# Patient Record
Sex: MALE
[De-identification: names, ages, dates, MRNs, and addresses within clinical notes are randomized per-mention and may not be internally consistent; named-entity substitution may affect disease eponyms.]

---

## 2022-12-02 ENCOUNTER — NURSE TRIAGE (OUTPATIENT)
Dept: OTHER | Facility: CLINIC | Age: 62
End: 2022-12-02

## 2022-12-02 NOTE — TELEPHONE ENCOUNTER
Received call from Kavon at [facility] with [employer]. Subjective: Caller states \"Would like to establish care\"     Current Symptoms: SOB - not new and feeling better than a couple weeks ago  Was told many years ago that his \"lungs are no good\"  Has not seen a provider in about 10 years    Onset:  Been a long time, gradual onset for >1 year    Pain Severity: 0/10    Temperature: Patient is reported to not have a fever. Also denies chills and sweats     What has been tried: Nothing    History related to the current reason for call: See above  Smoker    Recommended disposition: See in Office Within 2 Weeks    Care advice provided, patient verbalizes understanding; denies any other questions or concerns; instructed to call back for any new or worsening symptoms. Patient/Caller agrees with recommended disposition; writer provided warm transfer to [facility] at [facility] for appointment scheduling     Attention Provider: Thank you for allowing me to participate in the care of your patient. The patient was connected to triage in response to information provided to the ECC/PSC. Please do not respond through this encounter as the response is not directed to a shared pool.       Reason for Disposition   Breathing difficulty   [1] MILD longstanding difficulty breathing AND [2]  SAME as normal    Protocols used: Respiratory Multiple Symptoms - Guideline Selection-ADULT-, Breathing Difficulty-ADULT-AH

## 2022-12-20 ENCOUNTER — TELEPHONE (OUTPATIENT)
Dept: INTERNAL MEDICINE CLINIC | Age: 62
End: 2022-12-20

## 2022-12-20 NOTE — TELEPHONE ENCOUNTER
----- Message from Susankarina Vikram sent at 12/19/2022  9:56 AM EST -----  Subject: Appointment Request    Reason for Call: New Patient/New to Provider Appointment needed: New   Patient Request Appointment    QUESTIONS    Reason for appointment request? Requested Provider unavailable - iRck Hillman     Additional Information for Provider? PT had a return from [place] appt set with   PCP on 12/12/22 that was canceled due to transportation. Still has   shortness of breathe and would like to get that appt rescheduled to est   care with PCP. PT also is feeling a lot better from when that appt was   set. All due and missed due to transportation.  Please contact PT to   reschedule appt.  ---------------------------------------------------------------------------  --------------  Charles CHING  [phone]; OK to leave message on voicemail  ---------------------------------------------------------------------------  --------------  SCRIPT ANSWERS  NATACHAID Screen: Felicitas Mcadams

## 2022-12-20 NOTE — TELEPHONE ENCOUNTER
Multiple attempts made to reach patient, phone number disconnected no alternative numbers listed and no patient contacts available.

## 2023-01-01 ENCOUNTER — HOSPITAL ENCOUNTER (INPATIENT)
Age: 63
LOS: 17 days | Discharge: NURSING, REHAB OR LONG-TERM CARE | DRG: 139 | End: 2023-01-19
Attending: EMERGENCY MEDICINE | Admitting: INTERNAL MEDICINE
Payer: MEDICAID

## 2023-01-01 ENCOUNTER — APPOINTMENT (OUTPATIENT)
Dept: GENERAL RADIOLOGY | Age: 63
DRG: 139 | End: 2023-01-01
Payer: MEDICAID

## 2023-01-01 ENCOUNTER — APPOINTMENT (OUTPATIENT)
Dept: CT IMAGING | Age: 63
DRG: 139 | End: 2023-01-01
Payer: MEDICAID

## 2023-01-01 DIAGNOSIS — J96.02 ACUTE RESPIRATORY FAILURE WITH HYPOXIA AND HYPERCAPNIA (HCC): Primary | ICD-10-CM

## 2023-01-01 DIAGNOSIS — R41.82 ALTERED MENTAL STATUS, UNSPECIFIED ALTERED MENTAL STATUS TYPE: ICD-10-CM

## 2023-01-01 DIAGNOSIS — J18.9 COMMUNITY ACQUIRED PNEUMONIA OF RIGHT LOWER LOBE OF LUNG: ICD-10-CM

## 2023-01-01 DIAGNOSIS — J96.01 ACUTE RESPIRATORY FAILURE WITH HYPOXIA AND HYPERCAPNIA (HCC): Primary | ICD-10-CM

## 2023-01-01 LAB
ABSOLUTE EOS #: 0 K/UL (ref 0–0.4)
ABSOLUTE LYMPH #: 0.8 K/UL (ref 1–4.8)
ABSOLUTE MONO #: 0.5 K/UL (ref 0.1–1.3)
ALBUMIN SERPL-MCNC: 3.9 G/DL (ref 3.5–5.2)
ALP BLD-CCNC: 68 U/L (ref 40–129)
ALT SERPL-CCNC: 525 U/L (ref 5–41)
ANION GAP SERPL CALCULATED.3IONS-SCNC: 12 MMOL/L (ref 9–17)
AST SERPL-CCNC: 718 U/L
BACTERIA: ABNORMAL
BASOPHILS # BLD: 1 % (ref 0–2)
BASOPHILS ABSOLUTE: 0.1 K/UL (ref 0–0.2)
BILIRUB SERPL-MCNC: 1.1 MG/DL (ref 0.3–1.2)
BILIRUBIN DIRECT: 0.9 MG/DL
BILIRUBIN URINE: ABNORMAL
BILIRUBIN, INDIRECT: 0.2 MG/DL (ref 0–1)
BUN BLDV-MCNC: 46 MG/DL (ref 8–23)
CALCIUM SERPL-MCNC: 7.9 MG/DL (ref 8.6–10.4)
CASTS UA: ABNORMAL /LPF
CHLORIDE BLD-SCNC: 97 MMOL/L (ref 98–107)
CO2: 29 MMOL/L (ref 20–31)
COLOR: ABNORMAL
CREAT SERPL-MCNC: 1.77 MG/DL (ref 0.7–1.2)
EOSINOPHILS RELATIVE PERCENT: 0 % (ref 0–4)
EPITHELIAL CELLS UA: ABNORMAL /HPF
GFR SERPL CREATININE-BSD FRML MDRD: 43 ML/MIN/1.73M2
GLUCOSE BLD-MCNC: 96 MG/DL (ref 70–99)
GLUCOSE URINE: NEGATIVE
HCT VFR BLD CALC: 41.4 % (ref 41–53)
HEMOGLOBIN: 12.1 G/DL (ref 13.5–17.5)
INFLUENZA A: NOT DETECTED
INFLUENZA B: NOT DETECTED
INR BLD: 2.3
KETONES, URINE: ABNORMAL
LEUKOCYTE ESTERASE, URINE: ABNORMAL
LIPASE: 17 U/L (ref 13–60)
LYMPHOCYTES # BLD: 12 % (ref 24–44)
MAGNESIUM: 2.1 MG/DL (ref 1.6–2.6)
MCH RBC QN AUTO: 20.2 PG (ref 26–34)
MCHC RBC AUTO-ENTMCNC: 29.2 G/DL (ref 31–37)
MCV RBC AUTO: 69.1 FL (ref 80–100)
MONOCYTES # BLD: 8 % (ref 1–7)
NITRITE, URINE: NEGATIVE
PARTIAL THROMBOPLASTIN TIME: 26.5 SEC (ref 24–36)
PDW BLD-RTO: 19.8 % (ref 11.5–14.9)
PH UA: 5 (ref 5–8)
PHOSPHORUS: 4.3 MG/DL (ref 2.5–4.5)
PLATELET # BLD: 55 K/UL (ref 150–450)
PMV BLD AUTO: 8.6 FL (ref 6–12)
POTASSIUM SERPL-SCNC: 4.8 MMOL/L (ref 3.7–5.3)
PRO-BNP: 7797 PG/ML
PROTEIN UA: ABNORMAL
PROTHROMBIN TIME: 24.9 SEC (ref 11.8–14.6)
RBC # BLD: 5.99 M/UL (ref 4.5–5.9)
RBC UA: ABNORMAL /HPF
SARS-COV-2 RNA, RT PCR: NOT DETECTED
SEG NEUTROPHILS: 79 % (ref 36–66)
SEGMENTED NEUTROPHILS ABSOLUTE COUNT: 5.4 K/UL (ref 1.3–9.1)
SODIUM BLD-SCNC: 138 MMOL/L (ref 135–144)
SOURCE: NORMAL
SPECIFIC GRAVITY UA: 1.02 (ref 1–1.03)
SPECIMEN DESCRIPTION: NORMAL
TOTAL PROTEIN: 6.6 G/DL (ref 6.4–8.3)
TROPONIN, HIGH SENSITIVITY: 177 NG/L (ref 0–22)
TURBIDITY: ABNORMAL
URINE HGB: ABNORMAL
UROBILINOGEN, URINE: ABNORMAL
WBC # BLD: 6.8 K/UL (ref 3.5–11)
WBC UA: ABNORMAL /HPF

## 2023-01-01 PROCEDURE — 87636 SARSCOV2 & INF A&B AMP PRB: CPT

## 2023-01-01 PROCEDURE — 84100 ASSAY OF PHOSPHORUS: CPT

## 2023-01-01 PROCEDURE — 71045 X-RAY EXAM CHEST 1 VIEW: CPT

## 2023-01-01 PROCEDURE — 93005 ELECTROCARDIOGRAM TRACING: CPT | Performed by: EMERGENCY MEDICINE

## 2023-01-01 PROCEDURE — 36415 COLL VENOUS BLD VENIPUNCTURE: CPT

## 2023-01-01 PROCEDURE — 85730 THROMBOPLASTIN TIME PARTIAL: CPT

## 2023-01-01 PROCEDURE — 70450 CT HEAD/BRAIN W/O DYE: CPT

## 2023-01-01 PROCEDURE — 80048 BASIC METABOLIC PNL TOTAL CA: CPT

## 2023-01-01 PROCEDURE — 83735 ASSAY OF MAGNESIUM: CPT

## 2023-01-01 PROCEDURE — 2580000003 HC RX 258: Performed by: EMERGENCY MEDICINE

## 2023-01-01 PROCEDURE — 6360000002 HC RX W HCPCS: Performed by: EMERGENCY MEDICINE

## 2023-01-01 PROCEDURE — 80076 HEPATIC FUNCTION PANEL: CPT

## 2023-01-01 PROCEDURE — 81001 URINALYSIS AUTO W/SCOPE: CPT

## 2023-01-01 PROCEDURE — 85025 COMPLETE CBC W/AUTO DIFF WBC: CPT

## 2023-01-01 PROCEDURE — 96375 TX/PRO/DX INJ NEW DRUG ADDON: CPT

## 2023-01-01 PROCEDURE — 36600 WITHDRAWAL OF ARTERIAL BLOOD: CPT

## 2023-01-01 PROCEDURE — 0202U NFCT DS 22 TRGT SARS-COV-2: CPT

## 2023-01-01 PROCEDURE — 83690 ASSAY OF LIPASE: CPT

## 2023-01-01 PROCEDURE — 84484 ASSAY OF TROPONIN QUANT: CPT

## 2023-01-01 PROCEDURE — 99285 EMERGENCY DEPT VISIT HI MDM: CPT

## 2023-01-01 PROCEDURE — 96365 THER/PROPH/DIAG IV INF INIT: CPT

## 2023-01-01 PROCEDURE — 80307 DRUG TEST PRSMV CHEM ANLYZR: CPT

## 2023-01-01 PROCEDURE — 83880 ASSAY OF NATRIURETIC PEPTIDE: CPT

## 2023-01-01 PROCEDURE — 85610 PROTHROMBIN TIME: CPT

## 2023-01-01 RX ORDER — FUROSEMIDE 10 MG/ML
20 INJECTION INTRAMUSCULAR; INTRAVENOUS ONCE
Status: COMPLETED | OUTPATIENT
Start: 2023-01-01 | End: 2023-01-01

## 2023-01-01 RX ADMIN — CEFTRIAXONE SODIUM 1000 MG: 1 INJECTION, POWDER, FOR SOLUTION INTRAMUSCULAR; INTRAVENOUS at 23:04

## 2023-01-01 RX ADMIN — FUROSEMIDE 20 MG: 10 INJECTION, SOLUTION INTRAMUSCULAR; INTRAVENOUS at 22:29

## 2023-01-01 RX ADMIN — AZITHROMYCIN DIHYDRATE 500 MG: 500 INJECTION, POWDER, LYOPHILIZED, FOR SOLUTION INTRAVENOUS at 23:49

## 2023-01-02 ENCOUNTER — APPOINTMENT (OUTPATIENT)
Dept: CT IMAGING | Age: 63
DRG: 139 | End: 2023-01-02
Payer: MEDICAID

## 2023-01-02 ENCOUNTER — APPOINTMENT (OUTPATIENT)
Dept: GENERAL RADIOLOGY | Age: 63
DRG: 139 | End: 2023-01-02
Payer: MEDICAID

## 2023-01-02 ENCOUNTER — APPOINTMENT (OUTPATIENT)
Dept: ULTRASOUND IMAGING | Age: 63
DRG: 139 | End: 2023-01-02
Payer: MEDICAID

## 2023-01-02 PROBLEM — J96.01 ACUTE RESPIRATORY FAILURE WITH HYPOXIA AND HYPERCAPNIA (HCC): Status: ACTIVE | Noted: 2023-01-02

## 2023-01-02 PROBLEM — D69.6 THROMBOCYTOPENIA (HCC): Status: ACTIVE | Noted: 2023-01-02

## 2023-01-02 PROBLEM — R45.1 AGITATION: Status: ACTIVE | Noted: 2023-01-02

## 2023-01-02 PROBLEM — D64.9 ANEMIA: Status: ACTIVE | Noted: 2023-01-02

## 2023-01-02 PROBLEM — J18.9 COMMUNITY ACQUIRED PNEUMONIA OF RIGHT LOWER LOBE OF LUNG: Status: ACTIVE | Noted: 2023-01-02

## 2023-01-02 PROBLEM — J96.00 ACUTE RESPIRATORY FAILURE (HCC): Status: ACTIVE | Noted: 2023-01-02

## 2023-01-02 PROBLEM — J96.02 ACUTE RESPIRATORY FAILURE WITH HYPOXIA AND HYPERCAPNIA (HCC): Status: ACTIVE | Noted: 2023-01-02

## 2023-01-02 PROBLEM — R41.82 ALTERED MENTAL STATUS: Status: ACTIVE | Noted: 2023-01-02

## 2023-01-02 PROBLEM — R74.01 TRANSAMINITIS: Status: ACTIVE | Noted: 2023-01-02

## 2023-01-02 LAB
ABSOLUTE EOS #: 0 K/UL (ref 0–0.4)
ABSOLUTE LYMPH #: 0.67 K/UL (ref 1–4.8)
ABSOLUTE MONO #: 0.45 K/UL (ref 0.1–1.3)
ABSOLUTE RETIC #: 0.16 M/UL (ref 0.02–0.1)
ALBUMIN SERPL-MCNC: 3.3 G/DL (ref 3.5–5.2)
ALLEN TEST: ABNORMAL
ALLEN TEST: ABNORMAL
ALP BLD-CCNC: 59 U/L (ref 40–129)
ALT SERPL-CCNC: 449 U/L (ref 5–41)
AMMONIA: 29 UMOL/L (ref 16–60)
AMPHETAMINE SCREEN URINE: NEGATIVE
ANION GAP SERPL CALCULATED.3IONS-SCNC: 10 MMOL/L (ref 9–17)
AST SERPL-CCNC: 559 U/L
BARBITURATE SCREEN URINE: NEGATIVE
BASOPHILS # BLD: 1 % (ref 0–2)
BASOPHILS ABSOLUTE: 0.06 K/UL (ref 0–0.2)
BENZODIAZEPINE SCREEN, URINE: NEGATIVE
BILIRUB SERPL-MCNC: 0.8 MG/DL (ref 0.3–1.2)
BILIRUBIN URINE: NEGATIVE
BUN BLDV-MCNC: 46 MG/DL (ref 8–23)
C-REACTIVE PROTEIN: 16.4 MG/L (ref 0–5)
CALCIUM SERPL-MCNC: 7.5 MG/DL (ref 8.6–10.4)
CANNABINOID SCREEN URINE: NEGATIVE
CARBOXYHEMOGLOBIN: 2.1 % (ref 0–5)
CARBOXYHEMOGLOBIN: 3.1 % (ref 0–5)
CARBOXYHEMOGLOBIN: 4.3 % (ref 0–5)
CASTS UA: NORMAL /LPF
CASTS UA: NORMAL /LPF
CHLORIDE BLD-SCNC: 99 MMOL/L (ref 98–107)
CO2: 27 MMOL/L (ref 20–31)
COCAINE METABOLITE, URINE: NEGATIVE
COLOR: ABNORMAL
CREAT SERPL-MCNC: 1.5 MG/DL (ref 0.7–1.2)
D-DIMER QUANTITATIVE: 6.7 MG/L FEU (ref 0–0.59)
EOSINOPHILS RELATIVE PERCENT: 0 % (ref 0–4)
EPITHELIAL CELLS UA: NORMAL /HPF
ETHANOL PERCENT: <0.01 %
ETHANOL: <10 MG/DL
FDP: >5 UG/ML
FENTANYL URINE: NEGATIVE
FERRITIN: 14 NG/ML (ref 30–400)
FIBRINOGEN: 141 MG/DL (ref 210–530)
FIO2: 40
FOLATE: 10 NG/ML
GFR SERPL CREATININE-BSD FRML MDRD: 52 ML/MIN/1.73M2
GLUCOSE BLD-MCNC: 101 MG/DL (ref 70–99)
GLUCOSE URINE: NEGATIVE
HAPTOGLOBIN: 60 MG/DL (ref 30–200)
HAV IGM SER IA-ACNC: NONREACTIVE
HCO3 ARTERIAL: 29.3 MMOL/L (ref 22–26)
HCO3 ARTERIAL: 31.4 MMOL/L (ref 22–26)
HCO3 VENOUS: 28.1 MMOL/L (ref 24–30)
HCT VFR BLD CALC: 37.2 % (ref 41–53)
HCT VFR BLD CALC: 38.7 % (ref 41–53)
HCT VFR BLD CALC: 38.8 % (ref 41–53)
HEMOGLOBIN: 10.8 G/DL (ref 13.5–17.5)
HEMOGLOBIN: 11.1 G/DL (ref 13.5–17.5)
HEMOGLOBIN: 11.2 G/DL (ref 13.5–17.5)
HEPATITIS B CORE IGM ANTIBODY: NONREACTIVE
HEPATITIS B SURFACE ANTIGEN: NONREACTIVE
HEPATITIS C ANTIBODY: REACTIVE
HIV AG/AB: NONREACTIVE
INR BLD: 2.2
IRON SATURATION: 3 % (ref 20–55)
IRON: 14 UG/DL (ref 59–158)
KETONES, URINE: ABNORMAL
LACTATE DEHYDROGENASE: 444 U/L (ref 135–225)
LEGIONELLA PNEUMOPHILIA AG, URINE: NEGATIVE
LEUKOCYTE ESTERASE, URINE: ABNORMAL
LYMPHOCYTES # BLD: 12 % (ref 24–44)
MCH RBC QN AUTO: 20.1 PG (ref 26–34)
MCHC RBC AUTO-ENTMCNC: 28.8 G/DL (ref 31–37)
MCV RBC AUTO: 69.7 FL (ref 80–100)
METHADONE SCREEN, URINE: NEGATIVE
METHEMOGLOBIN: 0.5 % (ref 0–1.9)
METHEMOGLOBIN: 0.8 % (ref 0–1.9)
METHEMOGLOBIN: 1 % (ref 0–1.9)
MODE: ABNORMAL
MODE: ABNORMAL
MONOCYTES # BLD: 8 % (ref 1–7)
MORPHOLOGY: ABNORMAL
NITRITE, URINE: NEGATIVE
NUCLEATED RED BLOOD CELLS: 2 PER 100 WBC
O2 DEVICE/FLOW/%: ABNORMAL
O2 DEVICE/FLOW/%: ABNORMAL
O2 SAT, ARTERIAL: 85.8 % (ref 95–98)
O2 SAT, ARTERIAL: 86.3 % (ref 95–98)
O2 SAT, VEN: 72.1 % (ref 60–85)
OPIATES, URINE: NEGATIVE
OXYCODONE SCREEN URINE: NEGATIVE
PARTIAL THROMBOPLASTIN TIME: 38.7 SEC (ref 24–36)
PATIENT TEMP: 37
PCO2 ARTERIAL: 60.3 MMHG (ref 35–45)
PCO2 ARTERIAL: 61.9 MMHG (ref 35–45)
PCO2, VEN: 63 MM HG (ref 39–55)
PDW BLD-RTO: 19.7 % (ref 11.5–14.9)
PEEP/CPAP: 8
PH ARTERIAL: 7.29 (ref 7.35–7.45)
PH ARTERIAL: 7.31 (ref 7.35–7.45)
PH UA: 5 (ref 5–8)
PH VENOUS: 7.26 (ref 7.32–7.42)
PHENCYCLIDINE, URINE: NEGATIVE
PLATELET # BLD: 67 K/UL (ref 150–450)
PMV BLD AUTO: 9.1 FL (ref 6–12)
PO2 ARTERIAL: 58.1 MMHG (ref 80–100)
PO2 ARTERIAL: 63 MMHG (ref 80–100)
PO2, VEN: 48.1 MM HG (ref 30–50)
POSITIVE BASE EXCESS, ART: 2.8 MMOL/L (ref 0–2)
POSITIVE BASE EXCESS, ART: 5.3 MMOL/L (ref 0–2)
POSITIVE BASE EXCESS, VEN: 1 MMOL/L (ref 0–2)
POTASSIUM SERPL-SCNC: 4.8 MMOL/L (ref 3.7–5.3)
PROCALCITONIN: 0.09 NG/ML
PROTEIN UA: ABNORMAL
PROTHROMBIN TIME: 24.4 SEC (ref 11.8–14.6)
PT. POSITION: ABNORMAL
PT. POSITION: ABNORMAL
RBC # BLD: 5.57 M/UL (ref 4.5–5.9)
RBC UA: NORMAL /HPF
RESPIRATORY RATE: 16
RESPIRATORY RATE: 22
RETIC %: 2.8 % (ref 0.5–2)
SAMPLE SITE: ABNORMAL
SAMPLE SITE: ABNORMAL
SEDIMENTATION RATE, ERYTHROCYTE: 1 MM/HR (ref 0–20)
SEG NEUTROPHILS: 79 % (ref 36–66)
SEGMENTED NEUTROPHILS ABSOLUTE COUNT: 4.41 K/UL (ref 1.3–9.1)
SET RATE: 22
SODIUM BLD-SCNC: 136 MMOL/L (ref 135–144)
SPECIFIC GRAVITY UA: 1.01 (ref 1–1.03)
TEST INFORMATION: NORMAL
TEXT FOR RESPIRATORY: ABNORMAL
TOTAL IRON BINDING CAPACITY: 426 UG/DL (ref 250–450)
TOTAL PROTEIN: 5.9 G/DL (ref 6.4–8.3)
TOTAL RATE: 26
TROPONIN, HIGH SENSITIVITY: 184 NG/L (ref 0–22)
TROPONIN, HIGH SENSITIVITY: 195 NG/L (ref 0–22)
TURBIDITY: CLEAR
UNSATURATED IRON BINDING CAPACITY: 412 UG/DL (ref 112–347)
URINE HGB: ABNORMAL
UROBILINOGEN, URINE: NORMAL
VITAMIN B-12: 1926 PG/ML (ref 232–1245)
VT: 500
WBC # BLD: 5.6 K/UL (ref 3.5–11)
WBC UA: NORMAL /HPF

## 2023-01-02 PROCEDURE — 82805 BLOOD GASES W/O2 SATURATION: CPT

## 2023-01-02 PROCEDURE — 2580000003 HC RX 258: Performed by: INTERNAL MEDICINE

## 2023-01-02 PROCEDURE — 76705 ECHO EXAM OF ABDOMEN: CPT

## 2023-01-02 PROCEDURE — 80074 ACUTE HEPATITIS PANEL: CPT

## 2023-01-02 PROCEDURE — 99255 IP/OBS CONSLTJ NEW/EST HI 80: CPT | Performed by: INTERNAL MEDICINE

## 2023-01-02 PROCEDURE — 6370000000 HC RX 637 (ALT 250 FOR IP): Performed by: NURSE PRACTITIONER

## 2023-01-02 PROCEDURE — 6360000002 HC RX W HCPCS: Performed by: INTERNAL MEDICINE

## 2023-01-02 PROCEDURE — 2700000000 HC OXYGEN THERAPY PER DAY

## 2023-01-02 PROCEDURE — 86140 C-REACTIVE PROTEIN: CPT

## 2023-01-02 PROCEDURE — 02HV33Z INSERTION OF INFUSION DEVICE INTO SUPERIOR VENA CAVA, PERCUTANEOUS APPROACH: ICD-10-PCS | Performed by: INTERNAL MEDICINE

## 2023-01-02 PROCEDURE — 2580000003 HC RX 258: Performed by: NURSE PRACTITIONER

## 2023-01-02 PROCEDURE — 85384 FIBRINOGEN ACTIVITY: CPT

## 2023-01-02 PROCEDURE — 87449 NOS EACH ORGANISM AG IA: CPT

## 2023-01-02 PROCEDURE — 81001 URINALYSIS AUTO W/SCOPE: CPT

## 2023-01-02 PROCEDURE — 87389 HIV-1 AG W/HIV-1&-2 AB AG IA: CPT

## 2023-01-02 PROCEDURE — 85018 HEMOGLOBIN: CPT

## 2023-01-02 PROCEDURE — 36592 COLLECT BLOOD FROM PICC: CPT

## 2023-01-02 PROCEDURE — 82140 ASSAY OF AMMONIA: CPT

## 2023-01-02 PROCEDURE — 2000000000 HC ICU R&B

## 2023-01-02 PROCEDURE — 70450 CT HEAD/BRAIN W/O DYE: CPT

## 2023-01-02 PROCEDURE — 87040 BLOOD CULTURE FOR BACTERIA: CPT

## 2023-01-02 PROCEDURE — 6360000002 HC RX W HCPCS: Performed by: NURSE PRACTITIONER

## 2023-01-02 PROCEDURE — 85025 COMPLETE CBC W/AUTO DIFF WBC: CPT

## 2023-01-02 PROCEDURE — 83883 ASSAY NEPHELOMETRY NOT SPEC: CPT

## 2023-01-02 PROCEDURE — 6370000000 HC RX 637 (ALT 250 FOR IP): Performed by: INTERNAL MEDICINE

## 2023-01-02 PROCEDURE — 85730 THROMBOPLASTIN TIME PARTIAL: CPT

## 2023-01-02 PROCEDURE — C9113 INJ PANTOPRAZOLE SODIUM, VIA: HCPCS

## 2023-01-02 PROCEDURE — 71045 X-RAY EXAM CHEST 1 VIEW: CPT

## 2023-01-02 PROCEDURE — 51702 INSERT TEMP BLADDER CATH: CPT

## 2023-01-02 PROCEDURE — 82746 ASSAY OF FOLIC ACID SERUM: CPT

## 2023-01-02 PROCEDURE — 85014 HEMATOCRIT: CPT

## 2023-01-02 PROCEDURE — 94660 CPAP INITIATION&MGMT: CPT

## 2023-01-02 PROCEDURE — 85362 FIBRIN DEGRADATION PRODUCTS: CPT

## 2023-01-02 PROCEDURE — 83010 ASSAY OF HAPTOGLOBIN QUANT: CPT

## 2023-01-02 PROCEDURE — 87522 HEPATITIS C REVRS TRNSCRPJ: CPT

## 2023-01-02 PROCEDURE — 85610 PROTHROMBIN TIME: CPT

## 2023-01-02 PROCEDURE — 84156 ASSAY OF PROTEIN URINE: CPT

## 2023-01-02 PROCEDURE — 84165 PROTEIN E-PHORESIS SERUM: CPT

## 2023-01-02 PROCEDURE — 84145 PROCALCITONIN (PCT): CPT

## 2023-01-02 PROCEDURE — 86335 IMMUNFIX E-PHORSIS/URINE/CSF: CPT

## 2023-01-02 PROCEDURE — 86738 MYCOPLASMA ANTIBODY: CPT

## 2023-01-02 PROCEDURE — 99291 CRITICAL CARE FIRST HOUR: CPT | Performed by: INTERNAL MEDICINE

## 2023-01-02 PROCEDURE — G0480 DRUG TEST DEF 1-7 CLASSES: HCPCS

## 2023-01-02 PROCEDURE — 87086 URINE CULTURE/COLONY COUNT: CPT

## 2023-01-02 PROCEDURE — 2500000003 HC RX 250 WO HCPCS: Performed by: INTERNAL MEDICINE

## 2023-01-02 PROCEDURE — 85652 RBC SED RATE AUTOMATED: CPT

## 2023-01-02 PROCEDURE — 94640 AIRWAY INHALATION TREATMENT: CPT

## 2023-01-02 PROCEDURE — 82607 VITAMIN B-12: CPT

## 2023-01-02 PROCEDURE — 2580000003 HC RX 258

## 2023-01-02 PROCEDURE — 36415 COLL VENOUS BLD VENIPUNCTURE: CPT

## 2023-01-02 PROCEDURE — 31500 INSERT EMERGENCY AIRWAY: CPT

## 2023-01-02 PROCEDURE — 94002 VENT MGMT INPAT INIT DAY: CPT

## 2023-01-02 PROCEDURE — 0BH18EZ INSERTION OF ENDOTRACHEAL AIRWAY INTO TRACHEA, VIA NATURAL OR ARTIFICIAL OPENING ENDOSCOPIC: ICD-10-PCS | Performed by: INTERNAL MEDICINE

## 2023-01-02 PROCEDURE — 85045 AUTOMATED RETICULOCYTE COUNT: CPT

## 2023-01-02 PROCEDURE — 82800 BLOOD PH: CPT

## 2023-01-02 PROCEDURE — 87641 MR-STAPH DNA AMP PROBE: CPT

## 2023-01-02 PROCEDURE — 86334 IMMUNOFIX E-PHORESIS SERUM: CPT

## 2023-01-02 PROCEDURE — 83615 LACTATE (LD) (LDH) ENZYME: CPT

## 2023-01-02 PROCEDURE — 83550 IRON BINDING TEST: CPT

## 2023-01-02 PROCEDURE — 87899 AGENT NOS ASSAY W/OPTIC: CPT

## 2023-01-02 PROCEDURE — 36600 WITHDRAWAL OF ARTERIAL BLOOD: CPT

## 2023-01-02 PROCEDURE — 83540 ASSAY OF IRON: CPT

## 2023-01-02 PROCEDURE — 6360000002 HC RX W HCPCS

## 2023-01-02 PROCEDURE — 84484 ASSAY OF TROPONIN QUANT: CPT

## 2023-01-02 PROCEDURE — 84155 ASSAY OF PROTEIN SERUM: CPT

## 2023-01-02 PROCEDURE — 94761 N-INVAS EAR/PLS OXIMETRY MLT: CPT

## 2023-01-02 PROCEDURE — 6360000002 HC RX W HCPCS: Performed by: STUDENT IN AN ORGANIZED HEALTH CARE EDUCATION/TRAINING PROGRAM

## 2023-01-02 PROCEDURE — 85379 FIBRIN DEGRADATION QUANT: CPT

## 2023-01-02 PROCEDURE — A4216 STERILE WATER/SALINE, 10 ML: HCPCS

## 2023-01-02 PROCEDURE — 5A1945Z RESPIRATORY VENTILATION, 24-96 CONSECUTIVE HOURS: ICD-10-PCS | Performed by: INTERNAL MEDICINE

## 2023-01-02 PROCEDURE — 82728 ASSAY OF FERRITIN: CPT

## 2023-01-02 PROCEDURE — 80053 COMPREHEN METABOLIC PANEL: CPT

## 2023-01-02 PROCEDURE — 2500000003 HC RX 250 WO HCPCS

## 2023-01-02 RX ORDER — GUAIFENESIN 600 MG/1
600 TABLET, EXTENDED RELEASE ORAL 2 TIMES DAILY PRN
Status: DISCONTINUED | OUTPATIENT
Start: 2023-01-02 | End: 2023-01-19 | Stop reason: HOSPADM

## 2023-01-02 RX ORDER — SODIUM CHLORIDE 9 MG/ML
INJECTION, SOLUTION INTRAVENOUS CONTINUOUS
Status: DISCONTINUED | OUTPATIENT
Start: 2023-01-02 | End: 2023-01-04

## 2023-01-02 RX ORDER — LINEZOLID 2 MG/ML
600 INJECTION, SOLUTION INTRAVENOUS EVERY 12 HOURS
Status: DISCONTINUED | OUTPATIENT
Start: 2023-01-02 | End: 2023-01-02 | Stop reason: ALTCHOICE

## 2023-01-02 RX ORDER — SODIUM CHLORIDE 9 MG/ML
INJECTION, SOLUTION INTRAVENOUS PRN
Status: DISCONTINUED | OUTPATIENT
Start: 2023-01-02 | End: 2023-01-19 | Stop reason: HOSPADM

## 2023-01-02 RX ORDER — SODIUM CHLORIDE 0.9 % (FLUSH) 0.9 %
5-40 SYRINGE (ML) INJECTION PRN
Status: DISCONTINUED | OUTPATIENT
Start: 2023-01-02 | End: 2023-01-19 | Stop reason: HOSPADM

## 2023-01-02 RX ORDER — PROPOFOL 10 MG/ML
5-50 INJECTION, EMULSION INTRAVENOUS CONTINUOUS
Status: DISCONTINUED | OUTPATIENT
Start: 2023-01-02 | End: 2023-01-05

## 2023-01-02 RX ORDER — SODIUM CHLORIDE 0.9 % (FLUSH) 0.9 %
5-40 SYRINGE (ML) INJECTION EVERY 12 HOURS SCHEDULED
Status: DISCONTINUED | OUTPATIENT
Start: 2023-01-02 | End: 2023-01-19 | Stop reason: HOSPADM

## 2023-01-02 RX ORDER — FUROSEMIDE 10 MG/ML
20 INJECTION INTRAMUSCULAR; INTRAVENOUS ONCE
Status: COMPLETED | OUTPATIENT
Start: 2023-01-02 | End: 2023-01-02

## 2023-01-02 RX ORDER — ACETAMINOPHEN 325 MG/1
650 TABLET ORAL EVERY 6 HOURS PRN
Status: DISCONTINUED | OUTPATIENT
Start: 2023-01-02 | End: 2023-01-19 | Stop reason: HOSPADM

## 2023-01-02 RX ORDER — ONDANSETRON 2 MG/ML
4 INJECTION INTRAMUSCULAR; INTRAVENOUS EVERY 6 HOURS PRN
Status: DISCONTINUED | OUTPATIENT
Start: 2023-01-02 | End: 2023-01-19 | Stop reason: HOSPADM

## 2023-01-02 RX ORDER — ONDANSETRON 4 MG/1
4 TABLET, ORALLY DISINTEGRATING ORAL EVERY 8 HOURS PRN
Status: DISCONTINUED | OUTPATIENT
Start: 2023-01-02 | End: 2023-01-19 | Stop reason: HOSPADM

## 2023-01-02 RX ORDER — HEPARIN SODIUM 10000 [USP'U]/100ML
5-30 INJECTION, SOLUTION INTRAVENOUS CONTINUOUS
Status: DISCONTINUED | OUTPATIENT
Start: 2023-01-02 | End: 2023-01-02

## 2023-01-02 RX ORDER — METRONIDAZOLE 500 MG/100ML
500 INJECTION, SOLUTION INTRAVENOUS EVERY 8 HOURS
Status: DISCONTINUED | OUTPATIENT
Start: 2023-01-02 | End: 2023-01-05

## 2023-01-02 RX ORDER — NICOTINE 21 MG/24HR
1 PATCH, TRANSDERMAL 24 HOURS TRANSDERMAL DAILY
Status: DISCONTINUED | OUTPATIENT
Start: 2023-01-02 | End: 2023-01-19 | Stop reason: HOSPADM

## 2023-01-02 RX ORDER — ALBUTEROL SULFATE 2.5 MG/3ML
2.5 SOLUTION RESPIRATORY (INHALATION)
Status: DISCONTINUED | OUTPATIENT
Start: 2023-01-02 | End: 2023-01-19 | Stop reason: HOSPADM

## 2023-01-02 RX ORDER — ENOXAPARIN SODIUM 100 MG/ML
40 INJECTION SUBCUTANEOUS DAILY
Status: DISCONTINUED | OUTPATIENT
Start: 2023-01-02 | End: 2023-01-02

## 2023-01-02 RX ORDER — IPRATROPIUM BROMIDE AND ALBUTEROL SULFATE 2.5; .5 MG/3ML; MG/3ML
1 SOLUTION RESPIRATORY (INHALATION) EVERY 4 HOURS
Status: DISCONTINUED | OUTPATIENT
Start: 2023-01-02 | End: 2023-01-07

## 2023-01-02 RX ORDER — ACETAMINOPHEN 650 MG/1
650 SUPPOSITORY RECTAL EVERY 6 HOURS PRN
Status: DISCONTINUED | OUTPATIENT
Start: 2023-01-02 | End: 2023-01-19 | Stop reason: HOSPADM

## 2023-01-02 RX ORDER — DEXMEDETOMIDINE HYDROCHLORIDE 4 UG/ML
.1-1.5 INJECTION, SOLUTION INTRAVENOUS CONTINUOUS
Status: DISCONTINUED | OUTPATIENT
Start: 2023-01-02 | End: 2023-01-02

## 2023-01-02 RX ORDER — PROPOFOL 10 MG/ML
INJECTION, EMULSION INTRAVENOUS
Status: COMPLETED
Start: 2023-01-02 | End: 2023-01-02

## 2023-01-02 RX ORDER — 0.9 % SODIUM CHLORIDE 0.9 %
1000 INTRAVENOUS SOLUTION INTRAVENOUS ONCE
Status: DISCONTINUED | OUTPATIENT
Start: 2023-01-02 | End: 2023-01-07

## 2023-01-02 RX ORDER — IPRATROPIUM BROMIDE AND ALBUTEROL SULFATE 2.5; .5 MG/3ML; MG/3ML
1 SOLUTION RESPIRATORY (INHALATION)
Status: DISCONTINUED | OUTPATIENT
Start: 2023-01-02 | End: 2023-01-02

## 2023-01-02 RX ORDER — POLYETHYLENE GLYCOL 3350 17 G/17G
17 POWDER, FOR SOLUTION ORAL DAILY PRN
Status: DISCONTINUED | OUTPATIENT
Start: 2023-01-02 | End: 2023-01-19 | Stop reason: HOSPADM

## 2023-01-02 RX ADMIN — METRONIDAZOLE 500 MG: 500 INJECTION, SOLUTION INTRAVENOUS at 15:36

## 2023-01-02 RX ADMIN — SODIUM CHLORIDE, PRESERVATIVE FREE 40 MG: 5 INJECTION INTRAVENOUS at 15:34

## 2023-01-02 RX ADMIN — CEFEPIME 2000 MG: 2 INJECTION, POWDER, FOR SOLUTION INTRAVENOUS at 16:01

## 2023-01-02 RX ADMIN — IPRATROPIUM BROMIDE AND ALBUTEROL SULFATE 1 AMPULE: 2.5; .5 SOLUTION RESPIRATORY (INHALATION) at 23:16

## 2023-01-02 RX ADMIN — IPRATROPIUM BROMIDE AND ALBUTEROL SULFATE 1 AMPULE: 2.5; .5 SOLUTION RESPIRATORY (INHALATION) at 16:24

## 2023-01-02 RX ADMIN — IPRATROPIUM BROMIDE AND ALBUTEROL SULFATE 1 AMPULE: 2.5; .5 SOLUTION RESPIRATORY (INHALATION) at 19:43

## 2023-01-02 RX ADMIN — METRONIDAZOLE 500 MG: 500 INJECTION, SOLUTION INTRAVENOUS at 21:36

## 2023-01-02 RX ADMIN — NOREPINEPHRINE BITARTRATE 1 MCG/MIN: 1 INJECTION, SOLUTION, CONCENTRATE INTRAVENOUS at 16:48

## 2023-01-02 RX ADMIN — IPRATROPIUM BROMIDE AND ALBUTEROL SULFATE 1 AMPULE: 2.5; .5 SOLUTION RESPIRATORY (INHALATION) at 07:26

## 2023-01-02 RX ADMIN — SODIUM CHLORIDE, PRESERVATIVE FREE 10 ML: 5 INJECTION INTRAVENOUS at 21:39

## 2023-01-02 RX ADMIN — CEFEPIME 2000 MG: 2 INJECTION, POWDER, FOR SOLUTION INTRAVENOUS at 21:38

## 2023-01-02 RX ADMIN — DEXMEDETOMIDINE HYDROCHLORIDE 0.4 MCG/KG/HR: 400 INJECTION INTRAVENOUS at 10:40

## 2023-01-02 RX ADMIN — WATER 20 MG: 1 INJECTION INTRAMUSCULAR; INTRAVENOUS; SUBCUTANEOUS at 10:55

## 2023-01-02 RX ADMIN — FUROSEMIDE 20 MG: 10 INJECTION, SOLUTION INTRAMUSCULAR; INTRAVENOUS at 11:00

## 2023-01-02 RX ADMIN — THIAMINE HYDROCHLORIDE: 100 INJECTION, SOLUTION INTRAMUSCULAR; INTRAVENOUS at 14:35

## 2023-01-02 RX ADMIN — PROPOFOL 20 MCG/KG/MIN: 10 INJECTION, EMULSION INTRAVENOUS at 15:59

## 2023-01-02 RX ADMIN — SODIUM CHLORIDE 100 ML/HR: 9 INJECTION, SOLUTION INTRAVENOUS at 18:25

## 2023-01-02 RX ADMIN — VANCOMYCIN HYDROCHLORIDE 2000 MG: 1 INJECTION, POWDER, LYOPHILIZED, FOR SOLUTION INTRAVENOUS at 16:43

## 2023-01-02 RX ADMIN — ALBUTEROL SULFATE 2.5 MG: 2.5 SOLUTION RESPIRATORY (INHALATION) at 11:15

## 2023-01-02 ASSESSMENT — ENCOUNTER SYMPTOMS
SHORTNESS OF BREATH: 0
CHOKING: 0
VOMITING: 0
ANAL BLEEDING: 1
COUGH: 0
STRIDOR: 0
DIARRHEA: 0
SORE THROAT: 0
TROUBLE SWALLOWING: 0
CHEST TIGHTNESS: 0
COLOR CHANGE: 0
BACK PAIN: 0
NAUSEA: 0
APNEA: 1
EYE ITCHING: 0
FACIAL SWELLING: 0
CONSTIPATION: 0
BLOOD IN STOOL: 0
VOICE CHANGE: 0
WHEEZING: 0
ABDOMINAL PAIN: 0
EYE DISCHARGE: 0
EYE PAIN: 0

## 2023-01-02 ASSESSMENT — PULMONARY FUNCTION TESTS
PIF_VALUE: 21
PIF_VALUE: 20
PIF_VALUE: 23
PIF_VALUE: 22
PIF_VALUE: 27
PIF_VALUE: 20
PIF_VALUE: 21

## 2023-01-02 NOTE — CONSULTS
[facility]   CONSULT NOTE:      DATE OF CONSULT 1/2/2023    REASON FOR CONSULTATION:  Acute hypoxemic and hypercapnic respiratory failure      PCP No primary care provider on file. CHIEF COMPLAINT: (Altered mental status shortness of breath)    HISTORY OF PRESENT ILLNESS:     70-year-old male. Patient has not seen a physician in several years and is on no medications. Per significant other's report, the patient has been confused for the past week. This has been worse and worse yesterday prompting evaluation by EMS. O2 saturations were 75%. The patient  Was placed on BiPAP after VBG revealed 7.258 PCO2 62 PO2 48. The patient was placed on BiPAP chest x-ray revealed right lower lobe infiltrate. There may be a left upper lobe bullous disease. Unclear at this time. Patient placed on Rocephin and Zithromax DuoNeb treatments. Patient also on Zyvox. On NicoDerm patch. Patient Lovenox was held because of a INR  of 2.3. Count is 55-67.   not obtainable to 5. A lot of the history is taken from patient's significant other, Pa Solis     ALLERGIES:  No Known Allergies    HOME MEDICATIONS:  No medications prior to admission. PAST MEDICAL HISTORY:  History reviewed. No pertinent past medical history. PAST SURGICAL HISTORY:  History reviewed. No pertinent surgical history.        SOCIAL HISTORY:  Social History     Socioeconomic History    Marital status: Single     Spouse name: Not on file    Number of children: Not on file    Years of education: Not on file    Highest education level: Not on file   Occupational History    Not on file   Tobacco Use    Smoking status: Every Day     Packs/day: 1.00     Years: 40.00     Pack years: 40.00     Types: Cigarettes    Smokeless tobacco: Never   Substance and Sexual Activity    Alcohol use: Yes     Comment: beer and scotch    Drug use: Not Currently     Comment: over 20 years (stated by daughter) Sexual activity: Not on file   Other Topics Concern    Not on file   Social History Narrative    Not on file     Social Determinants of Health     Financial Resource Strain: Not on file   Food Insecurity: Not on file   Transportation Needs: Not on file   Physical Activity: Not on file   Stress: Not on file   Social Connections: Not on file   Intimate Partner Violence: Not on file   Housing Stability: Not on file       FAMILY HISTORY:  History reviewed. No pertinent family history. REVIEW OF SYSTEMS:  All other systems reviewed and are negative. PHYSICAL EXAM:  Vital Signs Blood pressure 126/73, pulse 71, temperature 98.2 °F (36.8 °C), temperature source Axillary, resp. rate 13, weight 180 lb 12.4 oz (82 kg), SpO2 90 %. Oxygen Amount and Delivery: O2 Flow Rate (L/min): (S) 15 L/min    Admission Weight Weight: 180 lb 12.4 oz (82 kg)    General Appearance   Chronically ill-appearing  Head  Normocephalic, without obvious abnormality, atraumatic    Eyes  conjunctivae/corneas clear. PERRL,      Neck  no adenopathy,  Lungs coarse breath sounds no crackles rales or wheezes.     Heart: regular rate and rhythm, S1, S2 normal, no murmur, click, rub or gallop  Abdomen  soft, non-tender; bowel sounds normal; no masses,  no organomegaly  Extremities no signs of cyanosis    Skin  Skin color, texture, turgor normal. No rashes or lesions  Neurologic: A able to communicate but is confused    Lab Review  CBC     Lab Results   Component Value Date/Time    WBC 5.6 01/02/2023 05:43 AM    RBC 5.57 01/02/2023 05:43 AM    HGB 11.2 01/02/2023 05:43 AM    HCT 38.8 01/02/2023 05:43 AM    PLT 67 01/02/2023 05:43 AM    MCV 69.7 01/02/2023 05:43 AM    MCH 20.1 01/02/2023 05:43 AM    MCHC 28.8 01/02/2023 05:43 AM    RDW 19.7 01/02/2023 05:43 AM    NRBC 2 01/02/2023 05:43 AM    LYMPHOPCT 12 01/02/2023 05:43 AM    MONOPCT 8 01/02/2023 05:43 AM    BASOPCT 1 01/02/2023 05:43 AM    MONOSABS 0.45 01/02/2023 05:43 AM    LYMPHSABS 0.67 01/02/2023 05:43 AM    EOSABS 0.00 01/02/2023 05:43 AM    BASOSABS 0.06 01/02/2023 05:43 AM       BMP   Lab Results   Component Value Date/Time     01/02/2023 03:53 AM    K 4.8 01/02/2023 03:53 AM    CL 99 01/02/2023 03:53 AM    CO2 27 01/02/2023 03:53 AM    BUN 46 01/02/2023 03:53 AM    CREATININE 1.50 01/02/2023 03:53 AM    GLUCOSE 101 01/02/2023 03:53 AM    CALCIUM 7.5 01/02/2023 03:53 AM       LFTS  Lab Results   Component Value Date/Time    ALKPHOS 59 01/02/2023 03:53 AM     01/02/2023 03:53 AM     01/02/2023 03:53 AM    PROT 5.9 01/02/2023 03:53 AM    BILITOT 0.8 01/02/2023 03:53 AM    BILIDIR 0.9 01/01/2023 09:59 PM    IBILI 0.2 01/01/2023 09:59 PM    LABALBU 3.3 01/02/2023 03:53 AM       INR  Recent Labs     01/01/23 2159   PROTIME 24.9*   INR 2.3       APTT  Recent Labs     01/01/23 2159   APTT 26.5       Lactic Acid  No results found for: LACTA     PRO-BNP   Recent Labs     01/01/23  2159   PROBNP 7,797*           ABGs:   Lab Results   Component Value Date/Time    PHART 7.295 01/02/2023 12:20 AM    PO2ART 63.0 01/02/2023 12:20 AM    UKL9SWP 60.3 01/02/2023 12:20 AM       Lab Results   Component Value Date/Time    MODE BIPAP 01/02/2023 12:20 AM         Impression  1. Acute respiratory failure with hypoxemia hypercapnia  2. Suspect community acquired pneumonia  3. Suspect COPD active tobacco use greater than 1 pack/day  4. Confusion altered mental status  5. INR 2.3  6. Elevated transaminases  7. Thrombocytopenia  8. Suspect liver disease given numbers 5,6,7  9. Elevated BNP  10. Tobacco abuse  11. Acute kidney injury versus chronic renal insufficiency  12.   Full code        Plan:      Await blood cultures  On Rocephin and Zithromax  No suspicion of nosocomial pneumonia, will stop Zyvox  Liver ultrasound pending  Hold pharmacological anticoagulation DVT prophylaxis given INR 2.3  Check echo  Try patient off BiPAP  Follow-up for any signs of respiratory decline    Updated patient's significant other, Sil Alfa her home number is [phone].   She cannot recall where her cell phone is    Thank you for the consult    Cc time 45 minutes    Dwayne Bullock MD

## 2023-01-02 NOTE — PROGRESS NOTES
Patient transferred to ICU from ER with RN and RT at bedside while attached to portable monitor. RN assessed patient and attached bedside monitor. Call light within reach and bed in the lowest position. Family at bedside. Patient currently wearing BIPAP.

## 2023-01-02 NOTE — PROGRESS NOTES
Vancomycin Dosing by Pharmacy - Initial Note   TODAY'S DATE:  1/2/2023  Patient name, age:  Catalina Fong, 60 y.o. Indication: Respiratory infection, MRSA suspected  Additional antimicrobials:  cefepime, flagyl     Allergies:  Patient has no known allergies. Actual Weight:    Wt Readings from Last 1 Encounters:   01/02/23 180 lb 12.4 oz (82 kg)     Labs/Ancillary Data  Estimated Creatinine Clearance: 52 mL/min (A) (based on SCr of 1.5 mg/dL (H)). Recent Labs     01/01/23  2159 01/02/23  0353 01/02/23  0543   CREATININE 1.77* 1.50*  --    BUN 46* 46*  --    WBC 6.8  --  5.6     Procalcitonin   Date Value Ref Range Status   01/02/2023 0.09 (H) <0.09 ng/mL Final     Comment:           Suspected Sepsis:  <0.50 ng/mL     Low likelihood of sepsis. 0.50-2.00 ng/mL     Increased likelihood of sepsis. Antibiotics encouraged. >2.00 ng/mL     High risk of sepsis/shock. Antibiotics strongly encouraged. Suspected Lower Resp Tract Infections:  <0.24 ng/mL     Low likelihood of bacterial infection. >0.24 ng/mL     Increased likelihood of bacterial infection. Antibiotics encouraged. With successful antibiotic therapy, PCT levels should decrease rapidly. (Half-life of 24 to   36 hours.)        Procalcitonin values from samples collected within the first 6 hours of systemic infection   may still be low. Retesting may be indicated. Values from day 1 and day 4 can be entered into the Change in Procalcitonin Calculator   ([email or web address]) to determine the patient's Mortality Risk Prognosis        In healthy neonates, plasma Procalcitonin (PCT) concentrations increase gradually after   birth, reaching peak values at about 24 hours of age then decrease to normal values below   0.5 ng/mL by 48-72 hours of age.          Intake/Output Summary (Last 24 hours) at 1/2/2023 1431  Last data filed at 1/2/2023 4423  Gross per 24 hour   Intake --   Output 290 ml   Net -290 ml     Temp: 98.6    Recent vancomycin administrations        No vancomycin IV orders with administrations found. Orders not given:            vancomycin (VANCOCIN) intermittent dosing (placeholder)    vancomycin (VANCOCIN) 2,000 mg in dextrose 5 % 500 mL IVPB                  Culture Date / Source  /  Results  See micro     MRSA Nasal Swab: was ordered by provider, awaiting results. Lore Kramer PLAN   Initial loading dose of 25mg/kg (max of 2,500 mg) = 2000  mg  x 1, then  vancomycin 1250 mg IV every 24 hours. Ensured BUN/sCr ordered at baseline and every 48 hours x at least 3 levels, then at least weekly. Vancomycin level ordered for 01/04 @ 0600. Will use bayesian method for dosing. This level will not be a trough. Target AUC/KENDALL: 400-600. Vancomycin Target Concentration Parameters  Treatment  Population Target AUC/KENDALL Target Trough   Invasive MRSA Infection (bacteremia, pneumonia, meningitis, endocarditis, osteomyelitis)  Sepsis (undifferentiated) 400-600 N/A   Infection due to non-MRSA pathogen  Empiric treatment of non-invasive MRSA infection  (SSTI, UTI) <500 10-15 mg/L   CrCl < 29 mL/min  Rapidly fluctuating serum creatinine   KENTRELL N/A < 15 mg/L     Renal replacement therapy is dosed by levels, per hospital protocol. Abbreviations  * Pauc: probability that AUC is >400 (efficacy); Pconc: probability that Ctrough is above 20 ?g/mL (toxicity); Tox: Probability of nephrotoxicity, based on Ricky et al. Clin Infect Dis 2009. Loading dose: 2000 mg at 14:00 01/02/2023. Regimen: 1250 mg IV every 24 hours. Start time: 14:28 on 01/02/2023  Exposure target: AUC24 (range)400-600 mg/L.hr   AUC24,ss: 486 mg/L.hr  Probability of AUC24 > 400: 71 %  Ctrough,ss: 14.7 mg/L  Probability of Ctrough,ss > 20: 24 %  Probability of nephrotoxicity (Lodise ELIGIO 2009): 10 %    Thank you for the consult. Pharmacy will continue to follow.     Lakeisha Valiente PharmD, BCPS  1/2/2023 2:32 PM

## 2023-01-02 NOTE — ED NOTES
Mode of arrival (squad #, walk in, police, etc) : EMS        Chief complaint(s): Altered Mental Status        Arrival Note (brief scenario, treatment PTA, etc). : Ems was called to pts home for AMS. Per pts wife pt has been experiencing AMS for the last 2 weeks but today was \"worse\" and she noticed \"slurred speech\". EMS found patient on 75% RA, pt arrived with nonrebreather 10L. Pt alert to self only. C= \"Have you ever felt that you should Cut down on your drinking? \"  No  A= \"Have people Annoyed you by criticizing your drinking? \"  No  G= \"Have you ever felt bad or Guilty about your drinking? \"  No  E= \"Have you ever had a drink as an Eye-opener first thing in the morning to steady your nerves or to help a hangover? \"  No      Deferred []      Reason for deferring: N/A    *If yes to two or more: probable alcohol abuse. Marcela Schrader RN  01/01/23 9381
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
## 2023-01-02 NOTE — PROGRESS NOTES
Precedex stopped due to pt over-sedated. Precedex has been weaned down to off but patient remained obtunded. SPO2 decreased to 85-87%. Nasal cannula at 6L without improvement. Pt's wrist restraints removed and bipap applied. Domenic Shepherd RT to room to trouble shoot. Mask changed, FIO2 increased. Dr Yajaira Evans to room, updated. He spoke to pt's daughter and S/O at bedside. Decision made to intubate.

## 2023-01-02 NOTE — PROGRESS NOTES
Critical care follow-up    Called to see because of worsening respiratory difficulty and worsening mental status. Patient was only on 0.6 of Precedex. Given Geodon IM earlier 20 mg    Patient currently not responding at this time. On BiPAP now on AVAPS. Sats stable. Blood pressure marginal at this time. I told the patient's significant other and patient's daughter that intubation is necessary. They voiced understanding. Significant other also gave verbal consent for either PICC line or central line. Will proceed. Patient critically ill.     Isabel Padilla MD

## 2023-01-02 NOTE — PROGRESS NOTES
Writer and senior resident were notified of patient's agitation. At time we arrived in the ICU patient was already started on Percedex and was put in soft restraints. Patient then received an IM dose of Geodon. Ethanol blood level pending.

## 2023-01-02 NOTE — PLAN OF CARE
Problem: Discharge Planning  Goal: Discharge to home or other facility with appropriate resources  Outcome: Progressing  Flowsheets (Taken 1/2/2023 0145)  Discharge to home or other facility with appropriate resources:   Identify barriers to discharge with patient and caregiver   Arrange for needed discharge resources and transportation as appropriate   Identify discharge learning needs (meds, wound care, etc)   Refer to discharge planning if patient needs post-hospital services based on physician order or complex needs related to functional status, cognitive ability or social support system     Problem: Safety - Adult  Goal: Free from fall injury  Outcome: Progressing     Problem: ABCDS Injury Assessment  Goal: Absence of physical injury  Outcome: Progressing     Problem: Skin/Tissue Integrity  Goal: Absence of new skin breakdown  Description: 1. Monitor for areas of redness and/or skin breakdown  2. Assess vascular access sites hourly  3. Every 4-6 hours minimum:  Change oxygen saturation probe site  4. Every 4-6 hours:  If on nasal continuous positive airway pressure, respiratory therapy assess nares and determine need for appliance change or resting period.   Outcome: Progressing

## 2023-01-02 NOTE — ED PROVIDER NOTES
EMERGENCY DEPARTMENT ENCOUNTER    Pt Name: Jaison Marvin  MRN: [id number]  [place] 1960  Date of evaluation: 1/1/23  CHIEF COMPLAINT       Chief Complaint   Patient presents with    Altered Mental Status     HISTORY OF PRESENT ILLNESS   68-year-old male presenting to the ER with altered mental state. Per the wife, the patient has been getting more and more confused since 2 weeks ago. It has progressively been getting worse and today it was just to the point where he needed to be assessed and treated. Patient does not normally see a physician. The wife called EMS. The history is provided by the EMS personnel and the spouse. Altered Mental Status  Presenting symptoms: behavior changes, confusion and partial responsiveness    Severity:  Moderate  Episode history:  Single  Duration:  2 weeks  Timing:  Constant  Progression:  Worsening  Chronicity:  New        REVIEW OF SYSTEMS     Review of Systems   Unable to perform ROS: Mental status change   Psychiatric/Behavioral:  Positive for confusion. PASTMEDICAL HISTORY   History reviewed. No pertinent past medical history. Past Problem List  Patient Active Problem List   Diagnosis Code    Acute respiratory failure ([facility] J96.00       SURGICAL HISTORY     History reviewed. No pertinent surgical history. CURRENT MEDICATIONS       Previous Medications    No medications on file     ALLERGIES     has No Known Allergies. FAMILY HISTORY     has no family status information on file. SOCIAL HISTORY        PHYSICAL EXAM     INITIAL VITALS: /78   Pulse 72   Temp 98.4 °F (36.9 °C)   Resp 22   SpO2 90%    Physical Exam  Constitutional:       General: He is not in acute distress. Appearance: Normal appearance. He is ill-appearing. HENT:      Head: Normocephalic and atraumatic. Right Ear: External ear normal.      Left Ear: External ear normal.      Nose: Nose normal. No congestion or rhinorrhea.    Eyes:      Extraocular Movements: Extraocular movements intact. Pupils: Pupils are equal, round, and reactive to light. Cardiovascular:      Rate and Rhythm: Normal rate and regular rhythm. Pulses: Normal pulses. Heart sounds: Normal heart sounds. Pulmonary:      Effort: Pulmonary effort is normal. No respiratory distress. Breath sounds: Normal breath sounds. Abdominal:      General: Bowel sounds are normal.      Palpations: Abdomen is soft. Musculoskeletal:         General: No deformity. Normal range of motion. Cervical back: Normal range of motion. No rigidity. Right lower leg: Edema present. Left lower leg: Edema present. Skin:     General: Skin is warm and dry. Neurological:      General: No focal deficit present. Mental Status: He is alert. He is confused. GCS: GCS eye subscore is 3. GCS verbal subscore is 4. GCS motor subscore is 5. Psychiatric:         Speech: Speech is slurred. Behavior: Behavior is slowed. MEDICAL DECISION MAKING / ED COURSE:   Summary of Patient Presentation: Patient presenting in respiratory distress and altered mental state progressively worsening for the last 2 weeks.       1)  Number and Complexity of Problems Addressed at this Encounter  Problem List This Visit: Altered mental state and respiratory distress    Differential Diagnosis: Intracranial abnormality, CO2 retention, pneumonia, urinary tract infection      Pertinent Comorbid Conditions: Patient does not see a physician regularly    2)  Data Reviewed and Analyzed  (Lab and radiology tests/orders below in next section)        My EKG interpretation: Did not display signs of acute cardiac ischemia    Tests considered but not ordered and why: CT chest pulmonary embolus, the patient responded well to BiPAP and did display signs of pneumonia on chest x-ray imaging      Imaging that is independently reviewed and interpreted by me as: Chest x-ray and CT of the head did not display signs of acute abnormality. 3)  Treatment and Disposition         Patient repeat assessment: Patient doing much better after being on BiPAP. Patient presenting in altered mental state and respiratory distress. Cardiac work-up in the ER did not display positive signs of acute cardiac ischemia. Patient is showing signs of pneumonia as well as a small pleural effusion on x-ray imaging. Patient started on IV antibiotics in the ER. Patient initially hypercapnic and altered but after BiPAP treatment in the ER the patient's mental status has been improving. Patient was admitted to the ICU after speaking with the admitting team and after consulting pulmonology critical care physician was spoken with. Patient and family understand and agree with the plan. CRITICAL CARE:   40 minutes    PROCEDURES:    Procedures      DATA FOR LAB AND RADIOLOGY TESTS ORDERED BELOW ARE REVIEWED BY THE ED CLINICIAN:    RADIOLOGY: All x-rays, CT, MRI, and formal ultrasound images (except ED bedside ultrasound) are read by the radiologist, see reports below, unless otherwise noted in MDM or here. Reports below are reviewed by myself. CT HEAD WO CONTRAST   Final Result   No acute intracranial abnormality. Mild chronic small vessel ischemic disease. XR CHEST PORTABLE   Final Result   Right lower lobe pneumonia and small right pleural effusion      Large lucent area in the left apex suggesting a large bulla however   superimposed pneumothorax cannot be excluded. Follow-up CT would be useful   for further evaluation. The findings were sent to the Radiology Results [id number] at 10:31   pm on 1/1/2023 to be communicated to a licensed caregiver. LABS: Lab orders shown below, the results are reviewed by myself, and all abnormals are listed below.   Labs Reviewed   TROPONIN - Abnormal; Notable for the following components:       Result Value    Troponin, High Sensitivity 177 (*)     All other components within normal limits   TROPONIN - Abnormal; Notable for the following components:    Troponin, High Sensitivity 184 (*)     All other components within normal limits   PROTIME-INR - Abnormal; Notable for the following components:    Protime 24.9 (*)     All other components within normal limits   BASIC METABOLIC PANEL - Abnormal; Notable for the following components:    BUN 46 (*)     Creatinine 1.77 (*)     Est, Glom Filt Rate 43 (*)     Calcium 7.9 (*)     Chloride 97 (*)     All other components within normal limits   CBC WITH AUTO DIFFERENTIAL - Abnormal; Notable for the following components:    RBC 5.99 (*)     Hemoglobin 12.1 (*)     MCV 69.1 (*)     MCH 20.2 (*)     MCHC 29.2 (*)     RDW 19.8 (*)     Platelets 55 (*)     Seg Neutrophils 79 (*)     Lymphocytes 12 (*)     Monocytes 8 (*)     Absolute Lymph # 0.80 (*)     All other components within normal limits   BRAIN NATRIURETIC PEPTIDE - Abnormal; Notable for the following components:    Pro-BNP 7,797 (*)     All other components within normal limits   HEPATIC FUNCTION PANEL - Abnormal; Notable for the following components:     (*)      (*)     Bilirubin, Direct 0.9 (*)     All other components within normal limits   URINALYSIS WITH REFLEX TO CULTURE - Abnormal; Notable for the following components:    Color, UA Orange (*)     Turbidity UA Cloudy (*)     Bilirubin Urine NEGATIVE  Verified by ictotest. (*)     Ketones, Urine TRACE (*)     Urine Hgb LARGE (*)     Protein, UA 2+ (*)     Urobilinogen, Urine ELEVATED (*)     Leukocyte Esterase, Urine SMALL (*)     All other components within normal limits   MICROSCOPIC URINALYSIS - Abnormal; Notable for the following components:    Bacteria, UA FEW (*)     All other components within normal limits   BLOOD GAS, ARTERIAL - Abnormal; Notable for the following components:    pH, Arterial 7.295 (*)     pCO2, Arterial 60.3 (*)     pO2, Arterial 63.0 (*)     HCO3, Arterial 29.3 (*)     Positive Base Excess, Art 2.8 (*)     O2 Sat, Arterial 85.8 (*)     All other components within normal limits   COVID-19 & INFLUENZA COMBO   APTT   PHOSPHORUS   MAGNESIUM   LIPASE   BLOOD GAS, VENOUS       Vitals Reviewed:    Vitals:    01/01/23 2214 01/01/23 2229 01/01/23 2310 01/02/23 0015   BP:  131/82  107/78   Pulse:    72   Resp: 22  17 22   Temp:       SpO2:    90%     MEDICATIONS GIVEN TO PATIENT THIS ENCOUNTER:  Orders Placed This Encounter   Medications    furosemide (LASIX) injection 20 mg    cefTRIAXone (ROCEPHIN) 1,000 mg in sodium chloride 0.9 % 50 mL IVPB mini-bag     Order Specific Question:   Antimicrobial Indications     Answer:   Pneumonia (CAP)    azithromycin (ZITHROMAX) 500 mg in D5W 250ml addavial     Order Specific Question:   Antimicrobial Indications     Answer:   Pneumonia (CAP)    azithromycin (ZITHROMAX) 500 mg in D5W 250ml addavial     Order Specific Question:   Antimicrobial Indications     Answer:   Pneumonia (CAP)     Order Specific Question:   CAP duration of therapy     Answer:   5 days    cefTRIAXone (ROCEPHIN) 1,000 mg in sodium chloride 0.9 % 50 mL IVPB mini-bag     Order Specific Question:   Antimicrobial Indications     Answer:   Pneumonia (CAP)     Order Specific Question:   CAP duration of therapy     Answer:   5 days     DISCHARGE PRESCRIPTIONS:  New Prescriptions    No medications on file     PHYSICIAN CONSULTS ORDERED THIS ENCOUNTER:  IP CONSULT TO INTERNAL MEDICINE  IP CONSULT TO CRITICAL CARE  IP CONSULT TO SOCIAL WORK  FINAL IMPRESSION      1. Acute respiratory failure with hypoxia and hypercapnia (HCC)    2. Community acquired pneumonia of right lower lobe of lung    3. Altered mental status, unspecified altered mental status type          DISPOSITION/PLAN   DISPOSITION Admitted 01/02/2023 12:34:39 AM      OUTPATIENT FOLLOW UP THE PATIENT:  No follow-up provider specified.     DO Gilles Graves DO  01/02/23 0118

## 2023-01-02 NOTE — PROGRESS NOTES
Writer returned call from pt's son Carolee Reza, update given. States he & his sister will visit later.

## 2023-01-02 NOTE — H&P
[address] [facility] [address]      [address]     HISTORY AND PHYSICAL EXAMINATION            Date:   1/2/2023  Patient name:  April Napoles  Date of admission:  1/1/2023  9:55 PM  MRN:   [id number]  Account:  [de-identified]  YOB: 1960  PCP:    No primary care provider on file. Room:   2009/2009-01  Code Status:    Full Code    Chief Complaint:     Chief Complaint   Patient presents with    Altered Mental Status       History Obtained From:     patient, wife, electronic medical record    History of Present Illness: The patient is a 60 y.o.  male with unknown past medical history due to no healthcare visits or follow-up who presents with Altered Mental Status and he is admitted to the hospital for the management of  Acute respiratory failure most likely 2/2 pneumonia. Per patient's wife, she reports that the patient has not been acting himself for the past week. She reports being more slurred, confused and progressively getting worse prior to presentation. Patient prior to the presentation a week ago he had a fall and EMS presented patient was vitally stable and he was not transported to the hospital.  This time upon EMS evaluation of the patient he had an O2 of 75%, he was put on a breather and was given a dose of IV Lasix in route. Wife and patient, cannot recall any precipitating event could have led to his presentation. He also denies chest pain, shortness of breath, or cough. Per patient, he does not recall any complaints or events prior to the presentation. Wife denies recent alcohol use, however, patient does have a history of regular alcohol intake but not on daily basis. Wife also reports that patient has constant heartburn for which he takes regular antiacids.  Patient denies the presence of any abdominal pain or any change in bowel habits, abdominal pain, nausea or vomiting. Upon arrival in the ED, patient was put on BiPAP. Patient was afebrile, normotensive and in normal sinus rhythm. A chest x-ray was completed and the patient had right lower pneumonia with a small pleural effusion. Patient also had a large bullae in the left apex with a pneumothorax that cannot be excluded. Patient ABG was suggestive of pattern of respiratory acidosis with pH 7.295, PCO2 60.3, PO2 63.0, HCO3 29.3. CT head WO did not show any acute findings. Patient had elevated troponins of 184, trended down to 177. Patient also had an elevated BNP of 7797. An elevated creatinine of 1.77 was on presentation, with no previous lab test to be compared to. Also patient had a left elevated liver enzymes ALT was 525, , with prolonged prothrombin time of 24.9, and INR of 2.3. Patient was admitted to the ICU for the management of type II respiratory failure associated altered mental status    Past Medical History:     History reviewed. No pertinent past medical history. Past SurgicalHistory:     History reviewed. No pertinent surgical history. Medications Prior to Admission:        Prior to Admission medications    Not on File        Allergies:     Patient has no known allergies. Social History:     Tobacco:    reports that he has been smoking cigarettes. He has a 40.00 pack-year smoking history. He has never used smokeless tobacco.  Alcohol:      reports current alcohol use. Drug Use:  reports that he does not currently use drugs. Family History:     History reviewed. No pertinent family history. Review of Systems:     Positive and Negative as described in HPI. Review of Systems   Constitutional:  Positive for appetite change and fatigue. Negative for activity change, chills, fever and unexpected weight change. HENT:  Positive for hearing loss.  Negative for congestion, drooling, ear pain, facial swelling, sore throat, trouble swallowing and voice change. Eyes:  Negative for pain, discharge and itching. Respiratory:  Positive for apnea. Negative for cough, choking, chest tightness, shortness of breath, wheezing and stridor. Cardiovascular:  Positive for leg swelling. Negative for chest pain and palpitations. Gastrointestinal:  Positive for anal bleeding. Negative for abdominal pain, blood in stool, constipation, diarrhea, nausea and vomiting. Genitourinary:  Positive for flank pain. Negative for dysuria, enuresis, hematuria and urgency. Musculoskeletal:  Negative for arthralgias and back pain. Skin:  Negative for color change and pallor. Neurological:  Negative for dizziness, tremors, facial asymmetry, weakness and headaches. Psychiatric/Behavioral:  Positive for confusion. Negative for agitation and behavioral problems. Physical Exam:   /70   Pulse 67   Temp 98.6 °F (37 °C) (Axillary)   Resp 16   Wt 180 lb 12.4 oz (82 kg)   SpO2 95%   Temp (24hrs), Av.5 °F (36.9 °C), Min:98.4 °F (36.9 °C), Max:98.6 °F (37 °C)    No results for input(s): POCGLU in the last 72 hours. Intake/Output Summary (Last 24 hours) at 2023 0774  Last data filed at 2023 6986  Gross per 24 hour   Intake --   Output 290 ml   Net -290 ml       Physical Exam  Vitals and nursing note reviewed. Constitutional:       General: He is in acute distress. Appearance: He is normal weight. HENT:      Head: Normocephalic. Mouth/Throat:      Mouth: Mucous membranes are moist.   Eyes:      General: No scleral icterus. Right eye: No discharge. Left eye: No discharge. Pupils: Pupils are equal, round, and reactive to light. Cardiovascular:      Rate and Rhythm: Normal rate and regular rhythm. Pulses: Normal pulses. Heart sounds: Normal heart sounds. Pulmonary:      Effort: Respiratory distress present. Breath sounds: Wheezing present. Abdominal:      General: There is distension. Palpations: There is no mass. Tenderness: There is no abdominal tenderness. There is no guarding. Hernia: No hernia is present. Musculoskeletal:         General: Deformity (Onycholysis. ) present. Cervical back: Neck supple. No rigidity or tenderness. Right lower leg: Edema (+2/3) present. Left lower leg: Edema (+2/3) present. Neurological:      Mental Status: He is oriented to person, place, and time.        Investigations:     Laboratory Testing:  Recent Results (from the past 24 hour(s))   Blood Gas, Venous    Collection Time: 01/01/23 10:04 AM   Result Value Ref Range    pH, [place] 7.258 (L) 7.320 - 7.420    pCO2, [place] 63.0 (H) 39.0 - 55.0 mm Hg    pO2, [place] 48.1 30.0 - 50.0 mm Hg    HCO3, Venous 28.1 24.0 - 30.0 mmol/L    Positive Base Excess, [place] 1.0 0.0 - 2.0 mmol/L    O2 Sat, [place] 72.1 60.0 - 85.0 %    Carboxyhemoglobin 4.3 0 - 5 %    Methemoglobin 0.5 0.0 - 1.9 %    Pt Temp 37    Troponin    Collection Time: 01/01/23  9:59 PM   Result Value Ref Range    Troponin, High Sensitivity 177 (HH) 0 - 22 ng/L   APTT    Collection Time: 01/01/23  9:59 PM   Result Value Ref Range    PTT 26.5 24.0 - 36.0 sec   Protime-INR    Collection Time: 01/01/23  9:59 PM   Result Value Ref Range    Protime 24.9 (H) 11.8 - 14.6 sec    INR 2.3    Phosphorus    Collection Time: 01/01/23  9:59 PM   Result Value Ref Range    Phosphorus 4.3 2.5 - 4.5 mg/dL   Magnesium    Collection Time: 01/01/23  9:59 PM   Result Value Ref Range    Magnesium 2.1 1.6 - 2.6 mg/dL   Basic Metabolic Panel    Collection Time: 01/01/23  9:59 PM   Result Value Ref Range    Glucose 96 70 - 99 mg/dL    BUN 46 (H) 8 - 23 mg/dL    Creatinine 1.77 (H) 0.70 - 1.20 mg/dL    Est, Glom Filt Rate 43 (L) >60 mL/min/1.73m2    Calcium 7.9 (L) 8.6 - 10.4 mg/dL    Sodium 138 135 - 144 mmol/L    Potassium 4.8 3.7 - 5.3 mmol/L    Chloride 97 (L) 98 - 107 mmol/L    CO2 29 20 - 31 mmol/L    Anion Gap 12 9 - 17 mmol/L   CBC with Auto Differential Collection Time: 01/01/23  9:59 PM   Result Value Ref Range    WBC 6.8 3.5 - 11.0 k/uL    RBC 5.99 (H) 4.5 - 5.9 m/uL    Hemoglobin 12.1 (L) 13.5 - 17.5 g/dL    Hematocrit 41.4 41 - 53 %    MCV 69.1 (L) 80 - 100 fL    MCH 20.2 (L) 26 - 34 pg    MCHC 29.2 (L) 31 - 37 g/dL    RDW 19.8 (H) 11.5 - 14.9 %    Platelets 55 (L) 945 - 450 k/uL    MPV 8.6 6.0 - 12.0 fL    Seg Neutrophils 79 (H) 36 - 66 %    Lymphocytes 12 (L) 24 - 44 %    Monocytes 8 (H) 1 - 7 %    Eosinophils % 0 0 - 4 %    Basophils 1 0 - 2 %    Segs Absolute 5.40 1.3 - 9.1 k/uL    Absolute Lymph # 0.80 (L) 1.0 - 4.8 k/uL    Absolute Mono # 0.50 0.1 - 1.3 k/uL    Absolute Eos # 0.00 0.0 - 0.4 k/uL    Basophils Absolute 0.10 0.0 - 0.2 k/uL   Brain Natriuretic Peptide    Collection Time: 01/01/23  9:59 PM   Result Value Ref Range    Pro-BNP 7,797 (H) <300 pg/mL   Hepatic Function Panel    Collection Time: 01/01/23  9:59 PM   Result Value Ref Range    Albumin 3.9 3.5 - 5.2 g/dL    Alkaline Phosphatase 68 40 - 129 U/L     (H) 5 - 41 U/L     (H) <40 U/L    Total Bilirubin 1.1 0.3 - 1.2 mg/dL    Bilirubin, Direct 0.9 (H) <0.3 mg/dL    Bilirubin, Indirect 0.2 0.0 - 1.0 mg/dL    Total Protein 6.6 6.4 - 8.3 g/dL   Lipase    Collection Time: 01/01/23  9:59 PM   Result Value Ref Range    Lipase 17 13 - 60 U/L   COVID-19 & Influenza Combo    Collection Time: 01/01/23 10:25 PM    Specimen: Nasopharyngeal Swab   Result Value Ref Range    Specimen Description . NASOPHARYNGEAL SWAB     Source . NASOPHARYNGEAL SWAB     SARS-CoV-2 RNA, RT PCR Not Detected Not Detected    INFLUENZA A Not Detected Not Detected    INFLUENZA B Not Detected Not Detected   Urinalysis with Reflex to Culture    Collection Time: 01/01/23 10:25 PM    Specimen: Urine   Result Value Ref Range    Color, UA Orange (A) Yellow    Turbidity UA Cloudy (A) Clear    Glucose, Ur NEGATIVE NEGATIVE    Bilirubin Urine NEGATIVE  Verified by ictotest. (A) NEGATIVE    Ketones, Urine TRACE (A) NEGATIVE Specific Gravity, UA 1.018 1.000 - 1.030    Urine Hgb LARGE (A) NEGATIVE    pH, UA 5.0 5.0 - 8.0    Protein, UA 2+ (A) NEGATIVE    Urobilinogen, Urine ELEVATED (A) Normal    Nitrite, Urine NEGATIVE NEGATIVE    Leukocyte Esterase, Urine SMALL (A) NEGATIVE   Microscopic Urinalysis    Collection Time: 01/01/23 10:25 PM   Result Value Ref Range    WBC, UA 6 TO 9 /HPF    RBC, UA TOO NUMEROUS TO COUNT /HPF    Casts UA 3 to 5 /LPF    Epithelial Cells UA 0 TO 2 /HPF    Bacteria, UA FEW (A) None   EKG 12 Lead    Collection Time: 01/01/23 11:05 PM   Result Value Ref Range    Ventricular Rate 75 BPM    Atrial Rate 75 BPM    P-R Interval 142 ms    QRS Duration 66 ms    Q-T Interval 350 ms    QTc Calculation (Bazett) 390 ms    P Axis 18 degrees    R Axis -165 degrees    T Axis 41 degrees   Arterial Blood Gases    Collection Time: 01/02/23 12:20 AM   Result Value Ref Range    pH, Arterial 7.295 (L) 7.350 - 7.450    pCO2, Arterial 60.3 (HH) 35.0 - 45.0 mmHg    pO2, Arterial 63.0 (L) 80.0 - 100.0 mmHg    HCO3, Arterial 29.3 (H) 22.0 - 26.0 mmol/L    Positive Base Excess, Art 2.8 (H) 0.0 - 2.0 mmol/L    O2 Sat, Arterial 85.8 (LL) 95 - 98 %    Carboxyhemoglobin 3.1 0 - 5 %    Methemoglobin 1.0 0.0 - 1.9 %    Pt Temp 37     O2 Device/Flow/% BIPAP     Respiratory Rate 16     Tyron Test PASS     Sample Site Right Radial Artery     Pt.  Position SEMI-FOWLERS     Mode BIPAP     Text for Respiratory RESULTS TO PHYSICIAN    Comprehensive Metabolic Panel w/ Reflex to MG    Collection Time: 01/02/23  3:53 AM   Result Value Ref Range    Glucose 101 (H) 70 - 99 mg/dL    BUN 46 (H) 8 - 23 mg/dL    Creatinine 1.50 (H) 0.70 - 1.20 mg/dL    Est, Glom Filt Rate 52 (L) >60 mL/min/1.73m2    Calcium 7.5 (L) 8.6 - 10.4 mg/dL    Sodium 136 135 - 144 mmol/L    Potassium 4.8 3.7 - 5.3 mmol/L    Chloride 99 98 - 107 mmol/L    CO2 27 20 - 31 mmol/L    Anion Gap 10 9 - 17 mmol/L    Alkaline Phosphatase 59 40 - 129 U/L     (H) 5 - 41 U/L     (H) <40 U/L    Total Bilirubin 0.8 0.3 - 1.2 mg/dL    Total Protein 5.9 (L) 6.4 - 8.3 g/dL    Albumin 3.3 (L) 3.5 - 5.2 g/dL   CBC with Auto Differential    Collection Time: 01/02/23  5:43 AM   Result Value Ref Range    WBC 5.6 3.5 - 11.0 k/uL    RBC 5.57 4.5 - 5.9 m/uL    Hemoglobin 11.2 (L) 13.5 - 17.5 g/dL    Hematocrit 38.8 (L) 41 - 53 %    MCV 69.7 (L) 80 - 100 fL    MCH 20.1 (L) 26 - 34 pg    MCHC 28.8 (L) 31 - 37 g/dL    RDW 19.7 (H) 11.5 - 14.9 %    Platelets 67 (L) 012 - 450 k/uL    MPV 9.1 6.0 - 12.0 fL    Seg Neutrophils 79 (H) 36 - 66 %    Lymphocytes 12 (L) 24 - 44 %    Monocytes 8 (H) 1 - 7 %    Eosinophils % 0 0 - 4 %    Basophils 1 0 - 2 %    nRBC 2 per 100 WBC    Segs Absolute 4.41 1.3 - 9.1 k/uL    Absolute Lymph # 0.67 (L) 1.0 - 4.8 k/uL    Absolute Mono # 0.45 0.1 - 1.3 k/uL    Absolute Eos # 0.00 0.0 - 0.4 k/uL    Basophils Absolute 0.06 0.0 - 0.2 k/uL    Morphology ANISOCYTOSIS PRESENT     Morphology HYPOCHROMIA PRESENT     Morphology 1+ ELLIPTOCYTES        Imaging/Diagnostics:  CT HEAD WO CONTRAST    Result Date: 1/1/2023  EXAMINATION: CT OF THE HEAD WITHOUT CONTRAST  1/1/2023 10:48 pm TECHNIQUE: CT of the head was performed without the administration of intravenous contrast. Automated exposure control, iterative reconstruction, and/or weight based adjustment of the mA/kV was utilized to reduce the radiation dose to as low as reasonably achievable. COMPARISON: None. HISTORY: Reason for Exam: AMS Additional signs and symptoms: the patient has been getting more and more confused since 2 weeks ago. It has progressively been getting worse and today FINDINGS: BRAIN/VENTRICLES: There is no acute intracranial hemorrhage, mass effect or midline shift. No abnormal extra-axial fluid collection. The gray-white differentiation is maintained without evidence of an acute infarct. There is no evidence of hydrocephalus. Changes of mild chronic small vessel ischemic disease.  ORBITS: The visualized portion of the orbits demonstrate no acute abnormality. SINUSES: The visualized paranasal sinuses and mastoid air cells demonstrate no acute abnormality. SOFT TISSUES/SKULL:  No acute abnormality of the visualized skull or soft tissues. No acute intracranial abnormality. Mild chronic small vessel ischemic disease. XR CHEST PORTABLE    Result Date: 1/1/2023  EXAMINATION: ONE XRAY VIEW OF THE CHEST 1/1/2023 7:17 pm COMPARISON: None. HISTORY: ORDERING SYSTEM PROVIDED HISTORY: ams TECHNOLOGIST PROVIDED HISTORY: ams Reason for Exam: Altered mental status, difficulty breathing Additional signs and symptoms: Altered mental status, difficulty breathing Relevant Medical/Surgical History: Altered mental status, difficulty breathing FINDINGS: Large lucent area in the left apex suggesting a large bulla however superimposed pneumothorax cannot be excluded. The cardiac size is normal. Right lower lobe airspace infiltrate and mild right pleural effusion. . Pulmonary vascularity appears normal. There is mild ectasia of the thoracic aorta. Calcific tendinitis of the right shoulder. No acute bony abnormalities. The hilar structures are normal.     Right lower lobe pneumonia and small right pleural effusion Large lucent area in the left apex suggesting a large bulla however superimposed pneumothorax cannot be excluded. Follow-up CT would be useful for further evaluation. The findings were sent to the Radiology Results [address] at 10:31 pm on 1/1/2023 to be communicated to a licensed caregiver.        Assessment :      Primary Problem  Acute respiratory failure [facility])    Active Hospital Problems    Diagnosis Date Noted    Acute respiratory failure ([facility] [address] [J96.00] 01/02/2023     Priority: Medium       Plan:     Patient status Admit as inpatient in the  Medical ICU    Acute type 2 respiratory failure with AMS secondary to Pneumonia Vs Acute Heart Failure  -Chest Xray suggestive of RLL pneumonia and possible presence of Bullae in the left Lung [place]  -Patient on BiPAP  -Started on Azithromycin and Ceftriaxone daily  -Ipratropium-Albuterol every 4 hrs  -Echocardiogam pending  -LLE. Repeat IV lasix 20 mg    Elevated troponins most likely 2/2 demand ischemia  -Troponin trending down  -no acute changes on EKG  -ECHO pending    Microcytic anemia, with acute drop of hemoglobin  -Hb upon presentation is 12.1, dropped this AM to 11.2  -No previous labs to compare?  -Iron, TIBC and Ferritin pending  -H&H Q6H for now and change to Q8H  -Fibrin products ordered    Transaminitis with prolonged PTT and INR  - .   -INR and PTT 24.9, and 2.3 respectively  -Thrombocytopenic of 54  on admission vs 67 this AM respectively  -Liver US pending  -Hepatitis Panel Pending  -Hold Lovenox. Will place Pneumatic compressors  -Hem/Onc Consulted     Thrombocytopenia  -Unknown  baseline  -Transaminates and Prolonged PT and PTT  -Improving PLT function  77 today vs 55 on admission  -Monitor HH closely  -Hold Lovenox    Asymptomatic Bacteruria  -Patient already on Rocephin      No DVT prophylaxis: Pneumatic Compressors  GI prophylaxis: Pantoprazole      Consultations:   IP CONSULT TO INTERNAL MEDICINE  IP CONSULT TO CRITICAL CARE  IP CONSULT TO SOCIAL WORK     Patient is admitted as inpatient status because of co-morbiditieslisted above, severity of signs and symptoms as outlined, requirement for current medical therapies and most importantly because of direct risk to patient if care not provided in a hospital setting. Brea Barton MD  1/2/2023  7:19 AM    Copy sent to Dr. Katrinka Frankel primary care provider on file. Attestation and add on       I have discussed the care of Kingsley Lamb , including pertinent history and exam findings,      1/2/23    with the resident. I have seen and examined the patient and the key elements of all parts of the encounter have been performed by me .    I agree with the assessment, plan and orders as documented by the resident. 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        [] DKA            [x] delirium       [] debility-    [] Dementia     [] Malnutrition                [] Acute End Organ Failure                    [x] cardiac,    [x] respiratory     [] renal [] hepatic    [] ___            []  Chronic Multi-organ insufficiency / failure    involving                             [] cardiac,    [] respiratory     [] renal [x] hepatic    [] ___           --Abnormal Pt , platelet counts hemoglobin  -          --critical care time spent  36--        ;        Medications:      Allergies:  No Known Allergies    Current Meds:   Scheduled Meds:    sodium chloride flush  5-40 mL IntraVENous 2 times per day    nicotine  1 patch TransDERmal Daily    folic acid, thiamine, multi-vitamin with vitamin K infusion   IntraVENous Q24H    pantoprazole (PROTONIX) 40 mg injection  40 mg IntraVENous Daily    cefepime  2,000 mg IntraVENous Q12H    metroNIDAZOLE  500 mg IntraVENous Q8H    vancomycin (VANCOCIN) intermittent dosing (placeholder)   Other RX Placeholder    vancomycin  2,000 mg IntraVENous Once    [START ON 1/3/2023] vancomycin  1,250 mg IntraVENous Q24H    ipratropium-albuterol  1 ampule Inhalation Q4H    sodium chloride  1,000 mL IntraVENous Once     Continuous Infusions:    sodium chloride      norepinephrine 1 mcg/min (01/02/23 1648)    propofol 20 mcg/kg/min (01/02/23 1641)    sodium chloride       PRN Meds: sodium chloride flush, sodium chloride, ondansetron **OR** ondansetron, polyethylene glycol, acetaminophen **OR** acetaminophen, albuterol, guaiFENesin      MD DELFIN Moe [address], [address] [place] [address].    Phone [phone]   Fax: [phone]  Answering Service: [phone]

## 2023-01-02 NOTE — PROGRESS NOTES
[facility] Internal Medicine  Pancho Carmona MD; Praveen Kirkland MD; Nils Duvall MD; MD General Acacia Jenkins MD; Rachael Kauffman MD  [facility]   [facility]                 Date:   1/2/2023  Patientname:  Evon Tucker  Date of admission:  1/1/2023  9:55 PM  MRN:   [id number]  Account:  [de-identified]  YOB: 1960  PCP:    No primary care provider on file. Room:   2009/2009-01  Code Status:    Full Code      Chief Complaint:     Chief Complaint   Patient presents with    Altered Mental Status       History of Present Illness:     Evon Tucker is a 60 y.o. Non- / non  male who presents with Altered Mental Status and is admitted to the hospital for the management of Acute respiratory failure ([facility] [address] According to wife and daughter present at bedside, patient has not pursued any healthcare treatment since childhood. According to wife patient has not been feeling well for the past 2 weeks and has been getting more confused. About a week ago he did have a fall where EMS was called but all of his vital signs were stable so he was not transported to the hospital.  When EMS came to the house to evaluate him today he was found to have an O2 sat at 75% on room air and he was placed on a nonrebreather and given a dose of Lasix due to new edema to bilateral lower extremities. When he arrived to the ED he was placed on BiPAP. Chest x-ray confirms presence of pneumonia and small right pleural effusion. CT head negative with no acute abnormality. Cardiac work-up does not show any acute cardiac ischemia. Troponin is elevated at 184 and trended down to 177. Uncertain of baseline troponin due to patient not having active medical treatment. Likely related to fluid overload with BNP resulting at 7797. UA is positive for UTI. Labs also indicate presence of acute kidney injury.   Creatinine elevated at 1.77 with no baseline to compare and no reported history of kidney disease. Liver enzymes are also elevated , . Patient admits occasional alcohol use but denies daily use. Denies fever, chills, chest pain, cough, abdominal pain, nausea, vomiting, diarrhea, and urinary symptoms. There are no aggravating or alleviating factors. Symptoms are reported as acute, constant and severe. Past Medical History:     History reviewed. No pertinent past medical history. Past Surgical History:     History reviewed. No pertinent surgical history. Medications Prior to Admission:     Prior to Admission medications    Not on File        Allergies:     Patient has no known allergies. Social History:     Tobacco:    reports that he has been smoking cigarettes. He has a 40.00 pack-year smoking history. He has never used smokeless tobacco.  Alcohol:      reports current alcohol use. Drug Use:  reports that he does not currently use drugs. Family History:     History reviewed. No pertinent family history.     Investigations:      Laboratory Testing:  Recent Results (from the past 24 hour(s))   Troponin    Collection Time: 01/01/23  9:59 PM   Result Value Ref Range    Troponin, High Sensitivity 177 (HH) 0 - 22 ng/L   APTT    Collection Time: 01/01/23  9:59 PM   Result Value Ref Range    PTT 26.5 24.0 - 36.0 sec   Protime-INR    Collection Time: 01/01/23  9:59 PM   Result Value Ref Range    Protime 24.9 (H) 11.8 - 14.6 sec    INR 2.3    Phosphorus    Collection Time: 01/01/23  9:59 PM   Result Value Ref Range    Phosphorus 4.3 2.5 - 4.5 mg/dL   Magnesium    Collection Time: 01/01/23  9:59 PM   Result Value Ref Range    Magnesium 2.1 1.6 - 2.6 mg/dL   Basic Metabolic Panel    Collection Time: 01/01/23  9:59 PM   Result Value Ref Range    Glucose 96 70 - 99 mg/dL    BUN 46 (H) 8 - 23 mg/dL    Creatinine 1.77 (H) 0.70 - 1.20 mg/dL    Est, Glom Filt Rate 43 (L) >60 mL/min/1.73m2    Calcium 7.9 (L) 8.6 - 10.4 mg/dL    Sodium 138 135 - 144 mmol/L    Potassium 4.8 3.7 - 5.3 mmol/L    Chloride 97 (L) 98 - 107 mmol/L    CO2 29 20 - 31 mmol/L    Anion Gap 12 9 - 17 mmol/L   CBC with Auto Differential    Collection Time: 01/01/23  9:59 PM   Result Value Ref Range    WBC 6.8 3.5 - 11.0 k/uL    RBC 5.99 (H) 4.5 - 5.9 m/uL    Hemoglobin 12.1 (L) 13.5 - 17.5 g/dL    Hematocrit 41.4 41 - 53 %    MCV 69.1 (L) 80 - 100 fL    MCH 20.2 (L) 26 - 34 pg    MCHC 29.2 (L) 31 - 37 g/dL    RDW 19.8 (H) 11.5 - 14.9 %    Platelets 55 (L) 995 - 450 k/uL    MPV 8.6 6.0 - 12.0 fL    Seg Neutrophils 79 (H) 36 - 66 %    Lymphocytes 12 (L) 24 - 44 %    Monocytes 8 (H) 1 - 7 %    Eosinophils % 0 0 - 4 %    Basophils 1 0 - 2 %    Segs Absolute 5.40 1.3 - 9.1 k/uL    Absolute Lymph # 0.80 (L) 1.0 - 4.8 k/uL    Absolute Mono # 0.50 0.1 - 1.3 k/uL    Absolute Eos # 0.00 0.0 - 0.4 k/uL    Basophils Absolute 0.10 0.0 - 0.2 k/uL   Brain Natriuretic Peptide    Collection Time: 01/01/23  9:59 PM   Result Value Ref Range    Pro-BNP 7,797 (H) <300 pg/mL   Hepatic Function Panel    Collection Time: 01/01/23  9:59 PM   Result Value Ref Range    Albumin 3.9 3.5 - 5.2 g/dL    Alkaline Phosphatase 68 40 - 129 U/L     (H) 5 - 41 U/L     (H) <40 U/L    Total Bilirubin 1.1 0.3 - 1.2 mg/dL    Bilirubin, Direct 0.9 (H) <0.3 mg/dL    Bilirubin, Indirect 0.2 0.0 - 1.0 mg/dL    Total Protein 6.6 6.4 - 8.3 g/dL   Lipase    Collection Time: 01/01/23  9:59 PM   Result Value Ref Range    Lipase 17 13 - 60 U/L   COVID-19 & Influenza Combo    Collection Time: 01/01/23 10:25 PM    Specimen: Nasopharyngeal Swab   Result Value Ref Range    Specimen Description . NASOPHARYNGEAL SWAB     Source . NASOPHARYNGEAL SWAB     SARS-CoV-2 RNA, RT PCR Not Detected Not Detected    INFLUENZA A Not Detected Not Detected    INFLUENZA B Not Detected Not Detected   Urinalysis with Reflex to Culture    Collection Time: 01/01/23 10:25 PM    Specimen: Urine   Result Value Ref Range    Color, UA Orange (A) Yellow    Turbidity UA Cloudy (A) Clear    Glucose, Ur NEGATIVE NEGATIVE    Bilirubin Urine NEGATIVE  Verified by ictotest. (A) NEGATIVE    Ketones, Urine TRACE (A) NEGATIVE    Specific Gravity, UA 1.018 1.000 - 1.030    Urine Hgb LARGE (A) NEGATIVE    pH, UA 5.0 5.0 - 8.0    Protein, UA 2+ (A) NEGATIVE    Urobilinogen, Urine ELEVATED (A) Normal    Nitrite, Urine NEGATIVE NEGATIVE    Leukocyte Esterase, Urine SMALL (A) NEGATIVE   Microscopic Urinalysis    Collection Time: 01/01/23 10:25 PM   Result Value Ref Range    WBC, UA 6 TO 9 /HPF    RBC, UA TOO NUMEROUS TO COUNT /HPF    Casts UA 3 to 5 /LPF    Epithelial Cells UA 0 TO 2 /HPF    Bacteria, UA FEW (A) None   EKG 12 Lead    Collection Time: 01/01/23 11:05 PM   Result Value Ref Range    Ventricular Rate 75 BPM    Atrial Rate 75 BPM    P-R Interval 142 ms    QRS Duration 66 ms    Q-T Interval 350 ms    QTc Calculation (Bazett) 390 ms    P Axis 18 degrees    R Axis -165 degrees    T Axis 41 degrees   Arterial Blood Gases    Collection Time: 01/02/23 12:20 AM   Result Value Ref Range    pH, Arterial 7.295 (L) 7.350 - 7.450    pCO2, Arterial 60.3 (HH) 35.0 - 45.0 mmHg    pO2, Arterial 63.0 (L) 80.0 - 100.0 mmHg    HCO3, Arterial 29.3 (H) 22.0 - 26.0 mmol/L    Positive Base Excess, Art 2.8 (H) 0.0 - 2.0 mmol/L    O2 Sat, Arterial 85.8 (LL) 95 - 98 %    Carboxyhemoglobin 3.1 0 - 5 %    Methemoglobin 1.0 0.0 - 1.9 %    Pt Temp 37     O2 Device/Flow/% BIPAP     Respiratory Rate 16     Tyron Test PASS     Sample Site Right Radial Artery     Pt. Position SEMI-FOWLERS     Mode BIPAP     Text for Respiratory RESULTS TO PHYSICIAN        Imaging/Diagnostics:  CT HEAD WO CONTRAST    Result Date: 1/1/2023  No acute intracranial abnormality. Mild chronic small vessel ischemic disease.      XR CHEST PORTABLE    Result Date: 1/1/2023  Right lower lobe pneumonia and small right pleural effusion Large lucent area in the left apex suggesting a large bulla however superimposed pneumothorax cannot be excluded. Follow-up CT would be useful for further evaluation. The findings were sent to the Radiology Results [address] at 10:31 pm on 1/1/2023 to be communicated to a licensed caregiver. Plan:     Patient status inpatient in the Medical ICU    Acute respiratory failure  -secondary to pneumonia and acute fluid volume overload  -IV Zithromax and rocephin initiated in ED  -Mucinex BID  -Respiratory care eval and treat  -Bipap  -Duoneb aerosols   -Albuterol aerosols PRN  -critical care consult  -2D echo concern for heart failure  -active nicotine abuse, 1+ ppd x50 years    Urinary tract infection  -IV rocephin initiated in ED  -Urine culture pending      Full code    Lovenox for DVT prophylaxis      ALAYNA Roberts - NP  1/2/2023  4:11 AM      Please note that this chart was generated using voice recognition Dragon dictation software. Although every effort was made to ensure the accuracy of this automated transcription, some errors in transcription may have occurred. [facility]: BRIDGET Valentin [address] [place] [address].    Phone [phone]

## 2023-01-02 NOTE — CONSULTS
Today's Date: 1/2/2023  Patient Name: Eliceo Javed  Date of admission: 1/1/2023  9:55 PM  Patient's age: 60 y.o., 1960  Admission Dx: Acute respiratory failure ([facility] [address] [J96.00]  Acute respiratory failure with hypoxia and hypercapnia ([facility] [address] [J96.01, J96.02]  Altered mental status, unspecified altered mental status type [R41.82]  Community acquired pneumonia of right lower lobe of lung [J18.9]    Reason for Consult: management recommendations  Requesting Physician: Jerry Pereira MD    CHIEF COMPLAINT: Abnormal cell counts. Altered mental status. Pneumonia. History Obtained From:  spouse, family member. Electronic medical record. Patient unable to give any history due to altered mental status    HISTORY OF PRESENT ILLNESS:      The patient is a 60 y.o.  male who is admitted with chief complaint of altered mental status. Patient has not seen a doctor for multiple years. Due to worsening mental status EMS was summoned. Patient oxygen saturation was noted to be at 75%. Patient placed on BiPAP x-ray showed right lower lobe pneumonia. Patient started on antibiotics. Patient also noted to have INR of 2.3 platelet count of 60,292. Patient does have a history of alcohol intake. Patient's ammonia level noted to be 29. Creatinine 1.5. Total bilirubin is within range. Hemoglobin 11.2 with MCV of 69. Platelet count is 35,964. Ultrasound liver done however report is pending.         Past Medical History: Unable to obtain as patient is confused    Past Surgical History: Unable to obtain as patient is confused    Medications:    Prior to Admission medications    Not on File     Current Facility-Administered Medications   Medication Dose Route Frequency Provider Last Rate Last Admin    sodium chloride flush 0.9 % injection 5-40 mL  5-40 mL IntraVENous 2 times per day ALAYNA Perez NP        sodium chloride flush 0.9 % injection 5-40 mL  5-40 mL IntraVENous PRN ALAYNA Perez NP 0.9 % sodium chloride infusion   IntraVENous PRN ALAYNA Ingram - NP        [Held by provider] enoxaparin (LOVENOX) injection 40 mg  40 mg SubCUTAneous Daily ALAYNA Marrero NP        ondansetron (ZOFRAN-ODT) disintegrating tablet 4 mg  4 mg Oral Q8H PRN ALAYNA Ingram NP        Or    ondansetron (ZOFRAN) injection 4 mg  4 mg IntraVENous Q6H PRN ALAYNA Marrero NP        polyethylene glycol (GLYCOLAX) packet 17 g  17 g Oral Daily PRN Sabina Kruse APRN - NP        acetaminophen (TYLENOL) tablet 650 mg  650 mg Oral Q6H PRN ALAYNA Ingram - NP        Or    acetaminophen (TYLENOL) suppository 650 mg  650 mg Rectal Q6H PRN Sabina Kruse, ALAYNA - NP        nicotine (NICODERM CQ) 21 MG/24HR 1 patch  1 patch TransDERmal Daily ALAYNA Ingram - NP   1 patch at 01/02/23 3574    albuterol (PROVENTIL) nebulizer solution 2.5 mg  2.5 mg Nebulization Q2H PRN ALAYNA Ingram - NASRA        ipratropium-albuterol (DUONEB) nebulizer solution 1 ampule  1 ampule Inhalation Q4H WA ALAYNA Marrero NP   1 ampule at 01/02/23 0726    azithromycin (ZITHROMAX) 500 mg in D5W 250ml addavial  500 mg IntraVENous Q24H ALAYNA Marrero NP        cefTRIAXone (ROCEPHIN) 1,000 mg in sodium chloride 0.9 % 50 mL IVPB mini-bag  1,000 mg IntraVENous Q24H ALAYNA Marrero NP        guaiFENesin [facility]) extended release tablet 600 mg  600 mg Oral BID PRN ALAYNA Ingram - NP        furosemide (LASIX) injection 20 mg  20 mg IntraVENous Once Batsheva Cheung MD        dexmedetomidine (PRECEDEX) 400 mcg in sodium chloride 0.9 % 100 mL infusion  0.1-1.5 mcg/kg/hr IntraVENous Continuous Hurley Gilford, MD 12.3 mL/hr at 01/02/23 1053 0.6 mcg/kg/hr at 01/02/23 1053    sodium chloride 0.9 % 6,221 mL with folic acid 1 mg, adult multi-vitamin with vitamin k 10 mL, thiamine 100 mg   IntraVENous Q24H Hurley Gilford, MD           Allergies:  Patient has no known allergies.     Social History:   reports that he has been smoking cigarettes. He has a 40.00 pack-year smoking history. He has never used smokeless tobacco. He reports current alcohol use. He reports that he does not currently use drugs. Family History: Patient not able to give history due to altered mental status    REVIEW OF SYSTEMS:      Unable to obtain as patient is confused    PHYSICAL EXAM:        /73   Pulse 71   Temp 98.2 °F (36.8 °C) (Axillary)   Resp 13   Wt 180 lb 12.4 oz (82 kg)   SpO2 90%    Temp (24hrs), Av.4 °F (36.9 °C), Min:98.2 °F (36.8 °C), Max:98.6 °F (37 °C)      General appearance -lethargic. Mental status -lethargic does not follow commands  Eyes - pupils equal and reactive, extraocular eye movements intact   Ears - bilateral TM's and external ear canals normal   Mouth - mucous membranes moist, pharynx normal without lesions   Neck - supple, no significant adenopathy   Lymphatics - no palpable lymphadenopathy, no hepatosplenomegaly   Chest -bilateral rales  Heart - normal rate, regular rhythm, normal S1, S2, no murmurs  Abdomen - soft, nontender, nondistended, no masses or organomegaly   Neurological -lethargic does not follow commands  Musculoskeletal - no joint tenderness, deformity or swelling   Extremities - peripheral pulses normal, no pedal edema, no clubbing or cyanosis   Skin - normal coloration and turgor, no rashes, no suspicious skin lesions noted ,      DATA:      Labs:     Results for orders placed or performed during the hospital encounter of 23   COVID-19 & Influenza Combo    Specimen: Nasopharyngeal Swab   Result Value Ref Range    Specimen Description . NASOPHARYNGEAL SWAB     Source . NASOPHARYNGEAL SWAB     SARS-CoV-2 RNA, RT PCR Not Detected Not Detected    INFLUENZA A Not Detected Not Detected    INFLUENZA B Not Detected Not Detected   Troponin   Result Value Ref Range    Troponin, High Sensitivity 177 (HH) 0 - 22 ng/L   Troponin   Result Value Ref Range    Troponin, High Sensitivity 184 (HH) 0 - 22 ng/L   APTT   Result Value Ref Range    PTT 26.5 24.0 - 36.0 sec   Protime-INR   Result Value Ref Range    Protime 24.9 (H) 11.8 - 14.6 sec    INR 2.3    Phosphorus   Result Value Ref Range    Phosphorus 4.3 2.5 - 4.5 mg/dL   Magnesium   Result Value Ref Range    Magnesium 2.1 1.6 - 2.6 mg/dL   Basic Metabolic Panel   Result Value Ref Range    Glucose 96 70 - 99 mg/dL    BUN 46 (H) 8 - 23 mg/dL    Creatinine 1.77 (H) 0.70 - 1.20 mg/dL    Est, Glom Filt Rate 43 (L) >60 mL/min/1.73m2    Calcium 7.9 (L) 8.6 - 10.4 mg/dL    Sodium 138 135 - 144 mmol/L    Potassium 4.8 3.7 - 5.3 mmol/L    Chloride 97 (L) 98 - 107 mmol/L    CO2 29 20 - 31 mmol/L    Anion Gap 12 9 - 17 mmol/L   CBC with Auto Differential   Result Value Ref Range    WBC 6.8 3.5 - 11.0 k/uL    RBC 5.99 (H) 4.5 - 5.9 m/uL    Hemoglobin 12.1 (L) 13.5 - 17.5 g/dL    Hematocrit 41.4 41 - 53 %    MCV 69.1 (L) 80 - 100 fL    MCH 20.2 (L) 26 - 34 pg    MCHC 29.2 (L) 31 - 37 g/dL    RDW 19.8 (H) 11.5 - 14.9 %    Platelets 55 (L) 667 - 450 k/uL    MPV 8.6 6.0 - 12.0 fL    Seg Neutrophils 79 (H) 36 - 66 %    Lymphocytes 12 (L) 24 - 44 %    Monocytes 8 (H) 1 - 7 %    Eosinophils % 0 0 - 4 %    Basophils 1 0 - 2 %    Segs Absolute 5.40 1.3 - 9.1 k/uL    Absolute Lymph # 0.80 (L) 1.0 - 4.8 k/uL    Absolute Mono # 0.50 0.1 - 1.3 k/uL    Absolute Eos # 0.00 0.0 - 0.4 k/uL    Basophils Absolute 0.10 0.0 - 0.2 k/uL   Blood Gas, Venous   Result Value Ref Range    pH, Nabor 7.258 (L) 7.320 - 7.420    pCO2, Nabor 63.0 (H) 39.0 - 55.0 mm Hg    pO2, Nabor 48.1 30.0 - 50.0 mm Hg    HCO3, Venous 28.1 24.0 - 30.0 mmol/L    Positive Base Excess, Nabor 1.0 0.0 - 2.0 mmol/L    O2 Sat, Nabor 72.1 60.0 - 85.0 %    Carboxyhemoglobin 4.3 0 - 5 %    Methemoglobin 0.5 0.0 - 1.9 %    Pt Temp 37    Brain Natriuretic Peptide   Result Value Ref Range    Pro-BNP 7,797 (H) <300 pg/mL   Hepatic Function Panel   Result Value Ref Range    Albumin 3.9 3.5 - 5.2 g/dL    Alkaline Phosphatase 68 40 - 129 U/L     (H) 5 - 41 U/L     (H) <40 U/L    Total Bilirubin 1.1 0.3 - 1.2 mg/dL    Bilirubin, Direct 0.9 (H) <0.3 mg/dL    Bilirubin, Indirect 0.2 0.0 - 1.0 mg/dL    Total Protein 6.6 6.4 - 8.3 g/dL   Lipase   Result Value Ref Range    Lipase 17 13 - 60 U/L   Urinalysis with Reflex to Culture    Specimen: Urine   Result Value Ref Range    Color, UA Orange (A) Yellow    Turbidity UA Cloudy (A) Clear    Glucose, Ur NEGATIVE NEGATIVE    Bilirubin Urine NEGATIVE  Verified by ictotest. (A) NEGATIVE    Ketones, Urine TRACE (A) NEGATIVE    Specific Gravity, UA 1.018 1.000 - 1.030    Urine Hgb LARGE (A) NEGATIVE    pH, UA 5.0 5.0 - 8.0    Protein, UA 2+ (A) NEGATIVE    Urobilinogen, Urine ELEVATED (A) Normal    Nitrite, Urine NEGATIVE NEGATIVE    Leukocyte Esterase, Urine SMALL (A) NEGATIVE   Microscopic Urinalysis   Result Value Ref Range    WBC, UA 6 TO 9 /HPF    RBC, UA TOO NUMEROUS TO COUNT /HPF    Casts UA 3 to 5 /LPF    Epithelial Cells UA 0 TO 2 /HPF    Bacteria, UA FEW (A) None   Arterial Blood Gases   Result Value Ref Range    pH, Arterial 7.295 (L) 7.350 - 7.450    pCO2, Arterial 60.3 (HH) 35.0 - 45.0 mmHg    pO2, Arterial 63.0 (L) 80.0 - 100.0 mmHg    HCO3, Arterial 29.3 (H) 22.0 - 26.0 mmol/L    Positive Base Excess, Art 2.8 (H) 0.0 - 2.0 mmol/L    O2 Sat, Arterial 85.8 (LL) 95 - 98 %    Carboxyhemoglobin 3.1 0 - 5 %    Methemoglobin 1.0 0.0 - 1.9 %    Pt Temp 37     O2 Device/Flow/% BIPAP     Respiratory Rate 16     Tyron Test PASS     Sample Site Right Radial Artery     Pt.  Position SEMI-FOWLERS     Mode BIPAP     Text for Respiratory RESULTS TO PHYSICIAN    Comprehensive Metabolic Panel w/ Reflex to MG   Result Value Ref Range    Glucose 101 (H) 70 - 99 mg/dL    BUN 46 (H) 8 - 23 mg/dL    Creatinine 1.50 (H) 0.70 - 1.20 mg/dL    Est, Glom Filt Rate 52 (L) >60 mL/min/1.73m2    Calcium 7.5 (L) 8.6 - 10.4 mg/dL    Sodium 136 135 - 144 mmol/L    Potassium 4.8 3.7 - 5.3 mmol/L Chloride 99 98 - 107 mmol/L    CO2 27 20 - 31 mmol/L    Anion Gap 10 9 - 17 mmol/L    Alkaline Phosphatase 59 40 - 129 U/L     (H) 5 - 41 U/L     (H) <40 U/L    Total Bilirubin 0.8 0.3 - 1.2 mg/dL    Total Protein 5.9 (L) 6.4 - 8.3 g/dL    Albumin 3.3 (L) 3.5 - 5.2 g/dL   CBC with Auto Differential   Result Value Ref Range    WBC 5.6 3.5 - 11.0 k/uL    RBC 5.57 4.5 - 5.9 m/uL    Hemoglobin 11.2 (L) 13.5 - 17.5 g/dL    Hematocrit 38.8 (L) 41 - 53 %    MCV 69.7 (L) 80 - 100 fL    MCH 20.1 (L) 26 - 34 pg    MCHC 28.8 (L) 31 - 37 g/dL    RDW 19.7 (H) 11.5 - 14.9 %    Platelets 67 (L) 719 - 450 k/uL    MPV 9.1 6.0 - 12.0 fL    Seg Neutrophils 79 (H) 36 - 66 %    Lymphocytes 12 (L) 24 - 44 %    Monocytes 8 (H) 1 - 7 %    Eosinophils % 0 0 - 4 %    Basophils 1 0 - 2 %    nRBC 2 per 100 WBC    Segs Absolute 4.41 1.3 - 9.1 k/uL    Absolute Lymph # 0.67 (L) 1.0 - 4.8 k/uL    Absolute Mono # 0.45 0.1 - 1.3 k/uL    Absolute Eos # 0.00 0.0 - 0.4 k/uL    Basophils Absolute 0.06 0.0 - 0.2 k/uL    Morphology ANISOCYTOSIS PRESENT     Morphology HYPOCHROMIA PRESENT     Morphology 1+ ELLIPTOCYTES    Ammonia   Result Value Ref Range    Ammonia 29 16 - 60 umol/L   Procalcitonin   Result Value Ref Range    Procalcitonin 0.09 (H) <0.09 ng/mL   C-Reactive Protein   Result Value Ref Range    CRP 16.4 (H) 0.0 - 5.0 mg/L   Sedimentation Rate   Result Value Ref Range    Sed Rate 1 0 - 20 mm/Hr   Drug Scr, Abuse, Ur   Result Value Ref Range    Amphetamine Screen, Ur NEGATIVE NEGATIVE    Barbiturate Screen, Ur NEGATIVE NEGATIVE    Benzodiazepine Screen, Urine NEGATIVE NEGATIVE    Cocaine Metabolite, Urine NEGATIVE NEGATIVE    Methadone Screen, Urine NEGATIVE NEGATIVE    Opiates, Urine NEGATIVE NEGATIVE    Phencyclidine, Urine NEGATIVE NEGATIVE    Cannabinoid Scrn, Ur NEGATIVE NEGATIVE    Oxycodone Screen, Ur NEGATIVE NEGATIVE    Fentanyl, Ur NEGATIVE NEGATIVE    Test Information       Assay provides medical screening only.   The absence of expected drug(s) and/or metabolite(s) may indicate diluted or adulterated urine, limitations of testing or timing of collection. Troponin   Result Value Ref Range    Troponin, High Sensitivity 195 (HH) 0 - 22 ng/L   EKG 12 Lead   Result Value Ref Range    Ventricular Rate 75 BPM    Atrial Rate 75 BPM    P-R Interval 142 ms    QRS Duration 66 ms    Q-T Interval 350 ms    QTc Calculation (Bazett) 390 ms    P Axis 18 degrees    R Axis -165 degrees    T Axis 41 degrees         IMAGING DATA:    CT HEAD WO CONTRAST    Result Date: 1/1/2023  EXAMINATION: CT OF THE HEAD WITHOUT CONTRAST  1/1/2023 10:48 pm TECHNIQUE: CT of the head was performed without the administration of intravenous contrast. Automated exposure control, iterative reconstruction, and/or weight based adjustment of the mA/kV was utilized to reduce the radiation dose to as low as reasonably achievable. COMPARISON: None. HISTORY: Reason for Exam: AMS Additional signs and symptoms: the patient has been getting more and more confused since 2 weeks ago. It has progressively been getting worse and today FINDINGS: BRAIN/VENTRICLES: There is no acute intracranial hemorrhage, mass effect or midline shift. No abnormal extra-axial fluid collection. The gray-white differentiation is maintained without evidence of an acute infarct. There is no evidence of hydrocephalus. Changes of mild chronic small vessel ischemic disease. ORBITS: The visualized portion of the orbits demonstrate no acute abnormality. SINUSES: The visualized paranasal sinuses and mastoid air cells demonstrate no acute abnormality. SOFT TISSUES/SKULL:  No acute abnormality of the visualized skull or soft tissues. No acute intracranial abnormality. Mild chronic small vessel ischemic disease. XR CHEST PORTABLE    Result Date: 1/1/2023  EXAMINATION: ONE XRAY VIEW OF THE CHEST 1/1/2023 7:17 pm COMPARISON: None.  HISTORY: ORDERING SYSTEM PROVIDED HISTORY: ams TECHNOLOGIST PROVIDED HISTORY: ams Reason for Exam: Altered mental status, difficulty breathing Additional signs and symptoms: Altered mental status, difficulty breathing Relevant Medical/Surgical History: Altered mental status, difficulty breathing FINDINGS: Large lucent area in the left apex suggesting a large bulla however superimposed pneumothorax cannot be excluded. The cardiac size is normal. Right lower lobe airspace infiltrate and mild right pleural effusion. . Pulmonary vascularity appears normal. There is mild ectasia of the thoracic aorta. Calcific tendinitis of the right shoulder. No acute bony abnormalities. The hilar structures are normal.     Right lower lobe pneumonia and small right pleural effusion Large lucent area in the left apex suggesting a large bulla however superimposed pneumothorax cannot be excluded. Follow-up CT would be useful for further evaluation. The findings were sent to the Radiology Results [address] at 10:31 pm on 1/1/2023 to be communicated to a licensed caregiver. IMPRESSION:   Primary Problem  Acute respiratory failure [facility])    Active Hospital Problems    Diagnosis Date Noted    Acute type II respiratory failure ([address] [J96.00] 01/02/2023     Priority: Medium    Transaminitis [R74.01] 01/02/2023     Priority: Medium    Microcytic anemia [D64.9] 01/02/2023     Priority: Medium    Thrombocytopenia ([address] [D69.6] 01/02/2023     Priority: Medium       Medical apathy  Respiratory failure secondary pneumonia  Abnormal LFT  Microcytic anemia  Thrombocytopenia  History of alcohol dependence    RECOMMENDATIONS:  I reviewed the labs/imaging available to me,outside records and discussed with the patient. I explained to the patient the nature of this problem. I explained the significance of these abnormalities and possible etiology and management options  Follow-up on ultrasound liver. If abnormal will also obtain ultrasound of spleen.   Suspect patient may have liver disease from alcohol use causing cytopenias   We will order work-up for anemia and thrombocytopenia  Rule out DIC  Okay to give pharmacological DVT prophylaxis if no active bleeding and platelet count is above 50,000  Transfuse to keep platelet count above 10,000 and above 50,000 patient is bleeding  Discussed with internal medicine team.    Discussed with family and Nurse. Thank you for asking us to see this patient. Mervin Ortiz MD          This note is created with the assistance of a speech recognition program.  While intending to generate a document that actually reflects the content of the visit, the document can still have some errors including those of syntax and sound a like substitutions which may escape proof reading. It such instances, actual meaning can be extrapolated by contextual diversion.

## 2023-01-02 NOTE — PROGRESS NOTES
Physical Therapy        Physical Therapy Cancel Note      DATE: 2023    NAME: Lottie Sinha  MRN: [id number]   : 1960      Patient not seen this date for Physical Therapy due to:  23 PT/OT Eval deferred by Nurse Madeleine Conroy this AM: awaiting pulmonology to assess patient before Therapy proceeds. PT to follow and check back in PM as time allows.       Electronically signed by Aurora Eldridge PT on 2023 at 10:19 AM

## 2023-01-02 NOTE — PROGRESS NOTES
Writer notified Dr. Grace Solorzano of patient's ABG results. No new orders provided; will continue to monitor.

## 2023-01-02 NOTE — PROGRESS NOTES
Physical Therapy        Physical Therapy Cancel Note      DATE: 2023    NAME: Geovanna Madrigal  MRN: [id number]   : 1960      Patient not seen this date for Physical Therapy due to:  23 Hold PT this PM per nurse Olivia esteves/valentin pt's agitation and combativeness. PT to follow.       Electronically signed by Rachelle Ellison PT on 2023 at 5:01 PM

## 2023-01-02 NOTE — PROGRESS NOTES
Patient intubated assisted Dr. Devyn Smith, 7.5 ETT placed 23cm at the lip, bilateral BS and ETCo2 color change noted. Patient is now on ventilator no complcations.

## 2023-01-02 NOTE — ED NOTES
Report given to Hal Litten, RN from ICU. Report method in person   The following was reviewed with receiving RN:   Current vital signs:  /78   Pulse 72   Temp 98.4 °F (36.9 °C)   Resp 22   SpO2 90%                MEWS Score: 2     Any medication or safety alerts were reviewed. Any pending diagnostics and notifications were also reviewed, as well as any safety concerns or issues, abnormal labs, abnormal imaging, and abnormal assessment findings. Questions were answered.             [facility]  01/02/23 0768

## 2023-01-02 NOTE — PROCEDURES
Procedure note    Endotracheal intubation    Indication impending respiratory failure arrest  Severe altered mental status    I explained the circumstances with patient's significant other and patient's daughter. They understand the need for intubation for airway protection and ventilation. They requested that we proceed as the patient is a full code    We placed the patient in the intubation position    Using a 4.0 glide scope, a 7.5 endotracheal tube was placed to 21 cm at the lip. Equal breath sounds were heard. Chest x-ray was done revealing endotracheal tube in the trachea. I did request that the patient endotracheal to be advanced 2 cm after looking the chest x-ray. Patient O2 saturations currently 95% with an FiO2 of 40%    Estimated blood loss 0    To to notify family in waiting room, they were not present. Attempted contacting Marcella Vieira via telephone.   Attempt not successful     MD Karlee

## 2023-01-02 NOTE — PROGRESS NOTES
[address]   OCCUPATIONAL THERAPY MISSED TREATMENT NOTE   INPATIENT   Date: 23  Patient Name: Ben Hill       Room:   MRN: [id number]   Account #: [de-identified]    : 1960  (63 y.o.)  Gender: male      REASON FOR MISSED TREATMENT:  Hold treatment per nursing request   -   ANGEL Baker requests OT to check back after pulmonology assesses patient. OT will re-attempt PM as appropriate.     [place], OT

## 2023-01-03 ENCOUNTER — APPOINTMENT (OUTPATIENT)
Dept: MRI IMAGING | Age: 63
DRG: 139 | End: 2023-01-03
Payer: MEDICAID

## 2023-01-03 ENCOUNTER — APPOINTMENT (OUTPATIENT)
Dept: ULTRASOUND IMAGING | Age: 63
DRG: 139 | End: 2023-01-03
Payer: MEDICAID

## 2023-01-03 ENCOUNTER — APPOINTMENT (OUTPATIENT)
Dept: VASCULAR LAB | Age: 63
DRG: 139 | End: 2023-01-03
Payer: MEDICAID

## 2023-01-03 ENCOUNTER — APPOINTMENT (OUTPATIENT)
Dept: CT IMAGING | Age: 63
DRG: 139 | End: 2023-01-03
Payer: MEDICAID

## 2023-01-03 ENCOUNTER — APPOINTMENT (OUTPATIENT)
Dept: GENERAL RADIOLOGY | Age: 63
DRG: 139 | End: 2023-01-03
Payer: MEDICAID

## 2023-01-03 ENCOUNTER — APPOINTMENT (OUTPATIENT)
Dept: NON INVASIVE DIAGNOSTICS | Age: 63
DRG: 139 | End: 2023-01-03
Payer: MEDICAID

## 2023-01-03 PROBLEM — J43.9 EMPHYSEMA LUNG (HCC): Status: ACTIVE | Noted: 2023-01-03

## 2023-01-03 PROBLEM — R79.1 PROLONGED PT (PROTHROMBIN TIME): Status: ACTIVE | Noted: 2023-01-03

## 2023-01-03 PROBLEM — I63.9 CEREBRAL INFARCTION (HCC): Status: ACTIVE | Noted: 2023-01-03

## 2023-01-03 LAB
ABSOLUTE BANDS #: 0.06 K/UL (ref 0–1)
ABSOLUTE EOS #: 0 K/UL (ref 0–0.4)
ABSOLUTE LYMPH #: 0.52 K/UL (ref 1–4.8)
ABSOLUTE MONO #: 0.29 K/UL (ref 0.1–1.3)
ADENOVIRUS PCR: NOT DETECTED
ALBUMIN (CALCULATED): 3.6 G/DL (ref 3.2–5.2)
ALBUMIN PERCENT: 65 % (ref 45–65)
ALBUMIN SERPL-MCNC: 3.1 G/DL (ref 3.5–5.2)
ALLEN TEST: ABNORMAL
ALP BLD-CCNC: 50 U/L (ref 40–129)
ALPHA 1 PERCENT: 3 % (ref 3–6)
ALPHA 2 PERCENT: 7 % (ref 6–13)
ALPHA-1-GLOBULIN: 0.2 G/DL (ref 0.1–0.4)
ALPHA-2-GLOBULIN: 0.4 G/DL (ref 0.5–0.9)
ALT SERPL-CCNC: 330 U/L (ref 5–41)
ANION GAP SERPL CALCULATED.3IONS-SCNC: 8 MMOL/L (ref 9–17)
AST SERPL-CCNC: 291 U/L
BANDS: 1 % (ref 0–10)
BASOPHILS # BLD: 0 % (ref 0–2)
BASOPHILS ABSOLUTE: 0 K/UL (ref 0–0.2)
BETA GLOBULIN: 0.6 G/DL (ref 0.5–1.1)
BETA PERCENT: 11 % (ref 11–19)
BILIRUB SERPL-MCNC: 0.9 MG/DL (ref 0.3–1.2)
BORDETELLA PARAPERTUSSIS: NOT DETECTED
BORDETELLA PERTUSSIS PCR: NOT DETECTED
BUN BLDV-MCNC: 32 MG/DL (ref 8–23)
CALCIUM SERPL-MCNC: 7.4 MG/DL (ref 8.6–10.4)
CARBOXYHEMOGLOBIN: 1.7 % (ref 0–5)
CHLAMYDIA PNEUMONIAE BY PCR: NOT DETECTED
CHLORIDE BLD-SCNC: 105 MMOL/L (ref 98–107)
CHLORIDE, UR: 34 MMOL/L
CO2: 30 MMOL/L (ref 20–31)
CORONAVIRUS 229E PCR: NOT DETECTED
CORONAVIRUS HKU1 PCR: NOT DETECTED
CORONAVIRUS NL63 PCR: NOT DETECTED
CORONAVIRUS OC43 PCR: NOT DETECTED
CREAT SERPL-MCNC: 0.97 MG/DL (ref 0.7–1.2)
CREATININE URINE: 79.8 MG/DL (ref 39–259)
CULTURE: NO GROWTH
EKG ATRIAL RATE: 75 BPM
EKG P AXIS: 18 DEGREES
EKG P-R INTERVAL: 142 MS
EKG Q-T INTERVAL: 350 MS
EKG QRS DURATION: 66 MS
EKG QTC CALCULATION (BAZETT): 390 MS
EKG R AXIS: -165 DEGREES
EKG T AXIS: 41 DEGREES
EKG VENTRICULAR RATE: 75 BPM
EOSINOPHILS RELATIVE PERCENT: 0 % (ref 0–4)
FIO2: 35
FREE KAPPA/LAMBDA RATIO: 0.13 (ref 0.26–1.65)
GAMMA GLOBULIN %: 14 % (ref 9–20)
GAMMA GLOBULIN: 0.8 G/DL (ref 0.5–1.5)
GFR SERPL CREATININE-BSD FRML MDRD: >60 ML/MIN/1.73M2
GLUCOSE BLD-MCNC: 91 MG/DL (ref 70–99)
HCO3 ARTERIAL: 32.6 MMOL/L (ref 22–26)
HCT VFR BLD CALC: 37.6 % (ref 41–53)
HCT VFR BLD CALC: 38 % (ref 41–53)
HEMOGLOBIN: 10.9 G/DL (ref 13.5–17.5)
HEMOGLOBIN: 11 G/DL (ref 13.5–17.5)
HUMAN METAPNEUMOVIRUS PCR: NOT DETECTED
INFLUENZA A BY PCR: NOT DETECTED
INFLUENZA B BY PCR: NOT DETECTED
INR BLD: 1.9
KAPPA FREE LIGHT CHAINS QNT: 2.79 MG/DL (ref 0.37–1.94)
LAMBDA FREE LIGHT CHAINS QNT: 20.89 MG/DL (ref 0.57–2.63)
LV EF: 55 %
LVEF MODALITY: NORMAL
LYMPHOCYTES # BLD: 9 % (ref 24–44)
MCH RBC QN AUTO: 20.2 PG (ref 26–34)
MCHC RBC AUTO-ENTMCNC: 29.2 G/DL (ref 31–37)
MCV RBC AUTO: 69.4 FL (ref 80–100)
METHEMOGLOBIN: 0.9 % (ref 0–1.9)
MODE: ABNORMAL
MONOCYTES # BLD: 5 % (ref 1–7)
MORPHOLOGY: ABNORMAL
MRSA, DNA, NASAL: ABNORMAL
MYCOPLASMA PNEUMONIAE PCR: NOT DETECTED
NUCLEATED RED BLOOD CELLS: 1 PER 100 WBC
O2 DEVICE/FLOW/%: ABNORMAL
O2 SAT, ARTERIAL: 89.2 % (ref 95–98)
PARAINFLUENZA 1 PCR: NOT DETECTED
PARAINFLUENZA 2 PCR: NOT DETECTED
PARAINFLUENZA 3 PCR: NOT DETECTED
PARAINFLUENZA 4 PCR: NOT DETECTED
PARTIAL THROMBOPLASTIN TIME: 38.4 SEC (ref 24–36)
PATHOLOGIST REVIEW: NORMAL
PATHOLOGIST: ABNORMAL
PATHOLOGIST: ABNORMAL
PATIENT TEMP: 37
PCO2 ARTERIAL: 56 MMHG (ref 35–45)
PDW BLD-RTO: 20 % (ref 11.5–14.9)
PEEP/CPAP: 8
PH ARTERIAL: 7.37 (ref 7.35–7.45)
PLATELET # BLD: 66 K/UL (ref 150–450)
PMV BLD AUTO: 9.4 FL (ref 6–12)
PO2 ARTERIAL: 61.4 MMHG (ref 80–100)
POSITIVE BASE EXCESS, ART: 7.4 MMOL/L (ref 0–2)
POTASSIUM SERPL-SCNC: 3.7 MMOL/L (ref 3.7–5.3)
PROTEIN ELECTROPHORESIS, SERUM: ABNORMAL
PROTHROMBIN TIME: 21.9 SEC (ref 11.8–14.6)
PT. POSITION: ABNORMAL
RBC # BLD: 5.42 M/UL (ref 4.5–5.9)
RESP SYNCYTIAL VIRUS PCR: NOT DETECTED
RESPIRATORY RATE: 22
RHINO/ENTEROVIRUS PCR: NOT DETECTED
SAMPLE SITE: ABNORMAL
SARS-COV-2, PCR: NOT DETECTED
SEG NEUTROPHILS: 85 % (ref 36–66)
SEGMENTED NEUTROPHILS ABSOLUTE COUNT: 4.87 K/UL (ref 1.3–9.1)
SERUM IFX INTERP: ABNORMAL
SET RATE: 22
SODIUM BLD-SCNC: 143 MMOL/L (ref 135–144)
SODIUM,UR: <20 MMOL/L
SPECIMEN DESCRIPTION: ABNORMAL
SPECIMEN DESCRIPTION: NORMAL
SPECIMEN DESCRIPTION: NORMAL
SURGICAL PATHOLOGY REPORT: NORMAL
TEXT FOR RESPIRATORY: ABNORMAL
TOTAL PROT. SUM,%: 100 % (ref 98–102)
TOTAL PROT. SUM: 5.6 G/DL (ref 6.3–8.2)
TOTAL PROTEIN, URINE: 23 MG/DL
TOTAL PROTEIN: 5.2 G/DL (ref 6.4–8.3)
TOTAL PROTEIN: 5.5 G/DL (ref 6.4–8.3)
TOTAL RATE: 22
TRIGL SERPL-MCNC: 85 MG/DL
URINE TOTAL PROTEIN CREATININE RATIO: 0.29 (ref 0–0.2)
VT: 500
WBC # BLD: 5.7 K/UL (ref 3.5–11)

## 2023-01-03 PROCEDURE — 85610 PROTHROMBIN TIME: CPT

## 2023-01-03 PROCEDURE — 70496 CT ANGIOGRAPHY HEAD: CPT

## 2023-01-03 PROCEDURE — 6360000002 HC RX W HCPCS: Performed by: INTERNAL MEDICINE

## 2023-01-03 PROCEDURE — 6360000004 HC RX CONTRAST MEDICATION: Performed by: PSYCHIATRY & NEUROLOGY

## 2023-01-03 PROCEDURE — 82570 ASSAY OF URINE CREATININE: CPT

## 2023-01-03 PROCEDURE — 2500000003 HC RX 250 WO HCPCS: Performed by: INTERNAL MEDICINE

## 2023-01-03 PROCEDURE — 71045 X-RAY EXAM CHEST 1 VIEW: CPT

## 2023-01-03 PROCEDURE — 84478 ASSAY OF TRIGLYCERIDES: CPT

## 2023-01-03 PROCEDURE — 2580000003 HC RX 258: Performed by: INTERNAL MEDICINE

## 2023-01-03 PROCEDURE — 82805 BLOOD GASES W/O2 SATURATION: CPT

## 2023-01-03 PROCEDURE — 87205 SMEAR GRAM STAIN: CPT

## 2023-01-03 PROCEDURE — 6360000004 HC RX CONTRAST MEDICATION

## 2023-01-03 PROCEDURE — 94003 VENT MGMT INPAT SUBQ DAY: CPT

## 2023-01-03 PROCEDURE — 85014 HEMATOCRIT: CPT

## 2023-01-03 PROCEDURE — 93010 ELECTROCARDIOGRAM REPORT: CPT | Performed by: INTERNAL MEDICINE

## 2023-01-03 PROCEDURE — 85730 THROMBOPLASTIN TIME PARTIAL: CPT

## 2023-01-03 PROCEDURE — 85018 HEMOGLOBIN: CPT

## 2023-01-03 PROCEDURE — 71260 CT THORAX DX C+: CPT

## 2023-01-03 PROCEDURE — 99223 1ST HOSP IP/OBS HIGH 75: CPT | Performed by: PSYCHIATRY & NEUROLOGY

## 2023-01-03 PROCEDURE — 93306 TTE W/DOPPLER COMPLETE: CPT

## 2023-01-03 PROCEDURE — 2580000003 HC RX 258

## 2023-01-03 PROCEDURE — 76705 ECHO EXAM OF ABDOMEN: CPT

## 2023-01-03 PROCEDURE — 2000000000 HC ICU R&B

## 2023-01-03 PROCEDURE — 6370000000 HC RX 637 (ALT 250 FOR IP): Performed by: INTERNAL MEDICINE

## 2023-01-03 PROCEDURE — 2500000003 HC RX 250 WO HCPCS

## 2023-01-03 PROCEDURE — 94761 N-INVAS EAR/PLS OXIMETRY MLT: CPT

## 2023-01-03 PROCEDURE — 84300 ASSAY OF URINE SODIUM: CPT

## 2023-01-03 PROCEDURE — 87070 CULTURE OTHR SPECIMN AEROBIC: CPT

## 2023-01-03 PROCEDURE — 6370000000 HC RX 637 (ALT 250 FOR IP): Performed by: NURSE PRACTITIONER

## 2023-01-03 PROCEDURE — 36415 COLL VENOUS BLD VENIPUNCTURE: CPT

## 2023-01-03 PROCEDURE — 6360000002 HC RX W HCPCS

## 2023-01-03 PROCEDURE — 70551 MRI BRAIN STEM W/O DYE: CPT

## 2023-01-03 PROCEDURE — 82436 ASSAY OF URINE CHLORIDE: CPT

## 2023-01-03 PROCEDURE — 80053 COMPREHEN METABOLIC PANEL: CPT

## 2023-01-03 PROCEDURE — 2580000003 HC RX 258: Performed by: PSYCHIATRY & NEUROLOGY

## 2023-01-03 PROCEDURE — 2700000000 HC OXYGEN THERAPY PER DAY

## 2023-01-03 PROCEDURE — 99233 SBSQ HOSP IP/OBS HIGH 50: CPT | Performed by: INTERNAL MEDICINE

## 2023-01-03 PROCEDURE — 36600 WITHDRAWAL OF ARTERIAL BLOOD: CPT

## 2023-01-03 PROCEDURE — 89220 SPUTUM SPECIMEN COLLECTION: CPT

## 2023-01-03 PROCEDURE — 85025 COMPLETE CBC W/AUTO DIFF WBC: CPT

## 2023-01-03 PROCEDURE — 94640 AIRWAY INHALATION TREATMENT: CPT

## 2023-01-03 PROCEDURE — C9113 INJ PANTOPRAZOLE SODIUM, VIA: HCPCS

## 2023-01-03 PROCEDURE — 84156 ASSAY OF PROTEIN URINE: CPT

## 2023-01-03 PROCEDURE — 2580000003 HC RX 258: Performed by: NURSE PRACTITIONER

## 2023-01-03 PROCEDURE — 93970 EXTREMITY STUDY: CPT

## 2023-01-03 RX ORDER — 0.9 % SODIUM CHLORIDE 0.9 %
100 INTRAVENOUS SOLUTION INTRAVENOUS ONCE
Status: COMPLETED | OUTPATIENT
Start: 2023-01-03 | End: 2023-01-03

## 2023-01-03 RX ORDER — SODIUM CHLORIDE 0.9 % (FLUSH) 0.9 %
10 SYRINGE (ML) INJECTION PRN
Status: DISCONTINUED | OUTPATIENT
Start: 2023-01-03 | End: 2023-01-19 | Stop reason: HOSPADM

## 2023-01-03 RX ORDER — SODIUM CHLORIDE 0.9 % (FLUSH) 0.9 %
10 SYRINGE (ML) INJECTION PRN
Status: DISCONTINUED | OUTPATIENT
Start: 2023-01-03 | End: 2023-01-12 | Stop reason: SDUPTHER

## 2023-01-03 RX ORDER — LORAZEPAM 2 MG/ML
INJECTION INTRAMUSCULAR
Status: DISCONTINUED
Start: 2023-01-03 | End: 2023-01-03 | Stop reason: WASHOUT

## 2023-01-03 RX ORDER — ASPIRIN 81 MG/1
81 TABLET, CHEWABLE ORAL DAILY
Status: DISCONTINUED | OUTPATIENT
Start: 2023-01-03 | End: 2023-01-19 | Stop reason: HOSPADM

## 2023-01-03 RX ADMIN — THIAMINE HYDROCHLORIDE: 100 INJECTION, SOLUTION INTRAMUSCULAR; INTRAVENOUS at 16:20

## 2023-01-03 RX ADMIN — METRONIDAZOLE 500 MG: 500 INJECTION, SOLUTION INTRAVENOUS at 05:24

## 2023-01-03 RX ADMIN — PROPOFOL 30 MCG/KG/MIN: 10 INJECTION, EMULSION INTRAVENOUS at 08:26

## 2023-01-03 RX ADMIN — SODIUM CHLORIDE, PRESERVATIVE FREE 10 ML: 5 INJECTION INTRAVENOUS at 07:58

## 2023-01-03 RX ADMIN — NOREPINEPHRINE BITARTRATE 2 MCG/MIN: 1 INJECTION, SOLUTION, CONCENTRATE INTRAVENOUS at 17:23

## 2023-01-03 RX ADMIN — IPRATROPIUM BROMIDE AND ALBUTEROL SULFATE 1 AMPULE: 2.5; .5 SOLUTION RESPIRATORY (INHALATION) at 06:48

## 2023-01-03 RX ADMIN — SODIUM CHLORIDE, PRESERVATIVE FREE 10 ML: 5 INJECTION INTRAVENOUS at 14:59

## 2023-01-03 RX ADMIN — IOPAMIDOL 75 ML: 755 INJECTION, SOLUTION INTRAVENOUS at 14:59

## 2023-01-03 RX ADMIN — IPRATROPIUM BROMIDE AND ALBUTEROL SULFATE 1 AMPULE: 2.5; .5 SOLUTION RESPIRATORY (INHALATION) at 16:36

## 2023-01-03 RX ADMIN — METRONIDAZOLE 500 MG: 500 INJECTION, SOLUTION INTRAVENOUS at 21:07

## 2023-01-03 RX ADMIN — IPRATROPIUM BROMIDE AND ALBUTEROL SULFATE 1 AMPULE: 2.5; .5 SOLUTION RESPIRATORY (INHALATION) at 11:47

## 2023-01-03 RX ADMIN — IPRATROPIUM BROMIDE AND ALBUTEROL SULFATE 1 AMPULE: 2.5; .5 SOLUTION RESPIRATORY (INHALATION) at 20:35

## 2023-01-03 RX ADMIN — VANCOMYCIN HYDROCHLORIDE 1000 MG: 1 INJECTION, POWDER, LYOPHILIZED, FOR SOLUTION INTRAVENOUS at 11:49

## 2023-01-03 RX ADMIN — IRON SUCROSE 300 MG: 20 INJECTION, SOLUTION INTRAVENOUS at 18:48

## 2023-01-03 RX ADMIN — SODIUM CHLORIDE, PRESERVATIVE FREE 10 ML: 5 INJECTION INTRAVENOUS at 21:01

## 2023-01-03 RX ADMIN — SODIUM CHLORIDE, PRESERVATIVE FREE 10 ML: 5 INJECTION INTRAVENOUS at 18:36

## 2023-01-03 RX ADMIN — SODIUM CHLORIDE: 9 INJECTION, SOLUTION INTRAVENOUS at 00:02

## 2023-01-03 RX ADMIN — CEFEPIME 2000 MG: 2 INJECTION, POWDER, FOR SOLUTION INTRAVENOUS at 10:40

## 2023-01-03 RX ADMIN — SODIUM CHLORIDE 100 ML: 9 INJECTION, SOLUTION INTRAVENOUS at 14:58

## 2023-01-03 RX ADMIN — PROPOFOL 30 MCG/KG/MIN: 10 INJECTION, EMULSION INTRAVENOUS at 21:05

## 2023-01-03 RX ADMIN — IOPAMIDOL 75 ML: 755 INJECTION, SOLUTION INTRAVENOUS at 18:35

## 2023-01-03 RX ADMIN — CEFEPIME 2000 MG: 2 INJECTION, POWDER, FOR SOLUTION INTRAVENOUS at 23:00

## 2023-01-03 RX ADMIN — PROPOFOL 30 MCG/KG/MIN: 10 INJECTION, EMULSION INTRAVENOUS at 12:59

## 2023-01-03 RX ADMIN — METRONIDAZOLE 500 MG: 500 INJECTION, SOLUTION INTRAVENOUS at 14:09

## 2023-01-03 RX ADMIN — IPRATROPIUM BROMIDE AND ALBUTEROL SULFATE 1 AMPULE: 2.5; .5 SOLUTION RESPIRATORY (INHALATION) at 04:04

## 2023-01-03 RX ADMIN — SODIUM CHLORIDE 100 ML: 9 INJECTION, SOLUTION INTRAVENOUS at 18:36

## 2023-01-03 RX ADMIN — SODIUM CHLORIDE, PRESERVATIVE FREE 40 MG: 5 INJECTION INTRAVENOUS at 08:07

## 2023-01-03 RX ADMIN — PROPOFOL 30 MCG/KG/MIN: 10 INJECTION, EMULSION INTRAVENOUS at 16:25

## 2023-01-03 ASSESSMENT — PULMONARY FUNCTION TESTS
PIF_VALUE: 26
PIF_VALUE: 23
PIF_VALUE: 26
PIF_VALUE: 20
PIF_VALUE: 21
PIF_VALUE: 24
PIF_VALUE: 26
PIF_VALUE: 22
PIF_VALUE: 22
PIF_VALUE: 28
PIF_VALUE: 22
PIF_VALUE: 20
PIF_VALUE: 22
PIF_VALUE: 21
PIF_VALUE: 44
PIF_VALUE: 21
PIF_VALUE: 23
PIF_VALUE: 21
PIF_VALUE: 21
PIF_VALUE: 20
PIF_VALUE: 23
PIF_VALUE: 22
PIF_VALUE: 23
PIF_VALUE: 22
PIF_VALUE: 22
PIF_VALUE: 26
PIF_VALUE: 22
PIF_VALUE: 26
PIF_VALUE: 20
PIF_VALUE: 22
PIF_VALUE: 25
PIF_VALUE: 29
PIF_VALUE: 24
PIF_VALUE: 30
PIF_VALUE: 23
PIF_VALUE: 24
PIF_VALUE: 23
PIF_VALUE: 21

## 2023-01-03 NOTE — CARE COORDINATION
CASE MANAGEMENT NOTE:    Admission Date:  1/1/2023 Jaison Marvin is a 60 y.o.  male    Admitted for : Acute respiratory failure ([facility] [address] [J96.00]  Acute respiratory failure with hypoxia and hypercapnia ([facility] [address] [J96.01, J96.02]  Altered mental status, unspecified altered mental status type [R41.82]  Community acquired pneumonia of right lower lobe of lung [J18.9]    Met with:  Family Maximilian Etienne     PCP:  none                                 Insurance:  none , Ivan Figueredo from [facility] met with dtr       Is patient alert and oriented at time of discussion:  No- intubated on vent     Current Residence/ Living Arrangements:  independently at home with Mckenzie Velázquez            Current Services PTA:  No    Does patient go to outpatient dialysis: No  If yes, location and chair time: NA  Who is their nephrologist? NA    Is patient agreeable to VNS: unknown     [place]edom of choice provided:  NA    List of [address] provided: NA    VNS chosen:  NA    DME:  none    Home Oxygen: No    Nebulizer: No    CPAP/BIPAP: No    Supplier: N/A    Potential Assistance Needed: Yes    SNF needed: Yes- possible pt is on vent and will have to see what needs will be    [place] of choice and list provided: NA    Pharmacy:  none Pt only taked OTC meds       Is patient currently receiving oral anticoagulation therapy? No    Is the Patient an SOL[facility]INTER [facility] with Readmission Risk Score greater than 14%? No  If yes, pt needs a follow up appointment made within 7 days. Family Members/Caregivers that pt would like involved in their care:    Yes    If yes, list name here:  Maximilian Iniguez, Son Faizan Trejo and Mkcenzie Velázquez     Transportation Provider:  Family             Discharge Plan:  1/3/23 SELF PAY VENT FIO2 35%. From home with Mckenzie Velázquez DME: none VNS: none Pt was intubated in ED. Pt has been sick since thanksgiving and last two weeks he had altered mental status. IV cefepime, flagyl, Vanco, MRI brain , lung scan, spleen U/S, venous dopplers.  Consult to Neuro , Nephro .  Following for needs//JF                           Electronically signed by: Darnell Joseph RN on 1/3/2023 at 12:10 PM

## 2023-01-03 NOTE — PROGRESS NOTES
[address]    OCCUPATIONAL THERAPY MISSED TREATMENT NOTE   INPATIENT   Date: 1/3/23  Patient Name: Geovanna Madrigal       Room:   MRN: [id number]   Account #: [de-identified]    : 1960  (63 y.o.)  Gender: male                 REASON FOR MISSED TREATMENT:  Patient unable to participate   -    Pt is vented and sedated. OT will continue to follow and attempt once medically appropriate.   [address] [place] [address]

## 2023-01-03 NOTE — PLAN OF CARE
Problem: Discharge Planning  Goal: Discharge to home or other facility with appropriate resources  1/3/2023 1719 by Caroline Fleming RN  Outcome: Progressing  Flowsheets  Taken 1/3/2023 1625  Discharge to home or other facility with appropriate resources: Refer to discharge planning if patient needs post-hospital services based on physician order or complex needs related to functional status, cognitive ability or social support system  Taken 1/3/2023 1245  Discharge to home or other facility with appropriate resources: Refer to discharge planning if patient needs post-hospital services based on physician order or complex needs related to functional status, cognitive ability or social support system  Taken 1/3/2023 0830  Discharge to home or other facility with appropriate resources: Refer to discharge planning if patient needs post-hospital services based on physician order or complex needs related to functional status, cognitive ability or social support system     Problem: Safety - Medical Restraint  Goal: Remains free of injury from restraints (Restraint for Interference with Medical Device)  Description: INTERVENTIONS:  1. Determine that other, less restrictive measures have been tried or would not be effective before applying the restraint  2. Evaluate the patient's condition at the time of restraint application  3. Inform patient/family regarding the reason for restraint  4.  Q2H: Monitor safety, psychosocial status, comfort, nutrition and hydration  1/3/2023 1719 by Caroline Fleming RN  Outcome: Progressing  Flowsheets  Taken 1/3/2023 1400  Remains free of injury from restraints (restraint for interference with medical device):   Determine that other, less restrictive measures have been tried or would not be effective before applying the restraint   Evaluate the patient's condition at the time of restraint application  Taken 2/6/6820 1300  Remains free of injury from restraints (restraint for interference with medical device):   Determine that other, less restrictive measures have been tried or would not be effective before applying the restraint   Evaluate the patient's condition at the time of restraint application  Taken 6/4/5859 1200  Remains free of injury from restraints (restraint for interference with medical device):   Determine that other, less restrictive measures have been tried or would not be effective before applying the restraint   Evaluate the patient's condition at the time of restraint application  Taken 0/4/2856 1100  Remains free of injury from restraints (restraint for interference with medical device):   Determine that other, less restrictive measures have been tried or would not be effective before applying the restraint   Evaluate the patient's condition at the time of restraint application  Taken 0/4/5743 1000  Remains free of injury from restraints (restraint for interference with medical device):   Determine that other, less restrictive measures have been tried or would not be effective before applying the restraint   Evaluate the patient's condition at the time of restraint application  Taken 8/5/8950 0900  Remains free of injury from restraints (restraint for interference with medical device):   Determine that other, less restrictive measures have been tried or would not be effective before applying the restraint   Evaluate the patient's condition at the time of restraint application  Taken 7/7/5841 0800  Remains free of injury from restraints (restraint for interference with medical device):   Determine that other, less restrictive measures have been tried or would not be effective before applying the restraint   Evaluate the patient's condition at the time of restraint application     Problem: Pain  Goal: Verbalizes/displays adequate comfort level or baseline comfort level  1/3/2023 1719 by Patsy Minor RN  Outcome: Progressing  Flowsheets  Taken 1/3/2023 1625  Verbalizes/displays adequate comfort level or baseline comfort level:   Encourage patient to monitor pain and request assistance   Assess pain using appropriate pain scale  Taken 1/3/2023 1245  Verbalizes/displays adequate comfort level or baseline comfort level:   Encourage patient to monitor pain and request assistance   Assess pain using appropriate pain scale   Administer analgesics based on type and severity of pain and evaluate response  Taken 1/3/2023 0730  Verbalizes/displays adequate comfort level or baseline comfort level:   Encourage patient to monitor pain and request assistance   Assess pain using appropriate pain scale

## 2023-01-03 NOTE — PROGRESS NOTES
Pt transferred to CT for CT head per primary RN, house sup and RT. Pt on vent and tele. Tolerated well. No distress noted.

## 2023-01-03 NOTE — PLAN OF CARE
Problem: Discharge Planning  Goal: Discharge to home or other facility with appropriate resources  1/3/2023 0439 by Mansi Persaud RN  Outcome: Progressing  Flowsheets (Taken 1/2/2023 0145 by Raven Troncoso RN)  Discharge to home or other facility with appropriate resources:   Identify barriers to discharge with patient and caregiver   Arrange for needed discharge resources and transportation as appropriate   Identify discharge learning needs (meds, wound care, etc)   Refer to discharge planning if patient needs post-hospital services based on physician order or complex needs related to functional status, cognitive ability or social support system  1/2/2023 1959 by Danilo Castro RN  Outcome: Progressing     Problem: Safety - Adult  Goal: Free from fall injury  1/3/2023 0439 by Mansi Persaud RN  Outcome: Progressing  Flowsheets (Taken 1/2/2023 2044)  Free From Fall Injury:   Instruct family/caregiver on patient safety   Based on caregiver fall risk screen, instruct family/caregiver to ask for assistance with transferring infant if caregiver noted to have fall risk factors  Note: Pt free from falls or injuries this shift. Fall precautions maintained. Will cont to monitor  1/2/2023 1959 by Danilo Castro RN  Outcome: Progressing     Problem: ABCDS Injury Assessment  Goal: Absence of physical injury  1/3/2023 0439 by Mansi Persaud RN  Outcome: Progressing  Flowsheets (Taken 1/2/2023 2044)  Absence of Physical Injury: Implement safety measures based on patient assessment  1/2/2023 1959 by Danilo Castro RN  Outcome: Progressing     Problem: Skin/Tissue Integrity  Goal: Absence of new skin breakdown  Description: 1. Monitor for areas of redness and/or skin breakdown  2. Assess vascular access sites hourly  3. Every 4-6 hours minimum:  Change oxygen saturation probe site  4.   Every 4-6 hours:  If on nasal continuous positive airway pressure, respiratory therapy assess nares and determine need for appliance change or resting period. 1/3/2023 0439 by Blane Lizama RN  Outcome: Progressing  1/2/2023 1959 by Omaira Ballard RN  Outcome: Progressing     Problem: Safety - Medical Restraint  Goal: Remains free of injury from restraints (Restraint for Interference with Medical Device)  Description: INTERVENTIONS:  1. Determine that other, less restrictive measures have been tried or would not be effective before applying the restraint  2. Evaluate the patient's condition at the time of restraint application  3. Inform patient/family regarding the reason for restraint  4.  Q2H: Monitor safety, psychosocial status, comfort, nutrition and hydration  1/3/2023 0439 by Blane Lizama RN  Outcome: Progressing  Flowsheets (Taken 1/2/2023 2000)  Remains free of injury from restraints (restraint for interference with medical device):   Determine that other, less restrictive measures have been tried or would not be effective before applying the restraint   Evaluate the patient's condition at the time of restraint application   Inform patient/family regarding the reason for restraint   Every 2 hours: Monitor safety, psychosocial status, comfort, nutrition and hydration  1/2/2023 1959 by Omaira Ballard RN  Outcome: Progressing  Flowsheets (Taken 1/2/2023 1600)  Remains free of injury from restraints (restraint for interference with medical device):   Determine that other, less restrictive measures have been tried or would not be effective before applying the restraint   Evaluate the patient's condition at the time of restraint application   Inform patient/family regarding the reason for restraint   Every 2 hours: Monitor safety, psychosocial status, comfort, nutrition and hydration     Problem: Pain  Goal: Verbalizes/displays adequate comfort level or baseline comfort level  Outcome: Progressing

## 2023-01-03 NOTE — PROGRESS NOTES
Vancomycin Dosing by Pharmacy - Daily Note   Vancomycin Therapy Day:  2  Indication: pneumonia    Allergies:  Patient has no known allergies. Actual Weight:    Wt Readings from Last 1 Encounters:   01/02/23 180 lb 12.4 oz (82 kg)       Labs/Ancillary Data  Estimated Creatinine Clearance: 80 mL/min (based on SCr of 0.97 mg/dL). Recent Labs     01/01/23  2159 01/02/23  0353 01/02/23  0543 01/03/23  0427   CREATININE 1.77* 1.50*  --  0.97   BUN 46* 46*  --  32*   WBC 6.8  --  5.6 5.7     Procalcitonin   Date Value Ref Range Status   01/02/2023 0.09 (H) <0.09 ng/mL Final     Comment:           Suspected Sepsis:  <0.50 ng/mL     Low likelihood of sepsis. 0.50-2.00 ng/mL     Increased likelihood of sepsis. Antibiotics encouraged. >2.00 ng/mL     High risk of sepsis/shock. Antibiotics strongly encouraged. Suspected Lower Resp Tract Infections:  <0.24 ng/mL     Low likelihood of bacterial infection. >0.24 ng/mL     Increased likelihood of bacterial infection. Antibiotics encouraged. With successful antibiotic therapy, PCT levels should decrease rapidly. (Half-life of 24 to   36 hours.)        Procalcitonin values from samples collected within the first 6 hours of systemic infection   may still be low. Retesting may be indicated. Values from day 1 and day 4 can be entered into the Change in Procalcitonin Calculator   ([email or web address]-pct-calculator. [employer]) to determine the patient's Mortality Risk Prognosis        In healthy neonates, plasma Procalcitonin (PCT) concentrations increase gradually after   birth, reaching peak values at about 24 hours of age then decrease to normal values below   0.5 ng/mL by 48-72 hours of age.          Intake/Output Summary (Last 24 hours) at 1/3/2023 0732  Last data filed at 1/3/2023 0551  Gross per 24 hour   Intake 2283.88 ml   Output 1825 ml   Net 458.88 ml     Temp: 97.6F    Culture Date / Source  /  Results  1/2 - blood x 2 - pending  MRSA nasal swab - pending  Legionella - Negative  Strep antigen - sent  Recent vancomycin administrations                     vancomycin (VANCOCIN) 2,000 mg in dextrose 5 % 500 mL IVPB (mg) 2,000 mg New Bag 01/02/23 1643                    Vancomycin Concentrations:   TROUGH:  No results for input(s): VANCOTROUGH in the last 72 hours. RANDOM:  No results for input(s): VANCORANDOM in the last 72 hours. MRSA Nasal Swab: was ordered by provider, awaiting results. Kwasiaaron Julio PLAN     Srcr improved 0.97 mg/dl, will change vanco dose to 1000mg IV every 12 hrs. Ensured BUN/sCr ordered at baseline and every 48 hours x at least 3 levels, then at least weekly. Vanco level tomorrow. Vancomycin Target Concentration Parameters  Treatment  Population Target AUC/KENDALL Target Trough   Invasive MRSA Infection (bacteremia, pneumonia, meningitis, endocarditis, osteomyelitis)  Sepsis (undifferentiated) 400-600 N/A   Infection due to non-MRSA pathogen  Empiric treatment of non-invasive MRSA infection  (SSTI, UTI) <500 10-15 mg/L   CrCl < 29 mL/min  Rapidly fluctuating serum creatinine   KENTRELL N/A < 15 mg/L     Renal replacement therapy is dosed by levels, per hospital protocol. Abbreviations  * Pauc: probability that AUC is >400 (efficacy); Pconc: probability that Ctrough is above 20 ?g/mL (toxicity); Tox: Probability of nephrotoxicity, based on Lodsusanna et al. Clin Infect Dis 2009. Snip appropriate part of Bayesian software results and enter here    Thank you for the consult. Pharmacy will continue to follow. Oskar Patiño, Pharm. [address] [place] [address]

## 2023-01-03 NOTE — PROGRESS NOTES
Physical Therapy        Physical Therapy Cancel Note      DATE: 1/3/2023    NAME: Lottie Sinha  MRN: [id number]   : 1960      Patient not seen this date for Physical Therapy due to:  1/3/23 Patient unable to participate - Pt is vented and sedated. PT will  to follow and check back QD to assess when medically appropriate.  1135am    Electronically signed by Aurora Eldridge PT on 1/3/2023 at 11:37 AM

## 2023-01-03 NOTE — PROGRESS NOTES
[facility]    PROGRESS NOTE             1/3/2023    7:15 AM    Name:   Sammy Negro  MRN:     [id number]     Acct:      [de-identified]   Room:   2009/2009-01  IP Day:  1  Admit Date:  1/1/2023  9:55 PM    PCP:  No primary care provider on file. Code Status:  Full Code    Subjective:     C/C:   Chief Complaint   Patient presents with    Altered Mental Status     Interval History Status: worsened. Patient seen and examined at the bed side. Yesterday patient was intubated following a further decline in mentation and worsening respiratory difficulty. Patient underwent a CT scan WO at 10 PM last night, showing an acute to subacute infarct in the posterior left parietal lobe. Pending MRI  This AM Hb remains stable at 11, Plt 67. This morning the patient was saturating 97% on 35 FiO2. Patient is sedated with propofol and on Levophed    Notes from nursing staff and Consults had been reviewed, and the overnight progress had been checked with the nursing staff as well. Brief History:     The patient is a 60 y.o.  male with unknown past medical history due to no healthcare visits or follow-up who presents with Altered Mental Status and he is admitted to the hospital for the management of  Acute respiratory failure most likely 2/2 pneumonia. Per patient's wife, she reports that the patient has not been acting himself for the past week. She reports being more slurred, confused and progressively getting worse prior to presentation. Patient prior to the presentation a week ago he had a fall and EMS presented patient was vitally stable and he was not transported to the hospital.  This time upon EMS evaluation of the patient he had an O2 of 75%, he was put on a breather and was given a dose of IV Lasix in route. Wife and patient, cannot recall any precipitating event could have led to his presentation.  He also denies chest pain, shortness of breath, or cough. Per patient, he does not recall any complaints or events prior to the presentation. Wife denies recent alcohol use, however, patient does have a history of regular alcohol intake but not on daily basis. Wife also reports that patient has constant heartburn for which he takes regular antiacids. Patient denies the presence of any abdominal pain or any change in bowel habits, abdominal pain, nausea or vomiting. Upon arrival in the ED, patient was put on BiPAP. Patient was afebrile, normotensive and in normal sinus rhythm. A chest x-ray was completed and the patient had right lower pneumonia with a small pleural effusion. Patient also had a large bullae in the left apex with a pneumothorax that cannot be excluded. Patient ABG was suggestive of pattern of respiratory acidosis with pH 7.295, PCO2 60.3, PO2 63.0, HCO3 29.3. CT head WO did not show any acute findings. Patient had elevated troponins of 184, trended down to 177. Patient also had an elevated BNP of 7797. An elevated creatinine of 1.77 was on presentation, with no previous lab test to be compared to. Also patient had a left elevated liver enzymes ALT was 525, , with prolonged prothrombin time of 24.9, and INR of 2.3. Patient was admitted to the ICU for the management of type II respiratory failure associated altered mental status    Review of Systems:     NA: Patient is intubated. Medications:      Allergies:    No Known Allergies    Current Meds:   Scheduled Meds:    sodium chloride flush  5-40 mL IntraVENous 2 times per day    nicotine  1 patch TransDERmal Daily    folic acid, thiamine, multi-vitamin with vitamin K infusion   IntraVENous Q24H    pantoprazole (PROTONIX) 40 mg injection  40 mg IntraVENous Daily    cefepime  2,000 mg IntraVENous Q12H    metroNIDAZOLE  500 mg IntraVENous Q8H    vancomycin (VANCOCIN) intermittent dosing (placeholder)   Other RX Placeholder    vancomycin 1,250 mg IntraVENous Q24H    ipratropium-albuterol  1 ampule Inhalation Q4H    sodium chloride  1,000 mL IntraVENous Once     Continuous Infusions:    sodium chloride      norepinephrine 3 mcg/min (23)    propofol 30 mcg/kg/min (23)    sodium chloride Stopped (23)     PRN Meds: sodium chloride flush, sodium chloride, ondansetron **OR** ondansetron, polyethylene glycol, acetaminophen **OR** acetaminophen, albuterol, guaiFENesin    Data:     Past Medical History:   has no past medical history on file. Social History:   reports that he has been smoking cigarettes. He has a 40.00 pack-year smoking history. He has never used smokeless tobacco. He reports current alcohol use. He reports that he does not currently use drugs. Family History:   History reviewed. No pertinent family history. Vitals:  /73   Pulse 75   Temp 97.6 °F (36.4 °C) (Axillary)   Resp 17   Ht 5' 7\" (1.702 m)   Wt 180 lb 12.4 oz (82 kg)   SpO2 97%   BMI 28.31 kg/m²   Temp (24hrs), Av.8 °F (36.6 °C), Min:97.5 °F (36.4 °C), Max:98.2 °F (36.8 °C)      No results for input(s): POCGLU in the last 72 hours. I/O(24Hr):     Intake/Output Summary (Last 24 hours) at 1/3/2023 0715  Last data filed at 1/3/2023 0551  Gross per 24 hour   Intake 2283.88 ml   Output 1825 ml   Net 458.88 ml       Labs:    CBC:   Lab Results   Component Value Date/Time    WBC 5.7 2023 04:27 AM    RBC 5.42 2023 04:27 AM    HGB 11.0 2023 04:27 AM    HCT 37.6 2023 04:27 AM    MCV 69.4 2023 04:27 AM    MCH 20.2 2023 04:27 AM    MCHC 29.2 2023 04:27 AM    RDW 20.0 2023 04:27 AM    PLT 66 2023 04:27 AM    MPV 9.4 2023 04:27 AM     CMP:    Lab Results   Component Value Date/Time     2023 04:27 AM    K 3.7 2023 04:27 AM     2023 04:27 AM    CO2 30 2023 04:27 AM    BUN 32 2023 04:27 AM    CREATININE 0.97 2023 04:27 AM    LABGLOM >60 01/03/2023 04:27 AM    GLUCOSE 91 01/03/2023 04:27 AM    PROT 5.2 01/03/2023 04:27 AM    LABALBU 3.1 01/03/2023 04:27 AM    CALCIUM 7.4 01/03/2023 04:27 AM    BILITOT 0.9 01/03/2023 04:27 AM    ALKPHOS 50 01/03/2023 04:27 AM     01/03/2023 04:27 AM     01/03/2023 04:27 AM     PT/INR:    Lab Results   Component Value Date/Time    PROTIME 24.4 01/02/2023 04:44 PM    INR 2.2 01/02/2023 04:44 PM       No results found for: SPECIAL  Lab Results   Component Value Date/Time    CULTURE NO GROWTH 12 HOURS 01/02/2023 08:25 AM         Radiology:    CT HEAD WO CONTRAST    Result Date: 1/2/2023  EXAMINATION: CT OF THE HEAD WITHOUT CONTRAST  1/2/2023 10:11 pm TECHNIQUE: CT of the head was performed without the administration of intravenous contrast. Automated exposure control, iterative reconstruction, and/or weight based adjustment of the mA/kV was utilized to reduce the radiation dose to as low as reasonably achievable. COMPARISON: None. HISTORY: ORDERING SYSTEM PROVIDED HISTORY: Altered Mental status TECHNOLOGIST PROVIDED HISTORY: Altered Mental status Reason for Exam: Altered Mental status Additional signs and symptoms: Patient came in with transient alteration of awareness, follow up head CT for any changes. FINDINGS: BRAIN/VENTRICLES: There is no acute intracranial hemorrhage, mass effect or midline shift. No abnormal extra-axial fluid collection. There is a focal area of decreased attenuation with loss of gray/white matter differentiation involving posterior left parietal lobe with somewhat increased conspicuity as compared to prior exam.  There is stable small focus of encephalomalacia involving left cerebellar hemisphere compatible with prior remote infarct/insult. Chronic lacunar infarct to right basal ganglia redemonstrated. There is no evidence of hydrocephalus. ORBITS: The visualized portion of the orbits demonstrate no acute abnormality. SINUSES: Small air-fluid level right sphenoid sinus. Trace air-fluid level right frontal sinus. Mild mucoperiosteal thickening to bilateral ethmoid air cells. Findings are new from prior exam and likely relate to presence of nasogastric tube. SOFT TISSUES/SKULL:  No acute abnormality of the visualized skull or soft tissues. Focal area of decreased attenuation with loss of gray/white matter differentiation involving posterior left parietal lobe with somewhat increased conspicuity as compared to prior exam likely reflecting an area of acute to subacute infarct. Stable small chronic infarct/insult to left cerebellar hemisphere. Chronic lacunar infarct to right basal ganglia redemonstrated. Paranasal sinus disease likely relating to presence of nasogastric tube. CT HEAD WO CONTRAST    Result Date: 1/1/2023  EXAMINATION: CT OF THE HEAD WITHOUT CONTRAST  1/1/2023 10:48 pm TECHNIQUE: CT of the head was performed without the administration of intravenous contrast. Automated exposure control, iterative reconstruction, and/or weight based adjustment of the mA/kV was utilized to reduce the radiation dose to as low as reasonably achievable. COMPARISON: None. HISTORY: Reason for Exam: AMS Additional signs and symptoms: the patient has been getting more and more confused since 2 weeks ago. It has progressively been getting worse and today FINDINGS: BRAIN/VENTRICLES: There is no acute intracranial hemorrhage, mass effect or midline shift. No abnormal extra-axial fluid collection. The gray-white differentiation is maintained without evidence of an acute infarct. There is no evidence of hydrocephalus. Changes of mild chronic small vessel ischemic disease. ORBITS: The visualized portion of the orbits demonstrate no acute abnormality. SINUSES: The visualized paranasal sinuses and mastoid air cells demonstrate no acute abnormality. SOFT TISSUES/SKULL:  No acute abnormality of the visualized skull or soft tissues. No acute intracranial abnormality.  Mild chronic small vessel ischemic disease. US LIVER    Result Date: 1/2/2023  EXAMINATION: RIGHT UPPER QUADRANT ULTRASOUND 1/2/2023 10:20 am COMPARISON: None. HISTORY: ORDERING SYSTEM PROVIDED HISTORY: elevated AST and ALT, in setting of PT, and INR TECHNOLOGIST PROVIDED HISTORY: elevated AST and ALT, in setting of PT, and INR FINDINGS: Patient body habitus limits the study, as there is increased attenuation of the ultrasound beam. This decreases sensitivity and specificity. LIVER:  There is mild increased echogenicity seen throughout the liver. No intrahepatic ductal dilatation. No perihepatic fluid. BILIARY SYSTEM:  Bladder is contracted. Gallstones are seen. Gallbladder wall thickness measures 6 mm. Common bile duct is within normal limits measuring 5 mm. RIGHT KIDNEY: The right kidney is grossly unremarkable without evidence of hydronephrosis. PANCREAS:  Visualized portions of the pancreas are unremarkable. OTHER: No evidence of right upper quadrant ascites. Portal vein flow appears hepatopetal     Increased echogenicity is seen throughout the liver suggesting diffuse hepatocellular disease such as fatty infiltration Cholelithiasis. No cholecystitis     XR CHEST PORTABLE    Result Date: 1/2/2023  EXAMINATION: ONE XRAY VIEW OF THE CHEST 1/2/2023 3:15 pm COMPARISON: 01/01/2023 HISTORY: ORDERING SYSTEM PROVIDED HISTORY: intubation TECHNOLOGIST PROVIDED HISTORY: intubation Reason for Exam: intubation FINDINGS: Overlying items external to the patient somewhat limit evaluation. Placement of endotracheal tube with tip 8.2 cm from the robert. Placement of enteric tube seen to extend into the stomach, distal extent not included in field of view. Persistent likely layering right pleural effusion, similar to slightly worsened. Persistent bilateral airspace opacities to bilateral mid to lower lung zones, right worse than left, similar on the left but mildly worsened on the right. No pneumothoraces are seen.   Persistent likely bullous change to the left upper lung zone. Cardiac and mediastinal silhouettes are stable. Stable appearance to bony structures. Placement of endotracheal tube which appears high riding with tip 8.2 cm from the robert. Suggest advancement by 2-3 cm. Placement of endotracheal tube seen to extend into the stomach, distal extent not included in field of view. No pneumothoraces. Persistent likely bullous change to the left upper lung zone. Persistent right pleural effusion, similar to slightly worsened. Persistent bilateral airspace opacities, right worse than left, similar on the left but mildly worsened on the right. XR CHEST PORTABLE    Result Date: 1/1/2023  EXAMINATION: ONE XRAY VIEW OF THE CHEST 1/1/2023 7:17 pm COMPARISON: None. HISTORY: ORDERING SYSTEM PROVIDED HISTORY: ams TECHNOLOGIST PROVIDED HISTORY: ams Reason for Exam: Altered mental status, difficulty breathing Additional signs and symptoms: Altered mental status, difficulty breathing Relevant Medical/Surgical History: Altered mental status, difficulty breathing FINDINGS: Large lucent area in the left apex suggesting a large bulla however superimposed pneumothorax cannot be excluded. The cardiac size is normal. Right lower lobe airspace infiltrate and mild right pleural effusion. . Pulmonary vascularity appears normal. There is mild ectasia of the thoracic aorta. Calcific tendinitis of the right shoulder. No acute bony abnormalities. The hilar structures are normal.     Right lower lobe pneumonia and small right pleural effusion Large lucent area in the left apex suggesting a large bulla however superimposed pneumothorax cannot be excluded. Follow-up CT would be useful for further evaluation. The findings were sent to the Radiology Results [id number] at 10:31 pm on 1/1/2023 to be communicated to a licensed caregiver. EKG:    there are no previous tracings available for comparison.     Physical Examination:        PHYSICAL EXAM:  General Appearance  Intubated and sedated. Psych: NA  HEENT - Head is normocephalic, atraumatic. Neck: supple, no rigidity, normal ROM  Lungs - Course lung sound on the right side. No wheezes appreciated. Limited exam due to patient position and intubation. Inability to perform posterior lung field exam.  Cardiovascular - Heart sounds are normal.  Regular rhythm, normal rate without murmur, gallop or rub. Abdomen - Soft, nontender, nondistended, no masses or organomegaly  Neurologic - Patient looks to have a facial droop on the right side(possibly due to tube holding position). Limited exam: patient is sedated with propofol. Skin - No bruising or bleeding on exposed skin area  Extremities - No cyanosis, clubbing or edema      Assessment:        Primary Problem  Acute respiratory failure with hypoxia and hypercapnia [facility])    Active Hospital Problems    Diagnosis Date Noted    Transaminitis [R74.01] 01/02/2023     Priority: Medium    Microcytic anemia [D64.9] 01/02/2023     Priority: Medium    Thrombocytopenia ([address] [D69.6] 01/02/2023     Priority: Medium    Agitation [R45.1] 01/02/2023     Priority: Medium    Acute respiratory failure with hypoxia and hypercapnia (HCC) RLL pneumonia [J96.01, J96.02] 01/02/2023     Priority: Medium    Altered mental status [R41.82] 01/02/2023     Priority: Medium    Community acquired pneumonia of right lower lobe of lung [J18.9] 01/02/2023     Priority: Medium       Plan: Altered Mental Status in the setting of Acute type 2 respiratory failure secondary to Pneumonia Vs Acute Heart Failure  -Chest Xray suggestive of RLL pneumonia and possible presence of Bullae in the left Lung [place]-  Patient intubated on Fio2 35, saturating 96%.  On propofol  -CT Head WO, showing an acute to subacute posterior L Parietal lobe infarct  -Neurology consulted; appreciate recommendations  -MRI brain pending  -Antibiotics changed to Cefepime and Vancomycin  Metronidazole Added  -Ipratropium-Albuterol every 4 hrs  -Echocardiogam pending     Elevated troponins most likely 2/2 demand ischemia  -Troponin trending down  -no acute changes on EKG  -ECHO pending    Microcytic anemia, with acute drop of hemoglobin  -Hb upon presentation is 12.1, this AM to 11  -No previous labs to compare?  -Iron: 14, TIBC: 412 and Ferritin:14  -H&H  Q8H  -Fibrin products>5 but <20     Transaminitis with prolonged PTT and INR  - . -IMPROVING  -INR and PTT 24.9, and 2.3 respectively  -Thrombocytopenic of 54  on admission vs 66 this AM   -Liver showing:   Increased echogenicity is seen throughout the liver suggesting diffuse   hepatocellular disease such as fatty infiltration   -Hepatitis C reactive. Pending Hcv PCR  -Hold Lovenox. Will place Pneumatic compressors  -Hem/Onc on board; appreciate recommendations     Thrombocytopenia  -Unknown  baseline  -Transaminates and Prolonged PT and PTT  -Improving PLT function  77 today vs 55 on admission  -Monitor HH closely  -Hold Lovenox     Asymptomatic Bacteruria  -Patient on Cefepime and Vancomycin already    DVT prophylaxis: reason for no prophylaxis: Prolonged PT, aPTT  GI prophylaxis: Protonix 40 mg daily    Shobha Chávez MD  1/3/2023  7:15 AM     Attestation and add on       I have discussed the care of Mechelle Benítez , including pertinent history and exam findings,      1/3/23    with the resident. I have seen and examined the patient and the key elements of all parts of the encounter have been performed by me . I agree with the assessment, plan and orders as documented by the resident.      Principal Problem:    Acute respiratory failure with hypoxia and hypercapnia (HCC) RLL pneumonia  Active Problems:    Transaminitis    Microcytic anemia    Thrombocytopenia (HCC)    Agitation    Altered mental status    Community acquired pneumonia of right lower lobe of lung    Prolonged pt (prothrombin time)    Emphysema lung (HCC)    Cerebral infarction [facility])  Resolved Problems:    * No resolved hospital problems. *       MD DELFIN Tellez[address], [address] [place] [address].    Phone [phone]   Fax: [phone]  Answering Service: [phone]

## 2023-01-03 NOTE — PROGRESS NOTES
Pt returned to room from [address] with primary RN, house sup and RT. On vent and tele. Tolerated well. No distress noted. Repositioned for comfort.

## 2023-01-03 NOTE — PLAN OF CARE
Please complete the MRI screening form online. MRI will be schedule once screening has been completed. Any questions, please call [phone]. Thank you!

## 2023-01-03 NOTE — PROGRESS NOTES
Today's Date: 1/3/2023  Patient Name: Marily Doll  Date of admission: 1/1/2023  9:55 PM  Patient's age: 60 y.o., 1960  Admission Dx: Acute respiratory failure ([facility] [address] [J96.00]  Acute respiratory failure with hypoxia and hypercapnia ([facility] [address] [J96.01, J96.02]  Altered mental status, unspecified altered mental status type [R41.82]  Community acquired pneumonia of right lower lobe of lung [J18.9]    Reason for Consult: management recommendations  Requesting Physician: Rachel Banuelos MD    CHIEF COMPLAINT: Abnormal cell counts. Altered mental status. Pneumonia. History Obtained From:  spouse, family member. Electronic medical record. Patient unable to give any history due to altered mental status          Interval history:    Patient seen and examined  Labs and vitals reviewed  Kidney function improved  Patient has been intubated. He is on low-dose Levophed. Platelet count 92,124. Hemoglobin stable  Work-up suggest iron deficiency  Ultrasound liver shows increased echogenicity suggesting diffuse hepatocellular disease  HCV antibodies positive. Patient's free light chain ratio came back as 7.4. Serum immunofixation studies positive, IgG lambda, 0.04 g/dL  Peripheral smear shows rare RBC fragments. Ultrasound spleen is unremarkable MRI brain without contrast report is pending  CTA negative for PE      HISTORY OF PRESENT ILLNESS:      The patient is a 60 y.o.  male who is admitted with chief complaint of altered mental status. Patient has not seen a doctor for multiple years. Due to worsening mental status EMS was summoned. Patient oxygen saturation was noted to be at 75%. Patient placed on BiPAP x-ray showed right lower lobe pneumonia. Patient started on antibiotics. Patient also noted to have INR of 2.3 platelet count of 81,464. Patient does have a history of alcohol intake. Patient's ammonia level noted to be 29. Creatinine 1.5. Total bilirubin is within range.   Hemoglobin 11.2 with MCV of 69. Platelet count is 59,429. Ultrasound liver done however report is pending.         Past Medical History: Unable to obtain as patient is confused    Past Surgical History: Unable to obtain as patient is confused    Medications:    Prior to Admission medications    Not on File     Current Facility-Administered Medications   Medication Dose Route Frequency Provider Last Rate Last Admin    perflutren lipid microspheres (DEFINITY) injection 1.5 mL  1.5 mL IntraVENous ONCE PRN Carolina Colbert MD        vancomycin 1000 mg IVPB in 250 mL D5W addavial  1,000 mg IntraVENous Q12H Lashell Martinez MD   Stopped at 01/03/23 1402    aspirin chewable tablet 81 mg  81 mg Oral Daily Aliza Grewal MD        0.9 % sodium chloride bolus  100 mL IntraVENous Once Lashell Martinez MD 49.6 mL/hr at 01/03/23 1458 100 mL at 01/03/23 1458    sodium chloride flush 0.9 % injection 10 mL  10 mL IntraVENous PRN Lashell Martinez MD   10 mL at 01/03/23 1459    sodium chloride flush 0.9 % injection 5-40 mL  5-40 mL IntraVENous 2 times per day Refugia Inches, APRN - NP   10 mL at 01/03/23 0758    sodium chloride flush 0.9 % injection 5-40 mL  5-40 mL IntraVENous PRN Abiola Gomez APRN - NP        0.9 % sodium chloride infusion   IntraVENous PRN Refugia Inches, APRN - NP        ondansetron (ZOFRAN-ODT) disintegrating tablet 4 mg  4 mg Oral Q8H PRN Refugia Inches, APRN - NP        Or    ondansetron (ZOFRAN) injection 4 mg  4 mg IntraVENous Q6H PRN Abiola Gomez, APRN - NP        polyethylene glycol (GLYCOLAX) packet 17 g  17 g Oral Daily PRN Refugia Inches, APRN - NP        acetaminophen (TYLENOL) tablet 650 mg  650 mg Oral Q6H PRN Refugia Inches, APRN - NP        Or    acetaminophen (TYLENOL) suppository 650 mg  650 mg Rectal Q6H PRN Refugia Inches, APRN - NP        nicotine (NICODERM CQ) 21 MG/24HR 1 patch  1 patch TransDERmal Daily Refugia Inches, APRN - NP   1 patch at 01/03/23 0810    albuterol (PROVENTIL) nebulizer solution 2.5 mg 2.5 mg Nebulization Q2H PRN Yaneth Bee, APRN - NP   2.5 mg at 01/02/23 1115    guaiFENesin (MUCINEX) extended release tablet 600 mg  600 mg Oral BID PRN Yaneth Bee, APRN - NP        sodium chloride 0.9 % 0,566 mL with folic acid 1 mg, adult multi-vitamin with vitamin k 10 mL, thiamine 100 mg   IntraVENous Q24H Keon Jurado  mL/hr at 01/02/23 1641 Rate Verify at 01/02/23 1641    pantoprazole (PROTONIX) 40 mg in sodium chloride (PF) 0.9 % 10 mL injection  40 mg IntraVENous Daily General Deja MD   40 mg at 01/03/23 0807    cefepime (MAXIPIME) 2,000 mg in sodium chloride 0.9 % 50 mL IVPB mini-bag  2,000 mg IntraVENous Q12H General Deja MD 12.5 mL/hr at 01/03/23 1040 2,000 mg at 01/03/23 1040    metronidazole (FLAGYL) 500 mg in 0.9% NaCl 100 mL IVPB premix  500 mg IntraVENous Q8H General Deja  mL/hr at 01/03/23 1409 500 mg at 01/03/23 1409    vancomycin (VANCOCIN) intermittent dosing (placeholder)   Other RX Darcie Merritt MD        norepinephrine (LEVOPHED) 16 mg in dextrose 5 % 250 mL infusion  1-100 mcg/min IntraVENous Continuous Keon Jurado MD 1.9 mL/hr at 01/03/23 0851 2 mcg/min at 01/03/23 0851    ipratropium-albuterol (DUONEB) nebulizer solution 1 ampule  1 ampule Inhalation Q4H Keon Jurado MD   1 ampule at 01/03/23 1147    propofol injection  5-50 mcg/kg/min IntraVENous Continuous Keon Jurado MD 14.8 mL/hr at 01/03/23 1259 30 mcg/kg/min at 01/03/23 1259    0.9 % sodium chloride infusion   IntraVENous Continuous Keon Jurado MD   Stopped at 01/03/23 0524    0.9 % sodium chloride bolus  1,000 mL IntraVENous Once Keon Jurado MD           Allergies:  Patient has no known allergies. Social History:   reports that he has been smoking cigarettes. He has a 40.00 pack-year smoking history. He has never used smokeless tobacco. He reports current alcohol use. He reports that he does not currently use drugs.      Family History: Patient not able to give history due to altered mental status    REVIEW OF SYSTEMS:      Unable to obtain as patient is intubated    PHYSICAL EXAM:        /75   Pulse 76   Temp 98.7 °F (37.1 °C) (Axillary)   Resp 22   Ht 5' 7\" (1.702 m)   Wt 188 lb 15 oz (85.7 kg)   SpO2 100%   BMI 29.59 kg/m²    Temp (24hrs), Av.9 °F (36.6 °C), Min:97.5 °F (36.4 °C), Max:98.7 °F (37.1 °C)      General appearance -intubated. Mental status -intubated  Eyes - pupils equal and reactive, extraocular eye movements intact   Ears - bilateral TM's and external ear canals normal   Mouth -orally intubated  Neck - supple, no significant adenopathy   Lymphatics - no palpable lymphadenopathy, no hepatosplenomegaly   Chest -bilateral rales  Heart - normal rate, regular rhythm, normal S1, S2, no murmurs  Abdomen - soft, nontender, nondistended, no masses or organomegaly   Neurological -orally intubated  Musculoskeletal - no joint tenderness, deformity or swelling   Extremities - peripheral pulses normal, no pedal edema, no clubbing or cyanosis   Skin - normal coloration and turgor, no rashes, no suspicious skin lesions noted ,      DATA:      Labs:     Results for orders placed or performed during the hospital encounter of 23   COVID-19 & Influenza Combo    Specimen: Nasopharyngeal Swab   Result Value Ref Range    Specimen Description . NASOPHARYNGEAL SWAB     Source . NASOPHARYNGEAL SWAB     SARS-CoV-2 RNA, RT PCR Not Detected Not Detected    INFLUENZA A Not Detected Not Detected    INFLUENZA B Not Detected Not Detected   Respiratory Panel, Molecular, with COVID-19 (Restricted: peds pts or suitable admitted adults)    Specimen: Nasopharyngeal Swab   Result Value Ref Range    Specimen Description . NASOPHARYNGEAL SWAB     Adenovirus PCR Not Detected Not Detected    Coronavirus 229E PCR Not Detected Not Detected    Coronavirus HKU1 PCR Not Detected Not Detected    Coronavirus NL63 PCR Not Detected Not Detected    Coronavirus OC43 PCR Not Detected Not Detected    SARS-CoV-2, PCR Not Detected Not Detected    Human Metapneumovirus PCR Not Detected Not Detected    Rhino/Enterovirus PCR Not Detected Not Detected    Influenza A by PCR Not Detected Not Detected    Influenza B by PCR Not Detected Not Detected    Parainfluenza 1 PCR Not Detected Not Detected    Parainfluenza 2 PCR Not Detected Not Detected    Parainfluenza 3 PCR Not Detected Not Detected    Parainfluenza 4 PCR Not Detected Not Detected    Resp Syncytial Virus PCR Not Detected Not Detected    Bordetella Parapertussis Not Detected Not Detected    B Pertussis by PCR Not Detected Not Detected    Chlamydia pneumoniae By PCR Not Detected Not Detected    Mycoplasma pneumo by PCR Not Detected Not Detected   Legionella Ag, Ur    Specimen: Urine   Result Value Ref Range    Legionella Pneumophilia Ag, Urine NEGATIVE NEGATIVE   Culture, Blood 1    Specimen: Blood   Result Value Ref Range    Specimen Description . BLOOD LEFT ARM     Culture NO GROWTH 1 DAY    Culture, Blood 1    Specimen: Blood   Result Value Ref Range    Specimen Description . BLOOD RIGHT ARM     Culture NO GROWTH 1 DAY    Culture, Urine    Specimen: Urine, clean catch   Result Value Ref Range    Specimen Description . CLEAN CATCH URINE     Culture NO GROWTH    MRSA DNA Probe, Nasal    Specimen: Nasal   Result Value Ref Range    Specimen Description . NASAL SWAB     MRSA, DNA, Nasal (A) NEGATIVE     POSITIVE:  MRSA DNA detected by nucleic acid amplification. Culture, Respiratory    Specimen: Sputum Induced   Result Value Ref Range    Specimen Description . INDUCED SPUTUM     Direct Exam >10, <25 NEUTROPHILS/LPF     Direct Exam < 10 EPITHELIAL CELLS/LPF     Direct Exam NO SIGNIFICANT PATHOGENS SEEN     Culture PENDING    Troponin   Result Value Ref Range    Troponin, High Sensitivity 177 (HH) 0 - 22 ng/L   Troponin   Result Value Ref Range    Troponin, High Sensitivity 184 (HH) 0 - 22 ng/L   APTT   Result Value Ref Range    PTT 26.5 24.0 - 36.0 sec Protime-INR   Result Value Ref Range    Protime 24.9 (H) 11.8 - 14.6 sec    INR 2.3    Phosphorus   Result Value Ref Range    Phosphorus 4.3 2.5 - 4.5 mg/dL   Magnesium   Result Value Ref Range    Magnesium 2.1 1.6 - 2.6 mg/dL   Basic Metabolic Panel   Result Value Ref Range    Glucose 96 70 - 99 mg/dL    BUN 46 (H) 8 - 23 mg/dL    Creatinine 1.77 (H) 0.70 - 1.20 mg/dL    Est, Glom Filt Rate 43 (L) >60 mL/min/1.73m2    Calcium 7.9 (L) 8.6 - 10.4 mg/dL    Sodium 138 135 - 144 mmol/L    Potassium 4.8 3.7 - 5.3 mmol/L    Chloride 97 (L) 98 - 107 mmol/L    CO2 29 20 - 31 mmol/L    Anion Gap 12 9 - 17 mmol/L   CBC with Auto Differential   Result Value Ref Range    WBC 6.8 3.5 - 11.0 k/uL    RBC 5.99 (H) 4.5 - 5.9 m/uL    Hemoglobin 12.1 (L) 13.5 - 17.5 g/dL    Hematocrit 41.4 41 - 53 %    MCV 69.1 (L) 80 - 100 fL    MCH 20.2 (L) 26 - 34 pg    MCHC 29.2 (L) 31 - 37 g/dL    RDW 19.8 (H) 11.5 - 14.9 %    Platelets 55 (L) 147 - 450 k/uL    MPV 8.6 6.0 - 12.0 fL    Seg Neutrophils 79 (H) 36 - 66 %    Lymphocytes 12 (L) 24 - 44 %    Monocytes 8 (H) 1 - 7 %    Eosinophils % 0 0 - 4 %    Basophils 1 0 - 2 %    Segs Absolute 5.40 1.3 - 9.1 k/uL    Absolute Lymph # 0.80 (L) 1.0 - 4.8 k/uL    Absolute Mono # 0.50 0.1 - 1.3 k/uL    Absolute Eos # 0.00 0.0 - 0.4 k/uL    Basophils Absolute 0.10 0.0 - 0.2 k/uL   Blood Gas, Venous   Result Value Ref Range    pH, [place] 7.258 (L) 7.320 - 7.420    pCO2, [place] 63.0 (H) 39.0 - 55.0 mm Hg    pO2, [place] 48.1 30.0 - 50.0 mm Hg    HCO3, Venous 28.1 24.0 - 30.0 mmol/L    Positive Base Excess, [place] 1.0 0.0 - 2.0 mmol/L    O2 Sat, [place] 72.1 60.0 - 85.0 %    Carboxyhemoglobin 4.3 0 - 5 %    Methemoglobin 0.5 0.0 - 1.9 %    Pt Temp 37    Brain Natriuretic Peptide   Result Value Ref Range    Pro-BNP 7,797 (H) <300 pg/mL   Hepatic Function Panel   Result Value Ref Range    Albumin 3.9 3.5 - 5.2 g/dL    Alkaline Phosphatase 68 40 - 129 U/L     (H) 5 - 41 U/L     (H) <40 U/L    Total Bilirubin 1.1 0.3 - 1.2 mg/dL    Bilirubin, Direct 0.9 (H) <0.3 mg/dL    Bilirubin, Indirect 0.2 0.0 - 1.0 mg/dL    Total Protein 6.6 6.4 - 8.3 g/dL   Lipase   Result Value Ref Range    Lipase 17 13 - 60 U/L   Urinalysis with Reflex to Culture    Specimen: Urine   Result Value Ref Range    Color, UA Orange (A) Yellow    Turbidity UA Cloudy (A) Clear    Glucose, Ur NEGATIVE NEGATIVE    Bilirubin Urine NEGATIVE  Verified by ictotest. (A) NEGATIVE    Ketones, Urine TRACE (A) NEGATIVE    Specific Gravity, UA 1.018 1.000 - 1.030    Urine Hgb LARGE (A) NEGATIVE    pH, UA 5.0 5.0 - 8.0    Protein, UA 2+ (A) NEGATIVE    Urobilinogen, Urine ELEVATED (A) Normal    Nitrite, Urine NEGATIVE NEGATIVE    Leukocyte Esterase, Urine SMALL (A) NEGATIVE   Microscopic Urinalysis   Result Value Ref Range    WBC, UA 6 TO 9 /HPF    RBC, UA TOO NUMEROUS TO COUNT /HPF    Casts UA 3 to 5 /LPF    Epithelial Cells UA 0 TO 2 /HPF    Bacteria, UA FEW (A) None   Arterial Blood Gases   Result Value Ref Range    pH, Arterial 7.295 (L) 7.350 - 7.450    pCO2, Arterial 60.3 (HH) 35.0 - 45.0 mmHg    pO2, Arterial 63.0 (L) 80.0 - 100.0 mmHg    HCO3, Arterial 29.3 (H) 22.0 - 26.0 mmol/L    Positive Base Excess, Art 2.8 (H) 0.0 - 2.0 mmol/L    O2 Sat, Arterial 85.8 (LL) 95 - 98 %    Carboxyhemoglobin 3.1 0 - 5 %    Methemoglobin 1.0 0.0 - 1.9 %    Pt Temp 37     O2 Device/Flow/% BIPAP     Respiratory Rate 16     Tyron Test PASS     Sample Site Right Radial Artery     Pt.  Position SEMI-FOWLERS     Mode BIPAP     Text for Respiratory RESULTS TO PHYSICIAN    Comprehensive Metabolic Panel w/ Reflex to MG   Result Value Ref Range    Glucose 101 (H) 70 - 99 mg/dL    BUN 46 (H) 8 - 23 mg/dL    Creatinine 1.50 (H) 0.70 - 1.20 mg/dL    Est, Glom Filt Rate 52 (L) >60 mL/min/1.73m2    Calcium 7.5 (L) 8.6 - 10.4 mg/dL    Sodium 136 135 - 144 mmol/L    Potassium 4.8 3.7 - 5.3 mmol/L    Chloride 99 98 - 107 mmol/L    CO2 27 20 - 31 mmol/L    Anion Gap 10 9 - 17 mmol/L    Alkaline Phosphatase 59 40 - 129 U/L     (H) 5 - 41 U/L     (H) <40 U/L    Total Bilirubin 0.8 0.3 - 1.2 mg/dL    Total Protein 5.9 (L) 6.4 - 8.3 g/dL    Albumin 3.3 (L) 3.5 - 5.2 g/dL   CBC with Auto Differential   Result Value Ref Range    WBC 5.6 3.5 - 11.0 k/uL    RBC 5.57 4.5 - 5.9 m/uL    Hemoglobin 11.2 (L) 13.5 - 17.5 g/dL    Hematocrit 38.8 (L) 41 - 53 %    MCV 69.7 (L) 80 - 100 fL    MCH 20.1 (L) 26 - 34 pg    MCHC 28.8 (L) 31 - 37 g/dL    RDW 19.7 (H) 11.5 - 14.9 %    Platelets 67 (L) 112 - 450 k/uL    MPV 9.1 6.0 - 12.0 fL    Seg Neutrophils 79 (H) 36 - 66 %    Lymphocytes 12 (L) 24 - 44 %    Monocytes 8 (H) 1 - 7 %    Eosinophils % 0 0 - 4 %    Basophils 1 0 - 2 %    nRBC 2 per 100 WBC    Segs Absolute 4.41 1.3 - 9.1 k/uL    Absolute Lymph # 0.67 (L) 1.0 - 4.8 k/uL    Absolute Mono # 0.45 0.1 - 1.3 k/uL    Absolute Eos # 0.00 0.0 - 0.4 k/uL    Basophils Absolute 0.06 0.0 - 0.2 k/uL    Morphology ANISOCYTOSIS PRESENT     Morphology HYPOCHROMIA PRESENT     Morphology 1+ ELLIPTOCYTES    Hemoglobin and Hematocrit   Result Value Ref Range    Hemoglobin 11.1 (L) 13.5 - 17.5 g/dL    Hematocrit 38.7 (L) 41 - 53 %   Hemoglobin and Hematocrit   Result Value Ref Range    Hemoglobin 10.8 (L) 13.5 - 17.5 g/dL    Hematocrit 37.2 (L) 41 - 53 %   Ammonia   Result Value Ref Range    Ammonia 29 16 - 60 umol/L   Hepatitis Panel, Acute   Result Value Ref Range    Hepatitis B Surface Ag NONREACTIVE NONREACTIVE    Hepatitis C Ab REACTIVE (A) NONREACTIVE    Hep B Core Ab, IgM NONREACTIVE NONREACTIVE    Hep A IgM NONREACTIVE NONREACTIVE   Iron and TIBC   Result Value Ref Range    Iron 14 (L) 59 - 158 ug/dL    TIBC 426 250 - 450 ug/dL    Iron Saturation 3 (L) 20 - 55 %    UIBC 412 (H) 112 - 347 ug/dL   Ferritin   Result Value Ref Range    Ferritin 14 (L) 30 - 400 ng/mL   Procalcitonin   Result Value Ref Range    Procalcitonin 0.09 (H) <0.09 ng/mL   C-Reactive Protein   Result Value Ref Range    CRP 16.4 (H) 0.0 - 5.0 mg/L Fibrin Split Products   Result Value Ref Range    FDP >5 (H) <5 ug/mL   Sedimentation Rate   Result Value Ref Range    Sed Rate 1 0 - 20 mm/Hr   Drug Scr, Abuse, Ur   Result Value Ref Range    Amphetamine Screen, Ur NEGATIVE NEGATIVE    Barbiturate Screen, Ur NEGATIVE NEGATIVE    Benzodiazepine Screen, Urine NEGATIVE NEGATIVE    Cocaine Metabolite, Urine NEGATIVE NEGATIVE    Methadone Screen, Urine NEGATIVE NEGATIVE    Opiates, Urine NEGATIVE NEGATIVE    Phencyclidine, Urine NEGATIVE NEGATIVE    Cannabinoid Scrn, Ur NEGATIVE NEGATIVE    Oxycodone Screen, Ur NEGATIVE NEGATIVE    Fentanyl, Ur NEGATIVE NEGATIVE    Test Information       Assay provides medical screening only. The absence of expected drug(s) and/or metabolite(s) may indicate diluted or adulterated urine, limitations of testing or timing of collection. Troponin   Result Value Ref Range    Troponin, High Sensitivity 195 (HH) 0 - 22 ng/L   Vitamin B12 & Folate   Result Value Ref Range    Vitamin B-12 1926 (H) 232 - 1245 pg/mL    Folate 10.0 >4.8 ng/mL   HIV Screen   Result Value Ref Range    HIV Ag/Ab NONREACTIVE NONREACTIVE   MONOCLONAL PANEL   Result Value Ref Range    Kappa Free Light Chains QNT 2.79 (H) 0.37 - 1.94 mg/dL    Lambda Free Light Chains QNT 20.89 (H) 0.57 - 2.63 mg/dL    Free Kappa/Lambda Ratio 0.13 (L) 0.26 - 1.65    Serum IFX Interp       A ZONE OF RESTRICTION IS PRESENT IN THE GAMMAGLOBULIN REGION. CONFIRMED BY IMMUNOFIXATION TO BE MONOCLONAL    Pathologist       Reviewed by pathologist:  Maranda Jaimes. Leandro Alicea D.O. Total Protein 5.5 (L) 6.4 - 8.3 g/dL    Albumin (calculated) 3.6 3.2 - 5.2 g/dL    Albumin % 65 45 - 65 %    Alpha-1-Globulin 0.2 0.1 - 0.4 g/dL    Alpha 1 % 3 3 - 6 %    Alpha-2-Globulin 0.4 (L) 0.5 - 0.9 g/dL    Alpha 2 % 7 6 - 13 %    Beta Globulin 0.6 0.5 - 1.1 g/dL    Beta Percent 11 11 - 19 %    Gamma Globulin 0.8 0.5 - 1.5 g/dL    Gamma Globulin % 14 9 - 20 %    Total Prot.  Sum 5.6 (L) 6.3 - 8.2 g/dL    Total Prot. Sum,% 100 98 - 102 %    Protein Electrophoresis, Serum       A ZONE OF RESTRICTION IS PRESENT IN THE GAMMAGLOBULIN REGION. CONFIRMED BY IMMUNOFIXATION TO BE MONOCLONAL    Pathologist       Reviewed by pathologist:  Charmayne Alter. Remona Reasons, D.O. Path Review, Smear   Result Value Ref Range    Pathologist Review ELECTRONICALLY SIGNED.  Mi Sy MD    Reticulocytes   Result Value Ref Range    Retic % 2.8 (H) 0.5 - 2.0 %    Absolute Retic # 0.157 (H) 0.0245 - 0.098 M/uL   Fibrinogen   Result Value Ref Range    Fibrinogen 141 (L) 210 - 530 mg/dL   Ethanol   Result Value Ref Range    Ethanol <10 <10 mg/dL    Ethanol percent <0.010 %   Lactate Dehydrogenase   Result Value Ref Range     (H) 135 - 225 U/L   Hemoglobin and Hematocrit   Result Value Ref Range    Hemoglobin 10.9 (L) 13.5 - 17.5 g/dL    Hematocrit 38.0 (L) 41 - 53 %   Haptoglobin   Result Value Ref Range    Haptoglobin 60 30 - 200 mg/dL   D-Dimer, Quantitative   Result Value Ref Range    D-Dimer, Quant 6.70 (H) 0.00 - 0.59 mg/L FEU   Urinalysis with Reflex to Culture    Specimen: Urine   Result Value Ref Range    Color, UA Dark Yellow (A) Yellow    Turbidity UA Clear Clear    Glucose, Ur NEGATIVE NEGATIVE    Bilirubin Urine NEGATIVE NEGATIVE    Ketones, Urine TRACE (A) NEGATIVE    Specific [place], UA 1.015 1.000 - 1.030    Urine Hgb LARGE (A) NEGATIVE    pH, UA 5.0 5.0 - 8.0    Protein, UA 1+ (A) NEGATIVE    Urobilinogen, Urine Normal Normal    Nitrite, Urine NEGATIVE NEGATIVE    Leukocyte Esterase, Urine SMALL (A) NEGATIVE   APTT   Result Value Ref Range    PTT 38.7 (H) 24.0 - 36.0 sec   Protime-INR   Result Value Ref Range    Protime 24.4 (H) 11.8 - 14.6 sec    INR 2.2    Microscopic Urinalysis   Result Value Ref Range    WBC, UA 10 TO 20 /HPF    RBC, UA TOO NUMEROUS TO COUNT /HPF    Casts UA HYALINE /LPF    Casts UA 0 TO 2 /LPF    Epithelial Cells UA 3 to 5 /HPF   BLOOD GAS, ARTERIAL   Result Value Ref Range    pH, Arterial 7.314 (L) 7.350 - 7.450    pCO2, Arterial 61.9 (HH) 35.0 - 45.0 mmHg    pO2, Arterial 58.1 (LL) 80.0 - 100.0 mmHg    HCO3, Arterial 31.4 (H) 22.0 - 26.0 mmol/L    Positive Base Excess, Art 5.3 (H) 0.0 - 2.0 mmol/L    O2 Sat, Arterial 86.3 (LL) 95 - 98 %    Carboxyhemoglobin 2.1 0 - 5 %    Methemoglobin 0.8 0.0 - 1.9 %    Pt Temp 37.0     O2 Device/Flow/% VENTILATOR     Respiratory Rate 22     Tyron Test PASS     Sample Site Left Radial Artery     Pt. Position SEMI-FOWLERS     Mode PRVC     Set Rate 22     Total Rate 26          FIO2 40     Peep/Cpap 8     Text for Respiratory PENDING    SURGICAL PATHOLOGY REPORT   Result Value Ref Range    Surgical Pathology Report       [id number]  [address] [place] [address], [facility] [address]. [facility], [address]  [phone]  Fax: [phone]  SURGICAL PATHOLOGY CONSULTATION    Patient Name: Franci Morales  MR#: [id number]  Specimen #[id number]    Procedures/Addenda  PERIPHERAL BLOOD REPORT     Date Ordered:     1/2/2023     Status:  Signed Out       Date Complete:     1/3/2023     By: Josue Negron M.D. Date Reported:     1/3/2023       INTERPRETATION  Peripheral blood:  Microcytic, hypochromic anemia. The patient's iron studies are compatible with iron deficiency anemia. Lymphopenia. Differential considerations include acute illness/infection,  medication effect, smoking, and debilitating diseases. Thrombocytopenia  Differential considerations include bone marrow hypoproduction and  immune destruction (idiopathic thrombocytopenic purpura, drug effect). RESULTS-COMMENTS  PERIPHERAL BLOOD STUDY    CBC: Please see the electronic health record  for CBC parameters  (, 01/02/2023, 05:43). PLATELETS: Decreased platelets. Platelets show normal morphology. LEUKOCYTES: Decreased lymphocytes. White blood cells show normal  morphology. There are no blasts.     ERYTHROCYTES: Microcytic, hypochromic. Anisocytosis and  poikilocytosis. Polychromasia. Reticulocyte count 2.8%. Rare RBC  fragments. Note: The electronic health record is reviewed. Malissa Bass M.D.                    Source:  A: Peripheral Blood     Comprehensive Metabolic Panel w/ Reflex to MG   Result Value Ref Range    Glucose 91 70 - 99 mg/dL    BUN 32 (H) 8 - 23 mg/dL    Creatinine 0.97 0.70 - 1.20 mg/dL    Est, Glom Filt Rate >60 >60 mL/min/1.73m2    Calcium 7.4 (L) 8.6 - 10.4 mg/dL    Sodium 143 135 - 144 mmol/L    Potassium 3.7 3.7 - 5.3 mmol/L    Chloride 105 98 - 107 mmol/L    CO2 30 20 - 31 mmol/L    Anion Gap 8 (L) 9 - 17 mmol/L    Alkaline Phosphatase 50 40 - 129 U/L     (H) 5 - 41 U/L     (H) <40 U/L    Total Bilirubin 0.9 0.3 - 1.2 mg/dL    Total Protein 5.2 (L) 6.4 - 8.3 g/dL    Albumin 3.1 (L) 3.5 - 5.2 g/dL   CBC with Auto Differential   Result Value Ref Range    WBC 5.7 3.5 - 11.0 k/uL    RBC 5.42 4.5 - 5.9 m/uL    Hemoglobin 11.0 (L) 13.5 - 17.5 g/dL    Hematocrit 37.6 (L) 41 - 53 %    MCV 69.4 (L) 80 - 100 fL    MCH 20.2 (L) 26 - 34 pg    MCHC 29.2 (L) 31 - 37 g/dL    RDW 20.0 (H) 11.5 - 14.9 %    Platelets 66 (L) 161 - 450 k/uL    MPV 9.4 6.0 - 12.0 fL    Seg Neutrophils 85 (H) 36 - 66 %    Lymphocytes 9 (L) 24 - 44 %    Monocytes 5 1 - 7 %    Eosinophils % 0 0 - 4 %    Basophils 0 0 - 2 %    Bands 1 0 - 10 %    nRBC 1 (H) 0 per 100 WBC    Segs Absolute 4.87 1.3 - 9.1 k/uL    Absolute Lymph # 0.52 (L) 1.0 - 4.8 k/uL    Absolute Mono # 0.29 0.1 - 1.3 k/uL    Absolute Eos # 0.00 0.0 - 0.4 k/uL    Basophils Absolute 0.00 0.0 - 0.2 k/uL    Absolute Bands # 0.06 0.0 - 1.0 k/uL    Morphology ANISOCYTOSIS PRESENT     Morphology HYPOCHROMIA PRESENT     Morphology 1+ POLYCHROMASIA     Morphology 1+ ELLIPTOCYTES    BLOOD GAS, ARTERIAL   Result Value Ref Range    pH, Arterial 7.373 7.350 - 7.450    pCO2, Arterial 56.0 (HH) 35.0 - 45.0 mmHg    pO2, Arterial 61.4 (L) 80.0 - 100.0 mmHg HCO3, Arterial 32.6 (H) 22.0 - 26.0 mmol/L    Positive Base Excess, Art 7.4 (H) 0.0 - 2.0 mmol/L    O2 Sat, Arterial 89.2 (L) 95 - 98 %    Carboxyhemoglobin 1.7 0 - 5 %    Methemoglobin 0.9 0.0 - 1.9 %    Pt Temp 37     O2 Device/Flow/% VENTILATOR     Respiratory Rate 22     Tyron Test PASS     Sample Site Right Radial Artery     Pt. Position SEMI-FOWLERS     Mode PRVC     Set Rate 22     Total Rate 22          FIO2 35     Peep/Cpap 8     Text for Respiratory RESULTS GIVEN TO RN    Protime-INR   Result Value Ref Range    Protime 21.9 (H) 11.8 - 14.6 sec    INR 1.9    APTT   Result Value Ref Range    PTT 38.4 (H) 24.0 - 36.0 sec   Triglyceride   Result Value Ref Range    Triglycerides 85 <150 mg/dL   CHLORIDE, URINE, RANDOM   Result Value Ref Range    Chloride, Ur 34 mmol/L   Protein / Creatinine Ratio, Urine   Result Value Ref Range    Total Protein, Urine 23 mg/dL    Creatinine, Ur 79.8 39.0 - 259.0 mg/dL    Urine Total Protein Creatinine Ratio 0.29 (H) 0.00 - 0.20   SODIUM, URINE, RANDOM   Result Value Ref Range    Sodium,Ur <20 mmol/L   EKG 12 Lead   Result Value Ref Range    Ventricular Rate 75 BPM    Atrial Rate 75 BPM    P-R Interval 142 ms    QRS Duration 66 ms    Q-T Interval 350 ms    QTc Calculation (Bazett) 390 ms    P Axis 18 degrees    R Axis -165 degrees    T Axis 41 degrees         IMAGING DATA:    CT HEAD WO CONTRAST    Result Date: 1/1/2023  EXAMINATION: CT OF THE HEAD WITHOUT CONTRAST  1/1/2023 10:48 pm TECHNIQUE: CT of the head was performed without the administration of intravenous contrast. Automated exposure control, iterative reconstruction, and/or weight based adjustment of the mA/kV was utilized to reduce the radiation dose to as low as reasonably achievable. COMPARISON: None. HISTORY: Reason for Exam: AMS Additional signs and symptoms: the patient has been getting more and more confused since 2 weeks ago.   It has progressively been getting worse and today FINDINGS: BRAIN/VENTRICLES: There is no acute intracranial hemorrhage, mass effect or midline shift. No abnormal extra-axial fluid collection. The gray-white differentiation is maintained without evidence of an acute infarct. There is no evidence of hydrocephalus. Changes of mild chronic small vessel ischemic disease. ORBITS: The visualized portion of the orbits demonstrate no acute abnormality. SINUSES: The visualized paranasal sinuses and mastoid air cells demonstrate no acute abnormality. SOFT TISSUES/SKULL:  No acute abnormality of the visualized skull or soft tissues. No acute intracranial abnormality. Mild chronic small vessel ischemic disease. XR CHEST PORTABLE    Result Date: 1/1/2023  EXAMINATION: ONE XRAY VIEW OF THE CHEST 1/1/2023 7:17 pm COMPARISON: None. HISTORY: ORDERING SYSTEM PROVIDED HISTORY: ams TECHNOLOGIST PROVIDED HISTORY: ams Reason for Exam: Altered mental status, difficulty breathing Additional signs and symptoms: Altered mental status, difficulty breathing Relevant Medical/Surgical History: Altered mental status, difficulty breathing FINDINGS: Large lucent area in the left apex suggesting a large bulla however superimposed pneumothorax cannot be excluded. The cardiac size is normal. Right lower lobe airspace infiltrate and mild right pleural effusion. . Pulmonary vascularity appears normal. There is mild ectasia of the thoracic aorta. Calcific tendinitis of the right shoulder. No acute bony abnormalities. The hilar structures are normal.     Right lower lobe pneumonia and small right pleural effusion Large lucent area in the left apex suggesting a large bulla however superimposed pneumothorax cannot be excluded. Follow-up CT would be useful for further evaluation. The findings were sent to the Radiology Results [address] at 10:31 pm on 1/1/2023 to be communicated to a licensed caregiver.          IMPRESSION:   Primary Problem  Acute respiratory failure with hypoxia and hypercapnia [facility])    Active Hospital Problems    Diagnosis Date Noted    Prolonged pt (prothrombin time) [R79.1] 01/03/2023     Priority: Medium    Emphysema lung ([facility] [address] [J43.9] 01/03/2023     Priority: Medium    Cerebral infarction ([facility] [address] [I63.9] 01/03/2023     Priority: Medium    Transaminitis [R74.01] 01/02/2023     Priority: Medium    Microcytic anemia [D64.9] 01/02/2023     Priority: Medium    Thrombocytopenia ([facility] [address] [D69.6] 01/02/2023     Priority: Medium    Agitation [R45.1] 01/02/2023     Priority: Medium    Acute respiratory failure with hypoxia and hypercapnia (HCC) RLL pneumonia [J96.01, J96.02] 01/02/2023     Priority: Medium    Altered mental status [R41.82] 01/02/2023     Priority: Medium    Community acquired pneumonia of right lower lobe of lung [J18.9] 01/02/2023     Priority: Medium       Medical apathy  Respiratory failure secondary pneumonia  Abnormal LFT  Microcytic anemia  Thrombocytopenia  History of alcohol dependence  IgG lambda paraproteinemia  Positive HCV screen  Iron deficiency  Coagulopathy    RECOMMENDATIONS:  I reviewed the labs/imaging available to me,outside records and discussed with the patient. I explained to the patient the nature of this problem. I explained the significance of these abnormalities and possible etiology and management options  Patient has multiple medical abnormalities  Will replace iron  No plan for bone marrow biopsy given small amount of M protein at this point and other acute issues going on  Follow-up on HCV PCR  Coag panel is abnormal.  DIC versus coagulopathy due to liver. Follow-up on his peripheral smear to assess for schistocytes. Low iron and anemia raise concerns regarding GI bleed. Closely monitor. Liver disease leads to a form of \"rebalanced\" hemostasis, in which diminished hepatic function leads to both procoagulant and anticoagulant effects.  Patients with severe liver disease and abnormalities of coagulation testing should not be assumed to be \"auto-anticoagulated,\" because standard coagulation testing does not assess prothrombotic and fibrinolytic changes. I requested for Thromboelastography on the patient but it is not done at [facility] (only done at [facility]. Td's at this point)    Given potential for coagulopathy and concerns regarding GI bleed (anemia and low iron) recommend only using mechanical DVT prophylaxis and holding off on anticoagulation at this point    Discussed with Dr. Mario Espinal from ICU team        Discussed with family and Nurse. Thank you for asking us to see this patient. Criss Ortiz MD          This note is created with the assistance of a speech recognition program.  While intending to generate a document that actually reflects the content of the visit, the document can still have some errors including those of syntax and sound a like substitutions which may escape proof reading. It such instances, actual meaning can be extrapolated by contextual diversion.

## 2023-01-03 NOTE — PROGRESS NOTES
ICU Progress Note (Vent)   [facility]ists    Patient - Ketty Reardon,  Age - 60 y.o.    - 1960      Room Number -    MRN -  [id number]   Acct # - [de-identified]  Date of Admission -  2023  9:55 PM    Events of Past 24 Hours   Patient currently is intubated on vent support. On low-dose Levophed. On propofol for sedation. Vitals    height is 5' 7\" (1.702 m) and weight is 188 lb 15 oz (85.7 kg). His axillary temperature is 98 °F (36.7 °C). His blood pressure is 109/73 and his pulse is 72. His respiration is 22 and oxygen saturation is 97%. Temperature Range: Temp: 98 °F (36.7 °C) Temp  Av.8 °F (36.6 °C)  Min: 97.5 °F (36.4 °C)  Max: 98 °F (36.7 °C)  BP Range:  Systolic (85FAM), EA , Min:80 , XHC:591     Diastolic (85IXI), DVX:42, Min:52, Max:78    Pulse Range: Pulse  Av.1  Min: 60  Max: 84  Respiration Range: Resp  Av.7  Min: 13  Max: 23  Current Pulse Ox[de-identified]  SpO2: 97 %  24HR Pulse Ox Range:  SpO2  Av.1 %  Min: 89 %  Max: 100 %  Oxygen Amount and Delivery: O2 Flow Rate (L/min): 6 L/min      Wt Readings from Last 3 Encounters:   23 188 lb 15 oz (85.7 kg)   23 188 lb (85.3 kg)     I/O     Intake/Output Summary (Last 24 hours) at 1/3/2023 1112  Last data filed at 1/3/2023 0551  Gross per 24 hour   Intake 2283.88 ml   Output 1825 ml   Net 458.88 ml     I/O last 3 completed shifts: In: 2283.9 [I.V.:1367.1;  IV Piggyback:916.8]  Out: 5 [Urine:2115]     DRAIN/TUBE OUTPUT:     Invasive Lines   ETT Day -   2  PICC line day #2    Mechanical Ventilation Data   SETTINGS (Comprehensive)  Vent Information  Ventilator Initiate: Yes  Vent Mode: AC/PRVC  Additional Respiratory Assessments  Heart Rate: 72  Resp: 22  SpO2: 97 %  End Tidal CO2: 38 (%)  Humidification Source: HME  Circuit Condensation: Not drained       ABGs:   Lab Results   Component Value Date/Time    PHART 7.373 2023 04:46 AM    PO2ART 61.4 2023 04:46 AM HLN0OOD 56.0 01/03/2023 04:46 AM       Lab Results   Component Value Date/Time    MODE PRVC 01/03/2023 04:46 AM         Medications   IV   sodium chloride      norepinephrine 2 mcg/min (01/03/23 0851)    propofol 30 mcg/kg/min (01/03/23 0826)    sodium chloride Stopped (01/03/23 0524)      vancomycin  1,000 mg IntraVENous Q12H    sodium chloride flush  5-40 mL IntraVENous 2 times per day    nicotine  1 patch TransDERmal Daily    folic acid, thiamine, multi-vitamin with vitamin K infusion   IntraVENous Q24H    pantoprazole (PROTONIX) 40 mg injection  40 mg IntraVENous Daily    cefepime  2,000 mg IntraVENous Q12H    metroNIDAZOLE  500 mg IntraVENous Q8H    vancomycin (VANCOCIN) intermittent dosing (placeholder)   Other RX Placeholder    ipratropium-albuterol  1 ampule Inhalation Q4H    sodium chloride  1,000 mL IntraVENous Once       Diet/Nutrition   Diet NPO    Exam   VITALS    height is 5' 7\" (1.702 m) and weight is 188 lb 15 oz (85.7 kg). His axillary temperature is 98 °F (36.7 °C). His blood pressure is 109/73 and his pulse is 72. His respiration is 22 and oxygen saturation is 97%. Ventilator Settings (Basic)  Vent Mode: AC/PRVC Resp Rate (Set): 22 bmp/Vt (Set, mL): 500 mL/ /FiO2 : 35 %    Constitutional -critically ill-appearing intubated  General Appearance  well developed,   HEENT - Life support devices in place (ET)  Lungs - Chest expands equally, no wheezes, rales or rhonchi. Cardiovascular - Heart sounds are normal.  normal rate and rhythm regular, no murmur, gallop or rub.   Abdomen - soft, nontender,  Extremities - no cyanosis,     Lab Results   CBC     Lab Results   Component Value Date/Time    WBC 5.7 01/03/2023 04:27 AM    RBC 5.42 01/03/2023 04:27 AM    HGB 11.0 01/03/2023 04:27 AM    HCT 37.6 01/03/2023 04:27 AM    PLT 66 01/03/2023 04:27 AM    MCV 69.4 01/03/2023 04:27 AM    MCH 20.2 01/03/2023 04:27 AM    MCHC 29.2 01/03/2023 04:27 AM    RDW 20.0 01/03/2023 04:27 AM    NRBC 1 01/03/2023 04:27 AM LYMPHOPCT 9 01/03/2023 04:27 AM    MONOPCT 5 01/03/2023 04:27 AM    BASOPCT 0 01/03/2023 04:27 AM    MONOSABS 0.29 01/03/2023 04:27 AM    LYMPHSABS 0.52 01/03/2023 04:27 AM    EOSABS 0.00 01/03/2023 04:27 AM    BASOSABS 0.00 01/03/2023 04:27 AM       BMP   Lab Results   Component Value Date/Time     01/03/2023 04:27 AM    K 3.7 01/03/2023 04:27 AM     01/03/2023 04:27 AM    CO2 30 01/03/2023 04:27 AM    BUN 32 01/03/2023 04:27 AM    CREATININE 0.97 01/03/2023 04:27 AM    GLUCOSE 91 01/03/2023 04:27 AM    CALCIUM 7.4 01/03/2023 04:27 AM       LFTS  Lab Results   Component Value Date/Time    ALKPHOS 50 01/03/2023 04:27 AM     01/03/2023 04:27 AM     01/03/2023 04:27 AM    PROT 5.2 01/03/2023 04:27 AM    BILITOT 0.9 01/03/2023 04:27 AM    BILIDIR 0.9 01/01/2023 09:59 PM    IBILI 0.2 01/01/2023 09:59 PM    LABALBU 3.1 01/03/2023 04:27 AM       INR  Recent Labs     01/01/23 2159 01/02/23  1644   PROTIME 24.9* 24.4*   INR 2.3 2.2       APTT  Recent Labs     01/01/23 2159 01/02/23  1644   APTT 26.5 38.7*       Lactic Acid  No results found for: LACTA     BNP   No results for input(s): BNP in the last 72 hours. Cultures   Sputum culture ordered.,  Yesterday  Urine culture in process  Blood cultures x2, January 2 negative  MRSA probe pending x-ray reviewed      Radiology     Plain Films         Chest x-ray reveals endotracheal tube in fair position. The right lower lobe process has improved from yesterday. SYSTEM ASSESSMENT    Impression  1. Acute respiratory failure with hypoxemia hypercapnia--intubated January 2  2. Suspect community acquired pneumonia  3. Suspect COPD active tobacco use greater than 1 pack/day  4. Confusion altered mental status  5. INR 2.3  6. Elevated transaminases  7. Thrombocytopenia  8. Suspect liver disease given numbers 5,6,7  9. Elevated BNP  10. Tobacco abuse  11. Acute kidney injury --- resolving, creatinine 1.77 upon admission now at 0.97  11. Shock  12. Full code    Neuro   Currently on propofol, --currently on vent support check triglyceride level  CT scan reveals a questionable subacute infarct. MRI of the brain ordered. Appreciate Dr. Rosie Mares  input  Respiratory   Wean oxygen as tolerated. Keep O2 sat 90-92%    Hemodynamics   On low-dose Levophed--- currently at 2 mics    Gastrointestinal/Nutrition   Will consult nutritional specialist for enteric feeding    Renal   Acute kidney injury is resolving. Creatinine down to 0.97    Infectious Disease   Cultures pending or negative thus far  On Maxipime Flagyl vancomycin  Hematology/Oncology   Problems with thrombocytopenia and elevation of INR 2.2  Appreciate hematology input. Spoke to Dr. Werner Monterroso---    For today, will hold off on any Lovenox or subcu heparin for DVT prophylaxis and use EPC cuffs. Will await the Doppler ultrasounds of lower extremities before placing EPC cuffs    Endocrine       Social/Spiritual/DNR/Disposition/Other     Updated César Slade, patient's daughter at bedside.     Critical Care Time   35 min    Electronically signed by Janee Aden MD on 1/3/2023 at 11:12 AM

## 2023-01-03 NOTE — CONSULTS
Department of Internal Medicine  Nephrology Sonia Flores MD   Consult Note    Reason for consultation: Management of acute kidney injury. Consulting physician: Jeremi Levi MD.    History of present illness: This is a 60 y.o. male with no significant past medical history [no regular physician follow-up], who was brought to the emergency department via EMS for further evaluation of progressively worsening confusion and lethargy of 2 weeks duration. Patient's spouse said that he became progressively confused prompting her to call EMS on day of presentation 1/1/2023. When EMS arrived, patient was found to be obtunded with oxygen saturation 75% on room air and he was placed on a nonrebreather mask and given a dose of IV Lasix as he also had severe bilateral leg swelling. Patient was placed on BiPAP on arrival to the emergency department and was admitted to the intensive care unit. Initial systolic blood pressure was 106 mmHg and serum creatinine 1.77 mg/dL associated with elevated AST and ALT. On admission to the intensive care unit patient required endotracheal intubation for airway protection and treatment of acute respiratory failure. Nephrology consultation has been placed for management of acute kidney injury. He is currently on Levophed drip at 3 mcg/min. CT scan of the brain performed at presentation showed no acute intracranial abnormality but with mild chronic small vessel ischemic disease. Repeat CT scan performed 24 hours later [1/2/2023] showed focal area of decreased attenuation with loss of gray/white matter differentiation involving posterior left. Lobe with somewhat increased conspicuity as compared to prior exam likely reflecting an area of acute to subacute stroke. Patient has no known allergies. History reviewed. No pertinent past medical history.     Scheduled Meds:   vancomycin  1,000 mg IntraVENous Q12H    sodium chloride flush  5-40 mL IntraVENous 2 times per day nicotine  1 patch TransDERmal Daily    folic acid, thiamine, multi-vitamin with vitamin K infusion   IntraVENous Q24H    pantoprazole (PROTONIX) 40 mg injection  40 mg IntraVENous Daily    cefepime  2,000 mg IntraVENous Q12H    metroNIDAZOLE  500 mg IntraVENous Q8H    vancomycin (VANCOCIN) intermittent dosing (placeholder)   Other RX Placeholder    ipratropium-albuterol  1 ampule Inhalation Q4H    sodium chloride  1,000 mL IntraVENous Once     Continuous Infusions:   sodium chloride      norepinephrine 2 mcg/min (23)    propofol 30 mcg/kg/min (23)    sodium chloride Stopped (23)     PRN Meds:.perflutren lipid microspheres, sodium chloride flush, sodium chloride, ondansetron **OR** ondansetron, polyethylene glycol, acetaminophen **OR** acetaminophen, albuterol, guaiFENesin    History reviewed. No pertinent family history. Social History     Socioeconomic History    Marital status: Single     Spouse name: None    Number of children: None    Years of education: None    Highest education level: None   Tobacco Use    Smoking status: Every Day     Packs/day: 1.00     Years: 40.00     Pack years: 40.00     Types: Cigarettes    Smokeless tobacco: Never   Substance and Sexual Activity    Alcohol use: Yes     Comment: beer and scotch    Drug use: Not Currently     Comment: over 20 years (stated by daughter)     Review of systems: Not obtainable as patient is intubated and on ventilator support. Physical Exam:    VITALS:  /73   Pulse 72   Temp 98 °F (36.7 °C) (Axillary)   Resp 22   Ht 5' 7\" (1.702 m)   Wt 188 lb 15 oz (85.7 kg)   SpO2 97%   BMI 29.59 kg/m²   TEMPERATURE:  Current - Temp: 98 °F (36.7 °C);  Max - Temp  Av.8 °F (36.6 °C)  Min: 97.5 °F (36.4 °C)  Max: 98 °F (36.7 °C)  RESPIRATIONS RANGE: Resp  Av.9  Min: 13  Max: 23  PULSE RANGE: Pulse  Av.9  Min: 60  Max: 77  BLOOD PRESSURE RANGE:  Systolic (64MDK), LG , Min:80 , QYC:615  ; Diastolic (58NDH), Av, Min:52, Max:78   PULSE OXIMETRY RANGE: SpO2  Av.1 %  Min: 89 %  Max: 100 %  24HR INTAKE/OUTPUT:    Intake/Output Summary (Last 24 hours) at 1/3/2023 1153  Last data filed at 1/3/2023 0551  Gross per 24 hour   Intake 2283.88 ml   Output 1825 ml   Net 458.88 ml     VENT SETTINGS:   Vent Information  Ventilator Initiate: Yes  Vent Mode: AC/PRVC  Additional Respiratory Assessments  Heart Rate: 72  Resp: 22  SpO2: 97 %  End Tidal CO2: 38 (%)  Humidification Source: HME  Circuit Condensation: Not drained    Constitutional:  intubated and sedated. Skin: Skin color, texture, turgor normal. No rashes or lesions    Head: Normocephalic, without obvious abnormality, atraumatic     Cardiovascular/Edema: regular rate and rhythm, S1, S2 normal, no murmur, click, rub or gallop    Respiratory: Lungs:  Coarse breath sounds bilaterally    Abdomen: soft, non-tender; bowel sounds normal; no masses,  no organomegaly    Back: symmetric, no curvature. ROM normal. No CVA tenderness. Extremities: edema +    Neuro:   sedated. CBC:   Recent Labs     23  0543 23  1150 23  1932 23  0135 23  0427   WBC 6.8 5.6  --   --   --  5.7   HGB 12.1* 11.2*   < > 10.8* 10.9* 11.0*   PLT 55* 67*  --   --   --  66*    < > = values in this interval not displayed.      BMP:    Recent Labs     23  21523  0353 23  0427    136 143   K 4.8 4.8 3.7   CL 97* 99 105   CO2 29 27 30   BUN 46* 46* 32*   CREATININE 1.77* 1.50* 0.97   GLUCOSE 96 101* 91     Lab Results   Component Value Date/Time    NITRU NEGATIVE 2023 03:34 PM    COLORU Dark Yellow 2023 03:34 PM    PHUR 5.0 2023 03:34 PM    WBCUA 10 TO 20 2023 03:34 PM    RBCUA TOO NUMEROUS TO COUNT 2023 03:34 PM    BACTERIA FEW 2023 10:25 PM    SPECGRAV 1.015 2023 03:34 PM    LEUKOCYTESUR SMALL 2023 03:34 PM    UROBILINOGEN Normal 2023 03:34 PM    BILIRUBINUR NEGATIVE 01/02/2023 03:34 PM    GLUCOSEU NEGATIVE 01/02/2023 03:34 PM    KETUA TRACE 01/02/2023 03:34 PM     IMPRESSION/RECOMMENDATIONS:      1. Acute kidney injury most consistent with prerenal azotemia and/or ischemic acute tubular necrosis from hypotension. Renal function is improved and serum creatinine is down to 0.97 mg/dL. He remains at increased risk for contrast nephropathy. However, due to acute respiratory failure and elevated D-dimer, it is okay to proceed with CT angiogram of the chest PE protocol. Continue IV fluid 0.9 normal saline at 100 mL/h. Monitor urine output closely. Keep mean arterial pressure greater than 65 mmHg. Urinalysis urine microscopy. Random urine electrolytes. Check basic metabolic profile daily. Thank you very much for the courtesy of this consultation.     Juani Nava MD FACP  Attending Nephrologist  1/3/2023 11:43 AM

## 2023-01-03 NOTE — PLAN OF CARE
Problem: Discharge Planning  Goal: Discharge to home or other facility with appropriate resources  1/2/2023 1959 by Dave Brooke RN  Outcome: Progressing     Problem: Safety - Adult  Goal: Free from fall injury  1/2/2023 1959 by Dave Brooke RN  Outcome: Progressing     Problem: ABCDS Injury Assessment  Goal: Absence of physical injury  1/2/2023 1959 by Dave Brooke RN  Outcome: Progressing     Problem: Skin/Tissue Integrity  Goal: Absence of new skin breakdown  Description: 1. Monitor for areas of redness and/or skin breakdown  2. Assess vascular access sites hourly  3. Every 4-6 hours minimum:  Change oxygen saturation probe site  4. Every 4-6 hours:  If on nasal continuous positive airway pressure, respiratory therapy assess nares and determine need for appliance change or resting period. 1/2/2023 1959 by Dave Brooke RN  Outcome: Progressing     Problem: Safety - Medical Restraint  Goal: Remains free of injury from restraints (Restraint for Interference with Medical Device)  Description: INTERVENTIONS:  1. Determine that other, less restrictive measures have been tried or would not be effective before applying the restraint  2. Evaluate the patient's condition at the time of restraint application  3. Inform patient/family regarding the reason for restraint  4. Q2H: Monitor safety, psychosocial status, comfort, nutrition and hydration  Outcome: Progressing  Flowsheets (Taken 1/2/2023 1600)  Remains free of injury from restraints (restraint for interference with medical device):   Determine that other, less restrictive measures have been tried or would not be effective before applying the restraint   Evaluate the patient's condition at the time of restraint application   Inform patient/family regarding the reason for restraint   Every 2 hours: Monitor safety, psychosocial status, comfort, nutrition and hydration   Pt in wrist restraints to protect artificial airway.  Pt does attempt to pull ETT and lines when restraints released for ROM.

## 2023-01-03 NOTE — CONSULTS
59 yo wm with mental status changes . Patient is admitted with increasing confusion for one week not acting right not being himself with slurring of speech along with confusion . He is found to have hypoxia with CXR showing right lower lobe pneumonia with small pleural effusion with possible large bullae left lung apex . Patient was placed on nasal BIPAP now on ventilator on IV diprivan sedation . Nurse reports that he is responsive off sedation moving all limbs equally . 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 There right centrum ovale and left parietal occipital hypodensities concordant with stroke unclear age . He does have history of alcohol use . Platelet count 67 k felt to be by hematology from alcohol ,  ,  . INR 2.3 unclear if anticoagulated UA small leukocyte esterase placed on IV rocephin . 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 No pertinent past medical history. History reviewed. No pertinent surgical history. History reviewed. No pertinent family history.     Social History     Socioeconomic History    Marital status: Single     Spouse name: None    Number of children: None    Years of education: None    Highest education level: None   Tobacco Use    Smoking status: Every Day     Packs/day: 1.00     Years: 40.00     Pack years: 40.00     Types: Cigarettes    Smokeless tobacco: Never   Substance and Sexual Activity    Alcohol use: Yes     Comment: beer and scotch    Drug use: Not Currently     Comment: over 20 years (stated by daughter)       Current Facility-Administered Medications   Medication Dose Route Frequency Provider Last Rate Last Admin    perflutren lipid microspheres (DEFINITY) injection 1.5 mL  1.5 mL IntraVENous ONCE PRN Gabe Galloway MD        vancomycin 1000 mg IVPB in 250 mL D5W addavial  1,000 mg IntraVENous Q12H Gladys Magallanes MD        sodium chloride flush 0.9 % injection 5-40 mL  5-40 mL IntraVENous 2 times per day ALAYNA Perez - NP   10 mL at 01/03/23 0758    sodium chloride flush 0.9 % injection 5-40 mL  5-40 mL IntraVENous PRN Abiola Gomez APRN - NP        0.9 % sodium chloride infusion   IntraVENous PRN Wood Maurice, APRN - NP        ondansetron (ZOFRAN-ODT) disintegrating tablet 4 mg  4 mg Oral Q8H PRN ALAYNA Perez - NASRA        Or    ondansetron (ZOFRAN) injection 4 mg  4 mg IntraVENous Q6H PRN Abiola Gomez, APRN - NP        polyethylene glycol (GLYCOLAX) packet 17 g  17 g Oral Daily PRN Wood Maurice, APRN - NP        acetaminophen (TYLENOL) tablet 650 mg  650 mg Oral Q6H PRN Wood Maurice APRN - NP        Or    acetaminophen (TYLENOL) suppository 650 mg  650 mg Rectal Q6H PRN Wood Maurice, APRN - NP        nicotine (NICODERM CQ) 21 MG/24HR 1 patch  1 patch TransDERmal Daily Wood Maurice APRN - NP   1 patch at 01/03/23 0810    albuterol (PROVENTIL) nebulizer solution 2.5 mg  2.5 mg Nebulization Q2H PRN Wood Maurice APRN - NP   2.5 mg at 01/02/23 1115    guaiFENesin (MUCINEX) extended release tablet 600 mg  600 mg Oral BID PRN Wood Maurice, APRN - NP        sodium chloride 0.9 % 1,836 mL with folic acid 1 mg, adult multi-vitamin with vitamin k 10 mL, thiamine 100 mg   IntraVENous Q24H Mike Ernst  mL/hr at 01/02/23 1641 Rate Verify at 01/02/23 1641    pantoprazole (PROTONIX) 40 mg in sodium chloride (PF) 0.9 % 10 mL injection  40 mg IntraVENous Daily Argyle Meckel, MD   40 mg at 01/03/23 0807    cefepime (MAXIPIME) 2,000 mg in sodium chloride 0.9 % 50 mL IVPB mini-bag  2,000 mg IntraVENous Q12H Argyle Meckel, MD 12.5 mL/hr at 01/03/23 1040 2,000 mg at 01/03/23 1040    metronidazole (FLAGYL) 500 mg in 0.9% NaCl 100 mL IVPB premix  500 mg IntraVENous Margaret Duff MD   Stopped at 01/03/23 0263    vancomycin (VANCOCIN) intermittent dosing (placeholder)   Other RX Placeholder Argyle Meckel, MD        norepinephrine (LEVOPHED) 16 mg in dextrose 5 % 250 mL infusion  1-100 mcg/min IntraVENous Continuous Sheyla Reyna MD 1.9 mL/hr at 01/03/23 0851 2 mcg/min at 01/03/23 0851    ipratropium-albuterol (DUONEB) nebulizer solution 1 ampule  1 ampule Inhalation Q4H Sheyla Reyna MD   1 ampule at 01/03/23 0648    propofol injection  5-50 mcg/kg/min IntraVENous Continuous Sheyla Reyna MD 14.8 mL/hr at 01/03/23 0826 30 mcg/kg/min at 01/03/23 0826    0.9 % sodium chloride infusion   IntraVENous Continuous Sheyla Reyna MD   Stopped at 01/03/23 0524    0.9 % sodium chloride bolus  1,000 mL IntraVENous Once Sheyla Reyna MD           No Known Allergies    ROS:   On ventilator on sedation     Vitals:    01/03/23 1045   BP: 109/73   Pulse: 72   Resp: 22   Temp:    SpO2: 97%     Admission weight: 180 lb 12.4 oz (82 kg)    Neurological Examination on ventilator on diprivan   Constitutional .General exam well groomed   Head/ Ears /Nose/Throat/external ear . Normal exam  Neck and thyroid . Normal size. No bruits  Cardiovascular: Auscultation of heart with regular rate and rhythm   Musculoskeletal. Muscle bulk and tone normal                                                           Muscle strength withdrawaing all limbs equally                                                                             No tremor   Orientation Obtunded grimacing to noxious stimulation not following commands   Cranial nerve 2 no visual threat  Cranial nerve 3, 4 and 6 . Extraocular muscles are intact . Pupils are equal and reactive   Cranial nerve 5 . Intact corneal reflex. Cranial nerve 7 normal exam   Cranial nerve 8. Grossly intact hearing   Cranial nerve 9 and 10. Symmetric palate elevation   Cranial nerve 11 , 5 out of 5 strength   Cranial Nerve 12 midline tongue . No atrophy  Sensation . Equal withdrawal all limbs   Deep Tendon Reflexes symmetrical   Plantar response flexor equivocal     Assessment :    Left cerebral infraction . Respiratory failure     Plan:    MRI of Head . EEG . Cardiac 2 D echo.  ASA 81 mg po qd

## 2023-01-04 ENCOUNTER — APPOINTMENT (OUTPATIENT)
Dept: NEUROLOGY | Age: 63
DRG: 139 | End: 2023-01-04
Payer: MEDICAID

## 2023-01-04 ENCOUNTER — APPOINTMENT (OUTPATIENT)
Dept: GENERAL RADIOLOGY | Age: 63
DRG: 139 | End: 2023-01-04
Payer: MEDICAID

## 2023-01-04 LAB
ABSOLUTE EOS #: 0.1 K/UL (ref 0–0.4)
ABSOLUTE LYMPH #: 0.4 K/UL (ref 1–4.8)
ABSOLUTE MONO #: 0.5 K/UL (ref 0.1–1.3)
ALBUMIN SERPL-MCNC: 2.8 G/DL (ref 3.5–5.2)
ALLEN TEST: ABNORMAL
ALP BLD-CCNC: 49 U/L (ref 40–129)
ALT SERPL-CCNC: 253 U/L (ref 5–41)
ANION GAP SERPL CALCULATED.3IONS-SCNC: 7 MMOL/L (ref 9–17)
AST SERPL-CCNC: 180 U/L
BASOPHILS # BLD: 1 % (ref 0–2)
BASOPHILS ABSOLUTE: 0.1 K/UL (ref 0–0.2)
BILIRUB SERPL-MCNC: 0.8 MG/DL (ref 0.3–1.2)
BUN BLDV-MCNC: 16 MG/DL (ref 8–23)
CALCIUM SERPL-MCNC: 7.4 MG/DL (ref 8.6–10.4)
CARBOXYHEMOGLOBIN: 1.2 % (ref 0–5)
CHLORIDE BLD-SCNC: 107 MMOL/L (ref 98–107)
CO2: 29 MMOL/L (ref 20–31)
CREAT SERPL-MCNC: 0.64 MG/DL (ref 0.7–1.2)
EOSINOPHILS RELATIVE PERCENT: 1 % (ref 0–4)
GFR SERPL CREATININE-BSD FRML MDRD: >60 ML/MIN/1.73M2
GLUCOSE BLD-MCNC: 85 MG/DL (ref 70–99)
HCO3 ARTERIAL: 31.6 MMOL/L (ref 22–26)
HCT VFR BLD CALC: 38.2 % (ref 41–53)
HEMOGLOBIN: 10.8 G/DL (ref 13.5–17.5)
LYMPHOCYTES # BLD: 7 % (ref 24–44)
MAGNESIUM: 2 MG/DL (ref 1.6–2.6)
MCH RBC QN AUTO: 19.5 PG (ref 26–34)
MCHC RBC AUTO-ENTMCNC: 28.3 G/DL (ref 31–37)
MCV RBC AUTO: 68.8 FL (ref 80–100)
METHEMOGLOBIN: 1.3 % (ref 0–1.9)
MODE: ABNORMAL
MONOCYTES # BLD: 9 % (ref 1–7)
MYCOPLASMA PNEUMONIAE IGM: 0.25
O2 DEVICE/FLOW/%: ABNORMAL
O2 SAT, ARTERIAL: 91.7 % (ref 95–98)
PATIENT TEMP: 37
PCO2 ARTERIAL: 55.9 MMHG (ref 35–45)
PDW BLD-RTO: 20.1 % (ref 11.5–14.9)
PH ARTERIAL: 7.36 (ref 7.35–7.45)
PLATELET # BLD: 75 K/UL (ref 150–450)
PMV BLD AUTO: 9.3 FL (ref 6–12)
PO2 ARTERIAL: 71.4 MMHG (ref 80–100)
POSITIVE BASE EXCESS, ART: 6.1 MMOL/L (ref 0–2)
POTASSIUM SERPL-SCNC: 3.3 MMOL/L (ref 3.7–5.3)
PT. POSITION: ABNORMAL
RBC # BLD: 5.56 M/UL (ref 4.5–5.9)
SAMPLE SITE: ABNORMAL
SEG NEUTROPHILS: 82 % (ref 36–66)
SEGMENTED NEUTROPHILS ABSOLUTE COUNT: 4.9 K/UL (ref 1.3–9.1)
SODIUM BLD-SCNC: 143 MMOL/L (ref 135–144)
SOURCE: NORMAL
STREP PNEUMONIAE ANTIGEN: NEGATIVE
TEXT FOR RESPIRATORY: ABNORMAL
TOTAL PROTEIN: 5 G/DL (ref 6.4–8.3)
VANCOMYCIN RANDOM DATE LAST DOSE: NORMAL
VANCOMYCIN RANDOM DOSE AMOUNT: NORMAL
VANCOMYCIN RANDOM TIME LAST DOSE: 232
VANCOMYCIN RANDOM: 16.1 UG/ML
WBC # BLD: 6 K/UL (ref 3.5–11)

## 2023-01-04 PROCEDURE — 2580000003 HC RX 258: Performed by: INTERNAL MEDICINE

## 2023-01-04 PROCEDURE — 36600 WITHDRAWAL OF ARTERIAL BLOOD: CPT

## 2023-01-04 PROCEDURE — 2580000003 HC RX 258: Performed by: NURSE PRACTITIONER

## 2023-01-04 PROCEDURE — 6360000002 HC RX W HCPCS: Performed by: PSYCHIATRY & NEUROLOGY

## 2023-01-04 PROCEDURE — 2500000003 HC RX 250 WO HCPCS

## 2023-01-04 PROCEDURE — 71045 X-RAY EXAM CHEST 1 VIEW: CPT

## 2023-01-04 PROCEDURE — 94003 VENT MGMT INPAT SUBQ DAY: CPT

## 2023-01-04 PROCEDURE — 80053 COMPREHEN METABOLIC PANEL: CPT

## 2023-01-04 PROCEDURE — 80202 ASSAY OF VANCOMYCIN: CPT

## 2023-01-04 PROCEDURE — 2700000000 HC OXYGEN THERAPY PER DAY

## 2023-01-04 PROCEDURE — 6360000002 HC RX W HCPCS: Performed by: INTERNAL MEDICINE

## 2023-01-04 PROCEDURE — 6370000000 HC RX 637 (ALT 250 FOR IP): Performed by: INTERNAL MEDICINE

## 2023-01-04 PROCEDURE — 99233 SBSQ HOSP IP/OBS HIGH 50: CPT | Performed by: INTERNAL MEDICINE

## 2023-01-04 PROCEDURE — 2500000003 HC RX 250 WO HCPCS: Performed by: INTERNAL MEDICINE

## 2023-01-04 PROCEDURE — 6360000002 HC RX W HCPCS

## 2023-01-04 PROCEDURE — 83735 ASSAY OF MAGNESIUM: CPT

## 2023-01-04 PROCEDURE — 2000000000 HC ICU R&B

## 2023-01-04 PROCEDURE — 95822 EEG COMA OR SLEEP ONLY: CPT | Performed by: PSYCHIATRY & NEUROLOGY

## 2023-01-04 PROCEDURE — C9113 INJ PANTOPRAZOLE SODIUM, VIA: HCPCS

## 2023-01-04 PROCEDURE — 94640 AIRWAY INHALATION TREATMENT: CPT

## 2023-01-04 PROCEDURE — 94761 N-INVAS EAR/PLS OXIMETRY MLT: CPT

## 2023-01-04 PROCEDURE — 87522 HEPATITIS C REVRS TRNSCRPJ: CPT

## 2023-01-04 PROCEDURE — 2500000003 HC RX 250 WO HCPCS: Performed by: NURSE PRACTITIONER

## 2023-01-04 PROCEDURE — 2580000003 HC RX 258: Performed by: PSYCHIATRY & NEUROLOGY

## 2023-01-04 PROCEDURE — 2580000003 HC RX 258

## 2023-01-04 PROCEDURE — 82805 BLOOD GASES W/O2 SATURATION: CPT

## 2023-01-04 PROCEDURE — 36415 COLL VENOUS BLD VENIPUNCTURE: CPT

## 2023-01-04 PROCEDURE — 99233 SBSQ HOSP IP/OBS HIGH 50: CPT | Performed by: PSYCHIATRY & NEUROLOGY

## 2023-01-04 PROCEDURE — 85025 COMPLETE CBC W/AUTO DIFF WBC: CPT

## 2023-01-04 PROCEDURE — A4216 STERILE WATER/SALINE, 10 ML: HCPCS

## 2023-01-04 PROCEDURE — 6370000000 HC RX 637 (ALT 250 FOR IP): Performed by: PSYCHIATRY & NEUROLOGY

## 2023-01-04 PROCEDURE — 95822 EEG COMA OR SLEEP ONLY: CPT

## 2023-01-04 PROCEDURE — 6370000000 HC RX 637 (ALT 250 FOR IP): Performed by: NURSE PRACTITIONER

## 2023-01-04 RX ADMIN — IPRATROPIUM BROMIDE AND ALBUTEROL SULFATE 1 AMPULE: 2.5; .5 SOLUTION RESPIRATORY (INHALATION) at 00:17

## 2023-01-04 RX ADMIN — ASPIRIN 81 MG: 81 TABLET, CHEWABLE ORAL at 07:40

## 2023-01-04 RX ADMIN — IPRATROPIUM BROMIDE AND ALBUTEROL SULFATE 1 AMPULE: 2.5; .5 SOLUTION RESPIRATORY (INHALATION) at 20:12

## 2023-01-04 RX ADMIN — POTASSIUM CHLORIDE: 2 INJECTION, SOLUTION, CONCENTRATE INTRAVENOUS at 13:16

## 2023-01-04 RX ADMIN — IPRATROPIUM BROMIDE AND ALBUTEROL SULFATE 1 AMPULE: 2.5; .5 SOLUTION RESPIRATORY (INHALATION) at 07:23

## 2023-01-04 RX ADMIN — IRON SUCROSE 300 MG: 20 INJECTION, SOLUTION INTRAVENOUS at 17:02

## 2023-01-04 RX ADMIN — PROPOFOL 40 MCG/KG/MIN: 10 INJECTION, EMULSION INTRAVENOUS at 14:09

## 2023-01-04 RX ADMIN — SODIUM CHLORIDE, PRESERVATIVE FREE 40 MG: 5 INJECTION INTRAVENOUS at 07:40

## 2023-01-04 RX ADMIN — METRONIDAZOLE 500 MG: 500 INJECTION, SOLUTION INTRAVENOUS at 16:48

## 2023-01-04 RX ADMIN — VANCOMYCIN HYDROCHLORIDE 1000 MG: 1 INJECTION, POWDER, LYOPHILIZED, FOR SOLUTION INTRAVENOUS at 02:32

## 2023-01-04 RX ADMIN — IPRATROPIUM BROMIDE AND ALBUTEROL SULFATE 1 AMPULE: 2.5; .5 SOLUTION RESPIRATORY (INHALATION) at 04:04

## 2023-01-04 RX ADMIN — PROPOFOL 35 MCG/KG/MIN: 10 INJECTION, EMULSION INTRAVENOUS at 09:16

## 2023-01-04 RX ADMIN — METRONIDAZOLE 500 MG: 500 INJECTION, SOLUTION INTRAVENOUS at 21:17

## 2023-01-04 RX ADMIN — CEFTRIAXONE 2000 MG: 2 INJECTION, POWDER, FOR SOLUTION INTRAMUSCULAR; INTRAVENOUS at 22:49

## 2023-01-04 RX ADMIN — PROPOFOL 40 MCG/KG/MIN: 10 INJECTION, EMULSION INTRAVENOUS at 19:14

## 2023-01-04 RX ADMIN — VANCOMYCIN HYDROCHLORIDE 1250 MG: 1.25 INJECTION, POWDER, LYOPHILIZED, FOR SOLUTION INTRAVENOUS at 17:03

## 2023-01-04 RX ADMIN — CEFEPIME 2000 MG: 2 INJECTION, POWDER, FOR SOLUTION INTRAVENOUS at 10:27

## 2023-01-04 RX ADMIN — NOREPINEPHRINE BITARTRATE 2 MCG/MIN: 1 INJECTION, SOLUTION, CONCENTRATE INTRAVENOUS at 10:31

## 2023-01-04 RX ADMIN — SODIUM CHLORIDE, PRESERVATIVE FREE 10 ML: 5 INJECTION INTRAVENOUS at 20:36

## 2023-01-04 RX ADMIN — METRONIDAZOLE 500 MG: 500 INJECTION, SOLUTION INTRAVENOUS at 05:02

## 2023-01-04 RX ADMIN — SODIUM CHLORIDE: 9 INJECTION, SOLUTION INTRAVENOUS at 02:31

## 2023-01-04 RX ADMIN — IPRATROPIUM BROMIDE AND ALBUTEROL SULFATE 1 AMPULE: 2.5; .5 SOLUTION RESPIRATORY (INHALATION) at 15:50

## 2023-01-04 RX ADMIN — THIAMINE HYDROCHLORIDE: 100 INJECTION, SOLUTION INTRAMUSCULAR; INTRAVENOUS at 13:25

## 2023-01-04 RX ADMIN — IPRATROPIUM BROMIDE AND ALBUTEROL SULFATE 1 AMPULE: 2.5; .5 SOLUTION RESPIRATORY (INHALATION) at 12:14

## 2023-01-04 RX ADMIN — IPRATROPIUM BROMIDE AND ALBUTEROL SULFATE 1 AMPULE: 2.5; .5 SOLUTION RESPIRATORY (INHALATION) at 23:38

## 2023-01-04 RX ADMIN — ANTI-FUNGAL POWDER MICONAZOLE NITRATE TALC FREE: 1.42 POWDER TOPICAL at 20:36

## 2023-01-04 RX ADMIN — PROPOFOL 40 MCG/KG/MIN: 10 INJECTION, EMULSION INTRAVENOUS at 23:55

## 2023-01-04 RX ADMIN — PROPOFOL 35 MCG/KG/MIN: 10 INJECTION, EMULSION INTRAVENOUS at 03:57

## 2023-01-04 ASSESSMENT — PULMONARY FUNCTION TESTS
PIF_VALUE: 23
PIF_VALUE: 22
PIF_VALUE: 24
PIF_VALUE: 27
PIF_VALUE: 23
PIF_VALUE: 27
PIF_VALUE: 29
PIF_VALUE: 23
PIF_VALUE: 28
PIF_VALUE: 23
PIF_VALUE: 24
PIF_VALUE: 26
PIF_VALUE: 25
PIF_VALUE: 22
PIF_VALUE: 23
PIF_VALUE: 22
PIF_VALUE: 22
PIF_VALUE: 23
PIF_VALUE: 28
PIF_VALUE: 23
PIF_VALUE: 23
PIF_VALUE: 22
PIF_VALUE: 27
PIF_VALUE: 22
PIF_VALUE: 27
PIF_VALUE: 27
PIF_VALUE: 25
PIF_VALUE: 22
PIF_VALUE: 24
PIF_VALUE: 22
PIF_VALUE: 23
PIF_VALUE: 30
PIF_VALUE: 44
PIF_VALUE: 23
PIF_VALUE: 24
PIF_VALUE: 28
PIF_VALUE: 25
PIF_VALUE: 26
PIF_VALUE: 22
PIF_VALUE: 29
PIF_VALUE: 28
PIF_VALUE: 27
PIF_VALUE: 22
PIF_VALUE: 24
PIF_VALUE: 22
PIF_VALUE: 22
PIF_VALUE: 26
PIF_VALUE: 25
PIF_VALUE: 27
PIF_VALUE: 27
PIF_VALUE: 22
PIF_VALUE: 21
PIF_VALUE: 26
PIF_VALUE: 23
PIF_VALUE: 24
PIF_VALUE: 22
PIF_VALUE: 22
PIF_VALUE: 24
PIF_VALUE: 22
PIF_VALUE: 27
PIF_VALUE: 26
PIF_VALUE: 22
PIF_VALUE: 25
PIF_VALUE: 22
PIF_VALUE: 27
PIF_VALUE: 34

## 2023-01-04 NOTE — PROGRESS NOTES
Patient vented and sedated; SC visit with sig other Addis and daughter Catia Singh; medical update provided by family; sig other anxious ; welcomed prayer; listening presence and support;     01/03/23 1925   Encounter Summary   Encounter Overview/Reason  Spiritual/Emotional Needs   Service Provided For: Patient and family together   Referral/Consult From: [address] [place] [address] Significant other;Children   Last Encounter  01/03/23   Complexity of Encounter Moderate   Spiritual/Emotional needs   Type Spiritual Support   Grief, Loss, and Adjustments   Type Adjustment to illness   Assessment/Intervention/Outcome   Assessment Anxious; Coping; Hopeful;Powerlessness   Intervention Discussed illness injury and its impact; Discussed belief system/Faith practices/vijay; Explored/Affirmed feelings, thoughts, concerns;Prayer (assurance of)/[place];Sustaining Presence/Ministry of presence   Outcome Coping;Engaged in conversation;Expressed feelings, needs, and concerns;Expressed Gratitude;Receptive

## 2023-01-04 NOTE — PROGRESS NOTES
Vancomycin Dosing by Pharmacy - Daily Note   Vancomycin Therapy Day:  3  Indication: pneumonia    Allergies:  Patient has no known allergies. Actual Weight:    Wt Readings from Last 1 Encounters:   01/03/23 188 lb 15 oz (85.7 kg)       Labs/Ancillary Data  Estimated Creatinine Clearance: 123 mL/min (A) (based on SCr of 0.64 mg/dL (L)). Recent Labs     01/02/23  0353 01/02/23  0543 01/03/23  0427 01/04/23  0904   CREATININE 1.50*  --  0.97 0.64*   BUN 46*  --  32* 16   WBC  --  5.6 5.7 6.0     Procalcitonin   Date Value Ref Range Status   01/02/2023 0.09 (H) <0.09 ng/mL Final     Comment:           Suspected Sepsis:  <0.50 ng/mL     Low likelihood of sepsis. 0.50-2.00 ng/mL     Increased likelihood of sepsis. Antibiotics encouraged. >2.00 ng/mL     High risk of sepsis/shock. Antibiotics strongly encouraged. Suspected Lower Resp Tract Infections:  <0.24 ng/mL     Low likelihood of bacterial infection. >0.24 ng/mL     Increased likelihood of bacterial infection. Antibiotics encouraged. With successful antibiotic therapy, PCT levels should decrease rapidly. (Half-life of 24 to   36 hours.)        Procalcitonin values from samples collected within the first 6 hours of systemic infection   may still be low. Retesting may be indicated. Values from day 1 and day 4 can be entered into the Change in Procalcitonin Calculator   ([email or web address]-pct-calculator. com) to determine the patient's Mortality Risk Prognosis        In healthy neonates, plasma Procalcitonin (PCT) concentrations increase gradually after   birth, reaching peak values at about 24 hours of age then decrease to normal values below   0.5 ng/mL by 48-72 hours of age.          Intake/Output Summary (Last 24 hours) at 1/4/2023 1204  Last data filed at 1/4/2023 0800  Gross per 24 hour   Intake 2755.65 ml   Output 1525 ml   Net 1230.65 ml     Temp: 98.4    Culture Date / Source  /  Results  See micro  Recent vancomycin administrations vancomycin 1000 mg IVPB in 250 mL D5W addavial (mg) 1,000 mg New Bag 01/04/23 0232     1,000 mg New Bag 01/03/23 1149    vancomycin (VANCOCIN) 2,000 mg in dextrose 5 % 500 mL IVPB (mg) 2,000 mg New Bag 01/02/23 1643                    Vancomycin Concentrations:   TROUGH:  No results for input(s): VANCOTROUGH in the last 72 hours. RANDOM:    Recent Labs     01/04/23  0904   VANCORANDOM 16.1       MRSA Nasal Swab: showed MRSA positive result on 01/03 . PLAN     Increase dose to 1250 mg q12h IV  Ensured BUN/sCr ordered at baseline and every 48 hours x at least 3 levels, then at least weekly. Pharmacy will continue to monitor patient and adjust therapy as indicated      Vancomycin Target Concentration Parameters  Treatment  Population Target AUC/KENDALL Target Trough   Invasive MRSA Infection (bacteremia, pneumonia, meningitis, endocarditis, osteomyelitis)  Sepsis (undifferentiated) 400-600 N/A   Infection due to non-MRSA pathogen  Empiric treatment of non-invasive MRSA infection  (SSTI, UTI) <500 10-15 mg/L   CrCl < 29 mL/min  Rapidly fluctuating serum creatinine   KENTRELL N/A < 15 mg/L     Renal replacement therapy is dosed by levels, per hospital protocol. Abbreviations  * Pauc: probability that AUC is >400 (efficacy); Pconc: probability that Ctrough is above 20 ?g/mL (toxicity); Tox: Probability of nephrotoxicity, based on Ricky et al. Clin Infect Dis 2009. Loading dose: N/A  Regimen: 1250 mg IV every 12 hours. Start time: 14:32 on 01/04/2023  Exposure target: AUC24 (range)400-600 mg/L.hr   AUC24,ss: 461 mg/L.hr  Probability of AUC24 > 400: 74 %  Ctrough,ss: 13.7 mg/L  Probability of Ctrough,ss > 20: 14 %  Probability of nephrotoxicity (Ricky ELIGIO 2009): 9 %    Thank you for the consult. Pharmacy will continue to follow.     Honorio Santos, AidaD, [facility]  1/4/2023   12:05 PM

## 2023-01-04 NOTE — PROGRESS NOTES
Active problem Left cerebral infraction . Respiratory failure . The condition is  MRI of Head with small acute infarctions in left cerebellum and left parietal lobe . Moderate bilateral subarachnoid hemorrhage with both cerebral hemispheres of uncertain etiology . Small bilateral mastoid effusions and left paranasal sinus disease . CTA of Head with no large vessel occlusion . Diffuse leptomeningeal enhancement seen in subarachnoid hemorrhage , encephalitis or meningitis  EEG moderate diffuse slowing. He is on ventilator for respiratory failure with community acquired pneumonia . MRSA postive on cefepine 2 grams IVPB q 12 hours , vancomycin 1250 mg IVPB q 12 hours , flagyl 500 mg IVPB q 12 hours. INR1. 9 along with elevated liver enzymes felt to be from liver disease , platelets 75 k with alcohol history  . He is on ventilator on IV diprivan sedation obtunded grimacing to noxious stimulus with mild withdrawal of all limbs . 59 yo wm with mental status changes . Patient is admitted with increasing confusion for one week not acting right not being himself with slurring of speech along with confusion . He is found to have hypoxia with CXR showing right lower lobe pneumonia with small pleural effusion with possible large bullae left lung apex . Patient was placed on nasal BIPAP now on ventilator on IV diprivan sedation . Nurse reports that he is responsive off sedation moving all limbs equally . 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 There right centrum ovale and left parietal occipital hypodensities concordant with stroke unclear age . He does have history of alcohol use . Platelet count 67 k felt to be by hematology from alcohol ,  ,  . INR 2.3 on no anticoagulation . UA small leukocyte esterase . 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 EEG moderate diffuse slowing. MRI of Head with small acute infarctions in left cerebellum and left parietal lobe . Moderate bilateral subarachnoid hemorrhage with both cerebral hemispheres of uncertain etiology . Small bilateral mastoid effusions and left paranasal sinus disease . CTA of Head with no large vessel occlusion . Diffuse leptomeningeal enhancement seen in subarachnoid hemorrhage , encephalitis or meningitis . Cardiac 2 D echo normal LVF EF> 55 % . Mild TR      History reviewed. No pertinent past medical history. History reviewed. No pertinent surgical history. History reviewed. No pertinent family history.     Social History     Socioeconomic History    Marital status: Single     Spouse name: None    Number of children: None    Years of education: None    Highest education level: None   Tobacco Use    Smoking status: Every Day     Packs/day: 1.00     Years: 40.00     Pack years: 40.00     Types: Cigarettes    Smokeless tobacco: Never   Substance and Sexual Activity    Alcohol use: Yes     Comment: beer and scotch    Drug use: Not Currently     Comment: over 20 years (stated by daughter)       Current Facility-Administered Medications   Medication Dose Route Frequency Provider Last Rate Last Admin    miconazole (MICOTIN) 2 % powder   Topical BID ALAYNA Marrero - NP        potassium chloride 20 mEq in sodium chloride 0.45 % 1,000 mL infusion   IntraVENous Continuous Tita Tracy MD 50 mL/hr at 01/04/23 1316 New Bag at 01/04/23 1316    vancomycin (VANCOCIN) 1,250 mg in dextrose 5 % 250 mL IVPB (ADDAVIAL)  1,250 mg IntraVENous Q12H Reece Tate .7 mL/hr at 01/04/23 1703 1,250 mg at 01/04/23 1703    perflutren lipid microspheres (DEFINITY) injection 1.5 mL  1.5 mL IntraVENous ONCE PRN Godfrey Rodney MD        [facility] by provider] aspirin chewable tablet 81 mg  81 mg Oral Daily Bobby Corona MD   81 mg at 01/04/23 0740    sodium chloride flush 0.9 % injection 10 mL  10 mL IntraVENous PRN Reece Tate MD   10 mL at 01/03/23 1459    iron sucrose (VENOFER) 300 mg in sodium chloride 0.9 % 250 mL IVPB  300 mg IntraVENous Q24H Melodie Golden .7 mL/hr at 01/04/23 1702 300 mg at 01/04/23 1702    sodium chloride flush 0.9 % injection 10 mL  10 mL IntraVENous PRN Matteo Paul MD   10 mL at 01/03/23 1836    sodium chloride flush 0.9 % injection 5-40 mL  5-40 mL IntraVENous 2 times per day ALAYNA Oconnor - NP   10 mL at 01/03/23 2101    sodium chloride flush 0.9 % injection 5-40 mL  5-40 mL IntraVENous PRN ALAYNA Marrero - NP        0.9 % sodium chloride infusion   IntraVENous PRN Juliet Galvan APRN - NP        ondansetron (ZOFRAN-ODT) disintegrating tablet 4 mg  4 mg Oral Q8H PRN Juliet Galvan, APRN - NP        Or    ondansetron (ZOFRAN) injection 4 mg  4 mg IntraVENous Q6H PRN Juliet Galvan, APRN - NP        polyethylene glycol (GLYCOLAX) packet 17 g  17 g Oral Daily PRN Juliet Galvan, APRN - NP        acetaminophen (TYLENOL) tablet 650 mg  650 mg Oral Q6H PRN Juliet Callhay, APRN - NP        Or    acetaminophen (TYLENOL) suppository 650 mg  650 mg Rectal Q6H PRN Juleit Galvan, APRN - NP        nicotine (NICODERM CQ) 21 MG/24HR 1 patch  1 patch TransDERmal Daily Juliet Galvan, APRN - NP   1 patch at 01/04/23 0740    albuterol (PROVENTIL) nebulizer solution 2.5 mg  2.5 mg Nebulization Q2H PRN Juliet Galvan, APRN - NP   2.5 mg at 01/02/23 1115    guaiFENesin (MUCINEX) extended release tablet 600 mg  600 mg Oral BID PRN Juliet Callhay, APRN - NP        pantoprazole (PROTONIX) 40 mg in sodium chloride (PF) 0.9 % 10 mL injection  40 mg IntraVENous Daily Jeimy Abbott MD   40 mg at 01/04/23 0740    cefepime (MAXIPIME) 2,000 mg in sodium chloride 0.9 % 50 mL IVPB mini-bag  2,000 mg IntraVENous Q12H Jeimy Abbott MD   Stopped at 01/04/23 1427    metronidazole (FLAGYL) 500 mg in 0.9% NaCl 100 mL IVPB premix  500 mg IntraVENous Q8H Jeimy Abbott  mL/hr at 01/04/23 1648 500 mg at 01/04/23 1648    vancomycin (VANCOCIN) intermittent dosing (placeholder)   Other RX Placeholder [employer] Julia[facility] Michelle Hughes MD        norepinephrine (LEVOPHED) 16 mg in dextrose 5 % 250 mL infusion  1-30 mcg/min IntraVENous Continuous Martha Pelayo MD 1.9 mL/hr at 01/04/23 1703 2 mcg/min at 01/04/23 1703    ipratropium-albuterol (DUONEB) nebulizer solution 1 ampule  1 ampule Inhalation Q4H Saroj Christianson MD   1 ampule at 01/04/23 1550    propofol injection  5-50 mcg/kg/min IntraVENous Continuous Saroj Christianson MD 19.7 mL/hr at 01/04/23 1409 40 mcg/kg/min at 01/04/23 1409    0.9 % sodium chloride bolus  1,000 mL IntraVENous Once Saroj Christianson MD           No Known Allergies    ROS:   On ventilator on sedation     Vitals:    01/04/23 1600   BP:    Pulse:    Resp:    Temp: 97.9 °F (36.6 °C)   SpO2:      Admission weight: 180 lb 12.4 oz (82 kg)    Neurological Examination on ventilator on diprivan   Constitutional .General exam well groomed   Head/ Ears /Nose/Throat/external ear . Normal exam  Neck and thyroid . Normal size. No bruits  Cardiovascular: Auscultation of heart with regular rate and rhythm   Musculoskeletal. Muscle bulk and tone normal                                                           Muscle strength mild withdrawal of all limbs equally                                                                             No tremor   Orientation Obtunded grimacing to noxious stimulation not following commands   Cranial nerve 2 no visual threat  Cranial nerve 3, 4 and 6 . Extraocular muscles are intact . Pupils are equal and reactive   Cranial nerve 5 . Intact corneal reflex. Cranial nerve 7 normal exam   Cranial nerve 8. Grossly intact hearing   Cranial nerve 9 and 10. Symmetric palate elevation   Cranial nerve 11 , 5 out of 5 strength   Cranial Nerve 12 midline tongue . No atrophy  Sensation . Equal withdrawal all limbs   Deep Tendon Reflexes symmetrical   Plantar response flexor equivocal     Assessment :    Encephalopathy . Cerebral multi infarct state . Meningeal process subarachnoid hemorrhage versus infection . Respiratory failure     Plan: Will change cefepime to rocephin 2 grams IVPB for meningeal coverage . ID consultation .  Can not get LP with elevated INR

## 2023-01-04 NOTE — PROGRESS NOTES
Comprehensive Nutrition Assessment    Type and Reason for Visit:  Initial    Nutrition Recommendations/Plan:   Continue NPO as ordered. Malnutrition Assessment:  Malnutrition Status:  Insufficient data (23 1527)    Context:  Acute Illness     Findings of the 6 clinical characteristics of malnutrition:  Energy Intake:  Unable to assess  Weight Loss:  Unable to assess     Body Fat Loss:  Unable to assess     Muscle Mass Loss:  Unable to assess    Fluid Accumulation:  Mild Extremities   Strength:  Not Performed    Nutrition Assessment:    Pt to ED after having 2 weeks of altered mental status, slurred speech and confusion. Pt was intubated with Propofol at 19.7 ml providin kcals. Pulmonology notes hold off weaning another 24 hours, if unable to extubate tomorrow will start tube feedings. Nutrition Related Findings:    Edema: +2 pitting BLE's. Hx: Constant hearburn, smoker, alcohol use but not daily per SO. Labs & meds reviewed. MRI done showing multiple small infarcts which are of a acute nature. EEG done 1-4. Wound Type: None       Current Nutrition Intake & Therapies:    Average Meal Intake: NPO     Diet NPO    Anthropometric Measures:  Height: 5' 7\" (170.2 cm)  Ideal Body Weight (IBW): 148 lbs (67 kg)    Admission Body Weight: 188 lb (85.3 kg)  Current Body Weight: 188 lb (85.3 kg), 127 % IBW. Weight Source: Bed Scale  Current BMI (kg/m2): 29.4                          BMI Categories: Overweight (BMI 25.0-29. 9)    Estimated Daily Nutrient Needs:  Energy Requirements Based On: Kcal/kg  Weight Used for Energy Requirements: Admission  Energy (kcal/day): 2145 kcals based on 25 kcals/kg  Weight Used for Protein Requirements: Ideal  Protein (g/day): 101-114 gm protein based on 1.5-1.7 gm/kg         Nutrition Diagnosis:   Inadequate oral intake related to cognitive or neurological impairment, impaired respiratory function as evidenced by NPO or clear liquid status due to medical condition, intubation    Nutrition Interventions:   Food and/or Nutrient Delivery: Continue NPO  Nutrition Education/Counseling: No recommendation at this time  Coordination of Nutrition Care: Continue to monitor while inpatient       Goals:     Goals: Meet at least 75% of estimated needs       Nutrition Monitoring and Evaluation:   Behavioral-Environmental Outcomes: None Identified  Food/Nutrient Intake Outcomes: None Identified  Physical Signs/Symptoms Outcomes: Biochemical Data, Fluid Status or Edema, Hemodynamic Status, Nutrition Focused Physical Findings, Skin, Weight    Discharge Planning:     Too soon to determine     Sondra Campos, [address] [place] [address],   [phone]

## 2023-01-04 NOTE — PROGRESS NOTES
Today's Date: 1/4/2023  Patient Name: Tawana Wyman  Date of admission: 1/1/2023  9:55 PM  Patient's age: 60 y.o., 1960  Admission Dx: Acute respiratory failure ([facility] [address] [J96.00]  Acute respiratory failure with hypoxia and hypercapnia ([facility] [address] [J96.01, J96.02]  Altered mental status, unspecified altered mental status type [R41.82]  Community acquired pneumonia of right lower lobe of lung [J18.9]    Reason for Consult: management recommendations  Requesting Physician: Tracy Hughes MD    CHIEF COMPLAINT: Abnormal cell counts. Altered mental status. Pneumonia. History Obtained From:  spouse, family member. Electronic medical record. Patient unable to give any history due to altered mental status          Interval history:    Patient seen and examined  Labs and vitals reviewed  Patient remains in ICU. Remains intubated  Kidney function has improved. 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 Platelet count up to 75,000    HISTORY OF PRESENT ILLNESS:      The patient is a 60 y.o.  male who is admitted with chief complaint of altered mental status. Patient has not seen a doctor for multiple years. Due to worsening mental status EMS was summoned. Patient oxygen saturation was noted to be at 75%. Patient placed on BiPAP x-ray showed right lower lobe pneumonia. Patient started on antibiotics. Patient also noted to have INR of 2.3 platelet count of 23,196. Patient does have a history of alcohol intake. Patient's ammonia level noted to be 29. Creatinine 1.5. Total bilirubin is within range. Hemoglobin 11.2 with MCV of 69. Platelet count is 07,812. Ultrasound liver done however report is pending.         Past Medical History: Unable to obtain as patient is confused    Past Surgical History: Unable to obtain as patient is confused    Medications:    Prior to Admission medications    Not on File Current Facility-Administered Medications   Medication Dose Route Frequency Provider Last Rate Last Admin    miconazole (MICOTIN) 2 % powder   Topical BID Refugia Inches, APRN - NP        potassium chloride 20 mEq in sodium chloride 0.45 % 1,000 mL infusion   IntraVENous Continuous Kendy Kumari MD 50 mL/hr at 01/04/23 1316 New Bag at 01/04/23 1316    vancomycin (VANCOCIN) 1,250 mg in dextrose 5 % 250 mL IVPB (ADDAVIAL)  1,250 mg IntraVENous Q12H Lashell Martinez MD        perflutren lipid microspheres (DEFINITY) injection 1.5 mL  1.5 mL IntraVENous ONCE PRN Carolina Colbert MD        [facility] aspirin chewable tablet 81 mg  81 mg Oral Daily Aliza Grewal MD   81 mg at 01/04/23 0740    sodium chloride flush 0.9 % injection 10 mL  10 mL IntraVENous PRN Lashell Martinez MD   10 mL at 01/03/23 1459    iron sucrose (VENOFER) 300 mg in sodium chloride 0.9 % 250 mL IVPB  300 mg IntraVENous Q24H Suha Dinero MD   Stopped at 01/03/23 2018    sodium chloride flush 0.9 % injection 10 mL  10 mL IntraVENous PRN Aliza Grewal MD   10 mL at 01/03/23 1836    sodium chloride flush 0.9 % injection 5-40 mL  5-40 mL IntraVENous 2 times per day Refugia Inches, APRN - NP   10 mL at 01/03/23 2101    sodium chloride flush 0.9 % injection 5-40 mL  5-40 mL IntraVENous PRN ALAYNA Marrero - NP        0.9 % sodium chloride infusion   IntraVENous PRN Refugia Inches, APRN - NP        ondansetron (ZOFRAN-ODT) disintegrating tablet 4 mg  4 mg Oral Q8H PRN Refugia Inches, APRN - NP        Or    ondansetron (ZOFRAN) injection 4 mg  4 mg IntraVENous Q6H PRN Abiola Gomez APRN - NP        polyethylene glycol (GLYCOLAX) packet 17 g  17 g Oral Daily PRN Refugia Inches, APRN - NP        acetaminophen (TYLENOL) tablet 650 mg  650 mg Oral Q6H PRN Refugia Inches, APRN - NP        Or    acetaminophen (TYLENOL) suppository 650 mg  650 mg Rectal Q6H PRN RefugALAYNA Aguirre - NASRA        nicotine (NICODERM CQ) 21 MG/24HR 1 patch 1 patch TransDERmal Daily Rodolfo Mesa APRN - NP   1 patch at 01/04/23 0740    albuterol (PROVENTIL) nebulizer solution 2.5 mg  2.5 mg Nebulization Q2H PRN Rodolfo Mesa, APRN - NP   2.5 mg at 01/02/23 1115    guaiFENesin (MUCINEX) extended release tablet 600 mg  600 mg Oral BID PRN Jyotsnaspencer crispin, APRN - NP        pantoprazole (PROTONIX) 40 mg in sodium chloride (PF) 0.9 % 10 mL injection  40 mg IntraVENous Daily Laurel Lopez MD   40 mg at 01/04/23 0740    cefepime (MAXIPIME) 2,000 mg in sodium chloride 0.9 % 50 mL IVPB mini-bag  2,000 mg IntraVENous Q12H Laurel Lopez MD 12.5 mL/hr at 01/04/23 1027 2,000 mg at 01/04/23 1027    metronidazole (FLAGYL) 500 mg in 0.9% NaCl 100 mL IVPB premix  500 mg IntraVENous Lee Dickson MD   Stopped at 01/04/23 0602    vancomycin (VANCOCIN) intermittent dosing (placeholder)   Other RX Kandis Cotter MD        norepinephrine (LEVOPHED) 16 mg in dextrose 5 % 250 mL infusion  1-30 mcg/min IntraVENous Continuous Jules Carrington MD 3.8 mL/hr at 01/04/23 1038 4 mcg/min at 01/04/23 1038    ipratropium-albuterol (DUONEB) nebulizer solution 1 ampule  1 ampule Inhalation Q4H Shaina Craig MD   1 ampule at 01/04/23 1214    propofol injection  5-50 mcg/kg/min IntraVENous Continuous Shaina Craig MD 19.7 mL/hr at 01/04/23 1322 40 mcg/kg/min at 01/04/23 1322    0.9 % sodium chloride bolus  1,000 mL IntraVENous Once Shaina Craig MD           Allergies:  Patient has no known allergies. Social History:   reports that he has been smoking cigarettes. He has a 40.00 pack-year smoking history. He has never used smokeless tobacco. He reports current alcohol use. He reports that he does not currently use drugs.      Family History: Patient not able to give history due to altered mental status    REVIEW OF SYSTEMS:      Unable to obtain as patient is intubated    PHYSICAL EXAM:        /76   Pulse 82   Temp 98.6 °F (37 °C) (Oral)   Resp 22   Ht 5' 7\" (1.702 m)   Wt 188 lb 15 oz (85.7 kg)   SpO2 97%   BMI 29.59 kg/m²    Temp (24hrs), Av.3 °F (36.8 °C), Min:97.6 °F (36.4 °C), Max:98.7 °F (37.1 °C)      General appearance -intubated. Mental status -intubated  Eyes - pupils equal and reactive, extraocular eye movements intact   Ears - bilateral TM's and external ear canals normal   Mouth -orally intubated  Neck - supple, no significant adenopathy   Lymphatics - no palpable lymphadenopathy, no hepatosplenomegaly   Chest -bilateral rales  Heart - normal rate, regular rhythm, normal S1, S2, no murmurs  Abdomen - soft, nontender, nondistended, no masses or organomegaly   Neurological -orally intubated  Musculoskeletal - no joint tenderness, deformity or swelling   Extremities - peripheral pulses normal, no pedal edema, no clubbing or cyanosis   Skin - normal coloration and turgor, no rashes, no suspicious skin lesions noted ,      DATA:      Labs:     Results for orders placed or performed during the hospital encounter of 23   COVID-19 & Influenza Combo    Specimen: Nasopharyngeal Swab   Result Value Ref Range    Specimen Description . NASOPHARYNGEAL SWAB     Source . NASOPHARYNGEAL SWAB     SARS-CoV-2 RNA, RT PCR Not Detected Not Detected    INFLUENZA A Not Detected Not Detected    INFLUENZA B Not Detected Not Detected   Respiratory Panel, Molecular, with COVID-19 (Restricted: peds pts or suitable admitted adults)    Specimen: Nasopharyngeal Swab   Result Value Ref Range    Specimen Description . NASOPHARYNGEAL SWAB     Adenovirus PCR Not Detected Not Detected    Coronavirus 229E PCR Not Detected Not Detected    Coronavirus HKU1 PCR Not Detected Not Detected    Coronavirus NL63 PCR Not Detected Not Detected    Coronavirus OC43 PCR Not Detected Not Detected    SARS-CoV-2, PCR Not Detected Not Detected    Human Metapneumovirus PCR Not Detected Not Detected    Rhino/Enterovirus PCR Not Detected Not Detected    Influenza A by PCR Not Detected Not Detected    Influenza B by PCR Not Detected Not Detected    Parainfluenza 1 PCR Not Detected Not Detected    Parainfluenza 2 PCR Not Detected Not Detected    Parainfluenza 3 PCR Not Detected Not Detected    Parainfluenza 4 PCR Not Detected Not Detected    Resp Syncytial Virus PCR Not Detected Not Detected    Bordetella Parapertussis Not Detected Not Detected    B Pertussis by PCR Not Detected Not Detected    Chlamydia pneumoniae By PCR Not Detected Not Detected    Mycoplasma pneumo by PCR Not Detected Not Detected   Legionella Ag, Ur    Specimen: Urine   Result Value Ref Range    Legionella Pneumophilia Ag, Urine NEGATIVE NEGATIVE   Culture, Blood 1    Specimen: Blood   Result Value Ref Range    Specimen Description . BLOOD LEFT ARM     Culture NO GROWTH 2 DAYS    Culture, Blood 1    Specimen: Blood   Result Value Ref Range    Specimen Description . BLOOD RIGHT ARM     Culture NO GROWTH 2 DAYS    Culture, Urine    Specimen: Urine, clean catch   Result Value Ref Range    Specimen Description . CLEAN CATCH URINE     Culture NO GROWTH    MRSA DNA Probe, Nasal    Specimen: Nasal   Result Value Ref Range    Specimen Description . NASAL SWAB     MRSA, DNA, Nasal (A) NEGATIVE     POSITIVE:  MRSA DNA detected by nucleic acid amplification. Culture, Respiratory    Specimen: Sputum Induced   Result Value Ref Range    Specimen Description . INDUCED SPUTUM     Direct Exam >10, <25 NEUTROPHILS/LPF     Direct Exam < 10 EPITHELIAL CELLS/LPF     Direct Exam NO SIGNIFICANT PATHOGENS SEEN     Culture PENDING    Troponin   Result Value Ref Range    Troponin, High Sensitivity 177 (HH) 0 - 22 ng/L   Troponin   Result Value Ref Range    Troponin, High Sensitivity 184 (HH) 0 - 22 ng/L   APTT   Result Value Ref Range    PTT 26.5 24.0 - 36.0 sec   Protime-INR   Result Value Ref Range    Protime 24.9 (H) 11.8 - 14.6 sec    INR 2.3    Phosphorus   Result Value Ref Range    Phosphorus 4.3 2.5 - 4.5 mg/dL   Magnesium   Result Value Ref Range    Magnesium 2.1 1.6 - 2.6 mg/dL   Basic Metabolic Panel   Result Value Ref Range    Glucose 96 70 - 99 mg/dL    BUN 46 (H) 8 - 23 mg/dL    Creatinine 1.77 (H) 0.70 - 1.20 mg/dL    Est, Glom Filt Rate 43 (L) >60 mL/min/1.73m2    Calcium 7.9 (L) 8.6 - 10.4 mg/dL    Sodium 138 135 - 144 mmol/L    Potassium 4.8 3.7 - 5.3 mmol/L    Chloride 97 (L) 98 - 107 mmol/L    CO2 29 20 - 31 mmol/L    Anion Gap 12 9 - 17 mmol/L   CBC with Auto Differential   Result Value Ref Range    WBC 6.8 3.5 - 11.0 k/uL    RBC 5.99 (H) 4.5 - 5.9 m/uL    Hemoglobin 12.1 (L) 13.5 - 17.5 g/dL    Hematocrit 41.4 41 - 53 %    MCV 69.1 (L) 80 - 100 fL    MCH 20.2 (L) 26 - 34 pg    MCHC 29.2 (L) 31 - 37 g/dL    RDW 19.8 (H) 11.5 - 14.9 %    Platelets 55 (L) 107 - 450 k/uL    MPV 8.6 6.0 - 12.0 fL    Seg Neutrophils 79 (H) 36 - 66 %    Lymphocytes 12 (L) 24 - 44 %    Monocytes 8 (H) 1 - 7 %    Eosinophils % 0 0 - 4 %    Basophils 1 0 - 2 %    Segs Absolute 5.40 1.3 - 9.1 k/uL    Absolute Lymph # 0.80 (L) 1.0 - 4.8 k/uL    Absolute Mono # 0.50 0.1 - 1.3 k/uL    Absolute Eos # 0.00 0.0 - 0.4 k/uL    Basophils Absolute 0.10 0.0 - 0.2 k/uL   Blood Gas, Venous   Result Value Ref Range    pH, [place] 7.258 (L) 7.320 - 7.420    pCO2, [place] 63.0 (H) 39.0 - 55.0 mm Hg    pO2, [place] 48.1 30.0 - 50.0 mm Hg    HCO3, Venous 28.1 24.0 - 30.0 mmol/L    Positive Base Excess, [place] 1.0 0.0 - 2.0 mmol/L    O2 Sat, [place] 72.1 60.0 - 85.0 %    Carboxyhemoglobin 4.3 0 - 5 %    Methemoglobin 0.5 0.0 - 1.9 %    Pt Temp 37    Brain Natriuretic Peptide   Result Value Ref Range    Pro-BNP 7,797 (H) <300 pg/mL   Hepatic Function Panel   Result Value Ref Range    Albumin 3.9 3.5 - 5.2 g/dL    Alkaline Phosphatase 68 40 - 129 U/L     (H) 5 - 41 U/L     (H) <40 U/L    Total Bilirubin 1.1 0.3 - 1.2 mg/dL    Bilirubin, Direct 0.9 (H) <0.3 mg/dL    Bilirubin, Indirect 0.2 0.0 - 1.0 mg/dL    Total Protein 6.6 6.4 - 8.3 g/dL   Lipase   Result Value Ref Range    Lipase 17 13 - 60 U/L   Urinalysis with Reflex to Culture    Specimen: Urine   Result Value Ref Range    Color, UA Orange (A) Yellow    Turbidity UA Cloudy (A) Clear    Glucose, Ur NEGATIVE NEGATIVE    Bilirubin Urine NEGATIVE  Verified by ictotest. (A) NEGATIVE    Ketones, Urine TRACE (A) NEGATIVE    Specific Gravity, UA 1.018 1.000 - 1.030    Urine Hgb LARGE (A) NEGATIVE    pH, UA 5.0 5.0 - 8.0    Protein, UA 2+ (A) NEGATIVE    Urobilinogen, Urine ELEVATED (A) Normal    Nitrite, Urine NEGATIVE NEGATIVE    Leukocyte Esterase, Urine SMALL (A) NEGATIVE   Microscopic Urinalysis   Result Value Ref Range    WBC, UA 6 TO 9 /HPF    RBC, UA TOO NUMEROUS TO COUNT /HPF    Casts UA 3 to 5 /LPF    Epithelial Cells UA 0 TO 2 /HPF    Bacteria, UA FEW (A) None   Arterial Blood Gases   Result Value Ref Range    pH, Arterial 7.295 (L) 7.350 - 7.450    pCO2, Arterial 60.3 (HH) 35.0 - 45.0 mmHg    pO2, Arterial 63.0 (L) 80.0 - 100.0 mmHg    HCO3, Arterial 29.3 (H) 22.0 - 26.0 mmol/L    Positive Base Excess, Art 2.8 (H) 0.0 - 2.0 mmol/L    O2 Sat, Arterial 85.8 (LL) 95 - 98 %    Carboxyhemoglobin 3.1 0 - 5 %    Methemoglobin 1.0 0.0 - 1.9 %    Pt Temp 37     O2 Device/Flow/% BIPAP     Respiratory Rate 16     Tyron Test PASS     Sample Site Right Radial Artery     Pt.  Position SEMI-FOWLERS     Mode BIPAP     Text for Respiratory RESULTS TO PHYSICIAN    Comprehensive Metabolic Panel w/ Reflex to MG   Result Value Ref Range    Glucose 101 (H) 70 - 99 mg/dL    BUN 46 (H) 8 - 23 mg/dL    Creatinine 1.50 (H) 0.70 - 1.20 mg/dL    Est, Glom Filt Rate 52 (L) >60 mL/min/1.73m2    Calcium 7.5 (L) 8.6 - 10.4 mg/dL    Sodium 136 135 - 144 mmol/L    Potassium 4.8 3.7 - 5.3 mmol/L    Chloride 99 98 - 107 mmol/L    CO2 27 20 - 31 mmol/L    Anion Gap 10 9 - 17 mmol/L    Alkaline Phosphatase 59 40 - 129 U/L     (H) 5 - 41 U/L     (H) <40 U/L    Total Bilirubin 0.8 0.3 - 1.2 mg/dL    Total Protein 5.9 (L) 6.4 - 8.3 g/dL    Albumin 3.3 (L) 3.5 - 5.2 g/dL   CBC with Auto Differential Result Value Ref Range    WBC 5.6 3.5 - 11.0 k/uL    RBC 5.57 4.5 - 5.9 m/uL    Hemoglobin 11.2 (L) 13.5 - 17.5 g/dL    Hematocrit 38.8 (L) 41 - 53 %    MCV 69.7 (L) 80 - 100 fL    MCH 20.1 (L) 26 - 34 pg    MCHC 28.8 (L) 31 - 37 g/dL    RDW 19.7 (H) 11.5 - 14.9 %    Platelets 67 (L) 350 - 450 k/uL    MPV 9.1 6.0 - 12.0 fL    Seg Neutrophils 79 (H) 36 - 66 %    Lymphocytes 12 (L) 24 - 44 %    Monocytes 8 (H) 1 - 7 %    Eosinophils % 0 0 - 4 %    Basophils 1 0 - 2 %    nRBC 2 per 100 WBC    Segs Absolute 4.41 1.3 - 9.1 k/uL    Absolute Lymph # 0.67 (L) 1.0 - 4.8 k/uL    Absolute Mono # 0.45 0.1 - 1.3 k/uL    Absolute Eos # 0.00 0.0 - 0.4 k/uL    Basophils Absolute 0.06 0.0 - 0.2 k/uL    Morphology ANISOCYTOSIS PRESENT     Morphology HYPOCHROMIA PRESENT     Morphology 1+ ELLIPTOCYTES    Hemoglobin and Hematocrit   Result Value Ref Range    Hemoglobin 11.1 (L) 13.5 - 17.5 g/dL    Hematocrit 38.7 (L) 41 - 53 %   Hemoglobin and Hematocrit   Result Value Ref Range    Hemoglobin 10.8 (L) 13.5 - 17.5 g/dL    Hematocrit 37.2 (L) 41 - 53 %   Ammonia   Result Value Ref Range    Ammonia 29 16 - 60 umol/L   Hepatitis Panel, Acute   Result Value Ref Range    Hepatitis B Surface Ag NONREACTIVE NONREACTIVE    Hepatitis C Ab REACTIVE (A) NONREACTIVE    Hep B Core Ab, IgM NONREACTIVE NONREACTIVE    Hep A IgM NONREACTIVE NONREACTIVE   Iron and TIBC   Result Value Ref Range    Iron 14 (L) 59 - 158 ug/dL    TIBC 426 250 - 450 ug/dL    Iron Saturation 3 (L) 20 - 55 %    UIBC 412 (H) 112 - 347 ug/dL   Ferritin   Result Value Ref Range    Ferritin 14 (L) 30 - 400 ng/mL   Mycoplasma Ab,IgM   Result Value Ref Range    Mycoplasma pneumo IgM 0.25 <0.91   Procalcitonin   Result Value Ref Range    Procalcitonin 0.09 (H) <0.09 ng/mL   C-Reactive Protein   Result Value Ref Range    CRP 16.4 (H) 0.0 - 5.0 mg/L   Fibrin Split Products   Result Value Ref Range    FDP >5 (H) <5 ug/mL   Sedimentation Rate   Result Value Ref Range    Sed Rate 1 0 - 20 mm/Hr   Drug Scr, Abuse, Ur   Result Value Ref Range    Amphetamine Screen, Ur NEGATIVE NEGATIVE    Barbiturate Screen, Ur NEGATIVE NEGATIVE    Benzodiazepine Screen, Urine NEGATIVE NEGATIVE    Cocaine Metabolite, Urine NEGATIVE NEGATIVE    Methadone Screen, Urine NEGATIVE NEGATIVE    Opiates, Urine NEGATIVE NEGATIVE    Phencyclidine, Urine NEGATIVE NEGATIVE    Cannabinoid Scrn, Ur NEGATIVE NEGATIVE    Oxycodone Screen, Ur NEGATIVE NEGATIVE    Fentanyl, Ur NEGATIVE NEGATIVE    Test Information       Assay provides medical screening only. The absence of expected drug(s) and/or metabolite(s) may indicate diluted or adulterated urine, limitations of testing or timing of collection. Troponin   Result Value Ref Range    Troponin, High Sensitivity 195 (HH) 0 - 22 ng/L   Vitamin B12 & Folate   Result Value Ref Range    Vitamin B-12 1926 (H) 232 - 1245 pg/mL    Folate 10.0 >4.8 ng/mL   HIV Screen   Result Value Ref Range    HIV Ag/Ab NONREACTIVE NONREACTIVE   MONOCLONAL PANEL   Result Value Ref Range    Kappa Free Light Chains QNT 2.79 (H) 0.37 - 1.94 mg/dL    Lambda Free Light Chains QNT 20.89 (H) 0.57 - 2.63 mg/dL    Free Kappa/Lambda Ratio 0.13 (L) 0.26 - 1.65    Serum IFX Interp       A ZONE OF RESTRICTION IS PRESENT IN THE GAMMAGLOBULIN REGION. CONFIRMED BY IMMUNOFIXATION TO BE MONOCLONAL    Pathologist       Reviewed by pathologist:  Stewart Chavez. Ira Serrano D.O. Total Protein 5.5 (L) 6.4 - 8.3 g/dL    Albumin (calculated) 3.6 3.2 - 5.2 g/dL    Albumin % 65 45 - 65 %    Alpha-1-Globulin 0.2 0.1 - 0.4 g/dL    Alpha 1 % 3 3 - 6 %    Alpha-2-Globulin 0.4 (L) 0.5 - 0.9 g/dL    Alpha 2 % 7 6 - 13 %    Beta Globulin 0.6 0.5 - 1.1 g/dL    Beta Percent 11 11 - 19 %    Gamma Globulin 0.8 0.5 - 1.5 g/dL    Gamma Globulin % 14 9 - 20 %    Total Prot. Sum 5.6 (L) 6.3 - 8.2 g/dL    Total Prot. Sum,% 100 98 - 102 %    Protein Electrophoresis, Serum       A ZONE OF RESTRICTION IS PRESENT IN THE GAMMAGLOBULIN REGION.   CONFIRMED BY IMMUNOFIXATION TO BE MONOCLONAL    Pathologist       Reviewed by pathologist:  Sundeep Brooks. Shara Richard D.O. Path Review, Smear   Result Value Ref Range    Pathologist Review ELECTRONICALLY SIGNED.  Cher Jonas MD    Reticulocytes   Result Value Ref Range    Retic % 2.8 (H) 0.5 - 2.0 %    Absolute Retic # 0.157 (H) 0.0245 - 0.098 M/uL   Fibrinogen   Result Value Ref Range    Fibrinogen 141 (L) 210 - 530 mg/dL   Ethanol   Result Value Ref Range    Ethanol <10 <10 mg/dL    Ethanol percent <0.010 %   Lactate Dehydrogenase   Result Value Ref Range     (H) 135 - 225 U/L   Hemoglobin and Hematocrit   Result Value Ref Range    Hemoglobin 10.9 (L) 13.5 - 17.5 g/dL    Hematocrit 38.0 (L) 41 - 53 %   Haptoglobin   Result Value Ref Range    Haptoglobin 60 30 - 200 mg/dL   D-Dimer, Quantitative   Result Value Ref Range    D-Dimer, Quant 6.70 (H) 0.00 - 0.59 mg/L FEU   Urinalysis with Reflex to Culture    Specimen: Urine   Result Value Ref Range    Color, UA Dark Yellow (A) Yellow    Turbidity UA Clear Clear    Glucose, Ur NEGATIVE NEGATIVE    Bilirubin Urine NEGATIVE NEGATIVE    Ketones, Urine TRACE (A) NEGATIVE    Specific [place], UA 1.015 1.000 - 1.030    Urine Hgb LARGE (A) NEGATIVE    pH, UA 5.0 5.0 - 8.0    Protein, UA 1+ (A) NEGATIVE    Urobilinogen, Urine Normal Normal    Nitrite, Urine NEGATIVE NEGATIVE    Leukocyte Esterase, Urine SMALL (A) NEGATIVE   APTT   Result Value Ref Range    PTT 38.7 (H) 24.0 - 36.0 sec   Protime-INR   Result Value Ref Range    Protime 24.4 (H) 11.8 - 14.6 sec    INR 2.2    Microscopic Urinalysis   Result Value Ref Range    WBC, UA 10 TO 20 /HPF    RBC, UA TOO NUMEROUS TO COUNT /HPF    Casts UA HYALINE /LPF    Casts UA 0 TO 2 /LPF    Epithelial Cells UA 3 to 5 /HPF   BLOOD GAS, ARTERIAL   Result Value Ref Range    pH, Arterial 7.314 (L) 7.350 - 7.450    pCO2, Arterial 61.9 (HH) 35.0 - 45.0 mmHg    pO2, Arterial 58.1 (LL) 80.0 - 100.0 mmHg    HCO3, Arterial 31.4 (H) 22.0 - 26.0 mmol/L Positive Base Excess, Art 5.3 (H) 0.0 - 2.0 mmol/L    O2 Sat, Arterial 86.3 (LL) 95 - 98 %    Carboxyhemoglobin 2.1 0 - 5 %    Methemoglobin 0.8 0.0 - 1.9 %    Pt Temp 37.0     O2 Device/Flow/% VENTILATOR     Respiratory Rate 22     Tyron Test PASS     Sample Site Left Radial Artery     Pt. Position SEMI-FOWLERS     Mode PRVC     Set Rate 22     Total Rate 26          FIO2 40     Peep/Cpap 8    SURGICAL PATHOLOGY REPORT   Result Value Ref Range    Surgical Pathology Report       [id number]  [facility]  ANATOMIC PATHOLOGY  [address] [place] [address],  [address]. [facility], [address]  [phone]  Fax: [phone]  SURGICAL PATHOLOGY CONSULTATION    Patient Name: Mk Ng  MR#: [id number]  Specimen #[id number]    Procedures/Addenda  PERIPHERAL BLOOD REPORT     Date Ordered:     1/2/2023     Status:  Signed Out       Date Complete:     1/3/2023     By: Carlos Villegas M.D. Date Reported:     1/3/2023       INTERPRETATION  Peripheral blood:  Microcytic, hypochromic anemia. The patient's iron studies are compatible with iron deficiency anemia. Lymphopenia. Differential considerations include acute illness/infection,  medication effect, smoking, and debilitating diseases. Thrombocytopenia  Differential considerations include bone marrow hypoproduction and  immune destruction (idiopathic thrombocytopenic purpura, drug effect). RESULTS-COMMENTS  PERIPHERAL BLOOD STUDY    CBC: Please see the electronic health record  for CBC parameters  (, 01/02/2023, 05:43). PLATELETS: Decreased platelets. Platelets show normal morphology. LEUKOCYTES: Decreased lymphocytes. White blood cells show normal  morphology. There are no blasts. ERYTHROCYTES: Microcytic, hypochromic. Anisocytosis and  poikilocytosis. Polychromasia. Reticulocyte count 2.8%. Rare RBC  fragments. Note: The electronic health record is reviewed. Frank Aponte M.D.                    Source:  A: Peripheral Blood     Comprehensive Metabolic Panel w/ Reflex to MG   Result Value Ref Range    Glucose 91 70 - 99 mg/dL    BUN 32 (H) 8 - 23 mg/dL    Creatinine 0.97 0.70 - 1.20 mg/dL    Est, Glom Filt Rate >60 >60 mL/min/1.73m2    Calcium 7.4 (L) 8.6 - 10.4 mg/dL    Sodium 143 135 - 144 mmol/L    Potassium 3.7 3.7 - 5.3 mmol/L    Chloride 105 98 - 107 mmol/L    CO2 30 20 - 31 mmol/L    Anion Gap 8 (L) 9 - 17 mmol/L    Alkaline Phosphatase 50 40 - 129 U/L     (H) 5 - 41 U/L     (H) <40 U/L    Total Bilirubin 0.9 0.3 - 1.2 mg/dL    Total Protein 5.2 (L) 6.4 - 8.3 g/dL    Albumin 3.1 (L) 3.5 - 5.2 g/dL   CBC with Auto Differential   Result Value Ref Range    WBC 5.7 3.5 - 11.0 k/uL    RBC 5.42 4.5 - 5.9 m/uL    Hemoglobin 11.0 (L) 13.5 - 17.5 g/dL    Hematocrit 37.6 (L) 41 - 53 %    MCV 69.4 (L) 80 - 100 fL    MCH 20.2 (L) 26 - 34 pg    MCHC 29.2 (L) 31 - 37 g/dL    RDW 20.0 (H) 11.5 - 14.9 %    Platelets 66 (L) 636 - 450 k/uL    MPV 9.4 6.0 - 12.0 fL    Seg Neutrophils 85 (H) 36 - 66 %    Lymphocytes 9 (L) 24 - 44 %    Monocytes 5 1 - 7 %    Eosinophils % 0 0 - 4 %    Basophils 0 0 - 2 %    Bands 1 0 - 10 %    nRBC 1 (H) 0 per 100 WBC    Segs Absolute 4.87 1.3 - 9.1 k/uL    Absolute Lymph # 0.52 (L) 1.0 - 4.8 k/uL    Absolute Mono # 0.29 0.1 - 1.3 k/uL    Absolute Eos # 0.00 0.0 - 0.4 k/uL    Basophils Absolute 0.00 0.0 - 0.2 k/uL    Absolute Bands # 0.06 0.0 - 1.0 k/uL    Morphology ANISOCYTOSIS PRESENT     Morphology HYPOCHROMIA PRESENT     Morphology 1+ POLYCHROMASIA     Morphology 1+ ELLIPTOCYTES    BLOOD GAS, ARTERIAL   Result Value Ref Range    pH, Arterial 7.373 7.350 - 7.450    pCO2, Arterial 56.0 (HH) 35.0 - 45.0 mmHg    pO2, Arterial 61.4 (L) 80.0 - 100.0 mmHg    HCO3, Arterial 32.6 (H) 22.0 - 26.0 mmol/L    Positive Base Excess, Art 7.4 (H) 0.0 - 2.0 mmol/L    O2 Sat, Arterial 89.2 (L) 95 - 98 %    Carboxyhemoglobin 1.7 0 - 5 % Methemoglobin 0.9 0.0 - 1.9 %    Pt Temp 37     O2 Device/Flow/% VENTILATOR     Respiratory Rate 22     Tyron Test PASS     Sample Site Right Radial Artery     Pt. Position SEMI-FOWLERS     Mode PRVC     Set Rate 22     Total Rate 22          FIO2 35     Peep/Cpap 8     Text for Respiratory RESULTS GIVEN TO RN    IMMUNOFIXATION URINE RANDOM PROFILE   Result Value Ref Range    Urine IFX Specimen HURTADO SPECIMEN     Volume HURTADO SPECIMEN mL    Urine Total Protein 11 mg/dL    Urine IFX Interp PENDING    Protime-INR   Result Value Ref Range    Protime 21.9 (H) 11.8 - 14.6 sec    INR 1.9    APTT   Result Value Ref Range    PTT 38.4 (H) 24.0 - 36.0 sec   Triglyceride   Result Value Ref Range    Triglycerides 85 <150 mg/dL   CHLORIDE, URINE, RANDOM   Result Value Ref Range    Chloride, Ur 34 mmol/L   Protein / Creatinine Ratio, Urine   Result Value Ref Range    Total Protein, Urine 23 mg/dL    Creatinine, Ur 79.8 39.0 - 259.0 mg/dL    Urine Total Protein Creatinine Ratio 0.29 (H) 0.00 - 0.20   SODIUM, URINE, RANDOM   Result Value Ref Range    Sodium,Ur <20 mmol/L   BLOOD GAS, ARTERIAL   Result Value Ref Range    pH, Arterial 7.360 7.350 - 7.450    pCO2, Arterial 55.9 (HH) 35.0 - 45.0 mmHg    pO2, Arterial 71.4 (L) 80.0 - 100.0 mmHg    HCO3, Arterial 31.6 (H) 22.0 - 26.0 mmol/L    Positive Base Excess, Art 6.1 (H) 0.0 - 2.0 mmol/L    O2 Sat, Arterial 91.7 (L) 95 - 98 %    Carboxyhemoglobin 1.2 0 - 5 %    Methemoglobin 1.3 0.0 - 1.9 %    Pt Temp 37     O2 Device/Flow/% VENTILATOR     Tyron Test PASS     Sample Site Right Radial Artery     Pt.  Position SEMI-FOWLERS     Mode PRVC     Text for Respiratory RESULTS GIVEN TO RN    CBC with Auto Differential   Result Value Ref Range    WBC 6.0 3.5 - 11.0 k/uL    RBC 5.56 4.5 - 5.9 m/uL    Hemoglobin 10.8 (L) 13.5 - 17.5 g/dL    Hematocrit 38.2 (L) 41 - 53 %    MCV 68.8 (L) 80 - 100 fL    MCH 19.5 (L) 26 - 34 pg    MCHC 28.3 (L) 31 - 37 g/dL    RDW 20.1 (H) 11.5 - 14.9 % Platelets 75 (L) 738 - 450 k/uL    MPV 9.3 6.0 - 12.0 fL    Seg Neutrophils 82 (H) 36 - 66 %    Lymphocytes 7 (L) 24 - 44 %    Monocytes 9 (H) 1 - 7 %    Eosinophils % 1 0 - 4 %    Basophils 1 0 - 2 %    Segs Absolute 4.90 1.3 - 9.1 k/uL    Absolute Lymph # 0.40 (L) 1.0 - 4.8 k/uL    Absolute Mono # 0.50 0.1 - 1.3 k/uL    Absolute Eos # 0.10 0.0 - 0.4 k/uL    Basophils Absolute 0.10 0.0 - 0.2 k/uL   Comprehensive Metabolic Panel w/ Reflex to MG   Result Value Ref Range    Glucose 85 70 - 99 mg/dL    BUN 16 8 - 23 mg/dL    Creatinine 0.64 (L) 0.70 - 1.20 mg/dL    Est, Glom Filt Rate >60 >60 mL/min/1.73m2    Calcium 7.4 (L) 8.6 - 10.4 mg/dL    Sodium 143 135 - 144 mmol/L    Potassium 3.3 (L) 3.7 - 5.3 mmol/L    Chloride 107 98 - 107 mmol/L    CO2 29 20 - 31 mmol/L    Anion Gap 7 (L) 9 - 17 mmol/L    Alkaline Phosphatase 49 40 - 129 U/L     (H) 5 - 41 U/L     (H) <40 U/L    Total Bilirubin 0.8 0.3 - 1.2 mg/dL    Total Protein 5.0 (L) 6.4 - 8.3 g/dL    Albumin 2.8 (L) 3.5 - 5.2 g/dL   Vancomycin Level, Random   Result Value Ref Range    Vancomycin Rm 16.1 ug/mL    Vancomycin Random Dose amount 1g     Vancomycin Random Date last dose 1/4/22     Vancomycin Random Time last dose 0232    Magnesium   Result Value Ref Range    Magnesium 2.0 1.6 - 2.6 mg/dL   EKG 12 Lead   Result Value Ref Range    Ventricular Rate 75 BPM    Atrial Rate 75 BPM    P-R Interval 142 ms    QRS Duration 66 ms    Q-T Interval 350 ms    QTc Calculation (Bazett) 390 ms    P Axis 18 degrees    R Axis -165 degrees    T Axis 41 degrees         IMAGING DATA:    CT HEAD WO CONTRAST    Result Date: 1/1/2023  EXAMINATION: CT OF THE HEAD WITHOUT CONTRAST  1/1/2023 10:48 pm TECHNIQUE: CT of the head was performed without the administration of intravenous contrast. Automated exposure control, iterative reconstruction, and/or weight based adjustment of the mA/kV was utilized to reduce the radiation dose to as low as reasonably achievable. COMPARISON: None. HISTORY: Reason for Exam: AMS Additional signs and symptoms: the patient has been getting more and more confused since 2 weeks ago. It has progressively been getting worse and today FINDINGS: BRAIN/VENTRICLES: There is no acute intracranial hemorrhage, mass effect or midline shift. No abnormal extra-axial fluid collection. The gray-white differentiation is maintained without evidence of an acute infarct. There is no evidence of hydrocephalus. Changes of mild chronic small vessel ischemic disease. ORBITS: The visualized portion of the orbits demonstrate no acute abnormality. SINUSES: The visualized paranasal sinuses and mastoid air cells demonstrate no acute abnormality. SOFT TISSUES/SKULL:  No acute abnormality of the visualized skull or soft tissues. No acute intracranial abnormality. Mild chronic small vessel ischemic disease. XR CHEST PORTABLE    Result Date: 1/1/2023  EXAMINATION: ONE XRAY VIEW OF THE CHEST 1/1/2023 7:17 pm COMPARISON: None. HISTORY: ORDERING SYSTEM PROVIDED HISTORY: ams TECHNOLOGIST PROVIDED HISTORY: ams Reason for Exam: Altered mental status, difficulty breathing Additional signs and symptoms: Altered mental status, difficulty breathing Relevant Medical/Surgical History: Altered mental status, difficulty breathing FINDINGS: Large lucent area in the left apex suggesting a large bulla however superimposed pneumothorax cannot be excluded. The cardiac size is normal. Right lower lobe airspace infiltrate and mild right pleural effusion. . Pulmonary vascularity appears normal. There is mild ectasia of the thoracic aorta. Calcific tendinitis of the right shoulder. No acute bony abnormalities. The hilar structures are normal.     Right lower lobe pneumonia and small right pleural effusion Large lucent area in the left apex suggesting a large bulla however superimposed pneumothorax cannot be excluded. Follow-up CT would be useful for further evaluation.  The findings were sent to the Radiology Results [address] at 10:31 pm on 1/1/2023 to be communicated to a licensed caregiver. IMPRESSION:   Primary Problem  Acute respiratory failure with hypoxia and hypercapnia ([address]    Active Hospital Problems    Diagnosis Date Noted    Prolonged pt (prothrombin time) [R79.1] 01/03/2023     Priority: Medium    Emphysema lung ([address] [J43.9] 01/03/2023     Priority: Medium    Cerebral infarction ([address] [I63.9] 01/03/2023     Priority: Medium    Transaminitis [R74.01] 01/02/2023     Priority: Medium    Microcytic anemia [D64.9] 01/02/2023     Priority: Medium    Thrombocytopenia ([address] [D69.6] 01/02/2023     Priority: Medium    Agitation [R45.1] 01/02/2023     Priority: Medium    Acute respiratory failure with hypoxia and hypercapnia (HCC) RLL pneumonia [J96.01, J96.02] 01/02/2023     Priority: Medium    Altered mental status [R41.82] 01/02/2023     Priority: Medium    Community acquired pneumonia of right lower lobe of lung [J18.9] 01/02/2023     Priority: Medium       Medical apathy  Respiratory failure secondary pneumonia  Abnormal LFT  Microcytic anemia  Thrombocytopenia  History of alcohol dependence  IgG lambda paraproteinemia  Positive HCV screen  Iron deficiency  Coagulopathy    RECOMMENDATIONS:  I reviewed the labs/imaging available to me,outside records and discussed with the patient. I explained to the patient the nature of this problem. I explained the significance of these abnormalities and possible etiology and management options  Patient has multiple medical abnormalities  Discussed results patient's family member at bedside  MRI shows findings concerning for multifocal stroke and bilateral subarachnoid hemorrhages. Replace iron  Will monitor paraproteinemia at this point possibly reactive versus primary bone marrow disorder  Follow-up on HCV PCR  Coag panel is abnormal.  DIC versus coagulopathy due to liver. Follow-up on his peripheral smear to assess for schistocytes. We will recheck coag panel tomorrow  Low iron and anemia raise concerns regarding GI bleed. Closely monitor. Patient is seriously ill       Liver disease leads to a form of \"rebalanced\" hemostasis, in which diminished hepatic function leads to both procoagulant and anticoagulant effects. Patients with severe liver disease and abnormalities of coagulation testing should not be assumed to be \"auto-anticoagulated,\" because standard coagulation testing does not assess prothrombotic and fibrinolytic changes. I requested for Thromboelastography on the patient but it is not done at [facility] (only done at [facility] at this point)              Discussed with family and Nurse. Thank you for asking us to see this patient. Brenton Ortiz MD          This note is created with the assistance of a speech recognition program.  While intending to generate a document that actually reflects the content of the visit, the document can still have some errors including those of syntax and sound a like substitutions which may escape proof reading. It such instances, actual meaning can be extrapolated by contextual diversion.

## 2023-01-04 NOTE — PROGRESS NOTES
ICU Progress Note (Vent)   [facility]    Patient - Claudia Beverly,  Age - 60 y.o.    - 1960      Room Number -    MRN -  [id number]   [facility]  Date of Admission -  2023  9:55 PM    Events of Past 24 Hours   Remains sedated, on propofol on norepinephrine    Vitals    height is 5' 7\" (1.702 m) and weight is 188 lb 15 oz (85.7 kg). His oral temperature is 98.6 °F (37 °C). His blood pressure is 134/76 and his pulse is 82. His respiration is 22 and oxygen saturation is 97%. Temperature Range: Temp: 98.6 °F (37 °C) Temp  Av.3 °F (36.8 °C)  Min: 97.6 °F (36.4 °C)  Max: 98.7 °F (37.1 °C)  BP Range:  Systolic (98JYA), JUX:377 , Min:74 , FRD:009     Diastolic (94HNU), OEN:32, Min:43, Max:185    Pulse Range: Pulse  Av.7  Min: 60  Max: 85  Respiration Range: Resp  Av.8  Min: 16  Max: 23  Current Pulse Ox[de-identified]  SpO2: 97 %  24HR Pulse Ox Range:  SpO2  Av.5 %  Min: 76 %  Max: 100 %  Oxygen Amount and Delivery: O2 Flow Rate (L/min): 6 L/min      Wt Readings from Last 3 Encounters:   23 188 lb 15 oz (85.7 kg)   23 188 lb (85.3 kg)     I/O     Intake/Output Summary (Last 24 hours) at 2023 1338  Last data filed at 2023 0800  Gross per 24 hour   Intake 2755.65 ml   Output 1525 ml   Net 1230.65 ml     I/O last 3 completed shifts:   In: 4364.4 [I.V.:2723.5; IV Piggyback:1640.9]  Out: 6336 [Urine:2150; Emesis/NG output:275]     DRAIN/TUBE OUTPUT:     Invasive Lines   ETT Day -   2  Lines -PICC line day 2    ICP PRESSURE RANGE:  No data recorded  CVP PRESSURE RANGE:  No data recorded  Mechanical Ventilation Data   SETTINGS (Comprehensive)  Vent Information  Ventilator Initiate: Yes  Vent Mode: AC/PRVC  Additional Respiratory Assessments  Heart Rate: 82  Resp: 22  SpO2: 97 %  End Tidal CO2: 39 (%)  Humidification Source: HME  Circuit Condensation: Not drained       ABGs:   Lab Results   Component Value Date/Time PHART 7.360 01/04/2023 05:43 AM    PO2ART 71.4 01/04/2023 05:43 AM    ZJN5WMA 55.9 01/04/2023 05:43 AM       Lab Results   Component Value Date/Time    MODE [facility] 01/04/2023 05:43 AM         Medications   IV   IV infusion builder 50 mL/hr at 01/04/23 1316    sodium chloride      norepinephrine 4 mcg/min (01/04/23 1038)    propofol 40 mcg/kg/min (01/04/23 1322)      miconazole   Topical BID    vancomycin  1,250 mg IntraVENous Q12H    aspirin  81 mg Oral Daily    iron sucrose  300 mg IntraVENous Q24H    sodium chloride flush  5-40 mL IntraVENous 2 times per day    nicotine  1 patch TransDERmal Daily    pantoprazole (PROTONIX) 40 mg injection  40 mg IntraVENous Daily    cefepime  2,000 mg IntraVENous Q12H    metroNIDAZOLE  500 mg IntraVENous Q8H    vancomycin (VANCOCIN) intermittent dosing (placeholder)   Other RX Placeholder    ipratropium-albuterol  1 ampule Inhalation Q4H    sodium chloride  1,000 mL IntraVENous Once       Diet/Nutrition   Diet NPO    Exam   VITALS    height is 5' 7\" (1.702 m) and weight is 188 lb 15 oz (85.7 kg). His oral temperature is 98.6 °F (37 °C). His blood pressure is 134/76 and his pulse is 82. His respiration is 22 and oxygen saturation is 97%. Ventilator Settings (Basic)  Vent Mode: AC/PRVC Resp Rate (Set): 22 bmp/Vt (Set, mL): 500 mL/ /FiO2 : 40 %    Constitutional - Sedated  General Appearance  well developed, well nourished  HEENT - Life support devices in place (ET, OG),normocephalic, atraumatic. PERRLA  Lungs - Chest expands equally, no wheezes, rales or rhonchi. Cardiovascular - Heart sounds are normal.  normal rate and rhythm regular, no murmur, gallop or rub. Abdomen - soft, nontender, nondistended, no masses or organomegaly  Neurologic - CN II-XII are grossly intact.  There are no focal motor deficits  Skin - no bruising or bleeding  Extremities - no cyanosis, clubbing or edema    Lab Results   CBC     Lab Results   Component Value Date/Time    WBC 6.0 01/04/2023 09:04 AM RBC 5.56 01/04/2023 09:04 AM    HGB 10.8 01/04/2023 09:04 AM    HCT 38.2 01/04/2023 09:04 AM    PLT 75 01/04/2023 09:04 AM    MCV 68.8 01/04/2023 09:04 AM    MCH 19.5 01/04/2023 09:04 AM    MCHC 28.3 01/04/2023 09:04 AM    RDW 20.1 01/04/2023 09:04 AM    NRBC 1 01/03/2023 04:27 AM    LYMPHOPCT 7 01/04/2023 09:04 AM    MONOPCT 9 01/04/2023 09:04 AM    BASOPCT 1 01/04/2023 09:04 AM    MONOSABS 0.50 01/04/2023 09:04 AM    LYMPHSABS 0.40 01/04/2023 09:04 AM    EOSABS 0.10 01/04/2023 09:04 AM    BASOSABS 0.10 01/04/2023 09:04 AM       BMP   Lab Results   Component Value Date/Time     01/04/2023 09:04 AM    K 3.3 01/04/2023 09:04 AM     01/04/2023 09:04 AM    CO2 29 01/04/2023 09:04 AM    BUN 16 01/04/2023 09:04 AM    CREATININE 0.64 01/04/2023 09:04 AM    GLUCOSE 85 01/04/2023 09:04 AM    CALCIUM 7.4 01/04/2023 09:04 AM       LFTS  Lab Results   Component Value Date/Time    ALKPHOS 49 01/04/2023 09:04 AM     01/04/2023 09:04 AM     01/04/2023 09:04 AM    PROT 5.0 01/04/2023 09:04 AM    BILITOT 0.8 01/04/2023 09:04 AM    BILIDIR 0.9 01/01/2023 09:59 PM    IBILI 0.2 01/01/2023 09:59 PM    LABALBU 2.8 01/04/2023 09:04 AM       INR  Recent Labs     01/01/23  2159 01/02/23  1644 01/03/23  1226   PROTIME 24.9* 24.4* 21.9*   INR 2.3 2.2 1.9       APTT  Recent Labs     01/01/23  2159 01/02/23  1644 01/03/23  1226   APTT 26.5 38.7* 38.4*       Lactic Acid  No results found for: LACTA     BNP   No results for input(s): BNP in the last 72 hours.      Cultures       Radiology     Plain Films         Increased interstitial markings bilaterally at the bases      SYSTEM ASSESSMENT    Acute hypoxic respiratory failure-intubated 1/2  Community-acquired pneumonia, multifocal  Acute infarcts left cerebellar hemisphere, left parietal lobe with bilateral subarachnoid hemorrhages  MRSA positive  COPD-no PFTs  Altered mental status  Liver disease given elevated transaminases, INR, and thrombocytopenia  History of tobacco use 1/pack per day x40 years  Acute kidney injury-resolved  Full code      Neuro   MRI shows small acute infarcts left cerebellar hemisphere and left parietal lobe with moderate bilateral subarachnoid hemorrhages within both cerebral hemispheres  Minimize sedation if possible  Respiratory   Wean oxygen as tolerated.  Keep O2 sat > 88%  Hold off on weaning another 24 hours     Hemodynamics   Wean norepinephrine off keep MAP greater than 65    Gastrointestinal/Nutrition   GI prophylaxis with Protonix  Unable to extubate tomorrow, will start tube feeds    Renal   IV fluids per nephrology; Hanny Bazan" back started    Infectious Disease   Remains on cefepime, Flagyl and vancomycin  MRSA positive, HIV negative  Hematology/Oncology   Hold baby aspirin secondary to thrombocytopenia, if platelet count goes up, can consider anticoagulation for DVT prophylaxis    Endocrine   Monitor blood sugars which have been on the lower side    Social/Spiritual/DNR/Disposition/Other     Updated patient's daughter in detail, she is concerned about the patient \"twitching\" which I did not observe and I asked her to discuss this with neurology    Critical Care Time   35 min    Electronically signed by Ciro Lombardi MD on 1/4/2023 at 1:38 PM

## 2023-01-04 NOTE — PROGRESS NOTES
[id number]    PROGRESS NOTE             1/4/2023    7:19 AM    Name:   Eliceo Javed  MRN:     [id number]     Acct:      [de-identified]   Room:   2009/2009-01  IP Day:  2  Admit Date:  1/1/2023  9:55 PM    PCP:  No primary care provider on file. Code Status:  Full Code    Subjective:     C/C:   Chief Complaint   Patient presents with    Altered Mental Status     Interval History Status: Slightly improved. Patient seen and examined at the bed side. Overnight patient's blood has blood. Patient had an episode of Hypertensive urgency 181/148 and 208/185 while patient was being turned in bed. Also, episodes of hypotension blood pressure between 74-97/43-59. Patient is being weaned off Levophed with rate of 1 mcg. Propofol was increased to 35. Overall patient today moves his head in response to name being called. He moves his limbs spontaneously. Patient grimaces to pain. Per nurse patient is also starting to follow command    MRI showing multiple small infarct which are of acute nature CTA without LVO, showing leptomeningeal enhancement which can be seen in Subarachnoid hemorrhage vs encephalitis vs meningitis  Pending Labs this AM    Patient Venous doppler demonstrated a superficial venous thrombosis in the extremity identified. Saturating 97% on 40 FiO2. Notes from nursing staff and Consults had been reviewed, and the overnight progress had been checked with the nursing staff as well. Nurse Martha input was appreciated    Brief History:     The patient is a 60 y.o.  male with unknown past medical history due to no healthcare visits or follow-up who presents with Altered Mental Status and he is admitted to the hospital for the management of  Acute respiratory failure most likely 2/2 pneumonia. Per patient's wife, she reports that the patient has not been acting himself for the past week.   She reports being more slurred, confused and progressively getting worse prior to presentation. Patient prior to the presentation a week ago he had a fall and EMS presented patient was vitally stable and he was not transported to the hospital.  This time upon EMS evaluation of the patient he had an O2 of 75%, he was put on a breather and was given a dose of IV Lasix in route. Wife and patient, cannot recall any precipitating event could have led to his presentation. He also denies chest pain, shortness of breath, or cough. Per patient, he does not recall any complaints or events prior to the presentation. Wife denies recent alcohol use, however, patient does have a history of regular alcohol intake but not on daily basis. Wife also reports that patient has constant heartburn for which he takes regular antiacids. Patient denies the presence of any abdominal pain or any change in bowel habits, abdominal pain, nausea or vomiting. Upon arrival in the ED, patient was put on BiPAP. Patient was afebrile, normotensive and in normal sinus rhythm. A chest x-ray was completed and the patient had right lower pneumonia with a small pleural effusion. Patient also had a large bullae in the left apex with a pneumothorax that cannot be excluded. Patient ABG was suggestive of pattern of respiratory acidosis with pH 7.295, PCO2 60.3, PO2 63.0, HCO3 29.3. CT head WO did not show any acute findings. Patient had elevated troponins of 184, trended down to 177. Patient also had an elevated BNP of 7797. An elevated creatinine of 1.77 was on presentation, with no previous lab test to be compared to. Also patient had a left elevated liver enzymes ALT was 525, , with prolonged prothrombin time of 24.9, and INR of 2.3. Patient was admitted to the ICU for the management of type II respiratory failure associated altered mental status    Review of Systems:     NA: Patient is intubated. Medications:      Allergies:    No Known Allergies    Current Meds:   Scheduled Meds:    miconazole   Topical BID    vancomycin  1,000 mg IntraVENous Q12H    aspirin  81 mg Oral Daily    iron sucrose  300 mg IntraVENous Q24H    sodium chloride flush  5-40 mL IntraVENous 2 times per day    nicotine  1 patch TransDERmal Daily    folic acid, thiamine, multi-vitamin with vitamin K infusion   IntraVENous Q24H    pantoprazole (PROTONIX) 40 mg injection  40 mg IntraVENous Daily    cefepime  2,000 mg IntraVENous Q12H    metroNIDAZOLE  500 mg IntraVENous Q8H    vancomycin (VANCOCIN) intermittent dosing (placeholder)   Other RX Placeholder    ipratropium-albuterol  1 ampule Inhalation Q4H    sodium chloride  1,000 mL IntraVENous Once     Continuous Infusions:    sodium chloride      norepinephrine 2 mcg/min (23 0505)    propofol 35 mcg/kg/min (23 7437)    sodium chloride 100 mL/hr at 23 0231     PRN Meds: perflutren lipid microspheres, sodium chloride flush, sodium chloride flush, sodium chloride flush, sodium chloride, ondansetron **OR** ondansetron, polyethylene glycol, acetaminophen **OR** acetaminophen, albuterol, guaiFENesin    Data:     Past Medical History:   has no past medical history on file. Social History:   reports that he has been smoking cigarettes. He has a 40.00 pack-year smoking history. He has never used smokeless tobacco. He reports current alcohol use. He reports that he does not currently use drugs. Family History:   History reviewed. No pertinent family history. Vitals:  BP (!) 91/55   Pulse 77   Temp 98.4 °F (36.9 °C) (Oral)   Resp 22   Ht 5' 7\" (1.702 m)   Wt 188 lb 15 oz (85.7 kg)   SpO2 93%   BMI 29.59 kg/m²   Temp (24hrs), Av.3 °F (36.8 °C), Min:97.6 °F (36.4 °C), Max:98.7 °F (37.1 °C)      No results for input(s): POCGLU in the last 72 hours. I/O(24Hr):     Intake/Output Summary (Last 24 hours) at 2023 0719  Last data filed at 2023 0600  Gross per 24 hour   Intake 2725.65 ml   Output 1525 ml   Net 1200.65 ml       Labs:    CBC:   Lab Results   Component Value Date/Time    WBC 5.7 01/03/2023 04:27 AM    RBC 5.42 01/03/2023 04:27 AM    HGB 11.0 01/03/2023 04:27 AM    HCT 37.6 01/03/2023 04:27 AM    MCV 69.4 01/03/2023 04:27 AM    MCH 20.2 01/03/2023 04:27 AM    MCHC 29.2 01/03/2023 04:27 AM    RDW 20.0 01/03/2023 04:27 AM    PLT 66 01/03/2023 04:27 AM    MPV 9.4 01/03/2023 04:27 AM     CMP:    Lab Results   Component Value Date/Time     01/03/2023 04:27 AM    K 3.7 01/03/2023 04:27 AM     01/03/2023 04:27 AM    CO2 30 01/03/2023 04:27 AM    BUN 32 01/03/2023 04:27 AM    CREATININE 0.97 01/03/2023 04:27 AM    LABGLOM >60 01/03/2023 04:27 AM    GLUCOSE 91 01/03/2023 04:27 AM    PROT 5.2 01/03/2023 04:27 AM    LABALBU 3.1 01/03/2023 04:27 AM    CALCIUM 7.4 01/03/2023 04:27 AM    BILITOT 0.9 01/03/2023 04:27 AM    ALKPHOS 50 01/03/2023 04:27 AM     01/03/2023 04:27 AM     01/03/2023 04:27 AM     PT/INR:    Lab Results   Component Value Date/Time    PROTIME 21.9 01/03/2023 12:26 PM    INR 1.9 01/03/2023 12:26 PM       No results found for: SPECIAL  Lab Results   Component Value Date/Time    CULTURE PENDING 01/03/2023 12:12 PM         Radiology:    CT HEAD WO CONTRAST    Result Date: 1/2/2023  EXAMINATION: CT OF THE HEAD WITHOUT CONTRAST  1/2/2023 10:11 pm TECHNIQUE: CT of the head was performed without the administration of intravenous contrast. Automated exposure control, iterative reconstruction, and/or weight based adjustment of the mA/kV was utilized to reduce the radiation dose to as low as reasonably achievable. COMPARISON: None. HISTORY: ORDERING SYSTEM PROVIDED HISTORY: Altered Mental status TECHNOLOGIST PROVIDED HISTORY: Altered Mental status Reason for Exam: Altered Mental status Additional signs and symptoms: Patient came in with transient alteration of awareness, follow up head CT for any changes.  FINDINGS: BRAIN/VENTRICLES: There is no acute intracranial hemorrhage, mass effect or midline shift. No abnormal extra-axial fluid collection. There is a focal area of decreased attenuation with loss of gray/white matter differentiation involving posterior left parietal lobe with somewhat increased conspicuity as compared to prior exam.  There is stable small focus of encephalomalacia involving left cerebellar hemisphere compatible with prior remote infarct/insult. Chronic lacunar infarct to right basal ganglia redemonstrated. There is no evidence of hydrocephalus. ORBITS: The visualized portion of the orbits demonstrate no acute abnormality. SINUSES: Small air-fluid level right sphenoid sinus. Trace air-fluid level right frontal sinus. Mild mucoperiosteal thickening to bilateral ethmoid air cells. Findings are new from prior exam and likely relate to presence of nasogastric tube. SOFT TISSUES/SKULL:  No acute abnormality of the visualized skull or soft tissues. Focal area of decreased attenuation with loss of gray/white matter differentiation involving posterior left parietal lobe with somewhat increased conspicuity as compared to prior exam likely reflecting an area of acute to subacute infarct. Stable small chronic infarct/insult to left cerebellar hemisphere. Chronic lacunar infarct to right basal ganglia redemonstrated. Paranasal sinus disease likely relating to presence of nasogastric tube. CT HEAD WO CONTRAST    Result Date: 1/1/2023  EXAMINATION: CT OF THE HEAD WITHOUT CONTRAST  1/1/2023 10:48 pm TECHNIQUE: CT of the head was performed without the administration of intravenous contrast. Automated exposure control, iterative reconstruction, and/or weight based adjustment of the mA/kV was utilized to reduce the radiation dose to as low as reasonably achievable. COMPARISON: None. HISTORY: Reason for Exam: AMS Additional signs and symptoms: the patient has been getting more and more confused since 2 weeks ago.   It has progressively been getting worse and today FINDINGS: BRAIN/VENTRICLES: There is no acute intracranial hemorrhage, mass effect or midline shift. No abnormal extra-axial fluid collection. The gray-white differentiation is maintained without evidence of an acute infarct. There is no evidence of hydrocephalus. Changes of mild chronic small vessel ischemic disease. ORBITS: The visualized portion of the orbits demonstrate no acute abnormality. SINUSES: The visualized paranasal sinuses and mastoid air cells demonstrate no acute abnormality. SOFT TISSUES/SKULL:  No acute abnormality of the visualized skull or soft tissues. No acute intracranial abnormality. Mild chronic small vessel ischemic disease. US LIVER    Result Date: 1/2/2023  EXAMINATION: RIGHT UPPER QUADRANT ULTRASOUND 1/2/2023 10:20 am COMPARISON: None. HISTORY: ORDERING SYSTEM PROVIDED HISTORY: elevated AST and ALT, in setting of PT, and INR TECHNOLOGIST PROVIDED HISTORY: elevated AST and ALT, in setting of PT, and INR FINDINGS: Patient body habitus limits the study, as there is increased attenuation of the ultrasound beam. This decreases sensitivity and specificity. LIVER:  There is mild increased echogenicity seen throughout the liver. No intrahepatic ductal dilatation. No perihepatic fluid. BILIARY SYSTEM:  Bladder is contracted. Gallstones are seen. Gallbladder wall thickness measures 6 mm. Common bile duct is within normal limits measuring 5 mm. RIGHT KIDNEY: The right kidney is grossly unremarkable without evidence of hydronephrosis. PANCREAS:  Visualized portions of the pancreas are unremarkable. OTHER: No evidence of right upper quadrant ascites. Portal vein flow appears hepatopetal     Increased echogenicity is seen throughout the liver suggesting diffuse hepatocellular disease such as fatty infiltration Cholelithiasis.   No cholecystitis     XR CHEST PORTABLE    Result Date: 1/2/2023  EXAMINATION: ONE XRAY VIEW OF THE CHEST 1/2/2023 3:15 pm COMPARISON: 01/01/2023 HISTORY: ORDERING SYSTEM PROVIDED HISTORY: intubation TECHNOLOGIST PROVIDED HISTORY: intubation Reason for Exam: intubation FINDINGS: Overlying items external to the patient somewhat limit evaluation. Placement of endotracheal tube with tip 8.2 cm from the robert. Placement of enteric tube seen to extend into the stomach, distal extent not included in field of view. Persistent likely layering right pleural effusion, similar to slightly worsened. Persistent bilateral airspace opacities to bilateral mid to lower lung zones, right worse than left, similar on the left but mildly worsened on the right. No pneumothoraces are seen. Persistent likely bullous change to the left upper lung zone. Cardiac and mediastinal silhouettes are stable. Stable appearance to bony structures. Placement of endotracheal tube which appears high riding with tip 8.2 cm from the robert. Suggest advancement by 2-3 cm. Placement of endotracheal tube seen to extend into the stomach, distal extent not included in field of view. No pneumothoraces. Persistent likely bullous change to the left upper lung zone. Persistent right pleural effusion, similar to slightly worsened. Persistent bilateral airspace opacities, right worse than left, similar on the left but mildly worsened on the right. XR CHEST PORTABLE    Result Date: 1/1/2023  EXAMINATION: ONE XRAY VIEW OF THE CHEST 1/1/2023 7:17 pm COMPARISON: None. HISTORY: ORDERING SYSTEM PROVIDED HISTORY: ams TECHNOLOGIST PROVIDED HISTORY: ams Reason for Exam: Altered mental status, difficulty breathing Additional signs and symptoms: Altered mental status, difficulty breathing Relevant Medical/Surgical History: Altered mental status, difficulty breathing FINDINGS: Large lucent area in the left apex suggesting a large bulla however superimposed pneumothorax cannot be excluded. The cardiac size is normal. Right lower lobe airspace infiltrate and mild right pleural effusion. . Pulmonary vascularity appears normal. There is mild ectasia of the thoracic aorta. Calcific tendinitis of the right shoulder. No acute bony abnormalities. The hilar structures are normal.     Right lower lobe pneumonia and small right pleural effusion Large lucent area in the left apex suggesting a large bulla however superimposed pneumothorax cannot be excluded. Follow-up CT would be useful for further evaluation. The findings were sent to the Radiology Results [id number] at 10:31 pm on 1/1/2023 to be communicated to a licensed caregiver. EKG:    there are no previous tracings available for comparison. Physical Examination:        PHYSICAL EXAM:  General Appearance  Intubated and sedated. Psych: NA  HEENT - Head is normocephalic, atraumatic. Neck: supple, no rigidity, normal ROM  Lungs - Course lung sound on the right side. No wheezes appreciated. Limited exam due to patient position and intubation. Inability to perform posterior lung field exam.  Cardiovascular - Heart sounds are normal.  Regular rhythm, normal rate without murmur, gallop or rub.   Abdomen - Soft, nontender, nondistended, no masses or organomegaly  Skin - No bruising or bleeding on exposed skin area  Extremities - No cyanosis, clubbing or edema  Assessment:        Primary Problem  Acute respiratory failure with hypoxia and hypercapnia ([address]    Active Hospital Problems    Diagnosis Date Noted    Prolonged pt (prothrombin time) [R79.1] 01/03/2023     Priority: Medium    Emphysema lung ([address] [J43.9] 01/03/2023     Priority: Medium    Cerebral infarction ([address] [I63.9] 01/03/2023     Priority: Medium    Transaminitis [R74.01] 01/02/2023     Priority: Medium    Microcytic anemia [D64.9] 01/02/2023     Priority: Medium    Thrombocytopenia ([address] [D69.6] 01/02/2023     Priority: Medium    Agitation [R45.1] 01/02/2023     Priority: Medium    Acute respiratory failure with hypoxia and hypercapnia (HCC) RLL pneumonia [J96.01, J96.02] 01/02/2023     Priority: Medium Altered mental status [R41.82] 01/02/2023     Priority: Medium    Community acquired pneumonia of right lower lobe of lung [J18.9] 01/02/2023     Priority: Medium       Plan: Altered Mental Status in the setting of Acute type 2 respiratory failure secondary to Pneumonia Vs Acute Heart Failure  -Chest Xray suggestive of RLL pneumonia and possible presence of Bullae in the left Lung [place]- Patient intubated on Fio2 35, saturating 96%. On propofol  -CT Head WO, showing an acute to subacute posterior L Parietal lobe infarct  -Neurology consulted; appreciate recommendations  -MRI showing multiple small infarct which are of acute nature  -CTA without LVO, showing leptomeningeal enhancement which can be seen in Subarachnoid hemorrhage vs encephalitis vs meningitis  -On Cefepime and Vancomycin Metronidazole   -Ipratropium-Albuterol every 4 hrs  -Echocardiogam completed     Elevated troponins most likely 2/2 demand ischemia  -Troponin trending down  -no acute changes on EKG  -ECHO showing normal LVF. Mild tricuspid regurge. RV pressure of 25 mmHG    Microcytic anemia, with acute drop of hemoglobin  -Hb upon presentation is 12.1, this AM to 11  -No previous labs to compare?  -Iron: 14, TIBC: 412 and Ferritin:14  -Started on Iron IV replacement  -H&H  Q8H  -Fibrin products>5 but <20     Transaminitis with prolonged PTT and INR  - . -IMPROVING  -INR and PTT 21.9 and 1.9 respectively  -Thrombocytopenic of 54  on admission vs 66 now   -Liver showing:   Increased echogenicity is seen throughout the liver suggesting diffuse   hepatocellular disease such as fatty infiltration   -Hepatitis C reactive. Pending Hcv PCR  -Hem/Onc on board; A-No plan for bone marrow biopsy given small amount of M protein at this point and other acute issues going   B-possible coagulopathy to do liver vs DIC.   C-No schistocytosis      Thrombocytopenia  -Unknown  baseline  -Transaminates and Prolonged PT and PTT  -Improving PLT function 77 vs 55 on admission  -Monitor HH closely  -Hold Lovenox    6. Multiple infarct seen on MRI  -Patient started on Aspirin 81 mg  -MRI showing multiple small infarct which are of acute nature  -CTA without LVO, showing leptomeningeal enhancement which can be seen in Subarachnoid hemorrhage vs encephalitis vs meningitis     7. Asymptomatic Bacteruria  -Patient on Cefepime and Vancomycin already    DVT prophylaxis: reason for no prophylaxis: Prolonged PT, aPTT  GI prophylaxis: Protonix 40 mg daily    Judypearl Sanchez MD  1/4/2023  7:19 AM     Attestation and add on       I have discussed the care of Sherry Jang , including pertinent history and exam findings,      1/4/23    with the resident. I have seen and examined the patient and the key elements of all parts of the encounter have been performed by me . I agree with the assessment, plan and orders as documented by the resident. On vent . Levophed had to be restarted   Critical , high risk     MD DELFIN Quij[address], [address] [place] [address].    Phone [phone]   Fax: [phone]  Answering Service: [phone]

## 2023-01-04 NOTE — PROGRESS NOTES
Department of Internal Medicine  Nephrology Danilo Dorado MD  Progress Note    Reason for consultation: Management of acute kidney injury. Consulting physician: Christine Engle MD.    Interval history: Patient was seen and examined today in the intensive care unit. He remains intubated and on ventilator support. Renal function has improved and he is nonoliguric. He received CT angiogram of the chest yesterday which ruled out pulmonary embolism; dilated main pulmonary artery suggestive of pulmonary hypertension as well as increased RV to LV ratio suggestive of right heart failure. There was incidental finding of moderate right and small left pleural effusion with partial collapse of the right lower lobe. History of present illness: This is a 60 y.o. male with no significant past medical history [no regular physician follow-up], who was brought to the emergency department via EMS for further evaluation of progressively worsening confusion and lethargy of 2 weeks duration. Patient's spouse said that he became progressively confused prompting her to call EMS on day of presentation 1/1/2023. When EMS arrived, patient was found to be obtunded with oxygen saturation 75% on room air and he was placed on a nonrebreather mask and given a dose of IV Lasix as he also had severe bilateral leg swelling. Patient was placed on BiPAP on arrival to the emergency department and was admitted to the intensive care unit. Initial systolic blood pressure was 106 mmHg and serum creatinine 1.77 mg/dL associated with elevated AST and ALT. On admission to the intensive care unit patient required endotracheal intubation for airway protection and treatment of acute respiratory failure. Nephrology consultation has been placed for management of acute kidney injury. He is currently on Levophed drip at 3 mcg/min.   CT scan of the brain performed at presentation showed no acute intracranial abnormality but with mild chronic small vessel ischemic disease. Repeat CT scan performed 24 hours later [1/2/2023] showed focal area of decreased attenuation with loss of gray/white matter differentiation involving posterior left lobe with somewhat increased conspicuity as compared to prior exam likely reflecting an area of acute to subacute stroke. Patient has no known allergies. History reviewed. No pertinent past medical history. Scheduled Meds:   miconazole   Topical BID    vancomycin  1,000 mg IntraVENous Q12H    aspirin  81 mg Oral Daily    iron sucrose  300 mg IntraVENous Q24H    sodium chloride flush  5-40 mL IntraVENous 2 times per day    nicotine  1 patch TransDERmal Daily    folic acid, thiamine, multi-vitamin with vitamin K infusion   IntraVENous Q24H    pantoprazole (PROTONIX) 40 mg injection  40 mg IntraVENous Daily    cefepime  2,000 mg IntraVENous Q12H    metroNIDAZOLE  500 mg IntraVENous Q8H    vancomycin (VANCOCIN) intermittent dosing (placeholder)   Other RX Placeholder    ipratropium-albuterol  1 ampule Inhalation Q4H    sodium chloride  1,000 mL IntraVENous Once     Continuous Infusions:   sodium chloride      norepinephrine 4 mcg/min (01/04/23 1038)    propofol 35 mcg/kg/min (01/04/23 0916)    sodium chloride 100 mL/hr at 01/04/23 0231     PRN Meds:.perflutren lipid microspheres, sodium chloride flush, sodium chloride flush, sodium chloride flush, sodium chloride, ondansetron **OR** ondansetron, polyethylene glycol, acetaminophen **OR** acetaminophen, albuterol, guaiFENesin    History reviewed. No pertinent family history.      Social History     Socioeconomic History    Marital status: Single     Spouse name: None    Number of children: None    Years of education: None    Highest education level: None   Tobacco Use    Smoking status: Every Day     Packs/day: 1.00     Years: 40.00     Pack years: 40.00     Types: Cigarettes    Smokeless tobacco: Never   Substance and Sexual Activity    Alcohol use: Yes     Comment: beer and scotch    Drug use: Not Currently     Comment: over 20 years (stated by daughter)     Review of systems: Not obtainable as patient is intubated and on ventilator support. Physical Exam:    VITALS:  BP (!) 97/59   Pulse 69   Temp 98.6 °F (37 °C) (Oral)   Resp 22   Ht 5' 7\" (1.702 m)   Wt 188 lb 15 oz (85.7 kg)   SpO2 91%   BMI 29.59 kg/m²   TEMPERATURE:  Current - Temp: 98.6 °F (37 °C); Max - Temp  Av.4 °F (36.9 °C)  Min: 97.6 °F (36.4 °C)  Max: 98.7 °F (37.1 °C)  RESPIRATIONS RANGE: Resp  Av.7  Min: 16  Max: 23  PULSE RANGE: Pulse  Av.1  Min: 60  Max: 85  BLOOD PRESSURE RANGE:  Systolic (90LNP), SGF:053 , Min:74 , BJU:331 ; Diastolic (40BTA), LPT:23, Min:43, Max:185  PULSE OXIMETRY RANGE: SpO2  Av.8 %  Min: 76 %  Max: 100 %  24HR INTAKE/OUTPUT:    Intake/Output Summary (Last 24 hours) at 2023 1129  Last data filed at 2023 0800  Gross per 24 hour   Intake 2755.65 ml   Output 1525 ml   Net 1230.65 ml       VENT SETTINGS:   Vent Information  Ventilator Initiate: Yes  Vent Mode: AC/PRVC  Additional Respiratory Assessments  Heart Rate: 69  Resp: 22  SpO2: 91 %  End Tidal CO2: 37 (%)  Humidification Source: HME  Circuit Condensation: Not drained    Constitutional:  intubated and sedated. Skin: Skin color, texture, turgor normal. No rashes or lesions    Head: Normocephalic, without obvious abnormality, atraumatic     Cardiovascular/Edema: regular rate and rhythm, S1, S2 normal, no murmur, click, rub or gallop    Respiratory: Lungs:  Coarse breath sounds bilaterally    Abdomen: soft, non-tender; bowel sounds normal; no masses,  no organomegaly    Back: symmetric, no curvature. ROM normal. No CVA tenderness. Extremities: edema +    Neuro:   sedated.     CBC:   Recent Labs     23  0543 23  1150 23  0135 23  0427 23  0904   WBC 5.6  --   --  5.7 6.0   HGB 11.2*   < > 10.9* 11.0* 10.8*   PLT 67*  --   --  66* 75*    < > = values in this interval not displayed. BMP:    Recent Labs     01/02/23  0353 01/03/23  0427 01/04/23  0904    143 143   K 4.8 3.7 3.3*   CL 99 105 107   CO2 27 30 29   BUN 46* 32* 16   CREATININE 1.50* 0.97 0.64*   GLUCOSE 101* 91 85       Lab Results   Component Value Date/Time    NITRU NEGATIVE 01/02/2023 03:34 PM    COLORU Dark Yellow 01/02/2023 03:34 PM    PHUR 5.0 01/02/2023 03:34 PM    WBCUA 10 TO 20 01/02/2023 03:34 PM    RBCUA TOO NUMEROUS TO COUNT 01/02/2023 03:34 PM    BACTERIA FEW 01/01/2023 10:25 PM    SPECGRAV 1.015 01/02/2023 03:34 PM    LEUKOCYTESUR SMALL 01/02/2023 03:34 PM    UROBILINOGEN Normal 01/02/2023 03:34 PM    BILIRUBINUR NEGATIVE 01/02/2023 03:34 PM    GLUCOSEU NEGATIVE 01/02/2023 03:34 PM    KETUA TRACE 01/02/2023 03:34 PM     MRI of the brain performed without contrast on 1/3/2023 showed:  Small acute infarcts involving the left cerebellar hemisphere and left   parietal lobe. Small subacute infarct within the left cerebellar hemisphere. Moderate bilateral subarachnoid hemorrhage within both cerebral hemispheres   which is of uncertain etiology. The hemorrhage is more apparent on MRI than   on previous head CT. Old right caudate nucleus lacune. Small bilateral mastoid effusions and moderate left paranasal sinus disease. IMPRESSION/RECOMMENDATIONS:      1. Acute kidney injury - most consistent with prerenal azotemia and/or ischemic acute tubular necrosis from hypotension. Renal function is improved and serum creatinine is down to 0.64 mg/dL today. Plan: Change IV fluid to 0.45 normal saline with 20 mEq of potassium chloride per liter at 50 mill per hour. Monitor urine output closely. Keep mean arterial pressure greater than 65 mmHg. Check basic metabolic profile daily. 2.  Hypokalemia - secondary to poor intake. Would add potassium chloride to IV fluid.     3.  Hypotension - continue IV fluid and titrate pressors to keep mean arterial pressure greater than 65 mmHg.    Prognosis is guarded.     Britney Acosta MD FACP  Attending Nephrologist  1/4/2023 11:29 AM

## 2023-01-04 NOTE — PROGRESS NOTES
[address]    OCCUPATIONAL THERAPY MISSED TREATMENT NOTE   INPATIENT   Date: 23  Patient Name: Ketty Reardon       Room:   MRN: [id number]   Account #: [de-identified]    : 1960  (63 y.o.)  Gender: male                 REASON FOR MISSED TREATMENT:  Patient unable to participate   -    Per chart review, patient is sedated and intubated. Unable to functionally participate in therapy assessment. Will hold therapy assessments until patient can safely participate.         Electronically signed by ASHUTOSH Hutchinson on 2023 at 2:04 PM

## 2023-01-04 NOTE — CARE COORDINATION
ONGOING DISCHARGE PLAN:    Patient is intubated on vent FIO2 40%. Pt is from home with his GF. Per Dtr Anu Singh yesterday patient is normally independent. Will need to readdress needs once off vent. IV cefepime, Flagyl, Vanco, IV venofer, aerosols. Levophed gtt at 4mcgs, propofol gtt. EEG done today. Neuro consult.  K+3.3, BUN 16 Creat 0.64, ,     Will continue to follow for additional discharge needs.     Electronically signed by Angélica Sheridan RN on 1/4/2023 at 12:17 PM

## 2023-01-04 NOTE — PROGRESS NOTES
Physical Therapy        Physical Therapy Cancel Note      DATE: 2023    NAME: Lottie Sinha  MRN: [id number]   : 1960      Patient not seen this date for Physical Therapy due to:    Patient unable to participate   -    Per chart review, patient is sedated and intubated. Unable to functionally participate in therapy assessment. Will hold therapy assessments until patient can actively and safely participate.         Electronically signed by Everett Miguel PT on 2023 at 2:31 PM

## 2023-01-04 NOTE — PLAN OF CARE
Problem: Discharge Planning  Goal: Discharge to home or other facility with appropriate resources  1/4/2023 0616 by Giselle Menchaca RN  Outcome: Progressing  1/3/2023 1719 by Manasa Goldberg RN  Outcome: Progressing  Flowsheets  Taken 1/3/2023 1625  Discharge to home or other facility with appropriate resources: Refer to discharge planning if patient needs post-hospital services based on physician order or complex needs related to functional status, cognitive ability or social support system  Taken 1/3/2023 1245  Discharge to home or other facility with appropriate resources: Refer to discharge planning if patient needs post-hospital services based on physician order or complex needs related to functional status, cognitive ability or social support system  Taken 1/3/2023 0830  Discharge to home or other facility with appropriate resources: Refer to discharge planning if patient needs post-hospital services based on physician order or complex needs related to functional status, cognitive ability or social support system     Problem: Safety - Adult  Goal: Free from fall injury  1/4/2023 0616 by Giselle Menchaca RN  Outcome: Progressing  1/3/2023 1719 by Manasa Goldberg RN  Outcome: Progressing     Problem: ABCDS Injury Assessment  Goal: Absence of physical injury  1/4/2023 0616 by Giselle Menchaca RN  Outcome: Progressing  1/3/2023 1719 by Manasa Goldberg RN  Outcome: Progressing     Problem: Skin/Tissue Integrity  Goal: Absence of new skin breakdown  Description: 1. Monitor for areas of redness and/or skin breakdown  2. Assess vascular access sites hourly  3. Every 4-6 hours minimum:  Change oxygen saturation probe site  4. Every 4-6 hours:  If on nasal continuous positive airway pressure, respiratory therapy assess nares and determine need for appliance change or resting period.   1/4/2023 0616 by Giselle Menchaca RN  Outcome: Progressing  1/3/2023 1719 by Teresa Boyd Meka Virk RN  Outcome: Progressing     Problem: Safety - Medical Restraint  Goal: Remains free of injury from restraints (Restraint for Interference with Medical Device)  Description: INTERVENTIONS:  1. Determine that other, less restrictive measures have been tried or would not be effective before applying the restraint  2. Evaluate the patient's condition at the time of restraint application  3. Inform patient/family regarding the reason for restraint  4.  Q2H: Monitor safety, psychosocial status, comfort, nutrition and hydration  1/4/2023 0616 by Doretha Krueger RN  Outcome: Progressing  Flowsheets (Taken 1/3/2023 1800 by Magaly Rios RN)  Remains free of injury from restraints (restraint for interference with medical device):   Determine that other, less restrictive measures have been tried or would not be effective before applying the restraint   Evaluate the patient's condition at the time of restraint application  2/0/7392 2880 by Magaly Rios RN  Outcome: Progressing  Flowsheets  Taken 1/3/2023 1700  Remains free of injury from restraints (restraint for interference with medical device):   Determine that other, less restrictive measures have been tried or would not be effective before applying the restraint   Evaluate the patient's condition at the time of restraint application  Taken 1/2/5601 1600  Remains free of injury from restraints (restraint for interference with medical device):   Determine that other, less restrictive measures have been tried or would not be effective before applying the restraint   Evaluate the patient's condition at the time of restraint application  Taken 2/5/5761 1500  Remains free of injury from restraints (restraint for interference with medical device):   Determine that other, less restrictive measures have been tried or would not be effective before applying the restraint   Evaluate the patient's condition at the time of restraint application  Taken 1/3/2023 1400  Remains free of injury from restraints (restraint for interference with medical device):   Determine that other, less restrictive measures have been tried or would not be effective before applying the restraint   Evaluate the patient's condition at the time of restraint application  Taken 2/7/0239 1300  Remains free of injury from restraints (restraint for interference with medical device):   Determine that other, less restrictive measures have been tried or would not be effective before applying the restraint   Evaluate the patient's condition at the time of restraint application  Taken 5/6/9326 1200  Remains free of injury from restraints (restraint for interference with medical device):   Determine that other, less restrictive measures have been tried or would not be effective before applying the restraint   Evaluate the patient's condition at the time of restraint application  Taken 3/8/4982 1100  Remains free of injury from restraints (restraint for interference with medical device):   Determine that other, less restrictive measures have been tried or would not be effective before applying the restraint   Evaluate the patient's condition at the time of restraint application  Taken 8/4/3506 1000  Remains free of injury from restraints (restraint for interference with medical device):   Determine that other, less restrictive measures have been tried or would not be effective before applying the restraint   Evaluate the patient's condition at the time of restraint application  Taken 0/2/2257 0900  Remains free of injury from restraints (restraint for interference with medical device):   Determine that other, less restrictive measures have been tried or would not be effective before applying the restraint   Evaluate the patient's condition at the time of restraint application  Taken 3/0/8963 0800  Remains free of injury from restraints (restraint for interference with medical device):   Determine that other, less restrictive measures have been tried or would not be effective before applying the restraint   Evaluate the patient's condition at the time of restraint application     Problem: Pain  Goal: Verbalizes/displays adequate comfort level or baseline comfort level  1/4/2023 0616 by Farzana Mcqueen RN  Outcome: Progressing  1/3/2023 1719 by Viola Todd RN  Outcome: Progressing  Flowsheets  Taken 1/3/2023 1625  Verbalizes/displays adequate comfort level or baseline comfort level:   Encourage patient to monitor pain and request assistance   Assess pain using appropriate pain scale  Taken 1/3/2023 1245  Verbalizes/displays adequate comfort level or baseline comfort level:   Encourage patient to monitor pain and request assistance   Assess pain using appropriate pain scale   Administer analgesics based on type and severity of pain and evaluate response  Taken 1/3/2023 0730  Verbalizes/displays adequate comfort level or baseline comfort level:   Encourage patient to monitor pain and request assistance   Assess pain using appropriate pain scale

## 2023-01-05 ENCOUNTER — APPOINTMENT (OUTPATIENT)
Dept: GENERAL RADIOLOGY | Age: 63
DRG: 139 | End: 2023-01-05
Payer: MEDICAID

## 2023-01-05 LAB
ABSOLUTE EOS #: 0.11 K/UL (ref 0–0.4)
ABSOLUTE LYMPH #: 0.42 K/UL (ref 1–4.8)
ABSOLUTE MONO #: 0.53 K/UL (ref 0.1–1.3)
ALBUMIN SERPL-MCNC: 2.9 G/DL (ref 3.5–5.2)
ALLEN TEST: ABNORMAL
ALP BLD-CCNC: 53 U/L (ref 40–129)
ALT SERPL-CCNC: 218 U/L (ref 5–41)
ANION GAP SERPL CALCULATED.3IONS-SCNC: 6 MMOL/L (ref 9–17)
AST SERPL-CCNC: 126 U/L
BASOPHILS # BLD: 1 % (ref 0–2)
BASOPHILS ABSOLUTE: 0.05 K/UL (ref 0–0.2)
BILIRUB SERPL-MCNC: 0.8 MG/DL (ref 0.3–1.2)
BUN BLDV-MCNC: 11 MG/DL (ref 8–23)
CALCIUM SERPL-MCNC: 7.9 MG/DL (ref 8.6–10.4)
CARBOXYHEMOGLOBIN: 1.7 % (ref 0–5)
CHLORIDE BLD-SCNC: 110 MMOL/L (ref 98–107)
CO2: 30 MMOL/L (ref 20–31)
CREAT SERPL-MCNC: 0.58 MG/DL (ref 0.7–1.2)
CULTURE: NO GROWTH
DIRECT EXAM: NORMAL
EOSINOPHILS RELATIVE PERCENT: 2 % (ref 0–4)
FIBRINOGEN: 98 MG/DL (ref 210–530)
FIO2: 40
GFR SERPL CREATININE-BSD FRML MDRD: >60 ML/MIN/1.73M2
GLUCOSE BLD-MCNC: 84 MG/DL (ref 70–99)
HCO3 ARTERIAL: 29.9 MMOL/L (ref 22–26)
HCT VFR BLD CALC: 40 % (ref 41–53)
HEMOGLOBIN: 11.4 G/DL (ref 13.5–17.5)
HEPATITIS C RNA PCR QUANT: DETECTED
HEPATITIS C RNA QUANT LOG: 4.24 LOG IU/ML
HEPATITIS C RNA-PCR: ABNORMAL IU/ML
INR BLD: 1.7
LYMPHOCYTES # BLD: 8 % (ref 24–44)
MAGNESIUM: 2 MG/DL (ref 1.6–2.6)
MCH RBC QN AUTO: 19.7 PG (ref 26–34)
MCHC RBC AUTO-ENTMCNC: 28.5 G/DL (ref 31–37)
MCV RBC AUTO: 68.9 FL (ref 80–100)
METHEMOGLOBIN: 1.1 % (ref 0–1.9)
MODE: ABNORMAL
MONOCYTES # BLD: 10 % (ref 1–7)
MORPHOLOGY: ABNORMAL
O2 DEVICE/FLOW/%: ABNORMAL
O2 SAT, ARTERIAL: 89.7 % (ref 95–98)
PARTIAL THROMBOPLASTIN TIME: 41.6 SEC (ref 24–36)
PATIENT TEMP: 37
PCO2 ARTERIAL: 51.6 MMHG (ref 35–45)
PDW BLD-RTO: 20.5 % (ref 11.5–14.9)
PEEP/CPAP: 8
PH ARTERIAL: 7.37 (ref 7.35–7.45)
PLATELET # BLD: 66 K/UL (ref 150–450)
PMV BLD AUTO: 9.1 FL (ref 6–12)
PO2 ARTERIAL: 62.9 MMHG (ref 80–100)
POSITIVE BASE EXCESS, ART: 4.7 MMOL/L (ref 0–2)
POTASSIUM SERPL-SCNC: 3.4 MMOL/L (ref 3.7–5.3)
PROTHROMBIN TIME: 20.3 SEC (ref 11.8–14.6)
PT. POSITION: ABNORMAL
RBC # BLD: 5.8 M/UL (ref 4.5–5.9)
RESPIRATORY RATE: 22
SAMPLE SITE: ABNORMAL
SEG NEUTROPHILS: 79 % (ref 36–66)
SEGMENTED NEUTROPHILS ABSOLUTE COUNT: 4.19 K/UL (ref 1.3–9.1)
SET RATE: 22
SODIUM BLD-SCNC: 146 MMOL/L (ref 135–144)
SOURCE: ABNORMAL
SPECIMEN DESCRIPTION: NORMAL
TEXT FOR RESPIRATORY: ABNORMAL
TOTAL PROTEIN: 5 G/DL (ref 6.4–8.3)
TOTAL RATE: 22
VT: 500
WBC # BLD: 5.3 K/UL (ref 3.5–11)

## 2023-01-05 PROCEDURE — 94003 VENT MGMT INPAT SUBQ DAY: CPT

## 2023-01-05 PROCEDURE — 99233 SBSQ HOSP IP/OBS HIGH 50: CPT | Performed by: INTERNAL MEDICINE

## 2023-01-05 PROCEDURE — 82805 BLOOD GASES W/O2 SATURATION: CPT

## 2023-01-05 PROCEDURE — 2580000003 HC RX 258: Performed by: NURSE PRACTITIONER

## 2023-01-05 PROCEDURE — 71045 X-RAY EXAM CHEST 1 VIEW: CPT

## 2023-01-05 PROCEDURE — 94660 CPAP INITIATION&MGMT: CPT

## 2023-01-05 PROCEDURE — 36415 COLL VENOUS BLD VENIPUNCTURE: CPT

## 2023-01-05 PROCEDURE — 2500000003 HC RX 250 WO HCPCS

## 2023-01-05 PROCEDURE — 2000000000 HC ICU R&B

## 2023-01-05 PROCEDURE — 2580000003 HC RX 258: Performed by: INTERNAL MEDICINE

## 2023-01-05 PROCEDURE — 6360000002 HC RX W HCPCS

## 2023-01-05 PROCEDURE — 94761 N-INVAS EAR/PLS OXIMETRY MLT: CPT

## 2023-01-05 PROCEDURE — A4216 STERILE WATER/SALINE, 10 ML: HCPCS

## 2023-01-05 PROCEDURE — 2700000000 HC OXYGEN THERAPY PER DAY

## 2023-01-05 PROCEDURE — 6360000002 HC RX W HCPCS: Performed by: INTERNAL MEDICINE

## 2023-01-05 PROCEDURE — 85730 THROMBOPLASTIN TIME PARTIAL: CPT

## 2023-01-05 PROCEDURE — 6360000002 HC RX W HCPCS: Performed by: PSYCHIATRY & NEUROLOGY

## 2023-01-05 PROCEDURE — 99255 IP/OBS CONSLTJ NEW/EST HI 80: CPT | Performed by: INTERNAL MEDICINE

## 2023-01-05 PROCEDURE — 94640 AIRWAY INHALATION TREATMENT: CPT

## 2023-01-05 PROCEDURE — C9113 INJ PANTOPRAZOLE SODIUM, VIA: HCPCS

## 2023-01-05 PROCEDURE — 80053 COMPREHEN METABOLIC PANEL: CPT

## 2023-01-05 PROCEDURE — 85384 FIBRINOGEN ACTIVITY: CPT

## 2023-01-05 PROCEDURE — 83735 ASSAY OF MAGNESIUM: CPT

## 2023-01-05 PROCEDURE — 2580000003 HC RX 258: Performed by: PSYCHIATRY & NEUROLOGY

## 2023-01-05 PROCEDURE — 2500000003 HC RX 250 WO HCPCS: Performed by: INTERNAL MEDICINE

## 2023-01-05 PROCEDURE — 2580000003 HC RX 258

## 2023-01-05 PROCEDURE — 85025 COMPLETE CBC W/AUTO DIFF WBC: CPT

## 2023-01-05 PROCEDURE — 6370000000 HC RX 637 (ALT 250 FOR IP): Performed by: INTERNAL MEDICINE

## 2023-01-05 PROCEDURE — 6370000000 HC RX 637 (ALT 250 FOR IP): Performed by: NURSE PRACTITIONER

## 2023-01-05 PROCEDURE — 99233 SBSQ HOSP IP/OBS HIGH 50: CPT | Performed by: PSYCHIATRY & NEUROLOGY

## 2023-01-05 PROCEDURE — 36600 WITHDRAWAL OF ARTERIAL BLOOD: CPT

## 2023-01-05 PROCEDURE — 85610 PROTHROMBIN TIME: CPT

## 2023-01-05 PROCEDURE — 6370000000 HC RX 637 (ALT 250 FOR IP): Performed by: STUDENT IN AN ORGANIZED HEALTH CARE EDUCATION/TRAINING PROGRAM

## 2023-01-05 RX ORDER — DEXMEDETOMIDINE HYDROCHLORIDE 4 UG/ML
.1-1.5 INJECTION, SOLUTION INTRAVENOUS CONTINUOUS
Status: DISCONTINUED | OUTPATIENT
Start: 2023-01-05 | End: 2023-01-06

## 2023-01-05 RX ADMIN — IPRATROPIUM BROMIDE AND ALBUTEROL SULFATE 1 AMPULE: 2.5; .5 SOLUTION RESPIRATORY (INHALATION) at 07:56

## 2023-01-05 RX ADMIN — IPRATROPIUM BROMIDE AND ALBUTEROL SULFATE 1 AMPULE: 2.5; .5 SOLUTION RESPIRATORY (INHALATION) at 03:56

## 2023-01-05 RX ADMIN — SODIUM CHLORIDE 3000 MG: 900 INJECTION INTRAVENOUS at 14:48

## 2023-01-05 RX ADMIN — SODIUM CHLORIDE 3000 MG: 900 INJECTION INTRAVENOUS at 20:02

## 2023-01-05 RX ADMIN — SODIUM CHLORIDE, PRESERVATIVE FREE 10 ML: 5 INJECTION INTRAVENOUS at 20:15

## 2023-01-05 RX ADMIN — IRON SUCROSE 300 MG: 20 INJECTION, SOLUTION INTRAVENOUS at 17:37

## 2023-01-05 RX ADMIN — DEXMEDETOMIDINE HYDROCHLORIDE 0.2 MCG/KG/HR: 400 INJECTION INTRAVENOUS at 20:06

## 2023-01-05 RX ADMIN — ANTI-FUNGAL POWDER MICONAZOLE NITRATE TALC FREE: 1.42 POWDER TOPICAL at 08:21

## 2023-01-05 RX ADMIN — SODIUM CHLORIDE, PRESERVATIVE FREE 40 MG: 5 INJECTION INTRAVENOUS at 08:02

## 2023-01-05 RX ADMIN — METRONIDAZOLE 500 MG: 500 INJECTION, SOLUTION INTRAVENOUS at 05:15

## 2023-01-05 RX ADMIN — VANCOMYCIN HYDROCHLORIDE 1250 MG: 1.25 INJECTION, POWDER, LYOPHILIZED, FOR SOLUTION INTRAVENOUS at 04:48

## 2023-01-05 RX ADMIN — IPRATROPIUM BROMIDE AND ALBUTEROL SULFATE 1 AMPULE: 2.5; .5 SOLUTION RESPIRATORY (INHALATION) at 12:06

## 2023-01-05 RX ADMIN — CEFTRIAXONE 2000 MG: 2 INJECTION, POWDER, FOR SOLUTION INTRAMUSCULAR; INTRAVENOUS at 08:04

## 2023-01-05 RX ADMIN — IPRATROPIUM BROMIDE AND ALBUTEROL SULFATE 1 AMPULE: 2.5; .5 SOLUTION RESPIRATORY (INHALATION) at 19:39

## 2023-01-05 RX ADMIN — VANCOMYCIN HYDROCHLORIDE 1250 MG: 1.25 INJECTION, POWDER, LYOPHILIZED, FOR SOLUTION INTRAVENOUS at 17:35

## 2023-01-05 RX ADMIN — PROPOFOL 40 MCG/KG/MIN: 10 INJECTION, EMULSION INTRAVENOUS at 10:08

## 2023-01-05 RX ADMIN — POTASSIUM CHLORIDE: 2 INJECTION, SOLUTION, CONCENTRATE INTRAVENOUS at 08:22

## 2023-01-05 RX ADMIN — ANTI-FUNGAL POWDER MICONAZOLE NITRATE TALC FREE: 1.42 POWDER TOPICAL at 20:14

## 2023-01-05 RX ADMIN — POTASSIUM BICARBONATE 20 MEQ: 782 TABLET, EFFERVESCENT ORAL at 14:48

## 2023-01-05 RX ADMIN — PROPOFOL 40 MCG/KG/MIN: 10 INJECTION, EMULSION INTRAVENOUS at 05:11

## 2023-01-05 ASSESSMENT — PULMONARY FUNCTION TESTS
PIF_VALUE: 2
PIF_VALUE: 23
PIF_VALUE: 24
PIF_VALUE: 23
PIF_VALUE: 17
PIF_VALUE: 24
PIF_VALUE: 25
PIF_VALUE: 23
PIF_VALUE: 19
PIF_VALUE: 24
PIF_VALUE: 25
PIF_VALUE: 23
PIF_VALUE: 24
PIF_VALUE: 23
PIF_VALUE: 7
PIF_VALUE: 23
PIF_VALUE: 17
PIF_VALUE: 17
PIF_VALUE: 29

## 2023-01-05 NOTE — PROGRESS NOTES
ICU Progress Note (Vent)   [facility]    Patient - Leila Potter,  Age - 60 y.o.    - 1960      Room Number -    MRN -  [id number]   Acct # - [de-identified]  Date of Admission -  2023  9:55 PM    Events of Past 24 Hours     Sedated on Propofol, he is following commands  On levophed requirements improving    Vitals    height is 5' 7\" (1.702 m) and weight is 195 lb 5.2 oz (88.6 kg). His oral temperature is 97.8 °F (36.6 °C). His blood pressure is 110/69 and his pulse is 75. His respiration is 22 and oxygen saturation is 96%. Temperature Range: Temp: 97.8 °F (36.6 °C) Temp  Av °F (36.7 °C)  Min: 97.7 °F (36.5 °C)  Max: 98.7 °F (37.1 °C)  BP Range:  Systolic (02YCL), NDJ:614 , Min:81 , JWT:167     Diastolic (66HPU), GZB:86, Min:50, Max:87    Pulse Range: Pulse  Av  Min: 59  Max: 82  Respiration Range: Resp  Av.1  Min: 22  Max: 25  Current Pulse Ox[de-identified]  SpO2: 96 %  24HR Pulse Ox Range:  SpO2  Av.1 %  Min: 89 %  Max: 100 %  Oxygen Amount and Delivery: O2 Flow Rate (L/min): 6 L/min      Wt Readings from Last 3 Encounters:   23 195 lb 5.2 oz (88.6 kg)   23 188 lb (85.3 kg)     I/O     Intake/Output Summary (Last 24 hours) at 2023 0927  Last data filed at 2023 0500  Gross per 24 hour   Intake 4028.48 ml   Output 1400 ml   Net 2628.48 ml     I/O last 3 completed shifts:   In: 6246 [I.V.:4553.7; NG/GT:30; IV Piggyback:1662.3]  Out: 2125 [Urine:1850; Emesis/NG output:275]     DRAIN/TUBE OUTPUT:     Invasive Lines   ETT Day -   3  Lines -  PICC line day 3    ICP PRESSURE RANGE:  No data recorded  CVP PRESSURE RANGE:  No data recorded  Mechanical Ventilation Data   SETTINGS (Comprehensive)  Vent Information  Ventilator Initiate: Yes  Vent Mode: AC/PRVC  Additional Respiratory Assessments  Heart Rate: 75  Resp: 22  SpO2: 96 %  End Tidal CO2: 34 (%)  Humidification Source: HME  Circuit Condensation: Not drained      ABGs:   Lab Results   Component Value Date/Time    PHART 7.372 01/05/2023 04:46 AM    PO2ART 62.9 01/05/2023 04:46 AM    YPA7QRU 51.6 01/05/2023 04:46 AM       Lab Results   Component Value Date/Time    MODE PRVC 01/05/2023 04:46 AM       Medications   IV   IV infusion builder 50 mL/hr at 01/05/23 9776    sodium chloride      norepinephrine 1 mcg/min (01/05/23 0924)    propofol 40 mcg/kg/min (01/05/23 0511)      miconazole   Topical BID    vancomycin  1,250 mg IntraVENous Q12H    cefTRIAXone (ROCEPHIN) IV  2,000 mg IntraVENous Q12H    [Held by provider] aspirin  81 mg Oral Daily    iron sucrose  300 mg IntraVENous Q24H    sodium chloride flush  5-40 mL IntraVENous 2 times per day    nicotine  1 patch TransDERmal Daily    pantoprazole (PROTONIX) 40 mg injection  40 mg IntraVENous Daily    metroNIDAZOLE  500 mg IntraVENous Q8H    vancomycin (VANCOCIN) intermittent dosing (placeholder)   Other RX Placeholder    ipratropium-albuterol  1 ampule Inhalation Q4H    sodium chloride  1,000 mL IntraVENous Once       Diet/Nutrition   Diet NPO    Exam   VITALS    height is 5' 7\" (1.702 m) and weight is 195 lb 5.2 oz (88.6 kg). His oral temperature is 97.8 °F (36.6 °C). His blood pressure is 110/69 and his pulse is 75. His respiration is 22 and oxygen saturation is 96%. Ventilator Settings (Basic)  Vent Mode: AC/PRVC Resp Rate (Set): 22 bmp/Vt (Set, mL): 500 mL/ /FiO2 : 40 %    Constitutional - Sedated, following commands   General Appearance  well developed, well nourished  HEENT - Life support devices in place (ET, OG),normocephalic, atraumatic. PERRLA  Lungs - Chest expands equally, no wheezes, rales or rhonchi. Cardiovascular - Heart sounds are normal.  normal rate and rhythm regular, no murmur, gallop or rub. Abdomen - soft, nontender, nondistended, no masses or organomegaly  Neurologic - CN II-XII are grossly intact.  There are no focal motor deficits  Skin - no bruising or bleeding  Extremities - no cyanosis, clubbing or edema    Lab Results   CBC     Lab Results   Component Value Date/Time    WBC 5.3 01/05/2023 04:27 AM    RBC 5.80 01/05/2023 04:27 AM    HGB 11.4 01/05/2023 04:27 AM    HCT 40.0 01/05/2023 04:27 AM    PLT 66 01/05/2023 04:27 AM    MCV 68.9 01/05/2023 04:27 AM    MCH 19.7 01/05/2023 04:27 AM    MCHC 28.5 01/05/2023 04:27 AM    RDW 20.5 01/05/2023 04:27 AM    NRBC 1 01/03/2023 04:27 AM    LYMPHOPCT 8 01/05/2023 04:27 AM    MONOPCT 10 01/05/2023 04:27 AM    BASOPCT 1 01/05/2023 04:27 AM    MONOSABS 0.53 01/05/2023 04:27 AM    LYMPHSABS 0.42 01/05/2023 04:27 AM    EOSABS 0.11 01/05/2023 04:27 AM    BASOSABS 0.05 01/05/2023 04:27 AM       BMP   Lab Results   Component Value Date/Time     01/05/2023 04:27 AM    K 3.4 01/05/2023 04:27 AM     01/05/2023 04:27 AM    CO2 30 01/05/2023 04:27 AM    BUN 11 01/05/2023 04:27 AM    CREATININE 0.58 01/05/2023 04:27 AM    GLUCOSE 84 01/05/2023 04:27 AM    CALCIUM 7.9 01/05/2023 04:27 AM       LFTS  Lab Results   Component Value Date/Time    ALKPHOS 53 01/05/2023 04:27 AM     01/05/2023 04:27 AM     01/05/2023 04:27 AM    PROT 5.0 01/05/2023 04:27 AM    BILITOT 0.8 01/05/2023 04:27 AM    BILIDIR 0.9 01/01/2023 09:59 PM    IBILI 0.2 01/01/2023 09:59 PM    LABALBU 2.9 01/05/2023 04:27 AM       INR  Recent Labs     01/02/23  1644 01/03/23  1226 01/05/23  0427   PROTIME 24.4* 21.9* 20.3*   INR 2.2 1.9 1.7       APTT  Recent Labs     01/02/23  1644 01/03/23  1226 01/05/23 0427   APTT 38.7* 38.4* 41.6*       Lactic Acid  No results found for: LACTA     BNP   No results for input(s): BNP in the last 72 hours.      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 Keep O2 sat > 88%     Hemodynamics   Wean norepinephrine off keep MAP greater than 65 currently on 1 mcg    Gastrointestinal/Nutrition   GI Prophylaxis with Protonix  Start tube feeds today     Renal   IVF per nephrology    Infectious Disease   Currently on Rocephin, Flagyl and Vancomycin   MRSA positive    Hematology/Oncology   Hem/Onc is following  Platelet count at 66 today, workup pending per hem/onc with peripheral blood smear and coag panel  Receiving Venofer     Endocrine   Glucose in 80s continue to monitor    Social/Spiritual/DNR/Disposition/Other   Discussed with significant other at bedside and she has been updated       Critical Care Time   35 min    Electronically signed by Harris Crawfodr MD on 1/5/2023 at 9:27 AM      Patient seen and examined independently by me. Above discussed and I agree with resident note except where indicated in the EMR revision history. Also see my additional comments and changes indicated by discrete font, text color, italics, and/or initials. Labs, cultures, and radiographs where available were reviewed.      He appears to be improving significantly in terms of his mental status over the last 48 hours  Hemodynamically also improved, norepinephrine has been off for about an hour  Discussed with neurology, they are concerned about meningitis, they also recommend CASSI  Would suggest infectious disease consultation  LP would be higher risk given patient's thrombocytopenia  If unable to extubate, will start tube feeds, will also need free water flushes  Once again, I did not witness any \"twitching\"  Updated patient's family in detail at bedside    Electronically signed by Nella Miles MD on 1/5/2023 at 10:57 AM

## 2023-01-05 NOTE — PROGRESS NOTES
Today's Date: 1/5/2023  Patient Name: Naun Mistry  Date of admission: 1/1/2023  9:55 PM  Patient's age: 60 y.o., 1960  Admission Dx: Acute respiratory failure ([facility] [address] [J96.00]  Acute respiratory failure with hypoxia and hypercapnia ([facility] [address] [J96.01, J96.02]  Altered mental status, unspecified altered mental status type [R41.82]  Community acquired pneumonia of right lower lobe of lung [J18.9]    Reason for Consult: management recommendations  Requesting Physician: Sergey Robledo MD    CHIEF COMPLAINT: Abnormal cell counts. Altered mental status. Pneumonia. History Obtained From:  spouse, family member. Electronic medical record. Patient unable to give any history due to altered mental status          Interval history:    Patient seen and examined  Labs and vitals reviewed  Patient remains in ICU. Patient has been extubated  No obvious bleeding noted  Platelet count fluctuating  HCV PCR positive    HISTORY OF PRESENT ILLNESS:      The patient is a 60 y.o.  male who is admitted with chief complaint of altered mental status. Patient has not seen a doctor for multiple years. Due to worsening mental status EMS was summoned. Patient oxygen saturation was noted to be at 75%. Patient placed on BiPAP x-ray showed right lower lobe pneumonia. Patient started on antibiotics. Patient also noted to have INR of 2.3 platelet count of 23,804. Patient does have a history of alcohol intake. Patient's ammonia level noted to be 29. Creatinine 1.5. Total bilirubin is within range. Hemoglobin 11.2 with MCV of 69. Platelet count is 16,886. Ultrasound liver done however report is pending.         Past Medical History: Hypertension    Past Surgical History: No pertinent surgical history    Medications:    Prior to Admission medications    Not on File     Current Facility-Administered Medications   Medication Dose Route Frequency Provider Last Rate Last Admin    potassium bicarb-citric acid (EFFER-K) effervescent tablet 20 mEq  20 mEq Oral Daily Radha Damon MD   20 mEq at 01/05/23 1448    ampicillin-sulbactam (UNASYN) 3,000 mg in sodium chloride 0.9 % 100 mL IVPB (mini-bag)  3,000 mg IntraVENous Q6H Angelia Luther  mL/hr at 01/05/23 1448 3,000 mg at 01/05/23 1448    miconazole (MICOTIN) 2 % powder   Topical BID ALAYNA Ramirez NP   Given at 01/05/23 7735    potassium chloride 20 mEq in sodium chloride 0.45 % 1,000 mL infusion   IntraVENous Continuous Sonia Flores MD 50 mL/hr at 01/05/23 0822 New Bag at 01/05/23 0822    vancomycin (VANCOCIN) 1,250 mg in dextrose 5 % 250 mL IVPB (ADDAVIAL)  1,250 mg IntraVENous Q12H June Shaw MD   Stopped at 01/05/23 6798    perflutren lipid microspheres (DEFINITY) injection 1.5 mL  1.5 mL IntraVENous ONCE PRN Stephen Murphy MD        [facility] by provider] aspirin chewable tablet 81 mg  81 mg Oral Daily Machelle Gaytan MD   81 mg at 01/04/23 0740    sodium chloride flush 0.9 % injection 10 mL  10 mL IntraVENous PRN June Shaw MD   10 mL at 01/03/23 1459    iron sucrose (VENOFER) 300 mg in sodium chloride 0.9 % 250 mL IVPB  300 mg IntraVENous Q24H Alex Chavez MD   Stopped at 01/04/23 1832    sodium chloride flush 0.9 % injection 10 mL  10 mL IntraVENous PRN Machelle Gaytan MD   10 mL at 01/03/23 1836    sodium chloride flush 0.9 % injection 5-40 mL  5-40 mL IntraVENous 2 times per day ALAYNA Ramirez NP   10 mL at 01/04/23 2036    sodium chloride flush 0.9 % injection 5-40 mL  5-40 mL IntraVENous PRN ALAYNA Marrero NP        0.9 % sodium chloride infusion   IntraVENous PRN ALAYNA Ramirez NP        ondansetron (ZOFRAN-ODT) disintegrating tablet 4 mg  4 mg Oral Q8H PRN ALAYNA Ramirez NP        Or    ondansetron (ZOFRAN) injection 4 mg  4 mg IntraVENous Q6H PRN ALAYNA Marrero NP        polyethylene glycol (GLYCOLAX) packet 17 g  17 g Oral Daily PRN ALAYNA Ramirez NP        acetaminophen (TYLENOL) tablet 650 mg  650 mg Oral Q6H PRN Gato Danas, APRN - NP        Or    acetaminophen (TYLENOL) suppository 650 mg  650 mg Rectal Q6H PRN Gato Danas, APRN - NP        nicotine (NICODERM CQ) 21 MG/24HR 1 patch  1 patch TransDERmal Daily Gato Danas, APRN - NP   1 patch at 01/05/23 0802    albuterol (PROVENTIL) nebulizer solution 2.5 mg  2.5 mg Nebulization Q2H PRN Gato Danas, APRN - NP   2.5 mg at 01/02/23 1115    guaiFENesin (MUCINEX) extended release tablet 600 mg  600 mg Oral BID PRN Gato Danas, APRN - NP        pantoprazole (PROTONIX) 40 mg in sodium chloride (PF) 0.9 % 10 mL injection  40 mg IntraVENous Daily Gustavo Velasquez MD   40 mg at 01/05/23 0802    vancomycin (VANCOCIN) intermittent dosing (placeholder)   Other RX Francy Roche MD        norepinephrine (LEVOPHED) 16 mg in dextrose 5 % 250 mL infusion  1-30 mcg/min IntraVENous Continuous Mar Mixon MD   Stopped at 01/05/23 1000    ipratropium-albuterol (DUONEB) nebulizer solution 1 ampule  1 ampule Inhalation Q4H Jessica Brooks MD   1 ampule at 01/05/23 1206    0.9 % sodium chloride bolus  1,000 mL IntraVENous Once Jessica Brooks MD           Allergies:  Patient has no known allergies. Social History:   reports that he has been smoking cigarettes. He has a 40.00 pack-year smoking history. He has never used smokeless tobacco. He reports current alcohol use. He reports that he does not currently use drugs. Family History: Patient not able to give history due to altered mental status    REVIEW OF SYSTEMS:      General: no fever or night sweats, Weight is stable. ENT: No double or blurred vision, no hearing problem, no dysphagia or sore throat   Respiratory: Positive shortness of breath  Cardiovascular: Denies chest pain, PND or orthopnea. No L E swelling or palpitations. Gastrointestinal:    No nausea or vomiting, abdominal pain, diarrhea or constipation.   Genitourinary: Denies dysuria, hematuria, frequency, urgency or incontinence. Neurological: Complains of being very weak. Musculoskeletal:  No arthralgia no back pain or joint swelling. Skin: There are no rashes or bleeding. Psych: Denies hallucinations or intentions to harm self        PHYSICAL EXAM:        BP (!) 155/80   Pulse 97   Temp 97 °F (36.1 °C) (Axillary)   Resp 23   Ht 5' 7\" (1.702 m)   Wt 195 lb 5.2 oz (88.6 kg)   SpO2 (!) 88%   BMI 30.59 kg/m²    Temp (24hrs), Av.6 °F (36.4 °C), Min:97 °F (36.1 °C), Max:97.9 °F (36.6 °C)      General appearance - not in acute distress  Mental status -arousable but somewhat lethargic  Eyes - pupils equal and reactive, extraocular eye movements intact   Ears - bilateral TM's and external ear canals normal   Mouth -mucous membranes moist  Neck - supple, no significant adenopathy   Lymphatics - no palpable lymphadenopathy, no hepatosplenomegaly   Chest -bilateral rales  Heart - normal rate, regular rhythm, normal S1, S2, no murmurs  Abdomen - soft, nontender, nondistended, no masses or organomegaly   Neurological -orally intubated  Musculoskeletal - no joint tenderness, deformity or swelling   Extremities - peripheral pulses normal, no pedal edema, no clubbing or cyanosis   Skin - normal coloration and turgor, no rashes, no suspicious skin lesions noted ,      DATA:      Labs:     Results for orders placed or performed during the hospital encounter of 23   COVID-19 & Influenza Combo    Specimen: Nasopharyngeal Swab   Result Value Ref Range    Specimen Description . NASOPHARYNGEAL SWAB     Source . NASOPHARYNGEAL SWAB     SARS-CoV-2 RNA, RT PCR Not Detected Not Detected    INFLUENZA A Not Detected Not Detected    INFLUENZA B Not Detected Not Detected   Respiratory Panel, Molecular, with COVID-19 (Restricted: peds pts or suitable admitted adults)    Specimen: Nasopharyngeal Swab   Result Value Ref Range    Specimen Description . NASOPHARYNGEAL SWAB     Adenovirus PCR Not Detected Not Detected    Coronavirus 229E PCR Not Detected Not Detected    Coronavirus HKU1 PCR Not Detected Not Detected    Coronavirus NL63 PCR Not Detected Not Detected    Coronavirus OC43 PCR Not Detected Not Detected    SARS-CoV-2, PCR Not Detected Not Detected    Human Metapneumovirus PCR Not Detected Not Detected    Rhino/Enterovirus PCR Not Detected Not Detected    Influenza A by PCR Not Detected Not Detected    Influenza B by PCR Not Detected Not Detected    Parainfluenza 1 PCR Not Detected Not Detected    Parainfluenza 2 PCR Not Detected Not Detected    Parainfluenza 3 PCR Not Detected Not Detected    Parainfluenza 4 PCR Not Detected Not Detected    Resp Syncytial Virus PCR Not Detected Not Detected    Bordetella Parapertussis Not Detected Not Detected    B Pertussis by PCR Not Detected Not Detected    Chlamydia pneumoniae By PCR Not Detected Not Detected    Mycoplasma pneumo by PCR Not Detected Not Detected   Legionella Ag, Ur    Specimen: Urine   Result Value Ref Range    Legionella Pneumophilia Ag, Urine NEGATIVE NEGATIVE   STREP PNEUMONIAE ANTIGEN    Specimen: Urine, indwelling catheter   Result Value Ref Range    Source . CLEAN CATCH URINE     Strep pneumo Ag NEGATIVE    Culture, Blood 1    Specimen: Blood   Result Value Ref Range    Specimen Description . BLOOD LEFT ARM     Culture NO GROWTH 3 DAYS    Culture, Blood 1    Specimen: Blood   Result Value Ref Range    Specimen Description . BLOOD RIGHT ARM     Culture NO GROWTH 3 DAYS    Culture, Urine    Specimen: Urine, clean catch   Result Value Ref Range    Specimen Description . CLEAN CATCH URINE     Culture NO GROWTH    MRSA DNA Probe, Nasal    Specimen: Nasal   Result Value Ref Range    Specimen Description . NASAL SWAB     MRSA, DNA, Nasal (A) NEGATIVE     POSITIVE:  MRSA DNA detected by nucleic acid amplification. Culture, Respiratory    Specimen: Sputum Induced   Result Value Ref Range    Specimen Description . INDUCED SPUTUM     Direct Exam >10, <25 NEUTROPHILS/LPF     Direct Exam < 10 EPITHELIAL CELLS/LPF     Direct Exam NO SIGNIFICANT PATHOGENS SEEN     Culture NO GROWTH    Troponin   Result Value Ref Range    Troponin, High Sensitivity 177 (HH) 0 - 22 ng/L   Troponin   Result Value Ref Range    Troponin, High Sensitivity 184 (HH) 0 - 22 ng/L   APTT   Result Value Ref Range    PTT 26.5 24.0 - 36.0 sec   Protime-INR   Result Value Ref Range    Protime 24.9 (H) 11.8 - 14.6 sec    INR 2.3    Phosphorus   Result Value Ref Range    Phosphorus 4.3 2.5 - 4.5 mg/dL   Magnesium   Result Value Ref Range    Magnesium 2.1 1.6 - 2.6 mg/dL   Basic Metabolic Panel   Result Value Ref Range    Glucose 96 70 - 99 mg/dL    BUN 46 (H) 8 - 23 mg/dL    Creatinine 1.77 (H) 0.70 - 1.20 mg/dL    Est, Glom Filt Rate 43 (L) >60 mL/min/1.73m2    Calcium 7.9 (L) 8.6 - 10.4 mg/dL    Sodium 138 135 - 144 mmol/L    Potassium 4.8 3.7 - 5.3 mmol/L    Chloride 97 (L) 98 - 107 mmol/L    CO2 29 20 - 31 mmol/L    Anion Gap 12 9 - 17 mmol/L   CBC with Auto Differential   Result Value Ref Range    WBC 6.8 3.5 - 11.0 k/uL    RBC 5.99 (H) 4.5 - 5.9 m/uL    Hemoglobin 12.1 (L) 13.5 - 17.5 g/dL    Hematocrit 41.4 41 - 53 %    MCV 69.1 (L) 80 - 100 fL    MCH 20.2 (L) 26 - 34 pg    MCHC 29.2 (L) 31 - 37 g/dL    RDW 19.8 (H) 11.5 - 14.9 %    Platelets 55 (L) 338 - 450 k/uL    MPV 8.6 6.0 - 12.0 fL    Seg Neutrophils 79 (H) 36 - 66 %    Lymphocytes 12 (L) 24 - 44 %    Monocytes 8 (H) 1 - 7 %    Eosinophils % 0 0 - 4 %    Basophils 1 0 - 2 %    Segs Absolute 5.40 1.3 - 9.1 k/uL    Absolute Lymph # 0.80 (L) 1.0 - 4.8 k/uL    Absolute Mono # 0.50 0.1 - 1.3 k/uL    Absolute Eos # 0.00 0.0 - 0.4 k/uL    Basophils Absolute 0.10 0.0 - 0.2 k/uL   Blood Gas, Venous   Result Value Ref Range    pH, [place] 7.258 (L) 7.320 - 7.420    pCO2, [place] 63.0 (H) 39.0 - 55.0 mm Hg    pO2, [place] 48.1 30.0 - 50.0 mm Hg    HCO3, Venous 28.1 24.0 - 30.0 mmol/L    Positive Base Excess, [place] 1.0 0.0 - 2.0 mmol/L    O2 Sat, [place] 72.1 60.0 - 85.0 %    Carboxyhemoglobin 4.3 0 - 5 %    Methemoglobin 0.5 0.0 - 1.9 %    Pt Temp 37    Brain Natriuretic Peptide   Result Value Ref Range    Pro-BNP 7,797 (H) <300 pg/mL   Hepatic Function Panel   Result Value Ref Range    Albumin 3.9 3.5 - 5.2 g/dL    Alkaline Phosphatase 68 40 - 129 U/L     (H) 5 - 41 U/L     (H) <40 U/L    Total Bilirubin 1.1 0.3 - 1.2 mg/dL    Bilirubin, Direct 0.9 (H) <0.3 mg/dL    Bilirubin, Indirect 0.2 0.0 - 1.0 mg/dL    Total Protein 6.6 6.4 - 8.3 g/dL   Lipase   Result Value Ref Range    Lipase 17 13 - 60 U/L   Urinalysis with Reflex to Culture    Specimen: Urine   Result Value Ref Range    Color, UA Orange (A) Yellow    Turbidity UA Cloudy (A) Clear    Glucose, Ur NEGATIVE NEGATIVE    Bilirubin Urine NEGATIVE  Verified by ictotest. (A) NEGATIVE    Ketones, Urine TRACE (A) NEGATIVE    Specific Gravity, UA 1.018 1.000 - 1.030    Urine Hgb LARGE (A) NEGATIVE    pH, UA 5.0 5.0 - 8.0    Protein, UA 2+ (A) NEGATIVE    Urobilinogen, Urine ELEVATED (A) Normal    Nitrite, Urine NEGATIVE NEGATIVE    Leukocyte Esterase, Urine SMALL (A) NEGATIVE   Microscopic Urinalysis   Result Value Ref Range    WBC, UA 6 TO 9 /HPF    RBC, UA TOO NUMEROUS TO COUNT /HPF    Casts UA 3 to 5 /LPF    Epithelial Cells UA 0 TO 2 /HPF    Bacteria, UA FEW (A) None   Arterial Blood Gases   Result Value Ref Range    pH, Arterial 7.295 (L) 7.350 - 7.450    pCO2, Arterial 60.3 (HH) 35.0 - 45.0 mmHg    pO2, Arterial 63.0 (L) 80.0 - 100.0 mmHg    HCO3, Arterial 29.3 (H) 22.0 - 26.0 mmol/L    Positive Base Excess, Art 2.8 (H) 0.0 - 2.0 mmol/L    O2 Sat, Arterial 85.8 (LL) 95 - 98 %    Carboxyhemoglobin 3.1 0 - 5 %    Methemoglobin 1.0 0.0 - 1.9 %    Pt Temp 37     O2 Device/Flow/% BIPAP     Respiratory Rate 16     Tyron Test PASS     Sample Site Right Radial Artery     Pt.  Position SEMI-FOWLERS     Mode BIPAP     Text for Respiratory RESULTS TO PHYSICIAN    Comprehensive Metabolic Panel w/ Reflex to MG   Result Value Ref Range    Glucose 101 (H) 70 - 99 mg/dL    BUN 46 (H) 8 - 23 mg/dL    Creatinine 1.50 (H) 0.70 - 1.20 mg/dL    Est, Glom Filt Rate 52 (L) >60 mL/min/1.73m2    Calcium 7.5 (L) 8.6 - 10.4 mg/dL    Sodium 136 135 - 144 mmol/L    Potassium 4.8 3.7 - 5.3 mmol/L    Chloride 99 98 - 107 mmol/L    CO2 27 20 - 31 mmol/L    Anion Gap 10 9 - 17 mmol/L    Alkaline Phosphatase 59 40 - 129 U/L     (H) 5 - 41 U/L     (H) <40 U/L    Total Bilirubin 0.8 0.3 - 1.2 mg/dL    Total Protein 5.9 (L) 6.4 - 8.3 g/dL    Albumin 3.3 (L) 3.5 - 5.2 g/dL   CBC with Auto Differential   Result Value Ref Range    WBC 5.6 3.5 - 11.0 k/uL    RBC 5.57 4.5 - 5.9 m/uL    Hemoglobin 11.2 (L) 13.5 - 17.5 g/dL    Hematocrit 38.8 (L) 41 - 53 %    MCV 69.7 (L) 80 - 100 fL    MCH 20.1 (L) 26 - 34 pg    MCHC 28.8 (L) 31 - 37 g/dL    RDW 19.7 (H) 11.5 - 14.9 %    Platelets 67 (L) 047 - 450 k/uL    MPV 9.1 6.0 - 12.0 fL    Seg Neutrophils 79 (H) 36 - 66 %    Lymphocytes 12 (L) 24 - 44 %    Monocytes 8 (H) 1 - 7 %    Eosinophils % 0 0 - 4 %    Basophils 1 0 - 2 %    nRBC 2 per 100 WBC    Segs Absolute 4.41 1.3 - 9.1 k/uL    Absolute Lymph # 0.67 (L) 1.0 - 4.8 k/uL    Absolute Mono # 0.45 0.1 - 1.3 k/uL    Absolute Eos # 0.00 0.0 - 0.4 k/uL    Basophils Absolute 0.06 0.0 - 0.2 k/uL    Morphology ANISOCYTOSIS PRESENT     Morphology HYPOCHROMIA PRESENT     Morphology 1+ ELLIPTOCYTES    Hemoglobin and Hematocrit   Result Value Ref Range    Hemoglobin 11.1 (L) 13.5 - 17.5 g/dL    Hematocrit 38.7 (L) 41 - 53 %   Hemoglobin and Hematocrit   Result Value Ref Range    Hemoglobin 10.8 (L) 13.5 - 17.5 g/dL    Hematocrit 37.2 (L) 41 - 53 %   Ammonia   Result Value Ref Range    Ammonia 29 16 - 60 umol/L   Hepatitis Panel, Acute   Result Value Ref Range    Hepatitis B Surface Ag NONREACTIVE NONREACTIVE    Hepatitis C Ab REACTIVE (A) NONREACTIVE    Hep B Core Ab, IgM NONREACTIVE NONREACTIVE    Hep A IgM NONREACTIVE NONREACTIVE   Iron and TIBC   Result Value Ref Range    Iron 14 (L) 59 - 158 ug/dL    TIBC 426 250 - 450 ug/dL    Iron Saturation 3 (L) 20 - 55 %    UIBC 412 (H) 112 - 347 ug/dL   Ferritin   Result Value Ref Range    Ferritin 14 (L) 30 - 400 ng/mL   Mycoplasma Ab,IgM   Result Value Ref Range    Mycoplasma pneumo IgM 0.25 <0.91   Procalcitonin   Result Value Ref Range    Procalcitonin 0.09 (H) <0.09 ng/mL   C-Reactive Protein   Result Value Ref Range    CRP 16.4 (H) 0.0 - 5.0 mg/L   Fibrin Split Products   Result Value Ref Range    FDP >5 (H) <5 ug/mL   Sedimentation Rate   Result Value Ref Range    Sed Rate 1 0 - 20 mm/Hr   Drug Scr, Abuse, Ur   Result Value Ref Range    Amphetamine Screen, Ur NEGATIVE NEGATIVE    Barbiturate Screen, Ur NEGATIVE NEGATIVE    Benzodiazepine Screen, Urine NEGATIVE NEGATIVE    Cocaine Metabolite, Urine NEGATIVE NEGATIVE    Methadone Screen, Urine NEGATIVE NEGATIVE    Opiates, Urine NEGATIVE NEGATIVE    Phencyclidine, Urine NEGATIVE NEGATIVE    Cannabinoid Scrn, Ur NEGATIVE NEGATIVE    Oxycodone Screen, Ur NEGATIVE NEGATIVE    Fentanyl, Ur NEGATIVE NEGATIVE    Test Information       Assay provides medical screening only. The absence of expected drug(s) and/or metabolite(s) may indicate diluted or adulterated urine, limitations of testing or timing of collection. Troponin   Result Value Ref Range    Troponin, High Sensitivity 195 (HH) 0 - 22 ng/L   Vitamin B12 & Folate   Result Value Ref Range    Vitamin B-12 1926 (H) 232 - 1245 pg/mL    Folate 10.0 >4.8 ng/mL   HIV Screen   Result Value Ref Range    HIV Ag/Ab NONREACTIVE NONREACTIVE   MONOCLONAL PANEL   Result Value Ref Range    Kappa Free Light Chains QNT 2.79 (H) 0.37 - 1.94 mg/dL    Lambda Free Light Chains QNT 20.89 (H) 0.57 - 2.63 mg/dL    Free Kappa/Lambda Ratio 0.13 (L) 0.26 - 1.65    Serum IFX Interp       A ZONE OF RESTRICTION IS PRESENT IN THE GAMMAGLOBULIN REGION. CONFIRMED BY IMMUNOFIXATION TO BE MONOCLONAL    Pathologist       Reviewed by pathologist:  Ayan Flores. Ny Wilson D.O.     Total Protein 5.5 (L) 6.4 - 8.3 g/dL    Albumin (calculated) 3.6 3.2 - 5.2 g/dL    Albumin % 65 45 - 65 %    Alpha-1-Globulin 0.2 0.1 - 0.4 g/dL    Alpha 1 % 3 3 - 6 %    Alpha-2-Globulin 0.4 (L) 0.5 - 0.9 g/dL    Alpha 2 % 7 6 - 13 %    Beta Globulin 0.6 0.5 - 1.1 g/dL    Beta Percent 11 11 - 19 %    Gamma Globulin 0.8 0.5 - 1.5 g/dL    Gamma Globulin % 14 9 - 20 %    Total Prot. Sum 5.6 (L) 6.3 - 8.2 g/dL    Total Prot. Sum,% 100 98 - 102 %    Protein Electrophoresis, Serum       A ZONE OF RESTRICTION IS PRESENT IN THE GAMMAGLOBULIN REGION. CONFIRMED BY IMMUNOFIXATION TO BE MONOCLONAL    Pathologist       Reviewed by pathologist:  Robert Vega. Jeremi Jimenez D.O. Path Review, Smear   Result Value Ref Range    Pathologist Review ELECTRONICALLY SIGNED.  Kimberly Agustin MD    Reticulocytes   Result Value Ref Range    Retic % 2.8 (H) 0.5 - 2.0 %    Absolute Retic # 0.157 (H) 0.0245 - 0.098 M/uL   Fibrinogen   Result Value Ref Range    Fibrinogen 141 (L) 210 - 530 mg/dL   Ethanol   Result Value Ref Range    Ethanol <10 <10 mg/dL    Ethanol percent <0.010 %   Lactate Dehydrogenase   Result Value Ref Range     (H) 135 - 225 U/L   Hemoglobin and Hematocrit   Result Value Ref Range    Hemoglobin 10.9 (L) 13.5 - 17.5 g/dL    Hematocrit 38.0 (L) 41 - 53 %   Haptoglobin   Result Value Ref Range    Haptoglobin 60 30 - 200 mg/dL   D-Dimer, Quantitative   Result Value Ref Range    D-Dimer, Quant 6.70 (H) 0.00 - 0.59 mg/L FEU   Urinalysis with Reflex to Culture    Specimen: Urine   Result Value Ref Range    Color, UA Dark Yellow (A) Yellow    Turbidity UA Clear Clear    Glucose, Ur NEGATIVE NEGATIVE    Bilirubin Urine NEGATIVE NEGATIVE    Ketones, Urine TRACE (A) NEGATIVE    Specific [place], UA 1.015 1.000 - 1.030    Urine Hgb LARGE (A) NEGATIVE    pH, UA 5.0 5.0 - 8.0    Protein, UA 1+ (A) NEGATIVE    Urobilinogen, Urine Normal Normal    Nitrite, Urine NEGATIVE NEGATIVE    Leukocyte Esterase, Urine SMALL (A) NEGATIVE   APTT   Result Value Ref Range    PTT 38.7 (H) 24.0 - 36.0 sec   Protime-INR   Result Value Ref Range    Protime 24.4 (H) 11.8 - 14.6 sec    INR 2.2    Microscopic Urinalysis   Result Value Ref Range    WBC, UA 10 TO 20 /HPF    RBC, UA TOO NUMEROUS TO COUNT /HPF    Casts UA HYALINE /LPF    Casts UA 0 TO 2 /LPF    Epithelial Cells UA 3 to 5 /HPF   BLOOD GAS, ARTERIAL   Result Value Ref Range    pH, Arterial 7.314 (L) 7.350 - 7.450    pCO2, Arterial 61.9 (HH) 35.0 - 45.0 mmHg    pO2, Arterial 58.1 (LL) 80.0 - 100.0 mmHg    HCO3, Arterial 31.4 (H) 22.0 - 26.0 mmol/L    Positive Base Excess, Art 5.3 (H) 0.0 - 2.0 mmol/L    O2 Sat, Arterial 86.3 (LL) 95 - 98 %    Carboxyhemoglobin 2.1 0 - 5 %    Methemoglobin 0.8 0.0 - 1.9 %    Pt Temp 37.0     O2 Device/Flow/% VENTILATOR     Respiratory Rate 22     Tyron Test PASS     Sample Site Left Radial Artery     Pt. Position SEMI-FOWLERS     Mode PRVC     Set Rate 22     Total Rate 26          FIO2 40     Peep/Cpap 8    SURGICAL PATHOLOGY REPORT   Result Value Ref Range    Surgical Pathology Report       [id number]  [facility]  ANATOMIC PATHOLOGY  [address] [place] [address]. [facility], [address]  [phone]  Fax: [phone]  SURGICAL PATHOLOGY CONSULTATION    Patient Name: Eleno Javed  MR#: [id number]  Specimen #[id number]    Procedures/Addenda  PERIPHERAL BLOOD REPORT     Date Ordered:     1/2/2023     Status:  Signed Out       Date Complete:     1/3/2023     By: Abad Camacho M.D. Date Reported:     1/3/2023       INTERPRETATION  Peripheral blood:  Microcytic, hypochromic anemia. The patient's iron studies are compatible with iron deficiency anemia. Lymphopenia. Differential considerations include acute illness/infection,  medication effect, smoking, and debilitating diseases. Thrombocytopenia  Differential considerations include bone marrow hypoproduction and  immune destruction (idiopathic thrombocytopenic purpura, drug effect). RESULTS-COMMENTS  PERIPHERAL BLOOD STUDY    CBC: Please see the electronic health record  for CBC parameters  (, 01/02/2023, 05:43). PLATELETS: Decreased platelets. Platelets show normal morphology. LEUKOCYTES: Decreased lymphocytes. White blood cells show normal  morphology. There are no blasts. ERYTHROCYTES: Microcytic, hypochromic. Anisocytosis and  poikilocytosis. Polychromasia. Reticulocyte count 2.8%. Rare RBC  fragments. Note: The electronic health record is reviewed. Tyesha Price M.D.                    Source:  A: Peripheral Blood     Comprehensive Metabolic Panel w/ Reflex to MG   Result Value Ref Range    Glucose 91 70 - 99 mg/dL    BUN 32 (H) 8 - 23 mg/dL    Creatinine 0.97 0.70 - 1.20 mg/dL    Est, Glom Filt Rate >60 >60 mL/min/1.73m2    Calcium 7.4 (L) 8.6 - 10.4 mg/dL    Sodium 143 135 - 144 mmol/L    Potassium 3.7 3.7 - 5.3 mmol/L    Chloride 105 98 - 107 mmol/L    CO2 30 20 - 31 mmol/L    Anion Gap 8 (L) 9 - 17 mmol/L    Alkaline Phosphatase 50 40 - 129 U/L     (H) 5 - 41 U/L     (H) <40 U/L    Total Bilirubin 0.9 0.3 - 1.2 mg/dL    Total Protein 5.2 (L) 6.4 - 8.3 g/dL    Albumin 3.1 (L) 3.5 - 5.2 g/dL   CBC with Auto Differential   Result Value Ref Range    WBC 5.7 3.5 - 11.0 k/uL    RBC 5.42 4.5 - 5.9 m/uL    Hemoglobin 11.0 (L) 13.5 - 17.5 g/dL    Hematocrit 37.6 (L) 41 - 53 %    MCV 69.4 (L) 80 - 100 fL    MCH 20.2 (L) 26 - 34 pg    MCHC 29.2 (L) 31 - 37 g/dL    RDW 20.0 (H) 11.5 - 14.9 %    Platelets 66 (L) 161 - 450 k/uL    MPV 9.4 6.0 - 12.0 fL    Seg Neutrophils 85 (H) 36 - 66 %    Lymphocytes 9 (L) 24 - 44 %    Monocytes 5 1 - 7 %    Eosinophils % 0 0 - 4 %    Basophils 0 0 - 2 %    Bands 1 0 - 10 %    nRBC 1 (H) 0 per 100 WBC    Segs Absolute 4.87 1.3 - 9.1 k/uL    Absolute Lymph # 0.52 (L) 1.0 - 4.8 k/uL    Absolute Mono # 0.29 0.1 - 1.3 k/uL    Absolute Eos # 0.00 0.0 - 0.4 k/uL    Basophils Absolute 0. 00 0.0 - 0.2 k/uL    Absolute Bands # 0.06 0.0 - 1.0 k/uL    Morphology ANISOCYTOSIS PRESENT     Morphology HYPOCHROMIA PRESENT     Morphology 1+ POLYCHROMASIA     Morphology 1+ ELLIPTOCYTES    BLOOD GAS, ARTERIAL   Result Value Ref Range    pH, Arterial 7.373 7.350 - 7.450    pCO2, Arterial 56.0 (HH) 35.0 - 45.0 mmHg    pO2, Arterial 61.4 (L) 80.0 - 100.0 mmHg    HCO3, Arterial 32.6 (H) 22.0 - 26.0 mmol/L    Positive Base Excess, Art 7.4 (H) 0.0 - 2.0 mmol/L    O2 Sat, Arterial 89.2 (L) 95 - 98 %    Carboxyhemoglobin 1.7 0 - 5 %    Methemoglobin 0.9 0.0 - 1.9 %    Pt Temp 37     O2 Device/Flow/% VENTILATOR     Respiratory Rate 22     Tyron Test PASS     Sample Site Right Radial Artery     Pt.  Position SEMI-FOWLERS     Mode PRVC     Set Rate 22     Total Rate 22          FIO2 35     Peep/Cpap 8     Text for Respiratory RESULTS GIVEN TO RN    IMMUNOFIXATION URINE RANDOM PROFILE   Result Value Ref Range    Urine IFX Specimen HURTADO SPECIMEN     Volume HURTADO SPECIMEN mL    Urine Total Protein 11 mg/dL    Urine IFX Interp PENDING    Protime-INR   Result Value Ref Range    Protime 21.9 (H) 11.8 - 14.6 sec    INR 1.9    APTT   Result Value Ref Range    PTT 38.4 (H) 24.0 - 36.0 sec   Triglyceride   Result Value Ref Range    Triglycerides 85 <150 mg/dL   CHLORIDE, URINE, RANDOM   Result Value Ref Range    Chloride, Ur 34 mmol/L   Protein / Creatinine Ratio, Urine   Result Value Ref Range    Total Protein, Urine 23 mg/dL    Creatinine, Ur 79.8 39.0 - 259.0 mg/dL    Urine Total Protein Creatinine Ratio 0.29 (H) 0.00 - 0.20   SODIUM, URINE, RANDOM   Result Value Ref Range    Sodium,Ur <20 mmol/L   BLOOD GAS, ARTERIAL   Result Value Ref Range    pH, Arterial 7.360 7.350 - 7.450    pCO2, Arterial 55.9 (HH) 35.0 - 45.0 mmHg    pO2, Arterial 71.4 (L) 80.0 - 100.0 mmHg    HCO3, Arterial 31.6 (H) 22.0 - 26.0 mmol/L    Positive Base Excess, Art 6.1 (H) 0.0 - 2.0 mmol/L    O2 Sat, Arterial 91.7 (L) 95 - 98 % Carboxyhemoglobin 1.2 0 - 5 %    Methemoglobin 1.3 0.0 - 1.9 %    Pt Temp 37     O2 Device/Flow/% VENTILATOR     Tyron Test PASS     Sample Site Right Radial Artery     Pt. Position SEMI-FOWLERS     Mode PRVC     Text for Respiratory RESULTS GIVEN TO RN    CBC with Auto Differential   Result Value Ref Range    WBC 6.0 3.5 - 11.0 k/uL    RBC 5.56 4.5 - 5.9 m/uL    Hemoglobin 10.8 (L) 13.5 - 17.5 g/dL    Hematocrit 38.2 (L) 41 - 53 %    MCV 68.8 (L) 80 - 100 fL    MCH 19.5 (L) 26 - 34 pg    MCHC 28.3 (L) 31 - 37 g/dL    RDW 20.1 (H) 11.5 - 14.9 %    Platelets 75 (L) 369 - 450 k/uL    MPV 9.3 6.0 - 12.0 fL    Seg Neutrophils 82 (H) 36 - 66 %    Lymphocytes 7 (L) 24 - 44 %    Monocytes 9 (H) 1 - 7 %    Eosinophils % 1 0 - 4 %    Basophils 1 0 - 2 %    Segs Absolute 4.90 1.3 - 9.1 k/uL    Absolute Lymph # 0.40 (L) 1.0 - 4.8 k/uL    Absolute Mono # 0.50 0.1 - 1.3 k/uL    Absolute Eos # 0.10 0.0 - 0.4 k/uL    Basophils Absolute 0.10 0.0 - 0.2 k/uL   Comprehensive Metabolic Panel w/ Reflex to MG   Result Value Ref Range    Glucose 85 70 - 99 mg/dL    BUN 16 8 - 23 mg/dL    Creatinine 0.64 (L) 0.70 - 1.20 mg/dL    Est, Glom Filt Rate >60 >60 mL/min/1.73m2    Calcium 7.4 (L) 8.6 - 10.4 mg/dL    Sodium 143 135 - 144 mmol/L    Potassium 3.3 (L) 3.7 - 5.3 mmol/L    Chloride 107 98 - 107 mmol/L    CO2 29 20 - 31 mmol/L    Anion Gap 7 (L) 9 - 17 mmol/L    Alkaline Phosphatase 49 40 - 129 U/L     (H) 5 - 41 U/L     (H) <40 U/L    Total Bilirubin 0.8 0.3 - 1.2 mg/dL    Total Protein 5.0 (L) 6.4 - 8.3 g/dL    Albumin 2.8 (L) 3.5 - 5.2 g/dL   Vancomycin Level, Random   Result Value Ref Range    Vancomycin Rm 16.1 ug/mL    Vancomycin Random Dose amount 1g     Vancomycin Random Date last dose 1/4/22     Vancomycin Random Time last dose 0232    Magnesium   Result Value Ref Range    Magnesium 2.0 1.6 - 2.6 mg/dL   Hepatitis C RNA, quantitative, PCR   Result Value Ref Range    Source . PLASMA     Hepatitis C RNA-PCR 17,400 IU/mL    Hepatitis C RNA Quant Log 4.24 Log IU/mL    HCV RNA PCR, Quant DETECTED (A) Not Detected   CBC with Auto Differential   Result Value Ref Range    WBC 5.3 3.5 - 11.0 k/uL    RBC 5.80 4.5 - 5.9 m/uL    Hemoglobin 11.4 (L) 13.5 - 17.5 g/dL    Hematocrit 40.0 (L) 41 - 53 %    MCV 68.9 (L) 80 - 100 fL    MCH 19.7 (L) 26 - 34 pg    MCHC 28.5 (L) 31 - 37 g/dL    RDW 20.5 (H) 11.5 - 14.9 %    Platelets 66 (L) 557 - 450 k/uL    MPV 9.1 6.0 - 12.0 fL    Seg Neutrophils 79 (H) 36 - 66 %    Lymphocytes 8 (L) 24 - 44 %    Monocytes 10 (H) 1 - 7 %    Eosinophils % 2 0 - 4 %    Basophils 1 0 - 2 %    Segs Absolute 4.19 1.3 - 9.1 k/uL    Absolute Lymph # 0.42 (L) 1.0 - 4.8 k/uL    Absolute Mono # 0.53 0.1 - 1.3 k/uL    Absolute Eos # 0.11 0.0 - 0.4 k/uL    Basophils Absolute 0.05 0.0 - 0.2 k/uL    Morphology ANISOCYTOSIS PRESENT     Morphology MICROCYTOSIS PRESENT     Morphology HYPOCHROMIA PRESENT     Morphology FEW ELLIPTOCYTES     Morphology FEW TARGET CELLS    Comprehensive Metabolic Panel w/ Reflex to MG   Result Value Ref Range    Glucose 84 70 - 99 mg/dL    BUN 11 8 - 23 mg/dL    Creatinine 0.58 (L) 0.70 - 1.20 mg/dL    Est, Glom Filt Rate >60 >60 mL/min/1.73m2    Calcium 7.9 (L) 8.6 - 10.4 mg/dL    Sodium 146 (H) 135 - 144 mmol/L    Potassium 3.4 (L) 3.7 - 5.3 mmol/L    Chloride 110 (H) 98 - 107 mmol/L    CO2 30 20 - 31 mmol/L    Anion Gap 6 (L) 9 - 17 mmol/L    Alkaline Phosphatase 53 40 - 129 U/L     (H) 5 - 41 U/L     (H) <40 U/L    Total Bilirubin 0.8 0.3 - 1.2 mg/dL    Total Protein 5.0 (L) 6.4 - 8.3 g/dL    Albumin 2.9 (L) 3.5 - 5.2 g/dL   Fibrinogen   Result Value Ref Range    Fibrinogen 98 (L) 210 - 530 mg/dL   Protime-INR   Result Value Ref Range    Protime 20.3 (H) 11.8 - 14.6 sec    INR 1.7    APTT   Result Value Ref Range    PTT 41.6 (H) 24.0 - 36.0 sec   BLOOD GAS, ARTERIAL   Result Value Ref Range    pH, Arterial 7.372 7.350 - 7.450    pCO2, Arterial 51.6 (HH) 35.0 - 45.0 mmHg    pO2, Arterial 62.9 (L) 80.0 - 100.0 mmHg    HCO3, Arterial 29.9 (H) 22.0 - 26.0 mmol/L    Positive Base Excess, Art 4.7 (H) 0.0 - 2.0 mmol/L    O2 Sat, Arterial 89.7 (L) 95 - 98 %    Carboxyhemoglobin 1.7 0 - 5 %    Methemoglobin 1.1 0.0 - 1.9 %    Pt Temp 37     O2 Device/Flow/% VENTILATOR     Respiratory Rate 22     Tyron Test PASS     Sample Site Right Radial Artery     Pt. Position SEMI-FOWLERS     Mode PRVC     Set Rate 22     Total Rate 22          FIO2 40     Peep/Cpap 8     Text for Respiratory RESULTS GIVEN TO RN    Magnesium   Result Value Ref Range    Magnesium 2.0 1.6 - 2.6 mg/dL   EKG 12 Lead   Result Value Ref Range    Ventricular Rate 75 BPM    Atrial Rate 75 BPM    P-R Interval 142 ms    QRS Duration 66 ms    Q-T Interval 350 ms    QTc Calculation (Bazett) 390 ms    P Axis 18 degrees    R Axis -165 degrees    T Axis 41 degrees         IMAGING DATA:    CT HEAD WO CONTRAST    Result Date: 1/1/2023  EXAMINATION: CT OF THE HEAD WITHOUT CONTRAST  1/1/2023 10:48 pm TECHNIQUE: CT of the head was performed without the administration of intravenous contrast. Automated exposure control, iterative reconstruction, and/or weight based adjustment of the mA/kV was utilized to reduce the radiation dose to as low as reasonably achievable. COMPARISON: None. HISTORY: Reason for Exam: AMS Additional signs and symptoms: the patient has been getting more and more confused since 2 weeks ago. It has progressively been getting worse and today FINDINGS: BRAIN/VENTRICLES: There is no acute intracranial hemorrhage, mass effect or midline shift. No abnormal extra-axial fluid collection. The gray-white differentiation is maintained without evidence of an acute infarct. There is no evidence of hydrocephalus. Changes of mild chronic small vessel ischemic disease. ORBITS: The visualized portion of the orbits demonstrate no acute abnormality.  SINUSES: The visualized paranasal sinuses and mastoid air cells demonstrate no acute abnormality. SOFT TISSUES/SKULL:  No acute abnormality of the visualized skull or soft tissues. No acute intracranial abnormality. Mild chronic small vessel ischemic disease. XR CHEST PORTABLE    Result Date: 1/1/2023  EXAMINATION: ONE XRAY VIEW OF THE CHEST 1/1/2023 7:17 pm COMPARISON: None. HISTORY: ORDERING SYSTEM PROVIDED HISTORY: ams TECHNOLOGIST PROVIDED HISTORY: ams Reason for Exam: Altered mental status, difficulty breathing Additional signs and symptoms: Altered mental status, difficulty breathing Relevant Medical/Surgical History: Altered mental status, difficulty breathing FINDINGS: Large lucent area in the left apex suggesting a large bulla however superimposed pneumothorax cannot be excluded. The cardiac size is normal. Right lower lobe airspace infiltrate and mild right pleural effusion. . Pulmonary vascularity appears normal. There is mild ectasia of the thoracic aorta. Calcific tendinitis of the right shoulder. No acute bony abnormalities. The hilar structures are normal.     Right lower lobe pneumonia and small right pleural effusion Large lucent area in the left apex suggesting a large bulla however superimposed pneumothorax cannot be excluded. Follow-up CT would be useful for further evaluation. The findings were sent to the Radiology Results [address] at 10:31 pm on 1/1/2023 to be communicated to a licensed caregiver.          IMPRESSION:   Primary Problem  Acute respiratory failure with hypoxia and hypercapnia [facility])    Active Hospital Problems    Diagnosis Date Noted    Prolonged pt (prothrombin time) [R79.1] 01/03/2023     Priority: Medium    Emphysema lung ([address] [J43.9] 01/03/2023     Priority: Medium    Cerebral infarction ([address] [I63.9] 01/03/2023     Priority: Medium    Transaminitis [R74.01] 01/02/2023     Priority: Medium    Microcytic anemia [D64.9] 01/02/2023     Priority: Medium    Thrombocytopenia ([address] [D69.6] 01/02/2023     Priority: Medium    Agitation [R45.1] 01/02/2023     Priority: Medium    Acute respiratory failure with hypoxia and hypercapnia (HCC) RLL pneumonia [J96.01, J96.02] 01/02/2023     Priority: Medium    Altered mental status [R41.82] 01/02/2023     Priority: Medium    Community acquired pneumonia of right lower lobe of lung [J18.9] 01/02/2023     Priority: Medium       Medical apathy  Respiratory failure secondary pneumonia  Abnormal LFT  Microcytic anemia  Thrombocytopenia  History of alcohol dependence  IgG lambda paraproteinemia  Positive HCV screen  Iron deficiency  Coagulopathy    RECOMMENDATIONS:  I reviewed the labs/imaging available to me,outside records and discussed with the patient. I explained to the patient the nature of this problem. I explained the significance of these abnormalities and possible etiology and management options  Patient has multiple medical abnormalities  Discussed with patient wife at bedside  HCV PCR is positive  Fibrinogen level below 100, INR and PTT worsening  Given suspicion for subarachnoid hemorrhage I will give 1 unit of cryo  MRI shows findings concerning for multifocal stroke and bilateral subarachnoid hemorrhages. Replace iron  Continue monitor for paraproteinemia repeat profile in 4 to 6 weeks  Low iron and anemia raise concerns regarding GI bleed. Closely monitor. Patient is seriously ill                    Discussed with family and Nurse. Thank you for asking us to see this patient. Lulu Ortiz MD          This note is created with the assistance of a speech recognition program.  While intending to generate a document that actually reflects the content of the visit, the document can still have some errors including those of syntax and sound a like substitutions which may escape proof reading. It such instances, actual meaning can be extrapolated by contextual diversion.

## 2023-01-05 NOTE — CONSULTS
[facility] -   Infectious diseases evaluation  admission date 1/1/2023    reason for consultation:   Community-acquired pneumonia    Impression :   Current:  Acute hypoxic respiratory failure required intubation 1/2/22  Community-acquired pneumonia with possible aspiration. Acute CVA with subarachnoid hemorrhage  Positive MRSA nasal swab  Liver disease  Cholelithiasis  Acute renal failure  Thrombocytopenia  Reactive hepatitis C antibody /elevated liver enzymes    Recommendations   IV vancomycin, Flagyl and Rocephin day 4  Discontinue Flagyl and Rocephin  IV Unasyn through 1/ 8/23  Continue IV vancomycin  Liver ultrasound 1/2/2323 showed increased echogenicity and cholelithiasis, no cholecystitis. HIV screen was negative on 1/2/23  Hepatitis C RNA pending  The patient received IV ceftriaxone and Zithromax on 1/1/2022  Follow blood and respiratory cultures, no growth to date  No growth on urine culture from 1/3/23  Nasal swab for MRSA was +1/2/23  Respiratory panel with COVID-19 PCR was negative  Urine for Legionella antigen negative  Follow CBC and renal function  Continue supportive care  Discussed with wife at the bedside    Infection Control Recommendations   [place] Precautions  Contact Isolation       Antimicrobial Stewardship Recommendations   Simplification of therapy  Targeted therapy      History of Present Illness:   Initial history:  Kinga Wanye is a 60y.o.-year-old male was brought to the hospital on 1/1/2023 with altered mental status, confusion associated with decreased responsiveness worsening for 2 weeks. At the ER he was obtunded, O2 sat was 75% on room air, was placed on nonrebreather initially, subsequently was intubated. He is intubated, sedated unable to provide history that was obtained from chart review and nursing staff. According to his wife he is fully vaccinated for COVID-19 and flu.   He has been afebrile since admission  Initial procalcitonin level was 0.09, WBC normal  He was hypotensive was started on Levophed. MRI showed small acute infarcts in the left cerebellar hemisphere and left parietal love with moderate bilateral subarachnoid hemorrhages within both cerebral hemispheres. Chest x-ray showed right lower lobe infiltrates  Interval changes  1/5/2023   The patient is intubated, sedated, on Levophed. Left arm PICC line in place, NG tube in place. Patient Vitals for the past 8 hrs:   BP Temp Temp src Pulse Resp SpO2   01/05/23 1208 -- -- -- 86 23 96 %   01/05/23 0915 114/67 -- -- 72 -- 98 %   01/05/23 0900 112/69 -- -- 73 -- 97 %   01/05/23 0845 106/65 -- -- 74 -- 98 %   01/05/23 0830 115/67 -- -- 74 -- 96 %   01/05/23 0815 124/75 -- -- 72 -- 93 %   01/05/23 0800 117/74 -- -- 74 -- 99 %   01/05/23 0757 117/74 97.6 °F (36.4 °C) Axillary 75 22 96 %   01/05/23 0745 91/60 -- -- 69 -- 95 %   01/05/23 0730 116/73 -- -- 70 -- 96 %   01/05/23 0700 110/69 -- -- 72 22 94 %   01/05/23 0600 114/70 -- -- 72 22 96 %   01/05/23 0530 113/67 -- -- 73 -- 95 %             I have personally reviewed the past medical history, past surgical history, medications, social history, and family history, and I haveupdated the database accordingly. Allergies:   Patient has no known allergies. Review of Systems:     Review of Systems  Intubated, unable to provide  Physical Examination :       Physical Exam  Constitutional:       Appearance: He is ill-appearing. Comments: Intubated   HENT:      Head: Normocephalic and atraumatic. Right Ear: External ear normal.      Left Ear: External ear normal.   Eyes:      General: No scleral icterus. Conjunctiva/sclera: Conjunctivae normal.   Cardiovascular:      Rate and Rhythm: Normal rate and regular rhythm. Heart sounds: Normal heart sounds. Pulmonary:      Comments: Intubated, coarse breath sounds bilaterally, few crackles. Abdominal:      General: There is no distension.       Palpations: Abdomen is soft. Genitourinary:     Comments: Mars catheter in place with dark urine  Musculoskeletal:      Cervical back: Neck supple. No rigidity. Right lower leg: No edema. Left lower leg: No edema. Skin:     Coloration: Skin is not jaundiced. Findings: No erythema. Neurological:      Comments: Intubated, sedated       Past Medical History:   History reviewed. No pertinent past medical history. Past Surgical  History:   History reviewed. No pertinent surgical history.     Medications:      potassium bicarb-citric acid  20 mEq Oral Daily    miconazole   Topical BID    vancomycin  1,250 mg IntraVENous Q12H    cefTRIAXone (ROCEPHIN) IV  2,000 mg IntraVENous Q12H    [Held by provider] aspirin  81 mg Oral Daily    iron sucrose  300 mg IntraVENous Q24H    sodium chloride flush  5-40 mL IntraVENous 2 times per day    nicotine  1 patch TransDERmal Daily    pantoprazole (PROTONIX) 40 mg injection  40 mg IntraVENous Daily    metroNIDAZOLE  500 mg IntraVENous Q8H    vancomycin (VANCOCIN) intermittent dosing (placeholder)   Other RX Placeholder    ipratropium-albuterol  1 ampule Inhalation Q4H    sodium chloride  1,000 mL IntraVENous Once       Social History:     Social History     Socioeconomic History    Marital status: Single     Spouse name: Not on file    Number of children: Not on file    Years of education: Not on file    Highest education level: Not on file   Occupational History    Not on file   Tobacco Use    Smoking status: Every Day     Packs/day: 1.00     Years: 40.00     Pack years: 40.00     Types: Cigarettes    Smokeless tobacco: Never   Substance and Sexual Activity    Alcohol use: Yes     Comment: beer and scotch    Drug use: Not Currently     Comment: over 20 years (stated by daughter)    Sexual activity: Not on file   Other Topics Concern    Not on file   Social History Narrative    Not on file     Social Determinants of Health     Financial Resource Strain: Not on file   Food Insecurity: Not on file   Transportation Needs: Not on file   Physical Activity: Not on file   Stress: Not on file   Social Connections: Not on file   Intimate Partner Violence: Not on file   Housing Stability: Not on file       Family History:   History reviewed. No pertinent family history. Medical Decision Making:   I have independently reviewed/ordered the following labs:    CBC with Differential:   Recent Labs     01/04/23  0904 01/05/23  0427   WBC 6.0 5.3   HGB 10.8* 11.4*   HCT 38.2* 40.0*   PLT 75* 66*   LYMPHOPCT 7* 8*   MONOPCT 9* 10*     BMP:  Recent Labs     01/04/23  0904 01/05/23  0427    146*   K 3.3* 3.4*    110*   CO2 29 30   BUN 16 11   CREATININE 0.64* 0.58*   MG 2.0 2.0     Hepatic Function Panel:   Recent Labs     01/04/23  0904 01/05/23  0427   PROT 5.0* 5.0*   LABALBU 2.8* 2.9*   BILITOT 0.8 0.8   ALKPHOS 49 53   * 218*   * 126*     No results for input(s): RPR in the last 72 hours. No results for input(s): HIV in the last 72 hours. No results for input(s): BC in the last 72 hours. Lab Results   Component Value Date/Time    CREATININE 0.58 01/05/2023 04:27 AM    GLUCOSE 84 01/05/2023 04:27 AM       Detailed results: Thank you for allowing us to participate in the care of this patient. Please call with questions. This note is created with the assistance of a speech recognition program.  While intending to generate adocument that actually reflects the content of the visit, the document can still have some errors including those of syntax and sound a like substitutions which may escape proof reading. It such instances, actual meaningcan be extrapolated by contextual diversion.     Carlton Cordova MD  Office: [phone]  Perfect serve / office [phone]

## 2023-01-05 NOTE — CARE COORDINATION
Post Acute Facility/Agency List     Provided significant other with the following list, the list includes the overall star ratings obtained from CMS per the Medicare Web site (www.Medicare.gov):     [] [address]  [] Acute Inpatient Rehabilitation Facilities  [x] Skilled Nursing Facilities  [x] Home Care    Provided verbal instructions on how to utilize the QR Code to obtain additional detailed star ratings from www. Medicare. gov     offered to print and provide the detailed list:    [x]Accepted   []Declined    Electronically signed by Dilip Harris RN on 1/5/2023 at 11:02 AM

## 2023-01-05 NOTE — PROGRESS NOTES
[address] [facility] [employer]    PROGRESS NOTE             1/5/2023    7:42 AM    Name:   Nahun Lainez  MRN:     [id number]     Acct:      [de-identified]   Room:   2009/2009-01  IP Day:  3  Admit Date:  1/1/2023  9:55 PM    PCP:  No primary care provider on file. Code Status:  Full Code    Subjective:     C/C:   Chief Complaint   Patient presents with    Altered Mental Status     Interval History Status: Slightly improved. Patient seen and examined at the bed side. Overnight patient's blood has blood. Patient is being weaned off Levophed with rate of 2 mcg. Propofol was increased to 40. Overall patient today moves his head in response to name being called. He moves his limbs spontaneously. Follows commands, squeeze fingers and wiggle toes. Patient grimaces to pain. Per nurse patient is also starting to follow command    Patient today is Hypernatremic with Na 146    Saturating 97% on 40 FiO2. Notes from nursing staff and Consults had been reviewed, and the overnight progress had been checked with the nursing staff as well. Nurse Martha input was appreciated    Brief History:     The patient is a 60 y.o.  male with unknown past medical history due to no healthcare visits or follow-up who presents with Altered Mental Status and he is admitted to the hospital for the management of  Acute respiratory failure most likely 2/2 pneumonia. Per patient's wife, she reports that the patient has not been acting himself for the past week. She reports being more slurred, confused and progressively getting worse prior to presentation. Patient prior to the presentation a week ago he had a fall and EMS presented patient was vitally stable and he was not transported to the hospital.  This time upon EMS evaluation of the patient he had an O2 of 75%, he was put on a breather and was given a dose of IV Lasix in route.   Wife and patient, cannot recall any precipitating event could have led to his presentation. He also denies chest pain, shortness of breath, or cough. Per patient, he does not recall any complaints or events prior to the presentation. Wife denies recent alcohol use, however, patient does have a history of regular alcohol intake but not on daily basis. Wife also reports that patient has constant heartburn for which he takes regular antiacids. Patient denies the presence of any abdominal pain or any change in bowel habits, abdominal pain, nausea or vomiting. Upon arrival in the ED, patient was put on BiPAP. Patient was afebrile, normotensive and in normal sinus rhythm. A chest x-ray was completed and the patient had right lower pneumonia with a small pleural effusion. Patient also had a large bullae in the left apex with a pneumothorax that cannot be excluded. Patient ABG was suggestive of pattern of respiratory acidosis with pH 7.295, PCO2 60.3, PO2 63.0, HCO3 29.3. CT head WO did not show any acute findings. Patient had elevated troponins of 184, trended down to 177. Patient also had an elevated BNP of 7797. An elevated creatinine of 1.77 was on presentation, with no previous lab test to be compared to. Also patient had a left elevated liver enzymes ALT was 525, , with prolonged prothrombin time of 24.9, and INR of 2.3. Patient was admitted to the ICU for the management of type II respiratory failure associated altered mental status    Review of Systems:     NA: Patient is intubated. Medications:      Allergies:    No Known Allergies    Current Meds:   Scheduled Meds:    miconazole   Topical BID    vancomycin  1,250 mg IntraVENous Q12H    cefTRIAXone (ROCEPHIN) IV  2,000 mg IntraVENous Q12H    [Held by provider] aspirin  81 mg Oral Daily    iron sucrose  300 mg IntraVENous Q24H    sodium chloride flush  5-40 mL IntraVENous 2 times per day    nicotine  1 patch TransDERmal Daily    pantoprazole (PROTONIX) 40 mg injection  40 mg IntraVENous Daily    metroNIDAZOLE  500 mg IntraVENous Q8H    vancomycin (VANCOCIN) intermittent dosing (placeholder)   Other RX Placeholder    ipratropium-albuterol  1 ampule Inhalation Q4H    sodium chloride  1,000 mL IntraVENous Once     Continuous Infusions:    IV infusion builder 50 mL/hr at 23 1316    sodium chloride      norepinephrine 2 mcg/min (23 1901)    propofol 40 mcg/kg/min (23 05)     PRN Meds: perflutren lipid microspheres, sodium chloride flush, sodium chloride flush, sodium chloride flush, sodium chloride, ondansetron **OR** ondansetron, polyethylene glycol, acetaminophen **OR** acetaminophen, albuterol, guaiFENesin    Data:     Past Medical History:   has no past medical history on file. Social History:   reports that he has been smoking cigarettes. He has a 40.00 pack-year smoking history. He has never used smokeless tobacco. He reports current alcohol use. He reports that he does not currently use drugs. Family History:   History reviewed. No pertinent family history. Vitals:  /69   Pulse 72   Temp 97.8 °F (36.6 °C) (Oral)   Resp 22   Ht 5' 7\" (1.702 m)   Wt 195 lb 5.2 oz (88.6 kg)   SpO2 94%   BMI 30.59 kg/m²   Temp (24hrs), Av.1 °F (36.7 °C), Min:97.7 °F (36.5 °C), Max:98.7 °F (37.1 °C)      No results for input(s): POCGLU in the last 72 hours. I/O(24Hr):     Intake/Output Summary (Last 24 hours) at 2023 0742  Last data filed at 2023 0500  Gross per 24 hour   Intake 4058.48 ml   Output 1400 ml   Net 2658.48 ml       Labs:    CBC:   Lab Results   Component Value Date/Time    WBC 5.3 2023 04:27 AM    RBC 5.80 2023 04:27 AM    HGB 11.4 2023 04:27 AM    HCT 40.0 2023 04:27 AM    MCV 68.9 2023 04:27 AM    MCH 19.7 2023 04:27 AM    MCHC 28.5 2023 04:27 AM    RDW 20.5 2023 04:27 AM    PLT 66 2023 04:27 AM    MPV 9.1 2023 04:27 AM     CMP:    Lab Results   Component Value Date/Time     01/05/2023 04:27 AM    K 3.4 01/05/2023 04:27 AM     01/05/2023 04:27 AM    CO2 30 01/05/2023 04:27 AM    BUN 11 01/05/2023 04:27 AM    CREATININE 0.58 01/05/2023 04:27 AM    LABGLOM >60 01/05/2023 04:27 AM    GLUCOSE 84 01/05/2023 04:27 AM    PROT 5.0 01/05/2023 04:27 AM    LABALBU 2.9 01/05/2023 04:27 AM    CALCIUM 7.9 01/05/2023 04:27 AM    BILITOT 0.8 01/05/2023 04:27 AM    ALKPHOS 53 01/05/2023 04:27 AM     01/05/2023 04:27 AM     01/05/2023 04:27 AM     PT/INR:    Lab Results   Component Value Date/Time    PROTIME 20.3 01/05/2023 04:27 AM    INR 1.7 01/05/2023 04:27 AM       No results found for: SPECIAL  Lab Results   Component Value Date/Time    CULTURE CULTURE IN PROGRESS 01/03/2023 12:12 PM         Radiology:    CT HEAD WO CONTRAST    Result Date: 1/2/2023  EXAMINATION: CT OF THE HEAD WITHOUT CONTRAST  1/2/2023 10:11 pm TECHNIQUE: CT of the head was performed without the administration of intravenous contrast. Automated exposure control, iterative reconstruction, and/or weight based adjustment of the mA/kV was utilized to reduce the radiation dose to as low as reasonably achievable. COMPARISON: None. HISTORY: ORDERING SYSTEM PROVIDED HISTORY: Altered Mental status TECHNOLOGIST PROVIDED HISTORY: Altered Mental status Reason for Exam: Altered Mental status Additional signs and symptoms: Patient came in with transient alteration of awareness, follow up head CT for any changes. FINDINGS: BRAIN/VENTRICLES: There is no acute intracranial hemorrhage, mass effect or midline shift. No abnormal extra-axial fluid collection. There is a focal area of decreased attenuation with loss of gray/white matter differentiation involving posterior left parietal lobe with somewhat increased conspicuity as compared to prior exam.  There is stable small focus of encephalomalacia involving left cerebellar hemisphere compatible with prior remote infarct/insult.   Chronic lacunar infarct to right basal ganglia redemonstrated. There is no evidence of hydrocephalus. ORBITS: The visualized portion of the orbits demonstrate no acute abnormality. SINUSES: Small air-fluid level right sphenoid sinus. Trace air-fluid level right frontal sinus. Mild mucoperiosteal thickening to bilateral ethmoid air cells. Findings are new from prior exam and likely relate to presence of nasogastric tube. SOFT TISSUES/SKULL:  No acute abnormality of the visualized skull or soft tissues. Focal area of decreased attenuation with loss of gray/white matter differentiation involving posterior left parietal lobe with somewhat increased conspicuity as compared to prior exam likely reflecting an area of acute to subacute infarct. Stable small chronic infarct/insult to left cerebellar hemisphere. Chronic lacunar infarct to right basal ganglia redemonstrated. Paranasal sinus disease likely relating to presence of nasogastric tube. CT HEAD WO CONTRAST    Result Date: 1/1/2023  EXAMINATION: CT OF THE HEAD WITHOUT CONTRAST  1/1/2023 10:48 pm TECHNIQUE: CT of the head was performed without the administration of intravenous contrast. Automated exposure control, iterative reconstruction, and/or weight based adjustment of the mA/kV was utilized to reduce the radiation dose to as low as reasonably achievable. COMPARISON: None. HISTORY: Reason for Exam: AMS Additional signs and symptoms: the patient has been getting more and more confused since 2 weeks ago. It has progressively been getting worse and today FINDINGS: BRAIN/VENTRICLES: There is no acute intracranial hemorrhage, mass effect or midline shift. No abnormal extra-axial fluid collection. The gray-white differentiation is maintained without evidence of an acute infarct. There is no evidence of hydrocephalus. Changes of mild chronic small vessel ischemic disease. ORBITS: The visualized portion of the orbits demonstrate no acute abnormality.  SINUSES: The visualized paranasal sinuses and mastoid air cells demonstrate no acute abnormality. SOFT TISSUES/SKULL:  No acute abnormality of the visualized skull or soft tissues. No acute intracranial abnormality. Mild chronic small vessel ischemic disease. US LIVER    Result Date: 1/2/2023  EXAMINATION: RIGHT UPPER QUADRANT ULTRASOUND 1/2/2023 10:20 am COMPARISON: None. HISTORY: ORDERING SYSTEM PROVIDED HISTORY: elevated AST and ALT, in setting of PT, and INR TECHNOLOGIST PROVIDED HISTORY: elevated AST and ALT, in setting of PT, and INR FINDINGS: Patient body habitus limits the study, as there is increased attenuation of the ultrasound beam. This decreases sensitivity and specificity. LIVER:  There is mild increased echogenicity seen throughout the liver. No intrahepatic ductal dilatation. No perihepatic fluid. BILIARY SYSTEM:  Bladder is contracted. Gallstones are seen. Gallbladder wall thickness measures 6 mm. Common bile duct is within normal limits measuring 5 mm. RIGHT KIDNEY: The right kidney is grossly unremarkable without evidence of hydronephrosis. PANCREAS:  Visualized portions of the pancreas are unremarkable. OTHER: No evidence of right upper quadrant ascites. Portal vein flow appears hepatopetal     Increased echogenicity is seen throughout the liver suggesting diffuse hepatocellular disease such as fatty infiltration Cholelithiasis. No cholecystitis     XR CHEST PORTABLE    Result Date: 1/2/2023  EXAMINATION: ONE XRAY VIEW OF THE CHEST 1/2/2023 3:15 pm COMPARISON: 01/01/2023 HISTORY: ORDERING SYSTEM PROVIDED HISTORY: intubation TECHNOLOGIST PROVIDED HISTORY: intubation Reason for Exam: intubation FINDINGS: Overlying items external to the patient somewhat limit evaluation. Placement of endotracheal tube with tip 8.2 cm from the robert. Placement of enteric tube seen to extend into the stomach, distal extent not included in field of view.  Persistent likely layering right pleural effusion, similar to slightly worsened. Persistent bilateral airspace opacities to bilateral mid to lower lung zones, right worse than left, similar on the left but mildly worsened on the right. No pneumothoraces are seen. Persistent likely bullous change to the left upper lung zone. Cardiac and mediastinal silhouettes are stable. Stable appearance to bony structures. Placement of endotracheal tube which appears high riding with tip 8.2 cm from the robert. Suggest advancement by 2-3 cm. Placement of endotracheal tube seen to extend into the stomach, distal extent not included in field of view. No pneumothoraces. Persistent likely bullous change to the left upper lung zone. Persistent right pleural effusion, similar to slightly worsened. Persistent bilateral airspace opacities, right worse than left, similar on the left but mildly worsened on the right. XR CHEST PORTABLE    Result Date: 1/1/2023  EXAMINATION: ONE XRAY VIEW OF THE CHEST 1/1/2023 7:17 pm COMPARISON: None. HISTORY: ORDERING SYSTEM PROVIDED HISTORY: ams TECHNOLOGIST PROVIDED HISTORY: ams Reason for Exam: Altered mental status, difficulty breathing Additional signs and symptoms: Altered mental status, difficulty breathing Relevant Medical/Surgical History: Altered mental status, difficulty breathing FINDINGS: Large lucent area in the left apex suggesting a large bulla however superimposed pneumothorax cannot be excluded. The cardiac size is normal. Right lower lobe airspace infiltrate and mild right pleural effusion. . Pulmonary vascularity appears normal. There is mild ectasia of the thoracic aorta. Calcific tendinitis of the right shoulder. No acute bony abnormalities. The hilar structures are normal.     Right lower lobe pneumonia and small right pleural effusion Large lucent area in the left apex suggesting a large bulla however superimposed pneumothorax cannot be excluded. Follow-up CT would be useful for further evaluation.  The findings were sent to the Radiology Results Communication Center at 10:31 pm on 1/1/2023 to be communicated to a licensed caregiver. EKG:    there are no previous tracings available for comparison. Physical Examination:        PHYSICAL EXAM:  General Appearance  Intubated and sedated. Psych: NA  HEENT - Head is normocephalic, atraumatic. Neck: supple, no rigidity, normal ROM  Lungs - Course lung sound on the right side. No wheezes appreciated. Limited exam due to patient position and intubation. Inability to perform posterior lung field exam.  Cardiovascular - Heart sounds are normal.  Regular rhythm, normal rate without murmur, gallop or rub. Neurology:  patient today moves his head in response to name being called. He moves his limbs spontaneously. Follows commands, squeeze fingers and wiggle toes. Patient grimaces to pain. Abdomen - Soft, nontender, nondistended, no masses or organomegaly  Skin - No bruising or bleeding on exposed skin area  Extremities - No cyanosis, clubbing or edema  Assessment:        Primary Problem  Acute respiratory failure with hypoxia and hypercapnia ([address]    Active Hospital Problems    Diagnosis Date Noted    Prolonged pt (prothrombin time) [R79.1] 01/03/2023     Priority: Medium    Emphysema lung ([address] [J43.9] 01/03/2023     Priority: Medium    Cerebral infarction ([address] [I63.9] 01/03/2023     Priority: Medium    Transaminitis [R74.01] 01/02/2023     Priority: Medium    Microcytic anemia [D64.9] 01/02/2023     Priority: Medium    Thrombocytopenia ([address] [D69.6] 01/02/2023     Priority: Medium    Agitation [R45.1] 01/02/2023     Priority: Medium    Acute respiratory failure with hypoxia and hypercapnia (HCC) RLL pneumonia [J96.01, J96.02] 01/02/2023     Priority: Medium    Altered mental status [R41.82] 01/02/2023     Priority: Medium    Community acquired pneumonia of right lower lobe of lung [J18.9] 01/02/2023     Priority: Medium       Plan:         Altered Mental Status in the setting of Acute type 2 respiratory failure secondary to Pneumonia Vs Acute Heart Failure  -Chest Xray suggestive of RLL pneumonia and possible presence of Bullae in the left Lung [place]- Patient intubated on Fio2 35, saturating 96%. On propofol  -CT WO and MRI showing multiple infarcts of acute nature. -CTA without LVO, showing leptomeningeal enhancement which can be seen in Subarachnoid hemorrhage vs encephalitis vs meningitis  -EEG showing Diffuse slowing.  -No plan for LP at this moment as INR is elevated. -ID consulted  -Cefepime changed to Rocephin to provide meningeal coverage, cont vanco and metronidazole. -Ipratropium-Albuterol every 4 hrs  -Echocardiogam completed     Elevated troponins most likely 2/2 demand ischemia  -Troponin trending down  -no acute changes on EKG  -ECHO showing normal LVF. Mild tricuspid regurge. RV pressure of 25 mmHG    Microcytic anemia  -Hb upon presentation is 12.1, this AM to 11.4  -No previous labs to compare?  -Iron: 14, TIBC: 412 and Ferritin:14  -Started on Iron IV replacement  -Fibrin products>5 but <20  -Fibrinogen 98, was 141 on 01/2/2023     Transaminitis with prolonged PTT and INR  -  and  this AM  -INR and PTT 20.3 and 1.7 respectively  -Thrombocytopenic of 54  on admission vs 66 now   -Liver showing:   Increased echogenicity is seen throughout the liver suggesting diffuse hepatocellular    disease such as fatty infiltration   -Hepatitis C reactive. Pending Hcv PCR  -Hem/Onc on board;   A-Monitor paraproteinemia   B-possible coagulopathy to do liver vs DIC. C-No schistocytosis     5. Multiple acute brain infarcts   -Patient started on Aspirin 81 mg  -MRI showing multiple small infarct which are of acute nature  -CTA without LVO, showing leptomeningeal enhancement which can be seen in Subarachnoid hemorrhage vs encephalitis vs meningitis  -EEG showing diffuse slowing without epileptic waves.   -Aspirin HELD       Thrombocytopenia  -Unknown  baseline  -Transaminates and Prolonged PT and PTT  -PLT count  66   -Monitor HH closely  -Hold Lovenox      7. Asymptomatic Bacteruria  -Patient on Rocephin and Vancomycin already    DVT prophylaxis: reason for no prophylaxis: Prolonged PT, aPTT  GI prophylaxis: Protonix 40 mg daily    Lorena Saavedra MD  1/5/2023  7:42 AM     Attestation and add on       I have discussed the care of Josie Sauceda , including pertinent history and exam findings,      1/5/23    with the resident. I have seen and examined the patient and the key elements of all parts of the encounter have been performed by me . I agree with the assessment, plan and orders as documented by the resident. MD DELFIN Dove[address], [address] [place] [address].    Phone [phone]   Fax: [phone]  Answering Service: [phone]

## 2023-01-05 NOTE — PROGRESS NOTES
Patient attempting to hit, pinch and spit at [address] Dr Sameer Kamara aware and restraints can continue.

## 2023-01-05 NOTE — PROGRESS NOTES
[address]   OCCUPATIONAL THERAPY MISSED TREATMENT NOTE   INPATIENT   Date: 23  Patient Name: Edyta Thomas       Room:   MRN: [id number]   Account #: [de-identified]    : 1960  (63 y.o.)  Gender: male                 REASON FOR MISSED TREATMENT:  Patient unable to participate   -    Pt currently intubated and sedated. Occupational therapy will continue to follow.            Eliza Tejeda OT

## 2023-01-05 NOTE — PROCEDURES
[address]                 [address] [facility], [address]                          ELECTROENCEPHALOGRAM REPORT    PATIENT NAME: Jase Reyes                   :        1960  MED REC NO:   [id number]                              ROOM:         ACCOUNT NO:   [de-identified]                           ADMIT DATE: 2023  PROVIDER:     Ela Madison MD    DATE OF EE2023    ATTENDING OF RECORD:  Dr. Kala Dennis. REASON FOR STUDY:  This is a 77-year-old gentleman presenting with acute  mental status changes, respiratory failure. MEDICATIONS:  Include Zofran, Mucinex, Protonix, cefepime. EEG FINDINGS:  This is a 23-channel EEG and one EKG channel recording  performed on a patient described to be on the ventilator on sedation. The patient shows delayed rhythms. No waking rhythm activity is seen. Background activity consists of moderate degree of medium amplitude 4-7  Hz activity admixed with lesser degree 2-3 Hz activity seen throughout  all head areas. This is furthermore interspersed by intermittent  spindle activity in beta and alpha frequency, which are seen  interspersed throughout all head areas. No clear lateralized or  epileptiform disturbance is seen. Hyperventilation and photic  stimulation not performed. The patient does have reactivity to noxious  stimulus. IMPRESSION:  This EEG shows the presence of moderate diffuse slowing. This EEG is concordant with diffuse encephalopathy. No clear  lateralized or epileptiform disturbance is seen.         Jurgen Gross MD    D: 2023 18:05:43       T: 2023 18:08:03     [email or web address]  Job#: [id number]     Doc#: [id number]    CC:

## 2023-01-05 NOTE — PROGRESS NOTES
Patient angry and spitting at [address] [facility]. Wife at bedside and patient is being mean to her. He at one time told me to \"get the fuck out\".

## 2023-01-05 NOTE — PLAN OF CARE
Problem: Discharge Planning  Goal: Discharge to home or other facility with appropriate resources  1/4/2023 1952 by Anh Faith RN  Outcome: Progressing  1/4/2023 1941 by Jerad Encinas RN  Outcome: Progressing  1/4/2023 0616 by Allen Rose RN  Outcome: Progressing     Problem: Safety - Adult  Goal: Free from fall injury  1/4/2023 1952 by Anh Faith RN  Outcome: Progressing  1/4/2023 1941 by Jerad Encinas RN  Outcome: Progressing  1/4/2023 0616 by Allen Rose RN  Outcome: Progressing     Problem: ABCDS Injury Assessment  Goal: Absence of physical injury  1/4/2023 1952 by Anh Faith RN  Outcome: Progressing  1/4/2023 1941 by Jerad Encinas RN  Outcome: Progressing  1/4/2023 0616 by Allen Rose RN  Outcome: Progressing     Problem: Skin/Tissue Integrity  Goal: Absence of new skin breakdown  Description: 1. Monitor for areas of redness and/or skin breakdown  2. Assess vascular access sites hourly  3. Every 4-6 hours minimum:  Change oxygen saturation probe site  4. Every 4-6 hours:  If on nasal continuous positive airway pressure, respiratory therapy assess nares and determine need for appliance change or resting period. 1/4/2023 1952 by Anh Faith RN  Outcome: Progressing  1/4/2023 1941 by Jerad Encinas RN  Outcome: Progressing  1/4/2023 0616 by Allen Rose RN  Outcome: Progressing     Problem: Safety - Medical Restraint  Goal: Remains free of injury from restraints (Restraint for Interference with Medical Device)  Description: INTERVENTIONS:  1. Determine that other, less restrictive measures have been tried or would not be effective before applying the restraint  2. Evaluate the patient's condition at the time of restraint application  3. Inform patient/family regarding the reason for restraint  4.  Q2H: Monitor safety, psychosocial status, comfort, nutrition and hydration  1/4/2023 1952 by Anh Faith RN  Outcome: Progressing  1/4/2023 1941 by Shwetha Jolly RN  Outcome: Progressing  1/4/2023 0616 by Farzana Mcqueen RN  Outcome: Progressing  Flowsheets (Taken 1/3/2023 1800 by Viola Todd RN)  Remains free of injury from restraints (restraint for interference with medical device):   Determine that other, less restrictive measures have been tried or would not be effective before applying the restraint   Evaluate the patient's condition at the time of restraint application     Problem: Pain  Goal: Verbalizes/displays adequate comfort level or baseline comfort level  1/4/2023 1952 by Pelon Dumont RN  Outcome: Progressing  1/4/2023 1941 by Shwetha Jolly RN  Outcome: Progressing  1/4/2023 0616 by Farzana Mcqueen RN  Outcome: Progressing     Problem: Nutrition Deficit:  Goal: Optimize nutritional status  1/4/2023 1952 by Pelon Dumont RN  Outcome: Progressing  1/4/2023 1941 by Shwetha Jolly RN  Outcome: Progressing

## 2023-01-05 NOTE — PROGRESS NOTES
Department of Internal Medicine  Nephrology [facility], MD  Progress Note    Reason for consultation: Management of acute kidney injury. Consulting physician: Jerry Pereira MD.    Interval history:   Patient was seen and examined today in the intensive care unit. He remains intubated and on ventilator support. Renal function has improved and he is nonoliguric. He received CT angiogram of the chest which ruled out pulmonary embolism; dilated main pulmonary artery suggestive of pulmonary hypertension as well as increased RV to LV ratio suggestive of right heart failure. There was incidental finding of moderate right and small left pleural effusion with partial collapse of the right lower lobe. NG tube is draining fluid  Plan to start tube feeding  Serum sodium was 146, potassium 3.4 serum creatinine 0.5  proBNP 7000    History of present illness: This is a 60 y.o. male with no significant past medical history [no regular physician follow-up], who was brought to the emergency department via EMS for further evaluation of progressively worsening confusion and lethargy of 2 weeks duration. Patient's spouse said that he became progressively confused prompting her to call EMS on day of presentation 1/1/2023. When EMS arrived, patient was found to be obtunded with oxygen saturation 75% on room air and he was placed on a nonrebreather mask and given a dose of IV Lasix as he also had severe bilateral leg swelling. Patient was placed on BiPAP on arrival to the emergency department and was admitted to the intensive care unit. Initial systolic blood pressure was 106 mmHg and serum creatinine 1.77 mg/dL associated with elevated AST and ALT. On admission to the intensive care unit patient required endotracheal intubation for airway protection and treatment of acute respiratory failure. Nephrology consultation has been placed for management of acute kidney injury.  He is currently on Levophed drip at 3 mcg/min. CT scan of the brain performed at presentation showed no acute intracranial abnormality but with mild chronic small vessel ischemic disease. Repeat CT scan performed 24 hours later [1/2/2023] showed focal area of decreased attenuation with loss of gray/white matter differentiation involving posterior left lobe with somewhat increased conspicuity as compared to prior exam likely reflecting an area of acute to subacute stroke. Patient has no known allergies. History reviewed. No pertinent past medical history. Scheduled Meds:   potassium bicarb-citric acid  20 mEq Oral Daily    miconazole   Topical BID    vancomycin  1,250 mg IntraVENous Q12H    cefTRIAXone (ROCEPHIN) IV  2,000 mg IntraVENous Q12H    [Held by provider] aspirin  81 mg Oral Daily    iron sucrose  300 mg IntraVENous Q24H    sodium chloride flush  5-40 mL IntraVENous 2 times per day    nicotine  1 patch TransDERmal Daily    pantoprazole (PROTONIX) 40 mg injection  40 mg IntraVENous Daily    metroNIDAZOLE  500 mg IntraVENous Q8H    vancomycin (VANCOCIN) intermittent dosing (placeholder)   Other RX Placeholder    ipratropium-albuterol  1 ampule Inhalation Q4H    sodium chloride  1,000 mL IntraVENous Once     Continuous Infusions:   IV infusion builder 50 mL/hr at 01/05/23 2297    sodium chloride      norepinephrine Stopped (01/05/23 1000)    propofol 20 mcg/kg/min (01/05/23 1050)     PRN Meds:.perflutren lipid microspheres, sodium chloride flush, sodium chloride flush, sodium chloride flush, sodium chloride, ondansetron **OR** ondansetron, polyethylene glycol, acetaminophen **OR** acetaminophen, albuterol, guaiFENesin    History reviewed. No pertinent family history.      Social History     Socioeconomic History    Marital status: Single     Spouse name: None    Number of children: None    Years of education: None    Highest education level: None   Tobacco Use    Smoking status: Every Day     Packs/day: 1.00     Years: 40.00     Pack years: 40.00     Types: Cigarettes    Smokeless tobacco: Never   Substance and Sexual Activity    Alcohol use: Yes     Comment: beer and scotch    Drug use: Not Currently     Comment: over 20 years (stated by daughter)     Review of systems: Not obtainable as patient is intubated and on ventilator support. Physical Exam:    VITALS:  /67   Pulse 72   Temp 97.6 °F (36.4 °C) (Axillary)   Resp 22   Ht 5' 7\" (1.702 m)   Wt 195 lb 5.2 oz (88.6 kg)   SpO2 98%   BMI 30.59 kg/m²   TEMPERATURE:  Current - Temp: 97.6 °F (36.4 °C); Max - Temp  Av.8 °F (36.6 °C)  Min: 97.6 °F (36.4 °C)  Max: 97.9 °F (36.6 °C)  RESPIRATIONS RANGE: Resp  Av.1  Min: 22  Max: 25  PULSE RANGE: Pulse  Av.4  Min: 59  Max: 82  BLOOD PRESSURE RANGE:  Systolic (84AZI), SXQ:926 , Min:86 , EGP:257 ; Diastolic (53GIX), LDX:20, Min:50, Max:87  PULSE OXIMETRY RANGE: SpO2  Av.9 %  Min: 90 %  Max: 100 %  24HR INTAKE/OUTPUT:    Intake/Output Summary (Last 24 hours) at 2023 1205  Last data filed at 2023 0500  Gross per 24 hour   Intake 4028.48 ml   Output 1400 ml   Net 2628.48 ml     VENT SETTINGS:   Vent Information  Ventilator Initiate: Yes  Vent Mode: AC/PRVC  Additional Respiratory Assessments  Heart Rate: 72  Resp: 22  SpO2: 98 %  End Tidal CO2: 36 (%)  Humidification Source: HME  Circuit Condensation: Not drained    Constitutional:  intubated and sedated. Skin: Skin color, texture, turgor normal. No rashes or lesions    Head: Normocephalic, without obvious abnormality, atraumatic     Cardiovascular/Edema: regular rate and rhythm, S1, S2 normal, no murmur, click, rub or gallop    Respiratory: Lungs:  Coarse breath sounds bilaterally    Abdomen: soft, non-tender; bowel sounds normal; no masses,  no organomegaly    Back: symmetric, no curvature. ROM normal. No CVA tenderness. Extremities: edema +    Neuro:   sedated.     CBC:   Recent Labs     23  0427 23  0904 23  0427   WBC 5.7 6.0 5.3   HGB 11.0* 10.8* 11.4*   PLT 66* 75* 66*     BMP:    Recent Labs     01/03/23  0427 01/04/23  0904 01/05/23 0427    143 146*   K 3.7 3.3* 3.4*    107 110*   CO2 30 29 30   BUN 32* 16 11   CREATININE 0.97 0.64* 0.58*   GLUCOSE 91 85 84     Lab Results   Component Value Date/Time    NITRU NEGATIVE 01/02/2023 03:34 PM    COLORU Dark Yellow 01/02/2023 03:34 PM    PHUR 5.0 01/02/2023 03:34 PM    WBCUA 10 TO 20 01/02/2023 03:34 PM    RBCUA TOO NUMEROUS TO COUNT 01/02/2023 03:34 PM    BACTERIA FEW 01/01/2023 10:25 PM    SPECGRAV 1.015 01/02/2023 03:34 PM    LEUKOCYTESUR SMALL 01/02/2023 03:34 PM    UROBILINOGEN Normal 01/02/2023 03:34 PM    BILIRUBINUR NEGATIVE 01/02/2023 03:34 PM    GLUCOSEU NEGATIVE 01/02/2023 03:34 PM    KETUA TRACE 01/02/2023 03:34 PM     MRI of the brain performed without contrast on 1/3/2023 showed:  Small acute infarcts involving the left cerebellar hemisphere and left   parietal lobe. Small subacute infarct within the left cerebellar hemisphere. Moderate bilateral subarachnoid hemorrhage within both cerebral hemispheres   which is of uncertain etiology. The hemorrhage is more apparent on MRI than   on previous head CT. Old right caudate nucleus lacune. Small bilateral mastoid effusions and moderate left paranasal sinus disease. IMPRESSION/RECOMMENDATIONS:      1. Acute kidney injury - most consistent with prerenal azotemia and/or ischemic acute tubular necrosis from hypotension. Renal function is improved and serum creatinine is down to 0.64 mg/dL today. Plan: Change IV fluid to 0.45 normal saline with 20 mEq of potassium chloride per liter at 50 mill per hour. Monitor urine output closely. Keep mean arterial pressure greater than 65 mmHg. Check basic metabolic profile daily. 2.  Hypokalemia - secondary to poor intake. On half-normal saline with 20 M EQ potassium chloride  Give IV KCl 20 M EQ extra    3.   Hypernatremia due to dehydration-start free water with tube feeds 200 every 6    3. Hypotension -blood pressure improved        Prognosis is guarded.     Layla Galvan MD   Attending Nephrologist  1/5/2023 12:05 PM

## 2023-01-05 NOTE — PROGRESS NOTES
Order obtained for extubation. SpO2 of 95 on 40% FiO2. Patient extubated and placed on 5 liters/min via nasal cannula. Post extubation SpO2 is 90% with HR  101 bpm and RR 16 breaths/min. Patient had strong cough that was productive of clear  and yellow sputum. Extubation was well tolerated by the patient.    Breath Sounds: clear, diminished    Daxa Caldera  1:46PM

## 2023-01-05 NOTE — PLAN OF CARE
Problem: Discharge Planning  Goal: Discharge to home or other facility with appropriate resources  1/4/2023 1941 by Jerad Encinas RN  Outcome: Progressing  1/4/2023 0616 by Allen Rose RN  Outcome: Progressing     Problem: Safety - Adult  Goal: Free from fall injury  1/4/2023 1941 by Jerad Encinas RN  Outcome: Progressing  1/4/2023 0616 by Allen Rose RN  Outcome: Progressing     Problem: ABCDS Injury Assessment  Goal: Absence of physical injury  1/4/2023 1941 by Jerad Encinas RN  Outcome: Progressing  1/4/2023 0616 by Allen Rose RN  Outcome: Progressing     Problem: Skin/Tissue Integrity  Goal: Absence of new skin breakdown  Description: 1. Monitor for areas of redness and/or skin breakdown  2. Assess vascular access sites hourly  3. Every 4-6 hours minimum:  Change oxygen saturation probe site  4. Every 4-6 hours:  If on nasal continuous positive airway pressure, respiratory therapy assess nares and determine need for appliance change or resting period. 1/4/2023 1941 by eJrad Encinas RN  Outcome: Progressing  1/4/2023 0616 by Allen Rose RN  Outcome: Progressing     Problem: Safety - Medical Restraint  Goal: Remains free of injury from restraints (Restraint for Interference with Medical Device)  Description: INTERVENTIONS:  1. Determine that other, less restrictive measures have been tried or would not be effective before applying the restraint  2. Evaluate the patient's condition at the time of restraint application  3. Inform patient/family regarding the reason for restraint  4.  Q2H: Monitor safety, psychosocial status, comfort, nutrition and hydration  1/4/2023 1941 by Jerad Encinas RN  Outcome: Progressing  1/4/2023 0616 by Allen Rose RN  Outcome: Progressing  Flowsheets (Taken 1/3/2023 1800 by Thalia White RN)  Remains free of injury from restraints (restraint for interference with medical device):   Determine that other, less restrictive measures have been tried or would not be effective before applying the restraint   Evaluate the patient's condition at the time of restraint application     Problem: Pain  Goal: Verbalizes/displays adequate comfort level or baseline comfort level  1/4/2023 1941 by Divya Reynoso RN  Outcome: Progressing  1/4/2023 0616 by Griselda Banuelos RN  Outcome: Progressing     Problem: Nutrition Deficit:  Goal: Optimize nutritional status  Outcome: Progressing

## 2023-01-05 NOTE — PROGRESS NOTES
Vancomycin Dosing by Pharmacy - Daily Note   Vancomycin Therapy Day:  4  Indication: MRSA pneumonia     Allergies:  Patient has no known allergies. Actual Weight:    Wt Readings from Last 1 Encounters:   01/05/23 195 lb 5.2 oz (88.6 kg)       Labs/Ancillary Data  Estimated Creatinine Clearance: 138 mL/min (A) (based on SCr of 0.58 mg/dL (L)). Recent Labs     01/03/23  0427 01/04/23  0904 01/05/23  0427   CREATININE 0.97 0.64* 0.58*   BUN 32* 16 11   WBC 5.7 6.0 5.3     Procalcitonin   Date Value Ref Range Status   01/02/2023 0.09 (H) <0.09 ng/mL Final     Comment:           Suspected Sepsis:  <0.50 ng/mL     Low likelihood of sepsis. 0.50-2.00 ng/mL     Increased likelihood of sepsis. Antibiotics encouraged. >2.00 ng/mL     High risk of sepsis/shock. Antibiotics strongly encouraged. Suspected Lower Resp Tract Infections:  <0.24 ng/mL     Low likelihood of bacterial infection. >0.24 ng/mL     Increased likelihood of bacterial infection. Antibiotics encouraged. With successful antibiotic therapy, PCT levels should decrease rapidly. (Half-life of 24 to   36 hours.)        Procalcitonin values from samples collected within the first 6 hours of systemic infection   may still be low. Retesting may be indicated. Values from day 1 and day 4 can be entered into the Change in Procalcitonin Calculator   (www.[employer]-pct-calculator. Gather App) to determine the patient's Mortality Risk Prognosis        In healthy neonates, plasma Procalcitonin (PCT) concentrations increase gradually after   birth, reaching peak values at about 24 hours of age then decrease to normal values below   0.5 ng/mL by 48-72 hours of age.          Intake/Output Summary (Last 24 hours) at 1/5/2023 0826  Last data filed at 1/5/2023 0500  Gross per 24 hour   Intake 4028.48 ml   Output 1400 ml   Net 2628.48 ml     Temp: 97.8    Culture Date / Source  /  Results  See micro   Recent vancomycin administrations                     vancomycin (VANCOCIN) 1,250 mg in dextrose 5 % 250 mL IVPB (ADDAVIAL) (mg) 1,250 mg New Bag 01/05/23 0448     1,250 mg New Bag 01/04/23 1703    vancomycin 1000 mg IVPB in 250 mL D5W addavial (mg) 1,000 mg New Bag 01/04/23 0232     1,000 mg New Bag 01/03/23 1149    vancomycin (VANCOCIN) 2,000 mg in dextrose 5 % 500 mL IVPB (mg) 2,000 mg New Bag 01/02/23 1643                    Vancomycin Concentrations:   TROUGH:  No results for input(s): VANCOTROUGH in the last 72 hours. RANDOM:    Recent Labs     01/04/23  0904   VANCORANDOM 16.1       MRSA Nasal Swab: showed MRSA positive result on 01/03 . PLAN     Continue current dose of 1250 mg q12h IV  Ensured BUN/sCr ordered at baseline and every 48 hours x at least 3 levels, then at least weekly. Repeat vancomycin concentration ordered for 01/06 @ 0600   Pharmacy will continue to monitor patient and adjust therapy as indicated      Vancomycin Target Concentration Parameters  Treatment  Population Target AUC/KENDALL Target Trough   Invasive MRSA Infection (bacteremia, pneumonia, meningitis, endocarditis, osteomyelitis)  Sepsis (undifferentiated) 400-600 N/A   Infection due to non-MRSA pathogen  Empiric treatment of non-invasive MRSA infection  (SSTI, UTI) <500 10-15 mg/L   CrCl < 29 mL/min  Rapidly fluctuating serum creatinine   KENTRELL N/A < 15 mg/L     Renal replacement therapy is dosed by levels, per hospital protocol. Abbreviations  * Pauc: probability that AUC is >400 (efficacy); Pconc: probability that Ctrough is above 20 ?g/mL (toxicity); Tox: Probability of nephrotoxicity, based on Ricky et al. Clin Infect Dis 2009. Loading dose: N/A  Regimen: 1250 mg IV every 12 hours. Start time: 16:48 on 01/05/2023  Exposure target: AUC24 (range)400-600 mg/L.hr   AUC24,ss: 442 mg/L.hr  Probability of AUC24 > 400: 67 %  Ctrough,ss: 12.9 mg/L  Probability of Ctrough,ss > 20: 11 %  Probability of nephrotoxicity (Ricky ELIGIO 2009): 8 %    Thank you for the consult.   Pharmacy will continue to follow.     Jony Floyd, PharmD, BCPS  1/5/2023 8:29 AM

## 2023-01-05 NOTE — PROGRESS NOTES
Comprehensive Nutrition Assessment    Type and Reason for Visit:  Consult (TF ordering and management)    Nutrition Recommendations/Plan:   Start Vital AF at 20 ml increase to 60 ml, follow for tolerance. Malnutrition Assessment:  Malnutrition Status:  Insufficient data ([id number])    Context:  Acute Illness     Findings of the 6 clinical characteristics of malnutrition:  Energy Intake:  Unable to assess  Weight Loss:  Unable to assess     Body Fat Loss:  Unable to assess     Muscle Mass Loss:  Unable to assess    Fluid Accumulation:  Mild Extremities   Strength:  Not Performed    Nutrition Assessment:    Pt remains intubated with Propofol at 19.7 ml providin kcals. To start Vital AF at 20 ml increase to 60 ml to provide: 1728 kcals, 105 gm protein. Sodium up to146 with MD ordering 200 ml water flushes every 6 hours. Nutrition Related Findings:    Edema: +2 pitting BLE's. Hx: Constant hearburn, smoker, alcohol use but not daily per SO. Labs & meds reviewed. MRI done showing multiple small infarcts which are of a acute nature. EEG done 1-4. Wound Type: None       Current Nutrition Intake & Therapies:    Average Meal Intake: NPO     Diet NPO  ADULT TUBE FEEDING; Nasogastric; Peptide Based; Continuous; 20; Yes; 15; Q 4 hours; 60; 200; Q 6 hours  Current Tube Feeding (TF) Orders:  Feeding Route: Nasogastric  Formula: Peptide Based  Schedule: Continuous  Feeding Regimen: 20 ml to 60 ml  Additives/Modulars: None  Water Flushes: 200 ml every 6 hours (MD orders)  Goal TF & Flush Orders Provides: 1728 kcals, 105 gm protein    Anthropometric Measures:  Height: 5' 7\" (170.2 cm)  Ideal Body Weight (IBW): 148 lbs (67 kg)    Admission Body Weight: 188 lb (85.3 kg)  Current Body Weight: 195 lb (88.5 kg) (increase in edema), 127 % IBW. Weight Source: Bed Scale  Current BMI (kg/m2): 30.5                          BMI Categories: Overweight (BMI 25.0-29. 9)    Estimated Daily Nutrient Needs:  Energy Requirements Based On: Kcal/kg  Weight Used for Energy Requirements: Admission  Energy (kcal/day): 2145 kcals based on 25 kcals/kg  Weight Used for Protein Requirements: Ideal  Protein (g/day): 101-114 gm protein based on 1.5-1.7 gm/kg      Nutrition Diagnosis:   Inadequate oral intake related to cognitive or neurological impairment, impaired respiratory function as evidenced by NPO or clear liquid status due to medical condition, intubation    Nutrition Interventions:   Food and/or Nutrient Delivery: Continue NPO, Start Tube Feeding  Nutrition Education/Counseling: No recommendation at this time  Coordination of Nutrition Care: Continue to monitor while inpatient       Goals:  Previous Goal Met: Progressing toward Goal(s)  Goals: Meet at least 75% of estimated needs       Nutrition Monitoring and Evaluation:   Behavioral-Environmental Outcomes: None Identified  Food/Nutrient Intake Outcomes: Enteral Nutrition Intake/Tolerance  Physical Signs/Symptoms Outcomes: Biochemical Data, Fluid Status or Edema, Hemodynamic Status, Nutrition Focused Physical Findings, Skin, Weight    Discharge Planning:     Too soon to determine     Hernan Estrada, 65 [address],   [phone]

## 2023-01-05 NOTE — CARE COORDINATION
ONGOING DISCHARGE PLAN:     Patient is intubated on vent FIO2 40%. Pt is from home with his GF. Per Dtr Pearl Diana yesterday patient is normally independent. Lists for SNF and VNS left at bedside for dtr , spoke to [email or web address] about options . Will need to readdress needs once off vent. IV rocpehin  Flagyl, Vanco, IV venofer, aerosols. Levophed gtt at 2mcgs, propofol gtt. Starting tube feeding today . EEG done today. Neuro consult.  K+3.4, BUN 11 Creat 0.58     HGB 11.4 HCT 40.0 PLT 66     Will continue to follow for additional discharge needs.     Electronically signed by Caitie Esteban RN on 1/5/2023 at 10:59 AM

## 2023-01-05 NOTE — PROGRESS NOTES
Active problem cerebral multi infarct state . Respiratory failure . The condition is he is improved lethargic on ventilator on IV diprivan following commands lifting all limbs equally . There is respiratory failure with community acquired pneumonia . MRSA positive . He is on rocephin 2 grams IVPB q 12 hours , vancomycin 1250 mg IVPB q 12 hours , flagyl 500 mg IVPB q 12 hours. INR 1.7 along with elevated liver enzymes felt to be from liver disease , platelets 66 k with alcohol history. MRI of Head with small acute infarctions in left cerebellum and left parietal lobe and right frontal lobe . Moderate bilateral subarachnoid hemorrhage with both cerebral hemispheres of uncertain etiology . Small bilateral mastoid effusions and left paranasal sinus disease . CTA of Head with no large vessel occlusion . Diffuse leptomeningeal enhancement seen in subarachnoid hemorrhage , encephalitis or meningitis  EEG moderate diffuse slowing. Patient is admitted with increasing confusion for one week not acting right not being himself with slurring of speech along with confusion . He is found to have hypoxia with CXR showing right lower lobe pneumonia with small pleural effusion with possible large bullae left lung apex . Patient was placed on nasal BIPAP now on ventilator on IV diprivan sedation . Nurse reports that he is responsive off sedation moving all limbs equally . 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 There right centrum ovale and left parietal occipital hypodensities concordant with stroke unclear age . He does have history of alcohol use . Platelet count 67 k felt to be by hematology from alcohol ,  ,  . INR 2.3 on no anticoagulation . UA small leukocyte esterase . 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 EEG moderate diffuse slowing. MRI of Head with small acute infarctions in left cerebellum , left parietal lobe and right frontal lobe .  Moderate bilateral subarachnoid hemorrhage with both cerebral hemispheres of uncertain etiology . Small bilateral mastoid effusions and left paranasal sinus disease . CTA of Head with no large vessel occlusion . Diffuse leptomeningeal enhancement seen in subarachnoid hemorrhage , encephalitis or meningitis . Cardiac 2 D echo normal LVF EF> 55 % . Mild TR      History reviewed. No pertinent past medical history. History reviewed. No pertinent surgical history. History reviewed. No pertinent family history.     Social History     Socioeconomic History    Marital status: Single     Spouse name: None    Number of children: None    Years of education: None    Highest education level: None   Tobacco Use    Smoking status: Every Day     Packs/day: 1.00     Years: 40.00     Pack years: 40.00     Types: Cigarettes    Smokeless tobacco: Never   Substance and Sexual Activity    Alcohol use: Yes     Comment: beer and scotch    Drug use: Not Currently     Comment: over 20 years (stated by daughter)       Current Facility-Administered Medications   Medication Dose Route Frequency Provider Last Rate Last Admin    miconazole (MICOTIN) 2 % powder   Topical BID Refugia Edgardoes, APRN - NP   Given at 01/04/23 2036    potassium chloride 20 mEq in sodium chloride 0.45 % 1,000 mL infusion   IntraVENous Continuous Kendy Kumari MD 50 mL/hr at 01/04/23 1316 New Bag at 01/04/23 1316    vancomycin (VANCOCIN) 1,250 mg in dextrose 5 % 250 mL IVPB (ADDAVIAL)  1,250 mg IntraVENous Q12H Lashell Martinez MD   Stopped at 01/05/23 0639    cefTRIAXone (ROCEPHIN) 2,000 mg in sodium chloride 0.9 % 50 mL IVPB mini-bag  2,000 mg IntraVENous Q12H Aliza Grewal  mL/hr at 01/05/23 0804 2,000 mg at 01/05/23 0804    perflutren lipid microspheres (DEFINITY) injection 1.5 mL  1.5 mL IntraVENous ONCE PRN Carolina Colbert MD        [facility] AT [place] by provider] aspirin chewable tablet 81 mg  81 mg Oral Daily Aliza Grewal MD   81 mg at 01/04/23 0740    sodium chloride flush 0.9 % injection 10 mL  10 mL IntraVENous PRN Roscoe Smallwood MD   10 mL at 01/03/23 1459    iron sucrose (VENOFER) 300 mg in sodium chloride 0.9 % 250 mL IVPB  300 mg IntraVENous Q24H Stephen Cain MD   Stopped at 01/04/23 1832    sodium chloride flush 0.9 % injection 10 mL  10 mL IntraVENous PRN Hermann Sharif MD   10 mL at 01/03/23 1836    sodium chloride flush 0.9 % injection 5-40 mL  5-40 mL IntraVENous 2 times per day ALAYNA Andre - NP   10 mL at 01/04/23 2036    sodium chloride flush 0.9 % injection 5-40 mL  5-40 mL IntraVENous PRN ALAYNA Marrero NP        0.9 % sodium chloride infusion   IntraVENous PRN Harris Maciel APRN - NP        ondansetron (ZOFRAN-ODT) disintegrating tablet 4 mg  4 mg Oral Q8H PRN ALAYNA Andre - NP        Or    ondansetron (ZOFRAN) injection 4 mg  4 mg IntraVENous Q6H PRN Harris Maciel APRN - NP        polyethylene glycol (GLYCOLAX) packet 17 g  17 g Oral Daily PRN Harris Maciel APRN - NP        acetaminophen (TYLENOL) tablet 650 mg  650 mg Oral Q6H PRN Harris Maciel APRN - NP        Or    acetaminophen (TYLENOL) suppository 650 mg  650 mg Rectal Q6H PRN Harris Maciel APRN - NP        nicotine (NICODERM CQ) 21 MG/24HR 1 patch  1 patch TransDERmal Daily ALAYNA Andre - NP   1 patch at 01/05/23 0802    albuterol (PROVENTIL) nebulizer solution 2.5 mg  2.5 mg Nebulization Q2H PRN Harris Maciel APRN - NP   2.5 mg at 01/02/23 1115    guaiFENesin (MUCINEX) extended release tablet 600 mg  600 mg Oral BID PRN Harris Maciel APRN - NP        pantoprazole (PROTONIX) 40 mg in sodium chloride (PF) 0.9 % 10 mL injection  40 mg IntraVENous Daily Roscoe Smallwood MD   40 mg at 01/05/23 0802    metronidazole (FLAGYL) 500 mg in 0.9% NaCl 100 mL IVPB premix  500 mg IntraVENous Aiden Espinal MD   Stopped at 01/05/23 5146    vancomycin (VANCOCIN) intermittent dosing (placeholder)   Other RX Placeholder Roscoe Smallwood MD        norepinephrine (LEVOPHED) 16 mg in dextrose 5 % 250 mL infusion  1-30 mcg/min IntraVENous Continuous Ambar Mcdonald MD 1.9 mL/hr at 01/04/23 1901 2 mcg/min at 01/04/23 1901    ipratropium-albuterol (DUONEB) nebulizer solution 1 ampule  1 ampule Inhalation Q4H Hurley Gilford, MD   1 ampule at 01/05/23 0756    propofol injection  5-50 mcg/kg/min IntraVENous Continuous Hurley Gilford, MD 19.7 mL/hr at 01/05/23 0511 40 mcg/kg/min at 01/05/23 0511    0.9 % sodium chloride bolus  1,000 mL IntraVENous Once Hurley Gilford, MD           No Known Allergies    ROS:   On ventilator on sedation     Vitals:    01/05/23 0757   BP:    Pulse: 75   Resp: 22   Temp:    SpO2: 96%     Admission weight: 180 lb 12.4 oz (82 kg)    Neurological Examination on ventilator on diprivan   Constitutional .General exam well groomed   Head/ Ears /Nose/Throat/external ear . Normal exam  Neck and thyroid . Normal size. No bruits  Cardiovascular: Auscultation of heart with regular rate and rhythm   Musculoskeletal. Muscle bulk and tone normal                                                           Muscle strength mild withdrawal of all limbs equally                                                                             No tremor   Orientation Obtunded grimacing to noxious stimulation not following commands   Cranial nerve 2 no visual threat  Cranial nerve 3, 4 and 6 . Extraocular muscles are intact . Pupils are equal and reactive   Cranial nerve 5 . Intact corneal reflex. Cranial nerve 7 normal exam   Cranial nerve 8. Grossly intact hearing   Cranial nerve 9 and 10. Symmetric palate elevation   Cranial nerve 11 , 5 out of 5 strength   Cranial Nerve 12 midline tongue . No atrophy  Sensation . Equal withdrawal all limbs   Deep Tendon Reflexes symmetrical   Plantar response flexor equivocal     Assessment :    Encephalopathy . Cerebral multi infarct state . Meningeal process subarachnoid hemorrhage versus infection . Respiratory failure     Plan:     Will change cefepime to rocephin 2 grams IVPB for meningeal coverage . ID consultation . Can not get LP with elevated INR .  CASSI to rule out SBE

## 2023-01-06 LAB
ABSOLUTE BANDS #: 0.05 K/UL (ref 0–1)
ABSOLUTE EOS #: 0 K/UL (ref 0–0.4)
ABSOLUTE LYMPH #: 0.21 K/UL (ref 1–4.8)
ABSOLUTE MONO #: 0.26 K/UL (ref 0.1–1.3)
ALBUMIN SERPL-MCNC: 2.6 G/DL (ref 3.5–5.2)
ALP BLD-CCNC: 46 U/L (ref 40–129)
ALT SERPL-CCNC: 152 U/L (ref 5–41)
ANION GAP SERPL CALCULATED.3IONS-SCNC: 4 MMOL/L (ref 9–17)
AST SERPL-CCNC: 74 U/L
BANDS: 1 % (ref 0–10)
BASOPHILS # BLD: 0 % (ref 0–2)
BASOPHILS ABSOLUTE: 0 K/UL (ref 0–0.2)
BILIRUB SERPL-MCNC: 0.9 MG/DL (ref 0.3–1.2)
BUN BLDV-MCNC: 7 MG/DL (ref 8–23)
CALCIUM SERPL-MCNC: 7.5 MG/DL (ref 8.6–10.4)
CHLORIDE BLD-SCNC: 111 MMOL/L (ref 98–107)
CO2: 29 MMOL/L (ref 20–31)
CREAT SERPL-MCNC: 0.42 MG/DL (ref 0.7–1.2)
EOSINOPHILS RELATIVE PERCENT: 0 % (ref 0–4)
FIBRINOGEN: 109 MG/DL (ref 210–530)
GFR SERPL CREATININE-BSD FRML MDRD: >60 ML/MIN/1.73M2
GLUCOSE BLD-MCNC: 111 MG/DL (ref 70–99)
HCT VFR BLD CALC: 38.4 % (ref 41–53)
HEMOGLOBIN: 10.5 G/DL (ref 13.5–17.5)
INR BLD: 1.7
LYMPHOCYTES # BLD: 4 % (ref 24–44)
MCH RBC QN AUTO: 19.7 PG (ref 26–34)
MCHC RBC AUTO-ENTMCNC: 27.4 G/DL (ref 31–37)
MCV RBC AUTO: 72 FL (ref 80–100)
MONOCYTES # BLD: 5 % (ref 1–7)
MORPHOLOGY: ABNORMAL
PDW BLD-RTO: 20.3 % (ref 11.5–14.9)
PLATELET # BLD: 71 K/UL (ref 150–450)
PMV BLD AUTO: 9.3 FL (ref 6–12)
POTASSIUM SERPL-SCNC: 4 MMOL/L (ref 3.7–5.3)
PROTHROMBIN TIME: 20.1 SEC (ref 11.8–14.6)
RBC # BLD: 5.33 M/UL (ref 4.5–5.9)
SEG NEUTROPHILS: 90 % (ref 36–66)
SEGMENTED NEUTROPHILS ABSOLUTE COUNT: 4.68 K/UL (ref 1.3–9.1)
SODIUM BLD-SCNC: 144 MMOL/L (ref 135–144)
TOTAL PROTEIN: 4.6 G/DL (ref 6.4–8.3)
VANCOMYCIN RANDOM DATE LAST DOSE: NORMAL
VANCOMYCIN RANDOM DOSE AMOUNT: 1250
VANCOMYCIN RANDOM TIME LAST DOSE: 546
VANCOMYCIN RANDOM: 20.3 UG/ML
WBC # BLD: 5.2 K/UL (ref 3.5–11)

## 2023-01-06 PROCEDURE — 6370000000 HC RX 637 (ALT 250 FOR IP): Performed by: INTERNAL MEDICINE

## 2023-01-06 PROCEDURE — 99232 SBSQ HOSP IP/OBS MODERATE 35: CPT | Performed by: PSYCHIATRY & NEUROLOGY

## 2023-01-06 PROCEDURE — 6360000002 HC RX W HCPCS

## 2023-01-06 PROCEDURE — 36430 TRANSFUSION BLD/BLD COMPNT: CPT

## 2023-01-06 PROCEDURE — 6360000002 HC RX W HCPCS: Performed by: INTERNAL MEDICINE

## 2023-01-06 PROCEDURE — 2000000000 HC ICU R&B

## 2023-01-06 PROCEDURE — 99291 CRITICAL CARE FIRST HOUR: CPT | Performed by: INTERNAL MEDICINE

## 2023-01-06 PROCEDURE — 86927 PLASMA FRESH FROZEN: CPT

## 2023-01-06 PROCEDURE — 2580000003 HC RX 258

## 2023-01-06 PROCEDURE — A4216 STERILE WATER/SALINE, 10 ML: HCPCS

## 2023-01-06 PROCEDURE — 80202 ASSAY OF VANCOMYCIN: CPT

## 2023-01-06 PROCEDURE — P9012 CRYOPRECIPITATE EACH UNIT: HCPCS

## 2023-01-06 PROCEDURE — 6370000000 HC RX 637 (ALT 250 FOR IP): Performed by: NURSE PRACTITIONER

## 2023-01-06 PROCEDURE — 99232 SBSQ HOSP IP/OBS MODERATE 35: CPT | Performed by: INTERNAL MEDICINE

## 2023-01-06 PROCEDURE — 2580000003 HC RX 258: Performed by: INTERNAL MEDICINE

## 2023-01-06 PROCEDURE — 94761 N-INVAS EAR/PLS OXIMETRY MLT: CPT

## 2023-01-06 PROCEDURE — 99233 SBSQ HOSP IP/OBS HIGH 50: CPT | Performed by: INTERNAL MEDICINE

## 2023-01-06 PROCEDURE — C9113 INJ PANTOPRAZOLE SODIUM, VIA: HCPCS

## 2023-01-06 PROCEDURE — 85610 PROTHROMBIN TIME: CPT

## 2023-01-06 PROCEDURE — 94640 AIRWAY INHALATION TREATMENT: CPT

## 2023-01-06 PROCEDURE — 2700000000 HC OXYGEN THERAPY PER DAY

## 2023-01-06 PROCEDURE — 36415 COLL VENOUS BLD VENIPUNCTURE: CPT

## 2023-01-06 PROCEDURE — 85384 FIBRINOGEN ACTIVITY: CPT

## 2023-01-06 PROCEDURE — 85025 COMPLETE CBC W/AUTO DIFF WBC: CPT

## 2023-01-06 PROCEDURE — 2580000003 HC RX 258: Performed by: NURSE PRACTITIONER

## 2023-01-06 PROCEDURE — 80053 COMPREHEN METABOLIC PANEL: CPT

## 2023-01-06 RX ORDER — NYSTATIN 100000 U/G
OINTMENT TOPICAL 2 TIMES DAILY
Status: DISCONTINUED | OUTPATIENT
Start: 2023-01-06 | End: 2023-01-19 | Stop reason: HOSPADM

## 2023-01-06 RX ORDER — DEXMEDETOMIDINE HYDROCHLORIDE 4 UG/ML
.1-1.5 INJECTION, SOLUTION INTRAVENOUS CONTINUOUS PRN
Status: DISCONTINUED | OUTPATIENT
Start: 2023-01-06 | End: 2023-01-19 | Stop reason: HOSPADM

## 2023-01-06 RX ORDER — SODIUM CHLORIDE 9 MG/ML
INJECTION, SOLUTION INTRAVENOUS PRN
Status: DISCONTINUED | OUTPATIENT
Start: 2023-01-06 | End: 2023-01-19 | Stop reason: HOSPADM

## 2023-01-06 RX ORDER — NYSTATIN 100000 U/G
CREAM TOPICAL 2 TIMES DAILY
Status: DISCONTINUED | OUTPATIENT
Start: 2023-01-06 | End: 2023-01-06

## 2023-01-06 RX ADMIN — SODIUM CHLORIDE, PRESERVATIVE FREE 40 MG: 5 INJECTION INTRAVENOUS at 09:22

## 2023-01-06 RX ADMIN — WATER 10 MG: 1 INJECTION INTRAMUSCULAR; INTRAVENOUS; SUBCUTANEOUS at 09:21

## 2023-01-06 RX ADMIN — ZIPRASIDONE MESYLATE 10 MG: 20 INJECTION, POWDER, LYOPHILIZED, FOR SOLUTION INTRAMUSCULAR at 21:20

## 2023-01-06 RX ADMIN — VANCOMYCIN HYDROCHLORIDE 1250 MG: 1.25 INJECTION, POWDER, LYOPHILIZED, FOR SOLUTION INTRAVENOUS at 04:28

## 2023-01-06 RX ADMIN — ANTI-FUNGAL POWDER MICONAZOLE NITRATE TALC FREE: 1.42 POWDER TOPICAL at 09:23

## 2023-01-06 RX ADMIN — SODIUM CHLORIDE 3000 MG: 900 INJECTION INTRAVENOUS at 20:40

## 2023-01-06 RX ADMIN — IPRATROPIUM BROMIDE AND ALBUTEROL SULFATE 1 AMPULE: 2.5; .5 SOLUTION RESPIRATORY (INHALATION) at 14:51

## 2023-01-06 RX ADMIN — VANCOMYCIN HYDROCHLORIDE 1250 MG: 1.25 INJECTION, POWDER, LYOPHILIZED, FOR SOLUTION INTRAVENOUS at 18:46

## 2023-01-06 RX ADMIN — IPRATROPIUM BROMIDE AND ALBUTEROL SULFATE 1 AMPULE: 2.5; .5 SOLUTION RESPIRATORY (INHALATION) at 11:08

## 2023-01-06 RX ADMIN — SODIUM CHLORIDE, PRESERVATIVE FREE 10 ML: 5 INJECTION INTRAVENOUS at 09:24

## 2023-01-06 RX ADMIN — SODIUM CHLORIDE 3000 MG: 900 INJECTION INTRAVENOUS at 01:48

## 2023-01-06 RX ADMIN — IPRATROPIUM BROMIDE AND ALBUTEROL SULFATE 1 AMPULE: 2.5; .5 SOLUTION RESPIRATORY (INHALATION) at 20:55

## 2023-01-06 RX ADMIN — SODIUM CHLORIDE, PRESERVATIVE FREE 10 ML: 5 INJECTION INTRAVENOUS at 20:53

## 2023-01-06 RX ADMIN — SODIUM CHLORIDE 3000 MG: 900 INJECTION INTRAVENOUS at 09:21

## 2023-01-06 RX ADMIN — SODIUM CHLORIDE 3000 MG: 900 INJECTION INTRAVENOUS at 14:51

## 2023-01-06 RX ADMIN — IPRATROPIUM BROMIDE AND ALBUTEROL SULFATE 1 AMPULE: 2.5; .5 SOLUTION RESPIRATORY (INHALATION) at 06:11

## 2023-01-06 ASSESSMENT — PAIN SCALES - GENERAL: PAINLEVEL_OUTOF10: 0

## 2023-01-06 NOTE — PROGRESS NOTES
Department of Internal Medicine  Nephrology [facility], MD  Progress Note    Reason for consultation: Management of acute kidney injury. Consulting physician: Nabila Wick MD.      Interval hx/Subjective    Patient seen and examined in ICU, extubated, confused and physical restraints  Serum creatinine remained stable within normal limits  Serum sodium has improved to 144  No acute events overnight    Interval history:   Patient was seen and examined today in the intensive care unit. He remains intubated and on ventilator support. Renal function has improved and he is nonoliguric. He received CT angiogram of the chest which ruled out pulmonary embolism; dilated main pulmonary artery suggestive of pulmonary hypertension as well as increased RV to LV ratio suggestive of right heart failure. There was incidental finding of moderate right and small left pleural effusion with partial collapse of the right lower lobe. NG tube is draining fluid  Plan to start tube feeding  Serum sodium was 146, potassium 3.4 serum creatinine 0.5  proBNP 7000    History of present illness: This is a 60 y.o. male with no significant past medical history [no regular physician follow-up], who was brought to the emergency department via EMS for further evaluation of progressively worsening confusion and lethargy of 2 weeks duration. Patient's spouse said that he became progressively confused prompting her to call EMS on day of presentation 1/1/2023. When EMS arrived, patient was found to be obtunded with oxygen saturation 75% on room air and he was placed on a nonrebreather mask and given a dose of IV Lasix as he also had severe bilateral leg swelling. Patient was placed on BiPAP on arrival to the emergency department and was admitted to the intensive care unit. Initial systolic blood pressure was 106 mmHg and serum creatinine 1.77 mg/dL associated with elevated AST and ALT.   On admission to the intensive care unit patient required endotracheal intubation for airway protection and treatment of acute respiratory failure. Nephrology consultation has been placed for management of acute kidney injury. He is currently on Levophed drip at 3 mcg/min. CT scan of the brain performed at presentation showed no acute intracranial abnormality but with mild chronic small vessel ischemic disease. Repeat CT scan performed 24 hours later [1/2/2023] showed focal area of decreased attenuation with loss of gray/white matter differentiation involving posterior left lobe with somewhat increased conspicuity as compared to prior exam likely reflecting an area of acute to subacute stroke. Patient has no known allergies. History reviewed. No pertinent past medical history. Scheduled Meds:   potassium bicarb-citric acid  20 mEq Oral Daily    ampicillin-sulbactam  3,000 mg IntraVENous Q6H    miconazole   Topical BID    vancomycin  1,250 mg IntraVENous Q12H    [Held by provider] aspirin  81 mg Oral Daily    sodium chloride flush  5-40 mL IntraVENous 2 times per day    nicotine  1 patch TransDERmal Daily    pantoprazole (PROTONIX) 40 mg injection  40 mg IntraVENous Daily    vancomycin (VANCOCIN) intermittent dosing (placeholder)   Other RX Placeholder    ipratropium-albuterol  1 ampule Inhalation Q4H    sodium chloride  1,000 mL IntraVENous Once     Continuous Infusions:   sodium chloride      dexmedetomidine      IV infusion builder 50 mL/hr at 01/05/23 2065    sodium chloride       PRN Meds:.sodium chloride, ziprasidone (GEODON) in sterile water injection, dexmedetomidine, perflutren lipid microspheres, sodium chloride flush, sodium chloride flush, sodium chloride flush, sodium chloride, ondansetron **OR** ondansetron, polyethylene glycol, acetaminophen **OR** acetaminophen, albuterol, guaiFENesin    History reviewed. No pertinent family history.      Social History     Socioeconomic History    Marital status: Single     Spouse name: None Number of children: None    Years of education: None    Highest education level: None   Tobacco Use    Smoking status: Every Day     Packs/day: 1.00     Years: 40.00     Pack years: 40.00     Types: Cigarettes    Smokeless tobacco: Never   Substance and Sexual Activity    Alcohol use: Yes     Comment: beer and scotch    Drug use: Not Currently     Comment: over 20 years (stated by daughter)         Physical Exam:    VITALS:  /60   Pulse 86   Temp 98.4 °F (36.9 °C) (Axillary)   Resp 21   Ht 5' 7\" (1.702 m)   Wt 196 lb 3.4 oz (89 kg)   SpO2 90%   BMI 30.73 kg/m²   TEMPERATURE:  Current - Temp: 98.4 °F (36.9 °C); Max - Temp  Av.4 °F (36.9 °C)  Min: 97 °F (36.1 °C)  Max: 98.9 °F (37.2 °C)  RESPIRATIONS RANGE: Resp  Av.8  Min: 13  Max: 26  PULSE RANGE: Pulse  Av.9  Min: 69  Max: 116  BLOOD PRESSURE RANGE:  Systolic (41OVG), RVU:253 , Min:82 , UCO:590 ; Diastolic (18YKT), ZPS:02, Min:50, Max:110  PULSE OXIMETRY RANGE: SpO2  Av.5 %  Min: 85 %  Max: 98 %  24HR INTAKE/OUTPUT:    Intake/Output Summary (Last 24 hours) at 2023 1205  Last data filed at 2023 0546  Gross per 24 hour   Intake 1780.31 ml   Output 1000 ml   Net 780.31 ml       Constitutional: Awake alert, responsive not in acute distress    Skin: Skin color, texture, turgor normal. No rashes or lesions    Head: Normocephalic, without obvious abnormality, atraumatic     Cardiovascular/Edema: regular rate and rhythm, S1, S2 normal, no murmur, click, rub or gallop    Respiratory: Lungs:  Coarse breath sounds bilaterally    Abdomen: soft, non-tender; bowel sounds normal; no masses,  no organomegaly    Back: symmetric, no curvature. ROM normal. No CVA tenderness.     Extremities: edema +    Neuro: Nonfocal    CBC:   Recent Labs     23  0904 23  0427 23  0419   WBC 6.0 5.3 5.2   HGB 10.8* 11.4* 10.5*   PLT 75* 66* 71*     BMP:    Recent Labs     23  0904 23  0427 23  0419    146* 144   K 3.3* 3. 4* 4.0    110* 111*   CO2 29 30 29   BUN 16 11 7*   CREATININE 0.64* 0.58* 0.42*   GLUCOSE 85 84 111*     Lab Results   Component Value Date/Time    NITRU NEGATIVE 01/02/2023 03:34 PM    COLORU Dark Yellow 01/02/2023 03:34 PM    PHUR 5.0 01/02/2023 03:34 PM    WBCUA 10 TO 20 01/02/2023 03:34 PM    RBCUA TOO NUMEROUS TO COUNT 01/02/2023 03:34 PM    BACTERIA FEW 01/01/2023 10:25 PM    SPECGRAV 1.015 01/02/2023 03:34 PM    LEUKOCYTESUR SMALL 01/02/2023 03:34 PM    UROBILINOGEN Normal 01/02/2023 03:34 PM    BILIRUBINUR NEGATIVE 01/02/2023 03:34 PM    GLUCOSEU NEGATIVE 01/02/2023 03:34 PM    KETUA TRACE 01/02/2023 03:34 PM     MRI of the brain performed without contrast on 1/3/2023 showed:  Small acute infarcts involving the left cerebellar hemisphere and left   parietal lobe. Small subacute infarct within the left cerebellar hemisphere. Moderate bilateral subarachnoid hemorrhage within both cerebral hemispheres   which is of uncertain etiology. The hemorrhage is more apparent on MRI than   on previous head CT. Old right caudate nucleus lacune. Small bilateral mastoid effusions and moderate left paranasal sinus disease. IMPRESSION/RECOMMENDATIONS:      1. Acute kidney injury - most consistent with prerenal azotemia and/or ischemic acute tubular necrosis from hypotension. Renal function is improved and serum creatinine is down to 0.4 mg/dL today. 2.  Hypokalemia -improved    3. Hypernatremia due to dehydration-improved    3. Hypotension -blood pressure improved    Plan  Taper IV fluids, signs of peripheral edema, patient is in positive fluid balance of 4.8 L since admission  Replace electrolytes per sliding scale    Will follow on as-needed basis    Prognosis is guarded.     Sherie Medrano MD   Attending Nephrologist  1/6/2023 12:05 PM

## 2023-01-06 NOTE — PROGRESS NOTES
HPI/Hospital course: This is a 59 y/o WM with reported prior alcoholism with residual liver failure who presented with altered mental status and who was subsequently dx'd with pneumonia. An initial head CT revealed old as well as possibly new strokes which were confirmed on MRI along with some probable SAH. He has just been extubated and nursing reports that he has been intermittently agitated and aggressive but no focal findings were reported. I did speak with his family in the room and they state that he has not been drinking ETOH recently. Blood cultures have been negative to date. History reviewed. No pertinent past medical history. History reviewed. No pertinent surgical history. History reviewed. No pertinent family history.     Social History     Socioeconomic History    Marital status: Single     Spouse name: None    Number of children: None    Years of education: None    Highest education level: None   Tobacco Use    Smoking status: Every Day     Packs/day: 1.00     Years: 40.00     Pack years: 40.00     Types: Cigarettes    Smokeless tobacco: Never   Substance and Sexual Activity    Alcohol use: Yes     Comment: beer and scotch    Drug use: Not Currently     Comment: over 20 years (stated by daughter)       Current Facility-Administered Medications   Medication Dose Route Frequency Provider Last Rate Last Admin    0.9 % sodium chloride infusion   IntraVENous PRN Melodie Golden MD        potassium bicarb-citric acid (EFFER-K) effervescent tablet 20 mEq  20 mEq Oral Daily Alon Prieto MD   20 mEq at 01/05/23 1448    ampicillin-sulbactam (UNASYN) 3,000 mg in sodium chloride 0.9 % 100 mL IVPB (mini-bag)  3,000 mg IntraVENous Q6H [place] Xie  mL/hr at 01/06/23 0921 3,000 mg at 01/06/23 0921    dexmedetomidine (PRECEDEX) 400 mcg in sodium chloride 0.9 % 100 mL infusion  0.1-1.5 mcg/kg/hr IntraVENous Continuous Elif Calderon MD 2.2 mL/hr at 01/06/23 0546 0.1 mcg/kg/hr at 01/06/23 0546    miconazole (MICOTIN) 2 % powder   Topical BID Earnie Teofilo, APRN - NP   Given at 01/06/23 0923    potassium chloride 20 mEq in sodium chloride 0.45 % 1,000 mL infusion   IntraVENous Continuous Richard Burgos MD 50 mL/hr at 01/05/23 0822 New Bag at 01/05/23 0822    vancomycin (VANCOCIN) 1,250 mg in dextrose 5 % 250 mL IVPB (ADDAVIAL)  1,250 mg IntraVENous Q12H Ian Gonzalez MD   Stopped at 01/06/23 8721    perflutren lipid microspheres (DEFINITY) injection 1.5 mL  1.5 mL IntraVENous ONCE PRN Joan Dixon MD        [facility] AT [place] by provider] aspirin chewable tablet 81 mg  81 mg Oral Daily Eva Cordova MD   81 mg at 01/04/23 0740    sodium chloride flush 0.9 % injection 10 mL  10 mL IntraVENous PRN Ian Gonzalez MD   10 mL at 01/03/23 1459    sodium chloride flush 0.9 % injection 10 mL  10 mL IntraVENous PRN Eva Cordova MD   10 mL at 01/03/23 1836    sodium chloride flush 0.9 % injection 5-40 mL  5-40 mL IntraVENous 2 times per day Earnie Teofilo, APRN - NP   10 mL at 01/06/23 0924    sodium chloride flush 0.9 % injection 5-40 mL  5-40 mL IntraVENous PRN Abiola Gomez APRN - NP        0.9 % sodium chloride infusion   IntraVENous PRN Earnie Teofilo, APRN - NP        ondansetron (ZOFRAN-ODT) disintegrating tablet 4 mg  4 mg Oral Q8H PRN Earnie Teofilo, APRN - NP        Or    ondansetron (ZOFRAN) injection 4 mg  4 mg IntraVENous Q6H PRN Abiola Gomez, APRN - NP        polyethylene glycol (GLYCOLAX) packet 17 g  17 g Oral Daily PRN Earnie Teofilo, APRN - NP        acetaminophen (TYLENOL) tablet 650 mg  650 mg Oral Q6H PRN Earnie Teofilo, APRN - NP        Or    acetaminophen (TYLENOL) suppository 650 mg  650 mg Rectal Q6H PRN Earnie Teofilo, APRN - NP        nicotine (NICODERM CQ) 21 MG/24HR 1 patch  1 patch TransDERmal Daily Earnie Teofilo, APRN - NP   1 patch at 01/06/23 0922    albuterol (PROVENTIL) nebulizer solution 2.5 mg  2.5 mg Nebulization Q2H PRN ALAYNA Bland NP   2.5 mg at 01/02/23 1115    guaiFENesin (MUCINEX) extended release tablet 600 mg  600 mg Oral BID PRN ALAYNA Santiago - NASRA        pantoprazole (PROTONIX) 40 mg in sodium chloride (PF) 0.9 % 10 mL injection  40 mg IntraVENous Daily Moo Foster MD   40 mg at 01/06/23 0124    vancomycin (VANCOCIN) intermittent dosing (placeholder)   Other RX [place] MD        norepinephrine (LEVOPHED) 16 mg in dextrose 5 % 250 mL infusion  1-30 mcg/min IntraVENous Continuous Amanda Francois MD   Stopped at 01/05/23 0959    ipratropium-albuterol (DUONEB) nebulizer solution 1 ampule  1 ampule Inhalation Q4H Blane Vázquez MD   1 ampule at 01/06/23 0611    0.9 % sodium chloride bolus  1,000 mL IntraVENous Once Blane Vázquez MD           No Known Allergies    ROS:   On ventilator on sedation     Vitals:    01/06/23 0830   BP: 121/72   Pulse: 86   Resp: 17   Temp: 98.4 °F (36.9 °C)   SpO2: 97%     Admission weight: 180 lb 12.4 oz (82 kg)    Neurological exam:  General Exam:  Normal body habitus, no acute distress    HEENT:  Normocephalic, atraumatic  Eyes: conjunctiva non-injected, sclera anicteric  Mucous membranes of normal color and hydration status    Neurologic exam:     Mental status: alert and oriented to person, but not place, he did recognize some family members but not accurately  No overt visuospatial neglect or gaze preference  Language with normal fluency and comprehension but he is refusing to follow some commands    Cranial nerves:  Pupils equal round and reactive to light,   Extraocular movements: conjugate  Face is symmetric to movement   Hearing is intact to conversation  Tongue midline, no dysarthria or dysphonia    Motor exam:  Moving all extremities purposefully and equally                       Assessment : This is a 59 y/o WM with prior alcoholism who presented with AMS in the setting of hypoxia and a pneumonia. A head CT noted some ischemic infarcts and an MRI confirmed this along with SAH. He is overall much improved and has a generally non-focal exam. I see no current reason to invoke meningitis given his appearance but his strokes due admittedly look cardioembolic. I am not particularly concerned about endocarditis given his persistently negative blood cultures but cardiology is on board to consider CASSI but he is much too agitated for this.  He is currently delirious but appears to be improving     Plan:  No further recommendations,   Will see again tomorrow

## 2023-01-06 NOTE — PROGRESS NOTES
[facility]   OCCUPATIONAL THERAPY MISSED TREATMENT NOTE   INPATIENT   Date: 23  Patient Name: Kavita Trevino       Room:   MRN: [id number]   Account #: [de-identified]    : 1960  (63 y.o.)  Gender: male                 REASON FOR MISSED TREATMENT:  Hold treatment per nursing request   -    Hold per RN Sabi Llamas at this time as patient is not currently appropriate for OT/PT eval. Occupational therapy will continue to follow.           Emery Ordoñez OT

## 2023-01-06 NOTE — PROGRESS NOTES
Today's Date: 1/6/2023  Patient Name: Gena Valdez  Date of admission: 1/1/2023  9:55 PM  Patient's age: 60 y.o., 1960  Admission Dx: Acute respiratory failure ([facility] [address] [J96.00]  Acute respiratory failure with hypoxia and hypercapnia ([facility] [address] [J96.01, J96.02]  Altered mental status, unspecified altered mental status type [R41.82]  Community acquired pneumonia of right lower lobe of lung [J18.9]    Reason for Consult: management recommendations  Requesting Physician: Christine Engle MD    CHIEF COMPLAINT: Abnormal cell counts. Altered mental status. Pneumonia. History Obtained From:  spouse, family member. Electronic medical record. Patient unable to give any history due to altered mental status          Interval history:    Patient seen and examined  Labs and vitals reviewed  Patient seen in ICU  Patient is confused and and in physical restraints  No acute event overnight per nurse  Received 1 unit of cryo yesterday  Possible CASSI tomorrow to evaluate for cardioembolic source of stroke. HISTORY OF PRESENT ILLNESS:      The patient is a 60 y.o.  male who is admitted with chief complaint of altered mental status. Patient has not seen a doctor for multiple years. Due to worsening mental status EMS was summoned. Patient oxygen saturation was noted to be at 75%. Patient placed on BiPAP x-ray showed right lower lobe pneumonia. Patient started on antibiotics. Patient also noted to have INR of 2.3 platelet count of 32,371. Patient does have a history of alcohol intake. Patient's ammonia level noted to be 29. Creatinine 1.5. Total bilirubin is within range. Hemoglobin 11.2 with MCV of 69. Platelet count is 17,065. Ultrasound liver done however report is pending.         Past Medical History: Hypertension    Past Surgical History: No pertinent surgical history    Medications:    Prior to Admission medications    Not on File     Current Facility-Administered Medications   Medication Dose Route Frequency Provider Last Rate Last Admin    0.9 % sodium chloride infusion   IntraVENous PRN Gail Arevalo MD        ziprasidone (GEODON) 10 mg in sterile water 0.5 mL injection  10 mg IntraMUSCular Q12H PRN Emmanuel Lombardi MD        dexmedetomidine (PRECEDEX) 400 mcg in sodium chloride 0.9 % 100 mL infusion  0.1-1.5 mcg/kg/hr IntraVENous Continuous PRN Emmanuel Lombardi MD        nystatin (MYCOSTATIN) ointment   Topical BID Barry Woodward MD        potassium bicarb-citric acid (EFFER-K) effervescent tablet 20 mEq  20 mEq Oral Daily Kapil Klein MD   20 mEq at 01/05/23 1448    ampicillin-sulbactam (UNASYN) 3,000 mg in sodium chloride 0.9 % 100 mL IVPB (mini-bag)  3,000 mg IntraVENous Q6H Lissy Patino  mL/hr at 01/06/23 1451 3,000 mg at 01/06/23 1451    [Held by provider] miconazole (MICOTIN) 2 % powder   Topical BID ALAYNA Garcia NP   Given at 01/06/23 0923    potassium chloride 20 mEq in sodium chloride 0.45 % 1,000 mL infusion   IntraVENous Continuous Concetta Naranjo MD 35 mL/hr at 01/06/23 1004 Rate Change at 01/06/23 1004    vancomycin (VANCOCIN) 1,250 mg in dextrose 5 % 250 mL IVPB (ADDAVIAL)  1,250 mg IntraVENous Q12H Yaneth Solano MD   Stopped at 01/06/23 6109    perflutren lipid microspheres (DEFINITY) injection 1.5 mL  1.5 mL IntraVENous ONCE PRN Leanne Santizo MD        [facility] [place] by provider] aspirin chewable tablet 81 mg  81 mg Oral Daily Radha Ross MD   81 mg at 01/04/23 0740    sodium chloride flush 0.9 % injection 10 mL  10 mL IntraVENous PRN Yaneth Solano MD   10 mL at 01/03/23 1459    sodium chloride flush 0.9 % injection 10 mL  10 mL IntraVENous PRN Radha Ross MD   10 mL at 01/03/23 1836    sodium chloride flush 0.9 % injection 5-40 mL  5-40 mL IntraVENous 2 times per day ALAYNA Garcia NP   10 mL at 01/06/23 0924    sodium chloride flush 0.9 % injection 5-40 mL  5-40 mL IntraVENous PRN Aure Rios APRN - NP        0.9 % sodium chloride infusion   IntraVENous PRN Saqib Dilling, APRN - NP        ondansetron (ZOFRAN-ODT) disintegrating tablet 4 mg  4 mg Oral Q8H PRN Saqib Dilling, APRN - NP        Or    ondansetron (ZOFRAN) injection 4 mg  4 mg IntraVENous Q6H PRN Abiola Gomez, APRN - NP        polyethylene glycol (GLYCOLAX) packet 17 g  17 g Oral Daily PRN Saqib Dilling, APRN - NP        acetaminophen (TYLENOL) tablet 650 mg  650 mg Oral Q6H PRN Saqib Dilling, APRN - NP        Or    acetaminophen (TYLENOL) suppository 650 mg  650 mg Rectal Q6H PRN Saqib Dilling, APRN - NP        nicotine (NICODERM CQ) 21 MG/24HR 1 patch  1 patch TransDERmal Daily Saqib Dilling, APRN - NP   1 patch at 01/06/23 0922    albuterol (PROVENTIL) nebulizer solution 2.5 mg  2.5 mg Nebulization Q2H PRN Saqib Dilling, APRN - NP   2.5 mg at 01/02/23 1115    guaiFENesin (MUCINEX) extended release tablet 600 mg  600 mg Oral BID PRN Saqib Dilling, APRN - NP        pantoprazole (PROTONIX) 40 mg in sodium chloride (PF) 0.9 % 10 mL injection  40 mg IntraVENous Daily Moo Foster MD   40 mg at 01/06/23 1307    vancomycin (VANCOCIN) intermittent dosing (placeholder)   Other RX [place] MD        ipratropium-albuterol (DUONEB) nebulizer solution 1 ampule  1 ampule Inhalation Q4H Blane Vázquez MD   1 ampule at 01/06/23 1451    0.9 % sodium chloride bolus  1,000 mL IntraVENous Once Blane Vázquez MD           Allergies:  Patient has no known allergies. Social History:   reports that he has been smoking cigarettes. He has a 40.00 pack-year smoking history. He has never used smokeless tobacco. He reports current alcohol use. He reports that he does not currently use drugs. Family History: Patient not able to give history due to altered mental status    REVIEW OF SYSTEMS:      General: no fever or night sweats, Weight is stable.   ENT: No double or blurred vision, no hearing problem, no dysphagia or sore throat   Respiratory: Positive shortness of breath  Cardiovascular: Denies chest pain, PND or orthopnea. No L E swelling or palpitations. Gastrointestinal:    No nausea or vomiting, abdominal pain, diarrhea or constipation. Genitourinary: Denies dysuria, hematuria, frequency, urgency or incontinence. Neurological: Complains of being very weak. Musculoskeletal:  No arthralgia no back pain or joint swelling. Skin: There are no rashes or bleeding. Psych: Denies hallucinations or intentions to harm self        PHYSICAL EXAM:        BP (!) 144/64   Pulse 94   Temp 98 °F (36.7 °C) (Oral)   Resp 17   Ht 5' 7\" (1.702 m)   Wt 196 lb 3.4 oz (89 kg)   SpO2 90%   BMI 30.73 kg/m²    Temp (24hrs), Av.6 °F (37 °C), Min:98 °F (36.7 °C), Max:98.9 °F (37.2 °C)      General appearance - not in acute distress  Mental status -arousable but somewhat lethargic  Eyes - pupils equal and reactive, extraocular eye movements intact   Ears - bilateral TM's and external ear canals normal   Mouth -mucous membranes moist  Neck - supple, no significant adenopathy   Lymphatics - no palpable lymphadenopathy, no hepatosplenomegaly   Chest -bilateral rales  Heart - normal rate, regular rhythm, normal S1, S2, no murmurs  Abdomen - soft, nontender, nondistended, no masses or organomegaly   Neurological -orally intubated  Musculoskeletal - no joint tenderness, deformity or swelling   Extremities - peripheral pulses normal, no pedal edema, no clubbing or cyanosis   Skin - normal coloration and turgor, no rashes, no suspicious skin lesions noted ,      DATA:      Labs:     Results for orders placed or performed during the hospital encounter of 23   COVID-19 & Influenza Combo    Specimen: Nasopharyngeal Swab   Result Value Ref Range    Specimen Description . NASOPHARYNGEAL SWAB     Source . NASOPHARYNGEAL SWAB     SARS-CoV-2 RNA, RT PCR Not Detected Not Detected    INFLUENZA A Not Detected Not Detected    INFLUENZA B Not Detected Not Detected   Respiratory Panel, Molecular, with COVID-19 (Restricted: peds pts or suitable admitted adults)    Specimen: Nasopharyngeal Swab   Result Value Ref Range    Specimen Description . NASOPHARYNGEAL SWAB     Adenovirus PCR Not Detected Not Detected    Coronavirus 229E PCR Not Detected Not Detected    Coronavirus HKU1 PCR Not Detected Not Detected    Coronavirus NL63 PCR Not Detected Not Detected    Coronavirus OC43 PCR Not Detected Not Detected    SARS-CoV-2, PCR Not Detected Not Detected    Human Metapneumovirus PCR Not Detected Not Detected    Rhino/Enterovirus PCR Not Detected Not Detected    Influenza A by PCR Not Detected Not Detected    Influenza B by PCR Not Detected Not Detected    Parainfluenza 1 PCR Not Detected Not Detected    Parainfluenza 2 PCR Not Detected Not Detected    Parainfluenza 3 PCR Not Detected Not Detected    Parainfluenza 4 PCR Not Detected Not Detected    Resp Syncytial Virus PCR Not Detected Not Detected    Bordetella Parapertussis Not Detected Not Detected    B Pertussis by PCR Not Detected Not Detected    Chlamydia pneumoniae By PCR Not Detected Not Detected    Mycoplasma pneumo by PCR Not Detected Not Detected   Legionella Ag, Ur    Specimen: Urine   Result Value Ref Range    Legionella Pneumophilia Ag, Urine NEGATIVE NEGATIVE   STREP PNEUMONIAE ANTIGEN    Specimen: Urine, indwelling catheter   Result Value Ref Range    Source . CLEAN CATCH URINE     Strep pneumo Ag NEGATIVE    Culture, Blood 1    Specimen: Blood   Result Value Ref Range    Specimen Description . BLOOD LEFT ARM     Culture NO GROWTH 4 DAYS    Culture, Blood 1    Specimen: Blood   Result Value Ref Range    Specimen Description . BLOOD RIGHT ARM     Culture NO GROWTH 4 DAYS    Culture, Urine    Specimen: Urine, clean catch   Result Value Ref Range    Specimen Description . CLEAN CATCH URINE     Culture NO GROWTH    MRSA DNA Probe, Nasal    Specimen: Nasal   Result Value Ref Range    Specimen Description . NASAL SWAB     MRSA, DNA, Nasal (A) NEGATIVE POSITIVE:  MRSA DNA detected by nucleic acid amplification. Culture, Respiratory    Specimen: Sputum Induced   Result Value Ref Range    Specimen Description . INDUCED SPUTUM     Direct Exam >10, <25 NEUTROPHILS/LPF     Direct Exam < 10 EPITHELIAL CELLS/LPF     Direct Exam NO SIGNIFICANT PATHOGENS SEEN     Culture NO GROWTH    Troponin   Result Value Ref Range    Troponin, High Sensitivity 177 (HH) 0 - 22 ng/L   Troponin   Result Value Ref Range    Troponin, High Sensitivity 184 (HH) 0 - 22 ng/L   APTT   Result Value Ref Range    PTT 26.5 24.0 - 36.0 sec   Protime-INR   Result Value Ref Range    Protime 24.9 (H) 11.8 - 14.6 sec    INR 2.3    Phosphorus   Result Value Ref Range    Phosphorus 4.3 2.5 - 4.5 mg/dL   Magnesium   Result Value Ref Range    Magnesium 2.1 1.6 - 2.6 mg/dL   Basic Metabolic Panel   Result Value Ref Range    Glucose 96 70 - 99 mg/dL    BUN 46 (H) 8 - 23 mg/dL    Creatinine 1.77 (H) 0.70 - 1.20 mg/dL    Est, Glom Filt Rate 43 (L) >60 mL/min/1.73m2    Calcium 7.9 (L) 8.6 - 10.4 mg/dL    Sodium 138 135 - 144 mmol/L    Potassium 4.8 3.7 - 5.3 mmol/L    Chloride 97 (L) 98 - 107 mmol/L    CO2 29 20 - 31 mmol/L    Anion Gap 12 9 - 17 mmol/L   CBC with Auto Differential   Result Value Ref Range    WBC 6.8 3.5 - 11.0 k/uL    RBC 5.99 (H) 4.5 - 5.9 m/uL    Hemoglobin 12.1 (L) 13.5 - 17.5 g/dL    Hematocrit 41.4 41 - 53 %    MCV 69.1 (L) 80 - 100 fL    MCH 20.2 (L) 26 - 34 pg    MCHC 29.2 (L) 31 - 37 g/dL    RDW 19.8 (H) 11.5 - 14.9 %    Platelets 55 (L) 275 - 450 k/uL    MPV 8.6 6.0 - 12.0 fL    Seg Neutrophils 79 (H) 36 - 66 %    Lymphocytes 12 (L) 24 - 44 %    Monocytes 8 (H) 1 - 7 %    Eosinophils % 0 0 - 4 %    Basophils 1 0 - 2 %    Segs Absolute 5.40 1.3 - 9.1 k/uL    Absolute Lymph # 0.80 (L) 1.0 - 4.8 k/uL    Absolute Mono # 0.50 0.1 - 1.3 k/uL    Absolute Eos # 0.00 0.0 - 0.4 k/uL    Basophils Absolute 0.10 0.0 - 0.2 k/uL   Blood Gas, Venous   Result Value Ref Range    pH, [place] 7.258 (L) 7.320 - 7.420    pCO2, [place] 63.0 (H) 39.0 - 55.0 mm Hg    pO2, [place] 48.1 30.0 - 50.0 mm Hg    HCO3, Venous 28.1 24.0 - 30.0 mmol/L    Positive Base Excess, [place] 1.0 0.0 - 2.0 mmol/L    O2 Sat, [place] 72.1 60.0 - 85.0 %    Carboxyhemoglobin 4.3 0 - 5 %    Methemoglobin 0.5 0.0 - 1.9 %    Pt Temp 37    Brain Natriuretic Peptide   Result Value Ref Range    Pro-BNP 7,797 (H) <300 pg/mL   Hepatic Function Panel   Result Value Ref Range    Albumin 3.9 3.5 - 5.2 g/dL    Alkaline Phosphatase 68 40 - 129 U/L     (H) 5 - 41 U/L     (H) <40 U/L    Total Bilirubin 1.1 0.3 - 1.2 mg/dL    Bilirubin, Direct 0.9 (H) <0.3 mg/dL    Bilirubin, Indirect 0.2 0.0 - 1.0 mg/dL    Total Protein 6.6 6.4 - 8.3 g/dL   Lipase   Result Value Ref Range    Lipase 17 13 - 60 U/L   Urinalysis with Reflex to Culture    Specimen: Urine   Result Value Ref Range    Color, UA Orange (A) Yellow    Turbidity UA Cloudy (A) Clear    Glucose, Ur NEGATIVE NEGATIVE    Bilirubin Urine NEGATIVE  Verified by ictotest. (A) NEGATIVE    Ketones, Urine TRACE (A) NEGATIVE    Specific Gravity, UA 1.018 1.000 - 1.030    Urine Hgb LARGE (A) NEGATIVE    pH, UA 5.0 5.0 - 8.0    Protein, UA 2+ (A) NEGATIVE    Urobilinogen, Urine ELEVATED (A) Normal    Nitrite, Urine NEGATIVE NEGATIVE    Leukocyte Esterase, Urine SMALL (A) NEGATIVE   Microscopic Urinalysis   Result Value Ref Range    WBC, UA 6 TO 9 /HPF    RBC, UA TOO NUMEROUS TO COUNT /HPF    Casts UA 3 to 5 /LPF    Epithelial Cells UA 0 TO 2 /HPF    Bacteria, UA FEW (A) None   Arterial Blood Gases   Result Value Ref Range    pH, Arterial 7.295 (L) 7.350 - 7.450    pCO2, Arterial 60.3 (HH) 35.0 - 45.0 mmHg    pO2, Arterial 63.0 (L) 80.0 - 100.0 mmHg    HCO3, Arterial 29.3 (H) 22.0 - 26.0 mmol/L    Positive Base Excess, Art 2.8 (H) 0.0 - 2.0 mmol/L    O2 Sat, Arterial 85.8 (LL) 95 - 98 %    Carboxyhemoglobin 3.1 0 - 5 %    Methemoglobin 1.0 0.0 - 1.9 %    Pt Temp 37     O2 Device/Flow/% BIPAP     Respiratory Rate 16 Tyron Test PASS     Sample Site Right Radial Artery     Pt.  Position SEMI-FOWLERS     Mode BIPAP     Text for Respiratory RESULTS TO PHYSICIAN    Comprehensive Metabolic Panel w/ Reflex to MG   Result Value Ref Range    Glucose 101 (H) 70 - 99 mg/dL    BUN 46 (H) 8 - 23 mg/dL    Creatinine 1.50 (H) 0.70 - 1.20 mg/dL    Est, Glom Filt Rate 52 (L) >60 mL/min/1.73m2    Calcium 7.5 (L) 8.6 - 10.4 mg/dL    Sodium 136 135 - 144 mmol/L    Potassium 4.8 3.7 - 5.3 mmol/L    Chloride 99 98 - 107 mmol/L    CO2 27 20 - 31 mmol/L    Anion Gap 10 9 - 17 mmol/L    Alkaline Phosphatase 59 40 - 129 U/L     (H) 5 - 41 U/L     (H) <40 U/L    Total Bilirubin 0.8 0.3 - 1.2 mg/dL    Total Protein 5.9 (L) 6.4 - 8.3 g/dL    Albumin 3.3 (L) 3.5 - 5.2 g/dL   CBC with Auto Differential   Result Value Ref Range    WBC 5.6 3.5 - 11.0 k/uL    RBC 5.57 4.5 - 5.9 m/uL    Hemoglobin 11.2 (L) 13.5 - 17.5 g/dL    Hematocrit 38.8 (L) 41 - 53 %    MCV 69.7 (L) 80 - 100 fL    MCH 20.1 (L) 26 - 34 pg    MCHC 28.8 (L) 31 - 37 g/dL    RDW 19.7 (H) 11.5 - 14.9 %    Platelets 67 (L) 759 - 450 k/uL    MPV 9.1 6.0 - 12.0 fL    Seg Neutrophils 79 (H) 36 - 66 %    Lymphocytes 12 (L) 24 - 44 %    Monocytes 8 (H) 1 - 7 %    Eosinophils % 0 0 - 4 %    Basophils 1 0 - 2 %    nRBC 2 per 100 WBC    Segs Absolute 4.41 1.3 - 9.1 k/uL    Absolute Lymph # 0.67 (L) 1.0 - 4.8 k/uL    Absolute Mono # 0.45 0.1 - 1.3 k/uL    Absolute Eos # 0.00 0.0 - 0.4 k/uL    Basophils Absolute 0.06 0.0 - 0.2 k/uL    Morphology ANISOCYTOSIS PRESENT     Morphology HYPOCHROMIA PRESENT     Morphology 1+ ELLIPTOCYTES    Hemoglobin and Hematocrit   Result Value Ref Range    Hemoglobin 11.1 (L) 13.5 - 17.5 g/dL    Hematocrit 38.7 (L) 41 - 53 %   Hemoglobin and Hematocrit   Result Value Ref Range    Hemoglobin 10.8 (L) 13.5 - 17.5 g/dL    Hematocrit 37.2 (L) 41 - 53 %   Ammonia   Result Value Ref Range    Ammonia 29 16 - 60 umol/L   Hepatitis Panel, Acute   Result Value Ref Range Hepatitis B Surface Ag NONREACTIVE NONREACTIVE    Hepatitis C Ab REACTIVE (A) NONREACTIVE    Hep B Core Ab, IgM NONREACTIVE NONREACTIVE    Hep A IgM NONREACTIVE NONREACTIVE   Iron and TIBC   Result Value Ref Range    Iron 14 (L) 59 - 158 ug/dL    TIBC 426 250 - 450 ug/dL    Iron Saturation 3 (L) 20 - 55 %    UIBC 412 (H) 112 - 347 ug/dL   Ferritin   Result Value Ref Range    Ferritin 14 (L) 30 - 400 ng/mL   Mycoplasma Ab,IgM   Result Value Ref Range    Mycoplasma pneumo IgM 0.25 <0.91   Procalcitonin   Result Value Ref Range    Procalcitonin 0.09 (H) <0.09 ng/mL   C-Reactive Protein   Result Value Ref Range    CRP 16.4 (H) 0.0 - 5.0 mg/L   Fibrin Split Products   Result Value Ref Range    FDP >5 (H) <5 ug/mL   Sedimentation Rate   Result Value Ref Range    Sed Rate 1 0 - 20 mm/Hr   Drug Scr, Abuse, Ur   Result Value Ref Range    Amphetamine Screen, Ur NEGATIVE NEGATIVE    Barbiturate Screen, Ur NEGATIVE NEGATIVE    Benzodiazepine Screen, Urine NEGATIVE NEGATIVE    Cocaine Metabolite, Urine NEGATIVE NEGATIVE    Methadone Screen, Urine NEGATIVE NEGATIVE    Opiates, Urine NEGATIVE NEGATIVE    Phencyclidine, Urine NEGATIVE NEGATIVE    Cannabinoid Scrn, Ur NEGATIVE NEGATIVE    Oxycodone Screen, Ur NEGATIVE NEGATIVE    Fentanyl, Ur NEGATIVE NEGATIVE    Test Information       Assay provides medical screening only. The absence of expected drug(s) and/or metabolite(s) may indicate diluted or adulterated urine, limitations of testing or timing of collection.    Troponin   Result Value Ref Range    Troponin, High Sensitivity 195 (HH) 0 - 22 ng/L   Vitamin B12 & Folate   Result Value Ref Range    Vitamin B-12 1926 (H) 232 - 1245 pg/mL    Folate 10.0 >4.8 ng/mL   HIV Screen   Result Value Ref Range    HIV Ag/Ab NONREACTIVE NONREACTIVE   MONOCLONAL PANEL   Result Value Ref Range    Kappa Free Light Chains QNT 2.79 (H) 0.37 - 1.94 mg/dL    Lambda Free Light Chains QNT 20.89 (H) 0.57 - 2.63 mg/dL    Free Kappa/Lambda Ratio 0.13 (L) 0.26 - 1.65    Serum IFX Interp       A ZONE OF RESTRICTION IS PRESENT IN THE GAMMAGLOBULIN REGION. CONFIRMED BY IMMUNOFIXATION TO BE MONOCLONAL    Pathologist       Reviewed by pathologist:  Kitty Mak. Rudy Burns D.O. Total Protein 5.5 (L) 6.4 - 8.3 g/dL    Albumin (calculated) 3.6 3.2 - 5.2 g/dL    Albumin % 65 45 - 65 %    Alpha-1-Globulin 0.2 0.1 - 0.4 g/dL    Alpha 1 % 3 3 - 6 %    Alpha-2-Globulin 0.4 (L) 0.5 - 0.9 g/dL    Alpha 2 % 7 6 - 13 %    Beta Globulin 0.6 0.5 - 1.1 g/dL    Beta Percent 11 11 - 19 %    Gamma Globulin 0.8 0.5 - 1.5 g/dL    Gamma Globulin % 14 9 - 20 %    Total Prot. Sum 5.6 (L) 6.3 - 8.2 g/dL    Total Prot. Sum,% 100 98 - 102 %    Protein Electrophoresis, Serum       A ZONE OF RESTRICTION IS PRESENT IN THE GAMMAGLOBULIN REGION. CONFIRMED BY IMMUNOFIXATION TO BE MONOCLONAL    Pathologist       Reviewed by pathologist:  Kitty Mak. Rudy Burns D.O. Path Review, Smear   Result Value Ref Range    Pathologist Review ELECTRONICALLY SIGNED.  Jason Lara MD    Reticulocytes   Result Value Ref Range    Retic % 2.8 (H) 0.5 - 2.0 %    Absolute Retic # 0.157 (H) 0.0245 - 0.098 M/uL   Fibrinogen   Result Value Ref Range    Fibrinogen 141 (L) 210 - 530 mg/dL   Ethanol   Result Value Ref Range    Ethanol <10 <10 mg/dL    Ethanol percent <0.010 %   Lactate Dehydrogenase   Result Value Ref Range     (H) 135 - 225 U/L   Hemoglobin and Hematocrit   Result Value Ref Range    Hemoglobin 10.9 (L) 13.5 - 17.5 g/dL    Hematocrit 38.0 (L) 41 - 53 %   Haptoglobin   Result Value Ref Range    Haptoglobin 60 30 - 200 mg/dL   D-Dimer, Quantitative   Result Value Ref Range    D-Dimer, Quant 6.70 (H) 0.00 - 0.59 mg/L FEU   Urinalysis with Reflex to Culture    Specimen: Urine   Result Value Ref Range    Color, UA Dark Yellow (A) Yellow    Turbidity UA Clear Clear    Glucose, Ur NEGATIVE NEGATIVE    Bilirubin Urine NEGATIVE NEGATIVE    Ketones, Urine TRACE (A) NEGATIVE    Specific [place], UA 1.015 1.000 - 1.030    Urine Hgb LARGE (A) NEGATIVE    pH, UA 5.0 5.0 - 8.0    Protein, UA 1+ (A) NEGATIVE    Urobilinogen, Urine Normal Normal    Nitrite, Urine NEGATIVE NEGATIVE    Leukocyte Esterase, Urine SMALL (A) NEGATIVE   APTT   Result Value Ref Range    PTT 38.7 (H) 24.0 - 36.0 sec   Protime-INR   Result Value Ref Range    Protime 24.4 (H) 11.8 - 14.6 sec    INR 2.2    Microscopic Urinalysis   Result Value Ref Range    WBC, UA 10 TO 20 /HPF    RBC, UA TOO NUMEROUS TO COUNT /HPF    Casts UA HYALINE /LPF    Casts UA 0 TO 2 /LPF    Epithelial Cells UA 3 to 5 /HPF   BLOOD GAS, ARTERIAL   Result Value Ref Range    pH, Arterial 7.314 (L) 7.350 - 7.450    pCO2, Arterial 61.9 (HH) 35.0 - 45.0 mmHg    pO2, Arterial 58.1 (LL) 80.0 - 100.0 mmHg    HCO3, Arterial 31.4 (H) 22.0 - 26.0 mmol/L    Positive Base Excess, Art 5.3 (H) 0.0 - 2.0 mmol/L    O2 Sat, Arterial 86.3 (LL) 95 - 98 %    Carboxyhemoglobin 2.1 0 - 5 %    Methemoglobin 0.8 0.0 - 1.9 %    Pt Temp 37.0     O2 Device/Flow/% VENTILATOR     Respiratory Rate 22     Tyron Test PASS     Sample Site Left Radial Artery     Pt. Position SEMI-FOWLERS     Mode PRVC     Set Rate 22     Total Rate 26          FIO2 40     Peep/Cpap 8    SURGICAL PATHOLOGY REPORT   Result Value Ref Range    Surgical Pathology Report       [id number]  [facility]  [employer]  [facility]  ANATOMIC PATHOLOGY  [address] [place] [address]. [facility], [address]  [phone]  Fax: [phone]  SURGICAL PATHOLOGY CONSULTATION    Patient Name: Aramis Gan  MR#: [id number]  Specimen #[id number]    Procedures/Addenda  PERIPHERAL BLOOD REPORT     Date Ordered:     1/2/2023     Status:  Signed Out       Date Complete:     1/3/2023     By: Frank Aponte M.D. Date Reported:     1/3/2023       INTERPRETATION  Peripheral blood:  Microcytic, hypochromic anemia. The patient's iron studies are compatible with iron deficiency anemia. Lymphopenia.   Differential considerations include acute illness/infection,  medication effect, smoking, and debilitating diseases. Thrombocytopenia  Differential considerations include bone marrow hypoproduction and  immune destruction (idiopathic thrombocytopenic purpura, drug effect). RESULTS-COMMENTS  PERIPHERAL BLOOD STUDY    CBC: Please see the electronic health record  for CBC parameters  (, 01/02/2023, 05:43). PLATELETS: Decreased platelets. Platelets show normal morphology. LEUKOCYTES: Decreased lymphocytes. White blood cells show normal  morphology. There are no blasts. ERYTHROCYTES: Microcytic, hypochromic. Anisocytosis and  poikilocytosis. Polychromasia. Reticulocyte count 2.8%. Rare RBC  fragments. Note: The electronic health record is reviewed. Silvio Lopez M.D.                    Source:  A: Peripheral Blood     Comprehensive Metabolic Panel w/ Reflex to MG   Result Value Ref Range    Glucose 91 70 - 99 mg/dL    BUN 32 (H) 8 - 23 mg/dL    Creatinine 0.97 0.70 - 1.20 mg/dL    Est, Glom Filt Rate >60 >60 mL/min/1.73m2    Calcium 7.4 (L) 8.6 - 10.4 mg/dL    Sodium 143 135 - 144 mmol/L    Potassium 3.7 3.7 - 5.3 mmol/L    Chloride 105 98 - 107 mmol/L    CO2 30 20 - 31 mmol/L    Anion Gap 8 (L) 9 - 17 mmol/L    Alkaline Phosphatase 50 40 - 129 U/L     (H) 5 - 41 U/L     (H) <40 U/L    Total Bilirubin 0.9 0.3 - 1.2 mg/dL    Total Protein 5.2 (L) 6.4 - 8.3 g/dL    Albumin 3.1 (L) 3.5 - 5.2 g/dL   CBC with Auto Differential   Result Value Ref Range    WBC 5.7 3.5 - 11.0 k/uL    RBC 5.42 4.5 - 5.9 m/uL    Hemoglobin 11.0 (L) 13.5 - 17.5 g/dL    Hematocrit 37.6 (L) 41 - 53 %    MCV 69.4 (L) 80 - 100 fL    MCH 20.2 (L) 26 - 34 pg    MCHC 29.2 (L) 31 - 37 g/dL    RDW 20.0 (H) 11.5 - 14.9 %    Platelets 66 (L) 388 - 450 k/uL    MPV 9.4 6.0 - 12.0 fL    Seg Neutrophils 85 (H) 36 - 66 %    Lymphocytes 9 (L) 24 - 44 %    Monocytes 5 1 - 7 %    Eosinophils % 0 0 - 4 %    Basophils 0 0 - 2 %    Bands 1 0 - 10 %    nRBC 1 (H) 0 per 100 WBC    Segs Absolute 4.87 1.3 - 9.1 k/uL    Absolute Lymph # 0.52 (L) 1.0 - 4.8 k/uL    Absolute Mono # 0.29 0.1 - 1.3 k/uL    Absolute Eos # 0.00 0.0 - 0.4 k/uL    Basophils Absolute 0.00 0.0 - 0.2 k/uL    Absolute Bands # 0.06 0.0 - 1.0 k/uL    Morphology ANISOCYTOSIS PRESENT     Morphology HYPOCHROMIA PRESENT     Morphology 1+ POLYCHROMASIA     Morphology 1+ ELLIPTOCYTES    BLOOD GAS, ARTERIAL   Result Value Ref Range    pH, Arterial 7.373 7.350 - 7.450    pCO2, Arterial 56.0 (HH) 35.0 - 45.0 mmHg    pO2, Arterial 61.4 (L) 80.0 - 100.0 mmHg    HCO3, Arterial 32.6 (H) 22.0 - 26.0 mmol/L    Positive Base Excess, Art 7.4 (H) 0.0 - 2.0 mmol/L    O2 Sat, Arterial 89.2 (L) 95 - 98 %    Carboxyhemoglobin 1.7 0 - 5 %    Methemoglobin 0.9 0.0 - 1.9 %    Pt Temp 37     O2 Device/Flow/% VENTILATOR     Respiratory Rate 22     Tyron Test PASS     Sample Site Right Radial Artery     Pt.  Position SEMI-FOWLERS     Mode PRVC     Set Rate 22     Total Rate 22          FIO2 35     Peep/Cpap 8     Text for Respiratory RESULTS GIVEN TO RN    IMMUNOFIXATION URINE RANDOM PROFILE   Result Value Ref Range    Urine IFX Specimen HURTADO SPECIMEN     Volume HURTADO SPECIMEN mL    Urine Total Protein 11 mg/dL    Urine IFX Interp PENDING    Protime-INR   Result Value Ref Range    Protime 21.9 (H) 11.8 - 14.6 sec    INR 1.9    APTT   Result Value Ref Range    PTT 38.4 (H) 24.0 - 36.0 sec   Triglyceride   Result Value Ref Range    Triglycerides 85 <150 mg/dL   CHLORIDE, URINE, RANDOM   Result Value Ref Range    Chloride, Ur 34 mmol/L   Protein / Creatinine Ratio, Urine   Result Value Ref Range    Total Protein, Urine 23 mg/dL    Creatinine, Ur 79.8 39.0 - 259.0 mg/dL    Urine Total Protein Creatinine Ratio 0.29 (H) 0.00 - 0.20   SODIUM, URINE, RANDOM   Result Value Ref Range    Sodium,Ur <20 mmol/L   BLOOD GAS, ARTERIAL   Result Value Ref Range    pH, Arterial 7.360 7.350 - 7.450    pCO2, Arterial 55.9 (HH) 35.0 - 45.0 mmHg    pO2, Arterial 71.4 (L) 80.0 - 100.0 mmHg    HCO3, Arterial 31.6 (H) 22.0 - 26.0 mmol/L    Positive Base Excess, Art 6.1 (H) 0.0 - 2.0 mmol/L    O2 Sat, Arterial 91.7 (L) 95 - 98 %    Carboxyhemoglobin 1.2 0 - 5 %    Methemoglobin 1.3 0.0 - 1.9 %    Pt Temp 37     O2 Device/Flow/% VENTILATOR     Tyron Test PASS     Sample Site Right Radial Artery     Pt.  Position SEMI-FOWLERS     Mode PRVC     Text for Respiratory RESULTS GIVEN TO RN    CBC with Auto Differential   Result Value Ref Range    WBC 6.0 3.5 - 11.0 k/uL    RBC 5.56 4.5 - 5.9 m/uL    Hemoglobin 10.8 (L) 13.5 - 17.5 g/dL    Hematocrit 38.2 (L) 41 - 53 %    MCV 68.8 (L) 80 - 100 fL    MCH 19.5 (L) 26 - 34 pg    MCHC 28.3 (L) 31 - 37 g/dL    RDW 20.1 (H) 11.5 - 14.9 %    Platelets 75 (L) 425 - 450 k/uL    MPV 9.3 6.0 - 12.0 fL    Seg Neutrophils 82 (H) 36 - 66 %    Lymphocytes 7 (L) 24 - 44 %    Monocytes 9 (H) 1 - 7 %    Eosinophils % 1 0 - 4 %    Basophils 1 0 - 2 %    Segs Absolute 4.90 1.3 - 9.1 k/uL    Absolute Lymph # 0.40 (L) 1.0 - 4.8 k/uL    Absolute Mono # 0.50 0.1 - 1.3 k/uL    Absolute Eos # 0.10 0.0 - 0.4 k/uL    Basophils Absolute 0.10 0.0 - 0.2 k/uL   Comprehensive Metabolic Panel w/ Reflex to MG   Result Value Ref Range    Glucose 85 70 - 99 mg/dL    BUN 16 8 - 23 mg/dL    Creatinine 0.64 (L) 0.70 - 1.20 mg/dL    Est, Glom Filt Rate >60 >60 mL/min/1.73m2    Calcium 7.4 (L) 8.6 - 10.4 mg/dL    Sodium 143 135 - 144 mmol/L    Potassium 3.3 (L) 3.7 - 5.3 mmol/L    Chloride 107 98 - 107 mmol/L    CO2 29 20 - 31 mmol/L    Anion Gap 7 (L) 9 - 17 mmol/L    Alkaline Phosphatase 49 40 - 129 U/L     (H) 5 - 41 U/L     (H) <40 U/L    Total Bilirubin 0.8 0.3 - 1.2 mg/dL    Total Protein 5.0 (L) 6.4 - 8.3 g/dL    Albumin 2.8 (L) 3.5 - 5.2 g/dL   Vancomycin Level, Random   Result Value Ref Range    Vancomycin Rm 16.1 ug/mL    Vancomycin Random Dose amount 1g     Vancomycin Random Date last dose 1/4/22 Vancomycin Random Time last dose 0232    Magnesium   Result Value Ref Range    Magnesium 2.0 1.6 - 2.6 mg/dL   Hepatitis C RNA, quantitative, PCR   Result Value Ref Range    Source . PLASMA     Hepatitis C RNA-PCR 17,400 IU/mL    Hepatitis C RNA Quant Log 4.24 Log IU/mL    HCV RNA PCR, Quant DETECTED (A) Not Detected   CBC with Auto Differential   Result Value Ref Range    WBC 5.3 3.5 - 11.0 k/uL    RBC 5.80 4.5 - 5.9 m/uL    Hemoglobin 11.4 (L) 13.5 - 17.5 g/dL    Hematocrit 40.0 (L) 41 - 53 %    MCV 68.9 (L) 80 - 100 fL    MCH 19.7 (L) 26 - 34 pg    MCHC 28.5 (L) 31 - 37 g/dL    RDW 20.5 (H) 11.5 - 14.9 %    Platelets 66 (L) 921 - 450 k/uL    MPV 9.1 6.0 - 12.0 fL    Seg Neutrophils 79 (H) 36 - 66 %    Lymphocytes 8 (L) 24 - 44 %    Monocytes 10 (H) 1 - 7 %    Eosinophils % 2 0 - 4 %    Basophils 1 0 - 2 %    Segs Absolute 4.19 1.3 - 9.1 k/uL    Absolute Lymph # 0.42 (L) 1.0 - 4.8 k/uL    Absolute Mono # 0.53 0.1 - 1.3 k/uL    Absolute Eos # 0.11 0.0 - 0.4 k/uL    Basophils Absolute 0.05 0.0 - 0.2 k/uL    Morphology ANISOCYTOSIS PRESENT     Morphology MICROCYTOSIS PRESENT     Morphology HYPOCHROMIA PRESENT     Morphology FEW ELLIPTOCYTES     Morphology FEW TARGET CELLS    Comprehensive Metabolic Panel w/ Reflex to MG   Result Value Ref Range    Glucose 84 70 - 99 mg/dL    BUN 11 8 - 23 mg/dL    Creatinine 0.58 (L) 0.70 - 1.20 mg/dL    Est, Glom Filt Rate >60 >60 mL/min/1.73m2    Calcium 7.9 (L) 8.6 - 10.4 mg/dL    Sodium 146 (H) 135 - 144 mmol/L    Potassium 3.4 (L) 3.7 - 5.3 mmol/L    Chloride 110 (H) 98 - 107 mmol/L    CO2 30 20 - 31 mmol/L    Anion Gap 6 (L) 9 - 17 mmol/L    Alkaline Phosphatase 53 40 - 129 U/L     (H) 5 - 41 U/L     (H) <40 U/L    Total Bilirubin 0.8 0.3 - 1.2 mg/dL    Total Protein 5.0 (L) 6.4 - 8.3 g/dL    Albumin 2.9 (L) 3.5 - 5.2 g/dL   Fibrinogen   Result Value Ref Range    Fibrinogen 98 (L) 210 - 530 mg/dL   Protime-INR   Result Value Ref Range    Protime 20.3 (H) 11.8 - 14.6 sec    INR 1.7    APTT   Result Value Ref Range    PTT 41.6 (H) 24.0 - 36.0 sec   BLOOD GAS, ARTERIAL   Result Value Ref Range    pH, Arterial 7.372 7.350 - 7.450    pCO2, Arterial 51.6 (HH) 35.0 - 45.0 mmHg    pO2, Arterial 62.9 (L) 80.0 - 100.0 mmHg    HCO3, Arterial 29.9 (H) 22.0 - 26.0 mmol/L    Positive Base Excess, Art 4.7 (H) 0.0 - 2.0 mmol/L    O2 Sat, Arterial 89.7 (L) 95 - 98 %    Carboxyhemoglobin 1.7 0 - 5 %    Methemoglobin 1.1 0.0 - 1.9 %    Pt Temp 37     O2 Device/Flow/% VENTILATOR     Respiratory Rate 22     Tyron Test PASS     Sample Site Right Radial Artery     Pt.  Position SEMI-FOWLERS     Mode PRVC     Set Rate 22     Total Rate 22          FIO2 40     Peep/Cpap 8     Text for Respiratory RESULTS GIVEN TO RN    Magnesium   Result Value Ref Range    Magnesium 2.0 1.6 - 2.6 mg/dL   CBC with Auto Differential   Result Value Ref Range    WBC 5.2 3.5 - 11.0 k/uL    RBC 5.33 4.5 - 5.9 m/uL    Hemoglobin 10.5 (L) 13.5 - 17.5 g/dL    Hematocrit 38.4 (L) 41 - 53 %    MCV 72.0 (L) 80 - 100 fL    MCH 19.7 (L) 26 - 34 pg    MCHC 27.4 (L) 31 - 37 g/dL    RDW 20.3 (H) 11.5 - 14.9 %    Platelets 71 (L) 511 - 450 k/uL    MPV 9.3 6.0 - 12.0 fL    Seg Neutrophils 90 (H) 36 - 66 %    Lymphocytes 4 (L) 24 - 44 %    Monocytes 5 1 - 7 %    Eosinophils % 0 0 - 4 %    Basophils 0 0 - 2 %    Bands 1 0 - 10 %    Segs Absolute 4.68 1.3 - 9.1 k/uL    Absolute Lymph # 0.21 (L) 1.0 - 4.8 k/uL    Absolute Mono # 0.26 0.1 - 1.3 k/uL    Absolute Eos # 0.00 0.0 - 0.4 k/uL    Basophils Absolute 0.00 0.0 - 0.2 k/uL    Absolute Bands # 0.05 0.0 - 1.0 k/uL    Morphology ANISOCYTOSIS PRESENT     Morphology HYPOCHROMIA PRESENT     Morphology MICROCYTOSIS PRESENT     Morphology 1+ ELLIPTOCYTES     Morphology 1+ TEARDROPS    Comprehensive Metabolic Panel w/ Reflex to MG   Result Value Ref Range    Glucose 111 (H) 70 - 99 mg/dL    BUN 7 (L) 8 - 23 mg/dL    Creatinine 0.42 (L) 0.70 - 1.20 mg/dL    Est, Glom Filt Rate >60 >60 mL/min/1.73m2    Calcium 7.5 (L) 8.6 - 10.4 mg/dL    Sodium 144 135 - 144 mmol/L    Potassium 4.0 3.7 - 5.3 mmol/L    Chloride 111 (H) 98 - 107 mmol/L    CO2 29 20 - 31 mmol/L    Anion Gap 4 (L) 9 - 17 mmol/L    Alkaline Phosphatase 46 40 - 129 U/L     (H) 5 - 41 U/L    AST 74 (H) <40 U/L    Total Bilirubin 0.9 0.3 - 1.2 mg/dL    Total Protein 4.6 (L) 6.4 - 8.3 g/dL    Albumin 2.6 (L) 3.5 - 5.2 g/dL   Vancomycin Level, Random   Result Value Ref Range    Vancomycin Rm 20.3 ug/mL    Vancomycin Random Dose amount 1,250     Vancomycin Random Date last dose 550489     Vancomycin Random Time last dose 0546    Fibrinogen   Result Value Ref Range    Fibrinogen 109 (L) 210 - 530 mg/dL   Protime-INR   Result Value Ref Range    Protime 20.1 (H) 11.8 - 14.6 sec    INR 1.7    EKG 12 Lead   Result Value Ref Range    Ventricular Rate 75 BPM    Atrial Rate 75 BPM    P-R Interval 142 ms    QRS Duration 66 ms    Q-T Interval 350 ms    QTc Calculation (Bazett) 390 ms    P Axis 18 degrees    R Axis -165 degrees    T Axis 41 degrees   PREPARE CRYOPRECIPITATE, 1 Product   Result Value Ref Range    Unit Number [id number]     Product Code Thawed Pooled Cryoprecipitate     Unit Divison 00     Dispense Status ISSUED     Unit Issue Date/Time [id number]     Product Code Blood Bank N0741S06     Blood Bank Unit Type and Rh O POS     Blood Bank ISBT Product Blood Type 5100     Blood Bank Blood Product Expiration Date 842596971299     Transfusion Status OK TO TRANSFUSE          IMAGING DATA:    CT HEAD WO CONTRAST    Result Date: 1/1/2023  EXAMINATION: CT OF THE HEAD WITHOUT CONTRAST  1/1/2023 10:48 pm TECHNIQUE: CT of the head was performed without the administration of intravenous contrast. Automated exposure control, iterative reconstruction, and/or weight based adjustment of the mA/kV was utilized to reduce the radiation dose to as low as reasonably achievable. COMPARISON: None.  HISTORY: Reason for Exam: AMS Additional signs and symptoms: the patient has been getting more and more confused since 2 weeks ago. It has progressively been getting worse and today FINDINGS: BRAIN/VENTRICLES: There is no acute intracranial hemorrhage, mass effect or midline shift. No abnormal extra-axial fluid collection. The gray-white differentiation is maintained without evidence of an acute infarct. There is no evidence of hydrocephalus. Changes of mild chronic small vessel ischemic disease. ORBITS: The visualized portion of the orbits demonstrate no acute abnormality. SINUSES: The visualized paranasal sinuses and mastoid air cells demonstrate no acute abnormality. SOFT TISSUES/SKULL:  No acute abnormality of the visualized skull or soft tissues. No acute intracranial abnormality. Mild chronic small vessel ischemic disease. XR CHEST PORTABLE    Result Date: 1/1/2023  EXAMINATION: ONE XRAY VIEW OF THE CHEST 1/1/2023 7:17 pm COMPARISON: None. HISTORY: ORDERING SYSTEM PROVIDED HISTORY: ams TECHNOLOGIST PROVIDED HISTORY: ams Reason for Exam: Altered mental status, difficulty breathing Additional signs and symptoms: Altered mental status, difficulty breathing Relevant Medical/Surgical History: Altered mental status, difficulty breathing FINDINGS: Large lucent area in the left apex suggesting a large bulla however superimposed pneumothorax cannot be excluded. The cardiac size is normal. Right lower lobe airspace infiltrate and mild right pleural effusion. . Pulmonary vascularity appears normal. There is mild ectasia of the thoracic aorta. Calcific tendinitis of the right shoulder. No acute bony abnormalities. The hilar structures are normal.     Right lower lobe pneumonia and small right pleural effusion Large lucent area in the left apex suggesting a large bulla however superimposed pneumothorax cannot be excluded. Follow-up CT would be useful for further evaluation.  The findings were sent to the Radiology Results Communication [facility] at 10:31 pm on 1/1/2023 to be communicated to a licensed caregiver. IMPRESSION:   Primary Problem  Acute respiratory failure with hypoxia and hypercapnia ([address]    Active Hospital Problems    Diagnosis Date Noted    Prolonged pt (prothrombin time) [R79.1] 01/03/2023     Priority: Medium    Emphysema lung ([address] [J43.9] 01/03/2023     Priority: Medium    Cerebral infarction ([address] [I63.9] 01/03/2023     Priority: Medium    Transaminitis [R74.01] 01/02/2023     Priority: Medium    Microcytic anemia [D64.9] 01/02/2023     Priority: Medium    Thrombocytopenia ([address] [D69.6] 01/02/2023     Priority: Medium    Agitation [R45.1] 01/02/2023     Priority: Medium    Acute respiratory failure with hypoxia and hypercapnia (HCC) RLL pneumonia [J96.01, J96.02] 01/02/2023     Priority: Medium    Altered mental status [R41.82] 01/02/2023     Priority: Medium    Community acquired pneumonia of right lower lobe of lung [J18.9] 01/02/2023     Priority: Medium       Medical apathy  Respiratory failure secondary pneumonia  Abnormal LFT  Microcytic anemia  Thrombocytopenia  History of alcohol dependence  IgG lambda paraproteinemia  Positive HCV screen  Iron deficiency  Coagulopathy    RECOMMENDATIONS:  I reviewed the labs/imaging available to me,outside records and discussed with the patient. I explained to the patient the nature of this problem. I explained the significance of these abnormalities and possible etiology and management options  Patient has multiple medical abnormalities  Discussed with patient's family member at bedside  Patient fibrinogen now 106. INR improved  Patient likely has embolic stroke also has subarachnoid bleed and possible GI bleed. Weigh  risk and benefit of anticoagulation given patient's thrombocytopenia and coagulopathy from liver disease. Outpatient follow-up for IgG lambda paraproteinemia  Patient has completed IV iron infusions  Low iron and anemia also raises concerns regarding GI bleed.   Given cirrhosis patient will also need screening for varices once stable enough  Will follow                      Discussed with family and Nurse. Thank you for asking us to see this patient. Criss Ortiz MD          This note is created with the assistance of a speech recognition program.  While intending to generate a document that actually reflects the content of the visit, the document can still have some errors including those of syntax and sound a like substitutions which may escape proof reading. It such instances, actual meaning can be extrapolated by contextual diversion.

## 2023-01-06 NOTE — PROGRESS NOTES
Patient continues to pull bipap mask off and attempt to hit RN. Took bipap off, put patient on nonrebreather and RN restored restraints.

## 2023-01-06 NOTE — PLAN OF CARE
Problem: Discharge Planning  Goal: Discharge to home or other facility with appropriate resources  Outcome: Progressing     Problem: Safety - Adult  Goal: Free from fall injury  Outcome: Progressing     Problem: ABCDS Injury Assessment  Goal: Absence of physical injury  Outcome: Progressing     Problem: Skin/Tissue Integrity  Goal: Absence of new skin breakdown  Description: 1. Monitor for areas of redness and/or skin breakdown  2. Assess vascular access sites hourly  3. Every 4-6 hours minimum:  Change oxygen saturation probe site  4. Every 4-6 hours:  If on nasal continuous positive airway pressure, respiratory therapy assess nares and determine need for appliance change or resting period. Outcome: Progressing     Problem: Safety - Medical Restraint  Goal: Remains free of injury from restraints (Restraint for Interference with Medical Device)  Description: INTERVENTIONS:  1. Determine that other, less restrictive measures have been tried or would not be effective before applying the restraint  2. Evaluate the patient's condition at the time of restraint application  3. Inform patient/family regarding the reason for restraint  4.  Q2H: Monitor safety, psychosocial status, comfort, nutrition and hydration  Outcome: Progressing     Problem: Pain  Goal: Verbalizes/displays adequate comfort level or baseline comfort level  Outcome: Progressing  Flowsheets (Taken 1/5/2023 2000)  Verbalizes/displays adequate comfort level or baseline comfort level:   Encourage patient to monitor pain and request assistance   Assess pain using appropriate pain scale   Administer analgesics based on type and severity of pain and evaluate response   Implement non-pharmacological measures as appropriate and evaluate response     Problem: Nutrition Deficit:  Goal: Optimize nutritional status  Outcome: Progressing  Flowsheets (Taken 1/5/2023 1329 by Drake Martinez RD, LD)  Nutrient intake appropriate for improving, restoring, or maintaining nutritional needs: Recommend, monitor, and adjust tube feedings and TPN/PPN based on assessed needs

## 2023-01-06 NOTE — PROGRESS NOTES
Vancomycin Dosing by Pharmacy - Daily Note   Vancomycin Therapy Day:  5  Indication: pneumonia     Allergies:  Patient has no known allergies. Actual Weight:    Wt Readings from Last 1 Encounters:   01/06/23 196 lb 3.4 oz (89 kg)       Labs/Ancillary Data  Estimated Creatinine Clearance: 192 mL/min (A) (based on SCr of 0.42 mg/dL (L)). Recent Labs     01/04/23  0904 01/05/23  0427 01/06/23  0419   CREATININE 0.64* 0.58* 0.42*   BUN 16 11 7*   WBC 6.0 5.3 5.2     Procalcitonin   Date Value Ref Range Status   01/02/2023 0.09 (H) <0.09 ng/mL Final     Comment:           Suspected Sepsis:  <0.50 ng/mL     Low likelihood of sepsis. 0.50-2.00 ng/mL     Increased likelihood of sepsis. Antibiotics encouraged. >2.00 ng/mL     High risk of sepsis/shock. Antibiotics strongly encouraged. Suspected Lower Resp Tract Infections:  <0.24 ng/mL     Low likelihood of bacterial infection. >0.24 ng/mL     Increased likelihood of bacterial infection. Antibiotics encouraged. With successful antibiotic therapy, PCT levels should decrease rapidly. (Half-life of 24 to   36 hours.)        Procalcitonin values from samples collected within the first 6 hours of systemic infection   may still be low. Retesting may be indicated. Values from day 1 and day 4 can be entered into the Change in Procalcitonin Calculator   ([email or web address]-pct-[email or web address] com) to determine the patient's Mortality Risk Prognosis        In healthy neonates, plasma Procalcitonin (PCT) concentrations increase gradually after   birth, reaching peak values at about 24 hours of age then decrease to normal values below   0.5 ng/mL by 48-72 hours of age.          Intake/Output Summary (Last 24 hours) at 1/6/2023 1218  Last data filed at 1/6/2023 0546  Gross per 24 hour   Intake 1780.31 ml   Output 1000 ml   Net 780.31 ml     Temp: 98.9    Culture Date / Source  /  Results  See micro   Recent vancomycin administrations                     vancomycin (VANCOCIN) 1,250 mg in dextrose 5 % 250 mL IVPB (ADDAVIAL) (mg) 1,250 mg New Bag 01/06/23 0428     1,250 mg New Bag 01/05/23 1735     1,250 mg New Bag  0448     1,250 mg New Bag 01/04/23 1703    vancomycin 1000 mg IVPB in 250 mL D5W addavial (mg) 1,000 mg New Bag 01/04/23 0232                    Vancomycin Concentrations:   TROUGH:  No results for input(s): VANCOTROUGH in the last 72 hours. RANDOM:    Recent Labs     01/04/23  0904 01/06/23  1149   VANCORANDOM 16.1 20.3       MRSA Nasal Swab: showed MRSA positive result on 01/03 . PLAN     Continue current dose of 1250 mg q12h IV  Ensured BUN/sCr ordered at baseline and every 48 hours x at least 3 levels, then at least weekly. Pharmacy will continue to monitor patient and adjust therapy as indicated      Vancomycin Target Concentration Parameters  Treatment  Population Target AUC/KENDALL Target Trough   Invasive MRSA Infection (bacteremia, pneumonia, meningitis, endocarditis, osteomyelitis)  Sepsis (undifferentiated) 400-600 N/A   Infection due to non-MRSA pathogen  Empiric treatment of non-invasive MRSA infection  (SSTI, UTI) <500 10-15 mg/L   CrCl < 29 mL/min  Rapidly fluctuating serum creatinine   KENTRELL N/A < 15 mg/L     Renal replacement therapy is dosed by levels, per hospital protocol. Abbreviations  * Pauc: probability that AUC is >400 (efficacy); Pconc: probability that Ctrough is above 20 ?g/mL (toxicity); Tox: Probability of nephrotoxicity, based on Ricky et al. Clin Infect Dis 2009. Loading dose: N/A  Regimen: 1250 mg IV every 12 hours. Start time: 16:32 on 01/06/2023  Exposure target: AUC24 (range)400-600 mg/L.hr   AUC24,ss: 484 mg/L.hr  Probability of AUC24 > 400: 90 %  Ctrough,ss: 14.8 mg/L  Probability of Ctrough,ss > 20: 10 %  Probability of nephrotoxicity (Lodise ELIGIO 2009): 10 %    Thank you for the consult. Pharmacy will continue to follow.     Ry Griffin, AidaD, BCPS  1/6/2023 12:19 PM

## 2023-01-06 NOTE — PROGRESS NOTES
Patient screaming, hallucinating, pulled oxygen off, bp cuff/pulse ox off,  iv's out, iv pull pulled om the bed and picc line in hand. Patient swing, kicking and attempting to bite nurses. Oxygen sat 85% and O2 turned up to 12 liter salter.

## 2023-01-06 NOTE — PROGRESS NOTES
PROVIDE ADEQUATE OXYGENATION WITH ACCEPTABLE SP02/ABG'S    [x]  IDENTIFY APPROPRIATE OXYGEN THERAPY  [x]   MONITOR SP02/ABG'S AS NEEDED   [x]   PATIENT EDUCATION AS NEEDED   Pt had to be placed on non heated high flow 15l overnight, cannula placed in pts mouth for better oxygenation, this morning I was able to decreased FiO2 to 6l SpO2 88% cannula in pts nose at this time. Pt still acting out yelling at everyone.  Pt remains in restraints at this time

## 2023-01-06 NOTE — CARE COORDINATION
ONGOING DISCHARGE PLAN:    Extubated  1/6/23 , On 10 L [place] O2, sats 90%. Patient is alert to self. Pt notes patient has been confused today     Pt is from home with GF. Lists for VNS/SNF left with family. Will need to address with family. IV kaylahsyMadai momin . Pulm, Neuro and cardio following. Poss need CASSI per Cardio note. Will continue to follow for additional discharge needs.     Electronically signed by Jamil Whitley RN on 1/6/2023 at 3:43 PM

## 2023-01-06 NOTE — PROGRESS NOTES
[facility] -   Infectious diseases evaluation  admission date 1/1/2023    reason for consultation:   Community-acquired pneumonia    Impression :   Current:  Acute hypoxic respiratory failure required intubation 1/2/22  Community-acquired pneumonia with possible aspiration. Acute CVA with subarachnoid hemorrhage  Positive MRSA nasal swab  Liver disease  Cholelithiasis  Acute renal failure  Thrombocytopenia  Reactive hepatitis C antibody /elevated liver enzymes    Recommendations     IV Unasyn through 1/ 8/23  Continue IV vancomycin  Liver ultrasound 1/2/2323 showed increased echogenicity and cholelithiasis, no cholecystitis. HIV screen was negative on 1/2/23  Hepatitis C RNA pending  The patient received IV ceftriaxone and Zithromax on 1/1/2022  Follow blood and respiratory cultures, no growth to date  No growth on urine culture from 1/3/23  Nasal swab for MRSA was +1/2/23  Respiratory panel with COVID-19 PCR was negative  Urine for Legionella antigen negative  Follow CBC and renal function  Continue supportive care  Discussed with cardiology Dr. Ricardo Clarke and nursing staff    Infection Control Recommendations   [place] Precautions  Contact Isolation       Antimicrobial Stewardship Recommendations   Simplification of therapy  Targeted therapy      History of Present Illness:   Initial history:  Eliceo Javed is a 60y.o.-year-old male was brought to the hospital on 1/1/2023 with altered mental status, confusion associated with decreased responsiveness worsening for 2 weeks. At the ER he was obtunded, O2 sat was 75% on room air, was placed on nonrebreather initially, subsequently was intubated. He is intubated, sedated unable to provide history that was obtained from chart review and nursing staff. According to his wife he is fully vaccinated for COVID-19 and flu.   He has been afebrile since admission  Initial procalcitonin level was 0.09, WBC normal  He was hypotensive was started on Levophed. MRI showed small acute infarcts in the left cerebellar hemisphere and left parietal love with moderate bilateral subarachnoid hemorrhages within both cerebral hemispheres. Chest x-ray showed right lower lobe infiltrates  Interval changes  1/6/2023   He was extubated yesterday, confused, no growth on blood cultures  Left arm PICC line in place. Patient Vitals for the past 8 hrs:   BP Temp Temp src Pulse Resp SpO2   01/06/23 1330 (!) 144/64 -- -- 94 17 (!) 88 %   01/06/23 1300 107/61 -- -- 88 18 (!) 87 %   01/06/23 1230 112/62 -- -- 92 17 (!) 87 %   01/06/23 1200 113/64 98 °F (36.7 °C) Oral 93 18 (!) 89 %   01/06/23 1130 106/62 -- -- 90 17 (!) 89 %   01/06/23 1110 -- -- -- 86 21 90 %   01/06/23 1100 105/60 -- -- 88 20 (!) 89 %   01/06/23 1030 (!) 89/52 -- -- 83 19 (!) 85 %   01/06/23 1000 110/67 -- -- 86 13 94 %   01/06/23 0930 (!) 112/58 -- -- 88 21 96 %   01/06/23 0900 134/87 -- -- 93 16 93 %   01/06/23 0830 121/72 98.4 °F (36.9 °C) Axillary 86 17 97 %   01/06/23 0820 -- -- -- 90 25 (!) 88 %   01/06/23 0815 (!) 142/85 -- -- 90 22 (!) 86 %   01/06/23 0730 (!) 96/57 -- -- 78 15 94 %   01/06/23 0700 (!) 98/59 -- -- 75 16 98 %   01/06/23 0630 115/62 -- -- 76 14 95 %   01/06/23 0615 -- -- -- 75 15 98 %   01/06/23 0600 117/71 -- -- 78 18 95 %               I have personally reviewed the past medical history, past surgical history, medications, social history, and family history, and I haveupdated the database accordingly. Allergies:   Patient has no known allergies. Review of Systems:     Review of Systems  confused, unable to provide  Physical Examination :       Physical Exam  Constitutional:       Appearance: He is ill-appearing. HENT:      Head: Normocephalic and atraumatic. Right Ear: External ear normal.      Left Ear: External ear normal.   Eyes:      General: No scleral icterus.      Conjunctiva/sclera: Conjunctivae normal.   Cardiovascular:      Rate and Rhythm: Normal rate and regular rhythm. Heart sounds: Normal heart sounds. Pulmonary:      Comments: coarse breath sounds bilaterally, few crackles. Abdominal:      General: There is no distension. Palpations: Abdomen is soft. Genitourinary:     Comments: Mars catheter in place with dark urine  Musculoskeletal:      Cervical back: Neck supple. No rigidity. Right lower leg: No edema. Left lower leg: No edema. Skin:     Coloration: Skin is not jaundiced. Findings: No erythema. Past Medical History:   History reviewed. No pertinent past medical history. Past Surgical  History:   History reviewed. No pertinent surgical history.     Medications:      potassium bicarb-citric acid  20 mEq Oral Daily    ampicillin-sulbactam  3,000 mg IntraVENous Q6H    miconazole   Topical BID    vancomycin  1,250 mg IntraVENous Q12H    [Held by provider] aspirin  81 mg Oral Daily    sodium chloride flush  5-40 mL IntraVENous 2 times per day    nicotine  1 patch TransDERmal Daily    pantoprazole (PROTONIX) 40 mg injection  40 mg IntraVENous Daily    vancomycin (VANCOCIN) intermittent dosing (placeholder)   Other RX Placeholder    ipratropium-albuterol  1 ampule Inhalation Q4H    sodium chloride  1,000 mL IntraVENous Once       Social History:     Social History     Socioeconomic History    Marital status: Single     Spouse name: Not on file    Number of children: Not on file    Years of education: Not on file    Highest education level: Not on file   Occupational History    Not on file   Tobacco Use    Smoking status: Every Day     Packs/day: 1.00     Years: 40.00     Pack years: 40.00     Types: Cigarettes    Smokeless tobacco: Never   Substance and Sexual Activity    Alcohol use: Yes     Comment: beer and scotch    Drug use: Not Currently     Comment: over 20 years (stated by daughter)    Sexual activity: Not on file   Other Topics Concern    Not on file   Social History Narrative    Not on file Social Determinants of Health     Financial Resource Strain: Not on file   Food Insecurity: Not on file   Transportation Needs: Not on file   Physical Activity: Not on file   Stress: Not on file   Social Connections: Not on file   Intimate Partner Violence: Not on file   Housing Stability: Not on file       Family History:   History reviewed. No pertinent family history. Medical Decision Making:   I have independently reviewed/ordered the following labs:    CBC with Differential:   Recent Labs     01/05/23 0427 01/06/23 0419   WBC 5.3 5.2   HGB 11.4* 10.5*   HCT 40.0* 38.4*   PLT 66* 71*   LYMPHOPCT 8* 4*   MONOPCT 10* 5       BMP:  Recent Labs     01/04/23  0904 01/05/23 0427 01/06/23 0419    146* 144   K 3.3* 3.4* 4.0    110* 111*   CO2 29 30 29   BUN 16 11 7*   CREATININE 0.64* 0.58* 0.42*   MG 2.0 2.0  --        Hepatic Function Panel:   Recent Labs     01/05/23 0427 01/06/23 0419   PROT 5.0* 4.6*   LABALBU 2.9* 2.6*   BILITOT 0.8 0.9   ALKPHOS 53 46   * 152*   * 74*       No results for input(s): RPR in the last 72 hours. No results for input(s): HIV in the last 72 hours. No results for input(s): BC in the last 72 hours. Lab Results   Component Value Date/Time    CREATININE 0.42 01/06/2023 04:19 AM    GLUCOSE 111 01/06/2023 04:19 AM       Detailed results: Thank you for allowing us to participate in the care of this patient. Please call with questions. This note is created with the assistance of a speech recognition program.  While intending to generate adocument that actually reflects the content of the visit, the document can still have some errors including those of syntax and sound a like substitutions which may escape proof reading. It such instances, actual meaningcan be extrapolated by contextual diversion.     Michoacano Xie MD  Office: [phone]  Perfect serve / office [phone]

## 2023-01-06 NOTE — PROGRESS NOTES
[address] [facility]    PROGRESS NOTE             1/6/2023    7:33 AM    Name:   Alonso Cavazos  MRN:     [id number]     Acct:      [de-identified]   Room:   2009/2009-01  IP Day:  4  Admit Date:  1/1/2023  9:55 PM    PCP:  No primary care provider on file. Code Status:  Full Code    Subjective:     C/C:   Chief Complaint   Patient presents with    Altered Mental Status     Interval History Status: Worsened    Patient seen and examined at the bed side. Patient extubated, off propofol. Patient is significantly altered, and agitated. Patient was also aggressive and combative. He remains in restraints. Patient seen cryoprecipitate unit this a.m., on oncology recommendations    Repeat INR PT pending. Patient platelet count 71, hemoglobin 10.5. HCV RNA positive,      Notes from nursing staff and Consults had been reviewed, and the overnight progress had been checked with the nursing staff as well. Nurse Thomas input was appreciated    Brief History:     The patient is a 60 y.o.  male with unknown past medical history due to no healthcare visits or follow-up who presents with Altered Mental Status and he is admitted to the hospital for the management of  Acute respiratory failure most likely 2/2 pneumonia. Per patient's wife, she reports that the patient has not been acting himself for the past week. She reports being more slurred, confused and progressively getting worse prior to presentation. Patient prior to the presentation a week ago he had a fall and EMS presented patient was vitally stable and he was not transported to the hospital.  This time upon EMS evaluation of the patient he had an O2 of 75%, he was put on a breather and was given a dose of IV Lasix in route. Wife and patient, cannot recall any precipitating event could have led to his presentation. He also denies chest pain, shortness of breath, or cough.        Per patient, he does not recall any complaints or events prior to the presentation. Wife denies recent alcohol use, however, patient does have a history of regular alcohol intake but not on daily basis. Wife also reports that patient has constant heartburn for which he takes regular antiacids. Patient denies the presence of any abdominal pain or any change in bowel habits, abdominal pain, nausea or vomiting. Upon arrival in the ED, patient was put on BiPAP. Patient was afebrile, normotensive and in normal sinus rhythm. A chest x-ray was completed and the patient had right lower pneumonia with a small pleural effusion. Patient also had a large bullae in the left apex with a pneumothorax that cannot be excluded. Patient ABG was suggestive of pattern of respiratory acidosis with pH 7.295, PCO2 60.3, PO2 63.0, HCO3 29.3. CT head WO did not show any acute findings. Patient had elevated troponins of 184, trended down to 177. Patient also had an elevated BNP of 7797. An elevated creatinine of 1.77 was on presentation, with no previous lab test to be compared to. Also patient had a left elevated liver enzymes ALT was 525, , with prolonged prothrombin time of 24.9, and INR of 2.3. Patient was admitted to the ICU for the management of type II respiratory failure associated altered mental status    Review of Systems:     NA: PATIENT AGITATED AND ALTERED    Medications:      Allergies:    No Known Allergies    Current Meds:   Scheduled Meds:    potassium bicarb-citric acid  20 mEq Oral Daily    ampicillin-sulbactam  3,000 mg IntraVENous Q6H    miconazole   Topical BID    vancomycin  1,250 mg IntraVENous Q12H    [Held by provider] aspirin  81 mg Oral Daily    sodium chloride flush  5-40 mL IntraVENous 2 times per day    nicotine  1 patch TransDERmal Daily    pantoprazole (PROTONIX) 40 mg injection  40 mg IntraVENous Daily    vancomycin (VANCOCIN) intermittent dosing (placeholder)   Other RX Placeholder    ipratropium-albuterol  1 ampule Inhalation Q4H    sodium chloride  1,000 mL IntraVENous Once     Continuous Infusions:    sodium chloride      dexmedetomidine 0.1 mcg/kg/hr (23 0546)    IV infusion builder 50 mL/hr at 23 1694    sodium chloride      norepinephrine Stopped (23 0959)     PRN Meds: sodium chloride, perflutren lipid microspheres, sodium chloride flush, sodium chloride flush, sodium chloride flush, sodium chloride, ondansetron **OR** ondansetron, polyethylene glycol, acetaminophen **OR** acetaminophen, albuterol, guaiFENesin    Data:     Past Medical History:   has no past medical history on file. Social History:   reports that he has been smoking cigarettes. He has a 40.00 pack-year smoking history. He has never used smokeless tobacco. He reports current alcohol use. He reports that he does not currently use drugs. Family History:   History reviewed. No pertinent family history. Vitals:  BP (!) 98/59   Pulse 75   Temp 98.6 °F (37 °C) (Axillary)   Resp 16   Ht 5' 7\" (1.702 m)   Wt 196 lb 3.4 oz (89 kg)   SpO2 98%   BMI 30.73 kg/m²   Temp (24hrs), Av.2 °F (36.8 °C), Min:97 °F (36.1 °C), Max:98.9 °F (37.2 °C)      No results for input(s): POCGLU in the last 72 hours. I/O(24Hr):     Intake/Output Summary (Last 24 hours) at 2023 0733  Last data filed at 2023 0546  Gross per 24 hour   Intake 1780.31 ml   Output 1000 ml   Net 780.31 ml       Labs:    CBC:   Lab Results   Component Value Date/Time    WBC 5.2 2023 04:19 AM    RBC 5.33 2023 04:19 AM    HGB 10.5 2023 04:19 AM    HCT 38.4 2023 04:19 AM    MCV 72.0 2023 04:19 AM    MCH 19.7 2023 04:19 AM    MCHC 27.4 2023 04:19 AM    RDW 20.3 2023 04:19 AM    PLT 71 2023 04:19 AM    MPV 9.3 2023 04:19 AM     CMP:    Lab Results   Component Value Date/Time     2023 04:19 AM    K 4.0 2023 04:19 AM     2023 04:19 AM CO2 29 01/06/2023 04:19 AM    BUN 7 01/06/2023 04:19 AM    CREATININE 0.42 01/06/2023 04:19 AM    LABGLOM >60 01/06/2023 04:19 AM    GLUCOSE 111 01/06/2023 04:19 AM    PROT 4.6 01/06/2023 04:19 AM    LABALBU 2.6 01/06/2023 04:19 AM    CALCIUM 7.5 01/06/2023 04:19 AM    BILITOT 0.9 01/06/2023 04:19 AM    ALKPHOS 46 01/06/2023 04:19 AM    AST 74 01/06/2023 04:19 AM     01/06/2023 04:19 AM     PT/INR:    Lab Results   Component Value Date/Time    PROTIME 20.3 01/05/2023 04:27 AM    INR 1.7 01/05/2023 04:27 AM       No results found for: SPECIAL  Lab Results   Component Value Date/Time    CULTURE NO GROWTH 01/03/2023 12:12 PM         Radiology:    CT HEAD WO CONTRAST    Result Date: 1/2/2023  EXAMINATION: CT OF THE HEAD WITHOUT CONTRAST  1/2/2023 10:11 pm TECHNIQUE: CT of the head was performed without the administration of intravenous contrast. Automated exposure control, iterative reconstruction, and/or weight based adjustment of the mA/kV was utilized to reduce the radiation dose to as low as reasonably achievable. COMPARISON: None. HISTORY: ORDERING SYSTEM PROVIDED HISTORY: Altered Mental status TECHNOLOGIST PROVIDED HISTORY: Altered Mental status Reason for Exam: Altered Mental status Additional signs and symptoms: Patient came in with transient alteration of awareness, follow up head CT for any changes. FINDINGS: BRAIN/VENTRICLES: There is no acute intracranial hemorrhage, mass effect or midline shift. No abnormal extra-axial fluid collection. There is a focal area of decreased attenuation with loss of gray/white matter differentiation involving posterior left parietal lobe with somewhat increased conspicuity as compared to prior exam.  There is stable small focus of encephalomalacia involving left cerebellar hemisphere compatible with prior remote infarct/insult. Chronic lacunar infarct to right basal ganglia redemonstrated. There is no evidence of hydrocephalus.  ORBITS: The visualized portion of the orbits demonstrate no acute abnormality. SINUSES: Small air-fluid level right sphenoid sinus. Trace air-fluid level right frontal sinus. Mild mucoperiosteal thickening to bilateral ethmoid air cells. Findings are new from prior exam and likely relate to presence of nasogastric tube. SOFT TISSUES/SKULL:  No acute abnormality of the visualized skull or soft tissues. Focal area of decreased attenuation with loss of gray/white matter differentiation involving posterior left parietal lobe with somewhat increased conspicuity as compared to prior exam likely reflecting an area of acute to subacute infarct. Stable small chronic infarct/insult to left cerebellar hemisphere. Chronic lacunar infarct to right basal ganglia redemonstrated. Paranasal sinus disease likely relating to presence of nasogastric tube. CT HEAD WO CONTRAST    Result Date: 1/1/2023  EXAMINATION: CT OF THE HEAD WITHOUT CONTRAST  1/1/2023 10:48 pm TECHNIQUE: CT of the head was performed without the administration of intravenous contrast. Automated exposure control, iterative reconstruction, and/or weight based adjustment of the mA/kV was utilized to reduce the radiation dose to as low as reasonably achievable. COMPARISON: None. HISTORY: Reason for Exam: AMS Additional signs and symptoms: the patient has been getting more and more confused since 2 weeks ago. It has progressively been getting worse and today FINDINGS: BRAIN/VENTRICLES: There is no acute intracranial hemorrhage, mass effect or midline shift. No abnormal extra-axial fluid collection. The gray-white differentiation is maintained without evidence of an acute infarct. There is no evidence of hydrocephalus. Changes of mild chronic small vessel ischemic disease. ORBITS: The visualized portion of the orbits demonstrate no acute abnormality. SINUSES: The visualized paranasal sinuses and mastoid air cells demonstrate no acute abnormality.  SOFT TISSUES/SKULL:  No acute abnormality of the visualized skull or soft tissues. No acute intracranial abnormality. Mild chronic small vessel ischemic disease. US LIVER    Result Date: 1/2/2023  EXAMINATION: RIGHT UPPER QUADRANT ULTRASOUND 1/2/2023 10:20 am COMPARISON: None. HISTORY: ORDERING SYSTEM PROVIDED HISTORY: elevated AST and ALT, in setting of PT, and INR TECHNOLOGIST PROVIDED HISTORY: elevated AST and ALT, in setting of PT, and INR FINDINGS: Patient body habitus limits the study, as there is increased attenuation of the ultrasound beam. This decreases sensitivity and specificity. LIVER:  There is mild increased echogenicity seen throughout the liver. No intrahepatic ductal dilatation. No perihepatic fluid. BILIARY SYSTEM:  Bladder is contracted. Gallstones are seen. Gallbladder wall thickness measures 6 mm. Common bile duct is within normal limits measuring 5 mm. RIGHT KIDNEY: The right kidney is grossly unremarkable without evidence of hydronephrosis. PANCREAS:  Visualized portions of the pancreas are unremarkable. OTHER: No evidence of right upper quadrant ascites. Portal vein flow appears hepatopetal     Increased echogenicity is seen throughout the liver suggesting diffuse hepatocellular disease such as fatty infiltration Cholelithiasis. No cholecystitis     XR CHEST PORTABLE    Result Date: 1/2/2023  EXAMINATION: ONE XRAY VIEW OF THE CHEST 1/2/2023 3:15 pm COMPARISON: 01/01/2023 HISTORY: ORDERING SYSTEM PROVIDED HISTORY: intubation TECHNOLOGIST PROVIDED HISTORY: intubation Reason for Exam: intubation FINDINGS: Overlying items external to the patient somewhat limit evaluation. Placement of endotracheal tube with tip 8.2 cm from the robert. Placement of enteric tube seen to extend into the stomach, distal extent not included in field of view. Persistent likely layering right pleural effusion, similar to slightly worsened.   Persistent bilateral airspace opacities to bilateral mid to lower lung zones, right worse than left, similar on the left but mildly worsened on the right. No pneumothoraces are seen. Persistent likely bullous change to the left upper lung zone. Cardiac and mediastinal silhouettes are stable. Stable appearance to bony structures. Placement of endotracheal tube which appears high riding with tip 8.2 cm from the robert. Suggest advancement by 2-3 cm. Placement of endotracheal tube seen to extend into the stomach, distal extent not included in field of view. No pneumothoraces. Persistent likely bullous change to the left upper lung zone. Persistent right pleural effusion, similar to slightly worsened. Persistent bilateral airspace opacities, right worse than left, similar on the left but mildly worsened on the right. XR CHEST PORTABLE    Result Date: 1/1/2023  EXAMINATION: ONE XRAY VIEW OF THE CHEST 1/1/2023 7:17 pm COMPARISON: None. HISTORY: ORDERING SYSTEM PROVIDED HISTORY: ams TECHNOLOGIST PROVIDED HISTORY: ams Reason for Exam: Altered mental status, difficulty breathing Additional signs and symptoms: Altered mental status, difficulty breathing Relevant Medical/Surgical History: Altered mental status, difficulty breathing FINDINGS: Large lucent area in the left apex suggesting a large bulla however superimposed pneumothorax cannot be excluded. The cardiac size is normal. Right lower lobe airspace infiltrate and mild right pleural effusion. . Pulmonary vascularity appears normal. There is mild ectasia of the thoracic aorta. Calcific tendinitis of the right shoulder. No acute bony abnormalities. The hilar structures are normal.     Right lower lobe pneumonia and small right pleural effusion Large lucent area in the left apex suggesting a large bulla however superimposed pneumothorax cannot be excluded. Follow-up CT would be useful for further evaluation. The findings were sent to the Radiology Results [address] at 10:31 pm on 1/1/2023 to be communicated to a licensed caregiver.        EKG:    there are no previous tracings available for comparison. Physical Examination:        PHYSICAL EXAM:  General Appearance agitated, altered. Psych: NA  HEENT - Head is normocephalic, atraumatic. Neck: supple, no rigidity, normal ROM  Lungs -limited exam, good air entry. No wheezes  Cardiovascular - Heart sounds are normal.  Regular rhythm, normal rate without murmur, gallop or rub. Neurology: Agitated moves all limbs spontaneously   Abdomen - Soft, nontender, nondistended, no masses or organomegaly  Skin - No bruising or bleeding on exposed skin area  Extremities - No cyanosis, clubbing or edema  Assessment:        Primary Problem  Acute respiratory failure with hypoxia and hypercapnia ([address]    Active Hospital Problems    Diagnosis Date Noted    Prolonged pt (prothrombin time) [R79.1] 01/03/2023     Priority: Medium    Emphysema lung ([address] [J43.9] 01/03/2023     Priority: Medium    Cerebral infarction ([address] [I63.9] 01/03/2023     Priority: Medium    Transaminitis [R74.01] 01/02/2023     Priority: Medium    Microcytic anemia [D64.9] 01/02/2023     Priority: Medium    Thrombocytopenia ([address] [D69.6] 01/02/2023     Priority: Medium    Agitation [R45.1] 01/02/2023     Priority: Medium    Acute respiratory failure with hypoxia and hypercapnia (HCC) RLL pneumonia [J96.01, J96.02] 01/02/2023     Priority: Medium    Altered mental status [R41.82] 01/02/2023     Priority: Medium    Community acquired pneumonia of right lower lobe of lung [J18.9] 01/02/2023     Priority: Medium     This is a 61 YO   Plan: Altered Mental Status in the setting of Acute type 2 respiratory failure secondary to Pneumonia and Acute Heart Failure  -Chest Xray suggestive of RLL pneumonia and possible presence of Bullae in the left Lung [place]-  -CT WO and MRI showing multiple infarcts of acute nature.   -CTA without LVO, showing leptomeningeal enhancement which can be seen in Subarachnoid hemorrhage vs encephalitis vs meningitis    -EEG showing Diffuse slowing.  -No plan for LP at this moment as INR is elevated. -ID following: Rocephin to provide meningeal coverage, cont vanco and -Ipratropium-Albuterol every 4 hrs  -Echocardiogam completed     Transaminitis with prolonged PTT and INR in the setting of acute HCV  - AST and ALT improving  -INR and PTT 20.3 and 1.7 respectively. Repeat pending  -Thrombocytopenic  -HCV RNA PCR positive; ID on board  -Liver showing:   Increased echogenicity is seen throughout the liver suggesting diffuse hepatocellular    disease such as fatty infiltration   -Hem/Onc on board; patient will receive cryoprecipitate this a.m. A-Monitor paraproteinemia   B-possible coagulopathy to do liver vs DIC. C-No schistocytosis     Multiple acute brain infarcts with subarachnoid hemorrhage versus meningitis  -MRI showing multiple small infarct which are of acute nature  -CTA without LVO, showing leptomeningeal enhancement which can be seen in Subarachnoid hemorrhage vs encephalitis vs meningitis  -EEG showing diffuse slowing without epileptic waves. -Aspirin HELD  -Plan for CASSI    Thrombocytopenia  -Unknown  baseline  -Transaminates and Prolonged PT and PTT  -PLT count  71   -Monitor HH closely    Microcytic anemia in the setting of elevated INR and PT  -Hb this a.m. is 10.5, compared to 11.4 yesterday.  -No previous labs to compare?  -Iron: 14, TIBC: 412 and Ferritin:14  -Started on Iron IV replacement  -Fibrin products>5 but <20  -Fibrinogen 98, repeat test this a.m. was 109.  141 on 01/2/2023        Elevated troponins most likely 2/2 demand ischemia  -Troponin trending down  -no acute changes on EKG  -ECHO showing normal LVF. Mild tricuspid regurge.  RV pressure of 25 mmHG        DVT prophylaxis: reason for no prophylaxis: Prolonged PT, aPTT  GI prophylaxis: Protonix 40 mg daily    Suresh Busch MD  1/6/2023  7:33 AM     Attending Physician Statement  I have discussed the care of Leila Potter with the resident team. I have examined the patient myself and taken ros and hpi , including pertinent history and exam findings,  with the resident. I have reviewed the key elements of all parts of the encounter with the resident. I agree with the assessment, plan and orders as documented by the resident. Principal Problem:    Acute respiratory failure with hypoxia and hypercapnia (HCC) RLL pneumonia  Active Problems:    Transaminitis    Microcytic anemia    Thrombocytopenia (HCC)    Agitation    Altered mental status    Community acquired pneumonia of right lower lobe of lung    Prolonged pt (prothrombin time)    Emphysema lung (HCC)    Cerebral infarction ([address]  Resolved Problems:    * No resolved hospital problems. *    Follow hb and plt  S/p cryoppt today  Extubated yesterday  CASSI to be done - re-eval tomorrow. Pt agitated today  C/w unasyn and vanc for pneumonia  No plan for LP as of now-will monitor INR    Patient continues to be unstable and has risk of sudden deterioration requiring highest level of care. Patient seen in ICU. Critical care time spent 35 minutes.         Electronically signed by Kailey Khan MD

## 2023-01-06 NOTE — PROGRESS NOTES
Physical Therapy        Physical Therapy Cancel Note      DATE: 2023    NAME: Ciera Scott  MRN: [id number]   : 1960      Patient not seen this date for Physical Therapy due to:    Patient is not appropriate for PT evaluation/treatment at this time per RN \" pt is hallucinating and delerius\"      Electronically signed by Anthony Campa PT on 2023 at 3:04 PM

## 2023-01-06 NOTE — CONSULTS
[facility]  In Patient Cardiology Consult             Date:   1/6/2023  Patient name: Nahun Lainez  Date of admission:  1/1/2023  9:55 PM  MRN:   [id number]  YOB: 1960    Reason for Admission: Altered mental status, respiratory failure    CHIEF COMPLAINT: Altered mental status, respiratory failure    History Obtained From: Chart    HISTORY OF PRESENT ILLNESS:    This is a 71-year-old male. He presented to the emergency room due to altered mental status. He was admitted due to respiratory failure secondary to pneumonia. Cardiology was consulted for possible CASSI. He was found on imaging to have both ischemic as well as hemorrhagic CVAs. This morning the patient is confused, combative, in restraints. He is on a nonrebreather. He is sinus on the monitor. No chest pain. Past Medical History:  History reviewed. No pertinent past medical history. Past Surgical History:  History reviewed. No pertinent surgical history. Home Medications:    No outpatient medications have been marked as taking for the 1/1/23 encounter [facility] Encounter). Allergies:  Patient has no known allergies. Social History:    Social History     Socioeconomic History    Marital status: Single     Spouse name: None    Number of children: None    Years of education: None    Highest education level: None   Tobacco Use    Smoking status: Every Day     Packs/day: 1.00     Years: 40.00     Pack years: 40.00     Types: Cigarettes    Smokeless tobacco: Never   Substance and Sexual Activity    Alcohol use: Yes     Comment: beer and scotch    Drug use: Not Currently     Comment: over 20 years (stated by daughter)        Family History:   History reviewed. No pertinent family history.     REVIEW OF SYSTEMS:    Unable to obtain due to AMS    PHYSICAL EXAM:    Physical Examination:    BP (!) 98/59   Pulse 75   Temp 98.6 °F (37 °C) (Axillary)   Resp 16   Ht 5' 7\" (1.702 m)   Wt 196 lb 3.4 oz (89 kg)   SpO2 98%   BMI 30.73 kg/m²    Constitutional and General Appearance: alert, combative  HEENT: PERRL, no cervical lymphadenopathy. No masses palpable. Normal oral mucosa  Respiratory:  Normal excursion and expansion without use of accessory muscles  Resp Auscultation: Good respiratory effort. No for increased work of breathing.  On auscultation: clear  Cardiovascular:  Heart tones are crisp and normal. regular S1 and S2. Murmurs: none  Jugular venous pulsation Normal  Abdomen:  No masses or tenderness  Bowel sounds present  Extremities:   No Cyanosis or Clubbing   Lower extremity edema: none   Skin: Warm and dry  Neurological:  Deferred     DATA:    Diagnostics:      EKG:   Results for orders placed or performed during the hospital encounter of 01/01/23   EKG 12 Lead   Result Value Ref Range    Ventricular Rate 75 BPM    Atrial Rate 75 BPM    P-R Interval 142 ms    QRS Duration 66 ms    Q-T Interval 350 ms    QTc Calculation (Bazett) 390 ms    P Axis 18 degrees    R Axis -165 degrees    T Axis 41 degrees    Narrative    Normal sinus rhythm  Right superior axis deviation  Possible Right ventricular hypertrophy  Septal infarct , age undetermined  Abnormal ECG  No previous ECGs available       Echo:  Results for orders placed or performed during the hospital encounter of 01/01/23   ECHO Complete 2D W Doppler W Color    Narrative    [facility]    Transthoracic Echocardiography Report (TTE)     Patient Name [facility]      Date of Study               01/03/2023                [facility]      Date of      1960  Gender                      Male   Birth      Age          60 year(s)  Race                              Room Number  2009        Height:                     67 inch, 170.18 cm      Corporate ID [id number]    Weight:                     188 pounds, 85.3 kg   #      Patient Acct [de-identified]   BSA:          1.97 m^2      BMI:     29.44 kg/m^2   #      MR #         [id number]      [id number] Cherri Elliott      Accession #  [id number]  Interpreting Physician      [address] [place] [address]      Fellow                   Referring Nurse                            Practitioner      Interpreting             Referring Physician         Gilles Ibarra   Fellow     Type of Study      TTE procedure:2D Echocardiogram, M-Mode, Doppler, Color Doppler. Procedure Date  Date: 01/03/2023 Start: 11:06 AM    Study Location: [address] [place] [address]  Technical Quality: Adequate visualization    Indications:Elevated BNP. Patient Status: Inpatient    Height: 67 inches Weight: 188 pounds BSA: 1.97 m^2 BMI: 29.44 kg/m^2    Rhythm: Within normal limits HR: 73 bpm BP: 121/79 mmHg    CONCLUSIONS    Summary  Normal left ventricle size, wall thickness and function with an estimated EF  > 55%. Mild tricuspid regurgitation. Estimated right ventricular systolic pressure is 89TOER. IVC dilated without respiratory collapse. No significant pericardial effusion is seen. Signature  ----------------------------------------------------------------------------   Electronically signed by Oneal Brandon(Sonographer) on 01/03/2023 03:46 PM  ----------------------------------------------------------------------------    ----------------------------------------------------------------------------   Electronically signed by Torres Orellana(Interpreting physician) on 01/03/2023   05:15 PM  ----------------------------------------------------------------------------  FINDINGS  Left Atrium  Left atrium is normal in size. Left Ventricle  Normal left ventricle size, wall thickness and function with an estimated EF  > 55%. No segmental wall motion abnormalities seen. Right Atrium  Right atrium is normal in size. Right Ventricle  Normal right ventricular size and function. Mitral Valve  No obvious valvular abnormality seen. No mitral regurgitation. Aortic Valve  No obvious valvular abnormality seen. No aortic insufficiency.   Tricuspid Valve  No obvious valvular abnormality. Mild tricuspid regurgitation. No pulmonary hypertension. Estimated right ventricular systolic pressure is 13DVXD. Pulmonic Valve  No obvious valvular abnormality seen. No pulmonic insufficiency. Pericardial Effusion  No significant pericardial effusion is seen. Pleural Effusion  No pleural effusion seen. Miscellaneous  Normal aortic root dimension (3.0cm). E/e average = 6.5. IVC dilated without respiratory collapse.     M-mode / 2D Measurements & Calculations:      LVIDd:5.39 cm(3.7 - 5.6 cm)       Diastolic ZVPGDX:469.54 ml   LVIDs:3.37 cm(2.2 - 4.0 cm)       Systolic NWWOZR:56.46 ml   IVSd:0.79 cm(0.6 - 1.1 cm)        Aortic Root:3.03 cm(2.0 - 3.7 cm)   LVPWd:0.84 cm(0.6 - 1.1 cm)       LA volume/Index: 55.69 ml /28m^2   Fractional Shortenin.48 %     LVOT:2.27 cm   Calculated LVEF (%): 67.09 %      Mitral:                                Aortic      Peak E-Wave: 0.78 m/s                  Peak Velocity: 1.30 m/s   Peak A-Wave: 0.91 m/s                  Mean Velocity: 0.95 m/s   E/A Ratio: 0.86                        Peak Gradient: 6.72 mmHg   Peak Gradient: 2.43 mmHg               Mean Gradient: 3.97 mmHg   Deceleration Time: 333.73 msec                                             Area (continuity): 3.37 cm^2                                          AV VTI: 21.5 cm      Tricuspid:                             Pulmonic:      Estimated RVSP: 25 mmHg   Peak TR Velocity: 1.60 m/s   Peak TR Gradient: 10.24 mmHg   Estimated RA Pressure: 15 mmHg                                          Estimated PASP: 25.24 mmHg     Septal Wall E' velocity:0.08 m/s  Lateral Wall E' velocity:0.06 m/s     Labs:     CBC:   Recent Labs     23  0427 23   WBC 5.3 5.2   HGB 11.4* 10.5*   HCT 40.0* 38.4*   PLT 66* 71*     BMP:   Recent Labs     23  0427 23   * 144   K 3.4* 4.0   CO2 30 29   BUN 11 7*   CREATININE 0.58* 0.42*   LABGLOM >60 >60   GLUCOSE 84 111* BNP: No results for input(s): BNP in the last 72 hours. PT/INR:   Recent Labs     01/03/23  1226 01/05/23  0427   PROTIME 21.9* 20.3*   INR 1.9 1.7     APTT:  Recent Labs     01/03/23  1226 01/05/23  0427   APTT 38.4* 41.6*     CARDIAC ENZYMES:No results for input(s): TROPHS in the last 72 hours. FASTING LIPID PANEL:  Lab Results   Component Value Date/Time    TRIG 85 01/03/2023 04:27 AM     LIVER PROFILE:  Recent Labs     01/05/23  0427 01/06/23  0419   * 74*   * 152*   LABALBU 2.9* 2.6*     Tele- Sinus, HR 91    IMPRESSION:      - AMS  - Acute Ischemic and hemorrhagic CVA  - Elevated trop  - Anemia  - Thrombocytopenia  - Elevated INR/PTT  - Liver disease   - Resp failure  - PNA  - KENTRELL  - HCV screen positive     RECOMMENDATIONS:  - I was consulted to consider CASSI for IE rule out  - his cultures are currently negative  - both ID and Neuro recommending CASSI   - I will consider CASSI when he is less altered. - Today he was combative, and agitated and on NRB- if we were to consider CASSI today, he would surely need to be placed on the ventilator due to sedation used during CASSI. Will re eval tomorrow and through weekend    Discussed with nursing at bedside in detail. Thank you for allowing me to participate in the care of this patient, please do not hesitate to call if you have any questions. Thang Pena DO, [facility], [address], [address], [place] [address] [facility]  [email or web address]  [phone]

## 2023-01-06 NOTE — PROGRESS NOTES
ICU Progress Note (Non-Vent)  [facility]    Patient - Frank Marx,  Age - 60 y.o.    - 1960      Room Number -    MRN -  [id number]   [facility]  Date of Admission -  2023  9:55 PM    Events of Past 24 Hours   Patient is extubated as of yesterday afternoon  He is on 10L of O2 nasal cannula saturating in high 80's to low 90's  Since last night patient has been aggressive and combative   Mental state seems to be altered and unchanged since his admission    Vitals    height is 5' 7\" (1.702 m) and weight is 196 lb 3.4 oz (89 kg). His axillary temperature is 98.4 °F (36.9 °C). His blood pressure is 121/72 and his pulse is 86. His respiration is 17 and oxygen saturation is 97%.        Temperature Range: Temp: 98.4 °F (36.9 °C) Temp  Av.4 °F (36.9 °C)  Min: 97 °F (36.1 °C)  Max: 98.9 °F (37.2 °C)  BP Range:  Systolic (08WPJ), IAJ:812 , Min:82 , ZPB:931     Diastolic (24NML), KYLE:01, Min:50, Max:110    Pulse Range: Pulse  Av  Min: 69  Max: 116  Respiration Range: Resp  Av.8  Min: 14  Max: 26  Current Pulse Ox[de-identified]  SpO2: 97 %  24HR Pulse Ox Range:  SpO2  Av.6 %  Min: 86 %  Max: 98 %  Oxygen Amount and Delivery: O2 Flow Rate (L/min): 6 L/min    Wt Readings from Last 3 Encounters:   23 196 lb 3.4 oz (89 kg)   23 188 lb (85.3 kg)     I/O     Intake/Output Summary (Last 24 hours) at 2023 0920  Last data filed at 2023 0546  Gross per 24 hour   Intake 1780.31 ml   Output 1000 ml   Net 780.31 ml     DRAIN/TUBE OUTPUT       Invasive Lines   ICP PRESSURE RANGE  No data recorded  CVP PRESSURE RANGE  No data recorded      Medications      ziprasidone (GEODON) in sterile water injection  10 mg IntraMUSCular Once    potassium bicarb-citric acid  20 mEq Oral Daily    ampicillin-sulbactam  3,000 mg IntraVENous Q6H    miconazole   Topical BID    vancomycin  1,250 mg IntraVENous Q12H    [Held by provider] aspirin  81 mg Oral Daily    sodium chloride flush  5-40 mL IntraVENous 2 times per day    nicotine  1 patch TransDERmal Daily    pantoprazole (PROTONIX) 40 mg injection  40 mg IntraVENous Daily    vancomycin (VANCOCIN) intermittent dosing (placeholder)   Other RX Placeholder    ipratropium-albuterol  1 ampule Inhalation Q4H    sodium chloride  1,000 mL IntraVENous Once     sodium chloride, perflutren lipid microspheres, sodium chloride flush, sodium chloride flush, sodium chloride flush, sodium chloride, ondansetron **OR** ondansetron, polyethylene glycol, acetaminophen **OR** acetaminophen, albuterol, guaiFENesin  IV Drips/Infusions   sodium chloride      dexmedetomidine 0.1 mcg/kg/hr (01/06/23 0546)    IV infusion builder 50 mL/hr at 01/05/23 7893    sodium chloride      norepinephrine Stopped (01/05/23 0959)       Diet/Nutrition   Diet NPO    Exam      Constitutional - Alert, arousable, combative and yelling, not oriented   General Appearance  well developed, well nourished  HEENT -normocephalic, atraumatic. PERRLA  Lungs - Chest expands equally, no wheezes, rales or rhonchi. Cardiovascular - Heart sounds are normal.  normal rate and rhythm regular, no murmur, gallop or rub.   Abdomen - soft, nontender, nondistended, no masses or organomegaly  Skin - no bruising or bleeding  Extremities - no cyanosis, clubbing or edema    Lab Results   CBC     Lab Results   Component Value Date/Time    WBC 5.2 01/06/2023 04:19 AM    RBC 5.33 01/06/2023 04:19 AM    HGB 10.5 01/06/2023 04:19 AM    HCT 38.4 01/06/2023 04:19 AM    PLT 71 01/06/2023 04:19 AM    MCV 72.0 01/06/2023 04:19 AM    MCH 19.7 01/06/2023 04:19 AM    MCHC 27.4 01/06/2023 04:19 AM    RDW 20.3 01/06/2023 04:19 AM    NRBC 1 01/03/2023 04:27 AM    LYMPHOPCT 4 01/06/2023 04:19 AM    MONOPCT 5 01/06/2023 04:19 AM    BASOPCT 0 01/06/2023 04:19 AM    MONOSABS 0.26 01/06/2023 04:19 AM    LYMPHSABS 0.21 01/06/2023 04:19 AM EOSABS 0.00 01/06/2023 04:19 AM    BASOSABS 0.00 01/06/2023 04:19 AM       BMP   Lab Results   Component Value Date/Time     01/06/2023 04:19 AM    K 4.0 01/06/2023 04:19 AM     01/06/2023 04:19 AM    CO2 29 01/06/2023 04:19 AM    BUN 7 01/06/2023 04:19 AM    CREATININE 0.42 01/06/2023 04:19 AM    GLUCOSE 111 01/06/2023 04:19 AM       LFTS  Lab Results   Component Value Date/Time    ALKPHOS 46 01/06/2023 04:19 AM     01/06/2023 04:19 AM    AST 74 01/06/2023 04:19 AM    PROT 4.6 01/06/2023 04:19 AM    BILITOT 0.9 01/06/2023 04:19 AM    BILIDIR 0.9 01/01/2023 09:59 PM    IBILI 0.2 01/01/2023 09:59 PM    LABALBU 2.6 01/06/2023 04:19 AM       ABG ABGs:   Lab Results   Component Value Date/Time    PHART 7.372 01/05/2023 04:46 AM    PO2ART 62.9 01/05/2023 04:46 AM    BOH5LEH 51.6 01/05/2023 04:46 AM       Lab Results   Component Value Date/Time    MODE PRVC 01/05/2023 04:46 AM         INR  Recent Labs     01/03/23  1226 01/05/23  0427   PROTIME 21.9* 20.3*   INR 1.9 1.7       APTT  Recent Labs     01/03/23  1226 01/05/23  0427   APTT 38.4* 41.6*       Lactic Acid  No results found for: LACTA     BNP   No results for input(s): BNP in the last 72 hours.      Cultures     Negative  Positive MRSA swab    Radiology     CXR            SYSTEMS ASSESSMENT  Acute hypoxic respiratory failure- extubated 01/05  Community-acquired pneumonia, multifocal  Acute infarcts left cerebellar hemisphere, left parietal lobe with bilateral subarachnoid hemorrhages  Agitation/delirium  MRSA positive  COPD-no PFTs  Altered mental status  Liver disease given elevated transaminases, INR, and thrombocytopenia  History of tobacco use 1/pack per day x40 years  Acute kidney injury-resolved  Full code    Neuro   MRI showing small acute infarcts in the left cerebellar hemisphere and left parietal love with moderate bilateral subarachnoid hemorrhages within both cerebral hemispheres   He is very altered similar to when he presented to hospital  Concern for Meningitis from neurology and cardiology has been consulted for a CASSI, currently patient's mentation may not allow for a CASSI today    Respiratory   Wean oxygen as tolerated. Keep O2 sat > 88%    Cardiovascular   Off of Norepinephrine pressures are stable     Gastrointestinal   Continue GI Prophylaxis with Protonix    Renal   Kidney function is stable  Continue with IVF per nephrology     Infectious Disease   Rocephin and flagyl have been discontinued  Now on Unasyn and Vancomycin      Hematology/Oncology   Hemoglobin is stable  Platelet count 71 today increase from yesterday    Endocrine   Glucose levels reasonable    Social/Spiritual/DNR/Disposition/Other       Critical Care Time   35 min    Electronically signed by Joni Quezada MD on 1/6/2023 at 9:20 AM      Patient seen and examined independently by me. Above discussed and I agree with resident note except where indicated in the EMR revision history. Also see my additional comments and changes indicated by discrete font, text color, italics, and/or initials. Labs, cultures, and radiographs where available were reviewed. He was extubated yesterday and initially did well, then became very agitated. He was thrashing and being aggressive with the nurses and had to be in restraints. He pulled out his IVs.  He seems to be much more calm after getting Geodon and that his family was present at the bedside. Currently, we have been able to wean him back down to 6 L nasal cannula saturating around 92%. We will try him on full liquid diet. I agree that he should continue his antibiotics but no need for work-up of meningitis he seems to be less likely especially when the family says that he has had episodes of agitation and delirium before.   Also, there is a risk for being placed on a ventilator as rightly pointed out by cardiology if he is going to have a CASSI    Can use Precedex if he gets severely agitated and also, can use Geodon 10 mg IM if agitation cannot be controlled by Precedex    Discussed in detail with patient's family and nursing staff at bedside.     Continue ICU monitoring      Electronically signed by Edwin Kirshna MD on 1/6/2023 at 10:13 AM

## 2023-01-07 LAB
ABSOLUTE EOS #: 0.07 K/UL (ref 0–0.4)
ABSOLUTE LYMPH #: 0.71 K/UL (ref 1–4.8)
ABSOLUTE MONO #: 0.43 K/UL (ref 0.1–1.3)
ALBUMIN SERPL-MCNC: 2.9 G/DL (ref 3.5–5.2)
ALP BLD-CCNC: 51 U/L (ref 40–129)
ALT SERPL-CCNC: 137 U/L (ref 5–41)
ANION GAP SERPL CALCULATED.3IONS-SCNC: 13 MMOL/L (ref 9–17)
AST SERPL-CCNC: 63 U/L
BASOPHILS # BLD: 1 % (ref 0–2)
BASOPHILS ABSOLUTE: 0.07 K/UL (ref 0–0.2)
BILIRUB SERPL-MCNC: 1.6 MG/DL (ref 0.3–1.2)
BUN BLDV-MCNC: 5 MG/DL (ref 8–23)
CALCIUM SERPL-MCNC: 8.1 MG/DL (ref 8.6–10.4)
CHLORIDE BLD-SCNC: 108 MMOL/L (ref 98–107)
CO2: 26 MMOL/L (ref 20–31)
CREAT SERPL-MCNC: <0.4 MG/DL (ref 0.7–1.2)
CULTURE: NORMAL
CULTURE: NORMAL
EOSINOPHILS RELATIVE PERCENT: 1 % (ref 0–4)
GFR SERPL CREATININE-BSD FRML MDRD: ABNORMAL ML/MIN/1.73M2
GLUCOSE BLD-MCNC: 91 MG/DL (ref 70–99)
HCT VFR BLD CALC: 41.4 % (ref 41–53)
HEMOGLOBIN: 11.7 G/DL (ref 13.5–17.5)
INR BLD: 1.6
LYMPHOCYTES # BLD: 10 % (ref 24–44)
MCH RBC QN AUTO: 19.8 PG (ref 26–34)
MCHC RBC AUTO-ENTMCNC: 28.2 G/DL (ref 31–37)
MCV RBC AUTO: 70.2 FL (ref 80–100)
MONOCYTES # BLD: 6 % (ref 1–7)
MORPHOLOGY: ABNORMAL
PARTIAL THROMBOPLASTIN TIME: 35.4 SEC (ref 24–36)
PDW BLD-RTO: 20.4 % (ref 11.5–14.9)
PLATELET # BLD: 68 K/UL (ref 150–450)
PMV BLD AUTO: 9.1 FL (ref 6–12)
POTASSIUM SERPL-SCNC: 4.1 MMOL/L (ref 3.7–5.3)
PROTHROMBIN TIME: 19.4 SEC (ref 11.8–14.6)
RBC # BLD: 5.9 M/UL (ref 4.5–5.9)
REASON FOR REJECTION: NORMAL
SEG NEUTROPHILS: 82 % (ref 36–66)
SEGMENTED NEUTROPHILS ABSOLUTE COUNT: 5.82 K/UL (ref 1.3–9.1)
SODIUM BLD-SCNC: 147 MMOL/L (ref 135–144)
SPECIMEN DESCRIPTION: NORMAL
SPECIMEN DESCRIPTION: NORMAL
TOTAL PROTEIN: 5.3 G/DL (ref 6.4–8.3)
WBC # BLD: 7.1 K/UL (ref 3.5–11)
ZZ NTE CLEAN UP: ORDERED TEST: NORMAL
ZZ NTE WITH NAME CLEAN UP: SPECIMEN SOURCE: NORMAL

## 2023-01-07 PROCEDURE — 6370000000 HC RX 637 (ALT 250 FOR IP): Performed by: INTERNAL MEDICINE

## 2023-01-07 PROCEDURE — 85610 PROTHROMBIN TIME: CPT

## 2023-01-07 PROCEDURE — 6360000002 HC RX W HCPCS: Performed by: INTERNAL MEDICINE

## 2023-01-07 PROCEDURE — 93005 ELECTROCARDIOGRAM TRACING: CPT | Performed by: INTERNAL MEDICINE

## 2023-01-07 PROCEDURE — 2580000003 HC RX 258: Performed by: INTERNAL MEDICINE

## 2023-01-07 PROCEDURE — 6360000002 HC RX W HCPCS: Performed by: STUDENT IN AN ORGANIZED HEALTH CARE EDUCATION/TRAINING PROGRAM

## 2023-01-07 PROCEDURE — C9113 INJ PANTOPRAZOLE SODIUM, VIA: HCPCS

## 2023-01-07 PROCEDURE — 6370000000 HC RX 637 (ALT 250 FOR IP): Performed by: STUDENT IN AN ORGANIZED HEALTH CARE EDUCATION/TRAINING PROGRAM

## 2023-01-07 PROCEDURE — 99233 SBSQ HOSP IP/OBS HIGH 50: CPT | Performed by: PSYCHIATRY & NEUROLOGY

## 2023-01-07 PROCEDURE — 2580000003 HC RX 258: Performed by: NURSE PRACTITIONER

## 2023-01-07 PROCEDURE — 6360000002 HC RX W HCPCS

## 2023-01-07 PROCEDURE — 87522 HEPATITIS C REVRS TRNSCRPJ: CPT

## 2023-01-07 PROCEDURE — 97163 PT EVAL HIGH COMPLEX 45 MIN: CPT

## 2023-01-07 PROCEDURE — 97530 THERAPEUTIC ACTIVITIES: CPT

## 2023-01-07 PROCEDURE — 97112 NEUROMUSCULAR REEDUCATION: CPT

## 2023-01-07 PROCEDURE — 2000000000 HC ICU R&B

## 2023-01-07 PROCEDURE — 36415 COLL VENOUS BLD VENIPUNCTURE: CPT

## 2023-01-07 PROCEDURE — 97167 OT EVAL HIGH COMPLEX 60 MIN: CPT

## 2023-01-07 PROCEDURE — 2700000000 HC OXYGEN THERAPY PER DAY

## 2023-01-07 PROCEDURE — 80053 COMPREHEN METABOLIC PANEL: CPT

## 2023-01-07 PROCEDURE — 94761 N-INVAS EAR/PLS OXIMETRY MLT: CPT

## 2023-01-07 PROCEDURE — 99231 SBSQ HOSP IP/OBS SF/LOW 25: CPT | Performed by: INTERNAL MEDICINE

## 2023-01-07 PROCEDURE — 2580000003 HC RX 258

## 2023-01-07 PROCEDURE — 94640 AIRWAY INHALATION TREATMENT: CPT

## 2023-01-07 PROCEDURE — 85025 COMPLETE CBC W/AUTO DIFF WBC: CPT

## 2023-01-07 PROCEDURE — 85730 THROMBOPLASTIN TIME PARTIAL: CPT

## 2023-01-07 PROCEDURE — 99233 SBSQ HOSP IP/OBS HIGH 50: CPT | Performed by: INTERNAL MEDICINE

## 2023-01-07 RX ORDER — HEPARIN SODIUM 5000 [USP'U]/ML
5000 INJECTION, SOLUTION INTRAVENOUS; SUBCUTANEOUS EVERY 8 HOURS SCHEDULED
Status: DISCONTINUED | OUTPATIENT
Start: 2023-01-07 | End: 2023-01-17

## 2023-01-07 RX ORDER — IPRATROPIUM BROMIDE AND ALBUTEROL SULFATE 2.5; .5 MG/3ML; MG/3ML
1 SOLUTION RESPIRATORY (INHALATION)
Status: DISCONTINUED | OUTPATIENT
Start: 2023-01-07 | End: 2023-01-19 | Stop reason: HOSPADM

## 2023-01-07 RX ADMIN — POTASSIUM CHLORIDE: 2 INJECTION, SOLUTION, CONCENTRATE INTRAVENOUS at 05:26

## 2023-01-07 RX ADMIN — SODIUM CHLORIDE, PRESERVATIVE FREE 40 MG: 5 INJECTION INTRAVENOUS at 10:38

## 2023-01-07 RX ADMIN — ZIPRASIDONE MESYLATE 10 MG: 20 INJECTION, POWDER, LYOPHILIZED, FOR SOLUTION INTRAMUSCULAR at 11:54

## 2023-01-07 RX ADMIN — SODIUM CHLORIDE 3000 MG: 900 INJECTION INTRAVENOUS at 22:09

## 2023-01-07 RX ADMIN — SODIUM CHLORIDE, PRESERVATIVE FREE 10 ML: 5 INJECTION INTRAVENOUS at 10:40

## 2023-01-07 RX ADMIN — SODIUM CHLORIDE 3000 MG: 900 INJECTION INTRAVENOUS at 10:34

## 2023-01-07 RX ADMIN — IPRATROPIUM BROMIDE AND ALBUTEROL SULFATE 1 AMPULE: 2.5; .5 SOLUTION RESPIRATORY (INHALATION) at 07:56

## 2023-01-07 RX ADMIN — SODIUM CHLORIDE 3000 MG: 900 INJECTION INTRAVENOUS at 02:04

## 2023-01-07 RX ADMIN — VANCOMYCIN HYDROCHLORIDE 1250 MG: 1.25 INJECTION, POWDER, LYOPHILIZED, FOR SOLUTION INTRAVENOUS at 04:25

## 2023-01-07 RX ADMIN — IPRATROPIUM BROMIDE AND ALBUTEROL SULFATE 1 AMPULE: 2.5; .5 SOLUTION RESPIRATORY (INHALATION) at 21:05

## 2023-01-07 RX ADMIN — NYSTATIN OINTMENT: 100000 OINTMENT TOPICAL at 10:32

## 2023-01-07 RX ADMIN — VANCOMYCIN HYDROCHLORIDE 1250 MG: 1.25 INJECTION, POWDER, LYOPHILIZED, FOR SOLUTION INTRAVENOUS at 17:46

## 2023-01-07 RX ADMIN — SODIUM CHLORIDE 3000 MG: 900 INJECTION INTRAVENOUS at 14:30

## 2023-01-07 RX ADMIN — NYSTATIN OINTMENT: 100000 OINTMENT TOPICAL at 22:14

## 2023-01-07 RX ADMIN — IPRATROPIUM BROMIDE AND ALBUTEROL SULFATE 1 AMPULE: 2.5; .5 SOLUTION RESPIRATORY (INHALATION) at 15:09

## 2023-01-07 RX ADMIN — POTASSIUM BICARBONATE 20 MEQ: 782 TABLET, EFFERVESCENT ORAL at 10:32

## 2023-01-07 RX ADMIN — HEPARIN SODIUM 5000 UNITS: 5000 INJECTION INTRAVENOUS; SUBCUTANEOUS at 22:11

## 2023-01-07 RX ADMIN — WATER 10 MG: 1 INJECTION INTRAMUSCULAR; INTRAVENOUS; SUBCUTANEOUS at 22:12

## 2023-01-07 RX ADMIN — IPRATROPIUM BROMIDE AND ALBUTEROL SULFATE 1 AMPULE: 2.5; .5 SOLUTION RESPIRATORY (INHALATION) at 11:19

## 2023-01-07 RX ADMIN — POTASSIUM CHLORIDE: 2 INJECTION, SOLUTION, CONCENTRATE INTRAVENOUS at 16:51

## 2023-01-07 ASSESSMENT — PAIN SCALES - GENERAL
PAINLEVEL_OUTOF10: 0

## 2023-01-07 NOTE — PROGRESS NOTES
Attempted to turn patient. Pt kicking out at nurses with legs. Swearing at nurses. Redirected without success. Bilat wrist restraints cont.

## 2023-01-07 NOTE — PROGRESS NOTES
Vancomycin Dosing by Pharmacy - Daily Note   Vancomycin Therapy Day:  6  Indication: CAP, MRSA DNA positive 1/3    Allergies:  Patient has no known allergies. Actual Weight:    Wt Readings from Last 1 Encounters:   01/06/23 196 lb 3.4 oz (89 kg)       Labs/Ancillary Data  Estimated Creatinine Clearance: 201 mL/min (based on SCr of 0.4 mg/dL). Recent Labs     01/05/23  0427 01/06/23  0419 01/07/23  0412 01/07/23  0519   CREATININE 0.58* 0.42* <0.40*  --    BUN 11 7* 5*  --    WBC 5.3 5.2  --  7.1     Procalcitonin   Date Value Ref Range Status   01/02/2023 0.09 (H) <0.09 ng/mL Final     Comment:           Suspected Sepsis:  <0.50 ng/mL     Low likelihood of sepsis. 0.50-2.00 ng/mL     Increased likelihood of sepsis. Antibiotics encouraged. >2.00 ng/mL     High risk of sepsis/shock. Antibiotics strongly encouraged. Suspected Lower Resp Tract Infections:  <0.24 ng/mL     Low likelihood of bacterial infection. >0.24 ng/mL     Increased likelihood of bacterial infection. Antibiotics encouraged. With successful antibiotic therapy, PCT levels should decrease rapidly. (Half-life of 24 to   36 hours.)        Procalcitonin values from samples collected within the first 6 hours of systemic infection   may still be low. Retesting may be indicated. Values from day 1 and day 4 can be entered into the Change in Procalcitonin Calculator   (www.SocialGlimpzs-pct-calculator. Wow! Stuff) to determine the patient's Mortality Risk Prognosis        In healthy neonates, plasma Procalcitonin (PCT) concentrations increase gradually after   birth, reaching peak values at about 24 hours of age then decrease to normal values below   0.5 ng/mL by 48-72 hours of age.          Intake/Output Summary (Last 24 hours) at 1/7/2023 0936  Last data filed at 1/6/2023 1850  Gross per 24 hour   Intake --   Output 550 ml   Net -550 ml     Temp: 98.4    Culture Date / Source  /  Results  See micro  Recent vancomycin administrations vancomycin (VANCOCIN) 1,250 mg in dextrose 5 % 250 mL IVPB (ADDAVIAL) (mg) 1,250 mg New Bag 01/07/23 0425     1,250 mg New Bag 01/06/23 1846     1,250 mg New Bag  0428     1,250 mg New Bag 01/05/23 1735     1,250 mg New Bag  0448     1,250 mg New Bag 01/04/23 1703                    Vancomycin Concentrations:   TROUGH:  No results for input(s): VANCOTROUGH in the last 72 hours. RANDOM:    Recent Labs     01/06/23  1149   VANCORANDOM 20.3       MRSA Nasal Swab: showed MRSA positive result on 1/3/2023 . PLAN     Continue current dose of 1250 mg q12h IV  Ensured BUN/sCr ordered at baseline and every 48 hours x at least 3 levels, then at least weekly. Repeat vancomycin concentration ordered for TBD @ TBD   Pharmacy will continue to monitor patient and adjust therapy as indicated      Vancomycin Target Concentration Parameters  Treatment  Population Target AUC/KENDALL Target Trough   Invasive MRSA Infection (bacteremia, pneumonia, meningitis, endocarditis, osteomyelitis)  Sepsis (undifferentiated) 400-600 N/A   Infection due to non-MRSA pathogen  Empiric treatment of non-invasive MRSA infection  (SSTI, UTI) <500 10-15 mg/L   CrCl < 29 mL/min  Rapidly fluctuating serum creatinine   KENTRELL N/A < 15 mg/L     Renal replacement therapy is dosed by levels, per hospital protocol. Abbreviations  * Pauc: probability that AUC is >400 (efficacy); Pconc: probability that Ctrough is above 20 ?g/mL (toxicity); Tox: Probability of nephrotoxicity, based on Ricky et al. Clin Infect Dis 2009. Thank you for the consult. Pharmacy will continue to follow. Althea Leong [facility] [address] in-patient pharmacy

## 2023-01-07 NOTE — PROGRESS NOTES
[facility]        Date:   1/7/2023  Patient name: Eliceo Javed  Date of admission:  1/1/2023  9:55 PM  MRN:   [id number]  YOB: 1960  PCP: No primary care provider on file. Reason for Consult:       Subjective: No new cardiac issues. No overnight events. Chart reviewed. Physical Exam:   Vitals: BP (!) 140/87   Pulse 99   Temp 98.4 °F (36.9 °C) (Axillary)   Resp 22   Ht 5' 7\" (1.702 m)   Wt 196 lb 3.4 oz (89 kg)   SpO2 96%   BMI 30.73 kg/m²   General appearance: alert and cooperative with exam  HEENT: Head: Normocephalic, no lesions, without obvious abnormality. Neck: no adenopathy, no carotid bruit, no JVD, supple, symmetrical, trachea midline and thyroid not enlarged, symmetric, no tenderness/mass/nodules  Lungs: clear to auscultation bilaterally  Heart: regular rate and rhythm, S1, S2 normal, no murmur, click, rub or gallop  Abdomen: soft, non-tender; bowel sounds normal; no masses,  no organomegaly  Extremities: extremities normal, atraumatic, no cyanosis or edema  Neurologic: Mental status: Alert, oriented, thought content appropriate      Lab work, imaging, nursing, and chart reviewed extensively.     Medications:   Scheduled Meds:   ziprasidone (GEODON) in sterile water injection  10 mg IntraMUSCular BID    ipratropium-albuterol  1 ampule Inhalation Q4H WA    heparin (porcine)  5,000 Units SubCUTAneous 3 times per day    nystatin   Topical BID    potassium bicarb-citric acid  20 mEq Oral Daily    ampicillin-sulbactam  3,000 mg IntraVENous Q6H    [Held by provider] miconazole   Topical BID    vancomycin  1,250 mg IntraVENous Q12H    [Held by provider] aspirin  81 mg Oral Daily    sodium chloride flush  5-40 mL IntraVENous 2 times per day    nicotine  1 patch TransDERmal Daily    pantoprazole (PROTONIX) 40 mg injection  40 mg IntraVENous Daily    vancomycin (VANCOCIN) intermittent dosing (placeholder)   Other RX Placeholder     Continuous Infusions:   IV infusion builder      sodium chloride      dexmedetomidine      sodium chloride           Labs:     CBC:   Recent Labs     01/05/23  0427 01/06/23  0419 01/07/23  0519   WBC 5.3 5.2 7.1   HGB 11.4* 10.5* 11.7*   PLT 66* 71* 68*     BMP:    Recent Labs     01/05/23  0427 01/06/23  0419 01/07/23  0412   * 144 147*   K 3.4* 4.0 4.1   * 111* 108*   CO2 30 29 26   BUN 11 7* 5*   CREATININE 0.58* 0.42* <0.40*   GLUCOSE 84 111* 91     Hepatic:   Recent Labs     01/05/23 0427 01/06/23  0419 01/07/23  0412   * 74* 63*   * 152* 137*   BILITOT 0.8 0.9 1.6*   ALKPHOS 53 46 51     Troponin: No results for input(s): TROPONINI in the last 72 hours. BNP: No results for input(s): BNP in the last 72 hours. Lipids: No results for input(s): CHOL, HDL in the last 72 hours. Invalid input(s): LDLCALCU  INR:   Recent Labs     01/05/23  0427 01/06/23  0836 01/07/23  0949   INR 1.7 1.7 1.6       Other active acute problems:  Patient Active Problem List   Diagnosis    Acute type II respiratory failure (HCC)    Transaminitis    Microcytic anemia    Thrombocytopenia (HCC)    Agitation    Acute respiratory failure with hypoxia and hypercapnia (HCC) RLL pneumonia    Altered mental status    Community acquired pneumonia of right lower lobe of lung    Prolonged pt (prothrombin time)    Emphysema lung (HCC)    Cerebral infarction ([facility] [address]         IMPRESSION & Recommendations:      - CASSI for IE rule out - will consider as neurological function improves, was agitated and combative. Will reassess Monday   - Neuro and ID would like CASSI  - AMS  - Acute Ischemic and hemorrhagic CVA  - Elevated trop  - Anemia  - Thrombocytopenia  - Elevated INR/PTT  - Liver disease   - Resp failure  - PNA  - KENTRELL  - HCV screen positive       Discussed with patient, family, and Nurse. Electronically signed by Santi Bui DO on 1/7/2023 at 4:01 PM    Santi Bui, [id number] [facility]  [employer]  [phone]

## 2023-01-07 NOTE — PLAN OF CARE
Problem: Discharge Planning  Goal: Discharge to home or other facility with appropriate resources  Outcome: Progressing     Problem: Safety - Adult  Goal: Free from fall injury  Outcome: Progressing     Problem: ABCDS Injury Assessment  Goal: Absence of physical injury  Outcome: Progressing     Problem: Skin/Tissue Integrity  Goal: Absence of new skin breakdown  Description: 1. Monitor for areas of redness and/or skin breakdown  2. Assess vascular access sites hourly  3. Every 4-6 hours minimum:  Change oxygen saturation probe site  4. Every 4-6 hours:  If on nasal continuous positive airway pressure, respiratory therapy assess nares and determine need for appliance change or resting period. Outcome: Progressing     Problem: Safety - Medical Restraint  Goal: Remains free of injury from restraints (Restraint for Interference with Medical Device)  Description: INTERVENTIONS:  1. Determine that other, less restrictive measures have been tried or would not be effective before applying the restraint  2. Evaluate the patient's condition at the time of restraint application  3. Inform patient/family regarding the reason for restraint  4.  Q2H: Monitor safety, psychosocial status, comfort, nutrition and hydration  Outcome: Progressing

## 2023-01-07 NOTE — PROGRESS NOTES
ICU Progress Note (Non-Vent)  [facility]    Patient - Kavita Trevino,  Age - 60 y.o.    - 1960      Room Number -    MRN -  [id number]   Acct # - [de-identified]  Date of Admission -  2023  9:55 PM    Events of Past 24 Hours   Continues to be agitated overnight, kicked at nurses again    Vitals    height is 5' 7\" (1.702 m) and weight is 196 lb 3.4 oz (89 kg). His axillary temperature is 98.4 °F (36.9 °C). His blood pressure is 147/82 (abnormal) and his pulse is 100. His respiration is 20 and oxygen saturation is 90%.        Temperature Range: Temp: 98.4 °F (36.9 °C) Temp  Av.5 °F (36.9 °C)  Min: 98 °F (36.7 °C)  Max: 98.9 °F (37.2 °C)  BP Range:  Systolic (08AQR), TQT:596 , Min:89 , USC:246     Diastolic (51HOR), VST:48, Min:52, Max:100    Pulse Range: Pulse  Av.4  Min: 83  Max: 105  Respiration Range: Resp  Av.1  Min: 13  Max: 25  Current Pulse Ox[de-identified]  SpO2: 90 %  24HR Pulse Ox Range:  SpO2  Av.7 %  Min: 81 %  Max: 99 %  Oxygen Amount and Delivery: O2 Flow Rate (L/min): 10 L/min    Wt Readings from Last 3 Encounters:   23 196 lb 3.4 oz (89 kg)   23 188 lb (85.3 kg)     I/O     Intake/Output Summary (Last 24 hours) at 2023 0808  Last data filed at 2023 1850  Gross per 24 hour   Intake --   Output 550 ml   Net -550 ml     DRAIN/TUBE OUTPUT       Invasive Lines   ICP PRESSURE RANGE  No data recorded  CVP PRESSURE RANGE  No data recorded      Medications      nystatin   Topical BID    potassium bicarb-citric acid  20 mEq Oral Daily    ampicillin-sulbactam  3,000 mg IntraVENous Q6H    [Held by provider] miconazole   Topical BID    vancomycin  1,250 mg IntraVENous Q12H    [Held by provider] aspirin  81 mg Oral Daily    sodium chloride flush  5-40 mL IntraVENous 2 times per day    nicotine  1 patch TransDERmal Daily    pantoprazole (PROTONIX) 40 mg injection  40 mg IntraVENous Daily    vancomycin (VANCOCIN) intermittent dosing (placeholder)   Other RX Placeholder    ipratropium-albuterol  1 ampule Inhalation Q4H    sodium chloride  1,000 mL IntraVENous Once     sodium chloride, ziprasidone (GEODON) in sterile water injection, dexmedetomidine, perflutren lipid microspheres, sodium chloride flush, sodium chloride flush, sodium chloride flush, sodium chloride, ondansetron **OR** ondansetron, polyethylene glycol, acetaminophen **OR** acetaminophen, albuterol, guaiFENesin  IV Drips/Infusions   sodium chloride      dexmedetomidine      IV infusion builder 35 mL/hr at 01/07/23 0526    sodium chloride         Diet/Nutrition   ADULT DIET; Full Liquid    Exam      Constitutional - Alert, arousable  General Appearance  well developed, well nourished, obese  HEENT -normocephalic, atraumatic. PERRLA  Lungs - Chest expands equally, no wheezes, rales or rhonchi. Diminished  Cardiovascular - Heart sounds are normal.  normal rate and rhythm regular, no murmur, gallop or rub. Abdomen - soft, nontender, nondistended, no masses or organomegaly  Neurologic - CN II-XII are grossly intact.  There are no focal motor deficits  Skin - no bruising or bleeding  Extremities - no cyanosis, clubbing or edema    Lab Results   CBC     Lab Results   Component Value Date/Time    WBC 7.1 01/07/2023 05:19 AM    RBC 5.90 01/07/2023 05:19 AM    HGB 11.7 01/07/2023 05:19 AM    HCT 41.4 01/07/2023 05:19 AM    PLT 68 01/07/2023 05:19 AM    MCV 70.2 01/07/2023 05:19 AM    MCH 19.8 01/07/2023 05:19 AM    MCHC 28.2 01/07/2023 05:19 AM    RDW 20.4 01/07/2023 05:19 AM    NRBC 1 01/03/2023 04:27 AM    LYMPHOPCT 10 01/07/2023 05:19 AM    MONOPCT 6 01/07/2023 05:19 AM    BASOPCT 1 01/07/2023 05:19 AM    MONOSABS 0.43 01/07/2023 05:19 AM    LYMPHSABS 0.71 01/07/2023 05:19 AM    EOSABS 0.07 01/07/2023 05:19 AM    BASOSABS 0.07 01/07/2023 05:19 AM       BMP   Lab Results   Component Value Date/Time    NA PENDING 01/07/2023 04:12 AM    K PENDING 01/07/2023 04:12 AM    CL PENDING 01/07/2023 04:12 AM    CO2 26 01/07/2023 04:12 AM    BUN 5 01/07/2023 04:12 AM    CREATININE <0.40 01/07/2023 04:12 AM    GLUCOSE 91 01/07/2023 04:12 AM       LFTS  Lab Results   Component Value Date/Time    ALKPHOS 51 01/07/2023 04:12 AM     01/07/2023 04:12 AM    AST 63 01/07/2023 04:12 AM    PROT 5.3 01/07/2023 04:12 AM    BILITOT 1.6 01/07/2023 04:12 AM    BILIDIR 0.9 01/01/2023 09:59 PM    IBILI 0.2 01/01/2023 09:59 PM    LABALBU 2.9 01/07/2023 04:12 AM       ABG ABGs:   Lab Results   Component Value Date/Time    PHART 7.372 01/05/2023 04:46 AM    PO2ART 62.9 01/05/2023 04:46 AM    HAP0WIX 51.6 01/05/2023 04:46 AM       Lab Results   Component Value Date/Time    MODE PRVC 01/05/2023 04:46 AM         INR  Recent Labs     01/05/23 0427 01/06/23  0836   PROTIME 20.3* 20.1*   INR 1.7 1.7       APTT  Recent Labs     01/05/23 0427   APTT 41.6*       Lactic Acid  No results found for: LACTA     BNP   No results for input(s): BNP in the last 72 hours. Cultures       Radiology     CXR      CT Scans    (See actual reports for details)      SYSTEMS ASSESSMENT    Acute hypoxic respiratory failure- extubated 01/05  Community-acquired pneumonia, multifocal; possible aspiration  Acute infarcts left cerebellar hemisphere, left parietal lobe with bilateral subarachnoid hemorrhages  Agitation/delirium  MRSA positive  COPD-no PFTs  Altered mental status  Liver disease given elevated transaminases, INR, and thrombocytopenia  History of tobacco use 1/pack per day x40 years  Acute kidney injury-resolved  History of alcohol use  Full code    Neuro   Given his continued agitation and delirium, will start Geodon scheduled 10 mg twice a day IM  His delirium may be due to combination of history of alcohol use, ICU environmental issues, and CVA  If he gets really violent, will have to start Precedex  Respiratory   Wean oxygen as tolerated.  Keep O2 sat > 88%  Continue Rosmerybs every 4 hours while awake  Gets more agitated when on BiPAP  Cardiovascular   Would avoid CASSI basically because he would need to be intubated unless absolutely necessary/life-threatening  Check EKG to monitor QT interval  Gastrointestinal   When his family is here he is able to eat    Renal   He is becoming hypernatremic  IV fluids per nephrology    Infectious Disease   Remains on Unasyn and vancomycin    Hematology/Oncology   Hemoglobin and platelet count remained stable    Endocrine   Blood sugars okay    Social/Spiritual/DNR/Disposition/Other     Have been updating family on a daily basis, cannot move him out of the ICU until his delirium has improved  He is in restraints to prevent him removing tubes and lines and also to prevent him from hitting nurses and other staff  Discussed with bedside nursing    Critical Care Time   35 min    Electronically signed by Shaheed Del Real MD on 1/7/2023 at 8:08 AM

## 2023-01-07 NOTE — PROGRESS NOTES
[address]   Occupational Therapy Evaluation  Date: 23  Patient Name: Kingsley Lamb       Room:   MRN: [id number]  Account: [de-identified]   : 1960  (63 y.o.) Gender: male     Discharge Recommendations:  Further Occupational Therapy is recommended upon facility discharge. OT Equipment Recommendations  Other: TBD       Diagnosis: Acute respiratory failure   Additional Pertinent Hx: This is a 61 y/o M with reported prior alcoholism with residual liver failure who presented with altered mental status and who was subsequently dx'd with pneumonia. An initial head CT revealed old as well as possibly new strokes which were confirmed on MRI along with some probable SAH. Pt extubated 23    Treatment Diagnosis: Impaired self care status    Past Medical History:  has no past medical history on file. Past Surgical History:   has no past surgical history on file. Restrictions  Restrictions/Precautions  Restrictions/Precautions: Fall Risk;General Precautions  Required Braces or Orthoses?: No  Implants present? :  (Pt denies)      Vitals  Vitals  Heart Rate: 99  Heart Rate Source: Monitor  BP: (!) 169/96  BP Location: Right upper arm  BP Method: Automatic  Patient Position: Semi fowlers  MAP (Calculated): 120  Resp: 21  SpO2: 96 %  O2 Device: High flow nasal cannula     Subjective  Subjective: Pt resting in bed upon arrival. Pt agreeable to OT/PT eval.  Comments: Ok per RN Vineet Medina for OT/PT eval. OK per RN to remove restraints during session. Replaced at end of session.   Subjective  Pain: Pt did not voice pain at this time      Social/Functional History  Social/Functional History  Lives With: Spouse  Type of Home: House  Home Layout: One level  Home Access: Stairs to enter with rails  Entrance Stairs - Number of Steps: 6 ALISA from front; 4 ALISA frmo back with no rails  Entrance Stairs - Rails: Both (able to reach both at same time but are not stable)  Bathroom Shower/Tub: Tub/Shower unit, Curtain, Shower chair without back  [employer]: Standard  Bathroom Equipment: Grab bars in shower, Hand-held shower, Toilet raiser  Bathroom Accessibility: Not accessible  Home Equipment: Bethanie Tiff  ADL Assistance: [address]: Independent  Homemaking Responsibilities: Yes  Ambulation Assistance: Independent  Transfer Assistance: Independent  Active : Yes  Mode of Transportation: [facility]  Occupation: Retired  Type of Occupation:   IADL Comments: Pt sleeps in flat bed. Additional Comments: Spouse is home all the time and able to assist as needed. Daughter lives close by and able to assist as needed. Daughter works and has family. Objective  Orientation  Overall Orientation Status: Impaired  Cognition  Overall Cognitive Status: Exceptions  Arousal/Alertness: Inconsistent responses to stimuli  Following Commands: Follows one step commands with repetition, Follows one step commands with increased time, Inconsistently follows commands  Attention Span: Difficulty attending to directions, Difficulty dividing attention  Memory: Decreased recall of recent events  Safety Judgement: Decreased awareness of need for assistance, Decreased awareness of need for safety  Problem Solving: Assistance required to generate solutions, Assistance required to implement solutions, Assistance required to identify errors made, Assistance required to correct errors made, Decreased awareness of errors  Insights: Not aware of deficits  Initiation: Requires cues for all  Sequencing: Requires cues for all        ADL  Feeding: Maximum assistance  Feeding Skilled Clinical Factors: Liquid Diet  Grooming: Maximum assistance  UE Bathing: Maximum assistance  LE Bathing: Dependent/Total  UE Dressing: Maximum assistance  LE Dressing: Dependent/Total  Toileting: Dependent/Total  Toileting Skilled Clinical Factors:  Mars catheter  Additional Comments: ADL scores based on clinical reasoning and skilled observation unless otherwise noted. Pt currently limited due to decreased strength, balance, activity tolerance, and cognitive barriers impacting safety and independence with self care tasks         UE Function  LUE PROM (degrees)  LUE PROM: WFL  LUE AROM (degrees)  LUE AROM : Exceptions  LUE General AROM: ~50% shoulder flexion; unable to maintain LUE elevated above head; Elbow -~10% flexion  Left Hand PROM (degrees)  Left Hand PROM: WFL  Left Hand AROM (degrees)  Left Hand AROM: Exceptions  Left Hand General AROM: Increased time with difficutly completing fully fist due to swelling  Tone LUE  LUE Tone: Normotonic  LUE Strength  Gross LUE Strength: Exceptions to [facility]  L Hand General: 3-/5    RUE PROM (degrees)  RUE PROM: WFL  RUE AROM (degrees)  RUE AROM : WFL  RUE General AROM: Pt able to complete with verbal cues and increased time  Right Hand PROM (degrees)  Right Hand PROM: WFL  Right Hand AROM (degrees)  Right Hand AROM: WFL  Right Hand General AROM: Pt able to complete with verbal cues and increased time  Tone RUE  RUE Tone: Normotonic  RUE Strength  R Hand General: 3+/5         Fine Motor Skills/Coordination  Coordination  Movements Are Fluid And Coordinated: No  Coordination and Movement Description: Fine motor impairments, Gross motor impairments, Decreased speed, Decreased accuracy, Right UE, Left UE              Bed Mobility  Bed mobility  Supine to Sit: 2 Person assistance, Dependent/Total  Sit to Supine: 2 Person assistance, Dependent/Total  Scooting: Dependent/Total  Bed Mobility Comments: Bed mobility completed with HOB elevated. Pt required total A x 2 to complete task due to cognitive barriers. Pt tolerated sitting EOB with flucuating level of assistance. Pt required total A x 2 for sitting with therapist placed in back and front of patient. Pt able to tolerate sitting EOB intermitently with Min A with BUE support however quickly progressing to requiring increased A.  Pt attempted to stand multiple times while sitting EOB requiring max verbal/tactile cues to maintain sitting position. Impulsive. Balance  Balance  Sitting Balance: Dependent/Total (Flucuated between Dependent x 2 (front and back) - Min A x 1 for sitting balance due to cognition/fatigue)  Standing Balance: Unable to assess(comment)       Transfers  Transfers  Transfer Comments: [facility]    Functional Mobility  Functional Mobility Comments: [facility]    Assessment  Assessment  Performance deficits / Impairments: Decreased ADL status, Decreased functional mobility , Decreased ROM, Decreased strength, Decreased safe awareness, Decreased cognition, Decreased endurance, Decreased balance, Decreased high-level IADLs, Decreased fine motor control, Decreased coordination, Decreased posture  Treatment Diagnosis: Impaired self care status  Prognosis: Fair  Decision Making: High Complexity  Discharge Recommendations: Patient would benefit from continued therapy after discharge    Activity Tolerance  Activity Tolerance: Patient limited by fatigue, Treatment limited secondary to decreased cognition    Safety Devices  Type of Devices:  All fall risk precautions in place, Bed alarm in place, Call light within reach, Patient at risk for falls, Left in bed, Nurse notified (Family in room)    Patient Education  Patient Education  Education Given To: Patient, Family  Education Provided: Role of Therapy, Plan of Care  Education Method: Verbal  Barriers to Learning: Cognition  Education Outcome: Continued education needed      Functional Outcome Measures  AM-PAC Daily Activity Inpatient   How much help for putting on and taking off regular lower body clothing?: Total  How much help for Bathing?: Total  How much help for Toileting?: Total  How much help for putting on and taking off regular upper body clothing?: A Lot  How much help for taking care of personal grooming?: A Lot  How much help for eating meals?: A Lot  AM-PAC Inpatient Daily Activity Raw Score: 9  AM-PAC Inpatient ADL T-Scale Score : 25.33  ADL Inpatient CMS 0-100% Score: 79.59  ADL Inpatient CMS G-Code Modifier : CL       Goals     Short Term Goals  Time Frame for Short Term Goals: By discharge  Short Term Goal 1: Pt will complete bed mobility with mod A x 2 to sitting EOB supported with Min A for 8+ minutes during self care tasks  Short Term Goal 2: Pt will complete simple grooming/self feeding task with Min A and Fair attention to task  Short Term Goal 3: Pt will complete upper body dressing/bathing with Mod A and Good safety/attention to task  Short Term Goal 4: Pt will follow simple 1-2 step commands 75% of the time to increase participation in daily activities  Short Term Goal 5: Pt will participate in 15+ minutes of therapeutic exercises/functional activities to increase safety and independence with self care and mobility  Short Term Goal 6: OTR to further assess transfers. mobility as appropriate    Plan  Occupational Therapy Plan  Times Per Week: 5-7  Current Treatment Recommendations: Self-Care / ADL, Strengthening, ROM, Balance training, Functional mobility training, Endurance training, Cognitive reorientation, Neuromuscular re-education, Safety education & training, Patient/Caregiver education & training, Equipment evaluation, education, & procurement, Cognitive/Perceptual training, Coordination training      OT Individual Minutes  OT Individual Minutes  Time In: 4076  Time Out: 1031  Minutes: 38  Time Code Minutes   Timed Code Treatment Minutes: 23 Minutes        Electronically signed by Curtis Fields OT on 1/7/23 at 1:36 PM EST

## 2023-01-07 NOTE — PROGRESS NOTES
[facility]    PROGRESS NOTE             1/7/2023    9:07 AM    Name:   Ciera Scott  MRN:     [id number]     Acct:      [de-identified]   Room:   2009/2009-01  IP Day:  5  Admit Date:  1/1/2023  9:55 PM    PCP:  No primary care provider on file. Code Status:  Full Code    Subjective:     C/C:   Chief Complaint   Patient presents with    Altered Mental Status     Interval History Status: not changed. Patient seen and examined at bedside today  Patient is still extubated and in restraints  Patient is unable to provide adequate history due to being significantly altered, agitated, aggressive. Pt was screaming \"I was just in [place] and tell these sons of bitches to leave my room\"  Vital signs this morning were 147/82 blood pressure, respiratory 20, pulse of 100, oxygen saturation of 90% on high flow nasal cannula    Brief History:     The patient is a 60 y.o.  male with unknown past medical history due to no healthcare visits or follow-up who presents with Altered Mental Status and he is admitted to the hospital for the management of  Acute respiratory failure most likely 2/2 pneumonia. Per patient's wife, she reports that the patient has not been acting himself for the past week. She reports being more slurred, confused and progressively getting worse prior to presentation. Patient prior to the presentation a week ago he had a fall and EMS presented patient was vitally stable and he was not transported to the hospital.  This time upon EMS evaluation of the patient he had an O2 of 75%, he was put on a breather and was given a dose of IV Lasix in route. Wife and patient, cannot recall any precipitating event could have led to his presentation. He also denies chest pain, shortness of breath, or cough. Per patient, he does not recall any complaints or events prior to the presentation.    Wife denies recent alcohol use, however, patient does have a history of regular alcohol intake but not on daily basis. Wife also reports that patient has constant heartburn for which he takes regular antiacids. Patient denies the presence of any abdominal pain or any change in bowel habits, abdominal pain, nausea or vomiting. Upon arrival in the ED, patient was put on BiPAP. Patient was afebrile, normotensive and in normal sinus rhythm. A chest x-ray was completed and the patient had right lower pneumonia with a small pleural effusion. Patient also had a large bullae in the left apex with a pneumothorax that cannot be excluded. Patient ABG was suggestive of pattern of respiratory acidosis with pH 7.295, PCO2 60.3, PO2 63.0, HCO3 29.3. CT head WO did not show any acute findings. Patient had elevated troponins of 184, trended down to 177. Patient also had an elevated BNP of 7797. An elevated creatinine of 1.77 was on presentation, with no previous lab test to be compared to. Also patient had a left elevated liver enzymes ALT was 525, , with prolonged prothrombin time of 24.9, and INR of 2.3. Patient was admitted to the ICU for the management of type II respiratory failure associated altered mental status    Review of Systems:     Review of Systems   Unable to perform ROS: Mental status change       Medications:      Allergies:  No Known Allergies    Current Meds:   Scheduled Meds:    ziprasidone (GEODON) in sterile water injection  10 mg IntraMUSCular BID    ipratropium-albuterol  1 ampule Inhalation Q4H WA    nystatin   Topical BID    potassium bicarb-citric acid  20 mEq Oral Daily    ampicillin-sulbactam  3,000 mg IntraVENous Q6H    [Held by provider] miconazole   Topical BID    vancomycin  1,250 mg IntraVENous Q12H    [Held by provider] aspirin  81 mg Oral Daily    sodium chloride flush  5-40 mL IntraVENous 2 times per day    nicotine  1 patch TransDERmal Daily    pantoprazole (PROTONIX) 40 mg injection  40 mg IntraVENous Daily    vancomycin (VANCOCIN) intermittent dosing (placeholder)   Other RX Placeholder     Continuous Infusions:    sodium chloride      dexmedetomidine      IV infusion builder 35 mL/hr at 23 0526    sodium chloride       PRN Meds: sodium chloride, ziprasidone (GEODON) in sterile water injection, dexmedetomidine, perflutren lipid microspheres, sodium chloride flush, sodium chloride flush, sodium chloride flush, sodium chloride, ondansetron **OR** ondansetron, polyethylene glycol, acetaminophen **OR** acetaminophen, albuterol, guaiFENesin    Data:     Past Medical History:   has no past medical history on file. Social History:   reports that he has been smoking cigarettes. He has a 40.00 pack-year smoking history. He has never used smokeless tobacco. He reports current alcohol use. He reports that he does not currently use drugs. Family History: History reviewed. No pertinent family history. Vitals:  BP (!) 147/82   Pulse 100   Temp 98.4 °F (36.9 °C) (Axillary)   Resp 20   Ht 5' 7\" (1.702 m)   Wt 196 lb 3.4 oz (89 kg)   SpO2 90%   BMI 30.73 kg/m²   Temp (24hrs), Av.5 °F (36.9 °C), Min:98 °F (36.7 °C), Max:98.9 °F (37.2 °C)    No results for input(s): POCGLU in the last 72 hours. I/O(24Hr):     Intake/Output Summary (Last 24 hours) at 2023 0907  Last data filed at 2023 1850  Gross per 24 hour   Intake --   Output 550 ml   Net -550 ml       Labs:    CBC:   Lab Results   Component Value Date/Time    WBC 7.1 2023 05:19 AM    RBC 5.90 2023 05:19 AM    HGB 11.7 2023 05:19 AM    HCT 41.4 2023 05:19 AM    MCV 70.2 2023 05:19 AM    MCH 19.8 2023 05:19 AM    MCHC 28.2 2023 05:19 AM    RDW 20.4 2023 05:19 AM    PLT 68 2023 05:19 AM    MPV 9.1 2023 05:19 AM     BMP:    Lab Results   Component Value Date/Time     2023 04:12 AM    K 4.1 2023 04:12 AM     2023 04:12 AM    CO2 26 2023 04:12 AM    BUN 5 01/07/2023 04:12 AM    LABALBU 2.9 01/07/2023 04:12 AM    CREATININE <0.40 01/07/2023 04:12 AM    CALCIUM 8.1 01/07/2023 04:12 AM    LABGLOM Can not be calculated 01/07/2023 04:12 AM    GLUCOSE 91 01/07/2023 04:12 AM       No results found for: SPECIAL  Lab Results   Component Value Date/Time    CULTURE NO GROWTH 01/03/2023 12:12 PM         Radiology:    CT HEAD WO CONTRAST    Result Date: 1/3/2023  EXAMINATION: CT OF THE HEAD WITHOUT CONTRAST  1/2/2023 10:11 pm TECHNIQUE: CT of the head was performed without the administration of intravenous contrast. Automated exposure control, iterative reconstruction, and/or weight based adjustment of the mA/kV was utilized to reduce the radiation dose to as low as reasonably achievable. COMPARISON: None. HISTORY: ORDERING SYSTEM PROVIDED HISTORY: Altered Mental status TECHNOLOGIST PROVIDED HISTORY: Altered Mental status Reason for Exam: Altered Mental status Additional signs and symptoms: Patient came in with transient alteration of awareness, follow up head CT for any changes. FINDINGS: BRAIN/VENTRICLES: There is no acute intracranial hemorrhage, mass effect or midline shift. No abnormal extra-axial fluid collection. There is a focal area of decreased attenuation with loss of gray/white matter differentiation involving posterior left parietal lobe with somewhat increased conspicuity as compared to prior exam.  There is stable small focus of encephalomalacia involving left cerebellar hemisphere compatible with prior remote infarct/insult. Chronic lacunar infarct to right basal ganglia redemonstrated. There is no evidence of hydrocephalus. ORBITS: The visualized portion of the orbits demonstrate no acute abnormality. SINUSES: Small air-fluid level right sphenoid sinus. Trace air-fluid level right frontal sinus. Mild mucoperiosteal thickening to bilateral ethmoid air cells.   Findings are new from prior exam and likely relate to presence of nasogastric tube. SOFT TISSUES/SKULL:  No acute abnormality of the visualized skull or soft tissues. Focal area of decreased attenuation with loss of gray/white matter differentiation involving posterior left parietal lobe with somewhat increased conspicuity as compared to prior exam likely reflecting an area of acute to subacute infarct. Stable small chronic infarct/insult to left cerebellar hemisphere. Chronic lacunar infarct to right basal ganglia redemonstrated. Paranasal sinus disease likely relating to presence of nasogastric tube. CT HEAD WO CONTRAST    Result Date: 1/1/2023  EXAMINATION: CT OF THE HEAD WITHOUT CONTRAST  1/1/2023 10:48 pm TECHNIQUE: CT of the head was performed without the administration of intravenous contrast. Automated exposure control, iterative reconstruction, and/or weight based adjustment of the mA/kV was utilized to reduce the radiation dose to as low as reasonably achievable. COMPARISON: None. HISTORY: Reason for Exam: AMS Additional signs and symptoms: the patient has been getting more and more confused since 2 weeks ago. It has progressively been getting worse and today FINDINGS: BRAIN/VENTRICLES: There is no acute intracranial hemorrhage, mass effect or midline shift. No abnormal extra-axial fluid collection. The gray-white differentiation is maintained without evidence of an acute infarct. There is no evidence of hydrocephalus. Changes of mild chronic small vessel ischemic disease. ORBITS: The visualized portion of the orbits demonstrate no acute abnormality. SINUSES: The visualized paranasal sinuses and mastoid air cells demonstrate no acute abnormality. SOFT TISSUES/SKULL:  No acute abnormality of the visualized skull or soft tissues. No acute intracranial abnormality. Mild chronic small vessel ischemic disease. US LIVER    Result Date: 1/2/2023  EXAMINATION: RIGHT UPPER QUADRANT ULTRASOUND 1/2/2023 10:20 am COMPARISON: None.  HISTORY: ORDERING SYSTEM PROVIDED HISTORY: elevated AST and ALT, in setting of PT, and INR TECHNOLOGIST PROVIDED HISTORY: elevated AST and ALT, in setting of PT, and INR FINDINGS: Patient body habitus limits the study, as there is increased attenuation of the ultrasound beam. This decreases sensitivity and specificity. LIVER:  There is mild increased echogenicity seen throughout the liver. No intrahepatic ductal dilatation. No perihepatic fluid. BILIARY SYSTEM:  Bladder is contracted. Gallstones are seen. Gallbladder wall thickness measures 6 mm. Common bile duct is within normal limits measuring 5 mm. RIGHT KIDNEY: The right kidney is grossly unremarkable without evidence of hydronephrosis. PANCREAS:  Visualized portions of the pancreas are unremarkable. OTHER: No evidence of right upper quadrant ascites. Portal vein flow appears hepatopetal     Increased echogenicity is seen throughout the liver suggesting diffuse hepatocellular disease such as fatty infiltration Cholelithiasis. No cholecystitis     XR CHEST PORTABLE    Result Date: 1/5/2023  EXAMINATION: ONE XRAY VIEW OF THE CHEST 1/5/2023 6:07 am COMPARISON: Chest x-ray dated 4 January 2023 HISTORY: ORDERING SYSTEM PROVIDED HISTORY: While on ventilator TECHNOLOGIST PROVIDED HISTORY: While on ventilator Reason for Exam: on vent FINDINGS: Stable cardiomediastinal silhouette. Stable lines and tubes. Stable appearance of small bilateral pleural effusions with bibasilar airspace disease. No pneumothorax. Stable exam with small bilateral pleural effusions and bibasilar airspace disease. Consider pneumonia versus atelectasis in the appropriate clinical setting.      XR CHEST PORTABLE    Result Date: 1/4/2023  EXAMINATION: ONE XRAY VIEW OF THE CHEST 1/4/2023 6:08 am COMPARISON: Chest x-ray dated 3 January 2023, 0632 hours HISTORY: ORDERING SYSTEM PROVIDED HISTORY: While on ventilator TECHNOLOGIST PROVIDED HISTORY: While on ventilator Reason for Exam: on vent FINDINGS: Left-sided PICC line with tip in the superior vena cava. Endotracheal tube with the tip above the robert. Enteric tube with the tip and side-port in the stomach. Small bilateral pleural effusions with bibasilar atelectasis. No pneumothorax. Emphysematous changes in the left upper lung. No significant interval change. XR CHEST PORTABLE    Result Date: 1/3/2023  EXAMINATION: ONE XRAY VIEW OF THE CHEST 1/3/2023 6:09 am COMPARISON: 1/2/2023 HISTORY: ORDERING SYSTEM PROVIDED HISTORY: While on ventilator TECHNOLOGIST PROVIDED HISTORY: While on ventilator Reason for Exam: ON VENT FINDINGS: Small to moderate right pleural effusion. Right lung base atelectasis versus infiltrate. Underlying mild congestion versus interstitial infiltrate. Emphysematous changes in the left upper lung. Left-sided PICC line tip in the superior vena cava right atrial junction. ET tube is 5 cm superior to the robert. Feeding tube terminates below the diaphragm. Multiple wires/leads overlie the chest. Moderate osteopenic changes and degenerative changes noted in the bony structures. No significant will change. See above for more details. XR CHEST PORTABLE    Result Date: 1/2/2023  EXAMINATION: ONE XRAY VIEW OF THE CHEST 1/2/2023 3:15 pm COMPARISON: 01/01/2023 HISTORY: ORDERING SYSTEM PROVIDED HISTORY: intubation TECHNOLOGIST PROVIDED HISTORY: intubation Reason for Exam: intubation FINDINGS: Overlying items external to the patient somewhat limit evaluation. Placement of endotracheal tube with tip 8.2 cm from the robert. Placement of enteric tube seen to extend into the stomach, distal extent not included in field of view. Persistent likely layering right pleural effusion, similar to slightly worsened. Persistent bilateral airspace opacities to bilateral mid to lower lung zones, right worse than left, similar on the left but mildly worsened on the right. No pneumothoraces are seen. Persistent likely bullous change to the left upper lung zone.   Cardiac and mediastinal silhouettes are stable. Stable appearance to bony structures. Placement of endotracheal tube which appears high riding with tip 8.2 cm from the robert. Suggest advancement by 2-3 cm. Placement of endotracheal tube seen to extend into the stomach, distal extent not included in field of view. No pneumothoraces. Persistent likely bullous change to the left upper lung zone. Persistent right pleural effusion, similar to slightly worsened. Persistent bilateral airspace opacities, right worse than left, similar on the left but mildly worsened on the right. XR CHEST PORTABLE    Result Date: 1/1/2023  EXAMINATION: ONE XRAY VIEW OF THE CHEST 1/1/2023 7:17 pm COMPARISON: None. HISTORY: ORDERING SYSTEM PROVIDED HISTORY: ams TECHNOLOGIST PROVIDED HISTORY: ams Reason for Exam: Altered mental status, difficulty breathing Additional signs and symptoms: Altered mental status, difficulty breathing Relevant Medical/Surgical History: Altered mental status, difficulty breathing FINDINGS: Large lucent area in the left apex suggesting a large bulla however superimposed pneumothorax cannot be excluded. The cardiac size is normal. Right lower lobe airspace infiltrate and mild right pleural effusion. . Pulmonary vascularity appears normal. There is mild ectasia of the thoracic aorta. Calcific tendinitis of the right shoulder. No acute bony abnormalities. The hilar structures are normal.     Right lower lobe pneumonia and small right pleural effusion Large lucent area in the left apex suggesting a large bulla however superimposed pneumothorax cannot be excluded. Follow-up CT would be useful for further evaluation. The findings were sent to the Radiology Results [address] at 10:31 pm on 1/1/2023 to be communicated to a licensed caregiver.      CT CHEST PULMONARY EMBOLISM W CONTRAST    Result Date: 1/3/2023  EXAMINATION: CTA OF THE CHEST 1/3/2023 2:43 pm TECHNIQUE: CTA of the chest was performed after the administration of intravenous contrast.  Multiplanar reformatted images are provided for review. MIP images are provided for review. Automated exposure control, iterative reconstruction, and/or weight based adjustment of the mA/kV was utilized to reduce the radiation dose to as low as reasonably achievable. COMPARISON: None. HISTORY: ORDERING SYSTEM PROVIDED HISTORY: Elevated d-dimer. Resp distress. Decreasing bp TECHNOLOGIST PROVIDED HISTORY: Elevated d-dimer. Resp distress. Decreasing bp Reason for Exam: Elevated d-dimer. Resp distress. Decreasing bp Additional signs and symptoms: pt on vent, eval for pe FINDINGS: Pulmonary Arteries: There is adequate opacification of the pulmonary arteries for evaluation. No evidence of intraluminal filling defect to suggest pulmonary embolism. Dilated main pulmonary artery measuring up to 3.2 cm. Increased RV to LV ratio. Lungs/pleura: Moderate right and small left pleural effusions with adjacent atelectasis and partial collapse of the right lower lobe. Large bullous formation involving the superior lobe of the left lower lobe. Moderate centrilobular emphysema. A few scattered solid pulmonary nodules measuring up to 5 mm in the right middle lobe (4:72). No mass or consolidation. No pneumothorax. Mediastinum: No lymphadenopathy. No pericardial effusion. No mass. Normal thyroid gland. Aortic valve annulus calcifications. Mild coronary artery calcifications. Mild calcifications of the thoracic aorta and its branches. Upper Abdomen: Trace perisplenic free fluid. Irregular appearance of the gallbladder with wall thickening, pericholecystic fluid, and probable cholelithiasis. . Soft Tissues/Bones: Mild anasarca. .  No acute osseous abnormality. Endotracheal and enteric tubes in place. No evidence of pulmonary embolism. Dilated main pulmonary artery suggestive of pulmonary hypertension. Increased RV to LV ratio suggestive of right heart strain.  Moderate right small left pleural effusions with partial collapse of the right lower lobe. Moderate centrilobular emphysema with large bullous formation in the left lung. Irregular appearance of the gallbladder with wall thickening, pericholecystic fluid, and probable cholelithiasis. Correlation with right upper quadrant ultrasound recommended. Trace perisplenic free fluid. VL Lower Extremity Bilateral Venous Duplex    Result Date: 1/3/2023    [facility]  Vascular Lower Extremities DVT Study Procedure   Patient Name  [facility]      Date of Study           01/03/2023                Grant Rosado   Date of Birth 1960  Gender                  Male   Age           60 year(s)  Race                       Room Number   2009        Height:                 67 inch, 170.18 cm   Corporate ID  [id number]    Weight:                 188 pounds, 85.3 kg  #   Patient Acct  [de-identified]   BSA:        1.97 m^2    BMI:       29.44 kg/m^2  #   MR #          [id number]      Sonographer             Shruthi Duncan   Accession #   [id number]  Interpreting Physician  [address] [place] [address]   Referring                 Referring Physician     Melody Meza  Nurse  Practitioner  Procedure Type of Study:   Veins: Lower Extremities DVT Study, Venous Scan Lower Bilateral.  Indications for Study:Elevated D-Dimer and Leg Swelling. Patient Status: In Patient. Technical Quality:Limited visualization. Limitation reason:Edema. Conclusions   Summary   No evidence of deep vein thrombosis in the bilateral lower extremities. Acute superficial vein thrombosis of the right GSV at the saphenofemoral  junction.    Signature   ----------------------------------------------------------------  Electronically signed by Oneal Johnson(Sonographer) on  01/03/2023 09:39 AM  ----------------------------------------------------------------   ----------------------------------------------------------------  Electronically signed by Genene Cota Reyes,Arthur(Interpreting  physician) on 01/03/2023 07:05 PM  ----------------------------------------------------------------  Findings:   Right Impression:                       Left Impression:  Non visualization of the posterior      Non visualization of the posterior  tibial and peroneal veins. tibial and peroneal veins. Remaining deep veins                    Remaining deep veins  demonstrate normal compressibility. demonstrate normal                                          compressibility. Non compressibility of the great  saphenous vein at the level of the      Normal compressibility of the  junction. great saphenous vein. Normal compressibility of the small     Normal compressibility of the  saphenous vein.                         small saphenous vein. Velocities are measured in cm/s ; Diameters are measured in cm Right Lower Extremities DVT Study Measurements Right 2D Measurements +------------------------------------+----------+---------------+----------+ ! Location                            ! Visualized! Compressibility! Thrombosis! +------------------------------------+----------+---------------+----------+ ! Common Femoral                      !Yes       ! Yes            ! None      ! +------------------------------------+----------+---------------+----------+ ! Prox Femoral                        !Yes       ! Yes            ! None      ! +------------------------------------+----------+---------------+----------+ ! Mid Femoral                         !Yes       ! Yes            ! None      ! +------------------------------------+----------+---------------+----------+ ! Dist Femoral                        !Yes       ! Yes            ! None      ! +------------------------------------+----------+---------------+----------+ ! Popliteal                           !Yes       ! Yes            ! None      ! +------------------------------------+----------+---------------+----------+ ! Sapheno Femoral Junction !Yes       !No             !          ! +------------------------------------+----------+---------------+----------+ ! PTV                                 ! No        !               !          ! +------------------------------------+----------+---------------+----------+ ! Peroneal                            !No        !               !          ! +------------------------------------+----------+---------------+----------+ ! Gastroc                             ! Yes       ! Yes            ! None      ! +------------------------------------+----------+---------------+----------+ ! GSV Thigh                           ! Yes       ! Yes            ! None      ! +------------------------------------+----------+---------------+----------+ ! GSV Knee                            ! Yes       ! Yes            ! None      ! +------------------------------------+----------+---------------+----------+ ! GSV Ankle                           ! No        !               !          ! +------------------------------------+----------+---------------+----------+ ! SSV                                 ! Yes       ! Yes            ! None      ! +------------------------------------+----------+---------------+----------+ Right Doppler Measurements +---------------------------+------+------+--------------------------------+ ! Location                   ! Signal!Reflux! Reflux (msec)                   ! +---------------------------+------+------+--------------------------------+ ! Common Femoral             !Phasic!      !                                ! +---------------------------+------+------+--------------------------------+ ! Prox Femoral               !Phasic!      !                                ! +---------------------------+------+------+--------------------------------+ ! Popliteal                  !Phasic!      !                                ! +---------------------------+------+------+--------------------------------+ Left Lower Extremities DVT Study Measurements Left 2D Measurements +------------------------------------+----------+---------------+----------+ ! Location                            ! Visualized! Compressibility! Thrombosis! +------------------------------------+----------+---------------+----------+ ! Common Femoral                      !Yes       ! Yes            ! None      ! +------------------------------------+----------+---------------+----------+ ! Prox Femoral                        !Yes       ! Yes            ! None      ! +------------------------------------+----------+---------------+----------+ ! Mid Femoral                         !Yes       ! Yes            ! None      ! +------------------------------------+----------+---------------+----------+ ! Dist Femoral                        !Yes       ! Yes            ! None      ! +------------------------------------+----------+---------------+----------+ ! Popliteal                           !Yes       ! Yes            ! None      ! +------------------------------------+----------+---------------+----------+ ! Sapheno Femoral Junction            ! Yes       ! Yes            ! None      ! +------------------------------------+----------+---------------+----------+ ! PTV                                 ! No        !               !          ! +------------------------------------+----------+---------------+----------+ ! Peroneal                            !No        !               !          ! +------------------------------------+----------+---------------+----------+ ! Gastroc                             ! Yes       ! Yes            ! None      ! +------------------------------------+----------+---------------+----------+ ! GSV Thigh                           ! Yes       ! Yes            ! None      ! +------------------------------------+----------+---------------+----------+ ! GSV Knee                            ! Yes       ! Yes            ! None      ! +------------------------------------+----------+---------------+----------+ ! GSV Ankle                           ! No        !               !          ! +------------------------------------+----------+---------------+----------+ ! SSV                                 ! Yes       ! Yes            ! None      ! +------------------------------------+----------+---------------+----------+ Left Doppler Measurements +---------------------------+------+------+--------------------------------+ ! Location                   ! Signal!Reflux! Reflux (msec)                   ! +---------------------------+------+------+--------------------------------+ ! Common Femoral             !Phasic!      !                                ! +---------------------------+------+------+--------------------------------+ ! Prox Femoral               !Phasic!      !                                ! +---------------------------+------+------+--------------------------------+ ! Popliteal                  !Phasic!      !                                ! +---------------------------+------+------+--------------------------------+    US SPLEEN    Result Date: 1/3/2023  EXAMINATION: ULTRASOUND OF THE SPLEEN 1/3/2023 1:01 pm COMPARISON: None. HISTORY: ORDERING SYSTEM PROVIDED HISTORY: thrombocytpenia TECHNOLOGIST PROVIDED HISTORY: thrombocytpenia FINDINGS: Spleen is normal in size and echotexture. Maximum diameter 9.9 cm. No focal lesion. Normal ultrasound evaluation of the spleen     CTA HEAD NECK W CONTRAST    Result Date: 1/3/2023  EXAMINATION: CTA OF THE HEAD AND NECK WITH CONTRAST 1/3/2023 6:28 pm: TECHNIQUE: CTA of the head and neck was performed with the administration of intravenous contrast. Multiplanar reformatted images are provided for review. MIP images are provided for review. Stenosis of the internal carotid arteries measured using NASCET criteria.  Automated exposure control, iterative reconstruction, and/or weight based adjustment of the mA/kV was utilized to reduce the radiation dose to as low as reasonably achievable. COMPARISON: Brain MRI done 01/03/2023. Noncontrast CT head done 01/02/2023. HISTORY: ORDERING SYSTEM PROVIDED HISTORY: MRI stoke TECHNOLOGIST PROVIDED HISTORY: MRI sto Reason for Exam: eval for Pulm embolism given pt with severe plum hypertension Additional signs and symptoms: MRI stoke Relevant Medical/Surgical History: pt on vent , f/u mri brain FINDINGS: CTA NECK: AORTIC ARCH/ARCH VESSELS: No dissection or arterial injury. No significant stenosis of the brachiocephalic or subclavian arteries. Atherosclerosis in the visualized thoracic aorta and bilateral subclavian arteries. CAROTID ARTERIES: No dissection, arterial injury, or hemodynamically significant stenosis by NASCET criteria. Right greater than left carotid bulb atherosclerotic plaque. Tortuosity of the bilateral distal cervical internal carotid arteries. VERTEBRAL ARTERIES: No dissection, arterial injury, or significant stenosis. The left vertebral artery arises directly from the aortic arch. Dominant right vertebral artery. SOFT TISSUES: Partially visualized right pleural effusion. Emphysema. Large left apical bulla. No cervical or superior mediastinal lymphadenopathy. The larynx and pharynx are unremarkable. No acute abnormality of the salivary and thyroid glands. Endotracheal and enteric tubes in place. BONES: No acute osseous abnormality. Multilevel degenerative changes in the visualized spine, centered at C4-C5 and C5-C6. CTA HEAD: ANTERIOR CIRCULATION: No significant stenosis of the intracranial internal carotid, anterior cerebral, or middle cerebral arteries. No aneurysm. Atherosclerosis in the bilateral intracranial internal carotid arteries. POSTERIOR CIRCULATION: No significant stenosis of the vertebral, basilar, or posterior cerebral arteries. No aneurysm. Mild atherosclerosis in the dominant right intracranial vertebral artery.  OTHER: No dural venous sinus thrombosis on this non-dedicated study. BRAIN: Comparison brain MRI done earlier same day demonstrates acute/subacute strokes which are not well seen on this study. Diffuse leptomeningeal enhancement throughout both cerebral hemispheres. 1.  No focal hemodynamically significant stenosis, occlusion or aneurysm in the large arteries of the head and neck. 2.  Diffuse leptomeningeal enhancement throughout both cerebral hemispheres can be seen in the setting of subarachnoid hemorrhage, encephalitis or meningitis. Consider correlation with CSF studies if there is high clinical concern for underlying infection. MRI BRAIN WO CONTRAST    Result Date: 1/3/2023  EXAMINATION: MRI OF THE BRAIN WITHOUT CONTRAST  1/3/2023 3:03 pm TECHNIQUE: Multiplanar multisequence MRI of the brain was performed without the administration of intravenous contrast. COMPARISON: None HISTORY: ORDERING SYSTEM PROVIDED HISTORY: acute to subacute left parietal infarct, AMS, Vent TECHNOLOGIST PROVIDED HISTORY: acute to subacute left parietal infarct, AMS, Vent What is the sedation requirement?->Sedation Reason for Exam: acute to subacute left parietal infarct, AMS, Vent FINDINGS: INTRACRANIAL STRUCTURES/VENTRICLES: There is an subacute to old left cerebellar lacune. Hyperintense FLAIR signal abnormality within the sulci of both cerebral hemispheres is compatible with subarachnoid hemorrhage. There is an old lacune within the right caudate nucleus. ORBITS: The visualized portion of the orbits demonstrate no acute abnormality. SINUSES: Small bilateral mastoid effusions are identified. There is moderate paranasal sinus disease on the left side. BONES/SOFT TISSUES: The bone marrow signal intensity appears normal. The soft tissues demonstrate no acute abnormality. Small acute infarcts involving the left cerebellar hemisphere and left parietal lobe. Small subacute infarct within the left cerebellar hemisphere.  Moderate bilateral subarachnoid hemorrhage within both cerebral hemispheres which is of uncertain etiology. The hemorrhage is more apparent on MRI than on previous head CT. Old right caudate nucleus lacune. Small bilateral mastoid effusions and moderate left paranasal sinus disease. The findings were sent to the Radiology Results [address] at 4:17 pm on 1/3/2023 to be communicated to a licensed caregiver. Physical Examination:        Physical Exam  Neurological:      Mental Status: He is disoriented. Unable to perform due to patient's agitation and combativeness     Assessment:        Primary Problem  Acute respiratory failure with hypoxia and hypercapnia ([address]    Active Hospital Problems    Diagnosis Date Noted    Prolonged pt (prothrombin time) [R79.1] 01/03/2023     Priority: Medium    Emphysema lung ([address] [J43.9] 01/03/2023     Priority: Medium    Cerebral infarction ([address] [I63.9] 01/03/2023     Priority: Medium    Transaminitis [R74.01] 01/02/2023     Priority: Medium    Microcytic anemia [D64.9] 01/02/2023     Priority: Medium    Thrombocytopenia ([address] [D69.6] 01/02/2023     Priority: Medium    Agitation [R45.1] 01/02/2023     Priority: Medium    Acute respiratory failure with hypoxia and hypercapnia (HCC) RLL pneumonia [J96.01, J96.02] 01/02/2023     Priority: Medium    Altered mental status [R41.82] 01/02/2023     Priority: Medium    Community acquired pneumonia of right lower lobe of lung [J18.9] 01/02/2023     Priority: Medium       Plan:         Altered Mental Status in the setting of Acute type 2 respiratory failure secondary to Pneumonia and Acute Heart Failure  -Patient continued to be altered, agitated and combative  -Patient was started on Geodon 10 mg twice daily for agitation and delirium per pulmonology  -As per pulmonology recommendation, if patient continues to get violent they will start Precedex  -As per ID recommendations, patient is to continue IV Unasyn and vancomycin  -EKG ordered today, results pending    Transaminitis with prolonged PTT and INR in the setting of acute HCV  -According to today's CMP, AST and ALT continue to improve  -INR/PT, PTT labs ordered this morning, pending results  -Hepatitis C RNA positive  -ID continues to follow    Hypernatremia 2/2 dehydration  -Na levels was 147 today  -Nephrology following     Multiple acute brain infarcts with subarachnoid hemorrhage  -Neurology following; believe the strokes are due to a cardio embolic reason . Not concern for endocarditis   -cardiology is on board to consider TTE but patient is too agitated  -We will reach out to nephrology to see if lumbar puncture will be done to rule out meningitis    Microcytic anemia in the setting of elevated PT  -Hemoglobin this morning was 11.7  -We will continue to monitor at this time    Thrombocytopenia  -This morning platelet count decreased from 71 to 68  -We will continue to monitor    Elevated troponins most likely 2/2 demand ischemia  -Cardiology following    DVT prophylaxis: reason for no prophylaxis: Prolonged PT, aPTT  GI prophylaxis: Protonix 40 mg daily    Gudelia Rm MD  1/7/2023  9:07 AM      Attending Physician Statement  I have discussed the care of Nolan Lindsay with the resident team. I have examined the patient myself and taken ros and hpi , including pertinent history and exam findings,  with the resident. I have reviewed the key elements of all parts of the encounter with the resident. I agree with the assessment, plan and orders as documented by the resident.     Principal Problem:    Acute respiratory failure with hypoxia and hypercapnia (HCC) RLL pneumonia  Active Problems:    Transaminitis    Microcytic anemia    Thrombocytopenia (HCC)    Agitation    Altered mental status    Community acquired pneumonia of right lower lobe of lung    Prolonged pt (prothrombin time)    Emphysema lung (HCC)    Cerebral infarction [facility])  Resolved Problems:    * No resolved hospital problems.  *    Remains on high flow NC- not much change  C/w unasyn and vanc for pneumonia- all cx neg so far- has had 6 days of abx  CASSI for multiple brain infarcts- decision for CASSI awaited from Cards   No LP plan from neuro  HCV RNA titer awaited  Liver enzymes downtrending  Hb and plt mainating       1/7  Continue to try to wean down O2  Follow cards recs for CASSI  Resume proph AC, monitor Hb and plt       Electronically signed by Deisy Mckinney MD

## 2023-01-07 NOTE — PROGRESS NOTES
Department of Internal Medicine  Nephrology Erna Vincent MD  Progress Note    Reason for consultation: Management of acute kidney injury. Consulting physician: Winsome Cerna MD.      Interval hx/Subjective    Patient seen and examined in ICU, patient remains confused and physical restraints  Noted no acute distress   serum creatinine remained stable within normal limits  Serum sodium has increased to 147 we will change IV fluids to D5 water at 50 MLS per hour  No acute events overnight    Interval history:   Patient was seen and examined today in the intensive care unit. He remains intubated and on ventilator support. Renal function has improved and he is nonoliguric. He received CT angiogram of the chest which ruled out pulmonary embolism; dilated main pulmonary artery suggestive of pulmonary hypertension as well as increased RV to LV ratio suggestive of right heart failure. There was incidental finding of moderate right and small left pleural effusion with partial collapse of the right lower lobe. NG tube is draining fluid  Plan to start tube feeding  Serum sodium was 146, potassium 3.4 serum creatinine 0.5  proBNP 7000    History of present illness: This is a 60 y.o. male with no significant past medical history [no regular physician follow-up], who was brought to the emergency department via EMS for further evaluation of progressively worsening confusion and lethargy of 2 weeks duration. Patient's spouse said that he became progressively confused prompting her to call EMS on day of presentation 1/1/2023. When EMS arrived, patient was found to be obtunded with oxygen saturation 75% on room air and he was placed on a nonrebreather mask and given a dose of IV Lasix as he also had severe bilateral leg swelling. Patient was placed on BiPAP on arrival to the emergency department and was admitted to the intensive care unit.   Initial systolic blood pressure was 106 mmHg and serum creatinine 1.77 mg/dL associated with elevated AST and ALT. On admission to the intensive care unit patient required endotracheal intubation for airway protection and treatment of acute respiratory failure. Nephrology consultation has been placed for management of acute kidney injury. He is currently on Levophed drip at 3 mcg/min. CT scan of the brain performed at presentation showed no acute intracranial abnormality but with mild chronic small vessel ischemic disease. Repeat CT scan performed 24 hours later [1/2/2023] showed focal area of decreased attenuation with loss of gray/white matter differentiation involving posterior left lobe with somewhat increased conspicuity as compared to prior exam likely reflecting an area of acute to subacute stroke. Patient has no known allergies. History reviewed. No pertinent past medical history.     Scheduled Meds:   ziprasidone (GEODON) in sterile water injection  10 mg IntraMUSCular BID    ipratropium-albuterol  1 ampule Inhalation Q4H WA    heparin (porcine)  5,000 Units SubCUTAneous 3 times per day    nystatin   Topical BID    potassium bicarb-citric acid  20 mEq Oral Daily    ampicillin-sulbactam  3,000 mg IntraVENous Q6H    [Held by provider] miconazole   Topical BID    vancomycin  1,250 mg IntraVENous Q12H    [Held by provider] aspirin  81 mg Oral Daily    sodium chloride flush  5-40 mL IntraVENous 2 times per day    nicotine  1 patch TransDERmal Daily    pantoprazole (PROTONIX) 40 mg injection  40 mg IntraVENous Daily    vancomycin (VANCOCIN) intermittent dosing (placeholder)   Other RX Placeholder     Continuous Infusions:   sodium chloride      dexmedetomidine      IV infusion builder 35 mL/hr at 01/07/23 0526    sodium chloride       PRN Meds:.sodium chloride, ziprasidone (GEODON) in sterile water injection, dexmedetomidine, perflutren lipid microspheres, sodium chloride flush, sodium chloride flush, sodium chloride flush, sodium chloride, ondansetron **OR** ondansetron, polyethylene glycol, acetaminophen **OR** acetaminophen, albuterol, guaiFENesin    History reviewed. No pertinent family history. Social History     Socioeconomic History    Marital status: Single     Spouse name: None    Number of children: None    Years of education: None    Highest education level: None   Tobacco Use    Smoking status: Every Day     Packs/day: 1.00     Years: 40.00     Pack years: 40.00     Types: Cigarettes    Smokeless tobacco: Never   Substance and Sexual Activity    Alcohol use: Yes     Comment: beer and scotch    Drug use: Not Currently     Comment: over 20 years (stated by daughter)         Physical Exam:    VITALS:  BP (!) 140/87   Pulse (!) 101   Temp 98.4 °F (36.9 °C) (Axillary)   Resp 15   Ht 5' 7\" (1.702 m)   Wt 196 lb 3.4 oz (89 kg)   SpO2 95%   BMI 30.73 kg/m²   TEMPERATURE:  Current - Temp: 98.4 °F (36.9 °C); Max - Temp  Av.8 °F (37.1 °C)  Min: 98.3 °F (36.8 °C)  Max: 100.1 °F (37.8 °C)  RESPIRATIONS RANGE: Resp  Av.7  Min: 14  Max: 25  PULSE RANGE: Pulse  Av.9  Min: 87  Max: 105  BLOOD PRESSURE RANGE:  Systolic (37LXQ), HL , Min:116 , JIX:576 ; Diastolic (03YDZ), PSC:98, Min:62, Max:100  PULSE OXIMETRY RANGE: SpO2  Av.2 %  Min: 81 %  Max: 99 %  24HR INTAKE/OUTPUT:    Intake/Output Summary (Last 24 hours) at 2023 1458  Last data filed at 2023 1850  Gross per 24 hour   Intake --   Output 550 ml   Net -550 ml       Constitutional: Awake alert, responsive not in acute distress    Skin: Skin color, texture, turgor normal. No rashes or lesions    Head: Normocephalic, without obvious abnormality, atraumatic     Cardiovascular/Edema: regular rate and rhythm, S1, S2 normal, no murmur, click, rub or gallop    Respiratory: Lungs:  Coarse breath sounds bilaterally    Abdomen: soft, non-tender; bowel sounds normal; no masses,  no organomegaly    Back: symmetric, no curvature. ROM normal. No CVA tenderness.     Extremities: edema +    Neuro: Nonfocal    CBC:   Recent Labs     01/05/23  0427 01/06/23  0419 01/07/23  0519   WBC 5.3 5.2 7.1   HGB 11.4* 10.5* 11.7*   PLT 66* 71* 68*     BMP:    Recent Labs     01/05/23  0427 01/06/23  0419 01/07/23  0412   * 144 147*   K 3.4* 4.0 4.1   * 111* 108*   CO2 30 29 26   BUN 11 7* 5*   CREATININE 0.58* 0.42* <0.40*   GLUCOSE 84 111* 91     Lab Results   Component Value Date/Time    NITRU NEGATIVE 01/02/2023 03:34 PM    COLORU Dark Yellow 01/02/2023 03:34 PM    PHUR 5.0 01/02/2023 03:34 PM    WBCUA 10 TO 20 01/02/2023 03:34 PM    RBCUA TOO NUMEROUS TO COUNT 01/02/2023 03:34 PM    BACTERIA FEW 01/01/2023 10:25 PM    SPECGRAV 1.015 01/02/2023 03:34 PM    LEUKOCYTESUR SMALL 01/02/2023 03:34 PM    UROBILINOGEN Normal 01/02/2023 03:34 PM    BILIRUBINUR NEGATIVE 01/02/2023 03:34 PM    GLUCOSEU NEGATIVE 01/02/2023 03:34 PM    KETUA TRACE 01/02/2023 03:34 PM     MRI of the brain performed without contrast on 1/3/2023 showed:  Small acute infarcts involving the left cerebellar hemisphere and left   parietal lobe. Small subacute infarct within the left cerebellar hemisphere. Moderate bilateral subarachnoid hemorrhage within both cerebral hemispheres   which is of uncertain etiology. The hemorrhage is more apparent on MRI than   on previous head CT. Old right caudate nucleus lacune. Small bilateral mastoid effusions and moderate left paranasal sinus disease. IMPRESSION/RECOMMENDATIONS:      1. Acute kidney injury - most consistent with prerenal azotemia and/or ischemic acute tubular necrosis from hypotension. Renal function is improved and serum creatinine is down to 0.4 mg/dL today. 2.  Hypokalemia -improved    3. Hypernatremia due to dehydration-SNa 147    3.   Hypotension -blood pressure improved    Plan  Will change IV fluids to D5 water with 20 M EQ potassium chloride at 50 MLS per hour  Replace electrolytes per sliding scale        Prognosis is guarded.     Eduar Charles MD   Attending Nephrologist  1/7/2023 2:58 PM

## 2023-01-07 NOTE — PROGRESS NOTES
[facility] -   Infectious diseases evaluation  admission date 1/1/2023    reason for consultation:   Community-acquired pneumonia    Impression :   Current:  Acute hypoxic respiratory failure required intubation 1/2/22 was subsequently extubated  Community-acquired pneumonia with possible aspiration. Acute CVA with subarachnoid hemorrhage  Positive MRSA nasal swab  Liver disease  Cholelithiasis  Acute renal failure  Thrombocytopenia  Reactive hepatitis C antibody /elevated liver enzymes    Recommendations     IV Unasyn through 1/ 8/23  Continue IV vancomycin  Liver ultrasound 1/2/2323 showed increased echogenicity and cholelithiasis, no cholecystitis. HIV screen was negative on 1/2/23  Hepatitis C RNA pending  The patient received IV ceftriaxone and Zithromax on 1/1/2022  Follow blood and respiratory cultures, no growth to date  No growth on urine culture from 1/3/23  Nasal swab for MRSA was +1/2/23  Respiratory panel with COVID-19 PCR was negative  Urine for Legionella antigen negative  Follow CBC and renal function  Continue supportive care      Infection Control Recommendations   [place] Precautions  Contact Isolation       Antimicrobial Stewardship Recommendations   Simplification of therapy  Targeted therapy      History of Present Illness:   Initial history:  Ciera Scott is a 60y.o.-year-old male was brought to the hospital on 1/1/2023 with altered mental status, confusion associated with decreased responsiveness worsening for 2 weeks. At the ER he was obtunded, O2 sat was 75% on room air, was placed on nonrebreather initially, subsequently was intubated. He is intubated, sedated unable to provide history that was obtained from chart review and nursing staff. According to his wife he is fully vaccinated for COVID-19 and flu.   He has been afebrile since admission  Initial procalcitonin level was 0.09, WBC normal  He was hypotensive was started on Levophed. MRI showed small acute infarcts in the left cerebellar hemisphere and left parietal love with moderate bilateral subarachnoid hemorrhages within both cerebral hemispheres. Chest x-ray showed right lower lobe infiltrates  Interval changes  1/7/2023   He was extubated 1/5/2023, afebrile, confused, no growth on blood cultures  Left arm PICC line in place. Patient Vitals for the past 8 hrs:   BP Temp Temp src Pulse Resp SpO2   01/07/23 1119 -- -- -- 98 19 --   01/07/23 1100 (!) 168/96 -- -- 99 23 95 %   01/07/23 1000 -- -- -- 97 23 95 %   01/07/23 0900 (!) 161/87 -- -- 100 24 92 %   01/07/23 0800 (!) 153/82 100.1 °F (37.8 °C) Axillary (!) 104 21 97 %   01/07/23 0756 -- -- -- 100 -- --   01/07/23 0700 (!) 147/82 -- -- 100 20 90 %   01/07/23 0600 (!) 152/65 -- -- 97 18 92 %   01/07/23 0500 (!) 174/100 -- -- 100 22 93 %   01/07/23 0400 (!) 161/85 98.4 °F (36.9 °C) Axillary 100 19 90 %               I have personally reviewed the past medical history, past surgical history, medications, social history, and family history, and I haveupdated the database accordingly. Allergies:   Patient has no known allergies. Review of Systems:     Review of Systems  confused, unable to provide  Physical Examination :       Physical Exam  Constitutional:       Appearance: He is ill-appearing. HENT:      Head: Normocephalic and atraumatic. Right Ear: External ear normal.      Left Ear: External ear normal.   Eyes:      General: No scleral icterus. Conjunctiva/sclera: Conjunctivae normal.   Cardiovascular:      Rate and Rhythm: Normal rate and regular rhythm. Heart sounds: Normal heart sounds. Pulmonary:      Comments: coarse breath sounds bilaterally, few crackles. Abdominal:      General: There is no distension. Palpations: Abdomen is soft. Musculoskeletal:      Cervical back: Neck supple. No rigidity. Right lower leg: No edema. Left lower leg: No edema.    Skin:     Coloration: Skin is not jaundiced. Findings: No erythema. Past Medical History:   History reviewed. No pertinent past medical history. Past Surgical  History:   History reviewed. No pertinent surgical history.     Medications:      ziprasidone (GEODON) in sterile water injection  10 mg IntraMUSCular BID    ipratropium-albuterol  1 ampule Inhalation Q4H WA    heparin (porcine)  5,000 Units SubCUTAneous 3 times per day    nystatin   Topical BID    potassium bicarb-citric acid  20 mEq Oral Daily    ampicillin-sulbactam  3,000 mg IntraVENous Q6H    [Held by provider] miconazole   Topical BID    vancomycin  1,250 mg IntraVENous Q12H    [Held by provider] aspirin  81 mg Oral Daily    sodium chloride flush  5-40 mL IntraVENous 2 times per day    nicotine  1 patch TransDERmal Daily    pantoprazole (PROTONIX) 40 mg injection  40 mg IntraVENous Daily    vancomycin (VANCOCIN) intermittent dosing (placeholder)   Other RX Placeholder       Social History:     Social History     Socioeconomic History    Marital status: Single     Spouse name: Not on file    Number of children: Not on file    Years of education: Not on file    Highest education level: Not on file   Occupational History    Not on file   Tobacco Use    Smoking status: Every Day     Packs/day: 1.00     Years: 40.00     Pack years: 40.00     Types: Cigarettes    Smokeless tobacco: Never   Substance and Sexual Activity    Alcohol use: Yes     Comment: beer and scotch    Drug use: Not Currently     Comment: over 20 years (stated by daughter)    Sexual activity: Not on file   Other Topics Concern    Not on file   Social History Narrative    Not on file     Social Determinants of Health     Financial Resource Strain: Not on file   Food Insecurity: Not on file   Transportation Needs: Not on file   Physical Activity: Not on file   Stress: Not on file   Social Connections: Not on file   Intimate Partner Violence: Not on file   Housing Stability: Not on file       Family History: History reviewed. No pertinent family history. Medical Decision Making:   I have independently reviewed/ordered the following labs:    CBC with Differential:   Recent Labs     01/06/23 0419 01/07/23 0519   WBC 5.2 7.1   HGB 10.5* 11.7*   HCT 38.4* 41.4   PLT 71* 68*   LYMPHOPCT 4* 10*   MONOPCT 5 6       BMP:  Recent Labs     01/05/23 0427 01/06/23 0419 01/07/23 0412   * 144 147*   K 3.4* 4.0 4.1   * 111* 108*   CO2 30 29 26   BUN 11 7* 5*   CREATININE 0.58* 0.42* <0.40*   MG 2.0  --   --        Hepatic Function Panel:   Recent Labs     01/06/23 0419 01/07/23 0412   PROT 4.6* 5.3*   LABALBU 2.6* 2.9*   BILITOT 0.9 1.6*   ALKPHOS 46 51   * 137*   AST 74* 63*       No results for input(s): RPR in the last 72 hours. No results for input(s): HIV in the last 72 hours. No results for input(s): BC in the last 72 hours. Lab Results   Component Value Date/Time    CREATININE <0.40 01/07/2023 04:12 AM    GLUCOSE 91 01/07/2023 04:12 AM       Detailed results: Thank you for allowing us to participate in the care of this patient. Please call with questions. This note is created with the assistance of a speech recognition program.  While intending to generate adocument that actually reflects the content of the visit, the document can still have some errors including those of syntax and sound a like substitutions which may escape proof reading. It such instances, actual meaningcan be extrapolated by contextual diversion.     Edgard Osuna MD  Office: [phone]  Perfect serve / office [phone]

## 2023-01-07 NOTE — PLAN OF CARE
Problem: Discharge Planning  Goal: Discharge to home or other facility with appropriate resources  1/7/2023 1136 by Jamar Kern RN  Outcome: Progressing  Flowsheets (Taken 1/7/2023 0800)  Discharge to home or other facility with appropriate resources:   Identify barriers to discharge with patient and caregiver   Arrange for needed discharge resources and transportation as appropriate   Identify discharge learning needs (meds, wound care, etc)  1/7/2023 0511 by Griselda Banuelos RN  Outcome: Progressing  1/7/2023 0511 by Griselda Banuelos RN  Outcome: Progressing     Problem: Safety - Adult  Goal: Free from fall injury  1/7/2023 1136 by Jamar Kern RN  Outcome: Progressing  1/7/2023 0511 by Griselda Banuelos RN  Outcome: Progressing  1/7/2023 0511 by Griselda Banuelos RN  Outcome: Progressing     Problem: ABCDS Injury Assessment  Goal: Absence of physical injury  1/7/2023 1136 by Jamar Kern RN  Outcome: Progressing  1/7/2023 0511 by Griselda Banuelos RN  Outcome: Progressing  1/7/2023 0511 by Griselda Banuelos RN  Outcome: Progressing     Problem: Skin/Tissue Integrity  Goal: Absence of new skin breakdown  Description: 1. Monitor for areas of redness and/or skin breakdown  2. Assess vascular access sites hourly  3. Every 4-6 hours minimum:  Change oxygen saturation probe site  4. Every 4-6 hours:  If on nasal continuous positive airway pressure, respiratory therapy assess nares and determine need for appliance change or resting period. 1/7/2023 1136 by Jamar Kern RN  Outcome: Progressing  1/7/2023 0511 by Griselda Banuelos RN  Outcome: Progressing  1/7/2023 0511 by Griselda Banuelos RN  Outcome: Progressing     Problem: Safety - Medical Restraint  Goal: Remains free of injury from restraints (Restraint for Interference with Medical Device)  Description: INTERVENTIONS:  1.  Determine that other, less restrictive measures have been tried or would not be effective before applying the restraint  2. Evaluate the patient's condition at the time of restraint application  3. Inform patient/family regarding the reason for restraint  4.  Q2H: Monitor safety, psychosocial status, comfort, nutrition and hydration  1/7/2023 1136 by Angelica Leung RN  Outcome: Progressing  Flowsheets  Taken 1/7/2023 1000  Remains free of injury from restraints (restraint for interference with medical device):   Determine that other, less restrictive measures have been tried or would not be effective before applying the restraint   Evaluate the patient's condition at the time of restraint application   Inform patient/family regarding the reason for restraint   Every 2 hours: Monitor safety, psychosocial status, comfort, nutrition and hydration  Taken 1/7/2023 0800  Remains free of injury from restraints (restraint for interference with medical device):   Determine that other, less restrictive measures have been tried or would not be effective before applying the restraint   Evaluate the patient's condition at the time of restraint application   Inform patient/family regarding the reason for restraint   Every 2 hours: Monitor safety, psychosocial status, comfort, nutrition and hydration  1/7/2023 0511 by Evon Hollingsworth RN  Outcome: Progressing  1/7/2023 0511 by Evon Hollingsworth RN  Outcome: Progressing     Problem: Pain  Goal: Verbalizes/displays adequate comfort level or baseline comfort level  1/7/2023 1136 by Angelica Leung RN  Outcome: Progressing  1/7/2023 0511 by Evon Hollingsworth RN  Outcome: Progressing  1/7/2023 0511 by Evon Hollingsworth RN  Outcome: Progressing     Problem: Nutrition Deficit:  Goal: Optimize nutritional status  1/7/2023 1136 by Angelica Leung RN  Outcome: Progressing  1/7/2023 0511 by Evon Hollingsworth RN  Outcome: Progressing

## 2023-01-07 NOTE — PROGRESS NOTES
Physical Therapy  Facility/Department: [facility] ICU  Physical Therapy Initial Assessment    Name: Leila Potter  : 1960  MRN: [id number]  Date of Service: 2023    Discharge Recommendations:  Therapy recommended at discharge          Patient Diagnosis(es): The primary encounter diagnosis was Acute respiratory failure with hypoxia and hypercapnia ([address] Diagnoses of Community acquired pneumonia of right lower lobe of lung and Altered mental status, unspecified altered mental status type were also pertinent to this visit. Past Medical History:  has no past medical history on file. Past Surgical History:  has no past surgical history on file. Assessment   Assessment: 59 y/o male adm with AMS and found to have [address] with recent fall at home. Pt is a high fall risk as he has poor safety awareness and significant weakness Lhemibody > R.   Treatment Diagnosis: impared mobility s/p AMS/SAH  Specific Instructions for Next Treatment:HIGH FALL RISK: progress bed mobility /sitting balance as tolerated, inform PT when pt ready to progress with transfers/standing, may need yomaira steay vs lift equipment initally  Therapy Prognosis: Good  Decision Making: Medium Complexity  History: falls, 1 week  AMS PTA  Exam: L side weakness, poor activity tolerance  Barriers to Learning: cognition, endurance  Requires PT Follow-Up: Yes  Activity Tolerance  Activity Tolerance: Patient limited by fatigue;Treatment limited secondary to decreased cognition;Treatment limited secondary to agitation;Patient limited by endurance  Activity Tolerance Comments: 8 liter high flow 02     Plan   Physcial Therapy Plan  General Plan: 6-7 times per week  Specific Instructions for Next Treatment: progress bed mobility sitting balance as tolerated, inform PT when pt ready to progress with transfers/standing, may need yomaira steay vs lift equipment initally  Current Treatment Recommendations: Strengthening, Balance training, Functional mobility training, Neuromuscular re-education, Cognitive reorientation, Safety education & training, Patient/Caregiver education & training, Equipment evaluation, education, & procurement, Therapeutic activities  Safety Devices  Type of Devices: Bed alarm in place, All fall risk precautions in place, Call light within reach, Patient at risk for falls, Left in bed, Nurse notified  Restraints  Restraints Initially in Place: Yes  Restraints: bilat wsit restraints     Restrictions  Restrictions/Precautions  Restrictions/Precautions: Fall Risk, General Precautions, Contact Precautions, Isolation, Bed Alarm (02: 8 liters on [place], bilat wrist restraints)  Required Braces or Orthoses?: No  Implants present? :  (Pt denies)     Subjective   Pain: Pt did not voice pain at this time  General  Chart Reviewed: Yes  Patient assessed for rehabilitation services?: Yes  Additional Pertinent Hx: Peggi Heimlich is a 60 y.o. Non- / non  male who presents with Altered Mental Status and is admitted to the hospital for the management of Acute respiratory failure ([facility] [address] According to wife and daughter present at bedside, patient has not pursued any healthcare treatment since childhood. According to wife patient has not been feeling well for the past 2 weeks and has been getting more confused. About a week ago he did have a fall where EMS was called but all of his vital signs were stable so he was not transported to the hospital.  When EMS came to the house to evaluate him today he was found to have an O2 sat at 75% on room air and he was placed on a nonrebreather and given a dose of Lasix due to new edema to bilateral lower extremities. When he arrived to the ED he was placed on BiPAP. Chest x-ray confirms presence of pneumonia and small right pleural effusion.  MRI found acute infarcts L cerebellar and parietal lobes and moderate bilat SAH in both cerebral hemispheres 1/3/23  Response To Previous Treatment: Not applicable  Referring Practitioner: Loras Eisenmenger  Referral Date : 01/02/23  Diagnosis: Acute respiratory failure  Follows Commands: Impaired  General Comment  Comments: Pt seen with OT in ICU for inital evaluation; per Sukhi Aragon RN, pt is halucinating today, but calm. SO/wife and pts daughter visiting upon arrival.         Social/Functional History  Social/Functional History  Lives With: Spouse (chart states  pt lives with girlfriend Brianne Del Angel)  Type of Home: House  Home Layout: One level  Home Access: Stairs to enter with rails (front entrance, used most often)  Entrance Stairs - Number of Steps: 6 ALISA from front; 4 ALISA frmo back with no rails  Entrance Stairs - Rails: Both (able to reach both at same time but are not stable)  Bathroom Shower/Tub: Tub/Shower unit, Curtain, Shower chair without back  [employer]: Standard  Bathroom Equipment: Grab bars in shower, Hand-held shower, Toilet raiser  Bathroom Accessibility: Not accessible  Home Equipment: [employer]  ADL Assistance: Independent  Homemaking Assistance: Independent  Homemaking Responsibilities: Yes  Ambulation Assistance: Independent  Transfer Assistance: Independent  Active : Yes  Mode of Transportation: SUV  Occupation: Retired  Type of Occupation:   IADL Comments: Pt sleeps in flat bed. Additional Comments: Spouse is home all the time and able to assist as needed. Daughter lives close by and able to assist as needed. Daughter works and has family.   Vision/Hearing  Vision  Vision: Impaired  Vision Exceptions: Wears glasses at all times  Tracking:  (difficult to assess formally- pt is distracted, difficulty focusing, may have L side inattention vs mild neglect)  Hearing  Hearing: Exceptions to [facility]  Hearing Exceptions: Hard of hearing/hearing concerns (Ringing in ears)    Cognition   Orientation  Overall Orientation Status: Impaired  Orientation Level: Oriented to person  Cognition  Overall Cognitive Status: Exceptions  Arousal/Alertness: Delayed responses to stimuli  Following Commands:  Follows one step commands with increased time, possible R/L confusion  Attention Span: Difficulty attending to directions  Memory: Decreased recall of biographical Information;Decreased recall of precautions;Decreased recall of recent events  Safety Judgement: Decreased awareness of need for assistance;Decreased awareness of need for safety  Problem Solving: Assistance required to generate solutions;Assistance required to implement solutions;Assistance required to identify errors made;Assistance required to correct errors made  Insights: Not aware of deficits  Initiation: Requires cues for all  Sequencing: Requires cues for all  Cognition Comment: Pt is impulsive, unaware of limitations related to medical status  Patient affect[de-identified] Flat     Objective   Heart Rate: 99  Heart Rate Source: Monitor  BP: (!) 169/96  BP Location: Right upper arm  BP Method: Automatic  Patient Position: Semi fowlers  MAP (Calculated): 120  Resp: 21  SpO2: 96 %  O2 Device: High flow nasal cannula  Comment: SP02 maintianed throughout mobiltiy     Observation/Palpation  Posture: Fair (slumped at EOB vs leaning posteriorly)  Observation: bruising UEs, rash on buttocks  Edema: generalized edema        AROM RLE (degrees)  RLE General AROM: AAROM at hip/knee, ankle WFL  AROM LLE (degrees)  LLE General AROM: AAROM hip/knee , ankle WFL  AROM RUE (degrees)  RUE General AROM: hand over hand to complete end ROM, myoclonus  AROM LUE (degrees)  LUE General AROM: handover hand to complete end ROM, dysmetria/ myoclonus noted  Strength RLE  Comment: grossly 2+/5 hip/knee, 3/5 ankle  Strength LLE  Comment: grossly 2+/5 hip/knee, 3/5 ankle  Strength RUE  Comment: 3-/5grossly- see OT  Strength LUE  Comment: 2+/5grossly, see OT, weak grasp, incoordination  Tone RLE  RLE Tone: Normotonic  Tone LLE  LLE Tone: Hypotonic (vs weakness)  Coordination  Rapid Alternating Movements: Dysdiadokinesia  Finger to Nose: Dysmetric        Bed mobility  Rolling to Left: Moderate assistance  Rolling to Right: Moderate assistance  Supine to Sit: Maximum assistance;2 Person assistance (assist to lift at trunk , min functional assist with LUE)  Sit to Supine: Maximum assistance;2 Person assistance (dependnet for LEs  up onto bed)  Scooting: Dependent/Total  Bed Mobility Comments: VC for all aspects of bed mobilty to use bedrail, position  LEs for rolling, use of bedrail etc.  Transfers  Sit to Stand: Unable to assess (pt unable to bear wt through LEs to attempt due to weakness and cognitive concerns/ safety)  Ambulation  Comments: NT     Balance  Posture: Fair (slumped posture at EOB vs pt leaning posteriorly, not engaging core mm)  Sitting - Static: Poor  Sitting - Dynamic: Poor  Standing - Static:  (NT)  Standing - Dynamic:  (NT)  Comments: pt with poor upright awareness. was 2 asssit to sit EOB with continued max assit to shift wt forward, then pt would improve to sitting withmon asssit , fluctuated throughout ~ 7 min sitting time. minimal use of LUE to assist with balance due to weakness  A/AROM Exercises: supine LEs  Static Sitting Balance Exercises: 5-7 min EOB      AM-PAC Score  AM-PAC Inpatient Mobility Raw Score : 9 (01/07/23 1319)  AM-PAC Inpatient T-Scale Score : 30.55 (01/07/23 1319)  Mobility Inpatient CMS 0-100% Score: 81.38 (01/07/23 1319)  Mobility Inpatient CMS G-Code Modifier : CM (01/07/23 1319)     Goals  Short Term Goals  Time Frame for Short Term Goals: 10 visits  Short Term Goal 1: min assist supine<-> sit x1 assist  Short Term Goal 2: sit EOB with min x1 up to 15 min for therex/ADL  Short Term Goal 3: 3+/5 LE strength to prepare for standing and transfers  Short Term Goal 4: LPT to continue to assess functional mobility to progress POC  Short Term Goal 5: roll L/R with SBA  Patient Goals   Patient Goals : unable to state       Education  Patient Education  Education Given To: Family; Patient  Education Provided: Role of Therapy;Plan of Care  Barriers to Learning: Cognition  Education Outcome: Continued education needed      Therapy Time   Individual Concurrent Group Co-treatment   Time In [address] [place] [address] Drive         Time Out 1031         Minutes 2687 [address]

## 2023-01-07 NOTE — PROGRESS NOTES
Today's Date: 1/7/2023  Patient Name: Frank Marx  Date of admission: 1/1/2023  9:55 PM  Patient's age: 60 y.o., 1960  Admission Dx: Acute respiratory failure ([facility] [address] [J96.00]  Acute respiratory failure with hypoxia and hypercapnia ([facility] [address] [J96.01, J96.02]  Altered mental status, unspecified altered mental status type [R41.82]  Community acquired pneumonia of right lower lobe of lung [J18.9]    Reason for Consult: management recommendations  Requesting Physician: Margaret Cazares MD    CHIEF COMPLAINT: Abnormal cell counts. Altered mental status. Pneumonia. History Obtained From:  spouse, family member. Electronic medical record. Patient unable to give any history due to altered mental status          Interval history:    Patient seen and examined  Labs and vitals reviewed  Patient seen in ICU  The patient is alert and oriented right now but family state that his mental status has been fluctuating this morning. Clinical condition seems to be improving slowly. No active bleeding appreciated. HISTORY OF PRESENT ILLNESS:      The patient is a 60 y.o.  male who is admitted with chief complaint of altered mental status. Patient has not seen a doctor for multiple years. Due to worsening mental status EMS was summoned. Patient oxygen saturation was noted to be at 75%. Patient placed on BiPAP x-ray showed right lower lobe pneumonia. Patient started on antibiotics. Patient also noted to have INR of 2.3 platelet count of 16,362. Patient does have a history of alcohol intake. Patient's ammonia level noted to be 29. Creatinine 1.5. Total bilirubin is within range. Hemoglobin 11.2 with MCV of 69. Platelet count is 52,699. Ultrasound liver done however report is pending.         Past Medical History: Hypertension    Past Surgical History: No pertinent surgical history    Medications:    Prior to Admission medications    Not on File     Current Facility-Administered Medications   Medication Dose Route Frequency Provider Last Rate Last Admin    ziprasidone (GEODON) 10 mg in sterile water 0.5 mL injection  10 mg IntraMUSCular BID Gabriel Chu MD        ipratropium-albuterol (DUONEB) nebulizer solution 1 ampule  1 ampule Inhalation Q4H WA Gabriel Chu MD   1 ampule at 01/07/23 1119    heparin (porcine) injection 5,000 Units  5,000 Units SubCUTAneous 3 times per day Jovany Flynn MD        0.9 % sodium chloride infusion   IntraVENous PRN Jean-Paul Guerrero MD        ziprasidone (GEODON) 10 mg in sterile water 0.5 mL injection  10 mg IntraMUSCular Q12H PRN Gabriel Chu MD   10 mg at 01/06/23 2120    dexmedetomidine (PRECEDEX) 400 mcg in sodium chloride 0.9 % 100 mL infusion  0.1-1.5 mcg/kg/hr IntraVENous Continuous PRN Gabriel Chu MD        nystatin (MYCOSTATIN) ointment   Topical BID Arlene Mendiola MD   Given at 01/07/23 1032    potassium bicarb-citric acid (EFFER-K) effervescent tablet 20 mEq  20 mEq Oral Daily Bonilla Covarrubias MD   20 mEq at 01/07/23 1032    ampicillin-sulbactam (UNASYN) 3,000 mg in sodium chloride 0.9 % 100 mL IVPB (mini-bag)  3,000 mg IntraVENous Q6H Reed Miner MD   Stopped at 01/07/23 1104    [Held by provider] miconazole (MICOTIN) 2 % powder   Topical BID ALAYNA Meeks - NP   Given at 01/06/23 0923    potassium chloride 20 mEq in sodium chloride 0.45 % 1,000 mL infusion   IntraVENous Continuous Daniele Rodriges MD 35 mL/hr at 01/07/23 0526 New Bag at 01/07/23 0526    vancomycin (VANCOCIN) 1,250 mg in dextrose 5 % 250 mL IVPB (ADDAVIAL)  1,250 mg IntraVENous Q12H Corinne Larsen MD   Stopped at 01/07/23 0555    perflutren lipid microspheres (DEFINITY) injection 1.5 mL  1.5 mL IntraVENous ONCE PRN Prabhu Gilman MD        [facility]] aspirin chewable tablet 81 mg  81 mg Oral Daily Cary Soto MD   81 mg at 01/04/23 0740    sodium chloride flush 0.9 % injection 10 mL  10 mL IntraVENous PRN Corinne Larsen MD   10 mL at 01/03/23 1459    sodium chloride flush 0.9 % injection 10 mL  10 mL IntraVENous PRN Jenny Crowe MD   10 mL at 01/03/23 1836    sodium chloride flush 0.9 % injection 5-40 mL  5-40 mL IntraVENous 2 times per day Manjinder Labella, APRN - NP   10 mL at 01/07/23 1040    sodium chloride flush 0.9 % injection 5-40 mL  5-40 mL IntraVENous PRN Abiola Dyei, APRN - NP        0.9 % sodium chloride infusion   IntraVENous PRN Manjinder Labella, APRN - NP        ondansetron (ZOFRAN-ODT) disintegrating tablet 4 mg  4 mg Oral Q8H PRN Manjinder Labella, APRN - NP        Or    ondansetron (ZOFRAN) injection 4 mg  4 mg IntraVENous Q6H PRN Abiola Goldi, APRN - NP        polyethylene glycol (GLYCOLAX) packet 17 g  17 g Oral Daily PRN Manjinder Labella, APRN - NP        acetaminophen (TYLENOL) tablet 650 mg  650 mg Oral Q6H PRN Manjinder Labella, APRN - NP        Or    acetaminophen (TYLENOL) suppository 650 mg  650 mg Rectal Q6H PRN Manjinder Labella, APRN - NP        nicotine (NICODERM CQ) 21 MG/24HR 1 patch  1 patch TransDERmal Daily Manjinder Labella, APRN - NP   1 patch at 01/06/23 0922    albuterol (PROVENTIL) nebulizer solution 2.5 mg  2.5 mg Nebulization Q2H PRN Manjinder Labella, APRN - NP   2.5 mg at 01/02/23 1115    guaiFENesin (MUCINEX) extended release tablet 600 mg  600 mg Oral BID PRN Manjinder Labella, APRN - NP        pantoprazole (PROTONIX) 40 mg in sodium chloride (PF) 0.9 % 10 mL injection  40 mg IntraVENous Daily Celi Josue MD   40 mg at 01/07/23 1038    vancomycin (VANCOCIN) intermittent dosing (placeholder)   Other RX Zeny Truong MD           Allergies:  Patient has no known allergies. Social History:   reports that he has been smoking cigarettes. He has a 40.00 pack-year smoking history. He has never used smokeless tobacco. He reports current alcohol use. He reports that he does not currently use drugs.      Family History: Patient not able to give history due to altered mental status    REVIEW OF SYSTEMS: General: no fever or night sweats, Weight is stable. ENT: No double or blurred vision, no hearing problem, no dysphagia or sore throat   Respiratory: Positive shortness of breath  Cardiovascular: Denies chest pain, PND or orthopnea. No L E swelling or palpitations. Gastrointestinal:    No nausea or vomiting, abdominal pain, diarrhea or constipation. Genitourinary: Denies dysuria, hematuria, frequency, urgency or incontinence. Neurological: Complains of being very weak. Musculoskeletal:  No arthralgia no back pain or joint swelling. Skin: There are no rashes or bleeding. Psych: Denies hallucinations or intentions to harm self        PHYSICAL EXAM:        BP (!) 168/96   Pulse 98   Temp 100.1 °F (37.8 °C) (Axillary)   Resp 19   Ht 5' 7\" (1.702 m)   Wt 196 lb 3.4 oz (89 kg)   SpO2 95%   BMI 30.73 kg/m²    Temp (24hrs), Av.8 °F (37.1 °C), Min:98 °F (36.7 °C), Max:100.1 °F (37.8 °C)      General appearance - not in acute distress  Mental status -arousable but somewhat lethargic  Eyes - pupils equal and reactive, extraocular eye movements intact   Ears - bilateral TM's and external ear canals normal   Mouth -mucous membranes moist  Neck - supple, no significant adenopathy   Lymphatics - no palpable lymphadenopathy, no hepatosplenomegaly   Chest -bilateral rales  Heart - normal rate, regular rhythm, normal S1, S2, no murmurs  Abdomen - soft, nontender, nondistended, no masses or organomegaly   Neurological -orally intubated  Musculoskeletal - no joint tenderness, deformity or swelling   Extremities - peripheral pulses normal, no pedal edema, no clubbing or cyanosis   Skin - normal coloration and turgor, no rashes, no suspicious skin lesions noted ,      DATA:      Labs:     Results for orders placed or performed during the hospital encounter of 23   COVID-19 & Influenza Combo    Specimen: Nasopharyngeal Swab   Result Value Ref Range    Specimen Description . [address] [facility] [facility]AB     SARS-CoV-2 RNA, RT PCR Not Detected Not Detected    INFLUENZA A Not Detected Not Detected    INFLUENZA B Not Detected Not Detected   Respiratory Panel, Molecular, with COVID-19 (Restricted: peds pts or suitable admitted adults)    Specimen: Nasopharyngeal Swab   Result Value Ref Range    Specimen Description . NASOPHARYNGEAL SWAB     Adenovirus PCR Not Detected Not Detected    Coronavirus 229E PCR Not Detected Not Detected    Coronavirus HKU1 PCR Not Detected Not Detected    Coronavirus NL63 PCR Not Detected Not Detected    Coronavirus OC43 PCR Not Detected Not Detected    SARS-CoV-2, PCR Not Detected Not Detected    Human Metapneumovirus PCR Not Detected Not Detected    Rhino/Enterovirus PCR Not Detected Not Detected    Influenza A by PCR Not Detected Not Detected    Influenza B by PCR Not Detected Not Detected    Parainfluenza 1 PCR Not Detected Not Detected    Parainfluenza 2 PCR Not Detected Not Detected    Parainfluenza 3 PCR Not Detected Not Detected    Parainfluenza 4 PCR Not Detected Not Detected    Resp Syncytial Virus PCR Not Detected Not Detected    Bordetella Parapertussis Not Detected Not Detected    B Pertussis by PCR Not Detected Not Detected    Chlamydia pneumoniae By PCR Not Detected Not Detected    Mycoplasma pneumo by PCR Not Detected Not Detected   Legionella Ag, Ur    Specimen: Urine   Result Value Ref Range    Legionella Pneumophilia Ag, Urine NEGATIVE NEGATIVE   STREP PNEUMONIAE ANTIGEN    Specimen: Urine, indwelling catheter   Result Value Ref Range    Source . CLEAN CATCH URINE     Strep pneumo Ag NEGATIVE    Culture, Blood 1    Specimen: Blood   Result Value Ref Range    Specimen Description . BLOOD LEFT ARM     Culture NO GROWTH 4 DAYS    Culture, Blood 1    Specimen: Blood   Result Value Ref Range    Specimen Description . BLOOD RIGHT ARM     Culture NO GROWTH 4 DAYS    Culture, Urine    Specimen: Urine, clean catch   Result Value Ref Range    Specimen Description Farhana Vega CLEAN CATCH URINE     Culture NO GROWTH    MRSA DNA Probe, Nasal    Specimen: Nasal   Result Value Ref Range    Specimen Description . NASAL SWAB     MRSA, DNA, Nasal (A) NEGATIVE     POSITIVE:  MRSA DNA detected by nucleic acid amplification. Culture, Respiratory    Specimen: Sputum Induced   Result Value Ref Range    Specimen Description . INDUCED SPUTUM     Direct Exam >10, <25 NEUTROPHILS/LPF     Direct Exam < 10 EPITHELIAL CELLS/LPF     Direct Exam NO SIGNIFICANT PATHOGENS SEEN     Culture NO GROWTH    Troponin   Result Value Ref Range    Troponin, High Sensitivity 177 (HH) 0 - 22 ng/L   Troponin   Result Value Ref Range    Troponin, High Sensitivity 184 (HH) 0 - 22 ng/L   APTT   Result Value Ref Range    PTT 26.5 24.0 - 36.0 sec   Protime-INR   Result Value Ref Range    Protime 24.9 (H) 11.8 - 14.6 sec    INR 2.3    Phosphorus   Result Value Ref Range    Phosphorus 4.3 2.5 - 4.5 mg/dL   Magnesium   Result Value Ref Range    Magnesium 2.1 1.6 - 2.6 mg/dL   Basic Metabolic Panel   Result Value Ref Range    Glucose 96 70 - 99 mg/dL    BUN 46 (H) 8 - 23 mg/dL    Creatinine 1.77 (H) 0.70 - 1.20 mg/dL    Est, Glom Filt Rate 43 (L) >60 mL/min/1.73m2    Calcium 7.9 (L) 8.6 - 10.4 mg/dL    Sodium 138 135 - 144 mmol/L    Potassium 4.8 3.7 - 5.3 mmol/L    Chloride 97 (L) 98 - 107 mmol/L    CO2 29 20 - 31 mmol/L    Anion Gap 12 9 - 17 mmol/L   CBC with Auto Differential   Result Value Ref Range    WBC 6.8 3.5 - 11.0 k/uL    RBC 5.99 (H) 4.5 - 5.9 m/uL    Hemoglobin 12.1 (L) 13.5 - 17.5 g/dL    Hematocrit 41.4 41 - 53 %    MCV 69.1 (L) 80 - 100 fL    MCH 20.2 (L) 26 - 34 pg    MCHC 29.2 (L) 31 - 37 g/dL    RDW 19.8 (H) 11.5 - 14.9 %    Platelets 55 (L) 386 - 450 k/uL    MPV 8.6 6.0 - 12.0 fL    Seg Neutrophils 79 (H) 36 - 66 %    Lymphocytes 12 (L) 24 - 44 %    Monocytes 8 (H) 1 - 7 %    Eosinophils % 0 0 - 4 %    Basophils 1 0 - 2 %    Segs Absolute 5.40 1.3 - 9.1 k/uL    Absolute Lymph # 0.80 (L) 1.0 - 4.8 k/uL Absolute Mono # 0.50 0.1 - 1.3 k/uL    Absolute Eos # 0.00 0.0 - 0.4 k/uL    Basophils Absolute 0.10 0.0 - 0.2 k/uL   Blood Gas, Venous   Result Value Ref Range    pH, [place] 7.258 (L) 7.320 - 7.420    pCO2, [place] 63.0 (H) 39.0 - 55.0 mm Hg    pO2, [place] 48.1 30.0 - 50.0 mm Hg    HCO3, Venous 28.1 24.0 - 30.0 mmol/L    Positive Base Excess, [place] 1.0 0.0 - 2.0 mmol/L    O2 Sat, [place] 72.1 60.0 - 85.0 %    Carboxyhemoglobin 4.3 0 - 5 %    Methemoglobin 0.5 0.0 - 1.9 %    Pt Temp 37    Brain Natriuretic Peptide   Result Value Ref Range    Pro-BNP 7,797 (H) <300 pg/mL   Hepatic Function Panel   Result Value Ref Range    Albumin 3.9 3.5 - 5.2 g/dL    Alkaline Phosphatase 68 40 - 129 U/L     (H) 5 - 41 U/L     (H) <40 U/L    Total Bilirubin 1.1 0.3 - 1.2 mg/dL    Bilirubin, Direct 0.9 (H) <0.3 mg/dL    Bilirubin, Indirect 0.2 0.0 - 1.0 mg/dL    Total Protein 6.6 6.4 - 8.3 g/dL   Lipase   Result Value Ref Range    Lipase 17 13 - 60 U/L   Urinalysis with Reflex to Culture    Specimen: Urine   Result Value Ref Range    Color, UA Orange (A) Yellow    Turbidity UA Cloudy (A) Clear    Glucose, Ur NEGATIVE NEGATIVE    Bilirubin Urine NEGATIVE  Verified by ictotest. (A) NEGATIVE    Ketones, Urine TRACE (A) NEGATIVE    Specific Gravity, UA 1.018 1.000 - 1.030    Urine Hgb LARGE (A) NEGATIVE    pH, UA 5.0 5.0 - 8.0    Protein, UA 2+ (A) NEGATIVE    Urobilinogen, Urine ELEVATED (A) Normal    Nitrite, Urine NEGATIVE NEGATIVE    Leukocyte Esterase, Urine SMALL (A) NEGATIVE   Microscopic Urinalysis   Result Value Ref Range    WBC, UA 6 TO 9 /HPF    RBC, UA TOO NUMEROUS TO COUNT /HPF    Casts UA 3 to 5 /LPF    Epithelial Cells UA 0 TO 2 /HPF    Bacteria, UA FEW (A) None   Arterial Blood Gases   Result Value Ref Range    pH, Arterial 7.295 (L) 7.350 - 7.450    pCO2, Arterial 60.3 (HH) 35.0 - 45.0 mmHg    pO2, Arterial 63.0 (L) 80.0 - 100.0 mmHg    HCO3, Arterial 29.3 (H) 22.0 - 26.0 mmol/L    Positive Base Excess, Art 2.8 (H) 0.0 - 2.0 mmol/L    O2 Sat, Arterial 85.8 (LL) 95 - 98 %    Carboxyhemoglobin 3.1 0 - 5 %    Methemoglobin 1.0 0.0 - 1.9 %    Pt Temp 37     O2 Device/Flow/% BIPAP     Respiratory Rate 16     Tyron Test PASS     Sample Site Right Radial Artery     Pt.  Position SEMI-FOWLERS     Mode BIPAP     Text for Respiratory RESULTS TO PHYSICIAN    Comprehensive Metabolic Panel w/ Reflex to MG   Result Value Ref Range    Glucose 101 (H) 70 - 99 mg/dL    BUN 46 (H) 8 - 23 mg/dL    Creatinine 1.50 (H) 0.70 - 1.20 mg/dL    Est, Glom Filt Rate 52 (L) >60 mL/min/1.73m2    Calcium 7.5 (L) 8.6 - 10.4 mg/dL    Sodium 136 135 - 144 mmol/L    Potassium 4.8 3.7 - 5.3 mmol/L    Chloride 99 98 - 107 mmol/L    CO2 27 20 - 31 mmol/L    Anion Gap 10 9 - 17 mmol/L    Alkaline Phosphatase 59 40 - 129 U/L     (H) 5 - 41 U/L     (H) <40 U/L    Total Bilirubin 0.8 0.3 - 1.2 mg/dL    Total Protein 5.9 (L) 6.4 - 8.3 g/dL    Albumin 3.3 (L) 3.5 - 5.2 g/dL   CBC with Auto Differential   Result Value Ref Range    WBC 5.6 3.5 - 11.0 k/uL    RBC 5.57 4.5 - 5.9 m/uL    Hemoglobin 11.2 (L) 13.5 - 17.5 g/dL    Hematocrit 38.8 (L) 41 - 53 %    MCV 69.7 (L) 80 - 100 fL    MCH 20.1 (L) 26 - 34 pg    MCHC 28.8 (L) 31 - 37 g/dL    RDW 19.7 (H) 11.5 - 14.9 %    Platelets 67 (L) 667 - 450 k/uL    MPV 9.1 6.0 - 12.0 fL    Seg Neutrophils 79 (H) 36 - 66 %    Lymphocytes 12 (L) 24 - 44 %    Monocytes 8 (H) 1 - 7 %    Eosinophils % 0 0 - 4 %    Basophils 1 0 - 2 %    nRBC 2 per 100 WBC    Segs Absolute 4.41 1.3 - 9.1 k/uL    Absolute Lymph # 0.67 (L) 1.0 - 4.8 k/uL    Absolute Mono # 0.45 0.1 - 1.3 k/uL    Absolute Eos # 0.00 0.0 - 0.4 k/uL    Basophils Absolute 0.06 0.0 - 0.2 k/uL    Morphology ANISOCYTOSIS PRESENT     Morphology HYPOCHROMIA PRESENT     Morphology 1+ ELLIPTOCYTES    Hemoglobin and Hematocrit   Result Value Ref Range    Hemoglobin 11.1 (L) 13.5 - 17.5 g/dL    Hematocrit 38.7 (L) 41 - 53 %   Hemoglobin and Hematocrit   Result Value Ref Range Hemoglobin 10.8 (L) 13.5 - 17.5 g/dL    Hematocrit 37.2 (L) 41 - 53 %   Ammonia   Result Value Ref Range    Ammonia 29 16 - 60 umol/L   Hepatitis Panel, Acute   Result Value Ref Range    Hepatitis B Surface Ag NONREACTIVE NONREACTIVE    Hepatitis C Ab REACTIVE (A) NONREACTIVE    Hep B Core Ab, IgM NONREACTIVE NONREACTIVE    Hep A IgM NONREACTIVE NONREACTIVE   Iron and TIBC   Result Value Ref Range    Iron 14 (L) 59 - 158 ug/dL    TIBC 426 250 - 450 ug/dL    Iron Saturation 3 (L) 20 - 55 %    UIBC 412 (H) 112 - 347 ug/dL   Ferritin   Result Value Ref Range    Ferritin 14 (L) 30 - 400 ng/mL   Mycoplasma Ab,IgM   Result Value Ref Range    Mycoplasma pneumo IgM 0.25 <0.91   Procalcitonin   Result Value Ref Range    Procalcitonin 0.09 (H) <0.09 ng/mL   C-Reactive Protein   Result Value Ref Range    CRP 16.4 (H) 0.0 - 5.0 mg/L   Fibrin Split Products   Result Value Ref Range    FDP >5 (H) <5 ug/mL   Sedimentation Rate   Result Value Ref Range    Sed Rate 1 0 - 20 mm/Hr   Drug Scr, Abuse, Ur   Result Value Ref Range    Amphetamine Screen, Ur NEGATIVE NEGATIVE    Barbiturate Screen, Ur NEGATIVE NEGATIVE    Benzodiazepine Screen, Urine NEGATIVE NEGATIVE    Cocaine Metabolite, Urine NEGATIVE NEGATIVE    Methadone Screen, Urine NEGATIVE NEGATIVE    Opiates, Urine NEGATIVE NEGATIVE    Phencyclidine, Urine NEGATIVE NEGATIVE    Cannabinoid Scrn, Ur NEGATIVE NEGATIVE    Oxycodone Screen, Ur NEGATIVE NEGATIVE    Fentanyl, Ur NEGATIVE NEGATIVE    Test Information       Assay provides medical screening only. The absence of expected drug(s) and/or metabolite(s) may indicate diluted or adulterated urine, limitations of testing or timing of collection.    Troponin   Result Value Ref Range    Troponin, High Sensitivity 195 (HH) 0 - 22 ng/L   Vitamin B12 & Folate   Result Value Ref Range    Vitamin B-12 1926 (H) 232 - 1245 pg/mL    Folate 10.0 >4.8 ng/mL   HIV Screen   Result Value Ref Range    HIV Ag/Ab NONREACTIVE NONREACTIVE MONOCLONAL PANEL   Result Value Ref Range    Kappa Free Light Chains QNT 2.79 (H) 0.37 - 1.94 mg/dL    Lambda Free Light Chains QNT 20.89 (H) 0.57 - 2.63 mg/dL    Free Kappa/Lambda Ratio 0.13 (L) 0.26 - 1.65    Serum IFX Interp       A ZONE OF RESTRICTION IS PRESENT IN THE GAMMAGLOBULIN REGION. CONFIRMED BY IMMUNOFIXATION TO BE MONOCLONAL    Pathologist       Reviewed by pathologist:  Leroy Padilla D.O. Total Protein 5.5 (L) 6.4 - 8.3 g/dL    Albumin (calculated) 3.6 3.2 - 5.2 g/dL    Albumin % 65 45 - 65 %    Alpha-1-Globulin 0.2 0.1 - 0.4 g/dL    Alpha 1 % 3 3 - 6 %    Alpha-2-Globulin 0.4 (L) 0.5 - 0.9 g/dL    Alpha 2 % 7 6 - 13 %    Beta Globulin 0.6 0.5 - 1.1 g/dL    Beta Percent 11 11 - 19 %    Gamma Globulin 0.8 0.5 - 1.5 g/dL    Gamma Globulin % 14 9 - 20 %    Total Prot. Sum 5.6 (L) 6.3 - 8.2 g/dL    Total Prot. Sum,% 100 98 - 102 %    Protein Electrophoresis, Serum       A ZONE OF RESTRICTION IS PRESENT IN THE GAMMAGLOBULIN REGION. CONFIRMED BY IMMUNOFIXATION TO BE MONOCLONAL    Pathologist       Reviewed by pathologist:  Leroy Padilla D.O. Path Review, Smear   Result Value Ref Range    Pathologist Review ELECTRONICALLY SIGNED.  Ashlyn Clarke MD    Reticulocytes   Result Value Ref Range    Retic % 2.8 (H) 0.5 - 2.0 %    Absolute Retic # 0.157 (H) 0.0245 - 0.098 M/uL   Fibrinogen   Result Value Ref Range    Fibrinogen 141 (L) 210 - 530 mg/dL   Ethanol   Result Value Ref Range    Ethanol <10 <10 mg/dL    Ethanol percent <0.010 %   Lactate Dehydrogenase   Result Value Ref Range     (H) 135 - 225 U/L   Hemoglobin and Hematocrit   Result Value Ref Range    Hemoglobin 10.9 (L) 13.5 - 17.5 g/dL    Hematocrit 38.0 (L) 41 - 53 %   Haptoglobin   Result Value Ref Range    Haptoglobin 60 30 - 200 mg/dL   D-Dimer, Quantitative   Result Value Ref Range    D-Dimer, Quant 6.70 (H) 0.00 - 0.59 mg/L FEU   Urinalysis with Reflex to Culture    Specimen: Urine   Result Value Ref Range    Color, UA Dark Yellow (A) Yellow    Turbidity UA Clear Clear    Glucose, Ur NEGATIVE NEGATIVE    Bilirubin Urine NEGATIVE NEGATIVE    Ketones, Urine TRACE (A) NEGATIVE    Specific [place], UA 1.015 1.000 - 1.030    Urine Hgb LARGE (A) NEGATIVE    pH, UA 5.0 5.0 - 8.0    Protein, UA 1+ (A) NEGATIVE    Urobilinogen, Urine Normal Normal    Nitrite, Urine NEGATIVE NEGATIVE    Leukocyte Esterase, Urine SMALL (A) NEGATIVE   APTT   Result Value Ref Range    PTT 38.7 (H) 24.0 - 36.0 sec   Protime-INR   Result Value Ref Range    Protime 24.4 (H) 11.8 - 14.6 sec    INR 2.2    Microscopic Urinalysis   Result Value Ref Range    WBC, UA 10 TO 20 /HPF    RBC, UA TOO NUMEROUS TO COUNT /HPF    Casts UA HYALINE /LPF    Casts UA 0 TO 2 /LPF    Epithelial Cells UA 3 to 5 /HPF   BLOOD GAS, ARTERIAL   Result Value Ref Range    pH, Arterial 7.314 (L) 7.350 - 7.450    pCO2, Arterial 61.9 (HH) 35.0 - 45.0 mmHg    pO2, Arterial 58.1 (LL) 80.0 - 100.0 mmHg    HCO3, Arterial 31.4 (H) 22.0 - 26.0 mmol/L    Positive Base Excess, Art 5.3 (H) 0.0 - 2.0 mmol/L    O2 Sat, Arterial 86.3 (LL) 95 - 98 %    Carboxyhemoglobin 2.1 0 - 5 %    Methemoglobin 0.8 0.0 - 1.9 %    Pt Temp 37.0     O2 Device/Flow/% VENTILATOR     Respiratory Rate 22     Tyron Test PASS     Sample Site Left Radial Artery     Pt. Position SEMI-FOWLERS     Mode PRVC     Set Rate 22     Total Rate 26          FIO2 40     Peep/Cpap 8    SURGICAL PATHOLOGY REPORT   Result Value Ref Range    Surgical Pathology Report       [id number]  Online-OR  [facility]  ANATOMIC PATHOLOGY  [address] [place] [address], [facility] [address] [place], [address]  [phone]  Fax: [phone]  SURGICAL PATHOLOGY CONSULTATION    Patient Name: Nathalie Kimball  MR#: [id number]  Specimen #[id number]    Procedures/Addenda  PERIPHERAL BLOOD REPORT     Date Ordered:     1/2/2023     Status:  Signed Out       Date Complete:     1/3/2023     By: Leroy Ponce M.D.        Date Reported:     1/3/2023 INTERPRETATION  Peripheral blood:  Microcytic, hypochromic anemia. The patient's iron studies are compatible with iron deficiency anemia. Lymphopenia. Differential considerations include acute illness/infection,  medication effect, smoking, and debilitating diseases. Thrombocytopenia  Differential considerations include bone marrow hypoproduction and  immune destruction (idiopathic thrombocytopenic purpura, drug effect). RESULTS-COMMENTS  PERIPHERAL BLOOD STUDY    CBC: Please see the electronic health record  for CBC parameters  (, 01/02/2023, 05:43). PLATELETS: Decreased platelets. Platelets show normal morphology. LEUKOCYTES: Decreased lymphocytes. White blood cells show normal  morphology. There are no blasts. ERYTHROCYTES: Microcytic, hypochromic. Anisocytosis and  poikilocytosis. Polychromasia. Reticulocyte count 2.8%. Rare RBC  fragments. Note: The electronic health record is reviewed. Vladislav Chiang M.D.                    Source:  A: Peripheral Blood     Comprehensive Metabolic Panel w/ Reflex to MG   Result Value Ref Range    Glucose 91 70 - 99 mg/dL    BUN 32 (H) 8 - 23 mg/dL    Creatinine 0.97 0.70 - 1.20 mg/dL    Est, Glom Filt Rate >60 >60 mL/min/1.73m2    Calcium 7.4 (L) 8.6 - 10.4 mg/dL    Sodium 143 135 - 144 mmol/L    Potassium 3.7 3.7 - 5.3 mmol/L    Chloride 105 98 - 107 mmol/L    CO2 30 20 - 31 mmol/L    Anion Gap 8 (L) 9 - 17 mmol/L    Alkaline Phosphatase 50 40 - 129 U/L     (H) 5 - 41 U/L     (H) <40 U/L    Total Bilirubin 0.9 0.3 - 1.2 mg/dL    Total Protein 5.2 (L) 6.4 - 8.3 g/dL    Albumin 3.1 (L) 3.5 - 5.2 g/dL   CBC with Auto Differential   Result Value Ref Range    WBC 5.7 3.5 - 11.0 k/uL    RBC 5.42 4.5 - 5.9 m/uL    Hemoglobin 11.0 (L) 13.5 - 17.5 g/dL    Hematocrit 37.6 (L) 41 - 53 %    MCV 69.4 (L) 80 - 100 fL    MCH 20.2 (L) 26 - 34 pg    MCHC 29.2 (L) 31 - 37 g/dL    RDW 20.0 (H) 11.5 - 14.9 %    Platelets 66 (L) 703 - 450 k/uL    MPV 9.4 6.0 - 12.0 fL    Seg Neutrophils 85 (H) 36 - 66 %    Lymphocytes 9 (L) 24 - 44 %    Monocytes 5 1 - 7 %    Eosinophils % 0 0 - 4 %    Basophils 0 0 - 2 %    Bands 1 0 - 10 %    nRBC 1 (H) 0 per 100 WBC    Segs Absolute 4.87 1.3 - 9.1 k/uL    Absolute Lymph # 0.52 (L) 1.0 - 4.8 k/uL    Absolute Mono # 0.29 0.1 - 1.3 k/uL    Absolute Eos # 0.00 0.0 - 0.4 k/uL    Basophils Absolute 0.00 0.0 - 0.2 k/uL    Absolute Bands # 0.06 0.0 - 1.0 k/uL    Morphology ANISOCYTOSIS PRESENT     Morphology HYPOCHROMIA PRESENT     Morphology 1+ POLYCHROMASIA     Morphology 1+ ELLIPTOCYTES    BLOOD GAS, ARTERIAL   Result Value Ref Range    pH, Arterial 7.373 7.350 - 7.450    pCO2, Arterial 56.0 (HH) 35.0 - 45.0 mmHg    pO2, Arterial 61.4 (L) 80.0 - 100.0 mmHg    HCO3, Arterial 32.6 (H) 22.0 - 26.0 mmol/L    Positive Base Excess, Art 7.4 (H) 0.0 - 2.0 mmol/L    O2 Sat, Arterial 89.2 (L) 95 - 98 %    Carboxyhemoglobin 1.7 0 - 5 %    Methemoglobin 0.9 0.0 - 1.9 %    Pt Temp 37     O2 Device/Flow/% VENTILATOR     Respiratory Rate 22     Tyron Test PASS     Sample Site Right Radial Artery     Pt.  Position SEMI-FOWLERS     Mode PRVC     Set Rate 22     Total Rate 22          FIO2 35     Peep/Cpap 8     Text for Respiratory RESULTS GIVEN TO RN    IMMUNOFIXATION URINE RANDOM PROFILE   Result Value Ref Range    Urine IFX Specimen HURTADO SPECIMEN     Volume HURTADO SPECIMEN mL    Urine Total Protein 11 mg/dL    Urine IFX Interp PENDING    Protime-INR   Result Value Ref Range    Protime 21.9 (H) 11.8 - 14.6 sec    INR 1.9    APTT   Result Value Ref Range    PTT 38.4 (H) 24.0 - 36.0 sec   Triglyceride   Result Value Ref Range    Triglycerides 85 <150 mg/dL   CHLORIDE, URINE, RANDOM   Result Value Ref Range    Chloride, Ur 34 mmol/L   Protein / Creatinine Ratio, Urine   Result Value Ref Range    Total Protein, Urine 23 mg/dL    Creatinine, Ur 79.8 39.0 - 259.0 mg/dL    Urine Total Protein Creatinine Ratio 0.29 (H) 0.00 - 0.20   SODIUM, URINE, RANDOM   Result Value Ref Range    Sodium,Ur <20 mmol/L   BLOOD GAS, ARTERIAL   Result Value Ref Range    pH, Arterial 7.360 7.350 - 7.450    pCO2, Arterial 55.9 (HH) 35.0 - 45.0 mmHg    pO2, Arterial 71.4 (L) 80.0 - 100.0 mmHg    HCO3, Arterial 31.6 (H) 22.0 - 26.0 mmol/L    Positive Base Excess, Art 6.1 (H) 0.0 - 2.0 mmol/L    O2 Sat, Arterial 91.7 (L) 95 - 98 %    Carboxyhemoglobin 1.2 0 - 5 %    Methemoglobin 1.3 0.0 - 1.9 %    Pt Temp 37     O2 Device/Flow/% VENTILATOR     Tyron Test PASS     Sample Site Right Radial Artery     Pt.  Position SEMI-FOWLERS     Mode PRVC     Text for Respiratory RESULTS GIVEN TO RN    CBC with Auto Differential   Result Value Ref Range    WBC 6.0 3.5 - 11.0 k/uL    RBC 5.56 4.5 - 5.9 m/uL    Hemoglobin 10.8 (L) 13.5 - 17.5 g/dL    Hematocrit 38.2 (L) 41 - 53 %    MCV 68.8 (L) 80 - 100 fL    MCH 19.5 (L) 26 - 34 pg    MCHC 28.3 (L) 31 - 37 g/dL    RDW 20.1 (H) 11.5 - 14.9 %    Platelets 75 (L) 934 - 450 k/uL    MPV 9.3 6.0 - 12.0 fL    Seg Neutrophils 82 (H) 36 - 66 %    Lymphocytes 7 (L) 24 - 44 %    Monocytes 9 (H) 1 - 7 %    Eosinophils % 1 0 - 4 %    Basophils 1 0 - 2 %    Segs Absolute 4.90 1.3 - 9.1 k/uL    Absolute Lymph # 0.40 (L) 1.0 - 4.8 k/uL    Absolute Mono # 0.50 0.1 - 1.3 k/uL    Absolute Eos # 0.10 0.0 - 0.4 k/uL    Basophils Absolute 0.10 0.0 - 0.2 k/uL   Comprehensive Metabolic Panel w/ Reflex to MG   Result Value Ref Range    Glucose 85 70 - 99 mg/dL    BUN 16 8 - 23 mg/dL    Creatinine 0.64 (L) 0.70 - 1.20 mg/dL    Est, Glom Filt Rate >60 >60 mL/min/1.73m2    Calcium 7.4 (L) 8.6 - 10.4 mg/dL    Sodium 143 135 - 144 mmol/L    Potassium 3.3 (L) 3.7 - 5.3 mmol/L    Chloride 107 98 - 107 mmol/L    CO2 29 20 - 31 mmol/L    Anion Gap 7 (L) 9 - 17 mmol/L    Alkaline Phosphatase 49 40 - 129 U/L     (H) 5 - 41 U/L     (H) <40 U/L    Total Bilirubin 0.8 0.3 - 1.2 mg/dL    Total Protein 5.0 (L) 6.4 - 8.3 g/dL    Albumin 2.8 (L) 3.5 - 5.2 g/dL   Vancomycin Level, Random   Result Value Ref Range    Vancomycin Rm 16.1 ug/mL    Vancomycin Random Dose amount 1g     Vancomycin Random Date last dose 1/4/22     Vancomycin Random Time last dose 0232    Magnesium   Result Value Ref Range    Magnesium 2.0 1.6 - 2.6 mg/dL   Hepatitis C RNA, quantitative, PCR   Result Value Ref Range    Source . PLASMA     Hepatitis C RNA-PCR 17,400 IU/mL    Hepatitis C RNA Quant Log 4.24 Log IU/mL    HCV RNA PCR, Quant DETECTED (A) Not Detected   CBC with Auto Differential   Result Value Ref Range    WBC 5.3 3.5 - 11.0 k/uL    RBC 5.80 4.5 - 5.9 m/uL    Hemoglobin 11.4 (L) 13.5 - 17.5 g/dL    Hematocrit 40.0 (L) 41 - 53 %    MCV 68.9 (L) 80 - 100 fL    MCH 19.7 (L) 26 - 34 pg    MCHC 28.5 (L) 31 - 37 g/dL    RDW 20.5 (H) 11.5 - 14.9 %    Platelets 66 (L) 786 - 450 k/uL    MPV 9.1 6.0 - 12.0 fL    Seg Neutrophils 79 (H) 36 - 66 %    Lymphocytes 8 (L) 24 - 44 %    Monocytes 10 (H) 1 - 7 %    Eosinophils % 2 0 - 4 %    Basophils 1 0 - 2 %    Segs Absolute 4.19 1.3 - 9.1 k/uL    Absolute Lymph # 0.42 (L) 1.0 - 4.8 k/uL    Absolute Mono # 0.53 0.1 - 1.3 k/uL    Absolute Eos # 0.11 0.0 - 0.4 k/uL    Basophils Absolute 0.05 0.0 - 0.2 k/uL    Morphology ANISOCYTOSIS PRESENT     Morphology MICROCYTOSIS PRESENT     Morphology HYPOCHROMIA PRESENT     Morphology FEW ELLIPTOCYTES     Morphology FEW TARGET CELLS    Comprehensive Metabolic Panel w/ Reflex to MG   Result Value Ref Range    Glucose 84 70 - 99 mg/dL    BUN 11 8 - 23 mg/dL    Creatinine 0.58 (L) 0.70 - 1.20 mg/dL    Est, Glom Filt Rate >60 >60 mL/min/1.73m2    Calcium 7.9 (L) 8.6 - 10.4 mg/dL    Sodium 146 (H) 135 - 144 mmol/L    Potassium 3.4 (L) 3.7 - 5.3 mmol/L    Chloride 110 (H) 98 - 107 mmol/L    CO2 30 20 - 31 mmol/L    Anion Gap 6 (L) 9 - 17 mmol/L    Alkaline Phosphatase 53 40 - 129 U/L     (H) 5 - 41 U/L     (H) <40 U/L    Total Bilirubin 0.8 0.3 - 1.2 mg/dL    Total Protein 5.0 (L) 6.4 - 8.3 g/dL    Albumin 2.9 (L) 3.5 - 5.2 g/dL   Fibrinogen   Result Value Ref Range    Fibrinogen 98 (L) 210 - 530 mg/dL   Protime-INR   Result Value Ref Range    Protime 20.3 (H) 11.8 - 14.6 sec    INR 1.7    APTT   Result Value Ref Range    PTT 41.6 (H) 24.0 - 36.0 sec   BLOOD GAS, ARTERIAL   Result Value Ref Range    pH, Arterial 7.372 7.350 - 7.450    pCO2, Arterial 51.6 (HH) 35.0 - 45.0 mmHg    pO2, Arterial 62.9 (L) 80.0 - 100.0 mmHg    HCO3, Arterial 29.9 (H) 22.0 - 26.0 mmol/L    Positive Base Excess, Art 4.7 (H) 0.0 - 2.0 mmol/L    O2 Sat, Arterial 89.7 (L) 95 - 98 %    Carboxyhemoglobin 1.7 0 - 5 %    Methemoglobin 1.1 0.0 - 1.9 %    Pt Temp 37     O2 Device/Flow/% VENTILATOR     Respiratory Rate 22     Tyron Test PASS     Sample Site Right Radial Artery     Pt.  Position SEMI-FOWLERS     Mode PRVC     Set Rate 22     Total Rate 22          FIO2 40     Peep/Cpap 8     Text for Respiratory RESULTS GIVEN TO RN    Magnesium   Result Value Ref Range    Magnesium 2.0 1.6 - 2.6 mg/dL   CBC with Auto Differential   Result Value Ref Range    WBC 5.2 3.5 - 11.0 k/uL    RBC 5.33 4.5 - 5.9 m/uL    Hemoglobin 10.5 (L) 13.5 - 17.5 g/dL    Hematocrit 38.4 (L) 41 - 53 %    MCV 72.0 (L) 80 - 100 fL    MCH 19.7 (L) 26 - 34 pg    MCHC 27.4 (L) 31 - 37 g/dL    RDW 20.3 (H) 11.5 - 14.9 %    Platelets 71 (L) 551 - 450 k/uL    MPV 9.3 6.0 - 12.0 fL    Seg Neutrophils 90 (H) 36 - 66 %    Lymphocytes 4 (L) 24 - 44 %    Monocytes 5 1 - 7 %    Eosinophils % 0 0 - 4 %    Basophils 0 0 - 2 %    Bands 1 0 - 10 %    Segs Absolute 4.68 1.3 - 9.1 k/uL    Absolute Lymph # 0.21 (L) 1.0 - 4.8 k/uL    Absolute Mono # 0.26 0.1 - 1.3 k/uL    Absolute Eos # 0.00 0.0 - 0.4 k/uL    Basophils Absolute 0.00 0.0 - 0.2 k/uL    Absolute Bands # 0.05 0.0 - 1.0 k/uL    Morphology ANISOCYTOSIS PRESENT     Morphology HYPOCHROMIA PRESENT     Morphology MICROCYTOSIS PRESENT     Morphology 1+ ELLIPTOCYTES     Morphology 1+ TEARDROPS Comprehensive Metabolic Panel w/ Reflex to MG   Result Value Ref Range    Glucose 111 (H) 70 - 99 mg/dL    BUN 7 (L) 8 - 23 mg/dL    Creatinine 0.42 (L) 0.70 - 1.20 mg/dL    Est, Glom Filt Rate >60 >60 mL/min/1.73m2    Calcium 7.5 (L) 8.6 - 10.4 mg/dL    Sodium 144 135 - 144 mmol/L    Potassium 4.0 3.7 - 5.3 mmol/L    Chloride 111 (H) 98 - 107 mmol/L    CO2 29 20 - 31 mmol/L    Anion Gap 4 (L) 9 - 17 mmol/L    Alkaline Phosphatase 46 40 - 129 U/L     (H) 5 - 41 U/L    AST 74 (H) <40 U/L    Total Bilirubin 0.9 0.3 - 1.2 mg/dL    Total Protein 4.6 (L) 6.4 - 8.3 g/dL    Albumin 2.6 (L) 3.5 - 5.2 g/dL   Vancomycin Level, Random   Result Value Ref Range    Vancomycin Rm 20.3 ug/mL    Vancomycin Random Dose amount 1,250     Vancomycin Random Date last dose 622349     Vancomycin Random Time last dose 0546    Fibrinogen   Result Value Ref Range    Fibrinogen 109 (L) 210 - 530 mg/dL   Protime-INR   Result Value Ref Range    Protime 20.1 (H) 11.8 - 14.6 sec    INR 1.7    Comprehensive Metabolic Panel w/ Reflex to MG   Result Value Ref Range    Glucose 91 70 - 99 mg/dL    BUN 5 (L) 8 - 23 mg/dL    Creatinine <0.40 (L) 0.70 - 1.20 mg/dL    Est, Glom Filt Rate Can not be calculated >60 mL/min/1.73m2    Calcium 8.1 (L) 8.6 - 10.4 mg/dL    Sodium 147 (H) 135 - 144 mmol/L    Potassium 4.1 3.7 - 5.3 mmol/L    Chloride 108 (H) 98 - 107 mmol/L    CO2 26 20 - 31 mmol/L    Anion Gap 13 9 - 17 mmol/L    Alkaline Phosphatase 51 40 - 129 U/L     (H) 5 - 41 U/L    AST 63 (H) <40 U/L    Total Bilirubin 1.6 (H) 0.3 - 1.2 mg/dL    Total Protein 5.3 (L) 6.4 - 8.3 g/dL    Albumin 2.9 (L) 3.5 - 5.2 g/dL   SPECIMEN REJECTION   Result Value Ref Range    Specimen Source . BLOOD     Ordered Test CDP     Reason for Rejection Unable to perform testing: Specimen clotted.     CBC with Auto Differential   Result Value Ref Range    WBC 7.1 3.5 - 11.0 k/uL    RBC 5.90 4.5 - 5.9 m/uL    Hemoglobin 11.7 (L) 13.5 - 17.5 g/dL    Hematocrit 41.4 41 - 53 %    MCV 70.2 (L) 80 - 100 fL    MCH 19.8 (L) 26 - 34 pg    MCHC 28.2 (L) 31 - 37 g/dL    RDW 20.4 (H) 11.5 - 14.9 %    Platelets 68 (L) 459 - 450 k/uL    MPV 9.1 6.0 - 12.0 fL    Seg Neutrophils 82 (H) 36 - 66 %    Lymphocytes 10 (L) 24 - 44 %    Monocytes 6 1 - 7 %    Eosinophils % 1 0 - 4 %    Basophils 1 0 - 2 %    Segs Absolute 5.82 1.3 - 9.1 k/uL    Absolute Lymph # 0.71 (L) 1.0 - 4.8 k/uL    Absolute Mono # 0.43 0.1 - 1.3 k/uL    Absolute Eos # 0.07 0.0 - 0.4 k/uL    Basophils Absolute 0.07 0.0 - 0.2 k/uL    Morphology ANISOCYTOSIS PRESENT     Morphology 1+ TARGET CELLS     Morphology 1+ TEARDROPS     Morphology 1+ ELLIPTOCYTES    Protime-INR   Result Value Ref Range    Protime 19.4 (H) 11.8 - 14.6 sec    INR 1.6    APTT   Result Value Ref Range    PTT 35.4 24.0 - 36.0 sec   EKG 12 Lead   Result Value Ref Range    Ventricular Rate 75 BPM    Atrial Rate 75 BPM    P-R Interval 142 ms    QRS Duration 66 ms    Q-T Interval 350 ms    QTc Calculation (Bazett) 390 ms    P Axis 18 degrees    R Axis -165 degrees    T Axis 41 degrees   EKG 12 Lead   Result Value Ref Range    Ventricular Rate 105 BPM    Atrial Rate 105 BPM    P-R Interval 144 ms    QRS Duration 78 ms    Q-T Interval 302 ms    QTc Calculation (Bazett) 399 ms    P Axis 13 degrees    R Axis -30 degrees    T Axis -15 degrees   PREPARE CRYOPRECIPITATE, 1 Product   Result Value Ref Range    Unit Number [id number]     Product Code Thawed Pooled Cryoprecipitate     Unit Divison 00     Dispense Status ISSUED     Unit Issue Date/Time 000872289664     Product Code Blood Bank B2387E56     Blood Bank Unit Type and Rh O POS     Blood Bank ISBT Product Blood Type 5100     Blood Bank Blood Product Expiration Date 338879514240     Transfusion Status OK TO TRANSFUSE          IMAGING DATA:    CT HEAD WO CONTRAST    Result Date: 1/1/2023  EXAMINATION: CT OF THE HEAD WITHOUT CONTRAST  1/1/2023 10:48 pm TECHNIQUE: CT of the head was performed without the administration of intravenous contrast. Automated exposure control, iterative reconstruction, and/or weight based adjustment of the mA/kV was utilized to reduce the radiation dose to as low as reasonably achievable. COMPARISON: None. HISTORY: Reason for Exam: AMS Additional signs and symptoms: the patient has been getting more and more confused since 2 weeks ago. It has progressively been getting worse and today FINDINGS: BRAIN/VENTRICLES: There is no acute intracranial hemorrhage, mass effect or midline shift. No abnormal extra-axial fluid collection. The gray-white differentiation is maintained without evidence of an acute infarct. There is no evidence of hydrocephalus. Changes of mild chronic small vessel ischemic disease. ORBITS: The visualized portion of the orbits demonstrate no acute abnormality. SINUSES: The visualized paranasal sinuses and mastoid air cells demonstrate no acute abnormality. SOFT TISSUES/SKULL:  No acute abnormality of the visualized skull or soft tissues. No acute intracranial abnormality. Mild chronic small vessel ischemic disease. XR CHEST PORTABLE    Result Date: 1/1/2023  EXAMINATION: ONE XRAY VIEW OF THE CHEST 1/1/2023 7:17 pm COMPARISON: None. HISTORY: ORDERING SYSTEM PROVIDED HISTORY: ams TECHNOLOGIST PROVIDED HISTORY: ams Reason for Exam: Altered mental status, difficulty breathing Additional signs and symptoms: Altered mental status, difficulty breathing Relevant Medical/Surgical History: Altered mental status, difficulty breathing FINDINGS: Large lucent area in the left apex suggesting a large bulla however superimposed pneumothorax cannot be excluded. The cardiac size is normal. Right lower lobe airspace infiltrate and mild right pleural effusion. . Pulmonary vascularity appears normal. There is mild ectasia of the thoracic aorta. Calcific tendinitis of the right shoulder. No acute bony abnormalities.  The hilar structures are normal.     Right lower lobe pneumonia and small right pleural effusion Large lucent area in the left apex suggesting a large bulla however superimposed pneumothorax cannot be excluded. Follow-up CT would be useful for further evaluation. The findings were sent to the Radiology Results [address] at 10:31 pm on 1/1/2023 to be communicated to a licensed caregiver. IMPRESSION:   Primary Problem  Acute respiratory failure with hypoxia and hypercapnia ([address]    Active Hospital Problems    Diagnosis Date Noted    Prolonged pt (prothrombin time) [R79.1] 01/03/2023     Priority: Medium    Emphysema lung ([address] [J43.9] 01/03/2023     Priority: Medium    Cerebral infarction ([address] [I63.9] 01/03/2023     Priority: Medium    Transaminitis [R74.01] 01/02/2023     Priority: Medium    Microcytic anemia [D64.9] 01/02/2023     Priority: Medium    Thrombocytopenia ([address] [D69.6] 01/02/2023     Priority: Medium    Agitation [R45.1] 01/02/2023     Priority: Medium    Acute respiratory failure with hypoxia and hypercapnia (HCC) RLL pneumonia [J96.01, J96.02] 01/02/2023     Priority: Medium    Altered mental status [R41.82] 01/02/2023     Priority: Medium    Community acquired pneumonia of right lower lobe of lung [J18.9] 01/02/2023     Priority: Medium       Medical apathy  Respiratory failure secondary pneumonia  Abnormal LFT  Microcytic anemia  Thrombocytopenia  History of alcohol dependence  IgG lambda paraproteinemia  Positive HCV screen  Iron deficiency  Coagulopathy    RECOMMENDATIONS:  I reviewed the labs/imaging available to me,outside records and discussed with the patient. I explained to the patient the nature of this problem.  I explained the significance of these abnormalities and possible etiology and management options  Patient has chronic liver disease secondary to alcohol  Anemia and thrombocytopenia are related to liver disease complicated by acute illness  Patient has coagulopathy secondary to liver disease as mentioned  Cardioembolic strokes with a component of subarachnoid bleeding  Coagulopathy seems to be better and his platelets are stable. ICU team decided against CASSI because of respiratory difficulty and the possible need for intubation. Difficult decision to offer or not to offer anticoagulation. On one hand. I do believe that this patient mental status changes are due to emboli and I think he would benefit from anticoagulation. On the other hand, there was significant hemorrhagic component that might deteriorate with anticoagulation. Also the patient has significant coagulopathy and thrombocytopenia. We will hold off anticoagulation for 24 hours and observe him. But likely to start low-dose heparin infusion tomorrow with careful monitoring  We will watch his fibrinogen and platelet counts  Discussed with patient's family member at bedside                        Discussed with family and Nurse. Thank you for asking us to see this patient. Jodie Mccray MD  Hematologist/Medical [address] [place] [address] hematology oncology physicians            This note is created with the assistance of a speech recognition program.  While intending to generate a document that actually reflects the content of the visit, the document can still have some errors including those of syntax and sound a like substitutions which may escape proof reading. It such instances, actual meaning can be extrapolated by contextual diversion.

## 2023-01-07 NOTE — PROGRESS NOTES
HPI/Hospital course: This is a 61 y/o WM with reported prior alcoholism with residual liver failure who presented with altered mental status and who was subsequently dx'd with pneumonia. An initial head CT revealed old as well as possibly new strokes which were confirmed on MRI along with some probable SAH. He has just been extubated and nursing reports that he has been intermittently agitated and aggressive but no focal findings were reported. I did speak with his family in the room and they state that he has not been drinking ETOH recently. Blood cultures have been negative to date. He continues to be delirious with hallucinations and agitated behavior and is kicking at nurses at times    He was quite pleasant and conversational this morning and suggested we had prior interactions outside the hospital.  Was also recently febrile and has been receiving some geodon for his agitation.           Social History     Socioeconomic History    Marital status: Single     Spouse name: None    Number of children: None    Years of education: None    Highest education level: None   Tobacco Use    Smoking status: Every Day     Packs/day: 1.00     Years: 40.00     Pack years: 40.00     Types: Cigarettes    Smokeless tobacco: Never   Substance and Sexual Activity    Alcohol use: Yes     Comment: beer and scotch    Drug use: Not Currently     Comment: over 20 years (stated by daughter)       Current Facility-Administered Medications   Medication Dose Route Frequency Provider Last Rate Last Admin    ziprasidone (GEODON) 10 mg in sterile water 0.5 mL injection  10 mg IntraMUSCular BID Julia Narayan MD        ipratropium-albuterol (DUONEB) nebulizer solution 1 ampule  1 ampule Inhalation Q4H WA Julia Narayan MD        0.9 % sodium chloride infusion   IntraVENous PRN Abena Rodriguez MD        ziprasidone (GEODON) 10 mg in sterile water 0.5 mL injection  10 mg IntraMUSCular Q12H PRN Julia Narayan MD   10 mg at 01/06/23 2120    dexmedetomidine (PRECEDEX) 400 mcg in sodium chloride 0.9 % 100 mL infusion  0.1-1.5 mcg/kg/hr IntraVENous Continuous PRN Edwin Krishna MD        nystatin (MYCOSTATIN) ointment   Topical BID Boy Guajardo MD        potassium bicarb-citric acid (EFFER-K) effervescent tablet 20 mEq  20 mEq Oral Daily Joni Quezada MD   20 mEq at 01/05/23 1448    ampicillin-sulbactam (UNASYN) 3,000 mg in sodium chloride 0.9 % 100 mL IVPB (mini-bag)  3,000 mg IntraVENous Q6H Gabi Buck MD   Stopped at 01/07/23 0234    [Held by provider] miconazole (MICOTIN) 2 % powder   Topical BID Rosan Drain, APRN - NP   Given at 01/06/23 0923    potassium chloride 20 mEq in sodium chloride 0.45 % 1,000 mL infusion   IntraVENous Continuous Yobany Cassi Gonzalez MD 35 mL/hr at 01/07/23 0526 New Bag at 01/07/23 0526    vancomycin (VANCOCIN) 1,250 mg in dextrose 5 % 250 mL IVPB (ADDAVIAL)  1,250 mg IntraVENous Q12H Venu Quintanilla MD   Stopped at 01/07/23 0555    perflutren lipid microspheres (DEFINITY) injection 1.5 mL  1.5 mL IntraVENous ONCE PRN Chrissy To MD        [facility] [place] by provider] aspirin chewable tablet 81 mg  81 mg Oral Daily Junior Zhu MD   81 mg at 01/04/23 0740    sodium chloride flush 0.9 % injection 10 mL  10 mL IntraVENous PRN Venu Quintanilla MD   10 mL at 01/03/23 1459    sodium chloride flush 0.9 % injection 10 mL  10 mL IntraVENous PRN Junior Zhu MD   10 mL at 01/03/23 1836    sodium chloride flush 0.9 % injection 5-40 mL  5-40 mL IntraVENous 2 times per day Rosan Drain, APRN - NP   10 mL at 01/06/23 2053    sodium chloride flush 0.9 % injection 5-40 mL  5-40 mL IntraVENous PRN ALAYNA Marrero - NP        0.9 % sodium chloride infusion   IntraVENous PRN Rosan Drain, APRN - NP        ondansetron (ZOFRAN-ODT) disintegrating tablet 4 mg  4 mg Oral Q8H PRN Rosan Drain, APRN - NP        Or    ondansetron (ZOFRAN) injection 4 mg  4 mg IntraVENous Q6H PRN Rosan Drain, APRN - NP        polyethylene glycol (GLYCOLAX) packet 17 g  17 g Oral Daily PRN [facility], APRN - NP        acetaminophen (TYLENOL) tablet 650 mg  650 mg Oral Q6H PRN [facility], APRN - NP        Or    acetaminophen (TYLENOL) suppository 650 mg  650 mg Rectal Q6H PRN [facility], APRN - NP        nicotine (NICODERM CQ) 21 MG/24HR 1 patch  1 patch TransDERmal Daily [facility], APRN - NP   1 patch at 01/06/23 5820    albuterol (PROVENTIL) nebulizer solution 2.5 mg  2.5 mg Nebulization Q2H PRN [facility], APRN - NP   2.5 mg at 01/02/23 1115    guaiFENesin (MUCINEX) extended release tablet 600 mg  600 mg Oral BID PRN [facility], APRN - NP        pantoprazole (PROTONIX) 40 mg in sodium chloride (PF) 0.9 % 10 mL injection  40 mg IntraVENous Daily Angi Scherer MD   40 mg at 01/06/23 7428    vancomycin (VANCOCIN) intermittent dosing (placeholder)   Other RX Placeholder Angi Scherer MD           No Known Allergies    ROS:   On ventilator on sedation     Vitals:    01/07/23 0756   BP:    Pulse: 100   Resp:    Temp:    SpO2:      Admission weight: 180 lb 12.4 oz (82 kg)    Neurological exam:  General Exam:  Normal body habitus, no acute distress    HEENT:  Normocephalic, atraumatic  Eyes: conjunctiva non-injected, sclera anicteric  Mucous membranes of normal color and hydration status  Poor dentition     Neurologic exam:     Mental status: alert and oriented to person, and carried on quite a rambling conversation which was hard to understand. He did readily make eye contact and reciprocated handshake.     No overt visuospatial neglect or gaze preference  Language with normal fluency and comprehension but he is refusing to follow some commands    Cranial nerves:  Pupils equal round and reactive to light,   Extraocular movements: conjugate  Face is symmetric to movement   Hearing is intact to conversation  Tongue midline, no dysarthria or dysphonia    Motor exam:  Moving all extremities purposefully and equally       Latest Reference Range & Units 1/6/23 04:19 1/7/23 05:19   WBC 3.5 - 11.0 k/uL 5.2 7.1   RBC 4.5 - 5.9 m/uL 5.33 5.90   Hemoglobin Quant 13.5 - 17.5 g/dL 10.5 (L) 11.7 (L)   Hematocrit 41 - 53 % 38.4 (L) 41.4   MCV 80 - 100 fL 72.0 (L) 70.2 (L)   MCH 26 - 34 pg 19.7 (L) 19.8 (L)   MCHC 31 - 37 g/dL 27.4 (L) 28.2 (L)   MPV 6.0 - 12.0 fL 9.3 9.1   RDW 11.5 - 14.9 % 20.3 (H) 20.4 (H)   Platelet Count 171 - 450 k/uL 71 (L) 68 (L)   Absolute Mono # 0.1 - 1.3 k/uL 0.26 0.43   Eosinophils % 0 - 4 % 0 1   Basophils Absolute 0.0 - 0.2 k/uL 0.00 0.07   Seg Neutrophils 36 - 66 % 90 (H) 82 (H)   Segs Absolute 1.3 - 9.1 k/uL 4.68 5.82   Bands 0 - 10 % 1    Absolute Bands # 0.0 - 1.0 k/uL 0.05    Lymphocytes 24 - 44 % 4 (L) 10 (L)   Absolute Lymph # 1.0 - 4.8 k/uL 0.21 (L) 0.71 (L)   Monocytes 1 - 7 % 5 6   Absolute Eos # 0.0 - 0.4 k/uL 0.00 0.07   Basophils 0 - 2 % 0 1   (L): Data is abnormally low  (H): Data is abnormally high                 Assessment : This is a 61 y/o WM with prior alcoholism who presented with AMS in the setting of hypoxia and a pneumonia. A head CT noted some ischemic infarcts and an MRI confirmed this along with SAH. He is overall much improved and has a generally non-focal exam. I see no current reason to invoke meningitis given his appearance but his strokes due admittedly look cardioembolic. I am not particularly concerned about endocarditis given his persistently negative blood cultures but cardiology is on board to consider CASSI but he is much too agitated for this. He is currently delirious but appears to be improving. His SAH is/was perhaps traumatic in the setting of his thrombocytopenia. On review of medications, I see no concerns for his delirium.  He was on Cefepime but this was d/c'd     Plan:  Delirum precautions, sunlight during day, minimize stimulation at night  Will see again tomorrow

## 2023-01-08 LAB
ABSOLUTE EOS #: 0.06 K/UL (ref 0–0.4)
ABSOLUTE LYMPH #: 0.65 K/UL (ref 1–4.8)
ABSOLUTE MONO #: 0.65 K/UL (ref 0.1–1.3)
ALBUMIN SERPL-MCNC: 2.8 G/DL (ref 3.5–5.2)
ALP BLD-CCNC: 45 U/L (ref 40–129)
ALT SERPL-CCNC: 99 U/L (ref 5–41)
ANION GAP SERPL CALCULATED.3IONS-SCNC: 4 MMOL/L (ref 9–17)
AST SERPL-CCNC: 38 U/L
BASOPHILS # BLD: 1 % (ref 0–2)
BASOPHILS ABSOLUTE: 0.06 K/UL (ref 0–0.2)
BILIRUB SERPL-MCNC: 1.7 MG/DL (ref 0.3–1.2)
BUN BLDV-MCNC: 4 MG/DL (ref 8–23)
CALCIUM SERPL-MCNC: 8.1 MG/DL (ref 8.6–10.4)
CHLORIDE BLD-SCNC: 109 MMOL/L (ref 98–107)
CO2: 34 MMOL/L (ref 20–31)
CREAT SERPL-MCNC: <0.4 MG/DL (ref 0.7–1.2)
EOSINOPHILS RELATIVE PERCENT: 1 % (ref 0–4)
FIBRINOGEN: 151 MG/DL (ref 210–530)
GFR SERPL CREATININE-BSD FRML MDRD: ABNORMAL ML/MIN/1.73M2
GLUCOSE BLD-MCNC: 118 MG/DL (ref 70–99)
HCT VFR BLD CALC: 39.3 % (ref 41–53)
HEMOGLOBIN: 11.4 G/DL (ref 13.5–17.5)
INR BLD: 1.6
LYMPHOCYTES # BLD: 11 % (ref 24–44)
MAGNESIUM: 1.8 MG/DL (ref 1.6–2.6)
MCH RBC QN AUTO: 20.2 PG (ref 26–34)
MCHC RBC AUTO-ENTMCNC: 28.9 G/DL (ref 31–37)
MCV RBC AUTO: 70 FL (ref 80–100)
MONOCYTES # BLD: 11 % (ref 1–7)
MORPHOLOGY: ABNORMAL
PARTIAL THROMBOPLASTIN TIME: 34.4 SEC (ref 24–36)
PDW BLD-RTO: 20.2 % (ref 11.5–14.9)
PLATELET # BLD: 63 K/UL (ref 150–450)
PMV BLD AUTO: 8.8 FL (ref 6–12)
POTASSIUM SERPL-SCNC: 3.3 MMOL/L (ref 3.7–5.3)
PROTHROMBIN TIME: 19.1 SEC (ref 11.8–14.6)
RBC # BLD: 5.61 M/UL (ref 4.5–5.9)
SEG NEUTROPHILS: 76 % (ref 36–66)
SEGMENTED NEUTROPHILS ABSOLUTE COUNT: 4.48 K/UL (ref 1.3–9.1)
SODIUM BLD-SCNC: 147 MMOL/L (ref 135–144)
TOTAL PROTEIN: 5.1 G/DL (ref 6.4–8.3)
WBC # BLD: 5.9 K/UL (ref 3.5–11)

## 2023-01-08 PROCEDURE — 99233 SBSQ HOSP IP/OBS HIGH 50: CPT | Performed by: INTERNAL MEDICINE

## 2023-01-08 PROCEDURE — 6370000000 HC RX 637 (ALT 250 FOR IP): Performed by: NURSE PRACTITIONER

## 2023-01-08 PROCEDURE — 6360000002 HC RX W HCPCS: Performed by: INTERNAL MEDICINE

## 2023-01-08 PROCEDURE — 2580000003 HC RX 258

## 2023-01-08 PROCEDURE — 2500000003 HC RX 250 WO HCPCS

## 2023-01-08 PROCEDURE — 85384 FIBRINOGEN ACTIVITY: CPT

## 2023-01-08 PROCEDURE — 92523 SPEECH SOUND LANG COMPREHEN: CPT

## 2023-01-08 PROCEDURE — 6360000002 HC RX W HCPCS

## 2023-01-08 PROCEDURE — 83735 ASSAY OF MAGNESIUM: CPT

## 2023-01-08 PROCEDURE — 2700000000 HC OXYGEN THERAPY PER DAY

## 2023-01-08 PROCEDURE — 2000000000 HC ICU R&B

## 2023-01-08 PROCEDURE — 2580000003 HC RX 258: Performed by: INTERNAL MEDICINE

## 2023-01-08 PROCEDURE — 94761 N-INVAS EAR/PLS OXIMETRY MLT: CPT

## 2023-01-08 PROCEDURE — 2580000003 HC RX 258: Performed by: NURSE PRACTITIONER

## 2023-01-08 PROCEDURE — 80053 COMPREHEN METABOLIC PANEL: CPT

## 2023-01-08 PROCEDURE — 99232 SBSQ HOSP IP/OBS MODERATE 35: CPT | Performed by: INTERNAL MEDICINE

## 2023-01-08 PROCEDURE — 36415 COLL VENOUS BLD VENIPUNCTURE: CPT

## 2023-01-08 PROCEDURE — 85025 COMPLETE CBC W/AUTO DIFF WBC: CPT

## 2023-01-08 PROCEDURE — 6360000002 HC RX W HCPCS: Performed by: STUDENT IN AN ORGANIZED HEALTH CARE EDUCATION/TRAINING PROGRAM

## 2023-01-08 PROCEDURE — 94640 AIRWAY INHALATION TREATMENT: CPT

## 2023-01-08 PROCEDURE — 31720 CLEARANCE OF AIRWAYS: CPT

## 2023-01-08 PROCEDURE — 85730 THROMBOPLASTIN TIME PARTIAL: CPT

## 2023-01-08 PROCEDURE — C9113 INJ PANTOPRAZOLE SODIUM, VIA: HCPCS

## 2023-01-08 PROCEDURE — 85610 PROTHROMBIN TIME: CPT

## 2023-01-08 PROCEDURE — 6370000000 HC RX 637 (ALT 250 FOR IP): Performed by: INTERNAL MEDICINE

## 2023-01-08 RX ORDER — POTASSIUM CHLORIDE 7.45 MG/ML
10 INJECTION INTRAVENOUS PRN
Status: DISCONTINUED | OUTPATIENT
Start: 2023-01-08 | End: 2023-01-19 | Stop reason: HOSPADM

## 2023-01-08 RX ORDER — LABETALOL HYDROCHLORIDE 5 MG/ML
5 INJECTION, SOLUTION INTRAVENOUS EVERY 6 HOURS PRN
Status: DISCONTINUED | OUTPATIENT
Start: 2023-01-08 | End: 2023-01-19 | Stop reason: HOSPADM

## 2023-01-08 RX ADMIN — NYSTATIN OINTMENT: 100000 OINTMENT TOPICAL at 21:50

## 2023-01-08 RX ADMIN — SODIUM CHLORIDE 3000 MG: 900 INJECTION INTRAVENOUS at 02:29

## 2023-01-08 RX ADMIN — SODIUM CHLORIDE, PRESERVATIVE FREE 40 MG: 5 INJECTION INTRAVENOUS at 08:20

## 2023-01-08 RX ADMIN — SODIUM CHLORIDE 3000 MG: 900 INJECTION INTRAVENOUS at 14:58

## 2023-01-08 RX ADMIN — VANCOMYCIN HYDROCHLORIDE 1250 MG: 1.25 INJECTION, POWDER, LYOPHILIZED, FOR SOLUTION INTRAVENOUS at 16:43

## 2023-01-08 RX ADMIN — IPRATROPIUM BROMIDE AND ALBUTEROL SULFATE 1 AMPULE: 2.5; .5 SOLUTION RESPIRATORY (INHALATION) at 19:34

## 2023-01-08 RX ADMIN — POTASSIUM CHLORIDE 10 MEQ: 7.46 INJECTION, SOLUTION INTRAVENOUS at 16:51

## 2023-01-08 RX ADMIN — VANCOMYCIN HYDROCHLORIDE 1250 MG: 1.25 INJECTION, POWDER, LYOPHILIZED, FOR SOLUTION INTRAVENOUS at 05:47

## 2023-01-08 RX ADMIN — SODIUM CHLORIDE, PRESERVATIVE FREE 10 ML: 5 INJECTION INTRAVENOUS at 08:35

## 2023-01-08 RX ADMIN — SODIUM CHLORIDE 3000 MG: 900 INJECTION INTRAVENOUS at 08:27

## 2023-01-08 RX ADMIN — LABETALOL HYDROCHLORIDE 5 MG: 5 INJECTION, SOLUTION INTRAVENOUS at 12:28

## 2023-01-08 RX ADMIN — IPRATROPIUM BROMIDE AND ALBUTEROL SULFATE 1 AMPULE: 2.5; .5 SOLUTION RESPIRATORY (INHALATION) at 07:46

## 2023-01-08 RX ADMIN — LABETALOL HYDROCHLORIDE 5 MG: 5 INJECTION, SOLUTION INTRAVENOUS at 23:03

## 2023-01-08 RX ADMIN — IPRATROPIUM BROMIDE AND ALBUTEROL SULFATE 1 AMPULE: 2.5; .5 SOLUTION RESPIRATORY (INHALATION) at 16:18

## 2023-01-08 RX ADMIN — IPRATROPIUM BROMIDE AND ALBUTEROL SULFATE 1 AMPULE: 2.5; .5 SOLUTION RESPIRATORY (INHALATION) at 11:23

## 2023-01-08 RX ADMIN — HEPARIN SODIUM 5000 UNITS: 5000 INJECTION INTRAVENOUS; SUBCUTANEOUS at 05:49

## 2023-01-08 RX ADMIN — POTASSIUM CHLORIDE 10 MEQ: 7.46 INJECTION, SOLUTION INTRAVENOUS at 21:55

## 2023-01-08 RX ADMIN — WATER 10 MG: 1 INJECTION INTRAMUSCULAR; INTRAVENOUS; SUBCUTANEOUS at 08:35

## 2023-01-08 RX ADMIN — POTASSIUM CHLORIDE 10 MEQ: 7.46 INJECTION, SOLUTION INTRAVENOUS at 20:27

## 2023-01-08 RX ADMIN — NYSTATIN OINTMENT: 100000 OINTMENT TOPICAL at 08:22

## 2023-01-08 ASSESSMENT — PAIN SCALES - GENERAL: PAINLEVEL_OUTOF10: 0

## 2023-01-08 NOTE — PROGRESS NOTES
Department of Internal Medicine  Nephrology [facility], [place]  Progress Note    Reason for consultation: Management of acute kidney injury. Consulting physician: Jaycob Portillo MD.      Interval hx/Subjective    Patient seen and examined in ICU, patient remains confused and physical restraints  Noted no acute distress   serum creatinine remained stable within normal limits  Serum sodium  147   No acute events overnight  Failed swallow eval  UOP 1.4 L  Over all positive Fluid balance 3.8 L    Interval history:   Patient was seen and examined today in the intensive care unit. He remains intubated and on ventilator support. Renal function has improved and he is nonoliguric. He received CT angiogram of the chest which ruled out pulmonary embolism; dilated main pulmonary artery suggestive of pulmonary hypertension as well as increased RV to LV ratio suggestive of right heart failure. There was incidental finding of moderate right and small left pleural effusion with partial collapse of the right lower lobe. NG tube is draining fluid  Plan to start tube feeding  Serum sodium was 146, potassium 3.4 serum creatinine 0.5  proBNP 7000    History of present illness: This is a 60 y.o. male with no significant past medical history [no regular physician follow-up], who was brought to the emergency department via EMS for further evaluation of progressively worsening confusion and lethargy of 2 weeks duration. Patient's spouse said that he became progressively confused prompting her to call EMS on day of presentation 1/1/2023. When EMS arrived, patient was found to be obtunded with oxygen saturation 75% on room air and he was placed on a nonrebreather mask and given a dose of IV Lasix as he also had severe bilateral leg swelling. Patient was placed on BiPAP on arrival to the emergency department and was admitted to the intensive care unit.   Initial systolic blood pressure was 106 mmHg and serum creatinine 1.77 mg/dL associated with elevated AST and ALT. On admission to the intensive care unit patient required endotracheal intubation for airway protection and treatment of acute respiratory failure. Nephrology consultation has been placed for management of acute kidney injury. He is currently on Levophed drip at 3 mcg/min. CT scan of the brain performed at presentation showed no acute intracranial abnormality but with mild chronic small vessel ischemic disease. Repeat CT scan performed 24 hours later [1/2/2023] showed focal area of decreased attenuation with loss of gray/white matter differentiation involving posterior left lobe with somewhat increased conspicuity as compared to prior exam likely reflecting an area of acute to subacute stroke. Patient has no known allergies. History reviewed. No pertinent past medical history.     Scheduled Meds:   ziprasidone (GEODON) in sterile water injection  10 mg IntraMUSCular BID    ipratropium-albuterol  1 ampule Inhalation Q4H WA    heparin (porcine)  5,000 Units SubCUTAneous 3 times per day    nystatin   Topical BID    potassium bicarb-citric acid  20 mEq Oral Daily    ampicillin-sulbactam  3,000 mg IntraVENous Q6H    [Held by provider] miconazole   Topical BID    vancomycin  1,250 mg IntraVENous Q12H    [Held by provider] aspirin  81 mg Oral Daily    sodium chloride flush  5-40 mL IntraVENous 2 times per day    nicotine  1 patch TransDERmal Daily    pantoprazole (PROTONIX) 40 mg injection  40 mg IntraVENous Daily    vancomycin (VANCOCIN) intermittent dosing (placeholder)   Other RX Placeholder     Continuous Infusions:   IV infusion builder 50 mL/hr at 01/07/23 1651    sodium chloride      dexmedetomidine      sodium chloride       PRN Meds:.labetalol, sodium chloride, ziprasidone (GEODON) in sterile water injection, dexmedetomidine, perflutren lipid microspheres, sodium chloride flush, sodium chloride flush, sodium chloride flush, sodium chloride, ondansetron **OR** ondansetron, polyethylene glycol, acetaminophen **OR** acetaminophen, albuterol, guaiFENesin    History reviewed. No pertinent family history. Social History     Socioeconomic History    Marital status: Single     Spouse name: None    Number of children: None    Years of education: None    Highest education level: None   Tobacco Use    Smoking status: Every Day     Packs/day: 1.00     Years: 40.00     Pack years: 40.00     Types: Cigarettes    Smokeless tobacco: Never   Substance and Sexual Activity    Alcohol use: Yes     Comment: beer and scotch    Drug use: Not Currently     Comment: over 20 years (stated by daughter)         Physical Exam:    VITALS:  BP (!) 151/84   Pulse (!) 106   Temp 99.3 °F (37.4 °C) (Axillary)   Resp 21   Ht 5' 7\" (1.702 m)   Wt 195 lb 1.7 oz (88.5 kg)   SpO2 (!) 89%   BMI 30.56 kg/m²   TEMPERATURE:  Current - Temp: 99.3 °F (37.4 °C); Max - Temp  Av.8 °F (37.1 °C)  Min: 98 °F (36.7 °C)  Max: 99.8 °F (37.7 °C)  RESPIRATIONS RANGE: Resp  Av.5  Min: 15  Max: 28  PULSE RANGE: Pulse  Av.2  Min: 92  Max: 112  BLOOD PRESSURE RANGE:  Systolic (00DQQ), BNJ:598 , Min:140 , HZW:801 ; Diastolic (47BBS), RYP:46, Min:52, Max:192  PULSE OXIMETRY RANGE: SpO2  Av.5 %  Min: 89 %  Max: 99 %  24HR INTAKE/OUTPUT:    Intake/Output Summary (Last 24 hours) at 2023 1435  Last data filed at 2023 3490  Gross per 24 hour   Intake 1335 ml   Output 1450 ml   Net -115 ml       Constitutional: Awake alert, responsive not in acute distress    Skin: Skin color, texture, turgor normal. No rashes or lesions    Head: Normocephalic, without obvious abnormality, atraumatic     Cardiovascular/Edema: regular rate and rhythm, S1, S2 normal, no murmur, click, rub or gallop    Respiratory: Lungs:  Coarse breath sounds bilaterally    Abdomen: soft, non-tender; bowel sounds normal; no masses,  no organomegaly    Back: symmetric, no curvature. ROM normal. No CVA tenderness.     Extremities: edema +    Neuro: Nonfocal    CBC:   Recent Labs     01/06/23  0419 01/07/23  0519 01/08/23  0438   WBC 5.2 7.1 5.9   HGB 10.5* 11.7* 11.4*   PLT 71* 68* 63*     BMP:    Recent Labs     01/06/23  0419 01/07/23  0412 01/08/23  0438    147* 147*   K 4.0 4.1 3.3*   * 108* 109*   CO2 29 26 34*   BUN 7* 5* 4*   CREATININE 0.42* <0.40* <0.40*   GLUCOSE 111* 91 118*     Lab Results   Component Value Date/Time    NITRU NEGATIVE 01/02/2023 03:34 PM    COLORU Dark Yellow 01/02/2023 03:34 PM    PHUR 5.0 01/02/2023 03:34 PM    WBCUA 10 TO 20 01/02/2023 03:34 PM    RBCUA TOO NUMEROUS TO COUNT 01/02/2023 03:34 PM    BACTERIA FEW 01/01/2023 10:25 PM    SPECGRAV 1.015 01/02/2023 03:34 PM    LEUKOCYTESUR SMALL 01/02/2023 03:34 PM    UROBILINOGEN Normal 01/02/2023 03:34 PM    BILIRUBINUR NEGATIVE 01/02/2023 03:34 PM    GLUCOSEU NEGATIVE 01/02/2023 03:34 PM    KETUA TRACE 01/02/2023 03:34 PM     MRI of the brain performed without contrast on 1/3/2023 showed:  Small acute infarcts involving the left cerebellar hemisphere and left   parietal lobe. Small subacute infarct within the left cerebellar hemisphere. Moderate bilateral subarachnoid hemorrhage within both cerebral hemispheres   which is of uncertain etiology. The hemorrhage is more apparent on MRI than   on previous head CT. Old right caudate nucleus lacune. Small bilateral mastoid effusions and moderate left paranasal sinus disease. IMPRESSION/RECOMMENDATIONS:      1. Acute kidney injury - most consistent with prerenal azotemia and/or ischemic acute tubular necrosis from hypotension. Renal function is improved and serum creatinine is down to 0.4 mg/dL today. 2.  Hypokalemia -improved    3. Hypernatremia due to dehydration-SNa 147    3.   Hypotension -blood pressure improved    Plan  continue IV fluids to D5 water with 20 M EQ potassium chloride at 50 MLS per hour  Replace electrolytes per sliding scale        Prognosis is guarded.     Frank Emerson MD   Attending Nephrologist  1/8/2023 2:35 PM

## 2023-01-08 NOTE — PROGRESS NOTES
[address]    PROGRESS NOTE             1/8/2023    7:45 AM    Name:   Dillon Awad  MRN:     [id number]     Acct:      [de-identified]   Room:   2009/2009-01  IP Day:  6  Admit Date:  1/1/2023  9:55 PM    PCP:  No primary care provider on file. Code Status:  Full Code    Subjective:     C/C:   Chief Complaint   Patient presents with    Altered Mental Status     Interval History Status: Same    Patient seen and examined at the bed side. Patient has no new complains. However, this is unreliable due to patient currently being signficantly confused. Patient Plt count has decreased again. Will continue to hold [facility] per Hem/onc. No change in exam otherwise. Notes from nursing staff and Consults had been reviewed, and the overnight progress had been checked with the nursing staff as well. Nurse Thomas input was appreciated    Brief History:     The patient is a 60 y.o.  male with unknown past medical history due to no healthcare visits or follow-up who presents with Altered Mental Status and he is admitted to the hospital for the management of  Acute respiratory failure most likely 2/2 pneumonia. Per patient's wife, she reports that the patient has not been acting himself for the past week. She reports being more slurred, confused and progressively getting worse prior to presentation. Patient prior to the presentation a week ago he had a fall and EMS presented patient was vitally stable and he was not transported to the hospital.  This time upon EMS evaluation of the patient he had an O2 of 75%, he was put on a breather and was given a dose of IV Lasix in route. Wife and patient, cannot recall any precipitating event could have led to his presentation. He also denies chest pain, shortness of breath, or cough. Per patient, he does not recall any complaints or events prior to the presentation.    Wife denies recent alcohol use, however, patient does have a history of regular alcohol intake but not on daily basis. Wife also reports that patient has constant heartburn for which he takes regular antiacids. Patient denies the presence of any abdominal pain or any change in bowel habits, abdominal pain, nausea or vomiting. Upon arrival in the ED, patient was put on BiPAP. Patient was afebrile, normotensive and in normal sinus rhythm. A chest x-ray was completed and the patient had right lower pneumonia with a small pleural effusion. Patient also had a large bullae in the left apex with a pneumothorax that cannot be excluded. Patient ABG was suggestive of pattern of respiratory acidosis with pH 7.295, PCO2 60.3, PO2 63.0, HCO3 29.3. CT head WO did not show any acute findings. Patient had elevated troponins of 184, trended down to 177. Patient also had an elevated BNP of 7797. An elevated creatinine of 1.77 was on presentation, with no previous lab test to be compared to. Also patient had a left elevated liver enzymes ALT was 525, , with prolonged prothrombin time of 24.9, and INR of 2.3. Patient was admitted to the ICU for the management of type II respiratory failure associated altered mental status    Review of Systems:     NA: PATIENT confused    Medications:      Allergies:    No Known Allergies    Current Meds:   Scheduled Meds:    ziprasidone (GEODON) in sterile water injection  10 mg IntraMUSCular BID    ipratropium-albuterol  1 ampule Inhalation Q4H WA    heparin (porcine)  5,000 Units SubCUTAneous 3 times per day    nystatin   Topical BID    potassium bicarb-citric acid  20 mEq Oral Daily    ampicillin-sulbactam  3,000 mg IntraVENous Q6H    [Held by provider] miconazole   Topical BID    vancomycin  1,250 mg IntraVENous Q12H    [Held by provider] aspirin  81 mg Oral Daily    sodium chloride flush  5-40 mL IntraVENous 2 times per day    nicotine  1 patch TransDERmal Daily    pantoprazole (PROTONIX) 40 mg injection  40 mg IntraVENous Daily    vancomycin (VANCOCIN) intermittent dosing (placeholder)   Other RX Placeholder     Continuous Infusions:    IV infusion builder 50 mL/hr at 23 1651    sodium chloride      dexmedetomidine      sodium chloride       PRN Meds: sodium chloride, ziprasidone (GEODON) in sterile water injection, dexmedetomidine, perflutren lipid microspheres, sodium chloride flush, sodium chloride flush, sodium chloride flush, sodium chloride, ondansetron **OR** ondansetron, polyethylene glycol, acetaminophen **OR** acetaminophen, albuterol, guaiFENesin    Data:     Past Medical History:   has no past medical history on file. Social History:   reports that he has been smoking cigarettes. He has a 40.00 pack-year smoking history. He has never used smokeless tobacco. He reports current alcohol use. He reports that he does not currently use drugs. Family History:   History reviewed. No pertinent family history. Vitals:  BP (!) 208/192   Pulse (!) 106   Temp 98 °F (36.7 °C)   Resp 19   Ht 5' 7\" (1.702 m)   Wt 195 lb 1.7 oz (88.5 kg)   SpO2 94%   BMI 30.56 kg/m²   Temp (24hrs), Av.7 °F (37.1 °C), Min:98 °F (36.7 °C), Max:100.1 °F (37.8 °C)      No results for input(s): POCGLU in the last 72 hours. I/O(24Hr):     Intake/Output Summary (Last 24 hours) at 2023 0745  Last data filed at 2023 0717  Gross per 24 hour   Intake 1335 ml   Output 1450 ml   Net -115 ml       Labs:    CBC:   Lab Results   Component Value Date/Time    WBC 5.9 2023 04:38 AM    RBC 5.61 2023 04:38 AM    HGB 11.4 2023 04:38 AM    HCT 39.3 2023 04:38 AM    MCV 70.0 2023 04:38 AM    MCH 20.2 2023 04:38 AM    MCHC 28.9 2023 04:38 AM    RDW 20.2 2023 04:38 AM    PLT 63 2023 04:38 AM    MPV 8.8 2023 04:38 AM     CMP:    Lab Results   Component Value Date/Time     2023 04:38 AM    K 3.3 2023 04:38 AM     2023 04:38 AM CO2 34 01/08/2023 04:38 AM    BUN 4 01/08/2023 04:38 AM    CREATININE <0.40 01/08/2023 04:38 AM    LABGLOM Can not be calculated 01/08/2023 04:38 AM    GLUCOSE 118 01/08/2023 04:38 AM    PROT 5.1 01/08/2023 04:38 AM    LABALBU 2.8 01/08/2023 04:38 AM    CALCIUM 8.1 01/08/2023 04:38 AM    BILITOT 1.7 01/08/2023 04:38 AM    ALKPHOS 45 01/08/2023 04:38 AM    AST 38 01/08/2023 04:38 AM    ALT 99 01/08/2023 04:38 AM     PT/INR:    Lab Results   Component Value Date/Time    PROTIME 19.1 01/08/2023 04:38 AM    INR 1.6 01/08/2023 04:38 AM       No results found for: SPECIAL  Lab Results   Component Value Date/Time    CULTURE NO GROWTH 01/03/2023 12:12 PM         Radiology:    CT HEAD WO CONTRAST    Result Date: 1/2/2023  EXAMINATION: CT OF THE HEAD WITHOUT CONTRAST  1/2/2023 10:11 pm TECHNIQUE: CT of the head was performed without the administration of intravenous contrast. Automated exposure control, iterative reconstruction, and/or weight based adjustment of the mA/kV was utilized to reduce the radiation dose to as low as reasonably achievable. COMPARISON: None. HISTORY: ORDERING SYSTEM PROVIDED HISTORY: Altered Mental status TECHNOLOGIST PROVIDED HISTORY: Altered Mental status Reason for Exam: Altered Mental status Additional signs and symptoms: Patient came in with transient alteration of awareness, follow up head CT for any changes. FINDINGS: BRAIN/VENTRICLES: There is no acute intracranial hemorrhage, mass effect or midline shift. No abnormal extra-axial fluid collection. There is a focal area of decreased attenuation with loss of gray/white matter differentiation involving posterior left parietal lobe with somewhat increased conspicuity as compared to prior exam.  There is stable small focus of encephalomalacia involving left cerebellar hemisphere compatible with prior remote infarct/insult. Chronic lacunar infarct to right basal ganglia redemonstrated. There is no evidence of hydrocephalus.  ORBITS: The visualized portion of the orbits demonstrate no acute abnormality. SINUSES: Small air-fluid level right sphenoid sinus. Trace air-fluid level right frontal sinus. Mild mucoperiosteal thickening to bilateral ethmoid air cells. Findings are new from prior exam and likely relate to presence of nasogastric tube. SOFT TISSUES/SKULL:  No acute abnormality of the visualized skull or soft tissues. Focal area of decreased attenuation with loss of gray/white matter differentiation involving posterior left parietal lobe with somewhat increased conspicuity as compared to prior exam likely reflecting an area of acute to subacute infarct. Stable small chronic infarct/insult to left cerebellar hemisphere. Chronic lacunar infarct to right basal ganglia redemonstrated. Paranasal sinus disease likely relating to presence of nasogastric tube. CT HEAD WO CONTRAST    Result Date: 1/1/2023  EXAMINATION: CT OF THE HEAD WITHOUT CONTRAST  1/1/2023 10:48 pm TECHNIQUE: CT of the head was performed without the administration of intravenous contrast. Automated exposure control, iterative reconstruction, and/or weight based adjustment of the mA/kV was utilized to reduce the radiation dose to as low as reasonably achievable. COMPARISON: None. HISTORY: Reason for Exam: AMS Additional signs and symptoms: the patient has been getting more and more confused since 2 weeks ago. It has progressively been getting worse and today FINDINGS: BRAIN/VENTRICLES: There is no acute intracranial hemorrhage, mass effect or midline shift. No abnormal extra-axial fluid collection. The gray-white differentiation is maintained without evidence of an acute infarct. There is no evidence of hydrocephalus. Changes of mild chronic small vessel ischemic disease. ORBITS: The visualized portion of the orbits demonstrate no acute abnormality. SINUSES: The visualized paranasal sinuses and mastoid air cells demonstrate no acute abnormality.  SOFT TISSUES/SKULL:  No acute abnormality of the visualized skull or soft tissues. No acute intracranial abnormality. Mild chronic small vessel ischemic disease. US LIVER    Result Date: 1/2/2023  EXAMINATION: RIGHT UPPER QUADRANT ULTRASOUND 1/2/2023 10:20 am COMPARISON: None. HISTORY: ORDERING SYSTEM PROVIDED HISTORY: elevated AST and ALT, in setting of PT, and INR TECHNOLOGIST PROVIDED HISTORY: elevated AST and ALT, in setting of PT, and INR FINDINGS: Patient body habitus limits the study, as there is increased attenuation of the ultrasound beam. This decreases sensitivity and specificity. LIVER:  There is mild increased echogenicity seen throughout the liver. No intrahepatic ductal dilatation. No perihepatic fluid. BILIARY SYSTEM:  Bladder is contracted. Gallstones are seen. Gallbladder wall thickness measures 6 mm. Common bile duct is within normal limits measuring 5 mm. RIGHT KIDNEY: The right kidney is grossly unremarkable without evidence of hydronephrosis. PANCREAS:  Visualized portions of the pancreas are unremarkable. OTHER: No evidence of right upper quadrant ascites. Portal vein flow appears hepatopetal     Increased echogenicity is seen throughout the liver suggesting diffuse hepatocellular disease such as fatty infiltration Cholelithiasis. No cholecystitis     XR CHEST PORTABLE    Result Date: 1/2/2023  EXAMINATION: ONE XRAY VIEW OF THE CHEST 1/2/2023 3:15 pm COMPARISON: 01/01/2023 HISTORY: ORDERING SYSTEM PROVIDED HISTORY: intubation TECHNOLOGIST PROVIDED HISTORY: intubation Reason for Exam: intubation FINDINGS: Overlying items external to the patient somewhat limit evaluation. Placement of endotracheal tube with tip 8.2 cm from the robert. Placement of enteric tube seen to extend into the stomach, distal extent not included in field of view. Persistent likely layering right pleural effusion, similar to slightly worsened.   Persistent bilateral airspace opacities to bilateral mid to lower lung zones, right worse than left, similar on the left but mildly worsened on the right. No pneumothoraces are seen. Persistent likely bullous change to the left upper lung zone. Cardiac and mediastinal silhouettes are stable. Stable appearance to bony structures. Placement of endotracheal tube which appears high riding with tip 8.2 cm from the robert. Suggest advancement by 2-3 cm. Placement of endotracheal tube seen to extend into the stomach, distal extent not included in field of view. No pneumothoraces. Persistent likely bullous change to the left upper lung zone. Persistent right pleural effusion, similar to slightly worsened. Persistent bilateral airspace opacities, right worse than left, similar on the left but mildly worsened on the right. XR CHEST PORTABLE    Result Date: 1/1/2023  EXAMINATION: ONE XRAY VIEW OF THE CHEST 1/1/2023 7:17 pm COMPARISON: None. HISTORY: ORDERING SYSTEM PROVIDED HISTORY: ams TECHNOLOGIST PROVIDED HISTORY: [facility] Reason for Exam: Altered mental status, difficulty breathing Additional signs and symptoms: Altered mental status, difficulty breathing Relevant Medical/Surgical History: Altered mental status, difficulty breathing FINDINGS: Large lucent area in the left apex suggesting a large bulla however superimposed pneumothorax cannot be excluded. The cardiac size is normal. Right lower lobe airspace infiltrate and mild right pleural effusion. . Pulmonary vascularity appears normal. There is mild ectasia of the thoracic aorta. Calcific tendinitis of the right shoulder. No acute bony abnormalities. The hilar structures are normal.     Right lower lobe pneumonia and small right pleural effusion Large lucent area in the left apex suggesting a large bulla however superimposed pneumothorax cannot be excluded. Follow-up CT would be useful for further evaluation. The findings were sent to the Radiology Results [id number] at 10:31 pm on 1/1/2023 to be communicated to a licensed caregiver. EKG:    there are no previous tracings available for comparison. Physical Examination:        PHYSICAL EXAM:  General Appearance Altered mentation. He is not oriented to time place or self. Psych:  I presume it is visual hallucination as patient can be seen talking to his side. HEENT - Head is normocephalic, atraumatic. Neck: supple, no rigidity, normal ROM  Lungs -limited exam, good air entry. No wheezes  Cardiovascular - Heart sounds are normal.  Regular rhythm, normal rate without murmur, gallop or rub. Neurology: Does move all limbs spontaneously. Abdomen - Soft, nontender, nondistended, no masses or organomegaly  Skin - No bruising or bleeding on exposed skin area  Extremities - Hand and feet are edematous. Assessment:        Primary Problem  Acute respiratory failure with hypoxia and hypercapnia ([address]    Active Hospital Problems    Diagnosis Date Noted    Prolonged pt (prothrombin time) [R79.1] 01/03/2023     Priority: Medium    Emphysema lung ([address] [J43.9] 01/03/2023     Priority: Medium    Cerebral infarction ([address] [I63.9] 01/03/2023     Priority: Medium    Transaminitis [R74.01] 01/02/2023     Priority: Medium    Microcytic anemia [D64.9] 01/02/2023     Priority: Medium    Thrombocytopenia ([address] [D69.6] 01/02/2023     Priority: Medium    Agitation [R45.1] 01/02/2023     Priority: Medium    Acute respiratory failure with hypoxia and hypercapnia (HCC) RLL pneumonia [J96.01, J96.02] 01/02/2023     Priority: Medium    Altered mental status [R41.82] 01/02/2023     Priority: Medium    Community acquired pneumonia of right lower lobe of lung [J18.9] 01/02/2023     Priority: Medium       Plan: Altered Mental Status in the setting of Acute type 2 respiratory failure secondary to Pneumonia and Acute Heart Failure  -EEG showing Diffuse slowing.  -No plan for LP at this moment as INR is elevated. -On Geodon 10 mg BID  -ID following:  On vancoymycin and Unasyn  -Ipratropium-Albuterol every 4 hrs  -Echocardiogam completed     Transaminitis with prolonged PTT and INR in the setting of acute HCV  - AST and ALT improving( AST normalized and ALT is 99)  -INR PT improving currently 1,6 and 19.1, respectively. Patient will remain off AC. Fibrinogen    -Thrombocytopenic  -HCV RNA PCR positive; ID on board      Hypernatremia in the setting of Dehydration  -Na 147  -Nephrology on board  -Patient IV hydration switched to D5 water with 20 M EQ potassium chloride at 50 MLS per hour       Multiple acute brain infarcts with subarachnoid hemorrhage versus meningitis  -EEG showing diffuse slowing without epileptic waves. -Aspirin HELD  -Plan for CASSI once patient mentation improved. -Repeat CT head WO in 2 days. Thrombocytopenia  -Unknown  baseline  -Transaminates and Prolonged PT and PTT  -PLT count  63  -Monitor HH closely    Microcytic anemia in the setting of elevated INR and PT  -Hb stable  -No previous labs to compare?  -Iron: 14, TIBC: 412 and Ferritin:14  -Started on Iron IV replacement        Elevated troponins most likely 2/2 demand ischemia  -Troponin trending down  -no acute changes on EKG  -ECHO showing normal LVF. Mild tricuspid regurge. RV pressure of 25 mmHG        DVT prophylaxis: reason for no prophylaxis: Prolonged PT, aPTT  GI prophylaxis: Protonix 40 mg daily    Genesis Slaughter MD  1/8/2023  7:45 AM     Attending Physician Statement  I have discussed the care of Nolan Lindsay with the resident team. I have examined the patient myself and taken ros and hpi , including pertinent history and exam findings,  with the resident. I have reviewed the key elements of all parts of the encounter with the resident. I agree with the assessment, plan and orders as documented by the resident.     Principal Problem:    Acute respiratory failure with hypoxia and hypercapnia (HCC) RLL pneumonia  Active Problems:    Transaminitis    Microcytic anemia    Thrombocytopenia (HCC)    Agitation    Altered mental status    Community acquired pneumonia of right lower lobe of lung    Prolonged pt (prothrombin time)    Emphysema lung (HCC)    Cerebral infarction [facility])  Resolved Problems:    * No resolved hospital problems. *    Remains on high flow NC- not much change  C/w unasyn and vanc for pneumonia- all cx neg so far- has had 6 days of abx  CASSI for multiple brain infarcts- decision for CASSI awaited from Cards   No LP plan from neuro  HCV RNA titer awaited  Liver enzymes downtrending  Hb and plt mainating     1/8  Still confused  On high flow NC  Cards to eval tomorrow re CASSI-   Pt has liq diet- noted to be coughing with swallow- will make NPO , order swallow study tomorrow.    Hemo recommend stay off [facility]     Electronically signed by Arturo William MD

## 2023-01-08 NOTE — PROGRESS NOTES
Speech Language Pathology  Facility/Department: [facility]  Initial Speech/Language/Cognitive Assessment    NAME: Leila Potter  : 1960   MRN: [id number]  ADMISSION DATE: 2023  ADMITTING DIAGNOSIS: has Acute type II respiratory failure ([facility] [address] Transaminitis; Microcytic anemia; Thrombocytopenia ([address] Agitation; Acute respiratory failure with hypoxia and hypercapnia (HCC) RLL pneumonia; Altered mental status; Community acquired pneumonia of right lower lobe of lung; Prolonged pt (prothrombin time); Emphysema lung ([facility] [address] and Cerebral infarction [facility]) on their problem list.  DATE ONSET: 2023    Date of Eval: 2023   Evaluating Therapist: SHAHIDA Agarwal    RECENT RESULTS MRI: 2023  Impression   Small acute infarcts involving the left cerebellar hemisphere and left   parietal lobe. Small subacute infarct within the left cerebellar hemisphere. Moderate bilateral subarachnoid hemorrhage within both cerebral hemispheres   which is of uncertain etiology. The hemorrhage is more apparent on MRI than   on previous head CT. Old right caudate nucleus lacune. Small bilateral mastoid effusions and moderate left paranasal sinus disease. Primary Complaint:   The patient is a 60 y.o.  male with unknown past medical history due to no healthcare visits or follow-up who presents with Altered Mental Status and he is admitted to the hospital for the management of  Acute respiratory failure most likely 2/2 pneumonia. Per patient's wife, she reports that the patient has not been acting himself for the past week. She reports being more slurred, confused and progressively getting worse prior to presentation.   Patient prior to the presentation a week ago he had a fall and EMS presented patient was vitally stable and he was not transported to the hospital.  This time upon EMS evaluation of the patient he had an O2 of 75%, he was put on a breather and was given a dose of IV Lasix in route. Wife and patient, cannot recall any precipitating event could have led to his presentation. He also denies chest pain, shortness of breath, or cough. Per patient, he does not recall any complaints or events prior to the presentation. Wife denies recent alcohol use, however, patient does have a history of regular alcohol intake but not on daily basis. Wife also reports that patient has constant heartburn for which he takes regular antiacids. Patient denies the presence of any abdominal pain or any change in bowel habits, abdominal pain, nausea or vomiting. Upon arrival in the ED, patient was put on BiPAP. Patient was afebrile, normotensive and in normal sinus rhythm. A chest x-ray was completed and the patient had right lower pneumonia with a small pleural effusion. Patient also had a large bullae in the left apex with a pneumothorax that cannot be excluded. Patient ABG was suggestive of pattern of respiratory acidosis with pH 7.295, PCO2 60.3, PO2 63.0, HCO3 29.3. CT head WO did not show any acute findings. Patient had elevated troponins of 184, trended down to 177. Patient also had an elevated BNP of 7797. An elevated creatinine of 1.77 was on presentation, with no previous lab test to be compared to. Also patient had a left elevated liver enzymes ALT was 525, , with prolonged prothrombin time of 24.9, and INR of 2.3.      Patient was admitted to the ICU for the management of type II respiratory failure associated altered mental status    Pain:  Pain Assessment  Pain Assessment: 0-10  Pain Level: 0    Vision/ Hearing  Vision  Vision: Impaired  Vision Exceptions: Wears glasses at all times  Hearing  Hearing: Exceptions to [facility]  Hearing Exceptions: Hard of hearing/hearing concerns    Assessment:      Diagnosis: Pt presents with mod-severe cognitive communication disorder characterized by deficits in orientation, memory, problem solving, reasoning, expression, comprehension, and thought organization. Pt required extended processing/response time and repeated stimuli to follow commands/engage in structured tasks. Pt demo fair awareness of deficits and agreeable to participate in cognitive treatment. Pt also exhibits mild-moderate dysarthria characterized by slow rate, imprecise articulation, and blended word boundaries. Per spouse, baseline speech is somewhat imprecise due to dentition however current performance represents decline in function. Recommendations:  Recommendations  Requires SLP Intervention: Yes  Recommendations: Modified barium swallow study  Patient Education: Pt/spouse educated re: treatment rationale, results of assessment, and plan of care. Patient Education Response: Verbalizes understanding             Plan:   Speech Therapy Prognosis  Prognosis: Fair  Prognosis Considerations: Co-Morbidities; Medical Diagnosis  Individuals consulted  Consulted and agree with results and recommendations: Patient; Family member;RN  Family member consulted: spouse  RN Name: [employer] Initially in Place: Yes  Restraints: bilateral wrist soft restraints    Goals:  Short Term Goals  Goal 1: Pt will increase intelligibility to 90% during simple conversational tasks. Goal 2: The Pt will implement dysarthria speaking strategies to improve articulatory precision/intelligibility in 90% of opportunities. Goal 3: The ptient will recall temporal/spatial/purpose orientation details with 100% accuracy. Goal 4: The patient will complete auditory comprehension tasks with 90% accuracy. Goal 5: The patient will complete STM/delayed recall tasks with 70% accuracy.    Patient/family involved in developing goals and treatment plan: patient, spouse    Subjective:   Previous level of function and limitations: Modified independence        Vision  Vision: Impaired  Vision Exceptions: Wears glasses at all times  Hearing  Hearing: Exceptions to [facility] Exceptions: Hard of hearing/hearing concerns           Objective:       Oral Motor   Labial: Decreased rate  Dentition: Extractions;Limited;Natural;Poor  Oral Hygiene: Dried secretions; Xerostomia  Lingual: Decreased rate;Decreased strength; Incoordinated    Motor Speech  Apraxic Characteristics: None  Dysarthric Characteristics: Blended word boundaries; Decreased rate; Imprecise  Intelligibility: Impaired  Word Intelligibility (%): 90 %  Phrase Intelligibility (%): 80 %  Sentence Intelligibility (%): 75 %  Conversation Intelligibility (%): 70 %  Overall Impairment Severity: Mild    Auditory Comprehension  Comprehension: Exceptions  Basic Questions: WFL  Complex Questions: Moderate  One Step Commands: WFL  Two Step Commands: Mild  Multistep Commands: Severe  Complex/Abstract Commands: Severe  Conversation: Moderate         Expression  Primary Mode of Expression: Verbal    Verbal Expression  Verbal Expression: Exceptions to functional limits  Repetition: Mild  Confrontation: Mild  Convergent: Severe  Divergent: Severe  Responsive: Mild  Interfering Components: Impaired thought organization; Attention                   Cognition:      Orientation  Overall Orientation Status: Impaired  Orientation Level: Oriented to person;Disoriented to time;Disoriented to place  Attention  Attention: Exceptions to [facility]  Selective Attention: Mild  Sustained Attention: Mild  Memory  Memory: Exceptions to [facility]  Short-term Memory: Moderate  Working Memory: Moderate  Problem Solving  Problem Solving: Exceptions to [facility]  Verbal Reasoning Skills: Moderate  Abstract Reasoning  Abstract Reasoning: Exceptions to [facility]  Convergent Thinking: Severe  Divergent Thinking: Severe      Prognosis:  Speech Therapy Prognosis  Prognosis: Fair  Prognosis Considerations: Co-Morbidities; Medical Diagnosis  Individuals consulted  Consulted and agree with results and recommendations: Patient; Family member;RN  Family member consulted: spouse  RN Name: Ny Henley    Education:  Patient Education: Pt/spouse educated re: treatment rationale, results of assessment, and plan of care.   Patient Education Response: Verbalizes understanding          Therapy Time:   Individual Concurrent Group Co-treatment   Time In 1         Time Out 7149         Minutes 17                 Electronically signed by Musa Castellon M.S., [address] [place] [address] on 1/8/2023 at 3:16 PM

## 2023-01-08 NOTE — PROGRESS NOTES
01/08/23 1309   Encounter Summary   Encounter Overview/Reason   Encounter   Service Provided For: Patient   Referral/Consult From: [facility]   Last Encounter  01/08/23   Spiritual/Emotional needs   Type Spiritual Support   Rituals, Rites and Sacraments   Type Anointing  (Fr Audrey Julien 1/8/23)

## 2023-01-08 NOTE — PROGRESS NOTES
[place]  Speech Language Pathology    Date: 1/8/2023  Patient Name: Geovanna Madrigal  YOB: 1960   AGE: 60 y.o. MRN: [id number]        Patient Not Available for Speech Therapy     Due to:  [] Testing  [] Hemodialysis  [] Cancelled by RN  [] Surgery   [] Intubation/Sedation/Pain Medication  [] Medical instability  [x] Other: Order received for video swallow study, Pt currently NPO. Radiology is unable to accommodate today; will plan to schedule MBS tomorrow. ANGEL Liu notified.      Next scheduled treatment: 01/09/2023  Completed by: Dorene Pope M.S., CCC-SLP

## 2023-01-08 NOTE — PLAN OF CARE
Problem: Discharge Planning  Goal: Discharge to home or other facility with appropriate resources  Outcome: Progressing  Flowsheets (Taken 1/8/2023 0800)  Discharge to home or other facility with appropriate resources:   Identify barriers to discharge with patient and caregiver   Arrange for needed discharge resources and transportation as appropriate   Identify discharge learning needs (meds, wound care, etc)     Problem: Safety - Adult  Goal: Free from fall injury  1/8/2023 1048 by Yuly Thompson RN  Outcome: Progressing  1/8/2023 0457 by Julieth Taylor RN  Outcome: Progressing  Note: Pt assessed as a fall risk this shift. Remains free from falls and accidental injury at this time. Fall precautions in place, including falling star sign and fall risk band on pt. Floor free from obstacles, and bed is locked and in lowest position. Adequate lighting provided. Pt encouraged to call before getting OOB for any need. Bed alarm activated. Will continue to monitor needs during hourly rounding, and reinforce education on use of call light. Problem: ABCDS Injury Assessment  Goal: Absence of physical injury  Outcome: Progressing     Problem: Skin/Tissue Integrity  Goal: Absence of new skin breakdown  Description: 1. Monitor for areas of redness and/or skin breakdown  2. Assess vascular access sites hourly  3. Every 4-6 hours minimum:  Change oxygen saturation probe site  4. Every 4-6 hours:  If on nasal continuous positive airway pressure, respiratory therapy assess nares and determine need for appliance change or resting period. 1/8/2023 1048 by Yuly Thompson RN  Outcome: Progressing  1/8/2023 0457 by Julieth Taylor RN  Outcome: Progressing  Note: Skin assessment complete. Waffle mattress in place. Coccyx reddened. Sensicare applied PRN. Turned and repositioned every two hours refused by pt. Area kept free from moisture. Proper nourishment and fluids encouraged, as appropriate.  Will continue to monitor for additional needs and changes in skin breakdown. Problem: Safety - Medical Restraint  Goal: Remains free of injury from restraints (Restraint for Interference with Medical Device)  Description: INTERVENTIONS:  1. Determine that other, less restrictive measures have been tried or would not be effective before applying the restraint  2. Evaluate the patient's condition at the time of restraint application  3. Inform patient/family regarding the reason for restraint  4.  Q2H: Monitor safety, psychosocial status, comfort, nutrition and hydration  1/8/2023 1048 by Tayler London RN  Outcome: Progressing  Flowsheets  Taken 1/8/2023 1000  Remains free of injury from restraints (restraint for interference with medical device):   Determine that other, less restrictive measures have been tried or would not be effective before applying the restraint   Evaluate the patient's condition at the time of restraint application   Inform patient/family regarding the reason for restraint   Every 2 hours: Monitor safety, psychosocial status, comfort, nutrition and hydration  Taken 1/8/2023 0800  Remains free of injury from restraints (restraint for interference with medical device):   Determine that other, less restrictive measures have been tried or would not be effective before applying the restraint   Evaluate the patient's condition at the time of restraint application   Inform patient/family regarding the reason for restraint   Every 2 hours: Monitor safety, psychosocial status, comfort, nutrition and hydration  1/8/2023 0457 by Ifeoma Jamison RN  Outcome: Progressing  Flowsheets  Taken 1/7/2023 1800 by Tayler London RN  Remains free of injury from restraints (restraint for interference with medical device):   Determine that other, less restrictive measures have been tried or would not be effective before applying the restraint   Evaluate the patient's condition at the time of restraint application   Inform patient/family regarding the reason for restraint   Every 2 hours: Monitor safety, psychosocial status, comfort, nutrition and hydration  Taken 1/7/2023 1600 by Trent Cain RN  Remains free of injury from restraints (restraint for interference with medical device):   Determine that other, less restrictive measures have been tried or would not be effective before applying the restraint   Evaluate the patient's condition at the time of restraint application   Inform patient/family regarding the reason for restraint   Every 2 hours: Monitor safety, psychosocial status, comfort, nutrition and hydration  Note: Patient in bilateral wrist restraints. Pt educated on discontinuation criteria of restraints; Pt verbally understands criteria, but does not meet criteria at this time. Will continue to monitor for non-violent, non-self destructive behaviors. Problem: Pain  Goal: Verbalizes/displays adequate comfort level or baseline comfort level  1/8/2023 1048 by Trent Cain RN  Outcome: Progressing  1/8/2023 0147 by Armaan Woody RN  Outcome: Adequate for Discharge  Note: No pain at this time. 0/10 pain scale.        Problem: Nutrition Deficit:  Goal: Optimize nutritional status  Outcome: Progressing

## 2023-01-08 NOTE — PROGRESS NOTES
Writer spoke to residents regarding patient telling family he wanted to change code status. Family updated writer that patient wants to change code status to DNR. Resident informed writer they would be rounding later and would address this with patient and family.

## 2023-01-08 NOTE — PROGRESS NOTES
Today's Date: 1/8/2023  Patient Name: Geovanna Madrigal  Date of admission: 1/1/2023  9:55 PM  Patient's age: 60 y.o., 1960  Admission Dx: Acute respiratory failure ([facility] [address] [J96.00]  Acute respiratory failure with hypoxia and hypercapnia ([facility] [address] [J96.01, J96.02]  Altered mental status, unspecified altered mental status type [R41.82]  Community acquired pneumonia of right lower lobe of lung [J18.9]    Reason for Consult: management recommendations  Requesting Physician: Rachana Alvarado MD    CHIEF COMPLAINT: Abnormal cell counts. Altered mental status. Pneumonia. History Obtained From:  spouse, family member. Electronic medical record. Patient unable to give any history due to altered mental status          Interval history:    Patient seen and examined  Labs and vitals reviewed  Patient seen in ICU  The patient is seen this morning. He is quite encephalopathic. According to the ICU nurse, he has been on and off for the last couple of days. At some times, he is quite coherent and answer questions, and other times, he continues to ramble incoherently. Case was discussed with critical care, we both feel that this is more related to hepatic encephalopathy rather than ongoing strokes. There is no evidence of active bleeding. The patient overall condition has stabilized over the last couple of days. But the liver enzymes continue to drop slowly. He remains quite coagulopathic with a baseline PT of 19.1. Fibrinogen slowly improved to 151. HISTORY OF PRESENT ILLNESS:      The patient is a 60 y.o.  male who is admitted with chief complaint of altered mental status. Patient has not seen a doctor for multiple years. Due to worsening mental status EMS was summoned. Patient oxygen saturation was noted to be at 75%. Patient placed on BiPAP x-ray showed right lower lobe pneumonia. Patient started on antibiotics. Patient also noted to have INR of 2.3 platelet count of 49,406.   Patient does have a history of alcohol intake. Patient's ammonia level noted to be 29. Creatinine 1.5. Total bilirubin is within range. Hemoglobin 11.2 with MCV of 69. Platelet count is 27,813. Ultrasound liver done however report is pending.         Past Medical History: Hypertension    Past Surgical History: No pertinent surgical history    Medications:    Prior to Admission medications    Not on File     Current Facility-Administered Medications   Medication Dose Route Frequency Provider Last Rate Last Admin    ziprasidone (GEODON) 10 mg in sterile water 0.5 mL injection  10 mg IntraMUSCular BID Margaret Hampton MD   10 mg at 01/07/23 2212    ipratropium-albuterol (DUONEB) nebulizer solution 1 ampule  1 ampule Inhalation Q4H WA Margaret Hampton MD   1 ampule at 01/08/23 0746    heparin (porcine) injection 5,000 Units  5,000 Units SubCUTAneous 3 times per day Klever Mccauley MD   5,000 Units at 01/08/23 0549    potassium chloride 20 mEq in dextrose 5 % 1,000 mL infusion   IntraVENous Continuous Yobany Flor Richter MD 50 mL/hr at 01/07/23 1651 New Bag at 01/07/23 1651    0.9 % sodium chloride infusion   IntraVENous PRN [facility], MD        ziprasidone (GEODON) 10 mg in sterile water 0.5 mL injection  10 mg IntraMUSCular Q12H PRN Margaret Hampton MD   10 mg at 01/07/23 1154    dexmedetomidine (PRECEDEX) 400 mcg in sodium chloride 0.9 % 100 mL infusion  0.1-1.5 mcg/kg/hr IntraVENous Continuous PRN Margaret Hampton MD        nystatin (MYCOSTATIN) ointment   Topical BID Ren Nicole MD   Given at 01/07/23 2214    potassium bicarb-citric acid (EFFER-K) effervescent tablet 20 mEq  20 mEq Oral Daily Aurelio Lara MD   20 mEq at 01/07/23 1032    ampicillin-sulbactam (UNASYN) 3,000 mg in sodium chloride 0.9 % 100 mL IVPB (mini-bag)  3,000 mg IntraVENous Q6H Therese Benitez MD   Stopped at 01/08/23 0259    [Held by provider] miconazole (MICOTIN) 2 % powder   Topical BID ALAYNA Garcia - NASRA   Given at 01/06/23 0923    vancomycin (VANCOCIN) 1,250 mg in dextrose 5 % 250 mL IVPB (ADDAVIAL)  1,250 mg IntraVENous Q12H Jaosn Hernández MD   Stopped at 01/08/23 8472    perflutren lipid microspheres (DEFINITY) injection 1.5 mL  1.5 mL IntraVENous ONCE PRN Anderson Rm MD        [facility] [place] by provider] aspirin chewable tablet 81 mg  81 mg Oral Daily Lela Wrad MD   81 mg at 01/04/23 0740    sodium chloride flush 0.9 % injection 10 mL  10 mL IntraVENous PRN Jason Hernández MD   10 mL at 01/03/23 1459    sodium chloride flush 0.9 % injection 10 mL  10 mL IntraVENous PRN Lela Ward MD   10 mL at 01/03/23 1836    sodium chloride flush 0.9 % injection 5-40 mL  5-40 mL IntraVENous 2 times per day ALAYNA Parra NP   10 mL at 01/07/23 1040    sodium chloride flush 0.9 % injection 5-40 mL  5-40 mL IntraVENous PRN ALAYNA Marrero - NP        0.9 % sodium chloride infusion   IntraVENous PRN ALAYNA Parra NP        ondansetron (ZOFRAN-ODT) disintegrating tablet 4 mg  4 mg Oral Q8H PRN ALAYNA Parra NP        Or    ondansetron (ZOFRAN) injection 4 mg  4 mg IntraVENous Q6H PRN ALAYNA Parra - NASRA        polyethylene glycol (GLYCOLAX) packet 17 g  17 g Oral Daily PRN ALAYNA Parra - NASRA        acetaminophen (TYLENOL) tablet 650 mg  650 mg Oral Q6H PRN ALAYNA Parra NP        Or    acetaminophen (TYLENOL) suppository 650 mg  650 mg Rectal Q6H PRN ALAYNA Parra - NP        nicotine (NICODERM CQ) 21 MG/24HR 1 patch  1 patch TransDERmal Daily ALAYNA Parra NP   1 patch at 01/06/23 0922    albuterol (PROVENTIL) nebulizer solution 2.5 mg  2.5 mg Nebulization Q2H PRN ALAYNA Parra - NP   2.5 mg at 01/02/23 1115    guaiFENesin (MUCINEX) extended release tablet 600 mg  600 mg Oral BID PRN ALAYNA Parra - NASRA        pantoprazole (PROTONIX) 40 mg in sodium chloride (PF) 0.9 % 10 mL injection  40 mg IntraVENous Daily Jason Hernández MD   40 mg at 01/07/23 1038    vancomycin (VANCOCIN) intermittent dosing (placeholder)   Other RX Placeholder General MD Deja           Allergies:  Patient has no known allergies. Social History:   reports that he has been smoking cigarettes. He has a 40.00 pack-year smoking history. He has never used smokeless tobacco. He reports current alcohol use. He reports that he does not currently use drugs. Family History: Patient not able to give history due to altered mental status    REVIEW OF SYSTEMS:      General: no fever or night sweats, Weight is stable. ENT: No double or blurred vision, no hearing problem, no dysphagia or sore throat   Respiratory: Positive shortness of breath  Cardiovascular: Denies chest pain, PND or orthopnea. No L E swelling or palpitations. Gastrointestinal:    No nausea or vomiting, abdominal pain, diarrhea or constipation. Genitourinary: Denies dysuria, hematuria, frequency, urgency or incontinence. Neurological: Complains of being very weak. Musculoskeletal:  No arthralgia no back pain or joint swelling. Skin: There are no rashes or bleeding.   Psych: Denies hallucinations or intentions to harm self        PHYSICAL EXAM:        BP (!) 208/192   Pulse (!) 106   Temp 98 °F (36.7 °C)   Resp 19   Ht 5' 7\" (1.702 m)   Wt 195 lb 1.7 oz (88.5 kg)   SpO2 94%   BMI 30.56 kg/m²    Temp (24hrs), Av.7 °F (37.1 °C), Min:98 °F (36.7 °C), Max:100.1 °F (37.8 °C)      General appearance - not in acute distress  Mental status -arousable but somewhat lethargic  Eyes - pupils equal and reactive, extraocular eye movements intact   Ears - bilateral TM's and external ear canals normal   Mouth -mucous membranes moist  Neck - supple, no significant adenopathy   Lymphatics - no palpable lymphadenopathy, no hepatosplenomegaly   Chest -bilateral rales  Heart - normal rate, regular rhythm, normal S1, S2, no murmurs  Abdomen - soft, nontender, nondistended, no masses or organomegaly   Neurological -orally intubated  Musculoskeletal - no joint tenderness, deformity or swelling   Extremities - peripheral pulses normal, no pedal edema, no clubbing or cyanosis   Skin - normal coloration and turgor, no rashes, no suspicious skin lesions noted ,      DATA:      Labs:     Results for orders placed or performed during the hospital encounter of 01/01/23   COVID-19 & Influenza Combo    Specimen: Nasopharyngeal Swab   Result Value Ref Range    Specimen Description . NASOPHARYNGEAL SWAB     Source . NASOPHARYNGEAL SWAB     SARS-CoV-2 RNA, RT PCR Not Detected Not Detected    INFLUENZA A Not Detected Not Detected    INFLUENZA B Not Detected Not Detected   Respiratory Panel, Molecular, with COVID-19 (Restricted: peds pts or suitable admitted adults)    Specimen: Nasopharyngeal Swab   Result Value Ref Range    Specimen Description . NASOPHARYNGEAL SWAB     Adenovirus PCR Not Detected Not Detected    Coronavirus 229E PCR Not Detected Not Detected    Coronavirus HKU1 PCR Not Detected Not Detected    Coronavirus NL63 PCR Not Detected Not Detected    Coronavirus OC43 PCR Not Detected Not Detected    SARS-CoV-2, PCR Not Detected Not Detected    Human Metapneumovirus PCR Not Detected Not Detected    Rhino/Enterovirus PCR Not Detected Not Detected    Influenza A by PCR Not Detected Not Detected    Influenza B by PCR Not Detected Not Detected    Parainfluenza 1 PCR Not Detected Not Detected    Parainfluenza 2 PCR Not Detected Not Detected    Parainfluenza 3 PCR Not Detected Not Detected    Parainfluenza 4 PCR Not Detected Not Detected    Resp Syncytial Virus PCR Not Detected Not Detected    Bordetella Parapertussis Not Detected Not Detected    B Pertussis by PCR Not Detected Not Detected    Chlamydia pneumoniae By PCR Not Detected Not Detected    Mycoplasma pneumo by PCR Not Detected Not Detected   Legionella Ag, Ur    Specimen: Urine   Result Value Ref Range    Legionella Pneumophilia Ag, Urine NEGATIVE NEGATIVE   STREP PNEUMONIAE ANTIGEN Specimen: Urine, indwelling catheter   Result Value Ref Range    Source . CLEAN CATCH URINE     Strep pneumo Ag NEGATIVE    Culture, Blood 1    Specimen: Blood   Result Value Ref Range    Specimen Description . BLOOD LEFT ARM     Culture NO GROWTH 5 DAYS    Culture, Blood 1    Specimen: Blood   Result Value Ref Range    Specimen Description . BLOOD RIGHT ARM     Culture NO GROWTH 5 DAYS    Culture, Urine    Specimen: Urine, clean catch   Result Value Ref Range    Specimen Description . CLEAN CATCH URINE     Culture NO GROWTH    MRSA DNA Probe, Nasal    Specimen: Nasal   Result Value Ref Range    Specimen Description . NASAL SWAB     MRSA, DNA, Nasal (A) NEGATIVE     POSITIVE:  MRSA DNA detected by nucleic acid amplification. Culture, Respiratory    Specimen: Sputum Induced   Result Value Ref Range    Specimen Description . INDUCED SPUTUM     Direct Exam >10, <25 NEUTROPHILS/LPF     Direct Exam < 10 EPITHELIAL CELLS/LPF     Direct Exam NO SIGNIFICANT PATHOGENS SEEN     Culture NO GROWTH    Troponin   Result Value Ref Range    Troponin, High Sensitivity 177 (HH) 0 - 22 ng/L   Troponin   Result Value Ref Range    Troponin, High Sensitivity 184 (HH) 0 - 22 ng/L   APTT   Result Value Ref Range    PTT 26.5 24.0 - 36.0 sec   Protime-INR   Result Value Ref Range    Protime 24.9 (H) 11.8 - 14.6 sec    INR 2.3    Phosphorus   Result Value Ref Range    Phosphorus 4.3 2.5 - 4.5 mg/dL   Magnesium   Result Value Ref Range    Magnesium 2.1 1.6 - 2.6 mg/dL   Basic Metabolic Panel   Result Value Ref Range    Glucose 96 70 - 99 mg/dL    BUN 46 (H) 8 - 23 mg/dL    Creatinine 1.77 (H) 0.70 - 1.20 mg/dL    Est, Glom Filt Rate 43 (L) >60 mL/min/1.73m2    Calcium 7.9 (L) 8.6 - 10.4 mg/dL    Sodium 138 135 - 144 mmol/L    Potassium 4.8 3.7 - 5.3 mmol/L    Chloride 97 (L) 98 - 107 mmol/L    CO2 29 20 - 31 mmol/L    Anion Gap 12 9 - 17 mmol/L   CBC with Auto Differential   Result Value Ref Range    WBC 6.8 3.5 - 11.0 k/uL    RBC 5.99 (H) 4.5 - 5.9 m/uL    Hemoglobin 12.1 (L) 13.5 - 17.5 g/dL    Hematocrit 41.4 41 - 53 %    MCV 69.1 (L) 80 - 100 fL    MCH 20.2 (L) 26 - 34 pg    MCHC 29.2 (L) 31 - 37 g/dL    RDW 19.8 (H) 11.5 - 14.9 %    Platelets 55 (L) 787 - 450 k/uL    MPV 8.6 6.0 - 12.0 fL    Seg Neutrophils 79 (H) 36 - 66 %    Lymphocytes 12 (L) 24 - 44 %    Monocytes 8 (H) 1 - 7 %    Eosinophils % 0 0 - 4 %    Basophils 1 0 - 2 %    Segs Absolute 5.40 1.3 - 9.1 k/uL    Absolute Lymph # 0.80 (L) 1.0 - 4.8 k/uL    Absolute Mono # 0.50 0.1 - 1.3 k/uL    Absolute Eos # 0.00 0.0 - 0.4 k/uL    Basophils Absolute 0.10 0.0 - 0.2 k/uL   Blood Gas, Venous   Result Value Ref Range    pH, [place] 7.258 (L) 7.320 - 7.420    pCO2, [place] 63.0 (H) 39.0 - 55.0 mm Hg    pO2, [place] 48.1 30.0 - 50.0 mm Hg    HCO3, Venous 28.1 24.0 - 30.0 mmol/L    Positive Base Excess, [place] 1.0 0.0 - 2.0 mmol/L    O2 Sat, [place] 72.1 60.0 - 85.0 %    Carboxyhemoglobin 4.3 0 - 5 %    Methemoglobin 0.5 0.0 - 1.9 %    Pt Temp 37    Brain Natriuretic Peptide   Result Value Ref Range    Pro-BNP 7,797 (H) <300 pg/mL   Hepatic Function Panel   Result Value Ref Range    Albumin 3.9 3.5 - 5.2 g/dL    Alkaline Phosphatase 68 40 - 129 U/L     (H) 5 - 41 U/L     (H) <40 U/L    Total Bilirubin 1.1 0.3 - 1.2 mg/dL    Bilirubin, Direct 0.9 (H) <0.3 mg/dL    Bilirubin, Indirect 0.2 0.0 - 1.0 mg/dL    Total Protein 6.6 6.4 - 8.3 g/dL   Lipase   Result Value Ref Range    Lipase 17 13 - 60 U/L   Urinalysis with Reflex to Culture    Specimen: Urine   Result Value Ref Range    Color, UA Orange (A) Yellow    Turbidity UA Cloudy (A) Clear    Glucose, Ur NEGATIVE NEGATIVE    Bilirubin Urine NEGATIVE  Verified by ictotest. (A) NEGATIVE    Ketones, Urine TRACE (A) NEGATIVE    Specific Gravity, UA 1.018 1.000 - 1.030    Urine Hgb LARGE (A) NEGATIVE    pH, UA 5.0 5.0 - 8.0    Protein, UA 2+ (A) NEGATIVE    Urobilinogen, Urine ELEVATED (A) Normal    Nitrite, Urine NEGATIVE NEGATIVE    Leukocyte Esterase, Urine SMALL (A) NEGATIVE   Microscopic Urinalysis   Result Value Ref Range    WBC, UA 6 TO 9 /HPF    RBC, UA TOO NUMEROUS TO COUNT /HPF    Casts UA 3 to 5 /LPF    Epithelial Cells UA 0 TO 2 /HPF    Bacteria, UA FEW (A) None   Arterial Blood Gases   Result Value Ref Range    pH, Arterial 7.295 (L) 7.350 - 7.450    pCO2, Arterial 60.3 (HH) 35.0 - 45.0 mmHg    pO2, Arterial 63.0 (L) 80.0 - 100.0 mmHg    HCO3, Arterial 29.3 (H) 22.0 - 26.0 mmol/L    Positive Base Excess, Art 2.8 (H) 0.0 - 2.0 mmol/L    O2 Sat, Arterial 85.8 (LL) 95 - 98 %    Carboxyhemoglobin 3.1 0 - 5 %    Methemoglobin 1.0 0.0 - 1.9 %    Pt Temp 37     O2 Device/Flow/% BIPAP     Respiratory Rate 16     Tyron Test PASS     Sample Site Right Radial Artery     Pt.  Position SEMI-FOWLERS     Mode BIPAP     Text for Respiratory RESULTS TO PHYSICIAN    Comprehensive Metabolic Panel w/ Reflex to MG   Result Value Ref Range    Glucose 101 (H) 70 - 99 mg/dL    BUN 46 (H) 8 - 23 mg/dL    Creatinine 1.50 (H) 0.70 - 1.20 mg/dL    Est, Glom Filt Rate 52 (L) >60 mL/min/1.73m2    Calcium 7.5 (L) 8.6 - 10.4 mg/dL    Sodium 136 135 - 144 mmol/L    Potassium 4.8 3.7 - 5.3 mmol/L    Chloride 99 98 - 107 mmol/L    CO2 27 20 - 31 mmol/L    Anion Gap 10 9 - 17 mmol/L    Alkaline Phosphatase 59 40 - 129 U/L     (H) 5 - 41 U/L     (H) <40 U/L    Total Bilirubin 0.8 0.3 - 1.2 mg/dL    Total Protein 5.9 (L) 6.4 - 8.3 g/dL    Albumin 3.3 (L) 3.5 - 5.2 g/dL   CBC with Auto Differential   Result Value Ref Range    WBC 5.6 3.5 - 11.0 k/uL    RBC 5.57 4.5 - 5.9 m/uL    Hemoglobin 11.2 (L) 13.5 - 17.5 g/dL    Hematocrit 38.8 (L) 41 - 53 %    MCV 69.7 (L) 80 - 100 fL    MCH 20.1 (L) 26 - 34 pg    MCHC 28.8 (L) 31 - 37 g/dL    RDW 19.7 (H) 11.5 - 14.9 %    Platelets 67 (L) 576 - 450 k/uL    MPV 9.1 6.0 - 12.0 fL    Seg Neutrophils 79 (H) 36 - 66 %    Lymphocytes 12 (L) 24 - 44 %    Monocytes 8 (H) 1 - 7 %    Eosinophils % 0 0 - 4 %    Basophils 1 0 - 2 %    nRBC 2 per 100 WBC Segs Absolute 4.41 1.3 - 9.1 k/uL    Absolute Lymph # 0.67 (L) 1.0 - 4.8 k/uL    Absolute Mono # 0.45 0.1 - 1.3 k/uL    Absolute Eos # 0.00 0.0 - 0.4 k/uL    Basophils Absolute 0.06 0.0 - 0.2 k/uL    Morphology ANISOCYTOSIS PRESENT     Morphology HYPOCHROMIA PRESENT     Morphology 1+ ELLIPTOCYTES    Hemoglobin and Hematocrit   Result Value Ref Range    Hemoglobin 11.1 (L) 13.5 - 17.5 g/dL    Hematocrit 38.7 (L) 41 - 53 %   Hemoglobin and Hematocrit   Result Value Ref Range    Hemoglobin 10.8 (L) 13.5 - 17.5 g/dL    Hematocrit 37.2 (L) 41 - 53 %   Ammonia   Result Value Ref Range    Ammonia 29 16 - 60 umol/L   Hepatitis Panel, Acute   Result Value Ref Range    Hepatitis B Surface Ag NONREACTIVE NONREACTIVE    Hepatitis C Ab REACTIVE (A) NONREACTIVE    Hep B Core Ab, IgM NONREACTIVE NONREACTIVE    Hep A IgM NONREACTIVE NONREACTIVE   Iron and TIBC   Result Value Ref Range    Iron 14 (L) 59 - 158 ug/dL    TIBC 426 250 - 450 ug/dL    Iron Saturation 3 (L) 20 - 55 %    UIBC 412 (H) 112 - 347 ug/dL   Ferritin   Result Value Ref Range    Ferritin 14 (L) 30 - 400 ng/mL   Mycoplasma Ab,IgM   Result Value Ref Range    Mycoplasma pneumo IgM 0.25 <0.91   Procalcitonin   Result Value Ref Range    Procalcitonin 0.09 (H) <0.09 ng/mL   C-Reactive Protein   Result Value Ref Range    CRP 16.4 (H) 0.0 - 5.0 mg/L   Fibrin Split Products   Result Value Ref Range    FDP >5 (H) <5 ug/mL   Sedimentation Rate   Result Value Ref Range    Sed Rate 1 0 - 20 mm/Hr   Drug Scr, Abuse, Ur   Result Value Ref Range    Amphetamine Screen, Ur NEGATIVE NEGATIVE    Barbiturate Screen, Ur NEGATIVE NEGATIVE    Benzodiazepine Screen, Urine NEGATIVE NEGATIVE    Cocaine Metabolite, Urine NEGATIVE NEGATIVE    Methadone Screen, Urine NEGATIVE NEGATIVE    Opiates, Urine NEGATIVE NEGATIVE    Phencyclidine, Urine NEGATIVE NEGATIVE    Cannabinoid Scrn, Ur NEGATIVE NEGATIVE    Oxycodone Screen, Ur NEGATIVE NEGATIVE    Fentanyl, Ur NEGATIVE NEGATIVE    Test Information       Assay provides medical screening only. The absence of expected drug(s) and/or metabolite(s) may indicate diluted or adulterated urine, limitations of testing or timing of collection. Troponin   Result Value Ref Range    Troponin, High Sensitivity 195 (HH) 0 - 22 ng/L   Vitamin B12 & Folate   Result Value Ref Range    Vitamin B-12 1926 (H) 232 - 1245 pg/mL    Folate 10.0 >4.8 ng/mL   HIV Screen   Result Value Ref Range    HIV Ag/Ab NONREACTIVE NONREACTIVE   MONOCLONAL PANEL   Result Value Ref Range    Kappa Free Light Chains QNT 2.79 (H) 0.37 - 1.94 mg/dL    Lambda Free Light Chains QNT 20.89 (H) 0.57 - 2.63 mg/dL    Free Kappa/Lambda Ratio 0.13 (L) 0.26 - 1.65    Serum IFX Interp       A ZONE OF RESTRICTION IS PRESENT IN THE GAMMAGLOBULIN REGION. CONFIRMED BY IMMUNOFIXATION TO BE MONOCLONAL    Pathologist       Reviewed by pathologist:  Jn Ribeiro. Migel Sy D.O. Total Protein 5.5 (L) 6.4 - 8.3 g/dL    Albumin (calculated) 3.6 3.2 - 5.2 g/dL    Albumin % 65 45 - 65 %    Alpha-1-Globulin 0.2 0.1 - 0.4 g/dL    Alpha 1 % 3 3 - 6 %    Alpha-2-Globulin 0.4 (L) 0.5 - 0.9 g/dL    Alpha 2 % 7 6 - 13 %    Beta Globulin 0.6 0.5 - 1.1 g/dL    Beta Percent 11 11 - 19 %    Gamma Globulin 0.8 0.5 - 1.5 g/dL    Gamma Globulin % 14 9 - 20 %    Total Prot. Sum 5.6 (L) 6.3 - 8.2 g/dL    Total Prot. Sum,% 100 98 - 102 %    Protein Electrophoresis, Serum       A ZONE OF RESTRICTION IS PRESENT IN THE GAMMAGLOBULIN REGION. CONFIRMED BY IMMUNOFIXATION TO BE MONOCLONAL    Pathologist       Reviewed by pathologist:  Jn Ribeiro. Migel Sy D.O. Path Review, Smear   Result Value Ref Range    Pathologist Review ELECTRONICALLY SIGNED.  Carlos Ibarra MD    Reticulocytes   Result Value Ref Range    Retic % 2.8 (H) 0.5 - 2.0 %    Absolute Retic # 0.157 (H) 0.0245 - 0.098 M/uL   Fibrinogen   Result Value Ref Range    Fibrinogen 141 (L) 210 - 530 mg/dL   Ethanol   Result Value Ref Range    Ethanol <10 <10 mg/dL    Ethanol percent <0.010 %   Lactate Dehydrogenase   Result Value Ref Range     (H) 135 - 225 U/L   Hemoglobin and Hematocrit   Result Value Ref Range    Hemoglobin 10.9 (L) 13.5 - 17.5 g/dL    Hematocrit 38.0 (L) 41 - 53 %   Haptoglobin   Result Value Ref Range    Haptoglobin 60 30 - 200 mg/dL   D-Dimer, Quantitative   Result Value Ref Range    D-Dimer, Quant 6.70 (H) 0.00 - 0.59 mg/L FEU   Urinalysis with Reflex to Culture    Specimen: Urine   Result Value Ref Range    Color, UA Dark Yellow (A) Yellow    Turbidity UA Clear Clear    Glucose, Ur NEGATIVE NEGATIVE    Bilirubin Urine NEGATIVE NEGATIVE    Ketones, Urine TRACE (A) NEGATIVE    Specific [place], UA 1.015 1.000 - 1.030    Urine Hgb LARGE (A) NEGATIVE    pH, UA 5.0 5.0 - 8.0    Protein, UA 1+ (A) NEGATIVE    Urobilinogen, Urine Normal Normal    Nitrite, Urine NEGATIVE NEGATIVE    Leukocyte Esterase, Urine SMALL (A) NEGATIVE   APTT   Result Value Ref Range    PTT 38.7 (H) 24.0 - 36.0 sec   Protime-INR   Result Value Ref Range    Protime 24.4 (H) 11.8 - 14.6 sec    INR 2.2    Microscopic Urinalysis   Result Value Ref Range    WBC, UA 10 TO 20 /HPF    RBC, UA TOO NUMEROUS TO COUNT /HPF    Casts UA HYALINE /LPF    Casts UA 0 TO 2 /LPF    Epithelial Cells UA 3 to 5 /HPF   BLOOD GAS, ARTERIAL   Result Value Ref Range    pH, Arterial 7.314 (L) 7.350 - 7.450    pCO2, Arterial 61.9 (HH) 35.0 - 45.0 mmHg    pO2, Arterial 58.1 (LL) 80.0 - 100.0 mmHg    HCO3, Arterial 31.4 (H) 22.0 - 26.0 mmol/L    Positive Base Excess, Art 5.3 (H) 0.0 - 2.0 mmol/L    O2 Sat, Arterial 86.3 (LL) 95 - 98 %    Carboxyhemoglobin 2.1 0 - 5 %    Methemoglobin 0.8 0.0 - 1.9 %    Pt Temp 37.0     O2 Device/Flow/% VENTILATOR     Respiratory Rate 22     Tyron Test PASS     Sample Site Left Radial Artery     Pt.  Position SEMI-FOWLERS     Mode PRVC     Set Rate 22     Total Rate 26          FIO2 40     Peep/Cpap 8    SURGICAL PATHOLOGY REPORT   Result Value Ref Range    Surgical Pathology Report [address] [place] [address]. [place], [facility] [address]  [phone]  Fax: [phone]  SURGICAL PATHOLOGY CONSULTATION    Patient Name: Francisca Lubin  MR#: [id number]  Specimen #[id number]    Procedures/Addenda  PERIPHERAL BLOOD REPORT     Date Ordered:     1/2/2023     Status:  Signed Out       Date Complete:     1/3/2023     By: Theodore Wynn M.D. Date Reported:     1/3/2023       INTERPRETATION  Peripheral blood:  Microcytic, hypochromic anemia. The patient's iron studies are compatible with iron deficiency anemia. Lymphopenia. Differential considerations include acute illness/infection,  medication effect, smoking, and debilitating diseases. Thrombocytopenia  Differential considerations include bone marrow hypoproduction and  immune destruction (idiopathic thrombocytopenic purpura, drug effect). RESULTS-COMMENTS  PERIPHERAL BLOOD STUDY    CBC: Please see the electronic health record  for CBC parameters  (, 01/02/2023, 05:43). PLATELETS: Decreased platelets. Platelets show normal morphology. LEUKOCYTES: Decreased lymphocytes. White blood cells show normal  morphology. There are no blasts. ERYTHROCYTES: Microcytic, hypochromic. Anisocytosis and  poikilocytosis. Polychromasia. Reticulocyte count 2.8%. Rare RBC  fragments. Note: The electronic health record is reviewed. Theodore Wynn M.D.                    Source:  A: Peripheral Blood     Comprehensive Metabolic Panel w/ Reflex to MG   Result Value Ref Range    Glucose 91 70 - 99 mg/dL    BUN 32 (H) 8 - 23 mg/dL    Creatinine 0.97 0.70 - 1.20 mg/dL    Est, Glom Filt Rate >60 >60 mL/min/1.73m2    Calcium 7.4 (L) 8.6 - 10.4 mg/dL    Sodium 143 135 - 144 mmol/L    Potassium 3.7 3.7 - 5.3 mmol/L    Chloride 105 98 - 107 mmol/L    CO2 30 20 - 31 mmol/L    Anion Gap 8 (L) 9 - 17 mmol/L    Alkaline Phosphatase 50 40 - 129 U/L     (H) 5 - 41 U/L     (H) <40 U/L    Total Bilirubin 0.9 0.3 - 1.2 mg/dL    Total Protein 5.2 (L) 6.4 - 8.3 g/dL    Albumin 3.1 (L) 3.5 - 5.2 g/dL   CBC with Auto Differential   Result Value Ref Range    WBC 5.7 3.5 - 11.0 k/uL    RBC 5.42 4.5 - 5.9 m/uL    Hemoglobin 11.0 (L) 13.5 - 17.5 g/dL    Hematocrit 37.6 (L) 41 - 53 %    MCV 69.4 (L) 80 - 100 fL    MCH 20.2 (L) 26 - 34 pg    MCHC 29.2 (L) 31 - 37 g/dL    RDW 20.0 (H) 11.5 - 14.9 %    Platelets 66 (L) 042 - 450 k/uL    MPV 9.4 6.0 - 12.0 fL    Seg Neutrophils 85 (H) 36 - 66 %    Lymphocytes 9 (L) 24 - 44 %    Monocytes 5 1 - 7 %    Eosinophils % 0 0 - 4 %    Basophils 0 0 - 2 %    Bands 1 0 - 10 %    nRBC 1 (H) 0 per 100 WBC    Segs Absolute 4.87 1.3 - 9.1 k/uL    Absolute Lymph # 0.52 (L) 1.0 - 4.8 k/uL    Absolute Mono # 0.29 0.1 - 1.3 k/uL    Absolute Eos # 0.00 0.0 - 0.4 k/uL    Basophils Absolute 0.00 0.0 - 0.2 k/uL    Absolute Bands # 0.06 0.0 - 1.0 k/uL    Morphology ANISOCYTOSIS PRESENT     Morphology HYPOCHROMIA PRESENT     Morphology 1+ POLYCHROMASIA     Morphology 1+ ELLIPTOCYTES    BLOOD GAS, ARTERIAL   Result Value Ref Range    pH, Arterial 7.373 7.350 - 7.450    pCO2, Arterial 56.0 (HH) 35.0 - 45.0 mmHg    pO2, Arterial 61.4 (L) 80.0 - 100.0 mmHg    HCO3, Arterial 32.6 (H) 22.0 - 26.0 mmol/L    Positive Base Excess, Art 7.4 (H) 0.0 - 2.0 mmol/L    O2 Sat, Arterial 89.2 (L) 95 - 98 %    Carboxyhemoglobin 1.7 0 - 5 %    Methemoglobin 0.9 0.0 - 1.9 %    Pt Temp 37     O2 Device/Flow/% VENTILATOR     Respiratory Rate 22     Tyron Test PASS     Sample Site Right Radial Artery     Pt.  Position SEMI-FOWLERS     Mode PRVC     Set Rate 22     Total Rate 22          FIO2 35     Peep/Cpap 8     Text for Respiratory RESULTS GIVEN TO RN    IMMUNOFIXATION URINE RANDOM PROFILE   Result Value Ref Range    Urine IFX Specimen HURTADO SPECIMEN     Volume HURTADO SPECIMEN mL    Urine Total Protein 11 mg/dL    Urine IFX Interp PENDING    Protime-INR   Result Value Ref Range    Protime 21.9 (H) 11.8 - 14.6 sec    INR 1.9    APTT   Result Value Ref Range    PTT 38.4 (H) 24.0 - 36.0 sec   Triglyceride   Result Value Ref Range    Triglycerides 85 <150 mg/dL   CHLORIDE, URINE, RANDOM   Result Value Ref Range    Chloride, Ur 34 mmol/L   Protein / Creatinine Ratio, Urine   Result Value Ref Range    Total Protein, Urine 23 mg/dL    Creatinine, Ur 79.8 39.0 - 259.0 mg/dL    Urine Total Protein Creatinine Ratio 0.29 (H) 0.00 - 0.20   SODIUM, URINE, RANDOM   Result Value Ref Range    Sodium,Ur <20 mmol/L   BLOOD GAS, ARTERIAL   Result Value Ref Range    pH, Arterial 7.360 7.350 - 7.450    pCO2, Arterial 55.9 (HH) 35.0 - 45.0 mmHg    pO2, Arterial 71.4 (L) 80.0 - 100.0 mmHg    HCO3, Arterial 31.6 (H) 22.0 - 26.0 mmol/L    Positive Base Excess, Art 6.1 (H) 0.0 - 2.0 mmol/L    O2 Sat, Arterial 91.7 (L) 95 - 98 %    Carboxyhemoglobin 1.2 0 - 5 %    Methemoglobin 1.3 0.0 - 1.9 %    Pt Temp 37     O2 Device/Flow/% VENTILATOR     Tyron Test PASS     Sample Site Right Radial Artery     Pt.  Position SEMI-FOWLERS     Mode PRVC     Text for Respiratory RESULTS GIVEN TO RN    CBC with Auto Differential   Result Value Ref Range    WBC 6.0 3.5 - 11.0 k/uL    RBC 5.56 4.5 - 5.9 m/uL    Hemoglobin 10.8 (L) 13.5 - 17.5 g/dL    Hematocrit 38.2 (L) 41 - 53 %    MCV 68.8 (L) 80 - 100 fL    MCH 19.5 (L) 26 - 34 pg    MCHC 28.3 (L) 31 - 37 g/dL    RDW 20.1 (H) 11.5 - 14.9 %    Platelets 75 (L) 865 - 450 k/uL    MPV 9.3 6.0 - 12.0 fL    Seg Neutrophils 82 (H) 36 - 66 %    Lymphocytes 7 (L) 24 - 44 %    Monocytes 9 (H) 1 - 7 %    Eosinophils % 1 0 - 4 %    Basophils 1 0 - 2 %    Segs Absolute 4.90 1.3 - 9.1 k/uL    Absolute Lymph # 0.40 (L) 1.0 - 4.8 k/uL    Absolute Mono # 0.50 0.1 - 1.3 k/uL    Absolute Eos # 0.10 0.0 - 0.4 k/uL    Basophils Absolute 0.10 0.0 - 0.2 k/uL   Comprehensive Metabolic Panel w/ Reflex to MG   Result Value Ref Range    Glucose 85 70 - 99 mg/dL    BUN 16 8 - 23 mg/dL    Creatinine 0.64 (L) 0.70 - 1.20 mg/dL    Est, Glom Filt Rate >60 >60 mL/min/1.73m2    Calcium 7.4 (L) 8.6 - 10.4 mg/dL    Sodium 143 135 - 144 mmol/L    Potassium 3.3 (L) 3.7 - 5.3 mmol/L    Chloride 107 98 - 107 mmol/L    CO2 29 20 - 31 mmol/L    Anion Gap 7 (L) 9 - 17 mmol/L    Alkaline Phosphatase 49 40 - 129 U/L     (H) 5 - 41 U/L     (H) <40 U/L    Total Bilirubin 0.8 0.3 - 1.2 mg/dL    Total Protein 5.0 (L) 6.4 - 8.3 g/dL    Albumin 2.8 (L) 3.5 - 5.2 g/dL   Vancomycin Level, Random   Result Value Ref Range    Vancomycin Rm 16.1 ug/mL    Vancomycin Random Dose amount 1g     Vancomycin Random Date last dose 1/4/22     Vancomycin Random Time last dose 0232    Magnesium   Result Value Ref Range    Magnesium 2.0 1.6 - 2.6 mg/dL   Hepatitis C RNA, quantitative, PCR   Result Value Ref Range    Source . PLASMA     Hepatitis C RNA-PCR 17,400 IU/mL    Hepatitis C RNA Quant Log 4.24 Log IU/mL    HCV RNA PCR, Quant DETECTED (A) Not Detected   CBC with Auto Differential   Result Value Ref Range    WBC 5.3 3.5 - 11.0 k/uL    RBC 5.80 4.5 - 5.9 m/uL    Hemoglobin 11.4 (L) 13.5 - 17.5 g/dL    Hematocrit 40.0 (L) 41 - 53 %    MCV 68.9 (L) 80 - 100 fL    MCH 19.7 (L) 26 - 34 pg    MCHC 28.5 (L) 31 - 37 g/dL    RDW 20.5 (H) 11.5 - 14.9 %    Platelets 66 (L) 639 - 450 k/uL    MPV 9.1 6.0 - 12.0 fL    Seg Neutrophils 79 (H) 36 - 66 %    Lymphocytes 8 (L) 24 - 44 %    Monocytes 10 (H) 1 - 7 %    Eosinophils % 2 0 - 4 %    Basophils 1 0 - 2 %    Segs Absolute 4.19 1.3 - 9.1 k/uL    Absolute Lymph # 0.42 (L) 1.0 - 4.8 k/uL    Absolute Mono # 0.53 0.1 - 1.3 k/uL    Absolute Eos # 0.11 0.0 - 0.4 k/uL    Basophils Absolute 0.05 0.0 - 0.2 k/uL    Morphology ANISOCYTOSIS PRESENT     Morphology MICROCYTOSIS PRESENT     Morphology HYPOCHROMIA PRESENT     Morphology FEW ELLIPTOCYTES     Morphology FEW TARGET CELLS    Comprehensive Metabolic Panel w/ Reflex to MG   Result Value Ref Range    Glucose 84 70 - 99 mg/dL    BUN 11 8 - 23 mg/dL    Creatinine 0.58 (L) 0.70 - 1.20 mg/dL    Est, Glom Filt Rate >60 >60 mL/min/1.73m2    Calcium 7.9 (L) 8.6 - 10.4 mg/dL    Sodium 146 (H) 135 - 144 mmol/L    Potassium 3.4 (L) 3.7 - 5.3 mmol/L    Chloride 110 (H) 98 - 107 mmol/L    CO2 30 20 - 31 mmol/L    Anion Gap 6 (L) 9 - 17 mmol/L    Alkaline Phosphatase 53 40 - 129 U/L     (H) 5 - 41 U/L     (H) <40 U/L    Total Bilirubin 0.8 0.3 - 1.2 mg/dL    Total Protein 5.0 (L) 6.4 - 8.3 g/dL    Albumin 2.9 (L) 3.5 - 5.2 g/dL   Fibrinogen   Result Value Ref Range    Fibrinogen 98 (L) 210 - 530 mg/dL   Protime-INR   Result Value Ref Range    Protime 20.3 (H) 11.8 - 14.6 sec    INR 1.7    APTT   Result Value Ref Range    PTT 41.6 (H) 24.0 - 36.0 sec   BLOOD GAS, ARTERIAL   Result Value Ref Range    pH, Arterial 7.372 7.350 - 7.450    pCO2, Arterial 51.6 (HH) 35.0 - 45.0 mmHg    pO2, Arterial 62.9 (L) 80.0 - 100.0 mmHg    HCO3, Arterial 29.9 (H) 22.0 - 26.0 mmol/L    Positive Base Excess, Art 4.7 (H) 0.0 - 2.0 mmol/L    O2 Sat, Arterial 89.7 (L) 95 - 98 %    Carboxyhemoglobin 1.7 0 - 5 %    Methemoglobin 1.1 0.0 - 1.9 %    Pt Temp 37     O2 Device/Flow/% VENTILATOR     Respiratory Rate 22     Tyron Test PASS     Sample Site Right Radial Artery     Pt.  Position SEMI-FOWLERS     Mode PRVC     Set Rate 22     Total Rate 22          FIO2 40     Peep/Cpap 8     Text for Respiratory RESULTS GIVEN TO RN    Magnesium   Result Value Ref Range    Magnesium 2.0 1.6 - 2.6 mg/dL   CBC with Auto Differential   Result Value Ref Range    WBC 5.2 3.5 - 11.0 k/uL    RBC 5.33 4.5 - 5.9 m/uL    Hemoglobin 10.5 (L) 13.5 - 17.5 g/dL    Hematocrit 38.4 (L) 41 - 53 %    MCV 72.0 (L) 80 - 100 fL    MCH 19.7 (L) 26 - 34 pg    MCHC 27.4 (L) 31 - 37 g/dL    RDW 20.3 (H) 11.5 - 14.9 %    Platelets 71 (L) 608 - 450 k/uL    MPV 9.3 6.0 - 12.0 fL    Seg Neutrophils 90 (H) 36 - 66 %    Lymphocytes 4 (L) 24 - 44 %    Monocytes 5 1 - 7 % Eosinophils % 0 0 - 4 %    Basophils 0 0 - 2 %    Bands 1 0 - 10 %    Segs Absolute 4.68 1.3 - 9.1 k/uL    Absolute Lymph # 0.21 (L) 1.0 - 4.8 k/uL    Absolute Mono # 0.26 0.1 - 1.3 k/uL    Absolute Eos # 0.00 0.0 - 0.4 k/uL    Basophils Absolute 0.00 0.0 - 0.2 k/uL    Absolute Bands # 0.05 0.0 - 1.0 k/uL    Morphology ANISOCYTOSIS PRESENT     Morphology HYPOCHROMIA PRESENT     Morphology MICROCYTOSIS PRESENT     Morphology 1+ ELLIPTOCYTES     Morphology 1+ TEARDROPS    Comprehensive Metabolic Panel w/ Reflex to MG   Result Value Ref Range    Glucose 111 (H) 70 - 99 mg/dL    BUN 7 (L) 8 - 23 mg/dL    Creatinine 0.42 (L) 0.70 - 1.20 mg/dL    Est, Glom Filt Rate >60 >60 mL/min/1.73m2    Calcium 7.5 (L) 8.6 - 10.4 mg/dL    Sodium 144 135 - 144 mmol/L    Potassium 4.0 3.7 - 5.3 mmol/L    Chloride 111 (H) 98 - 107 mmol/L    CO2 29 20 - 31 mmol/L    Anion Gap 4 (L) 9 - 17 mmol/L    Alkaline Phosphatase 46 40 - 129 U/L     (H) 5 - 41 U/L    AST 74 (H) <40 U/L    Total Bilirubin 0.9 0.3 - 1.2 mg/dL    Total Protein 4.6 (L) 6.4 - 8.3 g/dL    Albumin 2.6 (L) 3.5 - 5.2 g/dL   Vancomycin Level, Random   Result Value Ref Range    Vancomycin Rm 20.3 ug/mL    Vancomycin Random Dose amount 1,250     Vancomycin Random Date last dose 509153     Vancomycin Random Time last dose 0546    Fibrinogen   Result Value Ref Range    Fibrinogen 109 (L) 210 - 530 mg/dL   Protime-INR   Result Value Ref Range    Protime 20.1 (H) 11.8 - 14.6 sec    INR 1.7    Comprehensive Metabolic Panel w/ Reflex to MG   Result Value Ref Range    Glucose 91 70 - 99 mg/dL    BUN 5 (L) 8 - 23 mg/dL    Creatinine <0.40 (L) 0.70 - 1.20 mg/dL    Est, Glom Filt Rate Can not be calculated >60 mL/min/1.73m2    Calcium 8.1 (L) 8.6 - 10.4 mg/dL    Sodium 147 (H) 135 - 144 mmol/L    Potassium 4.1 3.7 - 5.3 mmol/L    Chloride 108 (H) 98 - 107 mmol/L    CO2 26 20 - 31 mmol/L    Anion Gap 13 9 - 17 mmol/L    Alkaline Phosphatase 51 40 - 129 U/L     (H) 5 - 41 U/L AST 63 (H) <40 U/L    Total Bilirubin 1.6 (H) 0.3 - 1.2 mg/dL    Total Protein 5.3 (L) 6.4 - 8.3 g/dL    Albumin 2.9 (L) 3.5 - 5.2 g/dL   SPECIMEN REJECTION   Result Value Ref Range    Specimen Source . BLOOD     Ordered Test CDP     Reason for Rejection Unable to perform testing: Specimen clotted.     CBC with Auto Differential   Result Value Ref Range    WBC 7.1 3.5 - 11.0 k/uL    RBC 5.90 4.5 - 5.9 m/uL    Hemoglobin 11.7 (L) 13.5 - 17.5 g/dL    Hematocrit 41.4 41 - 53 %    MCV 70.2 (L) 80 - 100 fL    MCH 19.8 (L) 26 - 34 pg    MCHC 28.2 (L) 31 - 37 g/dL    RDW 20.4 (H) 11.5 - 14.9 %    Platelets 68 (L) 577 - 450 k/uL    MPV 9.1 6.0 - 12.0 fL    Seg Neutrophils 82 (H) 36 - 66 %    Lymphocytes 10 (L) 24 - 44 %    Monocytes 6 1 - 7 %    Eosinophils % 1 0 - 4 %    Basophils 1 0 - 2 %    Segs Absolute 5.82 1.3 - 9.1 k/uL    Absolute Lymph # 0.71 (L) 1.0 - 4.8 k/uL    Absolute Mono # 0.43 0.1 - 1.3 k/uL    Absolute Eos # 0.07 0.0 - 0.4 k/uL    Basophils Absolute 0.07 0.0 - 0.2 k/uL    Morphology ANISOCYTOSIS PRESENT     Morphology 1+ TARGET CELLS     Morphology 1+ TEARDROPS     Morphology 1+ ELLIPTOCYTES    Protime-INR   Result Value Ref Range    Protime 19.4 (H) 11.8 - 14.6 sec    INR 1.6    APTT   Result Value Ref Range    PTT 35.4 24.0 - 36.0 sec   CBC with Auto Differential   Result Value Ref Range    WBC 5.9 3.5 - 11.0 k/uL    RBC 5.61 4.5 - 5.9 m/uL    Hemoglobin 11.4 (L) 13.5 - 17.5 g/dL    Hematocrit 39.3 (L) 41 - 53 %    MCV 70.0 (L) 80 - 100 fL    MCH 20.2 (L) 26 - 34 pg    MCHC 28.9 (L) 31 - 37 g/dL    RDW 20.2 (H) 11.5 - 14.9 %    Platelets 63 (L) 013 - 450 k/uL    MPV 8.8 6.0 - 12.0 fL    Seg Neutrophils 76 (H) 36 - 66 %    Lymphocytes 11 (L) 24 - 44 %    Monocytes 11 (H) 1 - 7 %    Eosinophils % 1 0 - 4 %    Basophils 1 0 - 2 %    Segs Absolute 4.48 1.3 - 9.1 k/uL    Absolute Lymph # 0.65 (L) 1.0 - 4.8 k/uL    Absolute Mono # 0.65 0.1 - 1.3 k/uL    Absolute Eos # 0.06 0.0 - 0.4 k/uL    Basophils Absolute 0.06 0.0 - 0.2 k/uL    Morphology ANISOCYTOSIS PRESENT     Morphology MICROCYTOSIS PRESENT     Morphology HYPOCHROMIA PRESENT     Morphology 1+ TARGET CELLS     Morphology 1+ ELLIPTOCYTES    Comprehensive Metabolic Panel w/ Reflex to MG   Result Value Ref Range    Glucose 118 (H) 70 - 99 mg/dL    BUN 4 (L) 8 - 23 mg/dL    Creatinine <0.40 (L) 0.70 - 1.20 mg/dL    Est, Glom Filt Rate Can not be calculated >60 mL/min/1.73m2    Calcium 8.1 (L) 8.6 - 10.4 mg/dL    Sodium 147 (H) 135 - 144 mmol/L    Potassium 3.3 (L) 3.7 - 5.3 mmol/L    Chloride 109 (H) 98 - 107 mmol/L    CO2 34 (H) 20 - 31 mmol/L    Anion Gap 4 (L) 9 - 17 mmol/L    Alkaline Phosphatase 45 40 - 129 U/L    ALT 99 (H) 5 - 41 U/L    AST 38 <40 U/L    Total Bilirubin 1.7 (H) 0.3 - 1.2 mg/dL    Total Protein 5.1 (L) 6.4 - 8.3 g/dL    Albumin 2.8 (L) 3.5 - 5.2 g/dL   Fibrinogen   Result Value Ref Range    Fibrinogen 151 (L) 210 - 530 mg/dL   APTT   Result Value Ref Range    PTT 34.4 24.0 - 36.0 sec   Protime-INR   Result Value Ref Range    Protime 19.1 (H) 11.8 - 14.6 sec    INR 1.6    Magnesium   Result Value Ref Range    Magnesium 1.8 1.6 - 2.6 mg/dL   EKG 12 Lead   Result Value Ref Range    Ventricular Rate 75 BPM    Atrial Rate 75 BPM    P-R Interval 142 ms    QRS Duration 66 ms    Q-T Interval 350 ms    QTc Calculation (Bazett) 390 ms    P Axis 18 degrees    R Axis -165 degrees    T Axis 41 degrees   EKG 12 Lead   Result Value Ref Range    Ventricular Rate 105 BPM    Atrial Rate 105 BPM    P-R Interval 144 ms    QRS Duration 78 ms    Q-T Interval 302 ms    QTc Calculation (Bazett) 399 ms    P Axis 13 degrees    R Axis -30 degrees    T Axis -15 degrees   PREPARE CRYOPRECIPITATE, 1 Product   Result Value Ref Range    Unit Number [id number]     Product Code Thawed Pooled Cryoprecipitate     Unit Divison 00     Dispense Status ISSUED     Unit Issue Date/Time 299547550916     Product Code Blood Bank S3513C51     Blood Bank Unit Type and Rh O POS Blood Bank ISBT Product Blood Type 5100     Blood Bank Blood Product Expiration Date 631658133118     Transfusion Status OK TO TRANSFUSE          IMAGING DATA:    CT HEAD WO CONTRAST    Result Date: 1/1/2023  EXAMINATION: CT OF THE HEAD WITHOUT CONTRAST  1/1/2023 10:48 pm TECHNIQUE: CT of the head was performed without the administration of intravenous contrast. Automated exposure control, iterative reconstruction, and/or weight based adjustment of the mA/kV was utilized to reduce the radiation dose to as low as reasonably achievable. COMPARISON: None. HISTORY: Reason for Exam: AMS Additional signs and symptoms: the patient has been getting more and more confused since 2 weeks ago. It has progressively been getting worse and today FINDINGS: BRAIN/VENTRICLES: There is no acute intracranial hemorrhage, mass effect or midline shift. No abnormal extra-axial fluid collection. The gray-white differentiation is maintained without evidence of an acute infarct. There is no evidence of hydrocephalus. Changes of mild chronic small vessel ischemic disease. ORBITS: The visualized portion of the orbits demonstrate no acute abnormality. SINUSES: The visualized paranasal sinuses and mastoid air cells demonstrate no acute abnormality. SOFT TISSUES/SKULL:  No acute abnormality of the visualized skull or soft tissues. No acute intracranial abnormality. Mild chronic small vessel ischemic disease. XR CHEST PORTABLE    Result Date: 1/1/2023  EXAMINATION: ONE XRAY VIEW OF THE CHEST 1/1/2023 7:17 pm COMPARISON: None. HISTORY: ORDERING SYSTEM PROVIDED HISTORY: ams TECHNOLOGIST PROVIDED HISTORY: ams Reason for Exam: Altered mental status, difficulty breathing Additional signs and symptoms: Altered mental status, difficulty breathing Relevant Medical/Surgical History:  Altered mental status, difficulty breathing FINDINGS: Large lucent area in the left apex suggesting a large bulla however superimposed pneumothorax cannot be excluded. The cardiac size is normal. Right lower lobe airspace infiltrate and mild right pleural effusion. . Pulmonary vascularity appears normal. There is mild ectasia of the thoracic aorta. Calcific tendinitis of the right shoulder. No acute bony abnormalities. The hilar structures are normal.     Right lower lobe pneumonia and small right pleural effusion Large lucent area in the left apex suggesting a large bulla however superimposed pneumothorax cannot be excluded. Follow-up CT would be useful for further evaluation. The findings were sent to the Radiology Results [address] at 10:31 pm on 1/1/2023 to be communicated to a licensed caregiver. IMPRESSION:   Primary Problem  Acute respiratory failure with hypoxia and hypercapnia ([address]    Active Hospital Problems    Diagnosis Date Noted    Prolonged pt (prothrombin time) [R79.1] 01/03/2023     Priority: Medium    Emphysema lung ([address] [J43.9] 01/03/2023     Priority: Medium    Cerebral infarction ([address] [I63.9] 01/03/2023     Priority: Medium    Transaminitis [R74.01] 01/02/2023     Priority: Medium    Microcytic anemia [D64.9] 01/02/2023     Priority: Medium    Thrombocytopenia ([address] [D69.6] 01/02/2023     Priority: Medium    Agitation [R45.1] 01/02/2023     Priority: Medium    Acute respiratory failure with hypoxia and hypercapnia (HCC) RLL pneumonia [J96.01, J96.02] 01/02/2023     Priority: Medium    Altered mental status [R41.82] 01/02/2023     Priority: Medium    Community acquired pneumonia of right lower lobe of lung [J18.9] 01/02/2023     Priority: Medium       Medical apathy  Respiratory failure secondary pneumonia  Abnormal LFT  Microcytic anemia  Thrombocytopenia  History of alcohol dependence  IgG lambda paraproteinemia  Positive HCV screen  Iron deficiency  Coagulopathy    RECOMMENDATIONS:  I reviewed the labs/imaging available to me,outside records and discussed with the patient. I explained to the patient the nature of this problem.  I explained the significance of these abnormalities and possible etiology and management options  Patient has chronic liver disease secondary to alcohol  Anemia and thrombocytopenia are related to liver disease complicated by acute illness  Patient has coagulopathy secondary to liver disease as mentioned  Cardioembolic strokes with a component of subarachnoid bleeding  Coagulopathy is close to baseline but fibrinogen is improving slowly   Case was discussed with critical care, looking at the CT and the MRI and the coagulopathy, it will be difficult to offer this patient anticoagulation. We decided to hold off on anticoagulation right now and watch the patient closely  Watch platelet count, watch PT/INR and fibrinogen. I recommend repeating imaging studies likely CT scan in the next 2 to 3 days to follow-up on subarachnoid hemorrhage. We will follow with you                        Discussed with family and Nurse. Thank you for asking us to see this patient. Brenton Mccray MD  Hematologist/Medical [address] [place] [address] hematology oncology physicians            This note is created with the assistance of a speech recognition program.  While intending to generate a document that actually reflects the content of the visit, the document can still have some errors including those of syntax and sound a like substitutions which may escape proof reading. It such instances, actual meaning can be extrapolated by contextual diversion.

## 2023-01-08 NOTE — PROGRESS NOTES
[facility] -   Infectious diseases evaluation  admission date 1/1/2023    reason for consultation:   Community-acquired pneumonia    Impression :   Current:  Acute hypoxic respiratory failure required intubation 1/2/22 was subsequently extubated  Community-acquired pneumonia with possible aspiration. Acute CVA with subarachnoid hemorrhage  Positive MRSA nasal swab  Liver disease  Cholelithiasis  Acute renal failure  Thrombocytopenia  Reactive hepatitis C antibody /elevated liver enzymes    Recommendations     Discontinue Unasyn 1/ 8/23  Continue IV vancomycin  Swallow study pending  Liver ultrasound 1/2/2323 showed increased echogenicity and cholelithiasis, no cholecystitis. HIV screen was negative on 1/2/23  Hepatitis C RNA pending  The patient received IV ceftriaxone and Zithromax on 1/1/2022  Follow blood and respiratory cultures, no growth to date  No growth on urine culture from 1/3/23  Nasal swab for MRSA was +1/2/23  Respiratory panel with COVID-19 PCR was negative  Urine for Legionella antigen negative  Follow CBC and renal function  Continue supportive care      Infection Control Recommendations   Hodges Precautions  Contact Isolation       Antimicrobial Stewardship Recommendations   Simplification of therapy  Targeted therapy      History of Present Illness:   Initial history:  Po Nielsen is a 60y.o.-year-old male was brought to the hospital on 1/1/2023 with altered mental status, confusion associated with decreased responsiveness worsening for 2 weeks. At the ER he was obtunded, O2 sat was 75% on room air, was placed on nonrebreather initially, subsequently was intubated. He is intubated, sedated unable to provide history that was obtained from chart review and nursing staff. According to his wife he is fully vaccinated for COVID-19 and flu.   He has been afebrile since admission  Initial procalcitonin level was 0.09, WBC normal  He was hypotensive was started on Levophed. MRI showed small acute infarcts in the left cerebellar hemisphere and left parietal love with moderate bilateral subarachnoid hemorrhages within both cerebral hemispheres. Chest x-ray showed right lower lobe infiltrates  Interval changes  1/8/2023   He was extubated 1/5/2023. He is afebrile, temperature max 100.1 yesterday, awake, oriented to person, confused, no reported diarrhea. Mars catheter in place with dark urine  Left arm PICC line in place. Patient Vitals for the past 8 hrs:   BP Temp Temp src Pulse Resp SpO2   01/08/23 1500 129/75 -- -- 86 22 92 %   01/08/23 1400 136/78 -- -- 86 17 90 %   01/08/23 1300 136/82 -- -- 91 19 91 %   01/08/23 1200 (!) 151/84 99.3 °F (37.4 °C) Axillary (!) 106 21 (!) 89 %   01/08/23 1123 -- -- -- 92 20 99 %   01/08/23 1100 (!) 145/90 -- -- 100 23 94 %   01/08/23 1000 (!) 142/81 -- -- 100 21 91 %   01/08/23 0900 (!) 151/74 -- -- 97 15 91 %   01/08/23 0800 (!) 154/99 99.8 °F (37.7 °C) Axillary 95 21 97 %               I have personally reviewed the past medical history, past surgical history, medications, social history, and family history, and I haveupdated the database accordingly. Allergies:   Patient has no known allergies. Review of Systems:     Review of Systems  confused, unable to provide  Physical Examination :       Physical Exam  Constitutional:       Appearance: He is ill-appearing. HENT:      Head: Normocephalic and atraumatic. Right Ear: External ear normal.      Left Ear: External ear normal.   Eyes:      General: No scleral icterus. Conjunctiva/sclera: Conjunctivae normal.   Cardiovascular:      Rate and Rhythm: Normal rate and regular rhythm. Heart sounds: Normal heart sounds. Pulmonary:      Comments: coarse breath sounds bilaterally, few crackles. Abdominal:      General: There is no distension. Palpations: Abdomen is soft. Musculoskeletal:      Cervical back: Neck supple. No rigidity. Right lower leg: No edema. Left lower leg: No edema. Skin:     Coloration: Skin is not jaundiced. Findings: No erythema. Past Medical History:   History reviewed. No pertinent past medical history. Past Surgical  History:   History reviewed. No pertinent surgical history.     Medications:      ziprasidone (GEODON) in sterile water injection  10 mg IntraMUSCular BID    ipratropium-albuterol  1 ampule Inhalation Q4H WA    heparin (porcine)  5,000 Units SubCUTAneous 3 times per day    nystatin   Topical BID    potassium bicarb-citric acid  20 mEq Oral Daily    ampicillin-sulbactam  3,000 mg IntraVENous Q6H    [Held by provider] miconazole   Topical BID    vancomycin  1,250 mg IntraVENous Q12H    [Held by provider] aspirin  81 mg Oral Daily    sodium chloride flush  5-40 mL IntraVENous 2 times per day    nicotine  1 patch TransDERmal Daily    pantoprazole (PROTONIX) 40 mg injection  40 mg IntraVENous Daily    vancomycin (VANCOCIN) intermittent dosing (placeholder)   Other RX Placeholder       Social History:     Social History     Socioeconomic History    Marital status: Single     Spouse name: Not on file    Number of children: Not on file    Years of education: Not on file    Highest education level: Not on file   Occupational History    Not on file   Tobacco Use    Smoking status: Every Day     Packs/day: 1.00     Years: 40.00     Pack years: 40.00     Types: Cigarettes    Smokeless tobacco: Never   Substance and Sexual Activity    Alcohol use: Yes     Comment: beer and scotch    Drug use: Not Currently     Comment: over 20 years (stated by daughter)    Sexual activity: Not on file   Other Topics Concern    Not on file   Social History Narrative    Not on file     Social Determinants of Health     Financial Resource Strain: Not on file   Food Insecurity: Not on file   Transportation Needs: Not on file   Physical Activity: Not on file   Stress: Not on file   Social Connections: Not on file Intimate Partner Violence: Not on file   Housing Stability: Not on file       Family History:   History reviewed. No pertinent family history. Medical Decision Making:   I have independently reviewed/ordered the following labs:    CBC with Differential:   Recent Labs     01/07/23 0519 01/08/23 0438   WBC 7.1 5.9   HGB 11.7* 11.4*   HCT 41.4 39.3*   PLT 68* 63*   LYMPHOPCT 10* 11*   MONOPCT 6 11*       BMP:  Recent Labs     01/07/23  0412 01/08/23 0438   * 147*   K 4.1 3.3*   * 109*   CO2 26 34*   BUN 5* 4*   CREATININE <0.40* <0.40*   MG  --  1.8       Hepatic Function Panel:   Recent Labs     01/07/23 0412 01/08/23 0438   PROT 5.3* 5.1*   LABALBU 2.9* 2.8*   BILITOT 1.6* 1.7*   ALKPHOS 51 45   * 99*   AST 63* 38       No results for input(s): RPR in the last 72 hours. No results for input(s): HIV in the last 72 hours. No results for input(s): BC in the last 72 hours. Lab Results   Component Value Date/Time    CREATININE <0.40 01/08/2023 04:38 AM    GLUCOSE 118 01/08/2023 04:38 AM       Detailed results: Thank you for allowing us to participate in the care of this patient. Please call with questions. This note is created with the assistance of a speech recognition program.  While intending to generate adocument that actually reflects the content of the visit, the document can still have some errors including those of syntax and sound a like substitutions which may escape proof reading. It such instances, actual meaningcan be extrapolated by contextual diversion.     Kaushal Nunez MD  Office: [phone]  Perfect serve / office [phone]

## 2023-01-08 NOTE — PROGRESS NOTES
Vancomycin Dosing by Pharmacy - Daily Note   Vancomycin Therapy Day:  7  Indication: CAP     Allergies:  Patient has no known allergies. Actual Weight:    Wt Readings from Last 1 Encounters:   01/08/23 195 lb 1.7 oz (88.5 kg)       Labs/Ancillary Data  Estimated Creatinine Clearance: 201 mL/min (based on SCr of 0.4 mg/dL). Recent Labs     01/06/23  0419 01/07/23  0412 01/07/23  0519 01/08/23  0438   CREATININE 0.42* <0.40*  --  <0.40*   BUN 7* 5*  --  4*   WBC 5.2  --  7.1 5.9     Procalcitonin   Date Value Ref Range Status   01/02/2023 0.09 (H) <0.09 ng/mL Final     Comment:           Suspected Sepsis:  <0.50 ng/mL     Low likelihood of sepsis. 0.50-2.00 ng/mL     Increased likelihood of sepsis. Antibiotics encouraged. >2.00 ng/mL     High risk of sepsis/shock. Antibiotics strongly encouraged. Suspected Lower Resp Tract Infections:  <0.24 ng/mL     Low likelihood of bacterial infection. >0.24 ng/mL     Increased likelihood of bacterial infection. Antibiotics encouraged. With successful antibiotic therapy, PCT levels should decrease rapidly. (Half-life of 24 to   36 hours.)        Procalcitonin values from samples collected within the first 6 hours of systemic infection   may still be low. Retesting may be indicated. Values from day 1 and day 4 can be entered into the Change in Procalcitonin Calculator   ([email or web address]) to determine the patient's Mortality Risk Prognosis        In healthy neonates, plasma Procalcitonin (PCT) concentrations increase gradually after   birth, reaching peak values at about 24 hours of age then decrease to normal values below   0.5 ng/mL by 48-72 hours of age.          Intake/Output Summary (Last 24 hours) at 1/8/2023 1415  Last data filed at 1/8/2023 0717  Gross per 24 hour   Intake 1335 ml   Output 1450 ml   Net -115 ml     Temp: 99.3    Culture Date / Source  /  Results  Blood/Urine/Sputum cx Neg  Recent vancomycin administrations vancomycin (VANCOCIN) 1,250 mg in dextrose 5 % 250 mL IVPB (ADDAVIAL) (mg) 1,250 mg New Bag 23 0547     1,250 mg New Bag 23 1746     1,250 mg New Bag  0425     1,250 mg New Bag 23 1846     1,250 mg New Bag  0428     1,250 mg New Bag 23 1735                    Vancomycin Concentrations:   TROUGH:  No results for input(s): VANCOTROUGH in the last 72 hours. RANDOM:    Recent Labs     23  1149   VANCORANDOM 20.3       MRSA Nasal Swab: showed MRSA positive result on 1/3/23 . PLAN     Continue current dose of 1250 mg q12h IV  Ensured BUN/sCr ordered at baseline and every 48 hours x at least 3 levels, then at least weekly. Pharmacy will continue to monitor patient and adjust therapy as indicated  Unasyn/Vancomycin orders  today      Vancomycin Target Concentration Parameters  Treatment  Population Target AUC/KENDALL Target Trough   Invasive MRSA Infection (bacteremia, pneumonia, meningitis, endocarditis, osteomyelitis)  Sepsis (undifferentiated) 400-600 N/A   Infection due to non-MRSA pathogen  Empiric treatment of non-invasive MRSA infection  (SSTI, UTI) <500 10-15 mg/L   CrCl < 29 mL/min  Rapidly fluctuating serum creatinine   KENTRELL N/A < 15 mg/L     Renal replacement therapy is dosed by levels, per hospital protocol. Abbreviations  * Pauc: probability that AUC is >400 (efficacy); Pconc: probability that Ctrough is above 20 ?g/mL (toxicity); Tox: Probability of nephrotoxicity, based on Ricky et al. Clin Infect Dis 2009. Thank you for the consult. Pharmacy will continue to follow.    [address], [place]   2023  2:17 PM

## 2023-01-08 NOTE — PROGRESS NOTES
ICU Progress Note (Non-Vent)  [facility]    Patient - Po Nielsen,  Age - 60 y.o.    - 1960      Room Number -    MRN -  [id number]   Acct # - [de-identified]  Date of Admission -  2023  9:55 PM    Events of Past 24 Hours   Much less agitated now that Geodon is scheduled    Vitals    height is 5' 7\" (1.702 m) and weight is 195 lb 1.7 oz (88.5 kg). His temperature is 98 °F (36.7 °C). His blood pressure is 208/192 (abnormal) and his pulse is 99. His respiration is 24 and oxygen saturation is 94%.        Temperature Range: Temp: 98 °F (36.7 °C) Temp  Av.4 °F (36.9 °C)  Min: 98 °F (36.7 °C)  Max: 99.2 °F (37.3 °C)  BP Range:  Systolic (92YXR), MZY:885 , Min:140 , PKK:166     Diastolic (79XMQ), KEX:29, Min:52, Max:192    Pulse Range: Pulse  Av.4  Min: 95  Max: 112  Respiration Range: Resp  Av.7  Min: 15  Max: 28  Current Pulse Ox[de-identified]  SpO2: 94 %  24HR Pulse Ox Range:  SpO2  Av.8 %  Min: 90 %  Max: 96 %  Oxygen Amount and Delivery: O2 Flow Rate (L/min): 8 L/min    Wt Readings from Last 3 Encounters:   23 195 lb 1.7 oz (88.5 kg)   23 188 lb (85.3 kg)     I/O     Intake/Output Summary (Last 24 hours) at 2023 0836  Last data filed at 2023 0717  Gross per 24 hour   Intake 1335 ml   Output 1450 ml   Net -115 ml     DRAIN/TUBE OUTPUT       Invasive Lines   ICP PRESSURE RANGE  No data recorded  CVP PRESSURE RANGE  No data recorded      Medications      ziprasidone (GEODON) in sterile water injection  10 mg IntraMUSCular BID    ipratropium-albuterol  1 ampule Inhalation Q4H WA    heparin (porcine)  5,000 Units SubCUTAneous 3 times per day    nystatin   Topical BID    potassium bicarb-citric acid  20 mEq Oral Daily    ampicillin-sulbactam  3,000 mg IntraVENous Q6H    [Held by provider] miconazole   Topical BID    vancomycin  1,250 mg IntraVENous Q12H    [Held by provider] aspirin  81 mg Oral Daily    sodium chloride flush  5-40 mL IntraVENous 2 times per day    nicotine  1 patch TransDERmal Daily    pantoprazole (PROTONIX) 40 mg injection  40 mg IntraVENous Daily    vancomycin (VANCOCIN) intermittent dosing (placeholder)   Other RX Placeholder     sodium chloride, ziprasidone (GEODON) in sterile water injection, dexmedetomidine, perflutren lipid microspheres, sodium chloride flush, sodium chloride flush, sodium chloride flush, sodium chloride, ondansetron **OR** ondansetron, polyethylene glycol, acetaminophen **OR** acetaminophen, albuterol, guaiFENesin  IV Drips/Infusions   IV infusion builder 50 mL/hr at 01/07/23 1651    sodium chloride      dexmedetomidine      sodium chloride         Diet/Nutrition   ADULT DIET; Full Liquid    Exam      Constitutional - Alert, arousable  General Appearance  well developed, well nourished  HEENT -normocephalic, atraumatic. PERRLA  Lungs - Chest expands equally, no wheezes, rales or rhonchi. Cardiovascular - Heart sounds are normal.  normal rate and rhythm regular, no murmur, gallop or rub. Abdomen - soft, nontender, nondistended, no masses or organomegaly  Neurologic - CN II-XII are grossly intact.  There are no focal motor deficits  Skin - no bruising or bleeding  Extremities - no cyanosis, clubbing or edema    Lab Results   CBC     Lab Results   Component Value Date/Time    WBC 5.9 01/08/2023 04:38 AM    RBC 5.61 01/08/2023 04:38 AM    HGB 11.4 01/08/2023 04:38 AM    HCT 39.3 01/08/2023 04:38 AM    PLT 63 01/08/2023 04:38 AM    MCV 70.0 01/08/2023 04:38 AM    MCH 20.2 01/08/2023 04:38 AM    MCHC 28.9 01/08/2023 04:38 AM    RDW 20.2 01/08/2023 04:38 AM    NRBC 1 01/03/2023 04:27 AM    LYMPHOPCT 11 01/08/2023 04:38 AM    MONOPCT 11 01/08/2023 04:38 AM    BASOPCT 1 01/08/2023 04:38 AM    MONOSABS 0.65 01/08/2023 04:38 AM    LYMPHSABS 0.65 01/08/2023 04:38 AM    EOSABS 0.06 01/08/2023 04:38 AM    BASOSABS 0.06 01/08/2023 04:38 AM BMP   Lab Results   Component Value Date/Time     01/08/2023 04:38 AM    K 3.3 01/08/2023 04:38 AM     01/08/2023 04:38 AM    CO2 34 01/08/2023 04:38 AM    BUN 4 01/08/2023 04:38 AM    CREATININE <0.40 01/08/2023 04:38 AM    GLUCOSE 118 01/08/2023 04:38 AM       LFTS  Lab Results   Component Value Date/Time    ALKPHOS 45 01/08/2023 04:38 AM    ALT 99 01/08/2023 04:38 AM    AST 38 01/08/2023 04:38 AM    PROT 5.1 01/08/2023 04:38 AM    BILITOT 1.7 01/08/2023 04:38 AM    BILIDIR 0.9 01/01/2023 09:59 PM    IBILI 0.2 01/01/2023 09:59 PM    LABALBU 2.8 01/08/2023 04:38 AM       ABG ABGs:   Lab Results   Component Value Date/Time    PHART 7.372 01/05/2023 04:46 AM    PO2ART 62.9 01/05/2023 04:46 AM    HJK9OWI 51.6 01/05/2023 04:46 AM       Lab Results   Component Value Date/Time    MODE PRVC 01/05/2023 04:46 AM         INR  Recent Labs     01/06/23  0836 01/07/23  0949 01/08/23  0438   PROTIME 20.1* 19.4* 19.1*   INR 1.7 1.6 1.6       APTT  Recent Labs     01/07/23  0949 01/08/23  0438   APTT 35.4 34.4       Lactic Acid  No results found for: LACTA     BNP   No results for input(s): BNP in the last 72 hours. Cultures       Radiology     CXR      CT Scans    (See actual reports for details)      SYSTEMS ASSESSMENT    Acute hypoxic respiratory failure- extubated 01/05  Community-acquired pneumonia, multifocal; possible aspiration  Acute infarcts left cerebellar hemisphere, left parietal lobe with bilateral subarachnoid hemorrhages  Agitation/delirium  MRSA positive  COPD-no PFTs  Altered mental status  Liver disease given elevated transaminases, INR, and thrombocytopenia  History of tobacco use 1/pack per day x40 years  Acute kidney injury-resolved  History of alcohol use  Full code    Neuro   Continue scheduled Geodon IM for the next 24 hours, then he can switch to oral medications if he can take oral Geodon or can make Geodon as needed.   I would not increase it to more than 10 mg twice a day    Respiratory   Wean oxygen as tolerated. Keep O2 sat > 88%  We have been successful in decreasing his oxygen.   I have encouraged him to take deep breaths  Has not able to use BiPAP\which makes him more agitated  Continue DuoNebs  Cardiovascular   EKG shows normal QT interval  See my previous note about doing a CASSI     Gastrointestinal   He will need evaluation by speech therapy, most likely he will fail a swallow study  Continue NG tube for now    Renal   Hypernatremia is worsening; nephrology managing fluids    Infectious Disease   Patient up course of vancomycin and Unasyn    Hematology/Oncology   No anticoagulation now due to thrombocytopenia: Has worsened over the last 48 hours    Endocrine   Sugars are okay    Social/Spiritual/DNR/Disposition/Other   Would watch him 1 more day in the ICU then probably could go to ICU/intermediate status if he is stable    Critical Care Time   0 min    Electronically signed by Walt Habermann, MD on 1/8/2023 at 8:36 AM

## 2023-01-08 NOTE — PROGRESS NOTES
[facility]        Date:   1/8/2023  Patient name: Alonso Cavazos  Date of admission:  1/1/2023  9:55 PM  MRN:   [id number]  YOB: 1960  PCP: No primary care provider on file. Reason for Consult:       Subjective: No new cardiac issues. No overnight events. Chart reviewed. Physical Exam:   Vitals: /78   Pulse 86   Temp 99.3 °F (37.4 °C) (Axillary)   Resp 17   Ht 5' 7\" (1.702 m)   Wt 195 lb 1.7 oz (88.5 kg)   SpO2 90%   BMI 30.56 kg/m²   General appearance: alert and cooperative with exam  HEENT: Head: Normocephalic, no lesions, without obvious abnormality. Neck: no adenopathy, no carotid bruit, no JVD, supple, symmetrical, trachea midline and thyroid not enlarged, symmetric, no tenderness/mass/nodules  Lungs: clear to auscultation bilaterally  Heart: regular rate and rhythm, S1, S2 normal, no murmur, click, rub or gallop  Abdomen: soft, non-tender; bowel sounds normal; no masses,  no organomegaly  Extremities: extremities normal, atraumatic, no cyanosis or edema  Neurologic: Mental status: Alert, oriented, thought content appropriate      Lab work, imaging, nursing, and chart reviewed extensively.     Medications:   Scheduled Meds:   ziprasidone (GEODON) in sterile water injection  10 mg IntraMUSCular BID    ipratropium-albuterol  1 ampule Inhalation Q4H WA    heparin (porcine)  5,000 Units SubCUTAneous 3 times per day    nystatin   Topical BID    potassium bicarb-citric acid  20 mEq Oral Daily    ampicillin-sulbactam  3,000 mg IntraVENous Q6H    [Held by provider] miconazole   Topical BID    vancomycin  1,250 mg IntraVENous Q12H    [Held by provider] aspirin  81 mg Oral Daily    sodium chloride flush  5-40 mL IntraVENous 2 times per day    nicotine  1 patch TransDERmal Daily    pantoprazole (PROTONIX) 40 mg injection  40 mg IntraVENous Daily    vancomycin (VANCOCIN) intermittent dosing (placeholder)   Other RX Placeholder     Continuous Infusions:   IV infusion builder 50 mL/hr at 01/07/23 1651    sodium chloride      dexmedetomidine      sodium chloride           Labs:     CBC:   Recent Labs     01/06/23  0419 01/07/23  0519 01/08/23  0438   WBC 5.2 7.1 5.9   HGB 10.5* 11.7* 11.4*   PLT 71* 68* 63*       BMP:    Recent Labs     01/06/23  0419 01/07/23  0412 01/08/23  0438    147* 147*   K 4.0 4.1 3.3*   * 108* 109*   CO2 29 26 34*   BUN 7* 5* 4*   CREATININE 0.42* <0.40* <0.40*   GLUCOSE 111* 91 118*       Hepatic:   Recent Labs     01/06/23  0419 01/07/23  0412 01/08/23 0438   AST 74* 63* 38   * 137* 99*   BILITOT 0.9 1.6* 1.7*   ALKPHOS 46 51 45       Troponin: No results for input(s): TROPONINI in the last 72 hours. BNP: No results for input(s): BNP in the last 72 hours. Lipids: No results for input(s): CHOL, HDL in the last 72 hours. Invalid input(s): LDLCALCU  INR:   Recent Labs     01/06/23  0836 01/07/23  0949 01/08/23 0438   INR 1.7 1.6 1.6         Other active acute problems:  Patient Active Problem List   Diagnosis    Acute type II respiratory failure (HCC)    Transaminitis    Microcytic anemia    Thrombocytopenia (HCC)    Agitation    Acute respiratory failure with hypoxia and hypercapnia (HCC) RLL pneumonia    Altered mental status    Community acquired pneumonia of right lower lobe of lung    Prolonged pt (prothrombin time)    Emphysema lung (HCC)    Cerebral infarction ([facility] [address]         IMPRESSION & Recommendations:      - CASSI for IE rule out - cultures have been negative. Will defer CASSI for now. - Neuro and ID - eval  - AMS  - Acute Ischemic and hemorrhagic CVA  - Elevated trop  - Anemia  - Thrombocytopenia  - Elevated INR/PTT  - Liver disease   - Resp failure  - PNA  - KENTRELL  - HCV screen positive   - Stable from cardiac standpoint  - Will follow peripherally, please call back if needed. Discussed with patient, family, and Nurse.     Electronically signed by Kalyani Valencia DO on 1/8/2023 at 2:50 PM    [facility]  [facility]  [phone]

## 2023-01-08 NOTE — PLAN OF CARE
Problem: Safety - Adult  Goal: Free from fall injury  Outcome: Progressing  Note: Pt assessed as a fall risk this shift. Remains free from falls and accidental injury at this time. Fall precautions in place, including falling star sign and fall risk band on pt. Floor free from obstacles, and bed is locked and in lowest position. Adequate lighting provided. Pt encouraged to call before getting OOB for any need. Bed alarm activated. Will continue to monitor needs during hourly rounding, and reinforce education on use of call light. Problem: Skin/Tissue Integrity  Goal: Absence of new skin breakdown  Description: 1. Monitor for areas of redness and/or skin breakdown  2. Assess vascular access sites hourly  3. Every 4-6 hours minimum:  Change oxygen saturation probe site  4. Every 4-6 hours:  If on nasal continuous positive airway pressure, respiratory therapy assess nares and determine need for appliance change or resting period. Outcome: Progressing  Note: Skin assessment complete. Waffle mattress in place. Coccyx reddened. S[employer] applied PRN. Turned and repositioned every two hours refused by pt. Area kept free from moisture. Proper nourishment and fluids encouraged, as appropriate. Will continue to monitor for additional needs and changes in skin breakdown. Problem: Safety - Medical Restraint  Goal: Remains free of injury from restraints (Restraint for Interference with Medical Device)  Description: INTERVENTIONS:  1. Determine that other, less restrictive measures have been tried or would not be effective before applying the restraint  2. Evaluate the patient's condition at the time of restraint application  3. Inform patient/family regarding the reason for restraint  4.  Q2H: Monitor safety, psychosocial status, comfort, nutrition and hydration  Outcome: Progressing    Problem: Pain  Goal: Verbalizes/displays adequate comfort level or baseline comfort level  Outcome: Adequate for Discharge  Note: No pain at this time. 0/10 pain scale. Note: Patient in bilateral wrist restraints. Pt educated on discontinuation criteria of restraints; Pt verbally understands criteria, but does not meet criteria at this time. Will continue to monitor for non-violent, non-self destructive behaviors.

## 2023-01-09 ENCOUNTER — APPOINTMENT (OUTPATIENT)
Dept: GENERAL RADIOLOGY | Age: 63
DRG: 139 | End: 2023-01-09
Payer: MEDICAID

## 2023-01-09 PROBLEM — Z71.89 GOALS OF CARE, COUNSELING/DISCUSSION: Status: ACTIVE | Noted: 2023-01-09

## 2023-01-09 PROBLEM — J44.1 COPD WITH RESPIRATORY FAILURE, ACUTE (HCC): Status: RESOLVED | Noted: 2023-01-09 | Resolved: 2023-01-09

## 2023-01-09 PROBLEM — J44.9 COPD WITH RESPIRATORY FAILURE, ACUTE (HCC): Status: RESOLVED | Noted: 2023-01-09 | Resolved: 2023-01-09

## 2023-01-09 PROBLEM — J44.9 COPD WITH RESPIRATORY FAILURE, ACUTE (HCC): Status: ACTIVE | Noted: 2023-01-09

## 2023-01-09 PROBLEM — Z51.5 ENCOUNTER FOR PALLIATIVE CARE: Status: ACTIVE | Noted: 2023-01-09

## 2023-01-09 PROBLEM — J96.00 COPD WITH RESPIRATORY FAILURE, ACUTE (HCC): Status: RESOLVED | Noted: 2023-01-09 | Resolved: 2023-01-09

## 2023-01-09 PROBLEM — Z71.89 ACP (ADVANCE CARE PLANNING): Status: ACTIVE | Noted: 2023-01-09

## 2023-01-09 PROBLEM — J44.1 COPD WITH RESPIRATORY FAILURE, ACUTE (HCC): Status: ACTIVE | Noted: 2023-01-09

## 2023-01-09 PROBLEM — J96.00 COPD WITH RESPIRATORY FAILURE, ACUTE (HCC): Status: ACTIVE | Noted: 2023-01-09

## 2023-01-09 LAB
ABSOLUTE EOS #: 0.06 K/UL (ref 0–0.4)
ABSOLUTE LYMPH #: 0.69 K/UL (ref 1–4.8)
ABSOLUTE MONO #: 0.63 K/UL (ref 0.1–1.3)
ALBUMIN SERPL-MCNC: 2.8 G/DL (ref 3.5–5.2)
ALP BLD-CCNC: 47 U/L (ref 40–129)
ALT SERPL-CCNC: 81 U/L (ref 5–41)
ANION GAP SERPL CALCULATED.3IONS-SCNC: 3 MMOL/L (ref 9–17)
AST SERPL-CCNC: 32 U/L
BASOPHILS # BLD: 1 % (ref 0–2)
BASOPHILS ABSOLUTE: 0.06 K/UL (ref 0–0.2)
BILIRUB SERPL-MCNC: 1.8 MG/DL (ref 0.3–1.2)
BLD PROD TYP BPU: NORMAL
BLOOD BANK BLOOD PRODUCT EXPIRATION DATE: NORMAL
BLOOD BANK ISBT PRODUCT BLOOD TYPE: 5100
BLOOD BANK PRODUCT CODE: NORMAL
BLOOD BANK UNIT TYPE AND RH: NORMAL
BPU ID: NORMAL
BUN BLDV-MCNC: 3 MG/DL (ref 8–23)
CALCIUM SERPL-MCNC: 8 MG/DL (ref 8.6–10.4)
CHLORIDE BLD-SCNC: 104 MMOL/L (ref 98–107)
CO2: 35 MMOL/L (ref 20–31)
CREAT SERPL-MCNC: <0.4 MG/DL (ref 0.7–1.2)
DATE, STOOL #1: NORMAL
DISPENSE STATUS BLOOD BANK: NORMAL
EKG ATRIAL RATE: 105 BPM
EKG P AXIS: 13 DEGREES
EKG P-R INTERVAL: 144 MS
EKG Q-T INTERVAL: 302 MS
EKG QRS DURATION: 78 MS
EKG QTC CALCULATION (BAZETT): 399 MS
EKG R AXIS: -30 DEGREES
EKG T AXIS: -15 DEGREES
EKG VENTRICULAR RATE: 105 BPM
EOSINOPHILS RELATIVE PERCENT: 1 % (ref 0–4)
GFR SERPL CREATININE-BSD FRML MDRD: ABNORMAL ML/MIN/1.73M2
GLUCOSE BLD-MCNC: 101 MG/DL (ref 70–99)
GLUCOSE BLD-MCNC: 104 MG/DL (ref 75–110)
HCT VFR BLD CALC: 40.3 % (ref 41–53)
HEMOCCULT SP1 STL QL: NEGATIVE
HEMOGLOBIN: 11.7 G/DL (ref 13.5–17.5)
LYMPHOCYTES # BLD: 11 % (ref 24–44)
MAGNESIUM: 1.7 MG/DL (ref 1.6–2.6)
MCH RBC QN AUTO: 20.3 PG (ref 26–34)
MCHC RBC AUTO-ENTMCNC: 29 G/DL (ref 31–37)
MCV RBC AUTO: 70 FL (ref 80–100)
MONOCYTES # BLD: 10 % (ref 1–7)
MORPHOLOGY: ABNORMAL
PDW BLD-RTO: 20.5 % (ref 11.5–14.9)
PLATELET # BLD: 65 K/UL (ref 150–450)
PMV BLD AUTO: 8.6 FL (ref 6–12)
POTASSIUM SERPL-SCNC: 3.3 MMOL/L (ref 3.7–5.3)
PRO-BNP: 7250 PG/ML
RBC # BLD: 5.76 M/UL (ref 4.5–5.9)
SEG NEUTROPHILS: 77 % (ref 36–66)
SEGMENTED NEUTROPHILS ABSOLUTE COUNT: 4.86 K/UL (ref 1.3–9.1)
SODIUM BLD-SCNC: 142 MMOL/L (ref 135–144)
TIME, STOOL #1: 300
TOTAL PROTEIN: 5.3 G/DL (ref 6.4–8.3)
TRANSFUSION STATUS: NORMAL
UNIT DIVISION: 0
UNIT ISSUE DATE/TIME: NORMAL
WBC # BLD: 6.3 K/UL (ref 3.5–11)

## 2023-01-09 PROCEDURE — 2580000003 HC RX 258: Performed by: INTERNAL MEDICINE

## 2023-01-09 PROCEDURE — 80053 COMPREHEN METABOLIC PANEL: CPT

## 2023-01-09 PROCEDURE — 6360000002 HC RX W HCPCS

## 2023-01-09 PROCEDURE — 6360000002 HC RX W HCPCS: Performed by: INTERNAL MEDICINE

## 2023-01-09 PROCEDURE — 6370000000 HC RX 637 (ALT 250 FOR IP): Performed by: INTERNAL MEDICINE

## 2023-01-09 PROCEDURE — 94640 AIRWAY INHALATION TREATMENT: CPT

## 2023-01-09 PROCEDURE — 2700000000 HC OXYGEN THERAPY PER DAY

## 2023-01-09 PROCEDURE — 6360000002 HC RX W HCPCS: Performed by: NURSE PRACTITIONER

## 2023-01-09 PROCEDURE — 97530 THERAPEUTIC ACTIVITIES: CPT

## 2023-01-09 PROCEDURE — 82270 OCCULT BLOOD FECES: CPT

## 2023-01-09 PROCEDURE — 94660 CPAP INITIATION&MGMT: CPT

## 2023-01-09 PROCEDURE — 83735 ASSAY OF MAGNESIUM: CPT

## 2023-01-09 PROCEDURE — 2580000003 HC RX 258: Performed by: NURSE PRACTITIONER

## 2023-01-09 PROCEDURE — 94761 N-INVAS EAR/PLS OXIMETRY MLT: CPT

## 2023-01-09 PROCEDURE — 2060000000 HC ICU INTERMEDIATE R&B

## 2023-01-09 PROCEDURE — 6370000000 HC RX 637 (ALT 250 FOR IP): Performed by: NURSE PRACTITIONER

## 2023-01-09 PROCEDURE — 36415 COLL VENOUS BLD VENIPUNCTURE: CPT

## 2023-01-09 PROCEDURE — 92611 MOTION FLUOROSCOPY/SWALLOW: CPT

## 2023-01-09 PROCEDURE — 82947 ASSAY GLUCOSE BLOOD QUANT: CPT

## 2023-01-09 PROCEDURE — 83880 ASSAY OF NATRIURETIC PEPTIDE: CPT

## 2023-01-09 PROCEDURE — 2500000003 HC RX 250 WO HCPCS

## 2023-01-09 PROCEDURE — 99222 1ST HOSP IP/OBS MODERATE 55: CPT | Performed by: PSYCHIATRY & NEUROLOGY

## 2023-01-09 PROCEDURE — 99233 SBSQ HOSP IP/OBS HIGH 50: CPT | Performed by: INTERNAL MEDICINE

## 2023-01-09 PROCEDURE — 99232 SBSQ HOSP IP/OBS MODERATE 35: CPT | Performed by: INTERNAL MEDICINE

## 2023-01-09 PROCEDURE — 74230 X-RAY XM SWLNG FUNCJ C+: CPT

## 2023-01-09 PROCEDURE — 99232 SBSQ HOSP IP/OBS MODERATE 35: CPT | Performed by: PSYCHIATRY & NEUROLOGY

## 2023-01-09 PROCEDURE — 2580000003 HC RX 258

## 2023-01-09 PROCEDURE — C9113 INJ PANTOPRAZOLE SODIUM, VIA: HCPCS

## 2023-01-09 PROCEDURE — 85025 COMPLETE CBC W/AUTO DIFF WBC: CPT

## 2023-01-09 PROCEDURE — 99254 IP/OBS CNSLTJ NEW/EST MOD 60: CPT | Performed by: NURSE PRACTITIONER

## 2023-01-09 PROCEDURE — 2000000000 HC ICU R&B

## 2023-01-09 RX ORDER — PANTOPRAZOLE SODIUM 40 MG/1
40 TABLET, DELAYED RELEASE ORAL
Status: DISCONTINUED | OUTPATIENT
Start: 2023-01-09 | End: 2023-01-09

## 2023-01-09 RX ORDER — FERROUS SULFATE 325(65) MG
325 TABLET ORAL
Status: DISCONTINUED | OUTPATIENT
Start: 2023-01-10 | End: 2023-01-19 | Stop reason: HOSPADM

## 2023-01-09 RX ORDER — POTASSIUM CHLORIDE 20 MEQ/1
40 TABLET, EXTENDED RELEASE ORAL ONCE
Status: DISCONTINUED | OUTPATIENT
Start: 2023-01-09 | End: 2023-01-09

## 2023-01-09 RX ORDER — HYDRALAZINE HYDROCHLORIDE 20 MG/ML
20 INJECTION INTRAMUSCULAR; INTRAVENOUS EVERY 6 HOURS PRN
Status: DISCONTINUED | OUTPATIENT
Start: 2023-01-09 | End: 2023-01-11

## 2023-01-09 RX ORDER — CARVEDILOL 6.25 MG/1
6.25 TABLET ORAL 2 TIMES DAILY WITH MEALS
Status: DISCONTINUED | OUTPATIENT
Start: 2023-01-09 | End: 2023-01-19 | Stop reason: HOSPADM

## 2023-01-09 RX ORDER — FUROSEMIDE 10 MG/ML
20 INJECTION INTRAMUSCULAR; INTRAVENOUS EVERY 12 HOURS
Status: DISCONTINUED | OUTPATIENT
Start: 2023-01-09 | End: 2023-01-12

## 2023-01-09 RX ADMIN — SODIUM CHLORIDE, PRESERVATIVE FREE 10 ML: 5 INJECTION INTRAVENOUS at 09:05

## 2023-01-09 RX ADMIN — LABETALOL HYDROCHLORIDE 5 MG: 5 INJECTION, SOLUTION INTRAVENOUS at 08:59

## 2023-01-09 RX ADMIN — POTASSIUM CHLORIDE 10 MEQ: 7.46 INJECTION, SOLUTION INTRAVENOUS at 13:12

## 2023-01-09 RX ADMIN — IPRATROPIUM BROMIDE AND ALBUTEROL SULFATE 1 AMPULE: 2.5; .5 SOLUTION RESPIRATORY (INHALATION) at 15:30

## 2023-01-09 RX ADMIN — POTASSIUM CHLORIDE 10 MEQ: 7.46 INJECTION, SOLUTION INTRAVENOUS at 09:00

## 2023-01-09 RX ADMIN — IPRATROPIUM BROMIDE AND ALBUTEROL SULFATE 1 AMPULE: 2.5; .5 SOLUTION RESPIRATORY (INHALATION) at 07:27

## 2023-01-09 RX ADMIN — SODIUM CHLORIDE, PRESERVATIVE FREE 40 MG: 5 INJECTION INTRAVENOUS at 10:10

## 2023-01-09 RX ADMIN — FUROSEMIDE 20 MG: 10 INJECTION, SOLUTION INTRAMUSCULAR; INTRAVENOUS at 13:07

## 2023-01-09 RX ADMIN — VANCOMYCIN HYDROCHLORIDE 1250 MG: 1.25 INJECTION, POWDER, LYOPHILIZED, FOR SOLUTION INTRAVENOUS at 04:36

## 2023-01-09 RX ADMIN — NYSTATIN OINTMENT: 100000 OINTMENT TOPICAL at 21:59

## 2023-01-09 RX ADMIN — IPRATROPIUM BROMIDE AND ALBUTEROL SULFATE 1 AMPULE: 2.5; .5 SOLUTION RESPIRATORY (INHALATION) at 11:09

## 2023-01-09 RX ADMIN — WATER 10 MG: 1 INJECTION INTRAMUSCULAR; INTRAVENOUS; SUBCUTANEOUS at 21:58

## 2023-01-09 RX ADMIN — IRON SUCROSE 300 MG: 20 INJECTION, SOLUTION INTRAVENOUS at 10:04

## 2023-01-09 RX ADMIN — NYSTATIN OINTMENT: 100000 OINTMENT TOPICAL at 08:59

## 2023-01-09 RX ADMIN — IPRATROPIUM BROMIDE AND ALBUTEROL SULFATE 1 AMPULE: 2.5; .5 SOLUTION RESPIRATORY (INHALATION) at 19:45

## 2023-01-09 RX ADMIN — HYDRALAZINE HYDROCHLORIDE 20 MG: 20 INJECTION INTRAMUSCULAR; INTRAVENOUS at 15:36

## 2023-01-09 RX ADMIN — VANCOMYCIN HYDROCHLORIDE 1250 MG: 1.25 INJECTION, POWDER, LYOPHILIZED, FOR SOLUTION INTRAVENOUS at 16:46

## 2023-01-09 RX ADMIN — SODIUM CHLORIDE, PRESERVATIVE FREE 10 ML: 5 INJECTION INTRAVENOUS at 22:00

## 2023-01-09 ASSESSMENT — PAIN SCALES - GENERAL: PAINLEVEL_OUTOF10: 0

## 2023-01-09 NOTE — PROGRESS NOTES
[address] [facility].    PROGRESS NOTE             1/9/2023    9:11 AM    Name:   Ben Hill  MRN:     [id number]     Acct:      [de-identified]   Room:   2009/2009-01  IP Day:  7  Admit Date:  1/1/2023  9:55 PM    PCP:  No primary care provider on file. Code Status:  Full Code    Subjective:     C/C:   Chief Complaint   Patient presents with    Altered Mental Status     Interval History Status: Same    Patient seen and examined at the bed side. Patient denies any complaints, however this remains inconclusive as patient is altered  Patient is oriented to self only. Patient seems confused as well. Per notes and nurse, yesterday patient was agitated for short period that did not require Geodon. Patient also was saying that he wants to kill him kill himself he wants a bullet in the gun from the nurse, as well as a knife to cut restraints and kill himself. No change in exam otherwise. Notes from nursing staff and Consults had been reviewed, and the overnight progress had been checked with the nursing staff as well. Nurse Thomas input was appreciated    Brief History:     The patient is a 60 y.o.  male with unknown past medical history due to no healthcare visits or follow-up who presents with Altered Mental Status and he is admitted to the hospital for the management of  Acute respiratory failure most likely 2/2 pneumonia. Per patient's wife, she reports that the patient has not been acting himself for the past week. She reports being more slurred, confused and progressively getting worse prior to presentation. Patient prior to the presentation a week ago he had a fall and EMS presented patient was vitally stable and he was not transported to the hospital.  This time upon EMS evaluation of the patient he had an O2 of 75%, he was put on a breather and was given a dose of IV Lasix in route.   Wife and patient, cannot recall any precipitating event could have led to his presentation. He also denies chest pain, shortness of breath, or cough. Per patient, he does not recall any complaints or events prior to the presentation. Wife denies recent alcohol use, however, patient does have a history of regular alcohol intake but not on daily basis. Wife also reports that patient has constant heartburn for which he takes regular antiacids. Patient denies the presence of any abdominal pain or any change in bowel habits, abdominal pain, nausea or vomiting. Upon arrival in the ED, patient was put on BiPAP. Patient was afebrile, normotensive and in normal sinus rhythm. A chest x-ray was completed and the patient had right lower pneumonia with a small pleural effusion. Patient also had a large bullae in the left apex with a pneumothorax that cannot be excluded. Patient ABG was suggestive of pattern of respiratory acidosis with pH 7.295, PCO2 60.3, PO2 63.0, HCO3 29.3. CT head WO did not show any acute findings. Patient had elevated troponins of 184, trended down to 177. Patient also had an elevated BNP of 7797. An elevated creatinine of 1.77 was on presentation, with no previous lab test to be compared to. Also patient had a left elevated liver enzymes ALT was 525, , with prolonged prothrombin time of 24.9, and INR of 2.3. Patient was admitted to the ICU for the management of type II respiratory failure associated altered mental status    Review of Systems:     NA: PATIENT confused    Medications:      Allergies:    No Known Allergies    Current Meds:   Scheduled Meds:    carvedilol  6.25 mg Oral BID WC    potassium chloride  40 mEq Oral Once    iron sucrose  300 mg IntraVENous Once    [START ON 1/10/2023] ferrous sulfate  325 mg Oral Daily with breakfast    pantoprazole  40 mg Oral QAM AC    ziprasidone (GEODON) in sterile water injection  10 mg IntraMUSCular BID    ipratropium-albuterol  1 ampule Inhalation Q4H WA [Held by provider] heparin (porcine)  5,000 Units SubCUTAneous 3 times per day    nystatin   Topical BID    potassium bicarb-citric acid  20 mEq Oral Daily    [Held by provider] miconazole   Topical BID    vancomycin  1,250 mg IntraVENous Q12H    [Held by provider] aspirin  81 mg Oral Daily    sodium chloride flush  5-40 mL IntraVENous 2 times per day    nicotine  1 patch TransDERmal Daily    vancomycin (VANCOCIN) intermittent dosing (placeholder)   Other RX Placeholder     Continuous Infusions:    IV infusion builder 50 mL/hr at 23 1651    sodium chloride      dexmedetomidine      sodium chloride       PRN Meds: hydrALAZINE, labetalol, potassium chloride, sodium chloride, ziprasidone (GEODON) in sterile water injection, dexmedetomidine, perflutren lipid microspheres, sodium chloride flush, sodium chloride flush, sodium chloride flush, sodium chloride, ondansetron **OR** ondansetron, polyethylene glycol, acetaminophen **OR** acetaminophen, albuterol, guaiFENesin    Data:     Past Medical History:   has no past medical history on file. Social History:   reports that he has been smoking cigarettes. He has a 40.00 pack-year smoking history. He has never used smokeless tobacco. He reports current alcohol use. He reports that he does not currently use drugs. Family History:   History reviewed. No pertinent family history. Vitals:  BP (!) 174/119   Pulse (!) 101   Temp 98.7 °F (37.1 °C) (Oral)   Resp 22   Ht 5' 7\" (1.702 m)   Wt 195 lb 8.8 oz (88.7 kg)   SpO2 97%   BMI 30.63 kg/m²   Temp (24hrs), Av.5 °F (36.9 °C), Min:97.9 °F (36.6 °C), Max:99.3 °F (37.4 °C)      No results for input(s): POCGLU in the last 72 hours. I/O(24Hr):     Intake/Output Summary (Last 24 hours) at 2023 0911  Last data filed at 2023 0606  Gross per 24 hour   Intake 1706.73 ml   Output 1800 ml   Net -93.27 ml       Labs:    CBC:   Lab Results   Component Value Date/Time    WBC 6.3 2023 04:56 AM    RBC 5.76 01/09/2023 04:56 AM    HGB 11.7 01/09/2023 04:56 AM    HCT 40.3 01/09/2023 04:56 AM    MCV 70.0 01/09/2023 04:56 AM    MCH 20.3 01/09/2023 04:56 AM    MCHC 29.0 01/09/2023 04:56 AM    RDW 20.5 01/09/2023 04:56 AM    PLT 65 01/09/2023 04:56 AM    MPV 8.6 01/09/2023 04:56 AM     CMP:    Lab Results   Component Value Date/Time     01/09/2023 04:56 AM    K 3.3 01/09/2023 04:56 AM     01/09/2023 04:56 AM    CO2 35 01/09/2023 04:56 AM    BUN 3 01/09/2023 04:56 AM    CREATININE <0.40 01/09/2023 04:56 AM    LABGLOM Can not be calculated 01/09/2023 04:56 AM    GLUCOSE 101 01/09/2023 04:56 AM    PROT 5.3 01/09/2023 04:56 AM    LABALBU 2.8 01/09/2023 04:56 AM    CALCIUM 8.0 01/09/2023 04:56 AM    BILITOT 1.8 01/09/2023 04:56 AM    ALKPHOS 47 01/09/2023 04:56 AM    AST 32 01/09/2023 04:56 AM    ALT 81 01/09/2023 04:56 AM     PT/INR:    Lab Results   Component Value Date/Time    PROTIME 19.1 01/08/2023 04:38 AM    INR 1.6 01/08/2023 04:38 AM       No results found for: SPECIAL  Lab Results   Component Value Date/Time    CULTURE NO GROWTH 01/03/2023 12:12 PM         Radiology:    CT HEAD WO CONTRAST    Result Date: 1/2/2023  EXAMINATION: CT OF THE HEAD WITHOUT CONTRAST  1/2/2023 10:11 pm TECHNIQUE: CT of the head was performed without the administration of intravenous contrast. Automated exposure control, iterative reconstruction, and/or weight based adjustment of the mA/kV was utilized to reduce the radiation dose to as low as reasonably achievable. COMPARISON: None. HISTORY: ORDERING SYSTEM PROVIDED HISTORY: Altered Mental status TECHNOLOGIST PROVIDED HISTORY: Altered Mental status Reason for Exam: Altered Mental status Additional signs and symptoms: Patient came in with transient alteration of awareness, follow up head CT for any changes. FINDINGS: BRAIN/VENTRICLES: There is no acute intracranial hemorrhage, mass effect or midline shift. No abnormal extra-axial fluid collection.   There is a focal area of decreased attenuation with loss of gray/white matter differentiation involving posterior left parietal lobe with somewhat increased conspicuity as compared to prior exam.  There is stable small focus of encephalomalacia involving left cerebellar hemisphere compatible with prior remote infarct/insult. Chronic lacunar infarct to right basal ganglia redemonstrated. There is no evidence of hydrocephalus. ORBITS: The visualized portion of the orbits demonstrate no acute abnormality. SINUSES: Small air-fluid level right sphenoid sinus. Trace air-fluid level right frontal sinus. Mild mucoperiosteal thickening to bilateral ethmoid air cells. Findings are new from prior exam and likely relate to presence of nasogastric tube. SOFT TISSUES/SKULL:  No acute abnormality of the visualized skull or soft tissues. Focal area of decreased attenuation with loss of gray/white matter differentiation involving posterior left parietal lobe with somewhat increased conspicuity as compared to prior exam likely reflecting an area of acute to subacute infarct. Stable small chronic infarct/insult to left cerebellar hemisphere. Chronic lacunar infarct to right basal ganglia redemonstrated. Paranasal sinus disease likely relating to presence of nasogastric tube. CT HEAD WO CONTRAST    Result Date: 1/1/2023  EXAMINATION: CT OF THE HEAD WITHOUT CONTRAST  1/1/2023 10:48 pm TECHNIQUE: CT of the head was performed without the administration of intravenous contrast. Automated exposure control, iterative reconstruction, and/or weight based adjustment of the mA/kV was utilized to reduce the radiation dose to as low as reasonably achievable. COMPARISON: None. HISTORY: Reason for Exam: AMS Additional signs and symptoms: the patient has been getting more and more confused since 2 weeks ago. It has progressively been getting worse and today FINDINGS: BRAIN/VENTRICLES: There is no acute intracranial hemorrhage, mass effect or midline shift.   No abnormal extra-axial fluid collection. The gray-white differentiation is maintained without evidence of an acute infarct. There is no evidence of hydrocephalus. Changes of mild chronic small vessel ischemic disease. ORBITS: The visualized portion of the orbits demonstrate no acute abnormality. SINUSES: The visualized paranasal sinuses and mastoid air cells demonstrate no acute abnormality. SOFT TISSUES/SKULL:  No acute abnormality of the visualized skull or soft tissues. No acute intracranial abnormality. Mild chronic small vessel ischemic disease. US LIVER    Result Date: 1/2/2023  EXAMINATION: RIGHT UPPER QUADRANT ULTRASOUND 1/2/2023 10:20 am COMPARISON: None. HISTORY: ORDERING SYSTEM PROVIDED HISTORY: elevated AST and ALT, in setting of PT, and INR TECHNOLOGIST PROVIDED HISTORY: elevated AST and ALT, in setting of PT, and INR FINDINGS: Patient body habitus limits the study, as there is increased attenuation of the ultrasound beam. This decreases sensitivity and specificity. LIVER:  There is mild increased echogenicity seen throughout the liver. No intrahepatic ductal dilatation. No perihepatic fluid. BILIARY SYSTEM:  Bladder is contracted. Gallstones are seen. Gallbladder wall thickness measures 6 mm. Common bile duct is within normal limits measuring 5 mm. RIGHT KIDNEY: The right kidney is grossly unremarkable without evidence of hydronephrosis. PANCREAS:  Visualized portions of the pancreas are unremarkable. OTHER: No evidence of right upper quadrant ascites. Portal vein flow appears hepatopetal     Increased echogenicity is seen throughout the liver suggesting diffuse hepatocellular disease such as fatty infiltration Cholelithiasis.   No cholecystitis     XR CHEST PORTABLE    Result Date: 1/2/2023  EXAMINATION: ONE XRAY VIEW OF THE CHEST 1/2/2023 3:15 pm COMPARISON: 01/01/2023 HISTORY: ORDERING SYSTEM PROVIDED HISTORY: intubation TECHNOLOGIST PROVIDED HISTORY: intubation Reason for Exam: intubation FINDINGS: Overlying items external to the patient somewhat limit evaluation. Placement of endotracheal tube with tip 8.2 cm from the robert. Placement of enteric tube seen to extend into the stomach, distal extent not included in field of view. Persistent likely layering right pleural effusion, similar to slightly worsened. Persistent bilateral airspace opacities to bilateral mid to lower lung zones, right worse than left, similar on the left but mildly worsened on the right. No pneumothoraces are seen. Persistent likely bullous change to the left upper lung zone. Cardiac and mediastinal silhouettes are stable. Stable appearance to bony structures. Placement of endotracheal tube which appears high riding with tip 8.2 cm from the robert. Suggest advancement by 2-3 cm. Placement of endotracheal tube seen to extend into the stomach, distal extent not included in field of view. No pneumothoraces. Persistent likely bullous change to the left upper lung zone. Persistent right pleural effusion, similar to slightly worsened. Persistent bilateral airspace opacities, right worse than left, similar on the left but mildly worsened on the right. XR CHEST PORTABLE    Result Date: 1/1/2023  EXAMINATION: ONE XRAY VIEW OF THE CHEST 1/1/2023 7:17 pm COMPARISON: None. HISTORY: ORDERING SYSTEM PROVIDED HISTORY: ams TECHNOLOGIST PROVIDED HISTORY: ams Reason for Exam: Altered mental status, difficulty breathing Additional signs and symptoms: Altered mental status, difficulty breathing Relevant Medical/Surgical History: Altered mental status, difficulty breathing FINDINGS: Large lucent area in the left apex suggesting a large bulla however superimposed pneumothorax cannot be excluded. The cardiac size is normal. Right lower lobe airspace infiltrate and mild right pleural effusion. . Pulmonary vascularity appears normal. There is mild ectasia of the thoracic aorta. Calcific tendinitis of the right shoulder. No acute bony abnormalities. The hilar structures are normal.     Right lower lobe pneumonia and small right pleural effusion Large lucent area in the left apex suggesting a large bulla however superimposed pneumothorax cannot be excluded. Follow-up CT would be useful for further evaluation. The findings were sent to the Radiology Results [address] at 10:31 pm on 1/1/2023 to be communicated to a licensed caregiver. EKG:    there are no previous tracings available for comparison. Physical Examination:        PHYSICAL EXAM:  General Appearance Altered mentation. He is not oriented to time place or self. Psych:  I presume it is visual hallucination as patient can be seen talking to his side. HEENT - Head is normocephalic, atraumatic. Neck: supple, no rigidity, normal ROM  Lungs -limited exam, good air entry. No wheezes  Cardiovascular - Heart sounds are normal.  Regular rhythm, normal rate without murmur, gallop or rub. Neurology: Does move all limbs spontaneously. Abdomen - Soft, nontender, nondistended, no masses or organomegaly  Skin - No bruising or bleeding on exposed skin area  Extremities - Hand and feet are edematous.    Assessment:        Primary Problem  Acute respiratory failure with hypoxia and hypercapnia ([address]    Active Hospital Problems    Diagnosis Date Noted    Prolonged pt (prothrombin time) [R79.1] 01/03/2023     Priority: Medium    Emphysema lung ([address] [J43.9] 01/03/2023     Priority: Medium    Cerebral infarction ([address] [I63.9] 01/03/2023     Priority: Medium    Transaminitis [R74.01] 01/02/2023     Priority: Medium    Microcytic anemia [D64.9] 01/02/2023     Priority: Medium    Thrombocytopenia ([address] [D69.6] 01/02/2023     Priority: Medium    Agitation [R45.1] 01/02/2023     Priority: Medium    Acute respiratory failure with hypoxia and hypercapnia (HCC) RLL pneumonia [J96.01, J96.02] 01/02/2023     Priority: Medium    Altered mental status [R41.82] 01/02/2023     Priority: Medium    Community acquired pneumonia of right lower lobe of lung [J18.9] 01/02/2023     Priority: Medium       Plan: Altered Mental Status in the setting of Acute type 2 respiratory failure secondary to Pneumonia and Acute Heart Failure  -EEG showing Diffuse slowing.  -No plan for LP at this moment as INR is elevated. -On Geodon 10 mg BID  -ID following: On vancoymycin and Unasyn  -Ipratropium-Albuterol every 4 hrs  -Echocardiogam completed    Transaminitis with prolonged PTT and INR in the setting of acute HCV  - AST and ALT improving( AST normalized and ALT is 99)  -INR PT improving currently 1,6 and 19.1, respectively. Patient will remain off AC. Fibrinogen  151  -Thrombocytopenic  -HCV RNA PCR positive; ID on board  Platelet count remains the same      Sundowning and hallucination. Concern for suicidal ideation.  - Psychiatry consulted      Hypernatremia in the setting of Dehydration  -Na 147  -Nephrology on board  -Patient IV hydration switched to D5 water with 20 M EQ potassium chloride at 50 MLS per hour       Multiple acute brain infarcts with subarachnoid hemorrhage versus meningitis  -EEG showing diffuse slowing without epileptic waves. -Aspirin HELD  -Plan for CASSI once patient mentation improved. -Repeat CT head WO in 2 days. Thrombocytopenia  -Unknown  baseline  -Transaminates and Prolonged PT and PTT  -PLT count  63  -Monitor HH closely    Microcytic anemia in the setting of elevated INR and PT  -Hb stable  -No previous labs to compare?  -Iron: 14, TIBC: 412 and Ferritin:14  -Started on Iron IV replacement    Elevated troponins most likely 2/2 demand ischemia  -Troponin trending down  -no acute changes on EKG  -ECHO showing normal LVF. Mild tricuspid regurge.  RV pressure of 25 mmHG        DVT prophylaxis: reason for no prophylaxis: Prolonged PT, aPTT  GI prophylaxis: Protonix 40 mg daily    Marcelo Rodriguez MD  1/9/2023  9:11 AM     Attending Physician Statement  I have discussed the care of Nolan Cortes 8 with the resident team. I have examined the patient myself and taken ros and hpi , including pertinent history and exam findings,  with the resident. I have reviewed the key elements of all parts of the encounter with the resident. I agree with the assessment, plan and orders as documented by the resident.     Principal Problem:    Acute respiratory failure with hypoxia and hypercapnia (HCC) RLL pneumonia  Active Problems:    Transaminitis    Microcytic anemia    Thrombocytopenia (HCC)    Agitation    Altered mental status    Community acquired pneumonia of right lower lobe of lung    Prolonged pt (prothrombin time)    Emphysema lung (HCC)    Cerebral infarction ([facility]  Resolved Problems:    COPD with respiratory failure, acute ([facility])    Treated for shortness of breath secondary to PNA and encephalopathy secondary to [facility], cerebral infarcts  Overall encephalopathy improving  Pt voiced suicidal thoughts yesterday- psych was consulted, ICU delirium  4-5L HF nasal cannula now - will try to wean  Waiting for swallow study- getting prn iv meds for BP control  Awaiting cards recs for CASSI  No further recs for neuro  Repeat CT tomorrow to review SAH- remains off dvt pplx  On iv vanc still per ID ( +ve MRSA swab)     Ok to tx to int icu      Electronically signed by Lydia Pompa MD

## 2023-01-09 NOTE — FLOWSHEET NOTE
01/09/23 0100   Treatment Team Notification   Reason for Communication Review case   Team Member Name Nolan Mendez Team Role Attending Provider   Method of Communication Call   Response See orders   Notification Time 0107     RN updated Brittani Freeze of pt's stating, \"just end it now for me, put a bullet through my head and just let it be over. \" RN also updated on pt's elevated BPs with PRN Lebatelol given at 2300, and pt's increased inflamation with bruising and weeping on left mid/lateral abdomen. Brittani Freeze to evaluate pt and place own orders. See MAR/orders.

## 2023-01-09 NOTE — PROGRESS NOTES
Bipap on standby at this time. Pulse Ox--93% 5lpm     Skin score-- 0     Nasal gel pad available at bedside. Pt awake/alert/no distress noted.         BRONCHOSPASM/BRONCHOCONSTRICTION     [x]         IMPROVE AERATION/BREATH SOUNDS  [x]   ADMINISTER BRONCHODILATOR THERAPY AS APPROPRIATE  [x]   ASSESS BREATH SOUNDS  []   IMPLEMENT AEROSOL/MDI PROTOCOL  [x]   PATIENT EDUCATION AS NEEDED   PROVIDE ADEQUATE OXYGENATION WITH ACCEPTABLE SP02/ABG'S    [x]  IDENTIFY APPROPRIATE OXYGEN THERAPY  [x]   MONITOR SP02/ABG'S AS NEEDED   []   PATIENT EDUCATION AS NEEDED

## 2023-01-09 NOTE — PROGRESS NOTES
Pulmonary Progress Note  [facility]      Patient - Tawana Wyman,  Age - 60 y.o.    - 1960      Room Number -    N -  [id number]   Acct # - [de-identified]  Date of Admission -  2023  9:55 PM    Consulting Santhosh Juarez MD  Primary Care Physician - No primary care provider on file. SUBJECTIVE   Patient currently is able to recognize his daughter who was at bedside. Resting quietly  OBJECTIVE   VITALS    height is 5' 7\" (1.702 m) and weight is 195 lb 8.8 oz (88.7 kg). His axillary temperature is 99.1 °F (37.3 °C). His blood pressure is 165/108 (abnormal) and his pulse is 97. His respiration is 19 and oxygen saturation is 87% (abnormal). Body mass index is 30.63 kg/m². Temperature Range: Temp: 99.1 °F (37.3 °C) Temp  Av.6 °F (37 °C)  Min: 97.9 °F (36.6 °C)  Max: 99.1 °F (37.3 °C)  BP Range:  Systolic (60WLH), ZWJ:370 , Min:129 , PPX:897     Diastolic (04RIE), PZW:790, Min:75, Max:180    Pulse Range: Pulse  Av.3  Min: 86  Max: 104  Respiration Range: Resp  Av.5  Min: 16  Max: 29  Current Pulse Ox[de-identified]  SpO2: (!) 87 %  24HR Pulse Ox Range:  SpO2  Av.6 %  Min: 87 %  Max: 97 %  Oxygen Amount and Delivery: O2 Flow Rate (L/min): (S) 2 L/min    Wt Readings from Last 3 Encounters:   23 195 lb 8.8 oz (88.7 kg)   23 188 lb (85.3 kg)       I/O (24 Hours)    Intake/Output Summary (Last 24 hours) at 2023 1329  Last data filed at 2023 0606  Gross per 24 hour   Intake 1706.73 ml   Output 1800 ml   Net -93.27 ml       EXAM     General Appearance  Awake, alert, oriented, in no acute distress  HEENT - normocephalic, atraumatic. Neck - Supple,  trachea midline   Lungs -breath sounds no crackles rales or wheezes  Heart Exam:PMI normal. No lifts, heaves, or thrills. RRR. No murmurs, clicks, gallops, or rubs  Abdomen Exam: Abdomen soft, non-tender.    Extremity Exam: No signs of cyanosis    MEDS      [Held by provider] carvedilol  6.25 mg Oral BID WC    [Held by provider] ferrous sulfate  325 mg Oral Daily with breakfast    pantoprazole (PROTONIX) 40 mg injection  40 mg IntraVENous Daily    furosemide  20 mg IntraVENous Q12H    ziprasidone (GEODON) in sterile water injection  10 mg IntraMUSCular BID    ipratropium-albuterol  1 ampule Inhalation Q4H WA    [Held by provider] heparin (porcine)  5,000 Units SubCUTAneous 3 times per day    nystatin   Topical BID    potassium bicarb-citric acid  20 mEq Oral Daily    [Held by provider] miconazole   Topical BID    vancomycin  1,250 mg IntraVENous Q12H    [Held by provider] aspirin  81 mg Oral Daily    sodium chloride flush  5-40 mL IntraVENous 2 times per day    nicotine  1 patch TransDERmal Daily    vancomycin (VANCOCIN) intermittent dosing (placeholder)   Other RX Placeholder      IV infusion builder 50 mL/hr at 01/07/23 1651    sodium chloride      dexmedetomidine      sodium chloride       hydrALAZINE, labetalol, potassium chloride, sodium chloride, ziprasidone (GEODON) in sterile water injection, dexmedetomidine, perflutren lipid microspheres, sodium chloride flush, sodium chloride flush, sodium chloride flush, sodium chloride, ondansetron **OR** ondansetron, polyethylene glycol, acetaminophen **OR** acetaminophen, albuterol, guaiFENesin    LABS   CBC   Recent Labs     01/09/23  0456   WBC 6.3   HGB 11.7*   HCT 40.3*   MCV 70.0*   PLT 65*     BMP:   Lab Results   Component Value Date/Time     01/09/2023 04:56 AM    K 3.3 01/09/2023 04:56 AM     01/09/2023 04:56 AM    CO2 35 01/09/2023 04:56 AM    BUN 3 01/09/2023 04:56 AM    LABALBU 2.8 01/09/2023 04:56 AM    CREATININE <0.40 01/09/2023 04:56 AM    CALCIUM 8.0 01/09/2023 04:56 AM    LABGLOM Can not be calculated 01/09/2023 04:56 AM     ABGs:  Lab Results   Component Value Date/Time    PHART 7.372 01/05/2023 04:46 AM    PO2ART 62.9 01/05/2023 04:46 AM    PRZ9KVM 51.6 01/05/2023 04:46 AM      Lab Results Component Value Date/Time    MODE [facility] 01/05/2023 04:46 AM     Ionized Calcium:  No results found for: IONCA  Magnesium:    Lab Results   Component Value Date/Time    MG 1.7 01/09/2023 04:56 AM     Phosphorus:    Lab Results   Component Value Date/Time    PHOS 4.3 01/01/2023 09:59 PM        LIVER PROFILE   Recent Labs     01/09/23  0456   AST 32   ALT 81*   BILITOT 1.8*   ALKPHOS 47     INR   Recent Labs     01/08/23  0438   INR 1.6     PTT   Lab Results   Component Value Date    APTT 34.4 01/08/2023         RADIOLOGY     (See actual reports for details)    ASSESSMENT/PLAN     Patient Active Problem List   Diagnosis    Acute type II respiratory failure (HCC)    Transaminitis    Microcytic anemia    Thrombocytopenia (HCC)    Agitation    Acute respiratory failure with hypoxia and hypercapnia (HCC) RLL pneumonia    Altered mental status    Community acquired pneumonia of right lower lobe of lung    Prolonged pt (prothrombin time)    Emphysema lung (HCC)    Cerebral infarction ([facility] [address]    ACP (advance care planning)    Goals of care, counseling/discussion    Encounter for palliative care     Acute hypoxic respiratory failure- extubated 01/05  Community-acquired pneumonia, multifocal; possible aspiration  Acute infarcts left cerebellar hemisphere, left parietal lobe with bilateral subarachnoid hemorrhages  Agitation/delirium  MRSA positive  COPD-no PFTs  Altered mental status  Liver disease given elevated transaminases, INR, and thrombocytopenia  History of tobacco use 1/pack per day x40 years  Acute kidney injury-resolved  History of alcohol use  Full code    Currently, the patient is on DuoNeb treatments for a clinical diagnosis of COPD  Chronic respiratory failure with hypercapnia  Confusion appreciated. Continuing on Geodon for now for delirium   On vancomycin. ID following. ICU intermediate care reasonable.       Electronically signed by Meche Marshall MD on 1/9/2023 at 1:29 PM

## 2023-01-09 NOTE — PROGRESS NOTES
[facility] -   Infectious diseases evaluation  admission date 1/1/2023    reason for consultation:   Community-acquired pneumonia    Impression :   Current:  Acute hypoxic respiratory failure required intubation 1/2/22 was subsequently extubated  Community-acquired pneumonia with possible aspiration. Acute CVA with subarachnoid hemorrhage  Positive MRSA nasal swab  Liver disease  Cholelithiasis  Acute renal failure  Thrombocytopenia  Hepatitis C/elevated liver enzymes    Recommendations      Unasyn course completed 1/8/2023  Continue IV vancomycin through 1/11/23  Swallow study pending  Liver ultrasound 1/2/2323 showed increased echogenicity and cholelithiasis, no cholecystitis. HIV screen was negative on 1/2/23  Hepatitis C RNA was 17, 400 IU/mL 4.2 for LOC  The patient received IV ceftriaxone and Zithromax on 1/1/2022  Follow blood and respiratory cultures, no growth to date  No growth on urine culture from 1/3/23  Nasal swab for MRSA was +1/2/23  Respiratory panel with COVID-19 PCR was negative  Urine for Legionella antigen negative  Follow CBC and renal function  Continue supportive care  Follow-up with GI as outpatient for hepatitis C  management      Infection Control Recommendations   [place] Precautions  Contact Isolation       Antimicrobial Stewardship Recommendations   Simplification of therapy  Targeted therapy      History of Present Illness:   Initial history:  Marily Doll is a 60y.o.-year-old male was brought to the hospital on 1/1/2023 with altered mental status, confusion associated with decreased responsiveness worsening for 2 weeks. At the ER he was obtunded, O2 sat was 75% on room air, was placed on nonrebreather initially, subsequently was intubated. He is intubated, sedated unable to provide history that was obtained from chart review and nursing staff. According to his wife he is fully vaccinated for COVID-19 and flu.   He has been afebrile since admission  Initial procalcitonin level was 0.09, WBC normal  He was hypotensive was started on Levophed. MRI showed small acute infarcts in the left cerebellar hemisphere and left parietal love with moderate bilateral subarachnoid hemorrhages within both cerebral hemispheres. Chest x-ray showed right lower lobe infiltrates  Interval changes  1/9/2023   He was extubated 1/5/2023. He is afebrile, temperature max was 99.8 yesterday, confused, no new events. Platelet count 63  EEG showing Diffuse slowing. Left arm PICC line in place. Patient Vitals for the past 8 hrs:   BP Temp Temp src Pulse Resp SpO2 Weight   01/09/23 1200 (!) 165/108 99.1 °F (37.3 °C) Axillary 97 19 (!) 87 % --   01/09/23 1145 -- -- -- 93 -- 93 % --   01/09/23 1119 -- -- -- 99 22 95 % --   01/09/23 1111 -- -- -- 93 21 93 % --   01/09/23 1100 -- -- -- 92 23 94 % --   01/09/23 1000 (!) 153/102 -- -- 94 27 94 % --   01/09/23 0900 (!) 150/107 -- -- 99 22 93 % --   01/09/23 0800 (!) 171/107 99.1 °F (37.3 °C) Axillary 97 24 95 % --   01/09/23 0730 -- -- -- (!) 101 22 97 % --   01/09/23 0728 -- -- -- -- 27 -- --   01/09/23 0700 (!) 174/119 -- -- 98 25 92 % --   01/09/23 0600 (!) 166/102 -- -- 99 18 90 % --   01/09/23 0520 -- -- -- -- -- -- 195 lb 8.8 oz (88.7 kg)   01/09/23 0500 (!) 162/98 -- -- 96 26 90 % --               I have personally reviewed the past medical history, past surgical history, medications, social history, and family history, and I haveupdated the database accordingly. Allergies:   Patient has no known allergies. Review of Systems:     Review of Systems  confused, unable to provide  Physical Examination :       Physical Exam  Constitutional:       Appearance: He is ill-appearing. HENT:      Head: Normocephalic and atraumatic. Right Ear: External ear normal.      Left Ear: External ear normal.   Eyes:      General: No scleral icterus.      Conjunctiva/sclera: Conjunctivae normal.   Cardiovascular: Rate and Rhythm: Normal rate and regular rhythm. Heart sounds: Normal heart sounds. Pulmonary:      Comments: coarse breath sounds bilaterally, few crackles. Abdominal:      General: There is no distension. Palpations: Abdomen is soft. Musculoskeletal:      Cervical back: Neck supple. No rigidity. Right lower leg: No edema. Left lower leg: No edema. Skin:     Coloration: Skin is not jaundiced. Findings: No erythema. Past Medical History:   History reviewed. No pertinent past medical history. Past Surgical  History:   History reviewed. No pertinent surgical history. Medications:      [Held by provider] carvedilol  6.25 mg Oral BID WC    [Held by provider] ferrous sulfate  325 mg Oral Daily with breakfast    pantoprazole (PROTONIX) 40 mg injection  40 mg IntraVENous Daily    furosemide  20 mg IntraVENous Q12H    ziprasidone (GEODON) in sterile water injection  10 mg IntraMUSCular BID    ipratropium-albuterol  1 ampule Inhalation Q4H WA    [Held by provider] heparin (porcine)  5,000 Units SubCUTAneous 3 times per day    nystatin   Topical BID    potassium bicarb-citric acid  20 mEq Oral Daily    [Held by provider] miconazole   Topical BID    vancomycin  1,250 mg IntraVENous Q12H    [Held by provider] aspirin  81 mg Oral Daily    sodium chloride flush  5-40 mL IntraVENous 2 times per day    nicotine  1 patch TransDERmal Daily    vancomycin (VANCOCIN) intermittent dosing (placeholder)   Other RX Placeholder       Social History:     Social History     Socioeconomic History    Marital status: Single     Spouse name: Not on file    Number of children: Not on file    Years of education: Not on file    Highest education level: Not on file   Occupational History    Not on file   Tobacco Use    Smoking status: Every Day     Packs/day: 1.00     Years: 40.00     Pack years: 40.00     Types: Cigarettes    Smokeless tobacco: Never   Substance and Sexual Activity    Alcohol use:  Yes Comment: beer and scotch    Drug use: Not Currently     Comment: over 20 years (stated by daughter)    Sexual activity: Not on file   Other Topics Concern    Not on file   Social History Narrative    Not on file     Social Determinants of Health     Financial Resource Strain: Not on file   Food Insecurity: Not on file   Transportation Needs: Not on file   Physical Activity: Not on file   Stress: Not on file   Social Connections: Not on file   Intimate Partner Violence: Not on file   Housing Stability: Not on file       Family History:   History reviewed. No pertinent family history. Medical Decision Making:   I have independently reviewed/ordered the following labs:    CBC with Differential:   Recent Labs     01/08/23 0438 01/09/23 0456   WBC 5.9 6.3   HGB 11.4* 11.7*   HCT 39.3* 40.3*   PLT 63* 65*   LYMPHOPCT 11* 11*   MONOPCT 11* 10*       BMP:  Recent Labs     01/08/23 0438 01/09/23 0456   * 142   K 3.3* 3.3*   * 104   CO2 34* 35*   BUN 4* 3*   CREATININE <0.40* <0.40*   MG 1.8 1.7       Hepatic Function Panel:   Recent Labs     01/08/23 0438 01/09/23 0456   PROT 5.1* 5.3*   LABALBU 2.8* 2.8*   BILITOT 1.7* 1.8*   ALKPHOS 45 47   ALT 99* 81*   AST 38 32       No results for input(s): RPR in the last 72 hours. No results for input(s): HIV in the last 72 hours. No results for input(s): BC in the last 72 hours. Lab Results   Component Value Date/Time    CREATININE <0.40 01/09/2023 04:56 AM    GLUCOSE 101 01/09/2023 04:56 AM       Detailed results: Thank you for allowing us to participate in the care of this patient. Please call with questions. This note is created with the assistance of a speech recognition program.  While intending to generate adocument that actually reflects the content of the visit, the document can still have some errors including those of syntax and sound a like substitutions which may escape proof reading.   It such instances, actual meaningcan be extrapolated by contextual diversion.     Parag Owens MD  Office: [phone]  Perfect serve / office [phone]

## 2023-01-09 NOTE — PLAN OF CARE
Problem: Safety - Adult  Goal: Free from fall injury  Outcome: Not Progressing  Note: Pt assessed as a fall risk this shift. Remains free from falls and accidental injury at this time. Fall precautions in place, including falling star sign and fall risk band on pt. Floor free from obstacles, and bed is locked and in lowest position. Adequate lighting provided. Pt encouraged to call before getting OOB for any need. Bed alarm activated. Will continue to monitor needs during hourly rounding, and reinforce education on use of call light. Problem: Safety - Medical Restraint  Goal: Remains free of injury from restraints (Restraint for Interference with Medical Device)  Description: INTERVENTIONS:  1. Determine that other, less restrictive measures have been tried or would not be effective before applying the restraint  2. Evaluate the patient's condition at the time of restraint application  3. Inform patient/family regarding the reason for restraint  4. Q2H: Monitor safety, psychosocial status, comfort, nutrition and hydration  Outcome: Not Progressing   Every 2 hours: Monitor safety, psychosocial status, comfort, nutrition and hydration  Note: Patient in bilateral wrist restraints, Pt educated on discontinuation criteria of restraints; Pt verbally understands criteria, but does not meet criteria at this time. Will continue to monitor for non-violent, non-self destructive behaviors. Problem: Skin/Tissue Integrity  Goal: Absence of new skin breakdown  Description: 1. Monitor for areas of redness and/or skin breakdown  2. Assess vascular access sites hourly  3. Every 4-6 hours minimum:  Change oxygen saturation probe site  4. Every 4-6 hours:  If on nasal continuous positive airway pressure, respiratory therapy assess nares and determine need for appliance change or resting period. Outcome: Progressing  Note: Skin assessment complete. Nystatin cream and Sensicare applied PRN.  Turned and repositioned every two hours refused by pt; pt moving per self in bed. Area kept free from moisture. Proper nourishment and fluids encouraged, as appropriate. Will continue to monitor for additional needs and changes in skin breakdown. Problem: Pain  Goal: Verbalizes/displays adequate comfort level or baseline comfort level  Outcome: Adequate for Discharge  Note: No pain at this time. 0/10 pain scale.

## 2023-01-09 NOTE — PROGRESS NOTES
PATIENT REFUSES TO WEAR BIPAP     [x] Risks and benefits explained to patient   [x] Patient refuses to wear Bipap stating \"not now\"   [x] Patient verbalizes understanding of information presented.

## 2023-01-09 NOTE — PROGRESS NOTES
Physical Therapy  Facility/Department: [address] [place] [address]  Physical Therapy Treatment Note   Name: Eliceo Javed  : 1960  MRN: [id number]  Date of Service: 2023    Discharge Recommendations:  Therapy recommended at discharge          Patient Diagnosis(es): The primary encounter diagnosis was Acute respiratory failure with hypoxia and hypercapnia ([facility] [address] Diagnoses of Community acquired pneumonia of right lower lobe of lung and Altered mental status, unspecified altered mental status type were also pertinent to this visit. Past Medical History:  has no past medical history on file. Past Surgical History:  has no past surgical history on file. Assessment   Assessment: 61 y/o male adm with AMS and found to have [address] with recent fall at home. Pt is a high fall risk as he has poor safety awareness and significant weakness Lhemibody > R.   Therapy Prognosis: Good  Decision Making: Medium Complexity  History: falls, 1 week  AMS PTA  Exam: L side weakness, poor activity tolerance  Barriers to Learning: cognition, endurance  Requires PT Follow-Up: Yes  Activity Tolerance  Activity Tolerance: Patient limited by fatigue;Treatment limited secondary to decreased cognition;Patient limited by endurance     Plan   Physcial Therapy Plan  General Plan: 6-7 times per week  Current Treatment Recommendations: Strengthening, Balance training, Functional mobility training, Neuromuscular re-education, Cognitive reorientation, Safety education & training, Patient/Caregiver education & training, Equipment evaluation, education, & procurement, Therapeutic activities  Safety Devices  Type of Devices: Bed alarm in place, All fall risk precautions in place, Call light within reach, Patient at risk for falls, Left in bed, Nurse notified (bilateral wrist restraints)  Restraints  Restraints Initially in Place: Yes  Restraints: bilat wsit restraints     Restrictions  Restrictions/Precautions  Restrictions/Precautions: Fall Risk, General Precautions, Contact Precautions, Isolation, Bed Alarm (02: 6 liters on [place], bilat wrist restraints)  Required Braces or Orthoses?: No  Implants present? :  (Pt denies)     Subjective   General  Additional Pertinent Hx: Lottie Sinha is a 60 y.o. Non- / non  male who presents with Altered Mental Status and is admitted to the hospital for the management of Acute respiratory failure ([address] According to wife and daughter present at bedside, patient has not pursued any healthcare treatment since childhood. According to wife patient has not been feeling well for the past 2 weeks and has been getting more confused. About a week ago he did have a fall where EMS was called but all of his vital signs were stable so he was not transported to the hospital.  When EMS came to the house to evaluate him today he was found to have an O2 sat at 75% on room air and he was placed on a nonrebreather and given a dose of Lasix due to new edema to bilateral lower extremities. When he arrived to the ED he was placed on BiPAP. Chest x-ray confirms presence of pneumonia and small right pleural effusion. CT head negative with no acute abnormality. Family / Caregiver Present: No  Referring Practitioner: ALAYNA Connor NP  Referral Date : 01/02/23  Diagnosis: Acute respiratory failure  Follows Commands: Impaired  General Comment  Comments: Nursing Joe Machado approves treatment, pt disoriented x 3 , weaknes left hand  , 50 % follow thru wit VCs and 50 % hand over hand for follow thru, redirection to task         Vision/Hearing  Vision  Vision: Impaired  Vision Exceptions: Wears glasses at all times  Hearing  Hearing: Exceptions to [facility]  Hearing Exceptions: Hard of hearing/hearing concerns (Ringing in ears)    Cognition   Orientation  Overall Orientation Status: Impaired  Orientation Level: Oriented to person;Disoriented to time;Disoriented to place; Disoriented to situation  Cognition  Overall Cognitive Status: Exceptions  Arousal/Alertness: Delayed responses to stimuli  Following Commands: Follows one step commands with increased time; Follows one step commands with repetition  Attention Span: Difficulty attending to directions  Safety Judgement: Decreased awareness of need for assistance;Decreased awareness of need for safety  Problem Solving: Assistance required to generate solutions;Assistance required to implement solutions;Assistance required to identify errors made;Assistance required to correct errors made  Insights: Not aware of deficits  Initiation: Requires cues for all  Sequencing: Requires cues for all  Cognition Comment: Pt is impulsive, unaware of limitations related to medical status     Objective   Heart Rate: 96  O2 Device: High flow nasal cannula  6 LO2   Comment: SpO2  95 %      Observation/Palpation  Posture: Fair (slumped at EOB vs leaning posteriorly)  Observation: bruising UEs, rash on buttocks  Edema: generalized edema       Bed mobility  Rolling to Left: Moderate assistance  Rolling to Right: Maximum assistance  Supine to Sit: Maximum assistance;2 Person assistance (asssit to lift at trunk advance LEs  , min functional assist with LUE, VCs for sequencing, 50 % hand over hand for follow thru)  Sit to Supine: Maximum assistance;2 Person assistance (dependnet for LEs  up onto bed)  Scooting: Dependent/Total  Bed Mobility Comments: VC for all aspects of bed mobilty to use bedrail, position  LEs for rolling, use of bedrail etc.  Transfers  Sit to Stand: Unable to assess (pt unalbe to bear wt through LEs to attempt due to weakness and cognitive concerns/ safety)  Ambulation  Comments: NT     Balance  Posture: Fair (slumped posture at EOB vs pt leaning posteriorly, not engaging core mm)  Sitting - Static: Poor  Sitting - Dynamic: Poor  Standing - Static:  (NT)  Standing - Dynamic:  (NT)  Comments: pt with poor upright awareness.  was 2 asssit to sit EOB with continued max assit to shift wt forward and left ~ 5 min  in sitting time.  minimal use of LUE to asssit with balance due to weakness  Static Sitting Balance Exercises: 5 min EOB (posterior and left lean strong maximum x 1 and minimum x 1, SpO2 decreased with increased duration to 90-88% at completion)         Goals  Short Term Goals  Time Frame for Short Term Goals: 10 visits  Short Term Goal 1: min assist supine<-> sit x1 assist  Short Term Goal 2: sit EOB with min x1 up to 15 min for therex/ADL  Short Term Goal 3: 3+/5 LE strength to prepare for standing and transfers  Short Term Goal 4: PT to continue to sassess functional mobility to progress POC  Short Term Goal 5: roll L/R with SBA  Patient Goals   Patient Goals : unable to state       Education  Patient Education  Education Given To: Patient  Barriers to Learning: Cognition  Education Outcome: Continued education needed  AM-[facility] Mobility Inpatient   How much difficulty turning over in bed?: A Lot  How much difficulty sitting down on / standing up from a chair with arms?: Unable  How much difficulty moving from lying on back to sitting on side of bed?: Unable  How much help from another person moving to and from a bed to a chair?: Total  How much help from another person needed to walk in hospital room?: Total  How much help from another person for climbing 3-5 steps with a railing?: Total  AM-PAC Inpatient Mobility Raw Score : 7  AM-PAC Inpatient T-Scale Score : 26.42  Mobility Inpatient CMS 0-100% Score: 92.36  Mobility Inpatient CMS G-Code Modifier : CM     Therapy Time   Individual Concurrent Group Co-treatment   Time In (P) 1000         Time Out (P) 1015         Minutes (P) 15                 Daphne Chavez, PTA

## 2023-01-09 NOTE — PROGRESS NOTES
[facility]: BRIDGET MONTES   INPATIENT OCCUPATIONAL THERAPY  PROGRESS NOTE  Date: 2023  Patient Name: Mechelle Benítez       Room:   MRN: [id number]    : 1960  (60 y.o.)  Gender: male      Diagnosis: Acute respiratory failure    Restrictions/Precautions  Restrictions/Precautions  Restrictions/Precautions: Fall Risk;General Precautions;Contact Precautions;Isolation; Bed Alarm (02: 8 liters on [place], bilat wrist restraints)  Required Braces or Orthoses?: No  Implants present? :  (Pt denies)    Subjective  Subjective  Subjective: \"Can you call Kenan? \" Pt with confusion and ask asking for family throughout session. ANGEL Quijano present and aware  Subjective  Pain: Pt did not voice pain at this time  Comments: Ok per Moris for co-tx with LUIS Serna. OK per RN to remove restraints during session. Replaced at end of session. Objective  Orientation  Overall Orientation Status: Impaired  Orientation Level: Oriented to person;Disoriented to time;Disoriented to place; Disoriented to situation  Cognition  Overall Cognitive Status: Exceptions  Arousal/Alertness: Delayed responses to stimuli  Following Commands: Follows one step commands with increased time; Follows one step commands with repetition  Attention Span: Difficulty attending to directions  Safety Judgement: Decreased awareness of need for assistance;Decreased awareness of need for safety  Problem Solving: Assistance required to generate solutions;Assistance required to implement solutions;Assistance required to identify errors made;Assistance required to correct errors made  Insights: Not aware of deficits  Initiation: Requires cues for all  Sequencing: Requires cues for all  Cognition Comment: Pt is impulsive, unaware of limitations related to medical status    ADL  Feeding: NPO  Feeding Skilled Clinical Factors: Per chart beginning 23  Grooming: Maximum assistance  UE Bathing: Maximum assistance  LE Bathing: Dependent/Total  UE Dressing: Maximum assistance  LE Dressing: Dependent/Total  Toileting: Dependent/Total  Additional Comments: ADL scores based on clinical reasoning and skilled observation unless otherwise noted. Pt currently limited due to decreased strength, balance, activity tolerance, and cognitive barriers impacting safety and independence with self care tasks      Balance  Balance  Sitting Balance: Dependent/Total (X2 assist)  Standing Balance: Unable to assess(comment)    Transfers/Mobility  Bed mobility  Supine to Sit: 2 Person assistance;Maximum assistance (assist to lift at trunk advance LEs, min functional assist with LUE, VCs for sequencing, 50 % hand over hand for follow thru)  Sit to Supine: 2 Person assistance;Maximum assistance  Scooting: Dependent/Total  Bed Mobility Comments: VC for all aspects of bed mobilty to use bedrail, position  LEs for rolling, use of bedrail etc.         Functional Activities  OT Exercises  Static Standing Balance Exercises: Pt facilitated in functional reaching anteriorly and across midline. Pt tolerates sitting EOB for 5 minutes. Demos with posterior and left lean, requiring strong maximum A x1 and minimum A X1. Limited reaching noted due to increased fatigue and decreased strength. Also, decreased L  noted when reaching for handrail. SpO2 decreased with increased duration to 90-88% at completion.       Patient Education  Patient Education  Education Given To: Patient  Education Provided: Role of Therapy, Plan of Care, Transfer Training  Education Method: Verbal  Barriers to Learning: Cognition  Education Outcome: Continued education needed      Goals  Short Term Goals  Time Frame for Short Term Goals: By discharge  Short Term Goal 1: Pt will complete bed mobility with mod A x 2 to sitting EOB supported with Min A for 8+ minutes during self care tasks  Short Term Goal 2: Pt will complete simple grooming/self feeding task with Min A and Fair attention to task  Short Term Goal 3: Pt will complete upper body dressing/bathing with Mod A and Good safety/attention to task  Short Term Goal 4: Pt will follow simple 1-2 step commands 75% of the time to increase participation in daily activities  Short Term Goal 5: Pt will participate in 15+ minutes of therapeutic exercises/functional activities to increase safety and independence with self care and mobility  Short Term Goal 6: OTR to further assess transfers. mobility as appropriate  Occupational Therapy Plan  Times Per Week: 5-7  Current Treatment Recommendations: Self-Care / ADL, Strengthening, ROM, Balance training, Functional mobility training, Endurance training, Cognitive reorientation, Neuromuscular re-education, Safety education & training, Patient/Caregiver education & training, Equipment evaluation, education, & procurement, Cognitive/Perceptual training, Coordination training      Assessment  Activity Tolerance  Activity Tolerance: Patient limited by fatigue, Treatment limited secondary to decreased cognition  Assessment  Performance deficits / Impairments: Decreased ADL status, Decreased functional mobility , Decreased ROM, Decreased strength, Decreased safe awareness, Decreased cognition, Decreased endurance, Decreased balance, Decreased high-level IADLs, Decreased fine motor control, Decreased coordination, Decreased posture  Treatment Diagnosis: Impaired self care status  Prognosis: Fair  Decision Making: High Complexity  Discharge Recommendations: Patient would benefit from continued therapy after discharge  OT Equipment Recommendations  Other: TBD  Safety Devices  Type of Devices: Bed alarm in place, All fall risk precautions in place, Call light within reach, Patient at risk for falls, Left in bed, Nurse notified (bilateral wrist restraints)      AM-PAC Daily Activities Inpatient  AM-PAC Daily Activity Inpatient   How much help for putting on and taking off regular lower body clothing?: Total  How much help for Bathing?: Total  How much help for Toileting?: Total  How much help for putting on and taking off regular upper body clothing?: A Lot  How much help for taking care of personal grooming?: A Lot  How much help for eating meals?: Total (NPO as of 1/8)  AM-PAC Inpatient Daily Activity Raw Score: 8  AM-PAC Inpatient ADL T-Scale Score : 22.86  ADL Inpatient CMS 0-100% Score: 85.69  ADL Inpatient CMS G-Code Modifier : CM    OT Minutes  OT Individual Minutes  Time In: 1000  Time Out: 5026  Minutes: QUIRINO Stover/OTONIEL

## 2023-01-09 NOTE — PROGRESS NOTES
Vancomycin Dosing by Pharmacy - Daily Note   Vancomycin Therapy Day:  8  Indication: CAP, MRSA DNA positive 01/03    Allergies:  Patient has no known allergies. Actual Weight:    Wt Readings from Last 1 Encounters:   01/09/23 195 lb 8.8 oz (88.7 kg)       Labs/Ancillary Data  Estimated Creatinine Clearance: 201 mL/min (based on SCr of 0.4 mg/dL). Recent Labs     01/07/23  0412 01/07/23  0519 01/08/23  0438 01/09/23  0456   CREATININE <0.40*  --  <0.40* <0.40*   BUN 5*  --  4* 3*   WBC  --  7.1 5.9 6.3     Procalcitonin   Date Value Ref Range Status   01/02/2023 0.09 (H) <0.09 ng/mL Final     Comment:           Suspected Sepsis:  <0.50 ng/mL     Low likelihood of sepsis. 0.50-2.00 ng/mL     Increased likelihood of sepsis. Antibiotics encouraged. >2.00 ng/mL     High risk of sepsis/shock. Antibiotics strongly encouraged. Suspected Lower Resp Tract Infections:  <0.24 ng/mL     Low likelihood of bacterial infection. >0.24 ng/mL     Increased likelihood of bacterial infection. Antibiotics encouraged. With successful antibiotic therapy, PCT levels should decrease rapidly. (Half-life of 24 to   36 hours.)        Procalcitonin values from samples collected within the first 6 hours of systemic infection   may still be low. Retesting may be indicated. Values from day 1 and day 4 can be entered into the Change in Procalcitonin Calculator   ([email or web address]) to determine the patient's Mortality Risk Prognosis        In healthy neonates, plasma Procalcitonin (PCT) concentrations increase gradually after   birth, reaching peak values at about 24 hours of age then decrease to normal values below   0.5 ng/mL by 48-72 hours of age.          Intake/Output Summary (Last 24 hours) at 1/9/2023 1026  Last data filed at 1/9/2023 0606  Gross per 24 hour   Intake 1706.73 ml   Output 1800 ml   Net -93.27 ml     Temp: 99.1 F    Culture Date / Source  /  Results  See micro  Recent vancomycin administrations vancomycin (VANCOCIN) 1,250 mg in dextrose 5 % 250 mL IVPB (ADDAVIAL) (mg) 1,250 mg New Bag 01/09/23 0436     1,250 mg New Bag 01/08/23 1643     1,250 mg New Bag  0547     1,250 mg New Bag 01/07/23 1746     1,250 mg New Bag  0425     1,250 mg New Bag 01/06/23 1846                    Vancomycin Concentrations:   TROUGH:  No results for input(s): VANCOTROUGH in the last 72 hours. RANDOM:    Recent Labs     01/06/23  1149   VANCORANDOM 20.3       MRSA Nasal Swab: showed MRSA positive result on 01/03/23 . PLAN     Continue current dose of 1250 mg q12h IV01/03/23  Ensured BUN/sCr ordered at baseline and every 48 hours x at least 3 levels, then at least weekly. Repeat vancomycin concentration ordered for TBD. Pharmacy will continue to monitor patient and adjust therapy as indicated      Vancomycin Target Concentration Parameters  Treatment  Population Target AUC/KENDALL Target Trough   Invasive MRSA Infection (bacteremia, pneumonia, meningitis, endocarditis, osteomyelitis)  Sepsis (undifferentiated) 400-600 N/A   Infection due to non-MRSA pathogen  Empiric treatment of non-invasive MRSA infection  (SSTI, UTI) <500 10-15 mg/L   CrCl < 29 mL/min  Rapidly fluctuating serum creatinine   KENTRELL N/A < 15 mg/L     Renal replacement therapy is dosed by levels, per hospital protocol. Abbreviations  * Pauc: probability that AUC is >400 (efficacy); Pconc: probability that Ctrough is above 20 ?g/mL (toxicity); Tox: Probability of nephrotoxicity, based on Ricky et al. Clin Infect Dis 2009. Loading dose: N/A  Regimen: 1250 mg IV every 12 hours. Start time: 16:36 on 01/09/2023  Exposure target: AUC24 (range)400-600 mg/L.hr   AUC24,ss: 493 mg/L.hr  Probability of AUC24 > 400: 92 %  Ctrough,ss: 15.5 mg/L  Probability of Ctrough,ss > 20: 13 %  Probability of nephrotoxicity (Ricky ELIGIO 2009): 11 %    Thank you for the consult. Pharmacy will continue to follow. Mitch Marsh. Ph.  1/9/2023  10:35 AM

## 2023-01-09 NOTE — PROGRESS NOTES
Comprehensive Nutrition Assessment    Type and Reason for Visit:  Reassess    Nutrition Recommendations/Plan:   Continue NPO status, follow for nutrition plan of care. Malnutrition Assessment:  Malnutrition Status:  Insufficient data (01/04/23 1527)    Context:  Acute Illness     Findings of the 6 clinical characteristics of malnutrition:  Energy Intake:  Unable to assess  Weight Loss:  Unable to assess     Body Fat Loss:  Unable to assess     Muscle Mass Loss:  Unable to assess    Fluid Accumulation:  Mild Extremities   Strength:  Not Performed    Nutrition Assessment:    Pt estubated on 1-5, went for MBSS today with speech who recommended NPO with alternative means of nutrition. Nutrition Related Findings:    Edema: +2 pitting BLE's. Hx: Constant hearburn, smoker, alcohol use but not daily per SO. Labs & meds reviewed. MRI done showing multiple small infarcts which are of a acute nature. EEG done 1-4. Wound Type: None       Current Nutrition Intake & Therapies:    Average Meal Intake: NPO     Diet NPO    Anthropometric Measures:  Height: 5' 7\" (170.2 cm)  Ideal Body Weight (IBW): 148 lbs (67 kg)    Admission Body Weight: 188 lb (85.3 kg)  Current Body Weight: 195 lb (88.5 kg) (increase in edema), 127 % IBW. Weight Source: Bed Scale  Current BMI (kg/m2): 30.5                          BMI Categories: Overweight (BMI 25.0-29. 9)    Estimated Daily Nutrient Needs:  Energy Requirements Based On: Kcal/kg  Weight Used for Energy Requirements: Admission  Energy (kcal/day): 2145 kcals based on 25 kcals/kg  Weight Used for Protein Requirements: Ideal  Protein (g/day): 101-114 gm protein based on 1.5-1.7 gm/kg      Nutrition Diagnosis:   Inadequate oral intake related to cognitive or neurological impairment, impaired respiratory function as evidenced by NPO or clear liquid status due to medical condition, intubation    Nutrition Interventions:   Food and/or Nutrient Delivery: Continue NPO  Nutrition Education/Counseling: No recommendation at this time  Coordination of Nutrition Care: Continue to monitor while inpatient       Goals:  Previous Goal Met: Progress towards Goal(s) Declining  Goals: Meet at least 75% of estimated needs       Nutrition Monitoring and Evaluation:   Behavioral-Environmental Outcomes: None Identified  Food/Nutrient Intake Outcomes: None Identified  Physical Signs/Symptoms Outcomes: Biochemical Data, Fluid Status or Edema, Hemodynamic Status, Nutrition Focused Physical Findings, Skin, Weight    Discharge Planning:     Too soon to determine     Levora Huy, [address] [place] [address],   [phone]

## 2023-01-09 NOTE — PROGRESS NOTES
BRONCHOSPASM/BRONCHOCONSTRICTION   [x]  IMPROVE AERATION/BREATH SOUNDS  [x]   ADMINISTER BRONCHODILATOR THERAPY AS APPROPRIATE  [x]   ASSESS BREATH SOUNDS  []   IMPLEMENT AEROSOL/MDI PROTOCOL  [x]   PATIENT EDUCATION AS NEEDED Yes

## 2023-01-09 NOTE — CONSULTS
Department of Psychiatry  Consult Service   Psychiatric Assessment      Thank you very much for allowing us to participate in the care of this patient. Reason for Consult: Capacity to evaluate DO NOT RESUSCITATE status    HISTORY OF PRESENT ILLNESS:          The patient is a 60 y.o. male with significant history of prior alcoholism, acute respiratory failure and subarachnoid hemorrhage accident who is admitted medically related to altered mental status    Patient is addressed at bedside where his daughter \"Jaimie\" is present and his significant other \"Gilda \"joins the assessment. Lilli Flores is fairly lucid at the time of assessment and able to answer basic yes no questions. He is not able to articulate where he is believing that we will be ordering [place] food. He is able to identify his daughter and his significant other and offers some slight practical jokes. Explore patient's psychiatric symptoms at this time and he clearly denies any symptoms of depression and anxiety or psychosis. When asked if he is ever considered ending his own life he probably raises his eyebrows stating \"hello\". His family confirm that he does not have a psychiatric history. They share that he has not been drinking alcohol recently as he had been ill at home 2 weeks prior to seeking care in the emergency department on January 1. Patient is denying auditory or visual hallucinations however it is reported by the nursing team that he has been reporting perceptual disturbances during the night. He is wearing soft restraints as he has been pulling at his lines although he does verbally deny aggressiveness or violence towards others. His family shares that he is generally a very gentle and kind person. They both verbalize support and it is clear that there is a strong relationship between these individuals.     Patient is able to answer linearly to basic questioning's but has no insight into his hospitalization or an understanding of lifesaving measures. He is not able to participate in a meaningful conversation or fully explain his wishes or desires even when asked Rosibel Muta is your favorite meal \". He is able to identify himself and family members but is unable to provide date time or correct year. At this time due to patient's presentation of delirium the family members should best address this together as it is clear that patient has not previously participated in consistent health care or ever considered medical power of .     PSYCHIATRIC HISTORY:      Outpatient psychiatric provider: Denies any psychiatric history  Suicide attempts: Denies  Inpatient psychiatric admissions: Denies    Past psychiatric medications includes:   No utilization of psychotropic medications  Adverse reactions from psychotropic medications:  Not applicable    Lifetime Psychiatric Review of Systems         Obsessions and Compulsions: Denies       Vanessa or Hypomania: Denies     Hallucinations: Denies     Panic Attacks:  Denies     Delusions:  Denies     Phobias:  Denies     Trauma: Denies    Prior to Admission medications    Not on File        Medications:    Current Facility-Administered Medications: hydrALAZINE (APRESOLINE) injection 20 mg, 20 mg, IntraVENous, Q6H PRN  [Held by provider] carvedilol (COREG) tablet 6.25 mg, 6.25 mg, Oral, BID WC  [Held by provider] ferrous sulfate (IRON 325) tablet 325 mg, 325 mg, Oral, Daily with breakfast  pantoprazole (PROTONIX) 40 mg in sodium chloride (PF) 0.9 % 10 mL injection, 40 mg, IntraVENous, Daily  furosemide (LASIX) injection 20 mg, 20 mg, IntraVENous, Q12H  labetalol (NORMODYNE;TRANDATE) injection 5 mg, 5 mg, IntraVENous, Q6H PRN  potassium chloride 10 mEq/100 mL IVPB (Peripheral Line), 10 mEq, IntraVENous, PRN  ziprasidone (GEODON) 10 mg in sterile water 0.5 mL injection, 10 mg, IntraMUSCular, BID  ipratropium-albuterol (DUONEB) nebulizer solution 1 ampule, 1 ampule, Inhalation, Q4H WA  [Held by provider] heparin (porcine) injection 5,000 Units, 5,000 Units, SubCUTAneous, 3 times per day  potassium chloride 20 mEq in dextrose 5 % 1,000 mL infusion, , IntraVENous, Continuous  0.9 % sodium chloride infusion, , IntraVENous, PRN  ziprasidone (GEODON) 10 mg in sterile water 0.5 mL injection, 10 mg, IntraMUSCular, Q12H PRN  dexmedetomidine (PRECEDEX) 400 mcg in sodium chloride 0.9 % 100 mL infusion, 0.1-1.5 mcg/kg/hr, IntraVENous, Continuous PRN  nystatin (MYCOSTATIN) ointment, , Topical, BID  potassium bicarb-citric acid (EFFER-K) effervescent tablet 20 mEq, 20 mEq, Oral, Daily  [Held by provider] miconazole (MICOTIN) 2 % powder, , Topical, BID  vancomycin (VANCOCIN) 1,250 mg in dextrose 5 % 250 mL IVPB (ADDAVIAL), 1,250 mg, IntraVENous, Q12H  perflutren lipid microspheres (DEFINITY) injection 1.5 mL, 1.5 mL, IntraVENous, ONCE PRN  [Held by provider] aspirin chewable tablet 81 mg, 81 mg, Oral, Daily  sodium chloride flush 0.9 % injection 10 mL, 10 mL, IntraVENous, PRN  sodium chloride flush 0.9 % injection 10 mL, 10 mL, IntraVENous, PRN  sodium chloride flush 0.9 % injection 5-40 mL, 5-40 mL, IntraVENous, 2 times per day  sodium chloride flush 0.9 % injection 5-40 mL, 5-40 mL, IntraVENous, PRN  0.9 % sodium chloride infusion, , IntraVENous, PRN  ondansetron (ZOFRAN-ODT) disintegrating tablet 4 mg, 4 mg, Oral, Q8H PRN **OR** ondansetron (ZOFRAN) injection 4 mg, 4 mg, IntraVENous, Q6H PRN  polyethylene glycol (GLYCOLAX) packet 17 g, 17 g, Oral, Daily PRN  acetaminophen (TYLENOL) tablet 650 mg, 650 mg, Oral, Q6H PRN **OR** acetaminophen (TYLENOL) suppository 650 mg, 650 mg, Rectal, Q6H PRN  nicotine (NICODERM CQ) 21 MG/24HR 1 patch, 1 patch, TransDERmal, Daily  albuterol (PROVENTIL) nebulizer solution 2.5 mg, 2.5 mg, Nebulization, Q2H PRN  guaiFENesin (MUCINEX) extended release tablet 600 mg, 600 mg, Oral, BID PRN  vancomycin (VANCOCIN) intermittent dosing (placeholder), , Other, RX Placeholder     Past Medical History:    History reviewed. No pertinent past medical history. Past Surgical History:    History reviewed. No pertinent surgical history. Allergies: Patient has no known allergies. Social History:    RESIDENCE: Resides with significant other in [address]: Single long-term significant other \"Huma garcia \"  CHILDREN: 4 grown adult children, Jaimie at bedside  OCCUPATION: Unemployed  EDUCATION: High school    SUBSTANCE USE HISTORY: Family endorses a long history of alcohol use. Denies other illicit drug use    Family Medical and Psychiatric History:   No significant psychiatric family history  History reviewed. No pertinent family history. Physical  BP (!) 151/99   Pulse (!) 101   Temp 99.1 °F (37.3 °C) (Axillary)   Resp 30   Ht 5' 7\" (1.702 m)   Wt 195 lb 8.8 oz (88.7 kg)   SpO2 (!) 88%   BMI 30.63 kg/m²     Mental Status Examination:  Level of consciousness:  Within normal limits  Appearance: hospital attire, lying in bed, fair grooming  Behavior/Motor: Approachable, slightly restless and soft wrist restraints  Attitude toward examiner: Somewhat cooperative, attentive and fair eye contact  Speech: Delayed rate and normal volume, slightly slurred  Mood: Patient reports \"great\"  Affect: Incongruent  Thought processes: Disorganized, illogical linear with only basic simple questions  Thought content: Denies suicidal ideations   Denies homicidal ideations    Denies hallucinations however it is reported that he is experiencing visual hallucinations during the evening   denies delusions  Cognition:  Oriented to self and family only  Concentration distractible  Memory impaired  Insight & Judgment: Poor    DSM-5 DIAGNOSIS:    Delirium associated with another medical condition    Stressors     Severity of stressors is significant as patient has not been involved with the healthcare system during his life  Source of stressors include:   Other psychosocial and environmental stressors    PLAN: Titrate Geodon 20 mg every 12 hours  Attempt to decrease stimulation at dusk including closing windows and curtains to avoid intensive care unit psychosis  Encourage family to visit as often as possible and provide grounding including family photos in the environment  Additional recommendations will follow the clinical course. Thank you very much for allowing us to participate in the care of this patient. Time spent 60 min.       Electronically signed by ALAYNA Conley CNP on 1/9/23 at 5:48 PM EST

## 2023-01-09 NOTE — CONSULTS
Palliative Care Inpatient Consult    NAME:  Rylie Cortes RECORD NUMBER:  [id number]  AGE: 60 y.o. GENDER: male  : 1960  TODAY'S DATE:  2023    Reasons for Consultation:    Goals of care   Code status discussion       Members of PC team contributing to this consultation are :  Linh De CNP  Palliative care     Summary   Patient is a full code status   [facility]. By [address] [place] [address] Dr grimaldo the majority of the 4 children would determine patient plan of care. Patient remains confused but agitation and restlessness better today per RN. Plan      Palliative Interaction:  I went to see patient at bedside and he was working with Therapy. He remains confused but cooperative with staff. I spoke with Artemio Keating patient RN and he stated they were going to do a swallow study later today. Patient's Geodon was held due to a Decrease in behaviors per RN. I called Joshua Barrs patient's daughter and introduced myself and my palliative care role to her. I provided her with an update on her dad's status today. Patient is single and has 4 biological children Kate Rios, Mikey Shone. Patient has not completed a healthcare power of  so by [address] [place] [address] Dr grimaldo the majority of the 4 children would determine patient's plan of care due to his confusion. Joshua Steve requested that I call her back around 12:30 PM today so patient significant other cannot participate in our conversation. 1230 pm Called Pastora Reynolds patient significant other to discuss patient information and provide updates with them. Jeane Krishna and patient have been together for 20 plus years, but are not legally . Prior to hospitalization patient lived at home with Jeane Krishna. We discussed code status level and I explained each level completely to patient daughter and significant other. They state they want patient to remain a full code status.      I also explained that due to patient confusion and agitation he can not make decisions for himself presently. I explained psychiatry is going to evaluate. I explained that Jani Friday can help kids make decisions but she can not make decisions for patient. They stated understanding. I provided emotional support and provided them with contact number to call for any further questions or concerns.      Palliative care will continue to follow       Education/support to family  Discharge planning/helping to coordinate care  Communications with primary service  Pharmacologic pain management  Providing support for coping/adaptation/distress of family  Discussing meaning/purpose   Caregiver support/education  Continue with current plan of care  Code status clarified: Full Code  Code status clarified: [facility]  Code status clarified: DNRCCA  Principle Problem/Diagnosis:  Acute respiratory failure with hypoxia and hypercapnia (HCC)    Additional Assessments:   Principal Problem:    Acute respiratory failure with hypoxia and hypercapnia (HCC) RLL pneumonia  Active Problems:    Transaminitis    Microcytic anemia    Thrombocytopenia (HCC)    Agitation    Altered mental status    Community acquired pneumonia of right lower lobe of lung    Prolonged pt (prothrombin time)    Emphysema lung ([address]    Cerebral infarction ([address]  Resolved Problems:    COPD with respiratory failure, acute (HCC)     1- Symptom management/ pain control     Pain Assessment:  Tylenol                Anxiety:   geodon, Precedex                          Dyspnea:   oxygen as needed                           Fatigue:   generalized weakness     Other:      2- Goals of care evaluation   The patient goals of care are improve or maintain function/quality of life   Goals of care discussed with:    [] Patient independently    [] Patient and Family    [x] Family or Healthcare DPOA independently    [] Unable to discuss with patient, family/DPOA not present    3- Code Status  Full Code    4- Other recommendations   - We will continue to provide comfort and support to the patient and the family    Palliative Care will continue to follow Mr. Lester Bernal care as needed. History of Present Illness     The patient is a 60 y.o. Non- / non  male who presents with Altered Mental Status      Referred to Palliative Care by   [x] Physician   [] Nursing  [] Family Request   [] Other:       He was admitted to the ICU service for Acute respiratory failure ([address] [J96.00]  Acute respiratory failure with hypoxia and hypercapnia (HCC) [J96.01, J96.02]  Altered mental status, unspecified altered mental status type [R41.82]  Community acquired pneumonia of right lower lobe of lung [J18.9]  COPD with respiratory failure, acute ([address] [J44.9, J96.00]. His hospital course has been associated with Acute respiratory failure with hypoxia and hypercapnia ([address] The patient has a complicated medical history and has been hospitalized since 1/1/2023  9:55 PM.      Terese Reilly is a 60year old male that came into hospital due to altered mental status and respiratory distressed. He was admitted with acute respiratory failure secondary due to pneumonia. He has medical history of HENDERSON with Cirrhosis, GERD, and portal hypertension. Patient significant other reported in ER that the patient has not been acting himself for the past week. She reports being more slurred, confused and progressively getting worse prior to presentation. Patient prior to the presentation a week ago he had a fall and EMS presented patient was vitally stable and he was not transported to the hospital.  It is also reported that patient has been dealing with constant heartburn. In EMS, patient pulse ox 75% with oxygen and was placed on non rebreather and given Lasix. When he arrived in ER Bipap was applied. A chest x-ray was completed and the patient had right lower pneumonia with a small pleural effusion.  Patient also had a large bullae in the left apex with a pneumothorax that cannot be excluded. Patient ABG was suggestive of pattern of respiratory acidosis with pH 7.295, PCO2 60.3, PO2 63.0, HCO3 29.3. CT head WO did not show any acute findings. Patient had elevated troponins of 184, trended down to 177. Patient also had an elevated BNP of 7797. An elevated creatinine of 1.77 was on presentation, with no previous lab test to be compared to. Also patient had a left elevated liver enzymes ALT was 525, , with prolonged prothrombin time of 24.9, and INR of 2.3. Patient was started on Rocephin IV which was changed to Vancomycin and Cefepime. He required intubation originally but now is extubated. He has had issues with anxiety and hallucinations and is on scheduled doses of Geodon. Patient has the following consults internal medicine for admission, nephrology due to KENTRELL and blood in urine, neurology due to left parietal lobe infarct and altered mental status, palliative care for goals of care and CODE STATUS discussion, psychiatric due to suicidal, hallucinations, agitation, oncology for thrombocytopenia, cardiology due to CASSI. Palliative care will continue to follow patient and reach out to patient and family to provide emotional support and updates as needed.     Active Hospital Problems    Diagnosis Date Noted    Prolonged pt (prothrombin time) [R79.1] 01/03/2023     Priority: Medium    Emphysema lung ([address] [J43.9] 01/03/2023     Priority: Medium    Cerebral infarction ([address] [I63.9] 01/03/2023     Priority: Medium    Transaminitis [R74.01] 01/02/2023     Priority: Medium    Microcytic anemia [D64.9] 01/02/2023     Priority: Medium    Thrombocytopenia ([address] [D69.6] 01/02/2023     Priority: Medium    Agitation [R45.1] 01/02/2023     Priority: Medium    Acute respiratory failure with hypoxia and hypercapnia (HCC) RLL pneumonia [J96.01, J96.02] 01/02/2023     Priority: Medium    Altered mental status [R41.82] 01/02/2023     Priority: Medium    Community acquired pneumonia of right lower lobe of lung [J18.9] 01/02/2023     Priority: Medium       PAST MEDICAL HISTORY  History reviewed. No pertinent past medical history. PAST SURGICAL HISTORY  History reviewed. No pertinent surgical history. SOCIAL HISTORY  Social History     Tobacco Use    Smoking status: Every Day     Packs/day: 1.00     Years: 40.00     Pack years: 40.00     Types: Cigarettes    Smokeless tobacco: Never   Substance Use Topics    Alcohol use: Yes     Comment: beer and scotch    Drug use: Not Currently     Comment: over 20 years (stated by daughter)       FAMILY HISTORY  . .History reviewed. No pertinent family history.      ALLERGIES  No Known Allergies      MEDICATIONS  Current Medications    carvedilol  6.25 mg Oral BID WC    potassium chloride  40 mEq Oral Once    iron sucrose  300 mg IntraVENous Once    [START ON 1/10/2023] ferrous sulfate  325 mg Oral Daily with breakfast    pantoprazole  40 mg Oral QAM AC    ziprasidone (GEODON) in sterile water injection  10 mg IntraMUSCular BID    ipratropium-albuterol  1 ampule Inhalation Q4H WA    [Held by provider] heparin (porcine)  5,000 Units SubCUTAneous 3 times per day    nystatin   Topical BID    potassium bicarb-citric acid  20 mEq Oral Daily    [Held by provider] miconazole   Topical BID    vancomycin  1,250 mg IntraVENous Q12H    [Held by provider] aspirin  81 mg Oral Daily    sodium chloride flush  5-40 mL IntraVENous 2 times per day    nicotine  1 patch TransDERmal Daily    vancomycin (VANCOCIN) intermittent dosing (placeholder)   Other RX Placeholder     hydrALAZINE, labetalol, potassium chloride, sodium chloride, ziprasidone (GEODON) in sterile water injection, dexmedetomidine, perflutren lipid microspheres, sodium chloride flush, sodium chloride flush, sodium chloride flush, sodium chloride, ondansetron **OR** ondansetron, polyethylene glycol, acetaminophen **OR** acetaminophen, albuterol, guaiFENesin  IV Drips/Infusions   IV infusion builder 50 mL/hr at 01/07/23 1651    sodium chloride      dexmedetomidine      sodium chloride       Home Medications  No current facility-administered medications on file prior to encounter. No current outpatient medications on file prior to encounter. Data         BP (!) 174/119   Pulse (!) 101   Temp 98.7 °F (37.1 °C) (Oral)   Resp 22   Ht 5' 7\" (1.702 m)   Wt 195 lb 8.8 oz (88.7 kg)   SpO2 97%   BMI 30.63 kg/m²     Wt Readings from Last 3 Encounters:   01/09/23 195 lb 8.8 oz (88.7 kg)   01/03/23 188 lb (85.3 kg)        Lab Results   Component Value Date    WBC 6.3 01/09/2023    HGB 11.7 (L) 01/09/2023    HCT 40.3 (L) 01/09/2023    MCV 70.0 (L) 01/09/2023    PLT 65 (L) 01/09/2023    ,   Lab Results   Component Value Date/Time     01/09/2023 04:56 AM    K 3.3 01/09/2023 04:56 AM     01/09/2023 04:56 AM    CO2 35 01/09/2023 04:56 AM    BUN 3 01/09/2023 04:56 AM    CREATININE <0.40 01/09/2023 04:56 AM    GLUCOSE 101 01/09/2023 04:56 AM    CALCIUM 8.0 01/09/2023 04:56 AM    ,   Lab Results   Component Value Date/Time    MG 1.7 01/09/2023 04:56 AM    ,  Lab Results   Component Value Date    CALCIUM 8.0 (L) 01/09/2023    PHOS 4.3 01/01/2023        Xray Result (most recent):  XR CHEST PORTABLE 01/05/2023    Narrative  EXAMINATION:  ONE XRAY VIEW OF THE CHEST    1/5/2023 6:07 am    COMPARISON:  Chest x-ray dated 4 January 2023    HISTORY:  ORDERING SYSTEM PROVIDED HISTORY: While on ventilator  TECHNOLOGIST PROVIDED HISTORY:  While on ventilator  Reason for Exam: on vent    FINDINGS:  Stable cardiomediastinal silhouette. Stable lines and tubes. Stable  appearance of small bilateral pleural effusions with bibasilar airspace  disease. No pneumothorax. Impression  Stable exam with small bilateral pleural effusions and bibasilar airspace  disease. Consider pneumonia versus atelectasis in the appropriate clinical  setting.        MRI Result (most recent):  MRI BRAIN WO CONTRAST 01/03/2023    Narrative  EXAMINATION:  MRI OF THE BRAIN WITHOUT CONTRAST  1/3/2023 3:03 pm    TECHNIQUE:  Multiplanar multisequence MRI of the brain was performed without the  administration of intravenous contrast.    COMPARISON:  None    HISTORY:  ORDERING SYSTEM PROVIDED HISTORY: acute to subacute left parietal infarct,  AMS, Vent  TECHNOLOGIST PROVIDED HISTORY:  acute to subacute left parietal infarct, AMS, Vent  What is the sedation requirement?->Sedation  Reason for Exam: acute to subacute left parietal infarct, AMS, Vent    FINDINGS:  INTRACRANIAL STRUCTURES/VENTRICLES: There is an subacute to old left  cerebellar lacune. Hyperintense FLAIR signal abnormality within the sulci of both cerebral  hemispheres is compatible with subarachnoid hemorrhage. There is an old lacune within the right caudate nucleus. ORBITS: The visualized portion of the orbits demonstrate no acute abnormality. SINUSES: Small bilateral mastoid effusions are identified. There is moderate  paranasal sinus disease on the left side. BONES/SOFT TISSUES: The bone marrow signal intensity appears normal. The soft  tissues demonstrate no acute abnormality. Impression  Small acute infarcts involving the left cerebellar hemisphere and left  parietal lobe. Small subacute infarct within the left cerebellar hemisphere. Moderate bilateral subarachnoid hemorrhage within both cerebral hemispheres  which is of uncertain etiology. The hemorrhage is more apparent on MRI than  on previous head CT. Old right caudate nucleus lacune. Small bilateral mastoid effusions and moderate left paranasal sinus disease. The findings were sent to the Radiology Results [address] at 4:17  pm on 1/3/2023 to be communicated to a licensed caregiver.        CT Result (most recent):  CTA HEAD NECK W CONTRAST 01/03/2023    Narrative  EXAMINATION:  CTA OF THE HEAD AND NECK WITH CONTRAST 1/3/2023 6:28 pm:    TECHNIQUE:  CTA of the head and neck was performed with the administration of intravenous  contrast. Multiplanar reformatted images are provided for review. MIP images  are provided for review. Stenosis of the internal carotid arteries measured  using NASCET criteria. Automated exposure control, iterative reconstruction,  and/or weight based adjustment of the mA/kV was utilized to reduce the  radiation dose to as low as reasonably achievable. COMPARISON:  Brain MRI done 01/03/2023. Noncontrast CT head done 01/02/2023. HISTORY:  ORDERING SYSTEM PROVIDED HISTORY: MRI stoke  TECHNOLOGIST PROVIDED HISTORY:  MRI [place]    Reason for Exam: eval for Pulm embolism given pt with severe plum hypertension  Additional signs and symptoms: MRI stoke  Relevant Medical/Surgical History: pt on vent , f/u mri brain    FINDINGS:    CTA NECK:    AORTIC ARCH/ARCH VESSELS: No dissection or arterial injury. No significant  stenosis of the brachiocephalic or subclavian arteries. Atherosclerosis in  the visualized thoracic aorta and bilateral subclavian arteries. CAROTID ARTERIES: No dissection, arterial injury, or hemodynamically  significant stenosis by NASCET criteria. Right greater than left carotid  bulb atherosclerotic plaque. Tortuosity of the bilateral distal cervical  internal carotid arteries. VERTEBRAL ARTERIES: No dissection, arterial injury, or significant stenosis. The left vertebral artery arises directly from the aortic arch. Dominant  right vertebral artery. SOFT TISSUES: Partially visualized right pleural effusion. Emphysema. Large  left apical bulla. No cervical or superior mediastinal lymphadenopathy. The  larynx and pharynx are unremarkable. No acute abnormality of the salivary  and thyroid glands. Endotracheal and enteric tubes in place. BONES: No acute osseous abnormality. Multilevel degenerative changes in the  visualized spine, centered at C4-C5 and C5-C6.       CTA HEAD:    ANTERIOR CIRCULATION: No significant stenosis of the intracranial internal  carotid, anterior cerebral, or middle cerebral arteries. No aneurysm. Atherosclerosis in the bilateral intracranial internal carotid arteries. POSTERIOR CIRCULATION: No significant stenosis of the vertebral, basilar, or  posterior cerebral arteries. No aneurysm. Mild atherosclerosis in the  dominant right intracranial vertebral artery. OTHER: No dural venous sinus thrombosis on this non-dedicated study. BRAIN: Comparison brain MRI done earlier same day demonstrates acute/subacute  strokes which are not well seen on this study. Diffuse leptomeningeal  enhancement throughout both cerebral hemispheres. Impression  1. No focal hemodynamically significant stenosis, occlusion or aneurysm in  the large arteries of the head and neck. 2.  Diffuse leptomeningeal enhancement throughout both cerebral hemispheres  can be seen in the setting of subarachnoid hemorrhage, encephalitis or  meningitis. Consider correlation with CSF studies if there is high clinical  concern for underlying infection. Code Status: Full Code     ADVANCED CARE PLANNING:  Patient has capacity for medical decisions: no  Health Care Power of : no  Living Will: no     Personal, Social, and Family History  Marital Status: single  Living situation:with family:  significant other  Importance of vij[place]/Religious/spiritual beliefs: [] Very [] Somewhat [] Not   Psychological Distress: moderate  Does patient understand diagnosis/treatment? Patient with some confusion   Does caregiver understand diagnosis/treatment?  yes      Assessment        REVIEW OF SYSTEMS  Constitutional: Alert and oriented to person follows some commands and answers some questions   Eyes: no eye pain or blurred vision  ENT: no hearing loss, congestion, or difficulty swallowing   Respiratory: Oxygen per NC respirations relaxed   Cardiovascular: no chest pain or pressure, no palpitations, no diaphoresis   Gastrointestinal: no nausea, vomiting,abdominal pain, diarrhea or constipation, no melena   Genitourinary: no dysuria, frequency,hematuria , or nocturia   Musculoskeletal: generalized weakness   Skin: no rashes or sores   Neurological: Alert and oriented to person follows some commands and answers some questions   PHYSICAL ASSESSMENT:  Constitutional: Alert and oriented to person follows some commands and answers some questions   Head: Normocephalic and atraumatic. Eyes: EOM are normal. Pupils are equal, round   Neck: Normal range of motion. Neck supple. No tracheal deviation present. Cardiovascular: Normal rate and regular rhythm, S1, S2, no murmur   Pulmonary/Chest: Lungs diminished respirations relaxed oxygen per NC   Abdomen: Soft. No tenderness, not distended, no ascites, no organomegaly   Musculoskeletal: generalized weakness   Neurological: Alert and oriented to person follows some commands and answers some questions   Skin: Normal turgor, no bleeding, no bruising     Palliative Performance Scale:  ___60%  Ambulation reduced; Significant disease; Can't do hobbies/housework; intake normal or reduced; occasional assist; LOC full/confusion  ___50%  Mainly sit/lie; Extensive disease; Can't do any work; Considerable assist; intake normal or reduced; LOC full/confusion  __x_40%  Mainly in bed; Extensive disease; Mainly assist; intake normal or reduced; LOC full/confusion   ___30%  Bed Bound; Extensive disease; Total care; intake reduced; LOCfull/confusion  ___20%  Bed Bound; Extensive disease; Total care; intake minimal; Drowsy/coma  ___10%  Bed Bound; Extensive disease; Total care; Mouth care only; Drowsy/coma  ___0       Death        Thank you for allowing Palliative Care to participate in the care of Mr. Basilia Salguero . This note has been dictated by dragon, typing errors may be a possibility.     The total time I spent in seeing the patient, discussing goals of care, advanced directives, code status and other major issues was more than 60 minutes      Electronically signed by   ALAYNA Arce NP  Palliative Care Team  on 1/9/2023 at 8:57 AM    [address] Number [phone] [address] Number [phone] [address] Number [phone]    Please call with any palliative questions or concerns. Palliative Care Team is available via perfect serve or via phone.

## 2023-01-09 NOTE — PROGRESS NOTES
Nurse informed writer that patient family and patient himself wanted to talk about CODE STATUS. I first went to speak to the family, however the daughter was not there, so I was told to come back around 4:30 PM.  At the time I went and with the senior resident aspect of the patient explaining in depth the meaning of DNR DNR CCA and DNR CCA. Patient and patient family had a chance to ask questions, patient stated that he would like to hang on to his current CODE STATUS. We further consulted palliative care to discuss in depth with patient and family goals of care and CODE STATUS. Patient seem to be oriented by 3 at that time. [Place [facility], time 2023, person full name]. ANGEL Presley input during interaction was appreciated.

## 2023-01-09 NOTE — PROGRESS NOTES
01/09/23 1137   Encounter Summary   Encounter Overview/Reason  Spiritual/Emotional Needs   Service Provided For: Patient   Referral/Consult From: Palliative Care   Last Encounter  01/09/23   Spiritual/Emotional needs   Type Spiritual Support   Assessment/Intervention/Outcome   Assessment Unable to assess   Intervention Prayer (assurance of)/[place]

## 2023-01-09 NOTE — PROCEDURES
INSTRUMENTAL SWALLOW REPORT  MODIFIED BARIUM SWALLOW    NAME: Nolan Lindsay   : 1960  MRN: [id number]       Date of Eval: 2023              Referring Diagnosis(es):  Dysphagia    Past Medical History:  has no past medical history on file. Past Surgical History:  has no past surgical history on file. Type of Study: Initial MBS       Recent CXR/CT of Chest: CT Chest (): Impression   No evidence of pulmonary embolism. Dilated main pulmonary artery suggestive of pulmonary hypertension. Increased RV to LV ratio suggestive of right heart strain. Moderate right small left pleural effusions with partial collapse of the   right lower lobe. Moderate centrilobular emphysema with large bullous formation in the left   lung. Irregular appearance of the gallbladder with wall thickening, pericholecystic   fluid, and probable cholelithiasis. Correlation with right upper quadrant   ultrasound recommended. Trace perisplenic free fluid. Patient Complaints/Reason for Referral:  Nolan Lindsay was referred for a MBS to assess the efficiency of his/her swallow function, assess for aspiration, and to make recommendations regarding safe dietary consistencies, effective compensatory strategies, and safe eating environment. Patient complaints: Pt denies trouble swallowing. Onset of problem: Per H&P:  The patient is a 60 y.o.  male with unknown past medical history due to no healthcare visits or follow-up who presents with Altered Mental Status and he is admitted to the hospital for the management of  Acute respiratory failure most likely 2/2 pneumonia. Per patient's wife, she reports that the patient has not been acting himself for the past week. She reports being more slurred, confused and progressively getting worse prior to presentation.   Patient prior to the presentation a week ago he had a fall and EMS presented patient was vitally stable and he was not transported to the hospital.  This time upon EMS evaluation of the patient he had an O2 of 75%, he was put on a breather and was given a dose of IV Lasix in route. Wife and patient, cannot recall any precipitating event could have led to his presentation. He also denies chest pain, shortness of breath, or cough. Per patient, he does not recall any complaints or events prior to the presentation. Wife denies recent alcohol use, however, patient does have a history of regular alcohol intake but not on daily basis. Wife also reports that patient has constant heartburn for which he takes regular antiacids. Patient denies the presence of any abdominal pain or any change in bowel habits, abdominal pain, nausea or vomiting. Upon arrival in the ED, patient was put on BiPAP. Patient was afebrile, normotensive and in normal sinus rhythm. A chest x-ray was completed and the patient had right lower pneumonia with a small pleural effusion. Patient also had a large bullae in the left apex with a pneumothorax that cannot be excluded. Patient ABG was suggestive of pattern of respiratory acidosis with pH 7.295, PCO2 60.3, PO2 63.0, HCO3 29.3. CT head WO did not show any acute findings. Patient had elevated troponins of 184, trended down to 177. Patient also had an elevated BNP of 7797. An elevated creatinine of 1.77 was on presentation, with no previous lab test to be compared to. Also patient had a left elevated liver enzymes ALT was 525, , with prolonged prothrombin time of 24.9, and INR of 2.3.      Patient was admitted to the ICU for the management of type II respiratory failure associated altered mental status     General Comment  Comments: respiratory failure; s/s of aspiration observed during BSE, MBS recommended to confirm or r/o aspiration  Subjective  Subjective: Pt cooperative for test, responding to questions appropriately    Behavior/Cognition/Vision/Hearing:  Behavior/Cognition: Alert; Cooperative  Vision: Impaired  Vision Exceptions: Wears glasses at all times  Hearing: Exceptions to [facility]  Hearing Exceptions: Hard of hearing/hearing concerns (Ringing in ears)    Impressions:   Patient Position: Lateral       Consistencies Administered: Pureed; Thin teaspoon    Oral Phase: Repetitive lingual tremors, reduced bolus hold, reduced A-P transit, piecemeal swallowing, min amount oral residue, delayed swallow initiation, and premature pyriform sinus spillage with all consistencies. Pharyngeal Phase: Puree and thin liquid per teaspoon: +DEEP penetration, unable to be ejected from airway, +mod amount vallecular and pyriform sinus residue. Reduced BOT retraction and hyolaryngeal excursion contributing to penetration and residue. Test discontinued d/t high risk for aspiration with ALL PO. Dysphagia Outcome Severity Scale: Level 1: Severe dysphagia- NPO. Unable to tolerate any PO safely  Penetration-Aspiration Scale (PAS): 3 - Material enters the airway, remains above the vocal folds, and is not ejected from airway    Recommended Diet:  Solid consistency: NPO (with alternate means of nutrition)  Liquid consistency: NPO (with alternate means of hydration)       Medication administration: Via NG/PEG      Recommendations/Treatment  Requires SLP Intervention: Yes  Recommendations: F/U MBS     D/C Recommendations: Ongoing speech therapy is recommended during this hospitalization         Recommended Exercises:    Therapeutic Interventions: Pharyngeal exercises; Laryngeal exercises;Oral care;Oral motor exercises; Patient/Family education         Education: Recommendations were reviewed with Pt and RN following this exam.   Patient Education: Yes  Patient Education Response: Verbalizes understanding    Safety Devices  Restraints Initially in Place: Yes  Restraints: bilat wsit restraints      Goals:       Goal 1: Pt will complete BOT/laryngeal/pharyngeal strengthening exercises with 100% returned demonstration  Dysphagia Goals: The patient will improve oral motor function via therapeutic oral motor exercises to 5/5 each trial.      Oral Preparation / Oral Phase  Bolus Acceptance No impairment   Bolus Formation/Control Impaired   Type of Impairment Oral holding; Delayed; Incomplete; Poor; Posterior; Spillage; Piecemeal   Propulsion Delayed (# of seconds); Discoordination; Lingual tremors; Tongue pumping   Oral Residue 10-50% of bolus; Lingual   Initiation of Swallow Triggered at pyriform sinus(es)   Timing Pooling 6-10 sec           Pharyngeal Phase  Decreased Tongue Base Retraction? Yes   Laryngeal Elevation Reduced excursion with laryngeal vestibule gap; Incomplete laryngeal closure   Penetration-Aspiration Scale (PAS) 3 - Material enters the airway, remains above the vocal folds, and is not ejected from airway   Pharyngeal Symmetry Not assessed   Pharyngeal-Esophageal Segment --   Pharyngeal Dysfunction Decreased pharyngeal wall constriction; Decreased tongue base retraction; Decreased strength; Decreased elevation/closure     Penetration During swallow; After swallow; To cords;  To laryngeal vestibule; From initial swallow; From residual   Aspiration/Timing No evidence of aspiration   Pharyngeal Clearance Vallecular residue; Pyriform residue; 10-50%         Esophageal Phase  Esophageal Screen: WNL        Pain      Pain Level: 0      Therapy Time:   Individual Concurrent Group Co-treatment   Time In 1500         Time Out 1515         Minutes Nakul Thompson M.A., CCC-SLP, 1/9/2023, 3:53 PM

## 2023-01-09 NOTE — ACP (ADVANCE CARE PLANNING)
Advance Care Planning     Advance Care Planning (ACP) Physician/NP/PA Conversation    Date of Conversation: 1/9/2023  Conducted with: Spoke with Jaimie and Soledad about patient goals of care     Healthcare Decision Maker:  Patient is single and has 4 biological children Dc Noonan. Patient has not completed a healthcare power of  so by [place] law the majority of the 4 children would determine patient's plan of care due to his confusion. Care Preferences:    Hospitalization: \"If your health worsens and it becomes clear that your chance of recovery is unlikely, what would be your preference regarding hospitalization? \"  Hospitalized     Ventilation: \"If you were unable to breath on your own and your chance of recovery was unlikely, what would be your preference about the use of a ventilator (breathing machine) if it was available to you? \"  [facility]    Resuscitation: \"In the event your heart stopped as a result of an underlying serious health condition, would you want attempts made to restart your heart, or would you prefer a natural death? \"  [facility]    Conversation Outcomes / Follow-Up Plan:  Palliative Interaction:  I went to see patient at bedside and he was working with Therapy. He remains confused but cooperative with staff. I spoke with Tahmina Horowitz patient RN and he stated they were going to do a swallow study later today. Patient's Geodon was held due to a Decrease in behaviors per RN. I called Eufemia Lubnajose martin patient's daughter and introduced myself and my palliative care role to her. I provided her with an update on her dad's status today. Patient is single and has 4 biological children Dc Noonan. Patient has not completed a healthcare power of  so by [place] law the majority of the 4 children would determine patient's plan of care due to his confusion.      Eufemia Littlejohn requested that I call her back around 12:30 PM today so patient significant other cannot participate in our conversation. 1230 pm Called Pastora Reynolds patient significant other to discuss patient information and provide updates with them. Jeane Krishna and patient have been together for 20 plus years, but are not legally . Prior to hospitalization patient lived at home with Jeane Krishna. We discussed code status level and I explained each level completely to patient daughter and significant other. They state they want patient to remain a full code status. I also explained that due to patient confusion and agitation he can not make decisions for himself presently. I explained psychiatry is going to evaluate. I explained that Jeane Krishna can help kids make decisions but she can not make decisions for patient. They stated understanding. I provided emotional support and provided them with contact number to call for any further questions or concerns.       Palliative care will continue to follow     Length of Voluntary ACP Conversation in minutes:  16 minutes    ALAYNA Spears - NP

## 2023-01-09 NOTE — PROGRESS NOTES
Department of Internal Medicine  Nephrology Patricia Lomax MD  Progress Note    Reason for consultation: Management of acute kidney injury. Consulting physician: Winsome Cerna MD.      Interval hx/Subjective  Patient seen and examined in ICU, patient remains confused and physical restraints  Noted no acute distress   serum creatinine remained stable within normal limits  Serum sodium  improving at 142   No acute events overnight  Failed swallow eval.  Urine output 1.8 L with positive balance of 4 L since admission  Echo showed IVC dilated without respiratory variation. Preserved EF 55%. Chest x-ray 1/5/23 with bilateral pleural effusion. Interval history:   Patient was seen and examined today in the intensive care unit. He remains intubated and on ventilator support. Renal function has improved and he is nonoliguric. He received CT angiogram of the chest which ruled out pulmonary embolism; dilated main pulmonary artery suggestive of pulmonary hypertension as well as increased RV to LV ratio suggestive of right heart failure. There was incidental finding of moderate right and small left pleural effusion with partial collapse of the right lower lobe. NG tube is draining fluid  Plan to start tube feeding  Serum sodium was 146, potassium 3.4 serum creatinine 0.5  proBNP 7000    History of present illness: This is a 60 y.o. male with no significant past medical history [no regular physician follow-up], who was brought to the emergency department via EMS for further evaluation of progressively worsening confusion and lethargy of 2 weeks duration. Patient's spouse said that he became progressively confused prompting her to call EMS on day of presentation 1/1/2023. When EMS arrived, patient was found to be obtunded with oxygen saturation 75% on room air and he was placed on a nonrebreather mask and given a dose of IV Lasix as he also had severe bilateral leg swelling.   Patient was placed on BiPAP on arrival to the emergency department and was admitted to the intensive care unit. Initial systolic blood pressure was 106 mmHg and serum creatinine 1.77 mg/dL associated with elevated AST and ALT. On admission to the intensive care unit patient required endotracheal intubation for airway protection and treatment of acute respiratory failure. Nephrology consultation has been placed for management of acute kidney injury. He is currently on Levophed drip at 3 mcg/min. CT scan of the brain performed at presentation showed no acute intracranial abnormality but with mild chronic small vessel ischemic disease. Repeat CT scan performed 24 hours later [1/2/2023] showed focal area of decreased attenuation with loss of gray/white matter differentiation involving posterior left lobe with somewhat increased conspicuity as compared to prior exam likely reflecting an area of acute to subacute stroke. Patient has no known allergies. History reviewed. No pertinent past medical history.     Scheduled Meds:   [Held by provider] carvedilol  6.25 mg Oral BID WC    iron sucrose  300 mg IntraVENous Once    [Held by provider] ferrous sulfate  325 mg Oral Daily with breakfast    pantoprazole (PROTONIX) 40 mg injection  40 mg IntraVENous Daily    ziprasidone (GEODON) in sterile water injection  10 mg IntraMUSCular BID    ipratropium-albuterol  1 ampule Inhalation Q4H WA    [Held by provider] heparin (porcine)  5,000 Units SubCUTAneous 3 times per day    nystatin   Topical BID    potassium bicarb-citric acid  20 mEq Oral Daily    [Held by provider] miconazole   Topical BID    vancomycin  1,250 mg IntraVENous Q12H    [Held by provider] aspirin  81 mg Oral Daily    sodium chloride flush  5-40 mL IntraVENous 2 times per day    nicotine  1 patch TransDERmal Daily    vancomycin (VANCOCIN) intermittent dosing (placeholder)   Other RX Placeholder     Continuous Infusions:   IV infusion builder 50 mL/hr at 01/07/23 1651    sodium chloride      dexmedetomidine      sodium chloride       PRN Meds:.hydrALAZINE, labetalol, potassium chloride, sodium chloride, ziprasidone (GEODON) in sterile water injection, dexmedetomidine, perflutren lipid microspheres, sodium chloride flush, sodium chloride flush, sodium chloride flush, sodium chloride, ondansetron **OR** ondansetron, polyethylene glycol, acetaminophen **OR** acetaminophen, albuterol, guaiFENesin    History reviewed. No pertinent family history. Social History     Socioeconomic History    Marital status: Single     Spouse name: None    Number of children: None    Years of education: None    Highest education level: None   Tobacco Use    Smoking status: Every Day     Packs/day: 1.00     Years: 40.00     Pack years: 40.00     Types: Cigarettes    Smokeless tobacco: Never   Substance and Sexual Activity    Alcohol use: Yes     Comment: beer and scotch    Drug use: Not Currently     Comment: over 20 years (stated by daughter)         Physical Exam:    VITALS:  BP (!) 150/107   Pulse 99   Temp 99.1 °F (37.3 °C) (Axillary)   Resp 22   Ht 5' 7\" (1.702 m)   Wt 195 lb 8.8 oz (88.7 kg)   SpO2 95%   BMI 30.63 kg/m²   TEMPERATURE:  Current - Temp: 99.1 °F (37.3 °C);  Max - Temp  Av.6 °F (37 °C)  Min: 97.9 °F (36.6 °C)  Max: 99.3 °F (37.4 °C)  RESPIRATIONS RANGE: Resp  Av.2  Min: 16  Max: 29  PULSE RANGE: Pulse  Av.6  Min: 86  Max: 106  BLOOD PRESSURE RANGE:  Systolic (43UDO), ZCF:436 , Min:129 , WIO:639 ; Diastolic (84JZP), HHI:403, Min:75, Max:180  PULSE OXIMETRY RANGE: SpO2  Av.7 %  Min: 88 %  Max: 99 %  24HR INTAKE/OUTPUT:    Intake/Output Summary (Last 24 hours) at 2023 1122  Last data filed at 2023 0606  Gross per 24 hour   Intake 1706.73 ml   Output 1800 ml   Net -93.27 ml         Constitutional: Awake alert, responsive not in acute distress    Skin: Skin color, texture, turgor normal. No rashes or lesions    Head: Normocephalic, without obvious abnormality, atraumatic     Cardiovascular/Edema: regular rate and rhythm, S1, S2 normal, no murmur, click, rub or gallop    Respiratory: Lungs:  Coarse breath sounds bilaterally    Abdomen: soft, non-tender; bowel sounds normal; no masses,  no organomegaly    Back: symmetric, no curvature. ROM normal. No CVA tenderness. Extremities: edema +    Neuro: Nonfocal    CBC:   Recent Labs     01/07/23  0519 01/08/23  0438 01/09/23  0456   WBC 7.1 5.9 6.3   HGB 11.7* 11.4* 11.7*   PLT 68* 63* 65*       BMP:    Recent Labs     01/07/23  0412 01/08/23  0438 01/09/23  0456   * 147* 142   K 4.1 3.3* 3.3*   * 109* 104   CO2 26 34* 35*   BUN 5* 4* 3*   CREATININE <0.40* <0.40* <0.40*   GLUCOSE 91 118* 101*       Lab Results   Component Value Date/Time    NITRU NEGATIVE 01/02/2023 03:34 PM    COLORU Dark Yellow 01/02/2023 03:34 PM    PHUR 5.0 01/02/2023 03:34 PM    WBCUA 10 TO 20 01/02/2023 03:34 PM    RBCUA TOO NUMEROUS TO COUNT 01/02/2023 03:34 PM    BACTERIA FEW 01/01/2023 10:25 PM    SPECGRAV 1.015 01/02/2023 03:34 PM    LEUKOCYTESUR SMALL 01/02/2023 03:34 PM    UROBILINOGEN Normal 01/02/2023 03:34 PM    BILIRUBINUR NEGATIVE 01/02/2023 03:34 PM    GLUCOSEU NEGATIVE 01/02/2023 03:34 PM    KETUA TRACE 01/02/2023 03:34 PM     MRI of the brain performed without contrast on 1/3/2023 showed:  Small acute infarcts involving the left cerebellar hemisphere and left   parietal lobe. Small subacute infarct within the left cerebellar hemisphere. Moderate bilateral subarachnoid hemorrhage within both cerebral hemispheres   which is of uncertain etiology. The hemorrhage is more apparent on MRI than   on previous head CT. Old right caudate nucleus lacune. Small bilateral mastoid effusions and moderate left paranasal sinus disease. IMPRESSION/RECOMMENDATIONS:      1. Acute kidney injury - most consistent with prerenal azotemia and/or ischemic acute tubular necrosis from hypotension.   Renal function is improved and serum creatinine is down to 0.4 mg/dL today. 2.  Hypokalemia -sliding-scale potassium replaced    3. Hypernatremia due to dehydration-SNa 142 stable     3. Hypotension -blood pressure improved. Blood Cultures are negative    4. Elevated blood pressures with agitation. Plan  Decrease IV fluids to D5 water with 20 M EQ potassium chloride to 30  MLS per hour  Replace electrolytes per sliding scale. Start IV Lasix 20 mg every 12  Basic metabolic panel daily  Strict I's and O's      Prognosis is guarded.     Ellen Gutiérrez MD   Attending Nephrologist  1/9/2023 11:22 AM

## 2023-01-09 NOTE — CARE COORDINATION
ONGOING DISCHARGE PLAN:    I spoke with patient about possible need for rehab at discharge. He is agreeable for me to speak with family/girlfriend for choice. Catia Singh states they are reviewing the list and will let me know. Acute CVA-Left Cerebral Infarct    Barium Swallow  IV venofer  IV vanco thru 1/11 per ID    New psych consult-combative overnight and suicidal   Palliative care consult for POA. Will continue to follow for additional discharge needs.     Electronically signed by Alejandro Mendiola RN on 1/9/2023 at 3:43 PM

## 2023-01-09 NOTE — PROGRESS NOTES
HPI/Hospital course: This is a 59 y/o WM with reported prior alcoholism with residual liver failure who presented with altered mental status and who was subsequently dx'd with pneumonia. An initial head CT revealed old as well as possibly new strokes which were confirmed on MRI along with some probable SAH. He has just been extubated and nursing reports that he has been intermittently agitated and aggressive but no focal findings were reported. I did speak with his family in the room and they state that he has not been drinking ETOH recently. Blood cultures have been negative to date. He continues to be delirious with agitated behavior     He is again in 4 point restraints. He has also been quite hypertensive at times.         Social History     Socioeconomic History    Marital status: Single     Spouse name: None    Number of children: None    Years of education: None    Highest education level: None   Tobacco Use    Smoking status: Every Day     Packs/day: 1.00     Years: 40.00     Pack years: 40.00     Types: Cigarettes    Smokeless tobacco: Never   Substance and Sexual Activity    Alcohol use: Yes     Comment: beer and scotch    Drug use: Not Currently     Comment: over 20 years (stated by daughter)       Current Facility-Administered Medications   Medication Dose Route Frequency Provider Last Rate Last Admin    hydrALAZINE (APRESOLINE) injection 20 mg  20 mg IntraVENous Q6H PRN ALAYNA Winchester NP        [Held by provider] carvedilol (COREG) tablet 6.25 mg  6.25 mg Oral BID WC Roro Madrigal MD        iron sucrose (VENOFER) 300 mg in sodium chloride 0.9 % 250 mL IVPB  300 mg IntraVENous Once Roro Madrigal .7 mL/hr at 01/09/23 1004 300 mg at 01/09/23 1004    [Held by provider] ferrous sulfate (IRON 325) tablet 325 mg  325 mg Oral Daily with breakfast Roro Madrigal MD        pantoprazole (PROTONIX) 40 mg in sodium chloride (PF) 0.9 % 10 mL injection  40 mg IntraVENous Daily Roro Madrigal MD   40 mg at 01/09/23 1010    labetalol (NORMODYNE;TRANDATE) injection 5 mg  5 mg IntraVENous Q6H PRN Ga Benitez MD   5 mg at 01/09/23 0859    potassium chloride 10 mEq/100 mL IVPB (Peripheral Line)  10 mEq IntraVENous PRN Jennifer Pimentel  mL/hr at 01/09/23 0900 10 mEq at 01/09/23 0900    ziprasidone (GEODON) 10 mg in sterile water 0.5 mL injection  10 mg IntraMUSCular BID Fabrizio Reyna MD   10 mg at 01/08/23 0835    ipratropium-albuterol (DUONEB) nebulizer solution 1 ampule  1 ampule Inhalation Q4H WA Fabrizio Reyna MD   1 ampule at 01/09/23 0727    [Held by provider] heparin (porcine) injection 5,000 Units  5,000 Units SubCUTAneous 3 times per day Deisy Gamboa MD   5,000 Units at 01/08/23 0549    potassium chloride 20 mEq in dextrose 5 % 1,000 mL infusion   IntraVENous Continuous Yobany Sheba De La Vega MD 50 mL/hr at 01/07/23 1651 New Bag at 01/07/23 1651    0.9 % sodium chloride infusion   IntraVENous PRN Rafael Thrasher MD        ziprasidone (GEODON) 10 mg in sterile water 0.5 mL injection  10 mg IntraMUSCular Q12H PRN Fabrizio Reyna MD   10 mg at 01/07/23 1154    dexmedetomidine (PRECEDEX) 400 mcg in sodium chloride 0.9 % 100 mL infusion  0.1-1.5 mcg/kg/hr IntraVENous Continuous PRN Fabrizio Reyna MD        nystatin (MYCOSTATIN) ointment   Topical BID Raymundo Johnson MD   Given at 01/09/23 0859    potassium bicarb-citric acid (EFFER-K) effervescent tablet 20 mEq  20 mEq Oral Daily Eliseo Humphreys MD   20 mEq at 01/07/23 1032    [Held by provider] miconazole (MICOTIN) 2 % powder   Topical BID ALAYNA Connor - NP   Given at 01/06/23 0923    vancomycin (VANCOCIN) 1,250 mg in dextrose 5 % 250 mL IVPB (ADDAVIAL)  1,250 mg IntraVENous Q12H Felipa Felix MD   Stopped at 01/09/23 0606    perflutren lipid microspheres (DEFINITY) injection 1.5 mL  1.5 mL IntraVENous ONCE PRN Randy Villavicencio MD        [facility] AT Skipwith by provider] aspirin chewable tablet 81 mg  81 mg Oral Daily Manjit BRIZUELA Valentino Frankel MD   81 mg at 01/04/23 0740    sodium chloride flush 0.9 % injection 10 mL  10 mL IntraVENous PRN Adrian Preciado MD   10 mL at 01/03/23 1459    sodium chloride flush 0.9 % injection 10 mL  10 mL IntraVENous PRN Adán Contreras MD   10 mL at 01/03/23 1836    sodium chloride flush 0.9 % injection 5-40 mL  5-40 mL IntraVENous 2 times per day ALAYNA Faye NP   10 mL at 01/09/23 0905    sodium chloride flush 0.9 % injection 5-40 mL  5-40 mL IntraVENous PRN ALAYNA Marrero NP        0.9 % sodium chloride infusion   IntraVENous PRN ALAYNA Faye NP        ondansetron (ZOFRAN-ODT) disintegrating tablet 4 mg  4 mg Oral Q8H PRN ALAYNA Faye NP        Or    ondansetron (ZOFRAN) injection 4 mg  4 mg IntraVENous Q6H PRN ALAYNA Faye - NASRA        polyethylene glycol (GLYCOLAX) packet 17 g  17 g Oral Daily PRN Arlin Lindquist APRN - NP        acetaminophen (TYLENOL) tablet 650 mg  650 mg Oral Q6H PRN ALAYNA Faye NP        Or    acetaminophen (TYLENOL) suppository 650 mg  650 mg Rectal Q6H PRN ALAYNA Faye - NP        nicotine (NICODERM CQ) 21 MG/24HR 1 patch  1 patch TransDERmal Daily ALAYNA Faye - NP   1 patch at 01/09/23 0859    albuterol (PROVENTIL) nebulizer solution 2.5 mg  2.5 mg Nebulization Q2H PRN ALAYNA Faye - NP   2.5 mg at 01/02/23 1115    guaiFENesin (MUCINEX) extended release tablet 600 mg  600 mg Oral BID PRN Arlin Lindquist APRN - NP        vancomycin (VANCOCIN) intermittent dosing (placeholder)   Other RX Placeholder Adrian Preciado MD           No Known Allergies    ROS:   On ventilator on sedation     Vitals:    01/09/23 0900   BP: (!) 150/107   Pulse: 99   Resp: 22   Temp:    SpO2: 93%     Admission weight: 180 lb 12.4 oz (82 kg)    Neurological exam:  General Exam:  Normal body habitus, no acute distress    HEENT:  Normocephalic, atraumatic  Eyes: conjunctiva non-injected, sclera anicteric  Mucous membranes of normal color and hydration status  Poor dentition     Neurologic exam:     Mental status: somnolent and oriented to person only  No overt visuospatial neglect or gaze preference  Language with normal fluency and comprehension to simple commands    Cranial nerves:  Pupils equal round and reactive to light,   Extraocular movements: conjugate  Face is symmetric to movement   Hearing is intact to conversation  Tongue midline, no dysarthria or dysphonia    Motor exam:  Moving all extremities purposefully and equally       Latest Reference Range & Units 1/6/23 04:19 1/7/23 05:19   WBC 3.5 - 11.0 k/uL 5.2 7.1   RBC 4.5 - 5.9 m/uL 5.33 5.90   Hemoglobin Quant 13.5 - 17.5 g/dL 10.5 (L) 11.7 (L)   Hematocrit 41 - 53 % 38.4 (L) 41.4   MCV 80 - 100 fL 72.0 (L) 70.2 (L)   MCH 26 - 34 pg 19.7 (L) 19.8 (L)   MCHC 31 - 37 g/dL 27.4 (L) 28.2 (L)   MPV 6.0 - 12.0 fL 9.3 9.1   RDW 11.5 - 14.9 % 20.3 (H) 20.4 (H)   Platelet Count 352 - 450 k/uL 71 (L) 68 (L)   Absolute Mono # 0.1 - 1.3 k/uL 0.26 0.43   Eosinophils % 0 - 4 % 0 1   Basophils Absolute 0.0 - 0.2 k/uL 0.00 0.07   Seg Neutrophils 36 - 66 % 90 (H) 82 (H)   Segs Absolute 1.3 - 9.1 k/uL 4.68 5.82   Bands 0 - 10 % 1    Absolute Bands # 0.0 - 1.0 k/uL 0.05    Lymphocytes 24 - 44 % 4 (L) 10 (L)   Absolute Lymph # 1.0 - 4.8 k/uL 0.21 (L) 0.71 (L)   Monocytes 1 - 7 % 5 6   Absolute Eos # 0.0 - 0.4 k/uL 0.00 0.07   Basophils 0 - 2 % 0 1   (L): Data is abnormally low  (H): Data is abnormally high             Assessment : This is a 61 y/o WM with prior alcoholism who presented with AMS in the setting of hypoxia and a pneumonia. A head CT noted some ischemic infarcts and an MRI confirmed this along with SAH. He is overall much improved and has a generally non-focal exam. I suspect that this is a multifactorial encephalopathy with contributions from being in the ICU and from his hypertension.  I don't see any suspect medications any longer        Plan:  Delirum precautions, sunlight during day, minimize stimulation at night  Recommend more aggressive blood pressure control

## 2023-01-09 NOTE — PROGRESS NOTES
At bedside to evaluate patient for multiple concerns. Uncontrolled hypertension with -200. Reviewed recent notes and labs. On admission BNP>7000. Fluid positive 3.8L per nephrology note. Has diffuse edema with weeping present. Patient initially had an KENTRELL at admission but seems to have resolved. Last Cr<0.04. Added BNP, plan to discuss diuretic use with nephrology. Prn hydralazine added for uncontrolled hypertension while awaiting results and nephrology input    Patient does have an area of increased firm edema to left lower abdomen/pannus. Multiple areas of ecchymosis present from patient receiving heparin injections. May be located specifically in this area due to patient positioning. No redness or warmth. Does have some weeping. Not painful to palpation. He also has a large lump on the left side of his neck which is not clearly documented in any notes. Patient reports the lump is a chronic issue from an injury to his jaw as a child. It is firm and moveable. Not red, warm or painful to palpation. Suggest confirming with family if this lump is a chronic issue. Patient is also experiencing increased agitation and made multiple statements concerning for suicidal ideation. \"Kill me now\" and \"End it now, I'm done\". He also has active hallucinations and carries out a conversation without anyone present in the room. Psych consult added.

## 2023-01-10 ENCOUNTER — APPOINTMENT (OUTPATIENT)
Dept: CT IMAGING | Age: 63
DRG: 139 | End: 2023-01-10
Payer: MEDICAID

## 2023-01-10 PROBLEM — F05 DELIRIUM DUE TO ANOTHER MEDICAL CONDITION: Status: ACTIVE | Noted: 2023-01-10

## 2023-01-10 LAB
ABSOLUTE EOS #: 0.07 K/UL (ref 0–0.4)
ABSOLUTE LYMPH #: 0.72 K/UL (ref 1–4.8)
ABSOLUTE MONO #: 0.72 K/UL (ref 0.1–1.3)
ALBUMIN SERPL-MCNC: 3.3 G/DL (ref 3.5–5.2)
ALP BLD-CCNC: 52 U/L (ref 40–129)
ALT SERPL-CCNC: 74 U/L (ref 5–41)
ANION GAP SERPL CALCULATED.3IONS-SCNC: 10 MMOL/L (ref 9–17)
AST SERPL-CCNC: 41 U/L
BASOPHILS # BLD: 2 % (ref 0–2)
BASOPHILS ABSOLUTE: 0.14 K/UL (ref 0–0.2)
BILIRUB SERPL-MCNC: 2.3 MG/DL (ref 0.3–1.2)
BUN BLDV-MCNC: 3 MG/DL (ref 8–23)
CALCIUM SERPL-MCNC: 8.6 MG/DL (ref 8.6–10.4)
CHLORIDE BLD-SCNC: 97 MMOL/L (ref 98–107)
CO2: 33 MMOL/L (ref 20–31)
CREAT SERPL-MCNC: <0.4 MG/DL (ref 0.7–1.2)
EOSINOPHILS RELATIVE PERCENT: 1 % (ref 0–4)
GFR SERPL CREATININE-BSD FRML MDRD: ABNORMAL ML/MIN/1.73M2
GLUCOSE BLD-MCNC: 96 MG/DL (ref 70–99)
HCT VFR BLD CALC: 45.3 % (ref 41–53)
HEMOGLOBIN: 13.6 G/DL (ref 13.5–17.5)
LYMPHOCYTES # BLD: 10 % (ref 24–44)
MAGNESIUM: 1.5 MG/DL (ref 1.6–2.6)
MCH RBC QN AUTO: 20.9 PG (ref 26–34)
MCHC RBC AUTO-ENTMCNC: 30 G/DL (ref 31–37)
MCV RBC AUTO: 69.5 FL (ref 80–100)
MONOCYTES # BLD: 10 % (ref 1–7)
MORPHOLOGY: ABNORMAL
PDW BLD-RTO: 21.5 % (ref 11.5–14.9)
PLATELET # BLD: 67 K/UL (ref 150–450)
PMV BLD AUTO: 8.6 FL (ref 6–12)
POTASSIUM SERPL-SCNC: 3.3 MMOL/L (ref 3.7–5.3)
RBC # BLD: 6.52 M/UL (ref 4.5–5.9)
REASON FOR REJECTION: NORMAL
SEG NEUTROPHILS: 77 % (ref 36–66)
SEGMENTED NEUTROPHILS ABSOLUTE COUNT: 5.55 K/UL (ref 1.3–9.1)
SODIUM BLD-SCNC: 140 MMOL/L (ref 135–144)
TOTAL PROTEIN: 6.1 G/DL (ref 6.4–8.3)
WBC # BLD: 7.2 K/UL (ref 3.5–11)
ZZ NTE CLEAN UP: ORDERED TEST: NORMAL
ZZ NTE WITH NAME CLEAN UP: SPECIMEN SOURCE: NORMAL

## 2023-01-10 PROCEDURE — 6360000002 HC RX W HCPCS: Performed by: INTERNAL MEDICINE

## 2023-01-10 PROCEDURE — 97110 THERAPEUTIC EXERCISES: CPT

## 2023-01-10 PROCEDURE — 36415 COLL VENOUS BLD VENIPUNCTURE: CPT

## 2023-01-10 PROCEDURE — 80053 COMPREHEN METABOLIC PANEL: CPT

## 2023-01-10 PROCEDURE — 2580000003 HC RX 258: Performed by: INTERNAL MEDICINE

## 2023-01-10 PROCEDURE — 6360000004 HC RX CONTRAST MEDICATION: Performed by: INTERNAL MEDICINE

## 2023-01-10 PROCEDURE — 99233 SBSQ HOSP IP/OBS HIGH 50: CPT | Performed by: INTERNAL MEDICINE

## 2023-01-10 PROCEDURE — 85025 COMPLETE CBC W/AUTO DIFF WBC: CPT

## 2023-01-10 PROCEDURE — 6360000002 HC RX W HCPCS

## 2023-01-10 PROCEDURE — 99232 SBSQ HOSP IP/OBS MODERATE 35: CPT | Performed by: NURSE PRACTITIONER

## 2023-01-10 PROCEDURE — 94640 AIRWAY INHALATION TREATMENT: CPT

## 2023-01-10 PROCEDURE — C9113 INJ PANTOPRAZOLE SODIUM, VIA: HCPCS

## 2023-01-10 PROCEDURE — 2500000003 HC RX 250 WO HCPCS: Performed by: STUDENT IN AN ORGANIZED HEALTH CARE EDUCATION/TRAINING PROGRAM

## 2023-01-10 PROCEDURE — 2700000000 HC OXYGEN THERAPY PER DAY

## 2023-01-10 PROCEDURE — 6370000000 HC RX 637 (ALT 250 FOR IP): Performed by: NURSE PRACTITIONER

## 2023-01-10 PROCEDURE — 2500000003 HC RX 250 WO HCPCS

## 2023-01-10 PROCEDURE — 6360000002 HC RX W HCPCS: Performed by: PSYCHIATRY & NEUROLOGY

## 2023-01-10 PROCEDURE — 6370000000 HC RX 637 (ALT 250 FOR IP): Performed by: INTERNAL MEDICINE

## 2023-01-10 PROCEDURE — 99231 SBSQ HOSP IP/OBS SF/LOW 25: CPT | Performed by: INTERNAL MEDICINE

## 2023-01-10 PROCEDURE — 83735 ASSAY OF MAGNESIUM: CPT

## 2023-01-10 PROCEDURE — 94761 N-INVAS EAR/PLS OXIMETRY MLT: CPT

## 2023-01-10 PROCEDURE — A4216 STERILE WATER/SALINE, 10 ML: HCPCS

## 2023-01-10 PROCEDURE — 2580000003 HC RX 258

## 2023-01-10 PROCEDURE — 70460 CT HEAD/BRAIN W/DYE: CPT

## 2023-01-10 PROCEDURE — 2580000003 HC RX 258: Performed by: NURSE PRACTITIONER

## 2023-01-10 PROCEDURE — 2060000000 HC ICU INTERMEDIATE R&B

## 2023-01-10 PROCEDURE — 94660 CPAP INITIATION&MGMT: CPT

## 2023-01-10 RX ORDER — SODIUM CHLORIDE 0.9 % (FLUSH) 0.9 %
10 SYRINGE (ML) INJECTION PRN
Status: DISCONTINUED | OUTPATIENT
Start: 2023-01-10 | End: 2023-01-12 | Stop reason: SDUPTHER

## 2023-01-10 RX ORDER — MAGNESIUM SULFATE HEPTAHYDRATE 40 MG/ML
2000 INJECTION, SOLUTION INTRAVENOUS ONCE
Status: COMPLETED | OUTPATIENT
Start: 2023-01-10 | End: 2023-01-10

## 2023-01-10 RX ORDER — 0.9 % SODIUM CHLORIDE 0.9 %
80 INTRAVENOUS SOLUTION INTRAVENOUS ONCE
Status: COMPLETED | OUTPATIENT
Start: 2023-01-10 | End: 2023-01-10

## 2023-01-10 RX ORDER — MAGNESIUM SULFATE HEPTAHYDRATE 40 MG/ML
2000 INJECTION, SOLUTION INTRAVENOUS PRN
Status: DISCONTINUED | OUTPATIENT
Start: 2023-01-10 | End: 2023-01-19 | Stop reason: HOSPADM

## 2023-01-10 RX ADMIN — FUROSEMIDE 20 MG: 10 INJECTION, SOLUTION INTRAMUSCULAR; INTRAVENOUS at 23:20

## 2023-01-10 RX ADMIN — SODIUM CHLORIDE, PRESERVATIVE FREE 40 MG: 5 INJECTION INTRAVENOUS at 07:45

## 2023-01-10 RX ADMIN — LABETALOL HYDROCHLORIDE 5 MG: 5 INJECTION, SOLUTION INTRAVENOUS at 16:06

## 2023-01-10 RX ADMIN — IPRATROPIUM BROMIDE AND ALBUTEROL SULFATE 1 AMPULE: 2.5; .5 SOLUTION RESPIRATORY (INHALATION) at 15:45

## 2023-01-10 RX ADMIN — VANCOMYCIN HYDROCHLORIDE 1250 MG: 1.25 INJECTION, POWDER, LYOPHILIZED, FOR SOLUTION INTRAVENOUS at 16:04

## 2023-01-10 RX ADMIN — NYSTATIN OINTMENT: 100000 OINTMENT TOPICAL at 07:47

## 2023-01-10 RX ADMIN — SODIUM CHLORIDE, PRESERVATIVE FREE 10 ML: 5 INJECTION INTRAVENOUS at 11:21

## 2023-01-10 RX ADMIN — POTASSIUM CHLORIDE 10 MEQ: 7.46 INJECTION, SOLUTION INTRAVENOUS at 08:25

## 2023-01-10 RX ADMIN — VANCOMYCIN HYDROCHLORIDE 1250 MG: 1.25 INJECTION, POWDER, LYOPHILIZED, FOR SOLUTION INTRAVENOUS at 04:21

## 2023-01-10 RX ADMIN — MAGNESIUM SULFATE HEPTAHYDRATE 2000 MG: 40 INJECTION, SOLUTION INTRAVENOUS at 08:59

## 2023-01-10 RX ADMIN — HYDRALAZINE HYDROCHLORIDE 20 MG: 20 INJECTION INTRAMUSCULAR; INTRAVENOUS at 20:59

## 2023-01-10 RX ADMIN — FUROSEMIDE 20 MG: 10 INJECTION, SOLUTION INTRAMUSCULAR; INTRAVENOUS at 00:33

## 2023-01-10 RX ADMIN — SODIUM CHLORIDE, PRESERVATIVE FREE 10 ML: 5 INJECTION INTRAVENOUS at 07:46

## 2023-01-10 RX ADMIN — SODIUM CHLORIDE 80 ML: 9 INJECTION, SOLUTION INTRAVENOUS at 11:22

## 2023-01-10 RX ADMIN — IPRATROPIUM BROMIDE AND ALBUTEROL SULFATE 1 AMPULE: 2.5; .5 SOLUTION RESPIRATORY (INHALATION) at 07:03

## 2023-01-10 RX ADMIN — FUROSEMIDE 20 MG: 10 INJECTION, SOLUTION INTRAMUSCULAR; INTRAVENOUS at 12:32

## 2023-01-10 RX ADMIN — POTASSIUM CHLORIDE: 2 INJECTION, SOLUTION, CONCENTRATE INTRAVENOUS at 23:19

## 2023-01-10 RX ADMIN — IOPAMIDOL 75 ML: 755 INJECTION, SOLUTION INTRAVENOUS at 11:21

## 2023-01-10 RX ADMIN — POTASSIUM CHLORIDE 10 MEQ: 7.46 INJECTION, SOLUTION INTRAVENOUS at 09:33

## 2023-01-10 RX ADMIN — IPRATROPIUM BROMIDE AND ALBUTEROL SULFATE 1 AMPULE: 2.5; .5 SOLUTION RESPIRATORY (INHALATION) at 10:45

## 2023-01-10 RX ADMIN — WATER 10 MG: 1 INJECTION INTRAMUSCULAR; INTRAVENOUS; SUBCUTANEOUS at 07:45

## 2023-01-10 RX ADMIN — POTASSIUM CHLORIDE 10 MEQ: 7.46 INJECTION, SOLUTION INTRAVENOUS at 12:26

## 2023-01-10 ASSESSMENT — PAIN SCALES - WONG BAKER
WONGBAKER_NUMERICALRESPONSE: 0

## 2023-01-10 ASSESSMENT — PAIN SCALES - GENERAL
PAINLEVEL_OUTOF10: 0
PAINLEVEL_OUTOF10: 0

## 2023-01-10 NOTE — PROGRESS NOTES
Today's Date: 1/10/2023  Patient Name: Po Nielsen  Date of admission: 1/1/2023  9:55 PM  Patient's age: 60 y.o., 1960  Admission Dx: Acute respiratory failure ([facility] [address] [J96.00]  Acute respiratory failure with hypoxia and hypercapnia ([address] [J96.01, J96.02]  Altered mental status, unspecified altered mental status type [R41.82]  Community acquired pneumonia of right lower lobe of lung [J18.9]  COPD with respiratory failure, acute ([address] [J44.9, J96.00]    Reason for Consult: management recommendations  Requesting Physician: Kanwal Serrano MD    CHIEF COMPLAINT: Abnormal cell counts. Altered mental status. Pneumonia. Interval history:    Patient seen and examined  Labs and vitals reviewed  Patient seen in ICU  The patient is seen and evaluated. Quite sleepy this afternoon. Respiratory status is stable  CT scan was done this morning. It continues to showed a subarachnoid hemorrhage and focus of intraparenchymal hemorrhage. It is felt to be unchanged but we will confirm with the radiologist  HISTORY OF PRESENT ILLNESS:      The patient is a 60 y.o.  male who is admitted with chief complaint of altered mental status. Patient has not seen a doctor for multiple years. Due to worsening mental status EMS was summoned. Patient oxygen saturation was noted to be at 75%. Patient placed on BiPAP x-ray showed right lower lobe pneumonia. Patient started on antibiotics. Patient also noted to have INR of 2.3 platelet count of 13,829. Patient does have a history of alcohol intake. Patient's ammonia level noted to be 29. Creatinine 1.5. Total bilirubin is within range. Hemoglobin 11.2 with MCV of 69. Platelet count is 06,418. Ultrasound liver done however report is pending.         Past Medical History: Hypertension    Past Surgical History: No pertinent surgical history    Medications:    Prior to Admission medications    Not on File     Current Facility-Administered Medications Medication Dose Route Frequency Provider Last Rate Last Admin    magnesium sulfate 2000 mg in water 50 mL IVPB  2,000 mg IntraVENous PRN Lashell Martinez MD        sodium chloride flush 0.9 % injection 10 mL  10 mL IntraVENous PRN Wilfredo Mccray MD   10 mL at 01/10/23 1121    hydrALAZINE (APRESOLINE) injection 20 mg  20 mg IntraVENous Q6H PRN Stevo Oates MD   20 mg at 01/09/23 1536    [Held by provider] carvedilol (COREG) tablet 6.25 mg  6.25 mg Oral BID WC Lashell Martinez MD        [facility]] ferrous sulfate (IRON 325) tablet 325 mg  325 mg Oral Daily with breakfast Lashell Martinez MD        pantoprazole (PROTONIX) 40 mg in sodium chloride (PF) 0.9 % 10 mL injection  40 mg IntraVENous Daily Lashell Martinez MD   40 mg at 01/10/23 0745    furosemide (LASIX) injection 20 mg  20 mg IntraVENous Q12H Yvette Emerson MD   20 mg at 01/10/23 1232    ziprasidone (GEODON) 20 mg in sterile water 1 mL injection  20 mg IntraMUSCular Q12H PRN ALAYNA Mckenna - CNP        labetalol (NORMODYNE;TRANDATE) injection 5 mg  5 mg IntraVENous Q6H PRN Lashell Martinez MD   5 mg at 01/10/23 1606    potassium chloride 10 mEq/100 mL IVPB (Peripheral Line)  10 mEq IntraVENous PRN Nadine Hill  mL/hr at 01/10/23 1226 10 mEq at 01/10/23 1226    ziprasidone (GEODON) 10 mg in sterile water 0.5 mL injection  10 mg IntraMUSCular BID Jared Valenzuela MD   10 mg at 01/10/23 0745    ipratropium-albuterol (DUONEB) nebulizer solution 1 ampule  1 ampule Inhalation Q4H WA Jared Valenzuela MD   1 ampule at 01/10/23 1545    [Held by provider] heparin (porcine) injection 5,000 Units  5,000 Units SubCUTAneous 3 times per day Gio Morales MD   5,000 Units at 01/08/23 0549    potassium chloride 20 mEq in dextrose 5 % 1,000 mL infusion   IntraVENous Continuous Yvette Emerson MD 30 mL/hr at 01/09/23 1652 Rate Change at 01/09/23 1652    0.9 % sodium chloride infusion   IntraVENous PRN [facility], MD        dexmedetomidine (PRECEDEX) 400 mcg in sodium chloride 0.9 % 100 mL infusion  0.1-1.5 mcg/kg/hr IntraVENous Continuous PRN Jannice Cooks, MD        nystatin (MYCOSTATIN) ointment   Topical BID Adrien La MD   Given at 01/10/23 0747    potassium bicarb-citric acid (EFFER-K) effervescent tablet 20 mEq  20 mEq Oral Daily Adam Herrera MD   20 mEq at 01/07/23 1032    [Held by provider] miconazole (MICOTIN) 2 % powder   Topical BID ALAYNA Carson - NP   Given at 01/06/23 0923    vancomycin (VANCOCIN) 1,250 mg in dextrose 5 % 250 mL IVPB (ADDAVIAL)  1,250 mg IntraVENous Q12H Lee Novak .7 mL/hr at 01/10/23 1604 1,250 mg at 01/10/23 1604    perflutren lipid microspheres (DEFINITY) injection 1.5 mL  1.5 mL IntraVENous ONCE PRN Liliam Franz MD        [facility] [place] by provider] aspirin chewable tablet 81 mg  81 mg Oral Daily Rocky Jansen MD   81 mg at 01/04/23 0740    sodium chloride flush 0.9 % injection 10 mL  10 mL IntraVENous PRN Rajiv Hood MD   10 mL at 01/03/23 1459    sodium chloride flush 0.9 % injection 10 mL  10 mL IntraVENous PRN Rocky Jansen MD   10 mL at 01/03/23 1836    sodium chloride flush 0.9 % injection 5-40 mL  5-40 mL IntraVENous 2 times per day ALAYNA Carson NP   10 mL at 01/10/23 0746    sodium chloride flush 0.9 % injection 5-40 mL  5-40 mL IntraVENous PRN ALAYNA Marrero - NP        0.9 % sodium chloride infusion   IntraVENous PRN ALAYNA Carson NP        ondansetron (ZOFRAN-ODT) disintegrating tablet 4 mg  4 mg Oral Q8H PRN ALAYNA Carson NP        Or    ondansetron (ZOFRAN) injection 4 mg  4 mg IntraVENous Q6H PRN ALAYNA Marrero - NASRA        polyethylene glycol (GLYCOLAX) packet 17 g  17 g Oral Daily PRN ALAYNA Carson - NASRA        acetaminophen (TYLENOL) tablet 650 mg  650 mg Oral Q6H PRN Celi Lo APRN - NASRA        Or    acetaminophen (TYLENOL) suppository 650 mg  650 mg Rectal Q6H PRN Abiola Ranny Pepper - NP        nicotine (NICODERM CQ) 21 MG/24HR 1 patch  1 patch TransDERmal Daily Refugia Inches, APRN - NP   1 patch at 01/10/23 0746    albuterol (PROVENTIL) nebulizer solution 2.5 mg  2.5 mg Nebulization Q2H PRN Refugia Inches, APRN - NP   2.5 mg at 23 1115    guaiFENesin (MUCINEX) extended release tablet 600 mg  600 mg Oral BID PRN Refugia Inches, APRN - NP        vancomycin (VANCOCIN) intermittent dosing (placeholder)   Other RX Abdirahman Paulino MD           Allergies:  Patient has no known allergies. Social History:   reports that he has been smoking cigarettes. He has a 40.00 pack-year smoking history. He has never used smokeless tobacco. He reports current alcohol use. He reports that he does not currently use drugs. Family History: Patient not able to give history due to altered mental status    REVIEW OF SYSTEMS:      General: no fever or night sweats, Weight is stable. ENT: No double or blurred vision, no hearing problem, no dysphagia or sore throat   Respiratory: Positive shortness of breath  Cardiovascular: Denies chest pain, PND or orthopnea. No L E swelling or palpitations. Gastrointestinal:    No nausea or vomiting, abdominal pain, diarrhea or constipation. Genitourinary: Denies dysuria, hematuria, frequency, urgency or incontinence. Neurological: Complains of being very weak. Musculoskeletal:  No arthralgia no back pain or joint swelling. Skin: There are no rashes or bleeding.   Psych: Denies hallucinations or intentions to harm self        PHYSICAL EXAM:        BP (!) 142/99   Pulse 83   Temp 98.6 °F (37 °C) (Oral)   Resp 19   Ht 5' 7\" (1.702 m)   Wt 195 lb 1.7 oz (88.5 kg)   SpO2 96%   BMI 30.56 kg/m²    Temp (24hrs), Av.3 °F (36.8 °C), Min:98.1 °F (36.7 °C), Max:98.6 °F (37 °C)      General appearance - not in acute distress  Mental status -arousable but somewhat lethargic  Eyes - pupils equal and reactive, extraocular eye movements intact Ears - bilateral TM's and external ear canals normal   Mouth -mucous membranes moist  Neck - supple, no significant adenopathy   Lymphatics - no palpable lymphadenopathy, no hepatosplenomegaly   Chest -bilateral rales  Heart - normal rate, regular rhythm, normal S1, S2, no murmurs  Abdomen - soft, nontender, nondistended, no masses or organomegaly   Neurological -orally intubated  Musculoskeletal - no joint tenderness, deformity or swelling   Extremities - peripheral pulses normal, no pedal edema, no clubbing or cyanosis   Skin - normal coloration and turgor, no rashes, no suspicious skin lesions noted ,      DATA:      Labs:     Results for orders placed or performed during the hospital encounter of 01/01/23   COVID-19 & Influenza Combo    Specimen: Nasopharyngeal Swab   Result Value Ref Range    Specimen Description . NASOPHARYNGEAL SWAB     Source . NASOPHARYNGEAL SWAB     SARS-CoV-2 RNA, RT PCR Not Detected Not Detected    INFLUENZA A Not Detected Not Detected    INFLUENZA B Not Detected Not Detected   Respiratory Panel, Molecular, with COVID-19 (Restricted: peds pts or suitable admitted adults)    Specimen: Nasopharyngeal Swab   Result Value Ref Range    Specimen Description . NASOPHARYNGEAL SWAB     Adenovirus PCR Not Detected Not Detected    Coronavirus 229E PCR Not Detected Not Detected    Coronavirus HKU1 PCR Not Detected Not Detected    Coronavirus NL63 PCR Not Detected Not Detected    Coronavirus OC43 PCR Not Detected Not Detected    SARS-CoV-2, PCR Not Detected Not Detected    Human Metapneumovirus PCR Not Detected Not Detected    Rhino/Enterovirus PCR Not Detected Not Detected    Influenza A by PCR Not Detected Not Detected    Influenza B by PCR Not Detected Not Detected    Parainfluenza 1 PCR Not Detected Not Detected    Parainfluenza 2 PCR Not Detected Not Detected    Parainfluenza 3 PCR Not Detected Not Detected    Parainfluenza 4 PCR Not Detected Not Detected    Resp Syncytial Virus PCR Not Detected Not Detected    Bordetella Parapertussis Not Detected Not Detected    B Pertussis by PCR Not Detected Not Detected    Chlamydia pneumoniae By PCR Not Detected Not Detected    Mycoplasma pneumo by PCR Not Detected Not Detected   Legionella Ag, Ur    Specimen: Urine   Result Value Ref Range    Legionella Pneumophilia Ag, Urine NEGATIVE NEGATIVE   STREP PNEUMONIAE ANTIGEN    Specimen: Urine, indwelling catheter   Result Value Ref Range    Source .CLEAN CATCH URINE     Strep pneumo Ag NEGATIVE    Culture, Blood 1    Specimen: Blood   Result Value Ref Range    Specimen Description .BLOOD LEFT ARM     Culture NO GROWTH 5 DAYS    Culture, Blood 1    Specimen: Blood   Result Value Ref Range    Specimen Description .BLOOD RIGHT ARM     Culture NO GROWTH 5 DAYS    Culture, Urine    Specimen: Urine, clean catch   Result Value Ref Range    Specimen Description .CLEAN CATCH URINE     Culture NO GROWTH    MRSA DNA Probe, Nasal    Specimen: Nasal   Result Value Ref Range    Specimen Description .NASAL SWAB     MRSA, DNA, Nasal (A) NEGATIVE     POSITIVE:  MRSA DNA detected by nucleic acid amplification.   Culture, Respiratory    Specimen: Sputum Induced   Result Value Ref Range    Specimen Description .INDUCED SPUTUM     Direct Exam >10, <25 NEUTROPHILS/LPF     Direct Exam < 10 EPITHELIAL CELLS/LPF     Direct Exam NO SIGNIFICANT PATHOGENS SEEN     Culture NO GROWTH    Troponin   Result Value Ref Range    Troponin, High Sensitivity 177 (HH) 0 - 22 ng/L   Troponin   Result Value Ref Range    Troponin, High Sensitivity 184 (HH) 0 - 22 ng/L   APTT   Result Value Ref Range    PTT 26.5 24.0 - 36.0 sec   Protime-INR   Result Value Ref Range    Protime 24.9 (H) 11.8 - 14.6 sec    INR 2.3    Phosphorus   Result Value Ref Range    Phosphorus 4.3 2.5 - 4.5 mg/dL   Magnesium   Result Value Ref Range    Magnesium 2.1 1.6 - 2.6 mg/dL   Basic Metabolic Panel   Result Value Ref Range    Glucose 96 70 - 99 mg/dL    BUN 46 (H) 8 - 23 mg/dL     Creatinine 1.77 (H) 0.70 - 1.20 mg/dL    Est, Glom Filt Rate 43 (L) >60 mL/min/1.73m2    Calcium 7.9 (L) 8.6 - 10.4 mg/dL    Sodium 138 135 - 144 mmol/L    Potassium 4.8 3.7 - 5.3 mmol/L    Chloride 97 (L) 98 - 107 mmol/L    CO2 29 20 - 31 mmol/L    Anion Gap 12 9 - 17 mmol/L   CBC with Auto Differential   Result Value Ref Range    WBC 6.8 3.5 - 11.0 k/uL    RBC 5.99 (H) 4.5 - 5.9 m/uL    Hemoglobin 12.1 (L) 13.5 - 17.5 g/dL    Hematocrit 41.4 41 - 53 %    MCV 69.1 (L) 80 - 100 fL    MCH 20.2 (L) 26 - 34 pg    MCHC 29.2 (L) 31 - 37 g/dL    RDW 19.8 (H) 11.5 - 14.9 %    Platelets 55 (L) 843 - 450 k/uL    MPV 8.6 6.0 - 12.0 fL    Seg Neutrophils 79 (H) 36 - 66 %    Lymphocytes 12 (L) 24 - 44 %    Monocytes 8 (H) 1 - 7 %    Eosinophils % 0 0 - 4 %    Basophils 1 0 - 2 %    Segs Absolute 5.40 1.3 - 9.1 k/uL    Absolute Lymph # 0.80 (L) 1.0 - 4.8 k/uL    Absolute Mono # 0.50 0.1 - 1.3 k/uL    Absolute Eos # 0.00 0.0 - 0.4 k/uL    Basophils Absolute 0.10 0.0 - 0.2 k/uL   Blood Gas, Venous   Result Value Ref Range    pH, N[place] 7.258 (L) 7.320 - 7.420    pCO2, [place] 63.0 (H) 39.0 - 55.0 mm Hg    pO2, [place] 48.1 30.0 - 50.0 mm Hg    HCO3, Venous 28.1 24.0 - 30.0 mmol/L    Positive Base Excess, N[place] 1.0 0.0 - 2.0 mmol/L    O2 Sat, N[place] 72.1 60.0 - 85.0 %    Carboxyhemoglobin 4.3 0 - 5 %    Methemoglobin 0.5 0.0 - 1.9 %    Pt Temp 37    Brain Natriuretic Peptide   Result Value Ref Range    Pro-BNP 7,797 (H) <300 pg/mL   Hepatic Function Panel   Result Value Ref Range    Albumin 3.9 3.5 - 5.2 g/dL    Alkaline Phosphatase 68 40 - 129 U/L     (H) 5 - 41 U/L     (H) <40 U/L    Total Bilirubin 1.1 0.3 - 1.2 mg/dL    Bilirubin, Direct 0.9 (H) <0.3 mg/dL    Bilirubin, Indirect 0.2 0.0 - 1.0 mg/dL    Total Protein 6.6 6.4 - 8.3 g/dL   Lipase   Result Value Ref Range    Lipase 17 13 - 60 U/L   Urinalysis with Reflex to Culture    Specimen: Urine   Result Value Ref Range    Color, UA Orange (A) Yellow    Turbidity UA Cloudy (A) Clear    Glucose, Ur NEGATIVE NEGATIVE    Bilirubin Urine NEGATIVE  Verified by ictotest. (A) NEGATIVE    Ketones, Urine TRACE (A) NEGATIVE    Specific Gravity, UA 1.018 1.000 - 1.030    Urine Hgb LARGE (A) NEGATIVE    pH, UA 5.0 5.0 - 8.0    Protein, UA 2+ (A) NEGATIVE    Urobilinogen, Urine ELEVATED (A) Normal    Nitrite, Urine NEGATIVE NEGATIVE    Leukocyte Esterase, Urine SMALL (A) NEGATIVE   Microscopic Urinalysis   Result Value Ref Range    WBC, UA 6 TO 9 /HPF    RBC, UA TOO NUMEROUS TO COUNT /HPF    Casts UA 3 to 5 /LPF    Epithelial Cells UA 0 TO 2 /HPF    Bacteria, UA FEW (A) None   Arterial Blood Gases   Result Value Ref Range    pH, Arterial 7.295 (L) 7.350 - 7.450    pCO2, Arterial 60.3 (HH) 35.0 - 45.0 mmHg    pO2, Arterial 63.0 (L) 80.0 - 100.0 mmHg    HCO3, Arterial 29.3 (H) 22.0 - 26.0 mmol/L    Positive Base Excess, Art 2.8 (H) 0.0 - 2.0 mmol/L    O2 Sat, Arterial 85.8 (LL) 95 - 98 %    Carboxyhemoglobin 3.1 0 - 5 %    Methemoglobin 1.0 0.0 - 1.9 %    Pt Temp 37     O2 Device/Flow/% BIPAP     Respiratory Rate 16     Tyron Test PASS     Sample Site Right Radial Artery     Pt.  Position SEMI-FOWLERS     Mode BIPAP     Text for Respiratory RESULTS TO PHYSICIAN    Comprehensive Metabolic Panel w/ Reflex to MG   Result Value Ref Range    Glucose 101 (H) 70 - 99 mg/dL    BUN 46 (H) 8 - 23 mg/dL    Creatinine 1.50 (H) 0.70 - 1.20 mg/dL    Est, Glom Filt Rate 52 (L) >60 mL/min/1.73m2    Calcium 7.5 (L) 8.6 - 10.4 mg/dL    Sodium 136 135 - 144 mmol/L    Potassium 4.8 3.7 - 5.3 mmol/L    Chloride 99 98 - 107 mmol/L    CO2 27 20 - 31 mmol/L    Anion Gap 10 9 - 17 mmol/L    Alkaline Phosphatase 59 40 - 129 U/L     (H) 5 - 41 U/L     (H) <40 U/L    Total Bilirubin 0.8 0.3 - 1.2 mg/dL    Total Protein 5.9 (L) 6.4 - 8.3 g/dL    Albumin 3.3 (L) 3.5 - 5.2 g/dL   CBC with Auto Differential   Result Value Ref Range    WBC 5.6 3.5 - 11.0 k/uL    RBC 5.57 4.5 - 5.9 m/uL    Hemoglobin 11.2 (L) 13.5 - 17.5 g/dL    Hematocrit 38.8 (L) 41 - 53 %    MCV 69.7 (L) 80 - 100 fL    MCH 20.1 (L) 26 - 34 pg    MCHC 28.8 (L) 31 - 37 g/dL    RDW 19.7 (H) 11.5 - 14.9 %    Platelets 67 (L) 748 - 450 k/uL    MPV 9.1 6.0 - 12.0 fL    Seg Neutrophils 79 (H) 36 - 66 %    Lymphocytes 12 (L) 24 - 44 %    Monocytes 8 (H) 1 - 7 %    Eosinophils % 0 0 - 4 %    Basophils 1 0 - 2 %    nRBC 2 per 100 WBC    Segs Absolute 4.41 1.3 - 9.1 k/uL    Absolute Lymph # 0.67 (L) 1.0 - 4.8 k/uL    Absolute Mono # 0.45 0.1 - 1.3 k/uL    Absolute Eos # 0.00 0.0 - 0.4 k/uL    Basophils Absolute 0.06 0.0 - 0.2 k/uL    Morphology ANISOCYTOSIS PRESENT     Morphology HYPOCHROMIA PRESENT     Morphology 1+ ELLIPTOCYTES    Occ Bld, Fecal Scrn   Result Value Ref Range    Occult Blood, Stool #1 NEGATIVE NEGATIVE    Date, Stool #1 [phone]     Time, Stool #1 300    Hemoglobin and Hematocrit   Result Value Ref Range    Hemoglobin 11.1 (L) 13.5 - 17.5 g/dL    Hematocrit 38.7 (L) 41 - 53 %   Hemoglobin and Hematocrit   Result Value Ref Range    Hemoglobin 10.8 (L) 13.5 - 17.5 g/dL    Hematocrit 37.2 (L) 41 - 53 %   Ammonia   Result Value Ref Range    Ammonia 29 16 - 60 umol/L   Hepatitis Panel, Acute   Result Value Ref Range    Hepatitis B Surface Ag NONREACTIVE NONREACTIVE    Hepatitis C Ab REACTIVE (A) NONREACTIVE    Hep B Core Ab, IgM NONREACTIVE NONREACTIVE    Hep A IgM NONREACTIVE NONREACTIVE   Iron and TIBC   Result Value Ref Range    Iron 14 (L) 59 - 158 ug/dL    TIBC 426 250 - 450 ug/dL    Iron Saturation 3 (L) 20 - 55 %    UIBC 412 (H) 112 - 347 ug/dL   Ferritin   Result Value Ref Range    Ferritin 14 (L) 30 - 400 ng/mL   Mycoplasma Ab,IgM   Result Value Ref Range    Mycoplasma pneumo IgM 0.25 <0.91   Procalcitonin   Result Value Ref Range    Procalcitonin 0.09 (H) <0.09 ng/mL   C-Reactive Protein   Result Value Ref Range    CRP 16.4 (H) 0.0 - 5.0 mg/L   Fibrin Split Products   Result Value Ref Range    FDP >5 (H) <5 ug/mL   Sedimentation Rate   Result Value Ref Range    Sed Rate 1 0 - 20 mm/Hr   Drug Scr, Abuse, Ur   Result Value Ref Range    Amphetamine Screen, Ur NEGATIVE NEGATIVE    Barbiturate Screen, Ur NEGATIVE NEGATIVE    Benzodiazepine Screen, Urine NEGATIVE NEGATIVE    Cocaine Metabolite, Urine NEGATIVE NEGATIVE    Methadone Screen, Urine NEGATIVE NEGATIVE    Opiates, Urine NEGATIVE NEGATIVE    Phencyclidine, Urine NEGATIVE NEGATIVE    Cannabinoid Scrn, Ur NEGATIVE NEGATIVE    Oxycodone Screen, Ur NEGATIVE NEGATIVE    Fentanyl, Ur NEGATIVE NEGATIVE    Test Information       Assay provides medical screening only. The absence of expected drug(s) and/or metabolite(s) may indicate diluted or adulterated urine, limitations of testing or timing of collection. Troponin   Result Value Ref Range    Troponin, High Sensitivity 195 (HH) 0 - 22 ng/L   Vitamin B12 & Folate   Result Value Ref Range    Vitamin B-12 1926 (H) 232 - 1245 pg/mL    Folate 10.0 >4.8 ng/mL   HIV Screen   Result Value Ref Range    HIV Ag/Ab NONREACTIVE NONREACTIVE   MONOCLONAL PANEL   Result Value Ref Range    Kappa Free Light Chains QNT 2.79 (H) 0.37 - 1.94 mg/dL    Lambda Free Light Chains QNT 20.89 (H) 0.57 - 2.63 mg/dL    Free Kappa/Lambda Ratio 0.13 (L) 0.26 - 1.65    Serum IFX Interp       A ZONE OF RESTRICTION IS PRESENT IN THE GAMMAGLOBULIN REGION. CONFIRMED BY IMMUNOFIXATION TO BE MONOCLONAL    Pathologist       Reviewed by pathologist:  Lieutenant Ramey. Demetria Trejo D.O. Total Protein 5.5 (L) 6.4 - 8.3 g/dL    Albumin (calculated) 3.6 3.2 - 5.2 g/dL    Albumin % 65 45 - 65 %    Alpha-1-Globulin 0.2 0.1 - 0.4 g/dL    Alpha 1 % 3 3 - 6 %    Alpha-2-Globulin 0.4 (L) 0.5 - 0.9 g/dL    Alpha 2 % 7 6 - 13 %    Beta Globulin 0.6 0.5 - 1.1 g/dL    Beta Percent 11 11 - 19 %    Gamma Globulin 0.8 0.5 - 1.5 g/dL    Gamma Globulin % 14 9 - 20 %    Total Prot. Sum 5.6 (L) 6.3 - 8.2 g/dL    Total Prot.  Sum,% 100 98 - 102 %    Protein Electrophoresis, Serum       A ZONE OF RESTRICTION IS PRESENT IN THE [address] CONFIRMED BY IMMUNOFIXATION TO BE MONOCLONAL    Pathologist       Reviewed by pathologist:  Leroy Ruffin. Dinah Padilla D.O. Path Review, Smear   Result Value Ref Range    Pathologist Review ELECTRONICALLY SIGNED.  Ashlyn Clarke MD    Reticulocytes   Result Value Ref Range    Retic % 2.8 (H) 0.5 - 2.0 %    Absolute Retic # 0.157 (H) 0.0245 - 0.098 M/uL   Fibrinogen   Result Value Ref Range    Fibrinogen 141 (L) 210 - 530 mg/dL   Ethanol   Result Value Ref Range    Ethanol <10 <10 mg/dL    Ethanol percent <0.010 %   Lactate Dehydrogenase   Result Value Ref Range     (H) 135 - 225 U/L   Hemoglobin and Hematocrit   Result Value Ref Range    Hemoglobin 10.9 (L) 13.5 - 17.5 g/dL    Hematocrit 38.0 (L) 41 - 53 %   Haptoglobin   Result Value Ref Range    Haptoglobin 60 30 - 200 mg/dL   D-Dimer, Quantitative   Result Value Ref Range    D-Dimer, Quant 6.70 (H) 0.00 - 0.59 mg/L FEU   Urinalysis with Reflex to Culture    Specimen: Urine   Result Value Ref Range    Color, UA Dark Yellow (A) Yellow    Turbidity UA Clear Clear    Glucose, Ur NEGATIVE NEGATIVE    Bilirubin Urine NEGATIVE NEGATIVE    Ketones, Urine TRACE (A) NEGATIVE    Specific [place], UA 1.015 1.000 - 1.030    Urine Hgb LARGE (A) NEGATIVE    pH, UA 5.0 5.0 - 8.0    Protein, UA 1+ (A) NEGATIVE    Urobilinogen, Urine Normal Normal    Nitrite, Urine NEGATIVE NEGATIVE    Leukocyte Esterase, Urine SMALL (A) NEGATIVE   APTT   Result Value Ref Range    PTT 38.7 (H) 24.0 - 36.0 sec   Protime-INR   Result Value Ref Range    Protime 24.4 (H) 11.8 - 14.6 sec    INR 2.2    Microscopic Urinalysis   Result Value Ref Range    WBC, UA 10 TO 20 /HPF    RBC, UA TOO NUMEROUS TO COUNT /HPF    Casts UA HYALINE /LPF    Casts UA 0 TO 2 /LPF    Epithelial Cells UA 3 to 5 /HPF   BLOOD GAS, ARTERIAL   Result Value Ref Range    pH, Arterial 7.314 (L) 7.350 - 7.450    pCO2, Arterial 61.9 (HH) 35.0 - 45.0 mmHg    pO2, Arterial 58.1 (LL) 80.0 - 100.0 mmHg    HCO3, Arterial 31.4 (H) 22.0 - 26.0 mmol/L    Positive Base Excess, Art 5.3 (H) 0.0 - 2.0 mmol/L    O2 Sat, Arterial 86.3 (LL) 95 - 98 %    Carboxyhemoglobin 2.1 0 - 5 %    Methemoglobin 0.8 0.0 - 1.9 %    Pt Temp 37.0     O2 Device/Flow/% VENTILATOR     Respiratory Rate 22     Tyron Test PASS     Sample Site Left Radial Artery     Pt. Position SEMI-FOWLERS     Mode PRVC     Set Rate 22     Total Rate 26          FIO2 40     Peep/Cpap 8    SURGICAL PATHOLOGY REPORT   Result Value Ref Range    Surgical Pathology Report       [id number]  [facility]  ANATOMIC PATHOLOGY  [address] [place] [address], [address]. [place], [address]  [phone]  Fax: [phone]  SURGICAL PATHOLOGY CONSULTATION    Patient Name: Nik Sarmiento  MR#: [id number]  Specimen #[id number]    Procedures/Addenda  PERIPHERAL BLOOD REPORT     Date Ordered:     1/2/2023     Status:  Signed Out       Date Complete:     1/3/2023     By: Rachael Sarmiento M.D. Date Reported:     1/3/2023       INTERPRETATION  Peripheral blood:  Microcytic, hypochromic anemia. The patient's iron studies are compatible with iron deficiency anemia. Lymphopenia. Differential considerations include acute illness/infection,  medication effect, smoking, and debilitating diseases. Thrombocytopenia  Differential considerations include bone marrow hypoproduction and  immune destruction (idiopathic thrombocytopenic purpura, drug effect). RESULTS-COMMENTS  PERIPHERAL BLOOD STUDY    CBC: Please see the electronic health record  for CBC parameters  (, 01/02/2023, 05:43). PLATELETS: Decreased platelets. Platelets show normal morphology. LEUKOCYTES: Decreased lymphocytes. White blood cells show normal  morphology. There are no blasts. ERYTHROCYTES: Microcytic, hypochromic. Anisocytosis and  poikilocytosis. Polychromasia. Reticulocyte count 2.8%. Rare RBC  fragments.     Note: The electronic health record is reviewed. Martina Sharma M.D.                    Source:  A: Peripheral Blood     Comprehensive Metabolic Panel w/ Reflex to MG   Result Value Ref Range    Glucose 91 70 - 99 mg/dL    BUN 32 (H) 8 - 23 mg/dL    Creatinine 0.97 0.70 - 1.20 mg/dL    Est, Glom Filt Rate >60 >60 mL/min/1.73m2    Calcium 7.4 (L) 8.6 - 10.4 mg/dL    Sodium 143 135 - 144 mmol/L    Potassium 3.7 3.7 - 5.3 mmol/L    Chloride 105 98 - 107 mmol/L    CO2 30 20 - 31 mmol/L    Anion Gap 8 (L) 9 - 17 mmol/L    Alkaline Phosphatase 50 40 - 129 U/L     (H) 5 - 41 U/L     (H) <40 U/L    Total Bilirubin 0.9 0.3 - 1.2 mg/dL    Total Protein 5.2 (L) 6.4 - 8.3 g/dL    Albumin 3.1 (L) 3.5 - 5.2 g/dL   CBC with Auto Differential   Result Value Ref Range    WBC 5.7 3.5 - 11.0 k/uL    RBC 5.42 4.5 - 5.9 m/uL    Hemoglobin 11.0 (L) 13.5 - 17.5 g/dL    Hematocrit 37.6 (L) 41 - 53 %    MCV 69.4 (L) 80 - 100 fL    MCH 20.2 (L) 26 - 34 pg    MCHC 29.2 (L) 31 - 37 g/dL    RDW 20.0 (H) 11.5 - 14.9 %    Platelets 66 (L) 253 - 450 k/uL    MPV 9.4 6.0 - 12.0 fL    Seg Neutrophils 85 (H) 36 - 66 %    Lymphocytes 9 (L) 24 - 44 %    Monocytes 5 1 - 7 %    Eosinophils % 0 0 - 4 %    Basophils 0 0 - 2 %    Bands 1 0 - 10 %    nRBC 1 (H) 0 per 100 WBC    Segs Absolute 4.87 1.3 - 9.1 k/uL    Absolute Lymph # 0.52 (L) 1.0 - 4.8 k/uL    Absolute Mono # 0.29 0.1 - 1.3 k/uL    Absolute Eos # 0.00 0.0 - 0.4 k/uL    Basophils Absolute 0.00 0.0 - 0.2 k/uL    Absolute Bands # 0.06 0.0 - 1.0 k/uL    Morphology ANISOCYTOSIS PRESENT     Morphology HYPOCHROMIA PRESENT     Morphology 1+ POLYCHROMASIA     Morphology 1+ ELLIPTOCYTES    BLOOD GAS, ARTERIAL   Result Value Ref Range    pH, Arterial 7.373 7.350 - 7.450    pCO2, Arterial 56.0 (HH) 35.0 - 45.0 mmHg    pO2, Arterial 61.4 (L) 80.0 - 100.0 mmHg    HCO3, Arterial 32.6 (H) 22.0 - 26.0 mmol/L    Positive Base Excess, Art 7.4 (H) 0.0 - 2.0 mmol/L    O2 Sat, Arterial 89.2 (L) 95 - 98 % Carboxyhemoglobin 1.7 0 - 5 %    Methemoglobin 0.9 0.0 - 1.9 %    Pt Temp 37     O2 Device/Flow/% VENTILATOR     Respiratory Rate 22     Tyron Test PASS     Sample Site Right Radial Artery     Pt. Position SEMI-FOWLERS     Mode PRVC     Set Rate 22     Total Rate 22          FIO2 35     Peep/Cpap 8     Text for Respiratory RESULTS GIVEN TO RN    Protime-INR   Result Value Ref Range    Protime 21.9 (H) 11.8 - 14.6 sec    INR 1.9    APTT   Result Value Ref Range    PTT 38.4 (H) 24.0 - 36.0 sec   Triglyceride   Result Value Ref Range    Triglycerides 85 <150 mg/dL   CHLORIDE, URINE, RANDOM   Result Value Ref Range    Chloride, Ur 34 mmol/L   Protein / Creatinine Ratio, Urine   Result Value Ref Range    Total Protein, Urine 23 mg/dL    Creatinine, Ur 79.8 39.0 - 259.0 mg/dL    Urine Total Protein Creatinine Ratio 0.29 (H) 0.00 - 0.20   SODIUM, URINE, RANDOM   Result Value Ref Range    Sodium,Ur <20 mmol/L   BLOOD GAS, ARTERIAL   Result Value Ref Range    pH, Arterial 7.360 7.350 - 7.450    pCO2, Arterial 55.9 (HH) 35.0 - 45.0 mmHg    pO2, Arterial 71.4 (L) 80.0 - 100.0 mmHg    HCO3, Arterial 31.6 (H) 22.0 - 26.0 mmol/L    Positive Base Excess, Art 6.1 (H) 0.0 - 2.0 mmol/L    O2 Sat, Arterial 91.7 (L) 95 - 98 %    Carboxyhemoglobin 1.2 0 - 5 %    Methemoglobin 1.3 0.0 - 1.9 %    Pt Temp 37     O2 Device/Flow/% VENTILATOR     Tyron Test PASS     Sample Site Right Radial Artery     Pt.  Position SEMI-FOWLERS     Mode PRVC     Text for Respiratory RESULTS GIVEN TO RN    CBC with Auto Differential   Result Value Ref Range    WBC 6.0 3.5 - 11.0 k/uL    RBC 5.56 4.5 - 5.9 m/uL    Hemoglobin 10.8 (L) 13.5 - 17.5 g/dL    Hematocrit 38.2 (L) 41 - 53 %    MCV 68.8 (L) 80 - 100 fL    MCH 19.5 (L) 26 - 34 pg    MCHC 28.3 (L) 31 - 37 g/dL    RDW 20.1 (H) 11.5 - 14.9 %    Platelets 75 (L) 561 - 450 k/uL    MPV 9.3 6.0 - 12.0 fL    Seg Neutrophils 82 (H) 36 - 66 %    Lymphocytes 7 (L) 24 - 44 %    Monocytes 9 (H) 1 - 7 % Eosinophils % 1 0 - 4 %    Basophils 1 0 - 2 %    Segs Absolute 4.90 1.3 - 9.1 k/uL    Absolute Lymph # 0.40 (L) 1.0 - 4.8 k/uL    Absolute Mono # 0.50 0.1 - 1.3 k/uL    Absolute Eos # 0.10 0.0 - 0.4 k/uL    Basophils Absolute 0.10 0.0 - 0.2 k/uL   Comprehensive Metabolic Panel w/ Reflex to MG   Result Value Ref Range    Glucose 85 70 - 99 mg/dL    BUN 16 8 - 23 mg/dL    Creatinine 0.64 (L) 0.70 - 1.20 mg/dL    Est, Glom Filt Rate >60 >60 mL/min/1.73m2    Calcium 7.4 (L) 8.6 - 10.4 mg/dL    Sodium 143 135 - 144 mmol/L    Potassium 3.3 (L) 3.7 - 5.3 mmol/L    Chloride 107 98 - 107 mmol/L    CO2 29 20 - 31 mmol/L    Anion Gap 7 (L) 9 - 17 mmol/L    Alkaline Phosphatase 49 40 - 129 U/L     (H) 5 - 41 U/L     (H) <40 U/L    Total Bilirubin 0.8 0.3 - 1.2 mg/dL    Total Protein 5.0 (L) 6.4 - 8.3 g/dL    Albumin 2.8 (L) 3.5 - 5.2 g/dL   Vancomycin Level, Random   Result Value Ref Range    Vancomycin Rm 16.1 ug/mL    Vancomycin Random Dose amount 1g     Vancomycin Random Date last dose 1/4/22     Vancomycin Random Time last dose 0232    Magnesium   Result Value Ref Range    Magnesium 2.0 1.6 - 2.6 mg/dL   Hepatitis C RNA, quantitative, PCR   Result Value Ref Range    Source . PLASMA     Hepatitis C RNA-PCR 17,400 IU/mL    Hepatitis C RNA Quant Log 4.24 Log IU/mL    HCV RNA PCR, Quant DETECTED (A) Not Detected   CBC with Auto Differential   Result Value Ref Range    WBC 5.3 3.5 - 11.0 k/uL    RBC 5.80 4.5 - 5.9 m/uL    Hemoglobin 11.4 (L) 13.5 - 17.5 g/dL    Hematocrit 40.0 (L) 41 - 53 %    MCV 68.9 (L) 80 - 100 fL    MCH 19.7 (L) 26 - 34 pg    MCHC 28.5 (L) 31 - 37 g/dL    RDW 20.5 (H) 11.5 - 14.9 %    Platelets 66 (L) 034 - 450 k/uL    MPV 9.1 6.0 - 12.0 fL    Seg Neutrophils 79 (H) 36 - 66 %    Lymphocytes 8 (L) 24 - 44 %    Monocytes 10 (H) 1 - 7 %    Eosinophils % 2 0 - 4 %    Basophils 1 0 - 2 %    Segs Absolute 4.19 1.3 - 9.1 k/uL    Absolute Lymph # 0.42 (L) 1.0 - 4.8 k/uL    Absolute Mono # 0.53 0.1 - 1.3 k/uL    Absolute Eos # 0.11 0.0 - 0.4 k/uL    Basophils Absolute 0.05 0.0 - 0.2 k/uL    Morphology ANISOCYTOSIS PRESENT     Morphology MICROCYTOSIS PRESENT     Morphology HYPOCHROMIA PRESENT     Morphology FEW ELLIPTOCYTES     Morphology FEW TARGET CELLS    Comprehensive Metabolic Panel w/ Reflex to MG   Result Value Ref Range    Glucose 84 70 - 99 mg/dL    BUN 11 8 - 23 mg/dL    Creatinine 0.58 (L) 0.70 - 1.20 mg/dL    Est, Glom Filt Rate >60 >60 mL/min/1.73m2    Calcium 7.9 (L) 8.6 - 10.4 mg/dL    Sodium 146 (H) 135 - 144 mmol/L    Potassium 3.4 (L) 3.7 - 5.3 mmol/L    Chloride 110 (H) 98 - 107 mmol/L    CO2 30 20 - 31 mmol/L    Anion Gap 6 (L) 9 - 17 mmol/L    Alkaline Phosphatase 53 40 - 129 U/L     (H) 5 - 41 U/L     (H) <40 U/L    Total Bilirubin 0.8 0.3 - 1.2 mg/dL    Total Protein 5.0 (L) 6.4 - 8.3 g/dL    Albumin 2.9 (L) 3.5 - 5.2 g/dL   Fibrinogen   Result Value Ref Range    Fibrinogen 98 (L) 210 - 530 mg/dL   Protime-INR   Result Value Ref Range    Protime 20.3 (H) 11.8 - 14.6 sec    INR 1.7    APTT   Result Value Ref Range    PTT 41.6 (H) 24.0 - 36.0 sec   BLOOD GAS, ARTERIAL   Result Value Ref Range    pH, Arterial 7.372 7.350 - 7.450    pCO2, Arterial 51.6 (HH) 35.0 - 45.0 mmHg    pO2, Arterial 62.9 (L) 80.0 - 100.0 mmHg    HCO3, Arterial 29.9 (H) 22.0 - 26.0 mmol/L    Positive Base Excess, Art 4.7 (H) 0.0 - 2.0 mmol/L    O2 Sat, Arterial 89.7 (L) 95 - 98 %    Carboxyhemoglobin 1.7 0 - 5 %    Methemoglobin 1.1 0.0 - 1.9 %    Pt Temp 37     O2 Device/Flow/% VENTILATOR     Respiratory Rate 22     Tyron Test PASS     Sample Site Right Radial Artery     Pt.  Position SEMI-FOWLERS     Mode PRVC     Set Rate 22     Total Rate 22          FIO2 40     Peep/Cpap 8     Text for Respiratory RESULTS GIVEN TO RN    Magnesium   Result Value Ref Range    Magnesium 2.0 1.6 - 2.6 mg/dL   CBC with Auto Differential   Result Value Ref Range    WBC 5.2 3.5 - 11.0 k/uL    RBC 5.33 4.5 - 5.9 m/uL Hemoglobin 10.5 (L) 13.5 - 17.5 g/dL    Hematocrit 38.4 (L) 41 - 53 %    MCV 72.0 (L) 80 - 100 fL    MCH 19.7 (L) 26 - 34 pg    MCHC 27.4 (L) 31 - 37 g/dL    RDW 20.3 (H) 11.5 - 14.9 %    Platelets 71 (L) 459 - 450 k/uL    MPV 9.3 6.0 - 12.0 fL    Seg Neutrophils 90 (H) 36 - 66 %    Lymphocytes 4 (L) 24 - 44 %    Monocytes 5 1 - 7 %    Eosinophils % 0 0 - 4 %    Basophils 0 0 - 2 %    Bands 1 0 - 10 %    Segs Absolute 4.68 1.3 - 9.1 k/uL    Absolute Lymph # 0.21 (L) 1.0 - 4.8 k/uL    Absolute Mono # 0.26 0.1 - 1.3 k/uL    Absolute Eos # 0.00 0.0 - 0.4 k/uL    Basophils Absolute 0.00 0.0 - 0.2 k/uL    Absolute Bands # 0.05 0.0 - 1.0 k/uL    Morphology ANISOCYTOSIS PRESENT     Morphology HYPOCHROMIA PRESENT     Morphology MICROCYTOSIS PRESENT     Morphology 1+ ELLIPTOCYTES     Morphology 1+ TEARDROPS    Comprehensive Metabolic Panel w/ Reflex to MG   Result Value Ref Range    Glucose 111 (H) 70 - 99 mg/dL    BUN 7 (L) 8 - 23 mg/dL    Creatinine 0.42 (L) 0.70 - 1.20 mg/dL    Est, Glom Filt Rate >60 >60 mL/min/1.73m2    Calcium 7.5 (L) 8.6 - 10.4 mg/dL    Sodium 144 135 - 144 mmol/L    Potassium 4.0 3.7 - 5.3 mmol/L    Chloride 111 (H) 98 - 107 mmol/L    CO2 29 20 - 31 mmol/L    Anion Gap 4 (L) 9 - 17 mmol/L    Alkaline Phosphatase 46 40 - 129 U/L     (H) 5 - 41 U/L    AST 74 (H) <40 U/L    Total Bilirubin 0.9 0.3 - 1.2 mg/dL    Total Protein 4.6 (L) 6.4 - 8.3 g/dL    Albumin 2.6 (L) 3.5 - 5.2 g/dL   Vancomycin Level, Random   Result Value Ref Range    Vancomycin Rm 20.3 ug/mL    Vancomycin Random Dose amount 1,250     Vancomycin Random Date last dose 306326     Vancomycin Random Time last dose 0546    Fibrinogen   Result Value Ref Range    Fibrinogen 109 (L) 210 - 530 mg/dL   Protime-INR   Result Value Ref Range    Protime 20.1 (H) 11.8 - 14.6 sec    INR 1.7    Comprehensive Metabolic Panel w/ Reflex to MG   Result Value Ref Range    Glucose 91 70 - 99 mg/dL    BUN 5 (L) 8 - 23 mg/dL    Creatinine <0.40 (L) 0.70 - 1.20 mg/dL    Est, Glom Filt Rate Can not be calculated >60 mL/min/1.73m2    Calcium 8.1 (L) 8.6 - 10.4 mg/dL    Sodium 147 (H) 135 - 144 mmol/L    Potassium 4.1 3.7 - 5.3 mmol/L    Chloride 108 (H) 98 - 107 mmol/L    CO2 26 20 - 31 mmol/L    Anion Gap 13 9 - 17 mmol/L    Alkaline Phosphatase 51 40 - 129 U/L     (H) 5 - 41 U/L    AST 63 (H) <40 U/L    Total Bilirubin 1.6 (H) 0.3 - 1.2 mg/dL    Total Protein 5.3 (L) 6.4 - 8.3 g/dL    Albumin 2.9 (L) 3.5 - 5.2 g/dL   SPECIMEN REJECTION   Result Value Ref Range    Specimen Source . BLOOD     Ordered Test CDP     Reason for Rejection Unable to perform testing: Specimen clotted.     CBC with Auto Differential   Result Value Ref Range    WBC 7.1 3.5 - 11.0 k/uL    RBC 5.90 4.5 - 5.9 m/uL    Hemoglobin 11.7 (L) 13.5 - 17.5 g/dL    Hematocrit 41.4 41 - 53 %    MCV 70.2 (L) 80 - 100 fL    MCH 19.8 (L) 26 - 34 pg    MCHC 28.2 (L) 31 - 37 g/dL    RDW 20.4 (H) 11.5 - 14.9 %    Platelets 68 (L) 570 - 450 k/uL    MPV 9.1 6.0 - 12.0 fL    Seg Neutrophils 82 (H) 36 - 66 %    Lymphocytes 10 (L) 24 - 44 %    Monocytes 6 1 - 7 %    Eosinophils % 1 0 - 4 %    Basophils 1 0 - 2 %    Segs Absolute 5.82 1.3 - 9.1 k/uL    Absolute Lymph # 0.71 (L) 1.0 - 4.8 k/uL    Absolute Mono # 0.43 0.1 - 1.3 k/uL    Absolute Eos # 0.07 0.0 - 0.4 k/uL    Basophils Absolute 0.07 0.0 - 0.2 k/uL    Morphology ANISOCYTOSIS PRESENT     Morphology 1+ TARGET CELLS     Morphology 1+ TEARDROPS     Morphology 1+ ELLIPTOCYTES    Protime-INR   Result Value Ref Range    Protime 19.4 (H) 11.8 - 14.6 sec    INR 1.6    APTT   Result Value Ref Range    PTT 35.4 24.0 - 36.0 sec   CBC with Auto Differential   Result Value Ref Range    WBC 5.9 3.5 - 11.0 k/uL    RBC 5.61 4.5 - 5.9 m/uL    Hemoglobin 11.4 (L) 13.5 - 17.5 g/dL    Hematocrit 39.3 (L) 41 - 53 %    MCV 70.0 (L) 80 - 100 fL    MCH 20.2 (L) 26 - 34 pg    MCHC 28.9 (L) 31 - 37 g/dL    RDW 20.2 (H) 11.5 - 14.9 %    Platelets 63 (L) 580 - 450 k/uL    MPV 8.8 6.0 - 12.0 fL    Seg Neutrophils 76 (H) 36 - 66 %    Lymphocytes 11 (L) 24 - 44 %    Monocytes 11 (H) 1 - 7 %    Eosinophils % 1 0 - 4 %    Basophils 1 0 - 2 %    Segs Absolute 4.48 1.3 - 9.1 k/uL    Absolute Lymph # 0.65 (L) 1.0 - 4.8 k/uL    Absolute Mono # 0.65 0.1 - 1.3 k/uL    Absolute Eos # 0.06 0.0 - 0.4 k/uL    Basophils Absolute 0.06 0.0 - 0.2 k/uL    Morphology ANISOCYTOSIS PRESENT     Morphology MICROCYTOSIS PRESENT     Morphology HYPOCHROMIA PRESENT     Morphology 1+ TARGET CELLS     Morphology 1+ ELLIPTOCYTES    Comprehensive Metabolic Panel w/ Reflex to MG   Result Value Ref Range    Glucose 118 (H) 70 - 99 mg/dL    BUN 4 (L) 8 - 23 mg/dL    Creatinine <0.40 (L) 0.70 - 1.20 mg/dL    Est, Glom Filt Rate Can not be calculated >60 mL/min/1.73m2    Calcium 8.1 (L) 8.6 - 10.4 mg/dL    Sodium 147 (H) 135 - 144 mmol/L    Potassium 3.3 (L) 3.7 - 5.3 mmol/L    Chloride 109 (H) 98 - 107 mmol/L    CO2 34 (H) 20 - 31 mmol/L    Anion Gap 4 (L) 9 - 17 mmol/L    Alkaline Phosphatase 45 40 - 129 U/L    ALT 99 (H) 5 - 41 U/L    AST 38 <40 U/L    Total Bilirubin 1.7 (H) 0.3 - 1.2 mg/dL    Total Protein 5.1 (L) 6.4 - 8.3 g/dL    Albumin 2.8 (L) 3.5 - 5.2 g/dL   Fibrinogen   Result Value Ref Range    Fibrinogen 151 (L) 210 - 530 mg/dL   APTT   Result Value Ref Range    PTT 34.4 24.0 - 36.0 sec   Protime-INR   Result Value Ref Range    Protime 19.1 (H) 11.8 - 14.6 sec    INR 1.6    Magnesium   Result Value Ref Range    Magnesium 1.8 1.6 - 2.6 mg/dL   CBC with Auto Differential   Result Value Ref Range    WBC 6.3 3.5 - 11.0 k/uL    RBC 5.76 4.5 - 5.9 m/uL    Hemoglobin 11.7 (L) 13.5 - 17.5 g/dL    Hematocrit 40.3 (L) 41 - 53 %    MCV 70.0 (L) 80 - 100 fL    MCH 20.3 (L) 26 - 34 pg    MCHC 29.0 (L) 31 - 37 g/dL    RDW 20.5 (H) 11.5 - 14.9 %    Platelets 65 (L) 669 - 450 k/uL    MPV 8.6 6.0 - 12.0 fL    Seg Neutrophils 77 (H) 36 - 66 %    Lymphocytes 11 (L) 24 - 44 %    Monocytes 10 (H) 1 - 7 %    Eosinophils % 1 0 - 4 % Basophils 1 0 - 2 %    Segs Absolute 4.86 1.3 - 9.1 k/uL    Absolute Lymph # 0.69 (L) 1.0 - 4.8 k/uL    Absolute Mono # 0.63 0.1 - 1.3 k/uL    Absolute Eos # 0.06 0.0 - 0.4 k/uL    Basophils Absolute 0.06 0.0 - 0.2 k/uL    Morphology HYPOCHROMIA PRESENT     Morphology MICROCYTOSIS PRESENT     Morphology ANISOCYTOSIS PRESENT     Morphology 1+ POLYCHROMASIA     Morphology 1+ TARGET CELLS     Morphology 2+ ECHINOCYTES    Comprehensive Metabolic Panel w/ Reflex to MG   Result Value Ref Range    Glucose 101 (H) 70 - 99 mg/dL    BUN 3 (L) 8 - 23 mg/dL    Creatinine <0.40 (L) 0.70 - 1.20 mg/dL    Est, Glom Filt Rate Can not be calculated >60 mL/min/1.73m2    Calcium 8.0 (L) 8.6 - 10.4 mg/dL    Sodium 142 135 - 144 mmol/L    Potassium 3.3 (L) 3.7 - 5.3 mmol/L    Chloride 104 98 - 107 mmol/L    CO2 35 (H) 20 - 31 mmol/L    Anion Gap 3 (L) 9 - 17 mmol/L    Alkaline Phosphatase 47 40 - 129 U/L    ALT 81 (H) 5 - 41 U/L    AST 32 <40 U/L    Total Bilirubin 1.8 (H) 0.3 - 1.2 mg/dL    Total Protein 5.3 (L) 6.4 - 8.3 g/dL    Albumin 2.8 (L) 3.5 - 5.2 g/dL   Brain Natriuretic Peptide   Result Value Ref Range    Pro-BNP 7,250 (H) <300 pg/mL   Magnesium   Result Value Ref Range    Magnesium 1.7 1.6 - 2.6 mg/dL   Comprehensive Metabolic Panel w/ Reflex to MG   Result Value Ref Range    Glucose 96 70 - 99 mg/dL    BUN 3 (L) 8 - 23 mg/dL    Creatinine <0.40 (L) 0.70 - 1.20 mg/dL    Est, Glom Filt Rate Can not be calculated >60 mL/min/1.73m2    Calcium 8.6 8.6 - 10.4 mg/dL    Sodium 140 135 - 144 mmol/L    Potassium 3.3 (L) 3.7 - 5.3 mmol/L    Chloride 97 (L) 98 - 107 mmol/L    CO2 33 (H) 20 - 31 mmol/L    Anion Gap 10 9 - 17 mmol/L    Alkaline Phosphatase 52 40 - 129 U/L    ALT 74 (H) 5 - 41 U/L    AST 41 (H) <40 U/L    Total Bilirubin 2.3 (H) 0.3 - 1.2 mg/dL    Total Protein 6.1 (L) 6.4 - 8.3 g/dL    Albumin 3.3 (L) 3.5 - 5.2 g/dL   CBC with Auto Differential   Result Value Ref Range    WBC 7.2 3.5 - 11.0 k/uL    RBC 6.52 (H) 4.5 - 5.9 m/uL    Hemoglobin 13.6 13.5 - 17.5 g/dL    Hematocrit 45.3 41 - 53 %    MCV 69.5 (L) 80 - 100 fL    MCH 20.9 (L) 26 - 34 pg    MCHC 30.0 (L) 31 - 37 g/dL    RDW 21.5 (H) 11.5 - 14.9 %    Platelets 67 (L) 514 - 450 k/uL    MPV 8.6 6.0 - 12.0 fL    Seg Neutrophils 77 (H) 36 - 66 %    Lymphocytes 10 (L) 24 - 44 %    Monocytes 10 (H) 1 - 7 %    Eosinophils % 1 0 - 4 %    Basophils 2 0 - 2 %    Segs Absolute 5.55 1.3 - 9.1 k/uL    Absolute Lymph # 0.72 (L) 1.0 - 4.8 k/uL    Absolute Mono # 0.72 0.1 - 1.3 k/uL    Absolute Eos # 0.07 0.0 - 0.4 k/uL    Basophils Absolute 0.14 0.0 - 0.2 k/uL    Morphology ANISOCYTOSIS PRESENT     Morphology HYPOCHROMIA PRESENT     Morphology MICROCYTOSIS PRESENT     Morphology GIANT PLATELETS    Magnesium   Result Value Ref Range    Magnesium 1.5 (L) 1.6 - 2.6 mg/dL   SPECIMEN REJECTION   Result Value Ref Range    Specimen Source . Random Urine     Ordered Test URIFX     Reason for Rejection       Unable to perform testing: Specimen quantity not sufficient.    POC Glucose Fingerstick   Result Value Ref Range    POC Glucose 104 75 - 110 mg/dL   EKG 12 Lead   Result Value Ref Range    Ventricular Rate 75 BPM    Atrial Rate 75 BPM    P-R Interval 142 ms    QRS Duration 66 ms    Q-T Interval 350 ms    QTc Calculation (Bazett) 390 ms    P Axis 18 degrees    R Axis -165 degrees    T Axis 41 degrees   EKG 12 Lead   Result Value Ref Range    Ventricular Rate 105 BPM    Atrial Rate 105 BPM    P-R Interval 144 ms    QRS Duration 78 ms    Q-T Interval 302 ms    QTc Calculation (Bazett) 399 ms    P Axis 13 degrees    R Axis -30 degrees    T Axis -15 degrees   PREPARE CRYOPRECIPITATE, 1 Product   Result Value Ref Range    Unit Number [id number]     Product Code Thawed Pooled Cryoprecipitate     Unit Divison 00     Dispense Status TRANSFUSED     Unit Issue Date/Time 234979333714     Product Code Blood Bank C4115N09     Blood Bank Unit Type and Rh O POS     Blood Bank ISBT Product Blood Type 5100 Blood Bank Blood Product Expiration Date 750072095193     Transfusion Status OK TO TRANSFUSE          IMAGING DATA:    CT HEAD WO CONTRAST    Result Date: 1/1/2023  EXAMINATION: CT OF THE HEAD WITHOUT CONTRAST  1/1/2023 10:48 pm TECHNIQUE: CT of the head was performed without the administration of intravenous contrast. Automated exposure control, iterative reconstruction, and/or weight based adjustment of the mA/kV was utilized to reduce the radiation dose to as low as reasonably achievable. COMPARISON: None. HISTORY: Reason for Exam: AMS Additional signs and symptoms: the patient has been getting more and more confused since 2 weeks ago. It has progressively been getting worse and today FINDINGS: BRAIN/VENTRICLES: There is no acute intracranial hemorrhage, mass effect or midline shift. No abnormal extra-axial fluid collection. The gray-white differentiation is maintained without evidence of an acute infarct. There is no evidence of hydrocephalus. Changes of mild chronic small vessel ischemic disease. ORBITS: The visualized portion of the orbits demonstrate no acute abnormality. SINUSES: The visualized paranasal sinuses and mastoid air cells demonstrate no acute abnormality. SOFT TISSUES/SKULL:  No acute abnormality of the visualized skull or soft tissues. No acute intracranial abnormality. Mild chronic small vessel ischemic disease. XR CHEST PORTABLE    Result Date: 1/1/2023  EXAMINATION: ONE XRAY VIEW OF THE CHEST 1/1/2023 7:17 pm COMPARISON: None. HISTORY: ORDERING SYSTEM PROVIDED HISTORY: ams TECHNOLOGIST PROVIDED HISTORY: ams Reason for Exam: Altered mental status, difficulty breathing Additional signs and symptoms: Altered mental status, difficulty breathing Relevant Medical/Surgical History: Altered mental status, difficulty breathing FINDINGS: Large lucent area in the left apex suggesting a large bulla however superimposed pneumothorax cannot be excluded. The cardiac size is normal. Right lower lobe airspace infiltrate and mild right pleural effusion. . Pulmonary vascularity appears normal. There is mild ectasia of the thoracic aorta. Calcific tendinitis of the right shoulder. No acute bony abnormalities. The hilar structures are normal.     Right lower lobe pneumonia and small right pleural effusion Large lucent area in the left apex suggesting a large bulla however superimposed pneumothorax cannot be excluded. Follow-up CT would be useful for further evaluation. The findings were sent to the Radiology Results [address] at 10:31 pm on 1/1/2023 to be communicated to a licensed caregiver.          IMPRESSION:   Primary Problem  Acute respiratory failure with hypoxia and hypercapnia [facility])    Active Hospital Problems    Diagnosis Date Noted    ACP (advance care planning) [Z71.89] 01/09/2023     Priority: Medium    Goals of care, counseling/discussion [Z71.89] 01/09/2023     Priority: Medium    Encounter for palliative care [Z51.5] 01/09/2023     Priority: Medium    Prolonged pt (prothrombin time) [R79.1] 01/03/2023     Priority: Medium    Emphysema lung ([address] [J43.9] 01/03/2023     Priority: Medium    Cerebral infarction ([address] [I63.9] 01/03/2023     Priority: Medium    Transaminitis [R74.01] 01/02/2023     Priority: Medium    Microcytic anemia [D64.9] 01/02/2023     Priority: Medium    Thrombocytopenia ([address] [D69.6] 01/02/2023     Priority: Medium    Agitation [R45.1] 01/02/2023     Priority: Medium    Acute respiratory failure with hypoxia and hypercapnia (HCC) RLL pneumonia [J96.01, J96.02] 01/02/2023     Priority: Medium    Altered mental status [R41.82] 01/02/2023     Priority: Medium    Community acquired pneumonia of right lower lobe of lung [J18.9] 01/02/2023     Priority: Medium       Medical apathy  Respiratory failure secondary pneumonia  Abnormal LFT  Microcytic anemia  Thrombocytopenia  History of alcohol dependence  IgG lambda paraproteinemia  Positive HCV screen  Iron deficiency  Coagulopathy    RECOMMENDATIONS:  I reviewed the labs/imaging available to me,outside records and discussed with the patient. I explained to the patient the nature of this problem. I explained the significance of these abnormalities and possible etiology and management options  Patient has chronic liver disease secondary to alcohol  Anemia and thrombocytopenia are related to liver disease complicated by acute illness  Patient has coagulopathy secondary to liver disease as mentioned  Cardioembolic strokes with a component of subarachnoid bleeding  CT scan was reviewed and affirming my decision not to proceed with anticoagulation as the patient has significant subarachnoid hemorrhage and intraparenchymal hemorrhage. Continue supportive care, watch for bleeding and watch PT and PTT/fibrinogen as well as platelet count  No need for acute intervention at this point from hematology standpoint    We will follow with you                        Discussed with family and Nurse. Thank you for asking us to see this patient. Marielos Mccray MD  Hematologist/Medical [address] [place] [address] hematology oncology physicians            This note is created with the assistance of a speech recognition program.  While intending to generate a document that actually reflects the content of the visit, the document can still have some errors including those of syntax and sound a like substitutions which may escape proof reading. It such instances, actual meaning can be extrapolated by contextual diversion.

## 2023-01-10 NOTE — PROGRESS NOTES
SO Tracy Pulliam at bedside; pt was just waking up and was groggy. She is struggling to understand pt's condition and POC. She was thankful for listening presence, emotional support, and welcomed prayer. Daughter Winnie Bowie came in as writer was leaving.    01/10/23 1833   Encounter Summary   Encounter Overview/Reason  Spiritual/Emotional Needs   Service Provided For: Patient and family together   Referral/Consult From: [address] Significant other;Children   Last Encounter  01/10/23   Complexity of Encounter Moderate   Begin Time 1730   End Time  1745   Total Time Calculated 15 min   Spiritual/Emotional needs   Type Spiritual Support;Emotional Distress   Palliative Care   Type Palliative Care, Family Care   Assessment/Intervention/Outcome   Assessment Anxious; Compromised coping; Sad   Intervention Active listening;Discussed illness injury and its impact; Explored/Affirmed feelings, thoughts, concerns;Prayer (assurance of)/[place];Sustaining Presence/Ministry of presence   Outcome Comfort;Engaged in conversation;Expressed feelings, needs, and concerns;Expressed Gratitude;Receptive

## 2023-01-10 NOTE — PROGRESS NOTES
Vancomycin Dosing by Pharmacy - Daily Note   Vancomycin Therapy Day:  9  Indication: CAP, MRSA DNA positive 01/03    Allergies:  Patient has no known allergies. Actual Weight:    Wt Readings from Last 1 Encounters:   01/10/23 195 lb 1.7 oz (88.5 kg)       Labs/Ancillary Data  Estimated Creatinine Clearance: 201 mL/min (based on SCr of 0.4 mg/dL). Recent Labs     01/08/23  0438 01/09/23  0456 01/10/23  0436   CREATININE <0.40* <0.40* <0.40*   BUN 4* 3* 3*   WBC 5.9 6.3 7.2     Procalcitonin   Date Value Ref Range Status   01/02/2023 0.09 (H) <0.09 ng/mL Final     Comment:           Suspected Sepsis:  <0.50 ng/mL     Low likelihood of sepsis. 0.50-2.00 ng/mL     Increased likelihood of sepsis. Antibiotics encouraged. >2.00 ng/mL     High risk of sepsis/shock. Antibiotics strongly encouraged. Suspected Lower Resp Tract Infections:  <0.24 ng/mL     Low likelihood of bacterial infection. >0.24 ng/mL     Increased likelihood of bacterial infection. Antibiotics encouraged. With successful antibiotic therapy, PCT levels should decrease rapidly. (Half-life of 24 to   36 hours.)        Procalcitonin values from samples collected within the first 6 hours of systemic infection   may still be low. Retesting may be indicated. Values from day 1 and day 4 can be entered into the Change in Procalcitonin Calculator   (www.[employer]-pct-calculator. [employer]) to determine the patient's Mortality Risk Prognosis        In healthy neonates, plasma Procalcitonin (PCT) concentrations increase gradually after   birth, reaching peak values at about 24 hours of age then decrease to normal values below   0.5 ng/mL by 48-72 hours of age.          Intake/Output Summary (Last 24 hours) at 1/10/2023 1135  Last data filed at 1/10/2023 1000  Gross per 24 hour   Intake --   Output 7600 ml   Net -7600 ml     Temp: 98.3 F    Culture Date / Source  /  Results  See micro  Recent vancomycin administrations                     vancomycin (VANCOCIN) 1,250 mg in dextrose 5 % 250 mL IVPB (ADDAVIAL) (mg) 1,250 mg New Bag 01/10/23 0421     1,250 mg New Bag 01/09/23 1646     1,250 mg New Bag  0436     1,250 mg New Bag 01/08/23 1643     1,250 mg New Bag  0547     1,250 mg New Bag 01/07/23 1746                    Vancomycin Concentrations:   TROUGH:  No results for input(s): VANCOTROUGH in the last 72 hours. RANDOM:  No results for input(s): VANCORANDOM in the last 72 hours. MRSA Nasal Swab: showed MRSA positive result on 01/03/23 . PLAN     Continue current dose of 1250 mg q12h IV  Ensured BUN/sCr ordered at baseline and every 48 hours x at least 3 levels, then at least weekly. Repeat vancomycin concentration ordered for TBD. Pharmacy will continue to monitor patient and adjust therapy as indicated      Vancomycin Target Concentration Parameters  Treatment  Population Target AUC/KENDALL Target Trough   Invasive MRSA Infection (bacteremia, pneumonia, meningitis, endocarditis, osteomyelitis)  Sepsis (undifferentiated) 400-600 N/A   Infection due to non-MRSA pathogen  Empiric treatment of non-invasive MRSA infection  (SSTI, UTI) <500 10-15 mg/L   CrCl < 29 mL/min  Rapidly fluctuating serum creatinine   KENTRELL N/A < 15 mg/L     Renal replacement therapy is dosed by levels, per hospital protocol. Abbreviations  * Pauc: probability that AUC is >400 (efficacy); Pconc: probability that Ctrough is above 20 ?g/mL (toxicity); Tox: Probability of nephrotoxicity, based on Ricky et al. Clin Infect Dis 2009. Loading dose: N/A  Regimen: 1250 mg IV every 12 hours. Start time: 16:21 on 01/10/2023  Exposure target: AUC24 (range)400-600 mg/L.hr   AUC24,ss: 493 mg/L.hr  Probability of AUC24 > 400: 92 %  Ctrough,ss: 15.5 mg/L  Probability of Ctrough,ss > 20: 13 %  Probability of nephrotoxicity (Ricky ELIGIO 2009): 11 %    Thank you for the consult. Pharmacy will continue to follow. Regulo Dwyer. Ph.  1/10/2023  11:37 AM

## 2023-01-10 NOTE — PROGRESS NOTES
Physical Therapy  Facility/Department: [facility] ICU  Physical Therapy Treatment Note   Name: Nelson Kamara  : 1960  MRN: [id number]  Date of Service: 1/10/2023    Discharge Recommendations:  Therapy recommended at discharge          Patient Diagnosis(es): The primary encounter diagnosis was Acute respiratory failure with hypoxia and hypercapnia ([address] Diagnoses of Community acquired pneumonia of right lower lobe of lung and Altered mental status, unspecified altered mental status type were also pertinent to this visit. Past Medical History:  has no past medical history on file. Past Surgical History:  has no past surgical history on file. Assessment   Assessment: 61 y/o male adm with AMS and found to have [address] with recent fall at home. Pt is a high fall risk as he has poor safety awareness and significant weakness Lhemibody > R.   Therapy Prognosis: Good  Decision Making: Medium Complexity  History: falls, 1 week  AMS PTA  Exam: L side weakness, poor activity tolerance  Barriers to Learning: cognition, endurance  Requires PT Follow-Up: Yes  Activity Tolerance  Activity Tolerance: Patient limited by fatigue;Treatment limited secondary to decreased cognition;Patient limited by endurance     Plan   Physcial Therapy Plan  General Plan: 6-7 times per week  Current Treatment Recommendations: Strengthening, Balance training, Functional mobility training, Neuromuscular re-education, Cognitive reorientation, Safety education & training, Patient/Caregiver education & training, Equipment evaluation, education, & procurement, Therapeutic activities  Safety Devices  Type of Devices: Bed alarm in place, All fall risk precautions in place, Call light within reach, Patient at risk for falls, Left in bed, Nurse notified (bilateral wrist restraints)  Restraints  Restraints Initially in Place: Yes  Restraints: bilat wsit restraints     Restrictions  Restrictions/Precautions  Restrictions/Precautions: Fall Risk, General Precautions, Contact Precautions, Isolation, Bed Alarm (02: 5 liters on [place], bilat wrist restraints)  Required Braces or Orthoses?: No  Implants present? :  (Pt denies)     Subjective   Pain: Pt did not voice pain at this time  General  Patient assessed for rehabilitation services?: Yes  Additional Pertinent Hx: [place] Heading is a 60 y.o. Non- / non  male who presents with Altered Mental Status and is admitted to the hospital for the management of Acute respiratory failure ([facility] [address] According to wife and daughter present at bedside, patient has not pursued any healthcare treatment since childhood. According to wife patient has not been feeling well for the past 2 weeks and has been getting more confused. About a week ago he did have a fall where EMS was called but all of his vital signs were stable so he was not transported to the hospital.  When EMS came to the house to evaluate him today he was found to have an O2 sat at 75% on room air and he was placed on a nonrebreather and given a dose of Lasix due to new edema to bilateral lower extremities. When he arrived to the ED he was placed on BiPAP. Chest x-ray confirms presence of pneumonia and small right pleural effusion. CT head negative with no acute abnormality.   Family / Caregiver Present: No  Referring Practitioner: ALAYNA Maloney NP  Referral Date : 01/02/23  Diagnosis: Acute respiratory failure  Follows Commands: Impaired  General Comment  Comments: Nursing Nelson Esposito  approves treatment, pt attempts reciprical conversation with mumbled speech , eyes closed , appropriate response with VCs bilateral  and ankle PF/DF         Vision/Hearing  Vision  Vision: Impaired  Vision Exceptions: Wears glasses at all times  Hearing  Hearing: Exceptions to [facility]  Hearing Exceptions: Hard of hearing/hearing concerns (Ringing in ears)    Cognition   Orientation  Overall Orientation Status: Impaired  Cognition  Overall Cognitive Status: Exceptions  Arousal/Alertness: Delayed responses to stimuli  Following Commands: Follows one step commands with increased time; Follows one step commands with repetition  Attention Span: Difficulty attending to directions  Safety Judgement: Decreased awareness of need for assistance;Decreased awareness of need for safety  Problem Solving: Assistance required to generate solutions;Assistance required to implement solutions;Assistance required to identify errors made;Assistance required to correct errors made  Insights: Not aware of deficits  Initiation: Requires cues for all  Sequencing: Requires cues for all  Cognition Comment: Pt is impulsive, unaware of limitations related to medical status     Objective   Heart Rate: 97  SpO2: 95 %  O2 Device: High flow nasal cannula  Comment: 5 LO2     Observation/Palpation  Posture: Fair (slumped at EOB vs leaning posteriorly)  Observation: bruising UEs, rash on buttocks  Edema: generalized edema      Bed mobility  Bed Mobility Comments: decreased participation and alertness maintaining eyes closed during treatment  Transfers  Sit to Stand: Unable to assess (pt unalbe to bear wt through LEs to attempt due to weakness and cognitive concerns/ safety)  Ambulation  Comments: NT      A/AROM Exercises: bilateral UEs/ LEs x 10 (continous VCs and tactile cues to facilitate target muscle group)             Goals  Short Term Goals  Time Frame for Short Term Goals: 10 visits  Short Term Goal 1: min assist supine<-> sit x1 assist  Short Term Goal 2: sit EOB with min x1 up to 15 min for therex/ADL  Short Term Goal 3: 3+/5 LE strength to prepare for standing and transfers  Short Term Goal 4: PT to continue to sassess functional mobility to progress POC  Short Term Goal 5: roll L/R with SBA  Patient Goals   Patient Goals : unable to state       Therapy Time   Individual Concurrent Group Co-treatment   Time In 1308         Time Out 1325         Minutes [address], LUIS

## 2023-01-10 NOTE — PROGRESS NOTES
Department of Internal Medicine  Nephrology Demetri Andrews MD  Progress Note    Reason for consultation: Management of acute kidney injury. Consulting physician: Christine Engle MD.      Interval hx/Subjective  Patient seen and examined in ICU, patient remains confused and physical restraints  Noted no acute distress   serum creatinine remained stable within normal limits  Serum sodium  improving at 140  No acute events overnight  Failed swallow eval.  Urine output 6.6  L with IV lasix 20 q 12 hrly  Echo showed IVC dilated without respiratory variation. Preserved EF 55%. Chest x-ray 1/5/23 with bilateral pleural effusion. Interval history:   Patient was seen and examined today in the intensive care unit. He remains intubated and on ventilator support. Renal function has improved and he is nonoliguric. He received CT angiogram of the chest which ruled out pulmonary embolism; dilated main pulmonary artery suggestive of pulmonary hypertension as well as increased RV to LV ratio suggestive of right heart failure. There was incidental finding of moderate right and small left pleural effusion with partial collapse of the right lower lobe. NG tube is draining fluid  Plan to start tube feeding  Serum sodium was 146, potassium 3.4 serum creatinine 0.5  proBNP 7000    History of present illness: This is a 60 y.o. male with no significant past medical history [no regular physician follow-up], who was brought to the emergency department via EMS for further evaluation of progressively worsening confusion and lethargy of 2 weeks duration. Patient's spouse said that he became progressively confused prompting her to call EMS on day of presentation 1/1/2023. When EMS arrived, patient was found to be obtunded with oxygen saturation 75% on room air and he was placed on a nonrebreather mask and given a dose of IV Lasix as he also had severe bilateral leg swelling.   Patient was placed on BiPAP on arrival to the emergency department and was admitted to the intensive care unit. Initial systolic blood pressure was 106 mmHg and serum creatinine 1.77 mg/dL associated with elevated AST and ALT. On admission to the intensive care unit patient required endotracheal intubation for airway protection and treatment of acute respiratory failure. Nephrology consultation has been placed for management of acute kidney injury. He is currently on Levophed drip at 3 mcg/min. CT scan of the brain performed at presentation showed no acute intracranial abnormality but with mild chronic small vessel ischemic disease. Repeat CT scan performed 24 hours later [1/2/2023] showed focal area of decreased attenuation with loss of gray/white matter differentiation involving posterior left lobe with somewhat increased conspicuity as compared to prior exam likely reflecting an area of acute to subacute stroke. Patient has no known allergies. History reviewed. No pertinent past medical history.     Scheduled Meds:   magnesium sulfate  2,000 mg IntraVENous Once    [Held by provider] carvedilol  6.25 mg Oral BID WC    [Held by provider] ferrous sulfate  325 mg Oral Daily with breakfast    pantoprazole (PROTONIX) 40 mg injection  40 mg IntraVENous Daily    furosemide  20 mg IntraVENous Q12H    ziprasidone (GEODON) in sterile water injection  10 mg IntraMUSCular BID    ipratropium-albuterol  1 ampule Inhalation Q4H WA    [Held by provider] heparin (porcine)  5,000 Units SubCUTAneous 3 times per day    nystatin   Topical BID    potassium bicarb-citric acid  20 mEq Oral Daily    [Held by provider] miconazole   Topical BID    vancomycin  1,250 mg IntraVENous Q12H    [Held by provider] aspirin  81 mg Oral Daily    sodium chloride flush  5-40 mL IntraVENous 2 times per day    nicotine  1 patch TransDERmal Daily    vancomycin (VANCOCIN) intermittent dosing (placeholder)   Other RX Placeholder     Continuous Infusions:   IV infusion builder 30 mL/hr at 23 1652    sodium chloride      dexmedetomidine      sodium chloride       PRN Meds:.magnesium sulfate, hydrALAZINE, ziprasidone (GEODON) in sterile water injection, labetalol, potassium chloride, sodium chloride, dexmedetomidine, perflutren lipid microspheres, sodium chloride flush, sodium chloride flush, sodium chloride flush, sodium chloride, ondansetron **OR** ondansetron, polyethylene glycol, acetaminophen **OR** acetaminophen, albuterol, guaiFENesin    History reviewed. No pertinent family history. Social History     Socioeconomic History    Marital status: Single     Spouse name: None    Number of children: None    Years of education: None    Highest education level: None   Tobacco Use    Smoking status: Every Day     Packs/day: 1.00     Years: 40.00     Pack years: 40.00     Types: Cigarettes    Smokeless tobacco: Never   Substance and Sexual Activity    Alcohol use: Yes     Comment: beer and scotch    Drug use: Not Currently     Comment: over 20 years (stated by daughter)         Physical Exam:    VITALS:  BP (!) 147/88   Pulse 90   Temp 98.3 °F (36.8 °C) (Oral)   Resp 17   Ht 5' 7\" (1.702 m)   Wt 195 lb 1.7 oz (88.5 kg)   SpO2 96%   BMI 30.56 kg/m²   TEMPERATURE:  Current - Temp: 98.3 °F (36.8 °C);  Max - Temp  Av.6 °F (37 °C)  Min: 98.1 °F (36.7 °C)  Max: 99.1 °F (37.3 °C)  RESPIRATIONS RANGE: Resp  Av.3  Min: 14  Max: 30  PULSE RANGE: Pulse  Av.3  Min: 89  Max: 117  BLOOD PRESSURE RANGE:  Systolic (49AZQ), IVL:970 , Min:136 , JESSE:678 ; Diastolic (95ODZ), GHX:36, Min:71, Max:122  PULSE OXIMETRY RANGE: SpO2  Av.7 %  Min: 86 %  Max: 97 %  24HR INTAKE/OUTPUT:    Intake/Output Summary (Last 24 hours) at 1/10/2023 1003  Last data filed at 1/10/2023 0900  Gross per 24 hour   Intake --   Output 7350 ml   Net -7350 ml         Constitutional: Awake alert, responsive not in acute distress    Skin: Skin color, texture, turgor normal. No rashes or lesions    Head: Normocephalic, without obvious abnormality, atraumatic     Cardiovascular/Edema: regular rate and rhythm, S1, S2 normal, no murmur, click, rub or gallop    Respiratory: Lungs:  Coarse breath sounds bilaterally    Abdomen: soft, non-tender; bowel sounds normal; no masses,  no organomegaly    Back: symmetric, no curvature. ROM normal. No CVA tenderness. Extremities: edema +    Neuro: Nonfocal    CBC:   Recent Labs     01/08/23 0438 01/09/23  0456 01/10/23  0436   WBC 5.9 6.3 7.2   HGB 11.4* 11.7* 13.6   PLT 63* 65* 67*       BMP:    Recent Labs     01/08/23  0438 01/09/23 0456 01/10/23  0436   * 142 140   K 3.3* 3.3* 3.3*   * 104 97*   CO2 34* 35* 33*   BUN 4* 3* 3*   CREATININE <0.40* <0.40* <0.40*   GLUCOSE 118* 101* 96       Lab Results   Component Value Date/Time    NITRU NEGATIVE 01/02/2023 03:34 PM    COLORU Dark Yellow 01/02/2023 03:34 PM    PHUR 5.0 01/02/2023 03:34 PM    WBCUA 10 TO 20 01/02/2023 03:34 PM    RBCUA TOO NUMEROUS TO COUNT 01/02/2023 03:34 PM    BACTERIA FEW 01/01/2023 10:25 PM    SPECGRAV 1.015 01/02/2023 03:34 PM    LEUKOCYTESUR SMALL 01/02/2023 03:34 PM    UROBILINOGEN Normal 01/02/2023 03:34 PM    BILIRUBINUR NEGATIVE 01/02/2023 03:34 PM    GLUCOSEU NEGATIVE 01/02/2023 03:34 PM    KETUA TRACE 01/02/2023 03:34 PM     MRI of the brain performed without contrast on 1/3/2023 showed:  Small acute infarcts involving the left cerebellar hemisphere and left   parietal lobe. Small subacute infarct within the left cerebellar hemisphere. Moderate bilateral subarachnoid hemorrhage within both cerebral hemispheres   which is of uncertain etiology. The hemorrhage is more apparent on MRI than   on previous head CT. Old right caudate nucleus lacune. Small bilateral mastoid effusions and moderate left paranasal sinus disease. IMPRESSION/RECOMMENDATIONS:      1. Acute kidney injury - most consistent with prerenal azotemia and/or ischemic acute tubular necrosis from hypotension. Renal function is improved and serum creatinine is down to 0.4 mg/dL today. 2.  Hypokalemia -sliding-scale potassium replaced    3. Hypernatremia due to dehydration-SNa 142 stable     3. Hypotension -blood pressure improved. Blood Cultures are negative    4. Elevated blood pressures with agitation. 5.Hypomagnesemia. Plan  D/C dw WATER   Replace electrolytes per sliding scale. Continue IV Lasix 20 mg every 12  Basic metabolic panel daily  Strict I's and O's  Sliding scale magnesium replacement    Prognosis is guarded.     Nataliya Peacock MD   Attending Nephrologist  1/10/2023 10:03 AM

## 2023-01-10 NOTE — PROGRESS NOTES
[employer] [facility]    PROGRESS NOTE             1/10/2023    7:44 AM    Name:   Cynthia Steen  MRN:     [id number]     Acct:      [de-identified]   Room:   2009/2009-01  IP Day:  8  Admit Date:  1/1/2023  9:55 PM    PCP:  No primary care provider on file. Code Status:  Full Code    Subjective:     C/C:   Chief Complaint   Patient presents with    Altered Mental Status     Interval History Status: Same    Patient seen and examined at the bed side. Patient is somnolent and difficult to wake up. He received Geodon at 7:45 AM.    Patient swallow study showed Level 1: Severe dysphagia- NPO. Barium swallow (only 1 image was obtained) showed no aspiration    Patient had bouts of tachycardia and hypertensive episodes. He is now on high flow 6l NC again. Patient Mg was 1.4 this AM. Will replace with 2gm IV. No change in exam otherwise. Notes from nursing staff and Consults had been reviewed, and the overnight progress had been checked with the nursing staff as well. Brief History:     The patient is a 60 y.o.  male with unknown past medical history due to no healthcare visits or follow-up who presents with Altered Mental Status and he is admitted to the hospital for the management of  Acute respiratory failure most likely 2/2 pneumonia. Per patient's wife, she reports that the patient has not been acting himself for the past week. She reports being more slurred, confused and progressively getting worse prior to presentation. Patient prior to the presentation a week ago he had a fall and EMS presented patient was vitally stable and he was not transported to the hospital.  This time upon EMS evaluation of the patient he had an O2 of 75%, he was put on a breather and was given a dose of IV Lasix in route. Wife and patient, cannot recall any precipitating event could have led to his presentation.  He also denies chest pain, shortness of breath, or cough. Per patient, he does not recall any complaints or events prior to the presentation. Wife denies recent alcohol use, however, patient does have a history of regular alcohol intake but not on daily basis. Wife also reports that patient has constant heartburn for which he takes regular antiacids. Patient denies the presence of any abdominal pain or any change in bowel habits, abdominal pain, nausea or vomiting. Upon arrival in the ED, patient was put on BiPAP. Patient was afebrile, normotensive and in normal sinus rhythm. A chest x-ray was completed and the patient had right lower pneumonia with a small pleural effusion. Patient also had a large bullae in the left apex with a pneumothorax that cannot be excluded. Patient ABG was suggestive of pattern of respiratory acidosis with pH 7.295, PCO2 60.3, PO2 63.0, HCO3 29.3. CT head WO did not show any acute findings. Patient had elevated troponins of 184, trended down to 177. Patient also had an elevated BNP of 7797. An elevated creatinine of 1.77 was on presentation, with no previous lab test to be compared to. Also patient had a left elevated liver enzymes ALT was 525, , with prolonged prothrombin time of 24.9, and INR of 2.3. Patient was admitted to the ICU for the management of type II respiratory failure associated altered mental status    Review of Systems:     NA: PATIENT somnolent     Medications:      Allergies:    No Known Allergies    Current Meds:   Scheduled Meds:    [Held by provider] carvedilol  6.25 mg Oral BID WC    [Held by provider] ferrous sulfate  325 mg Oral Daily with breakfast    pantoprazole (PROTONIX) 40 mg injection  40 mg IntraVENous Daily    furosemide  20 mg IntraVENous Q12H    ziprasidone (GEODON) in sterile water injection  10 mg IntraMUSCular BID    ipratropium-albuterol  1 ampule Inhalation Q4H WA    [Held by provider] heparin (porcine)  5,000 Units SubCUTAneous 3 times per day nystatin   Topical BID    potassium bicarb-citric acid  20 mEq Oral Daily    [Held by provider] miconazole   Topical BID    vancomycin  1,250 mg IntraVENous Q12H    [Held by provider] aspirin  81 mg Oral Daily    sodium chloride flush  5-40 mL IntraVENous 2 times per day    nicotine  1 patch TransDERmal Daily    vancomycin (VANCOCIN) intermittent dosing (placeholder)   Other RX Placeholder     Continuous Infusions:    IV infusion builder 30 mL/hr at 23 1652    sodium chloride      dexmedetomidine      sodium chloride       PRN Meds: hydrALAZINE, ziprasidone (GEODON) in sterile water injection, labetalol, potassium chloride, sodium chloride, dexmedetomidine, perflutren lipid microspheres, sodium chloride flush, sodium chloride flush, sodium chloride flush, sodium chloride, ondansetron **OR** ondansetron, polyethylene glycol, acetaminophen **OR** acetaminophen, albuterol, guaiFENesin    Data:     Past Medical History:   has no past medical history on file. Social History:   reports that he has been smoking cigarettes. He has a 40.00 pack-year smoking history. He has never used smokeless tobacco. He reports current alcohol use. He reports that he does not currently use drugs. Family History:   History reviewed. No pertinent family history. Vitals:  BP (!) 168/108   Pulse (!) 102   Temp 98.1 °F (36.7 °C) (Oral)   Resp 19   Ht 5' 7\" (1.702 m)   Wt 195 lb 1.7 oz (88.5 kg)   SpO2 96%   BMI 30.56 kg/m²   Temp (24hrs), Av.7 °F (37.1 °C), Min:98.1 °F (36.7 °C), Max:99.1 °F (37.3 °C)      Recent Labs     23  1102   POCGLU 104       I/O(24Hr):     Intake/Output Summary (Last 24 hours) at 1/10/2023 0744  Last data filed at 1/10/2023 1975  Gross per 24 hour   Intake --   Output 6550 ml   Net -6550 ml       Labs:    CBC:   Lab Results   Component Value Date/Time    WBC 7.2 01/10/2023 04:36 AM    RBC 6.52 01/10/2023 04:36 AM    HGB 13.6 01/10/2023 04:36 AM    HCT 45.3 01/10/2023 04:36 AM    MCV 69.5 01/10/2023 04:36 AM    MCH 20.9 01/10/2023 04:36 AM    MCHC 30.0 01/10/2023 04:36 AM    RDW 21.5 01/10/2023 04:36 AM    PLT 67 01/10/2023 04:36 AM    MPV 8.6 01/10/2023 04:36 AM     CMP:    Lab Results   Component Value Date/Time     01/10/2023 04:36 AM    K 3.3 01/10/2023 04:36 AM    CL 97 01/10/2023 04:36 AM    CO2 33 01/10/2023 04:36 AM    BUN 3 01/10/2023 04:36 AM    CREATININE <0.40 01/10/2023 04:36 AM    LABGLOM Can not be calculated 01/10/2023 04:36 AM    GLUCOSE 96 01/10/2023 04:36 AM    PROT 6.1 01/10/2023 04:36 AM    LABALBU 3.3 01/10/2023 04:36 AM    CALCIUM 8.6 01/10/2023 04:36 AM    BILITOT 2.3 01/10/2023 04:36 AM    ALKPHOS 52 01/10/2023 04:36 AM    AST 41 01/10/2023 04:36 AM    ALT 74 01/10/2023 04:36 AM     PT/INR:    Lab Results   Component Value Date/Time    PROTIME 19.1 01/08/2023 04:38 AM    INR 1.6 01/08/2023 04:38 AM       No results found for: SPECIAL  Lab Results   Component Value Date/Time    CULTURE NO GROWTH 01/03/2023 12:12 PM         Radiology:    CT HEAD WO CONTRAST    Result Date: 1/2/2023  EXAMINATION: CT OF THE HEAD WITHOUT CONTRAST  1/2/2023 10:11 pm TECHNIQUE: CT of the head was performed without the administration of intravenous contrast. Automated exposure control, iterative reconstruction, and/or weight based adjustment of the mA/kV was utilized to reduce the radiation dose to as low as reasonably achievable. COMPARISON: None. HISTORY: ORDERING SYSTEM PROVIDED HISTORY: Altered Mental status TECHNOLOGIST PROVIDED HISTORY: Altered Mental status Reason for Exam: Altered Mental status Additional signs and symptoms: Patient came in with transient alteration of awareness, follow up head CT for any changes. FINDINGS: BRAIN/VENTRICLES: There is no acute intracranial hemorrhage, mass effect or midline shift. No abnormal extra-axial fluid collection.   There is a focal area of decreased attenuation with loss of gray/white matter differentiation involving posterior left parietal lobe with somewhat increased conspicuity as compared to prior exam.  There is stable small focus of encephalomalacia involving left cerebellar hemisphere compatible with prior remote infarct/insult. Chronic lacunar infarct to right basal ganglia redemonstrated. There is no evidence of hydrocephalus. ORBITS: The visualized portion of the orbits demonstrate no acute abnormality. SINUSES: Small air-fluid level right sphenoid sinus. Trace air-fluid level right frontal sinus. Mild mucoperiosteal thickening to bilateral ethmoid air cells. Findings are new from prior exam and likely relate to presence of nasogastric tube. SOFT TISSUES/SKULL:  No acute abnormality of the visualized skull or soft tissues. Focal area of decreased attenuation with loss of gray/white matter differentiation involving posterior left parietal lobe with somewhat increased conspicuity as compared to prior exam likely reflecting an area of acute to subacute infarct. Stable small chronic infarct/insult to left cerebellar hemisphere. Chronic lacunar infarct to right basal ganglia redemonstrated. Paranasal sinus disease likely relating to presence of nasogastric tube. CT HEAD WO CONTRAST    Result Date: 1/1/2023  EXAMINATION: CT OF THE HEAD WITHOUT CONTRAST  1/1/2023 10:48 pm TECHNIQUE: CT of the head was performed without the administration of intravenous contrast. Automated exposure control, iterative reconstruction, and/or weight based adjustment of the mA/kV was utilized to reduce the radiation dose to as low as reasonably achievable. COMPARISON: None. HISTORY: Reason for Exam: AMS Additional signs and symptoms: the patient has been getting more and more confused since 2 weeks ago. It has progressively been getting worse and today FINDINGS: BRAIN/VENTRICLES: There is no acute intracranial hemorrhage, mass effect or midline shift. No abnormal extra-axial fluid collection.   The gray-white differentiation is maintained without evidence of an acute infarct. There is no evidence of hydrocephalus. Changes of mild chronic small vessel ischemic disease. ORBITS: The visualized portion of the orbits demonstrate no acute abnormality. SINUSES: The visualized paranasal sinuses and mastoid air cells demonstrate no acute abnormality. SOFT TISSUES/SKULL:  No acute abnormality of the visualized skull or soft tissues. No acute intracranial abnormality. Mild chronic small vessel ischemic disease. US LIVER    Result Date: 1/2/2023  EXAMINATION: RIGHT UPPER QUADRANT ULTRASOUND 1/2/2023 10:20 am COMPARISON: None. HISTORY: ORDERING SYSTEM PROVIDED HISTORY: elevated AST and ALT, in setting of PT, and INR TECHNOLOGIST PROVIDED HISTORY: elevated AST and ALT, in setting of PT, and INR FINDINGS: Patient body habitus limits the study, as there is increased attenuation of the ultrasound beam. This decreases sensitivity and specificity. LIVER:  There is mild increased echogenicity seen throughout the liver. No intrahepatic ductal dilatation. No perihepatic fluid. BILIARY SYSTEM:  Bladder is contracted. Gallstones are seen. Gallbladder wall thickness measures 6 mm. Common bile duct is within normal limits measuring 5 mm. RIGHT KIDNEY: The right kidney is grossly unremarkable without evidence of hydronephrosis. PANCREAS:  Visualized portions of the pancreas are unremarkable. OTHER: No evidence of right upper quadrant ascites. Portal vein flow appears hepatopetal     Increased echogenicity is seen throughout the liver suggesting diffuse hepatocellular disease such as fatty infiltration Cholelithiasis. No cholecystitis     XR CHEST PORTABLE    Result Date: 1/2/2023  EXAMINATION: ONE XRAY VIEW OF THE CHEST 1/2/2023 3:15 pm COMPARISON: 01/01/2023 HISTORY: ORDERING SYSTEM PROVIDED HISTORY: intubation TECHNOLOGIST PROVIDED HISTORY: intubation Reason for Exam: intubation FINDINGS: Overlying items external to the patient somewhat limit evaluation.  Placement of endotracheal tube with tip 8.2 cm from the robert. Placement of enteric tube seen to extend into the stomach, distal extent not included in field of view. Persistent likely layering right pleural effusion, similar to slightly worsened. Persistent bilateral airspace opacities to bilateral mid to lower lung zones, right worse than left, similar on the left but mildly worsened on the right. No pneumothoraces are seen. Persistent likely bullous change to the left upper lung zone. Cardiac and mediastinal silhouettes are stable. Stable appearance to bony structures. Placement of endotracheal tube which appears high riding with tip 8.2 cm from the robret. Suggest advancement by 2-3 cm. Placement of endotracheal tube seen to extend into the stomach, distal extent not included in field of view. No pneumothoraces. Persistent likely bullous change to the left upper lung zone. Persistent right pleural effusion, similar to slightly worsened. Persistent bilateral airspace opacities, right worse than left, similar on the left but mildly worsened on the right. XR CHEST PORTABLE    Result Date: 1/1/2023  EXAMINATION: ONE XRAY VIEW OF THE CHEST 1/1/2023 7:17 pm COMPARISON: None. HISTORY: ORDERING SYSTEM PROVIDED HISTORY: ams TECHNOLOGIST PROVIDED HISTORY: [facility] Reason for Exam: Altered mental status, difficulty breathing Additional signs and symptoms: Altered mental status, difficulty breathing Relevant Medical/Surgical History: Altered mental status, difficulty breathing FINDINGS: Large lucent area in the left apex suggesting a large bulla however superimposed pneumothorax cannot be excluded. The cardiac size is normal. Right lower lobe airspace infiltrate and mild right pleural effusion. . Pulmonary vascularity appears normal. There is mild ectasia of the thoracic aorta. Calcific tendinitis of the right shoulder. No acute bony abnormalities.  The hilar structures are normal.     Right lower lobe pneumonia and small right pleural effusion Large lucent area in the left apex suggesting a large bulla however superimposed pneumothorax cannot be excluded. Follow-up CT would be useful for further evaluation. The findings were sent to the Radiology Results [address] at 10:31 pm on 1/1/2023 to be communicated to a licensed caregiver. EKG:    there are no previous tracings available for comparison. Physical Examination:        PHYSICAL EXAM:  General Appearance Altered mentation. Patient is somnolent  Psych:  NA  HEENT - Head is normocephalic, atraumatic. Neck: supple, no rigidity, normal ROM  Lungs -limited exam, good air entry. No wheezes  Cardiovascular - Heart sounds are normal.  Regular rhythm, normal rate without murmur, gallop or rub. Neurology: Equivocal Babinski sign. DTR are symmetrical but looks exaggerated on left knee jerk. Cannot examine full motor exam due to patient condition  Pupils are symmetrical and reactive  Abdomen - Soft, nontender, nondistended, no masses or organomegaly  Skin - No bruising or bleeding on exposed skin area  Extremities - Hand and feet are edematous.    Assessment:        Primary Problem  Acute respiratory failure with hypoxia and hypercapnia [facility])    Active Hospital Problems    Diagnosis Date Noted    ACP (advance care planning) [Z71.89] 01/09/2023     Priority: Medium    Goals of care, counseling/discussion [Z71.89] 01/09/2023     Priority: Medium    Encounter for palliative care [Z51.5] 01/09/2023     Priority: Medium    Prolonged pt (prothrombin time) [R79.1] 01/03/2023     Priority: Medium    Emphysema lung ([address] [J43.9] 01/03/2023     Priority: Medium    Cerebral infarction ([address] [I63.9] 01/03/2023     Priority: Medium    Transaminitis [R74.01] 01/02/2023     Priority: Medium    Microcytic anemia [D64.9] 01/02/2023     Priority: Medium    Thrombocytopenia ([address] [D69.6] 01/02/2023     Priority: Medium    Agitation [R45.1] 01/02/2023     Priority: Medium    Acute respiratory failure with hypoxia and hypercapnia (HCC) RLL pneumonia [J96.01, J96.02] 01/02/2023     Priority: Medium    Altered mental status [R41.82] 01/02/2023     Priority: Medium    Community acquired pneumonia of right lower lobe of lung [J18.9] 01/02/2023     Priority: Medium       Plan: Altered Mental Status in the setting of Acute type 2 respiratory failure secondary to Pneumonia and Acute Heart Failure  -EEG showing Diffuse slowing.  -No plan for LP at this moment as INR is elevated. -On Geodon 10 mg BID  -ID following: On vancoymycin and Unasyn  -Ipratropium-Albuterol every 4 hrs  -Echocardiogam completed    Transaminitis with prolonged PTT and INR in the setting of acute HCV  - AST and ALT are 41 and 74 this am, respectively. -INR PT improving currently 1,6 and 19.1, respectively. Patient will remain off AC. Fibrinogen  151  -Thrombocytopenic  -HCV RNA PCR positive; ID on board  Platelet count remains the same      Sundowning and hallucination. Concern for suicidal ideation.  - Psychiatry consulted       Multiple acute brain infarcts with subarachnoid hemorrhage versus meningitis  -EEG showing diffuse slowing without epileptic waves. -Aspirin HELD  -Plan for CASSI once patient mentation improved. -Repeat CT head per Heme-Onc    Thrombocytopenia  -Unknown  baseline  -Transaminates and Prolonged PT and PTT  -PLT count  67  -Monitor HH closely    Microcytic anemia in the setting of elevated INR and PT  -Hb sig improved at 13.6  -No previous labs to compare?  -Iron: 14, TIBC: 412 and Ferritin:14  -Started on Iron IV replacement    Elevated troponins most likely 2/2 demand ischemia  -Troponin trending down  -no acute changes on EKG  -ECHO showing normal LVF. Mild tricuspid regurge.  RV pressure of 25 mmHG        Hypernatremia in the setting of Dehydration-Resolved      DVT prophylaxis: reason for no prophylaxis: Prolonged PT, aPTT  GI prophylaxis: Protonix 40 mg daily    Mandi Crisostomo MD  1/10/2023  7:44 AM Attending Physician Statement  I have discussed the care of Nolan Lindsay with the resident team. I have examined the patient myself and taken ros and hpi , including pertinent history and exam findings,  with the resident. I have reviewed the key elements of all parts of the encounter with the resident. I agree with the assessment, plan and orders as documented by the resident. Principal Problem:    Acute respiratory failure with hypoxia and hypercapnia (HCC) RLL pneumonia  Active Problems:    Transaminitis    Microcytic anemia    Thrombocytopenia (HCC)    Agitation    Altered mental status    Community acquired pneumonia of right lower lobe of lung    Prolonged pt (prothrombin time)    Emphysema lung (HCC)    Cerebral infarction ([facility]    ACP (advance care planning)    Goals of care, counseling/discussion    Encounter for palliative care  Resolved Problems:    COPD with respiratory failure, acute ([facility]    Treated for shortness of breath secondary to PNA and encephalopathy secondary to [facility], cerebral infarcts  Overall encephalopathy no change since yesterday    1/10  Swallow study- severe dysphagia- will need NG tube   Remains on HF NC  Repeat CT head today-resutls awatiged    Not much change overall.        Electronically signed by Janett Schneider MD

## 2023-01-10 NOTE — PROGRESS NOTES
Bipap on standby at this time. Pulse Ox-93% 6lpm-  Pt did not wear last night. Skin score--  0    Nasal gel pad available at bedside. Pt awake/alert/no distress noted.         BRONCHOSPASM/BRONCHOCONSTRICTION     [x]         IMPROVE AERATION/BREATH SOUNDS  [x]   ADMINISTER BRONCHODILATOR THERAPY AS APPROPRIATE  [x]   ASSESS BREATH SOUNDS  []   IMPLEMENT AEROSOL/MDI PROTOCOL  [x]   PATIENT EDUCATION AS NEEDED

## 2023-01-10 NOTE — CARE COORDINATION
ONGOING DISCHARGE PLAN:        Spoke with patient's s/o Randell Gowers while she was at bedside,regarding discharge plan. She states that she has been discussing placement with [facility]. They have not decided based on the list provided. She states they will visit some of the facilities. 1/9 Barium swallow-failed, SLP will continue to eval and treat  NPO    6L oxygen    IV vanco per ID    Psych note-continue geodon Q 12    IV lasix 20 mg BID    Will continue to follow for additional discharge needs.     Electronically signed by Louis Conde RN on 1/10/2023 at 2:41 PM

## 2023-01-10 NOTE — PROGRESS NOTES
Patient awake, 02 sat 91-92% on 6L, declines bipap. He is still restrained, so spoke with nurse and she agreed best to leave him as is.

## 2023-01-10 NOTE — PROGRESS NOTES
[facility] -   Infectious diseases evaluation  admission date 1/1/2023    reason for consultation:   Community-acquired pneumonia    Impression :   Current:  Acute hypoxic respiratory failure required intubation 1/2/22 was subsequently extubated  Community-acquired pneumonia with possible aspiration. Cellulitis /infiltration of left arm PICC line. Acute CVA with subarachnoid hemorrhage  Positive MRSA nasal swab  Liver disease  Cholelithiasis  Acute renal failure  Thrombocytopenia  Hepatitis C/elevated liver enzymes    Recommendations      Unasyn course completed 1/8/2023  Continue IV vancomycin   Swallow study noted had severe dysphagia, NG will be placed  Remove left arm PICC line  CT head ordered pending   Chest x-ray  Liver ultrasound 1/2/2323 showed increased echogenicity and cholelithiasis, no cholecystitis. HIV screen was negative on 1/2/23  Hepatitis C RNA was 17, 400 IU/mL 4.2 for LOC  The patient received IV ceftriaxone and Zithromax on 1/1/2022  Follow blood and respiratory cultures, no growth to date  No growth on urine culture from 1/3/23  Nasal swab for MRSA was +1/2/23  Respiratory panel with COVID-19 PCR was negative  Urine for Legionella antigen negative  Follow CBC and renal function  Continue supportive care  Follow-up with GI as outpatient for hepatitis C  management  Discussed with nursing staff      Infection Control Recommendations   [place] Precautions  Contact Isolation       Antimicrobial Stewardship Recommendations   Simplification of therapy  Targeted therapy      History of Present Illness:   Initial history:  Peggi Heimlich is a 60y.o.-year-old male was brought to the hospital on 1/1/2023 with altered mental status, confusion associated with decreased responsiveness worsening for 2 weeks. At the ER he was obtunded, O2 sat was 75% on room air, was placed on nonrebreather initially, subsequently was intubated.   He is intubated, sedated unable to provide history that was obtained from chart review and nursing staff. According to his wife he is fully vaccinated for COVID-19 and flu. He has been afebrile since admission  Initial procalcitonin level was 0.09, WBC normal  He was hypotensive was started on Levophed. MRI showed small acute infarcts in the left cerebellar hemisphere and left parietal love with moderate bilateral subarachnoid hemorrhages within both cerebral hemispheres. Chest x-ray showed right lower lobe infiltrates  Interval changes  1/10/2023   He was extubated 1/5/2023. He is afebrile, lethargic, arousable, not responding to commands or questions, on high flow oxygen  Mars catheter in place  Left arm PICC line in place with infiltration and mild surrounding erythema. Patient Vitals for the past 8 hrs:   BP Temp Temp src Pulse Resp SpO2 Weight   01/10/23 0800 (!) 147/88 -- -- 90 17 96 % --   01/10/23 0705 -- -- -- (!) 102 19 96 % --   01/10/23 0700 (!) 168/108 98.3 °F (36.8 °C) Oral (!) 101 24 94 % 195 lb 1.7 oz (88.5 kg)   01/10/23 0600 (!) 151/122 -- -- (!) 106 20 90 % --   01/10/23 0500 (!) 168/116 -- -- (!) 106 22 92 % --   01/10/23 0400 (!) 163/104 98.1 °F (36.7 °C) Oral (!) 102 24 94 % --   01/10/23 0300 (!) 168/109 -- -- (!) 110 23 94 % --               I have personally reviewed the past medical history, past surgical history, medications, social history, and family history, and I haveupdated the database accordingly. Allergies:   Patient has no known allergies. Review of Systems:     Review of Systems  confused, unable to provide  Physical Examination :       Physical Exam  Constitutional:       Appearance: He is ill-appearing. HENT:      Head: Normocephalic and atraumatic. Right Ear: External ear normal.      Left Ear: External ear normal.   Eyes:      General: No scleral icterus. Conjunctiva/sclera: Conjunctivae normal.   Cardiovascular:      Rate and Rhythm: Normal rate and regular rhythm. Heart sounds: Normal heart sounds. Pulmonary:      Comments: coarse breath sounds bilaterally, few crackles. Abdominal:      General: There is no distension. Palpations: Abdomen is soft. Musculoskeletal:      Cervical back: Neck supple. No rigidity. Right lower leg: No edema. Left lower leg: No edema. Skin:     Coloration: Skin is not jaundiced. Left arm PICC line in place with infiltration and mild surrounding erythema. Past Medical History:   History reviewed. No pertinent past medical history. Past Surgical  History:   History reviewed. No pertinent surgical history.     Medications:      magnesium sulfate  2,000 mg IntraVENous Once    [Held by provider] carvedilol  6.25 mg Oral BID WC    [Held by provider] ferrous sulfate  325 mg Oral Daily with breakfast    pantoprazole (PROTONIX) 40 mg injection  40 mg IntraVENous Daily    furosemide  20 mg IntraVENous Q12H    ziprasidone (GEODON) in sterile water injection  10 mg IntraMUSCular BID    ipratropium-albuterol  1 ampule Inhalation Q4H WA    [Held by provider] heparin (porcine)  5,000 Units SubCUTAneous 3 times per day    nystatin   Topical BID    potassium bicarb-citric acid  20 mEq Oral Daily    [Held by provider] miconazole   Topical BID    vancomycin  1,250 mg IntraVENous Q12H    [Held by provider] aspirin  81 mg Oral Daily    sodium chloride flush  5-40 mL IntraVENous 2 times per day    nicotine  1 patch TransDERmal Daily    vancomycin (VANCOCIN) intermittent dosing (placeholder)   Other RX Placeholder       Social History:     Social History     Socioeconomic History    Marital status: Single     Spouse name: Not on file    Number of children: Not on file    Years of education: Not on file    Highest education level: Not on file   Occupational History    Not on file   Tobacco Use    Smoking status: Every Day     Packs/day: 1.00     Years: 40.00     Pack years: 40.00     Types: Cigarettes    Smokeless tobacco: Never Substance and Sexual Activity    Alcohol use: Yes     Comment: beer and scotch    Drug use: Not Currently     Comment: over 20 years (stated by daughter)    Sexual activity: Not on file   Other Topics Concern    Not on file   Social History Narrative    Not on file     Social Determinants of Health     Financial Resource Strain: Not on file   Food Insecurity: Not on file   Transportation Needs: Not on file   Physical Activity: Not on file   Stress: Not on file   Social Connections: Not on file   Intimate Partner Violence: Not on file   Housing Stability: Not on file       Family History:   History reviewed. No pertinent family history. Medical Decision Making:   I have independently reviewed/ordered the following labs:    CBC with Differential:   Recent Labs     01/09/23 0456 01/10/23  0436   WBC 6.3 7.2   HGB 11.7* 13.6   HCT 40.3* 45.3   PLT 65* 67*   LYMPHOPCT 11* 10*   MONOPCT 10* 10*       BMP:  Recent Labs     01/09/23  0456 01/10/23  0436    140   K 3.3* 3.3*    97*   CO2 35* 33*   BUN 3* 3*   CREATININE <0.40* <0.40*   MG 1.7 1.5*       Hepatic Function Panel:   Recent Labs     01/09/23  0456 01/10/23  0436   PROT 5.3* 6.1*   LABALBU 2.8* 3.3*   BILITOT 1.8* 2.3*   ALKPHOS 47 52   ALT 81* 74*   AST 32 41*       No results for input(s): RPR in the last 72 hours. No results for input(s): HIV in the last 72 hours. No results for input(s): BC in the last 72 hours. Lab Results   Component Value Date/Time    CREATININE <0.40 01/10/2023 04:36 AM    GLUCOSE 96 01/10/2023 04:36 AM       Detailed results: Thank you for allowing us to participate in the care of this patient. Please call with questions. This note is created with the assistance of a speech recognition program.  While intending to generate adocument that actually reflects the content of the visit, the document can still have some errors including those of syntax and sound a like substitutions which may escape proof reading. It such instances, actual meaningcan be extrapolated by contextual diversion.     Georgia Burk MD  Office: [phone]  Perfect serve / office [phone]

## 2023-01-10 NOTE — PROGRESS NOTES
[place]  Speech Language Pathology    Date: 1/10/2023  Patient Name: Josie Sauceda  YOB: 1960   AGE: 60 y.o. MRN: [id number]        Patient Not Available for Speech Therapy     Due to:  [] Testing  [] Hemodialysis  [] Cancelled by RN  [] Surgery   [] Intubation/Sedation/Pain Medication  [] Medical instability  [x] Other:    1101- Attempt ST for dysphagia, however, pt. OOR.     [id number]- Attempted dysphagia tx, however, pt. Unable to awaken. Per [facility], pt. Was given Geodon at 0745 this am.  Pt. Wife reports he has been \"knocked out all day. \"  ST updated pt. Wife re: MBS results and recommendations from yesterdays test, she verbalized understanding. Yaa OZUNA A.CCC/SLP

## 2023-01-10 NOTE — PROGRESS NOTES
Today's Date: 1/9/2023  Patient Name: Gena Valdez  Date of admission: 1/1/2023  9:55 PM  Patient's age: 60 y.o., 1960  Admission Dx: Acute respiratory failure ([facility] [address] [J96.00]  Acute respiratory failure with hypoxia and hypercapnia ([facility] [address] [J96.01, J96.02]  Altered mental status, unspecified altered mental status type [R41.82]  Community acquired pneumonia of right lower lobe of lung [J18.9]  COPD with respiratory failure, acute ([facility] [address] [J44.9, J96.00]    Reason for Consult: management recommendations  Requesting Physician: Deisy Mckinney MD    CHIEF COMPLAINT: Abnormal cell counts. Altered mental status. Pneumonia. Interval history:    Patient seen and examined  Labs and vitals reviewed  Patient seen in ICU  The patient is seen and evaluated. Continues to have fluctuating mental status with delirium. Psych input appreciated. HISTORY OF PRESENT ILLNESS:      The patient is a 60 y.o.  male who is admitted with chief complaint of altered mental status. Patient has not seen a doctor for multiple years. Due to worsening mental status EMS was summoned. Patient oxygen saturation was noted to be at 75%. Patient placed on BiPAP x-ray showed right lower lobe pneumonia. Patient started on antibiotics. Patient also noted to have INR of 2.3 platelet count of 95,467. Patient does have a history of alcohol intake. Patient's ammonia level noted to be 29. Creatinine 1.5. Total bilirubin is within range. Hemoglobin 11.2 with MCV of 69. Platelet count is 69,801. Ultrasound liver done however report is pending.         Past Medical History: Hypertension    Past Surgical History: No pertinent surgical history    Medications:    Prior to Admission medications    Not on File     Current Facility-Administered Medications   Medication Dose Route Frequency Provider Last Rate Last Admin    hydrALAZINE (APRESOLINE) injection 20 mg  20 mg IntraVENous Q6H PRN ALAYNA Jenkins - NP   20 mg at 01/09/23 1536    [Held by provider] carvedilol (COREG) tablet 6.25 mg  6.25 mg Oral BID WC Clare Tony MD        [facility]] ferrous sulfate (IRON 325) tablet 325 mg  325 mg Oral Daily with breakfast Clare Tony MD        pantoprazole (PROTONIX) 40 mg in sodium chloride (PF) 0.9 % 10 mL injection  40 mg IntraVENous Daily Clare Tony MD   40 mg at 01/09/23 1010    furosemide (LASIX) injection 20 mg  20 mg IntraVENous Q12H Tyson Inigeuz MD   20 mg at 01/09/23 1307    ziprasidone (GEODON) 20 mg in sterile water 1 mL injection  20 mg IntraMUSCular Q12H PRN ALAYNA Giraldo - CNP        labetalol (NORMODYNE;TRANDATE) injection 5 mg  5 mg IntraVENous Q6H PRN Clare Tony MD   5 mg at 01/09/23 0859    potassium chloride 10 mEq/100 mL IVPB (Peripheral Line)  10 mEq IntraVENous PRN Kishan Reyes  mL/hr at 01/09/23 1312 10 mEq at 01/09/23 1312    ziprasidone (GEODON) 10 mg in sterile water 0.5 mL injection  10 mg IntraMUSCular BID Cameron Mora MD   10 mg at 01/09/23 2158    ipratropium-albuterol (DUONEB) nebulizer solution 1 ampule  1 ampule Inhalation Q4H WA Camreon Mora MD   1 ampule at 01/09/23 1945    [Held by provider] heparin (porcine) injection 5,000 Units  5,000 Units SubCUTAneous 3 times per day Maureen Garcia MD   5,000 Units at 01/08/23 0549    potassium chloride 20 mEq in dextrose 5 % 1,000 mL infusion   IntraVENous Continuous Tyson Iniguez MD 30 mL/hr at 01/09/23 1652 Rate Change at 01/09/23 1652    0.9 % sodium chloride infusion   IntraVENous PRN [facility], MD        dexmedetomidine (PRECEDEX) 400 mcg in sodium chloride 0.9 % 100 mL infusion  0.1-1.5 mcg/kg/hr IntraVENous Continuous PRN Cameron Mora MD        nystatin (MYCOSTATIN) ointment   Topical BID Nikhil Palomino MD   Given at 01/09/23 2159    potassium bicarb-citric acid (EFFER-K) effervescent tablet 20 mEq  20 mEq Oral Daily Daisy Ortiz MD   20 mEq at 01/07/23 1032    [Held by provider] miconazole (MICOTIN) 2 % powder   Topical BID Sabina Banana, APRN - NP   Given at 01/06/23 0923    vancomycin (VANCOCIN) 1,250 mg in dextrose 5 % 250 mL IVPB (ADDAVIAL)  1,250 mg IntraVENous Q12H Tyron Chiang MD   Stopped at 01/09/23 1820    perflutren lipid microspheres (DEFINITY) injection 1.5 mL  1.5 mL IntraVENous ONCE PRN Padma Alcantara MD        [facility] AT [place] by provider] aspirin chewable tablet 81 mg  81 mg Oral Daily Rajeev Brown MD   81 mg at 01/04/23 0740    sodium chloride flush 0.9 % injection 10 mL  10 mL IntraVENous PRN Nabeel Mercado MD   10 mL at 01/03/23 1459    sodium chloride flush 0.9 % injection 10 mL  10 mL IntraVENous PRN Rajeev Brown MD   10 mL at 01/03/23 1836    sodium chloride flush 0.9 % injection 5-40 mL  5-40 mL IntraVENous 2 times per day Sabina Banana, APRN - NP   10 mL at 01/09/23 2200    sodium chloride flush 0.9 % injection 5-40 mL  5-40 mL IntraVENous PRN Abiola Gomez APRN - NP        0.9 % sodium chloride infusion   IntraVENous PRN Sabina Banana, APRN - NP        ondansetron (ZOFRAN-ODT) disintegrating tablet 4 mg  4 mg Oral Q8H PRN Sabina Banana, APRN - NP        Or    ondansetron (ZOFRAN) injection 4 mg  4 mg IntraVENous Q6H PRN Abiola Gomez APRN - NP        polyethylene glycol (GLYCOLAX) packet 17 g  17 g Oral Daily PRN Sabina Banana, APRN - NP        acetaminophen (TYLENOL) tablet 650 mg  650 mg Oral Q6H PRN Sabina Banana, APRN - NP        Or    acetaminophen (TYLENOL) suppository 650 mg  650 mg Rectal Q6H PRN Sabina Banana, APRN - NP        nicotine (NICODERM CQ) 21 MG/24HR 1 patch  1 patch TransDERmal Daily Sabina Banana, APRN - NP   1 patch at 01/09/23 0859    albuterol (PROVENTIL) nebulizer solution 2.5 mg  2.5 mg Nebulization Q2H PRN Sabina Banana, APRN - NP   2.5 mg at 01/02/23 1115    guaiFENesin (MUCINEX) extended release tablet 600 mg  600 mg Oral BID PRN Sabina Banana, APRN - NP        vancomycin Jg Ramirez) intermittent dosing (placeholder)   Other RX Bennet Jeans, MD           Allergies:  Patient has no known allergies. Social History:   reports that he has been smoking cigarettes. He has a 40.00 pack-year smoking history. He has never used smokeless tobacco. He reports current alcohol use. He reports that he does not currently use drugs. Family History: Patient not able to give history due to altered mental status    REVIEW OF SYSTEMS:      General: no fever or night sweats, Weight is stable. ENT: No double or blurred vision, no hearing problem, no dysphagia or sore throat   Respiratory: Positive shortness of breath  Cardiovascular: Denies chest pain, PND or orthopnea. No L E swelling or palpitations. Gastrointestinal:    No nausea or vomiting, abdominal pain, diarrhea or constipation. Genitourinary: Denies dysuria, hematuria, frequency, urgency or incontinence. Neurological: Complains of being very weak. Musculoskeletal:  No arthralgia no back pain or joint swelling. Skin: There are no rashes or bleeding.   Psych: Denies hallucinations or intentions to harm self        PHYSICAL EXAM:        /75   Pulse 100   Temp 99.1 °F (37.3 °C) (Axillary)   Resp 21   Ht 5' 7\" (1.702 m)   Wt 195 lb 8.8 oz (88.7 kg)   SpO2 (!) 89%   BMI 30.63 kg/m²    Temp (24hrs), Av.8 °F (37.1 °C), Min:98.2 °F (36.8 °C), Max:99.1 °F (37.3 °C)      General appearance - not in acute distress  Mental status -arousable but somewhat lethargic  Eyes - pupils equal and reactive, extraocular eye movements intact   Ears - bilateral TM's and external ear canals normal   Mouth -mucous membranes moist  Neck - supple, no significant adenopathy   Lymphatics - no palpable lymphadenopathy, no hepatosplenomegaly   Chest -bilateral rales  Heart - normal rate, regular rhythm, normal S1, S2, no murmurs  Abdomen - soft, nontender, nondistended, no masses or organomegaly   Neurological -orally intubated  Musculoskeletal - no joint tenderness, deformity or swelling   Extremities - peripheral pulses normal, no pedal edema, no clubbing or cyanosis   Skin - normal coloration and turgor, no rashes, no suspicious skin lesions noted ,      DATA:      Labs:     Results for orders placed or performed during the hospital encounter of 01/01/23   COVID-19 & Influenza Combo    Specimen: Nasopharyngeal Swab   Result Value Ref Range    Specimen Description . NASOPHARYNGEAL SWAB     Source . NASOPHARYNGEAL SWAB     SARS-CoV-2 RNA, RT PCR Not Detected Not Detected    INFLUENZA A Not Detected Not Detected    INFLUENZA B Not Detected Not Detected   Respiratory Panel, Molecular, with COVID-19 (Restricted: peds pts or suitable admitted adults)    Specimen: Nasopharyngeal Swab   Result Value Ref Range    Specimen Description . NASOPHARYNGEAL SWAB     Adenovirus PCR Not Detected Not Detected    Coronavirus 229E PCR Not Detected Not Detected    Coronavirus HKU1 PCR Not Detected Not Detected    Coronavirus NL63 PCR Not Detected Not Detected    Coronavirus OC43 PCR Not Detected Not Detected    SARS-CoV-2, PCR Not Detected Not Detected    Human Metapneumovirus PCR Not Detected Not Detected    Rhino/Enterovirus PCR Not Detected Not Detected    Influenza A by PCR Not Detected Not Detected    Influenza B by PCR Not Detected Not Detected    Parainfluenza 1 PCR Not Detected Not Detected    Parainfluenza 2 PCR Not Detected Not Detected    Parainfluenza 3 PCR Not Detected Not Detected    Parainfluenza 4 PCR Not Detected Not Detected    Resp Syncytial Virus PCR Not Detected Not Detected    Bordetella Parapertussis Not Detected Not Detected    B Pertussis by PCR Not Detected Not Detected    Chlamydia pneumoniae By PCR Not Detected Not Detected    Mycoplasma pneumo by PCR Not Detected Not Detected   Legionella Ag, Ur    Specimen: Urine   Result Value Ref Range    Legionella Pneumophilia Ag, Urine NEGATIVE NEGATIVE   STREP PNEUMONIAE ANTIGEN    Specimen: Urine, indwelling catheter   Result Value Ref Range    Source . CLEAN CATCH URINE     Strep pneumo Ag NEGATIVE    Culture, Blood 1    Specimen: Blood   Result Value Ref Range    Specimen Description . BLOOD LEFT ARM     Culture NO GROWTH 5 DAYS    Culture, Blood 1    Specimen: Blood   Result Value Ref Range    Specimen Description . BLOOD RIGHT ARM     Culture NO GROWTH 5 DAYS    Culture, Urine    Specimen: Urine, clean catch   Result Value Ref Range    Specimen Description . CLEAN CATCH URINE     Culture NO GROWTH    MRSA DNA Probe, Nasal    Specimen: Nasal   Result Value Ref Range    Specimen Description . NASAL SWAB     MRSA, DNA, Nasal (A) NEGATIVE     POSITIVE:  MRSA DNA detected by nucleic acid amplification. Culture, Respiratory    Specimen: Sputum Induced   Result Value Ref Range    Specimen Description . INDUCED SPUTUM     Direct Exam >10, <25 NEUTROPHILS/LPF     Direct Exam < 10 EPITHELIAL CELLS/LPF     Direct Exam NO SIGNIFICANT PATHOGENS SEEN     Culture NO GROWTH    Troponin   Result Value Ref Range    Troponin, High Sensitivity 177 (HH) 0 - 22 ng/L   Troponin   Result Value Ref Range    Troponin, High Sensitivity 184 (HH) 0 - 22 ng/L   APTT   Result Value Ref Range    PTT 26.5 24.0 - 36.0 sec   Protime-INR   Result Value Ref Range    Protime 24.9 (H) 11.8 - 14.6 sec    INR 2.3    Phosphorus   Result Value Ref Range    Phosphorus 4.3 2.5 - 4.5 mg/dL   Magnesium   Result Value Ref Range    Magnesium 2.1 1.6 - 2.6 mg/dL   Basic Metabolic Panel   Result Value Ref Range    Glucose 96 70 - 99 mg/dL    BUN 46 (H) 8 - 23 mg/dL    Creatinine 1.77 (H) 0.70 - 1.20 mg/dL    Est, Glom Filt Rate 43 (L) >60 mL/min/1.73m2    Calcium 7.9 (L) 8.6 - 10.4 mg/dL    Sodium 138 135 - 144 mmol/L    Potassium 4.8 3.7 - 5.3 mmol/L    Chloride 97 (L) 98 - 107 mmol/L    CO2 29 20 - 31 mmol/L    Anion Gap 12 9 - 17 mmol/L   CBC with Auto Differential   Result Value Ref Range    WBC 6.8 3.5 - 11.0 k/uL    RBC 5.99 (H) 4.5 - 5.9 m/uL    Hemoglobin 12.1 (L) 13.5 - 17.5 g/dL    Hematocrit 41.4 41 - 53 %    MCV 69.1 (L) 80 - 100 fL    MCH 20.2 (L) 26 - 34 pg    MCHC 29.2 (L) 31 - 37 g/dL    RDW 19.8 (H) 11.5 - 14.9 %    Platelets 55 (L) 275 - 450 k/uL    MPV 8.6 6.0 - 12.0 fL    Seg Neutrophils 79 (H) 36 - 66 %    Lymphocytes 12 (L) 24 - 44 %    Monocytes 8 (H) 1 - 7 %    Eosinophils % 0 0 - 4 %    Basophils 1 0 - 2 %    Segs Absolute 5.40 1.3 - 9.1 k/uL    Absolute Lymph # 0.80 (L) 1.0 - 4.8 k/uL    Absolute Mono # 0.50 0.1 - 1.3 k/uL    Absolute Eos # 0.00 0.0 - 0.4 k/uL    Basophils Absolute 0.10 0.0 - 0.2 k/uL   Blood Gas, Venous   Result Value Ref Range    pH, [place] 7.258 (L) 7.320 - 7.420    pCO2, [place] 63.0 (H) 39.0 - 55.0 mm Hg    pO2, [place] 48.1 30.0 - 50.0 mm Hg    HCO3, Venous 28.1 24.0 - 30.0 mmol/L    Positive Base Excess, [place] 1.0 0.0 - 2.0 mmol/L    O2 Sat, [place] 72.1 60.0 - 85.0 %    Carboxyhemoglobin 4.3 0 - 5 %    Methemoglobin 0.5 0.0 - 1.9 %    Pt Temp 37    Brain Natriuretic Peptide   Result Value Ref Range    Pro-BNP 7,797 (H) <300 pg/mL   Hepatic Function Panel   Result Value Ref Range    Albumin 3.9 3.5 - 5.2 g/dL    Alkaline Phosphatase 68 40 - 129 U/L     (H) 5 - 41 U/L     (H) <40 U/L    Total Bilirubin 1.1 0.3 - 1.2 mg/dL    Bilirubin, Direct 0.9 (H) <0.3 mg/dL    Bilirubin, Indirect 0.2 0.0 - 1.0 mg/dL    Total Protein 6.6 6.4 - 8.3 g/dL   Lipase   Result Value Ref Range    Lipase 17 13 - 60 U/L   Urinalysis with Reflex to Culture    Specimen: Urine   Result Value Ref Range    Color, UA Orange (A) Yellow    Turbidity UA Cloudy (A) Clear    Glucose, Ur NEGATIVE NEGATIVE    Bilirubin Urine NEGATIVE  Verified by ictotest. (A) NEGATIVE    Ketones, Urine TRACE (A) NEGATIVE    Specific Gravity, UA 1.018 1.000 - 1.030    Urine Hgb LARGE (A) NEGATIVE    pH, UA 5.0 5.0 - 8.0    Protein, UA 2+ (A) NEGATIVE    Urobilinogen, Urine ELEVATED (A) Normal    Nitrite, Urine NEGATIVE NEGATIVE    Leukocyte Esterase, Urine SMALL (A) NEGATIVE Microscopic Urinalysis   Result Value Ref Range    WBC, UA 6 TO 9 /HPF    RBC, UA TOO NUMEROUS TO COUNT /HPF    Casts UA 3 to 5 /LPF    Epithelial Cells UA 0 TO 2 /HPF    Bacteria, UA FEW (A) None   Arterial Blood Gases   Result Value Ref Range    pH, Arterial 7.295 (L) 7.350 - 7.450    pCO2, Arterial 60.3 (HH) 35.0 - 45.0 mmHg    pO2, Arterial 63.0 (L) 80.0 - 100.0 mmHg    HCO3, Arterial 29.3 (H) 22.0 - 26.0 mmol/L    Positive Base Excess, Art 2.8 (H) 0.0 - 2.0 mmol/L    O2 Sat, Arterial 85.8 (LL) 95 - 98 %    Carboxyhemoglobin 3.1 0 - 5 %    Methemoglobin 1.0 0.0 - 1.9 %    Pt Temp 37     O2 Device/Flow/% BIPAP     Respiratory Rate 16     Tyron Test PASS     Sample Site Right Radial Artery     Pt.  Position SEMI-FOWLERS     Mode BIPAP     Text for Respiratory RESULTS TO PHYSICIAN    Comprehensive Metabolic Panel w/ Reflex to MG   Result Value Ref Range    Glucose 101 (H) 70 - 99 mg/dL    BUN 46 (H) 8 - 23 mg/dL    Creatinine 1.50 (H) 0.70 - 1.20 mg/dL    Est, Glom Filt Rate 52 (L) >60 mL/min/1.73m2    Calcium 7.5 (L) 8.6 - 10.4 mg/dL    Sodium 136 135 - 144 mmol/L    Potassium 4.8 3.7 - 5.3 mmol/L    Chloride 99 98 - 107 mmol/L    CO2 27 20 - 31 mmol/L    Anion Gap 10 9 - 17 mmol/L    Alkaline Phosphatase 59 40 - 129 U/L     (H) 5 - 41 U/L     (H) <40 U/L    Total Bilirubin 0.8 0.3 - 1.2 mg/dL    Total Protein 5.9 (L) 6.4 - 8.3 g/dL    Albumin 3.3 (L) 3.5 - 5.2 g/dL   CBC with Auto Differential   Result Value Ref Range    WBC 5.6 3.5 - 11.0 k/uL    RBC 5.57 4.5 - 5.9 m/uL    Hemoglobin 11.2 (L) 13.5 - 17.5 g/dL    Hematocrit 38.8 (L) 41 - 53 %    MCV 69.7 (L) 80 - 100 fL    MCH 20.1 (L) 26 - 34 pg    MCHC 28.8 (L) 31 - 37 g/dL    RDW 19.7 (H) 11.5 - 14.9 %    Platelets 67 (L) 322 - 450 k/uL    MPV 9.1 6.0 - 12.0 fL    Seg Neutrophils 79 (H) 36 - 66 %    Lymphocytes 12 (L) 24 - 44 %    Monocytes 8 (H) 1 - 7 %    Eosinophils % 0 0 - 4 %    Basophils 1 0 - 2 %    nRBC 2 per 100 WBC    Segs Absolute 4. 41 1.3 - 9.1 k/uL    Absolute Lymph # 0.67 (L) 1.0 - 4.8 k/uL    Absolute Mono # 0.45 0.1 - 1.3 k/uL    Absolute Eos # 0.00 0.0 - 0.4 k/uL    Basophils Absolute 0.06 0.0 - 0.2 k/uL    Morphology ANISOCYTOSIS PRESENT     Morphology HYPOCHROMIA PRESENT     Morphology 1+ ELLIPTOCYTES    Occ Bld, Fecal Scrn   Result Value Ref Range    Occult Blood, Stool #1 NEGATIVE NEGATIVE    Date, Stool #1 3,248,244     Time, Stool #1 300    Hemoglobin and Hematocrit   Result Value Ref Range    Hemoglobin 11.1 (L) 13.5 - 17.5 g/dL    Hematocrit 38.7 (L) 41 - 53 %   Hemoglobin and Hematocrit   Result Value Ref Range    Hemoglobin 10.8 (L) 13.5 - 17.5 g/dL    Hematocrit 37.2 (L) 41 - 53 %   Ammonia   Result Value Ref Range    Ammonia 29 16 - 60 umol/L   Hepatitis Panel, Acute   Result Value Ref Range    Hepatitis B Surface Ag NONREACTIVE NONREACTIVE    Hepatitis C Ab REACTIVE (A) NONREACTIVE    Hep B Core Ab, IgM NONREACTIVE NONREACTIVE    Hep A IgM NONREACTIVE NONREACTIVE   Iron and TIBC   Result Value Ref Range    Iron 14 (L) 59 - 158 ug/dL    TIBC 426 250 - 450 ug/dL    Iron Saturation 3 (L) 20 - 55 %    UIBC 412 (H) 112 - 347 ug/dL   Ferritin   Result Value Ref Range    Ferritin 14 (L) 30 - 400 ng/mL   Mycoplasma Ab,IgM   Result Value Ref Range    Mycoplasma pneumo IgM 0.25 <0.91   Procalcitonin   Result Value Ref Range    Procalcitonin 0.09 (H) <0.09 ng/mL   C-Reactive Protein   Result Value Ref Range    CRP 16.4 (H) 0.0 - 5.0 mg/L   Fibrin Split Products   Result Value Ref Range    FDP >5 (H) <5 ug/mL   Sedimentation Rate   Result Value Ref Range    Sed Rate 1 0 - 20 mm/Hr   Drug Scr, Abuse, Ur   Result Value Ref Range    Amphetamine Screen, Ur NEGATIVE NEGATIVE    Barbiturate Screen, Ur NEGATIVE NEGATIVE    Benzodiazepine Screen, Urine NEGATIVE NEGATIVE    Cocaine Metabolite, Urine NEGATIVE NEGATIVE    Methadone Screen, Urine NEGATIVE NEGATIVE    Opiates, Urine NEGATIVE NEGATIVE    Phencyclidine, Urine NEGATIVE NEGATIVE Cannabinoid Scrn, Ur NEGATIVE NEGATIVE    Oxycodone Screen, Ur NEGATIVE NEGATIVE    Fentanyl, Ur NEGATIVE NEGATIVE    Test Information       Assay provides medical screening only. The absence of expected drug(s) and/or metabolite(s) may indicate diluted or adulterated urine, limitations of testing or timing of collection. Troponin   Result Value Ref Range    Troponin, High Sensitivity 195 (HH) 0 - 22 ng/L   Vitamin B12 & Folate   Result Value Ref Range    Vitamin B-12 1926 (H) 232 - 1245 pg/mL    Folate 10.0 >4.8 ng/mL   HIV Screen   Result Value Ref Range    HIV Ag/Ab NONREACTIVE NONREACTIVE   MONOCLONAL PANEL   Result Value Ref Range    Kappa Free Light Chains QNT 2.79 (H) 0.37 - 1.94 mg/dL    Lambda Free Light Chains QNT 20.89 (H) 0.57 - 2.63 mg/dL    Free Kappa/Lambda Ratio 0.13 (L) 0.26 - 1.65    Serum IFX Interp       A ZONE OF RESTRICTION IS PRESENT IN THE GAMMAGLOBULIN REGION. CONFIRMED BY IMMUNOFIXATION TO BE MONOCLONAL    Pathologist       Reviewed by pathologist:  Lewis Albarran. Dwayne Beltre D.O. Total Protein 5.5 (L) 6.4 - 8.3 g/dL    Albumin (calculated) 3.6 3.2 - 5.2 g/dL    Albumin % 65 45 - 65 %    Alpha-1-Globulin 0.2 0.1 - 0.4 g/dL    Alpha 1 % 3 3 - 6 %    Alpha-2-Globulin 0.4 (L) 0.5 - 0.9 g/dL    Alpha 2 % 7 6 - 13 %    Beta Globulin 0.6 0.5 - 1.1 g/dL    Beta Percent 11 11 - 19 %    Gamma Globulin 0.8 0.5 - 1.5 g/dL    Gamma Globulin % 14 9 - 20 %    Total Prot. Sum 5.6 (L) 6.3 - 8.2 g/dL    Total Prot. Sum,% 100 98 - 102 %    Protein Electrophoresis, Serum       A ZONE OF RESTRICTION IS PRESENT IN THE GAMMAGLOBULIN REGION. CONFIRMED BY IMMUNOFIXATION TO BE MONOCLONAL    Pathologist       Reviewed by pathologist:  Lewis Albarran. Dwayne Beltre D.O. Path Review, Smear   Result Value Ref Range    Pathologist Review ELECTRONICALLY SIGNED.  Autumn Richardson MD    Reticulocytes   Result Value Ref Range    Retic % 2.8 (H) 0.5 - 2.0 %    Absolute Retic # 0.157 (H) 0.0245 - 0.098 M/uL   Fibrinogen   Result Value Ref Range    Fibrinogen 141 (L) 210 - 530 mg/dL   Ethanol   Result Value Ref Range    Ethanol <10 <10 mg/dL    Ethanol percent <0.010 %   Lactate Dehydrogenase   Result Value Ref Range     (H) 135 - 225 U/L   Hemoglobin and Hematocrit   Result Value Ref Range    Hemoglobin 10.9 (L) 13.5 - 17.5 g/dL    Hematocrit 38.0 (L) 41 - 53 %   Haptoglobin   Result Value Ref Range    Haptoglobin 60 30 - 200 mg/dL   D-Dimer, Quantitative   Result Value Ref Range    D-Dimer, Quant 6.70 (H) 0.00 - 0.59 mg/L FEU   Urinalysis with Reflex to Culture    Specimen: Urine   Result Value Ref Range    Color, UA Dark Yellow (A) Yellow    Turbidity UA Clear Clear    Glucose, Ur NEGATIVE NEGATIVE    Bilirubin Urine NEGATIVE NEGATIVE    Ketones, Urine TRACE (A) NEGATIVE    Specific [place], UA 1.015 1.000 - 1.030    Urine Hgb LARGE (A) NEGATIVE    pH, UA 5.0 5.0 - 8.0    Protein, UA 1+ (A) NEGATIVE    Urobilinogen, Urine Normal Normal    Nitrite, Urine NEGATIVE NEGATIVE    Leukocyte Esterase, Urine SMALL (A) NEGATIVE   APTT   Result Value Ref Range    PTT 38.7 (H) 24.0 - 36.0 sec   Protime-INR   Result Value Ref Range    Protime 24.4 (H) 11.8 - 14.6 sec    INR 2.2    Microscopic Urinalysis   Result Value Ref Range    WBC, UA 10 TO 20 /HPF    RBC, UA TOO NUMEROUS TO COUNT /HPF    Casts UA HYALINE /LPF    Casts UA 0 TO 2 /LPF    Epithelial Cells UA 3 to 5 /HPF   BLOOD GAS, ARTERIAL   Result Value Ref Range    pH, Arterial 7.314 (L) 7.350 - 7.450    pCO2, Arterial 61.9 (HH) 35.0 - 45.0 mmHg    pO2, Arterial 58.1 (LL) 80.0 - 100.0 mmHg    HCO3, Arterial 31.4 (H) 22.0 - 26.0 mmol/L    Positive Base Excess, Art 5.3 (H) 0.0 - 2.0 mmol/L    O2 Sat, Arterial 86.3 (LL) 95 - 98 %    Carboxyhemoglobin 2.1 0 - 5 %    Methemoglobin 0.8 0.0 - 1.9 %    Pt Temp 37.0     O2 Device/Flow/% VENTILATOR     Respiratory Rate 22     Tyron Test PASS     Sample Site Left Radial Artery     Pt.  Position SEMI-FOWLERS     Mode PRVC     Set Rate 22     Total Rate 26          FIO2 40     Peep/Cpap 8    SURGICAL PATHOLOGY REPORT   Result Value Ref Range    Surgical Pathology Report       [id number]  [email or web address]  ANATOMIC PATHOLOGY  [address] [place] [address],  [address]. [facility], [address]  [phone]  Fax: [phone]  SURGICAL PATHOLOGY CONSULTATION    Patient Name: Franci Morales  MR#: [id number]  Specimen #[id number]    Procedures/Addenda  PERIPHERAL BLOOD REPORT     Date Ordered:     1/2/2023     Status:  Signed Out       Date Complete:     1/3/2023     By: Josue Negron M.D. Date Reported:     1/3/2023       INTERPRETATION  Peripheral blood:  Microcytic, hypochromic anemia. The patient's iron studies are compatible with iron deficiency anemia. Lymphopenia. Differential considerations include acute illness/infection,  medication effect, smoking, and debilitating diseases. Thrombocytopenia  Differential considerations include bone marrow hypoproduction and  immune destruction (idiopathic thrombocytopenic purpura, drug effect). RESULTS-COMMENTS  PERIPHERAL BLOOD STUDY    CBC: Please see the electronic health record  for CBC parameters  (, 01/02/2023, 05:43). PLATELETS: Decreased platelets. Platelets show normal morphology. LEUKOCYTES: Decreased lymphocytes. White blood cells show normal  morphology. There are no blasts. ERYTHROCYTES: Microcytic, hypochromic. Anisocytosis and  poikilocytosis. Polychromasia. Reticulocyte count 2.8%. Rare RBC  fragments. Note: The electronic health record is reviewed. Josue Negron M.D.                    Source:  A: Peripheral Blood     Comprehensive Metabolic Panel w/ Reflex to MG   Result Value Ref Range    Glucose 91 70 - 99 mg/dL    BUN 32 (H) 8 - 23 mg/dL    Creatinine 0.97 0.70 - 1.20 mg/dL    Est, Glom Filt Rate >60 >60 mL/min/1.73m2    Calcium 7.4 (L) 8.6 - 10.4 mg/dL    Sodium 143 135 - 144 mmol/L    Potassium 3.7 3.7 - 5.3 mmol/L    Chloride 105 98 - 107 mmol/L    CO2 30 20 - 31 mmol/L    Anion Gap 8 (L) 9 - 17 mmol/L    Alkaline Phosphatase 50 40 - 129 U/L     (H) 5 - 41 U/L     (H) <40 U/L    Total Bilirubin 0.9 0.3 - 1.2 mg/dL    Total Protein 5.2 (L) 6.4 - 8.3 g/dL    Albumin 3.1 (L) 3.5 - 5.2 g/dL   CBC with Auto Differential   Result Value Ref Range    WBC 5.7 3.5 - 11.0 k/uL    RBC 5.42 4.5 - 5.9 m/uL    Hemoglobin 11.0 (L) 13.5 - 17.5 g/dL    Hematocrit 37.6 (L) 41 - 53 %    MCV 69.4 (L) 80 - 100 fL    MCH 20.2 (L) 26 - 34 pg    MCHC 29.2 (L) 31 - 37 g/dL    RDW 20.0 (H) 11.5 - 14.9 %    Platelets 66 (L) 686 - 450 k/uL    MPV 9.4 6.0 - 12.0 fL    Seg Neutrophils 85 (H) 36 - 66 %    Lymphocytes 9 (L) 24 - 44 %    Monocytes 5 1 - 7 %    Eosinophils % 0 0 - 4 %    Basophils 0 0 - 2 %    Bands 1 0 - 10 %    nRBC 1 (H) 0 per 100 WBC    Segs Absolute 4.87 1.3 - 9.1 k/uL    Absolute Lymph # 0.52 (L) 1.0 - 4.8 k/uL    Absolute Mono # 0.29 0.1 - 1.3 k/uL    Absolute Eos # 0.00 0.0 - 0.4 k/uL    Basophils Absolute 0.00 0.0 - 0.2 k/uL    Absolute Bands # 0.06 0.0 - 1.0 k/uL    Morphology ANISOCYTOSIS PRESENT     Morphology HYPOCHROMIA PRESENT     Morphology 1+ POLYCHROMASIA     Morphology 1+ ELLIPTOCYTES    BLOOD GAS, ARTERIAL   Result Value Ref Range    pH, Arterial 7.373 7.350 - 7.450    pCO2, Arterial 56.0 (HH) 35.0 - 45.0 mmHg    pO2, Arterial 61.4 (L) 80.0 - 100.0 mmHg    HCO3, Arterial 32.6 (H) 22.0 - 26.0 mmol/L    Positive Base Excess, Art 7.4 (H) 0.0 - 2.0 mmol/L    O2 Sat, Arterial 89.2 (L) 95 - 98 %    Carboxyhemoglobin 1.7 0 - 5 %    Methemoglobin 0.9 0.0 - 1.9 %    Pt Temp 37     O2 Device/Flow/% VENTILATOR     Respiratory Rate 22     Tyron Test PASS     Sample Site Right Radial Artery     Pt.  Position SEMI-FOWLERS     Mode PRVC     Set Rate 22     Total Rate 22          FIO2 35     Peep/Cpap 8     Text for Respiratory RESULTS GIVEN TO RN    IMMUNOFIXATION URINE RANDOM PROFILE   Result Value Ref Range    Urine IFX Specimen HURTADO SPECIMEN     Volume HURTADO SPECIMEN mL    Urine Total Protein 11 mg/dL    Urine IFX Interp PENDING    Protime-INR   Result Value Ref Range    Protime 21.9 (H) 11.8 - 14.6 sec    INR 1.9    APTT   Result Value Ref Range    PTT 38.4 (H) 24.0 - 36.0 sec   Triglyceride   Result Value Ref Range    Triglycerides 85 <150 mg/dL   CHLORIDE, URINE, RANDOM   Result Value Ref Range    Chloride, Ur 34 mmol/L   Protein / Creatinine Ratio, Urine   Result Value Ref Range    Total Protein, Urine 23 mg/dL    Creatinine, Ur 79.8 39.0 - 259.0 mg/dL    Urine Total Protein Creatinine Ratio 0.29 (H) 0.00 - 0.20   SODIUM, URINE, RANDOM   Result Value Ref Range    Sodium,Ur <20 mmol/L   BLOOD GAS, ARTERIAL   Result Value Ref Range    pH, Arterial 7.360 7.350 - 7.450    pCO2, Arterial 55.9 (HH) 35.0 - 45.0 mmHg    pO2, Arterial 71.4 (L) 80.0 - 100.0 mmHg    HCO3, Arterial 31.6 (H) 22.0 - 26.0 mmol/L    Positive Base Excess, Art 6.1 (H) 0.0 - 2.0 mmol/L    O2 Sat, Arterial 91.7 (L) 95 - 98 %    Carboxyhemoglobin 1.2 0 - 5 %    Methemoglobin 1.3 0.0 - 1.9 %    Pt Temp 37     O2 Device/Flow/% VENTILATOR     Tyron Test PASS     Sample Site Right Radial Artery     Pt.  Position SEMI-FOWLERS     Mode PRVC     Text for Respiratory RESULTS GIVEN TO RN    CBC with Auto Differential   Result Value Ref Range    WBC 6.0 3.5 - 11.0 k/uL    RBC 5.56 4.5 - 5.9 m/uL    Hemoglobin 10.8 (L) 13.5 - 17.5 g/dL    Hematocrit 38.2 (L) 41 - 53 %    MCV 68.8 (L) 80 - 100 fL    MCH 19.5 (L) 26 - 34 pg    MCHC 28.3 (L) 31 - 37 g/dL    RDW 20.1 (H) 11.5 - 14.9 %    Platelets 75 (L) 368 - 450 k/uL    MPV 9.3 6.0 - 12.0 fL    Seg Neutrophils 82 (H) 36 - 66 %    Lymphocytes 7 (L) 24 - 44 %    Monocytes 9 (H) 1 - 7 %    Eosinophils % 1 0 - 4 %    Basophils 1 0 - 2 %    Segs Absolute 4.90 1.3 - 9.1 k/uL    Absolute Lymph # 0.40 (L) 1.0 - 4.8 k/uL    Absolute Mono # 0.50 0.1 - 1.3 k/uL    Absolute Eos # 0.10 0.0 - 0.4 k/uL    Basophils Absolute 0.10 0.0 - 0.2 k/uL   Comprehensive Metabolic Panel w/ Reflex to MG   Result Value Ref Range    Glucose 85 70 - 99 mg/dL    BUN 16 8 - 23 mg/dL    Creatinine 0.64 (L) 0.70 - 1.20 mg/dL    Est, Glom Filt Rate >60 >60 mL/min/1.73m2    Calcium 7.4 (L) 8.6 - 10.4 mg/dL    Sodium 143 135 - 144 mmol/L    Potassium 3.3 (L) 3.7 - 5.3 mmol/L    Chloride 107 98 - 107 mmol/L    CO2 29 20 - 31 mmol/L    Anion Gap 7 (L) 9 - 17 mmol/L    Alkaline Phosphatase 49 40 - 129 U/L     (H) 5 - 41 U/L     (H) <40 U/L    Total Bilirubin 0.8 0.3 - 1.2 mg/dL    Total Protein 5.0 (L) 6.4 - 8.3 g/dL    Albumin 2.8 (L) 3.5 - 5.2 g/dL   Vancomycin Level, Random   Result Value Ref Range    Vancomycin Rm 16.1 ug/mL    Vancomycin Random Dose amount 1g     Vancomycin Random Date last dose 1/4/22     Vancomycin Random Time last dose 0232    Magnesium   Result Value Ref Range    Magnesium 2.0 1.6 - 2.6 mg/dL   Hepatitis C RNA, quantitative, PCR   Result Value Ref Range    Source . PLASMA     Hepatitis C RNA-PCR 17,400 IU/mL    Hepatitis C RNA Quant Log 4.24 Log IU/mL    HCV RNA PCR, Quant DETECTED (A) Not Detected   CBC with Auto Differential   Result Value Ref Range    WBC 5.3 3.5 - 11.0 k/uL    RBC 5.80 4.5 - 5.9 m/uL    Hemoglobin 11.4 (L) 13.5 - 17.5 g/dL    Hematocrit 40.0 (L) 41 - 53 %    MCV 68.9 (L) 80 - 100 fL    MCH 19.7 (L) 26 - 34 pg    MCHC 28.5 (L) 31 - 37 g/dL    RDW 20.5 (H) 11.5 - 14.9 %    Platelets 66 (L) 941 - 450 k/uL    MPV 9.1 6.0 - 12.0 fL    Seg Neutrophils 79 (H) 36 - 66 %    Lymphocytes 8 (L) 24 - 44 %    Monocytes 10 (H) 1 - 7 %    Eosinophils % 2 0 - 4 %    Basophils 1 0 - 2 %    Segs Absolute 4.19 1.3 - 9.1 k/uL    Absolute Lymph # 0.42 (L) 1.0 - 4.8 k/uL    Absolute Mono # 0.53 0.1 - 1.3 k/uL    Absolute Eos # 0.11 0.0 - 0.4 k/uL    Basophils Absolute 0.05 0.0 - 0.2 k/uL    Morphology ANISOCYTOSIS PRESENT     Morphology MICROCYTOSIS PRESENT     Morphology HYPOCHROMIA PRESENT     Morphology FEW ELLIPTOCYTES     Morphology FEW TARGET CELLS    Comprehensive Metabolic Panel w/ Reflex to MG   Result Value Ref Range    Glucose 84 70 - 99 mg/dL    BUN 11 8 - 23 mg/dL    Creatinine 0.58 (L) 0.70 - 1.20 mg/dL    Est, Glom Filt Rate >60 >60 mL/min/1.73m2    Calcium 7.9 (L) 8.6 - 10.4 mg/dL    Sodium 146 (H) 135 - 144 mmol/L    Potassium 3.4 (L) 3.7 - 5.3 mmol/L    Chloride 110 (H) 98 - 107 mmol/L    CO2 30 20 - 31 mmol/L    Anion Gap 6 (L) 9 - 17 mmol/L    Alkaline Phosphatase 53 40 - 129 U/L     (H) 5 - 41 U/L     (H) <40 U/L    Total Bilirubin 0.8 0.3 - 1.2 mg/dL    Total Protein 5.0 (L) 6.4 - 8.3 g/dL    Albumin 2.9 (L) 3.5 - 5.2 g/dL   Fibrinogen   Result Value Ref Range    Fibrinogen 98 (L) 210 - 530 mg/dL   Protime-INR   Result Value Ref Range    Protime 20.3 (H) 11.8 - 14.6 sec    INR 1.7    APTT   Result Value Ref Range    PTT 41.6 (H) 24.0 - 36.0 sec   BLOOD GAS, ARTERIAL   Result Value Ref Range    pH, Arterial 7.372 7.350 - 7.450    pCO2, Arterial 51.6 (HH) 35.0 - 45.0 mmHg    pO2, Arterial 62.9 (L) 80.0 - 100.0 mmHg    HCO3, Arterial 29.9 (H) 22.0 - 26.0 mmol/L    Positive Base Excess, Art 4.7 (H) 0.0 - 2.0 mmol/L    O2 Sat, Arterial 89.7 (L) 95 - 98 %    Carboxyhemoglobin 1.7 0 - 5 %    Methemoglobin 1.1 0.0 - 1.9 %    Pt Temp 37     O2 Device/Flow/% VENTILATOR     Respiratory Rate 22     Tyron Test PASS     Sample Site Right Radial Artery     Pt.  Position SEMI-FOWLERS     Mode PRVC     Set Rate 22     Total Rate 22          FIO2 40     Peep/Cpap 8     Text for Respiratory RESULTS GIVEN TO RN    Magnesium   Result Value Ref Range    Magnesium 2.0 1.6 - 2.6 mg/dL   CBC with Auto Differential   Result Value Ref Range    WBC 5.2 3.5 - 11.0 k/uL    RBC 5.33 4.5 - 5.9 m/uL    Hemoglobin 10.5 (L) 13.5 - 17.5 g/dL    Hematocrit 38.4 (L) 41 - 53 %    MCV 72.0 (L) 80 - 100 fL    MCH 19.7 (L) 26 - 34 pg    MCHC 27.4 (L) 31 - 37 g/dL    RDW 20.3 (H) 11.5 - 14.9 %    Platelets 71 (L) 113 - 450 k/uL    MPV 9.3 6.0 - 12.0 fL    Seg Neutrophils 90 (H) 36 - 66 %    Lymphocytes 4 (L) 24 - 44 %    Monocytes 5 1 - 7 %    Eosinophils % 0 0 - 4 %    Basophils 0 0 - 2 %    Bands 1 0 - 10 %    Segs Absolute 4.68 1.3 - 9.1 k/uL    Absolute Lymph # 0.21 (L) 1.0 - 4.8 k/uL    Absolute Mono # 0.26 0.1 - 1.3 k/uL    Absolute Eos # 0.00 0.0 - 0.4 k/uL    Basophils Absolute 0.00 0.0 - 0.2 k/uL    Absolute Bands # 0.05 0.0 - 1.0 k/uL    Morphology ANISOCYTOSIS PRESENT     Morphology HYPOCHROMIA PRESENT     Morphology MICROCYTOSIS PRESENT     Morphology 1+ ELLIPTOCYTES     Morphology 1+ TEARDROPS    Comprehensive Metabolic Panel w/ Reflex to MG   Result Value Ref Range    Glucose 111 (H) 70 - 99 mg/dL    BUN 7 (L) 8 - 23 mg/dL    Creatinine 0.42 (L) 0.70 - 1.20 mg/dL    Est, Glom Filt Rate >60 >60 mL/min/1.73m2    Calcium 7.5 (L) 8.6 - 10.4 mg/dL    Sodium 144 135 - 144 mmol/L    Potassium 4.0 3.7 - 5.3 mmol/L    Chloride 111 (H) 98 - 107 mmol/L    CO2 29 20 - 31 mmol/L    Anion Gap 4 (L) 9 - 17 mmol/L    Alkaline Phosphatase 46 40 - 129 U/L     (H) 5 - 41 U/L    AST 74 (H) <40 U/L    Total Bilirubin 0.9 0.3 - 1.2 mg/dL    Total Protein 4.6 (L) 6.4 - 8.3 g/dL    Albumin 2.6 (L) 3.5 - 5.2 g/dL   Vancomycin Level, Random   Result Value Ref Range    Vancomycin Rm 20.3 ug/mL    Vancomycin Random Dose amount 1,250     Vancomycin Random Date last dose 349433     Vancomycin Random Time last dose 0546    Fibrinogen   Result Value Ref Range    Fibrinogen 109 (L) 210 - 530 mg/dL   Protime-INR   Result Value Ref Range    Protime 20.1 (H) 11.8 - 14.6 sec    INR 1.7    Comprehensive Metabolic Panel w/ Reflex to MG   Result Value Ref Range    Glucose 91 70 - 99 mg/dL    BUN 5 (L) 8 - 23 mg/dL    Creatinine <0.40 (L) 0.70 - 1.20 mg/dL    Est, Glom Filt Rate Can not be calculated >60 mL/min/1.73m2    Calcium 8.1 (L) 8.6 - 10.4 mg/dL    Sodium 147 (H) 135 - 144 mmol/L    Potassium 4.1 3.7 - 5.3 mmol/L    Chloride 108 (H) 98 - 107 mmol/L    CO2 26 20 - 31 mmol/L    Anion Gap 13 9 - 17 mmol/L    Alkaline Phosphatase 51 40 - 129 U/L     (H) 5 - 41 U/L    AST 63 (H) <40 U/L    Total Bilirubin 1.6 (H) 0.3 - 1.2 mg/dL    Total Protein 5.3 (L) 6.4 - 8.3 g/dL    Albumin 2.9 (L) 3.5 - 5.2 g/dL   SPECIMEN REJECTION   Result Value Ref Range    Specimen Source . BLOOD     Ordered Test CDP     Reason for Rejection Unable to perform testing: Specimen clotted.     CBC with Auto Differential   Result Value Ref Range    WBC 7.1 3.5 - 11.0 k/uL    RBC 5.90 4.5 - 5.9 m/uL    Hemoglobin 11.7 (L) 13.5 - 17.5 g/dL    Hematocrit 41.4 41 - 53 %    MCV 70.2 (L) 80 - 100 fL    MCH 19.8 (L) 26 - 34 pg    MCHC 28.2 (L) 31 - 37 g/dL    RDW 20.4 (H) 11.5 - 14.9 %    Platelets 68 (L) 036 - 450 k/uL    MPV 9.1 6.0 - 12.0 fL    Seg Neutrophils 82 (H) 36 - 66 %    Lymphocytes 10 (L) 24 - 44 %    Monocytes 6 1 - 7 %    Eosinophils % 1 0 - 4 %    Basophils 1 0 - 2 %    Segs Absolute 5.82 1.3 - 9.1 k/uL    Absolute Lymph # 0.71 (L) 1.0 - 4.8 k/uL    Absolute Mono # 0.43 0.1 - 1.3 k/uL    Absolute Eos # 0.07 0.0 - 0.4 k/uL    Basophils Absolute 0.07 0.0 - 0.2 k/uL    Morphology ANISOCYTOSIS PRESENT     Morphology 1+ TARGET CELLS     Morphology 1+ TEARDROPS     Morphology 1+ ELLIPTOCYTES    Protime-INR   Result Value Ref Range    Protime 19.4 (H) 11.8 - 14.6 sec    INR 1.6    APTT   Result Value Ref Range    PTT 35.4 24.0 - 36.0 sec   CBC with Auto Differential   Result Value Ref Range    WBC 5.9 3.5 - 11.0 k/uL    RBC 5.61 4.5 - 5.9 m/uL    Hemoglobin 11.4 (L) 13.5 - 17.5 g/dL    Hematocrit 39.3 (L) 41 - 53 %    MCV 70.0 (L) 80 - 100 fL    MCH 20.2 (L) 26 - 34 pg    MCHC 28.9 (L) 31 - 37 g/dL    RDW 20.2 (H) 11.5 - 14.9 %    Platelets 63 (L) 898 - 450 k/uL    MPV 8.8 6.0 - 12.0 fL    Seg Neutrophils 76 (H) 36 - 66 %    Lymphocytes 11 (L) 24 - 44 %    Monocytes 11 (H) 1 - 7 %    Eosinophils % 1 0 - 4 %    Basophils 1 0 - 2 %    Segs Absolute 4.48 1.3 - 9.1 k/uL    Absolute Lymph # 0.65 (L) 1.0 - 4.8 k/uL    Absolute Mono # 0.65 0.1 - 1.3 k/uL    Absolute Eos # 0.06 0.0 - 0.4 k/uL    Basophils Absolute 0.06 0.0 - 0.2 k/uL    Morphology ANISOCYTOSIS PRESENT     Morphology MICROCYTOSIS PRESENT     Morphology HYPOCHROMIA PRESENT     Morphology 1+ TARGET CELLS     Morphology 1+ ELLIPTOCYTES    Comprehensive Metabolic Panel w/ Reflex to MG   Result Value Ref Range    Glucose 118 (H) 70 - 99 mg/dL    BUN 4 (L) 8 - 23 mg/dL    Creatinine <0.40 (L) 0.70 - 1.20 mg/dL    Est, Glom Filt Rate Can not be calculated >60 mL/min/1.73m2    Calcium 8.1 (L) 8.6 - 10.4 mg/dL    Sodium 147 (H) 135 - 144 mmol/L    Potassium 3.3 (L) 3.7 - 5.3 mmol/L    Chloride 109 (H) 98 - 107 mmol/L    CO2 34 (H) 20 - 31 mmol/L    Anion Gap 4 (L) 9 - 17 mmol/L    Alkaline Phosphatase 45 40 - 129 U/L    ALT 99 (H) 5 - 41 U/L    AST 38 <40 U/L    Total Bilirubin 1.7 (H) 0.3 - 1.2 mg/dL    Total Protein 5.1 (L) 6.4 - 8.3 g/dL    Albumin 2.8 (L) 3.5 - 5.2 g/dL   Fibrinogen   Result Value Ref Range    Fibrinogen 151 (L) 210 - 530 mg/dL   APTT   Result Value Ref Range    PTT 34.4 24.0 - 36.0 sec   Protime-INR   Result Value Ref Range    Protime 19.1 (H) 11.8 - 14.6 sec    INR 1.6    Magnesium   Result Value Ref Range    Magnesium 1.8 1.6 - 2.6 mg/dL   CBC with Auto Differential   Result Value Ref Range    WBC 6.3 3.5 - 11.0 k/uL    RBC 5.76 4.5 - 5.9 m/uL    Hemoglobin 11.7 (L) 13.5 - 17.5 g/dL    Hematocrit 40.3 (L) 41 - 53 %    MCV 70.0 (L) 80 - 100 fL    MCH 20.3 (L) 26 - 34 pg    MCHC 29.0 (L) 31 - 37 g/dL    RDW 20.5 (H) 11.5 - 14.9 %    Platelets 65 (L) 682 - 450 k/uL    MPV 8.6 6.0 - 12.0 fL    Seg Neutrophils 77 (H) 36 - 66 %    Lymphocytes 11 (L) 24 - 44 %    Monocytes 10 (H) 1 - 7 %    Eosinophils % 1 0 - 4 %    Basophils 1 0 - 2 %    Segs Absolute 4.86 1.3 - 9.1 k/uL    Absolute Lymph # 0.69 (L) 1.0 - 4.8 k/uL    Absolute Mono # 0.63 0.1 - 1.3 k/uL    Absolute Eos # 0.06 0.0 - 0.4 k/uL    Basophils Absolute 0.06 0.0 - 0.2 k/uL    Morphology HYPOCHROMIA PRESENT     Morphology MICROCYTOSIS PRESENT     Morphology ANISOCYTOSIS PRESENT     Morphology 1+ POLYCHROMASIA     Morphology 1+ TARGET CELLS     Morphology 2+ ECHINOCYTES    Comprehensive Metabolic Panel w/ Reflex to MG   Result Value Ref Range    Glucose 101 (H) 70 - 99 mg/dL    BUN 3 (L) 8 - 23 mg/dL    Creatinine <0.40 (L) 0.70 - 1.20 mg/dL    Est, Glom Filt Rate Can not be calculated >60 mL/min/1.73m2    Calcium 8.0 (L) 8.6 - 10.4 mg/dL    Sodium 142 135 - 144 mmol/L    Potassium 3.3 (L) 3.7 - 5.3 mmol/L    Chloride 104 98 - 107 mmol/L    CO2 35 (H) 20 - 31 mmol/L    Anion Gap 3 (L) 9 - 17 mmol/L    Alkaline Phosphatase 47 40 - 129 U/L    ALT 81 (H) 5 - 41 U/L    AST 32 <40 U/L    Total Bilirubin 1.8 (H) 0.3 - 1.2 mg/dL    Total Protein 5.3 (L) 6.4 - 8.3 g/dL    Albumin 2.8 (L) 3.5 - 5.2 g/dL   Brain Natriuretic Peptide   Result Value Ref Range    Pro-BNP 7,250 (H) <300 pg/mL   Magnesium   Result Value Ref Range    Magnesium 1.7 1.6 - 2.6 mg/dL   POC Glucose Fingerstick   Result Value Ref Range    POC Glucose 104 75 - 110 mg/dL   EKG 12 Lead   Result Value Ref Range    Ventricular Rate 75 BPM    Atrial Rate 75 BPM    P-R Interval 142 ms    QRS Duration 66 ms    Q-T Interval 350 ms    QTc Calculation (Bazett) 390 ms    P Axis 18 degrees    R Axis -165 degrees    T Axis 41 degrees   EKG 12 Lead   Result Value Ref Range    Ventricular Rate 105 BPM    Atrial Rate 105 BPM    P-R Interval 144 ms    QRS Duration 78 ms    Q-T Interval 302 ms    QTc Calculation (Bazett) 399 ms    P Axis 13 degrees    R Axis -30 degrees    T Axis -15 degrees   PREPARE CRYOPRECIPITATE, 1 Product   Result Value Ref Range    Unit Number [id number]     Product Code Thawed Pooled Cryoprecipitate     Unit Divison 00     Dispense Status TRANSFUSED     Unit Issue Date/Time 498376458621     Product Code Blood Bank C4170H15     Blood Bank Unit Type and Rh O POS     Blood Bank ISBT Product Blood Type 5100 Blood Bank Blood Product Expiration Date 240346571760     Transfusion Status OK TO TRANSFUSE          IMAGING DATA:    CT HEAD WO CONTRAST    Result Date: 1/1/2023  EXAMINATION: CT OF THE HEAD WITHOUT CONTRAST  1/1/2023 10:48 pm TECHNIQUE: CT of the head was performed without the administration of intravenous contrast. Automated exposure control, iterative reconstruction, and/or weight based adjustment of the mA/kV was utilized to reduce the radiation dose to as low as reasonably achievable. COMPARISON: None. HISTORY: Reason for Exam: AMS Additional signs and symptoms: the patient has been getting more and more confused since 2 weeks ago. It has progressively been getting worse and today FINDINGS: BRAIN/VENTRICLES: There is no acute intracranial hemorrhage, mass effect or midline shift. No abnormal extra-axial fluid collection. The gray-white differentiation is maintained without evidence of an acute infarct. There is no evidence of hydrocephalus. Changes of mild chronic small vessel ischemic disease. ORBITS: The visualized portion of the orbits demonstrate no acute abnormality. SINUSES: The visualized paranasal sinuses and mastoid air cells demonstrate no acute abnormality. SOFT TISSUES/SKULL:  No acute abnormality of the visualized skull or soft tissues. No acute intracranial abnormality. Mild chronic small vessel ischemic disease. XR CHEST PORTABLE    Result Date: 1/1/2023  EXAMINATION: ONE XRAY VIEW OF THE CHEST 1/1/2023 7:17 pm COMPARISON: None. HISTORY: ORDERING SYSTEM PROVIDED HISTORY: ams TECHNOLOGIST PROVIDED HISTORY: ams Reason for Exam: Altered mental status, difficulty breathing Additional signs and symptoms: Altered mental status, difficulty breathing Relevant Medical/Surgical History: Altered mental status, difficulty breathing FINDINGS: Large lucent area in the left apex suggesting a large bulla however superimposed pneumothorax cannot be excluded. The cardiac size is normal. Right lower lobe airspace infiltrate and mild right pleural effusion. . Pulmonary vascularity appears normal. There is mild ectasia of the thoracic aorta. Calcific tendinitis of the right shoulder. No acute bony abnormalities. The hilar structures are normal.     Right lower lobe pneumonia and small right pleural effusion Large lucent area in the left apex suggesting a large bulla however superimposed pneumothorax cannot be excluded. Follow-up CT would be useful for further evaluation. The findings were sent to the Radiology Results [address] at 10:31 pm on 1/1/2023 to be communicated to a licensed caregiver.          IMPRESSION:   Primary Problem  Acute respiratory failure with hypoxia and hypercapnia [facility])    Active Hospital Problems    Diagnosis Date Noted    ACP (advance care planning) [Z71.89] 01/09/2023     Priority: Medium    Goals of care, counseling/discussion [Z71.89] 01/09/2023     Priority: Medium    Encounter for palliative care [Z51.5] 01/09/2023     Priority: Medium    Prolonged pt (prothrombin time) [R79.1] 01/03/2023     Priority: Medium    Emphysema lung ([address] [J43.9] 01/03/2023     Priority: Medium    Cerebral infarction ([address] [I63.9] 01/03/2023     Priority: Medium    Transaminitis [R74.01] 01/02/2023     Priority: Medium    Microcytic anemia [D64.9] 01/02/2023     Priority: Medium    Thrombocytopenia ([address] [D69.6] 01/02/2023     Priority: Medium    Agitation [R45.1] 01/02/2023     Priority: Medium    Acute respiratory failure with hypoxia and hypercapnia (HCC) RLL pneumonia [J96.01, J96.02] 01/02/2023     Priority: Medium    Altered mental status [R41.82] 01/02/2023     Priority: Medium    Community acquired pneumonia of right lower lobe of lung [J18.9] 01/02/2023     Priority: Medium       Medical apathy  Respiratory failure secondary pneumonia  Abnormal LFT  Microcytic anemia  Thrombocytopenia  History of alcohol dependence  IgG lambda paraproteinemia  Positive HCV screen  Iron deficiency  Coagulopathy    RECOMMENDATIONS:  I reviewed the labs/imaging available to me,outside records and discussed with the patient. I explained to the patient the nature of this problem. I explained the significance of these abnormalities and possible etiology and management options  Patient has chronic liver disease secondary to alcohol  Anemia and thrombocytopenia are related to liver disease complicated by acute illness  Patient has coagulopathy secondary to liver disease as mentioned  Cardioembolic strokes with a component of subarachnoid bleeding  Coagulopathy is close to baseline but fibrinogen is improving slowly   Decision not to offer anticoagulation due to complicated history, multiple comorbidities and coagulopathy. Watch platelet count, watch PT/INR and fibrinogen. We will repeat CT scan to exclude ongoing bleeding  We will follow with you                        Discussed with family and Nurse. Thank you for asking us to see this patient. FRANCISCA NAJERA [facility] MD Mahendra  Hematologist/Medical [address] [place] [address] hematology oncology physicians            This note is created with the assistance of a speech recognition program.  While intending to generate a document that actually reflects the content of the visit, the document can still have some errors including those of syntax and sound a like substitutions which may escape proof reading. It such instances, actual meaning can be extrapolated by contextual diversion.

## 2023-01-10 NOTE — PROGRESS NOTES
[address]   INPATIENT OCCUPATIONAL THERAPY  PROGRESS NOTE  Date: 1/10/2023  Patient Name: Nahun Lainez       Room:   MRN: [id number]    : 1960  (60 y.o.)  Gender: male      Diagnosis: Acute respiratory failure    Restrictions/Precautions  Restrictions/Precautions  Restrictions/Precautions: Fall Risk;General Precautions;Contact Precautions;Isolation; Bed Alarm (02: 8 liters on [place], bilat wrist restraints)  Required Braces or Orthoses?: No  Implants present? :  (Pt denies)    Subjective  Subjective  Subjective: Pt nods head yes agreeable for therapy. Does not open eyes throughout, smiles and nods for occasional simple command following. Subjective  Pain: Pt did not voice pain at this time  Comments: Ok per Karuna Sherman for co-tx with LUIS Serna. OK per RN to remove restraints during session. Replaced at end of session. Objective  Orientation  Overall Orientation Status: Impaired  Orientation Level: Disoriented X4 (Pt unable to state name at this time due to lethargy)  Cognition  Overall Cognitive Status: Exceptions  Arousal/Alertness: Delayed responses to stimuli  Following Commands: Follows one step commands with increased time; Follows one step commands with repetition  Attention Span: Difficulty attending to directions  Memory: Decreased recall of biographical Information;Decreased recall of precautions;Decreased recall of recent events  Safety Judgement: Decreased awareness of need for assistance;Decreased awareness of need for safety  Problem Solving: Assistance required to generate solutions;Assistance required to implement solutions;Assistance required to identify errors made;Assistance required to correct errors made  Insights: Not aware of deficits  Initiation: Requires cues for all  Sequencing: Requires cues for all  Cognition Comment: Pt is impulsive, unaware of limitations related to medical status    ADL  Feeding: NPO  Feeding Skilled Clinical Factors: Per chart beginning 1/8/23  Grooming: Maximum assistance  UE Bathing: Maximum assistance  LE Bathing: Dependent/Total  UE Dressing: Maximum assistance  LE Dressing: Dependent/Total  Toileting: Dependent/Total  Toileting Skilled Clinical Factors: Mars catheter  Additional Comments: ADL scores based on clinical reasoning and skilled observation unless otherwise noted. Pt currently limited due to decreased strength, balance, activity tolerance, and cognitive barriers impacting safety and independence with self care tasks         Balance  Balance  Sitting Balance: Unable to assess(comment) (due to increased lethargy and ability  to follow commands/hold eyes open.)      Functional Activities  OT Exercises  Exercise Treatment: Pt facilitated in BUE PROM/AAROM for increased ability to follow commands and joint protection and strength to participate in ADLs. Initially pt requires PROM elbow fexion/extension due to decreased congition, then progresses to [facility] for shoulder flexion. Tolerates X10 reps each with max cues for opening eyes and continued engagement. Able to follow commands for grasping writers  hands at end of session.       Patient Education  Patient Education  Education Given To: Patient  Education Provided: Role of Therapy, Plan of Care  Education Method: Verbal, Demonstration  Barriers to Learning: Cognition  Education Outcome: Continued education needed      Goals  Short Term Goals  Time Frame for Short Term Goals: By discharge  Short Term Goal 1: Pt will complete bed mobility with mod A x 2 to sitting EOB supported with Min A for 8+ minutes during self care tasks  Short Term Goal 2: Pt will complete simple grooming/self feeding task with Min A and Fair attention to task  Short Term Goal 3: Pt will complete upper body dressing/bathing with Mod A and Good safety/attention to task  Short Term Goal 4: Pt will follow simple 1-2 step commands 75% of the time to increase participation in daily activities  Short Term Goal 5: Pt will participate in 15+ minutes of therapeutic exercises/functional activities to increase safety and independence with self care and mobility  Short Term Goal 6: OTR to further assess transfers. mobility as appropriate  Occupational Therapy Plan  Times Per Week: 5-7  Current Treatment Recommendations: Self-Care / ADL, Strengthening, ROM, Balance training, Functional mobility training, Endurance training, Cognitive reorientation, Neuromuscular re-education, Safety education & training, Patient/Caregiver education & training, Equipment evaluation, education, & procurement, Cognitive/Perceptual training, Coordination training      Assessment  Activity Tolerance  Activity Tolerance: Patient limited by fatigue, Treatment limited secondary to decreased cognition  Assessment  Performance deficits / Impairments: Decreased ADL status, Decreased functional mobility , Decreased ROM, Decreased strength, Decreased safe awareness, Decreased cognition, Decreased endurance, Decreased balance, Decreased high-level IADLs, Decreased fine motor control, Decreased coordination, Decreased posture  Treatment Diagnosis: Impaired self care status  Prognosis: Fair  Decision Making: High Complexity  Discharge Recommendations: Patient would benefit from continued therapy after discharge  OT Equipment Recommendations  Other: TBD  Safety Devices  Type of Devices: Bed alarm in place, All fall risk precautions in place, Call light within reach, Patient at risk for falls, Left in bed, Nurse notified (bilateral wrist restraints)      AM-PAC Daily Activities Inpatient  AM-PAC Daily Activity Inpatient   How much help for putting on and taking off regular lower body clothing?: Total  How much help for Bathing?: Total  How much help for Toileting?: Total  How much help for putting on and taking off regular upper body clothing?: A Lot  How much help for taking care of personal grooming?: A Lot  How much help for eating meals?: Total (NPO)  AM-PAC Inpatient Daily Activity Raw Score: 8  AM-PAC Inpatient ADL T-Scale Score : 22.86  ADL Inpatient CMS 0-100% Score: 85.69  ADL Inpatient CMS G-Code Modifier : CM    OT Minutes  OT Individual Minutes  Time In: 1308  Time Out: [place]  Minutes: [address] [place], WIN/L

## 2023-01-10 NOTE — PROGRESS NOTES
Pulmonary Progress Note  [facility]      Patient - Jaison Marvin,  Age - 60 y.o.    - 1960      Room Number -    MRN -  [id number]   Acct # - [de-identified]  Date of Admission -  2023  9:55 PM    Consulting Josefa Bamberger, MD  Primary Care Physician - No primary care provider on file. SUBJECTIVE   Patient appears to be stable. No increase in respiratory difficulty. Significant other present at bedside    OBJECTIVE   VITALS    height is 5' 7\" (1.702 m) and weight is 195 lb 1.7 oz (88.5 kg). His oral temperature is 98.6 °F (37 °C). His blood pressure is 142/99 (abnormal) and his pulse is 83. His respiration is 19 and oxygen saturation is 96%. Body mass index is 30.56 kg/m². Temperature Range: Temp: 98.6 °F (37 °C) Temp  Av.5 °F (36.9 °C)  Min: 98.1 °F (36.7 °C)  Max: 99.1 °F (37.3 °C)  BP Range:  Systolic (27AJY), FLW:644 , Min:117 , EOL:495     Diastolic (28CLP), ILW:50, Min:71, Max:122    Pulse Range: Pulse  Av.1  Min: 83  Max: 117  Respiration Range: Resp  Av.1  Min: 14  Max: 30  Current Pulse Ox[de-identified]  SpO2: 96 %  24HR Pulse Ox Range:  SpO2  Av.4 %  Min: 86 %  Max: 98 %  Oxygen Amount and Delivery: O2 Flow Rate (L/min): 6 L/min    Wt Readings from Last 3 Encounters:   01/10/23 195 lb 1.7 oz (88.5 kg)   23 188 lb (85.3 kg)       I/O (24 Hours)    Intake/Output Summary (Last 24 hours) at 1/10/2023 1557  Last data filed at 1/10/2023 1000  Gross per 24 hour   Intake --   Output 7600 ml   Net -7600 ml       EXAM     General Appearance  Awake, alert, oriented, in no acute distress  HEENT - normocephalic, atraumatic. Neck - Supple,  trachea midline   Lungs -coarse breath sounds no crackles rales or wheeze  Heart Exam:PMI normal. No lifts, heaves, or thrills. RRR. No murmurs, clicks, gallops, or rubs  Abdomen Exam: Abdomen soft, non-tender.   Extremity Exam: No signs of cyanosis    MEDS      [Held by provider] carvedilol  6.25 mg Oral BID WC    [Held by provider] ferrous sulfate  325 mg Oral Daily with breakfast    pantoprazole (PROTONIX) 40 mg injection  40 mg IntraVENous Daily    furosemide  20 mg IntraVENous Q12H    ziprasidone (GEODON) in sterile water injection  10 mg IntraMUSCular BID    ipratropium-albuterol  1 ampule Inhalation Q4H WA    [Held by provider] heparin (porcine)  5,000 Units SubCUTAneous 3 times per day    nystatin   Topical BID    potassium bicarb-citric acid  20 mEq Oral Daily    [Held by provider] miconazole   Topical BID    vancomycin  1,250 mg IntraVENous Q12H    [Held by provider] aspirin  81 mg Oral Daily    sodium chloride flush  5-40 mL IntraVENous 2 times per day    nicotine  1 patch TransDERmal Daily    vancomycin (VANCOCIN) intermittent dosing (placeholder)   Other RX Placeholder      IV infusion builder 30 mL/hr at 01/09/23 1652    sodium chloride      dexmedetomidine      sodium chloride       magnesium sulfate, sodium chloride flush, hydrALAZINE, ziprasidone (GEODON) in sterile water injection, labetalol, potassium chloride, sodium chloride, dexmedetomidine, perflutren lipid microspheres, sodium chloride flush, sodium chloride flush, sodium chloride flush, sodium chloride, ondansetron **OR** ondansetron, polyethylene glycol, acetaminophen **OR** acetaminophen, albuterol, guaiFENesin    LABS   CBC   Recent Labs     01/10/23  0436   WBC 7.2   HGB 13.6   HCT 45.3   MCV 69.5*   PLT 67*     BMP:   Lab Results   Component Value Date/Time     01/10/2023 04:36 AM    K 3.3 01/10/2023 04:36 AM    CL 97 01/10/2023 04:36 AM    CO2 33 01/10/2023 04:36 AM    BUN 3 01/10/2023 04:36 AM    LABALBU 3.3 01/10/2023 04:36 AM    CREATININE <0.40 01/10/2023 04:36 AM    CALCIUM 8.6 01/10/2023 04:36 AM    LABGLOM Can not be calculated 01/10/2023 04:36 AM     ABGs:  Lab Results   Component Value Date/Time    PHART 7.372 01/05/2023 04:46 AM    PO2ART 62.9 01/05/2023 04:46 AM    GXR8COO 51.6 01/05/2023 04:46 AM      Lab Results   Component Value Date/Time    MODE PRVC 01/05/2023 04:46 AM     Ionized Calcium:  No results found for: IONCA  Magnesium:    Lab Results   Component Value Date/Time    MG 1.5 01/10/2023 04:36 AM     Phosphorus:    Lab Results   Component Value Date/Time    PHOS 4.3 01/01/2023 09:59 PM        LIVER PROFILE   Recent Labs     01/10/23  0436   AST 41*   ALT 74*   BILITOT 2.3*   ALKPHOS 52     INR   Recent Labs     01/08/23  0438   INR 1.6     PTT   Lab Results   Component Value Date    APTT 34.4 01/08/2023         RADIOLOGY     (See actual reports for details)    ASSESSMENT/PLAN     Patient Active Problem List   Diagnosis    Acute type II respiratory failure (HCC)    Transaminitis    Microcytic anemia    Thrombocytopenia (HCC)    Agitation    Acute respiratory failure with hypoxia and hypercapnia (HCC) RLL pneumonia    Altered mental status    Community acquired pneumonia of right lower lobe of lung    Prolonged pt (prothrombin time)    Emphysema lung (HCC)    Cerebral infarction ([address]    ACP (advance care planning)    Goals of care, counseling/discussion    Encounter for palliative care     Acute hypoxic respiratory failure- extubated 01/05  Community-acquired pneumonia, multifocal; possible aspiration  Acute infarcts left cerebellar hemisphere, left parietal lobe with bilateral subarachnoid hemorrhages  Agitation/delirium  MRSA positive  COPD-no PFTs  Altered mental status  Liver disease given elevated transaminases, INR, and thrombocytopenia  History of tobacco use 1/pack per day x40 years  Acute kidney injury-resolved  History of alcohol use  Full code    His head CT scan positive for tattered foci of subarachnoid hemorrhage in the right cerebral hemisphere    Focus  of intraparenchymal region the left parietal    Bedside nurse to notify neurology  Not on any pharmacological prophylaxis DuoNeb treatments. On vancomycin    Updated patient's significant other.     Long-term prognosis may be uncertain.   May be guarded          Electronically signed by Omaira Peacock MD on 1/10/2023 at 3:54 PM

## 2023-01-10 NOTE — PLAN OF CARE
Problem: Discharge Planning  Goal: Discharge to home or other facility with appropriate resources  Outcome: Progressing     Problem: Safety - Adult  Goal: Free from fall injury  Outcome: Progressing     Problem: ABCDS Injury Assessment  Goal: Absence of physical injury  Outcome: Progressing     Problem: Skin/Tissue Integrity  Goal: Absence of new skin breakdown  Outcome: Progressing     Problem: Safety - Medical Restraint  Goal: Remains free of injury from restraints (Restraint for Interference with Medical Device)  Outcome: Progressing  Flowsheets (Taken 1/9/2023 1800 by Marshal Robles RN)  Remains free of injury from restraints (restraint for interference with medical device):   Determine that other, less restrictive measures have been tried or would not be effective before applying the restraint   Evaluate the patient's condition at the time of restraint application   Inform patient/family regarding the reason for restraint   Every 2 hours: Monitor safety, psychosocial status, comfort, nutrition and hydration     Problem: Pain  Goal: Verbalizes/displays adequate comfort level or baseline comfort level  Outcome: Progressing     Problem: Nutrition Deficit:  Goal: Optimize nutritional status  Outcome: Progressing

## 2023-01-10 NOTE — PROGRESS NOTES
Notified by nursing that patient had a repeat CT scan. This again reveals evidence of some convexial SAH and some prior strokes. He does remain quite thrombocytopenic and has been hypertensive as well. Overall the appearance of his prior strokes on MRI and the [facility] is not typical for most strokes. It is possible that this is simply some effect from his coagulopathy and HTN but this appears out of proportion to what I would expect in the absence of trauma. This combination of spontaneous hemorrhage and strokes could suggest an entity such as TTP, although admittedly he does not have renal failure. I reached out to DR Mccray regarding this possibility and he states he will look into it further in to include peripheral smear. For now my recommendations are to aggressively treat hypertension with a new goal of <140/80      Raven Mail. Manny Lowery M.D.   Clinical Neurophysiologist  Neuromuscular Medicine

## 2023-01-11 ENCOUNTER — APPOINTMENT (OUTPATIENT)
Dept: MRI IMAGING | Age: 63
DRG: 139 | End: 2023-01-11
Payer: MEDICAID

## 2023-01-11 ENCOUNTER — APPOINTMENT (OUTPATIENT)
Dept: INTERVENTIONAL RADIOLOGY/VASCULAR | Age: 63
DRG: 139 | End: 2023-01-11
Payer: MEDICAID

## 2023-01-11 LAB
ABSOLUTE EOS #: 0.07 K/UL (ref 0–0.4)
ABSOLUTE LYMPH #: 0.72 K/UL (ref 1–4.8)
ABSOLUTE MONO #: 0.65 K/UL (ref 0.1–1.3)
ALBUMIN SERPL-MCNC: 2.7 G/DL (ref 3.5–5.2)
ALP BLD-CCNC: 48 U/L (ref 40–129)
ALT SERPL-CCNC: 52 U/L (ref 5–41)
ANION GAP SERPL CALCULATED.3IONS-SCNC: 6 MMOL/L (ref 9–17)
AST SERPL-CCNC: 30 U/L
BASOPHILS # BLD: 1 % (ref 0–2)
BASOPHILS ABSOLUTE: 0.07 K/UL (ref 0–0.2)
BILIRUB SERPL-MCNC: 1.7 MG/DL (ref 0.3–1.2)
BUN BLDV-MCNC: 4 MG/DL (ref 8–23)
C-REACTIVE PROTEIN: 10 MG/L (ref 0–5)
CALCIUM SERPL-MCNC: 8.1 MG/DL (ref 8.6–10.4)
CHLORIDE BLD-SCNC: 98 MMOL/L (ref 98–107)
CO2: 37 MMOL/L (ref 20–31)
CREAT SERPL-MCNC: 0.42 MG/DL (ref 0.7–1.2)
EOSINOPHILS RELATIVE PERCENT: 1 % (ref 0–4)
GFR SERPL CREATININE-BSD FRML MDRD: >60 ML/MIN/1.73M2
GLUCOSE BLD-MCNC: 90 MG/DL (ref 70–99)
HCT VFR BLD CALC: 43.6 % (ref 41–53)
HEMOGLOBIN: 12.9 G/DL (ref 13.5–17.5)
HOMOCYSTEINE: 8.2 UMOL/L
LYMPHOCYTES # BLD: 11 % (ref 24–44)
MAGNESIUM: 1.8 MG/DL (ref 1.6–2.6)
MCH RBC QN AUTO: 20.8 PG (ref 26–34)
MCHC RBC AUTO-ENTMCNC: 29.7 G/DL (ref 31–37)
MCV RBC AUTO: 70 FL (ref 80–100)
MONOCYTES # BLD: 10 % (ref 1–7)
MORPHOLOGY: ABNORMAL
PDW BLD-RTO: 21.4 % (ref 11.5–14.9)
PLATELET # BLD: 85 K/UL (ref 150–450)
PMV BLD AUTO: 8.9 FL (ref 6–12)
POTASSIUM SERPL-SCNC: 3 MMOL/L (ref 3.7–5.3)
POTASSIUM SERPL-SCNC: 3.3 MMOL/L (ref 3.7–5.3)
RBC # BLD: 6.23 M/UL (ref 4.5–5.9)
SEDIMENTATION RATE, ERYTHROCYTE: 2 MM/HR (ref 0–20)
SEG NEUTROPHILS: 77 % (ref 36–66)
SEGMENTED NEUTROPHILS ABSOLUTE COUNT: 4.99 K/UL (ref 1.3–9.1)
SODIUM BLD-SCNC: 141 MMOL/L (ref 135–144)
TOTAL PROTEIN: 5.4 G/DL (ref 6.4–8.3)
WBC # BLD: 6.5 K/UL (ref 3.5–11)

## 2023-01-11 PROCEDURE — 6360000002 HC RX W HCPCS: Performed by: INTERNAL MEDICINE

## 2023-01-11 PROCEDURE — 6370000000 HC RX 637 (ALT 250 FOR IP): Performed by: STUDENT IN AN ORGANIZED HEALTH CARE EDUCATION/TRAINING PROGRAM

## 2023-01-11 PROCEDURE — 36415 COLL VENOUS BLD VENIPUNCTURE: CPT

## 2023-01-11 PROCEDURE — 70551 MRI BRAIN STEM W/O DYE: CPT

## 2023-01-11 PROCEDURE — 2580000003 HC RX 258: Performed by: NURSE PRACTITIONER

## 2023-01-11 PROCEDURE — 2709999900 IR PLACE NG TUBE BY DR W FLUORO

## 2023-01-11 PROCEDURE — 86140 C-REACTIVE PROTEIN: CPT

## 2023-01-11 PROCEDURE — 97110 THERAPEUTIC EXERCISES: CPT

## 2023-01-11 PROCEDURE — 80053 COMPREHEN METABOLIC PANEL: CPT

## 2023-01-11 PROCEDURE — 86225 DNA ANTIBODY NATIVE: CPT

## 2023-01-11 PROCEDURE — C9113 INJ PANTOPRAZOLE SODIUM, VIA: HCPCS

## 2023-01-11 PROCEDURE — 86038 ANTINUCLEAR ANTIBODIES: CPT

## 2023-01-11 PROCEDURE — 97535 SELF CARE MNGMENT TRAINING: CPT

## 2023-01-11 PROCEDURE — 6360000002 HC RX W HCPCS

## 2023-01-11 PROCEDURE — 99232 SBSQ HOSP IP/OBS MODERATE 35: CPT | Performed by: INTERNAL MEDICINE

## 2023-01-11 PROCEDURE — 83735 ASSAY OF MAGNESIUM: CPT

## 2023-01-11 PROCEDURE — A4216 STERILE WATER/SALINE, 10 ML: HCPCS

## 2023-01-11 PROCEDURE — 2580000003 HC RX 258

## 2023-01-11 PROCEDURE — 99233 SBSQ HOSP IP/OBS HIGH 50: CPT | Performed by: INTERNAL MEDICINE

## 2023-01-11 PROCEDURE — 2060000000 HC ICU INTERMEDIATE R&B

## 2023-01-11 PROCEDURE — 2580000003 HC RX 258: Performed by: INTERNAL MEDICINE

## 2023-01-11 PROCEDURE — 43752 NASAL/OROGASTRIC W/TUBE PLMT: CPT

## 2023-01-11 PROCEDURE — 94640 AIRWAY INHALATION TREATMENT: CPT

## 2023-01-11 PROCEDURE — 2700000000 HC OXYGEN THERAPY PER DAY

## 2023-01-11 PROCEDURE — 6370000000 HC RX 637 (ALT 250 FOR IP): Performed by: INTERNAL MEDICINE

## 2023-01-11 PROCEDURE — 6370000000 HC RX 637 (ALT 250 FOR IP)

## 2023-01-11 PROCEDURE — 94761 N-INVAS EAR/PLS OXIMETRY MLT: CPT

## 2023-01-11 PROCEDURE — 99232 SBSQ HOSP IP/OBS MODERATE 35: CPT | Performed by: PSYCHIATRY & NEUROLOGY

## 2023-01-11 PROCEDURE — 84132 ASSAY OF SERUM POTASSIUM: CPT

## 2023-01-11 PROCEDURE — 85025 COMPLETE CBC W/AUTO DIFF WBC: CPT

## 2023-01-11 PROCEDURE — 6360000002 HC RX W HCPCS: Performed by: STUDENT IN AN ORGANIZED HEALTH CARE EDUCATION/TRAINING PROGRAM

## 2023-01-11 PROCEDURE — 92526 ORAL FUNCTION THERAPY: CPT

## 2023-01-11 PROCEDURE — 85652 RBC SED RATE AUTOMATED: CPT

## 2023-01-11 PROCEDURE — 83090 ASSAY OF HOMOCYSTEINE: CPT

## 2023-01-11 PROCEDURE — 83516 IMMUNOASSAY NONANTIBODY: CPT

## 2023-01-11 RX ORDER — POTASSIUM CHLORIDE 20 MEQ/1
20 TABLET, EXTENDED RELEASE ORAL
Status: DISCONTINUED | OUTPATIENT
Start: 2023-01-12 | End: 2023-01-13

## 2023-01-11 RX ORDER — ASPIRIN 325 MG
325 TABLET ORAL DAILY
Status: ON HOLD | COMMUNITY
End: 2023-02-05 | Stop reason: HOSPADM

## 2023-01-11 RX ORDER — SPIRONOLACTONE 25 MG/1
25 TABLET ORAL DAILY
Status: DISCONTINUED | OUTPATIENT
Start: 2023-01-11 | End: 2023-01-19 | Stop reason: HOSPADM

## 2023-01-11 RX ORDER — POTASSIUM CHLORIDE 20 MEQ/1
40 TABLET, EXTENDED RELEASE ORAL PRN
Status: DISCONTINUED | OUTPATIENT
Start: 2023-01-11 | End: 2023-01-19 | Stop reason: HOSPADM

## 2023-01-11 RX ORDER — POTASSIUM CHLORIDE 7.45 MG/ML
10 INJECTION INTRAVENOUS PRN
Status: DISCONTINUED | OUTPATIENT
Start: 2023-01-11 | End: 2023-01-19 | Stop reason: HOSPADM

## 2023-01-11 RX ORDER — HYDRALAZINE HYDROCHLORIDE 20 MG/ML
20 INJECTION INTRAMUSCULAR; INTRAVENOUS EVERY 6 HOURS PRN
Status: DISCONTINUED | OUTPATIENT
Start: 2023-01-11 | End: 2023-01-19 | Stop reason: HOSPADM

## 2023-01-11 RX ADMIN — POTASSIUM CHLORIDE 10 MEQ: 7.46 INJECTION, SOLUTION INTRAVENOUS at 07:50

## 2023-01-11 RX ADMIN — IPRATROPIUM BROMIDE AND ALBUTEROL SULFATE 1 AMPULE: 2.5; .5 SOLUTION RESPIRATORY (INHALATION) at 20:17

## 2023-01-11 RX ADMIN — VANCOMYCIN HYDROCHLORIDE 1250 MG: 1.25 INJECTION, POWDER, LYOPHILIZED, FOR SOLUTION INTRAVENOUS at 04:24

## 2023-01-11 RX ADMIN — POTASSIUM CHLORIDE 10 MEQ: 7.46 INJECTION, SOLUTION INTRAVENOUS at 21:36

## 2023-01-11 RX ADMIN — CARVEDILOL 6.25 MG: 6.25 TABLET, FILM COATED ORAL at 18:20

## 2023-01-11 RX ADMIN — SODIUM CHLORIDE, PRESERVATIVE FREE 40 MG: 5 INJECTION INTRAVENOUS at 09:31

## 2023-01-11 RX ADMIN — POTASSIUM CHLORIDE 10 MEQ: 7.46 INJECTION, SOLUTION INTRAVENOUS at 18:36

## 2023-01-11 RX ADMIN — POTASSIUM CHLORIDE 10 MEQ: 7.46 INJECTION, SOLUTION INTRAVENOUS at 09:36

## 2023-01-11 RX ADMIN — NYSTATIN OINTMENT: 100000 OINTMENT TOPICAL at 09:39

## 2023-01-11 RX ADMIN — HYDRALAZINE HYDROCHLORIDE 20 MG: 20 INJECTION INTRAMUSCULAR; INTRAVENOUS at 12:24

## 2023-01-11 RX ADMIN — FUROSEMIDE 20 MG: 10 INJECTION, SOLUTION INTRAMUSCULAR; INTRAVENOUS at 23:10

## 2023-01-11 RX ADMIN — POTASSIUM CHLORIDE 10 MEQ: 7.46 INJECTION, SOLUTION INTRAVENOUS at 22:57

## 2023-01-11 RX ADMIN — FUROSEMIDE 20 MG: 10 INJECTION, SOLUTION INTRAMUSCULAR; INTRAVENOUS at 12:24

## 2023-01-11 RX ADMIN — IPRATROPIUM BROMIDE AND ALBUTEROL SULFATE 1 AMPULE: 2.5; .5 SOLUTION RESPIRATORY (INHALATION) at 11:31

## 2023-01-11 RX ADMIN — SODIUM CHLORIDE, PRESERVATIVE FREE 10 ML: 5 INJECTION INTRAVENOUS at 22:01

## 2023-01-11 RX ADMIN — NYSTATIN OINTMENT: 100000 OINTMENT TOPICAL at 22:02

## 2023-01-11 RX ADMIN — POTASSIUM CHLORIDE 10 MEQ: 7.46 INJECTION, SOLUTION INTRAVENOUS at 16:39

## 2023-01-11 RX ADMIN — POTASSIUM CHLORIDE 10 MEQ: 7.46 INJECTION, SOLUTION INTRAVENOUS at 06:34

## 2023-01-11 RX ADMIN — POTASSIUM CHLORIDE 10 MEQ: 7.46 INJECTION, SOLUTION INTRAVENOUS at 19:51

## 2023-01-11 RX ADMIN — VANCOMYCIN HYDROCHLORIDE 1250 MG: 1.25 INJECTION, POWDER, LYOPHILIZED, FOR SOLUTION INTRAVENOUS at 16:46

## 2023-01-11 RX ADMIN — POTASSIUM CHLORIDE 10 MEQ: 7.46 INJECTION, SOLUTION INTRAVENOUS at 12:29

## 2023-01-11 RX ADMIN — IPRATROPIUM BROMIDE AND ALBUTEROL SULFATE 1 AMPULE: 2.5; .5 SOLUTION RESPIRATORY (INHALATION) at 15:26

## 2023-01-11 RX ADMIN — SPIRONOLACTONE 25 MG: 25 TABLET ORAL at 18:20

## 2023-01-11 RX ADMIN — POTASSIUM CHLORIDE 10 MEQ: 7.46 INJECTION, SOLUTION INTRAVENOUS at 05:28

## 2023-01-11 ASSESSMENT — PAIN SCALES - GENERAL
PAINLEVEL_OUTOF10: 0
PAINLEVEL_OUTOF10: 0

## 2023-01-11 NOTE — PROGRESS NOTES
Attending Physician Statement  I have discussed the care of Nolan Lindsay with the resident team. I have examined the patient myself and taken ros and hpi , including pertinent history and exam findings,  with the resident. I have reviewed the key elements of all parts of the encounter with the resident. I agree with the assessment, plan and orders as documented by the resident. Principal Problem:    Acute respiratory failure with hypoxia and hypercapnia (HCC) RLL pneumonia  Active Problems:    Transaminitis    Microcytic anemia    Thrombocytopenia (HCC)    Agitation    Altered mental status    Community acquired pneumonia of right lower lobe of lung    Prolonged pt (prothrombin time)    Emphysema lung (HCC)    Cerebral infarction ([facility]    ACP (advance care planning)    Goals of care, counseling/discussion    Encounter for palliative care    Delirium due to another medical condition  Resolved Problems:    COPD with respiratory failure, acute ([facility] [address]    Treated for shortness of breath secondary to PNA and encephalopathy secondary to [facility], cerebral infarcts  Remains on IV vancomycin per ID recommendation  NG tube placed  Remains on high flow nasal cannula  Repeat CT head shows prior strokes and SAH and intraparenchymal hemorrhage  Appreciate neurology recommendations  Platelet count uptrending now DVT prophylaxis remains on hold    Full note to follow.       Electronically signed by Gilford Meth, MD

## 2023-01-11 NOTE — PROGRESS NOTES
Comprehensive Nutrition Assessment    Type and Reason for Visit:  Reassess    Nutrition Recommendations/Plan:   If appropriate, suggest tube feeding with Peptide Based Formula, Vital AF 1.2, at 20 ml per hr, with slow advancement of 20 mL every 12 hours to goal of 70 mL per hr. Goal tube feeding would provide: 2016 kcal and 126 gm protein. Monitor for possible signs of refeeding syndrome. NPO. Monitor wt. Malnutrition Assessment:  Malnutrition Status:  Insufficient data (01/04/23 0047)    Context:  Acute Illness     Findings of the 6 clinical characteristics of malnutrition:  Energy Intake:  Unable to assess  Weight Loss:  Unable to assess     Body Fat Loss:  Unable to assess     Muscle Mass Loss:  Unable to assess    Fluid Accumulation:  Mild Extremities   Strength:  Not Performed    Nutrition Assessment:    Pt remains NPO. NG has been inserted. Nurse states no consult or order for tube feeding yet. Nutrition Related Findings:    Edema: +1 Generalized, Perineal; +1 pitting RUE, UE= +2 pitting RLE, LLE. Labs and meds reviewed. Wound Type: None       Current Nutrition Intake & Therapies:    Average Meal Intake: NPO     Diet NPO    Anthropometric Measures:  Height: 5' 7\" (170.2 cm)  Ideal Body Weight (IBW): 148 lbs (67 kg)    Admission Body Weight: 188 lb (85.3 kg)  Current Body Weight: 171 lb 15.3 oz (78 kg) (question accuracy), 116.2 % IBW. Weight Source: Not Specified  Current BMI (kg/m2): 26.9                          BMI Categories: Overweight (BMI 25.0-29. 9)    Estimated Daily Nutrient Needs:  Energy Requirements Based On: Kcal/kg  Weight Used for Energy Requirements: Admission  Energy (kcal/day): 2145 kcals based on 25 kcals/kg  Weight Used for Protein Requirements: Ideal  Protein (g/day): 101-114 gm protein based on 1.5-1.7 gm/kg      Nutrition Diagnosis:   Inadequate oral intake related to cognitive or neurological impairment, impaired respiratory function as evidenced by NPO or clear liquid status due to medical condition    Nutrition Interventions:   Food and/or Nutrient Delivery: Continue NPO  Nutrition Education/Counseling: No recommendation at this time  Coordination of Nutrition Care: Continue to monitor while inpatient       Goals:  Previous Goal Met: No Progress toward Goal(s)  Goals: Meet at least 75% of estimated needs       Nutrition Monitoring and Evaluation:   Behavioral-Environmental Outcomes: None Identified  Food/Nutrient Intake Outcomes: None Identified  Physical Signs/Symptoms Outcomes: Biochemical Data, Fluid Status or Edema, Hemodynamic Status, Nutrition Focused Physical Findings, Skin, Weight    Discharge Planning:     Too soon to determine     Donna Tucker RD, LD  Contact: [phone]

## 2023-01-11 NOTE — PROGRESS NOTES
[facility] -   Infectious diseases evaluation  admission date 1/1/2023    reason for consultation:   Community-acquired pneumonia    Impression :   Current:  Acute hypoxic respiratory failure required intubation 1/2/22 was subsequently extubated  Community-acquired pneumonia with possible aspiration. Cellulitis /infiltration of left arm PICC line. Acute CVA with subarachnoid hemorrhage  Positive MRSA nasal swab  Liver disease  Cholelithiasis  Acute renal failure  Thrombocytopenia  Hepatitis C/elevated liver enzymes    Recommendations      Unasyn course completed 1/8/2023  Continue IV vancomycin day #10  Swallow study noted had severe dysphagia, NG will be placed  Left arm PICC line was removed  Liver ultrasound 1/2/2323 showed increased echogenicity and cholelithiasis, no cholecystitis. HIV screen was negative on 1/2/23  Hepatitis C RNA was 17, 400 IU/mL 4.2 for LOC  The patient received IV ceftriaxone and Zithromax on 1/1/2022  Follow blood and respiratory cultures, no growth to date  No growth on urine culture from 1/3/23  Nasal swab for MRSA was +1/2/23  Respiratory panel with COVID-19 PCR was negative  Urine for Legionella antigen negative  Follow CBC and renal function  Continue supportive care  Follow-up with GI as outpatient for hepatitis C  management  Discussed with nursing staff      Infection Control Recommendations   [place] Precautions  Contact Isolation       Antimicrobial Stewardship Recommendations   Simplification of therapy  Targeted therapy      History of Present Illness:   Initial history:  Yesi Stallworth is a 60y.o.-year-old male was brought to the hospital on 1/1/2023 with altered mental status, confusion associated with decreased responsiveness worsening for 2 weeks. At the ER he was obtunded, O2 sat was 75% on room air, was placed on nonrebreather initially, subsequently was intubated.   He is intubated, sedated unable to provide history that was obtained from chart review and nursing staff. According to his wife he is fully vaccinated for COVID-19 and flu. He has been afebrile since admission  Initial procalcitonin level was 0.09, WBC normal  He was hypotensive was started on Levophed. MRI showed small acute infarcts in the left cerebellar hemisphere and left parietal love with moderate bilateral subarachnoid hemorrhages within both cerebral hemispheres. Chest x-ray showed right lower lobe infiltrates  Interval changes  1/11/2023   He was extubated 1/5/2023. He is better today, alert, oriented, on oxygen per nasal cannula, follow commands, no new events  Mars catheter in place  Left arm PICC line was removed, less redness at the site  CT head showed from yesterday showed prior strokes and SAH and intraparenchymal hemorrhage  Patient Vitals for the past 8 hrs:   BP Temp Pulse Resp SpO2 Height   01/11/23 1700 120/60 98.7 °F (37.1 °C) 99 25 (!) 89 % --   01/11/23 1600 (!) 126/56 -- 89 19 (!) 88 % --   01/11/23 1538 -- -- (!) 102 19 90 % --   01/11/23 1446 -- -- -- -- -- 5' 7\" (1.702 m)   01/11/23 1200 (!) 141/92 -- 96 -- 94 % --   01/11/23 1134 -- -- 96 18 92 % --   01/11/23 1000 (!) 146/76 -- 94 16 95 % --               I have personally reviewed the past medical history, past surgical history, medications, social history, and family history, and I haveupdated the database accordingly. Allergies:   Patient has no known allergies. Review of Systems:     Review of Systems  confused, unable to provide  Physical Examination :       Physical Exam  Constitutional:       Appearance: He is ill-appearing. HENT:      Head: Normocephalic and atraumatic. Right Ear: External ear normal.      Left Ear: External ear normal.   Eyes:      General: No scleral icterus. Conjunctiva/sclera: Conjunctivae normal.   Cardiovascular:      Rate and Rhythm: Normal rate and regular rhythm. Heart sounds: Normal heart sounds.    Pulmonary: Comments: coarse breath sounds bilaterally, few crackles. Abdominal:      General: There is no distension. Palpations: Abdomen is soft. Musculoskeletal:      Cervical back: Neck supple. No rigidity. Right lower leg: No edema. Left lower leg: No edema. Skin:     Coloration: Skin is not jaundiced. Left arm PICC line in place with infiltration and mild surrounding erythema. Past Medical History:   History reviewed. No pertinent past medical history. Past Surgical  History:   History reviewed. No pertinent surgical history.     Medications:      spironolactone  25 mg Oral Daily    [START ON 1/12/2023] potassium chloride  20 mEq Oral Daily with breakfast    ziprasidone (GEODON) in sterile water injection  10 mg IntraMUSCular Nightly    [Held by provider] carvedilol  6.25 mg Oral BID WC    [Held by provider] ferrous sulfate  325 mg Oral Daily with breakfast    pantoprazole (PROTONIX) 40 mg injection  40 mg IntraVENous Daily    furosemide  20 mg IntraVENous Q12H    ipratropium-albuterol  1 ampule Inhalation Q4H WA    [Held by provider] heparin (porcine)  5,000 Units SubCUTAneous 3 times per day    nystatin   Topical BID    potassium bicarb-citric acid  20 mEq Oral Daily    [Held by provider] miconazole   Topical BID    vancomycin  1,250 mg IntraVENous Q12H    [Held by provider] aspirin  81 mg Oral Daily    sodium chloride flush  5-40 mL IntraVENous 2 times per day    nicotine  1 patch TransDERmal Daily    vancomycin (VANCOCIN) intermittent dosing (placeholder)   Other RX Placeholder       Social History:     Social History     Socioeconomic History    Marital status: Single     Spouse name: Not on file    Number of children: Not on file    Years of education: Not on file    Highest education level: Not on file   Occupational History    Not on file   Tobacco Use    Smoking status: Every Day     Packs/day: 1.00     Years: 40.00     Pack years: 40.00     Types: Cigarettes    Smokeless tobacco: Never   Substance and Sexual Activity    Alcohol use: Yes     Comment: beer and scotch    Drug use: Not Currently     Comment: over 20 years (stated by daughter)    Sexual activity: Not on file   Other Topics Concern    Not on file   Social History Narrative    Not on file     Social Determinants of Health     Financial Resource Strain: Not on file   Food Insecurity: Not on file   Transportation Needs: Not on file   Physical Activity: Not on file   Stress: Not on file   Social Connections: Not on file   Intimate Partner Violence: Not on file   Housing Stability: Not on file       Family History:   History reviewed. No pertinent family history. Medical Decision Making:   I have independently reviewed/ordered the following labs:    CBC with Differential:   Recent Labs     01/10/23  0436 01/11/23  0415   WBC 7.2 6.5   HGB 13.6 12.9*   HCT 45.3 43.6   PLT 67* 85*   LYMPHOPCT 10* 11*   MONOPCT 10* 10*       BMP:  Recent Labs     01/10/23  0436 01/11/23  0415    141   K 3.3* 3.0*   CL 97* 98   CO2 33* 37*   BUN 3* 4*   CREATININE <0.40* 0.42*   MG 1.5* 1.8       Hepatic Function Panel:   Recent Labs     01/10/23  0436 01/11/23  0415   PROT 6.1* 5.4*   LABALBU 3.3* 2.7*   BILITOT 2.3* 1.7*   ALKPHOS 52 48   ALT 74* 52*   AST 41* 30       No results for input(s): RPR in the last 72 hours. No results for input(s): HIV in the last 72 hours. No results for input(s): BC in the last 72 hours. Lab Results   Component Value Date/Time    CREATININE 0.42 01/11/2023 04:15 AM    GLUCOSE 90 01/11/2023 04:15 AM       Detailed results: Thank you for allowing us to participate in the care of this patient. Please call with questions. This note is created with the assistance of a speech recognition program.  While intending to generate adocument that actually reflects the content of the visit, the document can still have some errors including those of syntax and sound a like substitutions which may escape proof reading. It such instances, actual meaningcan be extrapolated by contextual diversion.     Verito Tsang MD  Office: [phone]  Perfect serve / office [phone]

## 2023-01-11 NOTE — PROGRESS NOTES
-patient not seen and examined yet. Patient was not at room at the time of my evaluation. Will re-evaluate patient later today.

## 2023-01-11 NOTE — PROGRESS NOTES
[address]   INPATIENT OCCUPATIONAL THERAPY  PROGRESS NOTE  Date: 2023  Patient Name: Jaison Marvin       Room:   MRN: [id number]    : 1960  (63 y.o.)  Gender: male      Diagnosis: Acute respiratory failure,  AMS in the setting of hypoxia and a pneumonia.  multifactoral encehpalopathy. Repeat CT head shows prior strokes and SAH and intraparenchymal hemorrhage    Restrictions/Precautions  Restrictions/Precautions  Restrictions/Precautions: Fall Risk;General Precautions;Contact Precautions;Isolation; Bed Alarm  Required Braces or Orthoses?: No  Position Activity Restriction  Other position/activity restrictions: Severe dysphagia- NPO. Unable to tolerate any PO safely. NG tube placed,  on high flow nasal cannula         Subjective  Subjective  Subjective: \"Is [place] going to be up. \"  per pt dtr, pt's enid Fox Roles will be visiting today and Marbin Pierson remains in [place],.  pt confused. Lennox Morris said there's a stroke involved. \"  during ed re recent L sided weakness which showed improvement today. Comments: pt no longer on restraints, RN agrees for therapy with mobility. Objective  Orientation  Orientation Level: Oriented to person;Oriented to place; Disoriented to time (knew it is ,  year \"02\", partially oriented to situation)  Cognition  Following Commands: Follows one step commands consistently  Attention Span: Difficulty dividing attention  Memory:  (fair, pt able to remember visitors who left the room 20 minutes prior.)  Safety Judgement: Decreased awareness of need for assistance  Problem Solving: Assistance required to generate solutions;Assistance required to implement solutions;Assistance required to identify errors made  Insights: Decreased awareness of deficits  Initiation: Requires cues for some  Sequencing: Requires cues for some  Cognition Comment: \"Is [place] going to be up.   \"  per pt dtr, pt's grandson Dominique Roles will be visiting today and GS Abdirashid remains in [place],.  pt confused. pt with poor awareness of posterior lean in standing. ADL  Feeding: NPO  Feeding Skilled Clinical Factors: Per chart beginning 1/8/23; Severe dysphagia- NPO. Unable to tolerate any PO safely. NG tube placed,  Grooming: Moderate assistance  UE Bathing: Moderate assistance  LE Bathing: Maximum assistance;Dependent/Total  UE Dressing: Moderate assistance  LE Dressing: Maximum assistance  LE Dressing Skilled Clinical Factors: while seated EOB pt able to bend and touch B ankles, TA to don socks  Toileting: Dependent/Total  Toileting Skilled Clinical Factors: Mars catheter, incontinent BM post supine to seated EOB. pt aware as occuring. requiring TA x 2  to clean up- completed while standing. briefs were donned. Transfers/Mobility  Bed mobility  Rolling to Right: Minimal assistance  Supine to Sit: Moderate assistance (1 person)  Sit to Supine: Moderate assistance (1 person)  Scooting: Moderate assistance (mod of 1 seated EOB)  Transfers  Sit to stand: Maximum assistance  Stand to sit: Maximum assistance  Transfer Comments: max x 1 with BUE support on upper and lower bedrails. Functional Activities  OT Exercises  A/AROM Exercises: pt able to raise BUE shlds to 100 degrees shld flex and abd.  elbows distally AROM WFL. Resistive Exercises: R shld G- and L shld F +  Static Sitting Balance Exercises: occasional min A with 1-2 UE support. dominik 12 min x 2 sitting EOB  Static Standing Balance Exercises: dominik 4 min, 2 min standing with max x 1 and BUE support, severe posterior lean. Patient Education  Patient Education  Education Given To: Patient, Family  Education Provided: Role of Therapy, Plan of Care  Education Provided Comments: activity promotion, pt initially declined therapy today.   Education Method: Verbal, Demonstration  Barriers to Learning: Cognition  Education Outcome: Continued education needed, Demonstrated understanding, Verbalized understanding      Goals  Short Term Goals  Time Frame for Short Term Goals: By discharge  Short Term Goal 1: Pt will complete bed mobility with mod A x 2 to sitting EOB supported with Min A for 8+ minutes during self care tasks/met 1-11-23  new goal: intermittent min assist with dynamic sitting balance with 0-1 UE support during adls or ex. Short Term Goal 2: Pt will complete simple grooming/self feeding task with Min A and Fair attention to task  Short Term Goal 3: Pt will complete upper body dressing/bathing with Mod A and Good safety/attention to task  Short Term Goal 4: Pt will follow simple 1-2 step commands 75% of the time to increase participation in daily activities/ met 1-11-23  Short Term Goal 5: Pt will participate in 15+ minutes of therapeutic exercises/functional activities to increase safety and independence with self care and mobility/ met 1-11-23  new goal:  min x 2 SPT for adls  Short Term Goal 6: OTR to further assess transfers. mobility as appropriate/ met 1-11-23.  new goal:  pt to dominik 5-7 min standing with BUE support and min A x 1 for adls.   Occupational Therapy Plan  Times Per Week: 5-7  Current Treatment Recommendations: Self-Care / ADL, Strengthening, ROM, Balance training, Functional mobility training, Endurance training, Cognitive reorientation, Neuromuscular re-education, Safety education & training, Patient/Caregiver education & training, Equipment evaluation, education, & procurement, Cognitive/Perceptual training, Coordination training      Assessment  Activity Tolerance  Activity Tolerance: Patient Tolerated treatment well  Activity Tolerance Comments: on high flow oxygen  Assessment  Performance deficits / Impairments: Decreased ADL status, Decreased functional mobility , Decreased ROM, Decreased strength, Decreased safe awareness, Decreased cognition, Decreased endurance, Decreased balance, Decreased high-level IADLs, Decreased fine motor control, Decreased coordination, Decreased posture  Treatment Diagnosis: Impaired self care status  Prognosis: Fair  Decision Making: High Complexity  Discharge Recommendations: Patient would benefit from continued therapy after discharge  OT Equipment Recommendations  Other: TBD  Safety Devices  Type of Devices: Bed alarm in place, All fall risk precautions in place, Call light within reach, Patient at risk for falls, Left in bed      AM-PAC Daily Activities Inpatient  AM-PAC Daily Activity Inpatient   How much help for putting on and taking off regular lower body clothing?: A Lot  How much help for Bathing?: A Lot  How much help for Toileting?: Total  How much help for putting on and taking off regular upper body clothing?: A Lot  How much help for taking care of personal grooming?: A Little  How much help for eating meals?: Total (NPO)  AM-PAC Inpatient Daily Activity Raw Score: 11  AM-PAC Inpatient ADL T-Scale Score : 29.04  ADL Inpatient CMS 0-100% Score: 70.42  ADL Inpatient CMS G-Code Modifier : CL    OT Minutes  OT Individual Minutes  Time In: 6176  Time Out: [address]  Minutes: Peg Walton 76, OT

## 2023-01-11 NOTE — PROGRESS NOTES
Pulmonary Progress Note  [facility] Specialists      Patient - Edyta Thomas,  Age - 60 y.o.    - 1960      Room Number -    MRN -  [id number]   [facility]  Date of Admission -  2023  9:55 PM      Consulting Price Mcdonald MD  Primary Care Physician - No primary care provider on file. SUBJECTIVE   Patient actually is more lucid than yesterday. Family present at bedside. O2 saturations currently 6 L,  90 to 94%. OBJECTIVE   VITALS    height is 5' 7\" (1.702 m) and weight is 171 lb 15.3 oz (78 kg). His axillary temperature is 99.8 °F (37.7 °C). His blood pressure is 141/92 (abnormal) and his pulse is 96. His respiration is 18 and oxygen saturation is 94%. Body mass index is 26.93 kg/m². Temperature Range: Temp: 99.8 °F (37.7 °C) Temp  Av.4 °F (36.9 °C)  Min: 97.5 °F (36.4 °C)  Max: 99.8 °F (37.7 °C)  BP Range:  Systolic (94WHB), ILS:102 , Min:116 , TUS:144     Diastolic (45SMP), OQW:30, Min:66, Max:99    Pulse Range: Pulse  Av.6  Min: 81  Max: 99  Respiration Range: Resp  Av.3  Min: 13  Max: 30  Current Pulse Ox[de-identified]  SpO2: 94 %  24HR Pulse Ox Range:  SpO2  Av %  Min: 92 %  Max: 96 %  Oxygen Amount and Delivery: O2 Flow Rate (L/min): 6 L/min    Wt Readings from Last 3 Encounters:   23 171 lb 15.3 oz (78 kg)   23 188 lb (85.3 kg)       I/O (24 Hours)    Intake/Output Summary (Last 24 hours) at 2023 1336  Last data filed at 2023 0424  Gross per 24 hour   Intake --   Output 2100 ml   Net -2100 ml       EXAM     General Appearance  Awake, alert, oriented, in no acute distress  HEENT - normocephalic, atraumatic. Neck - Supple,  trachea midline   Lungs -coarse breath sounds no crackles rales or wheezes  Heart Exam:PMI normal. No lifts, heaves, or thrills. RRR. No murmurs, clicks, gallops, or rubs  Abdomen Exam: Abdomen soft, non-tender. Extremity Exam: No signs of cyanosis.     MEDS ziprasidone (GEODON) in sterile water injection  10 mg IntraMUSCular Nightly    [Held by provider] carvedilol  6.25 mg Oral BID WC    [Held by provider] ferrous sulfate  325 mg Oral Daily with breakfast    pantoprazole (PROTONIX) 40 mg injection  40 mg IntraVENous Daily    furosemide  20 mg IntraVENous Q12H    ipratropium-albuterol  1 ampule Inhalation Q4H WA    [Held by provider] heparin (porcine)  5,000 Units SubCUTAneous 3 times per day    nystatin   Topical BID    potassium bicarb-citric acid  20 mEq Oral Daily    [Held by provider] miconazole   Topical BID    vancomycin  1,250 mg IntraVENous Q12H    [Held by provider] aspirin  81 mg Oral Daily    sodium chloride flush  5-40 mL IntraVENous 2 times per day    nicotine  1 patch TransDERmal Daily    vancomycin (VANCOCIN) intermittent dosing (placeholder)   Other RX Placeholder      IV infusion builder 30 mL/hr at 01/10/23 6583    sodium chloride      dexmedetomidine      sodium chloride       hydrALAZINE, potassium chloride **OR** potassium alternative oral replacement **OR** potassium chloride, magnesium sulfate, sodium chloride flush, ziprasidone (GEODON) in sterile water injection, labetalol, potassium chloride, sodium chloride, dexmedetomidine, perflutren lipid microspheres, sodium chloride flush, sodium chloride flush, sodium chloride flush, sodium chloride, ondansetron **OR** ondansetron, polyethylene glycol, acetaminophen **OR** acetaminophen, albuterol, guaiFENesin    LABS   CBC   Recent Labs     01/11/23  0415   WBC 6.5   HGB 12.9*   HCT 43.6   MCV 70.0*   PLT 85*     BMP:   Lab Results   Component Value Date/Time     01/11/2023 04:15 AM    K 3.0 01/11/2023 04:15 AM    CL 98 01/11/2023 04:15 AM    CO2 37 01/11/2023 04:15 AM    BUN 4 01/11/2023 04:15 AM    LABALBU 2.7 01/11/2023 04:15 AM    CREATININE 0.42 01/11/2023 04:15 AM    CALCIUM 8.1 01/11/2023 04:15 AM    LABGLOM >60 01/11/2023 04:15 AM     ABGs:  Lab Results   Component Value Date/Time PHART 7.372 01/05/2023 04:46 AM    PO2ART 62.9 01/05/2023 04:46 AM    TLI4BGH 51.6 01/05/2023 04:46 AM      Lab Results   Component Value Date/Time    MODE PRVC 01/05/2023 04:46 AM     Ionized Calcium:  No results found for: IONCA  Magnesium:    Lab Results   Component Value Date/Time    MG 1.8 01/11/2023 04:15 AM     Phosphorus:    Lab Results   Component Value Date/Time    PHOS 4.3 01/01/2023 09:59 PM        LIVER PROFILE   Recent Labs     01/11/23  0415   AST 30   ALT 52*   BILITOT 1.7*   ALKPHOS 48     INR No results for input(s): INR in the last 72 hours.   PTT   Lab Results   Component Value Date    APTT 34.4 01/08/2023         RADIOLOGY     (See actual reports for details)    ASSESSMENT/PLAN     Patient Active Problem List   Diagnosis    Acute type II respiratory failure (HCC)    Transaminitis    Microcytic anemia    Thrombocytopenia (HCC)    Agitation    Acute respiratory failure with hypoxia and hypercapnia (HCC) RLL pneumonia    Altered mental status    Community acquired pneumonia of right lower lobe of lung    Prolonged pt (prothrombin time)    Emphysema lung (HCC)    Cerebral infarction ([facility] [address]    ACP (advance care planning)    Goals of care, counseling/discussion    Encounter for palliative care    Delirium due to another medical condition        Acute hypoxic respiratory failure- extubated 01/05  Community-acquired pneumonia, multifocal; possible aspiration    Acute infarcts left cerebellar hemisphere, left parietal lobe with bilateral subarachnoid hemorrhages--His head CT scan positive for tattered foci of subarachnoid hemorrhage in the right cerebral hemisphere    Agitation/delirium  MRSA positive  COPD-no PFTs  Altered mental status  Liver disease given elevated transaminases, INR, and thrombocytopenia  History of tobacco use 1/pack per day x40 years  Acute kidney injury-resolved  History of alcohol use  Full code    SCDs  NicoDerm patch  DuoNeb treatments  Daily we can continue wean O2 down as tolerated    Patient will benefit from 1 more day in ICU intermediate care          Electronically signed by Foreign Vega MD on 1/11/2023 at 1:36 PM

## 2023-01-11 NOTE — PROGRESS NOTES
HPI/Hospital course: This is a 59 y/o WM with reported prior alcoholism with residual liver failure who presented with altered mental status and who was subsequently dx'd with pneumonia. An initial head CT revealed old as well as possibly new strokes which were confirmed on MRI along with some probable SAH. He has just been extubated and nursing reports that he has been intermittently agitated and aggressive but no focal findings were reported. I did speak with his family in the room and they state that he has not been drinking ETOH recently. Blood cultures have been negative to date. He appears to have improved markedly overnight. He is calm appropriate and not in restraints.         Social History     Socioeconomic History    Marital status: Single     Spouse name: None    Number of children: None    Years of education: None    Highest education level: None   Tobacco Use    Smoking status: Every Day     Packs/day: 1.00     Years: 40.00     Pack years: 40.00     Types: Cigarettes    Smokeless tobacco: Never   Substance and Sexual Activity    Alcohol use: Yes     Comment: beer and scotch    Drug use: Not Currently     Comment: over 20 years (stated by daughter)       Current Facility-Administered Medications   Medication Dose Route Frequency Provider Last Rate Last Admin    hydrALAZINE (APRESOLINE) injection 20 mg  20 mg IntraVENous Q6H PRN Jennifer Sigala MD        potassium chloride (KLOR-CON M) extended release tablet 40 mEq  40 mEq Oral PRN Jennifer Sigala MD        Or    potassium bicarb-citric acid (EFFER-K) effervescent tablet 40 mEq  40 mEq Oral PRN Jennifer Sigala MD        Or    potassium chloride 10 mEq/100 mL IVPB (Peripheral Line)  10 mEq IntraVENous PRN Jennifer Sigala MD        magnesium sulfate 2000 mg in water 50 mL IVPB  2,000 mg IntraVENous PRN Gene Holguin MD        sodium chloride flush 0.9 % injection 10 mL  10 mL IntraVENous PRN Ayo Lopez RADHA Mccray MD   10 mL at 01/10/23 1121    ziprasidone (GEODON) 10 mg in sterile water 0.5 mL injection  10 mg IntraMUSCular Nightly ALAYNA Grant - CNP        [Held by provider] carvedilol (COREG) tablet 6.25 mg  6.25 mg Oral BID WC Garret Willoughby MD        [facility] [place] by provider] ferrous sulfate (IRON 325) tablet 325 mg  325 mg Oral Daily with breakfast Garret Willoughby MD        pantoprazole (PROTONIX) 40 mg in sodium chloride (PF) 0.9 % 10 mL injection  40 mg IntraVENous Daily Garret Willoughby MD   40 mg at 01/10/23 0745    furosemide (LASIX) injection 20 mg  20 mg IntraVENous Q12H Anibal Tellez MD   20 mg at 01/10/23 2320    ziprasidone (GEODON) 20 mg in sterile water 1 mL injection  20 mg IntraMUSCular Q12H PRN ALAYNA Haas - CNP        labetalol (NORMODYNE;TRANDATE) injection 5 mg  5 mg IntraVENous Q6H PRN Donell Calderon MD   5 mg at 01/10/23 1606    potassium chloride 10 mEq/100 mL IVPB (Peripheral Line)  10 mEq IntraVENous PRN Rubi Alexandre  mL/hr at 01/11/23 0750 10 mEq at 01/11/23 0750    ipratropium-albuterol (DUONEB) nebulizer solution 1 ampule  1 ampule Inhalation Q4H WA Ronal Schneider MD   1 ampule at 01/10/23 1545    [Held by provider] heparin (porcine) injection 5,000 Units  5,000 Units SubCUTAneous 3 times per day Jory Story MD   5,000 Units at 01/08/23 0549    potassium chloride 20 mEq in dextrose 5 % 1,000 mL infusion   IntraVENous Continuous Anibal Tellez MD 30 mL/hr at 01/10/23 2319 New Bag at 01/10/23 2319    0.9 % sodium chloride infusion   IntraVENous PRN Ian Kim MD        dexmedetomidine (PRECEDEX) 400 mcg in sodium chloride 0.9 % 100 mL infusion  0.1-1.5 mcg/kg/hr IntraVENous Continuous PRN Ronal Schneider MD        nystatin (MYCOSTATIN) ointment   Topical BID Donell Calderon MD   Given at 01/10/23 0747    potassium bicarb-citric acid (EFFER-K) effervescent tablet 20 mEq  20 mEq Oral Daily [facility] Fiorella Vyas MD   20 mEq at 01/07/23 1032    [Held by provider] miconazole (MICOTIN) 2 % powder   Topical BID [place] Inch, APRN - NP   Given at 01/06/23 0923    vancomycin (VANCOCIN) 1,250 mg in dextrose 5 % 250 mL IVPB (ADDAVIAL)  1,250 mg IntraVENous Q12H Sarita Root MD   Stopped at 01/11/23 0554    perflutren lipid microspheres (DEFINITY) injection 1.5 mL  1.5 mL IntraVENous ONCE PRN Abby Fabian MD        [facility] [place] by provider] aspirin chewable tablet 81 mg  81 mg Oral Daily Jaxon Jarquin MD   81 mg at 01/04/23 0740    sodium chloride flush 0.9 % injection 10 mL  10 mL IntraVENous PRN Angi Scherer MD   10 mL at 01/03/23 1459    sodium chloride flush 0.9 % injection 10 mL  10 mL IntraVENous PRN Jaxon Jarquin MD   10 mL at 01/03/23 1836    sodium chloride flush 0.9 % injection 5-40 mL  5-40 mL IntraVENous 2 times per day [facility], APRN - NP   10 mL at 01/10/23 0746    sodium chloride flush 0.9 % injection 5-40 mL  5-40 mL IntraVENous PRN ALAYNA Marrero - NP        0.9 % sodium chloride infusion   IntraVENous PRN [facility], APRN - NP        ondansetron (ZOFRAN-ODT) disintegrating tablet 4 mg  4 mg Oral Q8H PRN [facility], APRN - NP        Or    ondansetron (ZOFRAN) injection 4 mg  4 mg IntraVENous Q6H PRN Abiola Gomez APRN - NP        polyethylene glycol (GLYCOLAX) packet 17 g  17 g Oral Daily PRN [facility], APRN - NP        acetaminophen (TYLENOL) tablet 650 mg  650 mg Oral Q6H PRN [facility], APRN - NP        Or    acetaminophen (TYLENOL) suppository 650 mg  650 mg Rectal Q6H PRN [facility], APRN - NP        nicotine (NICODERM CQ) 21 MG/24HR 1 patch  1 patch TransDERmal Daily [facility], APRN - NP   1 patch at 01/10/23 0746    albuterol (PROVENTIL) nebulizer solution 2.5 mg  2.5 mg Nebulization Q2H PRN ALAYNA Galo NP   2.5 mg at 01/02/23 1115    guaiFENesin (MUCINEX) extended release tablet 600 mg  600 mg Oral BID PRN ALAYNA Galo NP vancomycin (VANCOCIN) intermittent dosing (placeholder)   Other RX Placeholder Mario Rios MD           No Known Allergies    ROS:   On ventilator on sedation     Vitals:    01/11/23 0700   BP: 139/75   Pulse: 94   Resp: 30   Temp:    SpO2: 92%     Admission weight: 180 lb 12.4 oz (82 kg)    Neurological exam:  General Exam:  Normal body habitus, no acute distress    HEENT:  Normocephalic, atraumatic  Eyes: conjunctiva non-injected, sclera anicteric  Mucous membranes of normal color and hydration status  Poor dentition     Neurologic exam:     Mental status: Alert and oriented x 3, calm pleasant and speaks in detail.   No overt visuospatial neglect or gaze preference  Language with normal fluency and comprehension to simple commands    Cranial nerves:  Pupils equal round and reactive to light,   Extraocular movements: conjugate  Face is symmetric to movement   Hearing is intact to conversation  Tongue midline, no dysarthria or dysphonia    Motor exam:  Moving all extremities purposefully and equally       Latest Reference Range & Units 1/6/23 04:19 1/7/23 05:19   WBC 3.5 - 11.0 k/uL 5.2 7.1   RBC 4.5 - 5.9 m/uL 5.33 5.90   Hemoglobin Quant 13.5 - 17.5 g/dL 10.5 (L) 11.7 (L)   Hematocrit 41 - 53 % 38.4 (L) 41.4   MCV 80 - 100 fL 72.0 (L) 70.2 (L)   MCH 26 - 34 pg 19.7 (L) 19.8 (L)   MCHC 31 - 37 g/dL 27.4 (L) 28.2 (L)   MPV 6.0 - 12.0 fL 9.3 9.1   RDW 11.5 - 14.9 % 20.3 (H) 20.4 (H)   Platelet Count 938 - 450 k/uL 71 (L) 68 (L)   Absolute Mono # 0.1 - 1.3 k/uL 0.26 0.43   Eosinophils % 0 - 4 % 0 1   Basophils Absolute 0.0 - 0.2 k/uL 0.00 0.07   Seg Neutrophils 36 - 66 % 90 (H) 82 (H)   Segs Absolute 1.3 - 9.1 k/uL 4.68 5.82   Bands 0 - 10 % 1    Absolute Bands # 0.0 - 1.0 k/uL 0.05    Lymphocytes 24 - 44 % 4 (L) 10 (L)   Absolute Lymph # 1.0 - 4.8 k/uL 0.21 (L) 0.71 (L)   Monocytes 1 - 7 % 5 6   Absolute Eos # 0.0 - 0.4 k/uL 0.00 0.07   Basophils 0 - 2 % 0 1   (L): Data is abnormally low  (H): Data is abnormally high             Assessment : This is a 61 y/o WM with prior alcoholism who presented with AMS in the setting of hypoxia and a pneumonia. A head CT noted some ischemic infarcts and an MRI confirmed this along with SAH. He is overall much improved and has a generally non-focal exam. I suspect that this is a multifactorial encephalopathy with contributions from being in the ICU and from his hypertension. A repeat head CT done yesterday revealed some continued subarrachnoid blood and some old strokes. It would be good to know if he still has been having vascular events but this cannot be determined on CT. The appearance of the strokes on MRI was unusual and does appear more small vessel in nature and thus I was concerned that he could have some other process at work. I have sent off additional autoimmune labs and spoke with heme/onc about possible TTP          Plan:   Will repeat MRI  Will continue to follow

## 2023-01-11 NOTE — PROGRESS NOTES
Pt's wife at bedside, she voices concern that pt\" slept all day\" discussed with her that the goedon dose will now be night time only.  Upon discussion with pt, he staes \" lets see how it goes, Maybe I can sleep on my own without medicine\" asked to notify nurse if he feels he needs to receive the dose, pt voices understanding and is able to demonstrate use of call light

## 2023-01-11 NOTE — PROGRESS NOTES
Department of Psychiatry  Consult Service      Reason for Consult: Capacity evaluation ordered by internal medicine    SUBJECTIVE:      Hattie Braun is interviewed today at bedside, his significant other Aarti Pepe is present as well. She endorses frustration that he has been so sedated today. Nursing staff confirms that he did not sleep well last night and has been extremely groggy today. Kade Gutierrez does acknowledge that he has been more coherent even though he is tired. Hattie Braun is able to open his eyes, he is oriented to his name as well as the year of his birth. He identifies the current year as \"2020\". He is able to identify his significant other seated bedside, using humor. When asked to identify his location he was able to report that he is in a hospital \"somewhere in [place]" and states \"I have been to so many hospitals\". He was reoriented to the fact that he is at [facility].  He acknowledges that he feels very sedated, yesterday evening and this morning he was compliant with oral Geodon. Previously he had not been compliant with all scheduled doses. Given that his orientation is improving, we will back off the daytime dose of Geodon and continue with the nightly dose as previously scheduled. He is grateful and agreeable with this plan of care as is his significant other.         OBJECTIVE      Medications  Current Facility-Administered Medications: magnesium sulfate 2000 mg in water 50 mL IVPB, 2,000 mg, IntraVENous, PRN  sodium chloride flush 0.9 % injection 10 mL, 10 mL, IntraVENous, PRN  ziprasidone (GEODON) 10 mg in sterile water 0.5 mL injection, 10 mg, IntraMUSCular, Nightly  hydrALAZINE (APRESOLINE) injection 20 mg, 20 mg, IntraVENous, Q6H PRN  [Held by provider] carvedilol (COREG) tablet 6.25 mg, 6.25 mg, Oral, BID WC  [Held by provider] ferrous sulfate (IRON 325) tablet 325 mg, 325 mg, Oral, Daily with breakfast  pantoprazole (PROTONIX) 40 mg in sodium chloride (PF) 0.9 % 10 mL injection, 40 mg, IntraVENous, Daily  furosemide (LASIX) injection 20 mg, 20 mg, IntraVENous, Q12H  ziprasidone (GEODON) 20 mg in sterile water 1 mL injection, 20 mg, IntraMUSCular, Q12H PRN  labetalol (NORMODYNE;TRANDATE) injection 5 mg, 5 mg, IntraVENous, Q6H PRN  potassium chloride 10 mEq/100 mL IVPB (Peripheral Line), 10 mEq, IntraVENous, PRN  ipratropium-albuterol (DUONEB) nebulizer solution 1 ampule, 1 ampule, Inhalation, Q4H WA  [Held by provider] heparin (porcine) injection 5,000 Units, 5,000 Units, SubCUTAneous, 3 times per day  potassium chloride 20 mEq in dextrose 5 % 1,000 mL infusion, , IntraVENous, Continuous  0.9 % sodium chloride infusion, , IntraVENous, PRN  dexmedetomidine (PRECEDEX) 400 mcg in sodium chloride 0.9 % 100 mL infusion, 0.1-1.5 mcg/kg/hr, IntraVENous, Continuous PRN  nystatin (MYCOSTATIN) ointment, , Topical, BID  potassium bicarb-citric acid (EFFER-K) effervescent tablet 20 mEq, 20 mEq, Oral, Daily  [Held by provider] miconazole (MICOTIN) 2 % powder, , Topical, BID  vancomycin (VANCOCIN) 1,250 mg in dextrose 5 % 250 mL IVPB (ADDAVIAL), 1,250 mg, IntraVENous, Q12H  perflutren lipid microspheres (DEFINITY) injection 1.5 mL, 1.5 mL, IntraVENous, ONCE PRN  [Held by provider] aspirin chewable tablet 81 mg, 81 mg, Oral, Daily  sodium chloride flush 0.9 % injection 10 mL, 10 mL, IntraVENous, PRN  sodium chloride flush 0.9 % injection 10 mL, 10 mL, IntraVENous, PRN  sodium chloride flush 0.9 % injection 5-40 mL, 5-40 mL, IntraVENous, 2 times per day  sodium chloride flush 0.9 % injection 5-40 mL, 5-40 mL, IntraVENous, PRN  0.9 % sodium chloride infusion, , IntraVENous, PRN  ondansetron (ZOFRAN-ODT) disintegrating tablet 4 mg, 4 mg, Oral, Q8H PRN **OR** ondansetron (ZOFRAN) injection 4 mg, 4 mg, IntraVENous, Q6H PRN  polyethylene glycol (GLYCOLAX) packet 17 g, 17 g, Oral, Daily PRN  acetaminophen (TYLENOL) tablet 650 mg, 650 mg, Oral, Q6H PRN **OR** acetaminophen (TYLENOL) suppository 650 mg, 650 mg, Rectal, Q6H PRN  nicotine (NICODERM CQ) 21 MG/24HR 1 patch, 1 patch, TransDERmal, Daily  albuterol (PROVENTIL) nebulizer solution 2.5 mg, 2.5 mg, Nebulization, Q2H PRN  guaiFENesin (MUCINEX) extended release tablet 600 mg, 600 mg, Oral, BID PRN  vancomycin (VANCOCIN) intermittent dosing (placeholder), , Other, RX Placeholder     Physical  BP (!) 148/82   Pulse 87   Temp 98 °F (36.7 °C) (Oral)   Resp 18   Ht 5' 7\" (1.702 m)   Wt 195 lb 1.7 oz (88.5 kg)   SpO2 95%   BMI 30.56 kg/m²     Mental Status Examination:    Level of consciousness: Groggy, resting though arousable to verbal stimulus alone  Appearance: Fair grooming, bilateral soft wrist restraints noted  Behavior/Motor: With minimal encouragement engages with interviewer, occasional eye contact  Attitude toward examiner:  cooperative, expresses humor at one point during interview  Speech: Delayed, slow, low volume  Mood: \"Tired\"  Affect: Congruent, appears sedated  Thought processes: Linear and coherent  Thought content: Denies suicidal ideations, denies any homicidal ideations.    denies hallucinations or delusions, does not appear to be responding to internal stimuli   Memory: Impaired  Insight & Judgement: improving  Medication side effects: Endorses sedation      ASSESSMENT    Delirium associated with another medical condition  Agitation    Patient Active Problem List   Diagnosis    Acute type II respiratory failure (HCC)    Transaminitis    Microcytic anemia    Thrombocytopenia (HCC)    Agitation    Acute respiratory failure with hypoxia and hypercapnia (HCC) RLL pneumonia    Altered mental status    Community acquired pneumonia of right lower lobe of lung    Prolonged pt (prothrombin time)    Emphysema lung (HCC)    Cerebral infarction ([facility] [address]    ACP (advance care planning)    Goals of care, counseling/discussion    Encounter for palliative care        PLAN    Decrease Geodon by discontinuing the morning dose, will remain on scheduled 10 mg dose nightly. With continued improvement he may be able to taper off of the antipsychotic altogether.     Electronically signed by ALAYNA Tejada CNP on 1/10/2023 at 8:19 PM time spent 20 minutes

## 2023-01-11 NOTE — PROGRESS NOTES
Speech Language Pathology  Speech Language Pathology  [facility]    Dysphagia Treatment Note    Date: 1/11/2023  Patients Name: Yesi Stallworth  MRN: [id number]  Diagnosis: Dysphagia  Patient Active Problem List   Diagnosis Code    Acute type II respiratory failure (HCC) J96.00    Transaminitis R74.01    Microcytic anemia D64.9    Thrombocytopenia (HCC) D69.6    Agitation R45.1    Acute respiratory failure with hypoxia and hypercapnia (HCC) RLL pneumonia J96.01, J96.02    Altered mental status R41.82    Community acquired pneumonia of right lower lobe of lung J18.9    Prolonged pt (prothrombin time) R79.1    Emphysema lung (HCC) J43.9    Cerebral infarction (HCC) I63.9    ACP (advance care planning) Z71.89    Goals of care, counseling/discussion Z71.89    Encounter for palliative care Z51.5    Delirium due to another medical condition F05       Pain: no c/o pain    Dysphagia Treatment  Treatment time:[id number]    Subjective: [x] Alert [] Cooperative     [] Confused     [] Agitated    [] Lethargic    Objective/Assessment:    Pt seen for dysphagia management. ST provided min verbal cues for Pt to complete oral/pharyngeal/laryngeal exercises x 10 reps x 2 sets and Ikm swallow x 2 x 5 sets to strengthen swallowing mechanism. Pt easily fatigued and required frequent rest breaks. Family at bedside; ST provided handout of exercises and instruction in oral care using toothettes and 50% mouthwash/50% water solution. Comprehension verbalized.        Plan:  [x] Continue ST services    [] Discharge from ST:        Discharge recommendations: [] Inpatient Rehab   [] [place]   [] Outpatient Therapy  [] Follow up at trauma clinic   [] Other:         Treatment completed by: Juan Jose Hurtado M.S., [address] [facility], [place]

## 2023-01-11 NOTE — PROGRESS NOTES
PALLIATIVE CARE NURSING ASSESSMENT    Patient: Ciera Scott  Room: 2014/2014-01    Reason For Consult   Goals of care evaluation  Distress management  Guidance and support  Facilitate communications  Assistance in coordinating care    Code Status: Full Code    Summary:  Palliative Care visit to patient and daughter at bedside. Talked with daughter, about her needs and her dads. Yvons mother is in the ICU at a hospital out of state. Encouraged Naveed Young to practice self care while also caring for her family. Palliative Care will continue to follow. Confirmed from daughter patient would want full code. Decision maker are patient's 4 adult children together per [place]. Consider palliative care outpatient. Impression: Ciera Scott is a 60y.o. year old male  has no past medical history on file. .  Currently hospitalized for the management of acute respiratory failure. The Palliative Care Team is following to assist with Patient and family support, and goals of care. Vital Signs  Blood pressure 139/75, pulse 94, temperature 97.5 °F (36.4 °C), temperature source Axillary, resp. rate 30, height 5' 7\" (1.702 m), weight 171 lb 15.3 oz (78 kg), SpO2 92 %. Patient Active Problem List   Diagnosis    Acute type II respiratory failure (HCC)    Transaminitis    Microcytic anemia    Thrombocytopenia (HCC)    Agitation    Acute respiratory failure with hypoxia and hypercapnia (HCC) RLL pneumonia    Altered mental status    Community acquired pneumonia of right lower lobe of lung    Prolonged pt (prothrombin time)    Emphysema lung (HCC)    Cerebral infarction ([facility] [address]    ACP (advance care planning)    Goals of care, counseling/discussion    Encounter for palliative care    Delirium due to another medical condition       Palliative Interaction:Palliative Care follow up for continuation of care. Reviewed medical record, updates per bedside nurse Richard Munoz.     Met with patient and his daughter at his beside. Pt is not alert, does not contribute to conversation. Support offered to daughter Mimi Leroy. Jaimie's mom is in ICU in another state. Acknowledged how difficult this must be. Mimi Leroy is also recovering from carotid surgery in early December. Encouraged self care in addition to all she is doing for her family. Encouraged her that we are available if needed. Let her know we are here to just talk or listen. Palliative Care will continue to follow.      Goals/Plan of care  Education/support to staff  Education/support to family  Education/support to patient  Communications with primary service  Providing support for coping/adaptation/distress of family  Discussing meaning/purpose   Continue with current plan of care  Code status clarified: Full Code  Palliative care orders introduced  Validating patient/family distress  Continued communication updates  Recognizing, reflecting, and empathizing with family members' anticipatory grief      Palliative Care Coordinator  Bakari GONZALEZN, RN, ONN-CG    [address] [facility] Office: [address] Office: [phone]  [facility] Office: [phone]    For Symptom Management Clinic scheduling please call [phone]

## 2023-01-11 NOTE — PROGRESS NOTES
[address] [facility].    PROGRESS NOTE             1/11/2023    7:47 AM    Name:   Eliceo Javed  MRN:     [id number]     Acct:      [de-identified]   Room:   2014/2014-01  IP Day:  9  Admit Date:  1/1/2023  9:55 PM    PCP:  No primary care provider on file. Code Status:  Full Code    Subjective:     C/C:   Chief Complaint   Patient presents with    Altered Mental Status     Interval History Status: Significantly improved    Patient seen and examined at the bed side. No acute events overnight  Patient transferred to intermediate ICU. Patient significantly improved compared to yesterday's exam.  Patient is now alert oriented by 3. He follows commands and can have meaningful conversation. Remains hypokalemic, will follow protocols    New SBP goals are<140, per neurology. Notes from nursing staff and Consults had been reviewed, and the overnight progress had been checked with the nursing staff as well. Brief History:     The patient is a 60 y.o.  male with unknown past medical history due to no healthcare visits or follow-up who presents with Altered Mental Status and he is admitted to the hospital for the management of  Acute respiratory failure most likely 2/2 pneumonia. Per patient's wife, she reports that the patient has not been acting himself for the past week. She reports being more slurred, confused and progressively getting worse prior to presentation. Patient prior to the presentation a week ago he had a fall and EMS presented patient was vitally stable and he was not transported to the hospital.  This time upon EMS evaluation of the patient he had an O2 of 75%, he was put on a breather and was given a dose of IV Lasix in route. Wife and patient, cannot recall any precipitating event could have led to his presentation. He also denies chest pain, shortness of breath, or cough.        Per patient, he does not recall any complaints or events prior to the presentation. Wife denies recent alcohol use, however, patient does have a history of regular alcohol intake but not on daily basis. Wife also reports that patient has constant heartburn for which he takes regular antiacids. Patient denies the presence of any abdominal pain or any change in bowel habits, abdominal pain, nausea or vomiting. Upon arrival in the ED, patient was put on BiPAP. Patient was afebrile, normotensive and in normal sinus rhythm. A chest x-ray was completed and the patient had right lower pneumonia with a small pleural effusion. Patient also had a large bullae in the left apex with a pneumothorax that cannot be excluded. Patient ABG was suggestive of pattern of respiratory acidosis with pH 7.295, PCO2 60.3, PO2 63.0, HCO3 29.3. CT head WO did not show any acute findings. Patient had elevated troponins of 184, trended down to 177. Patient also had an elevated BNP of 7797. An elevated creatinine of 1.77 was on presentation, with no previous lab test to be compared to. Also patient had a left elevated liver enzymes ALT was 525, , with prolonged prothrombin time of 24.9, and INR of 2.3. Patient was admitted to the ICU for the management of type II respiratory failure associated altered mental status    Review of Systems:     Constitutional: Positive for fatigue  Hent: Positive for hearing loss. Respiratory: Positive for shortness of breath, negative for chest pain negative for wheezing negative for cough  Cardiovascular: Positive for leg swelling. Negative for chest pain palpitation  Gastrointestinal negative for abdominal pain diarrhea nausea or vomiting  Skin negative for change of color or pallor  Neurological: Overall patient feels weak from being hospitalized. Medications:      Allergies:    No Known Allergies    Current Meds:   Scheduled Meds:    ziprasidone (GEODON) in sterile water injection  10 mg IntraMUSCular Nightly    [Held by provider] carvedilol  6.25 mg Oral BID WC    [Held by provider] ferrous sulfate  325 mg Oral Daily with breakfast    pantoprazole (PROTONIX) 40 mg injection  40 mg IntraVENous Daily    furosemide  20 mg IntraVENous Q12H    ipratropium-albuterol  1 ampule Inhalation Q4H WA    [Held by provider] heparin (porcine)  5,000 Units SubCUTAneous 3 times per day    nystatin   Topical BID    potassium bicarb-citric acid  20 mEq Oral Daily    [Held by provider] miconazole   Topical BID    vancomycin  1,250 mg IntraVENous Q12H    [Held by provider] aspirin  81 mg Oral Daily    sodium chloride flush  5-40 mL IntraVENous 2 times per day    nicotine  1 patch TransDERmal Daily    vancomycin (VANCOCIN) intermittent dosing (placeholder)   Other RX Placeholder     Continuous Infusions:    IV infusion builder 30 mL/hr at 01/10/23 1676    sodium chloride      dexmedetomidine      sodium chloride       PRN Meds: magnesium sulfate, sodium chloride flush, hydrALAZINE, ziprasidone (GEODON) in sterile water injection, labetalol, potassium chloride, sodium chloride, dexmedetomidine, perflutren lipid microspheres, sodium chloride flush, sodium chloride flush, sodium chloride flush, sodium chloride, ondansetron **OR** ondansetron, polyethylene glycol, acetaminophen **OR** acetaminophen, albuterol, guaiFENesin    Data:     Past Medical History:   has no past medical history on file. Social History:   reports that he has been smoking cigarettes. He has a 40.00 pack-year smoking history. He has never used smokeless tobacco. He reports current alcohol use. He reports that he does not currently use drugs. Family History:   History reviewed. No pertinent family history.     Vitals:  /75   Pulse 94   Temp 97.5 °F (36.4 °C) (Axillary)   Resp 30   Ht 5' 7\" (1.702 m)   Wt 171 lb 15.3 oz (78 kg)   SpO2 92%   BMI 26.93 kg/m²   Temp (24hrs), Av °F (36.7 °C), Min:97.5 °F (36.4 °C), Max:98.6 °F (37 °C)      Recent Labs     01/09/23  1102   POCGLU 104       I/O(24Hr): Intake/Output Summary (Last 24 hours) at 1/11/2023 0747  Last data filed at 1/11/2023 0424  Gross per 24 hour   Intake --   Output 3150 ml   Net -3150 ml       Labs:    CBC:   Lab Results   Component Value Date/Time    WBC 6.5 01/11/2023 04:15 AM    RBC 6.23 01/11/2023 04:15 AM    HGB 12.9 01/11/2023 04:15 AM    HCT 43.6 01/11/2023 04:15 AM    MCV 70.0 01/11/2023 04:15 AM    MCH 20.8 01/11/2023 04:15 AM    MCHC 29.7 01/11/2023 04:15 AM    RDW 21.4 01/11/2023 04:15 AM    PLT 85 01/11/2023 04:15 AM    MPV 8.9 01/11/2023 04:15 AM     CMP:    Lab Results   Component Value Date/Time     01/11/2023 04:15 AM    K 3.0 01/11/2023 04:15 AM    CL 98 01/11/2023 04:15 AM    CO2 37 01/11/2023 04:15 AM    BUN 4 01/11/2023 04:15 AM    CREATININE 0.42 01/11/2023 04:15 AM    LABGLOM >60 01/11/2023 04:15 AM    GLUCOSE 90 01/11/2023 04:15 AM    PROT 5.4 01/11/2023 04:15 AM    LABALBU 2.7 01/11/2023 04:15 AM    CALCIUM 8.1 01/11/2023 04:15 AM    BILITOT 1.7 01/11/2023 04:15 AM    ALKPHOS 48 01/11/2023 04:15 AM    AST 30 01/11/2023 04:15 AM    ALT 52 01/11/2023 04:15 AM     PT/INR:    Lab Results   Component Value Date/Time    PROTIME 19.1 01/08/2023 04:38 AM    INR 1.6 01/08/2023 04:38 AM       No results found for: SPECIAL  Lab Results   Component Value Date/Time    CULTURE NO GROWTH 01/03/2023 12:12 PM         Radiology:    CT HEAD WO CONTRAST    Result Date: 1/2/2023  EXAMINATION: CT OF THE HEAD WITHOUT CONTRAST  1/2/2023 10:11 pm TECHNIQUE: CT of the head was performed without the administration of intravenous contrast. Automated exposure control, iterative reconstruction, and/or weight based adjustment of the mA/kV was utilized to reduce the radiation dose to as low as reasonably achievable. COMPARISON: None.  HISTORY: ORDERING SYSTEM PROVIDED HISTORY: Altered Mental status TECHNOLOGIST PROVIDED HISTORY: Altered Mental status Reason for Exam: Altered Mental status Additional signs and symptoms: Patient came in with transient alteration of awareness, follow up head CT for any changes. FINDINGS: BRAIN/VENTRICLES: There is no acute intracranial hemorrhage, mass effect or midline shift. No abnormal extra-axial fluid collection. There is a focal area of decreased attenuation with loss of gray/white matter differentiation involving posterior left parietal lobe with somewhat increased conspicuity as compared to prior exam.  There is stable small focus of encephalomalacia involving left cerebellar hemisphere compatible with prior remote infarct/insult. Chronic lacunar infarct to right basal ganglia redemonstrated. There is no evidence of hydrocephalus. ORBITS: The visualized portion of the orbits demonstrate no acute abnormality. SINUSES: Small air-fluid level right sphenoid sinus. Trace air-fluid level right frontal sinus. Mild mucoperiosteal thickening to bilateral ethmoid air cells. Findings are new from prior exam and likely relate to presence of nasogastric tube. SOFT TISSUES/SKULL:  No acute abnormality of the visualized skull or soft tissues. Focal area of decreased attenuation with loss of gray/white matter differentiation involving posterior left parietal lobe with somewhat increased conspicuity as compared to prior exam likely reflecting an area of acute to subacute infarct. Stable small chronic infarct/insult to left cerebellar hemisphere. Chronic lacunar infarct to right basal ganglia redemonstrated. Paranasal sinus disease likely relating to presence of nasogastric tube. CT HEAD WO CONTRAST    Result Date: 1/1/2023  EXAMINATION: CT OF THE HEAD WITHOUT CONTRAST  1/1/2023 10:48 pm TECHNIQUE: CT of the head was performed without the administration of intravenous contrast. Automated exposure control, iterative reconstruction, and/or weight based adjustment of the mA/kV was utilized to reduce the radiation dose to as low as reasonably achievable.  COMPARISON: None. HISTORY: Reason for Exam: AMS Additional signs and symptoms: the patient has been getting more and more confused since 2 weeks ago. It has progressively been getting worse and today FINDINGS: BRAIN/VENTRICLES: There is no acute intracranial hemorrhage, mass effect or midline shift. No abnormal extra-axial fluid collection. The gray-white differentiation is maintained without evidence of an acute infarct. There is no evidence of hydrocephalus. Changes of mild chronic small vessel ischemic disease. ORBITS: The visualized portion of the orbits demonstrate no acute abnormality. SINUSES: The visualized paranasal sinuses and mastoid air cells demonstrate no acute abnormality. SOFT TISSUES/SKULL:  No acute abnormality of the visualized skull or soft tissues. No acute intracranial abnormality. Mild chronic small vessel ischemic disease. US LIVER    Result Date: 1/2/2023  EXAMINATION: RIGHT UPPER QUADRANT ULTRASOUND 1/2/2023 10:20 am COMPARISON: None. HISTORY: ORDERING SYSTEM PROVIDED HISTORY: elevated AST and ALT, in setting of PT, and INR TECHNOLOGIST PROVIDED HISTORY: elevated AST and ALT, in setting of PT, and INR FINDINGS: Patient body habitus limits the study, as there is increased attenuation of the ultrasound beam. This decreases sensitivity and specificity. LIVER:  There is mild increased echogenicity seen throughout the liver. No intrahepatic ductal dilatation. No perihepatic fluid. BILIARY SYSTEM:  Bladder is contracted. Gallstones are seen. Gallbladder wall thickness measures 6 mm. Common bile duct is within normal limits measuring 5 mm. RIGHT KIDNEY: The right kidney is grossly unremarkable without evidence of hydronephrosis. PANCREAS:  Visualized portions of the pancreas are unremarkable. OTHER: No evidence of right upper quadrant ascites.  Portal vein flow appears hepatopetal     Increased echogenicity is seen throughout the liver suggesting diffuse hepatocellular disease such as fatty infiltration Cholelithiasis. No cholecystitis     XR CHEST PORTABLE    Result Date: 1/2/2023  EXAMINATION: ONE XRAY VIEW OF THE CHEST 1/2/2023 3:15 pm COMPARISON: 01/01/2023 HISTORY: ORDERING SYSTEM PROVIDED HISTORY: intubation TECHNOLOGIST PROVIDED HISTORY: intubation Reason for Exam: intubation FINDINGS: Overlying items external to the patient somewhat limit evaluation. Placement of endotracheal tube with tip 8.2 cm from the robert. Placement of enteric tube seen to extend into the stomach, distal extent not included in field of view. Persistent likely layering right pleural effusion, similar to slightly worsened. Persistent bilateral airspace opacities to bilateral mid to lower lung zones, right worse than left, similar on the left but mildly worsened on the right. No pneumothoraces are seen. Persistent likely bullous change to the left upper lung zone. Cardiac and mediastinal silhouettes are stable. Stable appearance to bony structures. Placement of endotracheal tube which appears high riding with tip 8.2 cm from the robert. Suggest advancement by 2-3 cm. Placement of endotracheal tube seen to extend into the stomach, distal extent not included in field of view. No pneumothoraces. Persistent likely bullous change to the left upper lung zone. Persistent right pleural effusion, similar to slightly worsened. Persistent bilateral airspace opacities, right worse than left, similar on the left but mildly worsened on the right. XR CHEST PORTABLE    Result Date: 1/1/2023  EXAMINATION: ONE XRAY VIEW OF THE CHEST 1/1/2023 7:17 pm COMPARISON: None. HISTORY: ORDERING SYSTEM PROVIDED HISTORY: ams TECHNOLOGIST PROVIDED HISTORY: ams Reason for Exam: Altered mental status, difficulty breathing Additional signs and symptoms: Altered mental status, difficulty breathing Relevant Medical/Surgical History:  Altered mental status, difficulty breathing FINDINGS: Large lucent area in the left apex suggesting a large bulla however superimposed pneumothorax cannot be excluded. The cardiac size is normal. Right lower lobe airspace infiltrate and mild right pleural effusion. . Pulmonary vascularity appears normal. There is mild ectasia of the thoracic aorta. Calcific tendinitis of the right shoulder. No acute bony abnormalities. The hilar structures are normal.     Right lower lobe pneumonia and small right pleural effusion Large lucent area in the left apex suggesting a large bulla however superimposed pneumothorax cannot be excluded. Follow-up CT would be useful for further evaluation. The findings were sent to the Radiology Results [id number] at 10:31 pm on 1/1/2023 to be communicated to a licensed caregiver. EKG:    there are no previous tracings available for comparison. Physical Examination:        PHYSICAL EXAM:  General Appearance patient is alert awake and oriented by 3. Psych: No agitation, no depression, maintains good eye contact  HEENT - Head is normocephalic, atraumatic. Neck: supple, no rigidity, normal ROM. Small swelling of the left side of the mandibular angle that its been since childhood  Lungs -limited exam, good air entry. No wheezes  Cardiovascular - Heart sounds are normal.  Regular rhythm, normal rate without murmur, gallop or rub.   Neurology: Neurological exam is nonfocal.  Strength is 4 -/5 in all extremities  Abdomen - Soft, nontender, nondistended, no masses or organomegaly  Skin - No bruising or bleeding on exposed skin area  Extremities -lower limb edema  Assessment:        Primary Problem  Acute respiratory failure with hypoxia and hypercapnia ([address]    Active Hospital Problems    Diagnosis Date Noted    Delirium due to another medical condition [F05] 01/10/2023     Priority: Medium    ACP (advance care planning) [Z71.89] 01/09/2023     Priority: Medium    Goals of care, counseling/discussion [Z71.89] 01/09/2023     Priority: Medium    Encounter for palliative care [Z51.5] 01/09/2023     Priority: Medium    Prolonged pt (prothrombin time) [R79.1] 01/03/2023     Priority: Medium    Emphysema lung ([address] [J43.9] 01/03/2023     Priority: Medium    Cerebral infarction ([address] [I63.9] 01/03/2023     Priority: Medium    Transaminitis [R74.01] 01/02/2023     Priority: Medium    Microcytic anemia [D64.9] 01/02/2023     Priority: Medium    Thrombocytopenia ([address] [D69.6] 01/02/2023     Priority: Medium    Agitation [R45.1] 01/02/2023     Priority: Medium    Acute respiratory failure with hypoxia and hypercapnia (HCC) RLL pneumonia [J96.01, J96.02] 01/02/2023     Priority: Medium    Altered mental status [R41.82] 01/02/2023     Priority: Medium    Community acquired pneumonia of right lower lobe of lung [J18.9] 01/02/2023     Priority: Medium       Plan: Altered Mental Status in the setting of Acute type 2 respiratory failure secondary to Pneumonia and Acute Heart Failure  -EEG showing Diffuse slowing. Multiple small infarct and areas of SAH. -On Geodon 10 mg BID  -ID following: On vancoymycin and Unasyn  -Ipratropium-Albuterol every 4 hrs  -Echocardiogam completed  -Patient failed swallow study. NG tube not able to placed at bedside. Will place by IR. Transaminitis with prolonged PTT and INR in the setting of acute HCV  - AST and ALT are Improving  -INR PT improving   -Thrombocytopenic, IMPROVING  -HCV RNA PCR positive; ID on board      Sundowning and hallucination. Concern for suicidal ideation.  - Psychiatry consulted likely 2/2 ICU delirium      Multiple acute brain infarcts with subarachnoid hemorrhage versus meningitis  -EEG showing diffuse slowing without epileptic waves. -Aspirin HELD  -Plan for CASSI once patient mentation improved. -New BP parameters of 140. Thrombocytopenia  -Unknown  baseline  -Transaminates and Prolonged PT and PTT  -PLT count  85  -Monitor HH closely    Hypokalemia  -On protocol  -Potasium chloride 20meq infusion.     Microcytic anemia in the setting of elevated INR and PT  -Hb sig improved at 13.6  -No previous labs to compare?  -Iron IV replacement    Elevated troponins most likely 2/2 demand ischemia  -no acute changes on EKG  -ECHO showing normal LVF. Mild tricuspid regurge.  RV pressure of 25 mmHG      DVT prophylaxis: reason for no prophylaxis: Prolonged PT, aPTT  GI prophylaxis: Protonix 40 mg daily    Jed Pak MD  1/11/2023  7:47 AM

## 2023-01-11 NOTE — PROGRESS NOTES
Department of Internal Medicine  Nephrology Munira Gillis MD  Progress Note    Reason for consultation: Management of acute kidney injury. Consulting physician: Rima Tanner MD.      Interval hx/Subjective  Patient seen and examined in ICU, patient remains confused and physical restraints  Noted no acute distress   serum creatinine remained stable within normal limits  Serum sodium  improving at 141  Excellent diuresis on IV Lasix 20 mg every 12  Echo showed IVC dilated without respiratory variation. Preserved EF 55%. Chest x-ray 1/5/23 with bilateral pleural effusion. Interval history:   Patient was seen and examined today in the intensive care unit. He remains intubated and on ventilator support. Renal function has improved and he is nonoliguric. He received CT angiogram of the chest which ruled out pulmonary embolism; dilated main pulmonary artery suggestive of pulmonary hypertension as well as increased RV to LV ratio suggestive of right heart failure. There was incidental finding of moderate right and small left pleural effusion with partial collapse of the right lower lobe. NG tube is draining fluid  Plan to start tube feeding  Serum sodium was 146, potassium 3.4 serum creatinine 0.5  proBNP 7000    History of present illness: This is a 60 y.o. male with no significant past medical history [no regular physician follow-up], who was brought to the emergency department via EMS for further evaluation of progressively worsening confusion and lethargy of 2 weeks duration. Patient's spouse said that he became progressively confused prompting her to call EMS on day of presentation 1/1/2023. When EMS arrived, patient was found to be obtunded with oxygen saturation 75% on room air and he was placed on a nonrebreather mask and given a dose of IV Lasix as he also had severe bilateral leg swelling.   Patient was placed on BiPAP on arrival to the emergency department and was admitted to the intensive care unit. Initial systolic blood pressure was 106 mmHg and serum creatinine 1.77 mg/dL associated with elevated AST and ALT. On admission to the intensive care unit patient required endotracheal intubation for airway protection and treatment of acute respiratory failure. Nephrology consultation has been placed for management of acute kidney injury. He is currently on Levophed drip at 3 mcg/min. CT scan of the brain performed at presentation showed no acute intracranial abnormality but with mild chronic small vessel ischemic disease. Repeat CT scan performed 24 hours later [1/2/2023] showed focal area of decreased attenuation with loss of gray/white matter differentiation involving posterior left lobe with somewhat increased conspicuity as compared to prior exam likely reflecting an area of acute to subacute stroke. Patient has no known allergies. History reviewed. No pertinent past medical history.     Scheduled Meds:   ziprasidone (GEODON) in sterile water injection  10 mg IntraMUSCular Nightly    [Held by provider] carvedilol  6.25 mg Oral BID WC    [Held by provider] ferrous sulfate  325 mg Oral Daily with breakfast    pantoprazole (PROTONIX) 40 mg injection  40 mg IntraVENous Daily    furosemide  20 mg IntraVENous Q12H    ipratropium-albuterol  1 ampule Inhalation Q4H WA    [Held by provider] heparin (porcine)  5,000 Units SubCUTAneous 3 times per day    nystatin   Topical BID    potassium bicarb-citric acid  20 mEq Oral Daily    [Held by provider] miconazole   Topical BID    vancomycin  1,250 mg IntraVENous Q12H    [Held by provider] aspirin  81 mg Oral Daily    sodium chloride flush  5-40 mL IntraVENous 2 times per day    nicotine  1 patch TransDERmal Daily    vancomycin (VANCOCIN) intermittent dosing (placeholder)   Other RX Placeholder     Continuous Infusions:   IV infusion builder 30 mL/hr at 01/10/23 0493    sodium chloride      dexmedetomidine      sodium chloride       PRN Meds:.hydrALAZINE, potassium chloride **OR** potassium alternative oral replacement **OR** potassium chloride, magnesium sulfate, sodium chloride flush, ziprasidone (GEODON) in sterile water injection, labetalol, potassium chloride, sodium chloride, dexmedetomidine, perflutren lipid microspheres, sodium chloride flush, sodium chloride flush, sodium chloride flush, sodium chloride, ondansetron **OR** ondansetron, polyethylene glycol, acetaminophen **OR** acetaminophen, albuterol, guaiFENesin    History reviewed. No pertinent family history. Social History     Socioeconomic History    Marital status: Single     Spouse name: None    Number of children: None    Years of education: None    Highest education level: None   Tobacco Use    Smoking status: Every Day     Packs/day: 1.00     Years: 40.00     Pack years: 40.00     Types: Cigarettes    Smokeless tobacco: Never   Substance and Sexual Activity    Alcohol use: Yes     Comment: beer and scotch    Drug use: Not Currently     Comment: over 20 years (stated by daughter)         Physical Exam:    VITALS:  BP (!) 141/92   Pulse 96   Temp 99.8 °F (37.7 °C) (Axillary)   Resp 18   Ht 5' 7\" (1.702 m)   Wt 171 lb 15.3 oz (78 kg)   SpO2 94%   BMI 26.93 kg/m²   TEMPERATURE:  Current - Temp: 99.8 °F (37.7 °C);  Max - Temp  Av.4 °F (36.9 °C)  Min: 97.5 °F (36.4 °C)  Max: 99.8 °F (37.7 °C)  RESPIRATIONS RANGE: Resp  Av.3  Min: 13  Max: 30  PULSE RANGE: Pulse  Av.6  Min: 81  Max: 99  BLOOD PRESSURE RANGE:  Systolic (89PGL), ELVIN:168 , Min:116 , BNM:328 ; Diastolic (60BQP), RBN:99, Min:66, Max:99  PULSE OXIMETRY RANGE: SpO2  Av %  Min: 92 %  Max: 96 %  24HR INTAKE/OUTPUT:    Intake/Output Summary (Last 24 hours) at 2023 1332  Last data filed at 2023 0424  Gross per 24 hour   Intake --   Output 2100 ml   Net -2100 ml         Constitutional: Awake alert, responsive not in acute distress    Skin: Skin color, texture, turgor normal. No rashes or lesions    Head: Normocephalic, without obvious abnormality, atraumatic     Cardiovascular/Edema: regular rate and rhythm, S1, S2 normal, no murmur, click, rub or gallop    Respiratory: Lungs:  Coarse breath sounds bilaterally    Abdomen: soft, non-tender; bowel sounds normal; no masses,  no organomegaly    Back: symmetric, no curvature. ROM normal. No CVA tenderness. Extremities: edema +    Neuro: Nonfocal    CBC:   Recent Labs     01/09/23  0456 01/10/23  0436 01/11/23  0415   WBC 6.3 7.2 6.5   HGB 11.7* 13.6 12.9*   PLT 65* 67* 85*       BMP:    Recent Labs     01/09/23  0456 01/10/23  0436 01/11/23 0415    140 141   K 3.3* 3.3* 3.0*    97* 98   CO2 35* 33* 37*   BUN 3* 3* 4*   CREATININE <0.40* <0.40* 0.42*   GLUCOSE 101* 96 90       Lab Results   Component Value Date/Time    NITRU NEGATIVE 01/02/2023 03:34 PM    COLORU Dark Yellow 01/02/2023 03:34 PM    PHUR 5.0 01/02/2023 03:34 PM    WBCUA 10 TO 20 01/02/2023 03:34 PM    RBCUA TOO NUMEROUS TO COUNT 01/02/2023 03:34 PM    BACTERIA FEW 01/01/2023 10:25 PM    SPECGRAV 1.015 01/02/2023 03:34 PM    LEUKOCYTESUR SMALL 01/02/2023 03:34 PM    UROBILINOGEN Normal 01/02/2023 03:34 PM    BILIRUBINUR NEGATIVE 01/02/2023 03:34 PM    GLUCOSEU NEGATIVE 01/02/2023 03:34 PM    KETUA TRACE 01/02/2023 03:34 PM     MRI of the brain performed without contrast on 1/3/2023 showed:  Small acute infarcts involving the left cerebellar hemisphere and left   parietal lobe. Small subacute infarct within the left cerebellar hemisphere. Moderate bilateral subarachnoid hemorrhage within both cerebral hemispheres   which is of uncertain etiology. The hemorrhage is more apparent on MRI than   on previous head CT. Old right caudate nucleus lacune. Small bilateral mastoid effusions and moderate left paranasal sinus disease. IMPRESSION/RECOMMENDATIONS:      1.   Acute kidney injury - most consistent with prerenal azotemia and/or ischemic acute tubular necrosis from hypotension. Renal function is improved and serum creatinine is down to 0.4 mg/dL today. 2.  Hypokalemia -sliding-scale potassium replaced-Aldactone 25 mg daily +potassium chloride 20 mEq daily    3. Hypernatremia due to dehydration-SNa 142 stable     3. Hypotension -blood pressure improved. Blood Cultures are negative    4. Hypomagnesemia.-Improving    5. Fluid overload-change IV Lasix to 40 mg p.o. daily    Plan  Replace electrolytes per sliding scale. Strict I's and O's  Remove Mars catheter    Prognosis is guarded.     Acacia Marr MD   Attending Nephrologist  1/11/2023 1:32 PM

## 2023-01-11 NOTE — PLAN OF CARE
Please have patient or POA answer MRI screening form questions in Epic. Exam will be scheduled after form has been completed and reviewed. Thank you.

## 2023-01-11 NOTE — PROGRESS NOTES
01/11/23 1512   Encounter Summary   Encounter Overview/Reason  Attempted Encounter   Service Provided For: Patient not available  (patient not in his room)

## 2023-01-12 LAB
ABSOLUTE EOS #: 0.06 K/UL (ref 0–0.4)
ABSOLUTE LYMPH #: 0.45 K/UL (ref 1–4.8)
ABSOLUTE MONO #: 0.7 K/UL (ref 0.1–1.3)
ALBUMIN SERPL-MCNC: 2.7 G/DL (ref 3.5–5.2)
ALP BLD-CCNC: 46 U/L (ref 40–129)
ALT SERPL-CCNC: 39 U/L (ref 5–41)
ANION GAP SERPL CALCULATED.3IONS-SCNC: 3 MMOL/L (ref 9–17)
ANTI DNA DOUBLE STRANDED: 4.2 IU/ML
ANTI-NUCLEAR ANTIBODY (ANA): NEGATIVE
AST SERPL-CCNC: 25 U/L
BASOPHILS # BLD: 2 % (ref 0–2)
BASOPHILS ABSOLUTE: 0.13 K/UL (ref 0–0.2)
BILIRUB SERPL-MCNC: 1.6 MG/DL (ref 0.3–1.2)
BUN BLDV-MCNC: 6 MG/DL (ref 8–23)
CALCIUM SERPL-MCNC: 8.1 MG/DL (ref 8.6–10.4)
CHLORIDE BLD-SCNC: 100 MMOL/L (ref 98–107)
CO2: 37 MMOL/L (ref 20–31)
CREAT SERPL-MCNC: 0.6 MG/DL (ref 0.7–1.2)
ENA ANTIBODIES SCREEN: 0.3 U/ML
EOSINOPHILS RELATIVE PERCENT: 1 % (ref 0–4)
GFR SERPL CREATININE-BSD FRML MDRD: >60 ML/MIN/1.73M2
GLUCOSE BLD-MCNC: 121 MG/DL (ref 70–99)
HCT VFR BLD CALC: 40.8 % (ref 41–53)
HEMOGLOBIN: 12.2 G/DL (ref 13.5–17.5)
HEPATITIS C RNA PCR QUANT: DETECTED
HEPATITIS C RNA QUANT LOG: 3.75 LOG IU/ML
HEPATITIS C RNA-PCR: ABNORMAL IU/ML
LYMPHOCYTES # BLD: 7 % (ref 24–44)
MCH RBC QN AUTO: 21.1 PG (ref 26–34)
MCHC RBC AUTO-ENTMCNC: 29.8 G/DL (ref 31–37)
MCV RBC AUTO: 70.7 FL (ref 80–100)
MONOCYTES # BLD: 11 % (ref 1–7)
MORPHOLOGY: ABNORMAL
PDW BLD-RTO: 21.3 % (ref 11.5–14.9)
PHOSPHORUS: 2.5 MG/DL (ref 2.5–4.5)
PLATELET # BLD: 88 K/UL (ref 150–450)
PMV BLD AUTO: 8.6 FL (ref 6–12)
POTASSIUM SERPL-SCNC: 3.9 MMOL/L (ref 3.7–5.3)
POTASSIUM SERPL-SCNC: 4.1 MMOL/L (ref 3.7–5.3)
RBC # BLD: 5.77 M/UL (ref 4.5–5.9)
SEG NEUTROPHILS: 79 % (ref 36–66)
SEGMENTED NEUTROPHILS ABSOLUTE COUNT: 5.06 K/UL (ref 1.3–9.1)
SODIUM BLD-SCNC: 140 MMOL/L (ref 135–144)
SOURCE: ABNORMAL
TOTAL PROTEIN: 5.2 G/DL (ref 6.4–8.3)
VANCOMYCIN RANDOM DATE LAST DOSE: NORMAL
VANCOMYCIN RANDOM DOSE AMOUNT: NORMAL
VANCOMYCIN RANDOM TIME LAST DOSE: 1646
VANCOMYCIN RANDOM: 22.9 UG/ML
WBC # BLD: 6.4 K/UL (ref 3.5–11)

## 2023-01-12 PROCEDURE — 84100 ASSAY OF PHOSPHORUS: CPT

## 2023-01-12 PROCEDURE — 2580000003 HC RX 258: Performed by: NURSE PRACTITIONER

## 2023-01-12 PROCEDURE — 99232 SBSQ HOSP IP/OBS MODERATE 35: CPT | Performed by: INTERNAL MEDICINE

## 2023-01-12 PROCEDURE — 6370000000 HC RX 637 (ALT 250 FOR IP): Performed by: STUDENT IN AN ORGANIZED HEALTH CARE EDUCATION/TRAINING PROGRAM

## 2023-01-12 PROCEDURE — 97530 THERAPEUTIC ACTIVITIES: CPT

## 2023-01-12 PROCEDURE — 6370000000 HC RX 637 (ALT 250 FOR IP)

## 2023-01-12 PROCEDURE — 6360000002 HC RX W HCPCS: Performed by: INTERNAL MEDICINE

## 2023-01-12 PROCEDURE — 99233 SBSQ HOSP IP/OBS HIGH 50: CPT | Performed by: INTERNAL MEDICINE

## 2023-01-12 PROCEDURE — 84132 ASSAY OF SERUM POTASSIUM: CPT

## 2023-01-12 PROCEDURE — 85025 COMPLETE CBC W/AUTO DIFF WBC: CPT

## 2023-01-12 PROCEDURE — 6370000000 HC RX 637 (ALT 250 FOR IP): Performed by: INTERNAL MEDICINE

## 2023-01-12 PROCEDURE — 80202 ASSAY OF VANCOMYCIN: CPT

## 2023-01-12 PROCEDURE — 94640 AIRWAY INHALATION TREATMENT: CPT

## 2023-01-12 PROCEDURE — 6370000000 HC RX 637 (ALT 250 FOR IP): Performed by: NURSE PRACTITIONER

## 2023-01-12 PROCEDURE — 2580000003 HC RX 258

## 2023-01-12 PROCEDURE — C9113 INJ PANTOPRAZOLE SODIUM, VIA: HCPCS

## 2023-01-12 PROCEDURE — A4216 STERILE WATER/SALINE, 10 ML: HCPCS

## 2023-01-12 PROCEDURE — 36415 COLL VENOUS BLD VENIPUNCTURE: CPT

## 2023-01-12 PROCEDURE — 99231 SBSQ HOSP IP/OBS SF/LOW 25: CPT | Performed by: PSYCHIATRY & NEUROLOGY

## 2023-01-12 PROCEDURE — 2060000000 HC ICU INTERMEDIATE R&B

## 2023-01-12 PROCEDURE — 92526 ORAL FUNCTION THERAPY: CPT

## 2023-01-12 PROCEDURE — 2580000003 HC RX 258: Performed by: INTERNAL MEDICINE

## 2023-01-12 PROCEDURE — 6360000002 HC RX W HCPCS

## 2023-01-12 PROCEDURE — 2700000000 HC OXYGEN THERAPY PER DAY

## 2023-01-12 PROCEDURE — 97110 THERAPEUTIC EXERCISES: CPT

## 2023-01-12 PROCEDURE — 80053 COMPREHEN METABOLIC PANEL: CPT

## 2023-01-12 PROCEDURE — 97535 SELF CARE MNGMENT TRAINING: CPT

## 2023-01-12 RX ORDER — FUROSEMIDE 40 MG/1
40 TABLET ORAL DAILY
Status: DISCONTINUED | OUTPATIENT
Start: 2023-01-12 | End: 2023-01-15

## 2023-01-12 RX ADMIN — IPRATROPIUM BROMIDE AND ALBUTEROL SULFATE 1 AMPULE: 2.5; .5 SOLUTION RESPIRATORY (INHALATION) at 11:26

## 2023-01-12 RX ADMIN — CARVEDILOL 6.25 MG: 6.25 TABLET, FILM COATED ORAL at 08:36

## 2023-01-12 RX ADMIN — IPRATROPIUM BROMIDE AND ALBUTEROL SULFATE 1 AMPULE: 2.5; .5 SOLUTION RESPIRATORY (INHALATION) at 19:59

## 2023-01-12 RX ADMIN — SODIUM CHLORIDE, PRESERVATIVE FREE 40 MG: 5 INJECTION INTRAVENOUS at 08:36

## 2023-01-12 RX ADMIN — SODIUM CHLORIDE, PRESERVATIVE FREE 10 ML: 5 INJECTION INTRAVENOUS at 21:56

## 2023-01-12 RX ADMIN — NYSTATIN OINTMENT: 100000 OINTMENT TOPICAL at 21:55

## 2023-01-12 RX ADMIN — SODIUM CHLORIDE, PRESERVATIVE FREE 10 ML: 5 INJECTION INTRAVENOUS at 08:38

## 2023-01-12 RX ADMIN — FUROSEMIDE 40 MG: 40 TABLET ORAL at 13:21

## 2023-01-12 RX ADMIN — SPIRONOLACTONE 25 MG: 25 TABLET ORAL at 13:21

## 2023-01-12 RX ADMIN — CARVEDILOL 6.25 MG: 6.25 TABLET, FILM COATED ORAL at 17:55

## 2023-01-12 RX ADMIN — IPRATROPIUM BROMIDE AND ALBUTEROL SULFATE 1 AMPULE: 2.5; .5 SOLUTION RESPIRATORY (INHALATION) at 07:51

## 2023-01-12 RX ADMIN — NYSTATIN OINTMENT: 100000 OINTMENT TOPICAL at 08:38

## 2023-01-12 RX ADMIN — POTASSIUM BICARBONATE 20 MEQ: 782 TABLET, EFFERVESCENT ORAL at 08:36

## 2023-01-12 RX ADMIN — VANCOMYCIN HYDROCHLORIDE 1250 MG: 1.25 INJECTION, POWDER, LYOPHILIZED, FOR SOLUTION INTRAVENOUS at 04:28

## 2023-01-12 RX ADMIN — VANCOMYCIN HYDROCHLORIDE 1250 MG: 1.25 INJECTION, POWDER, LYOPHILIZED, FOR SOLUTION INTRAVENOUS at 17:55

## 2023-01-12 RX ADMIN — POTASSIUM CHLORIDE: 2 INJECTION, SOLUTION, CONCENTRATE INTRAVENOUS at 04:02

## 2023-01-12 ASSESSMENT — PAIN SCALES - WONG BAKER: WONGBAKER_NUMERICALRESPONSE: 0

## 2023-01-12 ASSESSMENT — PAIN SCALES - GENERAL: PAINLEVEL_OUTOF10: 0

## 2023-01-12 NOTE — PROGRESS NOTES
Nutrition Note    Consulted for tube feed ordering and management. Placed order for Vital AF (Peptide) at 20 ml per hour with slow advance to goal of 70 ml per hour. Phos was ordered to check for refeeding Sx. Will follow for tolerance.     Electronically signed by Elsa Terrell RD, WENDIE on 1/12/23 at 9:21 AM EST    Contact: [phone]

## 2023-01-12 NOTE — PROGRESS NOTES
Tf started at 10ml/hr. Patient intermittently pulling at lines and tubes but is easily oriented.  Daughter remains at the bedside

## 2023-01-12 NOTE — PROGRESS NOTES
Today's Date: 1/12/2023  Patient Name: Po Nielsen  Date of admission: 1/1/2023  9:55 PM  Patient's age: 60 y.o., 1960  Admission Dx: Acute respiratory failure ([facility] [address] [J96.00]  Acute respiratory failure with hypoxia and hypercapnia ([facility] [address] [J96.01, J96.02]  Altered mental status, unspecified altered mental status type [R41.82]  Community acquired pneumonia of right lower lobe of lung [J18.9]  COPD with respiratory failure, acute ([facility] [address] [J44.9, J96.00]    Reason for Consult: management recommendations  Requesting Physician: Kanwal Serrano MD    CHIEF COMPLAINT: Abnormal cell counts. Altered mental status. Pneumonia. Interval history:    Patient seen and examined  Labs and vitals reviewed  Patient seen in ICU  Condition is improving quickly. Mental status is improving. He is off restraints. We discovered that he has been taking very high dose of aspirin and multiple supplements up to 20 supplements per day  Platelets are up to 80,769. He is not bleeding or bruising. HISTORY OF PRESENT ILLNESS:      The patient is a 60 y.o.  male who is admitted with chief complaint of altered mental status. Patient has not seen a doctor for multiple years. Due to worsening mental status EMS was summoned. Patient oxygen saturation was noted to be at 75%. Patient placed on BiPAP x-ray showed right lower lobe pneumonia. Patient started on antibiotics. Patient also noted to have INR of 2.3 platelet count of 89,863. Patient does have a history of alcohol intake. Patient's ammonia level noted to be 29. Creatinine 1.5. Total bilirubin is within range. Hemoglobin 11.2 with MCV of 69. Platelet count is 42,357. Ultrasound liver done however report is pending. Past Medical History: Hypertension    Past Surgical History: No pertinent surgical history    Medications:    Prior to Admission medications    Medication Sig Start Date End Date Taking?  Authorizing Provider   aspirin 325 MG tablet Take 325 mg by mouth daily Patient takes 2 tabs daily   Yes Historical Provider, MD   psyllium (KONSYL) 28.3 % PACK Take 1 packet by mouth daily   Yes Historical Provider, MD     Current Facility-Administered Medications   Medication Dose Route Frequency Provider Last Rate Last Admin    furosemide (LASIX) tablet 40 mg  40 mg Oral Daily Rajiv Hood MD   40 mg at 01/12/23 1321    hydrALAZINE (APRESOLINE) injection 20 mg  20 mg IntraVENous Q6H PRN Adrien La MD   20 mg at 01/11/23 1224    potassium chloride (KLOR-CON M) extended release tablet 40 mEq  40 mEq Oral PRN Adrien La MD        Or    potassium bicarb-citric acid (EFFER-K) effervescent tablet 40 mEq  40 mEq Oral PRN Adrien La MD        Or    potassium chloride 10 mEq/100 mL IVPB (Peripheral Line)  10 mEq IntraVENous PRN Adrien La MD        spironolactone (ALDACTONE) tablet 25 mg  25 mg Oral Daily Nestor Ferrell MD   25 mg at 01/12/23 1321    potassium chloride (KLOR-CON M) extended release tablet 20 mEq  20 mEq Oral Daily with breakfast Celina Julien MD        magnesium sulfate 2000 mg in water 50 mL IVPB  2,000 mg IntraVENous PRN Rajiv Hood MD        ziprasidone (GEODON) 10 mg in sterile water 0.5 mL injection  10 mg IntraMUSCular Nightly ALAYNA Grant - CNP        carvedilol (COREG) tablet 6.25 mg  6.25 mg Oral BID  Rajiv Hood MD   6.25 mg at 01/12/23 0836    [Held by provider] ferrous sulfate (IRON 325) tablet 325 mg  325 mg Oral Daily with breakfast Rajiv Hood MD        pantoprazole (PROTONIX) 40 mg in sodium chloride (PF) 0.9 % 10 mL injection  40 mg IntraVENous Daily Rajiv Hood MD   40 mg at 01/12/23 0836    ziprasidone (GEODON) 20 mg in sterile water 1 mL injection  20 mg IntraMUSCular Q12H PRN ALAYNA Polo - CNP        labetalol (NORMODYNE;TRANDATE) injection 5 mg  5 mg IntraVENous Q6H PRN Adrien La MD   5 mg at 01/10/23 1606    potassium chloride 10 mEq/100 mL IVPB (Peripheral Line)  10 mEq IntraVENous PRN Gautam Duggan MD   Stopped at 01/11/23 2357    ipratropium-albuterol (DUONEB) nebulizer solution 1 ampule  1 ampule Inhalation Q4H WA Kalani Esparza MD   1 ampule at 01/12/23 1126    [Held by provider] heparin (porcine) injection 5,000 Units  5,000 Units SubCUTAneous 3 times per day Rick Flores MD   5,000 Units at 01/08/23 0549    potassium chloride 20 mEq in dextrose 5 % 1,000 mL infusion   IntraVENous Continuous Dian Lucaman Trent Roe MD 30 mL/hr at 01/12/23 0402 New Bag at 01/12/23 0402    0.9 % sodium chloride infusion   IntraVENous PRN Duncan Fuentes MD        dexmedetomidine (PRECEDEX) 400 mcg in sodium chloride 0.9 % 100 mL infusion  0.1-1.5 mcg/kg/hr IntraVENous Continuous PRN Kalani Esparza MD        nystatin (MYCOSTATIN) ointment   Topical BID Yousif Rasheed MD   Given at 01/12/23 2567    potassium bicarb-citric acid (EFFER-K) effervescent tablet 20 mEq  20 mEq Oral Daily Deisy Rey MD   20 mEq at 01/12/23 0836    [Held by provider] miconazole (MICOTIN) 2 % powder   Topical BID ALAYNA Drew - NP   Given at 01/06/23 0923    vancomycin (VANCOCIN) 1,250 mg in dextrose 5 % 250 mL IVPB (ADDAVIAL)  1,250 mg IntraVENous Q12H Laura Delaney .7 mL/hr at 01/12/23 0558 Rate Verify at 01/12/23 0558    perflutren lipid microspheres (DEFINITY) injection 1.5 mL  1.5 mL IntraVENous ONCE PRN Gabe Galloway MD        [facility] [place] by provider] aspirin chewable tablet 81 mg  81 mg Oral Daily Yaneth Luna MD   81 mg at 01/04/23 0740    sodium chloride flush 0.9 % injection 10 mL  10 mL IntraVENous PRN Yaneth Luna MD   10 mL at 01/03/23 1836    sodium chloride flush 0.9 % injection 5-40 mL  5-40 mL IntraVENous 2 times per day ALAYNA Drew - NP   10 mL at 01/12/23 0838    sodium chloride flush 0.9 % injection 5-40 mL  5-40 mL IntraVENous PRN Homero De Santiago, APRN - NP        0.9 % sodium chloride infusion   IntraVENous PRN Rufino Grover, APRN - NP        ondansetron (ZOFRAN-ODT) disintegrating tablet 4 mg  4 mg Oral Q8H PRN Rufino Grover, APRN - NP        Or    ondansetron (ZOFRAN) injection 4 mg  4 mg IntraVENous Q6H PRN Abiola Gomez, APRN - NP        polyethylene glycol (GLYCOLAX) packet 17 g  17 g Oral Daily PRN Rufino Grover, APRN - NP        acetaminophen (TYLENOL) tablet 650 mg  650 mg Oral Q6H PRN Rufino Grover, APRN - NP        Or    acetaminophen (TYLENOL) suppository 650 mg  650 mg Rectal Q6H PRN Rufino Grover, APRN - NP        nicotine (NICODERM CQ) 21 MG/24HR 1 patch  1 patch TransDERmal Daily Rufino Grover, APRN - NP   1 patch at 01/12/23 0836    albuterol (PROVENTIL) nebulizer solution 2.5 mg  2.5 mg Nebulization Q2H PRN Rufino Grover, APRN - NP   2.5 mg at 01/02/23 1115    guaiFENesin (MUCINEX) extended release tablet 600 mg  600 mg Oral BID PRN Rufino Grover, APRN - NP        vancomycin (VANCOCIN) intermittent dosing (placeholder)   Other RX Ayanna Varela MD           Allergies:  Patient has no known allergies. Social History:   reports that he has been smoking cigarettes. He has a 40.00 pack-year smoking history. He has never used smokeless tobacco. He reports current alcohol use. He reports that he does not currently use drugs. Family History: Patient not able to give history due to altered mental status    REVIEW OF SYSTEMS:      General: no fever or night sweats, Weight is stable. ENT: No double or blurred vision, no hearing problem, no dysphagia or sore throat   Respiratory: Positive shortness of breath  Cardiovascular: Denies chest pain, PND or orthopnea. No L E swelling or palpitations. Gastrointestinal:    No nausea or vomiting, abdominal pain, diarrhea or constipation. Genitourinary: Denies dysuria, hematuria, frequency, urgency or incontinence. Neurological: Complains of being very weak.   Musculoskeletal:  No arthralgia no back pain or joint swelling. Skin: There are no rashes or bleeding. Psych: Denies hallucinations or intentions to harm self        PHYSICAL EXAM:        /75   Pulse 79   Temp 97.9 °F (36.6 °C)   Resp 17   Ht 5' 7\" (1.702 m)   Wt 168 lb 14 oz (76.6 kg)   SpO2 98%   BMI 26.45 kg/m²    Temp (24hrs), Av.4 °F (36.9 °C), Min:97.9 °F (36.6 °C), Max:98.7 °F (37.1 °C)      General appearance - not in acute distress  Mental status -arousable but somewhat lethargic  Eyes - pupils equal and reactive, extraocular eye movements intact   Ears - bilateral TM's and external ear canals normal   Mouth -mucous membranes moist  Neck - supple, no significant adenopathy   Lymphatics - no palpable lymphadenopathy, no hepatosplenomegaly   Chest -bilateral rales  Heart - normal rate, regular rhythm, normal S1, S2, no murmurs  Abdomen - soft, nontender, nondistended, no masses or organomegaly   Neurological -orally intubated  Musculoskeletal - no joint tenderness, deformity or swelling   Extremities - peripheral pulses normal, no pedal edema, no clubbing or cyanosis   Skin - normal coloration and turgor, no rashes, no suspicious skin lesions noted ,      DATA:      Labs:     Results for orders placed or performed during the hospital encounter of 23   COVID-19 & Influenza Combo    Specimen: Nasopharyngeal Swab   Result Value Ref Range    Specimen Description . NASOPHARYNGEAL SWAB     Source . NASOPHARYNGEAL SWAB     SARS-CoV-2 RNA, RT PCR Not Detected Not Detected    INFLUENZA A Not Detected Not Detected    INFLUENZA B Not Detected Not Detected   Respiratory Panel, Molecular, with COVID-19 (Restricted: peds pts or suitable admitted adults)    Specimen: Nasopharyngeal Swab   Result Value Ref Range    Specimen Description . NASOPHARYNGEAL SWAB     Adenovirus PCR Not Detected Not Detected    Coronavirus 229E PCR Not Detected Not Detected    Coronavirus HKU1 PCR Not Detected Not Detected    Coronavirus NL63 PCR Not Detected Not Detected    Coronavirus OC43 PCR Not Detected Not Detected    SARS-CoV-2, PCR Not Detected Not Detected    Human Metapneumovirus PCR Not Detected Not Detected    Rhino/Enterovirus PCR Not Detected Not Detected    Influenza A by PCR Not Detected Not Detected    Influenza B by PCR Not Detected Not Detected    Parainfluenza 1 PCR Not Detected Not Detected    Parainfluenza 2 PCR Not Detected Not Detected    Parainfluenza 3 PCR Not Detected Not Detected    Parainfluenza 4 PCR Not Detected Not Detected    Resp Syncytial Virus PCR Not Detected Not Detected    Bordetella Parapertussis Not Detected Not Detected    B Pertussis by PCR Not Detected Not Detected    Chlamydia pneumoniae By PCR Not Detected Not Detected    Mycoplasma pneumo by PCR Not Detected Not Detected   Legionella Ag, Ur    Specimen: Urine   Result Value Ref Range    Legionella Pneumophilia Ag, Urine NEGATIVE NEGATIVE   STREP PNEUMONIAE ANTIGEN    Specimen: Urine, indwelling catheter   Result Value Ref Range    Source . CLEAN CATCH URINE     Strep pneumo Ag NEGATIVE    Culture, Blood 1    Specimen: Blood   Result Value Ref Range    Specimen Description . BLOOD LEFT ARM     Culture NO GROWTH 5 DAYS    Culture, Blood 1    Specimen: Blood   Result Value Ref Range    Specimen Description . BLOOD RIGHT ARM     Culture NO GROWTH 5 DAYS    Culture, Urine    Specimen: Urine, clean catch   Result Value Ref Range    Specimen Description . CLEAN CATCH URINE     Culture NO GROWTH    MRSA DNA Probe, Nasal    Specimen: Nasal   Result Value Ref Range    Specimen Description . NASAL SWAB     MRSA, DNA, Nasal (A) NEGATIVE     POSITIVE:  MRSA DNA detected by nucleic acid amplification. Culture, Respiratory    Specimen: Sputum Induced   Result Value Ref Range    Specimen Description . INDUCED SPUTUM     Direct Exam >10, <25 NEUTROPHILS/LPF     Direct Exam < 10 EPITHELIAL CELLS/LPF     Direct Exam NO SIGNIFICANT PATHOGENS SEEN     Culture NO GROWTH    Troponin Result Value Ref Range    Troponin, High Sensitivity 177 (HH) 0 - 22 ng/L   Troponin   Result Value Ref Range    Troponin, High Sensitivity 184 (HH) 0 - 22 ng/L   APTT   Result Value Ref Range    PTT 26.5 24.0 - 36.0 sec   Protime-INR   Result Value Ref Range    Protime 24.9 (H) 11.8 - 14.6 sec    INR 2.3    Phosphorus   Result Value Ref Range    Phosphorus 4.3 2.5 - 4.5 mg/dL   Magnesium   Result Value Ref Range    Magnesium 2.1 1.6 - 2.6 mg/dL   Basic Metabolic Panel   Result Value Ref Range    Glucose 96 70 - 99 mg/dL    BUN 46 (H) 8 - 23 mg/dL    Creatinine 1.77 (H) 0.70 - 1.20 mg/dL    Est, Glom Filt Rate 43 (L) >60 mL/min/1.73m2    Calcium 7.9 (L) 8.6 - 10.4 mg/dL    Sodium 138 135 - 144 mmol/L    Potassium 4.8 3.7 - 5.3 mmol/L    Chloride 97 (L) 98 - 107 mmol/L    CO2 29 20 - 31 mmol/L    Anion Gap 12 9 - 17 mmol/L   CBC with Auto Differential   Result Value Ref Range    WBC 6.8 3.5 - 11.0 k/uL    RBC 5.99 (H) 4.5 - 5.9 m/uL    Hemoglobin 12.1 (L) 13.5 - 17.5 g/dL    Hematocrit 41.4 41 - 53 %    MCV 69.1 (L) 80 - 100 fL    MCH 20.2 (L) 26 - 34 pg    MCHC 29.2 (L) 31 - 37 g/dL    RDW 19.8 (H) 11.5 - 14.9 %    Platelets 55 (L) 568 - 450 k/uL    MPV 8.6 6.0 - 12.0 fL    Seg Neutrophils 79 (H) 36 - 66 %    Lymphocytes 12 (L) 24 - 44 %    Monocytes 8 (H) 1 - 7 %    Eosinophils % 0 0 - 4 %    Basophils 1 0 - 2 %    Segs Absolute 5.40 1.3 - 9.1 k/uL    Absolute Lymph # 0.80 (L) 1.0 - 4.8 k/uL    Absolute Mono # 0.50 0.1 - 1.3 k/uL    Absolute Eos # 0.00 0.0 - 0.4 k/uL    Basophils Absolute 0.10 0.0 - 0.2 k/uL   Blood Gas, Venous   Result Value Ref Range    pH, [place] 7.258 (L) 7.320 - 7.420    pCO2, [place] 63.0 (H) 39.0 - 55.0 mm Hg    pO2, [place] 48.1 30.0 - 50.0 mm Hg    HCO3, Venous 28.1 24.0 - 30.0 mmol/L    Positive Base Excess, [place] 1.0 0.0 - 2.0 mmol/L    O2 Sat, [place] 72.1 60.0 - 85.0 %    Carboxyhemoglobin 4.3 0 - 5 %    Methemoglobin 0.5 0.0 - 1.9 %    Pt Temp 37    Brain Natriuretic Peptide   Result Value Ref Range Pro-BNP 7,797 (H) <300 pg/mL   Hepatic Function Panel   Result Value Ref Range    Albumin 3.9 3.5 - 5.2 g/dL    Alkaline Phosphatase 68 40 - 129 U/L     (H) 5 - 41 U/L     (H) <40 U/L    Total Bilirubin 1.1 0.3 - 1.2 mg/dL    Bilirubin, Direct 0.9 (H) <0.3 mg/dL    Bilirubin, Indirect 0.2 0.0 - 1.0 mg/dL    Total Protein 6.6 6.4 - 8.3 g/dL   Lipase   Result Value Ref Range    Lipase 17 13 - 60 U/L   Urinalysis with Reflex to Culture    Specimen: Urine   Result Value Ref Range    Color, UA Orange (A) Yellow    Turbidity UA Cloudy (A) Clear    Glucose, Ur NEGATIVE NEGATIVE    Bilirubin Urine NEGATIVE  Verified by ictotest. (A) NEGATIVE    Ketones, Urine TRACE (A) NEGATIVE    Specific Gravity, UA 1.018 1.000 - 1.030    Urine Hgb LARGE (A) NEGATIVE    pH, UA 5.0 5.0 - 8.0    Protein, UA 2+ (A) NEGATIVE    Urobilinogen, Urine ELEVATED (A) Normal    Nitrite, Urine NEGATIVE NEGATIVE    Leukocyte Esterase, Urine SMALL (A) NEGATIVE   Microscopic Urinalysis   Result Value Ref Range    WBC, UA 6 TO 9 /HPF    RBC, UA TOO NUMEROUS TO COUNT /HPF    Casts UA 3 to 5 /LPF    Epithelial Cells UA 0 TO 2 /HPF    Bacteria, UA FEW (A) None   Arterial Blood Gases   Result Value Ref Range    pH, Arterial 7.295 (L) 7.350 - 7.450    pCO2, Arterial 60.3 (HH) 35.0 - 45.0 mmHg    pO2, Arterial 63.0 (L) 80.0 - 100.0 mmHg    HCO3, Arterial 29.3 (H) 22.0 - 26.0 mmol/L    Positive Base Excess, Art 2.8 (H) 0.0 - 2.0 mmol/L    O2 Sat, Arterial 85.8 (LL) 95 - 98 %    Carboxyhemoglobin 3.1 0 - 5 %    Methemoglobin 1.0 0.0 - 1.9 %    Pt Temp 37     O2 Device/Flow/% BIPAP     Respiratory Rate 16     Tyron Test PASS     Sample Site Right Radial Artery     Pt.  Position SEMI-FOWLERS     Mode BIPAP     Text for Respiratory RESULTS TO PHYSICIAN    Comprehensive Metabolic Panel w/ Reflex to MG   Result Value Ref Range    Glucose 101 (H) 70 - 99 mg/dL    BUN 46 (H) 8 - 23 mg/dL    Creatinine 1.50 (H) 0.70 - 1.20 mg/dL    Est, Glom Filt Rate 52 (L) >60 mL/min/1.73m2    Calcium 7.5 (L) 8.6 - 10.4 mg/dL    Sodium 136 135 - 144 mmol/L    Potassium 4.8 3.7 - 5.3 mmol/L    Chloride 99 98 - 107 mmol/L    CO2 27 20 - 31 mmol/L    Anion Gap 10 9 - 17 mmol/L    Alkaline Phosphatase 59 40 - 129 U/L     (H) 5 - 41 U/L     (H) <40 U/L    Total Bilirubin 0.8 0.3 - 1.2 mg/dL    Total Protein 5.9 (L) 6.4 - 8.3 g/dL    Albumin 3.3 (L) 3.5 - 5.2 g/dL   CBC with Auto Differential   Result Value Ref Range    WBC 5.6 3.5 - 11.0 k/uL    RBC 5.57 4.5 - 5.9 m/uL    Hemoglobin 11.2 (L) 13.5 - 17.5 g/dL    Hematocrit 38.8 (L) 41 - 53 %    MCV 69.7 (L) 80 - 100 fL    MCH 20.1 (L) 26 - 34 pg    MCHC 28.8 (L) 31 - 37 g/dL    RDW 19.7 (H) 11.5 - 14.9 %    Platelets 67 (L) 684 - 450 k/uL    MPV 9.1 6.0 - 12.0 fL    Seg Neutrophils 79 (H) 36 - 66 %    Lymphocytes 12 (L) 24 - 44 %    Monocytes 8 (H) 1 - 7 %    Eosinophils % 0 0 - 4 %    Basophils 1 0 - 2 %    nRBC 2 per 100 WBC    Segs Absolute 4.41 1.3 - 9.1 k/uL    Absolute Lymph # 0.67 (L) 1.0 - 4.8 k/uL    Absolute Mono # 0.45 0.1 - 1.3 k/uL    Absolute Eos # 0.00 0.0 - 0.4 k/uL    Basophils Absolute 0.06 0.0 - 0.2 k/uL    Morphology ANISOCYTOSIS PRESENT     Morphology HYPOCHROMIA PRESENT     Morphology 1+ ELLIPTOCYTES    Occ Bld, Fecal Scrn   Result Value Ref Range    Occult Blood, Stool #1 NEGATIVE NEGATIVE    Date, Stool #1 [phone]     Time, Stool #1 300    Hemoglobin and Hematocrit   Result Value Ref Range    Hemoglobin 11.1 (L) 13.5 - 17.5 g/dL    Hematocrit 38.7 (L) 41 - 53 %   Hemoglobin and Hematocrit   Result Value Ref Range    Hemoglobin 10.8 (L) 13.5 - 17.5 g/dL    Hematocrit 37.2 (L) 41 - 53 %   Ammonia   Result Value Ref Range    Ammonia 29 16 - 60 umol/L   Hepatitis Panel, Acute   Result Value Ref Range    Hepatitis B Surface Ag NONREACTIVE NONREACTIVE    Hepatitis C Ab REACTIVE (A) NONREACTIVE    Hep B Core Ab, IgM NONREACTIVE NONREACTIVE    Hep A IgM NONREACTIVE NONREACTIVE   Iron and TIBC   Result Value Ref Range    Iron 14 (L) 59 - 158 ug/dL    TIBC 426 250 - 450 ug/dL    Iron Saturation 3 (L) 20 - 55 %    UIBC 412 (H) 112 - 347 ug/dL   Ferritin   Result Value Ref Range    Ferritin 14 (L) 30 - 400 ng/mL   Mycoplasma Ab,IgM   Result Value Ref Range    Mycoplasma pneumo IgM 0.25 <0.91   Procalcitonin   Result Value Ref Range    Procalcitonin 0.09 (H) <0.09 ng/mL   C-Reactive Protein   Result Value Ref Range    CRP 16.4 (H) 0.0 - 5.0 mg/L   Fibrin Split Products   Result Value Ref Range    FDP >5 (H) <5 ug/mL   Sedimentation Rate   Result Value Ref Range    Sed Rate 1 0 - 20 mm/Hr   Drug Scr, Abuse, Ur   Result Value Ref Range    Amphetamine Screen, Ur NEGATIVE NEGATIVE    Barbiturate Screen, Ur NEGATIVE NEGATIVE    Benzodiazepine Screen, Urine NEGATIVE NEGATIVE    Cocaine Metabolite, Urine NEGATIVE NEGATIVE    Methadone Screen, Urine NEGATIVE NEGATIVE    Opiates, Urine NEGATIVE NEGATIVE    Phencyclidine, Urine NEGATIVE NEGATIVE    Cannabinoid Scrn, Ur NEGATIVE NEGATIVE    Oxycodone Screen, Ur NEGATIVE NEGATIVE    Fentanyl, Ur NEGATIVE NEGATIVE    Test Information       Assay provides medical screening only. The absence of expected drug(s) and/or metabolite(s) may indicate diluted or adulterated urine, limitations of testing or timing of collection. Troponin   Result Value Ref Range    Troponin, High Sensitivity 195 (HH) 0 - 22 ng/L   Vitamin B12 & Folate   Result Value Ref Range    Vitamin B-12 1926 (H) 232 - 1245 pg/mL    Folate 10.0 >4.8 ng/mL   HIV Screen   Result Value Ref Range    HIV Ag/Ab NONREACTIVE NONREACTIVE   MONOCLONAL PANEL   Result Value Ref Range    Kappa Free Light Chains QNT 2.79 (H) 0.37 - 1.94 mg/dL    Lambda Free Light Chains QNT 20.89 (H) 0.57 - 2.63 mg/dL    Free Kappa/Lambda Ratio 0.13 (L) 0.26 - 1.65    Serum IFX Interp       A ZONE OF RESTRICTION IS PRESENT IN THE GAMMAGLOBULIN REGION.   CONFIRMED BY IMMUNOFIXATION TO BE MONOCLONAL    Pathologist       Reviewed by pathologist: Leonie Huddleston D.O. Total Protein 5.5 (L) 6.4 - 8.3 g/dL    Albumin (calculated) 3.6 3.2 - 5.2 g/dL    Albumin % 65 45 - 65 %    Alpha-1-Globulin 0.2 0.1 - 0.4 g/dL    Alpha 1 % 3 3 - 6 %    Alpha-2-Globulin 0.4 (L) 0.5 - 0.9 g/dL    Alpha 2 % 7 6 - 13 %    Beta Globulin 0.6 0.5 - 1.1 g/dL    Beta Percent 11 11 - 19 %    Gamma Globulin 0.8 0.5 - 1.5 g/dL    Gamma Globulin % 14 9 - 20 %    Total Prot. Sum 5.6 (L) 6.3 - 8.2 g/dL    Total Prot. Sum,% 100 98 - 102 %    Protein Electrophoresis, Serum       A ZONE OF RESTRICTION IS PRESENT IN THE GAMMAGLOBULIN REGION. CONFIRMED BY IMMUNOFIXATION TO BE MONOCLONAL    Pathologist       Reviewed by pathologist:  Leonie Huddleston D.O. Path Review, Smear   Result Value Ref Range    Pathologist Review ELECTRONICALLY SIGNED.  Nohelia Arce MD    Reticulocytes   Result Value Ref Range    Retic % 2.8 (H) 0.5 - 2.0 %    Absolute Retic # 0.157 (H) 0.0245 - 0.098 M/uL   Fibrinogen   Result Value Ref Range    Fibrinogen 141 (L) 210 - 530 mg/dL   Ethanol   Result Value Ref Range    Ethanol <10 <10 mg/dL    Ethanol percent <0.010 %   Lactate Dehydrogenase   Result Value Ref Range     (H) 135 - 225 U/L   Hemoglobin and Hematocrit   Result Value Ref Range    Hemoglobin 10.9 (L) 13.5 - 17.5 g/dL    Hematocrit 38.0 (L) 41 - 53 %   Haptoglobin   Result Value Ref Range    Haptoglobin 60 30 - 200 mg/dL   D-Dimer, Quantitative   Result Value Ref Range    D-Dimer, Quant 6.70 (H) 0.00 - 0.59 mg/L FEU   Urinalysis with Reflex to Culture    Specimen: Urine   Result Value Ref Range    Color, UA Dark Yellow (A) Yellow    Turbidity UA Clear Clear    Glucose, Ur NEGATIVE NEGATIVE    Bilirubin Urine NEGATIVE NEGATIVE    Ketones, Urine TRACE (A) NEGATIVE    Specific [place], UA 1.015 1.000 - 1.030    Urine Hgb LARGE (A) NEGATIVE    pH, UA 5.0 5.0 - 8.0    Protein, UA 1+ (A) NEGATIVE    Urobilinogen, Urine Normal Normal    Nitrite, Urine NEGATIVE NEGATIVE    Leukocyte Esterase, Urine SMALL (A) NEGATIVE   APTT   Result Value Ref Range    PTT 38.7 (H) 24.0 - 36.0 sec   Protime-INR   Result Value Ref Range    Protime 24.4 (H) 11.8 - 14.6 sec    INR 2.2    Microscopic Urinalysis   Result Value Ref Range    WBC, UA 10 TO 20 /HPF    RBC, UA TOO NUMEROUS TO COUNT /HPF    Casts UA HYALINE /LPF    Casts UA 0 TO 2 /LPF    Epithelial Cells UA 3 to 5 /HPF   BLOOD GAS, ARTERIAL   Result Value Ref Range    pH, Arterial 7.314 (L) 7.350 - 7.450    pCO2, Arterial 61.9 (HH) 35.0 - 45.0 mmHg    pO2, Arterial 58.1 (LL) 80.0 - 100.0 mmHg    HCO3, Arterial 31.4 (H) 22.0 - 26.0 mmol/L    Positive Base Excess, Art 5.3 (H) 0.0 - 2.0 mmol/L    O2 Sat, Arterial 86.3 (LL) 95 - 98 %    Carboxyhemoglobin 2.1 0 - 5 %    Methemoglobin 0.8 0.0 - 1.9 %    Pt Temp 37.0     O2 Device/Flow/% VENTILATOR     Respiratory Rate 22     Tyron Test PASS     Sample Site Left Radial Artery     Pt. Position SEMI-FOWLERS     Mode PRVC     Set Rate 22     Total Rate 26          FIO2 40     Peep/Cpap 8    SURGICAL PATHOLOGY REPORT   Result Value Ref Range    Surgical Pathology Report       [id number]  [facility]  [employer]  [facility]  ANATOMIC PATHOLOGY  [address] [place] [address]. [facility], [address]  [phone]  Fax: [phone]  SURGICAL PATHOLOGY CONSULTATION    Patient Name: Marco Antonio Rashid  MR#: [id number]  Specimen #[id number]    Procedures/Addenda  PERIPHERAL BLOOD REPORT     Date Ordered:     1/2/2023     Status:  Signed Out       Date Complete:     1/3/2023     By: Zafar Velez M.D. Date Reported:     1/3/2023       INTERPRETATION  Peripheral blood:  Microcytic, hypochromic anemia. The patient's iron studies are compatible with iron deficiency anemia. Lymphopenia. Differential considerations include acute illness/infection,  medication effect, smoking, and debilitating diseases.     Thrombocytopenia  Differential considerations include bone marrow hypoproduction and  immune destruction (idiopathic thrombocytopenic purpura, drug effect). RESULTS-COMMENTS  PERIPHERAL BLOOD STUDY    CBC: Please see the electronic health record  for CBC parameters  (, 01/02/2023, 05:43). PLATELETS: Decreased platelets. Platelets show normal morphology. LEUKOCYTES: Decreased lymphocytes. White blood cells show normal  morphology. There are no blasts. ERYTHROCYTES: Microcytic, hypochromic. Anisocytosis and  poikilocytosis. Polychromasia. Reticulocyte count 2.8%. Rare RBC  fragments. Note: The electronic health record is reviewed. Josue Negron M.D.                    Source:  A: Peripheral Blood     Comprehensive Metabolic Panel w/ Reflex to MG   Result Value Ref Range    Glucose 91 70 - 99 mg/dL    BUN 32 (H) 8 - 23 mg/dL    Creatinine 0.97 0.70 - 1.20 mg/dL    Est, Glom Filt Rate >60 >60 mL/min/1.73m2    Calcium 7.4 (L) 8.6 - 10.4 mg/dL    Sodium 143 135 - 144 mmol/L    Potassium 3.7 3.7 - 5.3 mmol/L    Chloride 105 98 - 107 mmol/L    CO2 30 20 - 31 mmol/L    Anion Gap 8 (L) 9 - 17 mmol/L    Alkaline Phosphatase 50 40 - 129 U/L     (H) 5 - 41 U/L     (H) <40 U/L    Total Bilirubin 0.9 0.3 - 1.2 mg/dL    Total Protein 5.2 (L) 6.4 - 8.3 g/dL    Albumin 3.1 (L) 3.5 - 5.2 g/dL   CBC with Auto Differential   Result Value Ref Range    WBC 5.7 3.5 - 11.0 k/uL    RBC 5.42 4.5 - 5.9 m/uL    Hemoglobin 11.0 (L) 13.5 - 17.5 g/dL    Hematocrit 37.6 (L) 41 - 53 %    MCV 69.4 (L) 80 - 100 fL    MCH 20.2 (L) 26 - 34 pg    MCHC 29.2 (L) 31 - 37 g/dL    RDW 20.0 (H) 11.5 - 14.9 %    Platelets 66 (L) 610 - 450 k/uL    MPV 9.4 6.0 - 12.0 fL    Seg Neutrophils 85 (H) 36 - 66 %    Lymphocytes 9 (L) 24 - 44 %    Monocytes 5 1 - 7 %    Eosinophils % 0 0 - 4 %    Basophils 0 0 - 2 %    Bands 1 0 - 10 %    nRBC 1 (H) 0 per 100 WBC    Segs Absolute 4.87 1.3 - 9.1 k/uL    Absolute Lymph # 0.52 (L) 1.0 - 4.8 k/uL    Absolute Mono # 0.29 0.1 - 1.3 k/uL    Absolute Eos # 0. 00 0.0 - 0.4 k/uL    Basophils Absolute 0.00 0.0 - 0.2 k/uL    Absolute Bands # 0.06 0.0 - 1.0 k/uL    Morphology ANISOCYTOSIS PRESENT     Morphology HYPOCHROMIA PRESENT     Morphology 1+ POLYCHROMASIA     Morphology 1+ ELLIPTOCYTES    BLOOD GAS, ARTERIAL   Result Value Ref Range    pH, Arterial 7.373 7.350 - 7.450    pCO2, Arterial 56.0 (HH) 35.0 - 45.0 mmHg    pO2, Arterial 61.4 (L) 80.0 - 100.0 mmHg    HCO3, Arterial 32.6 (H) 22.0 - 26.0 mmol/L    Positive Base Excess, Art 7.4 (H) 0.0 - 2.0 mmol/L    O2 Sat, Arterial 89.2 (L) 95 - 98 %    Carboxyhemoglobin 1.7 0 - 5 %    Methemoglobin 0.9 0.0 - 1.9 %    Pt Temp 37     O2 Device/Flow/% VENTILATOR     Respiratory Rate 22     Tyron Test PASS     Sample Site Right Radial Artery     Pt.  Position SEMI-FOWLERS     Mode PRVC     Set Rate 22     Total Rate 22          FIO2 35     Peep/Cpap 8     Text for Respiratory RESULTS GIVEN TO RN    Protime-INR   Result Value Ref Range    Protime 21.9 (H) 11.8 - 14.6 sec    INR 1.9    APTT   Result Value Ref Range    PTT 38.4 (H) 24.0 - 36.0 sec   Triglyceride   Result Value Ref Range    Triglycerides 85 <150 mg/dL   CHLORIDE, URINE, RANDOM   Result Value Ref Range    Chloride, Ur 34 mmol/L   Protein / Creatinine Ratio, Urine   Result Value Ref Range    Total Protein, Urine 23 mg/dL    Creatinine, Ur 79.8 39.0 - 259.0 mg/dL    Urine Total Protein Creatinine Ratio 0.29 (H) 0.00 - 0.20   SODIUM, URINE, RANDOM   Result Value Ref Range    Sodium,Ur <20 mmol/L   BLOOD GAS, ARTERIAL   Result Value Ref Range    pH, Arterial 7.360 7.350 - 7.450    pCO2, Arterial 55.9 (HH) 35.0 - 45.0 mmHg    pO2, Arterial 71.4 (L) 80.0 - 100.0 mmHg    HCO3, Arterial 31.6 (H) 22.0 - 26.0 mmol/L    Positive Base Excess, Art 6.1 (H) 0.0 - 2.0 mmol/L    O2 Sat, Arterial 91.7 (L) 95 - 98 %    Carboxyhemoglobin 1.2 0 - 5 %    Methemoglobin 1.3 0.0 - 1.9 %    Pt Temp 37     O2 Device/Flow/% VENTILATOR     Tyron Test PASS     Sample Site Right Radial Artery Pt. Position SEMI-FOWLERS     Mode PRVC     Text for Respiratory RESULTS GIVEN TO RN    CBC with Auto Differential   Result Value Ref Range    WBC 6.0 3.5 - 11.0 k/uL    RBC 5.56 4.5 - 5.9 m/uL    Hemoglobin 10.8 (L) 13.5 - 17.5 g/dL    Hematocrit 38.2 (L) 41 - 53 %    MCV 68.8 (L) 80 - 100 fL    MCH 19.5 (L) 26 - 34 pg    MCHC 28.3 (L) 31 - 37 g/dL    RDW 20.1 (H) 11.5 - 14.9 %    Platelets 75 (L) 568 - 450 k/uL    MPV 9.3 6.0 - 12.0 fL    Seg Neutrophils 82 (H) 36 - 66 %    Lymphocytes 7 (L) 24 - 44 %    Monocytes 9 (H) 1 - 7 %    Eosinophils % 1 0 - 4 %    Basophils 1 0 - 2 %    Segs Absolute 4.90 1.3 - 9.1 k/uL    Absolute Lymph # 0.40 (L) 1.0 - 4.8 k/uL    Absolute Mono # 0.50 0.1 - 1.3 k/uL    Absolute Eos # 0.10 0.0 - 0.4 k/uL    Basophils Absolute 0.10 0.0 - 0.2 k/uL   Comprehensive Metabolic Panel w/ Reflex to MG   Result Value Ref Range    Glucose 85 70 - 99 mg/dL    BUN 16 8 - 23 mg/dL    Creatinine 0.64 (L) 0.70 - 1.20 mg/dL    Est, Glom Filt Rate >60 >60 mL/min/1.73m2    Calcium 7.4 (L) 8.6 - 10.4 mg/dL    Sodium 143 135 - 144 mmol/L    Potassium 3.3 (L) 3.7 - 5.3 mmol/L    Chloride 107 98 - 107 mmol/L    CO2 29 20 - 31 mmol/L    Anion Gap 7 (L) 9 - 17 mmol/L    Alkaline Phosphatase 49 40 - 129 U/L     (H) 5 - 41 U/L     (H) <40 U/L    Total Bilirubin 0.8 0.3 - 1.2 mg/dL    Total Protein 5.0 (L) 6.4 - 8.3 g/dL    Albumin 2.8 (L) 3.5 - 5.2 g/dL   Vancomycin Level, Random   Result Value Ref Range    Vancomycin Rm 16.1 ug/mL    Vancomycin Random Dose amount 1g     Vancomycin Random Date last dose 1/4/22     Vancomycin Random Time last dose 0232    Magnesium   Result Value Ref Range    Magnesium 2.0 1.6 - 2.6 mg/dL   Hepatitis C RNA, quantitative, PCR   Result Value Ref Range    Source . PLASMA     Hepatitis C RNA-PCR 17,400 IU/mL    Hepatitis C RNA Quant Log 4.24 Log IU/mL    HCV RNA PCR, Quant DETECTED (A) Not Detected   CBC with Auto Differential   Result Value Ref Range    WBC 5.3 3.5 - 11.0 k/uL    RBC 5.80 4.5 - 5.9 m/uL    Hemoglobin 11.4 (L) 13.5 - 17.5 g/dL    Hematocrit 40.0 (L) 41 - 53 %    MCV 68.9 (L) 80 - 100 fL    MCH 19.7 (L) 26 - 34 pg    MCHC 28.5 (L) 31 - 37 g/dL    RDW 20.5 (H) 11.5 - 14.9 %    Platelets 66 (L) 899 - 450 k/uL    MPV 9.1 6.0 - 12.0 fL    Seg Neutrophils 79 (H) 36 - 66 %    Lymphocytes 8 (L) 24 - 44 %    Monocytes 10 (H) 1 - 7 %    Eosinophils % 2 0 - 4 %    Basophils 1 0 - 2 %    Segs Absolute 4.19 1.3 - 9.1 k/uL    Absolute Lymph # 0.42 (L) 1.0 - 4.8 k/uL    Absolute Mono # 0.53 0.1 - 1.3 k/uL    Absolute Eos # 0.11 0.0 - 0.4 k/uL    Basophils Absolute 0.05 0.0 - 0.2 k/uL    Morphology ANISOCYTOSIS PRESENT     Morphology MICROCYTOSIS PRESENT     Morphology HYPOCHROMIA PRESENT     Morphology FEW ELLIPTOCYTES     Morphology FEW TARGET CELLS    Comprehensive Metabolic Panel w/ Reflex to MG   Result Value Ref Range    Glucose 84 70 - 99 mg/dL    BUN 11 8 - 23 mg/dL    Creatinine 0.58 (L) 0.70 - 1.20 mg/dL    Est, Glom Filt Rate >60 >60 mL/min/1.73m2    Calcium 7.9 (L) 8.6 - 10.4 mg/dL    Sodium 146 (H) 135 - 144 mmol/L    Potassium 3.4 (L) 3.7 - 5.3 mmol/L    Chloride 110 (H) 98 - 107 mmol/L    CO2 30 20 - 31 mmol/L    Anion Gap 6 (L) 9 - 17 mmol/L    Alkaline Phosphatase 53 40 - 129 U/L     (H) 5 - 41 U/L     (H) <40 U/L    Total Bilirubin 0.8 0.3 - 1.2 mg/dL    Total Protein 5.0 (L) 6.4 - 8.3 g/dL    Albumin 2.9 (L) 3.5 - 5.2 g/dL   Fibrinogen   Result Value Ref Range    Fibrinogen 98 (L) 210 - 530 mg/dL   Protime-INR   Result Value Ref Range    Protime 20.3 (H) 11.8 - 14.6 sec    INR 1.7    APTT   Result Value Ref Range    PTT 41.6 (H) 24.0 - 36.0 sec   BLOOD GAS, ARTERIAL   Result Value Ref Range    pH, Arterial 7.372 7.350 - 7.450    pCO2, Arterial 51.6 (HH) 35.0 - 45.0 mmHg    pO2, Arterial 62.9 (L) 80.0 - 100.0 mmHg    HCO3, Arterial 29.9 (H) 22.0 - 26.0 mmol/L    Positive Base Excess, Art 4.7 (H) 0.0 - 2.0 mmol/L    O2 Sat, Arterial 89.7 (L) 95 - 98 %    Carboxyhemoglobin 1.7 0 - 5 %    Methemoglobin 1.1 0.0 - 1.9 %    Pt Temp 37     O2 Device/Flow/% VENTILATOR     Respiratory Rate 22     Tyron Test PASS     Sample Site Right Radial Artery     Pt.  Position SEMI-FOWLERS     Mode PRVC     Set Rate 22     Total Rate 22          FIO2 40     Peep/Cpap 8     Text for Respiratory RESULTS GIVEN TO RN    Magnesium   Result Value Ref Range    Magnesium 2.0 1.6 - 2.6 mg/dL   CBC with Auto Differential   Result Value Ref Range    WBC 5.2 3.5 - 11.0 k/uL    RBC 5.33 4.5 - 5.9 m/uL    Hemoglobin 10.5 (L) 13.5 - 17.5 g/dL    Hematocrit 38.4 (L) 41 - 53 %    MCV 72.0 (L) 80 - 100 fL    MCH 19.7 (L) 26 - 34 pg    MCHC 27.4 (L) 31 - 37 g/dL    RDW 20.3 (H) 11.5 - 14.9 %    Platelets 71 (L) 869 - 450 k/uL    MPV 9.3 6.0 - 12.0 fL    Seg Neutrophils 90 (H) 36 - 66 %    Lymphocytes 4 (L) 24 - 44 %    Monocytes 5 1 - 7 %    Eosinophils % 0 0 - 4 %    Basophils 0 0 - 2 %    Bands 1 0 - 10 %    Segs Absolute 4.68 1.3 - 9.1 k/uL    Absolute Lymph # 0.21 (L) 1.0 - 4.8 k/uL    Absolute Mono # 0.26 0.1 - 1.3 k/uL    Absolute Eos # 0.00 0.0 - 0.4 k/uL    Basophils Absolute 0.00 0.0 - 0.2 k/uL    Absolute Bands # 0.05 0.0 - 1.0 k/uL    Morphology ANISOCYTOSIS PRESENT     Morphology HYPOCHROMIA PRESENT     Morphology MICROCYTOSIS PRESENT     Morphology 1+ ELLIPTOCYTES     Morphology 1+ TEARDROPS    Comprehensive Metabolic Panel w/ Reflex to MG   Result Value Ref Range    Glucose 111 (H) 70 - 99 mg/dL    BUN 7 (L) 8 - 23 mg/dL    Creatinine 0.42 (L) 0.70 - 1.20 mg/dL    Est, Glom Filt Rate >60 >60 mL/min/1.73m2    Calcium 7.5 (L) 8.6 - 10.4 mg/dL    Sodium 144 135 - 144 mmol/L    Potassium 4.0 3.7 - 5.3 mmol/L    Chloride 111 (H) 98 - 107 mmol/L    CO2 29 20 - 31 mmol/L    Anion Gap 4 (L) 9 - 17 mmol/L    Alkaline Phosphatase 46 40 - 129 U/L     (H) 5 - 41 U/L    AST 74 (H) <40 U/L    Total Bilirubin 0.9 0.3 - 1.2 mg/dL    Total Protein 4.6 (L) 6.4 - 8.3 g/dL    Albumin 2.6 (L) 3.5 - 5.2 g/dL   Vancomycin Level, Random   Result Value Ref Range    Vancomycin Rm 20.3 ug/mL    Vancomycin Random Dose amount 1,250     Vancomycin Random Date last dose 569931     Vancomycin Random Time last dose 0546    Fibrinogen   Result Value Ref Range    Fibrinogen 109 (L) 210 - 530 mg/dL   Protime-INR   Result Value Ref Range    Protime 20.1 (H) 11.8 - 14.6 sec    INR 1.7    Comprehensive Metabolic Panel w/ Reflex to MG   Result Value Ref Range    Glucose 91 70 - 99 mg/dL    BUN 5 (L) 8 - 23 mg/dL    Creatinine <0.40 (L) 0.70 - 1.20 mg/dL    Est, Glom Filt Rate Can not be calculated >60 mL/min/1.73m2    Calcium 8.1 (L) 8.6 - 10.4 mg/dL    Sodium 147 (H) 135 - 144 mmol/L    Potassium 4.1 3.7 - 5.3 mmol/L    Chloride 108 (H) 98 - 107 mmol/L    CO2 26 20 - 31 mmol/L    Anion Gap 13 9 - 17 mmol/L    Alkaline Phosphatase 51 40 - 129 U/L     (H) 5 - 41 U/L    AST 63 (H) <40 U/L    Total Bilirubin 1.6 (H) 0.3 - 1.2 mg/dL    Total Protein 5.3 (L) 6.4 - 8.3 g/dL    Albumin 2.9 (L) 3.5 - 5.2 g/dL   SPECIMEN REJECTION   Result Value Ref Range    Specimen Source . BLOOD     Ordered Test CDP     Reason for Rejection Unable to perform testing: Specimen clotted.     CBC with Auto Differential   Result Value Ref Range    WBC 7.1 3.5 - 11.0 k/uL    RBC 5.90 4.5 - 5.9 m/uL    Hemoglobin 11.7 (L) 13.5 - 17.5 g/dL    Hematocrit 41.4 41 - 53 %    MCV 70.2 (L) 80 - 100 fL    MCH 19.8 (L) 26 - 34 pg    MCHC 28.2 (L) 31 - 37 g/dL    RDW 20.4 (H) 11.5 - 14.9 %    Platelets 68 (L) 570 - 450 k/uL    MPV 9.1 6.0 - 12.0 fL    Seg Neutrophils 82 (H) 36 - 66 %    Lymphocytes 10 (L) 24 - 44 %    Monocytes 6 1 - 7 %    Eosinophils % 1 0 - 4 %    Basophils 1 0 - 2 %    Segs Absolute 5.82 1.3 - 9.1 k/uL    Absolute Lymph # 0.71 (L) 1.0 - 4.8 k/uL    Absolute Mono # 0.43 0.1 - 1.3 k/uL    Absolute Eos # 0.07 0.0 - 0.4 k/uL    Basophils Absolute 0.07 0.0 - 0.2 k/uL    Morphology ANISOCYTOSIS PRESENT     Morphology 1+ TARGET CELLS Morphology 1+ TEARDROPS     Morphology 1+ ELLIPTOCYTES    Protime-INR   Result Value Ref Range    Protime 19.4 (H) 11.8 - 14.6 sec    INR 1.6    APTT   Result Value Ref Range    PTT 35.4 24.0 - 36.0 sec   CBC with Auto Differential   Result Value Ref Range    WBC 5.9 3.5 - 11.0 k/uL    RBC 5.61 4.5 - 5.9 m/uL    Hemoglobin 11.4 (L) 13.5 - 17.5 g/dL    Hematocrit 39.3 (L) 41 - 53 %    MCV 70.0 (L) 80 - 100 fL    MCH 20.2 (L) 26 - 34 pg    MCHC 28.9 (L) 31 - 37 g/dL    RDW 20.2 (H) 11.5 - 14.9 %    Platelets 63 (L) 983 - 450 k/uL    MPV 8.8 6.0 - 12.0 fL    Seg Neutrophils 76 (H) 36 - 66 %    Lymphocytes 11 (L) 24 - 44 %    Monocytes 11 (H) 1 - 7 %    Eosinophils % 1 0 - 4 %    Basophils 1 0 - 2 %    Segs Absolute 4.48 1.3 - 9.1 k/uL    Absolute Lymph # 0.65 (L) 1.0 - 4.8 k/uL    Absolute Mono # 0.65 0.1 - 1.3 k/uL    Absolute Eos # 0.06 0.0 - 0.4 k/uL    Basophils Absolute 0.06 0.0 - 0.2 k/uL    Morphology ANISOCYTOSIS PRESENT     Morphology MICROCYTOSIS PRESENT     Morphology HYPOCHROMIA PRESENT     Morphology 1+ TARGET CELLS     Morphology 1+ ELLIPTOCYTES    Comprehensive Metabolic Panel w/ Reflex to MG   Result Value Ref Range    Glucose 118 (H) 70 - 99 mg/dL    BUN 4 (L) 8 - 23 mg/dL    Creatinine <0.40 (L) 0.70 - 1.20 mg/dL    Est, Glom Filt Rate Can not be calculated >60 mL/min/1.73m2    Calcium 8.1 (L) 8.6 - 10.4 mg/dL    Sodium 147 (H) 135 - 144 mmol/L    Potassium 3.3 (L) 3.7 - 5.3 mmol/L    Chloride 109 (H) 98 - 107 mmol/L    CO2 34 (H) 20 - 31 mmol/L    Anion Gap 4 (L) 9 - 17 mmol/L    Alkaline Phosphatase 45 40 - 129 U/L    ALT 99 (H) 5 - 41 U/L    AST 38 <40 U/L    Total Bilirubin 1.7 (H) 0.3 - 1.2 mg/dL    Total Protein 5.1 (L) 6.4 - 8.3 g/dL    Albumin 2.8 (L) 3.5 - 5.2 g/dL   Fibrinogen   Result Value Ref Range    Fibrinogen 151 (L) 210 - 530 mg/dL   APTT   Result Value Ref Range    PTT 34.4 24.0 - 36.0 sec   Protime-INR   Result Value Ref Range    Protime 19.1 (H) 11.8 - 14.6 sec    INR 1.6 Magnesium   Result Value Ref Range    Magnesium 1.8 1.6 - 2.6 mg/dL   CBC with Auto Differential   Result Value Ref Range    WBC 6.3 3.5 - 11.0 k/uL    RBC 5.76 4.5 - 5.9 m/uL    Hemoglobin 11.7 (L) 13.5 - 17.5 g/dL    Hematocrit 40.3 (L) 41 - 53 %    MCV 70.0 (L) 80 - 100 fL    MCH 20.3 (L) 26 - 34 pg    MCHC 29.0 (L) 31 - 37 g/dL    RDW 20.5 (H) 11.5 - 14.9 %    Platelets 65 (L) 457 - 450 k/uL    MPV 8.6 6.0 - 12.0 fL    Seg Neutrophils 77 (H) 36 - 66 %    Lymphocytes 11 (L) 24 - 44 %    Monocytes 10 (H) 1 - 7 %    Eosinophils % 1 0 - 4 %    Basophils 1 0 - 2 %    Segs Absolute 4.86 1.3 - 9.1 k/uL    Absolute Lymph # 0.69 (L) 1.0 - 4.8 k/uL    Absolute Mono # 0.63 0.1 - 1.3 k/uL    Absolute Eos # 0.06 0.0 - 0.4 k/uL    Basophils Absolute 0.06 0.0 - 0.2 k/uL    Morphology HYPOCHROMIA PRESENT     Morphology MICROCYTOSIS PRESENT     Morphology ANISOCYTOSIS PRESENT     Morphology 1+ POLYCHROMASIA     Morphology 1+ TARGET CELLS     Morphology 2+ ECHINOCYTES    Comprehensive Metabolic Panel w/ Reflex to MG   Result Value Ref Range    Glucose 101 (H) 70 - 99 mg/dL    BUN 3 (L) 8 - 23 mg/dL    Creatinine <0.40 (L) 0.70 - 1.20 mg/dL    Est, Glom Filt Rate Can not be calculated >60 mL/min/1.73m2    Calcium 8.0 (L) 8.6 - 10.4 mg/dL    Sodium 142 135 - 144 mmol/L    Potassium 3.3 (L) 3.7 - 5.3 mmol/L    Chloride 104 98 - 107 mmol/L    CO2 35 (H) 20 - 31 mmol/L    Anion Gap 3 (L) 9 - 17 mmol/L    Alkaline Phosphatase 47 40 - 129 U/L    ALT 81 (H) 5 - 41 U/L    AST 32 <40 U/L    Total Bilirubin 1.8 (H) 0.3 - 1.2 mg/dL    Total Protein 5.3 (L) 6.4 - 8.3 g/dL    Albumin 2.8 (L) 3.5 - 5.2 g/dL   Brain Natriuretic Peptide   Result Value Ref Range    Pro-BNP 7,250 (H) <300 pg/mL   Magnesium   Result Value Ref Range    Magnesium 1.7 1.6 - 2.6 mg/dL   Comprehensive Metabolic Panel w/ Reflex to MG   Result Value Ref Range    Glucose 96 70 - 99 mg/dL    BUN 3 (L) 8 - 23 mg/dL    Creatinine <0.40 (L) 0.70 - 1.20 mg/dL    Est, Glom Filt Rate Can not be calculated >60 mL/min/1.73m2    Calcium 8.6 8.6 - 10.4 mg/dL    Sodium 140 135 - 144 mmol/L    Potassium 3.3 (L) 3.7 - 5.3 mmol/L    Chloride 97 (L) 98 - 107 mmol/L    CO2 33 (H) 20 - 31 mmol/L    Anion Gap 10 9 - 17 mmol/L    Alkaline Phosphatase 52 40 - 129 U/L    ALT 74 (H) 5 - 41 U/L    AST 41 (H) <40 U/L    Total Bilirubin 2.3 (H) 0.3 - 1.2 mg/dL    Total Protein 6.1 (L) 6.4 - 8.3 g/dL    Albumin 3.3 (L) 3.5 - 5.2 g/dL   CBC with Auto Differential   Result Value Ref Range    WBC 7.2 3.5 - 11.0 k/uL    RBC 6.52 (H) 4.5 - 5.9 m/uL    Hemoglobin 13.6 13.5 - 17.5 g/dL    Hematocrit 45.3 41 - 53 %    MCV 69.5 (L) 80 - 100 fL    MCH 20.9 (L) 26 - 34 pg    MCHC 30.0 (L) 31 - 37 g/dL    RDW 21.5 (H) 11.5 - 14.9 %    Platelets 67 (L) 403 - 450 k/uL    MPV 8.6 6.0 - 12.0 fL    Seg Neutrophils 77 (H) 36 - 66 %    Lymphocytes 10 (L) 24 - 44 %    Monocytes 10 (H) 1 - 7 %    Eosinophils % 1 0 - 4 %    Basophils 2 0 - 2 %    Segs Absolute 5.55 1.3 - 9.1 k/uL    Absolute Lymph # 0.72 (L) 1.0 - 4.8 k/uL    Absolute Mono # 0.72 0.1 - 1.3 k/uL    Absolute Eos # 0.07 0.0 - 0.4 k/uL    Basophils Absolute 0.14 0.0 - 0.2 k/uL    Morphology ANISOCYTOSIS PRESENT     Morphology HYPOCHROMIA PRESENT     Morphology MICROCYTOSIS PRESENT     Morphology GIANT PLATELETS    Magnesium   Result Value Ref Range    Magnesium 1.5 (L) 1.6 - 2.6 mg/dL   SPECIMEN REJECTION   Result Value Ref Range    Specimen Source . Random Urine     Ordered Test URIFX     Reason for Rejection       Unable to perform testing: Specimen quantity not sufficient.    LOR Screen with Reflex   Result Value Ref Range    LOR NEGATIVE NEGATIVE    PHAM Antibodies Screen 0.3 <0.7 U/mL    Anti ds DNA 4.2 <10.0 IU/mL   C-Reactive Protein   Result Value Ref Range    CRP 10.0 (H) 0.0 - 5.0 mg/L   Homocysteine   Result Value Ref Range    Homocysteine 8.2 <15.0 umol/L   Sedimentation Rate   Result Value Ref Range    Sed Rate 2 0 - 20 mm/Hr   Comprehensive Metabolic Panel w/ Reflex to MG   Result Value Ref Range    Glucose 90 70 - 99 mg/dL    BUN 4 (L) 8 - 23 mg/dL    Creatinine 0.42 (L) 0.70 - 1.20 mg/dL    Est, Glom Filt Rate >60 >60 mL/min/1.73m2    Calcium 8.1 (L) 8.6 - 10.4 mg/dL    Sodium 141 135 - 144 mmol/L    Potassium 3.0 (L) 3.7 - 5.3 mmol/L    Chloride 98 98 - 107 mmol/L    CO2 37 (H) 20 - 31 mmol/L    Anion Gap 6 (L) 9 - 17 mmol/L    Alkaline Phosphatase 48 40 - 129 U/L    ALT 52 (H) 5 - 41 U/L    AST 30 <40 U/L    Total Bilirubin 1.7 (H) 0.3 - 1.2 mg/dL    Total Protein 5.4 (L) 6.4 - 8.3 g/dL    Albumin 2.7 (L) 3.5 - 5.2 g/dL   CBC with Auto Differential   Result Value Ref Range    WBC 6.5 3.5 - 11.0 k/uL    RBC 6.23 (H) 4.5 - 5.9 m/uL    Hemoglobin 12.9 (L) 13.5 - 17.5 g/dL    Hematocrit 43.6 41 - 53 %    MCV 70.0 (L) 80 - 100 fL    MCH 20.8 (L) 26 - 34 pg    MCHC 29.7 (L) 31 - 37 g/dL    RDW 21.4 (H) 11.5 - 14.9 %    Platelets 85 (L) 756 - 450 k/uL    MPV 8.9 6.0 - 12.0 fL    Seg Neutrophils 77 (H) 36 - 66 %    Lymphocytes 11 (L) 24 - 44 %    Monocytes 10 (H) 1 - 7 %    Eosinophils % 1 0 - 4 %    Basophils 1 0 - 2 %    Segs Absolute 4.99 1.3 - 9.1 k/uL    Absolute Lymph # 0.72 (L) 1.0 - 4.8 k/uL    Absolute Mono # 0.65 0.1 - 1.3 k/uL    Absolute Eos # 0.07 0.0 - 0.4 k/uL    Basophils Absolute 0.07 0.0 - 0.2 k/uL    Morphology ANISOCYTOSIS PRESENT     Morphology MICROCYTOSIS PRESENT     Morphology HYPOCHROMIA PRESENT     Morphology 1+ TARGET CELLS    Magnesium   Result Value Ref Range    Magnesium 1.8 1.6 - 2.6 mg/dL   Potassium   Result Value Ref Range    Potassium 3.3 (L) 3.7 - 5.3 mmol/L   Comprehensive Metabolic Panel w/ Reflex to MG   Result Value Ref Range    Glucose 121 (H) 70 - 99 mg/dL    BUN 6 (L) 8 - 23 mg/dL    Creatinine 0.60 (L) 0.70 - 1.20 mg/dL    Est, Glom Filt Rate >60 >60 mL/min/1.73m2    Calcium 8.1 (L) 8.6 - 10.4 mg/dL    Sodium 140 135 - 144 mmol/L    Potassium 3.9 3.7 - 5.3 mmol/L    Chloride 100 98 - 107 mmol/L    CO2 37 (H) 20 - 31 mmol/L Anion Gap 3 (L) 9 - 17 mmol/L    Alkaline Phosphatase 46 40 - 129 U/L    ALT 39 5 - 41 U/L    AST 25 <40 U/L    Total Bilirubin 1.6 (H) 0.3 - 1.2 mg/dL    Total Protein 5.2 (L) 6.4 - 8.3 g/dL    Albumin 2.7 (L) 3.5 - 5.2 g/dL   CBC with Auto Differential   Result Value Ref Range    WBC 6.4 3.5 - 11.0 k/uL    RBC 5.77 4.5 - 5.9 m/uL    Hemoglobin 12.2 (L) 13.5 - 17.5 g/dL    Hematocrit 40.8 (L) 41 - 53 %    MCV 70.7 (L) 80 - 100 fL    MCH 21.1 (L) 26 - 34 pg    MCHC 29.8 (L) 31 - 37 g/dL    RDW 21.3 (H) 11.5 - 14.9 %    Platelets 88 (L) 191 - 450 k/uL    MPV 8.6 6.0 - 12.0 fL    Seg Neutrophils 79 (H) 36 - 66 %    Lymphocytes 7 (L) 24 - 44 %    Monocytes 11 (H) 1 - 7 %    Eosinophils % 1 0 - 4 %    Basophils 2 0 - 2 %    Segs Absolute 5.06 1.3 - 9.1 k/uL    Absolute Lymph # 0.45 (L) 1.0 - 4.8 k/uL    Absolute Mono # 0.70 0.1 - 1.3 k/uL    Absolute Eos # 0.06 0.0 - 0.4 k/uL    Basophils Absolute 0.13 0.0 - 0.2 k/uL    Morphology ANISOCYTOSIS PRESENT     Morphology HYPOCHROMIA PRESENT     Morphology MICROCYTOSIS PRESENT    Vancomycin Level, Random   Result Value Ref Range    Vancomycin Rm 22.9 ug/mL    Vancomycin Random Dose amount 1250 MG     Vancomycin Random Date last dose [id number]     Vancomycin Random Time last dose 1641    Potassium   Result Value Ref Range    Potassium 4.1 3.7 - 5.3 mmol/L   Phosphorus   Result Value Ref Range    Phosphorus 2.5 2.5 - 4.5 mg/dL   POC Glucose Fingerstick   Result Value Ref Range    POC Glucose 104 75 - 110 mg/dL   EKG 12 Lead   Result Value Ref Range    Ventricular Rate 75 BPM    Atrial Rate 75 BPM    P-R Interval 142 ms    QRS Duration 66 ms    Q-T Interval 350 ms    QTc Calculation (Bazett) 390 ms    P Axis 18 degrees    R Axis -165 degrees    T Axis 41 degrees   EKG 12 Lead   Result Value Ref Range    Ventricular Rate 105 BPM    Atrial Rate 105 BPM    P-R Interval 144 ms    QRS Duration 78 ms    Q-T Interval 302 ms    QTc Calculation (Bazett) 399 ms    P Axis 13 degrees    R Axis -30 degrees    T Axis -15 degrees   ECHO Complete 2D W Doppler W Color   Result Value Ref Range    Left Ventricular Ejection Fraction 55     LVEF MODALITY ECHO    PREPARE CRYOPRECIPITATE, 1 Product   Result Value Ref Range    Unit Number [id number]     Product Code Thawed Pooled Cryoprecipitate     Unit Divison 00     Dispense Status TRANSFUSED     Unit Issue Date/Time [id number]     Product Code Blood Bank [id number]     Blood Bank Unit Type and Rh O POS     Blood Bank ISBT Product Blood Type 5100     Blood Bank Blood Product Expiration Date [id number]     Transfusion Status OK TO TRANSFUSE          IMAGING DATA:    CT HEAD WO CONTRAST    Result Date: 1/1/2023  EXAMINATION: CT OF THE HEAD WITHOUT CONTRAST  1/1/2023 10:48 pm TECHNIQUE: CT of the head was performed without the administration of intravenous contrast. Automated exposure control, iterative reconstruction, and/or weight based adjustment of the mA/kV was utilized to reduce the radiation dose to as low as reasonably achievable. COMPARISON: None. HISTORY: Reason for Exam: AMS Additional signs and symptoms: the patient has been getting more and more confused since 2 weeks ago. It has progressively been getting worse and today FINDINGS: BRAIN/VENTRICLES: There is no acute intracranial hemorrhage, mass effect or midline shift. No abnormal extra-axial fluid collection. The gray-white differentiation is maintained without evidence of an acute infarct. There is no evidence of hydrocephalus. Changes of mild chronic small vessel ischemic disease. ORBITS: The visualized portion of the orbits demonstrate no acute abnormality. SINUSES: The visualized paranasal sinuses and mastoid air cells demonstrate no acute abnormality. SOFT TISSUES/SKULL:  No acute abnormality of the visualized skull or soft tissues. No acute intracranial abnormality. Mild chronic small vessel ischemic disease.      XR CHEST PORTABLE    Result Date: 1/1/2023  EXAMINATION: ONE XRAY VIEW OF THE CHEST 1/1/2023 7:17 pm COMPARISON: None. HISTORY: ORDERING SYSTEM PROVIDED HISTORY: ams TECHNOLOGIST PROVIDED HISTORY: ams Reason for Exam: Altered mental status, difficulty breathing Additional signs and symptoms: Altered mental status, difficulty breathing Relevant Medical/Surgical History: Altered mental status, difficulty breathing FINDINGS: Large lucent area in the left apex suggesting a large bulla however superimposed pneumothorax cannot be excluded. The cardiac size is normal. Right lower lobe airspace infiltrate and mild right pleural effusion. . Pulmonary vascularity appears normal. There is mild ectasia of the thoracic aorta. Calcific tendinitis of the right shoulder. No acute bony abnormalities. The hilar structures are normal.     Right lower lobe pneumonia and small right pleural effusion Large lucent area in the left apex suggesting a large bulla however superimposed pneumothorax cannot be excluded. Follow-up CT would be useful for further evaluation. The findings were sent to the Radiology Results [address] at 10:31 pm on 1/1/2023 to be communicated to a licensed caregiver.          IMPRESSION:   Primary Problem  Acute respiratory failure with hypoxia and hypercapnia [facility])    Active Hospital Problems    Diagnosis Date Noted    Delirium due to another medical condition [F05] 01/10/2023     Priority: Medium    ACP (advance care planning) [Z71.89] 01/09/2023     Priority: Medium    Goals of care, counseling/discussion [Z71.89] 01/09/2023     Priority: Medium    Encounter for palliative care [Z51.5] 01/09/2023     Priority: Medium    Prolonged pt (prothrombin time) [R79.1] 01/03/2023     Priority: Medium    Emphysema lung ([address] [J43.9] 01/03/2023     Priority: Medium    Cerebral infarction ([address] [I63.9] 01/03/2023     Priority: Medium    Transaminitis [R74.01] 01/02/2023     Priority: Medium    Microcytic anemia [D64.9] 01/02/2023     Priority: Medium    Thrombocytopenia ([address] [D69.6] 01/02/2023     Priority: Medium    Agitation [R45.1] 01/02/2023     Priority: Medium    Acute respiratory failure with hypoxia and hypercapnia (HCC) RLL pneumonia [J96.01, J96.02] 01/02/2023     Priority: Medium    Altered mental status [R41.82] 01/02/2023     Priority: Medium    Community acquired pneumonia of right lower lobe of lung [J18.9] 01/02/2023     Priority: Medium       Medical apathy  Respiratory failure secondary pneumonia  Abnormal LFT  Microcytic anemia  Thrombocytopenia  History of alcohol dependence  IgG lambda paraproteinemia  Positive HCV screen  Iron deficiency  Coagulopathy    RECOMMENDATIONS:  I reviewed the labs/imaging available to me,outside records and discussed with the patient. I explained to the patient the nature of this problem. I explained the significance of these abnormalities and possible etiology and management options  Patient has chronic liver disease secondary to alcohol  No TTP, smear shows no evidence of schistocytes    Anemia and thrombocytopenia are related to liver disease complicated by acute illness  Patient has coagulopathy secondary to liver disease as mentioned  Cardioembolic strokes with a component of subarachnoid bleeding, not a candidate for anticoagulation  Overall all condition seems to be improving. We talked him about abstaining from alcohol and acetaminophen. He is taking 2 full dose aspirin every day and I asked him to avoid aspirin if possible. We also discovered that he is taking very high dose of supplements almost to a toxic level. We asked him to refrain from doing that. We will follow with you                        Discussed with family and Nurse. Thank you for asking us to see this patient.           Shantell Mccray MD  Hematologist/Medical [id number] [facility] hematology oncology physicians            This note is created with the assistance of a speech recognition program.  While intending to generate a document that actually reflects the content of the visit, the document can still have some errors including those of syntax and sound a like substitutions which may escape proof reading. It such instances, actual meaning can be extrapolated by contextual diversion.

## 2023-01-12 NOTE — PROGRESS NOTES
Pulmonary Progress Note  [facility]      Patient - Cynthia Steen,  Age - 60 y.o.    - 1960      Room Number -    N -  [id number]   [facility]  Date of Admission -  2023  9:55 PM        Consulting Emerita Brito MD  Primary Care Physician - No primary care provider on file. SUBJECTIVE   Patient currently on 3 L nasal cannula with O2 saturations 90-93%. No fevers appreciated. Patient's significant other at bedside. OBJECTIVE   VITALS    height is 5' 7\" (1.702 m) and weight is 168 lb 14 oz (76.6 kg). His temperature is 97.9 °F (36.6 °C). His blood pressure is 137/75 and his pulse is 79. His respiration is 17 and oxygen saturation is 98%. Body mass index is 26.45 kg/m². Temperature Range: Temp: 97.9 °F (36.6 °C) Temp  Av.4 °F (36.9 °C)  Min: 97.9 °F (36.6 °C)  Max: 98.7 °F (37.1 °C)  BP Range:  Systolic (99AZS), HIR:273 , Min:76 , GYN:699     Diastolic (42JGL), HZF:99, Min:34, Max:81    Pulse Range: Pulse  Av.7  Min: 76  Max: 102  Respiration Range: Resp  Av.3  Min: 11  Max: 25  Current Pulse Ox[de-identified]  SpO2: 98 %  24HR Pulse Ox Range:  SpO2  Av.4 %  Min: 82 %  Max: 99 %  Oxygen Amount and Delivery: O2 Flow Rate (L/min): 3 L/min    Wt Readings from Last 3 Encounters:   23 168 lb 14 oz (76.6 kg)   23 188 lb (85.3 kg)       I/O (24 Hours)    Intake/Output Summary (Last 24 hours) at 2023 1438  Last data filed at 2023 0849  Gross per 24 hour   Intake 3214.07 ml   Output 1300 ml   Net 1914.07 ml       EXAM     General Appearance  Awake, alert, oriented, in no acute distress  HEENT - normocephalic, atraumatic. Neck - Supple,  trachea midline   Lungs -coarse breath sounds no crackles rales or wheeze   Heart Exam:PMI normal. No lifts, heaves, or thrills. RRR. No murmurs, clicks, gallops, or rubs  Abdomen Exam: Abdomen soft, non-tender.    Extremity Exam: No signs of cyanosis    MEDS furosemide  40 mg Oral Daily    spironolactone  25 mg Oral Daily    potassium chloride  20 mEq Oral Daily with breakfast    ziprasidone (GEODON) in sterile water injection  10 mg IntraMUSCular Nightly    carvedilol  6.25 mg Oral BID WC    [Held by provider] ferrous sulfate  325 mg Oral Daily with breakfast    pantoprazole (PROTONIX) 40 mg injection  40 mg IntraVENous Daily    ipratropium-albuterol  1 ampule Inhalation Q4H WA    [Held by provider] heparin (porcine)  5,000 Units SubCUTAneous 3 times per day    nystatin   Topical BID    potassium bicarb-citric acid  20 mEq Oral Daily    [Held by provider] miconazole   Topical BID    vancomycin  1,250 mg IntraVENous Q12H    [Held by provider] aspirin  81 mg Oral Daily    sodium chloride flush  5-40 mL IntraVENous 2 times per day    nicotine  1 patch TransDERmal Daily    vancomycin (VANCOCIN) intermittent dosing (placeholder)   Other RX Placeholder      IV infusion builder 30 mL/hr at 01/12/23 0402    sodium chloride      dexmedetomidine      sodium chloride       hydrALAZINE, potassium chloride **OR** potassium alternative oral replacement **OR** potassium chloride, magnesium sulfate, ziprasidone (GEODON) in sterile water injection, labetalol, potassium chloride, sodium chloride, dexmedetomidine, perflutren lipid microspheres, sodium chloride flush, sodium chloride flush, sodium chloride, ondansetron **OR** ondansetron, polyethylene glycol, acetaminophen **OR** acetaminophen, albuterol, guaiFENesin    LABS   CBC   Recent Labs     01/12/23  0404   WBC 6.4   HGB 12.2*   HCT 40.8*   MCV 70.7*   PLT 88*     BMP:   Lab Results   Component Value Date/Time     01/12/2023 04:04 AM    K 3.9 01/12/2023 04:04 AM     01/12/2023 04:04 AM    CO2 37 01/12/2023 04:04 AM    BUN 6 01/12/2023 04:04 AM    LABALBU 2.7 01/12/2023 04:04 AM    CREATININE 0.60 01/12/2023 04:04 AM    CALCIUM 8.1 01/12/2023 04:04 AM    LABGLOM >60 01/12/2023 04:04 AM     ABGs:  Lab Results Component Value Date/Time    PHART 7.372 01/05/2023 04:46 AM    PO2ART 62.9 01/05/2023 04:46 AM    AOG3MPF 51.6 01/05/2023 04:46 AM      Lab Results   Component Value Date/Time    MODE PRVC 01/05/2023 04:46 AM     Ionized Calcium:  No results found for: IONCA  Magnesium:    Lab Results   Component Value Date/Time    MG 1.8 01/11/2023 04:15 AM     Phosphorus:    Lab Results   Component Value Date/Time    PHOS 2.5 01/12/2023 04:04 AM        LIVER PROFILE   Recent Labs     01/12/23  0404   AST 25   ALT 39   BILITOT 1.6*   ALKPHOS 46     INR No results for input(s): INR in the last 72 hours.   PTT   Lab Results   Component Value Date    APTT 34.4 01/08/2023         RADIOLOGY     (See actual reports for details)    ASSESSMENT/PLAN     Patient Active Problem List   Diagnosis    Acute type II respiratory failure (HCC)    Transaminitis    Microcytic anemia    Thrombocytopenia (HCC)    Agitation    Acute respiratory failure with hypoxia and hypercapnia (HCC) RLL pneumonia    Altered mental status    Community acquired pneumonia of right lower lobe of lung    Prolonged pt (prothrombin time)    Emphysema lung (HCC)    C[address]bral infarction ([facility] [address]    ACP (advance care planning)    Goals of care, counseling/discussion    Encounter for palliative care    Delirium due to another medical condition     Acute hypoxic respiratory failure- extubated 01/05  Community-acquired pneumonia, multifocal; possible aspiration  Acute infarcts left cerebellar hemisphere, left parietal lobe with bilateral subarachnoid hemorrhages--His head CT scan positive for tattered foci of subarachnoid hemorrhage in the right cerebral hemisphere  Agitation/delirium  MRSA positive  COPD-no PFTs  Altered mental status  Liver disease given elevated transaminases, INR, and thrombocytopenia-- 88K  History of tobacco use 1/pack per day x40 years  Acute kidney injury-resolved  History of alcohol use  Full code    On EPC cuffs  Weaning O2 down as tolerated  On DuoNeb treatments    From the pulmonary critical standpoint, transfer out of ICU intermediate care reasonable    Electronically signed by Ketty Mtz MD on 1/12/2023 at 2:38 PM

## 2023-01-12 NOTE — PROGRESS NOTES
Today's Date: 1/11/2023  Patient Name: Po Nielsen  Date of admission: 1/1/2023  9:55 PM  Patient's age: 60 y.o., 1960  Admission Dx: Acute respiratory failure ([facility] [address] [J96.00]  Acute respiratory failure with hypoxia and hypercapnia ([facility] [address] [J96.01, J96.02]  Altered mental status, unspecified altered mental status type [R41.82]  Community acquired pneumonia of right lower lobe of lung [J18.9]  COPD with respiratory failure, acute ([facility] [address] [J44.9, J96.00]    Reason for Consult: management recommendations  Requesting Physician: Kanwal Serrano MD    CHIEF COMPLAINT: Abnormal cell counts. Altered mental status. Pneumonia. Interval history:    Patient seen and examined  Labs and vitals reviewed  Patient seen in ICU  Based on conversation with neurology, I reviewed the smear to exclude schistocytes. Smear is negative, no evidence of TTP   HISTORY OF PRESENT ILLNESS:      The patient is a 60 y.o.  male who is admitted with chief complaint of altered mental status. Patient has not seen a doctor for multiple years. Due to worsening mental status EMS was summoned. Patient oxygen saturation was noted to be at 75%. Patient placed on BiPAP x-ray showed right lower lobe pneumonia. Patient started on antibiotics. Patient also noted to have INR of 2.3 platelet count of 35,766. Patient does have a history of alcohol intake. Patient's ammonia level noted to be 29. Creatinine 1.5. Total bilirubin is within range. Hemoglobin 11.2 with MCV of 69. Platelet count is 57,125. Ultrasound liver done however report is pending. Past Medical History: Hypertension    Past Surgical History: No pertinent surgical history    Medications:    Prior to Admission medications    Medication Sig Start Date End Date Taking?  Authorizing Provider   aspirin 325 MG tablet Take 325 mg by mouth daily Patient takes 2 tabs daily   Yes Historical Provider, MD   psyllium (KONSYL) 28.3 % PACK Take 1 packet by mouth daily   Yes Historical Provider, MD     Current Facility-Administered Medications   Medication Dose Route Frequency Provider Last Rate Last Admin    hydrALAZINE (APRESOLINE) injection 20 mg  20 mg IntraVENous Q6H PRN Jana Fernandez MD   20 mg at 01/11/23 1224    potassium chloride (KLOR-CON M) extended release tablet 40 mEq  40 mEq Oral PRN Jana Fernandez MD        Or    potassium bicarb-citric acid (EFFER-K) effervescent tablet 40 mEq  40 mEq Oral PRN Jana Fernandez MD        Or    potassium chloride 10 mEq/100 mL IVPB (Peripheral Line)  10 mEq IntraVENous PRN Jana Fernandez MD        spironolactone (ALDACTONE) tablet 25 mg  25 mg Oral Daily Olena Keenan MD   25 mg at 01/11/23 1820    [START ON 1/12/2023] potassium chloride (KLOR-CON M) extended release tablet 20 mEq  20 mEq Oral Daily with breakfast Olena Keenan MD        magnesium sulfate 2000 mg in water 50 mL IVPB  2,000 mg IntraVENous PRN Priyanka Singh MD        sodium chloride flush 0.9 % injection 10 mL  10 mL IntraVENous PRN Karel Mccray MD   10 mL at 01/10/23 1121    ziprasidone (GEODON) 10 mg in sterile water 0.5 mL injection  10 mg IntraMUSCular Nightly ALAYNA Grant - CNP        carvedilol (COREG) tablet 6.25 mg  6.25 mg Oral BID WC Priyanka Singh MD   6.25 mg at 01/11/23 1820    [Held by provider] ferrous sulfate (IRON 325) tablet 325 mg  325 mg Oral Daily with breakfast Priyanka Singh MD        pantoprazole (PROTONIX) 40 mg in sodium chloride (PF) 0.9 % 10 mL injection  40 mg IntraVENous Daily Priyanka Singh MD   40 mg at 01/11/23 0931    furosemide (LASIX) injection 20 mg  20 mg IntraVENous Q12H Olena Keenan MD   20 mg at 01/11/23 2310    ziprasidone (GEODON) 20 mg in sterile water 1 mL injection  20 mg IntraMUSCular Q12H PRN ALAYNA Membreno Do - CNP        labetalol (NORMODYNE;TRANDATE) injection 5 mg  5 mg IntraVENous Q6H PRN Beverley Garcia Zahida Brooks MD   5 mg at 01/10/23 1606    potassium chloride 10 mEq/100 mL IVPB (Peripheral Line)  10 mEq IntraVENous PRN Abdirahman Mccann  mL/hr at 01/11/23 2257 10 mEq at 01/11/23 2257    ipratropium-albuterol (DUONEB) nebulizer solution 1 ampule  1 ampule Inhalation Q4H WA Meli Abdi MD   1 ampule at 01/11/23 2017    [Held by provider] heparin (porcine) injection 5,000 Units  5,000 Units SubCUTAneous 3 times per day Kya Perez MD   5,000 Units at 01/08/23 0549    potassium chloride 20 mEq in dextrose 5 % 1,000 mL infusion   IntraVENous Continuous Sammi Kavonagle Arabella Osuna MD 30 mL/hr at 01/10/23 2319 New Bag at 01/10/23 2319    0.9 % sodium chloride infusion   IntraVENous PRN Nancy Holland MD        dexmedetomidine (PRECEDEX) 400 mcg in sodium chloride 0.9 % 100 mL infusion  0.1-1.5 mcg/kg/hr IntraVENous Continuous PRN Meli Abdi MD        nystatin (MYCOSTATIN) ointment   Topical BID Rosalie Worthy MD   Given at 01/11/23 2202    potassium bicarb-citric acid (EFFER-K) effervescent tablet 20 mEq  20 mEq Oral Daily Colt Ramirez MD   20 mEq at 01/07/23 1032    [Held by provider] miconazole (MICOTIN) 2 % powder   Topical BID Alexi Faria APRN - NP   Given at 01/06/23 0923    vancomycin (VANCOCIN) 1,250 mg in dextrose 5 % 250 mL IVPB (ADDAVIAL)  1,250 mg IntraVENous Q12H Mary Sinhg MD   Stopped at 01/11/23 1821    perflutren lipid microspheres (DEFINITY) injection 1.5 mL  1.5 mL IntraVENous ONCE PRN Leigh Lopez MD        [facility] [place] [facility] [place] by provider] aspirin chewable tablet 81 mg  81 mg Oral Daily Howard Krishnamurthy MD   81 mg at 01/04/23 0740    sodium chloride flush 0.9 % injection 10 mL  10 mL IntraVENous PRN Mario Rios MD   10 mL at 01/03/23 1459    sodium chloride flush 0.9 % injection 10 mL  10 mL IntraVENous PRN Howard Krishnamurthy MD   10 mL at 01/03/23 1836    sodium chloride flush 0.9 % injection 5-40 mL  5-40 mL IntraVENous 2 times per day Abiola Mathew Alexisnitza - NP   10 mL at 01/11/23 2201    sodium chloride flush 0.9 % injection 5-40 mL  5-40 mL IntraVENous PRN ALAYNA Marrero - NP        0.9 % sodium chloride infusion   IntraVENous PRN ALAYNA Roque - NASRA        ondansetron (ZOFRAN-ODT) disintegrating tablet 4 mg  4 mg Oral Q8H PRN Jordyn Lucio APRN - NASRA        Or    ondansetron (ZOFRAN) injection 4 mg  4 mg IntraVENous Q6H PRN Abiola Gomez APRN - NP        polyethylene glycol (GLYCOLAX) packet 17 g  17 g Oral Daily PRN Jordyn Lucio APRN - NP        acetaminophen (TYLENOL) tablet 650 mg  650 mg Oral Q6H PRN ALAYNA Roque - NASRA        Or    acetaminophen (TYLENOL) suppository 650 mg  650 mg Rectal Q6H PRN Jordyn Lucio APRN - NP        nicotine (NICODERM CQ) 21 MG/24HR 1 patch  1 patch TransDERmal Daily ALAYNA Roque - NP   1 patch at 01/10/23 0746    albuterol (PROVENTIL) nebulizer solution 2.5 mg  2.5 mg Nebulization Q2H PRN Jordyn Lucio APRN - NP   2.5 mg at 01/02/23 1115    guaiFENesin (MUCINEX) extended release tablet 600 mg  600 mg Oral BID PRN Jordyn Lucio APRN - NP        vancomycin (VANCOCIN) intermittent dosing (placeholder)   Other RX Mae Benitez MD           Allergies:  Patient has no known allergies. Social History:   reports that he has been smoking cigarettes. He has a 40.00 pack-year smoking history. He has never used smokeless tobacco. He reports current alcohol use. He reports that he does not currently use drugs. Family History: Patient not able to give history due to altered mental status    REVIEW OF SYSTEMS:      General: no fever or night sweats, Weight is stable. ENT: No double or blurred vision, no hearing problem, no dysphagia or sore throat   Respiratory: Positive shortness of breath  Cardiovascular: Denies chest pain, PND or orthopnea. No L E swelling or palpitations. Gastrointestinal:    No nausea or vomiting, abdominal pain, diarrhea or constipation.   Genitourinary: Denies dysuria, hematuria, frequency, urgency or incontinence. Neurological: Complains of being very weak. Musculoskeletal:  No arthralgia no back pain or joint swelling. Skin: There are no rashes or bleeding. Psych: Denies hallucinations or intentions to harm self        PHYSICAL EXAM:        BP (!) 106/50   Pulse 76   Temp 98.7 °F (37.1 °C) (Oral)   Resp 17   Ht 5' 7\" (1.702 m)   Wt 171 lb 15.3 oz (78 kg)   SpO2 96%   BMI 26.93 kg/m²    Temp (24hrs), Av.7 °F (37.1 °C), Min:97.5 °F (36.4 °C), Max:99.8 °F (37.7 °C)      General appearance - not in acute distress  Mental status -arousable but somewhat lethargic  Eyes - pupils equal and reactive, extraocular eye movements intact   Ears - bilateral TM's and external ear canals normal   Mouth -mucous membranes moist  Neck - supple, no significant adenopathy   Lymphatics - no palpable lymphadenopathy, no hepatosplenomegaly   Chest -bilateral rales  Heart - normal rate, regular rhythm, normal S1, S2, no murmurs  Abdomen - soft, nontender, nondistended, no masses or organomegaly   Neurological -orally intubated  Musculoskeletal - no joint tenderness, deformity or swelling   Extremities - peripheral pulses normal, no pedal edema, no clubbing or cyanosis   Skin - normal coloration and turgor, no rashes, no suspicious skin lesions noted ,      DATA:      Labs:     Results for orders placed or performed during the hospital encounter of 23   COVID-19 & Influenza Combo    Specimen: Nasopharyngeal Swab   Result Value Ref Range    Specimen Description . NASOPHARYNGEAL SWAB     Source . NASOPHARYNGEAL SWAB     SARS-CoV-2 RNA, RT PCR Not Detected Not Detected    INFLUENZA A Not Detected Not Detected    INFLUENZA B Not Detected Not Detected   Respiratory Panel, Molecular, with COVID-19 (Restricted: peds pts or suitable admitted adults)    Specimen: Nasopharyngeal Swab   Result Value Ref Range    Specimen Description . NASOPHARYNGEAL SWAB     Adenovirus PCR Not Detected Not Detected    Coronavirus 229E PCR Not Detected Not Detected    Coronavirus HKU1 PCR Not Detected Not Detected    Coronavirus NL63 PCR Not Detected Not Detected    Coronavirus OC43 PCR Not Detected Not Detected    SARS-CoV-2, PCR Not Detected Not Detected    Human Metapneumovirus PCR Not Detected Not Detected    Rhino/Enterovirus PCR Not Detected Not Detected    Influenza A by PCR Not Detected Not Detected    Influenza B by PCR Not Detected Not Detected    Parainfluenza 1 PCR Not Detected Not Detected    Parainfluenza 2 PCR Not Detected Not Detected    Parainfluenza 3 PCR Not Detected Not Detected    Parainfluenza 4 PCR Not Detected Not Detected    Resp Syncytial Virus PCR Not Detected Not Detected    Bordetella Parapertussis Not Detected Not Detected    B Pertussis by PCR Not Detected Not Detected    Chlamydia pneumoniae By PCR Not Detected Not Detected    Mycoplasma pneumo by PCR Not Detected Not Detected   Legionella Ag, Ur    Specimen: Urine   Result Value Ref Range    Legionella Pneumophilia Ag, Urine NEGATIVE NEGATIVE   STREP PNEUMONIAE ANTIGEN    Specimen: Urine, indwelling catheter   Result Value Ref Range    Source . CLEAN CATCH URINE     Strep pneumo Ag NEGATIVE    Culture, Blood 1    Specimen: Blood   Result Value Ref Range    Specimen Description . BLOOD LEFT ARM     Culture NO GROWTH 5 DAYS    Culture, Blood 1    Specimen: Blood   Result Value Ref Range    Specimen Description . BLOOD RIGHT ARM     Culture NO GROWTH 5 DAYS    Culture, Urine    Specimen: Urine, clean catch   Result Value Ref Range    Specimen Description . CLEAN CATCH URINE     Culture NO GROWTH    MRSA DNA Probe, Nasal    Specimen: Nasal   Result Value Ref Range    Specimen Description . NASAL SWAB     MRSA, DNA, Nasal (A) NEGATIVE     POSITIVE:  MRSA DNA detected by nucleic acid amplification. Culture, Respiratory    Specimen: Sputum Induced   Result Value Ref Range    Specimen Description . INDUCED SPUTUM     Direct Exam >10, <25 NEUTROPHILS/LPF     Direct Exam < 10 EPITHELIAL CELLS/LPF     Direct Exam NO SIGNIFICANT PATHOGENS SEEN     Culture NO GROWTH    Troponin   Result Value Ref Range    Troponin, High Sensitivity 177 (HH) 0 - 22 ng/L   Troponin   Result Value Ref Range    Troponin, High Sensitivity 184 (HH) 0 - 22 ng/L   APTT   Result Value Ref Range    PTT 26.5 24.0 - 36.0 sec   Protime-INR   Result Value Ref Range    Protime 24.9 (H) 11.8 - 14.6 sec    INR 2.3    Phosphorus   Result Value Ref Range    Phosphorus 4.3 2.5 - 4.5 mg/dL   Magnesium   Result Value Ref Range    Magnesium 2.1 1.6 - 2.6 mg/dL   Basic Metabolic Panel   Result Value Ref Range    Glucose 96 70 - 99 mg/dL    BUN 46 (H) 8 - 23 mg/dL    Creatinine 1.77 (H) 0.70 - 1.20 mg/dL    Est, Glom Filt Rate 43 (L) >60 mL/min/1.73m2    Calcium 7.9 (L) 8.6 - 10.4 mg/dL    Sodium 138 135 - 144 mmol/L    Potassium 4.8 3.7 - 5.3 mmol/L    Chloride 97 (L) 98 - 107 mmol/L    CO2 29 20 - 31 mmol/L    Anion Gap 12 9 - 17 mmol/L   CBC with Auto Differential   Result Value Ref Range    WBC 6.8 3.5 - 11.0 k/uL    RBC 5.99 (H) 4.5 - 5.9 m/uL    Hemoglobin 12.1 (L) 13.5 - 17.5 g/dL    Hematocrit 41.4 41 - 53 %    MCV 69.1 (L) 80 - 100 fL    MCH 20.2 (L) 26 - 34 pg    MCHC 29.2 (L) 31 - 37 g/dL    RDW 19.8 (H) 11.5 - 14.9 %    Platelets 55 (L) 141 - 450 k/uL    MPV 8.6 6.0 - 12.0 fL    Seg Neutrophils 79 (H) 36 - 66 %    Lymphocytes 12 (L) 24 - 44 %    Monocytes 8 (H) 1 - 7 %    Eosinophils % 0 0 - 4 %    Basophils 1 0 - 2 %    Segs Absolute 5.40 1.3 - 9.1 k/uL    Absolute Lymph # 0.80 (L) 1.0 - 4.8 k/uL    Absolute Mono # 0.50 0.1 - 1.3 k/uL    Absolute Eos # 0.00 0.0 - 0.4 k/uL    Basophils Absolute 0.10 0.0 - 0.2 k/uL   Blood Gas, Venous   Result Value Ref Range    pH, Nabor 7.258 (L) 7.320 - 7.420    pCO2, N[place] 63.0 (H) 39.0 - 55.0 mm Hg    pO2, N[place] 48.1 30.0 - 50.0 mm Hg    HCO3, Venous 28.1 24.0 - 30.0 mmol/L    Positive Base Excess, Nabor 1.0 0.0 - 2.0 mmol/L    O2 Sat, Nabor 72.1 60.0 - 85.0 %    Carboxyhemoglobin 4.3 0 - 5 %    Methemoglobin 0.5 0.0 - 1.9 %    Pt Temp 37    Brain Natriuretic Peptide   Result Value Ref Range    Pro-BNP 7,797 (H) <300 pg/mL   Hepatic Function Panel   Result Value Ref Range    Albumin 3.9 3.5 - 5.2 g/dL    Alkaline Phosphatase 68 40 - 129 U/L     (H) 5 - 41 U/L     (H) <40 U/L    Total Bilirubin 1.1 0.3 - 1.2 mg/dL    Bilirubin, Direct 0.9 (H) <0.3 mg/dL    Bilirubin, Indirect 0.2 0.0 - 1.0 mg/dL    Total Protein 6.6 6.4 - 8.3 g/dL   Lipase   Result Value Ref Range    Lipase 17 13 - 60 U/L   Urinalysis with Reflex to Culture    Specimen: Urine   Result Value Ref Range    Color, UA Orange (A) Yellow    Turbidity UA Cloudy (A) Clear    Glucose, Ur NEGATIVE NEGATIVE    Bilirubin Urine NEGATIVE  Verified by ictotest. (A) NEGATIVE    Ketones, Urine TRACE (A) NEGATIVE    Specific Gravity, UA 1.018 1.000 - 1.030    Urine Hgb LARGE (A) NEGATIVE    pH, UA 5.0 5.0 - 8.0    Protein, UA 2+ (A) NEGATIVE    Urobilinogen, Urine ELEVATED (A) Normal    Nitrite, Urine NEGATIVE NEGATIVE    Leukocyte Esterase, Urine SMALL (A) NEGATIVE   Microscopic Urinalysis   Result Value Ref Range    WBC, UA 6 TO 9 /HPF    RBC, UA TOO NUMEROUS TO COUNT /HPF    Casts UA 3 to 5 /LPF    Epithelial Cells UA 0 TO 2 /HPF    Bacteria, UA FEW (A) None   Arterial Blood Gases   Result Value Ref Range    pH, Arterial 7.295 (L) 7.350 - 7.450    pCO2, Arterial 60.3 (HH) 35.0 - 45.0 mmHg    pO2, Arterial 63.0 (L) 80.0 - 100.0 mmHg    HCO3, Arterial 29.3 (H) 22.0 - 26.0 mmol/L    Positive Base Excess, Art 2.8 (H) 0.0 - 2.0 mmol/L    O2 Sat, Arterial 85.8 (LL) 95 - 98 %    Carboxyhemoglobin 3.1 0 - 5 %    Methemoglobin 1.0 0.0 - 1.9 %    Pt Temp 37     O2 Device/Flow/% BIPAP     Respiratory Rate 16     Tyron Test PASS     Sample Site Right Radial Artery     Pt.  Position SEMI-FOWLERS     Mode BIPAP     Text for Respiratory RESULTS TO PHYSICIAN    Comprehensive Metabolic Panel w/ Reflex to MG   Result Value Ref Range    Glucose 101 (H) 70 - 99 mg/dL    BUN 46 (H) 8 - 23 mg/dL    Creatinine 1.50 (H) 0.70 - 1.20 mg/dL    Est, Glom Filt Rate 52 (L) >60 mL/min/1.73m2    Calcium 7.5 (L) 8.6 - 10.4 mg/dL    Sodium 136 135 - 144 mmol/L    Potassium 4.8 3.7 - 5.3 mmol/L    Chloride 99 98 - 107 mmol/L    CO2 27 20 - 31 mmol/L    Anion Gap 10 9 - 17 mmol/L    Alkaline Phosphatase 59 40 - 129 U/L     (H) 5 - 41 U/L     (H) <40 U/L    Total Bilirubin 0.8 0.3 - 1.2 mg/dL    Total Protein 5.9 (L) 6.4 - 8.3 g/dL    Albumin 3.3 (L) 3.5 - 5.2 g/dL   CBC with Auto Differential   Result Value Ref Range    WBC 5.6 3.5 - 11.0 k/uL    RBC 5.57 4.5 - 5.9 m/uL    Hemoglobin 11.2 (L) 13.5 - 17.5 g/dL    Hematocrit 38.8 (L) 41 - 53 %    MCV 69.7 (L) 80 - 100 fL    MCH 20.1 (L) 26 - 34 pg    MCHC 28.8 (L) 31 - 37 g/dL    RDW 19.7 (H) 11.5 - 14.9 %    Platelets 67 (L) 653 - 450 k/uL    MPV 9.1 6.0 - 12.0 fL    Seg Neutrophils 79 (H) 36 - 66 %    Lymphocytes 12 (L) 24 - 44 %    Monocytes 8 (H) 1 - 7 %    Eosinophils % 0 0 - 4 %    Basophils 1 0 - 2 %    nRBC 2 per 100 WBC    Segs Absolute 4.41 1.3 - 9.1 k/uL    Absolute Lymph # 0.67 (L) 1.0 - 4.8 k/uL    Absolute Mono # 0.45 0.1 - 1.3 k/uL    Absolute Eos # 0.00 0.0 - 0.4 k/uL    Basophils Absolute 0.06 0.0 - 0.2 k/uL    Morphology ANISOCYTOSIS PRESENT     Morphology HYPOCHROMIA PRESENT     Morphology 1+ ELLIPTOCYTES    Occ Bld, Fecal Scrn   Result Value Ref Range    Occult Blood, Stool #1 NEGATIVE NEGATIVE    Date, Stool #1 5,206,[phone]     Time, Stool #1 300    Hemoglobin and Hematocrit   Result Value Ref Range    Hemoglobin 11.1 (L) 13.5 - 17.5 g/dL    Hematocrit 38.7 (L) 41 - 53 %   Hemoglobin and Hematocrit   Result Value Ref Range    Hemoglobin 10.8 (L) 13.5 - 17.5 g/dL    Hematocrit 37.2 (L) 41 - 53 %   Ammonia   Result Value Ref Range    Ammonia 29 16 - 60 umol/L   Hepatitis Panel, Acute   Result Value Ref Range    Hepatitis B Surface Ag NONREACTIVE NONREACTIVE Hepatitis C Ab REACTIVE (A) NONREACTIVE    Hep B Core Ab, IgM NONREACTIVE NONREACTIVE    Hep A IgM NONREACTIVE NONREACTIVE   Iron and TIBC   Result Value Ref Range    Iron 14 (L) 59 - 158 ug/dL    TIBC 426 250 - 450 ug/dL    Iron Saturation 3 (L) 20 - 55 %    UIBC 412 (H) 112 - 347 ug/dL   Ferritin   Result Value Ref Range    Ferritin 14 (L) 30 - 400 ng/mL   Mycoplasma Ab,IgM   Result Value Ref Range    Mycoplasma pneumo IgM 0.25 <0.91   Procalcitonin   Result Value Ref Range    Procalcitonin 0.09 (H) <0.09 ng/mL   C-Reactive Protein   Result Value Ref Range    CRP 16.4 (H) 0.0 - 5.0 mg/L   Fibrin Split Products   Result Value Ref Range    FDP >5 (H) <5 ug/mL   Sedimentation Rate   Result Value Ref Range    Sed Rate 1 0 - 20 mm/Hr   Drug Scr, Abuse, Ur   Result Value Ref Range    Amphetamine Screen, Ur NEGATIVE NEGATIVE    Barbiturate Screen, Ur NEGATIVE NEGATIVE    Benzodiazepine Screen, Urine NEGATIVE NEGATIVE    Cocaine Metabolite, Urine NEGATIVE NEGATIVE    Methadone Screen, Urine NEGATIVE NEGATIVE    Opiates, Urine NEGATIVE NEGATIVE    Phencyclidine, Urine NEGATIVE NEGATIVE    Cannabinoid Scrn, Ur NEGATIVE NEGATIVE    Oxycodone Screen, Ur NEGATIVE NEGATIVE    Fentanyl, Ur NEGATIVE NEGATIVE    Test Information       Assay provides medical screening only. The absence of expected drug(s) and/or metabolite(s) may indicate diluted or adulterated urine, limitations of testing or timing of collection.    Troponin   Result Value Ref Range    Troponin, High Sensitivity 195 (HH) 0 - 22 ng/L   Vitamin B12 & Folate   Result Value Ref Range    Vitamin B-12 1926 (H) 232 - 1245 pg/mL    Folate 10.0 >4.8 ng/mL   HIV Screen   Result Value Ref Range    HIV Ag/Ab NONREACTIVE NONREACTIVE   MONOCLONAL PANEL   Result Value Ref Range    Kappa Free Light Chains QNT 2.79 (H) 0.37 - 1.94 mg/dL    Lambda Free Light Chains QNT 20.89 (H) 0.57 - 2.63 mg/dL    Free Kappa/Lambda Ratio 0.13 (L) 0.26 - 1.65    Serum IFX Interp       A ZONE OF RESTRICTION IS PRESENT IN THE GAMMAGLOBULIN REGION. CONFIRMED BY IMMUNOFIXATION TO BE MONOCLONAL    Pathologist       Reviewed by pathologist:  Leroy Padilla D.O. Total Protein 5.5 (L) 6.4 - 8.3 g/dL    Albumin (calculated) 3.6 3.2 - 5.2 g/dL    Albumin % 65 45 - 65 %    Alpha-1-Globulin 0.2 0.1 - 0.4 g/dL    Alpha 1 % 3 3 - 6 %    Alpha-2-Globulin 0.4 (L) 0.5 - 0.9 g/dL    Alpha 2 % 7 6 - 13 %    Beta Globulin 0.6 0.5 - 1.1 g/dL    Beta Percent 11 11 - 19 %    Gamma Globulin 0.8 0.5 - 1.5 g/dL    Gamma Globulin % 14 9 - 20 %    Total Prot. Sum 5.6 (L) 6.3 - 8.2 g/dL    Total Prot. Sum,% 100 98 - 102 %    Protein Electrophoresis, Serum       A ZONE OF RESTRICTION IS PRESENT IN THE GAMMAGLOBULIN REGION. CONFIRMED BY IMMUNOFIXATION TO BE MONOCLONAL    Pathologist       Reviewed by pathologist:  Leroy Padilla D.O. Path Review, Smear   Result Value Ref Range    Pathologist Review ELECTRONICALLY SIGNED.  Ashlyn Clarke MD    Reticulocytes   Result Value Ref Range    Retic % 2.8 (H) 0.5 - 2.0 %    Absolute Retic # 0.157 (H) 0.0245 - 0.098 M/uL   Fibrinogen   Result Value Ref Range    Fibrinogen 141 (L) 210 - 530 mg/dL   Ethanol   Result Value Ref Range    Ethanol <10 <10 mg/dL    Ethanol percent <0.010 %   Lactate Dehydrogenase   Result Value Ref Range     (H) 135 - 225 U/L   Hemoglobin and Hematocrit   Result Value Ref Range    Hemoglobin 10.9 (L) 13.5 - 17.5 g/dL    Hematocrit 38.0 (L) 41 - 53 %   Haptoglobin   Result Value Ref Range    Haptoglobin 60 30 - 200 mg/dL   D-Dimer, Quantitative   Result Value Ref Range    D-Dimer, Quant 6.70 (H) 0.00 - 0.59 mg/L FEU   Urinalysis with Reflex to Culture    Specimen: Urine   Result Value Ref Range    Color, UA Dark Yellow (A) Yellow    Turbidity UA Clear Clear    Glucose, Ur NEGATIVE NEGATIVE    Bilirubin Urine NEGATIVE NEGATIVE    Ketones, Urine TRACE (A) NEGATIVE    Specific [place], UA 1.015 1.000 - 1.030    Urine Hgb LARGE (A) NEGATIVE    pH, UA 5.0 5.0 - 8.0    Protein, UA 1+ (A) NEGATIVE    Urobilinogen, Urine Normal Normal    Nitrite, Urine NEGATIVE NEGATIVE    Leukocyte Esterase, Urine SMALL (A) NEGATIVE   APTT   Result Value Ref Range    PTT 38.7 (H) 24.0 - 36.0 sec   Protime-INR   Result Value Ref Range    Protime 24.4 (H) 11.8 - 14.6 sec    INR 2.2    Microscopic Urinalysis   Result Value Ref Range    WBC, UA 10 TO 20 /HPF    RBC, UA TOO NUMEROUS TO COUNT /HPF    Casts UA HYALINE /LPF    Casts UA 0 TO 2 /LPF    Epithelial Cells UA 3 to 5 /HPF   BLOOD GAS, ARTERIAL   Result Value Ref Range    pH, Arterial 7.314 (L) 7.350 - 7.450    pCO2, Arterial 61.9 (HH) 35.0 - 45.0 mmHg    pO2, Arterial 58.1 (LL) 80.0 - 100.0 mmHg    HCO3, Arterial 31.4 (H) 22.0 - 26.0 mmol/L    Positive Base Excess, Art 5.3 (H) 0.0 - 2.0 mmol/L    O2 Sat, Arterial 86.3 (LL) 95 - 98 %    Carboxyhemoglobin 2.1 0 - 5 %    Methemoglobin 0.8 0.0 - 1.9 %    Pt Temp 37.0     O2 Device/Flow/% VENTILATOR     Respiratory Rate 22     Tyron Test PASS     Sample Site Left Radial Artery     Pt. Position SEMI-FOWLERS     Mode PRVC     Set Rate 22     Total Rate 26          FIO2 40     Peep/Cpap 8    SURGICAL PATHOLOGY REPORT   Result Value Ref Range    Surgical Pathology Report       [id number]  [employer]  [facility]  ANATOMIC PATHOLOGY  [address] [place] [address]. [facility], [address]  [phone]  Fax: [phone]  SURGICAL PATHOLOGY CONSULTATION    Patient Name: Matthew House  MR#: [id number]  Specimen #[id number]    Procedures/Addenda  PERIPHERAL BLOOD REPORT     Date Ordered:     1/2/2023     Status:  Signed Out       Date Complete:     1/3/2023     By: Maldonado Don M.D. Date Reported:     1/3/2023       INTERPRETATION  Peripheral blood:  Microcytic, hypochromic anemia. The patient's iron studies are compatible with iron deficiency anemia. Lymphopenia.   Differential considerations include acute illness/infection,  medication effect, smoking, and debilitating diseases. Thrombocytopenia  Differential considerations include bone marrow hypoproduction and  immune destruction (idiopathic thrombocytopenic purpura, drug effect). RESULTS-COMMENTS  PERIPHERAL BLOOD STUDY    CBC: Please see the electronic health record  for CBC parameters  (, 01/02/2023, 05:43). PLATELETS: Decreased platelets. Platelets show normal morphology. LEUKOCYTES: Decreased lymphocytes. White blood cells show normal  morphology. There are no blasts. ERYTHROCYTES: Microcytic, hypochromic. Anisocytosis and  poikilocytosis. Polychromasia. Reticulocyte count 2.8%. Rare RBC  fragments. Note: The electronic health record is reviewed. Zonia Wheatley M.D.                    Source:  A: Peripheral Blood     Comprehensive Metabolic Panel w/ Reflex to MG   Result Value Ref Range    Glucose 91 70 - 99 mg/dL    BUN 32 (H) 8 - 23 mg/dL    Creatinine 0.97 0.70 - 1.20 mg/dL    Est, Glom Filt Rate >60 >60 mL/min/1.73m2    Calcium 7.4 (L) 8.6 - 10.4 mg/dL    Sodium 143 135 - 144 mmol/L    Potassium 3.7 3.7 - 5.3 mmol/L    Chloride 105 98 - 107 mmol/L    CO2 30 20 - 31 mmol/L    Anion Gap 8 (L) 9 - 17 mmol/L    Alkaline Phosphatase 50 40 - 129 U/L     (H) 5 - 41 U/L     (H) <40 U/L    Total Bilirubin 0.9 0.3 - 1.2 mg/dL    Total Protein 5.2 (L) 6.4 - 8.3 g/dL    Albumin 3.1 (L) 3.5 - 5.2 g/dL   CBC with Auto Differential   Result Value Ref Range    WBC 5.7 3.5 - 11.0 k/uL    RBC 5.42 4.5 - 5.9 m/uL    Hemoglobin 11.0 (L) 13.5 - 17.5 g/dL    Hematocrit 37.6 (L) 41 - 53 %    MCV 69.4 (L) 80 - 100 fL    MCH 20.2 (L) 26 - 34 pg    MCHC 29.2 (L) 31 - 37 g/dL    RDW 20.0 (H) 11.5 - 14.9 %    Platelets 66 (L) 891 - 450 k/uL    MPV 9.4 6.0 - 12.0 fL    Seg Neutrophils 85 (H) 36 - 66 %    Lymphocytes 9 (L) 24 - 44 %    Monocytes 5 1 - 7 %    Eosinophils % 0 0 - 4 %    Basophils 0 0 - 2 %    Bands 1 0 - 10 %    nRBC 1 (H) 0 per 100 WBC    Segs Absolute 4.87 1.3 - 9.1 k/uL    Absolute Lymph # 0.52 (L) 1.0 - 4.8 k/uL    Absolute Mono # 0.29 0.1 - 1.3 k/uL    Absolute Eos # 0.00 0.0 - 0.4 k/uL    Basophils Absolute 0.00 0.0 - 0.2 k/uL    Absolute Bands # 0.06 0.0 - 1.0 k/uL    Morphology ANISOCYTOSIS PRESENT     Morphology HYPOCHROMIA PRESENT     Morphology 1+ POLYCHROMASIA     Morphology 1+ ELLIPTOCYTES    BLOOD GAS, ARTERIAL   Result Value Ref Range    pH, Arterial 7.373 7.350 - 7.450    pCO2, Arterial 56.0 (HH) 35.0 - 45.0 mmHg    pO2, Arterial 61.4 (L) 80.0 - 100.0 mmHg    HCO3, Arterial 32.6 (H) 22.0 - 26.0 mmol/L    Positive Base Excess, Art 7.4 (H) 0.0 - 2.0 mmol/L    O2 Sat, Arterial 89.2 (L) 95 - 98 %    Carboxyhemoglobin 1.7 0 - 5 %    Methemoglobin 0.9 0.0 - 1.9 %    Pt Temp 37     O2 Device/Flow/% VENTILATOR     Respiratory Rate 22     Tyron Test PASS     Sample Site Right Radial Artery     Pt.  Position SEMI-FOWLERS     Mode PRVC     Set Rate 22     Total Rate 22          FIO2 35     Peep/Cpap 8     Text for Respiratory RESULTS GIVEN TO RN    Protime-INR   Result Value Ref Range    Protime 21.9 (H) 11.8 - 14.6 sec    INR 1.9    APTT   Result Value Ref Range    PTT 38.4 (H) 24.0 - 36.0 sec   Triglyceride   Result Value Ref Range    Triglycerides 85 <150 mg/dL   CHLORIDE, URINE, RANDOM   Result Value Ref Range    Chloride, Ur 34 mmol/L   Protein / Creatinine Ratio, Urine   Result Value Ref Range    Total Protein, Urine 23 mg/dL    Creatinine, Ur 79.8 39.0 - 259.0 mg/dL    Urine Total Protein Creatinine Ratio 0.29 (H) 0.00 - 0.20   SODIUM, URINE, RANDOM   Result Value Ref Range    Sodium,Ur <20 mmol/L   BLOOD GAS, ARTERIAL   Result Value Ref Range    pH, Arterial 7.360 7.350 - 7.450    pCO2, Arterial 55.9 (HH) 35.0 - 45.0 mmHg    pO2, Arterial 71.4 (L) 80.0 - 100.0 mmHg    HCO3, Arterial 31.6 (H) 22.0 - 26.0 mmol/L    Positive Base Excess, Art 6.1 (H) 0.0 - 2.0 mmol/L    O2 Sat, Arterial 91.7 (L) 95 - 98 %    Carboxyhemoglobin 1.2 0 - 5 %    Methemoglobin 1.3 0.0 - 1.9 %    Pt Temp 37     O2 Device/Flow/% VENTILATOR     Tyron Test PASS     Sample Site Right Radial Artery     Pt. Position SEMI-FOWLERS     Mode PRVC     Text for Respiratory RESULTS GIVEN TO RN    CBC with Auto Differential   Result Value Ref Range    WBC 6.0 3.5 - 11.0 k/uL    RBC 5.56 4.5 - 5.9 m/uL    Hemoglobin 10.8 (L) 13.5 - 17.5 g/dL    Hematocrit 38.2 (L) 41 - 53 %    MCV 68.8 (L) 80 - 100 fL    MCH 19.5 (L) 26 - 34 pg    MCHC 28.3 (L) 31 - 37 g/dL    RDW 20.1 (H) 11.5 - 14.9 %    Platelets 75 (L) 373 - 450 k/uL    MPV 9.3 6.0 - 12.0 fL    Seg Neutrophils 82 (H) 36 - 66 %    Lymphocytes 7 (L) 24 - 44 %    Monocytes 9 (H) 1 - 7 %    Eosinophils % 1 0 - 4 %    Basophils 1 0 - 2 %    Segs Absolute 4.90 1.3 - 9.1 k/uL    Absolute Lymph # 0.40 (L) 1.0 - 4.8 k/uL    Absolute Mono # 0.50 0.1 - 1.3 k/uL    Absolute Eos # 0.10 0.0 - 0.4 k/uL    Basophils Absolute 0.10 0.0 - 0.2 k/uL   Comprehensive Metabolic Panel w/ Reflex to MG   Result Value Ref Range    Glucose 85 70 - 99 mg/dL    BUN 16 8 - 23 mg/dL    Creatinine 0.64 (L) 0.70 - 1.20 mg/dL    Est, Glom Filt Rate >60 >60 mL/min/1.73m2    Calcium 7.4 (L) 8.6 - 10.4 mg/dL    Sodium 143 135 - 144 mmol/L    Potassium 3.3 (L) 3.7 - 5.3 mmol/L    Chloride 107 98 - 107 mmol/L    CO2 29 20 - 31 mmol/L    Anion Gap 7 (L) 9 - 17 mmol/L    Alkaline Phosphatase 49 40 - 129 U/L     (H) 5 - 41 U/L     (H) <40 U/L    Total Bilirubin 0.8 0.3 - 1.2 mg/dL    Total Protein 5.0 (L) 6.4 - 8.3 g/dL    Albumin 2.8 (L) 3.5 - 5.2 g/dL   Vancomycin Level, Random   Result Value Ref Range    Vancomycin Rm 16.1 ug/mL    Vancomycin Random Dose amount 1g     Vancomycin Random Date last dose 1/4/22     Vancomycin Random Time last dose 0232    Magnesium   Result Value Ref Range    Magnesium 2.0 1.6 - 2.6 mg/dL   Hepatitis C RNA, quantitative, PCR   Result Value Ref Range    Source . PLASMA     Hepatitis C RNA-PCR 17,400 IU/mL    Hepatitis C RNA Quant Log 4.24 Log IU/mL    HCV RNA PCR, Quant DETECTED (A) Not Detected   CBC with Auto Differential   Result Value Ref Range    WBC 5.3 3.5 - 11.0 k/uL    RBC 5.80 4.5 - 5.9 m/uL    Hemoglobin 11.4 (L) 13.5 - 17.5 g/dL    Hematocrit 40.0 (L) 41 - 53 %    MCV 68.9 (L) 80 - 100 fL    MCH 19.7 (L) 26 - 34 pg    MCHC 28.5 (L) 31 - 37 g/dL    RDW 20.5 (H) 11.5 - 14.9 %    Platelets 66 (L) 637 - 450 k/uL    MPV 9.1 6.0 - 12.0 fL    Seg Neutrophils 79 (H) 36 - 66 %    Lymphocytes 8 (L) 24 - 44 %    Monocytes 10 (H) 1 - 7 %    Eosinophils % 2 0 - 4 %    Basophils 1 0 - 2 %    Segs Absolute 4.19 1.3 - 9.1 k/uL    Absolute Lymph # 0.42 (L) 1.0 - 4.8 k/uL    Absolute Mono # 0.53 0.1 - 1.3 k/uL    Absolute Eos # 0.11 0.0 - 0.4 k/uL    Basophils Absolute 0.05 0.0 - 0.2 k/uL    Morphology ANISOCYTOSIS PRESENT     Morphology MICROCYTOSIS PRESENT     Morphology HYPOCHROMIA PRESENT     Morphology FEW ELLIPTOCYTES     Morphology FEW TARGET CELLS    Comprehensive Metabolic Panel w/ Reflex to MG   Result Value Ref Range    Glucose 84 70 - 99 mg/dL    BUN 11 8 - 23 mg/dL    Creatinine 0.58 (L) 0.70 - 1.20 mg/dL    Est, Glom Filt Rate >60 >60 mL/min/1.73m2    Calcium 7.9 (L) 8.6 - 10.4 mg/dL    Sodium 146 (H) 135 - 144 mmol/L    Potassium 3.4 (L) 3.7 - 5.3 mmol/L    Chloride 110 (H) 98 - 107 mmol/L    CO2 30 20 - 31 mmol/L    Anion Gap 6 (L) 9 - 17 mmol/L    Alkaline Phosphatase 53 40 - 129 U/L     (H) 5 - 41 U/L     (H) <40 U/L    Total Bilirubin 0.8 0.3 - 1.2 mg/dL    Total Protein 5.0 (L) 6.4 - 8.3 g/dL    Albumin 2.9 (L) 3.5 - 5.2 g/dL   Fibrinogen   Result Value Ref Range    Fibrinogen 98 (L) 210 - 530 mg/dL   Protime-INR   Result Value Ref Range    Protime 20.3 (H) 11.8 - 14.6 sec    INR 1.7    APTT   Result Value Ref Range    PTT 41.6 (H) 24.0 - 36.0 sec   BLOOD GAS, ARTERIAL   Result Value Ref Range    pH, Arterial 7.372 7.350 - 7.450    pCO2, Arterial 51.6 (HH) 35.0 - 45.0 mmHg    pO2, Arterial 62.9 (L) 80.0 - 100.0 mmHg    HCO3, Arterial 29.9 (H) 22.0 - 26.0 mmol/L    Positive Base Excess, Art 4.7 (H) 0.0 - 2.0 mmol/L    O2 Sat, Arterial 89.7 (L) 95 - 98 %    Carboxyhemoglobin 1.7 0 - 5 %    Methemoglobin 1.1 0.0 - 1.9 %    Pt Temp 37     O2 Device/Flow/% VENTILATOR     Respiratory Rate 22     Tyron Test PASS     Sample Site Right Radial Artery     Pt.  Position SEMI-FOWLERS     Mode PRVC     Set Rate 22     Total Rate 22          FIO2 40     Peep/Cpap 8     Text for Respiratory RESULTS GIVEN TO RN    Magnesium   Result Value Ref Range    Magnesium 2.0 1.6 - 2.6 mg/dL   CBC with Auto Differential   Result Value Ref Range    WBC 5.2 3.5 - 11.0 k/uL    RBC 5.33 4.5 - 5.9 m/uL    Hemoglobin 10.5 (L) 13.5 - 17.5 g/dL    Hematocrit 38.4 (L) 41 - 53 %    MCV 72.0 (L) 80 - 100 fL    MCH 19.7 (L) 26 - 34 pg    MCHC 27.4 (L) 31 - 37 g/dL    RDW 20.3 (H) 11.5 - 14.9 %    Platelets 71 (L) 137 - 450 k/uL    MPV 9.3 6.0 - 12.0 fL    Seg Neutrophils 90 (H) 36 - 66 %    Lymphocytes 4 (L) 24 - 44 %    Monocytes 5 1 - 7 %    Eosinophils % 0 0 - 4 %    Basophils 0 0 - 2 %    Bands 1 0 - 10 %    Segs Absolute 4.68 1.3 - 9.1 k/uL    Absolute Lymph # 0.21 (L) 1.0 - 4.8 k/uL    Absolute Mono # 0.26 0.1 - 1.3 k/uL    Absolute Eos # 0.00 0.0 - 0.4 k/uL    Basophils Absolute 0.00 0.0 - 0.2 k/uL    Absolute Bands # 0.05 0.0 - 1.0 k/uL    Morphology ANISOCYTOSIS PRESENT     Morphology HYPOCHROMIA PRESENT     Morphology MICROCYTOSIS PRESENT     Morphology 1+ ELLIPTOCYTES     Morphology 1+ TEARDROPS    Comprehensive Metabolic Panel w/ Reflex to MG   Result Value Ref Range    Glucose 111 (H) 70 - 99 mg/dL    BUN 7 (L) 8 - 23 mg/dL    Creatinine 0.42 (L) 0.70 - 1.20 mg/dL    Est, Glom Filt Rate >60 >60 mL/min/1.73m2    Calcium 7.5 (L) 8.6 - 10.4 mg/dL    Sodium 144 135 - 144 mmol/L    Potassium 4.0 3.7 - 5.3 mmol/L    Chloride 111 (H) 98 - 107 mmol/L    CO2 29 20 - 31 mmol/L    Anion Gap 4 (L) 9 - 17 mmol/L    Alkaline Phosphatase 46 40 - 129 U/L     (H) 5 - 41 U/L    AST 74 (H) <40 U/L    Total Bilirubin 0.9 0.3 - 1.2 mg/dL    Total Protein 4.6 (L) 6.4 - 8.3 g/dL    Albumin 2.6 (L) 3.5 - 5.2 g/dL   Vancomycin Level, Random   Result Value Ref Range    Vancomycin Rm 20.3 ug/mL    Vancomycin Random Dose amount 1,250     Vancomycin Random Date last dose 379736     Vancomycin Random Time last dose 0546    Fibrinogen   Result Value Ref Range    Fibrinogen 109 (L) 210 - 530 mg/dL   Protime-INR   Result Value Ref Range    Protime 20.1 (H) 11.8 - 14.6 sec    INR 1.7    Comprehensive Metabolic Panel w/ Reflex to MG   Result Value Ref Range    Glucose 91 70 - 99 mg/dL    BUN 5 (L) 8 - 23 mg/dL    Creatinine <0.40 (L) 0.70 - 1.20 mg/dL    Est, Glom Filt Rate Can not be calculated >60 mL/min/1.73m2    Calcium 8.1 (L) 8.6 - 10.4 mg/dL    Sodium 147 (H) 135 - 144 mmol/L    Potassium 4.1 3.7 - 5.3 mmol/L    Chloride 108 (H) 98 - 107 mmol/L    CO2 26 20 - 31 mmol/L    Anion Gap 13 9 - 17 mmol/L    Alkaline Phosphatase 51 40 - 129 U/L     (H) 5 - 41 U/L    AST 63 (H) <40 U/L    Total Bilirubin 1.6 (H) 0.3 - 1.2 mg/dL    Total Protein 5.3 (L) 6.4 - 8.3 g/dL    Albumin 2.9 (L) 3.5 - 5.2 g/dL   SPECIMEN REJECTION   Result Value Ref Range    Specimen Source . BLOOD     Ordered Test CDP     Reason for Rejection Unable to perform testing: Specimen clotted.     CBC with Auto Differential   Result Value Ref Range    WBC 7.1 3.5 - 11.0 k/uL    RBC 5.90 4.5 - 5.9 m/uL    Hemoglobin 11.7 (L) 13.5 - 17.5 g/dL    Hematocrit 41.4 41 - 53 %    MCV 70.2 (L) 80 - 100 fL    MCH 19.8 (L) 26 - 34 pg    MCHC 28.2 (L) 31 - 37 g/dL    RDW 20.4 (H) 11.5 - 14.9 %    Platelets 68 (L) 769 - 450 k/uL    MPV 9.1 6.0 - 12.0 fL    Seg Neutrophils 82 (H) 36 - 66 %    Lymphocytes 10 (L) 24 - 44 %    Monocytes 6 1 - 7 %    Eosinophils % 1 0 - 4 %    Basophils 1 0 - 2 %    Segs Absolute 5.82 1.3 - 9.1 k/uL    Absolute Lymph # 0.71 (L) 1.0 - 4.8 k/uL    Absolute Mono # 0.43 0.1 - 1.3 k/uL Absolute Eos # 0.07 0.0 - 0.4 k/uL    Basophils Absolute 0.07 0.0 - 0.2 k/uL    Morphology ANISOCYTOSIS PRESENT     Morphology 1+ TARGET CELLS     Morphology 1+ TEARDROPS     Morphology 1+ ELLIPTOCYTES    Protime-INR   Result Value Ref Range    Protime 19.4 (H) 11.8 - 14.6 sec    INR 1.6    APTT   Result Value Ref Range    PTT 35.4 24.0 - 36.0 sec   CBC with Auto Differential   Result Value Ref Range    WBC 5.9 3.5 - 11.0 k/uL    RBC 5.61 4.5 - 5.9 m/uL    Hemoglobin 11.4 (L) 13.5 - 17.5 g/dL    Hematocrit 39.3 (L) 41 - 53 %    MCV 70.0 (L) 80 - 100 fL    MCH 20.2 (L) 26 - 34 pg    MCHC 28.9 (L) 31 - 37 g/dL    RDW 20.2 (H) 11.5 - 14.9 %    Platelets 63 (L) 054 - 450 k/uL    MPV 8.8 6.0 - 12.0 fL    Seg Neutrophils 76 (H) 36 - 66 %    Lymphocytes 11 (L) 24 - 44 %    Monocytes 11 (H) 1 - 7 %    Eosinophils % 1 0 - 4 %    Basophils 1 0 - 2 %    Segs Absolute 4.48 1.3 - 9.1 k/uL    Absolute Lymph # 0.65 (L) 1.0 - 4.8 k/uL    Absolute Mono # 0.65 0.1 - 1.3 k/uL    Absolute Eos # 0.06 0.0 - 0.4 k/uL    Basophils Absolute 0.06 0.0 - 0.2 k/uL    Morphology ANISOCYTOSIS PRESENT     Morphology MICROCYTOSIS PRESENT     Morphology HYPOCHROMIA PRESENT     Morphology 1+ TARGET CELLS     Morphology 1+ ELLIPTOCYTES    Comprehensive Metabolic Panel w/ Reflex to MG   Result Value Ref Range    Glucose 118 (H) 70 - 99 mg/dL    BUN 4 (L) 8 - 23 mg/dL    Creatinine <0.40 (L) 0.70 - 1.20 mg/dL    Est, Glom Filt Rate Can not be calculated >60 mL/min/1.73m2    Calcium 8.1 (L) 8.6 - 10.4 mg/dL    Sodium 147 (H) 135 - 144 mmol/L    Potassium 3.3 (L) 3.7 - 5.3 mmol/L    Chloride 109 (H) 98 - 107 mmol/L    CO2 34 (H) 20 - 31 mmol/L    Anion Gap 4 (L) 9 - 17 mmol/L    Alkaline Phosphatase 45 40 - 129 U/L    ALT 99 (H) 5 - 41 U/L    AST 38 <40 U/L    Total Bilirubin 1.7 (H) 0.3 - 1.2 mg/dL    Total Protein 5.1 (L) 6.4 - 8.3 g/dL    Albumin 2.8 (L) 3.5 - 5.2 g/dL   Fibrinogen   Result Value Ref Range    Fibrinogen 151 (L) 210 - 530 mg/dL   APTT Result Value Ref Range    PTT 34.4 24.0 - 36.0 sec   Protime-INR   Result Value Ref Range    Protime 19.1 (H) 11.8 - 14.6 sec    INR 1.6    Magnesium   Result Value Ref Range    Magnesium 1.8 1.6 - 2.6 mg/dL   CBC with Auto Differential   Result Value Ref Range    WBC 6.3 3.5 - 11.0 k/uL    RBC 5.76 4.5 - 5.9 m/uL    Hemoglobin 11.7 (L) 13.5 - 17.5 g/dL    Hematocrit 40.3 (L) 41 - 53 %    MCV 70.0 (L) 80 - 100 fL    MCH 20.3 (L) 26 - 34 pg    MCHC 29.0 (L) 31 - 37 g/dL    RDW 20.5 (H) 11.5 - 14.9 %    Platelets 65 (L) 866 - 450 k/uL    MPV 8.6 6.0 - 12.0 fL    Seg Neutrophils 77 (H) 36 - 66 %    Lymphocytes 11 (L) 24 - 44 %    Monocytes 10 (H) 1 - 7 %    Eosinophils % 1 0 - 4 %    Basophils 1 0 - 2 %    Segs Absolute 4.86 1.3 - 9.1 k/uL    Absolute Lymph # 0.69 (L) 1.0 - 4.8 k/uL    Absolute Mono # 0.63 0.1 - 1.3 k/uL    Absolute Eos # 0.06 0.0 - 0.4 k/uL    Basophils Absolute 0.06 0.0 - 0.2 k/uL    Morphology HYPOCHROMIA PRESENT     Morphology MICROCYTOSIS PRESENT     Morphology ANISOCYTOSIS PRESENT     Morphology 1+ POLYCHROMASIA     Morphology 1+ TARGET CELLS     Morphology 2+ ECHINOCYTES    Comprehensive Metabolic Panel w/ Reflex to MG   Result Value Ref Range    Glucose 101 (H) 70 - 99 mg/dL    BUN 3 (L) 8 - 23 mg/dL    Creatinine <0.40 (L) 0.70 - 1.20 mg/dL    Est, Glom Filt Rate Can not be calculated >60 mL/min/1.73m2    Calcium 8.0 (L) 8.6 - 10.4 mg/dL    Sodium 142 135 - 144 mmol/L    Potassium 3.3 (L) 3.7 - 5.3 mmol/L    Chloride 104 98 - 107 mmol/L    CO2 35 (H) 20 - 31 mmol/L    Anion Gap 3 (L) 9 - 17 mmol/L    Alkaline Phosphatase 47 40 - 129 U/L    ALT 81 (H) 5 - 41 U/L    AST 32 <40 U/L    Total Bilirubin 1.8 (H) 0.3 - 1.2 mg/dL    Total Protein 5.3 (L) 6.4 - 8.3 g/dL    Albumin 2.8 (L) 3.5 - 5.2 g/dL   Brain Natriuretic Peptide   Result Value Ref Range    Pro-BNP 7,250 (H) <300 pg/mL   Magnesium   Result Value Ref Range    Magnesium 1.7 1.6 - 2.6 mg/dL   Comprehensive Metabolic Panel w/ Reflex to MG   Result Value Ref Range    Glucose 96 70 - 99 mg/dL    BUN 3 (L) 8 - 23 mg/dL    Creatinine <0.40 (L) 0.70 - 1.20 mg/dL    Est, Glom Filt Rate Can not be calculated >60 mL/min/1.73m2    Calcium 8.6 8.6 - 10.4 mg/dL    Sodium 140 135 - 144 mmol/L    Potassium 3.3 (L) 3.7 - 5.3 mmol/L    Chloride 97 (L) 98 - 107 mmol/L    CO2 33 (H) 20 - 31 mmol/L    Anion Gap 10 9 - 17 mmol/L    Alkaline Phosphatase 52 40 - 129 U/L    ALT 74 (H) 5 - 41 U/L    AST 41 (H) <40 U/L    Total Bilirubin 2.3 (H) 0.3 - 1.2 mg/dL    Total Protein 6.1 (L) 6.4 - 8.3 g/dL    Albumin 3.3 (L) 3.5 - 5.2 g/dL   CBC with Auto Differential   Result Value Ref Range    WBC 7.2 3.5 - 11.0 k/uL    RBC 6.52 (H) 4.5 - 5.9 m/uL    Hemoglobin 13.6 13.5 - 17.5 g/dL    Hematocrit 45.3 41 - 53 %    MCV 69.5 (L) 80 - 100 fL    MCH 20.9 (L) 26 - 34 pg    MCHC 30.0 (L) 31 - 37 g/dL    RDW 21.5 (H) 11.5 - 14.9 %    Platelets 67 (L) 000 - 450 k/uL    MPV 8.6 6.0 - 12.0 fL    Seg Neutrophils 77 (H) 36 - 66 %    Lymphocytes 10 (L) 24 - 44 %    Monocytes 10 (H) 1 - 7 %    Eosinophils % 1 0 - 4 %    Basophils 2 0 - 2 %    Segs Absolute 5.55 1.3 - 9.1 k/uL    Absolute Lymph # 0.72 (L) 1.0 - 4.8 k/uL    Absolute Mono # 0.72 0.1 - 1.3 k/uL    Absolute Eos # 0.07 0.0 - 0.4 k/uL    Basophils Absolute 0.14 0.0 - 0.2 k/uL    Morphology ANISOCYTOSIS PRESENT     Morphology HYPOCHROMIA PRESENT     Morphology MICROCYTOSIS PRESENT     Morphology GIANT PLATELETS    Magnesium   Result Value Ref Range    Magnesium 1.5 (L) 1.6 - 2.6 mg/dL   SPECIMEN REJECTION   Result Value Ref Range    Specimen Source . Random Urine     Ordered Test URIFX     Reason for Rejection       Unable to perform testing: Specimen quantity not sufficient.    C-Reactive Protein   Result Value Ref Range    CRP 10.0 (H) 0.0 - 5.0 mg/L   Homocysteine   Result Value Ref Range    Homocysteine 8.2 <15.0 umol/L   Sedimentation Rate   Result Value Ref Range    Sed Rate 2 0 - 20 mm/Hr   Comprehensive Metabolic Panel w/ Reflex to MG   Result Value Ref Range    Glucose 90 70 - 99 mg/dL    BUN 4 (L) 8 - 23 mg/dL    Creatinine 0.42 (L) 0.70 - 1.20 mg/dL    Est, Glom Filt Rate >60 >60 mL/min/1.73m2    Calcium 8.1 (L) 8.6 - 10.4 mg/dL    Sodium 141 135 - 144 mmol/L    Potassium 3.0 (L) 3.7 - 5.3 mmol/L    Chloride 98 98 - 107 mmol/L    CO2 37 (H) 20 - 31 mmol/L    Anion Gap 6 (L) 9 - 17 mmol/L    Alkaline Phosphatase 48 40 - 129 U/L    ALT 52 (H) 5 - 41 U/L    AST 30 <40 U/L    Total Bilirubin 1.7 (H) 0.3 - 1.2 mg/dL    Total Protein 5.4 (L) 6.4 - 8.3 g/dL    Albumin 2.7 (L) 3.5 - 5.2 g/dL   CBC with Auto Differential   Result Value Ref Range    WBC 6.5 3.5 - 11.0 k/uL    RBC 6.23 (H) 4.5 - 5.9 m/uL    Hemoglobin 12.9 (L) 13.5 - 17.5 g/dL    Hematocrit 43.6 41 - 53 %    MCV 70.0 (L) 80 - 100 fL    MCH 20.8 (L) 26 - 34 pg    MCHC 29.7 (L) 31 - 37 g/dL    RDW 21.4 (H) 11.5 - 14.9 %    Platelets 85 (L) 048 - 450 k/uL    MPV 8.9 6.0 - 12.0 fL    Seg Neutrophils 77 (H) 36 - 66 %    Lymphocytes 11 (L) 24 - 44 %    Monocytes 10 (H) 1 - 7 %    Eosinophils % 1 0 - 4 %    Basophils 1 0 - 2 %    Segs Absolute 4.99 1.3 - 9.1 k/uL    Absolute Lymph # 0.72 (L) 1.0 - 4.8 k/uL    Absolute Mono # 0.65 0.1 - 1.3 k/uL    Absolute Eos # 0.07 0.0 - 0.4 k/uL    Basophils Absolute 0.07 0.0 - 0.2 k/uL    Morphology ANISOCYTOSIS PRESENT     Morphology MICROCYTOSIS PRESENT     Morphology HYPOCHROMIA PRESENT     Morphology 1+ TARGET CELLS    Magnesium   Result Value Ref Range    Magnesium 1.8 1.6 - 2.6 mg/dL   Potassium   Result Value Ref Range    Potassium 3.3 (L) 3.7 - 5.3 mmol/L   POC Glucose Fingerstick   Result Value Ref Range    POC Glucose 104 75 - 110 mg/dL   EKG 12 Lead   Result Value Ref Range    Ventricular Rate 75 BPM    Atrial Rate 75 BPM    P-R Interval 142 ms    QRS Duration 66 ms    Q-T Interval 350 ms    QTc Calculation (Bazett) 390 ms    P Axis 18 degrees    R Axis -165 degrees    T Axis 41 degrees   EKG 12 Lead   Result Value Ref Range Ventricular Rate 105 BPM    Atrial Rate 105 BPM    P-R Interval 144 ms    QRS Duration 78 ms    Q-T Interval 302 ms    QTc Calculation (Bazett) 399 ms    P Axis 13 degrees    R Axis -30 degrees    T Axis -15 degrees   ECHO Complete 2D W Doppler W Color   Result Value Ref Range    Left Ventricular Ejection Fraction 55     LVEF MODALITY ECHO    PREPARE CRYOPRECIPITATE, 1 Product   Result Value Ref Range    Unit Number [id number]     Product Code Thawed Pooled Cryoprecipitate     Unit Divison 00     Dispense Status TRANSFUSED     Unit Issue Date/Time [id number]     Product Code Blood Bank T7832G31     Blood Bank Unit Type and Rh O POS     Blood Bank ISBT Product Blood Type 5100     Blood Bank Blood Product Expiration Date 845363981329     Transfusion Status OK TO TRANSFUSE          IMAGING DATA:    CT HEAD WO CONTRAST    Result Date: 1/1/2023  EXAMINATION: CT OF THE HEAD WITHOUT CONTRAST  1/1/2023 10:48 pm TECHNIQUE: CT of the head was performed without the administration of intravenous contrast. Automated exposure control, iterative reconstruction, and/or weight based adjustment of the mA/kV was utilized to reduce the radiation dose to as low as reasonably achievable. COMPARISON: None. HISTORY: Reason for Exam: AMS Additional signs and symptoms: the patient has been getting more and more confused since 2 weeks ago. It has progressively been getting worse and today FINDINGS: BRAIN/VENTRICLES: There is no acute intracranial hemorrhage, mass effect or midline shift. No abnormal extra-axial fluid collection. The gray-white differentiation is maintained without evidence of an acute infarct. There is no evidence of hydrocephalus. Changes of mild chronic small vessel ischemic disease. ORBITS: The visualized portion of the orbits demonstrate no acute abnormality. SINUSES: The visualized paranasal sinuses and mastoid air cells demonstrate no acute abnormality.  SOFT TISSUES/SKULL:  No acute abnormality of the visualized skull or soft tissues. No acute intracranial abnormality. Mild chronic small vessel ischemic disease. XR CHEST PORTABLE    Result Date: 1/1/2023  EXAMINATION: ONE XRAY VIEW OF THE CHEST 1/1/2023 7:17 pm COMPARISON: None. HISTORY: ORDERING SYSTEM PROVIDED HISTORY: ams TECHNOLOGIST PROVIDED HISTORY: ams Reason for Exam: Altered mental status, difficulty breathing Additional signs and symptoms: Altered mental status, difficulty breathing Relevant Medical/Surgical History: Altered mental status, difficulty breathing FINDINGS: Large lucent area in the left apex suggesting a large bulla however superimposed pneumothorax cannot be excluded. The cardiac size is normal. Right lower lobe airspace infiltrate and mild right pleural effusion. . Pulmonary vascularity appears normal. There is mild ectasia of the thoracic aorta. Calcific tendinitis of the right shoulder. No acute bony abnormalities. The hilar structures are normal.     Right lower lobe pneumonia and small right pleural effusion Large lucent area in the left apex suggesting a large bulla however superimposed pneumothorax cannot be excluded. Follow-up CT would be useful for further evaluation. The findings were sent to the Radiology Results [id number] at 10:31 pm on 1/1/2023 to be communicated to a licensed caregiver.          IMPRESSION:   Primary Problem  Acute respiratory failure with hypoxia and hypercapnia [facility])    Active Hospital Problems    Diagnosis Date Noted    Delirium due to another medical condition [F05] 01/10/2023     Priority: Medium    ACP (advance care planning) [Z71.89] 01/09/2023     Priority: Medium    Goals of care, counseling/discussion [Z71.89] 01/09/2023     Priority: Medium    Encounter for palliative care [Z51.5] 01/09/2023     Priority: Medium    Prolonged pt (prothrombin time) [R79.1] 01/03/2023     Priority: Medium    Emphysema lung ([address] [J43.9] 01/03/2023     Priority: Medium    Cerebral infarction ([address] [I63.9] 01/03/2023     Priority: Medium    Transaminitis [R74.01] 01/02/2023     Priority: Medium    Microcytic anemia [D64.9] 01/02/2023     Priority: Medium    Thrombocytopenia ([address] [D69.6] 01/02/2023     Priority: Medium    Agitation [R45.1] 01/02/2023     Priority: Medium    Acute respiratory failure with hypoxia and hypercapnia (HCC) RLL pneumonia [J96.01, J96.02] 01/02/2023     Priority: Medium    Altered mental status [R41.82] 01/02/2023     Priority: Medium    Community acquired pneumonia of right lower lobe of lung [J18.9] 01/02/2023     Priority: Medium       Medical apathy  Respiratory failure secondary pneumonia  Abnormal LFT  Microcytic anemia  Thrombocytopenia  History of alcohol dependence  IgG lambda paraproteinemia  Positive HCV screen  Iron deficiency  Coagulopathy    RECOMMENDATIONS:  I reviewed the labs/imaging available to me,outside records and discussed with the patient. I explained to the patient the nature of this problem. I explained the significance of these abnormalities and possible etiology and management options  Patient has chronic liver disease secondary to alcohol  No TTP    Anemia and thrombocytopenia are related to liver disease complicated by acute illness  Patient has coagulopathy secondary to liver disease as mentioned  Cardioembolic strokes with a component of subarachnoid bleeding  CT scan was reviewed and affirming my decision not to proceed with anticoagulation as the patient has significant subarachnoid hemorrhage and intraparenchymal hemorrhage. Continue supportive care, watch for bleeding and watch PT and PTT/fibrinogen as well as platelet count  No need for acute intervention at this point from hematology standpoint    We will follow with you                        Discussed with family and Nurse. Thank you for asking us to see this patient.           Brenton Mccray MD  Hematologist/Medical [id number] [facility] hematology oncology physicians            This note is created with the assistance of a speech recognition program.  While intending to generate a document that actually reflects the content of the visit, the document can still have some errors including those of syntax and sound a like substitutions which may escape proof reading. It such instances, actual meaning can be extrapolated by contextual diversion.

## 2023-01-12 NOTE — PLAN OF CARE
Start tf and advance as tolerated. Continue with abx until course completed. Reorient patient prn.   Ok to transfer

## 2023-01-12 NOTE — PROGRESS NOTES
[address] [facility]    PROGRESS NOTE             1/12/2023    7:46 AM    Name:   April Napoles  MRN:     [id number]     Acct:      [de-identified]   Room:   2014/2014-01  IP Day:  10  Admit Date:  1/1/2023  9:55 PM    PCP:  No primary care provider on file. Code Status:  Full Code    Subjective:     C/C:   Chief Complaint   Patient presents with    Altered Mental Status     Interval History Status: Significantly improved    Patient seen and examined at the bed side. Episodes of hypotension were noticed last night (76/48). He remains on 5l NC. Due to patient's COPD, will wean down to 88-92% SPO2    Hypokalemia resolved. Plt count improving. Will initiate transfer  out of intermediate ICU today if Pulmonology are okay. New SBP goals are<140, per neurology. Notes from nursing staff and Consults had been reviewed, and the overnight progress had been checked with the nursing staff as well. Brief History:     The patient is a 60 y.o.  male with unknown past medical history due to no healthcare visits or follow-up who presents with Altered Mental Status and he is admitted to the hospital for the management of  Acute respiratory failure most likely 2/2 pneumonia. Per patient's wife, she reports that the patient has not been acting himself for the past week. She reports being more slurred, confused and progressively getting worse prior to presentation. Patient prior to the presentation a week ago he had a fall and EMS presented patient was vitally stable and he was not transported to the hospital.  This time upon EMS evaluation of the patient he had an O2 of 75%, he was put on a breather and was given a dose of IV Lasix in route. Wife and patient, cannot recall any precipitating event could have led to his presentation. He also denies chest pain, shortness of breath, or cough.        Per patient, he does not recall any complaints or events prior to the presentation. Wife denies recent alcohol use, however, patient does have a history of regular alcohol intake but not on daily basis. Wife also reports that patient has constant heartburn for which he takes regular antiacids. Patient denies the presence of any abdominal pain or any change in bowel habits, abdominal pain, nausea or vomiting. Upon arrival in the ED, patient was put on BiPAP. Patient was afebrile, normotensive and in normal sinus rhythm. A chest x-ray was completed and the patient had right lower pneumonia with a small pleural effusion. Patient also had a large bullae in the left apex with a pneumothorax that cannot be excluded. Patient ABG was suggestive of pattern of respiratory acidosis with pH 7.295, PCO2 60.3, PO2 63.0, HCO3 29.3. CT head WO did not show any acute findings. Patient had elevated troponins of 184, trended down to 177. Patient also had an elevated BNP of 7797. An elevated creatinine of 1.77 was on presentation, with no previous lab test to be compared to. Also patient had a left elevated liver enzymes ALT was 525, , with prolonged prothrombin time of 24.9, and INR of 2.3. Patient was admitted to the ICU for the management of type II respiratory failure associated altered mental status    Review of Systems:     Constitutional: Positive for fatigue  Hent: Positive for hearing loss. Respiratory: Positive for shortness of breath, negative for chest pain negative for wheezing negative for cough  Cardiovascular: Positive for leg swelling. Negative for chest pain palpitation  Gastrointestinal negative for abdominal pain diarrhea nausea or vomiting  Skin negative for change of color or pallor  Neurological: Overall patient feels weak from being hospitalized. Medications:      Allergies:    No Known Allergies    Current Meds:   Scheduled Meds:    spironolactone  25 mg Oral Daily    potassium chloride  20 mEq Oral Daily with breakfast ziprasidone (GEODON) in sterile water injection  10 mg IntraMUSCular Nightly    carvedilol  6.25 mg Oral BID WC    [Held by provider] ferrous sulfate  325 mg Oral Daily with breakfast    pantoprazole (PROTONIX) 40 mg injection  40 mg IntraVENous Daily    furosemide  20 mg IntraVENous Q12H    ipratropium-albuterol  1 ampule Inhalation Q4H WA    [Held by provider] heparin (porcine)  5,000 Units SubCUTAneous 3 times per day    nystatin   Topical BID    potassium bicarb-citric acid  20 mEq Oral Daily    [Held by provider] miconazole   Topical BID    vancomycin  1,250 mg IntraVENous Q12H    [Held by provider] aspirin  81 mg Oral Daily    sodium chloride flush  5-40 mL IntraVENous 2 times per day    nicotine  1 patch TransDERmal Daily    vancomycin (VANCOCIN) intermittent dosing (placeholder)   Other RX Placeholder     Continuous Infusions:    IV infusion builder 30 mL/hr at 01/12/23 0402    sodium chloride      dexmedetomidine      sodium chloride       PRN Meds: hydrALAZINE, potassium chloride **OR** potassium alternative oral replacement **OR** potassium chloride, magnesium sulfate, ziprasidone (GEODON) in sterile water injection, labetalol, potassium chloride, sodium chloride, dexmedetomidine, perflutren lipid microspheres, sodium chloride flush, sodium chloride flush, sodium chloride, ondansetron **OR** ondansetron, polyethylene glycol, acetaminophen **OR** acetaminophen, albuterol, guaiFENesin    Data:     Past Medical History:   has no past medical history on file. Social History:   reports that he has been smoking cigarettes. He has a 40.00 pack-year smoking history. He has never used smokeless tobacco. He reports current alcohol use. He reports that he does not currently use drugs. Family History:   History reviewed. No pertinent family history.     Vitals:  BP (!) 140/79   Pulse 83   Temp 98.3 °F (36.8 °C) (Oral)   Resp 23   Ht 5' 7\" (1.702 m)   Wt 168 lb 14 oz (76.6 kg)   SpO2 (!) 82%   BMI 26.45 kg/m²   Temp (24hrs), Av.9 °F (37.2 °C), Min:98.3 °F (36.8 °C), Max:99.8 °F (37.7 °C)      Recent Labs     23  1102   POCGLU 104       I/O(24Hr): Intake/Output Summary (Last 24 hours) at 2023 0746  Last data filed at 2023 0558  Gross per 24 hour   Intake 3184.07 ml   Output 2250 ml   Net 934.07 ml       Labs:    CBC:   Lab Results   Component Value Date/Time    WBC 6.4 2023 04:04 AM    RBC 5.77 2023 04:04 AM    HGB 12.2 2023 04:04 AM    HCT 40.8 2023 04:04 AM    MCV 70.7 2023 04:04 AM    MCH 21.1 2023 04:04 AM    MCHC 29.8 2023 04:04 AM    RDW 21.3 2023 04:04 AM    PLT 88 2023 04:04 AM    MPV 8.6 2023 04:04 AM     CMP:    Lab Results   Component Value Date/Time     2023 04:04 AM    K 3.9 2023 04:04 AM     2023 04:04 AM    CO2 37 2023 04:04 AM    BUN 6 2023 04:04 AM    CREATININE 0.60 2023 04:04 AM    LABGLOM >60 2023 04:04 AM    GLUCOSE 121 2023 04:04 AM    PROT 5.2 2023 04:04 AM    LABALBU 2.7 2023 04:04 AM    CALCIUM 8.1 2023 04:04 AM    BILITOT 1.6 2023 04:04 AM    ALKPHOS 46 2023 04:04 AM    AST 25 2023 04:04 AM    ALT 39 2023 04:04 AM     PT/INR:    Lab Results   Component Value Date/Time    PROTIME 19.1 2023 04:38 AM    INR 1.6 2023 04:38 AM       No results found for: SPECIAL  Lab Results   Component Value Date/Time    CULTURE NO GROWTH 2023 12:12 PM         Radiology:    CT HEAD WO CONTRAST    Result Date: 2023  EXAMINATION: CT OF THE HEAD WITHOUT CONTRAST  2023 10:11 pm TECHNIQUE: CT of the head was performed without the administration of intravenous contrast. Automated exposure control, iterative reconstruction, and/or weight based adjustment of the mA/kV was utilized to reduce the radiation dose to as low as reasonably achievable. COMPARISON: None.  HISTORY: ORDERING SYSTEM PROVIDED HISTORY: Altered Mental status TECHNOLOGIST PROVIDED HISTORY: Altered Mental status Reason for Exam: Altered Mental status Additional signs and symptoms: Patient came in with transient alteration of awareness, follow up head CT for any changes. FINDINGS: BRAIN/VENTRICLES: There is no acute intracranial hemorrhage, mass effect or midline shift. No abnormal extra-axial fluid collection. There is a focal area of decreased attenuation with loss of gray/white matter differentiation involving posterior left parietal lobe with somewhat increased conspicuity as compared to prior exam.  There is stable small focus of encephalomalacia involving left cerebellar hemisphere compatible with prior remote infarct/insult. Chronic lacunar infarct to right basal ganglia redemonstrated. There is no evidence of hydrocephalus. ORBITS: The visualized portion of the orbits demonstrate no acute abnormality. SINUSES: Small air-fluid level right sphenoid sinus. Trace air-fluid level right frontal sinus. Mild mucoperiosteal thickening to bilateral ethmoid air cells. Findings are new from prior exam and likely relate to presence of nasogastric tube. SOFT TISSUES/SKULL:  No acute abnormality of the visualized skull or soft tissues. Focal area of decreased attenuation with loss of gray/white matter differentiation involving posterior left parietal lobe with somewhat increased conspicuity as compared to prior exam likely reflecting an area of acute to subacute infarct. Stable small chronic infarct/insult to left cerebellar hemisphere. Chronic lacunar infarct to right basal ganglia redemonstrated. Paranasal sinus disease likely relating to presence of nasogastric tube.      CT HEAD WO CONTRAST    Result Date: 1/1/2023  EXAMINATION: CT OF THE HEAD WITHOUT CONTRAST  1/1/2023 10:48 pm TECHNIQUE: CT of the head was performed without the administration of intravenous contrast. Automated exposure control, iterative reconstruction, and/or weight based adjustment of the mA/kV was utilized to reduce the radiation dose to as low as reasonably achievable. COMPARISON: None. HISTORY: Reason for Exam: AMS Additional signs and symptoms: the patient has been getting more and more confused since 2 weeks ago. It has progressively been getting worse and today FINDINGS: BRAIN/VENTRICLES: There is no acute intracranial hemorrhage, mass effect or midline shift. No abnormal extra-axial fluid collection. The gray-white differentiation is maintained without evidence of an acute infarct. There is no evidence of hydrocephalus. Changes of mild chronic small vessel ischemic disease. ORBITS: The visualized portion of the orbits demonstrate no acute abnormality. SINUSES: The visualized paranasal sinuses and mastoid air cells demonstrate no acute abnormality. SOFT TISSUES/SKULL:  No acute abnormality of the visualized skull or soft tissues. No acute intracranial abnormality. Mild chronic small vessel ischemic disease. US LIVER    Result Date: 1/2/2023  EXAMINATION: RIGHT UPPER QUADRANT ULTRASOUND 1/2/2023 10:20 am COMPARISON: None. HISTORY: ORDERING SYSTEM PROVIDED HISTORY: elevated AST and ALT, in setting of PT, and INR TECHNOLOGIST PROVIDED HISTORY: elevated AST and ALT, in setting of PT, and INR FINDINGS: Patient body habitus limits the study, as there is increased attenuation of the ultrasound beam. This decreases sensitivity and specificity. LIVER:  There is mild increased echogenicity seen throughout the liver. No intrahepatic ductal dilatation. No perihepatic fluid. BILIARY SYSTEM:  Bladder is contracted. Gallstones are seen. Gallbladder wall thickness measures 6 mm. Common bile duct is within normal limits measuring 5 mm. RIGHT KIDNEY: The right kidney is grossly unremarkable without evidence of hydronephrosis. PANCREAS:  Visualized portions of the pancreas are unremarkable. OTHER: No evidence of right upper quadrant ascites.  Portal vein flow appears hepatopetal Increased echogenicity is seen throughout the liver suggesting diffuse hepatocellular disease such as fatty infiltration Cholelithiasis. No cholecystitis     XR CHEST PORTABLE    Result Date: 1/2/2023  EXAMINATION: ONE XRAY VIEW OF THE CHEST 1/2/2023 3:15 pm COMPARISON: 01/01/2023 HISTORY: ORDERING SYSTEM PROVIDED HISTORY: intubation TECHNOLOGIST PROVIDED HISTORY: intubation Reason for Exam: intubation FINDINGS: Overlying items external to the patient somewhat limit evaluation. Placement of endotracheal tube with tip 8.2 cm from the robert. Placement of enteric tube seen to extend into the stomach, distal extent not included in field of view. Persistent likely layering right pleural effusion, similar to slightly worsened. Persistent bilateral airspace opacities to bilateral mid to lower lung zones, right worse than left, similar on the left but mildly worsened on the right. No pneumothoraces are seen. Persistent likely bullous change to the left upper lung zone. Cardiac and mediastinal silhouettes are stable. Stable appearance to bony structures. Placement of endotracheal tube which appears high riding with tip 8.2 cm from the robert. Suggest advancement by 2-3 cm. Placement of endotracheal tube seen to extend into the stomach, distal extent not included in field of view. No pneumothoraces. Persistent likely bullous change to the left upper lung zone. Persistent right pleural effusion, similar to slightly worsened. Persistent bilateral airspace opacities, right worse than left, similar on the left but mildly worsened on the right. XR CHEST PORTABLE    Result Date: 1/1/2023  EXAMINATION: ONE XRAY VIEW OF THE CHEST 1/1/2023 7:17 pm COMPARISON: None.  HISTORY: ORDERING SYSTEM PROVIDED HISTORY: ams TECHNOLOGIST PROVIDED HISTORY: ams Reason for Exam: Altered mental status, difficulty breathing Additional signs and symptoms: Altered mental status, difficulty breathing Relevant Medical/Surgical History: Altered mental status, difficulty breathing FINDINGS: Large lucent area in the left apex suggesting a large bulla however superimposed pneumothorax cannot be excluded. The cardiac size is normal. Right lower lobe airspace infiltrate and mild right pleural effusion. . Pulmonary vascularity appears normal. There is mild ectasia of the thoracic aorta. Calcific tendinitis of the right shoulder. No acute bony abnormalities. The hilar structures are normal.     Right lower lobe pneumonia and small right pleural effusion Large lucent area in the left apex suggesting a large bulla however superimposed pneumothorax cannot be excluded. Follow-up CT would be useful for further evaluation. The findings were sent to the Radiology Results [address] at 10:31 pm on 1/1/2023 to be communicated to a licensed caregiver. EKG:    there are no previous tracings available for comparison. Physical Examination:        PHYSICAL EXAM:  General Appearance patient is alert awake and oriented by 3. Psych: No agitation, no depression, maintains good eye contact  HEENT - Head is normocephalic, atraumatic. Neck: supple, no rigidity, normal ROM. Small swelling of the left side of the mandibular angle that its been since childhood  Lungs -limited exam, good air entry. No wheezes  Cardiovascular - Heart sounds are normal.  Regular rhythm, normal rate without murmur, gallop or rub.   Neurology: Neurological exam is nonfocal.  Strength is 4 -/5 in all extremities  Abdomen - Soft, nontender, nondistended, no masses or organomegaly  Skin - No bruising or bleeding on exposed skin area  Extremities -lower limb edema  Assessment:        Primary Problem  Acute respiratory failure with hypoxia and hypercapnia ([address]    Active Hospital Problems    Diagnosis Date Noted    Delirium due to another medical condition [F05] 01/10/2023     Priority: Medium    ACP (advance care planning) [Z71.89] 01/09/2023     Priority: Medium    Goals of care, counseling/discussion [Z71.89] 01/09/2023     Priority: Medium    Encounter for palliative care [Z51.5] 01/09/2023     Priority: Medium    Prolonged pt (prothrombin time) [R79.1] 01/03/2023     Priority: Medium    Emphysema lung ([address] [J43.9] 01/03/2023     Priority: Medium    Cerebral infarction ([address] [I63.9] 01/03/2023     Priority: Medium    Transaminitis [R74.01] 01/02/2023     Priority: Medium    Microcytic anemia [D64.9] 01/02/2023     Priority: Medium    Thrombocytopenia ([address] [D69.6] 01/02/2023     Priority: Medium    Agitation [R45.1] 01/02/2023     Priority: Medium    Acute respiratory failure with hypoxia and hypercapnia (HCC) RLL pneumonia [J96.01, J96.02] 01/02/2023     Priority: Medium    Altered mental status [R41.82] 01/02/2023     Priority: Medium    Community acquired pneumonia of right lower lobe of lung [J18.9] 01/02/2023     Priority: Medium       Plan: Altered Mental Status in the setting of Acute type 2 respiratory failure secondary to Pneumonia and Acute Heart Failure  -EEG showing Diffuse slowing. Multiple small infarct and areas of SAH. -On Geodon 10 mg BID  -ID following: On vancoymycin and Unasyn  -Ipratropium-Albuterol every 4 hrs  -Echocardiogam completed  -Patient failed swallow study. NG tube placed. Will repeat Swallow Study    Transaminitis with prolonged PTT and INR in the setting of acute HCV  - AST and ALT are Improving  -INR PT improving   -Thrombocytopenic, IMPROVING  -HCV RNA PCR positive; ID on board       Multiple acute brain infarcts with subarachnoid hemorrhage versus meningitis(Unlikely)  -EEG showing diffuse slowing without epileptic waves.  -No anticoagulation  -Plan for CASSI once patient mentation improved. -New BP parameters of 140. -SAH and infarcts again seen on repeat CT.       Thrombocytopenia-Improving  -Unknown  baseline  -Transaminates and Prolonged PT and PTT  -PLT count  88  -Monitor HH       Sundowning and hallucination.   - Psychiatry consulted likely 2/2 ICU delirium     Microcytic anemia in the setting of elevated INR and PT  -No previous labs to compare?  -Iron IV replacement    Elevated troponins most likely 2/2 demand ischemia  -no acute changes on EKG  -ECHO showing normal LVF. Mild tricuspid regurge. RV pressure of 25 mmHG  Hypokalemia-Resolved  -On protocol  -Potasium chloride 20meq infusion. DVT prophylaxis: reason for no prophylaxis: Prolonged PT, aPTT  GI prophylaxis: Protonix 40 mg daily    Nick Barfield MD  1/12/2023  7:46 AM     Attending Physician Statement  I have discussed the care of Nolan Lindsay with the resident team. I have examined the patient myself and taken ros and hpi , including pertinent history and exam findings,  with the resident. I have reviewed the key elements of all parts of the encounter with the resident. I agree with the assessment, plan and orders as documented by the resident.     Principal Problem:    Acute respiratory failure with hypoxia and hypercapnia (HCC) RLL pneumonia  Active Problems:    Transaminitis    Microcytic anemia    Thrombocytopenia (HCC)    Agitation    Altered mental status    Community acquired pneumonia of right lower lobe of lung    Prolonged pt (prothrombin time)    Emphysema lung (HCC)    Cerebral infarction ([facility]    ACP (advance care planning)    Goals of care, counseling/discussion    Encounter for palliative care    Delirium due to another medical condition  Resolved Problems:    COPD with respiratory failure, acute ([address]      Treated for shortness of breath secondary to PNA and encephalopathy secondary to [facility], cerebral infarcts  Remains on IV vancomycin per ID recommendation  NG tube placed  Currently on 3L NC  Repeat CT head shows prior strokes and SAH and intraparenchymal hemorrhage  Appreciate neurology recommendations  Platelet count uptrending now DVT prophylaxis remains on hold    1/12  No concern for TTP/ITP - schistocytes neg  Plt count 80  Apparenlty pt had been on OTC  BID- discontinued  Doing better  NG tube placed yesterday    Electronically signed by Annamarie Newman MD

## 2023-01-12 NOTE — PROGRESS NOTES
Speech Language Pathology  Speech Language Pathology  [facility]    Dysphagia Treatment Note    Date: 1/12/2023  Patients Name: Kavita Trevino  MRN: [id number]  Diagnosis: Dysphagia  Patient Active Problem List   Diagnosis Code    Acute type II respiratory failure (HCC) J96.00    Transaminitis R74.01    Microcytic anemia D64.9    Thrombocytopenia (HCC) D69.6    Agitation R45.1    Acute respiratory failure with hypoxia and hypercapnia (HCC) RLL pneumonia J96.01, J96.02    Altered mental status R41.82    Community acquired pneumonia of right lower lobe of lung J18.9    Prolonged pt (prothrombin time) R79.1    Emphysema lung (HCC) J43.9    Cerebral infarction (HCC) I63.9    ACP (advance care planning) Z71.89    Goals of care, counseling/discussion Z71.89    Encounter for palliative care Z51.5    Delirium due to another medical condition F05       Pain: 4/10 (neck)    Dysphagia Treatment  Treatment time: 9891-4150    Subjective: [x] Alert [] Cooperative     [] Confused     [] Agitated    [] Lethargic    Objective/Assessment:    Pt seen for dysphagia management. Education provided re: results and recommendations of MBS, aspiration risk, and dysphagia treatment. Pt verbalized understanding, but needs reinforcement. Pt cooperative for session, but confused -- stating, \"I can just go home and come back tomorrow\". Frequent redirection and repetition provided throughout session. Pt completed BOT exercises x3 sets in reps of 20 c mod cues. Pt completed volitional effortful swallow x2 reps and noe maneuver x10 reps c max cues. Pt demonstrates difficulty with swallow initiation for exercises. Moist oral swab used throughout to assist.  Education provided re: completion of swallowing exercises throughout the day to improve swallow function. Handout of exercises reviewed with Pt and left in room. Pt and family x2 verbalized understanding and agreeable.       Plan:  [x] Continue ST services    [] Discharge from ST:        Discharge recommendations: [] Inpatient Rehab   [] [place]   [] Outpatient Therapy  [] Follow up at trauma clinic   [] Other:         Treatment completed by:  Ra Donaldson M.A., CCC-SLP

## 2023-01-12 NOTE — PROGRESS NOTES
Department of Internal Medicine  Nephrology Nica Arevalo MD  Progress Note    Reason for consultation: Management of acute kidney injury. Consulting physician: Niya Starr MD.      Interval hx/Subjective  Patient seen and examined in ICU, patient is alert and oriented and not in any distress. Noted no acute distress. serum creatinine remains  stable within normal limits  Hypernatremia   improving at 140 mmol/l. Good diuresis on lasix 40 mg dly  Echo showed IVC dilated without respiratory variation. Preserved EF 55%. Chest x-ray 1/5/23 with bilateral pleural effusion. CT angiogram of the chest on 1/3/2023 which ruled out pulmonary embolism; dilated main pulmonary artery suggestive of pulmonary hypertension as well as increased RV to LV ratio suggestive of right heart failure. There was incidental finding of moderate right and small left pleural effusion with partial collapse of the right lower lobe. History of present illness: This is a 60 y.o. male with no significant past medical history [no regular physician follow-up], who was brought to the emergency department via EMS for further evaluation of progressively worsening confusion and lethargy of 2 weeks duration. Patient's spouse said that he became progressively confused prompting her to call EMS on day of presentation 1/1/2023. When EMS arrived, patient was found to be obtunded with oxygen saturation 75% on room air and he was placed on a nonrebreather mask and given a dose of IV Lasix as he also had severe bilateral leg swelling. Patient was placed on BiPAP on arrival to the emergency department and was admitted to the intensive care unit. Initial systolic blood pressure was 106 mmHg and serum creatinine 1.77 mg/dL associated with elevated AST and ALT. On admission to the intensive care unit patient required endotracheal intubation for airway protection and treatment of acute respiratory failure.   Nephrology consultation has been placed for management of acute kidney injury. He is currently on Levophed drip at 3 mcg/min. CT scan of the brain performed at presentation showed no acute intracranial abnormality but with mild chronic small vessel ischemic disease. Repeat CT scan performed 24 hours later [1/2/2023] showed focal area of decreased attenuation with loss of gray/white matter differentiation involving posterior left lobe with somewhat increased conspicuity as compared to prior exam likely reflecting an area of acute to subacute stroke. Patient has no known allergies. History reviewed. No pertinent past medical history.     Scheduled Meds:   spironolactone  25 mg Oral Daily    potassium chloride  20 mEq Oral Daily with breakfast    ziprasidone (GEODON) in sterile water injection  10 mg IntraMUSCular Nightly    carvedilol  6.25 mg Oral BID WC    [Held by provider] ferrous sulfate  325 mg Oral Daily with breakfast    pantoprazole (PROTONIX) 40 mg injection  40 mg IntraVENous Daily    furosemide  20 mg IntraVENous Q12H    ipratropium-albuterol  1 ampule Inhalation Q4H WA    [Held by provider] heparin (porcine)  5,000 Units SubCUTAneous 3 times per day    nystatin   Topical BID    potassium bicarb-citric acid  20 mEq Oral Daily    [Held by provider] miconazole   Topical BID    vancomycin  1,250 mg IntraVENous Q12H    [Held by provider] aspirin  81 mg Oral Daily    sodium chloride flush  5-40 mL IntraVENous 2 times per day    nicotine  1 patch TransDERmal Daily    vancomycin (VANCOCIN) intermittent dosing (placeholder)   Other RX Placeholder     Continuous Infusions:   IV infusion builder 30 mL/hr at 01/12/23 0402    sodium chloride      dexmedetomidine      sodium chloride       PRN Meds:.hydrALAZINE, potassium chloride **OR** potassium alternative oral replacement **OR** potassium chloride, magnesium sulfate, ziprasidone (GEODON) in sterile water injection, labetalol, potassium chloride, sodium chloride, dexmedetomidine, perflutren lipid microspheres, sodium chloride flush, sodium chloride flush, sodium chloride, ondansetron **OR** ondansetron, polyethylene glycol, acetaminophen **OR** acetaminophen, albuterol, guaiFENesin    History reviewed. No pertinent family history. Social History     Socioeconomic History    Marital status: Single     Spouse name: None    Number of children: None    Years of education: None    Highest education level: None   Tobacco Use    Smoking status: Every Day     Packs/day: 1.00     Years: 40.00     Pack years: 40.00     Types: Cigarettes    Smokeless tobacco: Never   Substance and Sexual Activity    Alcohol use: Yes     Comment: beer and scotch    Drug use: Not Currently     Comment: over 20 years (stated by daughter)         Physical Exam:    VITALS:  /75   Pulse 85   Temp 98.2 °F (36.8 °C)   Resp 17   Ht 5' 7\" (1.702 m)   Wt 168 lb 14 oz (76.6 kg)   SpO2 91%   BMI 26.45 kg/m²   TEMPERATURE:  Current - Temp: 98.2 °F (36.8 °C);  Max - Temp  Av.5 °F (36.9 °C)  Min: 98.2 °F (36.8 °C)  Max: 98.7 °F (37.1 °C)  RESPIRATIONS RANGE: Resp  Av.4  Min: 11  Max: 25  PULSE RANGE: Pulse  Av  Min: 76  Max: 102  BLOOD PRESSURE RANGE:  Systolic (33FSG), MDJ:014 , Min:76 , LORRI:234 ; Diastolic (85TYM), PCV:05, Min:34, Max:92  PULSE OXIMETRY RANGE: SpO2  Av.2 %  Min: 82 %  Max: 99 %  24HR INTAKE/OUTPUT:    Intake/Output Summary (Last 24 hours) at 2023 1100  Last data filed at 2023 0849  Gross per 24 hour   Intake 3214.07 ml   Output 2250 ml   Net 964.07 ml         Constitutional: Awake alert, responsive not in acute distress    Skin: Skin color, texture, turgor normal. No rashes or lesions    Head: Normocephalic, without obvious abnormality, atraumatic     Cardiovascular/Edema: regular rate and rhythm, S1, S2 normal, no murmur, click, rub or gallop    Respiratory: Lungs:  Coarse breath sounds bilaterally    Abdomen: soft, non-tender; bowel sounds normal; no masses,  no organomegaly    Back: symmetric, no curvature. ROM normal. No CVA tenderness. Extremities: edema +    Neuro: Nonfocal    CBC:   Recent Labs     01/10/23  0436 01/11/23  0415 01/12/23  0404   WBC 7.2 6.5 6.4   HGB 13.6 12.9* 12.2*   PLT 67* 85* 88*       BMP:    Recent Labs     01/10/23  0436 01/11/23  0415 01/11/23  1735 01/12/23  0108 01/12/23  0404    141  --   --  140   K 3.3* 3.0* 3.3* 4.1 3.9   CL 97* 98  --   --  100   CO2 33* 37*  --   --  37*   BUN 3* 4*  --   --  6*   CREATININE <0.40* 0.42*  --   --  0.60*   GLUCOSE 96 90  --   --  121*       Lab Results   Component Value Date/Time    NITRU NEGATIVE 01/02/2023 03:34 PM    COLORU Dark Yellow 01/02/2023 03:34 PM    PHUR 5.0 01/02/2023 03:34 PM    WBCUA 10 TO 20 01/02/2023 03:34 PM    RBCUA TOO NUMEROUS TO COUNT 01/02/2023 03:34 PM    BACTERIA FEW 01/01/2023 10:25 PM    SPECGRAV 1.015 01/02/2023 03:34 PM    LEUKOCYTESUR SMALL 01/02/2023 03:34 PM    UROBILINOGEN Normal 01/02/2023 03:34 PM    BILIRUBINUR NEGATIVE 01/02/2023 03:34 PM    GLUCOSEU NEGATIVE 01/02/2023 03:34 PM    KETUA TRACE 01/02/2023 03:34 PM     MRI of the brain performed without contrast on 1/3/2023 showed:  Small acute infarcts involving the left cerebellar hemisphere and left   parietal lobe. Small subacute infarct within the left cerebellar hemisphere. Moderate bilateral subarachnoid hemorrhage within both cerebral hemispheres   which is of uncertain etiology. The hemorrhage is more apparent on MRI than   on previous head CT. Old right caudate nucleus lacune. Small bilateral mastoid effusions and moderate left paranasal sinus disease. IMPRESSION/RECOMMENDATIONS:      1. Acute kidney injury - most consistent with prerenal azotemia and/or ischemic acute tubular necrosis from hypotension. Renal function is improved and serum creatinine is  stable at 0.6 mg/dL today.       2.  Hypokalemia -improved on -Aldactone 25 mg daily +potassium chloride 20 mEq daily    3. Hypernatremia due to dehydration-SNa 142 stable     3. Hypotension -blood pressure improved. Blood Cultures are negative    4. Hypomagnesemia.-Improving    5. Fluid overload-improving -continue lasix 40 mg p.o. daily    Plan  Replace electrolytes per sliding scale. Strict I's and O's  Remove Mars catheter  Continue aldactone and KCL 20 meq daily. Prognosis is guarded.     Fito Duff MD   Attending Nephrologist  1/12/2023 11:00 AM

## 2023-01-12 NOTE — PROGRESS NOTES
HPI/Hospital course: This is a 59 y/o WM with reported prior alcoholism with residual liver failure who presented with altered mental status and who was subsequently dx'd with pneumonia. An initial head CT revealed old as well as possibly new strokes which were confirmed on MRI along with some probable SAH. He has just been extubated and nursing reports that he has been intermittently agitated and aggressive but no focal findings were reported. I did speak with his family in the room and they state that he has not been drinking ETOH recently. Blood cultures have been negative to date. He continues to be improved. Is fully oriented but NPO and to undergo a swallow study.     Peripheral smear reviewed by hematology and no schistocytes seen    MRI completed and no new strokes and SAH appears to be resolving       Social History     Socioeconomic History    Marital status: Single     Spouse name: None    Number of children: None    Years of education: None    Highest education level: None   Tobacco Use    Smoking status: Every Day     Packs/day: 1.00     Years: 40.00     Pack years: 40.00     Types: Cigarettes    Smokeless tobacco: Never   Substance and Sexual Activity    Alcohol use: Yes     Comment: beer and scotch    Drug use: Not Currently     Comment: over 20 years (stated by daughter)       Current Facility-Administered Medications   Medication Dose Route Frequency Provider Last Rate Last Admin    hydrALAZINE (APRESOLINE) injection 20 mg  20 mg IntraVENous Q6H PRN Donell Calderon MD   20 mg at 01/11/23 1224    potassium chloride (KLOR-CON M) extended release tablet 40 mEq  40 mEq Oral PRN Donell Calderon MD        Or    potassium bicarb-citric acid (EFFER-K) effervescent tablet 40 mEq  40 mEq Oral PRN Donell Calderon MD        Or    potassium chloride 10 mEq/100 mL IVPB (Peripheral Line)  10 mEq IntraVENous PRN Donell Calderon MD        spironolactone (ALDACTONE) tablet 25 mg  25 mg Oral Daily Geovanny Ferrell MD   25 mg at 01/11/23 1820    potassium chloride (KLOR-CON M) extended release tablet 20 mEq  20 mEq Oral Daily with breakfast Michelle Huggins MD        magnesium sulfate 2000 mg in water 50 mL IVPB  2,000 mg IntraVENous PRN Roscoe Smallwood MD        ziprasidone (GEODON) 10 mg in sterile water 0.5 mL injection  10 mg IntraMUSCular Nightly ALAYNA Grant - CNP        carvedilol (COREG) tablet 6.25 mg  6.25 mg Oral BID WC Roscoe Smallwood MD   6.25 mg at 01/12/23 0836    [Held by provider] ferrous sulfate (IRON 325) tablet 325 mg  325 mg Oral Daily with breakfast Roscoe Smallwood MD        pantoprazole (PROTONIX) 40 mg in sodium chloride (PF) 0.9 % 10 mL injection  40 mg IntraVENous Daily Roscoe Smallwood MD   40 mg at 01/12/23 0836    furosemide (LASIX) injection 20 mg  20 mg IntraVENous Q12H Michelle Huggins MD   20 mg at 01/11/23 2310    ziprasidone (GEODON) 20 mg in sterile water 1 mL injection  20 mg IntraMUSCular Q12H PRN ALAYNA Hope CNP        labetalol (NORMODYNE;TRANDATE) injection 5 mg  5 mg IntraVENous Q6H PRN Rosy Mon MD   5 mg at 01/10/23 1606    potassium chloride 10 mEq/100 mL IVPB (Peripheral Line)  10 mEq IntraVENous PRN Claudia Garvey MD   Stopped at 01/11/23 2357    ipratropium-albuterol (DUONEB) nebulizer solution 1 ampule  1 ampule Inhalation Q4H WA Ciro Lombardi MD   1 ampule at 01/12/23 0751    [Held by provider] heparin (porcine) injection 5,000 Units  5,000 Units SubCUTAneous 3 times per day Dexter Garcia MD   5,000 Units at 01/08/23 0549    potassium chloride 20 mEq in dextrose 5 % 1,000 mL infusion   IntraVENous Continuous Michelle Huggins MD 30 mL/hr at 01/12/23 0402 New Bag at 01/12/23 0402    0.9 % sodium chloride infusion   IntraVENous PRN Stephen Cain MD        dexmedetomidine (PRECEDEX) 400 mcg in sodium chloride 0.9 % 100 mL infusion  0.1-1.5 mcg/kg/hr IntraVENous Continuous PRN Shaheed Del Real MD        nystatin (MYCOSTATIN) ointment   Topical BID Easton Vu MD   Given at 01/12/23 3288    potassium bicarb-citric acid (EFFER-K) effervescent tablet 20 mEq  20 mEq Oral Daily Lucille Chi MD   20 mEq at 01/12/23 0836    [Held by provider] miconazole (MICOTIN) 2 % powder   Topical BID ALAYNA Castaneda NP   Given at 01/06/23 0923    vancomycin (VANCOCIN) 1,250 mg in dextrose 5 % 250 mL IVPB (ADDAVIAL)  1,250 mg IntraVENous Q12H Shelly Hughes .7 mL/hr at 01/12/23 0558 Rate Verify at 01/12/23 0558    perflutren lipid microspheres (DEFINITY) injection 1.5 mL  1.5 mL IntraVENous ONCE PRN Michelle Mackey MD        [facility] by [facility]] aspirin chewable tablet 81 mg  81 mg Oral Daily Nydia Choe MD   81 mg at 01/04/23 0740    sodium chloride flush 0.9 % injection 10 mL  10 mL IntraVENous PRN Nydia Choe MD   10 mL at 01/03/23 1836    sodium chloride flush 0.9 % injection 5-40 mL  5-40 mL IntraVENous 2 times per day ALAYNA Castaneda NP   10 mL at 01/12/23 0838    sodium chloride flush 0.9 % injection 5-40 mL  5-40 mL IntraVENous PRN ALAYNA Marrero NP        0.9 % sodium chloride infusion   IntraVENous PRN ALAYNA Castaneda NP        ondansetron (ZOFRAN-ODT) disintegrating tablet 4 mg  4 mg Oral Q8H PRN ALAYNA Castaneda NP        Or    ondansetron (ZOFRAN) injection 4 mg  4 mg IntraVENous Q6H PRN ALAYNA Marrero NP        polyethylene glycol (GLYCOLAX) packet 17 g  17 g Oral Daily PRN ALAYNA Castaneda NP        acetaminophen (TYLENOL) tablet 650 mg  650 mg Oral Q6H PRN ALAYNA Castaneda NP        Or    acetaminophen (TYLENOL) suppository 650 mg  650 mg Rectal Q6H PRN ALAYNA Castaneda NP        nicotine (NICODERM CQ) 21 MG/24HR 1 patch  1 patch TransDERmal Daily ALAYNA Castaneda NP   1 patch at 01/12/23 0836    albuterol (PROVENTIL) nebulizer solution 2.5 mg  2.5 mg Nebulization Q2H PRN Alexi Faria, APRN - NP   2.5 mg at 01/02/23 1115    guaiFENesin (MUCINEX) extended release tablet 600 mg  600 mg Oral BID PRN Alexi Faria, APRN - NP        vancomycin (VANCOCIN) intermittent dosing (placeholder)   Other RX Placeholder Mario Rios MD           No Known Allergies    ROS:   On ventilator on sedation     Vitals:    01/12/23 0845   BP:    Pulse:    Resp:    Temp:    SpO2: 91%     Admission weight: 180 lb 12.4 oz (82 kg)    Neurological exam:  General Exam:  Normal body habitus, no acute distress    HEENT:  Normocephalic, atraumatic  Eyes: conjunctiva non-injected, sclera anicteric  Mucous membranes of normal color and hydration status  Poor dentition     Neurologic exam:     Mental status: Alert and oriented x 3, calm pleasant and speaks in detail.   No overt visuospatial neglect or gaze preference  Language with normal fluency and comprehension to simple commands    Cranial nerves:  Pupils equal round and reactive to light,   Extraocular movements: conjugate  Face is symmetric to movement   Hearing is intact to conversation  Tongue midline, no dysarthria or dysphonia    Motor exam:  Moving all extremities purposefully and equally       Latest Reference Range & Units 1/6/23 04:19 1/7/23 05:19   WBC 3.5 - 11.0 k/uL 5.2 7.1   RBC 4.5 - 5.9 m/uL 5.33 5.90   Hemoglobin Quant 13.5 - 17.5 g/dL 10.5 (L) 11.7 (L)   Hematocrit 41 - 53 % 38.4 (L) 41.4   MCV 80 - 100 fL 72.0 (L) 70.2 (L)   MCH 26 - 34 pg 19.7 (L) 19.8 (L)   MCHC 31 - 37 g/dL 27.4 (L) 28.2 (L)   MPV 6.0 - 12.0 fL 9.3 9.1   RDW 11.5 - 14.9 % 20.3 (H) 20.4 (H)   Platelet Count 217 - 450 k/uL 71 (L) 68 (L)   Absolute Mono # 0.1 - 1.3 k/uL 0.26 0.43   Eosinophils % 0 - 4 % 0 1   Basophils Absolute 0.0 - 0.2 k/uL 0.00 0.07   Seg Neutrophils 36 - 66 % 90 (H) 82 (H)   Segs Absolute 1.3 - 9.1 k/uL 4.68 5.82   Bands 0 - 10 % 1    Absolute Bands # 0.0 - 1.0 k/uL 0.05    Lymphocytes 24 - 44 % 4 (L) 10 (L)   Absolute Lymph # 1.0 - 4.8 k/uL 0.21 (L) 0.71 (L)   Monocytes 1 - 7 % 5 6   Absolute Eos # 0.0 - 0.4 k/uL 0.00 0.07   Basophils 0 - 2 % 0 1   (L): Data is abnormally low  (H): Data is abnormally high             Assessment : This is a 59 y/o WM with prior alcoholism who presented with AMS in the setting of hypoxia and a pneumonia. A head CT noted some ischemic infarcts and an MRI confirmed this along with SAH. He is overall much improved and has a generally non-focal exam. I suspect that this is a multifactorial encephalopathy with contributions from being in the ICU and from his hypertension. A repeat head CT done yesterday revealed some continued subarrachnoid blood and some old strokes. The appearance of the strokes on MRI was unusual and does appear more small vessel in nature and thus I was concerned that he could have some other process at work. I have sent off additional autoimmune labs and spoke with heme/onc about possible TTP however give stable to improving MRI and negative schistocytes per hemeonc we have ruled out this condition I suspect. Perhaps his SAH is related to low platelets in combination with severe HTN          Plan: Recommend aggressive blood pressure control, will sign off.

## 2023-01-12 NOTE — CARE COORDINATION
ONGOING DISCHARGE PLAN:    Patient is alert and oriented x4. Spoke with patient& Patient's Charlene Leiva, who he live with regarding discharge plan and she confirms that she can NOT take care of him in this condition at home. Pt's Daughter, Rose Yu, appears, to be very involved & would be able to make decisions. She is currently working, & will be here priti. Writer will speak to her priti in regards to SNF, choices. [phone]. PT/OT on board. Agreeable to [facility], unsure of choice, pt. Has Medicaid. Pt. Failed Swallow Study , remains on TF. Currently sating 95% on 3LNC. Will continue to follow for additional discharge needs.     Electronically signed by Dilcia Trejo RN on 1/12/2023 at 4:54 PM

## 2023-01-12 NOTE — PROGRESS NOTES
Vancomycin Dosing by Pharmacy - Daily Note   Vancomycin Therapy Day:  11  Indication: cellulitis    Allergies:  Patient has no known allergies. Actual Weight:    Wt Readings from Last 1 Encounters:   01/11/23 171 lb 15.3 oz (78 kg)       Labs/Ancillary Data  Estimated Creatinine Clearance: 168 mL/min (A) (based on SCr of 0.42 mg/dL (L)). Recent Labs     01/09/23  0456 01/10/23  0436 01/11/23  0415   CREATININE <0.40* <0.40* 0.42*   BUN 3* 3* 4*   WBC 6.3 7.2 6.5     Procalcitonin   Date Value Ref Range Status   01/02/2023 0.09 (H) <0.09 ng/mL Final     Comment:           Suspected Sepsis:  <0.50 ng/mL     Low likelihood of sepsis. 0.50-2.00 ng/mL     Increased likelihood of sepsis. Antibiotics encouraged. >2.00 ng/mL     High risk of sepsis/shock. Antibiotics strongly encouraged. Suspected Lower Resp Tract Infections:  <0.24 ng/mL     Low likelihood of bacterial infection. >0.24 ng/mL     Increased likelihood of bacterial infection. Antibiotics encouraged. With successful antibiotic therapy, PCT levels should decrease rapidly. (Half-life of 24 to   36 hours.)        Procalcitonin values from samples collected within the first 6 hours of systemic infection   may still be low. Retesting may be indicated. Values from day 1 and day 4 can be entered into the Change in Procalcitonin Calculator   ([email or web address]-pct-[email or web address] com) to determine the patient's Mortality Risk Prognosis        In healthy neonates, plasma Procalcitonin (PCT) concentrations increase gradually after   birth, reaching peak values at about 24 hours of age then decrease to normal values below   0.5 ng/mL by 48-72 hours of age.          Intake/Output Summary (Last 24 hours) at 1/12/2023 0159  Last data filed at 1/11/2023 1400  Gross per 24 hour   Intake --   Output 1950 ml   Net -1950 ml     Temp: 98.7    Culture Date / Source  /  Results  See micro reports  Recent vancomycin administrations                     vancomycin (VANCOCIN) 1,250 mg in dextrose 5 % 250 mL IVPB (ADDAVIAL) (mg) 1,250 mg New Bag 01/11/23 1646     1,250 mg New Bag  0424     1,250 mg New Bag 01/10/23 1604     1,250 mg New Bag  0421     1,250 mg New Bag 01/09/23 1646     1,250 mg New Bag  0436                    Vancomycin Concentrations:   TROUGH:  No results for input(s): VANCOTROUGH in the last 72 hours. RANDOM:    Recent Labs     01/12/23  0108   VANCORANDOM 22.9       MRSA Nasal Swab: showed MRSA positive result on 1/3/23 . PLAN     Continue current dose of 1250 mg q12h IV  Ensured BUN/sCr ordered at baseline and every 48 hours x at least 3 levels, then at least weekly. Repeat vancomycin concentration in 7 days if needed. Pharmacy will continue to monitor patient and adjust therapy as indicated      Vancomycin Target Concentration Parameters  Treatment  Population Target AUC/KENDALL Target Trough   Invasive MRSA Infection (bacteremia, pneumonia, meningitis, endocarditis, osteomyelitis)  Sepsis (undifferentiated) 400-600 N/A   Infection due to non-MRSA pathogen  Empiric treatment of non-invasive MRSA infection  (SSTI, UTI) <500 10-15 mg/L   CrCl < 29 mL/min  Rapidly fluctuating serum creatinine   KENTRELL N/A < 15 mg/L     Renal replacement therapy is dosed by levels, per hospital protocol. Abbreviations  * Pauc: probability that AUC is >400 (efficacy); Pconc: probability that Ctrough is above 20 ?g/mL (toxicity); Tox: Probability of nephrotoxicity, based on Ricky et al. Clin Infect Dis 2009. Loading dose: N/A  Regimen: 1250 mg IV every 12 hours. Start time: 04:46 on 01/12/2023  Exposure target: AUC24 (range)400-600 mg/L.hr   AUC24,ss: 539 mg/L.hr  Probability of AUC24 > 400: 91 %  Ctrough,ss: 14 mg/L  Probability of Ctrough,ss > 20: 12 %  Probability of nephrotoxicity (Ricky ELIGIO 2009): 9 %      Thank you for the consult. Pharmacy will continue to follow.

## 2023-01-12 NOTE — PROGRESS NOTES
Physical Therapy  Facility/Department: [facility] ICU  Daily Treatment Note  NAME: Demetria Johnson  : 1960  MRN: [id number]    Date of Service: 2023    Discharge Recommendations:  Therapy recommended at discharge        Patient Diagnosis(es): The primary encounter diagnosis was Acute respiratory failure with hypoxia and hypercapnia ([facility] [address] Diagnoses of Community acquired pneumonia of right lower lobe of lung and Altered mental status, unspecified altered mental status type were also pertinent to this visit. Assessment   Activity Tolerance: Patient limited by fatigue;Treatment limited secondary to decreased cognition;Patient limited by endurance     Plan    Physcial Therapy Plan  General Plan: 6-7 times per week  Specific Instructions for Next Treatment: progress bed mobility as tolerated, inform PT when pt ready to progress with transfers/standing, may need yomaira steay vs lift equipment initally  Current Treatment Recommendations: Strengthening;Balance training;Functional mobility training;Neuromuscular re-education;Cognitive reorientation; Safety education & training;Patient/Caregiver education & training;Equipment evaluation, education, & procurement; Therapeutic activities     Restrictions  Restrictions/Precautions  Restrictions/Precautions: Fall Risk, General Precautions, Contact Precautions, Isolation, Bed Alarm  Required Braces or Orthoses?: No  Implants present? :  (Pt denies)  Position Activity Restriction  Other position/activity restrictions: Severe dysphagia- NPO. Unable to tolerate any PO safely. NG tube placed,  O2     Subjective    Subjective  Subjective: Pt is attempting to get out of bed upon arrival. Bed alarm is on upon arrival. Pt states he needs to use the restroom to urinate. Edu pt that he has a tadeo and already had incontinence of BM. Co-treat with Luis E Wayne. Pain: Pt reports pain in posterior neck. Does not rate. RN is aware and in room.    Orientation  Overall Orientation Status: Impaired  Orientation Level: Oriented to person;Oriented to place; Disoriented to time (partially oriented to situation/location)  Cognition  Overall Cognitive Status: Exceptions  Arousal/Alertness: Delayed responses to stimuli  Following Commands: Follows one step commands consistently with cues  Attention Span: Difficulty dividing attention  Safety Judgement: Decreased awareness of need for assistance  Problem Solving: Assistance required to generate solutions;Assistance required to implement solutions;Assistance required to identify errors made  Insights: Decreased awareness of deficits  Initiation: Requires cues for some  Sequencing: Requires cues for some  Cognition Comment: Pt attempting to transfer to toilet to urinate upon arrival. Edu pt on him having a tadeo. Pt is forgetful during tx and request to use the toilet to urinate. Re-educated pt on his tadeo. Objective   Vitals  BP Location: Left upper arm  O2 Device: Nasal cannula  Bed Mobility Training  Bed Mobility Training: Yes  Overall Level of Assistance: Minimum assistance  Interventions: Safety awareness training;Verbal cues  Rolling: Minimum assistance  Supine to Sit: Minimum assistance  Sit to Supine: Minimum assistance  Scooting: Minimum assistance  Duration: 25  Balance  Sitting: Impaired  Sitting - Static: Fair (occasional)  Sitting - Dynamic: Poor (constant support)  Standing: Impaired  Standing - Static: Constant support (CGA-Mod A with posterior lean.)  Transfer Training  Transfer Training: Yes  Overall Level of Assistance: Minimum assistance;Assist X2  Interventions: Safety awareness training; Tactile cues; Verbal cues  Sit to Stand: Minimum assistance;Assist X2;Adaptive equipment; Additional time (progressing towards Mod A x1. Implusivness noted during tx. Edu for safety awareness.)  Stand to Sit: Minimum assistance  Gait Training  Gait Training: No     PT Exercises  Static Sitting Balance Exercises: Seated EOB ~20min. AROM BLE's to tolerance. Edu provided on benefits. Dynamic Sitting Balance Exercises: Sitting functional dyn act faciliated by Alice Weldon while reaching slightly out of SELVIN. Static sitting while bed sheets are being changed. Static Standing Balance Exercises: 1x5-6min, 1x3-4min, and 1x~1min. Pt requires CGA-Mod A x1 d/t posterior lean, increase knee flexion, and weakness this date. Vickie Sophia assists with tiffany care while writer assists with standing balance. Cues for upright posture with fair carryover. Edu pt on the importance/benefits of continued therex as able while supine. Pt is unable to recall x2-3 ex's (shoulder shrugs/rolls and chin tucks) when questioned after edu. AM-[facility] Mobility Inpatient   How much difficulty turning over in bed?: A Little  How much difficulty sitting down on / standing up from a chair with arms?: Unable  How much difficulty moving from lying on back to sitting on side of bed?: A Little  How much help from another person moving to and from a bed to a chair?: Total  How much help from another person needed to walk in hospital room?: Total  How much help from another person for climbing 3-5 steps with a railing?: Total  AM-PAC Inpatient Mobility Raw Score : 10  AM-PAC Inpatient T-Scale Score : 32.29  Mobility Inpatient CMS 0-100% Score: 76.75  Mobility Inpatient CMS G-Code Modifier : CL      Goals  Short Term Goals  Time Frame for Short Term Goals: 10 visits  Short Term Goal 1: min assist supine<-> sit x1 assist  Short Term Goal 2: sit EOB with min x1 up to 15 min for therex/ADL  Short Term Goal 3: 3+/5 LE strength to prepare for standing and transfers  Short Term Goal 4: PT to continue to sassess functional mobility to progress POC  Short Term Goal 5: roll L/R with SBA  Patient Goals   Patient Goals : unable to state    Education  Patient Education  Education Given To: Patient  Education Provided: Role of Therapy;Plan of Care;Transfer Training;Energy Conservation; Fall Prevention Strategies  Barriers to Learning: Cognition  Education Outcome: Continued education needed    Therapy Time   Individual Concurrent Group Co-treatment   Time In 0938         Time Out [address] [place], [place]

## 2023-01-12 NOTE — PROGRESS NOTES
Patient continues to oxygenate well on 3 liters when at rest. With exertion , he has increased oxygenation needs.

## 2023-01-12 NOTE — PROGRESS NOTES
PALLIATIVE CARE NURSING ASSESSMENT    Patient: Curtis Montanez  Room: 2014/2014-01    Reason For Consult   Goals of care evaluation  Distress management  Guidance and support  Facilitate communications  Assistance in coordinating care    Code Status: Full Code    Summary:  Palliative Care support visit today met with patient, significant other and daughter Joshua Wilson. Confirmed that patient has no DPOAH  Confirmed that patient has 5 adult children. Explained [place] [address] law and hierarchy. Per [facility] Law: Decision Maker is majority vote of the 5 children. Childrens names and numbers obtained  Explained filling out [address] when patient is ready and completely oriented. Palliative care will continue to follow. Pt wants to go home at discharge. Impression: Edyta Thomas is a 60y.o. year old male  has no past medical history on file. .  Currently hospitalized for the management of acute respiratory failure. The Palliative Care Team is following to assist with patient and family support goals of care. Vital Signs  Blood pressure 137/75, pulse 85, temperature 98.2 °F (36.8 °C), resp. rate 17, height 5' 7\" (1.702 m), weight 168 lb 14 oz (76.6 kg), SpO2 91 %. Patient Active Problem List   Diagnosis    Acute type II respiratory failure (HCC)    Transaminitis    Microcytic anemia    Thrombocytopenia (HCC)    Agitation    Acute respiratory failure with hypoxia and hypercapnia (HCC) RLL pneumonia    Altered mental status    Community acquired pneumonia of right lower lobe of lung    Prolonged pt (prothrombin time)    Emphysema lung (HCC)    Cerebral infarction ([facility] [address]    ACP (advance care planning)    Goals of care, counseling/discussion    Encounter for palliative care    Delirium due to another medical condition       Palliative Interaction:  Follow up for continuation of care. Reviewed medical record, updates per bedside nurse Ruslan Liang  KENTRELL improving, antibiotics for pneumonia, chronic liver disease. Anemia and thrombocytopenia secondary to liver disease. Cardioembolic strokes with improving SAH. Failed swallow study 1/9/23. There are 4 adult children that would be shared decision makers. We only have 2 of their phone numbers. Will attempt to address with daughter Mimi Leroy. Significant other listed also. Palliative care visit to patient's room. Met with Juan David Blanton and Phuc Gramajo. Patient is alert ng capped, 02 per n.c., patient oriented to person, place and time. Answers questions regarding to his condition appropriately. Explained value of establishing decision maker. If no DPOAH is completed explained that we would follow [place] Next of Bharati Hess. Notified by patient that he has 5 biologic children. I explained that per [address] his 5 children all working together to make his medical decisions. Explained that majority would determine decision. So 3 of 5 children in agreement would make decision. Explained we have to make an attempt to contact all children. Explained that if he has individual he wants to name as decision maker he can through [facility] paperwork. I obtained names and numbers that we did not have. Patient and family interested in filling out [facility] when able. Daughter relates that during a portion of our conversation Lilli Flores is not remembering some things correctly. Speech therapist related in her note that patient was confused about some things during treatment. Explained that it is important to make sure patient is completely oriented to complete the documents. Let her know we will continue to address. Spiritual care or Palliative care can complete when appropriate. Pt is pleasant and participating in conversation. He feels much improved. He relates working with p.t and o.t. Plan is for repeat swallow study tomorrow. Gave family palliative care card with name and office number. Encouraged to call as needed.       Goals/Plan of care  Education/support to staff  Education/support to family  Education/support to patient  Communications with primary service  Non-pain symptom management    Providing support for coping/adaptation/distress of family  Providing support for coping/adaptation/distress of patient  Discussing meaning/purpose   Continue with current plan of care  Validating patient/family distress  Continued communication updates  Discussion with patient and family regarding establishing decision maker/DPOAH.       Palliative Care Coordinator  Gretchen PEMBERTON, RN, ONN-CG    [facility] Office: [address] [facility] Office: [phone]  [facility] Office: [phone]    For Symptom Management Clinic scheduling please call [phone]

## 2023-01-12 NOTE — PROGRESS NOTES
[facility]   INPATIENT OCCUPATIONAL THERAPY  PROGRESS NOTE  Date: 2023  Patient Name: Cynthia Steen       Room:   MRN: [id number]    : 1960  (63 y.o.)  Gender: male      Diagnosis: Acute respiratory failure,  AMS in the setting of hypoxia and a pneumonia.  multifactoral encehpalopathy. Repeat CT head shows prior strokes and SAH and intraparenchymal hemorrhage    Restrictions/Precautions  Restrictions/Precautions  Restrictions/Precautions: Fall Risk;General Precautions;Contact Precautions;Isolation; Bed Alarm  Required Braces or Orthoses?: No    Vitals  O2 Device: Nasal cannula    Subjective  Subjective  Subjective: \"You have to go with the flow. You're just doing your job\" Pt is pleasant and agreeable for therapy session  Subjective  Pain: Denied pain  Comments: Okay for OT PT co-tx per ANGEL Vargas    Objective  Orientation  Overall Orientation Status: Impaired  Orientation Level: Oriented to person;Oriented to place; Disoriented to time (knew it is  year \"02\", partially oriented to situation)  Cognition  Overall Cognitive Status: Exceptions  Arousal/Alertness: Appropriate responses to stimuli  Following Commands: Follows one step commands consistently  Attention Span: Difficulty dividing attention  Memory:  (fair, pt able to remember visitors who left the room 20 minutes prior.)  Safety Judgement: Decreased awareness of need for assistance;Decreased awareness of need for safety  Problem Solving: Assistance required to generate solutions;Assistance required to implement solutions;Assistance required to identify errors made;Assistance required to correct errors made  Insights: Decreased awareness of deficits  Initiation: Requires cues for some  Sequencing: Requires cues for some  Cognition Comment: Pt demonstrated impulsive behaviors as pt attempted to to ambulate into the bathroom to urinate.  Pt educated on importance of safety and re-directed to task at hand  ADL  Feeding: NPO  Grooming: Stand by assistance  UE Bathing: Minimal assistance  LE Bathing: Maximum assistance  UE Dressing: Stand by assistance  LE Dressing: Maximum assistance  Toileting: Dependent/Total  Toileting Skilled Clinical Factors: Pt noted to have soiled brief upon arrival. Pt stood for pericare and brief management. Additional Comments: ADL scores based on clinical reasoning and skilled observation unless otherwise noted. Pt currently limited due to decreased strength, balance, activity tolerance, and cognitive barriers impacting safety and independence with self care tasks         Balance  Balance  Sitting Balance: Contact guard assistance  Standing Balance: Dependent/Total (Two-person assist)  Standing Balance  Time: 5-6 minutes, 3-4 minutes, 60 seconds  Activity: static standing  Comment: with RW for UE support    Transfers/Mobility  Bed mobility  Supine to Sit: Minimal assistance  Sit to Supine: Minimal assistance  Scooting: Minimal assistance  Bed Mobility Comments: HOB elevated with use of hand rail. Pt sat EOB for approx. 3-4 minutes with CGA. Transfers  Sit to stand: Dependent/Total  Stand to sit: Dependent/Total  Transfer Comments: Pt required Cyril x2 for sit<->stand for two trials. Pt completed modA for third trial.    Functional Mobility  Functional Mobility Comments: Attempted to complete side steps with RW; unable to complete this date. Functional Activities         Patient Education  Patient Education  Education Given To: Patient  Education Provided: Role of Therapy, Plan of Care, Transfer Training  Education Provided Comments: activity promotion, pt initially declined therapy today.   Education Method: Verbal, Demonstration  Barriers to Learning: Cognition  Education Outcome: Continued education needed, Demonstrated understanding, Verbalized understanding      Goals  Short Term Goals  Time Frame for Short Term Goals: By discharge  Short Term Goal 1: Pt will complete bed mobility mod I to sitting EOB supported for 8+ minutes during self care tasks/met (Previous goal met. Goal Updated 1/12/2022 Buzzy May OTR/L)  Short Term Goal 2: Pt will complete simple grooming/self feeding task with mod I and Fair attention to task (Previous goal met. Goal Updated 1/12/2022 Buzzy May OTR/L)  Short Term Goal 3: Pt will complete upper body dressing/bathing with Mod I and Good safety/attention to task (Previous goal met. Goal Updated 1/12/2022 Buzzy May OTR/L)  Short Term Goal 4: Pt will follow simple 1-2 step commands 75% of the time to increase participation in daily activities/ met 1-11-23  Short Term Goal 5: Pt will participate in 15+ minutes of therapeutic exercises/functional activities to increase safety and independence with self care and mobility/ met 1-11-23  Short Term Goal 6: new goal:  pt to dominik 5-7 min standing with BUE support and min A x 1 for adls.  (Goal Added Buzzy May)  Short Term Goal 7: Pt will perform functional transfers/mobility with CGA, using least restrictive device and Fair safety (Goal Added Buzzy May OTR/L)  Occupational Therapy Plan  Times Per Week: 5-7  Current Treatment Recommendations: Self-Care / ADL, Strengthening, ROM, Balance training, Functional mobility training, Endurance training, Cognitive reorientation, Neuromuscular re-education, Safety education & training, Patient/Caregiver education & training, Equipment evaluation, education, & procurement, Cognitive/Perceptual training, Coordination training      Assessment  Activity Tolerance  Activity Tolerance: Patient Tolerated treatment well, Patient limited by fatigue  Activity Tolerance Comments: on high flow oxygen  Assessment  Performance deficits / Impairments: Decreased ADL status, Decreased functional mobility , Decreased ROM, Decreased strength, Decreased safe awareness, Decreased cognition, Decreased endurance, Decreased balance, Decreased high-level IADLs, Decreased fine motor control, Decreased coordination, Decreased posture  Treatment Diagnosis: Impaired self care status  Prognosis: Fair  Decision Making: High Complexity  Discharge Recommendations: Patient would benefit from continued therapy after discharge  OT Equipment Recommendations  Other: TBD  Safety Devices  Type of Devices:  All fall risk precautions in place, Bed alarm in place, Call light within reach, Gait belt, Patient at risk for falls, Left in bed, Nurse notified      AM-PAC Daily Activities Inpatient  AM-PAC Daily Activity Inpatient   How much help for putting on and taking off regular lower body clothing?: A Lot  How much help for Bathing?: A Lot  How much help for Toileting?: Total  How much help for putting on and taking off regular upper body clothing?: A Little  How much help for taking care of personal grooming?: A Little  How much help for eating meals?: Total  AM-PAC Inpatient Daily Activity Raw Score: 12  AM-PAC Inpatient ADL T-Scale Score : 30.6  ADL Inpatient CMS 0-100% Score: 66.57  ADL Inpatient CMS G-Code Modifier : CL    OT Minutes  OT Individual Minutes  Time In: 7988  Time Out: 1016  Minutes: 38  Time Code Minutes   Timed Code Treatment Minutes: [address], OT

## 2023-01-12 NOTE — PROGRESS NOTES
BRONCHOSPASM/BRONCHOCONSTRICTION     [x]         IMPROVE AERATION/BREATH SOUNDS  [x]   ADMINISTER BRONCHODILATOR THERAPY AS APPROPRIATE  [x]   ASSESS BREATH SOUNDS  []   IMPLEMENT AEROSOL/MDI PROTOCOL  [x]   PATIENT EDUCATION AS NEEDED   PROVIDE ADEQUATE OXYGENATION WITH ACCEPTABLE SP02/ABG'S    [x]  IDENTIFY APPROPRIATE OXYGEN THERAPY  [x]   MONITOR SP02/ABG'S AS NEEDED   [x]   PATIENT EDUCATION AS NEEDED     Pt's oxygen was able to be decreased from 6l to 3l current SpO2 95% diminished breath sounds through out.  Pt in no distress at this time

## 2023-01-12 NOTE — PROGRESS NOTES
[facility] -   Infectious diseases evaluation  admission date 1/1/2023    reason for consultation:   Community-acquired pneumonia    Impression :   Current:  Acute hypoxic respiratory failure required intubation 1/2/22 was subsequently extubated  Community-acquired pneumonia with possible aspiration. Cellulitis /infiltration of left arm PICC line that was removed. CVA with subarachnoid hemorrhage  Positive MRSA nasal swab  Liver disease  Cholelithiasis  Acute renal failure  Thrombocytopenia  Hepatitis C/elevated liver enzymes    Recommendations      Unasyn course completed 1/8/2023  Continue IV vancomycin day #11  Swallow study suggestive of severe dysphagia, NG  placed 1/10/23  Left arm PICC line was removed  Liver ultrasound 1/2/2323 showed increased echogenicity and cholelithiasis, no cholecystitis. HIV screen was negative on 1/2/23  Hepatitis C RNA was 17, 400 IU/mL 4.2 for LOC  The patient received IV ceftriaxone and Zithromax on 1/1/2022  Follow blood and respiratory cultures, no growth to date  No growth on urine culture from 1/3/23  Nasal swab for MRSA was +1/2/23  Respiratory panel with COVID-19 PCR was negative  Urine for Legionella antigen negative  Follow CBC and renal function  Continue supportive care  Follow-up with GI as outpatient for hepatitis C  management  Discussed with nursing staff      Infection Control Recommendations   [place] Precautions  Contact Isolation       Antimicrobial Stewardship Recommendations   Simplification of therapy  Targeted therapy      History of Present Illness:   Initial history:  Geovanna Madrigal is a 60y.o.-year-old male was brought to the hospital on 1/1/2023 with altered mental status, confusion associated with decreased responsiveness worsening for 2 weeks. At the ER he was obtunded, O2 sat was 75% on room air, was placed on nonrebreather initially, subsequently was intubated.   He is intubated, sedated unable to provide history that was obtained from chart review and nursing staff. According to his wife he is fully vaccinated for COVID-19 and flu. He has been afebrile since admission  Initial procalcitonin level was 0.09, WBC normal  He was hypotensive was started on Levophed. MRI showed small acute infarcts in the left cerebellar hemisphere and left parietal love with moderate bilateral subarachnoid hemorrhages within both cerebral hemispheres. Chest x-ray showed right lower lobe infiltrates  Interval changes  1/12/2023   He was extubated 1/5/2023. He is afebrile, NG tube in place, awake and oriented, follow commands, denied significant pain, tolerating tube feed, no. Mars catheter in place  Left arm PICC line was removed, less redness and swelling at the site  CT head 1/10 showed prior strokes and SAH and intraparenchymal hemorrhage  Brain MRI 1/11/2023 with impression of:  Mild amount of residual subarachnoid hemorrhage in the right frontal and   right parietal lobes as well as the left frontal lobe. Evolving small focus of hemorrhage in the left parietal lobe posteriorly. Evolving areas of ischemia in the right frontal lobe and left posterior   parietal lobe as well as a few foci in the parasagittal aspect of the right   frontoparietal region. Patient Vitals for the past 8 hrs:   BP Temp Pulse Resp SpO2   01/12/23 1400 120/65 -- 78 18 95 %   01/12/23 1300 (!) 156/71 -- 86 21 94 %   01/12/23 1200 110/67 -- 81 17 95 %   01/12/23 1142 -- 97.9 °F (36.6 °C) -- -- --   01/12/23 1133 -- -- 79 17 98 %   01/12/23 1126 -- -- 82 19 93 %   01/12/23 0845 -- -- -- -- 91 %               I have personally reviewed the past medical history, past surgical history, medications, social history, and family history, and I haveupdated the database accordingly. Allergies:   Patient has no known allergies.      Review of Systems:     Review of Systems  confused, unable to provide  Physical Examination : Physical Exam  Constitutional:       General: He is not in acute distress. Appearance: He is not ill-appearing. HENT:      Head: Normocephalic and atraumatic. Right Ear: External ear normal.      Left Ear: External ear normal.   Eyes:      General: No scleral icterus. Conjunctiva/sclera: Conjunctivae normal.   Cardiovascular:      Rate and Rhythm: Normal rate and regular rhythm. Heart sounds: Normal heart sounds. Pulmonary:      Effort: Pulmonary effort is normal. No respiratory distress. Abdominal:      General: There is no distension. Palpations: Abdomen is soft. Musculoskeletal:      Cervical back: Neck supple. No rigidity. Right lower leg: No edema. Left lower leg: No edema. Skin:     Coloration: Skin is not jaundiced. Neurological:      Mental Status: He is alert and oriented to person, place, and time. Left arm PICC line was removed    Past Medical History:   History reviewed. No pertinent past medical history. Past Surgical  History:   History reviewed. No pertinent surgical history.     Medications:      furosemide  40 mg Oral Daily    spironolactone  25 mg Oral Daily    potassium chloride  20 mEq Oral Daily with breakfast    ziprasidone (GEODON) in sterile water injection  10 mg IntraMUSCular Nightly    carvedilol  6.25 mg Oral BID WC    [Held by provider] ferrous sulfate  325 mg Oral Daily with breakfast    pantoprazole (PROTONIX) 40 mg injection  40 mg IntraVENous Daily    ipratropium-albuterol  1 ampule Inhalation Q4H WA    [Held by provider] heparin (porcine)  5,000 Units SubCUTAneous 3 times per day    nystatin   Topical BID    potassium bicarb-citric acid  20 mEq Oral Daily    [Held by provider] miconazole   Topical BID    vancomycin  1,250 mg IntraVENous Q12H    [Held by provider] aspirin  81 mg Oral Daily    sodium chloride flush  5-40 mL IntraVENous 2 times per day    nicotine  1 patch TransDERmal Daily    vancomycin (VANCOCIN) intermittent dosing (placeholder)   Other RX Placeholder       Social History:     Social History     Socioeconomic History    Marital status: Single     Spouse name: Not on file    Number of children: Not on file    Years of education: Not on file    Highest education level: Not on file   Occupational History    Not on file   Tobacco Use    Smoking status: Every Day     Packs/day: 1.00     Years: 40.00     Pack years: 40.00     Types: Cigarettes    Smokeless tobacco: Never   Substance and Sexual Activity    Alcohol use: Yes     Comment: beer and scotch    Drug use: Not Currently     Comment: over 20 years (stated by daughter)    Sexual activity: Not on file   Other Topics Concern    Not on file   Social History Narrative    Not on file     Social Determinants of Health     Financial Resource Strain: Not on file   Food Insecurity: Not on file   Transportation Needs: Not on file   Physical Activity: Not on file   Stress: Not on file   Social Connections: Not on file   Intimate Partner Violence: Not on file   Housing Stability: Not on file       Family History:   History reviewed. No pertinent family history. Medical Decision Making:   I have independently reviewed/ordered the following labs:    CBC with Differential:   Recent Labs     01/11/23 0415 01/12/23  0404   WBC 6.5 6.4   HGB 12.9* 12.2*   HCT 43.6 40.8*   PLT 85* 88*   LYMPHOPCT 11* 7*   MONOPCT 10* 11*       BMP:  Recent Labs     01/10/23  0436 01/11/23  0415 01/11/23  1735 01/12/23  0108 01/12/23  0404    141  --   --  140   K 3.3* 3.0*   < > 4.1 3.9   CL 97* 98  --   --  100   CO2 33* 37*  --   --  37*   BUN 3* 4*  --   --  6*   CREATININE <0.40* 0.42*  --   --  0.60*   MG 1.5* 1.8  --   --   --     < > = values in this interval not displayed.        Hepatic Function Panel:   Recent Labs     01/11/23 0415 01/12/23  0404   PROT 5.4* 5.2*   LABALBU 2.7* 2.7*   BILITOT 1.7* 1.6*   ALKPHOS 48 46   ALT 52* 39   AST 30 25       No results for input(s): RPR in the last 72 hours. No results for input(s): HIV in the last 72 hours. No results for input(s): BC in the last 72 hours. Lab Results   Component Value Date/Time    CREATININE 0.60 01/12/2023 04:04 AM    GLUCOSE 121 01/12/2023 04:04 AM       Detailed results: Thank you for allowing us to participate in the care of this patient. Please call with questions. This note is created with the assistance of a speech recognition program.  While intending to generate adocument that actually reflects the content of the visit, the document can still have some errors including those of syntax and sound a like substitutions which may escape proof reading. It such instances, actual meaningcan be extrapolated by contextual diversion.     Gabi Buck MD  Office: [phone]  Perfect serve / office [phone]

## 2023-01-12 NOTE — PROGRESS NOTES
*Patient stated that he wanted to back to his home       01/12/23 1523   Encounter Summary   Encounter Overview/Reason  Spiritual/Emotional Needs   Service Provided For: Patient and family together   Referral/Consult From: [address] Significant other   Last Encounter  01/12/23   Complexity of Encounter Moderate   Begin Time 0250   End Time  0300   Total Time Calculated 10 min   Spiritual/Emotional needs   Type Spiritual Support   Palliative Care   Type Palliative Care, Follow-up   Assessment/Intervention/Outcome   Assessment Anxious; Coping   Intervention Active listening;Nurtured Hope;Sustaining Presence/Ministry of presence   Outcome Receptive;Engaged in conversation Rx approved

## 2023-01-13 PROBLEM — E44.1 MILD MALNUTRITION (HCC): Status: ACTIVE | Noted: 2023-01-13

## 2023-01-13 LAB
ABSOLUTE EOS #: 0.07 K/UL (ref 0–0.4)
ABSOLUTE LYMPH #: 0.51 K/UL (ref 1–4.8)
ABSOLUTE MONO #: 0.88 K/UL (ref 0.1–1.3)
ALBUMIN SERPL-MCNC: 2.7 G/DL (ref 3.5–5.2)
ALP BLD-CCNC: 49 U/L (ref 40–129)
ALT SERPL-CCNC: 35 U/L (ref 5–41)
ANION GAP SERPL CALCULATED.3IONS-SCNC: 7 MMOL/L (ref 9–17)
AST SERPL-CCNC: 27 U/L
BASOPHILS # BLD: 1 % (ref 0–2)
BASOPHILS ABSOLUTE: 0.07 K/UL (ref 0–0.2)
BILIRUB SERPL-MCNC: 1.6 MG/DL (ref 0.3–1.2)
BUN BLDV-MCNC: 7 MG/DL (ref 8–23)
CALCIUM SERPL-MCNC: 8.4 MG/DL (ref 8.6–10.4)
CHLORIDE BLD-SCNC: 100 MMOL/L (ref 98–107)
CO2: 34 MMOL/L (ref 20–31)
CREAT SERPL-MCNC: 0.74 MG/DL (ref 0.7–1.2)
EOSINOPHILS RELATIVE PERCENT: 1 % (ref 0–4)
GFR SERPL CREATININE-BSD FRML MDRD: >60 ML/MIN/1.73M2
GLUCOSE BLD-MCNC: 112 MG/DL (ref 70–99)
HCT VFR BLD CALC: 43.9 % (ref 41–53)
HEMOGLOBIN: 13 G/DL (ref 13.5–17.5)
LYMPHOCYTES # BLD: 7 % (ref 24–44)
MAGNESIUM: 1.7 MG/DL (ref 1.6–2.6)
MCH RBC QN AUTO: 21.2 PG (ref 26–34)
MCHC RBC AUTO-ENTMCNC: 29.6 G/DL (ref 31–37)
MCV RBC AUTO: 71.6 FL (ref 80–100)
MONOCYTES # BLD: 12 % (ref 1–7)
MORPHOLOGY: ABNORMAL
PDW BLD-RTO: 25.5 % (ref 11.5–14.9)
PLATELET # BLD: 81 K/UL (ref 150–450)
PMV BLD AUTO: 8.6 FL (ref 6–12)
POTASSIUM SERPL-SCNC: 3.5 MMOL/L (ref 3.7–5.3)
POTASSIUM SERPL-SCNC: 4 MMOL/L (ref 3.7–5.3)
RBC # BLD: 6.14 M/UL (ref 4.5–5.9)
SEG NEUTROPHILS: 79 % (ref 36–66)
SEGMENTED NEUTROPHILS ABSOLUTE COUNT: 5.77 K/UL (ref 1.3–9.1)
SODIUM BLD-SCNC: 141 MMOL/L (ref 135–144)
TOTAL PROTEIN: 5.7 G/DL (ref 6.4–8.3)
WBC # BLD: 7.3 K/UL (ref 3.5–11)

## 2023-01-13 PROCEDURE — 6370000000 HC RX 637 (ALT 250 FOR IP): Performed by: INTERNAL MEDICINE

## 2023-01-13 PROCEDURE — 99254 IP/OBS CNSLTJ NEW/EST MOD 60: CPT | Performed by: PHYSICAL MEDICINE & REHABILITATION

## 2023-01-13 PROCEDURE — 2580000003 HC RX 258: Performed by: INTERNAL MEDICINE

## 2023-01-13 PROCEDURE — 2580000003 HC RX 258

## 2023-01-13 PROCEDURE — 2060000000 HC ICU INTERMEDIATE R&B

## 2023-01-13 PROCEDURE — 6360000002 HC RX W HCPCS: Performed by: INTERNAL MEDICINE

## 2023-01-13 PROCEDURE — C9113 INJ PANTOPRAZOLE SODIUM, VIA: HCPCS

## 2023-01-13 PROCEDURE — 99232 SBSQ HOSP IP/OBS MODERATE 35: CPT | Performed by: INTERNAL MEDICINE

## 2023-01-13 PROCEDURE — 6370000000 HC RX 637 (ALT 250 FOR IP): Performed by: STUDENT IN AN ORGANIZED HEALTH CARE EDUCATION/TRAINING PROGRAM

## 2023-01-13 PROCEDURE — 36415 COLL VENOUS BLD VENIPUNCTURE: CPT

## 2023-01-13 PROCEDURE — A4216 STERILE WATER/SALINE, 10 ML: HCPCS

## 2023-01-13 PROCEDURE — 94640 AIRWAY INHALATION TREATMENT: CPT

## 2023-01-13 PROCEDURE — 85025 COMPLETE CBC W/AUTO DIFF WBC: CPT

## 2023-01-13 PROCEDURE — 80053 COMPREHEN METABOLIC PANEL: CPT

## 2023-01-13 PROCEDURE — 97530 THERAPEUTIC ACTIVITIES: CPT

## 2023-01-13 PROCEDURE — 2580000003 HC RX 258: Performed by: NURSE PRACTITIONER

## 2023-01-13 PROCEDURE — 92526 ORAL FUNCTION THERAPY: CPT

## 2023-01-13 PROCEDURE — 83735 ASSAY OF MAGNESIUM: CPT

## 2023-01-13 PROCEDURE — 84132 ASSAY OF SERUM POTASSIUM: CPT

## 2023-01-13 PROCEDURE — 97110 THERAPEUTIC EXERCISES: CPT

## 2023-01-13 PROCEDURE — 94761 N-INVAS EAR/PLS OXIMETRY MLT: CPT

## 2023-01-13 PROCEDURE — 2580000003 HC RX 258: Performed by: PSYCHIATRY & NEUROLOGY

## 2023-01-13 PROCEDURE — 6360000002 HC RX W HCPCS

## 2023-01-13 PROCEDURE — 2700000000 HC OXYGEN THERAPY PER DAY

## 2023-01-13 PROCEDURE — 6360000002 HC RX W HCPCS: Performed by: STUDENT IN AN ORGANIZED HEALTH CARE EDUCATION/TRAINING PROGRAM

## 2023-01-13 PROCEDURE — 99233 SBSQ HOSP IP/OBS HIGH 50: CPT | Performed by: INTERNAL MEDICINE

## 2023-01-13 PROCEDURE — 97116 GAIT TRAINING THERAPY: CPT

## 2023-01-13 PROCEDURE — 6370000000 HC RX 637 (ALT 250 FOR IP)

## 2023-01-13 PROCEDURE — 6360000002 HC RX W HCPCS: Performed by: PSYCHIATRY & NEUROLOGY

## 2023-01-13 PROCEDURE — 97535 SELF CARE MNGMENT TRAINING: CPT

## 2023-01-13 RX ORDER — POTASSIUM CHLORIDE 20 MEQ/1
20 TABLET, EXTENDED RELEASE ORAL ONCE
Status: DISCONTINUED | OUTPATIENT
Start: 2023-01-13 | End: 2023-01-19 | Stop reason: HOSPADM

## 2023-01-13 RX ADMIN — CARVEDILOL 6.25 MG: 6.25 TABLET, FILM COATED ORAL at 08:16

## 2023-01-13 RX ADMIN — IPRATROPIUM BROMIDE AND ALBUTEROL SULFATE 1 AMPULE: 2.5; .5 SOLUTION RESPIRATORY (INHALATION) at 19:11

## 2023-01-13 RX ADMIN — POTASSIUM CHLORIDE 10 MEQ: 7.46 INJECTION, SOLUTION INTRAVENOUS at 09:28

## 2023-01-13 RX ADMIN — VANCOMYCIN HYDROCHLORIDE 1250 MG: 1.25 INJECTION, POWDER, LYOPHILIZED, FOR SOLUTION INTRAVENOUS at 05:02

## 2023-01-13 RX ADMIN — POTASSIUM CHLORIDE 10 MEQ: 7.46 INJECTION, SOLUTION INTRAVENOUS at 10:34

## 2023-01-13 RX ADMIN — SODIUM CHLORIDE, PRESERVATIVE FREE 10 ML: 5 INJECTION INTRAVENOUS at 08:20

## 2023-01-13 RX ADMIN — CARVEDILOL 6.25 MG: 6.25 TABLET, FILM COATED ORAL at 16:52

## 2023-01-13 RX ADMIN — POTASSIUM BICARBONATE 20 MEQ: 782 TABLET, EFFERVESCENT ORAL at 08:16

## 2023-01-13 RX ADMIN — POTASSIUM CHLORIDE 10 MEQ: 7.46 INJECTION, SOLUTION INTRAVENOUS at 12:33

## 2023-01-13 RX ADMIN — SODIUM CHLORIDE, PRESERVATIVE FREE 40 MG: 5 INJECTION INTRAVENOUS at 08:16

## 2023-01-13 RX ADMIN — IPRATROPIUM BROMIDE AND ALBUTEROL SULFATE 1 AMPULE: 2.5; .5 SOLUTION RESPIRATORY (INHALATION) at 11:56

## 2023-01-13 RX ADMIN — FUROSEMIDE 40 MG: 40 TABLET ORAL at 08:16

## 2023-01-13 RX ADMIN — SPIRONOLACTONE 25 MG: 25 TABLET ORAL at 08:16

## 2023-01-13 RX ADMIN — NYSTATIN OINTMENT: 100000 OINTMENT TOPICAL at 08:16

## 2023-01-13 RX ADMIN — POTASSIUM CHLORIDE 10 MEQ: 7.46 INJECTION, SOLUTION INTRAVENOUS at 08:19

## 2023-01-13 ASSESSMENT — ENCOUNTER SYMPTOMS
WHEEZING: 0
BACK PAIN: 0
ABDOMINAL PAIN: 0
SHORTNESS OF BREATH: 0
COUGH: 0

## 2023-01-13 ASSESSMENT — PAIN SCALES - WONG BAKER
WONGBAKER_NUMERICALRESPONSE: 0
WONGBAKER_NUMERICALRESPONSE: 0

## 2023-01-13 NOTE — PROGRESS NOTES
Physical Therapy  Facility/Department: [facility] ICU  Daily Treatment Note  NAME: Gena Valdez  : 1960  MRN: [id number]    Date of Service: 2023    Discharge Recommendations:  Therapy recommended at discharge        Patient Diagnosis(es): The primary encounter diagnosis was Acute respiratory failure with hypoxia and hypercapnia ([facility] [address] Diagnoses of Community acquired pneumonia of right lower lobe of lung and Altered mental status, unspecified altered mental status type were also pertinent to this visit. Assessment   Activity Tolerance: Patient limited by fatigue;Treatment limited secondary to decreased cognition;Patient limited by endurance     Plan    Physcial Therapy Plan  General Plan: 6-7 times per week  Specific Instructions for Next Treatment: progress bed mobility as tolerated, inform PT when pt ready to progress with transfers/standing, may need yomaira steay vs lift equipment initally  Current Treatment Recommendations: Strengthening;Balance training;Functional mobility training;Neuromuscular re-education;Cognitive reorientation; Safety education & training;Patient/Caregiver education & training;Equipment evaluation, education, & procurement; Therapeutic activities     Restrictions  Restrictions/Precautions  Restrictions/Precautions: Fall Risk, General Precautions, Contact Precautions, Isolation, Bed Alarm  Required Braces or Orthoses?: No  Implants present? :  (Pt denies)  Position Activity Restriction  Other position/activity restrictions: Severe dysphagia- NPO. Unable to tolerate any PO safely. NG tube placed,  on high flow nasal cannula     Subjective    Subjective  Subjective: Pt is in bed upon arrival. RN is in room and ok's pt for PT/OT and sitting up in his chair with chair alarm on. Co-treat with OTR Teacia. Pain: Denied pain  Orientation  Overall Orientation Status: Impaired  Orientation Level: Oriented to person;Oriented to place; Disoriented to time  Cognition  Overall Cognitive Status: Exceptions  Arousal/Alertness: Appropriate responses to stimuli  Following Commands: Follows one step commands consistently  Attention Span: Difficulty dividing attention  Safety Judgement: Decreased awareness of need for assistance;Decreased awareness of need for safety  Problem Solving: Assistance required to generate solutions;Assistance required to implement solutions;Assistance required to identify errors made;Assistance required to correct errors made  Insights: Decreased awareness of deficits  Initiation: Requires cues for some  Sequencing: Requires cues for some    Objective   Vitals  BP Location: Left upper arm  O2 Device: Nasal cannula  Bed Mobility Training  Bed Mobility Training: Yes  Overall Level of Assistance: Stand-by assistance  Interventions: Safety awareness training;Verbal cues  Rolling: Stand-by assistance  Supine to Sit: Stand-by assistance  Sit to Supine: Stand-by assistance  Scooting: Stand-by assistance  Duration: 25  Balance  Sitting: Impaired  Sitting - Static: Fair (occasional)  Sitting - Dynamic: Poor (constant support)  Standing: Impaired  Standing - Static: Constant support (CGA-Mod A with posterior lean.)  Transfer Training  Transfer Training: Yes  Overall Level of Assistance: Minimum assistance;Assist X2  Interventions: Safety awareness training; Tactile cues; Verbal cues  Sit to Stand: Minimum assistance;Assist X2;Adaptive equipment; Additional time (Implusivness noted during tx. Edu for safety awareness. forwared flexed posture)  Stand to Sit: Minimum assistance (impulsively attempts to sit without chair near d/t increase fatigue.)  Gait Training  Gait Training: Yes  Gait  Overall Level of Assistance: Moderate assistance;Assist X2 (Min A x2 initially regressing to Mod A x2 d/t increase fatigue)  Interventions: Safety awareness training; Tactile cues; Verbal cues; Visual cues;Manual cues  Base of Support: Widened  Speed/Radha: Slow  Step Length: Left shortened;Right shortened  Gait Abnormalities:  (very flexed forward posture at hips/knees. pushes RW out of SELVIN F and laterally despite cues. impulsive during tx.)  Distance (ft): 4 Feet (x2; pt amb from bed to [facility] and from [facility] to chair. Chair brought closer d/t increase fatigue.)  Assistive Device: Walker, rolling     PT Exercises  A/AROM Exercises: Seated BLE ex's AROM. Reps: 10-15 each. Pressure Relief Exercises: STS x3; x1 from EOB and x2 from [facility]. Static Sitting Balance Exercises: Seated EOB ~20min  Static Standing Balance Exercises: 1x2-3min. Pt requires CGA-Min A x1 d/t posterior lean, increase knee flexion, and weakness this date. OTR Ally assists with tiffany care while writer assists with standing balance. Cues for upright posture with fair carryover. Dynamic Standing Balance Exercises: 2x~2-3min each. Increase time required to complete all tasks. Safety Devices  Type of Devices: All fall risk precautions in place;Call light within reach; Chair alarm in place;Gait belt;Left in chair;Patient at risk for falls;Nurse notified  Restraints  Restraints Initially in Place: No   AM-[facility] Mobility Inpatient   How much difficulty turning over in bed?: A Little  How much difficulty sitting down on / standing up from a chair with arms?: Unable  How much difficulty moving from lying on back to sitting on side of bed?: A Little  How much help from another person moving to and from a bed to a chair?: Total  How much help from another person needed to walk in hospital room?: Total  How much help from another person for climbing 3-5 steps with a railing?: Total  AM-PAC Inpatient Mobility Raw Score : 10  AM-PAC Inpatient T-Scale Score : 32.29  Mobility Inpatient CMS 0-100% Score: 76.75  Mobility Inpatient CMS G-Code Modifier : CL      Goals  Short Term Goals  Time Frame for Short Term Goals: 10 visits  Short Term Goal 1: min assist supine<-> sit x1 assist  Short Term Goal 2: sit EOB with min x1 up to 15 min for therex/ADL  Short Term Goal 3: 3+/5 LE strength to prepare for standing and transfers  Short Term Goal 4: standing for functional ADLs with Cyril of 2 for 3-5min  Short Term Goal 5: roll L/R with SBA  Patient Goals   Patient Goals : unable to state    Education  Patient Education  Education Given To: Patient  Education Provided: Role of Therapy;Plan of Care;Transfer Training;Energy Conservation; Fall Prevention Strategies; Equipment  Barriers to Learning: Cognition  Education Outcome: Continued education needed    Therapy Time   Individual Concurrent Group Co-treatment   Time In 0826         Time Out 0907         Minutes [address] [place] [address]

## 2023-01-13 NOTE — CARE COORDINATION
ONGOING DISCHARGE PLAN:    Patient is alert and oriented x4. Spoke with patient, Pt's Jona Bearden, Daughter, Joseph Gurrola, regarding discharge plan and they confirm that plan is still to DC to ARU. PM & R has been placed. Writer spoke w/ Andrew, they do Accept Medicaid. Back up Plan would be 1) [facility], 2) [facility], 3) [facility]. PT/OT on board. Pt. Failed Swallow Study , remains on TF/NG. Currently sating 93-98% on 1.5 LNC. Will continue to follow for additional discharge needs.     Electronically signed by Andrea Hughes RN on 1/13/2023 at 1:26 PM

## 2023-01-13 NOTE — PROGRESS NOTES
Department of Internal Medicine  Nephrology Abigail Ray MD  Progress Note    Reason for consultation: Management of acute kidney injury. Consulting physician: Concha Ahn MD.      Interval hx/Subjective  Patient seen and examined in ICU, patient is alert and oriented and not in any distress. Noted no acute distress. serum creatinine remains  stable within normal limits  Hypernatremia   improving at 141 mmol/l. Good diuresis on lasix 40 mg dly  Echo showed IVC dilated without respiratory variation. Preserved EF 55%. Chest x-ray 1/5/23 with bilateral pleural effusion. CT angiogram of the chest on 1/3/2023 which ruled out pulmonary embolism; dilated main pulmonary artery suggestive of pulmonary hypertension as well as increased RV to LV ratio suggestive of right heart failure. There was incidental finding of moderate right and small left pleural effusion with partial collapse of the right lower lobe. History of present illness: This is a 60 y.o. male with no significant past medical history [no regular physician follow-up], who was brought to the emergency department via EMS for further evaluation of progressively worsening confusion and lethargy of 2 weeks duration. Patient's spouse said that he became progressively confused prompting her to call EMS on day of presentation 1/1/2023. When EMS arrived, patient was found to be obtunded with oxygen saturation 75% on room air and he was placed on a nonrebreather mask and given a dose of IV Lasix as he also had severe bilateral leg swelling. Patient was placed on BiPAP on arrival to the emergency department and was admitted to the intensive care unit. Initial systolic blood pressure was 106 mmHg and serum creatinine 1.77 mg/dL associated with elevated AST and ALT. On admission to the intensive care unit patient required endotracheal intubation for airway protection and treatment of acute respiratory failure.   Nephrology consultation has been placed for management of acute kidney injury. He is currently on Levophed drip at 3 mcg/min. CT scan of the brain performed at presentation showed no acute intracranial abnormality but with mild chronic small vessel ischemic disease. Repeat CT scan performed 24 hours later [1/2/2023] showed focal area of decreased attenuation with loss of gray/white matter differentiation involving posterior left lobe with somewhat increased conspicuity as compared to prior exam likely reflecting an area of acute to subacute stroke. Patient has no known allergies. History reviewed. No pertinent past medical history.     Scheduled Meds:   furosemide  40 mg Oral Daily    spironolactone  25 mg Oral Daily    potassium chloride  20 mEq Oral Daily with breakfast    ziprasidone (GEODON) in sterile water injection  10 mg IntraMUSCular Nightly    carvedilol  6.25 mg Oral BID WC    [Held by provider] ferrous sulfate  325 mg Oral Daily with breakfast    pantoprazole (PROTONIX) 40 mg injection  40 mg IntraVENous Daily    ipratropium-albuterol  1 ampule Inhalation Q4H WA    [Held by provider] heparin (porcine)  5,000 Units SubCUTAneous 3 times per day    nystatin   Topical BID    potassium bicarb-citric acid  20 mEq Oral Daily    [Held by provider] miconazole   Topical BID    [Held by provider] aspirin  81 mg Oral Daily    sodium chloride flush  5-40 mL IntraVENous 2 times per day    nicotine  1 patch TransDERmal Daily     Continuous Infusions:   IV infusion builder 30 mL/hr at 01/12/23 0402    sodium chloride      dexmedetomidine      sodium chloride       PRN Meds:.hydrALAZINE, potassium chloride **OR** potassium alternative oral replacement **OR** potassium chloride, magnesium sulfate, ziprasidone (GEODON) in sterile water injection, labetalol, potassium chloride, sodium chloride, dexmedetomidine, perflutren lipid microspheres, sodium chloride flush, sodium chloride flush, sodium chloride, ondansetron **OR** ondansetron, polyethylene glycol, acetaminophen **OR** acetaminophen, albuterol, guaiFENesin    History reviewed. No pertinent family history. Social History     Socioeconomic History    Marital status: Single     Spouse name: None    Number of children: None    Years of education: None    Highest education level: None   Tobacco Use    Smoking status: Every Day     Packs/day: 1.00     Years: 40.00     Pack years: 40.00     Types: Cigarettes    Smokeless tobacco: Never   Substance and Sexual Activity    Alcohol use: Yes     Comment: beer and scotch    Drug use: Not Currently     Comment: over 20 years (stated by daughter)         Physical Exam:    VITALS:  BP (!) 140/114   Pulse 92   Temp 97.5 °F (36.4 °C) (Oral)   Resp 24   Ht 5' 7\" (1.702 m)   Wt 164 lb 3.9 oz (74.5 kg)   SpO2 90%   BMI 25.72 kg/m²   TEMPERATURE:  Current - Temp: 97.5 °F (36.4 °C); Max - Temp  Av.9 °F (36.6 °C)  Min: 97.5 °F (36.4 °C)  Max: 98.7 °F (37.1 °C)  RESPIRATIONS RANGE: Resp  Av.8  Min: 16  Max: 28  PULSE RANGE: Pulse  Av.5  Min: 78  Max: 97  BLOOD PRESSURE RANGE:  Systolic (06UVR), VZP:970 , Min:100 , YNK:889 ; Diastolic (03WBE), CYU:90, Min:65, Max:114  PULSE OXIMETRY RANGE: SpO2  Av.8 %  Min: 82 %  Max: 98 %  24HR INTAKE/OUTPUT:    Intake/Output Summary (Last 24 hours) at 2023 1047  Last data filed at 2023 0800  Gross per 24 hour   Intake 1238.8 ml   Output 2400 ml   Net -1161.2 ml         Constitutional: Awake alert, responsive not in acute distress    Skin: Skin color, texture, turgor normal. No rashes or lesions    Head: Normocephalic, without obvious abnormality, atraumatic     Cardiovascular/Edema: regular rate and rhythm, S1, S2 normal, no murmur, click, rub or gallop    Respiratory: Lungs:  Coarse breath sounds bilaterally    Abdomen: soft, non-tender; bowel sounds normal; no masses,  no organomegaly    Back: symmetric, no curvature. ROM normal. No CVA tenderness.     Extremities: edema +    Neuro: Nonfocal    CBC:   Recent Labs     01/11/23  0415 01/12/23  0404 01/13/23  0451   WBC 6.5 6.4 7.3   HGB 12.9* 12.2* 13.0*   PLT 85* 88* 81*       BMP:    Recent Labs     01/11/23  0415 01/11/23  1735 01/12/23  0108 01/12/23  0404 01/13/23  0451     --   --  140 141   K 3.0*   < > 4.1 3.9 3.5*   CL 98  --   --  100 100   CO2 37*  --   --  37* 34*   BUN 4*  --   --  6* 7*   CREATININE 0.42*  --   --  0.60* 0.74   GLUCOSE 90  --   --  121* 112*    < > = values in this interval not displayed. Lab Results   Component Value Date/Time    NITRU NEGATIVE 01/02/2023 03:34 PM    COLORU Dark Yellow 01/02/2023 03:34 PM    PHUR 5.0 01/02/2023 03:34 PM    WBCUA 10 TO 20 01/02/2023 03:34 PM    RBCUA TOO NUMEROUS TO COUNT 01/02/2023 03:34 PM    BACTERIA FEW 01/01/2023 10:25 PM    SPECGRAV 1.015 01/02/2023 03:34 PM    LEUKOCYTESUR SMALL 01/02/2023 03:34 PM    UROBILINOGEN Normal 01/02/2023 03:34 PM    BILIRUBINUR NEGATIVE 01/02/2023 03:34 PM    GLUCOSEU NEGATIVE 01/02/2023 03:34 PM    KETUA TRACE 01/02/2023 03:34 PM     MRI of the brain performed without contrast on 1/3/2023 showed:  Small acute infarcts involving the left cerebellar hemisphere and left   parietal lobe. Small subacute infarct within the left cerebellar hemisphere. Moderate bilateral subarachnoid hemorrhage within both cerebral hemispheres   which is of uncertain etiology. The hemorrhage is more apparent on MRI than   on previous head CT. Old right caudate nucleus lacune. Small bilateral mastoid effusions and moderate left paranasal sinus disease. IMPRESSION/RECOMMENDATIONS:      1. Acute kidney injury - most consistent with prerenal azotemia and/or ischemic acute tubular necrosis from hypotension. Renal function is improved and serum creatinine is  stable at 0.6 mg/dL today. 2.  Hypokalemia -Aldactone 25 mg daily     3. Hypernatremia due to dehydration-SNa 141 stable     3.   Hypotension -blood pressure improved. Blood Cultures are negative    4. Hypomagnesemia.-Improving    5. Fluid overload-improving -continue lasix 40 mg p.o. daily    Plan  Replace electrolytes per sliding scale. Strict I's and O's  Remove Mars catheter  Continue aldactone  Increase KCL solution to 40 meq daily. Iv magnesium 2 gm    Prognosis is guarded.     Sara Salas MD   Attending Nephrologist  1/13/2023 10:47 AM

## 2023-01-13 NOTE — PROGRESS NOTES
[facility] -   Infectious diseases evaluation  admission date 1/1/2023    reason for consultation:   Community-acquired pneumonia    Impression :   Current:  Acute hypoxic respiratory failure required intubation 1/2/22 was subsequently extubated  Community-acquired pneumonia with possible aspiration. Cellulitis /infiltration of left arm PICC line that was removed. Acute CVA with subarachnoid hemorrhage  Positive MRSA nasal swab  Liver disease  Cholelithiasis  Acute renal failure  Thrombocytopenia followed by hematology oncology, possibly related to liver disease  Hepatitis C/elevated liver enzymes    Recommendations      Unasyn course completed 1/8/2023  Continue IV vancomycin day #12  will discontinue  Swallow study noted had severe dysphagia, NG in place  Left arm PICC line was removed  Liver ultrasound 1/2/2323 showed increased echogenicity and cholelithiasis, no cholecystitis. HIV screen was negative on 1/2/23  Hepatitis C RNA was 17, 400 IU/mL 4.2 for LOC  The patient received IV ceftriaxone and Zithromax on 1/1/2022  Follow blood and respiratory cultures, no growth to date  No growth on urine culture from 1/3/23  Nasal swab for MRSA was +1/2/23  Respiratory panel with COVID-19 PCR was negative  Urine for Legionella antigen negative  Follow CBC and renal function  Continue supportive care  Follow-up with GI as outpatient for hepatitis C  management  Discussed with nursing staff      Infection Control Recommendations   [place] Precautions  Contact Isolation       Antimicrobial Stewardship Recommendations   Simplification of therapy  Targeted therapy      History of Present Illness:   Initial history:  Grace Sal is a 60y.o.-year-old male was brought to the hospital on 1/1/2023 with altered mental status, confusion associated with decreased responsiveness worsening for 2 weeks.   At the ER he was obtunded, O2 sat was 75% on room air, was placed on nonrebreather initially, subsequently was intubated. He is intubated, sedated unable to provide history that was obtained from chart review and nursing staff. According to his wife he is fully vaccinated for COVID-19 and flu. He has been afebrile since admission  Initial procalcitonin level was 0.09, WBC normal  He was hypotensive was started on Levophed. MRI showed small acute infarcts in the left cerebellar hemisphere and left parietal love with moderate bilateral subarachnoid hemorrhages within both cerebral hemispheres. Chest x-ray showed right lower lobe infiltrates  Interval changes  1/13/2023   He was extubated 1/5/2023. He is awake and oriented, afebrile, NG tube in place, denied cough, denied nausea or vomiting, no abdominal pain, no other complaints. Mars catheter was removed overnight  Left arm PICC line was removed, peripheral line in place  Patient Vitals for the past 8 hrs:   BP Temp Temp src Pulse Resp SpO2 Weight   01/13/23 0800 -- 97.5 °F (36.4 °C) Oral -- -- -- --   01/13/23 0600 (!) 147/77 -- -- 88 18 92 % 164 lb 3.9 oz (74.5 kg)   01/13/23 0530 -- -- -- -- -- -- 164 lb 3.9 oz (74.5 kg)   01/13/23 0500 (!) 147/83 -- -- 93 17 94 % --   01/13/23 0400 136/85 -- -- 97 16 -- --   01/13/23 0300 (!) 153/88 -- -- 95 28 (!) 82 % --   01/13/23 0200 138/75 -- -- 91 23 97 % --   01/13/23 0100 124/81 -- -- 95 19 (!) 85 % --               I have personally reviewed the past medical history, past surgical history, medications, social history, and family history, and I haveupdated the database accordingly. Allergies:   Patient has no known allergies. Review of Systems:     Review of Systems  12systems reviewed were negative  Physical Examination :   Constitutional:       General: He is not in acute distress. Appearance: He is not ill-appearing. HENT:      Head: Normocephalic and atraumatic.       Right Ear: External ear normal.      Left Ear: External ear normal.   Eyes:      General: No scleral icterus. Conjunctiva/sclera: Conjunctivae normal.   Cardiovascular:      Rate and Rhythm: Normal rate and regular rhythm. Heart sounds: Normal heart sounds. Pulmonary:      Effort: Pulmonary effort is normal. No respiratory distress. Abdominal:      General: There is no distension. Palpations: Abdomen is soft. Musculoskeletal:      Cervical back: Neck supple. No rigidity. Right lower leg: No edema. Left lower leg: No edema. Skin:     Coloration: Skin is not jaundiced. Neurological:      Mental Status: He is alert and oriented to person, place, and time. Left arm PICC line was removed         Past Medical History:   History reviewed. No pertinent past medical history. Past Surgical  History:   History reviewed. No pertinent surgical history.     Medications:      furosemide  40 mg Oral Daily    spironolactone  25 mg Oral Daily    potassium chloride  20 mEq Oral Daily with breakfast    ziprasidone (GEODON) in sterile water injection  10 mg IntraMUSCular Nightly    carvedilol  6.25 mg Oral BID WC    [Held by provider] ferrous sulfate  325 mg Oral Daily with breakfast    pantoprazole (PROTONIX) 40 mg injection  40 mg IntraVENous Daily    ipratropium-albuterol  1 ampule Inhalation Q4H WA    [Held by provider] heparin (porcine)  5,000 Units SubCUTAneous 3 times per day    nystatin   Topical BID    potassium bicarb-citric acid  20 mEq Oral Daily    [Held by provider] miconazole   Topical BID    [Held by provider] aspirin  81 mg Oral Daily    sodium chloride flush  5-40 mL IntraVENous 2 times per day    nicotine  1 patch TransDERmal Daily    vancomycin (VANCOCIN) intermittent dosing (placeholder)   Other RX Placeholder       Social History:     Social History     Socioeconomic History    Marital status: Single     Spouse name: Not on file    Number of children: Not on file    Years of education: Not on file    Highest education level: Not on file   Occupational History    Not on file   Tobacco Use    Smoking status: Every Day     Packs/day: 1.00     Years: 40.00     Pack years: 40.00     Types: Cigarettes    Smokeless tobacco: Never   Substance and Sexual Activity    Alcohol use: Yes     Comment: beer and scotch    Drug use: Not Currently     Comment: over 20 years (stated by daughter)    Sexual activity: Not on file   Other Topics Concern    Not on file   Social History Narrative    Not on file     Social Determinants of Health     Financial Resource Strain: Not on file   Food Insecurity: Not on file   Transportation Needs: Not on file   Physical Activity: Not on file   Stress: Not on file   Social Connections: Not on file   Intimate Partner Violence: Not on file   Housing Stability: Not on file       Family History:   History reviewed. No pertinent family history. Medical Decision Making:   I have independently reviewed/ordered the following labs:    CBC with Differential:   Recent Labs     01/12/23 0404 01/13/23 0451   WBC 6.4 7.3   HGB 12.2* 13.0*   HCT 40.8* 43.9   PLT 88* 81*   LYMPHOPCT 7* 7*   MONOPCT 11* 12*       BMP:  Recent Labs     01/11/23  0415 01/11/23  1735 01/12/23 0404 01/13/23 0451     --  140 141   K 3.0*   < > 3.9 3.5*   CL 98  --  100 100   CO2 37*  --  37* 34*   BUN 4*  --  6* 7*   CREATININE 0.42*  --  0.60* 0.74   MG 1.8  --   --  1.7    < > = values in this interval not displayed. Hepatic Function Panel:   Recent Labs     01/12/23 0404 01/13/23 0451   PROT 5.2* 5.7*   LABALBU 2.7* 2.7*   BILITOT 1.6* 1.6*   ALKPHOS 46 49   ALT 39 35   AST 25 27       No results for input(s): RPR in the last 72 hours. No results for input(s): HIV in the last 72 hours. No results for input(s): BC in the last 72 hours. Lab Results   Component Value Date/Time    CREATININE 0.74 01/13/2023 04:51 AM    GLUCOSE 112 01/13/2023 04:51 AM       Detailed results: Thank you for allowing us to participate in the care of this patient. Please call with questions. This note is created with the assistance of a speech recognition program.  While intending to generate adocument that actually reflects the content of the visit, the document can still have some errors including those of syntax and sound a like substitutions which may escape proof reading. It such instances, actual meaningcan be extrapolated by contextual diversion.     Geoffrey Lacy MD  Office: [phone]  Perfect serve / office [phone]

## 2023-01-13 NOTE — PROGRESS NOTES
[address] [facility].    PROGRESS NOTE             1/13/2023    8:44 AM    Name:   Demetria Johnson  MRN:     [id number]     Acct:      [de-identified]   Room:   2014/2014-01  IP Day:  6  Admit Date:  1/1/2023  9:55 PM    PCP:  No primary care provider on file. Code Status:  Full Code    Subjective:     C/C:   Chief Complaint   Patient presents with    Altered Mental Status     Interval History Status: improved. Patient seen and examined at bedside this morning  Is alert and oriented x3  Vital signs stable, some episodes of elevated blood pressure readings  Denies headache, shortness of breath, chest pain or blurry vision  NG tube feedings  No acute events overnight  Brief History:     The patient is a 60 y.o.  male with unknown past medical history due to no healthcare visits or follow-up who presents with Altered Mental Status and he is admitted to the hospital for the management of  Acute respiratory failure most likely 2/2 pneumonia. Per patient's wife, she reports that the patient has not been acting himself for the past week. She reports being more slurred, confused and progressively getting worse prior to presentation. Patient prior to the presentation a week ago he had a fall and EMS presented patient was vitally stable and he was not transported to the hospital.  This time upon EMS evaluation of the patient he had an O2 of 75%, he was put on a breather and was given a dose of IV Lasix in route. Wife and patient, cannot recall any precipitating event could have led to his presentation. He also denies chest pain, shortness of breath, or cough. Per patient, he does not recall any complaints or events prior to the presentation. Wife denies recent alcohol use, however, patient does have a history of regular alcohol intake but not on daily basis.   Wife also reports that patient has constant heartburn for which he takes regular antiacids. Patient denies the presence of any abdominal pain or any change in bowel habits, abdominal pain, nausea or vomiting. Upon arrival in the ED, patient was put on BiPAP. Patient was afebrile, normotensive and in normal sinus rhythm. A chest x-ray was completed and the patient had right lower pneumonia with a small pleural effusion. Patient also had a large bullae in the left apex with a pneumothorax that cannot be excluded. Patient ABG was suggestive of pattern of respiratory acidosis with pH 7.295, PCO2 60.3, PO2 63.0, HCO3 29.3. CT head WO did not show any acute findings. Patient had elevated troponins of 184, trended down to 177. Patient also had an elevated BNP of 7797. An elevated creatinine of 1.77 was on presentation, with no previous lab test to be compared to. Also patient had a left elevated liver enzymes ALT was 525, , with prolonged prothrombin time of 24.9, and INR of 2.3. Patient was admitted to the ICU for the management of type II respiratory failure associated altered mental status    Review of Systems:     Review of Systems   Constitutional:  Negative for chills and fever. Eyes:  Negative for visual disturbance. Respiratory:  Negative for cough, shortness of breath and wheezing. Cardiovascular:  Negative for chest pain, palpitations and leg swelling. Gastrointestinal:  Negative for abdominal pain. Genitourinary:  Negative for dysuria. Musculoskeletal:  Negative for back pain. Neurological:  Negative for dizziness, light-headedness, numbness and headaches. Psychiatric/Behavioral:  Negative for confusion. Medications:      Allergies:  No Known Allergies    Current Meds:   Scheduled Meds:    furosemide  40 mg Oral Daily    spironolactone  25 mg Oral Daily    potassium chloride  20 mEq Oral Daily with breakfast    ziprasidone (GEODON) in sterile water injection  10 mg IntraMUSCular Nightly    carvedilol  6.25 mg Oral BID WC    [Held by provider] ferrous sulfate  325 mg Oral Daily with breakfast    pantoprazole (PROTONIX) 40 mg injection  40 mg IntraVENous Daily    ipratropium-albuterol  1 ampule Inhalation Q4H WA    [Held by provider] heparin (porcine)  5,000 Units SubCUTAneous 3 times per day    nystatin   Topical BID    potassium bicarb-citric acid  20 mEq Oral Daily    [Held by provider] miconazole   Topical BID    [Held by provider] aspirin  81 mg Oral Daily    sodium chloride flush  5-40 mL IntraVENous 2 times per day    nicotine  1 patch TransDERmal Daily    vancomycin (VANCOCIN) intermittent dosing (placeholder)   Other RX Placeholder     Continuous Infusions:    IV infusion builder 30 mL/hr at 23 0402    sodium chloride      dexmedetomidine      sodium chloride       PRN Meds: hydrALAZINE, potassium chloride **OR** potassium alternative oral replacement **OR** potassium chloride, magnesium sulfate, ziprasidone (GEODON) in sterile water injection, labetalol, potassium chloride, sodium chloride, dexmedetomidine, perflutren lipid microspheres, sodium chloride flush, sodium chloride flush, sodium chloride, ondansetron **OR** ondansetron, polyethylene glycol, acetaminophen **OR** acetaminophen, albuterol, guaiFENesin    Data:     Past Medical History:   has no past medical history on file. Social History:   reports that he has been smoking cigarettes. He has a 40.00 pack-year smoking history. He has never used smokeless tobacco. He reports current alcohol use. He reports that he does not currently use drugs. Family History: History reviewed. No pertinent family history. Vitals:  BP (!) 147/77   Pulse 88   Temp 97.5 °F (36.4 °C) (Oral)   Resp 18   Ht 5' 7\" (1.702 m)   Wt 164 lb 3.9 oz (74.5 kg)   SpO2 92%   BMI 25.72 kg/m²   Temp (24hrs), Av.9 °F (36.6 °C), Min:97.5 °F (36.4 °C), Max:98.7 °F (37.1 °C)    No results for input(s): POCGLU in the last 72 hours. I/O(24Hr):     Intake/Output Summary (Last 24 hours) at 1/13/2023 0844  Last data filed at 1/13/2023 0500  Gross per 24 hour   Intake 1238.8 ml   Output 2400 ml   Net -1161.2 ml       Labs:    CBC:   Lab Results   Component Value Date/Time    WBC 7.3 01/13/2023 04:51 AM    RBC 6.14 01/13/2023 04:51 AM    HGB 13.0 01/13/2023 04:51 AM    HCT 43.9 01/13/2023 04:51 AM    MCV 71.6 01/13/2023 04:51 AM    MCH 21.2 01/13/2023 04:51 AM    MCHC 29.6 01/13/2023 04:51 AM    RDW 25.5 01/13/2023 04:51 AM    PLT 81 01/13/2023 04:51 AM    MPV 8.6 01/13/2023 04:51 AM     BMP:    Lab Results   Component Value Date/Time     01/13/2023 04:51 AM    K 3.5 01/13/2023 04:51 AM     01/13/2023 04:51 AM    CO2 34 01/13/2023 04:51 AM    BUN 7 01/13/2023 04:51 AM    LABALBU 2.7 01/13/2023 04:51 AM    CREATININE 0.74 01/13/2023 04:51 AM    CALCIUM 8.4 01/13/2023 04:51 AM    LABGLOM >60 01/13/2023 04:51 AM    GLUCOSE 112 01/13/2023 04:51 AM       No results found for: SPECIAL  Lab Results   Component Value Date/Time    CULTURE NO GROWTH 01/03/2023 12:12 PM         Radiology:    CT HEAD WO CONTRAST    Result Date: 1/3/2023  EXAMINATION: CT OF THE HEAD WITHOUT CONTRAST  1/2/2023 10:11 pm TECHNIQUE: CT of the head was performed without the administration of intravenous contrast. Automated exposure control, iterative reconstruction, and/or weight based adjustment of the mA/kV was utilized to reduce the radiation dose to as low as reasonably achievable. COMPARISON: None. HISTORY: ORDERING SYSTEM PROVIDED HISTORY: Altered Mental status TECHNOLOGIST PROVIDED HISTORY: Altered Mental status Reason for Exam: Altered Mental status Additional signs and symptoms: Patient came in with transient alteration of awareness, follow up head CT for any changes. FINDINGS: BRAIN/VENTRICLES: There is no acute intracranial hemorrhage, mass effect or midline shift. No abnormal extra-axial fluid collection.   There is a focal area of decreased attenuation with loss of gray/white matter differentiation involving posterior left parietal lobe with somewhat increased conspicuity as compared to prior exam.  There is stable small focus of encephalomalacia involving left cerebellar hemisphere compatible with prior remote infarct/insult. Chronic lacunar infarct to right basal ganglia redemonstrated. There is no evidence of hydrocephalus. ORBITS: The visualized portion of the orbits demonstrate no acute abnormality. SINUSES: Small air-fluid level right sphenoid sinus. Trace air-fluid level right frontal sinus. Mild mucoperiosteal thickening to bilateral ethmoid air cells. Findings are new from prior exam and likely relate to presence of nasogastric tube. SOFT TISSUES/SKULL:  No acute abnormality of the visualized skull or soft tissues. Focal area of decreased attenuation with loss of gray/white matter differentiation involving posterior left parietal lobe with somewhat increased conspicuity as compared to prior exam likely reflecting an area of acute to subacute infarct. Stable small chronic infarct/insult to left cerebellar hemisphere. Chronic lacunar infarct to right basal ganglia redemonstrated. Paranasal sinus disease likely relating to presence of nasogastric tube. CT HEAD WO CONTRAST    Result Date: 1/1/2023  EXAMINATION: CT OF THE HEAD WITHOUT CONTRAST  1/1/2023 10:48 pm TECHNIQUE: CT of the head was performed without the administration of intravenous contrast. Automated exposure control, iterative reconstruction, and/or weight based adjustment of the mA/kV was utilized to reduce the radiation dose to as low as reasonably achievable. COMPARISON: None. HISTORY: Reason for Exam: AMS Additional signs and symptoms: the patient has been getting more and more confused since 2 weeks ago. It has progressively been getting worse and today FINDINGS: BRAIN/VENTRICLES: There is no acute intracranial hemorrhage, mass effect or midline shift. No abnormal extra-axial fluid collection.   The gray-white differentiation is maintained without evidence of an acute infarct. There is no evidence of hydrocephalus. Changes of mild chronic small vessel ischemic disease. ORBITS: The visualized portion of the orbits demonstrate no acute abnormality. SINUSES: The visualized paranasal sinuses and mastoid air cells demonstrate no acute abnormality. SOFT TISSUES/SKULL:  No acute abnormality of the visualized skull or soft tissues. No acute intracranial abnormality. Mild chronic small vessel ischemic disease. CT HEAD W CONTRAST    Result Date: 1/10/2023  EXAMINATION: CT OF THE HEAD WITH CONTRAST  1/10/2023 11:09 am TECHNIQUE: CT of the head/brain was performed with the administration of intravenous contrast. Multiplanar reformatted images are provided for review. Automated exposure control, iterative reconstruction, and/or weight based adjustment of the mA/kV was utilized to reduce the radiation dose to as low as reasonably achievable. COMPARISON: CT brain performed 02/2023. HISTORY: ORDERING SYSTEM PROVIDED HISTORY: Follow up subarachnoid hemorrhage TECHNOLOGIST PROVIDED HISTORY: Follow up subarachnoid hemorrhage Reason for Exam: Follow up subarachnoid hemorrhage. CT scan on 1/2/2023 FINDINGS: BRAIN/VENTRICLES: Is minimal scattered subarachnoid hemorrhage within right cerebral hemisphere. Is a focus intraparenchymal hemorrhage in the left parietal lobe. There is no mass effect midline shift. Ventricular structures are symmetric. The infratentorial structures are unremarkable. There is no abnormal postcontrast enhancement. ORBITS: The visualized portion of the orbits demonstrate no acute abnormality. SINUSES: The visualized paranasal sinuses and mastoid air cells demonstrate no acute abnormality. SOFT TISSUES/SKULL:  No acute abnormality of the visualized skull or soft tissues. Scattered foci of subarachnoid hemorrhage in the right cerebral hemisphere. Focus of intraparenchymal hemorrhage in the left parietal lobe.      [address] LIVER    Result Date: 1/2/2023  EXAMINATION: RIGHT UPPER QUADRANT ULTRASOUND 1/2/2023 10:20 am COMPARISON: None. HISTORY: ORDERING SYSTEM PROVIDED HISTORY: elevated AST and ALT, in setting of PT, and INR TECHNOLOGIST PROVIDED HISTORY: elevated AST and ALT, in setting of PT, and INR FINDINGS: Patient body habitus limits the study, as there is increased attenuation of the ultrasound beam. This decreases sensitivity and specificity. LIVER:  There is mild increased echogenicity seen throughout the liver. No intrahepatic ductal dilatation. No perihepatic fluid. BILIARY SYSTEM:  Bladder is contracted. Gallstones are seen. Gallbladder wall thickness measures 6 mm. Common bile duct is within normal limits measuring 5 mm. RIGHT KIDNEY: The right kidney is grossly unremarkable without evidence of hydronephrosis. PANCREAS:  Visualized portions of the pancreas are unremarkable. OTHER: No evidence of right upper quadrant ascites. Portal vein flow appears hepatopetal     Increased echogenicity is seen throughout the liver suggesting diffuse hepatocellular disease such as fatty infiltration Cholelithiasis. No cholecystitis     XR CHEST PORTABLE    Result Date: 1/5/2023  EXAMINATION: ONE XRAY VIEW OF THE CHEST 1/5/2023 6:07 am COMPARISON: Chest x-ray dated 4 January 2023 HISTORY: ORDERING SYSTEM PROVIDED HISTORY: While on ventilator TECHNOLOGIST PROVIDED HISTORY: While on ventilator Reason for Exam: on vent FINDINGS: Stable cardiomediastinal silhouette. Stable lines and tubes. Stable appearance of small bilateral pleural effusions with bibasilar airspace disease. No pneumothorax. Stable exam with small bilateral pleural effusions and bibasilar airspace disease. Consider pneumonia versus atelectasis in the appropriate clinical setting.      XR CHEST PORTABLE    Result Date: 1/4/2023  EXAMINATION: ONE XRAY VIEW OF THE CHEST 1/4/2023 6:08 am COMPARISON: Chest x-ray dated 3 January 2023, 0632 hours HISTORY: [address] PROVIDED HISTORY: While on ventilator TECHNOLOGIST PROVIDED HISTORY: While on ventilator Reason for Exam: on vent FINDINGS: Left-sided PICC line with tip in the superior vena cava. Endotracheal tube with the tip above the robert. Enteric tube with the tip and side-port in the stomach. Small bilateral pleural effusions with bibasilar atelectasis. No pneumothorax. Emphysematous changes in the left upper lung. No significant interval change. XR CHEST PORTABLE    Result Date: 1/3/2023  EXAMINATION: ONE XRAY VIEW OF THE CHEST 1/3/2023 6:09 am COMPARISON: 1/2/2023 HISTORY: ORDERING SYSTEM PROVIDED HISTORY: While on ventilator TECHNOLOGIST PROVIDED HISTORY: While on ventilator Reason for Exam: ON VENT FINDINGS: Small to moderate right pleural effusion. Right lung base atelectasis versus infiltrate. Underlying mild congestion versus interstitial infiltrate. Emphysematous changes in the left upper lung. Left-sided PICC line tip in the superior vena cava right atrial junction. ET tube is 5 cm superior to the robert. Feeding tube terminates below the diaphragm. Multiple wires/leads overlie the chest. Moderate osteopenic changes and degenerative changes noted in the bony structures. No significant will change. See above for more details. XR CHEST PORTABLE    Result Date: 1/2/2023  EXAMINATION: ONE XRAY VIEW OF THE CHEST 1/2/2023 3:15 pm COMPARISON: 01/01/2023 HISTORY: ORDERING SYSTEM PROVIDED HISTORY: intubation TECHNOLOGIST PROVIDED HISTORY: intubation Reason for Exam: intubation FINDINGS: Overlying items external to the patient somewhat limit evaluation. Placement of endotracheal tube with tip 8.2 cm from the robert. Placement of enteric tube seen to extend into the stomach, distal extent not included in field of view. Persistent likely layering right pleural effusion, similar to slightly worsened.   Persistent bilateral airspace opacities to bilateral mid to lower lung zones, right worse than left, similar on the left but mildly worsened on the right. No pneumothoraces are seen. Persistent likely bullous change to the left upper lung zone. Cardiac and mediastinal silhouettes are stable. Stable appearance to bony structures. Placement of endotracheal tube which appears high riding with tip 8.2 cm from the robert. Suggest advancement by 2-3 cm. Placement of endotracheal tube seen to extend into the stomach, distal extent not included in field of view. No pneumothoraces. Persistent likely bullous change to the left upper lung zone. Persistent right pleural effusion, similar to slightly worsened. Persistent bilateral airspace opacities, right worse than left, similar on the left but mildly worsened on the right. XR CHEST PORTABLE    Result Date: 1/1/2023  EXAMINATION: ONE XRAY VIEW OF THE CHEST 1/1/2023 7:17 pm COMPARISON: None. HISTORY: ORDERING SYSTEM PROVIDED HISTORY: ams TECHNOLOGIST PROVIDED HISTORY: ams Reason for Exam: Altered mental status, difficulty breathing Additional signs and symptoms: Altered mental status, difficulty breathing Relevant Medical/Surgical History: Altered mental status, difficulty breathing FINDINGS: Large lucent area in the left apex suggesting a large bulla however superimposed pneumothorax cannot be excluded. The cardiac size is normal. Right lower lobe airspace infiltrate and mild right pleural effusion. . Pulmonary vascularity appears normal. There is mild ectasia of the thoracic aorta. Calcific tendinitis of the right shoulder. No acute bony abnormalities. The hilar structures are normal.     Right lower lobe pneumonia and small right pleural effusion Large lucent area in the left apex suggesting a large bulla however superimposed pneumothorax cannot be excluded. Follow-up CT would be useful for further evaluation. The findings were sent to the Radiology Results [address] at 10:31 pm on 1/1/2023 to be communicated to a licensed caregiver.      CT CHEST PULMONARY EMBOLISM W CONTRAST    Result Date: 1/3/2023  EXAMINATION: CTA OF THE CHEST 1/3/2023 2:43 pm TECHNIQUE: CTA of the chest was performed after the administration of intravenous contrast.  Multiplanar reformatted images are provided for review. MIP images are provided for review. Automated exposure control, iterative reconstruction, and/or weight based adjustment of the mA/kV was utilized to reduce the radiation dose to as low as reasonably achievable. COMPARISON: None. HISTORY: ORDERING SYSTEM PROVIDED HISTORY: Elevated d-dimer. Resp distress. Decreasing bp TECHNOLOGIST PROVIDED HISTORY: Elevated d-dimer. Resp distress. Decreasing bp Reason for Exam: Elevated d-dimer. Resp distress. Decreasing bp Additional signs and symptoms: pt on vent, eval for pe FINDINGS: Pulmonary Arteries: There is adequate opacification of the pulmonary arteries for evaluation. No evidence of intraluminal filling defect to suggest pulmonary embolism. Dilated main pulmonary artery measuring up to 3.2 cm. Increased RV to LV ratio. Lungs/pleura: Moderate right and small left pleural effusions with adjacent atelectasis and partial collapse of the right lower lobe. Large bullous formation involving the superior lobe of the left lower lobe. Moderate centrilobular emphysema. A few scattered solid pulmonary nodules measuring up to 5 mm in the right middle lobe (4:72). No mass or consolidation. No pneumothorax. Mediastinum: No lymphadenopathy. No pericardial effusion. No mass. Normal thyroid gland. Aortic valve annulus calcifications. Mild coronary artery calcifications. Mild calcifications of the thoracic aorta and its branches. Upper Abdomen: Trace perisplenic free fluid. Irregular appearance of the gallbladder with wall thickening, pericholecystic fluid, and probable cholelithiasis. . Soft Tissues/Bones: Mild anasarca. .  No acute osseous abnormality. Endotracheal and enteric tubes in place. No evidence of pulmonary embolism. Dilated main pulmonary artery suggestive of pulmonary hypertension. Increased RV to LV ratio suggestive of right heart strain. Moderate right small left pleural effusions with partial collapse of the right lower lobe. Moderate centrilobular emphysema with large bullous formation in the left lung. Irregular appearance of the gallbladder with wall thickening, pericholecystic fluid, and probable cholelithiasis. Correlation with right upper quadrant ultrasound recommended. Trace perisplenic free fluid. VL Lower Extremity Bilateral Venous Duplex    Result Date: 1/3/2023    [facility]  Vascular Lower Extremities DVT Study Procedure   Patient Name  [facility]      Date of Study           01/03/2023                Franca Chan   Date of Birth 1960  Gender                  Male   Age           60 year(s)  Race                       Room Number   2009        Height:                 67 inch, 170.18 cm   Corporate ID  [id number]    Weight:                 188 pounds, 85.3 kg  #   Patient Acct  [de-identified]   BSA:        1.97 m^2    BMI:       29.44 kg/m^2  #   MR #          [id number]      Sonographer             Jose Miguel Arcelia   Accession #   [id number]  Interpreting Physician  [address] [place] [address]   Referring                 Referring Physician     Joel Lopez  Nurse  Practitioner  Procedure Type of Study:   Veins: Lower Extremities DVT Study, Venous Scan Lower Bilateral.  Indications for Study:Elevated D-Dimer and Leg Swelling. Patient Status: In Patient. Technical Quality:Limited visualization. Limitation reason:Edema. Conclusions   Summary   No evidence of deep vein thrombosis in the bilateral lower extremities. Acute superficial vein thrombosis of the right GSV at the saphenofemoral  junction.    Signature   ----------------------------------------------------------------  Electronically signed by Oneal Matos(Sonographer) on  01/03/2023 09:39 AM ----------------------------------------------------------------   ----------------------------------------------------------------  Electronically signed by Marlowe Gardener Reyes,Arthur(Interpreting  physician) on 01/03/2023 07:05 PM  ----------------------------------------------------------------  Findings:   Right Impression:                       Left Impression:  Non visualization of the posterior      Non visualization of the posterior  tibial and peroneal veins. tibial and peroneal veins. Remaining deep veins                    Remaining deep veins  demonstrate normal compressibility. demonstrate normal                                          compressibility. Non compressibility of the great  saphenous vein at the level of the      Normal compressibility of the  junction. great saphenous vein. Normal compressibility of the small     Normal compressibility of the  saphenous vein.                         small saphenous vein. Velocities are measured in cm/s ; Diameters are measured in cm Right Lower Extremities DVT Study Measurements Right 2D Measurements +------------------------------------+----------+---------------+----------+ ! Location                            ! Visualized! Compressibility! Thrombosis! +------------------------------------+----------+---------------+----------+ ! Common Femoral                      !Yes       ! Yes            ! None      ! +------------------------------------+----------+---------------+----------+ ! Prox Femoral                        !Yes       ! Yes            ! None      ! +------------------------------------+----------+---------------+----------+ ! Mid Femoral                         !Yes       ! Yes            ! None      ! +------------------------------------+----------+---------------+----------+ ! Dist Femoral                        !Yes       ! Yes            ! None      ! +------------------------------------+----------+---------------+----------+ ! Popliteal                           !Yes       ! Yes            ! None      ! +------------------------------------+----------+---------------+----------+ ! Sapheno Femoral Junction            ! Yes       ! No             !          ! +------------------------------------+----------+---------------+----------+ ! PTV                                 ! No        !               !          ! +------------------------------------+----------+---------------+----------+ ! Peroneal                            !No        !               !          ! +------------------------------------+----------+---------------+----------+ ! Gastroc                             ! Yes       ! Yes            ! None      ! +------------------------------------+----------+---------------+----------+ ! GSV Thigh                           ! Yes       ! Yes            ! None      ! +------------------------------------+----------+---------------+----------+ ! GSV Knee                            ! Yes       ! Yes            ! None      ! +------------------------------------+----------+---------------+----------+ ! GSV Ankle                           ! No        !               !          ! +------------------------------------+----------+---------------+----------+ ! SSV                                 ! Yes       ! Yes            ! None      ! +------------------------------------+----------+---------------+----------+ Right Doppler Measurements +---------------------------+------+------+--------------------------------+ ! Location                   ! Signal!Reflux! Reflux (msec)                   ! +---------------------------+------+------+--------------------------------+ ! Common Femoral             !Phasic!      !                                ! +---------------------------+------+------+--------------------------------+ ! Prox Femoral               !Phasic!      ! ! +---------------------------+------+------+--------------------------------+ ! Popliteal                  !Phasic!      !                                ! +---------------------------+------+------+--------------------------------+ Left Lower Extremities DVT Study Measurements Left 2D Measurements +------------------------------------+----------+---------------+----------+ ! Location                            ! Visualized! Compressibility! Thrombosis! +------------------------------------+----------+---------------+----------+ ! Common Femoral                      !Yes       ! Yes            ! None      ! +------------------------------------+----------+---------------+----------+ ! Prox Femoral                        !Yes       ! Yes            ! None      ! +------------------------------------+----------+---------------+----------+ ! Mid Femoral                         !Yes       ! Yes            ! None      ! +------------------------------------+----------+---------------+----------+ ! Dist Femoral                        !Yes       ! Yes            ! None      ! +------------------------------------+----------+---------------+----------+ ! Popliteal                           !Yes       ! Yes            ! None      ! +------------------------------------+----------+---------------+----------+ ! Sapheno Femoral Junction            ! Yes       ! Yes            ! None      ! +------------------------------------+----------+---------------+----------+ ! PTV                                 ! No        !               !          ! +------------------------------------+----------+---------------+----------+ ! Peroneal                            !No        !               !          ! +------------------------------------+----------+---------------+----------+ ! Gastroc                             ! Yes       ! Yes            ! None      ! +------------------------------------+----------+---------------+----------+ ! SHANE Thigh !Yes       !Yes            ! None      ! +------------------------------------+----------+---------------+----------+ ! GSV Knee                            ! Yes       ! Yes            ! None      ! +------------------------------------+----------+---------------+----------+ ! GSV Ankle                           ! No        !               !          ! +------------------------------------+----------+---------------+----------+ ! SSV                                 ! Yes       ! Yes            ! None      ! +------------------------------------+----------+---------------+----------+ Left Doppler Measurements +---------------------------+------+------+--------------------------------+ ! Location                   ! Signal!Reflux! Reflux (msec)                   ! +---------------------------+------+------+--------------------------------+ ! Common Femoral             !Phasic!      !                                ! +---------------------------+------+------+--------------------------------+ ! Prox Femoral               !Phasic!      !                                ! +---------------------------+------+------+--------------------------------+ ! Popliteal                  !Phasic!      !                                ! +---------------------------+------+------+--------------------------------+    US SPLEEN    Result Date: 1/3/2023  EXAMINATION: ULTRASOUND OF THE SPLEEN 1/3/2023 1:01 pm COMPARISON: None. HISTORY: ORDERING SYSTEM PROVIDED HISTORY: thrombocytpenia TECHNOLOGIST PROVIDED HISTORY: thrombocytpenia FINDINGS: Spleen is normal in size and echotexture. Maximum diameter 9.9 cm. No focal lesion. Normal ultrasound evaluation of the spleen     CTA HEAD NECK W CONTRAST    Result Date: 1/3/2023  EXAMINATION: CTA OF THE HEAD AND NECK WITH CONTRAST 1/3/2023 6:28 pm: TECHNIQUE: CTA of the head and neck was performed with the administration of intravenous contrast. Multiplanar reformatted images are provided for review.   MIP images are provided for review. Stenosis of the internal carotid arteries measured using NASCET criteria. Automated exposure control, iterative reconstruction, and/or weight based adjustment of the mA/kV was utilized to reduce the radiation dose to as low as reasonably achievable. COMPARISON: Brain MRI done 01/03/2023. Noncontrast CT head done 01/02/2023. HISTORY: ORDERING SYSTEM PROVIDED HISTORY: MRI stoke TECHNOLOGIST PROVIDED HISTORY: MRI stoke Reason for Exam: eval for Pulm embolism given pt with severe plum hypertension Additional signs and symptoms: MRI stoke Relevant Medical/Surgical History: pt on vent , f/u mri brain FINDINGS: CTA NECK: AORTIC ARCH/ARCH VESSELS: No dissection or arterial injury. No significant stenosis of the brachiocephalic or subclavian arteries. Atherosclerosis in the visualized thoracic aorta and bilateral subclavian arteries. CAROTID ARTERIES: No dissection, arterial injury, or hemodynamically significant stenosis by NASCET criteria. Right greater than left carotid bulb atherosclerotic plaque. Tortuosity of the bilateral distal cervical internal carotid arteries. VERTEBRAL ARTERIES: No dissection, arterial injury, or significant stenosis. The left vertebral artery arises directly from the aortic arch. Dominant right vertebral artery. SOFT TISSUES: Partially visualized right pleural effusion. Emphysema. Large left apical bulla. No cervical or superior mediastinal lymphadenopathy. The larynx and pharynx are unremarkable. No acute abnormality of the salivary and thyroid glands. Endotracheal and enteric tubes in place. BONES: No acute osseous abnormality. Multilevel degenerative changes in the visualized spine, centered at C4-C5 and C5-C6. CTA HEAD: ANTERIOR CIRCULATION: No significant stenosis of the intracranial internal carotid, anterior cerebral, or middle cerebral arteries. No aneurysm. Atherosclerosis in the bilateral intracranial internal carotid arteries.  POSTERIOR CIRCULATION: No significant stenosis of the vertebral, basilar, or posterior cerebral arteries. No aneurysm. Mild atherosclerosis in the dominant right intracranial vertebral artery. OTHER: No dural venous sinus thrombosis on this non-dedicated study. BRAIN: Comparison brain MRI done earlier same day demonstrates acute/subacute strokes which are not well seen on this study. Diffuse leptomeningeal enhancement throughout both cerebral hemispheres. 1.  No focal hemodynamically significant stenosis, occlusion or aneurysm in the large arteries of the head and neck. 2.  Diffuse leptomeningeal enhancement throughout both cerebral hemispheres can be seen in the setting of subarachnoid hemorrhage, encephalitis or meningitis. Consider correlation with CSF studies if there is high clinical concern for underlying infection. IR PLACE NG TUBE BY DR Bravo Hoffman    Result Date: 1/11/2023  PROCEDURE: XR PLACE NASOGASTRIC TUBE PHYS 1/11/2023 HISTORY: ORDERING SYSTEM PROVIDED HISTORY: Resistance to NG tube placement per bedside nurse. Needs to be NG tube fed. TECHNOLOGIST PROVIDED HISTORY: Resistance to NG tube placement per bedside nurse. Needs to be NG tube fed. CONTRAST: None SEDATION: None FLUOROSCOPY DOSE AND TYPE OR TIME AND EXPOSURES: 27 seconds; D  cGy cm2 DESCRIPTION OF PROCEDURE AND FINDINGS: Under intermittent fluoroscopic guidance a 12 Nigerien nasogastric tube was placed through the left nostril and directed into the stomach. The tip is in the distal body of the stomach with the side hole well past the GE junction. Small amount air was injected verifying appropriate tube positioning within the stomach. The catheter was secured in place to the patient's nose. There are no immediate complications. The patient left the department stable condition. EBL: None     16 Nigerien nasogastric tube placed successfully with its tip in the stomach.      MRI BRAIN WO CONTRAST    Result Date: 1/11/2023  EXAMINATION: MRI OF THE BRAIN WITHOUT CONTRAST  1/11/2023 3:31 pm TECHNIQUE: Multiplanar multisequence MRI of the brain was performed without the administration of intravenous contrast. COMPARISON: CT brain performed 01/10/2023. MRI brain performed 01/03/2023. HISTORY: ORDERING SYSTEM PROVIDED HISTORY: follow-up strokes and SAH TECHNOLOGIST PROVIDED HISTORY: follow-up strokes and SAH Reason for Exam: follow-up strokes and SAH Additional signs and symptoms: Acute respiratory failure with hypoxia and hypercapnia (HCC) RLL pneumonia, ams FINDINGS: INTRACRANIAL STRUCTURES/VENTRICLES: The sellar and suprasellar structures, optic chiasm, corpus callosum, pineal gland, tectum, and midline brainstem structures are unremarkable. The craniocervical junction is unremarkable. There is chronic microvascular disease. There is a mild amount of residual subarachnoid hemorrhage within the right frontal and right parietal lobes as well as the left frontal lobe. There is an evolving small focus of hemorrhage in the left parietal lobe posteriorly. There is no mass effect or midline shift. There is redemonstration an evolving area of ischemia in the right frontal lobe and left posterior parietal lobe as well as a few foci within the parasagittal aspect of the right frontoparietal region. ORBITS: The visualized portion of the orbits demonstrate no acute abnormality. SINUSES: There is chronic sinusitis. There is fluid in the mastoid air cells. BONES/SOFT TISSUES: The bone marrow signal intensity appears normal. The soft tissues demonstrate no acute abnormality. Mild amount of residual subarachnoid hemorrhage in the right frontal and right parietal lobes as well as the left frontal lobe. Evolving small focus of hemorrhage in the left parietal lobe posteriorly. Evolving areas of ischemia in the right frontal lobe and left posterior parietal lobe as well as a few foci in the parasagittal aspect of the right frontoparietal region.      MRI BRAIN WO CONTRAST    Result Date: 1/3/2023  EXAMINATION: MRI OF THE BRAIN WITHOUT CONTRAST  1/3/2023 3:03 pm TECHNIQUE: Multiplanar multisequence MRI of the brain was performed without the administration of intravenous contrast. COMPARISON: None HISTORY: ORDERING SYSTEM PROVIDED HISTORY: acute to subacute left parietal infarct, AMS, Vent TECHNOLOGIST PROVIDED HISTORY: acute to subacute left parietal infarct, AMS, Vent What is the sedation requirement?->Sedation Reason for Exam: acute to subacute left parietal infarct, AMS, Vent FINDINGS: INTRACRANIAL STRUCTURES/VENTRICLES: There is an subacute to old left cerebellar lacune. Hyperintense FLAIR signal abnormality within the sulci of both cerebral hemispheres is compatible with subarachnoid hemorrhage. There is an old lacune within the right caudate nucleus. ORBITS: The visualized portion of the orbits demonstrate no acute abnormality. SINUSES: Small bilateral mastoid effusions are identified. There is moderate paranasal sinus disease on the left side. BONES/SOFT TISSUES: The bone marrow signal intensity appears normal. The soft tissues demonstrate no acute abnormality. Small acute infarcts involving the left cerebellar hemisphere and left parietal lobe. Small subacute infarct within the left cerebellar hemisphere. Moderate bilateral subarachnoid hemorrhage within both cerebral hemispheres which is of uncertain etiology. The hemorrhage is more apparent on MRI than on previous head CT. Old right caudate nucleus lacune. Small bilateral mastoid effusions and moderate left paranasal sinus disease. The findings were sent to the Radiology Results [address] at 4:17 pm on 1/3/2023 to be communicated to a licensed caregiver.      FL MODIFIED BARIUM SWALLOW W VIDEO    Result Date: 1/9/2023  EXAMINATION: MODIFIED BARIUM SWALLOW WAS PERFORMED IN CONJUNCTION WITH SPEECH PATHOLOGY SERVICES TECHNIQUE: Under fluoroscopic evaluation cineradiography/videoradiography recordings were performed in conjunction with the speech-language pathologist (SLP). Various liquid, solid and/or semi-solid barium preparations were used to assess swallowing function. FLUOROSCOPY DOSE AND TYPE OR TIME AND EXPOSURES: Fluoro time: 1 minute 52 seconds. Dap: 462 dGy per cm2. Air kerma: 8.9 mGy HISTORY: ORDERING SYSTEM PROVIDED HISTORY: Assess swallow TECHNOLOGIST PROVIDED HISTORY: Assess swallow Reason for Exam: aspiration Additional signs and symptoms: recent stroke FINDINGS/IMPRESSION: (Only 1 image was able to be recorded.)  Radiologist observations: Progressively increased penetration during ingestion of thin liquids and puree/pudding textures. No aspiration identified. Please see separate speech pathology report for full discussion of findings and recommendations. Physical Examination:        Physical Exam  Constitutional:       General: He is not in acute distress. Appearance: He is not ill-appearing. HENT:      Nose:      Comments: NG tube placement  Cardiovascular:      Rate and Rhythm: Normal rate and regular rhythm. Pulses: Normal pulses. Heart sounds: Normal heart sounds. Pulmonary:      Effort: Pulmonary effort is normal.      Breath sounds: Normal breath sounds. Abdominal:      Palpations: Abdomen is soft. Musculoskeletal:      Right lower leg: No edema. Left lower leg: No edema. Skin:     General: Skin is warm. Neurological:      Mental Status: He is alert and oriented to person, place, and time.          Assessment:        Primary Problem  Acute respiratory failure with hypoxia and hypercapnia [facility])    Active Hospital Problems    Diagnosis Date Noted    Delirium due to another medical condition [F05] 01/10/2023     Priority: Medium    ACP (advance care planning) [Z71.89] 01/09/2023     Priority: Medium    Goals of care, counseling/discussion [Z71.89] 01/09/2023     Priority: Medium    Encounter for palliative care [Z51.5] 01/09/2023     Priority: Medium Prolonged pt (prothrombin time) [R79.1] 01/03/2023     Priority: Medium    Emphysema lung ([address] [J43.9] 01/03/2023     Priority: Medium    Cerebral infarction ([address] [I63.9] 01/03/2023     Priority: Medium    Transaminitis [R74.01] 01/02/2023     Priority: Medium    Microcytic anemia [D64.9] 01/02/2023     Priority: Medium    Thrombocytopenia ([address] [D69.6] 01/02/2023     Priority: Medium    Agitation [R45.1] 01/02/2023     Priority: Medium    Acute respiratory failure with hypoxia and hypercapnia (HCC) RLL pneumonia [J96.01, J96.02] 01/02/2023     Priority: Medium    Altered mental status [R41.82] 01/02/2023     Priority: Medium    Community acquired pneumonia of right lower lobe of lung [J18.9] 01/02/2023     Priority: Medium       Plan: Altered Mental Status in the setting of Acute type 2 respiratory failure secondary to Pneumonia and Acute Heart Failure- improving   -Failed swallow study. Patient continues to get feeding through NG tube  -As per IDs recommendation this morning, patient has completed 12-day course of vancomycin. ID is to discontinue vancomycin today  -AMS significantly improved  -Remains on Geodon  -Echo results: Estimated ejection fraction greater than 55%. Mild tricuspid regurg. IVC dilated without respiratory collapse.  -As per pulmonology, patient is stable to be transferred to [facility]. Orders placed.  -on 4L NC    Multiple acute brain infarcts with subarachnoid hemorrhage versus meningitis(Unlikely)  -Neurology following  -As per neurology, MRI was completed and no new strokes and SAH appears to be resolving.  Neurology to sign off    Transaminitis with prolonged PTT and INR in the setting of acute HCV  -ast/alt numbers are back to normal  -Platelet count is improving stable in the 80s  -ID recommends to get outpatient GI for hepatitis C    Microcytic anemia in the setting of elevated INR and PT- resolving  -Daily CBC  -Continues to be monitored    Elevated troponins most likely 2/2 demand ischemia  -No acute changes  -Patient asymptomatic  -We will continue to monitor    Hypokalemia  -3.5 this morning  -Protocol in place  -Continue potassium chloride 20 meq infusion    DVT prophylaxis: reason for no prophylaxis: Prolonged PT, aPTT  GI prophylaxis: Protonix 40 mg daily      Jeane Escoto MD  1/13/2023  8:44 AM   Attending Physician Statement    I have discussed the case of Edyta Thomas, including pertinent history and exam findings with the resident. I have seen and examined the patient and the key elements of the encounter have been performed by me. I agree with the assessment, plan, and orders as documented by the resident. Today the patient's clinical condition is stable. He has no confusion and is not in respiratory distress. He had bilateral crepitation and mild distention of his abdomen.   He was admitted with acute respiratory failure and confusion  Electronically signed by Kim Nagy MD on 1/13/2023 at 1:17 PM

## 2023-01-13 NOTE — PROGRESS NOTES
Speech Language Pathology  Speech Language Pathology  [facility]    Dysphagia Treatment Note    Date: 1/13/2023  Patients Name: Aiden Goldstein  MRN: [id number]  Diagnosis: Dysphagia  Patient Active Problem List   Diagnosis Code    Acute type II respiratory failure (HCC) J96.00    Transaminitis R74.01    Microcytic anemia D64.9    Thrombocytopenia (HCC) D69.6    Agitation R45.1    Acute respiratory failure with hypoxia and hypercapnia (HCC) RLL pneumonia J96.01, J96.02    Altered mental status R41.82    Community acquired pneumonia of right lower lobe of lung J18.9    Prolonged pt (prothrombin time) R79.1    Emphysema lung (HCC) J43.9    Cerebral infarction (HCC) I63.9    ACP (advance care planning) Z71.89    Goals of care, counseling/discussion Z71.89    Encounter for palliative care Z51.5    Delirium due to another medical condition F05       Pain: 4/10 (neck)    Dysphagia Treatment  Treatment time: [id number]    Subjective: [x] Alert [] Cooperative     [] Confused     [] Agitated    [] Lethargic    Objective/Assessment:    Pt seen for dysphagia management. Pt cooperative for session, responding to questions more appropriately and following commands more accurately today. Occasional redirection and repetition provided throughout session. Pt completed BOT exercises x3 sets in reps of 20 c min cues. Pt completed volitional effortful swallow x3 reps and noe maneuver x10 reps c mod cues. Pt continues to demonstrate difficulty with swallow initiation for exercises, however improved from previous sessions. Moist oral swab used throughout to assist.  Education provided re: completion of swallowing exercises throughout the day to improve swallow function. Handout of exercises reviewed with Pt and left in room. Pt and family x2 verbalized understanding and agreeable. Discussed repeat MBS to assess swallow function and to determine least restrictive diet. Pt and family agreeable.   Discussed with RN.  Awaiting order for MBS. Plan:  [x] Continue ST services    [] Discharge from ST:        Discharge recommendations: [] Inpatient Rehab   [] [place]   [] Outpatient Therapy  [] Follow up at trauma clinic   [] Other:         Treatment completed by:  Raciel Al M.A., CCC-SLP

## 2023-01-13 NOTE — PROGRESS NOTES
[address]   INPATIENT OCCUPATIONAL THERAPY  PROGRESS NOTE  Date: 2023  Patient Name: Grace Sal       Room:   MRN: [id number]    : 1960  (63 y.o.)  Gender: male      Diagnosis: Acute respiratory failure,  AMS in the setting of hypoxia and a pneumonia.  multifactoral encehpalopathy. Repeat CT head shows prior strokes and SAH and intraparenchymal hemorrhage    Restrictions/Precautions  Restrictions/Precautions  Restrictions/Precautions: Fall Risk;General Precautions;Contact Precautions;Isolation; Bed Alarm  Required Braces or Orthoses?: No  Implants present? :  (Pt denies)  Position Activity Restriction  Other position/activity restrictions: Severe dysphagia- NPO. Unable to tolerate any PO safely. NG tube placed,  on high flow nasal cannula         Subjective  Subjective  Subjective: \"Next is to get to the toilet like a big boy. \" Pt was pleasant and agreeable to OT/PT session  Comments: [id number] Shani Tellez per RN Emiliano Apodaca for OT/PT with PTA Pearly Morgan B    Objective  Orientation  Overall Orientation Status: Impaired  Cognition  Overall Cognitive Status: Exceptions  Arousal/Alertness: Appropriate responses to stimuli  Following Commands: Follows one step commands consistently  Attention Span: Difficulty dividing attention  Safety Judgement: Decreased awareness of need for assistance;Decreased awareness of need for safety  Problem Solving: Assistance required to generate solutions;Assistance required to implement solutions;Assistance required to identify errors made;Assistance required to correct errors made  Insights: Decreased awareness of deficits  Initiation: Requires cues for some  Sequencing: Requires cues for some    ADL  Feeding: NPO  Grooming: Stand by assistance  UE Bathing: Minimal assistance  LE Bathing: Maximum assistance  UE Dressing: Stand by assistance  LE Dressing: Dependent/Total  Toileting: Dependent/Total  Toileting Skilled Clinical Factors:  Toileting task completed on BSC. Pt required 2 person A while standing with therapist A for clothing management and hygiene. Additional Comments: ADL scores based on clinical reasoning and skilled observation unless otherwise noted. Pt currently limited due to decreased strength, balance, activity tolerance, and cognitive barriers impacting safety and independence with self care tasks         Balance  Balance  Sitting Balance: Contact guard assistance  Standing Balance: Dependent/Total (Mod A x 2 - Min A x 2)  Standing Balance  Time: 2-3 minutes x 3  Activity: Functional transfers/mobility, toileting task  Comment: with RW for UE support    Transfers/Mobility  Bed mobility  Supine to Sit: Stand by assistance  Sit to Supine: Unable to assess (Pt sitting in chair at end of session)  Scooting: Stand by assistance  Bed Mobility Comments: Bed mobility completed with HOB elevated. Transfers  Sit to stand: 2 Person assistance;Minimal assistance  Stand to sit: 2 Person assistance;Minimal assistance  Transfer Comments: Verbal cues for hand placement and safety with increased time and Fair carryover. Pt attempts to sit prematurly requiring max verbal/tactile cues for safety. Toilet Transfers  Toilet - Technique: Ambulating  Equipment Used: Standard bedside commode  Toilet Transfer: 2 Person assistance;Minimal assistance  Toilet Transfers Comments: Verbal cues for hand placement and safety with Fair carryover    Functional Mobility  Functional - Mobility Device: Rolling Walker  Activity: Other (Bed to BSC; BSC to chair (short distances))  Assist Level: Dependent/Total  Functional Mobility Comments: Verabl/tactile cues for hand placement, safety, posture, and RW management. Pt initially required min A x 2 for mobility with pt requiring Mod A x 2 quickly due to fatigue. Forward flexed position. Impulsive. Fall risk.     Patient Education  Patient Education  Education Given To: Patient  Education Provided: Role of Therapy, Plan of Care, Transfer Training  Education Provided Comments: activity promotion, pt initially declined therapy today. Education Method: Verbal, Demonstration  Barriers to Learning: Cognition  Education Outcome: Continued education needed, Demonstrated understanding, Verbalized understanding      Goals  Short Term Goals  Time Frame for Short Term Goals: By discharge  Short Term Goal 1: Pt will complete bed mobility mod I to sitting EOB supported for 8+ minutes during self care tasks/met (Previous goal met. Goal Updated 1/12/2022 Willetta Mohair OTR/L)  Short Term Goal 2: Pt will complete simple grooming/self feeding task with mod I and Fair attention to task (Previous goal met. Goal Updated 1/12/2022 Willetta Mohair OTR/L)  Short Term Goal 3: Pt will complete upper body dressing/bathing with Mod I and Good safety/attention to task (Previous goal met. Goal Updated 1/12/2022 Willetta Mohair OTR/L)  Short Term Goal 4: Pt will follow simple 1-2 step commands 75% of the time to increase participation in daily activities/ met 1-11-23  Short Term Goal 5: Pt will participate in 15+ minutes of therapeutic exercises/functional activities to increase safety and independence with self care and mobility/ met 1-11-23  Short Term Goal 6: new goal:  pt to dominik 5-7 min standing with BUE support and min A x 1 for adls.  (Goal Added Willetta Mohair OTR/L)  Short Term Goal 7: Pt will perform functional transfers/mobility with CGA, using least restrictive device and Fair safety (Goal Added Willetta Mohair OTR/L)  Occupational Therapy Plan  Times Per Week: 5-7  Current Treatment Recommendations: Self-Care / ADL, Strengthening, ROM, Balance training, Functional mobility training, Endurance training, Cognitive reorientation, Neuromuscular re-education, Safety education & training, Patient/Caregiver education & training, Equipment evaluation, education, & procurement, Cognitive/Perceptual training, Coordination training      Assessment  Activity Tolerance  Activity Tolerance: Patient Tolerated treatment well, Patient limited by fatigue  Activity Tolerance Comments: on high flow oxygen  Assessment  Performance deficits / Impairments: Decreased ADL status, Decreased functional mobility , Decreased ROM, Decreased strength, Decreased safe awareness, Decreased cognition, Decreased endurance, Decreased balance, Decreased high-level IADLs, Decreased fine motor control, Decreased coordination, Decreased posture  Treatment Diagnosis: Impaired self care status  Prognosis: Fair  Decision Making: High Complexity  Discharge Recommendations: Patient would benefit from continued therapy after discharge  OT Equipment Recommendations  Other: TBD  Safety Devices  Type of Devices:  All fall risk precautions in place, Call light within reach, Chair alarm in place, Gait belt, Left in chair, Patient at risk for falls, Nurse notified      AM-[facility] Daily Activities Inpatient  AM-PAC Daily Activity Inpatient   How much help for putting on and taking off regular lower body clothing?: Total  How much help for Bathing?: A Lot  How much help for Toileting?: Total  How much help for putting on and taking off regular upper body clothing?: A Little  How much help for taking care of personal grooming?: A Little  How much help for eating meals?: Total  AM-PAC Inpatient Daily Activity Raw Score: 11  AM-PAC Inpatient ADL T-Scale Score : 29.04  ADL Inpatient CMS 0-100% Score: 70.42  ADL Inpatient CMS G-Code Modifier : CL    OT Minutes  OT Individual Minutes  Time In: 0826  Time Out: [address]  Minutes: 41  Time Code Minutes   Timed Code Treatment Minutes: 41 Minutes      Carlita Stallworth OT

## 2023-01-13 NOTE — PROGRESS NOTES
Pulmonary Progress Note  [facility]      Patient - Aleksandr Lima,  Age - 60 y.o.    - 1960      Room Number -    MRN -  [id number]   Acct # - [de-identified]  Date of Admission -  2023  9:55 PM        Consulting Rolande Kawasaki, MD  Primary Care Physician - No primary care provider on file. SUBJECTIVE   Patient appears to be in good spirits today. Family members visiting    OBJECTIVE   VITALS    height is 5' 7\" (1.702 m) and weight is 164 lb 3.9 oz (74.5 kg). His oral temperature is 97.5 °F (36.4 °C). His blood pressure is 117/86 and his pulse is 95. His respiration is 17 and oxygen saturation is 90%. Body mass index is 25.72 kg/m². Temperature Range: Temp: 97.5 °F (36.4 °C) Temp  Av.9 °F (36.6 °C)  Min: 97.5 °F (36.4 °C)  Max: 98.7 °F (37.1 °C)  BP Range:  Systolic (82ARQ), DVP:468 , Min:100 , QGS:911     Diastolic (76GWK), GHD:42, Min:69, Max:114    Pulse Range: Pulse  Av.5  Min: 81  Max: 97  Respiration Range: Resp  Av.8  Min: 16  Max: 28  Current Pulse Ox[de-identified]  SpO2: 90 %  24HR Pulse Ox Range:  SpO2  Av.9 %  Min: 82 %  Max: 98 %  Oxygen Amount and Delivery: O2 Flow Rate (L/min): 1.5 L/min    Wt Readings from Last 3 Encounters:   23 164 lb 3.9 oz (74.5 kg)   23 188 lb (85.3 kg)       I/O (24 Hours)    Intake/Output Summary (Last 24 hours) at 2023 1709  Last data filed at 2023 1200  Gross per 24 hour   Intake 1208.8 ml   Output 2400 ml   Net -1191.2 ml       EXAM     General Appearance  Awake, alert, oriented, in no acute distress  HEENT - normocephalic, atraumatic. Neck - Supple,  trachea midline   Lungs -coarse breath sounds no crackles rales or wheezes  Heart Exam:PMI normal. No lifts, heaves, or thrills. RRR. No murmurs, clicks, gallops, or rubs  Abdomen Exam: Abdomen soft, non-tender. Extremity Exam: No signs of cyanosis.     MEDS      [START ON 2023] potassium bicarb-citric acid  40 mEq Oral Daily    potassium chloride  20 mEq Oral Once    furosemide  40 mg Oral Daily    spironolactone  25 mg Oral Daily    ziprasidone (GEODON) in sterile water injection  10 mg IntraMUSCular Nightly    carvedilol  6.25 mg Oral BID WC    [Held by provider] ferrous sulfate  325 mg Oral Daily with breakfast    pantoprazole (PROTONIX) 40 mg injection  40 mg IntraVENous Daily    ipratropium-albuterol  1 ampule Inhalation Q4H WA    [Held by provider] heparin (porcine)  5,000 Units SubCUTAneous 3 times per day    nystatin   Topical BID    [Held by provider] miconazole   Topical BID    [Held by provider] aspirin  81 mg Oral Daily    sodium chloride flush  5-40 mL IntraVENous 2 times per day    nicotine  1 patch TransDERmal Daily      sodium chloride      dexmedetomidine      sodium chloride       hydrALAZINE, potassium chloride **OR** potassium alternative oral replacement **OR** potassium chloride, magnesium sulfate, ziprasidone (GEODON) in sterile water injection, labetalol, potassium chloride, sodium chloride, dexmedetomidine, perflutren lipid microspheres, sodium chloride flush, sodium chloride flush, sodium chloride, ondansetron **OR** ondansetron, polyethylene glycol, acetaminophen **OR** acetaminophen, albuterol, guaiFENesin    LABS   CBC   Recent Labs     01/13/23  0451   WBC 7.3   HGB 13.0*   HCT 43.9   MCV 71.6*   PLT 81*     BMP:   Lab Results   Component Value Date/Time     01/13/2023 04:51 AM    K 4.0 01/13/2023 02:13 PM     01/13/2023 04:51 AM    CO2 34 01/13/2023 04:51 AM    BUN 7 01/13/2023 04:51 AM    LABALBU 2.7 01/13/2023 04:51 AM    CREATININE 0.74 01/13/2023 04:51 AM    CALCIUM 8.4 01/13/2023 04:51 AM    LABGLOM >60 01/13/2023 04:51 AM     ABGs:  Lab Results   Component Value Date/Time    PHART 7.372 01/05/2023 04:46 AM    PO2ART 62.9 01/05/2023 04:46 AM    ZEB4CHF 51.6 01/05/2023 04:46 AM      Lab Results   Component Value Date/Time    MODE PRVC 01/05/2023 04:46 AM     Ionized Calcium:  No results found for: IONCA  Magnesium:    Lab Results   Component Value Date/Time    MG 1.7 01/13/2023 04:51 AM     Phosphorus:    Lab Results   Component Value Date/Time    PHOS 2.5 01/12/2023 04:04 AM        LIVER PROFILE   Recent Labs     01/13/23  0451   AST 27   ALT 35   BILITOT 1.6*   ALKPHOS 49     INR No results for input(s): INR in the last 72 hours.   PTT   Lab Results   Component Value Date    APTT 34.4 01/08/2023         RADIOLOGY     (See actual reports for details)    ASSESSMENT/PLAN     Patient Active Problem List   Diagnosis    Acute type II respiratory failure (HCC)    Transaminitis    Microcytic anemia    Thrombocytopenia (HCC)    Agitation    Acute respiratory failure with hypoxia and hypercapnia (HCC) RLL pneumonia    Altered mental status    Community acquired pneumonia of right lower lobe of lung    Prolonged pt (prothrombin time)    Emphysema lung (HCC)    Cerebral infarction ([address]    ACP (advance care planning)    Goals of care, counseling/discussion    Encounter for palliative care    Delirium due to another medical condition     Acute hypoxic respiratory failure- extubated 01/05  Community-acquired pneumonia, multifocal; possible aspiration  Acute infarcts left cerebellar hemisphere, left parietal lobe with bilateral subarachnoid hemorrhages--His head CT scan positive for tattered foci of subarachnoid hemorrhage in the right cerebral hemisphere  Agitation/delirium  MRSA positive  COPD-no PFTs  Altered mental status  Liver disease given elevated transaminases, INR, and thrombocytopenia-- 88K  History of tobacco use 1/pack per day x40 years  Acute kidney injury-resolved  History of alcohol use  Full code    Currently on DuoNeb treatments  On Lasix and Coreg  Patient with platelet count of 81 k  MRI of the brain revealing a small focus of hemorrhage in the left parietal lobe posteriorly  Mild amount of residual subarachnoid hemorrhage in the right frontal and right parietal lobe  As well as the left frontal lobe. On vancomycin. Okay for progressive unit.   I was told that orders have been processed for progressive unit     Electronically signed by Travon Mcdonald MD on 1/13/2023 at 5:09 PM

## 2023-01-13 NOTE — CONSULTS
Physical Medicine & Rehabilitation  Consult Note      Admitting Physician: Adán Joseph MD    Primary Care Provider: No primary care provider on file. Reason for Consult:  Acute Inpatient Rehabilitation    Chief Complaint: Altered mental status    History of Present Illness:  Referring Provider is requesting an evaluation for appropriate placement upon discharge from acute care. Mr. Khari Adame is a 60 y.o.  male who was admitted to [facility] on 1/1/2023 with Altered Mental Status    65-year-old male with altered mental status admitted with acute respiratory failure secondary to pneumonia. On admission O2 sats 95% he was placed on nonrebreather does have a history of regular alcohol intake but denies any recent use.   Patient was placed on BiPAP chest x-ray showed right lower pneumonia and large bullae in left apex with a pneumothorax cannot be excluded had respiratory acidosis with a liver enzymes and INR 2.3    Hematology/oncology-chronic liver disease secondary alcohol anemia and thrombocytopenia related to liver disease as well as coagulopathy, cardioembolic strokes with component of subarachnoid bleeding not a candidate for anticoagulation-discussed abstaining alcohol and acetaminophen and avoid aspirin he is taking high-dose of supplements almost a toxic level    ID-acute hypoxic restaurant failure s/p intubation 1/2 subsequently extubated, community-acquired pneumonia, cellulitis of PICC line that was removed possible MRSA completed Unasyn 1/8 continue IV vancomycin day 12 plan to discontinue, severe dysphagia has NG tube placed positive hepatitis C COVID-19 negative follow-up with GI as outpatient for hepatitis C management    Internal medicine-failed swallow study getting feeding through NG tube remains on Geodon?,  IVC dilated without respiratory collapse noted on echo on 4 L nasal cannula, noted multiple acute infarcts with subarachnoid hemorrhage versus meningitis-neurology following no new strokes and subarachnoid hemorrhage resolving neurology signed off,, ALT AST back to normal platelets improving in the 80s outpatient follow-up with GI no DVT prophylaxis due to prolonged PT and PTT    Nephrology acute kidney injury sec. Prerenal and ischemic acute tubular necrosis, replace electrolytes remove Mars on potassium supplement and IV magnesium    Neurology felt that multifactor encephalopathy. Appearance of strokes on MRI unusual appear more small vessel in nature evaluating for autoimmune labs    Psychiatry-decrease Geodon by discontinue morning dose remain on schedule 10 mg nightly    Pulmonary-continue EPC cuffs weaning O2 as tolerated on DuoNeb treatments-noted acute hypoxic respiratory failure extubated 1/5, CAD episodes agitation/delirium    Radiology:  CT HEAD WO CONTRAST    Result Date: 1/3/2023  Focal area of decreased attenuation with loss of gray/white matter differentiation involving posterior left parietal lobe with somewhat increased conspicuity as compared to prior exam likely reflecting an area of acute to subacute infarct. Stable small chronic infarct/insult to left cerebellar hemisphere. Chronic lacunar infarct to right basal ganglia redemonstrated. Paranasal sinus disease likely relating to presence of nasogastric tube. CT HEAD WO CONTRAST    Result Date: 1/1/2023  No acute intracranial abnormality. Mild chronic small vessel ischemic disease. CT HEAD W CONTRAST    Result Date: 1/10/2023  Scattered foci of subarachnoid hemorrhage in the right cerebral hemisphere. Focus of intraparenchymal hemorrhage in the left parietal lobe. US LIVER    Result Date: 1/2/2023  Increased echogenicity is seen throughout the liver suggesting diffuse hepatocellular disease such as fatty infiltration Cholelithiasis. No cholecystitis     XR CHEST PORTABLE    Result Date: 1/5/2023  Stable exam with small bilateral pleural effusions and bibasilar airspace disease.   Consider pneumonia versus atelectasis in the appropriate clinical setting. XR CHEST PORTABLE    Result Date: 1/4/2023  No significant interval change. XR CHEST PORTABLE    Result Date: 1/3/2023  No significant will change. See above for more details. XR CHEST PORTABLE    Result Date: 1/2/2023  Placement of endotracheal tube which appears high riding with tip 8.2 cm from the robert. Suggest advancement by 2-3 cm. Placement of endotracheal tube seen to extend into the stomach, distal extent not included in field of view. No pneumothoraces. Persistent likely bullous change to the left upper lung zone. Persistent right pleural effusion, similar to slightly worsened. Persistent bilateral airspace opacities, right worse than left, similar on the left but mildly worsened on the right. XR CHEST PORTABLE    Result Date: 1/1/2023  Right lower lobe pneumonia and small right pleural effusion Large lucent area in the left apex suggesting a large bulla however superimposed pneumothorax cannot be excluded. Follow-up CT would be useful for further evaluation. The findings were sent to the Radiology Results [address] at 10:31 pm on 1/1/2023 to be communicated to a licensed caregiver. CT CHEST PULMONARY EMBOLISM W CONTRAST    Result Date: 1/3/2023  No evidence of pulmonary embolism. Dilated main pulmonary artery suggestive of pulmonary hypertension. Increased RV to LV ratio suggestive of right heart strain. Moderate right small left pleural effusions with partial collapse of the right lower lobe. Moderate centrilobular emphysema with large bullous formation in the left lung. Irregular appearance of the gallbladder with wall thickening, pericholecystic fluid, and probable cholelithiasis. Correlation with right upper quadrant ultrasound recommended. Trace perisplenic free fluid. US SPLEEN    Result Date: 1/3/2023  Normal ultrasound evaluation of the spleen     CTA HEAD NECK W CONTRAST    Result Date: 1/3/2023  1.   No focal hemodynamically significant stenosis, occlusion or aneurysm in the large arteries of the head and neck. 2.  Diffuse leptomeningeal enhancement throughout both cerebral hemispheres can be seen in the setting of subarachnoid hemorrhage, encephalitis or meningitis. Consider correlation with CSF studies if there is high clinical concern for underlying infection. IR PLACE NG TUBE BY DR Carlos A Cabral    Result Date: 1/11/2023  16 Sinhala nasogastric tube placed successfully with its tip in the stomach. MRI BRAIN WO CONTRAST    Result Date: 1/11/2023  Mild amount of residual subarachnoid hemorrhage in the right frontal and right parietal lobes as well as the left frontal lobe. Evolving small focus of hemorrhage in the left parietal lobe posteriorly. Evolving areas of ischemia in the right frontal lobe and left posterior parietal lobe as well as a few foci in the parasagittal aspect of the right frontoparietal region. MRI BRAIN WO CONTRAST    Result Date: 1/3/2023  Small acute infarcts involving the left cerebellar hemisphere and left parietal lobe. Small subacute infarct within the left cerebellar hemisphere. Moderate bilateral subarachnoid hemorrhage within both cerebral hemispheres which is of uncertain etiology. The hemorrhage is more apparent on MRI than on previous head CT. Old right caudate nucleus lacune. Small bilateral mastoid effusions and moderate left paranasal sinus disease. The findings were sent to the Radiology Results [address] at 4:17 pm on 1/3/2023 to be communicated to a licensed caregiver.        Review of Systems:  Constitutional: negative for anorexia, chills, fatigue, fevers, sweats and weight loss  Eyes: negative for redness and visual disturbance  Ears, nose, mouth, throat, and face: negative for earaches, sore throat and tinnitus  Respiratory: negative for cough and shortness of breath  Cardiovascular: negative for chest pain, dyspnea and palpitations  Gastrointestinal: negative for abdominal pain, change in bowel habits, constipation, nausea and vomiting  Genitourinary:negative for dysuria, frequency, hesitancy and urinary incontinence  Integument/breast: negative for pruritus and rash  Musculoskeletal:negative for muscle weakness and stiff joints  Neurological: negative for dizziness, headaches and weakness  Behavioral/Psych: negative for decreased appetite, depression and fatigue    Functional History:  PTA: Independent with all activities. Current:  PT:    Bed Mobility Training  Bed Mobility Training: Yes  Overall Level of Assistance: Stand-by assistance  Interventions: Safety awareness training, Verbal cues  Rolling: Stand-by assistance  Supine to Sit: Stand-by assistance  Sit to Supine: Stand-by assistance  Scooting: Stand-by assistance  Restrictions/Precautions  Restrictions/Precautions: Fall Risk, General Precautions, Contact Precautions, Isolation, Bed Alarm  Required Braces or Orthoses?: No  Implants present? :  (Pt denies)  Position Activity Restriction  Other position/activity restrictions: Severe dysphagia- NPO. Unable to tolerate any PO safely. NG tube placed,  on high flow nasal cannula    Transfer Training  Transfer Training: Yes  Overall Level of Assistance: Minimum assistance, Assist X2  Interventions: Safety awareness training, Tactile cues, Verbal cues  Sit to Stand: Minimum assistance, Assist X2, Adaptive equipment, Additional time (Implusivness noted during tx. Edu for safety awareness. forwared flexed posture)  Stand to Sit: Minimum assistance (impulsively attempts to sit without chair near d/t increase fatigue.)  Bed mobility  Rolling to Left: Moderate assistance  Rolling to Right: Minimal assistance  Supine to Sit: Stand by assistance  Sit to Supine: Unable to assess (Pt sitting in chair at end of session)  Scooting: Stand by assistance  Bed Mobility Comments: Bed mobility completed with HOB elevated.     Gait Training  Gait Training: Yes  Gait  Overall Level of Assistance: Moderate assistance, Assist X2 (Min A x2 initially regressing to Mod A x2 d/t increase fatigue)  Interventions: Safety awareness training, Tactile cues, Verbal cues, Visual cues, Manual cues  Base of Support: Widened  Speed/Radha: Slow  Step Length: Left shortened, Right shortened  Gait Abnormalities:  (very flexed forward posture at hips/knees. pushes RW out of SELVIN F and laterally despite cues. impulsive during tx.)  Distance (ft): 4 Feet (x2; pt amb from bed to [facility] and from [facility] to chair. Chair brought closer d/t increase fatigue.)  Assistive Device: Walker, rolling  Transfers  Sit to Stand: Unable to assess (pt unalbe to bear wt through LEs to attempt due to weakness and cognitive concerns/ safety)         OT:   ADL  Feeding: NPO  Feeding Skilled Clinical Factors: Per chart beginning 1/8/23; Severe dysphagia- NPO. Unable to tolerate any PO safely. NG tube placed,  Grooming: Stand by assistance  UE Bathing: Minimal assistance  LE Bathing: Maximum assistance  UE Dressing: Stand by assistance  LE Dressing: Dependent/Total  LE Dressing Skilled Clinical Factors: while seated EOB pt able to bend and touch B ankles, TA to don socks  Toileting: Dependent/Total  Toileting Skilled Clinical Factors: Toileting task completed on [facility]. Pt required 2 person A while standing with therapist A for clothing management and hygiene. Additional Comments: ADL scores based on clinical reasoning and skilled observation unless otherwise noted. Pt currently limited due to decreased strength, balance, activity tolerance, and cognitive barriers impacting safety and independence with self care tasks    Toilet Transfers  Toilet - Technique: Ambulating  Equipment Used: Standard bedside commode  Toilet Transfer: 2 Person assistance, Minimal assistance  Toilet Transfers Comments: Verbal cues for hand placement and safety with Fair carryover    ST:    Pt seen for dysphagia management.   Pt cooperative for session, responding to questions more appropriately and following commands more accurately today. Occasional redirection and repetition provided throughout session. Pt completed BOT exercises x3 sets in reps of 20 c min cues. Pt completed volitional effortful swallow x3 reps and noe maneuver x10 reps c mod cues. Pt continues to demonstrate difficulty with swallow initiation for exercises, however improved from previous sessions. Moist oral swab used throughout to assist.  Education provided re: completion of swallowing exercises throughout the day to improve swallow function. Handout of exercises reviewed with Pt and left in room. Pt and family x2 verbalized understanding and agreeable. Discussed repeat MBS to assess swallow function and to determine least restrictive diet. Pt and family agreeable. Discussed with RN. Awaiting order for MBS. Past Medical History:    History reviewed. No pertinent past medical history. Past Surgical History:    History reviewed. No pertinent surgical history.     Allergies:    No Known Allergies     Current Medications:   Current Facility-Administered Medications: [START ON 1/14/2023] potassium bicarb-citric acid (EFFER-K) effervescent tablet 40 mEq, 40 mEq, Oral, Daily  potassium chloride (KLOR-CON M) extended release tablet 20 mEq, 20 mEq, Oral, Once  furosemide (LASIX) tablet 40 mg, 40 mg, Oral, Daily  hydrALAZINE (APRESOLINE) injection 20 mg, 20 mg, IntraVENous, Q6H PRN  potassium chloride (KLOR-CON M) extended release tablet 40 mEq, 40 mEq, Oral, PRN **OR** potassium bicarb-citric acid (EFFER-K) effervescent tablet 40 mEq, 40 mEq, Oral, PRN **OR** potassium chloride 10 mEq/100 mL IVPB (Peripheral Line), 10 mEq, IntraVENous, PRN  spironolactone (ALDACTONE) tablet 25 mg, 25 mg, Oral, Daily  magnesium sulfate 2000 mg in water 50 mL IVPB, 2,000 mg, IntraVENous, PRN  ziprasidone (GEODON) 10 mg in sterile water 0.5 mL injection, 10 mg, IntraMUSCular, Nightly  carvedilol (COREG) tablet 6.25 mg, 6.25 mg, Oral, BID WC  [Held by provider] ferrous sulfate (IRON 325) tablet 325 mg, 325 mg, Oral, Daily with breakfast  pantoprazole (PROTONIX) 40 mg in sodium chloride (PF) 0.9 % 10 mL injection, 40 mg, IntraVENous, Daily  ziprasidone (GEODON) 20 mg in sterile water 1 mL injection, 20 mg, IntraMUSCular, Q12H PRN  labetalol (NORMODYNE;TRANDATE) injection 5 mg, 5 mg, IntraVENous, Q6H PRN  potassium chloride 10 mEq/100 mL IVPB (Peripheral Line), 10 mEq, IntraVENous, PRN  ipratropium-albuterol (DUONEB) nebulizer solution 1 ampule, 1 ampule, Inhalation, Q4H WA  [Held by provider] heparin (porcine) injection 5,000 Units, 5,000 Units, SubCUTAneous, 3 times per day  0.9 % sodium chloride infusion, , IntraVENous, PRN  dexmedetomidine (PRECEDEX) 400 mcg in sodium chloride 0.9 % 100 mL infusion, 0.1-1.5 mcg/kg/hr, IntraVENous, Continuous PRN  nystatin (MYCOSTATIN) ointment, , Topical, BID  [Held by provider] miconazole (MICOTIN) 2 % powder, , Topical, BID  perflutren lipid microspheres (DEFINITY) injection 1.5 mL, 1.5 mL, IntraVENous, ONCE PRN  [Held by provider] aspirin chewable tablet 81 mg, 81 mg, Oral, Daily  sodium chloride flush 0.9 % injection 10 mL, 10 mL, IntraVENous, PRN  sodium chloride flush 0.9 % injection 5-40 mL, 5-40 mL, IntraVENous, 2 times per day  sodium chloride flush 0.9 % injection 5-40 mL, 5-40 mL, IntraVENous, PRN  0.9 % sodium chloride infusion, , IntraVENous, PRN  ondansetron (ZOFRAN-ODT) disintegrating tablet 4 mg, 4 mg, Oral, Q8H PRN **OR** ondansetron (ZOFRAN) injection 4 mg, 4 mg, IntraVENous, Q6H PRN  polyethylene glycol (GLYCOLAX) packet 17 g, 17 g, Oral, Daily PRN  acetaminophen (TYLENOL) tablet 650 mg, 650 mg, Oral, Q6H PRN **OR** acetaminophen (TYLENOL) suppository 650 mg, 650 mg, Rectal, Q6H PRN  nicotine (NICODERM CQ) 21 MG/24HR 1 patch, 1 patch, TransDERmal, Daily  albuterol (PROVENTIL) nebulizer solution 2.5 mg, 2.5 mg, Nebulization, Q2H PRN  guaiFENesin (MUCINEX) extended release tablet 600 mg, 600 mg, Oral, BID PRN    Social History:  Social History     Socioeconomic History    Marital status: Single     Spouse name: Not on file    Number of children: Not on file    Years of education: Not on file    Highest education level: Not on file   Occupational History    Not on file   Tobacco Use    Smoking status: Every Day     Packs/day: 1.00     Years: 40.00     Pack years: 40.00     Types: Cigarettes    Smokeless tobacco: Never   Substance and Sexual Activity    Alcohol use: Yes     Comment: beer and scotch    Drug use: Not Currently     Comment: over 20 years (stated by daughter)    Sexual activity: Not on file   Other Topics Concern    Not on file   Social History Narrative    Not on file     Social Determinants of Health     Financial Resource Strain: Not on file   Food Insecurity: Not on file   Transportation Needs: Not on file   Physical Activity: Not on file   Stress: Not on file   Social Connections: Not on file   Intimate Partner Violence: Not on file   Housing Stability: Not on file     Social/Functional History  Social/Functional History  Lives With: Spouse  Type of Home: [facility] [place] [facility]: One level  Home Access: Stairs to enter with rails  Entrance Stairs - Number of Steps: 6 ALISA from front; 4 ALISA frmo back with no rails  Entrance Stairs - Rails: Both (able to reach both at same time but are not stable)  Bathroom Shower/Tub: Tub/Shower unit, Curtain, Shower chair without back  [employer]: Standard  Bathroom Equipment: Grab bars in shower, Hand-held shower, Toilet raiser  Bathroom Accessibility: Not accessible  Home Equipment: [address]  ADL Assistance: Independent  Homemaking Assistance: Independent  Homemaking Responsibilities: Yes  Ambulation Assistance: Independent  Transfer Assistance: Independent  Active : Yes  Mode of Transportation: SUV  Occupation: Retired  Type of Occupation:   IADL Comments: Pt sleeps in flat bed.   Additional Comments: Spouse is home all the time and able to assist as needed. Daughter lives close by and able to assist as needed. Daughter works and has family. Family History:   History reviewed. No pertinent family history. Physical Exam:    /69   Pulse 95   Temp 97.5 °F (36.4 °C) (Oral)   Resp 21   Ht 5' 7\" (1.702 m)   Wt 164 lb 3.9 oz (74.5 kg)   SpO2 90%   BMI 25.72 kg/m²     General appearance: alert, appears stated age, cooperative, and no distress  HEENT: Normocephalic, without obvious abnormality, atraumatic NG tube in place  Pulm: clear to auscultation bilaterally. Cardiac: regular rate and rhythm, no murmur. Abdomen: soft, non-tender; bowel sounds normal.  MSK: extremities normal, atraumatic, no edema, normal tone. ROM: Appears to have functional range of motion upper and distal lower extremities  Mental status/Psych: Alert, orientedX2-knew he was in [place], year 2022, Biden president, follows 1 and two-step commands, has decreased short-term memory,-does not recall informing informed about alcohol use etc.  Wife present and verifies he has been informed  Skin:     Neuro:  Sensory: Intact in BUE and BLE to soft and pin sensation. Motor: Muscle tone and bulk are normal bilaterally. No pronator drift. There is at least 4/5 upper and distal lower extremities      Coordination: finger to nose normal bilaterally.       Diagnostics:  CBC   Lab Results   Component Value Date/Time    WBC 7.3 01/13/2023 04:51 AM    RBC 6.14 01/13/2023 04:51 AM    HGB 13.0 01/13/2023 04:51 AM    HCT 43.9 01/13/2023 04:51 AM    MCV 71.6 01/13/2023 04:51 AM    RDW 25.5 01/13/2023 04:51 AM    PLT 81 01/13/2023 04:51 AM     BMP    Lab Results   Component Value Date/Time     01/13/2023 04:51 AM    K 4.0 01/13/2023 02:13 PM     01/13/2023 04:51 AM    CO2 34 01/13/2023 04:51 AM    BUN 7 01/13/2023 04:51 AM     Uric Acid  No components found for: URIC  VITAMIN B12 No components found for: B12  PT/INR  No results found for: PTINR      Impression: Mr. Ciera Scott is a 60 y.o. male with a history of Acute respiratory failure with hypoxia and hypercapnia ([facility] [address]    Multifactorial encephalopathy as well as multi infarct CVAs and subarachnoid hemorrhage-aspirin on hold  Alcohol use-chronic liver disease, hepatitis C, prolonged PT/PTT, thrombocytopenia-platelets 79 last INR 1/s7-1.6 consider recheck  Acute hypoxic respiratory failure s/p intubation extubated 1/5, community-acquired pneumonia, cellulitis PICC line completed antibiotics of Unasyn and Vanco, on 4 L nasal cannula  Dysphagia with NG tube-awaiting follow-up swallow study per speech  Status post acute kidney injury  Agitated/confusion-on Geodon continues nightly and as needed  Hypertension-Coreg, Lasix, spironolactone  GERD-Protonix    Recommendations:  1. Diagnosis: Multi-infarct CVAs, encephalopathy  2. Therapy: Has PT and OT needs  3. Medical  Necessity: As above  4. Support: Clarify, daughter lives nearby able to assist as needed per notes also has spouse  5. Rehab recommendation: Would benefit acute inpatient rotation when medically ready    -? Continues on Geodon nightly-question if received  -Continues NG tube, failed swallow study-      6. DVT proph: Subcu heparin on hold due to elevated INR, noted platelets improved, consider recheck INR-will need Doppler screen 24-48 prior to rehab    Discussed with wife and patient    It was my pleasure to evaluate Ciera Scott today. Please call with questions. Dylan Smith. Leatha Epley, MD          This note is created with the assistance of a speech recognition program.  While intending to generate a document that actually reflects the content of the visit, the document can still have some errors including those of syntax and sound a like substitutions which may escape proof reading.   In such instances, actual meaning can be extrapolated by contextual diversion

## 2023-01-13 NOTE — PROGRESS NOTES
Comprehensive Nutrition Assessment    Type and Reason for Visit:  Reassess    Nutrition Recommendations/Plan:   Continue advancing tube feeding as ordered. Suggest physician further clarify water flushes if needed. Await results of swallow study. Malnutrition Assessment:  Malnutrition Status:  Mild malnutrition (Based on available criteria) (01/13/23 2757)    Context:  Acute Illness     Findings of the 6 clinical characteristics of malnutrition:  Energy Intake:  75% or less of estimated energy requirements for 7 or more days  Weight Loss:   (Has lost dry wt; unsure of quantity and time frame.)     Body Fat Loss:  Unable to assess     Muscle Mass Loss:  Unable to assess    Fluid Accumulation:  Moderate to Severe (Unsure if related to nutritional status, but may mask wt loss) Generalized, Extremities   Strength:  Not Performed    Nutrition Assessment:    Pt has been tolerating tube feeding thus far with current rate at 40 mL per hr. Nurse plans on gradual advancement. Nurse states additional water flushes have been given- no further clarification needed at present. Modified Barium Swallow Study has been ordered. Nutrition Related Findings:    Edema: +1 generalized, perineal; +1 pitting RUE, LUE; +2 pitting RLE, LLE. Labs and meds reviewed.  Wound Type: None       Current Nutrition Intake & Therapies:    Average Meal Intake: NPO     Diet NPO  ADULT TUBE FEEDING; Nasogastric; Peptide Based; Continuous; 20; Yes; 10; Q 8 hours; 70; 30; Q 4 hours  Current Tube Feeding (TF) Orders:  Feeding Route: Nasogastric  Formula: Peptide Based  Schedule: Continuous  Feeding Regimen: goal of 70 mL per hr  Additives/Modulars: None  Water Flushes: 30 mL every 4 hrs  Goal TF & Flush Orders Provides: 2016 kcal, 126 gm protein, 1543 mL free fluid (as ordered; additional fluids likely given)    Anthropometric Measures:  Height: 5' 7\" (170.2 cm)  Ideal Body Weight (IBW): 148 lbs (67 kg)    Admission Body Weight: 188 lb (85.3 kg)  Current Body Weight: 164 lb 3.9 oz (74.5 kg) (wt discrepancies noted), 116.2 % IBW. Weight Source: Bed Scale  Current BMI (kg/m2): 25.7  Usual Body Weight: 170 lb (77.1 kg) (per significant other)  % Weight Change (Calculated): -3.4                    BMI Categories: Overweight (BMI 25.0-29. 9)    Estimated Daily Nutrient Needs:  Energy Requirements Based On: Kcal/kg  Weight Used for Energy Requirements: Admission  Energy (kcal/day): 2145 kcals based on 25 kcals/kg  Weight Used for Protein Requirements: Ideal  Protein (g/day): 101-114 gm protein based on 1.5-1.7 gm/kg    Nutrition Diagnosis:   Inadequate oral intake related to cognitive or neurological impairment, impaired respiratory function as evidenced by NPO or clear liquid status due to medical condition    Nutrition Interventions:   Food and/or Nutrient Delivery: Continue NPO, Continue Current Tube Feeding (Advance tube feeding as ordered.)  Nutrition Education/Counseling: No recommendation at this time  Coordination of Nutrition Care: Continue to monitor while inpatient       Goals:  Previous Goal Met: No Progress toward Goal(s)  Goals: Meet at least 75% of estimated needs       Nutrition Monitoring and Evaluation:   Behavioral-Environmental Outcomes: None Identified  Food/Nutrient Intake Outcomes: None Identified  Physical Signs/Symptoms Outcomes: Biochemical Data, Fluid Status or Edema, Hemodynamic Status, Nutrition Focused Physical Findings, Skin, Weight    Discharge Planning:     Too soon to determine     Donna Tucker RD, LD  Contact: [phone]

## 2023-01-13 NOTE — PROGRESS NOTES
01/13/23 1058   Encounter Summary   Encounter Overview/Reason  Spiritual/Emotional Needs   Service Provided For: Patient and family together   Referral/Consult From: Palliative Care   Last Encounter  01/13/23   Complexity of Encounter Low   Palliative Care   Type Palliative Care, Follow-up   Assessment/Intervention/Outcome   Assessment Coping; Anxious   Intervention Sustaining Presence/Ministry of presence;Nurtured Hope;Explored/Affirmed feelings, thoughts, concerns   Outcome Receptive;Engaged in conversation

## 2023-01-14 ENCOUNTER — APPOINTMENT (OUTPATIENT)
Dept: GENERAL RADIOLOGY | Age: 63
DRG: 139 | End: 2023-01-14
Payer: MEDICAID

## 2023-01-14 PROBLEM — B19.20 HEPATITIS C VIRUS INFECTION WITHOUT HEPATIC COMA: Status: ACTIVE | Noted: 2023-01-14

## 2023-01-14 LAB
ABSOLUTE EOS #: 0 K/UL (ref 0–0.4)
ABSOLUTE LYMPH #: 0.59 K/UL (ref 1–4.8)
ABSOLUTE MONO #: 0.86 K/UL (ref 0.1–1.3)
ALBUMIN SERPL-MCNC: 2.8 G/DL (ref 3.5–5.2)
ALP BLD-CCNC: 46 U/L (ref 40–129)
ALT SERPL-CCNC: 30 U/L (ref 5–41)
ANION GAP SERPL CALCULATED.3IONS-SCNC: 5 MMOL/L (ref 9–17)
AST SERPL-CCNC: 23 U/L
BASOPHILS # BLD: 1 % (ref 0–2)
BASOPHILS ABSOLUTE: 0.07 K/UL (ref 0–0.2)
BILIRUB SERPL-MCNC: 1.3 MG/DL (ref 0.3–1.2)
BUN BLDV-MCNC: 9 MG/DL (ref 8–23)
CALCIUM SERPL-MCNC: 8.4 MG/DL (ref 8.6–10.4)
CHLORIDE BLD-SCNC: 97 MMOL/L (ref 98–107)
CO2: 37 MMOL/L (ref 20–31)
CREAT SERPL-MCNC: 0.73 MG/DL (ref 0.7–1.2)
EOSINOPHILS RELATIVE PERCENT: 0 % (ref 0–4)
GFR SERPL CREATININE-BSD FRML MDRD: >60 ML/MIN/1.73M2
GLUCOSE BLD-MCNC: 106 MG/DL (ref 75–110)
GLUCOSE BLD-MCNC: 121 MG/DL (ref 70–99)
HCT VFR BLD CALC: 41.3 % (ref 41–53)
HEMOGLOBIN: 12.2 G/DL (ref 13.5–17.5)
LYMPHOCYTES # BLD: 9 % (ref 24–44)
MAGNESIUM: 1.7 MG/DL (ref 1.6–2.6)
MCH RBC QN AUTO: 21.3 PG (ref 26–34)
MCHC RBC AUTO-ENTMCNC: 29.5 G/DL (ref 31–37)
MCV RBC AUTO: 72.1 FL (ref 80–100)
MONOCYTES # BLD: 13 % (ref 1–7)
MORPHOLOGY: ABNORMAL
MYELOPEROXIDASE AB: 0 AU/ML (ref 0–19)
PDW BLD-RTO: 29.8 % (ref 11.5–14.9)
PLATELET # BLD: 101 K/UL (ref 150–450)
PMV BLD AUTO: 9.2 FL (ref 6–12)
POTASSIUM SERPL-SCNC: 3.3 MMOL/L (ref 3.7–5.3)
RBC # BLD: 5.72 M/UL (ref 4.5–5.9)
SEG NEUTROPHILS: 77 % (ref 36–66)
SEGMENTED NEUTROPHILS ABSOLUTE COUNT: 5.08 K/UL (ref 1.3–9.1)
SODIUM BLD-SCNC: 139 MMOL/L (ref 135–144)
TOTAL PROTEIN: 5.5 G/DL (ref 6.4–8.3)
WBC # BLD: 6.6 K/UL (ref 3.5–11)

## 2023-01-14 PROCEDURE — 6360000002 HC RX W HCPCS

## 2023-01-14 PROCEDURE — 80053 COMPREHEN METABOLIC PANEL: CPT

## 2023-01-14 PROCEDURE — 83735 ASSAY OF MAGNESIUM: CPT

## 2023-01-14 PROCEDURE — 2700000000 HC OXYGEN THERAPY PER DAY

## 2023-01-14 PROCEDURE — 99232 SBSQ HOSP IP/OBS MODERATE 35: CPT | Performed by: NURSE PRACTITIONER

## 2023-01-14 PROCEDURE — 2060000000 HC ICU INTERMEDIATE R&B

## 2023-01-14 PROCEDURE — 6370000000 HC RX 637 (ALT 250 FOR IP): Performed by: NURSE PRACTITIONER

## 2023-01-14 PROCEDURE — 74230 X-RAY XM SWLNG FUNCJ C+: CPT

## 2023-01-14 PROCEDURE — 6370000000 HC RX 637 (ALT 250 FOR IP)

## 2023-01-14 PROCEDURE — 85025 COMPLETE CBC W/AUTO DIFF WBC: CPT

## 2023-01-14 PROCEDURE — 94640 AIRWAY INHALATION TREATMENT: CPT

## 2023-01-14 PROCEDURE — 6370000000 HC RX 637 (ALT 250 FOR IP): Performed by: INTERNAL MEDICINE

## 2023-01-14 PROCEDURE — 99232 SBSQ HOSP IP/OBS MODERATE 35: CPT | Performed by: INTERNAL MEDICINE

## 2023-01-14 PROCEDURE — 82947 ASSAY GLUCOSE BLOOD QUANT: CPT

## 2023-01-14 PROCEDURE — C9113 INJ PANTOPRAZOLE SODIUM, VIA: HCPCS

## 2023-01-14 PROCEDURE — A4216 STERILE WATER/SALINE, 10 ML: HCPCS

## 2023-01-14 PROCEDURE — 92611 MOTION FLUOROSCOPY/SWALLOW: CPT

## 2023-01-14 PROCEDURE — 36415 COLL VENOUS BLD VENIPUNCTURE: CPT

## 2023-01-14 PROCEDURE — 97530 THERAPEUTIC ACTIVITIES: CPT

## 2023-01-14 PROCEDURE — 2580000003 HC RX 258: Performed by: NURSE PRACTITIONER

## 2023-01-14 PROCEDURE — 94761 N-INVAS EAR/PLS OXIMETRY MLT: CPT

## 2023-01-14 PROCEDURE — 2580000003 HC RX 258

## 2023-01-14 RX ADMIN — CARVEDILOL 6.25 MG: 6.25 TABLET, FILM COATED ORAL at 08:00

## 2023-01-14 RX ADMIN — SODIUM CHLORIDE, PRESERVATIVE FREE 40 MG: 5 INJECTION INTRAVENOUS at 08:01

## 2023-01-14 RX ADMIN — SODIUM CHLORIDE, PRESERVATIVE FREE 10 ML: 5 INJECTION INTRAVENOUS at 08:01

## 2023-01-14 RX ADMIN — SPIRONOLACTONE 25 MG: 25 TABLET ORAL at 08:00

## 2023-01-14 RX ADMIN — NYSTATIN OINTMENT: 100000 OINTMENT TOPICAL at 21:42

## 2023-01-14 RX ADMIN — IPRATROPIUM BROMIDE AND ALBUTEROL SULFATE 1 AMPULE: 2.5; .5 SOLUTION RESPIRATORY (INHALATION) at 16:14

## 2023-01-14 RX ADMIN — POTASSIUM BICARBONATE 40 MEQ: 782 TABLET, EFFERVESCENT ORAL at 08:00

## 2023-01-14 RX ADMIN — NYSTATIN OINTMENT: 100000 OINTMENT TOPICAL at 08:01

## 2023-01-14 RX ADMIN — IPRATROPIUM BROMIDE AND ALBUTEROL SULFATE 1 AMPULE: 2.5; .5 SOLUTION RESPIRATORY (INHALATION) at 07:52

## 2023-01-14 RX ADMIN — FUROSEMIDE 40 MG: 40 TABLET ORAL at 08:00

## 2023-01-14 RX ADMIN — IPRATROPIUM BROMIDE AND ALBUTEROL SULFATE 1 AMPULE: 2.5; .5 SOLUTION RESPIRATORY (INHALATION) at 19:44

## 2023-01-14 RX ADMIN — SODIUM CHLORIDE, PRESERVATIVE FREE 10 ML: 5 INJECTION INTRAVENOUS at 21:43

## 2023-01-14 ASSESSMENT — PAIN SCALES - GENERAL: PAINLEVEL_OUTOF10: 4

## 2023-01-14 ASSESSMENT — PAIN DESCRIPTION - LOCATION: LOCATION: NECK

## 2023-01-14 NOTE — CARE COORDINATION
ONGOING DISCHARGE  NOTE:      Writer reviewed notes, Patient is agreeable to discharge to [facility] [address]     Patient was accepted to [facility] has not been started. Pre cert will need to be started Monday     Will continue to follow for additional discharge needs.      Electronically signed by Alice Dinero RN on 1/14/2023 at 3:39 PM

## 2023-01-14 NOTE — PROGRESS NOTES
[facility]    PROGRESS NOTE             1/14/2023    1:08 PM    Name:   Sherry Jang  MRN:     [id number]     Acct:      [de-identified]   Room:   [facility] Day:  12  Admit Date:  1/1/2023  9:55 PM    PCP:  No primary care provider on file. Code Status:  Full Code    Subjective:     C/C:   Chief Complaint   Patient presents with    Altered Mental Status     Interval History Status: Improved    Patient seen and examined at the bed side. No new complains. Overall continuously improving. Patient out of transferred to progressive care unit. Hypokalemia with K 3.3. will receive daily 40 efer k and 20 meq once     Plt count improving. Blood pressure remains mostly below 140. Notes from nursing staff and Consults had been reviewed, and the overnight progress had been checked with the nursing staff as well. Brief History:     The patient is a 60 y.o.  male with unknown past medical history due to no healthcare visits or follow-up who presents with Altered Mental Status and he is admitted to the hospital for the management of  Acute respiratory failure most likely 2/2 pneumonia. Per patient's wife, she reports that the patient has not been acting himself for the past week. She reports being more slurred, confused and progressively getting worse prior to presentation. Patient prior to the presentation a week ago he had a fall and EMS presented patient was vitally stable and he was not transported to the hospital.  This time upon EMS evaluation of the patient he had an O2 of 75%, he was put on a breather and was given a dose of IV Lasix in route. Wife and patient, cannot recall any precipitating event could have led to his presentation. He also denies chest pain, shortness of breath, or cough. Per patient, he does not recall any complaints or events prior to the presentation.    Wife denies recent alcohol use, however, patient does have a history of regular alcohol intake but not on daily basis. Wife also reports that patient has constant heartburn for which he takes regular antiacids. Patient denies the presence of any abdominal pain or any change in bowel habits, abdominal pain, nausea or vomiting. Upon arrival in the ED, patient was put on BiPAP. Patient was afebrile, normotensive and in normal sinus rhythm. A chest x-ray was completed and the patient had right lower pneumonia with a small pleural effusion. Patient also had a large bullae in the left apex with a pneumothorax that cannot be excluded. Patient ABG was suggestive of pattern of respiratory acidosis with pH 7.295, PCO2 60.3, PO2 63.0, HCO3 29.3. CT head WO did not show any acute findings. Patient had elevated troponins of 184, trended down to 177. Patient also had an elevated BNP of 7797. An elevated creatinine of 1.77 was on presentation, with no previous lab test to be compared to. Also patient had a left elevated liver enzymes ALT was 525, , with prolonged prothrombin time of 24.9, and INR of 2.3. Patient was admitted to the ICU for the management of type II respiratory failure associated altered mental status    Review of Systems:     Constitutional: Positive for fatigue  Hent: Positive for hearing loss. Respiratory: Positive for shortness of breath, negative for chest pain negative for wheezing negative for cough  Cardiovascular: Positive for leg swelling. Negative for chest pain palpitation  Gastrointestinal negative for abdominal pain diarrhea nausea or vomiting  Skin negative for change of color or pallor  Neurological: Overall patient feels weak from being hospitalized. Medications:      Allergies:    No Known Allergies    Current Meds:   Scheduled Meds:    potassium bicarb-citric acid  40 mEq Oral Daily    potassium chloride  20 mEq Oral Once    furosemide  40 mg Oral Daily    spironolactone  25 mg Oral Daily ziprasidone (GEODON) in sterile water injection  10 mg IntraMUSCular Nightly    carvedilol  6.25 mg Oral BID WC    [Held by provider] ferrous sulfate  325 mg Oral Daily with breakfast    pantoprazole (PROTONIX) 40 mg injection  40 mg IntraVENous Daily    ipratropium-albuterol  1 ampule Inhalation Q4H WA    [Held by provider] heparin (porcine)  5,000 Units SubCUTAneous 3 times per day    nystatin   Topical BID    [Held by provider] miconazole   Topical BID    [Held by provider] aspirin  81 mg Oral Daily    sodium chloride flush  5-40 mL IntraVENous 2 times per day    nicotine  1 patch TransDERmal Daily     Continuous Infusions:    sodium chloride      dexmedetomidine      sodium chloride       PRN Meds: hydrALAZINE, potassium chloride **OR** potassium alternative oral replacement **OR** potassium chloride, magnesium sulfate, ziprasidone (GEODON) in sterile water injection, labetalol, potassium chloride, sodium chloride, dexmedetomidine, perflutren lipid microspheres, sodium chloride flush, sodium chloride flush, sodium chloride, ondansetron **OR** ondansetron, polyethylene glycol, acetaminophen **OR** acetaminophen, albuterol, guaiFENesin    Data:     Past Medical History:   has no past medical history on file. Social History:   reports that he has been smoking cigarettes. He has a 40.00 pack-year smoking history. He has never used smokeless tobacco. He reports current alcohol use. He reports that he does not currently use drugs. Family History:   History reviewed. No pertinent family history. Vitals:  /80   Pulse 83   Temp 98.6 °F (37 °C)   Resp 20   Ht 5' 7\" (1.702 m)   Wt 164 lb 3.9 oz (74.5 kg)   SpO2 96%   BMI 25.72 kg/m²   Temp (24hrs), Av.4 °F (36.9 °C), Min:98.2 °F (36.8 °C), Max:98.6 °F (37 °C)      No results for input(s): POCGLU in the last 72 hours. I/O(24Hr):     Intake/Output Summary (Last 24 hours) at 2023 1308  Last data filed at 2023 0806  Gross per 24 hour Intake 60 ml   Output 500 ml   Net -440 ml       Labs:    CBC:   Lab Results   Component Value Date/Time    WBC 6.6 01/14/2023 05:08 AM    RBC 5.72 01/14/2023 05:08 AM    HGB 12.2 01/14/2023 05:08 AM    HCT 41.3 01/14/2023 05:08 AM    MCV 72.1 01/14/2023 05:08 AM    MCH 21.3 01/14/2023 05:08 AM    MCHC 29.5 01/14/2023 05:08 AM    RDW 29.8 01/14/2023 05:08 AM     01/14/2023 05:08 AM    MPV 9.2 01/14/2023 05:08 AM     CMP:    Lab Results   Component Value Date/Time     01/14/2023 05:08 AM    K 3.3 01/14/2023 05:08 AM    CL 97 01/14/2023 05:08 AM    CO2 37 01/14/2023 05:08 AM    BUN 9 01/14/2023 05:08 AM    CREATININE 0.73 01/14/2023 05:08 AM    LABGLOM >60 01/14/2023 05:08 AM    GLUCOSE 121 01/14/2023 05:08 AM    PROT 5.5 01/14/2023 05:08 AM    LABALBU 2.8 01/14/2023 05:08 AM    CALCIUM 8.4 01/14/2023 05:08 AM    BILITOT 1.3 01/14/2023 05:08 AM    ALKPHOS 46 01/14/2023 05:08 AM    AST 23 01/14/2023 05:08 AM    ALT 30 01/14/2023 05:08 AM     PT/INR:    Lab Results   Component Value Date/Time    PROTIME 19.1 01/08/2023 04:38 AM    INR 1.6 01/08/2023 04:38 AM       No results found for: SPECIAL  Lab Results   Component Value Date/Time    CULTURE NO GROWTH 01/03/2023 12:12 PM         Radiology:    CT HEAD WO CONTRAST    Result Date: 1/2/2023  EXAMINATION: CT OF THE HEAD WITHOUT CONTRAST  1/2/2023 10:11 pm TECHNIQUE: CT of the head was performed without the administration of intravenous contrast. Automated exposure control, iterative reconstruction, and/or weight based adjustment of the mA/kV was utilized to reduce the radiation dose to as low as reasonably achievable. COMPARISON: None. HISTORY: ORDERING SYSTEM PROVIDED HISTORY: Altered Mental status TECHNOLOGIST PROVIDED HISTORY: Altered Mental status Reason for Exam: Altered Mental status Additional signs and symptoms: Patient came in with transient alteration of awareness, follow up head CT for any changes.  FINDINGS: BRAIN/VENTRICLES: There is no acute intracranial hemorrhage, mass effect or midline shift. No abnormal extra-axial fluid collection. There is a focal area of decreased attenuation with loss of gray/white matter differentiation involving posterior left parietal lobe with somewhat increased conspicuity as compared to prior exam.  There is stable small focus of encephalomalacia involving left cerebellar hemisphere compatible with prior remote infarct/insult. Chronic lacunar infarct to right basal ganglia redemonstrated. There is no evidence of hydrocephalus. ORBITS: The visualized portion of the orbits demonstrate no acute abnormality. SINUSES: Small air-fluid level right sphenoid sinus. Trace air-fluid level right frontal sinus. Mild mucoperiosteal thickening to bilateral ethmoid air cells. Findings are new from prior exam and likely relate to presence of nasogastric tube. SOFT TISSUES/SKULL:  No acute abnormality of the visualized skull or soft tissues. Focal area of decreased attenuation with loss of gray/white matter differentiation involving posterior left parietal lobe with somewhat increased conspicuity as compared to prior exam likely reflecting an area of acute to subacute infarct. Stable small chronic infarct/insult to left cerebellar hemisphere. Chronic lacunar infarct to right basal ganglia redemonstrated. Paranasal sinus disease likely relating to presence of nasogastric tube. CT HEAD WO CONTRAST    Result Date: 1/1/2023  EXAMINATION: CT OF THE HEAD WITHOUT CONTRAST  1/1/2023 10:48 pm TECHNIQUE: CT of the head was performed without the administration of intravenous contrast. Automated exposure control, iterative reconstruction, and/or weight based adjustment of the mA/kV was utilized to reduce the radiation dose to as low as reasonably achievable. COMPARISON: None. HISTORY: Reason for Exam: AMS Additional signs and symptoms: the patient has been getting more and more confused since 2 weeks ago.   It has progressively been getting worse and today FINDINGS: BRAIN/VENTRICLES: There is no acute intracranial hemorrhage, mass effect or midline shift. No abnormal extra-axial fluid collection. The gray-white differentiation is maintained without evidence of an acute infarct. There is no evidence of hydrocephalus. Changes of mild chronic small vessel ischemic disease. ORBITS: The visualized portion of the orbits demonstrate no acute abnormality. SINUSES: The visualized paranasal sinuses and mastoid air cells demonstrate no acute abnormality. SOFT TISSUES/SKULL:  No acute abnormality of the visualized skull or soft tissues. No acute intracranial abnormality. Mild chronic small vessel ischemic disease. US LIVER    Result Date: 1/2/2023  EXAMINATION: RIGHT UPPER QUADRANT ULTRASOUND 1/2/2023 10:20 am COMPARISON: None. HISTORY: ORDERING SYSTEM PROVIDED HISTORY: elevated AST and ALT, in setting of PT, and INR TECHNOLOGIST PROVIDED HISTORY: elevated AST and ALT, in setting of PT, and INR FINDINGS: Patient body habitus limits the study, as there is increased attenuation of the ultrasound beam. This decreases sensitivity and specificity. LIVER:  There is mild increased echogenicity seen throughout the liver. No intrahepatic ductal dilatation. No perihepatic fluid. BILIARY SYSTEM:  Bladder is contracted. Gallstones are seen. Gallbladder wall thickness measures 6 mm. Common bile duct is within normal limits measuring 5 mm. RIGHT KIDNEY: The right kidney is grossly unremarkable without evidence of hydronephrosis. PANCREAS:  Visualized portions of the pancreas are unremarkable. OTHER: No evidence of right upper quadrant ascites. Portal vein flow appears hepatopetal     Increased echogenicity is seen throughout the liver suggesting diffuse hepatocellular disease such as fatty infiltration Cholelithiasis.   No cholecystitis     XR CHEST PORTABLE    Result Date: 1/2/2023  EXAMINATION: ONE XRAY VIEW OF THE CHEST 1/2/2023 3:15 pm COMPARISON: 01/01/2023 HISTORY: ORDERING SYSTEM PROVIDED HISTORY: intubation TECHNOLOGIST PROVIDED HISTORY: intubation Reason for Exam: intubation FINDINGS: Overlying items external to the patient somewhat limit evaluation. Placement of endotracheal tube with tip 8.2 cm from the robert. Placement of enteric tube seen to extend into the stomach, distal extent not included in field of view. Persistent likely layering right pleural effusion, similar to slightly worsened. Persistent bilateral airspace opacities to bilateral mid to lower lung zones, right worse than left, similar on the left but mildly worsened on the right. No pneumothoraces are seen. Persistent likely bullous change to the left upper lung zone. Cardiac and mediastinal silhouettes are stable. Stable appearance to bony structures. Placement of endotracheal tube which appears high riding with tip 8.2 cm from the robert. Suggest advancement by 2-3 cm. Placement of endotracheal tube seen to extend into the stomach, distal extent not included in field of view. No pneumothoraces. Persistent likely bullous change to the left upper lung zone. Persistent right pleural effusion, similar to slightly worsened. Persistent bilateral airspace opacities, right worse than left, similar on the left but mildly worsened on the right. XR CHEST PORTABLE    Result Date: 1/1/2023  EXAMINATION: ONE XRAY VIEW OF THE CHEST 1/1/2023 7:17 pm COMPARISON: None. HISTORY: ORDERING SYSTEM PROVIDED HISTORY: ams TECHNOLOGIST PROVIDED HISTORY: ams Reason for Exam: Altered mental status, difficulty breathing Additional signs and symptoms: Altered mental status, difficulty breathing Relevant Medical/Surgical History: Altered mental status, difficulty breathing FINDINGS: Large lucent area in the left apex suggesting a large bulla however superimposed pneumothorax cannot be excluded. The cardiac size is normal. Right lower lobe airspace infiltrate and mild right pleural effusion. . Pulmonary vascularity appears normal. There is mild ectasia of the thoracic aorta. Calcific tendinitis of the right shoulder. No acute bony abnormalities. The hilar structures are normal.     Right lower lobe pneumonia and small right pleural effusion Large lucent area in the left apex suggesting a large bulla however superimposed pneumothorax cannot be excluded. Follow-up CT would be useful for further evaluation. The findings were sent to the Radiology Results [address] at 10:31 pm on 1/1/2023 to be communicated to a licensed caregiver. EKG:    there are no previous tracings available for comparison. Physical Examination:        PHYSICAL EXAM:  General Appearance patient is alert awake and oriented by 3. Psych: No agitation, no depression, maintains good eye contact  HEENT - Head is normocephalic, atraumatic. Neck: supple, no rigidity, normal ROM. Small swelling of the left side of the mandibular angle that its been since childhood  Lungs -limited exam, good air entry. No wheezes  Cardiovascular - Heart sounds are normal.  Regular rhythm, normal rate without murmur, gallop or rub.   Neurology: Neurological exam is nonfocal.  Strength is 4 -/5 in all extremities  Abdomen - Soft, nontender, nondistended, no masses or organomegaly  Skin - No bruising or bleeding on exposed skin area  Extremities -lower limb edema  Assessment:        Primary Problem  Acute respiratory failure with hypoxia and hypercapnia [facility])    Active Hospital Problems    Diagnosis Date Noted    Mild malnutrition ([facility] [address] [E44.1] 01/13/2023     Priority: Medium    Delirium due to another medical condition [F05] 01/10/2023     Priority: Medium    ACP (advance care planning) [Z71.89] 01/09/2023     Priority: Medium    Goals of care, counseling/discussion [Z71.89] 01/09/2023     Priority: Medium    Encounter for palliative care [Z51.5] 01/09/2023     Priority: Medium    Prolonged pt (prothrombin time) [R79.1] 01/03/2023     Priority: Medium Emphysema lung ([facility] [address] [J43.9] 01/03/2023     Priority: Medium    Cerebral infarction ([facility] [address] [I63.9] 01/03/2023     Priority: Medium    Transaminitis [R74.01] 01/02/2023     Priority: Medium    Microcytic anemia [D64.9] 01/02/2023     Priority: Medium    Thrombocytopenia ([facility] [address] [D69.6] 01/02/2023     Priority: Medium    Agitation [R45.1] 01/02/2023     Priority: Medium    Acute respiratory failure with hypoxia and hypercapnia (HCC) RLL pneumonia [J96.01, J96.02] 01/02/2023     Priority: Medium    Altered mental status [R41.82] 01/02/2023     Priority: Medium    Community acquired pneumonia of right lower lobe of lung [J18.9] 01/02/2023     Priority: Medium       Plan: Altered Mental Status in the setting of Acute type 2 respiratory failure secondary to Pneumonia and Acute Heart Failure-Improved. -failed swallow study, reains on NG tube feeds.  -On Geodon 10 mg BID  -ID following: On vancoymycin and Unasyn  -Ipratropium-Albuterol every 4 hrs  -Echocardiogam completed  -Patient failed swallow study. NG tube placed. Will repeat Swallow Study    Transaminitis with prolonged PTT and INR in the setting of acute HCV  - AST and ALT normalized  -INR PT improving   -Thrombocytopenic, IMPROVING  -HCV RNA PCR positive; ID on board       Multiple acute brain infarcts with subarachnoid hemorrhage versus meningitis(Unlikely)  -EEG showing diffuse slowing without epileptic waves.  -No anticoagulation  -Plan for CASSI once patient mentation improved. -New BP parameters of 140. -SAH and infarcts again seen on repeat CT. Thrombocytopenia-Improving  -Unknown  baseline  -Transaminates and Prolonged PT and PTT; resolved. -PLT count  101  -Monitor HH       Microcytic anemia in the setting of elevated INR and PT  -No previous labs to compare?  -Iron IV replacement    Elevated troponins most likely 2/2 demand ischemia  -no acute changes on EKG  -ECHO showing normal LVF. Mild tricuspid regurge.  RV pressure of 25 mmHG    Hypokalemia  -K 3.3 this AM  -Effer K 40 meq daily. DVT prophylaxis: reason for no prophylaxis: Prolonged PT, aPTT  GI prophylaxis: Protonix 40 mg daily    Francisca Kamara MD  1/14/2023  1:08 PM   Attending Physician Statement    I have discussed the case of Naun Mistry, including pertinent history and exam findings with the resident. I have seen and examined the patient and the key elements of the encounter have been performed by me. I agree with the assessment, plan, and orders as documented by the resident. His clinical condition has improved as compared to yesterday and today he was not in acute distress.   His medications and lab work was reviewed  Electronically signed by Francisca Kamara MD on 1/14/2023 at 1:08 PM

## 2023-01-14 NOTE — PROGRESS NOTES
01/14/23 1543   Encounter Summary   Encounter Overview/Reason  Spiritual/Emotional Needs   Service Provided For: Patient   Referral/Consult From: Palliative Care   Support System Spouse   Last Encounter  01/14/23   Complexity of Encounter Moderate   Begin Time 1420   End Time  1435   Total Time Calculated 15 min   Spiritual/Emotional needs   Type Spiritual Support   Palliative Care   Type Palliative Care, Follow-up   Assessment/Intervention/Outcome   Assessment Calm;Coping   Intervention Active listening;Discussed illness injury and its impact;Prayer (assurance of)/[place];Sustaining Presence/Ministry of presence   Outcome Comfort; Acceptance;Engaged in conversation;Receptive

## 2023-01-14 NOTE — PROCEDURES
INSTRUMENTAL SWALLOW REPORT  MODIFIED BARIUM SWALLOW    NAME: Gena Valdez   : 1960  MRN: [id number]       Date of Eval: 2023              Referring Diagnosis(es):  dysphagia    Past Medical History:  has no past medical history on file. Past Surgical History:  has no past surgical history on file. Date of Prior Study:   Type of Study: Repeat MBS  Results of Prior Study: Oral Phase: Repetitive lingual tremors, reduced bolus hold, reduced A-P transit, piecemeal swallowing, min amount oral residue, delayed swallow initiation, and premature pyriform sinus spillage with all consistencies. Pharyngeal Phase: Puree and thin liquid per teaspoon: +DEEP penetration, unable to be ejected from airway, +mod amount vallecular and pyriform sinus residue. Reduced BOT retraction and hyolaryngeal excursion contributing to penetration and residue. Test discontinued d/t high risk for aspiration with ALL PO. Recent CXR/CT of Chest: Date n/a    Patient Complaints/Reason for Referral:  Gena Valdez was referred for a MBS to assess the efficiency of his/her swallow function, assess for aspiration, and to make recommendations regarding safe dietary consistencies, effective compensatory strategies, and safe eating environment. Onset of problem:      Pt. Has been NPO c NGT since previous swallow study. Behavior/Cognition/Vision/Hearing:  Behavior/Cognition: Alert; Cooperative  Pt. Is pleasantly confused  Vision: Impaired  Vision Exceptions: Wears glasses at all times  Hearing: Exceptions to [facility]  Hearing Exceptions: Hard of hearing/hearing concerns    Impressions:     Patient Position: Lateral       Consistencies Administered: Pureed;Mildly Thick cup            Oral Preparation / Oral Phase   Premature vallecular spillage of both consistencies presented        Pharyngeal Phase     Mildly thick:  min vallecular and pyriform sinus cavity residue.   Deep penetration  Pudding:  mod amt vallecular and max amt of pyriform sinus cavity residue. Deep penetration and +aspiration with residue lying atop vocal cords. Weak cough/congested respirations noted. Test discontinued at this time d/t pt. At great risk for aspiration with all PO intake with reduced airway protection. Esophageal Phase  Esophageal Screen: WNL                   Dysphagia Outcome Severity Scale: Level 1: Severe dysphagia- NPO. Unable to tolerate any PO safely  Penetration-Aspiration Scale (PAS): 5 - Material enters the airway, contacts the vocal folds, and is not ejected into the airway    Recommended Diet:  Solid consistency: NPO  Liquid consistency: NPO       Medication administration: Via NG/PEG              Recommendations/Treatment  Requires SLP Intervention: Yes        D/C Recommendations: Ongoing speech therapy is recommended during this hospitalization; Ongoing speech therapy is recommended at next level of care         Recommended Exercises:    Therapeutic Interventions: Pharyngeal exercises; Laryngeal exercises;Oral care;Oral motor exercises; Patient/Family education         Education: Results and recommendations were reviewed with pt.  following this exam.      Patient Education Response: Verbalizes understanding;Needs reinforcement    Safety Devices  Restraints Initially in Place: No      Goals:    Long Term:    Diet at least restrictive consistency           Short Term:     Goal 1: Pt will complete BOT/laryngeal/pharyngeal strengthening exercises with 100% returned demonstration                                Pain      Pt. denies      Therapy Time:   Individual Concurrent Group Co-treatment   Time In 1510         Time Out 1525         Minutes 15               Lenader Rolling MLaurie SUN/SLP    1/14/2023, 3:37 PM

## 2023-01-14 NOTE — PROGRESS NOTES
[facility] -   Infectious diseases evaluation  admission date 1/1/2023    reason for consultation:   Community-acquired pneumonia    Impression :   Current:  Acute hypoxic respiratory failure required intubation 1/2/22 was subsequently extubated  Community-acquired pneumonia with possible aspiration. Cellulitis /infiltration of left arm PICC line that was removed. Acute CVA with subarachnoid hemorrhage  Positive MRSA nasal swab  Liver disease  Cholelithiasis  Acute renal failure  Thrombocytopenia followed by hematology oncology, possibly related to liver disease  Hepatitis C/elevated liver enzymes    Recommendations      Monitor off antibiotics. Swallow study noted had severe dysphagia, NG in place  Left arm PICC line was removed  Liver ultrasound 1/2/2323 showed increased echogenicity and cholelithiasis, no cholecystitis. HIV screen was negative on 1/2/23  Hepatitis C RNA was 17, 400 IU/mL 4.2 for LOC  The patient received IV ceftriaxone and Zithromax on 1/1/2022  Follow blood and respiratory cultures, no growth to date  No growth on urine culture from 1/3/23  Nasal swab for MRSA was +1/2/23  Respiratory panel with COVID-19 PCR was negative  Urine for Legionella antigen negative  Follow CBC and renal function  Continue supportive care  Follow-up with GI as outpatient for hepatitis C  management  Discussed with nursing staff      Infection Control Recommendations   [place] Precautions  Contact Isolation       Antimicrobial Stewardship Recommendations   Simplification of therapy  Targeted therapy      History of Present Illness:   Initial history:  Ann Elias is a 60y.o.-year-old male was brought to the hospital on 1/1/2023 with altered mental status, confusion associated with decreased responsiveness worsening for 2 weeks. At the ER he was obtunded, O2 sat was 75% on room air, was placed on nonrebreather initially, subsequently was intubated.   He is intubated, sedated unable to provide history that was obtained from chart review and nursing staff. According to his wife he is fully vaccinated for COVID-19 and flu. He has been afebrile since admission  Initial procalcitonin level was 0.09, WBC normal  He was hypotensive was started on Levophed. MRI showed small acute infarcts in the left cerebellar hemisphere and left parietal love with moderate bilateral subarachnoid hemorrhages within both cerebral hemispheres. Chest x-ray showed right lower lobe infiltrate    Interval changes  1/14/2023   Resting comfortably. Denies any pain, fever, chills, n/v/d. Labs reviewed. Accepted at ARU. Patient Vitals for the past 8 hrs:   BP Temp Pulse Resp SpO2   01/14/23 1614 -- -- 77 20 95 %   01/14/23 1245 103/80 98.6 °F (37 °C) 83 20 96 %             I have personally reviewed the past medical history, past surgical history, medications, social history, and family history, and I haveupdated the database accordingly. Allergies:   Patient has no known allergies. Review of Systems:     Review of Systems  12systems reviewed were negative  Physical Examination :   Constitutional:       General: He is not in acute distress. Appearance: He is not ill-appearing. HENT:      Head: Normocephalic and atraumatic. Right Ear: External ear normal.      Left Ear: External ear normal.   Eyes:      General: No scleral icterus. Conjunctiva/sclera: Conjunctivae normal.   Cardiovascular:      Rate and Rhythm: Normal rate and regular rhythm. Heart sounds: Normal heart sounds. Pulmonary:      Effort: Pulmonary effort is normal. No respiratory distress. Abdominal:      General: There is no distension. Palpations: Abdomen is soft. Musculoskeletal:      Cervical back: Neck supple. No rigidity. Right lower leg: No edema. Left lower leg: No edema. Skin:     Coloration: Skin is not jaundiced.    Neurological:      Mental Status: He is alert and oriented to person, place, and time. Past Medical History:   History reviewed. No pertinent past medical history. Past Surgical  History:   History reviewed. No pertinent surgical history.     Medications:      potassium bicarb-citric acid  40 mEq Oral Daily    potassium chloride  20 mEq Oral Once    furosemide  40 mg Oral Daily    spironolactone  25 mg Oral Daily    ziprasidone (GEODON) in sterile water injection  10 mg IntraMUSCular Nightly    carvedilol  6.25 mg Oral BID WC    [Held by provider] ferrous sulfate  325 mg Oral Daily with breakfast    pantoprazole (PROTONIX) 40 mg injection  40 mg IntraVENous Daily    ipratropium-albuterol  1 ampule Inhalation Q4H WA    [Held by provider] heparin (porcine)  5,000 Units SubCUTAneous 3 times per day    nystatin   Topical BID    [Held by provider] miconazole   Topical BID    [Held by provider] aspirin  81 mg Oral Daily    sodium chloride flush  5-40 mL IntraVENous 2 times per day    nicotine  1 patch TransDERmal Daily       Social History:     Social History     Socioeconomic History    Marital status: Single     Spouse name: Not on file    Number of children: Not on file    Years of education: Not on file    Highest education level: Not on file   Occupational History    Not on file   Tobacco Use    Smoking status: Every Day     Packs/day: 1.00     Years: 40.00     Pack years: 40.00     Types: Cigarettes    Smokeless tobacco: Never   Substance and Sexual Activity    Alcohol use: Yes     Comment: beer and scotch    Drug use: Not Currently     Comment: over 20 years (stated by daughter)    Sexual activity: Not on file   Other Topics Concern    Not on file   Social History Narrative    Not on file     Social Determinants of Health     Financial Resource Strain: Not on file   Food Insecurity: Not on file   Transportation Needs: Not on file   Physical Activity: Not on file   Stress: Not on file   Social Connections: Not on file   Intimate Partner Violence: Not on file   Housing Stability: Not on file       Family History:   History reviewed. No pertinent family history. Medical Decision Making:   I have independently reviewed/ordered the following labs:    CBC with Differential:   Recent Labs     01/13/23  0451 01/14/23  0508   WBC 7.3 6.6   HGB 13.0* 12.2*   HCT 43.9 41.3   PLT 81* 101*   LYMPHOPCT 7* 9*   MONOPCT 12* 13*     BMP:  Recent Labs     01/13/23  0451 01/13/23  1413 01/14/23  0508     --  139   K 3.5* 4.0 3.3*     --  97*   CO2 34*  --  37*   BUN 7*  --  9   CREATININE 0.74  --  0.73   MG 1.7  --  1.7     Hepatic Function Panel:   Recent Labs     01/13/23  0451 01/14/23  0508   PROT 5.7* 5.5*   LABALBU 2.7* 2.8*   BILITOT 1.6* 1.3*   ALKPHOS 49 46   ALT 35 30   AST 27 23     No results for input(s): RPR in the last 72 hours. No results for input(s): HIV in the last 72 hours. No results for input(s): BC in the last 72 hours. Lab Results   Component Value Date/Time    CREATININE 0.73 01/14/2023 05:08 AM    GLUCOSE 121 01/14/2023 05:08 AM       Detailed results: Thank you for allowing us to participate in the care of this patient. Please call with questions. This note is created with the assistance of a speech recognition program.  While intending to generate adocument that actually reflects the content of the visit, the document can still have some errors including those of syntax and sound a like substitutions which may escape proof reading. It such instances, actual meaningcan be extrapolated by contextual diversion.     Richa Reaves, ALAYNA - CNP  Office: [phone]  Perfect serve / office [phone]

## 2023-01-14 NOTE — PROGRESS NOTES
[facility]   INPATIENT OCCUPATIONAL THERAPY  PROGRESS NOTE  Date: 2023  Patient Name: Jaz Schultz       Room:   MRN: [id number]    : 1960  (60 y.o.)  Gender: male      Diagnosis: Acute respiratory failure,  AMS in the setting of hypoxia and a pneumonia.  multifactoral encehpalopathy. Repeat CT head shows prior strokes and SAH and intraparenchymal hemorrhage    Restrictions/Precautions  Restrictions/Precautions  Restrictions/Precautions: Fall Risk;General Precautions;Contact Precautions;Isolation; Bed Alarm  Required Braces or Orthoses?: No  Implants present? :  (Pt denies)  Position Activity Restriction  Other position/activity restrictions: Severe dysphagia- NPO. Unable to tolerate any PO safely. NG tube placed,  on high flow nasal cannula    Vitals  O2 Device: Nasal cannula    Subjective  Subjective  Subjective: \"I'll take a break from therapy today\" pt requires education on benefits of out of bed activity, participation in therapy, and encouragement. Pt is then agreeable. Subjective  Pain: pt reports 8/10 pain in R wrist from IV line  Comments: ANGEL cueto'linn pt for PT/OT co-tx. Pt agreeable to participate and pleasant/cooperative throughout. Objective  Orientation  Overall Orientation Status: Impaired  Orientation Level: Disoriented to place; Disoriented to time;Oriented to person  Cognition  Overall Cognitive Status: Exceptions  Arousal/Alertness: Appropriate responses to stimuli  Following Commands:  Follows one step commands consistently  Attention Span: Difficulty dividing attention  Memory: Decreased recall of biographical Information;Decreased recall of precautions;Decreased recall of recent events  Safety Judgement: Decreased awareness of need for assistance;Decreased awareness of need for safety  Problem Solving: Assistance required to generate solutions;Assistance required to implement solutions;Assistance required to identify errors made;Assistance required to correct errors made  Insights: Decreased awareness of deficits  Initiation: Requires cues for some  Sequencing: Requires cues for some  ADL  Feeding: NPO  Feeding Skilled Clinical Factors: NG tube in place  Grooming: Stand by assistance  UE Bathing: Minimal assistance  LE Bathing: Maximum assistance  UE Dressing: Stand by assistance  LE Dressing: Maximum assistance  Toileting: Maximum assistance  Additional Comments: Pt declines to participate in ADL tasks this date stating \"I already did all of that this morning\" in regards to getting washed up. Pt currently limited due to decreased strength, balance, activity tolerance, and cognitive barriers impacting safety and independence with self care tasks         Balance  Balance  Sitting Balance: Stand by assistance  Standing Balance: Moderate assistance  Standing Balance  Time: 2 minutes (standing for 30-45s 3 times)  Activity: Functional transfers/mobility  Comment: with RW for UE support; pt has posterior lean and dec balance while standing    Transfers/Mobility  Bed mobility  Supine to Sit: Moderate assistance  Sit to Supine: Moderate assistance  Scooting: Stand by assistance (to progress hips to EOB)  Bed Mobility Comments: HOB elevated 30 degrees with use of bedrail  Transfers  Sit to stand: Minimal assistance  Stand to sit: Minimal assistance  Transfer Comments: Verbal cues for hand placement and safety with increased time and Fair carryover. Pt attempts to sit prematurly requiring max verbal/tactile cues for safety. Functional Mobility  Functional - Mobility Device: Rolling Walker  Activity: Other (~3-4 anterior/posterior steps while bedside; completed 3x)  Assist Level: Moderate assistance  Functional Mobility Comments: pt fatigued quickly, required verbal/tactile cueing for sequencing BLEs.  Pt can be impulsive and is a high fall risk      Patient Education  Patient Education  Education Given To: Patient  Education Provided: Role of Therapy, Plan of Care, Transfer Training  Education Provided Comments: activity promotion, safety awareness  Education Method: Verbal  Barriers to Learning: Cognition  Education Outcome: Continued education needed, Demonstrated understanding, Verbalized understanding      Goals  Short Term Goals  Time Frame for Short Term Goals: By discharge  Short Term Goal 1: Pt will complete bed mobility mod I to sitting EOB supported for 8+ minutes during self care tasks/met  Short Term Goal 2: Pt will complete simple grooming/self feeding task with mod I and Fair attention to task  Short Term Goal 3: Pt will complete upper body dressing/bathing with Mod I and Good safety/attention to task  Short Term Goal 4: Pt will follow simple 1-2 step commands 75% of the time to increase participation in daily activities/ met 1-11-23  Short Term Goal 5: Pt will participate in 15+ minutes of therapeutic exercises/functional activities to increase safety and independence with self care and mobility/ met 1-11-23  Short Term Goal 6: new goal:  pt to dominik 5-7 min standing with BUE support and min A x 1 for adls.   Short Term Goal 7: Pt will perform functional transfers/mobility with CGA, using least restrictive device and Fair safety (Goal Added Jessie Fine OTR/L)  Occupational Therapy Plan  Times Per Week: 5-7  Current Treatment Recommendations: Self-Care / ADL, Strengthening, ROM, Balance training, Functional mobility training, Endurance training, Cognitive reorientation, Neuromuscular re-education, Safety education & training, Patient/Caregiver education & training, Equipment evaluation, education, & procurement, Cognitive/Perceptual training, Coordination training      Assessment  Activity Tolerance  Activity Tolerance: Patient Tolerated treatment well, Patient limited by fatigue  Activity Tolerance Comments: on high flow oxygen  Assessment  Performance deficits / Impairments: Decreased ADL status, Decreased functional mobility , Decreased ROM, Decreased strength, Decreased safe awareness, Decreased cognition, Decreased endurance, Decreased balance, Decreased high-level IADLs, Decreased fine motor control, Decreased coordination, Decreased posture  Treatment Diagnosis: Impaired self care status  Prognosis: Fair  Decision Making: High Complexity  Discharge Recommendations: Patient would benefit from continued therapy after discharge  OT Equipment Recommendations  Other: TBD  Safety Devices  Type of Devices:  All fall risk precautions in place, Call light within reach, Chair alarm in place, Gait belt, Patient at risk for falls, Nurse notified, Left in bed      AM-[facility] Daily Activities Inpatient  AM-PAC Daily Activity Inpatient   How much help for putting on and taking off regular lower body clothing?: A Lot  How much help for Bathing?: A Lot  How much help for Toileting?: A Lot  How much help for putting on and taking off regular upper body clothing?: A Little  How much help for taking care of personal grooming?: A Little  How much help for eating meals?: Total  AM-PAC Inpatient Daily Activity Raw Score: 13  AM-PAC Inpatient ADL T-Scale Score : 32.03  ADL Inpatient CMS 0-100% Score: 63.03  ADL Inpatient CMS G-Code Modifier : CL    OT Minutes  OT Individual Minutes  Time In: 0850  Time Out: 0913  Minutes: 23  Time Code Minutes   Timed Code Treatment Minutes: 23 Minutes      STEFFI Leonard/OTONIEL

## 2023-01-14 NOTE — PROGRESS NOTES
RT at bedside.      Juan Miguel Powell RN  04/27/19 5770 Today's Date: 1/14/2023  Patient Name: Claudia Beverly  Date of admission: 1/1/2023  9:55 PM  Patient's age: 60 y.o., 1960  Admission Dx: Acute respiratory failure ([facility] [J96.00]  Acute respiratory failure with hypoxia and hypercapnia ([facility] [J96.01, J96.02]  Altered mental status, unspecified altered mental status type [R41.82]  Community acquired pneumonia of right lower lobe of lung [J18.9]  COPD with respiratory failure, acute ([facility] [J44.9, J96.00]    Reason for Consult: management recommendations  Requesting Physician: Iraj Arizmendi MD    CHIEF COMPLAINT: Abnormal cell counts. Altered mental status. Pneumonia. Interval history:    Patient seen and examined  Labs and vitals reviewed  Continues to recover well. Condition is improving quickly. Mental status is improving. No active bleeding. Platelets 692. HISTORY OF PRESENT ILLNESS:      The patient is a 60 y.o.  male who is admitted with chief complaint of altered mental status. Patient has not seen a doctor for multiple years. Due to worsening mental status EMS was summoned. Patient oxygen saturation was noted to be at 75%. Patient placed on BiPAP x-ray showed right lower lobe pneumonia. Patient started on antibiotics. Patient also noted to have INR of 2.3 platelet count of 43,539. Patient does have a history of alcohol intake. Patient's ammonia level noted to be 29. Creatinine 1.5. Total bilirubin is within range. Hemoglobin 11.2 with MCV of 69. Platelet count is 88,922. Ultrasound liver done however report is pending. Past Medical History: Hypertension    Past Surgical History: No pertinent surgical history    Medications:    Prior to Admission medications    Medication Sig Start Date End Date Taking?  Authorizing Provider   aspirin 325 MG tablet Take 325 mg by mouth daily Patient takes 2 tabs daily   Yes Historical Provider, MD   psyllium (KONSYL) 28.3 % PACK Take 1 packet by mouth daily   Yes Historical Provider, MD     Current Facility-Administered Medications   Medication Dose Route Frequency Provider Last Rate Last Admin    potassium bicarb-citric acid (EFFER-K) effervescent tablet 40 mEq  40 mEq Oral Daily Anibal Tellez MD   40 mEq at 01/14/23 0800    potassium chloride (KLOR-CON M) extended release tablet 20 mEq  20 mEq Oral Once Anibal Tellez MD        furosemide (LASIX) tablet 40 mg  40 mg Oral Daily Garret Willoughby MD   40 mg at 01/14/23 0800    hydrALAZINE (APRESOLINE) injection 20 mg  20 mg IntraVENous Q6H PRN Donell Calderon MD   20 mg at 01/11/23 1224    potassium chloride (KLOR-CON M) extended release tablet 40 mEq  40 mEq Oral PRN Donell Calderon MD        Or    potassium bicarb-citric acid (EFFER-K) effervescent tablet 40 mEq  40 mEq Oral PRN Donell Calderon MD        Or    potassium chloride 10 mEq/100 mL IVPB (Peripheral Line)  10 mEq IntraVENous PRN Donell Calderon  mL/hr at 01/13/23 0928 10 mEq at 01/13/23 8048    spironolactone (ALDACTONE) tablet 25 mg  25 mg Oral Daily Anibal Tellez MD   25 mg at 01/14/23 0800    magnesium sulfate 2000 mg in water 50 mL IVPB  2,000 mg IntraVENous PRN Garret Willoughby MD        ziprasidone (GEODON) 10 mg in sterile water 0.5 mL injection  10 mg IntraMUSCular Nightly ALAYNA Grant - CNP        carvedilol (COREG) tablet 6.25 mg  6.25 mg Oral BID WC Garret Willoughby MD   6.25 mg at 01/14/23 0800    [Held by provider] ferrous sulfate (IRON 325) tablet 325 mg  325 mg Oral Daily with breakfast Garret Willoughby MD        pantoprazole (PROTONIX) 40 mg in sodium chloride (PF) 0.9 % 10 mL injection  40 mg IntraVENous Daily Garret Willoughby MD   40 mg at 01/14/23 0801    ziprasidone (GEODON) 20 mg in sterile water 1 mL injection  20 mg IntraMUSCular Q12H PRN ALAYNA Haas - CNP        labetalol (NORMODYNE;TRANDATE) injection 5 mg  5 mg IntraVENous Q6H PRN Woody Setters Mary Solomon MD   5 mg at 01/10/23 1606    potassium chloride 10 mEq/100 mL IVPB (Peripheral Line)  10 mEq IntraVENous PRN Gautam Duggan  mL/hr at 01/13/23 1233 10 mEq at 01/13/23 1233    ipratropium-albuterol (DUONEB) nebulizer solution 1 ampule  1 ampule Inhalation Q4H WA Kalani Esparza MD   1 ampule at 01/14/23 0752    [Held by provider] heparin (porcine) injection 5,000 Units  5,000 Units SubCUTAneous 3 times per day Rick Flores MD   5,000 Units at 01/08/23 0549    0.9 % sodium chloride infusion   IntraVENous PRN Duncan Fuentes MD        dexmedetomidine (PRECEDEX) 400 mcg in sodium chloride 0.9 % 100 mL infusion  0.1-1.5 mcg/kg/hr IntraVENous Continuous PRN Kalani Esparza MD        nystatin (MYCOSTATIN) ointment   Topical BID Yousif Rasheed MD   Given at 01/14/23 0801    [Held by provider] miconazole (MICOTIN) 2 % powder   Topical BID ALAYNA Drew NP   Given at 01/06/23 1930    perflutren lipid microspheres (DEFINITY) injection 1.5 mL  1.5 mL IntraVENous ONCE PRN Gabe Galloway MD        [facility] [place] by provider] aspirin chewable tablet 81 mg  81 mg Oral Daily Yaneth Luna MD   81 mg at 01/04/23 0740    sodium chloride flush 0.9 % injection 10 mL  10 mL IntraVENous PRN Yaneth Luna MD   10 mL at 01/03/23 1836    sodium chloride flush 0.9 % injection 5-40 mL  5-40 mL IntraVENous 2 times per day ALAYNA Drew NP   10 mL at 01/14/23 0801    sodium chloride flush 0.9 % injection 5-40 mL  5-40 mL IntraVENous PRN ALAYNA Marrero NP        0.9 % sodium chloride infusion   IntraVENous PRN ALAYNA Drew NP        ondansetron (ZOFRAN-ODT) disintegrating tablet 4 mg  4 mg Oral Q8H PRN ALAYNA Drew NP        Or    ondansetron (ZOFRAN) injection 4 mg  4 mg IntraVENous Q6H PRN Abiola Gomez, APRN - NP        polyethylene glycol (GLYCOLAX) packet 17 g  17 g Oral Daily PRN Homero De Santiago APRN - NP        acetaminophen (TYLENOL) tablet 650 mg  650 mg Oral Q6H PRN Keny Elders, APRN - NP        Or    acetaminophen (TYLENOL) suppository 650 mg  650 mg Rectal Q6H PRN Keny Elders, APRN - NP        nicotine (NICODERM CQ) 21 MG/24HR 1 patch  1 patch TransDERmal Daily Clemmons Elders, APRN - NP   1 patch at 23 0801    albuterol (PROVENTIL) nebulizer solution 2.5 mg  2.5 mg Nebulization Q2H PRN Clemmons Elders, APRN - NP   2.5 mg at 23 1115    guaiFENesin (MUCINEX) extended release tablet 600 mg  600 mg Oral BID PRN Clemmons Elders, APRN - NP           Allergies:  Patient has no known allergies. Social History:   reports that he has been smoking cigarettes. He has a 40.00 pack-year smoking history. He has never used smokeless tobacco. He reports current alcohol use. He reports that he does not currently use drugs. Family History: Patient not able to give history due to altered mental status    REVIEW OF SYSTEMS:      General: no fever or night sweats, Weight is stable. ENT: No double or blurred vision, no hearing problem, no dysphagia or sore throat   Respiratory: Positive shortness of breath  Cardiovascular: Denies chest pain, PND or orthopnea. No L E swelling or palpitations. Gastrointestinal:    No nausea or vomiting, abdominal pain, diarrhea or constipation. Genitourinary: Denies dysuria, hematuria, frequency, urgency or incontinence. Neurological: Complains of being very weak. Musculoskeletal:  No arthralgia no back pain or joint swelling. Skin: There are no rashes or bleeding.   Psych: Denies hallucinations or intentions to harm self        PHYSICAL EXAM:        /80   Pulse 83   Temp 98.6 °F (37 °C)   Resp 20   Ht 5' 7\" (1.702 m)   Wt 164 lb 3.9 oz (74.5 kg)   SpO2 96%   BMI 25.72 kg/m²    Temp (24hrs), Av.4 °F (36.9 °C), Min:98.2 °F (36.8 °C), Max:98.6 °F (37 °C)      General appearance - not in acute distress  Mental status -arousable but somewhat lethargic  Eyes - pupils equal and reactive, extraocular eye movements intact   Ears - bilateral TM's and external ear canals normal   Mouth -mucous membranes moist  Neck - supple, no significant adenopathy   Lymphatics - no palpable lymphadenopathy, no hepatosplenomegaly   Chest -bilateral rales  Heart - normal rate, regular rhythm, normal S1, S2, no murmurs  Abdomen - soft, nontender, nondistended, no masses or organomegaly   Neurological -orally intubated  Musculoskeletal - no joint tenderness, deformity or swelling   Extremities - peripheral pulses normal, no pedal edema, no clubbing or cyanosis   Skin - normal coloration and turgor, no rashes, no suspicious skin lesions noted ,      DATA:      Labs:     Results for orders placed or performed during the hospital encounter of 01/01/23   COVID-19 & Influenza Combo    Specimen: Nasopharyngeal Swab   Result Value Ref Range    Specimen Description . NASOPHARYNGEAL SWAB     Source . NASOPHARYNGEAL SWAB     SARS-CoV-2 RNA, RT PCR Not Detected Not Detected    INFLUENZA A Not Detected Not Detected    INFLUENZA B Not Detected Not Detected   Respiratory Panel, Molecular, with COVID-19 (Restricted: peds pts or suitable admitted adults)    Specimen: Nasopharyngeal Swab   Result Value Ref Range    Specimen Description . NASOPHARYNGEAL SWAB     Adenovirus PCR Not Detected Not Detected    Coronavirus 229E PCR Not Detected Not Detected    Coronavirus HKU1 PCR Not Detected Not Detected    Coronavirus NL63 PCR Not Detected Not Detected    Coronavirus OC43 PCR Not Detected Not Detected    SARS-CoV-2, PCR Not Detected Not Detected    Human Metapneumovirus PCR Not Detected Not Detected    Rhino/Enterovirus PCR Not Detected Not Detected    Influenza A by PCR Not Detected Not Detected    Influenza B by PCR Not Detected Not Detected    Parainfluenza 1 PCR Not Detected Not Detected    Parainfluenza 2 PCR Not Detected Not Detected    Parainfluenza 3 PCR Not Detected Not Detected    Parainfluenza 4 PCR Not Detected Not Detected    Resp Syncytial Virus PCR Not Detected Not Detected    Bordetella Parapertussis Not Detected Not Detected    B Pertussis by PCR Not Detected Not Detected    Chlamydia pneumoniae By PCR Not Detected Not Detected    Mycoplasma pneumo by PCR Not Detected Not Detected   Legionella Ag, Ur    Specimen: Urine   Result Value Ref Range    Legionella Pneumophilia Ag, Urine NEGATIVE NEGATIVE   STREP PNEUMONIAE ANTIGEN    Specimen: Urine, indwelling catheter   Result Value Ref Range    Source . CLEAN CATCH URINE     Strep pneumo Ag NEGATIVE    Culture, Blood 1    Specimen: Blood   Result Value Ref Range    Specimen Description . BLOOD LEFT ARM     Culture NO GROWTH 5 DAYS    Culture, Blood 1    Specimen: Blood   Result Value Ref Range    Specimen Description . BLOOD RIGHT ARM     Culture NO GROWTH 5 DAYS    Culture, Urine    Specimen: Urine, clean catch   Result Value Ref Range    Specimen Description . CLEAN CATCH URINE     Culture NO GROWTH    MRSA DNA Probe, Nasal    Specimen: Nasal   Result Value Ref Range    Specimen Description . NASAL SWAB     MRSA, DNA, Nasal (A) NEGATIVE     POSITIVE:  MRSA DNA detected by nucleic acid amplification. Culture, Respiratory    Specimen: Sputum Induced   Result Value Ref Range    Specimen Description . INDUCED SPUTUM     Direct Exam >10, <25 NEUTROPHILS/LPF     Direct Exam < 10 EPITHELIAL CELLS/LPF     Direct Exam NO SIGNIFICANT PATHOGENS SEEN     Culture NO GROWTH    Troponin   Result Value Ref Range    Troponin, High Sensitivity 177 (HH) 0 - 22 ng/L   Troponin   Result Value Ref Range    Troponin, High Sensitivity 184 (HH) 0 - 22 ng/L   APTT   Result Value Ref Range    PTT 26.5 24.0 - 36.0 sec   Protime-INR   Result Value Ref Range    Protime 24.9 (H) 11.8 - 14.6 sec    INR 2.3    Phosphorus   Result Value Ref Range    Phosphorus 4.3 2.5 - 4.5 mg/dL   Magnesium   Result Value Ref Range    Magnesium 2.1 1.6 - 2.6 mg/dL   Basic Metabolic Panel   Result Value Ref Range    Glucose 96 70 - 99 mg/dL    BUN 46 (H) 8 - 23 mg/dL    Creatinine 1.77 (H) 0.70 - 1.20 mg/dL    Est, Glom Filt Rate 43 (L) >60 mL/min/1.73m2    Calcium 7.9 (L) 8.6 - 10.4 mg/dL    Sodium 138 135 - 144 mmol/L    Potassium 4.8 3.7 - 5.3 mmol/L    Chloride 97 (L) 98 - 107 mmol/L    CO2 29 20 - 31 mmol/L    Anion Gap 12 9 - 17 mmol/L   CBC with Auto Differential   Result Value Ref Range    WBC 6.8 3.5 - 11.0 k/uL    RBC 5.99 (H) 4.5 - 5.9 m/uL    Hemoglobin 12.1 (L) 13.5 - 17.5 g/dL    Hematocrit 41.4 41 - 53 %    MCV 69.1 (L) 80 - 100 fL    MCH 20.2 (L) 26 - 34 pg    MCHC 29.2 (L) 31 - 37 g/dL    RDW 19.8 (H) 11.5 - 14.9 %    Platelets 55 (L) 873 - 450 k/uL    MPV 8.6 6.0 - 12.0 fL    Seg Neutrophils 79 (H) 36 - 66 %    Lymphocytes 12 (L) 24 - 44 %    Monocytes 8 (H) 1 - 7 %    Eosinophils % 0 0 - 4 %    Basophils 1 0 - 2 %    Segs Absolute 5.40 1.3 - 9.1 k/uL    Absolute Lymph # 0.80 (L) 1.0 - 4.8 k/uL    Absolute Mono # 0.50 0.1 - 1.3 k/uL    Absolute Eos # 0.00 0.0 - 0.4 k/uL    Basophils Absolute 0.10 0.0 - 0.2 k/uL   Blood Gas, Venous   Result Value Ref Range    pH, [place] 7.258 (L) 7.320 - 7.420    pCO2, [place] 63.0 (H) 39.0 - 55.0 mm Hg    pO2, [place] 48.1 30.0 - 50.0 mm Hg    HCO3, Venous 28.1 24.0 - 30.0 mmol/L    Positive Base Excess, [place] 1.0 0.0 - 2.0 mmol/L    O2 Sat, [place] 72.1 60.0 - 85.0 %    Carboxyhemoglobin 4.3 0 - 5 %    Methemoglobin 0.5 0.0 - 1.9 %    Pt Temp 37    Brain Natriuretic Peptide   Result Value Ref Range    Pro-BNP 7,797 (H) <300 pg/mL   Hepatic Function Panel   Result Value Ref Range    Albumin 3.9 3.5 - 5.2 g/dL    Alkaline Phosphatase 68 40 - 129 U/L     (H) 5 - 41 U/L     (H) <40 U/L    Total Bilirubin 1.1 0.3 - 1.2 mg/dL    Bilirubin, Direct 0.9 (H) <0.3 mg/dL    Bilirubin, Indirect 0.2 0.0 - 1.0 mg/dL    Total Protein 6.6 6.4 - 8.3 g/dL   Lipase   Result Value Ref Range    Lipase 17 13 - 60 U/L   Urinalysis with Reflex to Culture    Specimen: Urine   Result Value Ref Range    Color, UA Orange (A) Yellow    Turbidity UA Cloudy (A) Clear    Glucose, Ur NEGATIVE NEGATIVE    Bilirubin Urine NEGATIVE  Verified by ictotest. (A) NEGATIVE    Ketones, Urine TRACE (A) NEGATIVE    Specific Gravity, UA 1.018 1.000 - 1.030    Urine Hgb LARGE (A) NEGATIVE    pH, UA 5.0 5.0 - 8.0    Protein, UA 2+ (A) NEGATIVE    Urobilinogen, Urine ELEVATED (A) Normal    Nitrite, Urine NEGATIVE NEGATIVE    Leukocyte Esterase, Urine SMALL (A) NEGATIVE   Microscopic Urinalysis   Result Value Ref Range    WBC, UA 6 TO 9 /HPF    RBC, UA TOO NUMEROUS TO COUNT /HPF    Casts UA 3 to 5 /LPF    Epithelial Cells UA 0 TO 2 /HPF    Bacteria, UA FEW (A) None   Arterial Blood Gases   Result Value Ref Range    pH, Arterial 7.295 (L) 7.350 - 7.450    pCO2, Arterial 60.3 (HH) 35.0 - 45.0 mmHg    pO2, Arterial 63.0 (L) 80.0 - 100.0 mmHg    HCO3, Arterial 29.3 (H) 22.0 - 26.0 mmol/L    Positive Base Excess, Art 2.8 (H) 0.0 - 2.0 mmol/L    O2 Sat, Arterial 85.8 (LL) 95 - 98 %    Carboxyhemoglobin 3.1 0 - 5 %    Methemoglobin 1.0 0.0 - 1.9 %    Pt Temp 37     O2 Device/Flow/% BIPAP     Respiratory Rate 16     Tyron Test PASS     Sample Site Right Radial Artery     Pt.  Position SEMI-FOWLERS     Mode BIPAP     Text for Respiratory RESULTS TO PHYSICIAN    Comprehensive Metabolic Panel w/ Reflex to MG   Result Value Ref Range    Glucose 101 (H) 70 - 99 mg/dL    BUN 46 (H) 8 - 23 mg/dL    Creatinine 1.50 (H) 0.70 - 1.20 mg/dL    Est, Glom Filt Rate 52 (L) >60 mL/min/1.73m2    Calcium 7.5 (L) 8.6 - 10.4 mg/dL    Sodium 136 135 - 144 mmol/L    Potassium 4.8 3.7 - 5.3 mmol/L    Chloride 99 98 - 107 mmol/L    CO2 27 20 - 31 mmol/L    Anion Gap 10 9 - 17 mmol/L    Alkaline Phosphatase 59 40 - 129 U/L     (H) 5 - 41 U/L     (H) <40 U/L    Total Bilirubin 0.8 0.3 - 1.2 mg/dL    Total Protein 5.9 (L) 6.4 - 8.3 g/dL    Albumin 3.3 (L) 3.5 - 5.2 g/dL   CBC with Auto Differential   Result Value Ref Range    WBC 5.6 3.5 - 11.0 k/uL    RBC 5.57 4.5 - 5.9 m/uL    Hemoglobin 11.2 (L) 13.5 - 17.5 g/dL    Hematocrit 38.8 (L) 41 - 53 %    MCV 69.7 (L) 80 - 100 fL    MCH 20.1 (L) 26 - 34 pg    MCHC 28.8 (L) 31 - 37 g/dL    RDW 19.7 (H) 11.5 - 14.9 %    Platelets 67 (L) 721 - 450 k/uL    MPV 9.1 6.0 - 12.0 fL    Seg Neutrophils 79 (H) 36 - 66 %    Lymphocytes 12 (L) 24 - 44 %    Monocytes 8 (H) 1 - 7 %    Eosinophils % 0 0 - 4 %    Basophils 1 0 - 2 %    nRBC 2 per 100 WBC    Segs Absolute 4.41 1.3 - 9.1 k/uL    Absolute Lymph # 0.67 (L) 1.0 - 4.8 k/uL    Absolute Mono # 0.45 0.1 - 1.3 k/uL    Absolute Eos # 0.00 0.0 - 0.4 k/uL    Basophils Absolute 0.06 0.0 - 0.2 k/uL    Morphology ANISOCYTOSIS PRESENT     Morphology HYPOCHROMIA PRESENT     Morphology 1+ ELLIPTOCYTES    Occ Bld, Fecal Scrn   Result Value Ref Range    Occult Blood, Stool #1 NEGATIVE NEGATIVE    Date, Stool #1 [phone]     Time, Stool #1 300    Hemoglobin and Hematocrit   Result Value Ref Range    Hemoglobin 11.1 (L) 13.5 - 17.5 g/dL    Hematocrit 38.7 (L) 41 - 53 %   Hemoglobin and Hematocrit   Result Value Ref Range    Hemoglobin 10.8 (L) 13.5 - 17.5 g/dL    Hematocrit 37.2 (L) 41 - 53 %   Ammonia   Result Value Ref Range    Ammonia 29 16 - 60 umol/L   Hepatitis Panel, Acute   Result Value Ref Range    Hepatitis B Surface Ag NONREACTIVE NONREACTIVE    Hepatitis C Ab REACTIVE (A) NONREACTIVE    Hep B Core Ab, IgM NONREACTIVE NONREACTIVE    Hep A IgM NONREACTIVE NONREACTIVE   Iron and TIBC   Result Value Ref Range    Iron 14 (L) 59 - 158 ug/dL    TIBC 426 250 - 450 ug/dL    Iron Saturation 3 (L) 20 - 55 %    UIBC 412 (H) 112 - 347 ug/dL   Ferritin   Result Value Ref Range    Ferritin 14 (L) 30 - 400 ng/mL   Mycoplasma Ab,IgM   Result Value Ref Range    Mycoplasma pneumo IgM 0.25 <0.91   Procalcitonin   Result Value Ref Range    Procalcitonin 0.09 (H) <0.09 ng/mL   C-Reactive Protein   Result Value Ref Range    CRP 16.4 (H) 0.0 - 5.0 mg/L   Fibrin Split Products   Result Value Ref Range    FDP >5 (H) <5 ug/mL   Sedimentation Rate Result Value Ref Range    Sed Rate 1 0 - 20 mm/Hr   Drug Scr, Abuse, Ur   Result Value Ref Range    Amphetamine Screen, Ur NEGATIVE NEGATIVE    Barbiturate Screen, Ur NEGATIVE NEGATIVE    Benzodiazepine Screen, Urine NEGATIVE NEGATIVE    Cocaine Metabolite, Urine NEGATIVE NEGATIVE    Methadone Screen, Urine NEGATIVE NEGATIVE    Opiates, Urine NEGATIVE NEGATIVE    Phencyclidine, Urine NEGATIVE NEGATIVE    Cannabinoid Scrn, Ur NEGATIVE NEGATIVE    Oxycodone Screen, Ur NEGATIVE NEGATIVE    Fentanyl, Ur NEGATIVE NEGATIVE    Test Information       Assay provides medical screening only. The absence of expected drug(s) and/or metabolite(s) may indicate diluted or adulterated urine, limitations of testing or timing of collection. Troponin   Result Value Ref Range    Troponin, High Sensitivity 195 (HH) 0 - 22 ng/L   Vitamin B12 & Folate   Result Value Ref Range    Vitamin B-12 1926 (H) 232 - 1245 pg/mL    Folate 10.0 >4.8 ng/mL   HIV Screen   Result Value Ref Range    HIV Ag/Ab NONREACTIVE NONREACTIVE   MONOCLONAL PANEL   Result Value Ref Range    Kappa Free Light Chains QNT 2.79 (H) 0.37 - 1.94 mg/dL    Lambda Free Light Chains QNT 20.89 (H) 0.57 - 2.63 mg/dL    Free Kappa/Lambda Ratio 0.13 (L) 0.26 - 1.65    Serum IFX Interp       A ZONE OF RESTRICTION IS PRESENT IN THE GAMMAGLOBULIN REGION. CONFIRMED BY IMMUNOFIXATION TO BE MONOCLONAL    Pathologist       Reviewed by pathologist:  Natalia Barnes. Amaury Tovar D.O. Total Protein 5.5 (L) 6.4 - 8.3 g/dL    Albumin (calculated) 3.6 3.2 - 5.2 g/dL    Albumin % 65 45 - 65 %    Alpha-1-Globulin 0.2 0.1 - 0.4 g/dL    Alpha 1 % 3 3 - 6 %    Alpha-2-Globulin 0.4 (L) 0.5 - 0.9 g/dL    Alpha 2 % 7 6 - 13 %    Beta Globulin 0.6 0.5 - 1.1 g/dL    Beta Percent 11 11 - 19 %    Gamma Globulin 0.8 0.5 - 1.5 g/dL    Gamma Globulin % 14 9 - 20 %    Total Prot. Sum 5.6 (L) 6.3 - 8.2 g/dL    Total Prot.  Sum,% 100 98 - 102 %    Protein Electrophoresis, Serum       A ZONE OF RESTRICTION IS PRESENT IN THE GAMMAGLOBULIN REGION. CONFIRMED BY IMMUNOFIXATION TO BE MONOCLONAL    Pathologist       Reviewed by pathologist:  Carlito Beltre. Barney Do D.O. Path Review, Smear   Result Value Ref Range    Pathologist Review ELECTRONICALLY SIGNED.  Stewart Vera MD    Reticulocytes   Result Value Ref Range    Retic % 2.8 (H) 0.5 - 2.0 %    Absolute Retic # 0.157 (H) 0.0245 - 0.098 M/uL   Fibrinogen   Result Value Ref Range    Fibrinogen 141 (L) 210 - 530 mg/dL   Ethanol   Result Value Ref Range    Ethanol <10 <10 mg/dL    Ethanol percent <0.010 %   Lactate Dehydrogenase   Result Value Ref Range     (H) 135 - 225 U/L   Hemoglobin and Hematocrit   Result Value Ref Range    Hemoglobin 10.9 (L) 13.5 - 17.5 g/dL    Hematocrit 38.0 (L) 41 - 53 %   Haptoglobin   Result Value Ref Range    Haptoglobin 60 30 - 200 mg/dL   D-Dimer, Quantitative   Result Value Ref Range    D-Dimer, Quant 6.70 (H) 0.00 - 0.59 mg/L FEU   Urinalysis with Reflex to Culture    Specimen: Urine   Result Value Ref Range    Color, UA Dark Yellow (A) Yellow    Turbidity UA Clear Clear    Glucose, Ur NEGATIVE NEGATIVE    Bilirubin Urine NEGATIVE NEGATIVE    Ketones, Urine TRACE (A) NEGATIVE    Specific [place], UA 1.015 1.000 - 1.030    Urine Hgb LARGE (A) NEGATIVE    pH, UA 5.0 5.0 - 8.0    Protein, UA 1+ (A) NEGATIVE    Urobilinogen, Urine Normal Normal    Nitrite, Urine NEGATIVE NEGATIVE    Leukocyte Esterase, Urine SMALL (A) NEGATIVE   APTT   Result Value Ref Range    PTT 38.7 (H) 24.0 - 36.0 sec   Protime-INR   Result Value Ref Range    Protime 24.4 (H) 11.8 - 14.6 sec    INR 2.2    Microscopic Urinalysis   Result Value Ref Range    WBC, UA 10 TO 20 /HPF    RBC, UA TOO NUMEROUS TO COUNT /HPF    Casts UA HYALINE /LPF    Casts UA 0 TO 2 /LPF    Epithelial Cells UA 3 to 5 /HPF   BLOOD GAS, ARTERIAL   Result Value Ref Range    pH, Arterial 7.314 (L) 7.350 - 7.450    pCO2, Arterial 61.9 (HH) 35.0 - 45.0 mmHg    pO2, Arterial 58.1 (LL) 80.0 - 100.0 mmHg    HCO3, Arterial 31.4 (H) 22.0 - 26.0 mmol/L    Positive Base Excess, Art 5.3 (H) 0.0 - 2.0 mmol/L    O2 Sat, Arterial 86.3 (LL) 95 - 98 %    Carboxyhemoglobin 2.1 0 - 5 %    Methemoglobin 0.8 0.0 - 1.9 %    Pt Temp 37.0     O2 Device/Flow/% VENTILATOR     Respiratory Rate 22     Tyron Test PASS     Sample Site Left Radial Artery     Pt. Position SEMI-FOWLERS     Mode PRVC     Set Rate 22     Total Rate 26          FIO2 40     Peep/Cpap 8    SURGICAL PATHOLOGY REPORT   Result Value Ref Range    Surgical Pathology Report       [id number]  [employer]  [facility]  ANATOMIC PATHOLOGY  [address] [place] [address],  [address] [facility], [address]  [phone]  Fax: [phone]  SURGICAL PATHOLOGY CONSULTATION    Patient Name: Franci Morales  MR#: [id number]  Specimen #[id number]    Procedures/Addenda  PERIPHERAL BLOOD REPORT     Date Ordered:     1/2/2023     Status:  Signed Out       Date Complete:     1/3/2023     By: Josue Negron M.D. Date Reported:     1/3/2023       INTERPRETATION  Peripheral blood:  Microcytic, hypochromic anemia. The patient's iron studies are compatible with iron deficiency anemia. Lymphopenia. Differential considerations include acute illness/infection,  medication effect, smoking, and debilitating diseases. Thrombocytopenia  Differential considerations include bone marrow hypoproduction and  immune destruction (idiopathic thrombocytopenic purpura, drug effect). RESULTS-COMMENTS  PERIPHERAL BLOOD STUDY    CBC: Please see the electronic health record  for CBC parameters  (, 01/02/2023, 05:43). PLATELETS: Decreased platelets. Platelets show normal morphology. LEUKOCYTES: Decreased lymphocytes. White blood cells show normal  morphology. There are no blasts. ERYTHROCYTES: Microcytic, hypochromic. Anisocytosis and  poikilocytosis. Polychromasia. Reticulocyte count 2.8%. Rare RBC  fragments.     Note: The electronic health record is reviewed. Theodore Wynn M.D.                    Source:  A: Peripheral Blood     Comprehensive Metabolic Panel w/ Reflex to MG   Result Value Ref Range    Glucose 91 70 - 99 mg/dL    BUN 32 (H) 8 - 23 mg/dL    Creatinine 0.97 0.70 - 1.20 mg/dL    Est, Glom Filt Rate >60 >60 mL/min/1.73m2    Calcium 7.4 (L) 8.6 - 10.4 mg/dL    Sodium 143 135 - 144 mmol/L    Potassium 3.7 3.7 - 5.3 mmol/L    Chloride 105 98 - 107 mmol/L    CO2 30 20 - 31 mmol/L    Anion Gap 8 (L) 9 - 17 mmol/L    Alkaline Phosphatase 50 40 - 129 U/L     (H) 5 - 41 U/L     (H) <40 U/L    Total Bilirubin 0.9 0.3 - 1.2 mg/dL    Total Protein 5.2 (L) 6.4 - 8.3 g/dL    Albumin 3.1 (L) 3.5 - 5.2 g/dL   CBC with Auto Differential   Result Value Ref Range    WBC 5.7 3.5 - 11.0 k/uL    RBC 5.42 4.5 - 5.9 m/uL    Hemoglobin 11.0 (L) 13.5 - 17.5 g/dL    Hematocrit 37.6 (L) 41 - 53 %    MCV 69.4 (L) 80 - 100 fL    MCH 20.2 (L) 26 - 34 pg    MCHC 29.2 (L) 31 - 37 g/dL    RDW 20.0 (H) 11.5 - 14.9 %    Platelets 66 (L) 152 - 450 k/uL    MPV 9.4 6.0 - 12.0 fL    Seg Neutrophils 85 (H) 36 - 66 %    Lymphocytes 9 (L) 24 - 44 %    Monocytes 5 1 - 7 %    Eosinophils % 0 0 - 4 %    Basophils 0 0 - 2 %    Bands 1 0 - 10 %    nRBC 1 (H) 0 per 100 WBC    Segs Absolute 4.87 1.3 - 9.1 k/uL    Absolute Lymph # 0.52 (L) 1.0 - 4.8 k/uL    Absolute Mono # 0.29 0.1 - 1.3 k/uL    Absolute Eos # 0.00 0.0 - 0.4 k/uL    Basophils Absolute 0.00 0.0 - 0.2 k/uL    Absolute Bands # 0.06 0.0 - 1.0 k/uL    Morphology ANISOCYTOSIS PRESENT     Morphology HYPOCHROMIA PRESENT     Morphology 1+ POLYCHROMASIA     Morphology 1+ ELLIPTOCYTES    BLOOD GAS, ARTERIAL   Result Value Ref Range    pH, Arterial 7.373 7.350 - 7.450    pCO2, Arterial 56.0 (HH) 35.0 - 45.0 mmHg    pO2, Arterial 61.4 (L) 80.0 - 100.0 mmHg    HCO3, Arterial 32.6 (H) 22.0 - 26.0 mmol/L    Positive Base Excess, Art 7.4 (H) 0.0 - 2.0 mmol/L    O2 Sat, Arterial 89.2 (L) 95 - 98 %    Carboxyhemoglobin 1.7 0 - 5 %    Methemoglobin 0.9 0.0 - 1.9 %    Pt Temp 37     O2 Device/Flow/% VENTILATOR     Respiratory Rate 22     Tyron Test PASS     Sample Site Right Radial Artery     Pt. Position SEMI-FOWLERS     Mode PRVC     Set Rate 22     Total Rate 22          FIO2 35     Peep/Cpap 8     Text for Respiratory RESULTS GIVEN TO RN    Protime-INR   Result Value Ref Range    Protime 21.9 (H) 11.8 - 14.6 sec    INR 1.9    APTT   Result Value Ref Range    PTT 38.4 (H) 24.0 - 36.0 sec   Triglyceride   Result Value Ref Range    Triglycerides 85 <150 mg/dL   CHLORIDE, URINE, RANDOM   Result Value Ref Range    Chloride, Ur 34 mmol/L   Protein / Creatinine Ratio, Urine   Result Value Ref Range    Total Protein, Urine 23 mg/dL    Creatinine, Ur 79.8 39.0 - 259.0 mg/dL    Urine Total Protein Creatinine Ratio 0.29 (H) 0.00 - 0.20   SODIUM, URINE, RANDOM   Result Value Ref Range    Sodium,Ur <20 mmol/L   BLOOD GAS, ARTERIAL   Result Value Ref Range    pH, Arterial 7.360 7.350 - 7.450    pCO2, Arterial 55.9 (HH) 35.0 - 45.0 mmHg    pO2, Arterial 71.4 (L) 80.0 - 100.0 mmHg    HCO3, Arterial 31.6 (H) 22.0 - 26.0 mmol/L    Positive Base Excess, Art 6.1 (H) 0.0 - 2.0 mmol/L    O2 Sat, Arterial 91.7 (L) 95 - 98 %    Carboxyhemoglobin 1.2 0 - 5 %    Methemoglobin 1.3 0.0 - 1.9 %    Pt Temp 37     O2 Device/Flow/% VENTILATOR     Tyron Test PASS     Sample Site Right Radial Artery     Pt.  Position SEMI-FOWLERS     Mode PRVC     Text for Respiratory RESULTS GIVEN TO RN    CBC with Auto Differential   Result Value Ref Range    WBC 6.0 3.5 - 11.0 k/uL    RBC 5.56 4.5 - 5.9 m/uL    Hemoglobin 10.8 (L) 13.5 - 17.5 g/dL    Hematocrit 38.2 (L) 41 - 53 %    MCV 68.8 (L) 80 - 100 fL    MCH 19.5 (L) 26 - 34 pg    MCHC 28.3 (L) 31 - 37 g/dL    RDW 20.1 (H) 11.5 - 14.9 %    Platelets 75 (L) 479 - 450 k/uL    MPV 9.3 6.0 - 12.0 fL    Seg Neutrophils 82 (H) 36 - 66 %    Lymphocytes 7 (L) 24 - 44 %    Monocytes 9 (H) 1 - 7 % Eosinophils % 1 0 - 4 %    Basophils 1 0 - 2 %    Segs Absolute 4.90 1.3 - 9.1 k/uL    Absolute Lymph # 0.40 (L) 1.0 - 4.8 k/uL    Absolute Mono # 0.50 0.1 - 1.3 k/uL    Absolute Eos # 0.10 0.0 - 0.4 k/uL    Basophils Absolute 0.10 0.0 - 0.2 k/uL   Comprehensive Metabolic Panel w/ Reflex to MG   Result Value Ref Range    Glucose 85 70 - 99 mg/dL    BUN 16 8 - 23 mg/dL    Creatinine 0.64 (L) 0.70 - 1.20 mg/dL    Est, Glom Filt Rate >60 >60 mL/min/1.73m2    Calcium 7.4 (L) 8.6 - 10.4 mg/dL    Sodium 143 135 - 144 mmol/L    Potassium 3.3 (L) 3.7 - 5.3 mmol/L    Chloride 107 98 - 107 mmol/L    CO2 29 20 - 31 mmol/L    Anion Gap 7 (L) 9 - 17 mmol/L    Alkaline Phosphatase 49 40 - 129 U/L     (H) 5 - 41 U/L     (H) <40 U/L    Total Bilirubin 0.8 0.3 - 1.2 mg/dL    Total Protein 5.0 (L) 6.4 - 8.3 g/dL    Albumin 2.8 (L) 3.5 - 5.2 g/dL   Vancomycin Level, Random   Result Value Ref Range    Vancomycin Rm 16.1 ug/mL    Vancomycin Random Dose amount 1g     Vancomycin Random Date last dose 1/4/22     Vancomycin Random Time last dose 0232    Magnesium   Result Value Ref Range    Magnesium 2.0 1.6 - 2.6 mg/dL   Hepatitis C RNA, quantitative, PCR   Result Value Ref Range    Source . PLASMA     Hepatitis C RNA-PCR 17,400 IU/mL    Hepatitis C RNA Quant Log 4.24 Log IU/mL    HCV RNA PCR, Quant DETECTED (A) Not Detected   CBC with Auto Differential   Result Value Ref Range    WBC 5.3 3.5 - 11.0 k/uL    RBC 5.80 4.5 - 5.9 m/uL    Hemoglobin 11.4 (L) 13.5 - 17.5 g/dL    Hematocrit 40.0 (L) 41 - 53 %    MCV 68.9 (L) 80 - 100 fL    MCH 19.7 (L) 26 - 34 pg    MCHC 28.5 (L) 31 - 37 g/dL    RDW 20.5 (H) 11.5 - 14.9 %    Platelets 66 (L) 072 - 450 k/uL    MPV 9.1 6.0 - 12.0 fL    Seg Neutrophils 79 (H) 36 - 66 %    Lymphocytes 8 (L) 24 - 44 %    Monocytes 10 (H) 1 - 7 %    Eosinophils % 2 0 - 4 %    Basophils 1 0 - 2 %    Segs Absolute 4.19 1.3 - 9.1 k/uL    Absolute Lymph # 0.42 (L) 1.0 - 4.8 k/uL    Absolute Mono # 0.53 0.1 - 1.3 k/uL    Absolute Eos # 0.11 0.0 - 0.4 k/uL    Basophils Absolute 0.05 0.0 - 0.2 k/uL    Morphology ANISOCYTOSIS PRESENT     Morphology MICROCYTOSIS PRESENT     Morphology HYPOCHROMIA PRESENT     Morphology FEW ELLIPTOCYTES     Morphology FEW TARGET CELLS    Comprehensive Metabolic Panel w/ Reflex to MG   Result Value Ref Range    Glucose 84 70 - 99 mg/dL    BUN 11 8 - 23 mg/dL    Creatinine 0.58 (L) 0.70 - 1.20 mg/dL    Est, Glom Filt Rate >60 >60 mL/min/1.73m2    Calcium 7.9 (L) 8.6 - 10.4 mg/dL    Sodium 146 (H) 135 - 144 mmol/L    Potassium 3.4 (L) 3.7 - 5.3 mmol/L    Chloride 110 (H) 98 - 107 mmol/L    CO2 30 20 - 31 mmol/L    Anion Gap 6 (L) 9 - 17 mmol/L    Alkaline Phosphatase 53 40 - 129 U/L     (H) 5 - 41 U/L     (H) <40 U/L    Total Bilirubin 0.8 0.3 - 1.2 mg/dL    Total Protein 5.0 (L) 6.4 - 8.3 g/dL    Albumin 2.9 (L) 3.5 - 5.2 g/dL   Fibrinogen   Result Value Ref Range    Fibrinogen 98 (L) 210 - 530 mg/dL   Protime-INR   Result Value Ref Range    Protime 20.3 (H) 11.8 - 14.6 sec    INR 1.7    APTT   Result Value Ref Range    PTT 41.6 (H) 24.0 - 36.0 sec   BLOOD GAS, ARTERIAL   Result Value Ref Range    pH, Arterial 7.372 7.350 - 7.450    pCO2, Arterial 51.6 (HH) 35.0 - 45.0 mmHg    pO2, Arterial 62.9 (L) 80.0 - 100.0 mmHg    HCO3, Arterial 29.9 (H) 22.0 - 26.0 mmol/L    Positive Base Excess, Art 4.7 (H) 0.0 - 2.0 mmol/L    O2 Sat, Arterial 89.7 (L) 95 - 98 %    Carboxyhemoglobin 1.7 0 - 5 %    Methemoglobin 1.1 0.0 - 1.9 %    Pt Temp 37     O2 Device/Flow/% VENTILATOR     Respiratory Rate 22     Tyron Test PASS     Sample Site Right Radial Artery     Pt.  Position SEMI-FOWLERS     Mode PRVC     Set Rate 22     Total Rate 22          FIO2 40     Peep/Cpap 8     Text for Respiratory RESULTS GIVEN TO RN    Magnesium   Result Value Ref Range    Magnesium 2.0 1.6 - 2.6 mg/dL   CBC with Auto Differential   Result Value Ref Range    WBC 5.2 3.5 - 11.0 k/uL    RBC 5.33 4.5 - 5.9 m/uL Hemoglobin 10.5 (L) 13.5 - 17.5 g/dL    Hematocrit 38.4 (L) 41 - 53 %    MCV 72.0 (L) 80 - 100 fL    MCH 19.7 (L) 26 - 34 pg    MCHC 27.4 (L) 31 - 37 g/dL    RDW 20.3 (H) 11.5 - 14.9 %    Platelets 71 (L) 823 - 450 k/uL    MPV 9.3 6.0 - 12.0 fL    Seg Neutrophils 90 (H) 36 - 66 %    Lymphocytes 4 (L) 24 - 44 %    Monocytes 5 1 - 7 %    Eosinophils % 0 0 - 4 %    Basophils 0 0 - 2 %    Bands 1 0 - 10 %    Segs Absolute 4.68 1.3 - 9.1 k/uL    Absolute Lymph # 0.21 (L) 1.0 - 4.8 k/uL    Absolute Mono # 0.26 0.1 - 1.3 k/uL    Absolute Eos # 0.00 0.0 - 0.4 k/uL    Basophils Absolute 0.00 0.0 - 0.2 k/uL    Absolute Bands # 0.05 0.0 - 1.0 k/uL    Morphology ANISOCYTOSIS PRESENT     Morphology HYPOCHROMIA PRESENT     Morphology MICROCYTOSIS PRESENT     Morphology 1+ ELLIPTOCYTES     Morphology 1+ TEARDROPS    Comprehensive Metabolic Panel w/ Reflex to MG   Result Value Ref Range    Glucose 111 (H) 70 - 99 mg/dL    BUN 7 (L) 8 - 23 mg/dL    Creatinine 0.42 (L) 0.70 - 1.20 mg/dL    Est, Glom Filt Rate >60 >60 mL/min/1.73m2    Calcium 7.5 (L) 8.6 - 10.4 mg/dL    Sodium 144 135 - 144 mmol/L    Potassium 4.0 3.7 - 5.3 mmol/L    Chloride 111 (H) 98 - 107 mmol/L    CO2 29 20 - 31 mmol/L    Anion Gap 4 (L) 9 - 17 mmol/L    Alkaline Phosphatase 46 40 - 129 U/L     (H) 5 - 41 U/L    AST 74 (H) <40 U/L    Total Bilirubin 0.9 0.3 - 1.2 mg/dL    Total Protein 4.6 (L) 6.4 - 8.3 g/dL    Albumin 2.6 (L) 3.5 - 5.2 g/dL   Vancomycin Level, Random   Result Value Ref Range    Vancomycin Rm 20.3 ug/mL    Vancomycin Random Dose amount 1,250     Vancomycin Random Date last dose 018519     Vancomycin Random Time last dose 0588    Fibrinogen   Result Value Ref Range    Fibrinogen 109 (L) 210 - 530 mg/dL   Protime-INR   Result Value Ref Range    Protime 20.1 (H) 11.8 - 14.6 sec    INR 1.7    Comprehensive Metabolic Panel w/ Reflex to MG   Result Value Ref Range    Glucose 91 70 - 99 mg/dL    BUN 5 (L) 8 - 23 mg/dL    Creatinine <0.40 (L) 0.70 - 1.20 mg/dL    Est, Glom Filt Rate Can not be calculated >60 mL/min/1.73m2    Calcium 8.1 (L) 8.6 - 10.4 mg/dL    Sodium 147 (H) 135 - 144 mmol/L    Potassium 4.1 3.7 - 5.3 mmol/L    Chloride 108 (H) 98 - 107 mmol/L    CO2 26 20 - 31 mmol/L    Anion Gap 13 9 - 17 mmol/L    Alkaline Phosphatase 51 40 - 129 U/L     (H) 5 - 41 U/L    AST 63 (H) <40 U/L    Total Bilirubin 1.6 (H) 0.3 - 1.2 mg/dL    Total Protein 5.3 (L) 6.4 - 8.3 g/dL    Albumin 2.9 (L) 3.5 - 5.2 g/dL   SPECIMEN REJECTION   Result Value Ref Range    Specimen Source . BLOOD     Ordered Test CDP     Reason for Rejection Unable to perform testing: Specimen clotted. CBC with Auto Differential   Result Value Ref Range    WBC 7.1 3.5 - 11.0 k/uL    RBC 5.90 4.5 - 5.9 m/uL    Hemoglobin 11.7 (L) 13.5 - 17.5 g/dL    Hematocrit 41.4 41 - 53 %    MCV 70.2 (L) 80 - 100 fL    MCH 19.8 (L) 26 - 34 pg    MCHC 28.2 (L) 31 - 37 g/dL    RDW 20.4 (H) 11.5 - 14.9 %    Platelets 68 (L) 608 - 450 k/uL    MPV 9.1 6.0 - 12.0 fL    Seg Neutrophils 82 (H) 36 - 66 %    Lymphocytes 10 (L) 24 - 44 %    Monocytes 6 1 - 7 %    Eosinophils % 1 0 - 4 %    Basophils 1 0 - 2 %    Segs Absolute 5.82 1.3 - 9.1 k/uL    Absolute Lymph # 0.71 (L) 1.0 - 4.8 k/uL    Absolute Mono # 0.43 0.1 - 1.3 k/uL    Absolute Eos # 0.07 0.0 - 0.4 k/uL    Basophils Absolute 0.07 0.0 - 0.2 k/uL    Morphology ANISOCYTOSIS PRESENT     Morphology 1+ TARGET CELLS     Morphology 1+ TEARDROPS     Morphology 1+ ELLIPTOCYTES    Protime-INR   Result Value Ref Range    Protime 19.4 (H) 11.8 - 14.6 sec    INR 1.6    APTT   Result Value Ref Range    PTT 35.4 24.0 - 36.0 sec   HEPATITIS C RNA, QUANTITATIVE, PCR   Result Value Ref Range    Source . PLASMA     Hepatitis C RNA-PCR 5,680 IU/mL    Hepatitis C RNA Quant Log 3.75 Log IU/mL    HCV RNA PCR, Quant DETECTED (A) Not Detected   CBC with Auto Differential   Result Value Ref Range    WBC 5.9 3.5 - 11.0 k/uL    RBC 5.61 4.5 - 5.9 m/uL    Hemoglobin 11.4 (L) 13.5 - 17.5 g/dL    Hematocrit 39.3 (L) 41 - 53 %    MCV 70.0 (L) 80 - 100 fL    MCH 20.2 (L) 26 - 34 pg    MCHC 28.9 (L) 31 - 37 g/dL    RDW 20.2 (H) 11.5 - 14.9 %    Platelets 63 (L) 494 - 450 k/uL    MPV 8.8 6.0 - 12.0 fL    Seg Neutrophils 76 (H) 36 - 66 %    Lymphocytes 11 (L) 24 - 44 %    Monocytes 11 (H) 1 - 7 %    Eosinophils % 1 0 - 4 %    Basophils 1 0 - 2 %    Segs Absolute 4.48 1.3 - 9.1 k/uL    Absolute Lymph # 0.65 (L) 1.0 - 4.8 k/uL    Absolute Mono # 0.65 0.1 - 1.3 k/uL    Absolute Eos # 0.06 0.0 - 0.4 k/uL    Basophils Absolute 0.06 0.0 - 0.2 k/uL    Morphology ANISOCYTOSIS PRESENT     Morphology MICROCYTOSIS PRESENT     Morphology HYPOCHROMIA PRESENT     Morphology 1+ TARGET CELLS     Morphology 1+ ELLIPTOCYTES    Comprehensive Metabolic Panel w/ Reflex to MG   Result Value Ref Range    Glucose 118 (H) 70 - 99 mg/dL    BUN 4 (L) 8 - 23 mg/dL    Creatinine <0.40 (L) 0.70 - 1.20 mg/dL    Est, Glom Filt Rate Can not be calculated >60 mL/min/1.73m2    Calcium 8.1 (L) 8.6 - 10.4 mg/dL    Sodium 147 (H) 135 - 144 mmol/L    Potassium 3.3 (L) 3.7 - 5.3 mmol/L    Chloride 109 (H) 98 - 107 mmol/L    CO2 34 (H) 20 - 31 mmol/L    Anion Gap 4 (L) 9 - 17 mmol/L    Alkaline Phosphatase 45 40 - 129 U/L    ALT 99 (H) 5 - 41 U/L    AST 38 <40 U/L    Total Bilirubin 1.7 (H) 0.3 - 1.2 mg/dL    Total Protein 5.1 (L) 6.4 - 8.3 g/dL    Albumin 2.8 (L) 3.5 - 5.2 g/dL   Fibrinogen   Result Value Ref Range    Fibrinogen 151 (L) 210 - 530 mg/dL   APTT   Result Value Ref Range    PTT 34.4 24.0 - 36.0 sec   Protime-INR   Result Value Ref Range    Protime 19.1 (H) 11.8 - 14.6 sec    INR 1.6    Magnesium   Result Value Ref Range    Magnesium 1.8 1.6 - 2.6 mg/dL   CBC with Auto Differential   Result Value Ref Range    WBC 6.3 3.5 - 11.0 k/uL    RBC 5.76 4.5 - 5.9 m/uL    Hemoglobin 11.7 (L) 13.5 - 17.5 g/dL    Hematocrit 40.3 (L) 41 - 53 %    MCV 70.0 (L) 80 - 100 fL    MCH 20.3 (L) 26 - 34 pg    MCHC 29.0 (L) 31 - 37 g/dL    RDW 20. 5 (H) 11.5 - 14.9 %    Platelets 65 (L) 414 - 450 k/uL    MPV 8.6 6.0 - 12.0 fL    Seg Neutrophils 77 (H) 36 - 66 %    Lymphocytes 11 (L) 24 - 44 %    Monocytes 10 (H) 1 - 7 %    Eosinophils % 1 0 - 4 %    Basophils 1 0 - 2 %    Segs Absolute 4.86 1.3 - 9.1 k/uL    Absolute Lymph # 0.69 (L) 1.0 - 4.8 k/uL    Absolute Mono # 0.63 0.1 - 1.3 k/uL    Absolute Eos # 0.06 0.0 - 0.4 k/uL    Basophils Absolute 0.06 0.0 - 0.2 k/uL    Morphology HYPOCHROMIA PRESENT     Morphology MICROCYTOSIS PRESENT     Morphology ANISOCYTOSIS PRESENT     Morphology 1+ POLYCHROMASIA     Morphology 1+ TARGET CELLS     Morphology 2+ ECHINOCYTES    Comprehensive Metabolic Panel w/ Reflex to MG   Result Value Ref Range    Glucose 101 (H) 70 - 99 mg/dL    BUN 3 (L) 8 - 23 mg/dL    Creatinine <0.40 (L) 0.70 - 1.20 mg/dL    Est, Glom Filt Rate Can not be calculated >60 mL/min/1.73m2    Calcium 8.0 (L) 8.6 - 10.4 mg/dL    Sodium 142 135 - 144 mmol/L    Potassium 3.3 (L) 3.7 - 5.3 mmol/L    Chloride 104 98 - 107 mmol/L    CO2 35 (H) 20 - 31 mmol/L    Anion Gap 3 (L) 9 - 17 mmol/L    Alkaline Phosphatase 47 40 - 129 U/L    ALT 81 (H) 5 - 41 U/L    AST 32 <40 U/L    Total Bilirubin 1.8 (H) 0.3 - 1.2 mg/dL    Total Protein 5.3 (L) 6.4 - 8.3 g/dL    Albumin 2.8 (L) 3.5 - 5.2 g/dL   Brain Natriuretic Peptide   Result Value Ref Range    Pro-BNP 7,250 (H) <300 pg/mL   Magnesium   Result Value Ref Range    Magnesium 1.7 1.6 - 2.6 mg/dL   Comprehensive Metabolic Panel w/ Reflex to MG   Result Value Ref Range    Glucose 96 70 - 99 mg/dL    BUN 3 (L) 8 - 23 mg/dL    Creatinine <0.40 (L) 0.70 - 1.20 mg/dL    Est, Glom Filt Rate Can not be calculated >60 mL/min/1.73m2    Calcium 8.6 8.6 - 10.4 mg/dL    Sodium 140 135 - 144 mmol/L    Potassium 3.3 (L) 3.7 - 5.3 mmol/L    Chloride 97 (L) 98 - 107 mmol/L    CO2 33 (H) 20 - 31 mmol/L    Anion Gap 10 9 - 17 mmol/L    Alkaline Phosphatase 52 40 - 129 U/L    ALT 74 (H) 5 - 41 U/L    AST 41 (H) <40 U/L    Total Bilirubin 2.3 (H) 0.3 - 1.2 mg/dL    Total Protein 6.1 (L) 6.4 - 8.3 g/dL    Albumin 3.3 (L) 3.5 - 5.2 g/dL   CBC with Auto Differential   Result Value Ref Range    WBC 7.2 3.5 - 11.0 k/uL    RBC 6.52 (H) 4.5 - 5.9 m/uL    Hemoglobin 13.6 13.5 - 17.5 g/dL    Hematocrit 45.3 41 - 53 %    MCV 69.5 (L) 80 - 100 fL    MCH 20.9 (L) 26 - 34 pg    MCHC 30.0 (L) 31 - 37 g/dL    RDW 21.5 (H) 11.5 - 14.9 %    Platelets 67 (L) 889 - 450 k/uL    MPV 8.6 6.0 - 12.0 fL    Seg Neutrophils 77 (H) 36 - 66 %    Lymphocytes 10 (L) 24 - 44 %    Monocytes 10 (H) 1 - 7 %    Eosinophils % 1 0 - 4 %    Basophils 2 0 - 2 %    Segs Absolute 5.55 1.3 - 9.1 k/uL    Absolute Lymph # 0.72 (L) 1.0 - 4.8 k/uL    Absolute Mono # 0.72 0.1 - 1.3 k/uL    Absolute Eos # 0.07 0.0 - 0.4 k/uL    Basophils Absolute 0.14 0.0 - 0.2 k/uL    Morphology ANISOCYTOSIS PRESENT     Morphology HYPOCHROMIA PRESENT     Morphology MICROCYTOSIS PRESENT     Morphology GIANT PLATELETS    Magnesium   Result Value Ref Range    Magnesium 1.5 (L) 1.6 - 2.6 mg/dL   SPECIMEN REJECTION   Result Value Ref Range    Specimen Source . Random Urine     Ordered Test URIFX     Reason for Rejection       Unable to perform testing: Specimen quantity not sufficient.    LOR Screen with Reflex   Result Value Ref Range    LOR NEGATIVE NEGATIVE    PHAM Antibodies Screen 0.3 <0.7 U/mL    Anti ds DNA 4.2 <10.0 IU/mL   C-Reactive Protein   Result Value Ref Range    CRP 10.0 (H) 0.0 - 5.0 mg/L   Homocysteine   Result Value Ref Range    Homocysteine 8.2 <15.0 umol/L   Myeloperoxidase Ab   Result Value Ref Range    Myeloperoxidase Ab 0 0 - 19 AU/mL   Sedimentation Rate   Result Value Ref Range    Sed Rate 2 0 - 20 mm/Hr   Comprehensive Metabolic Panel w/ Reflex to MG   Result Value Ref Range    Glucose 90 70 - 99 mg/dL    BUN 4 (L) 8 - 23 mg/dL    Creatinine 0.42 (L) 0.70 - 1.20 mg/dL    Est, Glom Filt Rate >60 >60 mL/min/1.73m2    Calcium 8.1 (L) 8.6 - 10.4 mg/dL    Sodium 141 135 - 144 mmol/L Potassium 3.0 (L) 3.7 - 5.3 mmol/L    Chloride 98 98 - 107 mmol/L    CO2 37 (H) 20 - 31 mmol/L    Anion Gap 6 (L) 9 - 17 mmol/L    Alkaline Phosphatase 48 40 - 129 U/L    ALT 52 (H) 5 - 41 U/L    AST 30 <40 U/L    Total Bilirubin 1.7 (H) 0.3 - 1.2 mg/dL    Total Protein 5.4 (L) 6.4 - 8.3 g/dL    Albumin 2.7 (L) 3.5 - 5.2 g/dL   CBC with Auto Differential   Result Value Ref Range    WBC 6.5 3.5 - 11.0 k/uL    RBC 6.23 (H) 4.5 - 5.9 m/uL    Hemoglobin 12.9 (L) 13.5 - 17.5 g/dL    Hematocrit 43.6 41 - 53 %    MCV 70.0 (L) 80 - 100 fL    MCH 20.8 (L) 26 - 34 pg    MCHC 29.7 (L) 31 - 37 g/dL    RDW 21.4 (H) 11.5 - 14.9 %    Platelets 85 (L) 205 - 450 k/uL    MPV 8.9 6.0 - 12.0 fL    Seg Neutrophils 77 (H) 36 - 66 %    Lymphocytes 11 (L) 24 - 44 %    Monocytes 10 (H) 1 - 7 %    Eosinophils % 1 0 - 4 %    Basophils 1 0 - 2 %    Segs Absolute 4.99 1.3 - 9.1 k/uL    Absolute Lymph # 0.72 (L) 1.0 - 4.8 k/uL    Absolute Mono # 0.65 0.1 - 1.3 k/uL    Absolute Eos # 0.07 0.0 - 0.4 k/uL    Basophils Absolute 0.07 0.0 - 0.2 k/uL    Morphology ANISOCYTOSIS PRESENT     Morphology MICROCYTOSIS PRESENT     Morphology HYPOCHROMIA PRESENT     Morphology 1+ TARGET CELLS    Magnesium   Result Value Ref Range    Magnesium 1.8 1.6 - 2.6 mg/dL   Potassium   Result Value Ref Range    Potassium 3.3 (L) 3.7 - 5.3 mmol/L   Comprehensive Metabolic Panel w/ Reflex to MG   Result Value Ref Range    Glucose 121 (H) 70 - 99 mg/dL    BUN 6 (L) 8 - 23 mg/dL    Creatinine 0.60 (L) 0.70 - 1.20 mg/dL    Est, Glom Filt Rate >60 >60 mL/min/1.73m2    Calcium 8.1 (L) 8.6 - 10.4 mg/dL    Sodium 140 135 - 144 mmol/L    Potassium 3.9 3.7 - 5.3 mmol/L    Chloride 100 98 - 107 mmol/L    CO2 37 (H) 20 - 31 mmol/L    Anion Gap 3 (L) 9 - 17 mmol/L    Alkaline Phosphatase 46 40 - 129 U/L    ALT 39 5 - 41 U/L    AST 25 <40 U/L    Total Bilirubin 1.6 (H) 0.3 - 1.2 mg/dL    Total Protein 5.2 (L) 6.4 - 8.3 g/dL    Albumin 2.7 (L) 3.5 - 5.2 g/dL   CBC with Auto Differential   Result Value Ref Range    WBC 6.4 3.5 - 11.0 k/uL    RBC 5.77 4.5 - 5.9 m/uL    Hemoglobin 12.2 (L) 13.5 - 17.5 g/dL    Hematocrit 40.8 (L) 41 - 53 %    MCV 70.7 (L) 80 - 100 fL    MCH 21.1 (L) 26 - 34 pg    MCHC 29.8 (L) 31 - 37 g/dL    RDW 21.3 (H) 11.5 - 14.9 %    Platelets 88 (L) 252 - 450 k/uL    MPV 8.6 6.0 - 12.0 fL    Seg Neutrophils 79 (H) 36 - 66 %    Lymphocytes 7 (L) 24 - 44 %    Monocytes 11 (H) 1 - 7 %    Eosinophils % 1 0 - 4 %    Basophils 2 0 - 2 %    Segs Absolute 5.06 1.3 - 9.1 k/uL    Absolute Lymph # 0.45 (L) 1.0 - 4.8 k/uL    Absolute Mono # 0.70 0.1 - 1.3 k/uL    Absolute Eos # 0.06 0.0 - 0.4 k/uL    Basophils Absolute 0.13 0.0 - 0.2 k/uL    Morphology ANISOCYTOSIS PRESENT     Morphology HYPOCHROMIA PRESENT     Morphology MICROCYTOSIS PRESENT    Vancomycin Level, Random   Result Value Ref Range    Vancomycin Rm 22.9 ug/mL    Vancomycin Random Dose amount 1250 MG     Vancomycin Random Date last dose [id number]     Vancomycin Random Time last dose 1646    Potassium   Result Value Ref Range    Potassium 4.1 3.7 - 5.3 mmol/L   Phosphorus   Result Value Ref Range    Phosphorus 2.5 2.5 - 4.5 mg/dL   Comprehensive Metabolic Panel w/ Reflex to MG   Result Value Ref Range    Glucose 112 (H) 70 - 99 mg/dL    BUN 7 (L) 8 - 23 mg/dL    Creatinine 0.74 0.70 - 1.20 mg/dL    Est, Glom Filt Rate >60 >60 mL/min/1.73m2    Calcium 8.4 (L) 8.6 - 10.4 mg/dL    Sodium 141 135 - 144 mmol/L    Potassium 3.5 (L) 3.7 - 5.3 mmol/L    Chloride 100 98 - 107 mmol/L    CO2 34 (H) 20 - 31 mmol/L    Anion Gap 7 (L) 9 - 17 mmol/L    Alkaline Phosphatase 49 40 - 129 U/L    ALT 35 5 - 41 U/L    AST 27 <40 U/L    Total Bilirubin 1.6 (H) 0.3 - 1.2 mg/dL    Total Protein 5.7 (L) 6.4 - 8.3 g/dL    Albumin 2.7 (L) 3.5 - 5.2 g/dL   CBC with Auto Differential   Result Value Ref Range    WBC 7.3 3.5 - 11.0 k/uL    RBC 6.14 (H) 4.5 - 5.9 m/uL    Hemoglobin 13.0 (L) 13.5 - 17.5 g/dL    Hematocrit 43.9 41 - 53 %    MCV 71.6 (L) 80 - 100 fL    MCH 21.2 (L) 26 - 34 pg    MCHC 29.6 (L) 31 - 37 g/dL    RDW 25.5 (H) 11.5 - 14.9 %    Platelets 81 (L) 135 - 450 k/uL    MPV 8.6 6.0 - 12.0 fL    Seg Neutrophils 79 (H) 36 - 66 %    Lymphocytes 7 (L) 24 - 44 %    Monocytes 12 (H) 1 - 7 %    Eosinophils % 1 0 - 4 %    Basophils 1 0 - 2 %    Segs Absolute 5.77 1.3 - 9.1 k/uL    Absolute Lymph # 0.51 (L) 1.0 - 4.8 k/uL    Absolute Mono # 0.88 0.1 - 1.3 k/uL    Absolute Eos # 0.07 0.0 - 0.4 k/uL    Basophils Absolute 0.07 0.0 - 0.2 k/uL    Morphology ANISOCYTOSIS PRESENT     Morphology MICROCYTOSIS PRESENT     Morphology HYPOCHROMIA PRESENT     Morphology FEW ELLIPTOCYTES    Magnesium   Result Value Ref Range    Magnesium 1.7 1.6 - 2.6 mg/dL   Potassium   Result Value Ref Range    Potassium 4.0 3.7 - 5.3 mmol/L   Comprehensive Metabolic Panel w/ Reflex to MG   Result Value Ref Range    Glucose 121 (H) 70 - 99 mg/dL    BUN 9 8 - 23 mg/dL    Creatinine 0.73 0.70 - 1.20 mg/dL    Est, Glom Filt Rate >60 >60 mL/min/1.73m2    Calcium 8.4 (L) 8.6 - 10.4 mg/dL    Sodium 139 135 - 144 mmol/L    Potassium 3.3 (L) 3.7 - 5.3 mmol/L    Chloride 97 (L) 98 - 107 mmol/L    CO2 37 (H) 20 - 31 mmol/L    Anion Gap 5 (L) 9 - 17 mmol/L    Alkaline Phosphatase 46 40 - 129 U/L    ALT 30 5 - 41 U/L    AST 23 <40 U/L    Total Bilirubin 1.3 (H) 0.3 - 1.2 mg/dL    Total Protein 5.5 (L) 6.4 - 8.3 g/dL    Albumin 2.8 (L) 3.5 - 5.2 g/dL   CBC with Auto Differential   Result Value Ref Range    WBC 6.6 3.5 - 11.0 k/uL    RBC 5.72 4.5 - 5.9 m/uL    Hemoglobin 12.2 (L) 13.5 - 17.5 g/dL    Hematocrit 41.3 41 - 53 %    MCV 72.1 (L) 80 - 100 fL    MCH 21.3 (L) 26 - 34 pg    MCHC 29.5 (L) 31 - 37 g/dL    RDW 29.8 (H) 11.5 - 14.9 %    Platelets 537 (L) 393 - 450 k/uL    MPV 9.2 6.0 - 12.0 fL    Seg Neutrophils 77 (H) 36 - 66 %    Lymphocytes 9 (L) 24 - 44 %    Monocytes 13 (H) 1 - 7 %    Eosinophils % 0 0 - 4 %    Basophils 1 0 - 2 %    Segs Absolute 5.08 1.3 - 9.1 k/uL    Absolute Lymph # 0.59 (L) 1.0 - 4.8 k/uL    Absolute Mono # 0.86 0.1 - 1.3 k/uL    Absolute Eos # 0.00 0.0 - 0.4 k/uL    Basophils Absolute 0.07 0.0 - 0.2 k/uL    Morphology ANISOCYTOSIS PRESENT     Morphology HYPOCHROMIA PRESENT     Morphology MICROCYTOSIS PRESENT     Morphology FEW ELLIPTOCYTES     Morphology FEW TEARDROPS    Magnesium   Result Value Ref Range    Magnesium 1.7 1.6 - 2.6 mg/dL   POC Glucose Fingerstick   Result Value Ref Range    POC Glucose 104 75 - 110 mg/dL   EKG 12 Lead   Result Value Ref Range    Ventricular Rate 75 BPM    Atrial Rate 75 BPM    P-R Interval 142 ms    QRS Duration 66 ms    Q-T Interval 350 ms    QTc Calculation (Bazett) 390 ms    P Axis 18 degrees    R Axis -165 degrees    T Axis 41 degrees   EKG 12 Lead   Result Value Ref Range    Ventricular Rate 105 BPM    Atrial Rate 105 BPM    P-R Interval 144 ms    QRS Duration 78 ms    Q-T Interval 302 ms    QTc Calculation (Bazett) 399 ms    P Axis 13 degrees    R Axis -30 degrees    T Axis -15 degrees   ECHO Complete 2D W Doppler W Color   Result Value Ref Range    Left Ventricular Ejection Fraction 55     LVEF MODALITY ECHO    PREPARE CRYOPRECIPITATE, 1 Product   Result Value Ref Range    Unit Number [id number]     Product Code Thawed Pooled Cryoprecipitate     Unit Divison 00     Dispense Status TRANSFUSED     Unit Issue Date/Time [id number]     Product Code Blood Bank [id number]     Blood Bank Unit Type and Rh O POS     Blood Bank ISBT Product Blood Type 5100     Blood Bank Blood Product Expiration Date [id number]     Transfusion Status OK TO TRANSFUSE          IMAGING DATA:    CT HEAD WO CONTRAST    Result Date: 1/1/2023  EXAMINATION: CT OF THE HEAD WITHOUT CONTRAST  1/1/2023 10:48 pm TECHNIQUE: CT of the head was performed without the administration of intravenous contrast. Automated exposure control, iterative reconstruction, and/or weight based adjustment of the mA/kV was utilized to reduce the radiation dose to as low as reasonably achievable. COMPARISON: None. HISTORY: Reason for Exam: AMS Additional signs and symptoms: the patient has been getting more and more confused since 2 weeks ago. It has progressively been getting worse and today FINDINGS: BRAIN/VENTRICLES: There is no acute intracranial hemorrhage, mass effect or midline shift. No abnormal extra-axial fluid collection. The gray-white differentiation is maintained without evidence of an acute infarct. There is no evidence of hydrocephalus. Changes of mild chronic small vessel ischemic disease. ORBITS: The visualized portion of the orbits demonstrate no acute abnormality. SINUSES: The visualized paranasal sinuses and mastoid air cells demonstrate no acute abnormality. SOFT TISSUES/SKULL:  No acute abnormality of the visualized skull or soft tissues. No acute intracranial abnormality. Mild chronic small vessel ischemic disease. XR CHEST PORTABLE    Result Date: 1/1/2023  EXAMINATION: ONE XRAY VIEW OF THE CHEST 1/1/2023 7:17 pm COMPARISON: None. HISTORY: ORDERING SYSTEM PROVIDED HISTORY: ams TECHNOLOGIST PROVIDED HISTORY: ams Reason for Exam: Altered mental status, difficulty breathing Additional signs and symptoms: Altered mental status, difficulty breathing Relevant Medical/Surgical History: Altered mental status, difficulty breathing FINDINGS: Large lucent area in the left apex suggesting a large bulla however superimposed pneumothorax cannot be excluded. The cardiac size is normal. Right lower lobe airspace infiltrate and mild right pleural effusion. . Pulmonary vascularity appears normal. There is mild ectasia of the thoracic aorta. Calcific tendinitis of the right shoulder. No acute bony abnormalities. The hilar structures are normal.     Right lower lobe pneumonia and small right pleural effusion Large lucent area in the left apex suggesting a large bulla however superimposed pneumothorax cannot be excluded. Follow-up CT would be useful for further evaluation.  The findings were sent to the Radiology Results [address] at 10:31 pm on 1/1/2023 to be communicated to a licensed caregiver. IMPRESSION:   Primary Problem  Acute respiratory failure with hypoxia and hypercapnia [facility])    Active Hospital Problems    Diagnosis Date Noted    Mild malnutrition ([address] [E44.1] 01/13/2023     Priority: Medium    Delirium due to another medical condition [F05] 01/10/2023     Priority: Medium    ACP (advance care planning) [Z71.89] 01/09/2023     Priority: Medium    Goals of care, counseling/discussion [Z71.89] 01/09/2023     Priority: Medium    Encounter for palliative care [Z51.5] 01/09/2023     Priority: Medium    Prolonged pt (prothrombin time) [R79.1] 01/03/2023     Priority: Medium    Emphysema lung ([address] [J43.9] 01/03/2023     Priority: Medium    Cerebral infarction ([address] [I63.9] 01/03/2023     Priority: Medium    Transaminitis [R74.01] 01/02/2023     Priority: Medium    Microcytic anemia [D64.9] 01/02/2023     Priority: Medium    Thrombocytopenia ([address] [D69.6] 01/02/2023     Priority: Medium    Agitation [R45.1] 01/02/2023     Priority: Medium    Acute respiratory failure with hypoxia and hypercapnia (HCC) RLL pneumonia [J96.01, J96.02] 01/02/2023     Priority: Medium    Altered mental status [R41.82] 01/02/2023     Priority: Medium    Community acquired pneumonia of right lower lobe of lung [J18.9] 01/02/2023     Priority: Medium       Medical apathy  Respiratory failure secondary pneumonia  Abnormal LFT  Microcytic anemia  Thrombocytopenia  History of alcohol dependence  IgG lambda paraproteinemia  Positive HCV screen  Iron deficiency  Coagulopathy    RECOMMENDATIONS:  I reviewed the labs/imaging available to me,outside records and discussed with the patient. I explained to the patient the nature of this problem.  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 Recovering well. Hemoglobin 12.2. Platelets 320. Patient has coagulopathy secondary to liver disease as mentioned  Cardioembolic strokes with a component of subarachnoid bleeding, not a candidate for anticoagulation  Overall all condition seems to be improving. We talked him about abstaining from alcohol and acetaminophen. He is taking 2 full dose aspirin every day and I asked him to avoid aspirin if possible. We also discovered that he is taking very high dose of supplements almost to a toxic level. We asked him to refrain from doing that. We will follow with you                            [address] [facility]                            This note is created with the assistance of a speech recognition program.  While intending to generate a document that actually reflects the content of the visit, the document can still have some errors including those of syntax and sound a like substitutions which may escape proof reading. It such instances, actual meaning can be extrapolated by contextual diversion.

## 2023-01-14 NOTE — PROGRESS NOTES
Patient found with cannula out and in hand, spo2 82%, patient given aerosol treatment and placed back on 3L nasal cannula.

## 2023-01-14 NOTE — PROGRESS NOTES
Department of Internal Medicine  Nephrology Jostin Harvey MD  Progress Note    Reason for consultation: Management of acute kidney injury. Consulting physician: Jaswant Zavala MD.    History: Patient was seen and examined today and he does not have any new complaints. He does not have shortness of breath but is unable to bring up his phlegm and remains on oxygen 3 L/min via nasal cannula. He recently failed swallow study. He is hemodynamically stable and nonoliguric. History of present illness: This is a 60 y.o. male with no significant past medical history [no regular physician follow-up], who was brought to the emergency department via EMS for further evaluation of progressively worsening confusion and lethargy of 2 weeks duration. Patient's spouse said that he became progressively confused prompting her to call EMS on day of presentation 1/1/2023. When EMS arrived, patient was found to be obtunded with oxygen saturation 75% on room air and he was placed on a nonrebreather mask and given a dose of IV Lasix as he also had severe bilateral leg swelling. Patient was placed on BiPAP on arrival to the emergency department and was admitted to the intensive care unit. Initial systolic blood pressure was 106 mmHg and serum creatinine 1.77 mg/dL associated with elevated AST and ALT. On admission to the intensive care unit patient required endotracheal intubation for airway protection and treatment of acute respiratory failure. Nephrology consultation has been placed for management of acute kidney injury. He is currently on Levophed drip at 3 mcg/min. CT scan of the brain performed at presentation showed no acute intracranial abnormality but with mild chronic small vessel ischemic disease.   Repeat CT scan performed 24 hours later [1/2/2023] showed focal area of decreased attenuation with loss of gray/white matter differentiation involving posterior left lobe with somewhat increased conspicuity as compared to prior exam likely reflecting an area of acute to subacute stroke. Scheduled Meds:   potassium bicarb-citric acid  40 mEq Oral Daily    potassium chloride  20 mEq Oral Once    furosemide  40 mg Oral Daily    spironolactone  25 mg Oral Daily    ziprasidone (GEODON) in sterile water injection  10 mg IntraMUSCular Nightly    carvedilol  6.25 mg Oral BID WC    [Held by provider] ferrous sulfate  325 mg Oral Daily with breakfast    pantoprazole (PROTONIX) 40 mg injection  40 mg IntraVENous Daily    ipratropium-albuterol  1 ampule Inhalation Q4H WA    [Held by provider] heparin (porcine)  5,000 Units SubCUTAneous 3 times per day    nystatin   Topical BID    [Held by provider] miconazole   Topical BID    [Held by provider] aspirin  81 mg Oral Daily    sodium chloride flush  5-40 mL IntraVENous 2 times per day    nicotine  1 patch TransDERmal Daily     Continuous Infusions:   sodium chloride      dexmedetomidine      sodium chloride       PRN Meds:.hydrALAZINE, potassium chloride **OR** potassium alternative oral replacement **OR** potassium chloride, magnesium sulfate, ziprasidone (GEODON) in sterile water injection, labetalol, potassium chloride, sodium chloride, dexmedetomidine, perflutren lipid microspheres, sodium chloride flush, sodium chloride flush, sodium chloride, ondansetron **OR** ondansetron, polyethylene glycol, acetaminophen **OR** acetaminophen, albuterol, guaiFENesin    Physical Exam:    VITALS:  /80   Pulse 77   Temp 98.6 °F (37 °C)   Resp 20   Ht 5' 7\" (1.702 m)   Wt 164 lb 3.9 oz (74.5 kg)   SpO2 95%   BMI 25.72 kg/m²   TEMPERATURE:  Current - Temp: 98.6 °F (37 °C);  Max - Temp  Av.4 °F (36.9 °C)  Min: 98.2 °F (36.8 °C)  Max: 98.6 °F (37 °C)  RESPIRATIONS RANGE: Resp  Av.1  Min: 16  Max: 26  PULSE RANGE: Pulse  Av.6  Min: 72  Max: 91  BLOOD PRESSURE RANGE:  Systolic (67CLB), PYO:679 , Min:103 , LSD:100 ; Diastolic (55YXV), KKE:14, Min:72, Max:91  PULSE OXIMETRY RANGE: SpO2  Av.4 %  Min: 91 %  Max: 96 %  24HR INTAKE/OUTPUT:    Intake/Output Summary (Last 24 hours) at 2023 1617  Last data filed at 2023 1531  Gross per 24 hour   Intake 60 ml   Output 500 ml   Net -440 ml       Constitutional: Awake alert, responsive not in acute distress    Skin: Skin color, texture, turgor normal. No rashes or lesions    Head: Normocephalic, without obvious abnormality, atraumatic     Cardiovascular/Edema: regular rate and rhythm, S1, S2 normal, no murmur, click, rub or gallop    Respiratory: Lungs:  Coarse breath sounds bilaterally    Abdomen: soft, non-tender; bowel sounds normal; no masses,  no organomegaly    Back: symmetric, no curvature. ROM normal. No CVA tenderness. Extremities: edema +    Neuro: Nonfocal    CBC:   Recent Labs     23  0404 23  0451 23  0508   WBC 6.4 7.3 6.6   HGB 12.2* 13.0* 12.2*   PLT 88* 81* 101*       BMP:    Recent Labs     23  0404 23  0451 23  1413 23  0508    141  --  139   K 3.9 3.5* 4.0 3.3*    100  --  97*   CO2 37* 34*  --  37*   BUN 6* 7*  --  9   CREATININE 0.60* 0.74  --  0.73   GLUCOSE 121* 112*  --  121*       Lab Results   Component Value Date/Time    NITRU NEGATIVE 2023 03:34 PM    COLORU Dark Yellow 2023 03:34 PM    PHUR 5.0 2023 03:34 PM    WBCUA 10 TO 20 2023 03:34 PM    RBCUA TOO NUMEROUS TO COUNT 2023 03:34 PM    BACTERIA FEW 2023 10:25 PM    SPECGRAV 1.015 2023 03:34 PM    LEUKOCYTESUR SMALL 2023 03:34 PM    UROBILINOGEN Normal 2023 03:34 PM    BILIRUBINUR NEGATIVE 2023 03:34 PM    GLUCOSEU NEGATIVE 2023 03:34 PM    KETUA TRACE 2023 03:34 PM     MRI of the brain performed without contrast on 1/3/2023 showed:  Small acute infarcts involving the left cerebellar hemisphere and left   parietal lobe. Small subacute infarct within the left cerebellar hemisphere.        Moderate bilateral subarachnoid hemorrhage within both cerebral hemispheres   which is of uncertain etiology. The hemorrhage is more apparent on MRI than   on previous head CT. Old right caudate nucleus lacune. Small bilateral mastoid effusions and moderate left paranasal sinus disease. IMPRESSION/RECOMMENDATIONS:      1. Acute kidney injury - most consistent with prerenal azotemia and/or ischemic acute tubular necrosis from hypotension. Resolved. 2.  Hypokalemia - Continue Aldactone 25 mg p.o. daily and supplement with oral potassium chloride. 3.  Edema - continue furosemide 40 mg p.o. daily. Prognosis is guarded.     Richard Burgos MD   Attending Nephrologist  1/14/2023 4:17 PM

## 2023-01-14 NOTE — PROGRESS NOTES
Patient received from ICU Vitals taken. Assessment completed. No distress noted. See doc flowsheet and transfer navigator for details. POC and education reviewed with patient. Call light within reach, and pt educated on its use. Bed in lowest position, and locked. Side rails up x 2. Denied further questions or needs at this time. Will continue to monitor.

## 2023-01-14 NOTE — PROGRESS NOTES
Physical Therapy  Facility/Department: [facility] PROGRESSIVE CARE  Daily Treatment Note  NAME: April Napoles  : 1960  MRN: [id number]    Date of Service: 2023    Discharge Recommendations:  Therapy recommended at discharge     Patient Diagnosis(es): The primary encounter diagnosis was Acute respiratory failure with hypoxia and hypercapnia ([facility] [address] Diagnoses of Community acquired pneumonia of right lower lobe of lung and Altered mental status, unspecified altered mental status type were also pertinent to this visit. Assessment   Activity Tolerance: Patient tolerated treatment well     Plan    Physcial Therapy Plan  General Plan: 6-7 times per week  Specific Instructions for Next Treatment: Continue to progress with functional mobility training  Current Treatment Recommendations: Strengthening;Balance training;Functional mobility training;Neuromuscular re-education;Cognitive reorientation; Safety education & training;Patient/Caregiver education & training;Equipment evaluation, education, & procurement; Therapeutic activities     Restrictions  Fall Risk, General Precautions, Contact Precautions, Isolation, Bed Alarm  Required Braces or Orthoses?: No  Implants present? :  (Pt denies)  Position Activity Restriction  Other position/activity restrictions: Severe dysphagia- NPO. Unable to tolerate any PO safely. NG tube placed,  on high flow nasal cannula     Subjective   Pt stated that overall he was feeing well. No abnormal symptoms reported. Pain: pt reports 8/10 pain in R wrist from IV line  Orientation  Overall Orientation Status: Impaired  Orientation Level: Disoriented to place; Disoriented to time;Oriented to person     Objective  Bed Mobility Training  Bed Mobility Training: Yes  Overall Level of Assistance: Moderate assistance  Interventions: Verbal cues;Manual cues;Demonstration  Rolling: Moderate assistance  Supine to Sit: Moderate assistance  Sit to Supine:  Moderate assistance  Balance  Sitting: Without support  Sitting - Dynamic: Unsupported  Standing: With support  Transfer Training  Transfer Training: Yes  Overall Level of Assistance: Minimum assistance  Interventions: Safety awareness training;Manual cues; Verbal cues  Sit to Stand: Minimum assistance  Stand to Sit: Minimum assistance  Gait  Overall Level of Assistance: Minimum assistance  Interventions: Manual cues; Verbal cues  Speed/Radha: Slow  Distance (ft): 4 Feet  Assistive Device: Walker, rolling    PT Exercises  Static Standing Balance Exercises: 3 repetitions for 30 seconds each. - posterior lean noted/moderate assistance with RW required. .     Safety Devices  Type of Devices: All fall risk precautions in place;Call light within reach; Chair alarm in place;Gait belt;Patient at risk for falls;Nurse notified; Left in bed  Restraints  Restraints Initially in Place: No     Goals  Short Term Goals  Time Frame for Short Term Goals: 10 visits  Short Term Goal 1: min assist supine<-> sit x1 assist  Short Term Goal 2: sit EOB with min x1 up to 15 min for therex/ADL  Short Term Goal 3: 3+/5 LE strength to prepare for standing and transfers  Short Term Goal 4: standing for functional ADLs with Cyril of 2 for 3-5min  Short Term Goal 5: roll L/R with SBA  Patient Goals   Patient Goals : unable to state    Education  Patient Education  Education Given To: Patient  Education Provided: Transfer Training  Education Method: Demonstration;Verbal  Barriers to Learning: Cognition  Education Outcome: Continued education needed    AM-PAC Mobility Inpatient   How much difficulty turning over in bed?: A Little  How much difficulty sitting down on / standing up from a chair with arms?: A Little  How much difficulty moving from lying on back to sitting on side of bed?: A Lot  How much help from another person moving to and from a bed to a chair?: Total  How much help from another person needed to walk in hospital room?: Total  How much help from another person for climbing 3-5 steps with a railing?: Total  AM-PAC Inpatient Mobility Raw Score : 11  AM-PAC Inpatient T-Scale Score : 33.86  Mobility Inpatient CMS 0-100% Score: 72.57  Mobility Inpatient CMS G-Code Modifier : CL     Therapy Time   Individual Concurrent Group Co-treatment   Time In 0850         Time Out 0913         Minutes 23         Timed Code Treatment Minutes: [address], PT DPT

## 2023-01-14 NOTE — PLAN OF CARE
Problem: Discharge Planning  Goal: Discharge to home or other facility with appropriate resources  Outcome: Progressing  Flowsheets (Taken 1/14/2023 0806)  Discharge to home or other facility with appropriate resources: Identify barriers to discharge with patient and caregiver     Problem: Safety - Adult  Goal: Free from fall injury  Outcome: Progressing  Flowsheets (Taken 1/14/2023 1643)  Free From Fall Injury: Instruct family/caregiver on patient safety     Problem: ABCDS Injury Assessment  Goal: Absence of physical injury  Outcome: Progressing  Flowsheets (Taken 1/14/2023 1643)  Absence of Physical Injury: Implement safety measures based on patient assessment     Problem: Skin/Tissue Integrity  Goal: Absence of new skin breakdown  Description: 1. Monitor for areas of redness and/or skin breakdown  2. Assess vascular access sites hourly  3. Every 4-6 hours minimum:  Change oxygen saturation probe site  4. Every 4-6 hours:  If on nasal continuous positive airway pressure, respiratory therapy assess nares and determine need for appliance change or resting period.   Outcome: Progressing     Problem: Pain  Goal: Verbalizes/displays adequate comfort level or baseline comfort level  Outcome: Progressing     Problem: Nutrition Deficit:  Goal: Optimize nutritional status  Outcome: Progressing

## 2023-01-14 NOTE — PROGRESS NOTES
Byron Mckenzie MD/Luis Felipe Brewster MD/ Ashley Bravo MD/Ryan Leighton Holler MD Durell Severin APRN AGAKAILEYP-BC, NP-C      Desiree CATALAN NP-C     Zaina CATALAN NP-C                                           Pulmonary Progress Note    Patient - Aleksandr Her   Age - 60 y.o.   - 1960  MRN - [id number]  Acct # - [de-identified]  Date of Admission - 2023  9:55 PM    Consulting Service/Physician:       Primary Care Physician: No primary care provider on file. SUBJECTIVE:     Chief Complaint:   Chief Complaint   Patient presents with    Altered Mental Status     Subjective:    He states breathing wise he is doing okay, he wishes that he could bring up some phlegm, he does have some wheezing. He continues on 3 L of oxygen. He is aware that he needs a feeding tube, but he tells me he wants to go home to take care of a few things first and come back on Monday to have the procedure. He is still fairly weak, some confusion still noted.     Failed swallow study today he had deep penetration aspiration with residual lying atop vocal cords, very weak congested cough, recommended n.p.o. diet with NG or PEG only    VITALS  /80   Pulse 83   Temp 98.6 °F (37 °C)   Resp 20   Ht 5' 7\" (1.702 m)   Wt 164 lb 3.9 oz (74.5 kg)   SpO2 96%   BMI 25.72 kg/m²   Wt Readings from Last 3 Encounters:   23 164 lb 3.9 oz (74.5 kg)   23 188 lb (85.3 kg)     I/O (24 Hours)    Intake/Output Summary (Last 24 hours) at 2023 1600  Last data filed at 2023 0806  Gross per 24 hour   Intake 60 ml   Output 500 ml   Net -440 ml     Ventilator:   Settings  FiO2 : 60 %  Insp Time (sec): 0.9 sec  Insp Rise Time (%): 0.15 %  Flow Sensitivity: 5 L/min  Exam:   Physical Exam   Constitutional: Awake, alert, oriented for the most part, some confusion to some details, not in any distress  HENT: Unremarkable, nasal cannula and nasogastric tube in place  Head: Normocephalic and atraumatic. Eyes: EOM are normal. Pupils are equal, round, and reactive to light. Neck: Neck supple. Cardiovascular:  Regular rate and rhythm. Normal heart tones. No JVD. Pulmonary/Chest: Effort normal and breath sounds few faint scattered wheezing noted, no rhonchi, respirations easy nonlabored at rest on 3 L nasal cannula  Abdominal: Soft. Bowel sounds are present, nasogastric tube in place  Musculoskeletal: Normal range of motion. Generalized weakness noted  Neurological:patient is alert and oriented to person, place, some confusion to details  Skin: Skin is warm and dry.    Extremities: No edema   Infusions:      sodium chloride      dexmedetomidine      sodium chloride       Meds:     Current Facility-Administered Medications:     potassium bicarb-citric acid (EFFER-K) effervescent tablet 40 mEq, 40 mEq, Oral, Daily, Haja Bradshaw MD, 40 mEq at 01/14/23 0800    potassium chloride (KLOR-CON M) extended release tablet 20 mEq, 20 mEq, Oral, Once, Haja Bradshaw MD    furosemide (LASIX) tablet 40 mg, 40 mg, Oral, Daily, Jg Watkins MD, 40 mg at 01/14/23 0800    hydrALAZINE (APRESOLINE) injection 20 mg, 20 mg, IntraVENous, Q6H PRN, Easton Vu MD, 20 mg at 01/11/23 1224    potassium chloride (KLOR-CON M) extended release tablet 40 mEq, 40 mEq, Oral, PRN **OR** potassium bicarb-citric acid (EFFER-K) effervescent tablet 40 mEq, 40 mEq, Oral, PRN **OR** potassium chloride 10 mEq/100 mL IVPB (Peripheral Line), 10 mEq, IntraVENous, PRN, Easton Vu MD, Last Rate: 100 mL/hr at 01/13/23 0928, 10 mEq at 01/13/23 1280    spironolactone (ALDACTONE) tablet 25 mg, 25 mg, Oral, Daily, Haja Bradshaw MD, 25 mg at 01/14/23 0800    magnesium sulfate 2000 mg in water 50 mL IVPB, 2,000 mg, IntraVENous, PRN, Jg Watkins MD    ziprasidone (GEODON) 10 mg in sterile water 0.5 mL injection, 10 mg, IntraMUSCular, Nightly, Greta Reddish, APRN - CNP    carvedilol (COREG) tablet 6.25 mg, 6.25 mg, Oral, BID WC, Argyle Meckel, MD, 6.25 mg at 01/14/23 0800    [Held by provider] ferrous sulfate (IRON 325) tablet 325 mg, 325 mg, Oral, Daily with breakfast, Argyle Meckel, MD    pantoprazole (PROTONIX) 40 mg in sodium chloride (PF) 0.9 % 10 mL injection, 40 mg, IntraVENous, Daily, Argyle Meckel, MD, 40 mg at 01/14/23 0801    ziprasidone (GEODON) 20 mg in sterile water 1 mL injection, 20 mg, IntraMUSCular, Q12H PRN, ALAYNA Zazueta - CNP    labetalol (NORMODYNE;TRANDATE) injection 5 mg, 5 mg, IntraVENous, Q6H PRN, See Jose MD, 5 mg at 01/10/23 1606    potassium chloride 10 mEq/100 mL IVPB (Peripheral Line), 10 mEq, IntraVENous, PRN, Keon Tong MD, Last Rate: 100 mL/hr at 01/13/23 1233, 10 mEq at 01/13/23 1233    ipratropium-albuterol (DUONEB) nebulizer solution 1 ampule, 1 ampule, Inhalation, Q4H WA, Brooks Puentes MD, 1 ampule at 01/14/23 0752    [Held by provider] heparin (porcine) injection 5,000 Units, 5,000 Units, SubCUTAneous, 3 times per day, Armaan Vinson MD, 5,000 Units at 01/08/23 0549    0.9 % sodium chloride infusion, , IntraVENous, PRN, Morena Thompson MD    dexmedetomidine (PRECEDEX) 400 mcg in sodium chloride 0.9 % 100 mL infusion, 0.1-1.5 mcg/kg/hr, IntraVENous, Continuous PRN, Brooks Puentes MD    nystatin (MYCOSTATIN) ointment, , Topical, BID, See Jose MD, Given at 01/14/23 0801    [Held by provider] miconazole (MICOTIN) 2 % powder, , Topical, BID, ALAYNA Perez - NP, Given at 01/06/23 0206    perflutren lipid microspheres (DEFINITY) injection 1.5 mL, 1.5 mL, IntraVENous, ONCE PRN, Nnamdi Chaney MD    [facility] [place] by provider] aspirin chewable tablet 81 mg, 81 mg, Oral, Daily, Baron Farnsworth MD, 81 mg at 01/04/23 0740    sodium chloride flush 0.9 % injection 10 mL, 10 mL, IntraVENous, PRN, Baron Farnsworth MD, 10 mL at 01/03/23 1836    sodium chloride flush 0.9 % injection 5-40 mL, 5-40 mL, IntraVENous, 2 times per day, Adelina Setting, APRN - NP, 10 mL at 01/14/23 0801    sodium chloride flush 0.9 % injection 5-40 mL, 5-40 mL, IntraVENous, PRN, Abiola Gomez, APRN - NP    0.9 % sodium chloride infusion, , IntraVENous, PRN, Adelina Setting, APRN - NP    ondansetron (ZOFRAN-ODT) disintegrating tablet 4 mg, 4 mg, Oral, Q8H PRN **OR** ondansetron (ZOFRAN) injection 4 mg, 4 mg, IntraVENous, Q6H PRN, Adelina Setting, APRN - NP    polyethylene glycol (GLYCOLAX) packet 17 g, 17 g, Oral, Daily PRN, Adelina Setting, APRN - NP    acetaminophen (TYLENOL) tablet 650 mg, 650 mg, Oral, Q6H PRN **OR** acetaminophen (TYLENOL) suppository 650 mg, 650 mg, Rectal, Q6H PRN, Adelina Setting, APRN - NP    nicotine (NICODERM CQ) 21 MG/24HR 1 patch, 1 patch, TransDERmal, Daily, Abiola Gomez, APRN - NP, 1 patch at 01/14/23 0801    albuterol (PROVENTIL) nebulizer solution 2.5 mg, 2.5 mg, Nebulization, Q2H PRN, Adelina Setting, APRN - NP, 2.5 mg at 01/02/23 1115    guaiFENesin (MUCINEX) extended release tablet 600 mg, 600 mg, Oral, BID PRN, Adelina Setting, APRN - NP    Lab Results:     Lab Results   Component Value Date    WBC 6.6 01/14/2023    HGB 12.2 (L) 01/14/2023    HCT 41.3 01/14/2023    MCV 72.1 (L) 01/14/2023     (L) 01/14/2023     Lab Results   Component Value Date    CALCIUM 8.4 (L) 01/14/2023     01/14/2023    K 3.3 (L) 01/14/2023    CO2 37 (H) 01/14/2023    CL 97 (L) 01/14/2023    BUN 9 01/14/2023    CREATININE 0.73 01/14/2023       Lab Results   Component Value Date    INR 1.6 01/08/2023    PROTIME 19.1 (H) 01/08/2023       Radiology:   No new chest imaging    ASSESSMENT:       Acute hypoxic respiratory failure, extubated 1/5/2023, now on 3 L  Multifocal pneumonia suspect aspiration versus community-acquired  Acute infarcts left cerebral hemisphere, left parietal lobe with bilateral subarachnoid hemorrhages  Dysphagia-still failed swallow eval  Delirium-resolved  Metabolic encephalopathy  Transaminitis  Thrombocytopenia-improved  History of alcohol use  Acute kidney injury-resolved  History of tobacco use 40-pack-year history  Suspect COPD-mild wheezing  Positive hepatitis C screen  Chronic liver disease secondary to alcohol use  Debilitated  Full code  PLAN:   Continue to wean oxygen as tolerated to keep sats greater than 90%, continues on 3 L  Add vibratory device to aid with mucus clearance  Continue with DuoNebs  PEG tube planned per surgery  Still with some confusion  Continue to monitor  Eventual discharge to acute rehab noted      Electronically signed by ALAYNA Og - CNP on 01/14/23     This progress note was completed using a voice transcription system. Every effort was made to ensure accuracy. However, inadvertent computerized transcription errors may be present.     CYDNEY CATALAN AGAKAILEYP-BC, NP-C  [facility]

## 2023-01-15 LAB
ABSOLUTE EOS #: 0.06 K/UL (ref 0–0.4)
ABSOLUTE LYMPH #: 0.76 K/UL (ref 1–4.8)
ABSOLUTE MONO #: 0.63 K/UL (ref 0.1–1.3)
ALBUMIN SERPL-MCNC: 2.8 G/DL (ref 3.5–5.2)
ALP BLD-CCNC: 46 U/L (ref 40–129)
ALT SERPL-CCNC: 24 U/L (ref 5–41)
ANION GAP SERPL CALCULATED.3IONS-SCNC: 6 MMOL/L (ref 9–17)
AST SERPL-CCNC: 26 U/L
BASOPHILS # BLD: 1 % (ref 0–2)
BASOPHILS ABSOLUTE: 0.06 K/UL (ref 0–0.2)
BILIRUB SERPL-MCNC: 1.2 MG/DL (ref 0.3–1.2)
BUN BLDV-MCNC: 11 MG/DL (ref 8–23)
CALCIUM SERPL-MCNC: 8.5 MG/DL (ref 8.6–10.4)
CHLORIDE BLD-SCNC: 99 MMOL/L (ref 98–107)
CO2: 37 MMOL/L (ref 20–31)
CREAT SERPL-MCNC: 0.78 MG/DL (ref 0.7–1.2)
EOSINOPHILS RELATIVE PERCENT: 1 % (ref 0–4)
GFR SERPL CREATININE-BSD FRML MDRD: >60 ML/MIN/1.73M2
GLUCOSE BLD-MCNC: 127 MG/DL (ref 70–99)
HCT VFR BLD CALC: 40.9 % (ref 41–53)
HEMOGLOBIN: 12.2 G/DL (ref 13.5–17.5)
LYMPHOCYTES # BLD: 12 % (ref 24–44)
MAGNESIUM: 1.7 MG/DL (ref 1.6–2.6)
MCH RBC QN AUTO: 21.5 PG (ref 26–34)
MCHC RBC AUTO-ENTMCNC: 29.9 G/DL (ref 31–37)
MCV RBC AUTO: 71.9 FL (ref 80–100)
MONOCYTES # BLD: 10 % (ref 1–7)
MORPHOLOGY: ABNORMAL
PDW BLD-RTO: 31.3 % (ref 11.5–14.9)
PLATELET # BLD: 118 K/UL (ref 150–450)
PMV BLD AUTO: 9.4 FL (ref 6–12)
POTASSIUM SERPL-SCNC: 3.4 MMOL/L (ref 3.7–5.3)
RBC # BLD: 5.69 M/UL (ref 4.5–5.9)
SEG NEUTROPHILS: 76 % (ref 36–66)
SEGMENTED NEUTROPHILS ABSOLUTE COUNT: 4.79 K/UL (ref 1.3–9.1)
SODIUM BLD-SCNC: 142 MMOL/L (ref 135–144)
TOTAL PROTEIN: 5.9 G/DL (ref 6.4–8.3)
WBC # BLD: 6.3 K/UL (ref 3.5–11)

## 2023-01-15 PROCEDURE — 6370000000 HC RX 637 (ALT 250 FOR IP): Performed by: INTERNAL MEDICINE

## 2023-01-15 PROCEDURE — 92526 ORAL FUNCTION THERAPY: CPT

## 2023-01-15 PROCEDURE — 36415 COLL VENOUS BLD VENIPUNCTURE: CPT

## 2023-01-15 PROCEDURE — 94664 DEMO&/EVAL PT USE INHALER: CPT

## 2023-01-15 PROCEDURE — 6370000000 HC RX 637 (ALT 250 FOR IP)

## 2023-01-15 PROCEDURE — 2060000000 HC ICU INTERMEDIATE R&B

## 2023-01-15 PROCEDURE — 99232 SBSQ HOSP IP/OBS MODERATE 35: CPT | Performed by: INTERNAL MEDICINE

## 2023-01-15 PROCEDURE — 6360000002 HC RX W HCPCS

## 2023-01-15 PROCEDURE — 85025 COMPLETE CBC W/AUTO DIFF WBC: CPT

## 2023-01-15 PROCEDURE — 94640 AIRWAY INHALATION TREATMENT: CPT

## 2023-01-15 PROCEDURE — 6370000000 HC RX 637 (ALT 250 FOR IP): Performed by: STUDENT IN AN ORGANIZED HEALTH CARE EDUCATION/TRAINING PROGRAM

## 2023-01-15 PROCEDURE — 6370000000 HC RX 637 (ALT 250 FOR IP): Performed by: NURSE PRACTITIONER

## 2023-01-15 PROCEDURE — 83735 ASSAY OF MAGNESIUM: CPT

## 2023-01-15 PROCEDURE — C9113 INJ PANTOPRAZOLE SODIUM, VIA: HCPCS

## 2023-01-15 PROCEDURE — 2580000003 HC RX 258

## 2023-01-15 PROCEDURE — 97530 THERAPEUTIC ACTIVITIES: CPT

## 2023-01-15 PROCEDURE — 94761 N-INVAS EAR/PLS OXIMETRY MLT: CPT

## 2023-01-15 PROCEDURE — 2580000003 HC RX 258: Performed by: NURSE PRACTITIONER

## 2023-01-15 PROCEDURE — 80053 COMPREHEN METABOLIC PANEL: CPT

## 2023-01-15 PROCEDURE — A4216 STERILE WATER/SALINE, 10 ML: HCPCS

## 2023-01-15 RX ORDER — FUROSEMIDE 20 MG/1
20 TABLET ORAL DAILY
Status: DISCONTINUED | OUTPATIENT
Start: 2023-01-16 | End: 2023-01-16

## 2023-01-15 RX ADMIN — NYSTATIN OINTMENT: 100000 OINTMENT TOPICAL at 08:20

## 2023-01-15 RX ADMIN — SODIUM CHLORIDE, PRESERVATIVE FREE 10 ML: 5 INJECTION INTRAVENOUS at 08:19

## 2023-01-15 RX ADMIN — POTASSIUM BICARBONATE 40 MEQ: 782 TABLET, EFFERVESCENT ORAL at 08:18

## 2023-01-15 RX ADMIN — POTASSIUM BICARBONATE 20 MEQ: 782 TABLET, EFFERVESCENT ORAL at 08:18

## 2023-01-15 RX ADMIN — FUROSEMIDE 40 MG: 40 TABLET ORAL at 08:19

## 2023-01-15 RX ADMIN — SPIRONOLACTONE 25 MG: 25 TABLET ORAL at 08:19

## 2023-01-15 RX ADMIN — SODIUM CHLORIDE, PRESERVATIVE FREE 10 ML: 5 INJECTION INTRAVENOUS at 22:22

## 2023-01-15 RX ADMIN — SODIUM CHLORIDE, PRESERVATIVE FREE 40 MG: 5 INJECTION INTRAVENOUS at 08:19

## 2023-01-15 RX ADMIN — IPRATROPIUM BROMIDE AND ALBUTEROL SULFATE 1 AMPULE: 2.5; .5 SOLUTION RESPIRATORY (INHALATION) at 19:48

## 2023-01-15 RX ADMIN — NYSTATIN OINTMENT: 100000 OINTMENT TOPICAL at 22:21

## 2023-01-15 RX ADMIN — IPRATROPIUM BROMIDE AND ALBUTEROL SULFATE 1 AMPULE: 2.5; .5 SOLUTION RESPIRATORY (INHALATION) at 11:33

## 2023-01-15 NOTE — PROGRESS NOTES
[id number] "[employer] [employer]    PROGRESS NOTE             1/15/2023    2:14 PM    Name:   Sherry Jang  MRN:     [id number]     Acct:      [de-identified]   Room:   2116/2116-  IP Day:  15  Admit Date:  1/1/2023  9:55 PM    PCP:  No primary care provider on file. Code Status:  Full Code    Subjective:     C/C:   Chief Complaint   Patient presents with    Altered Mental Status     Interval History Status: Same    Patient seen and examined at the bed side. No new complains. Overall continuously improving. He is today Orietned to self and place. Not oriented to time. Patient out of transferred to progressive care unit. Hypokalemia with K 3.4. will receive daily 40 efer k and 20 meq once     Plt count improving. Blood pressure remains mostly below 140. Notes from nursing staff and Consults had been reviewed, and the overnight progress had been checked with the nursing staff as well. Brief History:     The patient is a 60 y.o.  male with unknown past medical history due to no healthcare visits or follow-up who presents with Altered Mental Status and he is admitted to the hospital for the management of  Acute respiratory failure most likely 2/2 pneumonia. Per patient's wife, she reports that the patient has not been acting himself for the past week. She reports being more slurred, confused and progressively getting worse prior to presentation. Patient prior to the presentation a week ago he had a fall and EMS presented patient was vitally stable and he was not transported to the hospital.  This time upon EMS evaluation of the patient he had an O2 of 75%, he was put on a breather and was given a dose of IV Lasix in route. Wife and patient, cannot recall any precipitating event could have led to his presentation. He also denies chest pain, shortness of breath, or cough.        Per patient, he does not recall any complaints or events prior to the presentation. Wife denies recent alcohol use, however, patient does have a history of regular alcohol intake but not on daily basis. Wife also reports that patient has constant heartburn for which he takes regular antiacids. Patient denies the presence of any abdominal pain or any change in bowel habits, abdominal pain, nausea or vomiting. Upon arrival in the ED, patient was put on BiPAP. Patient was afebrile, normotensive and in normal sinus rhythm. A chest x-ray was completed and the patient had right lower pneumonia with a small pleural effusion. Patient also had a large bullae in the left apex with a pneumothorax that cannot be excluded. Patient ABG was suggestive of pattern of respiratory acidosis with pH 7.295, PCO2 60.3, PO2 63.0, HCO3 29.3. CT head WO did not show any acute findings. Patient had elevated troponins of 184, trended down to 177. Patient also had an elevated BNP of 7797. An elevated creatinine of 1.77 was on presentation, with no previous lab test to be compared to. Also patient had a left elevated liver enzymes ALT was 525, , with prolonged prothrombin time of 24.9, and INR of 2.3. Patient was admitted to the ICU for the management of type II respiratory failure associated altered mental status    Review of Systems:     Constitutional: Positive for fatigue  Hent: Positive for hearing loss. Respiratory: Positive for shortness of breath, negative for chest pain negative for wheezing negative for cough  Cardiovascular: Positive for leg swelling. Negative for chest pain palpitation  Gastrointestinal negative for abdominal pain diarrhea nausea or vomiting  Skin negative for change of color or pallor  Neurological: Overall patient feels weak from being hospitalized. Medications:      Allergies:    No Known Allergies    Current Meds:   Scheduled Meds:    potassium bicarb-citric acid  20 mEq Oral Daily    [START ON 1/16/2023] furosemide  20 mg Oral Daily    potassium bicarb-citric acid  40 mEq Oral Daily    potassium chloride  20 mEq Oral Once    spironolactone  25 mg Oral Daily    ziprasidone (GEODON) in sterile water injection  10 mg IntraMUSCular Nightly    carvedilol  6.25 mg Oral BID WC    [Held by provider] ferrous sulfate  325 mg Oral Daily with breakfast    pantoprazole (PROTONIX) 40 mg injection  40 mg IntraVENous Daily    ipratropium-albuterol  1 ampule Inhalation Q4H WA    [Held by provider] heparin (porcine)  5,000 Units SubCUTAneous 3 times per day    nystatin   Topical BID    [Held by provider] miconazole   Topical BID    [Held by provider] aspirin  81 mg Oral Daily    sodium chloride flush  5-40 mL IntraVENous 2 times per day    nicotine  1 patch TransDERmal Daily     Continuous Infusions:    sodium chloride      dexmedetomidine      sodium chloride       PRN Meds: hydrALAZINE, potassium chloride **OR** potassium alternative oral replacement **OR** potassium chloride, magnesium sulfate, ziprasidone (GEODON) in sterile water injection, labetalol, potassium chloride, sodium chloride, dexmedetomidine, perflutren lipid microspheres, sodium chloride flush, sodium chloride flush, sodium chloride, ondansetron **OR** ondansetron, polyethylene glycol, acetaminophen **OR** acetaminophen, albuterol, guaiFENesin    Data:     Past Medical History:   has no past medical history on file. Social History:   reports that he has been smoking cigarettes. He has a 40.00 pack-year smoking history. He has never used smokeless tobacco. He reports current alcohol use. He reports that he does not currently use drugs. Family History:   History reviewed. No pertinent family history.     Vitals:  /79   Pulse 91   Temp 99.2 °F (37.3 °C) (Axillary)   Resp 16   Ht 5' 7\" (1.702 m)   Wt 164 lb 3.9 oz (74.5 kg)   SpO2 93%   BMI 25.72 kg/m²   Temp (24hrs), Av.4 °F (36.9 °C), Min:97.8 °F (36.6 °C), Max:99.2 °F (37.3 °C)      Recent Labs     23  5059 POCGLU 106         I/O(24Hr): Intake/Output Summary (Last 24 hours) at 1/15/2023 1414  Last data filed at 1/15/2023 0950  Gross per 24 hour   Intake 2020 ml   Output 2100 ml   Net -80 ml       Labs:    CBC:   Lab Results   Component Value Date/Time    WBC 6.3 01/15/2023 05:14 AM    RBC 5.69 01/15/2023 05:14 AM    HGB 12.2 01/15/2023 05:14 AM    HCT 40.9 01/15/2023 05:14 AM    MCV 71.9 01/15/2023 05:14 AM    MCH 21.5 01/15/2023 05:14 AM    MCHC 29.9 01/15/2023 05:14 AM    RDW 31.3 01/15/2023 05:14 AM     01/15/2023 05:14 AM    MPV 9.4 01/15/2023 05:14 AM     CMP:    Lab Results   Component Value Date/Time     01/15/2023 05:14 AM    K 3.4 01/15/2023 05:14 AM    CL 99 01/15/2023 05:14 AM    CO2 37 01/15/2023 05:14 AM    BUN 11 01/15/2023 05:14 AM    CREATININE 0.78 01/15/2023 05:14 AM    LABGLOM >60 01/15/2023 05:14 AM    GLUCOSE 127 01/15/2023 05:14 AM    PROT 5.9 01/15/2023 05:14 AM    LABALBU 2.8 01/15/2023 05:14 AM    CALCIUM 8.5 01/15/2023 05:14 AM    BILITOT 1.2 01/15/2023 05:14 AM    ALKPHOS 46 01/15/2023 05:14 AM    AST 26 01/15/2023 05:14 AM    ALT 24 01/15/2023 05:14 AM     PT/INR:    Lab Results   Component Value Date/Time    PROTIME 19.1 01/08/2023 04:38 AM    INR 1.6 01/08/2023 04:38 AM       No results found for: SPECIAL  Lab Results   Component Value Date/Time    CULTURE NO GROWTH 01/03/2023 12:12 PM         Radiology:    CT HEAD WO CONTRAST    Result Date: 1/2/2023  EXAMINATION: CT OF THE HEAD WITHOUT CONTRAST  1/2/2023 10:11 pm TECHNIQUE: CT of the head was performed without the administration of intravenous contrast. Automated exposure control, iterative reconstruction, and/or weight based adjustment of the mA/kV was utilized to reduce the radiation dose to as low as reasonably achievable. COMPARISON: None.  HISTORY: ORDERING SYSTEM PROVIDED HISTORY: Altered Mental status TECHNOLOGIST PROVIDED HISTORY: Altered Mental status Reason for Exam: Altered Mental status Additional signs and symptoms: Patient came in with transient alteration of awareness, follow up head CT for any changes. FINDINGS: BRAIN/VENTRICLES: There is no acute intracranial hemorrhage, mass effect or midline shift. No abnormal extra-axial fluid collection. There is a focal area of decreased attenuation with loss of gray/white matter differentiation involving posterior left parietal lobe with somewhat increased conspicuity as compared to prior exam.  There is stable small focus of encephalomalacia involving left cerebellar hemisphere compatible with prior remote infarct/insult. Chronic lacunar infarct to right basal ganglia redemonstrated. There is no evidence of hydrocephalus. ORBITS: The visualized portion of the orbits demonstrate no acute abnormality. SINUSES: Small air-fluid level right sphenoid sinus. Trace air-fluid level right frontal sinus. Mild mucoperiosteal thickening to bilateral ethmoid air cells. Findings are new from prior exam and likely relate to presence of nasogastric tube. SOFT TISSUES/SKULL:  No acute abnormality of the visualized skull or soft tissues. Focal area of decreased attenuation with loss of gray/white matter differentiation involving posterior left parietal lobe with somewhat increased conspicuity as compared to prior exam likely reflecting an area of acute to subacute infarct. Stable small chronic infarct/insult to left cerebellar hemisphere. Chronic lacunar infarct to right basal ganglia redemonstrated. Paranasal sinus disease likely relating to presence of nasogastric tube. CT HEAD WO CONTRAST    Result Date: 1/1/2023  EXAMINATION: CT OF THE HEAD WITHOUT CONTRAST  1/1/2023 10:48 pm TECHNIQUE: CT of the head was performed without the administration of intravenous contrast. Automated exposure control, iterative reconstruction, and/or weight based adjustment of the mA/kV was utilized to reduce the radiation dose to as low as reasonably achievable. COMPARISON: None.  HISTORY: Reason for Exam: AMS Additional signs and symptoms: the patient has been getting more and more confused since 2 weeks ago. It has progressively been getting worse and today FINDINGS: BRAIN/VENTRICLES: There is no acute intracranial hemorrhage, mass effect or midline shift. No abnormal extra-axial fluid collection. The gray-white differentiation is maintained without evidence of an acute infarct. There is no evidence of hydrocephalus. Changes of mild chronic small vessel ischemic disease. ORBITS: The visualized portion of the orbits demonstrate no acute abnormality. SINUSES: The visualized paranasal sinuses and mastoid air cells demonstrate no acute abnormality. SOFT TISSUES/SKULL:  No acute abnormality of the visualized skull or soft tissues. No acute intracranial abnormality. Mild chronic small vessel ischemic disease. US LIVER    Result Date: 1/2/2023  EXAMINATION: RIGHT UPPER QUADRANT ULTRASOUND 1/2/2023 10:20 am COMPARISON: None. HISTORY: ORDERING SYSTEM PROVIDED HISTORY: elevated AST and ALT, in setting of PT, and INR TECHNOLOGIST PROVIDED HISTORY: elevated AST and ALT, in setting of PT, and INR FINDINGS: Patient body habitus limits the study, as there is increased attenuation of the ultrasound beam. This decreases sensitivity and specificity. LIVER:  There is mild increased echogenicity seen throughout the liver. No intrahepatic ductal dilatation. No perihepatic fluid. BILIARY SYSTEM:  Bladder is contracted. Gallstones are seen. Gallbladder wall thickness measures 6 mm. Common bile duct is within normal limits measuring 5 mm. RIGHT KIDNEY: The right kidney is grossly unremarkable without evidence of hydronephrosis. PANCREAS:  Visualized portions of the pancreas are unremarkable. OTHER: No evidence of right upper quadrant ascites.  Portal vein flow appears hepatopetal     Increased echogenicity is seen throughout the liver suggesting diffuse hepatocellular disease such as fatty infiltration Cholelithiasis. No cholecystitis     XR CHEST PORTABLE    Result Date: 1/2/2023  EXAMINATION: ONE XRAY VIEW OF THE CHEST 1/2/2023 3:15 pm COMPARISON: 01/01/2023 HISTORY: ORDERING SYSTEM PROVIDED HISTORY: intubation TECHNOLOGIST PROVIDED HISTORY: intubation Reason for Exam: intubation FINDINGS: Overlying items external to the patient somewhat limit evaluation. Placement of endotracheal tube with tip 8.2 cm from the robert. Placement of enteric tube seen to extend into the stomach, distal extent not included in field of view. Persistent likely layering right pleural effusion, similar to slightly worsened. Persistent bilateral airspace opacities to bilateral mid to lower lung zones, right worse than left, similar on the left but mildly worsened on the right. No pneumothoraces are seen. Persistent likely bullous change to the left upper lung zone. Cardiac and mediastinal silhouettes are stable. Stable appearance to bony structures. Placement of endotracheal tube which appears high riding with tip 8.2 cm from the robert. Suggest advancement by 2-3 cm. Placement of endotracheal tube seen to extend into the stomach, distal extent not included in field of view. No pneumothoraces. Persistent likely bullous change to the left upper lung zone. Persistent right pleural effusion, similar to slightly worsened. Persistent bilateral airspace opacities, right worse than left, similar on the left but mildly worsened on the right. XR CHEST PORTABLE    Result Date: 1/1/2023  EXAMINATION: ONE XRAY VIEW OF THE CHEST 1/1/2023 7:17 pm COMPARISON: None. HISTORY: ORDERING SYSTEM PROVIDED HISTORY: ams TECHNOLOGIST PROVIDED HISTORY: ams Reason for Exam: Altered mental status, difficulty breathing Additional signs and symptoms: Altered mental status, difficulty breathing Relevant Medical/Surgical History:  Altered mental status, difficulty breathing FINDINGS: Large lucent area in the left apex suggesting a large bulla however superimposed pneumothorax cannot be excluded. The cardiac size is normal. Right lower lobe airspace infiltrate and mild right pleural effusion. . Pulmonary vascularity appears normal. There is mild ectasia of the thoracic aorta. Calcific tendinitis of the right shoulder. No acute bony abnormalities. The hilar structures are normal.     Right lower lobe pneumonia and small right pleural effusion Large lucent area in the left apex suggesting a large bulla however superimposed pneumothorax cannot be excluded. Follow-up CT would be useful for further evaluation. The findings were sent to the Radiology Results [id number] at 10:31 pm on 1/1/2023 to be communicated to a licensed caregiver. EKG:    there are no previous tracings available for comparison. Physical Examination:        PHYSICAL EXAM:  General Appearance patient is alert awake and oriented by 2 Name=full, place=Eugenio CARLSON, Time: non. Psych: No agitation, no depression, maintains good eye contact  HEENT - Head is normocephalic, atraumatic. Neck: supple, no rigidity, normal ROM. Small swelling of the left side of the mandibular angle that its been since childhood  Lungs -limited exam, good air entry. No wheezes  Cardiovascular - Heart sounds are normal.  Regular rhythm, normal rate without murmur, gallop or rub.   Neurology: Neurological exam is nonfocal.  Strength is 4 -/5 in all extremities  Abdomen - Soft, nontender, nondistended, no masses or organomegaly  Skin - No bruising or bleeding on exposed skin area  Extremities -lower limb edema  Assessment:        Primary Problem  Acute respiratory failure with hypoxia and hypercapnia [facility])    Active Hospital Problems    Diagnosis Date Noted    Hepatitis C virus infection without hepatic coma [B19.20] 01/14/2023     Priority: Medium    Mild malnutrition ([address] [E44.1] 01/13/2023     Priority: Medium    Delirium due to another medical condition [F05] 01/10/2023     Priority: Medium    ACP (advance care planning) [Z71.89] 01/09/2023     Priority: Medium    Goals of care, counseling/discussion [Z71.89] 01/09/2023     Priority: Medium    Encounter for palliative care [Z51.5] 01/09/2023     Priority: Medium    Prolonged pt (prothrombin time) [R79.1] 01/03/2023     Priority: Medium    Emphysema lung ([address] [J43.9] 01/03/2023     Priority: Medium    Cerebral infarction ([address] [I63.9] 01/03/2023     Priority: Medium    Transaminitis [R74.01] 01/02/2023     Priority: Medium    Normocytic anemia [D64.9] 01/02/2023     Priority: Medium    Thrombocytopenia ([address] [D69.6] 01/02/2023     Priority: Medium    Agitation [R45.1] 01/02/2023     Priority: Medium    Acute respiratory failure with hypoxia and hypercapnia (HCC) RLL pneumonia [J96.01, J96.02] 01/02/2023     Priority: Medium    Altered mental status [R41.82] 01/02/2023     Priority: Medium    Community acquired pneumonia of right lower lobe of lung [J18.9] 01/02/2023     Priority: Medium       Plan: Altered Mental Status in the setting of Acute type 2 respiratory failure secondary to Pneumonia and Acute Heart Failure-Improved. -failed swallow study, reains on NG tube feeds.  -On Geodon 10 mg BID  -ID following: vancoymycin and Unasyn completed  -Ipratropium-Albuterol every 4 hrs  -Echocardiogam completed  -Patient failed swallow study. NG tube placed. Will repeat Swallow Study  - On aldactone 25 mg, Coreg 6.25mg BID    Transaminitis with prolonged PTT and INR in the setting of acute HCV  - AST and ALT normalized  -INR PT improving   -Thrombocytopenic, IMPROVING  -HCV RNA PCR positive; ID on board       Multiple acute brain infarcts with subarachnoid hemorrhage versus meningitis(Unlikely)  -EEG showing diffuse slowing without epileptic waves.  -No anticoagulation  -BP parameters of 140.  -Exam is nonfocal    Thrombocytopenia-Improving  -Unknown  baseline  -Transaminates and Prolonged PT and PTT; resolved.   -PLT count  118  -Monitor HH       Microcytic anemia in the setting of elevated INR and PT  -No previous labs to compare?  -Iron IV replacement  -Hb stable    Elevated troponins most likely 2/2 demand ischemia  -no acute changes on EKG  -ECHO showing normal LVF. Mild tricuspid regurge. RV pressure of 25 mmHG    Hypokalemia  -K 3.4 this AM  -Effer K 40 meq daily. DVT prophylaxis: reason for no prophylaxis: Prolonged PT, aPTT  GI prophylaxis: Protonix 40 mg daily    Ramses Dempsey MD  1/15/2023  2:14 PM   Attending Physician Statement    I have discussed the case of Mechelle Benítez, including pertinent history and exam findings with the resident. I have seen and examined the patient and the key elements of the encounter have been performed by me. I agree with the assessment, plan, and orders as documented by the resident. Today patient was alert and responsive, he does not have any significant focal weakness.   His nutrition will be optimized with G-tube insertion  Electronically signed by Ramses Dempsey MD on 1/15/2023 at 2:14 PM

## 2023-01-15 NOTE — CONSULTS
General Surgery Consult      Pt Name: Kingsley Lamb  MRN: [id number]  YOB: 1960  Date of evaluation: 1/15/2023  Primary Care Physician: No primary care provider on file. Patient evaluated at the request of  Dr. Margret Kaur  Reason for evaluation: PEG tube placement    SUBJECTIVE:   History of Chief Complaint:    Kingsley Lamb is a 60 y.o. male who presents with stroke. Failed swallow study. Dysphagia. PEG tube was requested. Patient denied any abdominal surgeries in the past.  Family at the bedside. Denied any acute abdominal pain. Tolerating tube feeds via NG tube at this time. Bowels are moving. Past Medical History   has no past medical history on file. Past Surgical History   has no past surgical history on file. Medications  Prior to Admission medications    Medication Sig Start Date End Date Taking? Authorizing Provider   aspirin 325 MG tablet Take 325 mg by mouth daily Patient takes 2 tabs daily   Yes Historical Provider, MD   psyllium (KONSYL) 28.3 % PACK Take 1 packet by mouth daily   Yes Historical Provider, MD     Allergies  has No Known Allergies. Family History  family history is not on file. Social History   reports that he has been smoking cigarettes. He has a 40.00 pack-year smoking history. He has never used smokeless tobacco. He reports current alcohol use. He reports that he does not currently use drugs. Review of Systems:  All 10 system review was conducted. Please refer to chart. OBJECTIVE:   VITALS:  height is 5' 7\" (1.702 m) and weight is 164 lb 3.9 oz (74.5 kg). His oral temperature is 97.8 °F (36.6 °C). His blood pressure is 116/78 and his pulse is 90. His respiration is 18 and oxygen saturation is 93%. CONSTITUTIONAL: Alert and oriented times 3, no acute distress and cooperative to examination with proper mood and affect. SKIN: Skin color, texture, turgor normal. No rashes or lesions.   LYMPH: no cervical nodes, no inguinal nodes  HEENT: Head is normocephalic, atraumatic. EOMI, PERRLA  NECK: Supple, symmetrical, trachea midline, no adenopathy, thyroid symmetric, not enlarged and no tenderness, skin normal  CHEST/LUNGS: chest symmetric with normal A/P diameter, normal respiratory rate and rhythm, lungs clear to auscultation without wheezes, rales or rhonchi. No accessory muscle use. Scars None   CARDIOVASCULAR: Heart regular rate and rhythm Normal S1 and S2. . Carotid and femoral pulses 2+/4 and equal bilaterally  ABDOMEN: Normal shape. . No and Laparoscopic scar(s) present. Normal bowel sounds. No bruits. Soft, nondistended, no masses or organomegaly. no evidence of hernia. Percussion: Normal without hepatosplenomegally. Tenderness: absent  RECTAL: deferred, not clinically indicated  NEUROLOGIC: Awake alert in no acute distress.   Detailed neurological examination deferred  EXTREMITIES: no cyanosis, no clubbing, and no edema    LABS:   CBC with Differential:    Lab Results   Component Value Date/Time    WBC 6.3 01/15/2023 05:14 AM    RBC 5.69 01/15/2023 05:14 AM    HGB 12.2 01/15/2023 05:14 AM    HCT 40.9 01/15/2023 05:14 AM     01/15/2023 05:14 AM    MCV 71.9 01/15/2023 05:14 AM    MCH 21.5 01/15/2023 05:14 AM    MCHC 29.9 01/15/2023 05:14 AM    RDW 31.3 01/15/2023 05:14 AM    NRBC 1 01/03/2023 04:27 AM    LYMPHOPCT 12 01/15/2023 05:14 AM    MONOPCT 10 01/15/2023 05:14 AM    BASOPCT 1 01/15/2023 05:14 AM    MONOSABS 0.63 01/15/2023 05:14 AM    LYMPHSABS 0.76 01/15/2023 05:14 AM    EOSABS 0.06 01/15/2023 05:14 AM    BASOSABS 0.06 01/15/2023 05:14 AM     BMP:    Lab Results   Component Value Date/Time     01/15/2023 05:14 AM    K 3.4 01/15/2023 05:14 AM    CL 99 01/15/2023 05:14 AM    CO2 37 01/15/2023 05:14 AM    BUN 11 01/15/2023 05:14 AM    LABALBU 2.8 01/15/2023 05:14 AM    CREATININE 0.78 01/15/2023 05:14 AM    CALCIUM 8.5 01/15/2023 05:14 AM    LABGLOM >60 01/15/2023 05:14 AM    GLUCOSE 127 01/15/2023 05:14 AM     Hepatic Function Panel: Lab Results   Component Value Date/Time    [facility] S[facility]TH 46 01/15/2023 05:14 AM    ALT 24 01/15/2023 05:14 AM    AST 26 01/15/2023 05:14 AM    PROT 5.9 01/15/2023 05:14 AM    BILITOT 1.2 01/15/2023 05:14 AM    BILIDIR 0.9 01/01/2023 09:59 PM    IBILI 0.2 01/01/2023 09:59 PM    LABALBU 2.8 01/15/2023 05:14 AM     Calcium:    Lab Results   Component Value Date/Time    CALCIUM 8.5 01/15/2023 05:14 AM     Magnesium:    Lab Results   Component Value Date/Time    MG 1.7 01/15/2023 05:14 AM     Phosphorus:    Lab Results   Component Value Date/Time    PHOS 2.5 01/12/2023 04:04 AM     PT/INR:    Lab Results   Component Value Date/Time    PROTIME 19.1 01/08/2023 04:38 AM    INR 1.6 01/08/2023 04:38 AM     ABG:    Lab Results   Component Value Date/Time    PHART 7.372 01/05/2023 04:46 AM    ORP8KNC 51.6 01/05/2023 04:46 AM    PO2ART 62.9 01/05/2023 04:46 AM    YUL7HQR 29.9 01/05/2023 04:46 AM    B2NIKNSN 89.7 01/05/2023 04:46 AM     Urine Culture:  No components found for: CURINE  Blood Culture:  No components found for: CBLOOD, CFUNGUSBL  Stool Culture:  No components found for: CSTOOL    RADIOLOGY:   I have personally reviewed the following films:  CT HEAD WO CONTRAST    Result Date: 1/3/2023  EXAMINATION: CT OF THE HEAD WITHOUT CONTRAST  1/2/2023 10:11 pm TECHNIQUE: CT of the head was performed without the administration of intravenous contrast. Automated exposure control, iterative reconstruction, and/or weight based adjustment of the mA/kV was utilized to reduce the radiation dose to as low as reasonably achievable. COMPARISON: None. HISTORY: ORDERING SYSTEM PROVIDED HISTORY: Altered Mental status TECHNOLOGIST PROVIDED HISTORY: Altered Mental status Reason for Exam: Altered Mental status Additional signs and symptoms: Patient came in with transient alteration of awareness, follow up head CT for any changes. FINDINGS: BRAIN/VENTRICLES: There is no acute intracranial hemorrhage, mass effect or midline shift.   No abnormal extra-axial fluid collection. There is a focal area of decreased attenuation with loss of gray/white matter differentiation involving posterior left parietal lobe with somewhat increased conspicuity as compared to prior exam.  There is stable small focus of encephalomalacia involving left cerebellar hemisphere compatible with prior remote infarct/insult. Chronic lacunar infarct to right basal ganglia redemonstrated. There is no evidence of hydrocephalus. ORBITS: The visualized portion of the orbits demonstrate no acute abnormality. SINUSES: Small air-fluid level right sphenoid sinus. Trace air-fluid level right frontal sinus. Mild mucoperiosteal thickening to bilateral ethmoid air cells. Findings are new from prior exam and likely relate to presence of nasogastric tube. SOFT TISSUES/SKULL:  No acute abnormality of the visualized skull or soft tissues. Focal area of decreased attenuation with loss of gray/white matter differentiation involving posterior left parietal lobe with somewhat increased conspicuity as compared to prior exam likely reflecting an area of acute to subacute infarct. Stable small chronic infarct/insult to left cerebellar hemisphere. Chronic lacunar infarct to right basal ganglia redemonstrated. Paranasal sinus disease likely relating to presence of nasogastric tube. CT HEAD WO CONTRAST    Result Date: 1/1/2023  EXAMINATION: CT OF THE HEAD WITHOUT CONTRAST  1/1/2023 10:48 pm TECHNIQUE: CT of the head was performed without the administration of intravenous contrast. Automated exposure control, iterative reconstruction, and/or weight based adjustment of the mA/kV was utilized to reduce the radiation dose to as low as reasonably achievable. COMPARISON: None. HISTORY: Reason for Exam: AMS Additional signs and symptoms: the patient has been getting more and more confused since 2 weeks ago.   It has progressively been getting worse and today FINDINGS: BRAIN/VENTRICLES: There is no acute intracranial hemorrhage, mass effect or midline shift. No abnormal extra-axial fluid collection. The gray-white differentiation is maintained without evidence of an acute infarct. There is no evidence of hydrocephalus. Changes of mild chronic small vessel ischemic disease. ORBITS: The visualized portion of the orbits demonstrate no acute abnormality. SINUSES: The visualized paranasal sinuses and mastoid air cells demonstrate no acute abnormality. SOFT TISSUES/SKULL:  No acute abnormality of the visualized skull or soft tissues. No acute intracranial abnormality. Mild chronic small vessel ischemic disease. US LIVER    Result Date: 1/2/2023  EXAMINATION: RIGHT UPPER QUADRANT ULTRASOUND 1/2/2023 10:20 am COMPARISON: None. HISTORY: ORDERING SYSTEM PROVIDED HISTORY: elevated AST and ALT, in setting of PT, and INR TECHNOLOGIST PROVIDED HISTORY: elevated AST and ALT, in setting of PT, and INR FINDINGS: Patient body habitus limits the study, as there is increased attenuation of the ultrasound beam. This decreases sensitivity and specificity. LIVER:  There is mild increased echogenicity seen throughout the liver. No intrahepatic ductal dilatation. No perihepatic fluid. BILIARY SYSTEM:  Bladder is contracted. Gallstones are seen. Gallbladder wall thickness measures 6 mm. Common bile duct is within normal limits measuring 5 mm. RIGHT KIDNEY: The right kidney is grossly unremarkable without evidence of hydronephrosis. PANCREAS:  Visualized portions of the pancreas are unremarkable. OTHER: No evidence of right upper quadrant ascites. Portal vein flow appears hepatopetal     Increased echogenicity is seen throughout the liver suggesting diffuse hepatocellular disease such as fatty infiltration Cholelithiasis.   No cholecystitis     XR CHEST PORTABLE    Result Date: 1/2/2023  EXAMINATION: ONE XRAY VIEW OF THE CHEST 1/2/2023 3:15 pm COMPARISON: 01/01/2023 HISTORY: ORDERING SYSTEM PROVIDED HISTORY: intubation TECHNOLOGIST PROVIDED HISTORY: intubation Reason for Exam: intubation FINDINGS: Overlying items external to the patient somewhat limit evaluation. Placement of endotracheal tube with tip 8.2 cm from the robert. Placement of enteric tube seen to extend into the stomach, distal extent not included in field of view. Persistent likely layering right pleural effusion, similar to slightly worsened. Persistent bilateral airspace opacities to bilateral mid to lower lung zones, right worse than left, similar on the left but mildly worsened on the right. No pneumothoraces are seen. Persistent likely bullous change to the left upper lung zone. Cardiac and mediastinal silhouettes are stable. Stable appearance to bony structures. Placement of endotracheal tube which appears high riding with tip 8.2 cm from the robert. Suggest advancement by 2-3 cm. Placement of endotracheal tube seen to extend into the stomach, distal extent not included in field of view. No pneumothoraces. Persistent likely bullous change to the left upper lung zone. Persistent right pleural effusion, similar to slightly worsened. Persistent bilateral airspace opacities, right worse than left, similar on the left but mildly worsened on the right. XR CHEST PORTABLE    Result Date: 1/1/2023  EXAMINATION: ONE XRAY VIEW OF THE CHEST 1/1/2023 7:17 pm COMPARISON: None. HISTORY: ORDERING SYSTEM PROVIDED HISTORY: ams TECHNOLOGIST PROVIDED HISTORY: ams Reason for Exam: Altered mental status, difficulty breathing Additional signs and symptoms: Altered mental status, difficulty breathing Relevant Medical/Surgical History: Altered mental status, difficulty breathing FINDINGS: Large lucent area in the left apex suggesting a large bulla however superimposed pneumothorax cannot be excluded. The cardiac size is normal. Right lower lobe airspace infiltrate and mild right pleural effusion. . Pulmonary vascularity appears normal. There is mild ectasia of the thoracic aorta. Calcific tendinitis of the right shoulder. No acute bony abnormalities. The hilar structures are normal.     Right lower lobe pneumonia and small right pleural effusion Large lucent area in the left apex suggesting a large bulla however superimposed pneumothorax cannot be excluded. Follow-up CT would be useful for further evaluation. The findings were sent to the Radiology Results [id number] at 10:31 pm on 1/1/2023 to be communicated to a licensed caregiver. IMPRESSION:   History of stroke  Dysphagia malnutrition failed swallow study    does not have any pertinent problems on file. PLAN:   Hold tube feeds after midnight tonight. Heparin on hold. For EGD with PEG tube placement tomorrow. Discussed with patient and family at length. They understand and agree with the current management plan. Patient scheduled. Thank you for this interesting consult and for allowing us to participate in the care of this patient. If you have any questions please don't hesitate to call.           Electronically signed by Michael Brown MD  on 1/15/2023 at 12:59 PM

## 2023-01-15 NOTE — PROGRESS NOTES
Physical Therapy  Facility/Department: [facility] PROGRESSIVE CARE  Daily Treatment Note  NAME: Jaison Marvin  : 1960  MRN: [id number]    Date of Service: 1/15/2023    Discharge Recommendations:  Therapy recommended at discharge        Patient Diagnosis(es): The primary encounter diagnosis was Acute respiratory failure with hypoxia and hypercapnia ([facility] [address] Diagnoses of Community acquired pneumonia of right lower lobe of lung and Altered mental status, unspecified altered mental status type were also pertinent to this visit. Assessment   Assessment: pt was with decreased endurance this date and with instability in standing. Pt was able to sit EOB with SBA for safety for about 5 mins and then requested to lay down to rest. Cont therapy recommended to address pt deficits. Activity Tolerance: Patient limited by endurance     Plan    Physcial Therapy Plan  General Plan: 5-7 times per week  Current Treatment Recommendations: Strengthening;Balance training;Functional mobility training;Neuromuscular re-education;Cognitive reorientation; Safety education & training;Patient/Caregiver education & training;Equipment evaluation, education, & procurement; Therapeutic activities     Restrictions  Restrictions/Precautions  Restrictions/Precautions: Fall Risk, General Precautions, Contact Precautions, Isolation, Bed Alarm  Required Braces or Orthoses?: No  Implants present? :  (Pt denies)  Position Activity Restriction  Other position/activity restrictions: Severe dysphagia- NPO. Unable to tolerate any PO safely. NG tube placed,  on high flow nasal cannula     Subjective    Subjective  Subjective: pt in bed and is agreeable to therapy. spouse in room. Pain: denies pain  Cognition  Overall Cognitive Status: WFL  Cognition Comment: pt with no impulsivity this date     Objective   Vitals  O2 Device: Nasal cannula  Bed Mobility Training  Supine to Sit: Moderate assistance  Sit to Supine:  Moderate assistance  Scooting: Stand-by assistance  Balance  Sitting: Without support  Sitting - Static: Fair (occasional)  Sitting - Dynamic: Unsupported  Standing: With support  Standing - Static: Constant support  Standing - Dynamic: Fair;Poor  Transfer Training  Overall Level of Assistance: Moderate assistance  Interventions: Safety awareness training;Manual cues; Verbal cues  Sit to Stand: Moderate assistance (Pt sat EOB about 5 mins with SBA with no LOB in any direction)  Stand to Sit: Minimum assistance  Gait Training  Gait Training: Yes  Gait  Overall Level of Assistance: Moderate assistance  Interventions: Manual cues; Verbal cues  Base of Support: Widened  Speed/Radha: Slow  Step Length: Left shortened;Right shortened  Distance (ft):  (steps EOB, pt with instability so returned to sitting EOB.)  Assistive Device: Walker, rolling     PT Exercises  Exercise Treatment: educated pt on importance of sitting up, he was feeling tired to returned to supine to rest.See above for bed mobility and transfer training.               Goals  Short Term Goals  Time Frame for Short Term Goals: 10 visits  Short Term Goal 1: min assist supine<-> sit x1 assist  Short Term Goal 2: sit EOB with min x1 up to 15 min for therex/ADL  Short Term Goal 3: 3+/5 LE strength to prepare for standing and transfers  Short Term Goal 4: standing for functional ADLs with Cyril of 2 for 3-5min  Short Term Goal 5: roll L/R with SBA  Patient Goals   Patient Goals : unable to state    Education  Patient Education  Education Given To: Patient  Education Provided: Transfer Training  Education Method: Demonstration;Verbal  Barriers to Learning: Cognition  Education Outcome: Continued education needed;Verbalized understanding    Therapy Time   Individual Concurrent Group Co-treatment   Time In 1457         Time Out 1515         Minutes 18         Timed Code Treatment Minutes: 18 Minutes       Siobhan Holley PT

## 2023-01-15 NOTE — PROGRESS NOTES
Today's Date: 1/15/2023  Patient Name: Ben Hill  Date of admission: 1/1/2023  9:55 PM  Patient's age: 60 y.o., 1960  Admission Dx: Acute respiratory failure ([facility] [address] [J96.00]  Acute respiratory failure with hypoxia and hypercapnia ([facility] [address] [J96.01, J96.02]  Altered mental status, unspecified altered mental status type [R41.82]  Community acquired pneumonia of right lower lobe of lung [J18.9]  COPD with respiratory failure, acute ([facility] [address] [J44.9, J96.00]    Reason for Consult: management recommendations  Requesting Physician: Cherylene Patten, MD    CHIEF COMPLAINT: Abnormal cell counts. Altered mental status. Pneumonia. Interval history:    Patient seen and examined  Labs and vitals reviewed  Continues to recover well. Condition is improving quickly. Mental status is improving. No active bleeding. Platelets 870. HISTORY OF PRESENT ILLNESS:      The patient is a 60 y.o.  male who is admitted with chief complaint of altered mental status. Patient has not seen a doctor for multiple years. Due to worsening mental status EMS was summoned. Patient oxygen saturation was noted to be at 75%. Patient placed on BiPAP x-ray showed right lower lobe pneumonia. Patient started on antibiotics. Patient also noted to have INR of 2.3 platelet count of 09,244. Patient does have a history of alcohol intake. Patient's ammonia level noted to be 29. Creatinine 1.5. Total bilirubin is within range. Hemoglobin 11.2 with MCV of 69. Platelet count is 89,444. Ultrasound liver done however report is pending. Past Medical History: Hypertension    Past Surgical History: No pertinent surgical history    Medications:    Prior to Admission medications    Medication Sig Start Date End Date Taking?  Authorizing Provider   aspirin 325 MG tablet Take 325 mg by mouth daily Patient takes 2 tabs daily   Yes Historical Provider, MD   psyllium (KONSYL) 28.3 % PACK Take 1 packet by mouth daily   Yes Historical Provider, MD     Current Facility-Administered Medications   Medication Dose Route Frequency Provider Last Rate Last Admin    potassium bicarb-citric acid (EFFER-K) effervescent tablet 20 mEq  20 mEq Oral Daily Cristel Faust MD   20 mEq at 01/15/23 0818    [START ON 1/16/2023] furosemide (LASIX) tablet 20 mg  20 mg Oral Daily Jenine Gowers, MD        potassium bicarb-citric acid (EFFER-K) effervescent tablet 40 mEq  40 mEq Oral Daily Jolene Bolanos MD   40 mEq at 01/15/23 0818    potassium chloride (KLOR-CON M) extended release tablet 20 mEq  20 mEq Oral Once Jolene Bolanos MD        hydrALAZINE (APRESOLINE) injection 20 mg  20 mg IntraVENous Q6H PRN Cristel Faust MD   20 mg at 01/11/23 1224    potassium chloride (KLOR-CON M) extended release tablet 40 mEq  40 mEq Oral PRN Cristel Faust MD        Or    potassium bicarb-citric acid (EFFER-K) effervescent tablet 40 mEq  40 mEq Oral PRN Cristel Faust MD        Or    potassium chloride 10 mEq/100 mL IVPB (Peripheral Line)  10 mEq IntraVENous PRN Cristel Faust  mL/hr at 01/13/23 0928 10 mEq at 01/13/23 1128    spironolactone (ALDACTONE) tablet 25 mg  25 mg Oral Daily Jolene Bolanos MD   25 mg at 01/15/23 9005    magnesium sulfate 2000 mg in water 50 mL IVPB  2,000 mg IntraVENous PRN Robert Joens MD        ziprasidone (GEODON) 10 mg in sterile water 0.5 mL injection  10 mg IntraMUSCular Nightly ALAYNA Grant - CNP        carvedilol (COREG) tablet 6.25 mg  6.25 mg Oral BID WC Robert Jones MD   6.25 mg at 01/14/23 0800    [Held by provider] ferrous sulfate (IRON 325) tablet 325 mg  325 mg Oral Daily with breakfast Robert Jones MD        pantoprazole (PROTONIX) 40 mg in sodium chloride (PF) 0.9 % 10 mL injection  40 mg IntraVENous Daily Robert Jones MD   40 mg at 01/15/23 0819    ziprasidone (GEODON) 20 mg in sterile water 1 mL injection 20 mg IntraMUSCular Q12H PRN ALAYNA Duque CNP        labetalol (NORMODYNE;TRANDATE) injection 5 mg  5 mg IntraVENous Q6H PRN Bishop Gee MD   5 mg at 01/10/23 1606    potassium chloride 10 mEq/100 mL IVPB (Peripheral Line)  10 mEq IntraVENous PRN Stephen Brown  mL/hr at 01/13/23 1233 10 mEq at 01/13/23 1233    ipratropium-albuterol (DUONEB) nebulizer solution 1 ampule  1 ampule Inhalation Q4H WA Nella Miles MD   1 ampule at 01/15/23 1133    [Held by provider] heparin (porcine) injection 5,000 Units  5,000 Units SubCUTAneous 3 times per day Alyssa Poe MD   5,000 Units at 01/08/23 0549    0.9 % sodium chloride infusion   IntraVENous PRN Humberto Webster MD        dexmedetomidine (PRECEDEX) 400 mcg in sodium chloride 0.9 % 100 mL infusion  0.1-1.5 mcg/kg/hr IntraVENous Continuous PRN Nella Miles MD        nystatin (MYCOSTATIN) ointment   Topical BID Bishop Gee MD   Given at 01/15/23 0820    [Held by provider] miconazole (MICOTIN) 2 % powder   Topical BID ALAYNA Roque NP   Given at 01/06/23 9923    perflutren lipid microspheres (DEFINITY) injection 1.5 mL  1.5 mL IntraVENous ONCE PRN Godfrey Rodney MD        [facility] [place] by provider] aspirin chewable tablet 81 mg  81 mg Oral Daily Bobby Corona MD   81 mg at 01/04/23 0740    sodium chloride flush 0.9 % injection 10 mL  10 mL IntraVENous PRN Bobby Corona MD   10 mL at 01/03/23 1836    sodium chloride flush 0.9 % injection 5-40 mL  5-40 mL IntraVENous 2 times per day ALAYNA Roque NP   10 mL at 01/15/23 0819    sodium chloride flush 0.9 % injection 5-40 mL  5-40 mL IntraVENous PRN ALAYNA Marrero NP        0.9 % sodium chloride infusion   IntraVENous PRN ALAYNA Roque NP        ondansetron (ZOFRAN-ODT) disintegrating tablet 4 mg  4 mg Oral Q8H PRN ALAYNA Roque NP        Or    ondansetron (ZOFRAN) injection 4 mg  4 mg IntraVENous Q6H PRN Abiola Umesh Guerrero, APRN - NP        polyethylene glycol (GLYCOLAX) packet 17 g  17 g Oral Daily PRN Adelina Setting, APRN - NP        acetaminophen (TYLENOL) tablet 650 mg  650 mg Oral Q6H PRN Adelina Setting, APRN - NP        Or    acetaminophen (TYLENOL) suppository 650 mg  650 mg Rectal Q6H PRN Adelina Setting, APRN - NP        nicotine (NICODERM CQ) 21 MG/24HR 1 patch  1 patch TransDERmal Daily Adelina Setting, APRN - NP   1 patch at 01/15/23 0821    albuterol (PROVENTIL) nebulizer solution 2.5 mg  2.5 mg Nebulization Q2H PRN Adelina Setting, APRN - NP   2.5 mg at 23 1115    guaiFENesin (MUCINEX) extended release tablet 600 mg  600 mg Oral BID PRN Adelina Setting, APRN - NP           Allergies:  Patient has no known allergies. Social History:   reports that he has been smoking cigarettes. He has a 40.00 pack-year smoking history. He has never used smokeless tobacco. He reports current alcohol use. He reports that he does not currently use drugs. Family History: Patient not able to give history due to altered mental status    REVIEW OF SYSTEMS:      General: no fever or night sweats, Weight is stable. ENT: No double or blurred vision, no hearing problem, no dysphagia or sore throat   Respiratory: Positive shortness of breath  Cardiovascular: Denies chest pain, PND or orthopnea. No L E swelling or palpitations. Gastrointestinal:    No nausea or vomiting, abdominal pain, diarrhea or constipation. Genitourinary: Denies dysuria, hematuria, frequency, urgency or incontinence. Neurological: Complains of being very weak. Musculoskeletal:  No arthralgia no back pain or joint swelling. Skin: There are no rashes or bleeding.   Psych: Denies hallucinations or intentions to harm self        PHYSICAL EXAM:        /79   Pulse 91   Temp 99.2 °F (37.3 °C) (Axillary)   Resp 16   Ht 5' 7\" (1.702 m)   Wt 164 lb 3.9 oz (74.5 kg)   SpO2 93%   BMI 25.72 kg/m²    Temp (24hrs), Av.4 °F (36.9 °C), Min:97.8 °F (36.6 °C), Max:99.2 °F (37.3 °C)      General appearance - not in acute distress  Mental status -arousable but somewhat lethargic  Eyes - pupils equal and reactive, extraocular eye movements intact   Ears - bilateral TM's and external ear canals normal   Mouth -mucous membranes moist  Neck - supple, no significant adenopathy   Lymphatics - no palpable lymphadenopathy, no hepatosplenomegaly   Chest -bilateral rales  Heart - normal rate, regular rhythm, normal S1, S2, no murmurs  Abdomen - soft, nontender, nondistended, no masses or organomegaly   Neurological -orally intubated  Musculoskeletal - no joint tenderness, deformity or swelling   Extremities - peripheral pulses normal, no pedal edema, no clubbing or cyanosis   Skin - normal coloration and turgor, no rashes, no suspicious skin lesions noted ,      DATA:      Labs:     Results for orders placed or performed during the hospital encounter of 01/01/23   COVID-19 & Influenza Combo    Specimen: Nasopharyngeal Swab   Result Value Ref Range    Specimen Description . NASOPHARYNGEAL SWAB     Source . NASOPHARYNGEAL SWAB     SARS-CoV-2 RNA, RT PCR Not Detected Not Detected    INFLUENZA A Not Detected Not Detected    INFLUENZA B Not Detected Not Detected   Respiratory Panel, Molecular, with COVID-19 (Restricted: peds pts or suitable admitted adults)    Specimen: Nasopharyngeal Swab   Result Value Ref Range    Specimen Description . NASOPHARYNGEAL SWAB     Adenovirus PCR Not Detected Not Detected    Coronavirus 229E PCR Not Detected Not Detected    Coronavirus HKU1 PCR Not Detected Not Detected    Coronavirus NL63 PCR Not Detected Not Detected    Coronavirus OC43 PCR Not Detected Not Detected    SARS-CoV-2, PCR Not Detected Not Detected    Human Metapneumovirus PCR Not Detected Not Detected    Rhino/Enterovirus PCR Not Detected Not Detected    Influenza A by PCR Not Detected Not Detected    Influenza B by PCR Not Detected Not Detected    Parainfluenza 1 PCR Not Detected Not Detected    Parainfluenza 2 PCR Not Detected Not Detected    Parainfluenza 3 PCR Not Detected Not Detected    Parainfluenza 4 PCR Not Detected Not Detected    Resp Syncytial Virus PCR Not Detected Not Detected    Bordetella Parapertussis Not Detected Not Detected    B Pertussis by PCR Not Detected Not Detected    Chlamydia pneumoniae By PCR Not Detected Not Detected    Mycoplasma pneumo by PCR Not Detected Not Detected   Legionella Ag, Ur    Specimen: Urine   Result Value Ref Range    Legionella Pneumophilia Ag, Urine NEGATIVE NEGATIVE   STREP PNEUMONIAE ANTIGEN    Specimen: Urine, indwelling catheter   Result Value Ref Range    Source . CLEAN CATCH URINE     Strep pneumo Ag NEGATIVE    Culture, Blood 1    Specimen: Blood   Result Value Ref Range    Specimen Description . BLOOD LEFT ARM     Culture NO GROWTH 5 DAYS    Culture, Blood 1    Specimen: Blood   Result Value Ref Range    Specimen Description . BLOOD RIGHT ARM     Culture NO GROWTH 5 DAYS    Culture, Urine    Specimen: Urine, clean catch   Result Value Ref Range    Specimen Description . CLEAN CATCH URINE     Culture NO GROWTH    MRSA DNA Probe, Nasal    Specimen: Nasal   Result Value Ref Range    Specimen Description . NASAL SWAB     MRSA, DNA, Nasal (A) NEGATIVE     POSITIVE:  MRSA DNA detected by nucleic acid amplification. Culture, Respiratory    Specimen: Sputum Induced   Result Value Ref Range    Specimen Description . INDUCED SPUTUM     Direct Exam >10, <25 NEUTROPHILS/LPF     Direct Exam < 10 EPITHELIAL CELLS/LPF     Direct Exam NO SIGNIFICANT PATHOGENS SEEN     Culture NO GROWTH    Troponin   Result Value Ref Range    Troponin, High Sensitivity 177 (HH) 0 - 22 ng/L   Troponin   Result Value Ref Range    Troponin, High Sensitivity 184 (HH) 0 - 22 ng/L   APTT   Result Value Ref Range    PTT 26.5 24.0 - 36.0 sec   Protime-INR   Result Value Ref Range    Protime 24.9 (H) 11.8 - 14.6 sec    INR 2.3    Phosphorus   Result Value Ref Range    Phosphorus 4.3 2.5 - 4.5 mg/dL   Magnesium   Result Value Ref Range    Magnesium 2.1 1.6 - 2.6 mg/dL   Basic Metabolic Panel   Result Value Ref Range    Glucose 96 70 - 99 mg/dL    BUN 46 (H) 8 - 23 mg/dL    Creatinine 1.77 (H) 0.70 - 1.20 mg/dL    Est, Glom Filt Rate 43 (L) >60 mL/min/1.73m2    Calcium 7.9 (L) 8.6 - 10.4 mg/dL    Sodium 138 135 - 144 mmol/L    Potassium 4.8 3.7 - 5.3 mmol/L    Chloride 97 (L) 98 - 107 mmol/L    CO2 29 20 - 31 mmol/L    Anion Gap 12 9 - 17 mmol/L   CBC with Auto Differential   Result Value Ref Range    WBC 6.8 3.5 - 11.0 k/uL    RBC 5.99 (H) 4.5 - 5.9 m/uL    Hemoglobin 12.1 (L) 13.5 - 17.5 g/dL    Hematocrit 41.4 41 - 53 %    MCV 69.1 (L) 80 - 100 fL    MCH 20.2 (L) 26 - 34 pg    MCHC 29.2 (L) 31 - 37 g/dL    RDW 19.8 (H) 11.5 - 14.9 %    Platelets 55 (L) 982 - 450 k/uL    MPV 8.6 6.0 - 12.0 fL    Seg Neutrophils 79 (H) 36 - 66 %    Lymphocytes 12 (L) 24 - 44 %    Monocytes 8 (H) 1 - 7 %    Eosinophils % 0 0 - 4 %    Basophils 1 0 - 2 %    Segs Absolute 5.40 1.3 - 9.1 k/uL    Absolute Lymph # 0.80 (L) 1.0 - 4.8 k/uL    Absolute Mono # 0.50 0.1 - 1.3 k/uL    Absolute Eos # 0.00 0.0 - 0.4 k/uL    Basophils Absolute 0.10 0.0 - 0.2 k/uL   Blood Gas, Venous   Result Value Ref Range    pH, [place] 7.258 (L) 7.320 - 7.420    pCO2, [place] 63.0 (H) 39.0 - 55.0 mm Hg    pO2, [place] 48.1 30.0 - 50.0 mm Hg    HCO3, Venous 28.1 24.0 - 30.0 mmol/L    Positive Base Excess, [place] 1.0 0.0 - 2.0 mmol/L    O2 Sat, [place] 72.1 60.0 - 85.0 %    Carboxyhemoglobin 4.3 0 - 5 %    Methemoglobin 0.5 0.0 - 1.9 %    Pt Temp 37    Brain Natriuretic Peptide   Result Value Ref Range    Pro-BNP 7,797 (H) <300 pg/mL   Hepatic Function Panel   Result Value Ref Range    Albumin 3.9 3.5 - 5.2 g/dL    Alkaline Phosphatase 68 40 - 129 U/L     (H) 5 - 41 U/L     (H) <40 U/L    Total Bilirubin 1.1 0.3 - 1.2 mg/dL    Bilirubin, Direct 0.9 (H) <0.3 mg/dL    Bilirubin, Indirect 0.2 0.0 - 1.0 mg/dL    Total Protein 6.6 6.4 - 8.3 g/dL Lipase   Result Value Ref Range    Lipase 17 13 - 60 U/L   Urinalysis with Reflex to Culture    Specimen: Urine   Result Value Ref Range    Color, UA Orange (A) Yellow    Turbidity UA Cloudy (A) Clear    Glucose, Ur NEGATIVE NEGATIVE    Bilirubin Urine NEGATIVE  Verified by ictotest. (A) NEGATIVE    Ketones, Urine TRACE (A) NEGATIVE    Specific Gravity, UA 1.018 1.000 - 1.030    Urine Hgb LARGE (A) NEGATIVE    pH, UA 5.0 5.0 - 8.0    Protein, UA 2+ (A) NEGATIVE    Urobilinogen, Urine ELEVATED (A) Normal    Nitrite, Urine NEGATIVE NEGATIVE    Leukocyte Esterase, Urine SMALL (A) NEGATIVE   Microscopic Urinalysis   Result Value Ref Range    WBC, UA 6 TO 9 /HPF    RBC, UA TOO NUMEROUS TO COUNT /HPF    Casts UA 3 to 5 /LPF    Epithelial Cells UA 0 TO 2 /HPF    Bacteria, UA FEW (A) None   Arterial Blood Gases   Result Value Ref Range    pH, Arterial 7.295 (L) 7.350 - 7.450    pCO2, Arterial 60.3 (HH) 35.0 - 45.0 mmHg    pO2, Arterial 63.0 (L) 80.0 - 100.0 mmHg    HCO3, Arterial 29.3 (H) 22.0 - 26.0 mmol/L    Positive Base Excess, Art 2.8 (H) 0.0 - 2.0 mmol/L    O2 Sat, Arterial 85.8 (LL) 95 - 98 %    Carboxyhemoglobin 3.1 0 - 5 %    Methemoglobin 1.0 0.0 - 1.9 %    Pt Temp 37     O2 Device/Flow/% BIPAP     Respiratory Rate 16     Tyron Test PASS     Sample Site Right Radial Artery     Pt.  Position SEMI-FOWLERS     Mode BIPAP     Text for Respiratory RESULTS TO PHYSICIAN    Comprehensive Metabolic Panel w/ Reflex to MG   Result Value Ref Range    Glucose 101 (H) 70 - 99 mg/dL    BUN 46 (H) 8 - 23 mg/dL    Creatinine 1.50 (H) 0.70 - 1.20 mg/dL    Est, Glom Filt Rate 52 (L) >60 mL/min/1.73m2    Calcium 7.5 (L) 8.6 - 10.4 mg/dL    Sodium 136 135 - 144 mmol/L    Potassium 4.8 3.7 - 5.3 mmol/L    Chloride 99 98 - 107 mmol/L    CO2 27 20 - 31 mmol/L    Anion Gap 10 9 - 17 mmol/L    Alkaline Phosphatase 59 40 - 129 U/L     (H) 5 - 41 U/L     (H) <40 U/L    Total Bilirubin 0.8 0.3 - 1.2 mg/dL    Total Protein 5.9 (L) 6.4 - 8.3 g/dL    Albumin 3.3 (L) 3.5 - 5.2 g/dL   CBC with Auto Differential   Result Value Ref Range    WBC 5.6 3.5 - 11.0 k/uL    RBC 5.57 4.5 - 5.9 m/uL    Hemoglobin 11.2 (L) 13.5 - 17.5 g/dL    Hematocrit 38.8 (L) 41 - 53 %    MCV 69.7 (L) 80 - 100 fL    MCH 20.1 (L) 26 - 34 pg    MCHC 28.8 (L) 31 - 37 g/dL    RDW 19.7 (H) 11.5 - 14.9 %    Platelets 67 (L) 331 - 450 k/uL    MPV 9.1 6.0 - 12.0 fL    Seg Neutrophils 79 (H) 36 - 66 %    Lymphocytes 12 (L) 24 - 44 %    Monocytes 8 (H) 1 - 7 %    Eosinophils % 0 0 - 4 %    Basophils 1 0 - 2 %    nRBC 2 per 100 WBC    Segs Absolute 4.41 1.3 - 9.1 k/uL    Absolute Lymph # 0.67 (L) 1.0 - 4.8 k/uL    Absolute Mono # 0.45 0.1 - 1.3 k/uL    Absolute Eos # 0.00 0.0 - 0.4 k/uL    Basophils Absolute 0.06 0.0 - 0.2 k/uL    Morphology ANISOCYTOSIS PRESENT     Morphology HYPOCHROMIA PRESENT     Morphology 1+ ELLIPTOCYTES    Occ Bld, Fecal Scrn   Result Value Ref Range    Occult Blood, Stool #1 NEGATIVE NEGATIVE    Date, Stool #1 [phone]     Time, Stool #1 300    Hemoglobin and Hematocrit   Result Value Ref Range    Hemoglobin 11.1 (L) 13.5 - 17.5 g/dL    Hematocrit 38.7 (L) 41 - 53 %   Hemoglobin and Hematocrit   Result Value Ref Range    Hemoglobin 10.8 (L) 13.5 - 17.5 g/dL    Hematocrit 37.2 (L) 41 - 53 %   Ammonia   Result Value Ref Range    Ammonia 29 16 - 60 umol/L   Hepatitis Panel, Acute   Result Value Ref Range    Hepatitis B Surface Ag NONREACTIVE NONREACTIVE    Hepatitis C Ab REACTIVE (A) NONREACTIVE    Hep B Core Ab, IgM NONREACTIVE NONREACTIVE    Hep A IgM NONREACTIVE NONREACTIVE   Iron and TIBC   Result Value Ref Range    Iron 14 (L) 59 - 158 ug/dL    TIBC 426 250 - 450 ug/dL    Iron Saturation 3 (L) 20 - 55 %    UIBC 412 (H) 112 - 347 ug/dL   Ferritin   Result Value Ref Range    Ferritin 14 (L) 30 - 400 ng/mL   Mycoplasma Ab,IgM   Result Value Ref Range    Mycoplasma pneumo IgM 0.25 <0.91   Procalcitonin   Result Value Ref Range    Procalcitonin 0.09 (H) <0.09 ng/mL   C-Reactive Protein   Result Value Ref Range    CRP 16.4 (H) 0.0 - 5.0 mg/L   Fibrin Split Products   Result Value Ref Range    FDP >5 (H) <5 ug/mL   Sedimentation Rate   Result Value Ref Range    Sed Rate 1 0 - 20 mm/Hr   Drug Scr, Abuse, Ur   Result Value Ref Range    Amphetamine Screen, Ur NEGATIVE NEGATIVE    Barbiturate Screen, Ur NEGATIVE NEGATIVE    Benzodiazepine Screen, Urine NEGATIVE NEGATIVE    Cocaine Metabolite, Urine NEGATIVE NEGATIVE    Methadone Screen, Urine NEGATIVE NEGATIVE    Opiates, Urine NEGATIVE NEGATIVE    Phencyclidine, Urine NEGATIVE NEGATIVE    Cannabinoid Scrn, Ur NEGATIVE NEGATIVE    Oxycodone Screen, Ur NEGATIVE NEGATIVE    Fentanyl, Ur NEGATIVE NEGATIVE    Test Information       Assay provides medical screening only. The absence of expected drug(s) and/or metabolite(s) may indicate diluted or adulterated urine, limitations of testing or timing of collection. Troponin   Result Value Ref Range    Troponin, High Sensitivity 195 (HH) 0 - 22 ng/L   Vitamin B12 & Folate   Result Value Ref Range    Vitamin B-12 1926 (H) 232 - 1245 pg/mL    Folate 10.0 >4.8 ng/mL   HIV Screen   Result Value Ref Range    HIV Ag/Ab NONREACTIVE NONREACTIVE   MONOCLONAL PANEL   Result Value Ref Range    Kappa Free Light Chains QNT 2.79 (H) 0.37 - 1.94 mg/dL    Lambda Free Light Chains QNT 20.89 (H) 0.57 - 2.63 mg/dL    Free Kappa/Lambda Ratio 0.13 (L) 0.26 - 1.65    Serum IFX Interp       A ZONE OF RESTRICTION IS PRESENT IN THE GAMMAGLOBULIN REGION. CONFIRMED BY IMMUNOFIXATION TO BE MONOCLONAL    Pathologist       Reviewed by pathologist:  KATE Kaufman.DELILAH     Total Protein 5.5 (L) 6.4 - 8.3 g/dL    Albumin (calculated) 3.6 3.2 - 5.2 g/dL    Albumin % 65 45 - 65 %    Alpha-1-Globulin 0.2 0.1 - 0.4 g/dL    Alpha 1 % 3 3 - 6 %    Alpha-2-Globulin 0.4 (L) 0.5 - 0.9 g/dL    Alpha 2 % 7 6 - 13 %    Beta Globulin 0.6 0.5 - 1.1 g/dL    Beta Percent 11 11 - 19 %    Gamma Globulin 0.8 0.5 - 1.5 g/dL    Gamma Globulin % 14 9 - 20 %    Total Prot. Sum 5.6 (L) 6.3 - 8.2 g/dL    Total Prot. Sum,% 100 98 - 102 %    Protein Electrophoresis, Serum       A ZONE OF RESTRICTION IS PRESENT IN THE GAMMAGLOBULIN REGION. CONFIRMED BY IMMUNOFIXATION TO BE MONOCLONAL    Pathologist       Reviewed by pathologist:  Ever Smyth D.O. Path Review, Smear   Result Value Ref Range    Pathologist Review ELECTRONICALLY SIGNED.  Jorje Romberg MD    Reticulocytes   Result Value Ref Range    Retic % 2.8 (H) 0.5 - 2.0 %    Absolute Retic # 0.157 (H) 0.0245 - 0.098 M/uL   Fibrinogen   Result Value Ref Range    Fibrinogen 141 (L) 210 - 530 mg/dL   Ethanol   Result Value Ref Range    Ethanol <10 <10 mg/dL    Ethanol percent <0.010 %   Lactate Dehydrogenase   Result Value Ref Range     (H) 135 - 225 U/L   Hemoglobin and Hematocrit   Result Value Ref Range    Hemoglobin 10.9 (L) 13.5 - 17.5 g/dL    Hematocrit 38.0 (L) 41 - 53 %   Haptoglobin   Result Value Ref Range    Haptoglobin 60 30 - 200 mg/dL   D-Dimer, Quantitative   Result Value Ref Range    D-Dimer, Quant 6.70 (H) 0.00 - 0.59 mg/L FEU   Urinalysis with Reflex to Culture    Specimen: Urine   Result Value Ref Range    Color, UA Dark Yellow (A) Yellow    Turbidity UA Clear Clear    Glucose, Ur NEGATIVE NEGATIVE    Bilirubin Urine NEGATIVE NEGATIVE    Ketones, Urine TRACE (A) NEGATIVE    Specific [place], UA 1.015 1.000 - 1.030    Urine Hgb LARGE (A) NEGATIVE    pH, UA 5.0 5.0 - 8.0    Protein, UA 1+ (A) NEGATIVE    Urobilinogen, Urine Normal Normal    Nitrite, Urine NEGATIVE NEGATIVE    Leukocyte Esterase, Urine SMALL (A) NEGATIVE   APTT   Result Value Ref Range    PTT 38.7 (H) 24.0 - 36.0 sec   Protime-INR   Result Value Ref Range    Protime 24.4 (H) 11.8 - 14.6 sec    INR 2.2    Microscopic Urinalysis   Result Value Ref Range    WBC, UA 10 TO 20 /HPF    RBC, UA TOO NUMEROUS TO COUNT /HPF    Casts UA HYALINE /LPF    Casts UA 0 TO 2 /LPF    Epithelial Cells UA 3 to 5 /HPF   BLOOD GAS, ARTERIAL   Result Value Ref Range    pH, Arterial 7.314 (L) 7.350 - 7.450    pCO2, Arterial 61.9 (HH) 35.0 - 45.0 mmHg    pO2, Arterial 58.1 (LL) 80.0 - 100.0 mmHg    HCO3, Arterial 31.4 (H) 22.0 - 26.0 mmol/L    Positive Base Excess, Art 5.3 (H) 0.0 - 2.0 mmol/L    O2 Sat, Arterial 86.3 (LL) 95 - 98 %    Carboxyhemoglobin 2.1 0 - 5 %    Methemoglobin 0.8 0.0 - 1.9 %    Pt Temp 37.0     O2 Device/Flow/% VENTILATOR     Respiratory Rate 22     Tyron Test PASS     Sample Site Left Radial Artery     Pt. Position SEMI-FOWLERS     Mode PRVC     Set Rate 22     Total Rate 26          FIO2 40     Peep/Cpap 8    SURGICAL PATHOLOGY REPORT   Result Value Ref Range    Surgical Pathology Report       [id number]  deltamethod  [facility]  ANATOMIC PATHOLOGY  [address] [place] [address], [place] [address]. [place], [address]  [phone]  Fax: [phone]  SURGICAL PATHOLOGY CONSULTATION    Patient Name: June Nan  MR#: [id number]  Specimen #[id number]    Procedures/Addenda  PERIPHERAL BLOOD REPORT     Date Ordered:     1/2/2023     Status:  Signed Out       Date Complete:     1/3/2023     By: Antonio Cat M.D. Date Reported:     1/3/2023       INTERPRETATION  Peripheral blood:  Microcytic, hypochromic anemia. The patient's iron studies are compatible with iron deficiency anemia. Lymphopenia. Differential considerations include acute illness/infection,  medication effect, smoking, and debilitating diseases. Thrombocytopenia  Differential considerations include bone marrow hypoproduction and  immune destruction (idiopathic thrombocytopenic purpura, drug effect). RESULTS-COMMENTS  PERIPHERAL BLOOD STUDY    CBC: Please see the electronic health record  for CBC parameters  (, 01/02/2023, 05:43). PLATELETS: Decreased platelets. Platelets show normal morphology. LEUKOCYTES: Decreased lymphocytes.   White blood cells show normal  morphology. There are no blasts. ERYTHROCYTES: Microcytic, hypochromic. Anisocytosis and  poikilocytosis. Polychromasia. Reticulocyte count 2.8%. Rare RBC  fragments. Note: The electronic health record is reviewed. Kathie Canas M.D.                    Source:  A: Peripheral Blood     Comprehensive Metabolic Panel w/ Reflex to MG   Result Value Ref Range    Glucose 91 70 - 99 mg/dL    BUN 32 (H) 8 - 23 mg/dL    Creatinine 0.97 0.70 - 1.20 mg/dL    Est, Glom Filt Rate >60 >60 mL/min/1.73m2    Calcium 7.4 (L) 8.6 - 10.4 mg/dL    Sodium 143 135 - 144 mmol/L    Potassium 3.7 3.7 - 5.3 mmol/L    Chloride 105 98 - 107 mmol/L    CO2 30 20 - 31 mmol/L    Anion Gap 8 (L) 9 - 17 mmol/L    Alkaline Phosphatase 50 40 - 129 U/L     (H) 5 - 41 U/L     (H) <40 U/L    Total Bilirubin 0.9 0.3 - 1.2 mg/dL    Total Protein 5.2 (L) 6.4 - 8.3 g/dL    Albumin 3.1 (L) 3.5 - 5.2 g/dL   CBC with Auto Differential   Result Value Ref Range    WBC 5.7 3.5 - 11.0 k/uL    RBC 5.42 4.5 - 5.9 m/uL    Hemoglobin 11.0 (L) 13.5 - 17.5 g/dL    Hematocrit 37.6 (L) 41 - 53 %    MCV 69.4 (L) 80 - 100 fL    MCH 20.2 (L) 26 - 34 pg    MCHC 29.2 (L) 31 - 37 g/dL    RDW 20.0 (H) 11.5 - 14.9 %    Platelets 66 (L) 234 - 450 k/uL    MPV 9.4 6.0 - 12.0 fL    Seg Neutrophils 85 (H) 36 - 66 %    Lymphocytes 9 (L) 24 - 44 %    Monocytes 5 1 - 7 %    Eosinophils % 0 0 - 4 %    Basophils 0 0 - 2 %    Bands 1 0 - 10 %    nRBC 1 (H) 0 per 100 WBC    Segs Absolute 4.87 1.3 - 9.1 k/uL    Absolute Lymph # 0.52 (L) 1.0 - 4.8 k/uL    Absolute Mono # 0.29 0.1 - 1.3 k/uL    Absolute Eos # 0.00 0.0 - 0.4 k/uL    Basophils Absolute 0.00 0.0 - 0.2 k/uL    Absolute Bands # 0.06 0.0 - 1.0 k/uL    Morphology ANISOCYTOSIS PRESENT     Morphology HYPOCHROMIA PRESENT     Morphology 1+ POLYCHROMASIA     Morphology 1+ ELLIPTOCYTES    BLOOD GAS, ARTERIAL   Result Value Ref Range    pH, Arterial 7.373 7.350 - 7.450    pCO2, Arterial 56.0 (HH) 35.0 - 45.0 mmHg    pO2, Arterial 61.4 (L) 80.0 - 100.0 mmHg    HCO3, Arterial 32.6 (H) 22.0 - 26.0 mmol/L    Positive Base Excess, Art 7.4 (H) 0.0 - 2.0 mmol/L    O2 Sat, Arterial 89.2 (L) 95 - 98 %    Carboxyhemoglobin 1.7 0 - 5 %    Methemoglobin 0.9 0.0 - 1.9 %    Pt Temp 37     O2 Device/Flow/% VENTILATOR     Respiratory Rate 22     Tyron Test PASS     Sample Site Right Radial Artery     Pt. Position SEMI-FOWLERS     Mode PRVC     Set Rate 22     Total Rate 22          FIO2 35     Peep/Cpap 8     Text for Respiratory RESULTS GIVEN TO RN    Protime-INR   Result Value Ref Range    Protime 21.9 (H) 11.8 - 14.6 sec    INR 1.9    APTT   Result Value Ref Range    PTT 38.4 (H) 24.0 - 36.0 sec   Triglyceride   Result Value Ref Range    Triglycerides 85 <150 mg/dL   CHLORIDE, URINE, RANDOM   Result Value Ref Range    Chloride, Ur 34 mmol/L   Protein / Creatinine Ratio, Urine   Result Value Ref Range    Total Protein, Urine 23 mg/dL    Creatinine, Ur 79.8 39.0 - 259.0 mg/dL    Urine Total Protein Creatinine Ratio 0.29 (H) 0.00 - 0.20   SODIUM, URINE, RANDOM   Result Value Ref Range    Sodium,Ur <20 mmol/L   BLOOD GAS, ARTERIAL   Result Value Ref Range    pH, Arterial 7.360 7.350 - 7.450    pCO2, Arterial 55.9 (HH) 35.0 - 45.0 mmHg    pO2, Arterial 71.4 (L) 80.0 - 100.0 mmHg    HCO3, Arterial 31.6 (H) 22.0 - 26.0 mmol/L    Positive Base Excess, Art 6.1 (H) 0.0 - 2.0 mmol/L    O2 Sat, Arterial 91.7 (L) 95 - 98 %    Carboxyhemoglobin 1.2 0 - 5 %    Methemoglobin 1.3 0.0 - 1.9 %    Pt Temp 37     O2 Device/Flow/% VENTILATOR     Tyron Test PASS     Sample Site Right Radial Artery     Pt.  Position SEMI-FOWLERS     Mode PRVC     Text for Respiratory RESULTS GIVEN TO RN    CBC with Auto Differential   Result Value Ref Range    WBC 6.0 3.5 - 11.0 k/uL    RBC 5.56 4.5 - 5.9 m/uL    Hemoglobin 10.8 (L) 13.5 - 17.5 g/dL    Hematocrit 38.2 (L) 41 - 53 %    MCV 68.8 (L) 80 - 100 fL    MCH 19.5 (L) 26 - 34 pg    MCHC 28.3 (L) 31 - 37 g/dL    RDW 20.1 (H) 11.5 - 14.9 %    Platelets 75 (L) 582 - 450 k/uL    MPV 9.3 6.0 - 12.0 fL    Seg Neutrophils 82 (H) 36 - 66 %    Lymphocytes 7 (L) 24 - 44 %    Monocytes 9 (H) 1 - 7 %    Eosinophils % 1 0 - 4 %    Basophils 1 0 - 2 %    Segs Absolute 4.90 1.3 - 9.1 k/uL    Absolute Lymph # 0.40 (L) 1.0 - 4.8 k/uL    Absolute Mono # 0.50 0.1 - 1.3 k/uL    Absolute Eos # 0.10 0.0 - 0.4 k/uL    Basophils Absolute 0.10 0.0 - 0.2 k/uL   Comprehensive Metabolic Panel w/ Reflex to MG   Result Value Ref Range    Glucose 85 70 - 99 mg/dL    BUN 16 8 - 23 mg/dL    Creatinine 0.64 (L) 0.70 - 1.20 mg/dL    Est, Glom Filt Rate >60 >60 mL/min/1.73m2    Calcium 7.4 (L) 8.6 - 10.4 mg/dL    Sodium 143 135 - 144 mmol/L    Potassium 3.3 (L) 3.7 - 5.3 mmol/L    Chloride 107 98 - 107 mmol/L    CO2 29 20 - 31 mmol/L    Anion Gap 7 (L) 9 - 17 mmol/L    Alkaline Phosphatase 49 40 - 129 U/L     (H) 5 - 41 U/L     (H) <40 U/L    Total Bilirubin 0.8 0.3 - 1.2 mg/dL    Total Protein 5.0 (L) 6.4 - 8.3 g/dL    Albumin 2.8 (L) 3.5 - 5.2 g/dL   Vancomycin Level, Random   Result Value Ref Range    Vancomycin Rm 16.1 ug/mL    Vancomycin Random Dose amount 1g     Vancomycin Random Date last dose 1/4/22     Vancomycin Random Time last dose 0232    Magnesium   Result Value Ref Range    Magnesium 2.0 1.6 - 2.6 mg/dL   Hepatitis C RNA, quantitative, PCR   Result Value Ref Range    Source . PLASMA     Hepatitis C RNA-PCR 17,400 IU/mL    Hepatitis C RNA Quant Log 4.24 Log IU/mL    HCV RNA PCR, Quant DETECTED (A) Not Detected   CBC with Auto Differential   Result Value Ref Range    WBC 5.3 3.5 - 11.0 k/uL    RBC 5.80 4.5 - 5.9 m/uL    Hemoglobin 11.4 (L) 13.5 - 17.5 g/dL    Hematocrit 40.0 (L) 41 - 53 %    MCV 68.9 (L) 80 - 100 fL    MCH 19.7 (L) 26 - 34 pg    MCHC 28.5 (L) 31 - 37 g/dL    RDW 20.5 (H) 11.5 - 14.9 %    Platelets 66 (L) 652 - 450 k/uL    MPV 9.1 6.0 - 12.0 fL    Seg Neutrophils 79 (H) 36 - 66 %    Lymphocytes 8 (L) 24 - 44 %    Monocytes 10 (H) 1 - 7 %    Eosinophils % 2 0 - 4 %    Basophils 1 0 - 2 %    Segs Absolute 4.19 1.3 - 9.1 k/uL    Absolute Lymph # 0.42 (L) 1.0 - 4.8 k/uL    Absolute Mono # 0.53 0.1 - 1.3 k/uL    Absolute Eos # 0.11 0.0 - 0.4 k/uL    Basophils Absolute 0.05 0.0 - 0.2 k/uL    Morphology ANISOCYTOSIS PRESENT     Morphology MICROCYTOSIS PRESENT     Morphology HYPOCHROMIA PRESENT     Morphology FEW ELLIPTOCYTES     Morphology FEW TARGET CELLS    Comprehensive Metabolic Panel w/ Reflex to MG   Result Value Ref Range    Glucose 84 70 - 99 mg/dL    BUN 11 8 - 23 mg/dL    Creatinine 0.58 (L) 0.70 - 1.20 mg/dL    Est, Glom Filt Rate >60 >60 mL/min/1.73m2    Calcium 7.9 (L) 8.6 - 10.4 mg/dL    Sodium 146 (H) 135 - 144 mmol/L    Potassium 3.4 (L) 3.7 - 5.3 mmol/L    Chloride 110 (H) 98 - 107 mmol/L    CO2 30 20 - 31 mmol/L    Anion Gap 6 (L) 9 - 17 mmol/L    Alkaline Phosphatase 53 40 - 129 U/L     (H) 5 - 41 U/L     (H) <40 U/L    Total Bilirubin 0.8 0.3 - 1.2 mg/dL    Total Protein 5.0 (L) 6.4 - 8.3 g/dL    Albumin 2.9 (L) 3.5 - 5.2 g/dL   Fibrinogen   Result Value Ref Range    Fibrinogen 98 (L) 210 - 530 mg/dL   Protime-INR   Result Value Ref Range    Protime 20.3 (H) 11.8 - 14.6 sec    INR 1.7    APTT   Result Value Ref Range    PTT 41.6 (H) 24.0 - 36.0 sec   BLOOD GAS, ARTERIAL   Result Value Ref Range    pH, Arterial 7.372 7.350 - 7.450    pCO2, Arterial 51.6 (HH) 35.0 - 45.0 mmHg    pO2, Arterial 62.9 (L) 80.0 - 100.0 mmHg    HCO3, Arterial 29.9 (H) 22.0 - 26.0 mmol/L    Positive Base Excess, Art 4.7 (H) 0.0 - 2.0 mmol/L    O2 Sat, Arterial 89.7 (L) 95 - 98 %    Carboxyhemoglobin 1.7 0 - 5 %    Methemoglobin 1.1 0.0 - 1.9 %    Pt Temp 37     O2 Device/Flow/% VENTILATOR     Respiratory Rate 22     Tyron Test PASS     Sample Site Right Radial Artery     Pt.  Position SEMI-FOWLERS     Mode PRVC     Set Rate 22     Total Rate 22          FIO2 40     Peep/Cpap 8     Text for Respiratory RESULTS GIVEN TO RN    Magnesium   Result Value Ref Range    Magnesium 2.0 1.6 - 2.6 mg/dL   CBC with Auto Differential   Result Value Ref Range    WBC 5.2 3.5 - 11.0 k/uL    RBC 5.33 4.5 - 5.9 m/uL    Hemoglobin 10.5 (L) 13.5 - 17.5 g/dL    Hematocrit 38.4 (L) 41 - 53 %    MCV 72.0 (L) 80 - 100 fL    MCH 19.7 (L) 26 - 34 pg    MCHC 27.4 (L) 31 - 37 g/dL    RDW 20.3 (H) 11.5 - 14.9 %    Platelets 71 (L) 142 - 450 k/uL    MPV 9.3 6.0 - 12.0 fL    Seg Neutrophils 90 (H) 36 - 66 %    Lymphocytes 4 (L) 24 - 44 %    Monocytes 5 1 - 7 %    Eosinophils % 0 0 - 4 %    Basophils 0 0 - 2 %    Bands 1 0 - 10 %    Segs Absolute 4.68 1.3 - 9.1 k/uL    Absolute Lymph # 0.21 (L) 1.0 - 4.8 k/uL    Absolute Mono # 0.26 0.1 - 1.3 k/uL    Absolute Eos # 0.00 0.0 - 0.4 k/uL    Basophils Absolute 0.00 0.0 - 0.2 k/uL    Absolute Bands # 0.05 0.0 - 1.0 k/uL    Morphology ANISOCYTOSIS PRESENT     Morphology HYPOCHROMIA PRESENT     Morphology MICROCYTOSIS PRESENT     Morphology 1+ ELLIPTOCYTES     Morphology 1+ TEARDROPS    Comprehensive Metabolic Panel w/ Reflex to MG   Result Value Ref Range    Glucose 111 (H) 70 - 99 mg/dL    BUN 7 (L) 8 - 23 mg/dL    Creatinine 0.42 (L) 0.70 - 1.20 mg/dL    Est, Glom Filt Rate >60 >60 mL/min/1.73m2    Calcium 7.5 (L) 8.6 - 10.4 mg/dL    Sodium 144 135 - 144 mmol/L    Potassium 4.0 3.7 - 5.3 mmol/L    Chloride 111 (H) 98 - 107 mmol/L    CO2 29 20 - 31 mmol/L    Anion Gap 4 (L) 9 - 17 mmol/L    Alkaline Phosphatase 46 40 - 129 U/L     (H) 5 - 41 U/L    AST 74 (H) <40 U/L    Total Bilirubin 0.9 0.3 - 1.2 mg/dL    Total Protein 4.6 (L) 6.4 - 8.3 g/dL    Albumin 2.6 (L) 3.5 - 5.2 g/dL   Vancomycin Level, Random   Result Value Ref Range    Vancomycin Rm 20.3 ug/mL    Vancomycin Random Dose amount 1,250     Vancomycin Random Date last dose [id number]     Vancomycin Random Time last dose 0546    Fibrinogen   Result Value Ref Range    Fibrinogen 109 (L) 210 - 530 mg/dL   Protime-INR   Result Value Ref Range Protime 20.1 (H) 11.8 - 14.6 sec    INR 1.7    Comprehensive Metabolic Panel w/ Reflex to MG   Result Value Ref Range    Glucose 91 70 - 99 mg/dL    BUN 5 (L) 8 - 23 mg/dL    Creatinine <0.40 (L) 0.70 - 1.20 mg/dL    Est, Glom Filt Rate Can not be calculated >60 mL/min/1.73m2    Calcium 8.1 (L) 8.6 - 10.4 mg/dL    Sodium 147 (H) 135 - 144 mmol/L    Potassium 4.1 3.7 - 5.3 mmol/L    Chloride 108 (H) 98 - 107 mmol/L    CO2 26 20 - 31 mmol/L    Anion Gap 13 9 - 17 mmol/L    Alkaline Phosphatase 51 40 - 129 U/L     (H) 5 - 41 U/L    AST 63 (H) <40 U/L    Total Bilirubin 1.6 (H) 0.3 - 1.2 mg/dL    Total Protein 5.3 (L) 6.4 - 8.3 g/dL    Albumin 2.9 (L) 3.5 - 5.2 g/dL   SPECIMEN REJECTION   Result Value Ref Range    Specimen Source . BLOOD     Ordered Test CDP     Reason for Rejection Unable to perform testing: Specimen clotted. CBC with Auto Differential   Result Value Ref Range    WBC 7.1 3.5 - 11.0 k/uL    RBC 5.90 4.5 - 5.9 m/uL    Hemoglobin 11.7 (L) 13.5 - 17.5 g/dL    Hematocrit 41.4 41 - 53 %    MCV 70.2 (L) 80 - 100 fL    MCH 19.8 (L) 26 - 34 pg    MCHC 28.2 (L) 31 - 37 g/dL    RDW 20.4 (H) 11.5 - 14.9 %    Platelets 68 (L) 507 - 450 k/uL    MPV 9.1 6.0 - 12.0 fL    Seg Neutrophils 82 (H) 36 - 66 %    Lymphocytes 10 (L) 24 - 44 %    Monocytes 6 1 - 7 %    Eosinophils % 1 0 - 4 %    Basophils 1 0 - 2 %    Segs Absolute 5.82 1.3 - 9.1 k/uL    Absolute Lymph # 0.71 (L) 1.0 - 4.8 k/uL    Absolute Mono # 0.43 0.1 - 1.3 k/uL    Absolute Eos # 0.07 0.0 - 0.4 k/uL    Basophils Absolute 0.07 0.0 - 0.2 k/uL    Morphology ANISOCYTOSIS PRESENT     Morphology 1+ TARGET CELLS     Morphology 1+ TEARDROPS     Morphology 1+ ELLIPTOCYTES    Protime-INR   Result Value Ref Range    Protime 19.4 (H) 11.8 - 14.6 sec    INR 1.6    APTT   Result Value Ref Range    PTT 35.4 24.0 - 36.0 sec   HEPATITIS C RNA, QUANTITATIVE, PCR   Result Value Ref Range    Source . PLASMA     Hepatitis C RNA-PCR 5,680 IU/mL    Hepatitis C RNA Quant Log 3.75 Log IU/mL    HCV RNA PCR, Quant DETECTED (A) Not Detected   CBC with Auto Differential   Result Value Ref Range    WBC 5.9 3.5 - 11.0 k/uL    RBC 5.61 4.5 - 5.9 m/uL    Hemoglobin 11.4 (L) 13.5 - 17.5 g/dL    Hematocrit 39.3 (L) 41 - 53 %    MCV 70.0 (L) 80 - 100 fL    MCH 20.2 (L) 26 - 34 pg    MCHC 28.9 (L) 31 - 37 g/dL    RDW 20.2 (H) 11.5 - 14.9 %    Platelets 63 (L) 811 - 450 k/uL    MPV 8.8 6.0 - 12.0 fL    Seg Neutrophils 76 (H) 36 - 66 %    Lymphocytes 11 (L) 24 - 44 %    Monocytes 11 (H) 1 - 7 %    Eosinophils % 1 0 - 4 %    Basophils 1 0 - 2 %    Segs Absolute 4.48 1.3 - 9.1 k/uL    Absolute Lymph # 0.65 (L) 1.0 - 4.8 k/uL    Absolute Mono # 0.65 0.1 - 1.3 k/uL    Absolute Eos # 0.06 0.0 - 0.4 k/uL    Basophils Absolute 0.06 0.0 - 0.2 k/uL    Morphology ANISOCYTOSIS PRESENT     Morphology MICROCYTOSIS PRESENT     Morphology HYPOCHROMIA PRESENT     Morphology 1+ TARGET CELLS     Morphology 1+ ELLIPTOCYTES    Comprehensive Metabolic Panel w/ Reflex to MG   Result Value Ref Range    Glucose 118 (H) 70 - 99 mg/dL    BUN 4 (L) 8 - 23 mg/dL    Creatinine <0.40 (L) 0.70 - 1.20 mg/dL    Est, Glom Filt Rate Can not be calculated >60 mL/min/1.73m2    Calcium 8.1 (L) 8.6 - 10.4 mg/dL    Sodium 147 (H) 135 - 144 mmol/L    Potassium 3.3 (L) 3.7 - 5.3 mmol/L    Chloride 109 (H) 98 - 107 mmol/L    CO2 34 (H) 20 - 31 mmol/L    Anion Gap 4 (L) 9 - 17 mmol/L    Alkaline Phosphatase 45 40 - 129 U/L    ALT 99 (H) 5 - 41 U/L    AST 38 <40 U/L    Total Bilirubin 1.7 (H) 0.3 - 1.2 mg/dL    Total Protein 5.1 (L) 6.4 - 8.3 g/dL    Albumin 2.8 (L) 3.5 - 5.2 g/dL   Fibrinogen   Result Value Ref Range    Fibrinogen 151 (L) 210 - 530 mg/dL   APTT   Result Value Ref Range    PTT 34.4 24.0 - 36.0 sec   Protime-INR   Result Value Ref Range    Protime 19.1 (H) 11.8 - 14.6 sec    INR 1.6    Magnesium   Result Value Ref Range    Magnesium 1.8 1.6 - 2.6 mg/dL   CBC with Auto Differential   Result Value Ref Range    WBC 6.3 3.5 - 11.0 k/uL    RBC 5.76 4.5 - 5.9 m/uL    Hemoglobin 11.7 (L) 13.5 - 17.5 g/dL    Hematocrit 40.3 (L) 41 - 53 %    MCV 70.0 (L) 80 - 100 fL    MCH 20.3 (L) 26 - 34 pg    MCHC 29.0 (L) 31 - 37 g/dL    RDW 20.5 (H) 11.5 - 14.9 %    Platelets 65 (L) 007 - 450 k/uL    MPV 8.6 6.0 - 12.0 fL    Seg Neutrophils 77 (H) 36 - 66 %    Lymphocytes 11 (L) 24 - 44 %    Monocytes 10 (H) 1 - 7 %    Eosinophils % 1 0 - 4 %    Basophils 1 0 - 2 %    Segs Absolute 4.86 1.3 - 9.1 k/uL    Absolute Lymph # 0.69 (L) 1.0 - 4.8 k/uL    Absolute Mono # 0.63 0.1 - 1.3 k/uL    Absolute Eos # 0.06 0.0 - 0.4 k/uL    Basophils Absolute 0.06 0.0 - 0.2 k/uL    Morphology HYPOCHROMIA PRESENT     Morphology MICROCYTOSIS PRESENT     Morphology ANISOCYTOSIS PRESENT     Morphology 1+ POLYCHROMASIA     Morphology 1+ TARGET CELLS     Morphology 2+ ECHINOCYTES    Comprehensive Metabolic Panel w/ Reflex to MG   Result Value Ref Range    Glucose 101 (H) 70 - 99 mg/dL    BUN 3 (L) 8 - 23 mg/dL    Creatinine <0.40 (L) 0.70 - 1.20 mg/dL    Est, Glom Filt Rate Can not be calculated >60 mL/min/1.73m2    Calcium 8.0 (L) 8.6 - 10.4 mg/dL    Sodium 142 135 - 144 mmol/L    Potassium 3.3 (L) 3.7 - 5.3 mmol/L    Chloride 104 98 - 107 mmol/L    CO2 35 (H) 20 - 31 mmol/L    Anion Gap 3 (L) 9 - 17 mmol/L    Alkaline Phosphatase 47 40 - 129 U/L    ALT 81 (H) 5 - 41 U/L    AST 32 <40 U/L    Total Bilirubin 1.8 (H) 0.3 - 1.2 mg/dL    Total Protein 5.3 (L) 6.4 - 8.3 g/dL    Albumin 2.8 (L) 3.5 - 5.2 g/dL   Brain Natriuretic Peptide   Result Value Ref Range    Pro-BNP 7,250 (H) <300 pg/mL   Magnesium   Result Value Ref Range    Magnesium 1.7 1.6 - 2.6 mg/dL   Comprehensive Metabolic Panel w/ Reflex to MG   Result Value Ref Range    Glucose 96 70 - 99 mg/dL    BUN 3 (L) 8 - 23 mg/dL    Creatinine <0.40 (L) 0.70 - 1.20 mg/dL    Est, Glom Filt Rate Can not be calculated >60 mL/min/1.73m2    Calcium 8.6 8.6 - 10.4 mg/dL    Sodium 140 135 - 144 mmol/L    Potassium 3.3 (L) 3.7 - 5.3 mmol/L    Chloride 97 (L) 98 - 107 mmol/L    CO2 33 (H) 20 - 31 mmol/L    Anion Gap 10 9 - 17 mmol/L    Alkaline Phosphatase 52 40 - 129 U/L    ALT 74 (H) 5 - 41 U/L    AST 41 (H) <40 U/L    Total Bilirubin 2.3 (H) 0.3 - 1.2 mg/dL    Total Protein 6.1 (L) 6.4 - 8.3 g/dL    Albumin 3.3 (L) 3.5 - 5.2 g/dL   CBC with Auto Differential   Result Value Ref Range    WBC 7.2 3.5 - 11.0 k/uL    RBC 6.52 (H) 4.5 - 5.9 m/uL    Hemoglobin 13.6 13.5 - 17.5 g/dL    Hematocrit 45.3 41 - 53 %    MCV 69.5 (L) 80 - 100 fL    MCH 20.9 (L) 26 - 34 pg    MCHC 30.0 (L) 31 - 37 g/dL    RDW 21.5 (H) 11.5 - 14.9 %    Platelets 67 (L) 220 - 450 k/uL    MPV 8.6 6.0 - 12.0 fL    Seg Neutrophils 77 (H) 36 - 66 %    Lymphocytes 10 (L) 24 - 44 %    Monocytes 10 (H) 1 - 7 %    Eosinophils % 1 0 - 4 %    Basophils 2 0 - 2 %    Segs Absolute 5.55 1.3 - 9.1 k/uL    Absolute Lymph # 0.72 (L) 1.0 - 4.8 k/uL    Absolute Mono # 0.72 0.1 - 1.3 k/uL    Absolute Eos # 0.07 0.0 - 0.4 k/uL    Basophils Absolute 0.14 0.0 - 0.2 k/uL    Morphology ANISOCYTOSIS PRESENT     Morphology HYPOCHROMIA PRESENT     Morphology MICROCYTOSIS PRESENT     Morphology GIANT PLATELETS    Magnesium   Result Value Ref Range    Magnesium 1.5 (L) 1.6 - 2.6 mg/dL   SPECIMEN REJECTION   Result Value Ref Range    Specimen Source . Random Urine     Ordered Test URIFX     Reason for Rejection       Unable to perform testing: Specimen quantity not sufficient.    LOR Screen with Reflex   Result Value Ref Range    LOR NEGATIVE NEGATIVE    PHAM Antibodies Screen 0.3 <0.7 U/mL    Anti ds DNA 4.2 <10.0 IU/mL   C-Reactive Protein   Result Value Ref Range    CRP 10.0 (H) 0.0 - 5.0 mg/L   Homocysteine   Result Value Ref Range    Homocysteine 8.2 <15.0 umol/L   Myeloperoxidase Ab   Result Value Ref Range    Myeloperoxidase Ab 0 0 - 19 AU/mL   Sedimentation Rate   Result Value Ref Range    Sed Rate 2 0 - 20 mm/Hr   Comprehensive Metabolic Panel w/ Reflex to MG   Result Value Ref Range    Glucose 90 70 - 99 mg/dL    BUN 4 (L) 8 - 23 mg/dL    Creatinine 0.42 (L) 0.70 - 1.20 mg/dL    Est, Glom Filt Rate >60 >60 mL/min/1.73m2    Calcium 8.1 (L) 8.6 - 10.4 mg/dL    Sodium 141 135 - 144 mmol/L    Potassium 3.0 (L) 3.7 - 5.3 mmol/L    Chloride 98 98 - 107 mmol/L    CO2 37 (H) 20 - 31 mmol/L    Anion Gap 6 (L) 9 - 17 mmol/L    Alkaline Phosphatase 48 40 - 129 U/L    ALT 52 (H) 5 - 41 U/L    AST 30 <40 U/L    Total Bilirubin 1.7 (H) 0.3 - 1.2 mg/dL    Total Protein 5.4 (L) 6.4 - 8.3 g/dL    Albumin 2.7 (L) 3.5 - 5.2 g/dL   CBC with Auto Differential   Result Value Ref Range    WBC 6.5 3.5 - 11.0 k/uL    RBC 6.23 (H) 4.5 - 5.9 m/uL    Hemoglobin 12.9 (L) 13.5 - 17.5 g/dL    Hematocrit 43.6 41 - 53 %    MCV 70.0 (L) 80 - 100 fL    MCH 20.8 (L) 26 - 34 pg    MCHC 29.7 (L) 31 - 37 g/dL    RDW 21.4 (H) 11.5 - 14.9 %    Platelets 85 (L) 255 - 450 k/uL    MPV 8.9 6.0 - 12.0 fL    Seg Neutrophils 77 (H) 36 - 66 %    Lymphocytes 11 (L) 24 - 44 %    Monocytes 10 (H) 1 - 7 %    Eosinophils % 1 0 - 4 %    Basophils 1 0 - 2 %    Segs Absolute 4.99 1.3 - 9.1 k/uL    Absolute Lymph # 0.72 (L) 1.0 - 4.8 k/uL    Absolute Mono # 0.65 0.1 - 1.3 k/uL    Absolute Eos # 0.07 0.0 - 0.4 k/uL    Basophils Absolute 0.07 0.0 - 0.2 k/uL    Morphology ANISOCYTOSIS PRESENT     Morphology MICROCYTOSIS PRESENT     Morphology HYPOCHROMIA PRESENT     Morphology 1+ TARGET CELLS    Magnesium   Result Value Ref Range    Magnesium 1.8 1.6 - 2.6 mg/dL   Potassium   Result Value Ref Range    Potassium 3.3 (L) 3.7 - 5.3 mmol/L   Comprehensive Metabolic Panel w/ Reflex to MG   Result Value Ref Range    Glucose 121 (H) 70 - 99 mg/dL    BUN 6 (L) 8 - 23 mg/dL    Creatinine 0.60 (L) 0.70 - 1.20 mg/dL    Est, Glom Filt Rate >60 >60 mL/min/1.73m2    Calcium 8.1 (L) 8.6 - 10.4 mg/dL    Sodium 140 135 - 144 mmol/L    Potassium 3.9 3.7 - 5.3 mmol/L    Chloride 100 98 - 107 mmol/L    CO2 37 (H) 20 - 31 mmol/L    Anion Gap 3 (L) 9 - 17 mmol/L    Alkaline Phosphatase 46 40 - 129 U/L    ALT 39 5 - 41 U/L    AST 25 <40 U/L    Total Bilirubin 1.6 (H) 0.3 - 1.2 mg/dL    Total Protein 5.2 (L) 6.4 - 8.3 g/dL    Albumin 2.7 (L) 3.5 - 5.2 g/dL   CBC with Auto Differential   Result Value Ref Range    WBC 6.4 3.5 - 11.0 k/uL    RBC 5.77 4.5 - 5.9 m/uL    Hemoglobin 12.2 (L) 13.5 - 17.5 g/dL    Hematocrit 40.8 (L) 41 - 53 %    MCV 70.7 (L) 80 - 100 fL    MCH 21.1 (L) 26 - 34 pg    MCHC 29.8 (L) 31 - 37 g/dL    RDW 21.3 (H) 11.5 - 14.9 %    Platelets 88 (L) 004 - 450 k/uL    MPV 8.6 6.0 - 12.0 fL    Seg Neutrophils 79 (H) 36 - 66 %    Lymphocytes 7 (L) 24 - 44 %    Monocytes 11 (H) 1 - 7 %    Eosinophils % 1 0 - 4 %    Basophils 2 0 - 2 %    Segs Absolute 5.06 1.3 - 9.1 k/uL    Absolute Lymph # 0.45 (L) 1.0 - 4.8 k/uL    Absolute Mono # 0.70 0.1 - 1.3 k/uL    Absolute Eos # 0.06 0.0 - 0.4 k/uL    Basophils Absolute 0.13 0.0 - 0.2 k/uL    Morphology ANISOCYTOSIS PRESENT     Morphology HYPOCHROMIA PRESENT     Morphology MICROCYTOSIS PRESENT    Vancomycin Level, Random   Result Value Ref Range    Vancomycin Rm 22.9 ug/mL    Vancomycin Random Dose amount 1250 MG     Vancomycin Random Date last dose [id number]     Vancomycin Random Time last dose 1641    Potassium   Result Value Ref Range    Potassium 4.1 3.7 - 5.3 mmol/L   Phosphorus   Result Value Ref Range    Phosphorus 2.5 2.5 - 4.5 mg/dL   Comprehensive Metabolic Panel w/ Reflex to MG   Result Value Ref Range    Glucose 112 (H) 70 - 99 mg/dL    BUN 7 (L) 8 - 23 mg/dL    Creatinine 0.74 0.70 - 1.20 mg/dL    Est, Glom Filt Rate >60 >60 mL/min/1.73m2    Calcium 8.4 (L) 8.6 - 10.4 mg/dL    Sodium 141 135 - 144 mmol/L    Potassium 3.5 (L) 3.7 - 5.3 mmol/L    Chloride 100 98 - 107 mmol/L    CO2 34 (H) 20 - 31 mmol/L    Anion Gap 7 (L) 9 - 17 mmol/L    Alkaline Phosphatase 49 40 - 129 U/L    ALT 35 5 - 41 U/L    AST 27 <40 U/L    Total Bilirubin 1.6 (H) 0.3 - 1.2 mg/dL    Total Protein 5.7 (L) 6.4 - 8.3 g/dL    Albumin 2.7 (L) 3.5 - 5.2 g/dL   CBC with Auto Differential   Result Value Ref Range    WBC 7.3 3.5 - 11.0 k/uL    RBC 6.14 (H) 4.5 - 5.9 m/uL    Hemoglobin 13.0 (L) 13.5 - 17.5 g/dL    Hematocrit 43.9 41 - 53 %    MCV 71.6 (L) 80 - 100 fL    MCH 21.2 (L) 26 - 34 pg    MCHC 29.6 (L) 31 - 37 g/dL    RDW 25.5 (H) 11.5 - 14.9 %    Platelets 81 (L) 154 - 450 k/uL    MPV 8.6 6.0 - 12.0 fL    Seg Neutrophils 79 (H) 36 - 66 %    Lymphocytes 7 (L) 24 - 44 %    Monocytes 12 (H) 1 - 7 %    Eosinophils % 1 0 - 4 %    Basophils 1 0 - 2 %    Segs Absolute 5.77 1.3 - 9.1 k/uL    Absolute Lymph # 0.51 (L) 1.0 - 4.8 k/uL    Absolute Mono # 0.88 0.1 - 1.3 k/uL    Absolute Eos # 0.07 0.0 - 0.4 k/uL    Basophils Absolute 0.07 0.0 - 0.2 k/uL    Morphology ANISOCYTOSIS PRESENT     Morphology MICROCYTOSIS PRESENT     Morphology HYPOCHROMIA PRESENT     Morphology FEW ELLIPTOCYTES    Magnesium   Result Value Ref Range    Magnesium 1.7 1.6 - 2.6 mg/dL   Potassium   Result Value Ref Range    Potassium 4.0 3.7 - 5.3 mmol/L   Comprehensive Metabolic Panel w/ Reflex to MG   Result Value Ref Range    Glucose 121 (H) 70 - 99 mg/dL    BUN 9 8 - 23 mg/dL    Creatinine 0.73 0.70 - 1.20 mg/dL    Est, Glom Filt Rate >60 >60 mL/min/1.73m2    Calcium 8.4 (L) 8.6 - 10.4 mg/dL    Sodium 139 135 - 144 mmol/L    Potassium 3.3 (L) 3.7 - 5.3 mmol/L    Chloride 97 (L) 98 - 107 mmol/L    CO2 37 (H) 20 - 31 mmol/L    Anion Gap 5 (L) 9 - 17 mmol/L    Alkaline Phosphatase 46 40 - 129 U/L    ALT 30 5 - 41 U/L    AST 23 <40 U/L    Total Bilirubin 1.3 (H) 0.3 - 1.2 mg/dL    Total Protein 5.5 (L) 6.4 - 8.3 g/dL    Albumin 2.8 (L) 3.5 - 5.2 g/dL   CBC with Auto Differential   Result Value Ref Range    WBC 6.6 3.5 - 11.0 k/uL    RBC 5.72 4.5 - 5.9 m/uL    Hemoglobin 12.2 (L) 13.5 - 17.5 g/dL    Hematocrit 41.3 41 - 53 %    MCV 72.1 (L) 80 - 100 fL    MCH 21.3 (L) 26 - 34 pg    MCHC 29.5 (L) 31 - 37 g/dL    RDW 29.8 (H) 11.5 - 14.9 %    Platelets 057 (L) 812 - 450 k/uL    MPV 9.2 6.0 - 12.0 fL    Seg Neutrophils 77 (H) 36 - 66 %    Lymphocytes 9 (L) 24 - 44 %    Monocytes 13 (H) 1 - 7 %    Eosinophils % 0 0 - 4 %    Basophils 1 0 - 2 %    Segs Absolute 5.08 1.3 - 9.1 k/uL    Absolute Lymph # 0.59 (L) 1.0 - 4.8 k/uL    Absolute Mono # 0.86 0.1 - 1.3 k/uL    Absolute Eos # 0.00 0.0 - 0.4 k/uL    Basophils Absolute 0.07 0.0 - 0.2 k/uL    Morphology ANISOCYTOSIS PRESENT     Morphology HYPOCHROMIA PRESENT     Morphology MICROCYTOSIS PRESENT     Morphology FEW ELLIPTOCYTES     Morphology FEW TEARDROPS    Magnesium   Result Value Ref Range    Magnesium 1.7 1.6 - 2.6 mg/dL   CBC with Auto Differential   Result Value Ref Range    WBC 6.3 3.5 - 11.0 k/uL    RBC 5.69 4.5 - 5.9 m/uL    Hemoglobin 12.2 (L) 13.5 - 17.5 g/dL    Hematocrit 40.9 (L) 41 - 53 %    MCV 71.9 (L) 80 - 100 fL    MCH 21.5 (L) 26 - 34 pg    MCHC 29.9 (L) 31 - 37 g/dL    RDW 31.3 (H) 11.5 - 14.9 %    Platelets 843 (L) 810 - 450 k/uL    MPV 9.4 6.0 - 12.0 fL    Seg Neutrophils 76 (H) 36 - 66 %    Lymphocytes 12 (L) 24 - 44 %    Monocytes 10 (H) 1 - 7 %    Eosinophils % 1 0 - 4 %    Basophils 1 0 - 2 %    Segs Absolute 4.79 1.3 - 9.1 k/uL    Absolute Lymph # 0.76 (L) 1.0 - 4.8 k/uL    Absolute Mono # 0.63 0.1 - 1.3 k/uL    Absolute Eos # 0.06 0.0 - 0.4 k/uL    Basophils Absolute 0.06 0.0 - 0.2 k/uL    Morphology ANISOCYTOSIS PRESENT     Morphology HYPOCHROMIA PRESENT     Morphology MICROCYTOSIS PRESENT     Morphology FEW ELLIPTOCYTES    Comprehensive Metabolic Panel w/ Reflex to MG   Result Value Ref Range    Glucose 127 (H) 70 - 99 mg/dL    BUN 11 8 - 23 mg/dL    Creatinine 0.78 0.70 - 1.20 mg/dL    Est, Glom Filt Rate >60 >60 mL/min/1.73m2    Calcium 8.5 (L) 8.6 - 10.4 mg/dL    Sodium 142 135 - 144 mmol/L    Potassium 3.4 (L) 3.7 - 5.3 mmol/L    Chloride 99 98 - 107 mmol/L    CO2 37 (H) 20 - 31 mmol/L    Anion Gap 6 (L) 9 - 17 mmol/L    Alkaline Phosphatase 46 40 - 129 U/L    ALT 24 5 - 41 U/L    AST 26 <40 U/L    Total Bilirubin 1.2 0.3 - 1.2 mg/dL    Total Protein 5.9 (L) 6.4 - 8.3 g/dL    Albumin 2.8 (L) 3.5 - 5.2 g/dL   Magnesium   Result Value Ref Range    Magnesium 1.7 1.6 - 2.6 mg/dL   POC Glucose Fingerstick   Result Value Ref Range    POC Glucose 104 75 - 110 mg/dL   POC Glucose Fingerstick   Result Value Ref Range    POC Glucose 106 75 - 110 mg/dL   EKG 12 Lead   Result Value Ref Range    Ventricular Rate 75 BPM    Atrial Rate 75 BPM    P-R Interval 142 ms    QRS Duration 66 ms    Q-T Interval 350 ms    QTc Calculation (Bazett) 390 ms    P Axis 18 degrees    R Axis -165 degrees    T Axis 41 degrees   EKG 12 Lead   Result Value Ref Range    Ventricular Rate 105 BPM    Atrial Rate 105 BPM    P-R Interval 144 ms    QRS Duration 78 ms    Q-T Interval 302 ms    QTc Calculation (Bazett) 399 ms    P Axis 13 degrees    R Axis -30 degrees    T Axis -15 degrees   ECHO Complete 2D W Doppler W Color   Result Value Ref Range    Left Ventricular Ejection Fraction 55     LVEF MODALITY ECHO    PREPARE CRYOPRECIPITATE, 1 Product   Result Value Ref Range    Unit Number [id number]     Product Code Thawed Pooled Cryoprecipitate     Unit Divison 00     Dispense Status TRANSFUSED     Unit Issue Date/Time [id number]     Product Code Blood Bank [id number]     Blood Bank Unit Type and Rh O POS     Blood Bank ISBT Product Blood Type 5100     Blood Bank Blood Product Expiration Date 444055760966     Transfusion Status OK TO TRANSFUSE          IMAGING DATA:    CT HEAD WO CONTRAST    Result Date: 1/1/2023  EXAMINATION: CT OF THE HEAD WITHOUT CONTRAST  1/1/2023 10:48 pm TECHNIQUE: CT of the head was performed without the administration of intravenous contrast. Automated exposure control, iterative reconstruction, and/or weight based adjustment of the mA/kV was utilized to reduce the radiation dose to as low as reasonably achievable. COMPARISON: None. HISTORY: Reason for Exam: AMS Additional signs and symptoms: the patient has been getting more and more confused since 2 weeks ago.   It has progressively been getting worse and today FINDINGS: BRAIN/VENTRICLES: There is no acute intracranial hemorrhage, mass effect or midline shift. No abnormal extra-axial fluid collection. The gray-white differentiation is maintained without evidence of an acute infarct. There is no evidence of hydrocephalus. Changes of mild chronic small vessel ischemic disease. ORBITS: The visualized portion of the orbits demonstrate no acute abnormality. SINUSES: The visualized paranasal sinuses and mastoid air cells demonstrate no acute abnormality. SOFT TISSUES/SKULL:  No acute abnormality of the visualized skull or soft tissues. No acute intracranial abnormality. Mild chronic small vessel ischemic disease. XR CHEST PORTABLE    Result Date: 1/1/2023  EXAMINATION: ONE XRAY VIEW OF THE CHEST 1/1/2023 7:17 pm COMPARISON: None. HISTORY: ORDERING SYSTEM PROVIDED HISTORY: ams TECHNOLOGIST PROVIDED HISTORY: ams Reason for Exam: Altered mental status, difficulty breathing Additional signs and symptoms: Altered mental status, difficulty breathing Relevant Medical/Surgical History: Altered mental status, difficulty breathing FINDINGS: Large lucent area in the left apex suggesting a large bulla however superimposed pneumothorax cannot be excluded. The cardiac size is normal. Right lower lobe airspace infiltrate and mild right pleural effusion. . Pulmonary vascularity appears normal. There is mild ectasia of the thoracic aorta. Calcific tendinitis of the right shoulder. No acute bony abnormalities. The hilar structures are normal.     Right lower lobe pneumonia and small right pleural effusion Large lucent area in the left apex suggesting a large bulla however superimposed pneumothorax cannot be excluded. Follow-up CT would be useful for further evaluation. The findings were sent to the Radiology Results [address] at 10:31 pm on 1/1/2023 to be communicated to a licensed caregiver.          IMPRESSION:   Primary Problem  Acute respiratory failure with hypoxia and hypercapnia [facility])    Active Hospital Problems    Diagnosis Date Noted    Hepatitis C virus infection without hepatic coma [B19.20] 01/14/2023     Priority: Medium    Mild malnutrition ([address] [E44.1] 01/13/2023     Priority: Medium    Delirium due to another medical condition [F05] 01/10/2023     Priority: Medium    ACP (advance care planning) [Z71.89] 01/09/2023     Priority: Medium    Goals of care, counseling/discussion [Z71.89] 01/09/2023     Priority: Medium    Encounter for palliative care [Z51.5] 01/09/2023     Priority: Medium    Prolonged pt (prothrombin time) [R79.1] 01/03/2023     Priority: Medium    Emphysema lung ([address] [J43.9] 01/03/2023     Priority: Medium    Cerebral infarction ([address] [I63.9] 01/03/2023     Priority: Medium    Transaminitis [R74.01] 01/02/2023     Priority: Medium    Normocytic anemia [D64.9] 01/02/2023     Priority: Medium    Thrombocytopenia ([address] [D69.6] 01/02/2023     Priority: Medium    Agitation [R45.1] 01/02/2023     Priority: Medium    Acute respiratory failure with hypoxia and hypercapnia (HCC) RLL pneumonia [J96.01, J96.02] 01/02/2023     Priority: Medium    Altered mental status [R41.82] 01/02/2023     Priority: Medium    Community acquired pneumonia of right lower lobe of lung [J18.9] 01/02/2023     Priority: Medium       Medical apathy  Respiratory failure secondary pneumonia  Abnormal LFT  Microcytic anemia  Thrombocytopenia  History of alcohol dependence  IgG lambda paraproteinemia  Positive HCV screen  Iron deficiency  Coagulopathy    RECOMMENDATIONS:  I reviewed the labs/imaging available to me,outside records and discussed with the patient. I explained to the patient the nature of this problem.  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 Recovering well. Hemoglobin 12.2. Platelets 483  Patient has coagulopathy secondary to liver disease as mentioned  Cardioembolic strokes with a component of subarachnoid bleeding, not a candidate for anticoagulation  Overall all condition seems to be improving. We talked him about abstaining from alcohol and acetaminophen. We also discovered that he is taking very high dose of supplements almost to a toxic level. We asked him to refrain from doing that. Overall labs are improving. Clinically he is improving as well. Discussed with the patient and his wife at bedside. We will follow with you                            [facility]                            This note is created with the assistance of a speech recognition program.  While intending to generate a document that actually reflects the content of the visit, the document can still have some errors including those of syntax and sound a like substitutions which may escape proof reading. It such instances, actual meaning can be extrapolated by contextual diversion.

## 2023-01-15 NOTE — PROGRESS NOTES
Byron Mckenzie MD/Luis Felipe Long MD/ [facility]      Reta Gilford APRN NP-C          Ivan Perry APRN - NP-C                                      Critical Care / Pulmonary Progress Note    Patient - Amelia Crowell   Age - 60 y.o.   - 1960  MRN - [id number]  Acct # - [de-identified]  Date of Admission - 2023  9:55 PM    Consulting Service/Physician:        Primary Care Physician: No primary care provider on file. SUBJECTIVE:     Chief Complaint:   Chief Complaint   Patient presents with    Altered Mental Status     Subjective:    Patient is resting comfortably. He is currently on 2 L nasal cannula. He does not have incentive spirometry at the bedside. He is normally not on oxygen. He tells me his breathing is doing okay. No chest pain. VITALS  /79   Pulse 91   Temp 99.2 °F (37.3 °C) (Axillary)   Resp 16   Ht 5' 7\" (1.702 m)   Wt 164 lb 3.9 oz (74.5 kg)   SpO2 93%   BMI 25.72 kg/m²   Wt Readings from Last 3 Encounters:   23 164 lb 3.9 oz (74.5 kg)   23 188 lb (85.3 kg)     I/O (24 Hours)    Intake/Output Summary (Last 24 hours) at 1/15/2023 1304  Last data filed at 1/15/2023 0950  Gross per 24 hour   Intake 2020 ml   Output 2100 ml   Net -80 ml     Ventilator:   Settings  FiO2 : 60 %  Insp Time (sec): 0.9 sec  Insp Rise Time (%): 0.15 %  Flow Sensitivity: 5 L/min  Exam:   Physical Exam   Constitutional:  Oriented to person, place, and time. Appears well-developed and well-nourished. HENT: NG-tube in place  Head: Normocephalic and atraumatic. Eyes: EOM are normal. Pupils are equal, round, and reactive to light. Neck: Neck supple. Cardiovascular:  Regular rate and rhythm. Normal heart tones. No JVD. Pulmonary/Chest: Effort normal and breath sounds lightly diminished  Abdominal: Soft. Bowel sounds are normal. There is no tenderness. Neurological:patient is alert and oriented to person, place, and time. Skin: Skin is warm and dry. No rash noted.    Extremities: No edema or discoloration  Infusions:      sodium chloride      dexmedetomidine      sodium chloride       Meds:     Current Facility-Administered Medications:     potassium bicarb-citric acid (EFFER-K) effervescent tablet 20 mEq, 20 mEq, Oral, Daily, Rosy Mon MD, 20 mEq at 01/15/23 0818    potassium bicarb-citric acid (EFFER-K) effervescent tablet 40 mEq, 40 mEq, Oral, Daily, Michelle Huggins MD, 40 mEq at 01/15/23 0818    potassium chloride (KLOR-CON M) extended release tablet 20 mEq, 20 mEq, Oral, Once, Michelle Huggins MD    furosemide (LASIX) tablet 40 mg, 40 mg, Oral, Daily, Roscoe Smallwood MD, 40 mg at 01/15/23 0819    hydrALAZINE (APRESOLINE) injection 20 mg, 20 mg, IntraVENous, Q6H PRN, Rosy Mon MD, 20 mg at 01/11/23 1224    potassium chloride (KLOR-CON M) extended release tablet 40 mEq, 40 mEq, Oral, PRN **OR** potassium bicarb-citric acid (EFFER-K) effervescent tablet 40 mEq, 40 mEq, Oral, PRN **OR** potassium chloride 10 mEq/100 mL IVPB (Peripheral Line), 10 mEq, IntraVENous, PRN, Rosy Mon MD, Last Rate: 100 mL/hr at 01/13/23 0928, 10 mEq at 01/13/23 1554    spironolactone (ALDACTONE) tablet 25 mg, 25 mg, Oral, Daily, Michelle Huggins MD, 25 mg at 01/15/23 7437    magnesium sulfate 2000 mg in water 50 mL IVPB, 2,000 mg, IntraVENous, PRN, Roscoe Smallwood MD    ziprasidone (GEODON) 10 mg in sterile water 0.5 mL injection, 10 mg, IntraMUSCular, Nightly, Keara Renee, APRN - CNP    carvedilol (COREG) tablet 6.25 mg, 6.25 mg, Oral, BID WC, Roscoe Smallwood MD, 6.25 mg at 01/14/23 0800    [Held by provider] ferrous sulfate (IRON 325) tablet 325 mg, 325 mg, Oral, Daily with breakfast, Roscoe Smallwood MD    pantoprazole (PROTONIX) 40 mg in sodium chloride (PF) 0.9 % 10 mL injection, 40 mg, IntraVENous, Daily, Ana Gr MD, 40 mg at 01/15/23 0819    ziprasidone (GEODON) 20 mg in sterile water 1 mL injection, 20 mg, IntraMUSCular, Q12H PRN, Kwabena Worthy APRN - CNP    labetalol (NORMODYNE;TRANDATE) injection 5 mg, 5 mg, IntraVENous, Q6H PRN, Bebo Torres MD, 5 mg at 01/10/23 1606    potassium chloride 10 mEq/100 mL IVPB (Peripheral Line), 10 mEq, IntraVENous, PRN, Do Loredo MD, Last Rate: 100 mL/hr at 01/13/23 1233, 10 mEq at 01/13/23 1233    ipratropium-albuterol (DUONEB) nebulizer solution 1 ampule, 1 ampule, Inhalation, Q4H WA, Fernando Syed MD, 1 ampule at 01/15/23 1133    [Held by provider] heparin (porcine) injection 5,000 Units, 5,000 Units, SubCUTAneous, 3 times per day, Bernice Desouza MD, 5,000 Units at 01/08/23 0549    0.9 % sodium chloride infusion, , IntraVENous, PRN, Faisal Parker MD    dexmedetomidine (PRECEDEX) 400 mcg in sodium chloride 0.9 % 100 mL infusion, 0.1-1.5 mcg/kg/hr, IntraVENous, Continuous PRN, Fernando Syed MD    nystatin (MYCOSTATIN) ointment, , Topical, BID, Bebo Torres MD, Given at 01/15/23 0820    [Held by provider] miconazole (MICOTIN) 2 % powder, , Topical, BID, Florencio Burgos APRN - NP, Given at 01/06/23 1229    perflutren lipid microspheres (DEFINITY) injection 1.5 mL, 1.5 mL, IntraVENous, ONCE PRN, Jerardo Love MD    [facility] [place] by provider] aspirin chewable tablet 81 mg, 81 mg, Oral, Daily, Claudia Persaud MD, 81 mg at 01/04/23 0740    sodium chloride flush 0.9 % injection 10 mL, 10 mL, IntraVENous, PRN, Claudia Persaud MD, 10 mL at 01/03/23 1836    sodium chloride flush 0.9 % injection 5-40 mL, 5-40 mL, IntraVENous, 2 times per day, Florencio Burgos, APRN - NP, 10 mL at 01/15/23 0819    sodium chloride flush 0.9 % injection 5-40 mL, 5-40 mL, IntraVENous, PRN, Abiola Gomez APRN - NP    0.9 % sodium chloride infusion, , IntraVENous, PRN, Florencio Burgos, APRN - NP    ondansetron (ZOFRAN-ODT) disintegrating tablet 4 mg, 4 mg, Oral, Q8H PRN **OR** ondansetron (ZOFRAN) injection 4 mg, 4 mg, IntraVENous, Q6H PRN, ALAYNA Castaneda - NP    polyethylene glycol (GLYCOLAX) packet 17 g, 17 g, Oral, Daily PRN, Alexandra Prajapati APRN - NP    acetaminophen (TYLENOL) tablet 650 mg, 650 mg, Oral, Q6H PRN **OR** acetaminophen (TYLENOL) suppository 650 mg, 650 mg, Rectal, Q6H PRN, ALAYNA Castaneda - NP    nicotine (NICODERM CQ) 21 MG/24HR 1 patch, 1 patch, TransDERmal, Daily, ALAYNA Castaneda NP, 1 patch at 01/15/23 0821    albuterol (PROVENTIL) nebulizer solution 2.5 mg, 2.5 mg, Nebulization, Q2H PRN, ALAYNA Castaneda - NP, 2.5 mg at 01/02/23 1115    guaiFENesin (MUCINEX) extended release tablet 600 mg, 600 mg, Oral, BID PRN, ALAYNA Castaneda NP    Lab Results:     [unfilled]  Lab Results   Component Value Date    WBC 6.3 01/15/2023    HGB 12.2 (L) 01/15/2023    HCT 40.9 (L) 01/15/2023    MCV 71.9 (L) 01/15/2023     (L) 01/15/2023     Lab Results   Component Value Date    CALCIUM 8.5 (L) 01/15/2023     01/15/2023    K 3.4 (L) 01/15/2023    CO2 37 (H) 01/15/2023    CL 99 01/15/2023    BUN 11 01/15/2023    CREATININE 0.78 01/15/2023     No components found for: ABGSAMPLETYP, ABGBODYTEMP, ABGPHCORRFOR, [id number], ABGPHCORRFOOR, ABGPH, ABGPCO2, ABGPO2, ABGBASEEXCES, ABGBASEDEFIC, ABGHCO3, YYXO4KQS, ABGENDTIDALC, ABGALLENSTES, ABGSPO2, ABGSAMEPLESIT, [id number], ABGOXYGENSOU  Lab Results   Component Value Date    INR 1.6 01/08/2023    PROTIME 19.1 (H) 01/08/2023       Radiology:        ASSESSMENT:       Acute hypoxic respiratory failure, extubated 1/5/2023, now on 3 L  Multifocal pneumonia suspect aspiration versus community-acquired  Acute infarcts left cerebral hemisphere, left parietal lobe with bilateral subarachnoid hemorrhages  Dysphagia-still failed swallow eval  Delirium-resolved  Metabolic encephalopathy  Transaminitis  Thrombocytopenia-improved  History of alcohol use  Acute kidney injury-resolved  History of tobacco use 40-pack-year history  Suspect COPD-mild wheezing  Positive hepatitis C screen  Chronic liver disease secondary to alcohol use  Debilitated  Full code  PLAN:   Plans for PEG tube placement tomorrow  Wean O2 as tolerated  Plans for acute rehab      Electronically signed by ALAYNA Lovelace CNP on 01/15/23     This progress note was completed using a voice transcription system. Every effort was made to ensure accuracy. However, inadvertent computerized transcription errors may be present.     [address] [facility]

## 2023-01-15 NOTE — PROGRESS NOTES
Speech Language Pathology  Spe[facility] Language Pathology  [facility]    Dysphagia Treatment Note    Date: 1/15/2023  Patients Name: Frank Marx  MRN: [id number]  Diagnosis: Dysphagia  Patient Active Problem List   Diagnosis Code    Acute type II respiratory failure (HCC) J96.00    Transaminitis R74.01    Normocytic anemia D64.9    Thrombocytopenia (HCC) D69.6    Agitation R45.1    Acute respiratory failure with hypoxia and hypercapnia (HCC) RLL pneumonia J96.01, J96.02    Altered mental status R41.82    Community acquired pneumonia of right lower lobe of lung J18.9    Prolonged pt (prothrombin time) R79.1    Emphysema lung (HCC) J43.9    Cerebral infarction (HCC) I63.9    ACP (advance care planning) Z71.89    Goals of care, counseling/discussion Z71.89    Encounter for palliative care Z51.5    Delirium due to another medical condition F05    Mild malnutrition [address] [address] [facility] E44.1    Hepatitis C virus infection without hepatic coma B19.20       Pain: pt. denies    Dysphagia Treatment  Treatment time: [id number]    Subjective: [x] Alert [x] Cooperative     [] Confused     [] Agitated    [] Lethargic    Objective/Assessment:    Pt seen for dysphagia management. Pt cooperative for session, responding to questions  appropriately and following commands, pt. Recalled video swallow study he had yesterday. Pt completed BOT exercises x3 sets in reps of 20. Pt completed  noe maneuver x8 reps. Pt. Completed lingual oral motor exercises x2, 10 reps each. Pt. Demonstrated moderate oral tongue weakness with fatigue during exercises. Moist oral swab used throughout to assist.  Education provided re: completion of swallowing exercises throughout the day to improve swallow function. Pt and SO, Gilda, verbalized understanding and agreeable. Gilda was educ. Re: MBS results from test yesterday.         Plan:  [x] Continue ST services    [] Discharge from ST:        Discharge recommendations: [] Inpatient Rehab   [] Nakul Sykes   [] Outpatient Therapy  [] Follow up at trauma clinic   [] Other:         Treatment completed by: Don Perez A.CCC/SLP

## 2023-01-15 NOTE — PLAN OF CARE
Problem: Discharge Planning  Goal: Discharge to home or other facility with appropriate resources  1/15/2023 1513 by Haseeb Teixeira RN  Outcome: Progressing  Flowsheets (Taken 1/15/2023 5244)  Discharge to home or other facility with appropriate resources: Identify barriers to discharge with patient and caregiver     Problem: Safety - Adult  Goal: Free from fall injury  1/15/2023 1513 by Haseeb Teixeira RN  Outcome: Progressing  Flowsheets (Taken 1/15/2023 1105)  Free From Fall Injury: Instruct family/caregiver on patient safety     Problem: ABCDS Injury Assessment  Goal: Absence of physical injury  1/15/2023 1513 by Haseeb Teixeira RN  Outcome: Progressing  Flowsheets (Taken 1/15/2023 1105)  Absence of Physical Injury: Implement safety measures based on patient assessment     Problem: Skin/Tissue Integrity  Goal: Absence of new skin breakdown  Description: 1. Monitor for areas of redness and/or skin breakdown  2. Assess vascular access sites hourly  3. Every 4-6 hours minimum:  Change oxygen saturation probe site  4. Every 4-6 hours:  If on nasal continuous positive airway pressure, respiratory therapy assess nares and determine need for appliance change or resting period.   1/15/2023 1513 by Haseeb Teixeira RN  Outcome: Progressing     Problem: Pain  Goal: Verbalizes/displays adequate comfort level or baseline comfort level  1/15/2023 1513 by Haseeb Teixeira RN  Outcome: Progressing     Problem: Nutrition Deficit:  Goal: Optimize nutritional status  1/15/2023 1513 by Haseeb Teixeira RN  Outcome: Progressing

## 2023-01-15 NOTE — PROGRESS NOTES
Department of Internal Medicine  Nephrology Kenneth Badillo MD  Progress Note    Reason for consultation: Management of acute kidney injury. Consulting physician: Winsome Cerna MD.    History: Patient was seen and examined today and he is comfortable at rest.  He is currently receiving tube feeding via NG tube but has agreed to placement of a PEG tube for nutrition. He is hemodynamically stable and nonoliguric. History of present illness: This is a 60 y.o. male with no significant past medical history [no regular physician follow-up], who was brought to the emergency department via EMS for further evaluation of progressively worsening confusion and lethargy of 2 weeks duration. Patient's spouse said that he became progressively confused prompting her to call EMS on day of presentation 1/1/2023. When EMS arrived, patient was found to be obtunded with oxygen saturation 75% on room air and he was placed on a nonrebreather mask and given a dose of IV Lasix as he also had severe bilateral leg swelling. Patient was placed on BiPAP on arrival to the emergency department and was admitted to the intensive care unit. Initial systolic blood pressure was 106 mmHg and serum creatinine 1.77 mg/dL associated with elevated AST and ALT. On admission to the intensive care unit patient required endotracheal intubation for airway protection and treatment of acute respiratory failure. Nephrology consultation has been placed for management of acute kidney injury. He is currently on Levophed drip at 3 mcg/min. CT scan of the brain performed at presentation showed no acute intracranial abnormality but with mild chronic small vessel ischemic disease.   Repeat CT scan performed 24 hours later [1/2/2023] showed focal area of decreased attenuation with loss of gray/white matter differentiation involving posterior left lobe with somewhat increased conspicuity as compared to prior exam likely reflecting an area of acute to subacute stroke. Scheduled Meds:   potassium bicarb-citric acid  20 mEq Oral Daily    potassium bicarb-citric acid  40 mEq Oral Daily    potassium chloride  20 mEq Oral Once    furosemide  40 mg Oral Daily    spironolactone  25 mg Oral Daily    ziprasidone (GEODON) in sterile water injection  10 mg IntraMUSCular Nightly    carvedilol  6.25 mg Oral BID WC    [Held by provider] ferrous sulfate  325 mg Oral Daily with breakfast    pantoprazole (PROTONIX) 40 mg injection  40 mg IntraVENous Daily    ipratropium-albuterol  1 ampule Inhalation Q4H WA    [Held by provider] heparin (porcine)  5,000 Units SubCUTAneous 3 times per day    nystatin   Topical BID    [Held by provider] miconazole   Topical BID    [Held by provider] aspirin  81 mg Oral Daily    sodium chloride flush  5-40 mL IntraVENous 2 times per day    nicotine  1 patch TransDERmal Daily     Continuous Infusions:   sodium chloride      dexmedetomidine      sodium chloride       PRN Meds:.hydrALAZINE, potassium chloride **OR** potassium alternative oral replacement **OR** potassium chloride, magnesium sulfate, ziprasidone (GEODON) in sterile water injection, labetalol, potassium chloride, sodium chloride, dexmedetomidine, perflutren lipid microspheres, sodium chloride flush, sodium chloride flush, sodium chloride, ondansetron **OR** ondansetron, polyethylene glycol, acetaminophen **OR** acetaminophen, albuterol, guaiFENesin    Physical Exam:    VITALS:  /79   Pulse 91   Temp 99.2 °F (37.3 °C) (Axillary)   Resp 16   Ht 5' 7\" (1.702 m)   Wt 164 lb 3.9 oz (74.5 kg)   SpO2 93%   BMI 25.72 kg/m²   TEMPERATURE:  Current - Temp: 99.2 °F (37.3 °C);  Max - Temp  Av.4 °F (36.9 °C)  Min: 97.8 °F (36.6 °C)  Max: 99.2 °F (37.3 °C)  RESPIRATIONS RANGE: Resp  Av.3  Min: 16  Max: 20  PULSE RANGE: Pulse  Av.7  Min: 77  Max: 91  BLOOD PRESSURE RANGE:  Systolic (17CCT), GW , Min:106 , GMI:140 ; Diastolic (23NZG), QAS:87, Min:69, Max:79  PULSE OXIMETRY RANGE: SpO2  Av.1 %  Min: 90 %  Max: 95 %  24HR INTAKE/OUTPUT:    Intake/Output Summary (Last 24 hours) at 1/15/2023 1311  Last data filed at 1/15/2023 0495  Gross per 24 hour   Intake 2020 ml   Output 2100 ml   Net -80 ml       Constitutional: Awake alert, responsive not in acute distress    Skin: Skin color, texture, turgor normal. No rashes or lesions    Head: Normocephalic, without obvious abnormality, atraumatic     Cardiovascular/Edema: regular rate and rhythm, S1, S2 normal, no murmur, click, rub or gallop    Respiratory: Lungs:  Coarse breath sounds bilaterally    Abdomen: soft, non-tender; bowel sounds normal; no masses,  no organomegaly    Back: symmetric, no curvature. ROM normal. No CVA tenderness. Extremities: edema +    Neuro: Nonfocal    CBC:   Recent Labs     23  0451 23  0508 01/15/23  0514   WBC 7.3 6.6 6.3   HGB 13.0* 12.2* 12.2*   PLT 81* 101* 118*       BMP:    Recent Labs     23  0451 23  1413 23  0508 01/15/23  0514     --  139 142   K 3.5* 4.0 3.3* 3.4*     --  97* 99   CO2 34*  --  37* 37*   BUN 7*  --  9 11   CREATININE 0.74  --  0.73 0.78   GLUCOSE 112*  --  121* 127*       Lab Results   Component Value Date/Time    NITRU NEGATIVE 2023 03:34 PM    COLORU Dark Yellow 2023 03:34 PM    PHUR 5.0 2023 03:34 PM    WBCUA 10 TO 20 2023 03:34 PM    RBCUA TOO NUMEROUS TO COUNT 2023 03:34 PM    BACTERIA FEW 2023 10:25 PM    SPECGRAV 1.015 2023 03:34 PM    LEUKOCYTESUR SMALL 2023 03:34 PM    UROBILINOGEN Normal 2023 03:34 PM    BILIRUBINUR NEGATIVE 2023 03:34 PM    GLUCOSEU NEGATIVE 2023 03:34 PM    KETUA TRACE 2023 03:34 PM     MRI of the brain performed without contrast on 1/3/2023 showed:  Small acute infarcts involving the left cerebellar hemisphere and left   parietal lobe. Small subacute infarct within the left cerebellar hemisphere.        Moderate bilateral subarachnoid hemorrhage within both cerebral hemispheres   which is of uncertain etiology. The hemorrhage is more apparent on MRI than   on previous head CT. Old right caudate nucleus lacune. Small bilateral mastoid effusions and moderate left paranasal sinus disease. IMPRESSION/RECOMMENDATIONS:      1. Acute kidney injury - most consistent with prerenal azotemia and/or ischemic acute tubular necrosis from hypotension. Resolved. 2.  Hypokalemia - Secondary to poor intake and loop diuretic. Continue Aldactone 25 mg p.o. daily and supplement with oral potassium chloride. 3.  Edema - Clinically improved and we will decrease furosemide to 20 mg per NG daily    Prognosis is guarded.     Haresh Gong MD   Attending Nephrologist  1/15/2023 1:11 PM

## 2023-01-15 NOTE — CARE COORDINATION
ONGOING DISCHARGE  NOTE:        Writer reviewed notes, Patient is agreeable to discharge to [address]      Patient was accepted to [address] has not been started.        Pre cert will need to be started Monday      Will continue to follow for additional discharge needs    Electronically signed by Isabela Banks RN on 1/15/2023 at 8:15 AM

## 2023-01-15 NOTE — PLAN OF CARE
Problem: Safety - Adult  Goal: Free from fall injury  1/15/2023 0339 by Alonso Arreguin RN  Outcome: Progressing  Note: Pt assessed as a fall risk this shift. Remains free from falls and accidental injury at this time. Fall precautions in place, including falling star sign. Floor free from obstacles, and bed is locked and in lowest position. Adequate lighting provided. Pt encouraged to call before getting OOB for any need. Bed alarm activated. Will continue to monitor needs during hourly rounding, and reinforce education on use of call light. Problem: Skin/Tissue Integrity  Goal: Absence of new skin breakdown  Description: 1. Monitor for areas of redness and/or skin breakdown  2. Assess vascular access sites hourly  3. Every 4-6 hours minimum:  Change oxygen saturation probe site  4. Every 4-6 hours:  If on nasal continuous positive airway pressure, respiratory therapy assess nares and determine need for appliance change or resting period. 1/15/2023 0339 by Alonso Arreguin RN  Outcome: Progressing     Problem: Pain  Goal: Verbalizes/displays adequate comfort level or baseline comfort level  1/15/2023 0339 by Alonso Arreguin RN  Outcome: Progressing  Note: No pain reported this shift.      Problem: Nutrition Deficit:  Goal: Optimize nutritional status  1/15/2023 0339 by Alonso Arreguin RN  Outcome: Progressing  Note: Patient reached goal of tube feedin mL/hr     Problem: Discharge Planning  Goal: Discharge to home or other facility with appropriate resources  1/15/2023 0339 by Alonso Arreguin RN  Outcome: Progressing

## 2023-01-16 PROBLEM — R13.10 DYSPHAGIA: Status: ACTIVE | Noted: 2023-01-16

## 2023-01-16 LAB
ABSOLUTE EOS #: 0 K/UL (ref 0–0.4)
ABSOLUTE LYMPH #: 0.7 K/UL (ref 1–4.8)
ABSOLUTE MONO #: 0.64 K/UL (ref 0.1–1.3)
ALBUMIN SERPL-MCNC: 3.1 G/DL (ref 3.5–5.2)
ALP BLD-CCNC: 48 U/L (ref 40–129)
ALT SERPL-CCNC: 24 U/L (ref 5–41)
ANION GAP SERPL CALCULATED.3IONS-SCNC: 7 MMOL/L (ref 9–17)
AST SERPL-CCNC: 27 U/L
BASOPHILS # BLD: 2 % (ref 0–2)
BASOPHILS ABSOLUTE: 0.13 K/UL (ref 0–0.2)
BILIRUB SERPL-MCNC: 1.6 MG/DL (ref 0.3–1.2)
BUN BLDV-MCNC: 11 MG/DL (ref 8–23)
CALCIUM SERPL-MCNC: 8.9 MG/DL (ref 8.6–10.4)
CHLORIDE BLD-SCNC: 102 MMOL/L (ref 98–107)
CO2: 37 MMOL/L (ref 20–31)
CREAT SERPL-MCNC: 0.81 MG/DL (ref 0.7–1.2)
EOSINOPHILS RELATIVE PERCENT: 0 % (ref 0–4)
GFR SERPL CREATININE-BSD FRML MDRD: >60 ML/MIN/1.73M2
GLUCOSE BLD-MCNC: 111 MG/DL (ref 70–99)
HCT VFR BLD CALC: 41.8 % (ref 41–53)
HEMOGLOBIN: 12.4 G/DL (ref 13.5–17.5)
LYMPHOCYTES # BLD: 11 % (ref 24–44)
MCH RBC QN AUTO: 21.6 PG (ref 26–34)
MCHC RBC AUTO-ENTMCNC: 29.6 G/DL (ref 31–37)
MCV RBC AUTO: 72.8 FL (ref 80–100)
MONOCYTES # BLD: 10 % (ref 1–7)
MORPHOLOGY: ABNORMAL
PDW BLD-RTO: 31.8 % (ref 11.5–14.9)
PLATELET # BLD: 159 K/UL (ref 150–450)
PMV BLD AUTO: 9.3 FL (ref 6–12)
POTASSIUM SERPL-SCNC: 3.8 MMOL/L (ref 3.7–5.3)
RBC # BLD: 5.75 M/UL (ref 4.5–5.9)
SEG NEUTROPHILS: 77 % (ref 36–66)
SEGMENTED NEUTROPHILS ABSOLUTE COUNT: 4.93 K/UL (ref 1.3–9.1)
SODIUM BLD-SCNC: 146 MMOL/L (ref 135–144)
TOTAL PROTEIN: 6.6 G/DL (ref 6.4–8.3)
WBC # BLD: 6.4 K/UL (ref 3.5–11)

## 2023-01-16 PROCEDURE — 99222 1ST HOSP IP/OBS MODERATE 55: CPT | Performed by: STUDENT IN AN ORGANIZED HEALTH CARE EDUCATION/TRAINING PROGRAM

## 2023-01-16 PROCEDURE — 6360000002 HC RX W HCPCS: Performed by: NURSE PRACTITIONER

## 2023-01-16 PROCEDURE — 2580000003 HC RX 258: Performed by: NURSE PRACTITIONER

## 2023-01-16 PROCEDURE — 6370000000 HC RX 637 (ALT 250 FOR IP): Performed by: INTERNAL MEDICINE

## 2023-01-16 PROCEDURE — 99233 SBSQ HOSP IP/OBS HIGH 50: CPT | Performed by: INTERNAL MEDICINE

## 2023-01-16 PROCEDURE — 80053 COMPREHEN METABOLIC PANEL: CPT

## 2023-01-16 PROCEDURE — 85025 COMPLETE CBC W/AUTO DIFF WBC: CPT

## 2023-01-16 PROCEDURE — 99231 SBSQ HOSP IP/OBS SF/LOW 25: CPT | Performed by: INTERNAL MEDICINE

## 2023-01-16 PROCEDURE — C9113 INJ PANTOPRAZOLE SODIUM, VIA: HCPCS

## 2023-01-16 PROCEDURE — 92526 ORAL FUNCTION THERAPY: CPT

## 2023-01-16 PROCEDURE — 99232 SBSQ HOSP IP/OBS MODERATE 35: CPT | Performed by: INTERNAL MEDICINE

## 2023-01-16 PROCEDURE — 6370000000 HC RX 637 (ALT 250 FOR IP): Performed by: NURSE PRACTITIONER

## 2023-01-16 PROCEDURE — 94761 N-INVAS EAR/PLS OXIMETRY MLT: CPT

## 2023-01-16 PROCEDURE — 97116 GAIT TRAINING THERAPY: CPT

## 2023-01-16 PROCEDURE — 6370000000 HC RX 637 (ALT 250 FOR IP): Performed by: STUDENT IN AN ORGANIZED HEALTH CARE EDUCATION/TRAINING PROGRAM

## 2023-01-16 PROCEDURE — A4216 STERILE WATER/SALINE, 10 ML: HCPCS

## 2023-01-16 PROCEDURE — 6360000002 HC RX W HCPCS

## 2023-01-16 PROCEDURE — 94640 AIRWAY INHALATION TREATMENT: CPT

## 2023-01-16 PROCEDURE — 2700000000 HC OXYGEN THERAPY PER DAY

## 2023-01-16 PROCEDURE — 97530 THERAPEUTIC ACTIVITIES: CPT

## 2023-01-16 PROCEDURE — 36415 COLL VENOUS BLD VENIPUNCTURE: CPT

## 2023-01-16 PROCEDURE — 2580000003 HC RX 258

## 2023-01-16 PROCEDURE — 2580000003 HC RX 258: Performed by: INTERNAL MEDICINE

## 2023-01-16 PROCEDURE — 97110 THERAPEUTIC EXERCISES: CPT

## 2023-01-16 PROCEDURE — 2060000000 HC ICU INTERMEDIATE R&B

## 2023-01-16 RX ORDER — DEXTROSE AND SODIUM CHLORIDE 5; .45 G/100ML; G/100ML
INJECTION, SOLUTION INTRAVENOUS CONTINUOUS
Status: DISCONTINUED | OUTPATIENT
Start: 2023-01-16 | End: 2023-01-19

## 2023-01-16 RX ADMIN — FUROSEMIDE 20 MG: 20 TABLET ORAL at 10:02

## 2023-01-16 RX ADMIN — IPRATROPIUM BROMIDE AND ALBUTEROL SULFATE 1 AMPULE: 2.5; .5 SOLUTION RESPIRATORY (INHALATION) at 16:01

## 2023-01-16 RX ADMIN — SPIRONOLACTONE 25 MG: 25 TABLET ORAL at 10:02

## 2023-01-16 RX ADMIN — SODIUM CHLORIDE, PRESERVATIVE FREE 40 MG: 5 INJECTION INTRAVENOUS at 10:03

## 2023-01-16 RX ADMIN — IPRATROPIUM BROMIDE AND ALBUTEROL SULFATE 1 AMPULE: 2.5; .5 SOLUTION RESPIRATORY (INHALATION) at 07:59

## 2023-01-16 RX ADMIN — ZIPRASIDONE MESYLATE 10 MG: 20 INJECTION, POWDER, LYOPHILIZED, FOR SOLUTION INTRAMUSCULAR at 20:46

## 2023-01-16 RX ADMIN — IPRATROPIUM BROMIDE AND ALBUTEROL SULFATE 1 AMPULE: 2.5; .5 SOLUTION RESPIRATORY (INHALATION) at 19:18

## 2023-01-16 RX ADMIN — DEXTROSE AND SODIUM CHLORIDE: 5; 450 INJECTION, SOLUTION INTRAVENOUS at 15:12

## 2023-01-16 RX ADMIN — SODIUM CHLORIDE, PRESERVATIVE FREE 10 ML: 5 INJECTION INTRAVENOUS at 10:04

## 2023-01-16 RX ADMIN — NYSTATIN OINTMENT: 100000 OINTMENT TOPICAL at 10:03

## 2023-01-16 RX ADMIN — NYSTATIN OINTMENT: 100000 OINTMENT TOPICAL at 20:41

## 2023-01-16 RX ADMIN — POTASSIUM BICARBONATE 20 MEQ: 782 TABLET, EFFERVESCENT ORAL at 10:02

## 2023-01-16 RX ADMIN — IPRATROPIUM BROMIDE AND ALBUTEROL SULFATE 1 AMPULE: 2.5; .5 SOLUTION RESPIRATORY (INHALATION) at 11:34

## 2023-01-16 ASSESSMENT — PAIN SCALES - WONG BAKER: WONGBAKER_NUMERICALRESPONSE: 0

## 2023-01-16 NOTE — PROGRESS NOTES
Comprehensive Nutrition Assessment    Type and Reason for Visit:  Reassess    Nutrition Recommendations/Plan:   Pt NPO at midnight for PEG tube placement today. Malnutrition Assessment:  Malnutrition Status:  Mild malnutrition (Based on available criteria) (01/13/23 1725)    Context:  Acute Illness     Findings of the 6 clinical characteristics of malnutrition:  Energy Intake:  75% or less of estimated energy requirements for 7 or more days  Weight Loss:   (Has lost dry wt; unsure of quantity and time frame.)     Body Fat Loss:  Unable to assess     Muscle Mass Loss:  Unable to assess    Fluid Accumulation:  Moderate to Severe (Unsure if related to nutritional status, but may mask wt loss) Generalized, Extremities   Strength:  Not Performed    Nutrition Assessment:    Tube feeding off at midnight, going for PEG tube placement today. Nutrition Related Findings:    Edema: +1 generalized, perineal; +1 pitting RUE, LUE; +2 pitting RLE, LLE. Labs and meds reviewed. Wound Type: None       Current Nutrition Intake & Therapies:    Average Meal Intake: NPO     Diet NPO  Current Tube Feeding (TF) Orders:  Feeding Route: Nasogastric  Formula: Peptide Based  Schedule: Continuous  Feeding Regimen: goal of 70 mL per hr  Additives/Modulars: None  Water Flushes: 30 mL every 4 hrs  Goal TF & Flush Orders Provides: 2016 kcal, 126 gm protein, 1543 mL free fluid (as ordered; additional fluids likely given)    Anthropometric Measures:  Height: 5' 7\" (170.2 cm)  Ideal Body Weight (IBW): 148 lbs (67 kg)    Admission Body Weight: 188 lb (85.3 kg)  Current Body Weight: 164 lb 3.9 oz (74.5 kg) (wt discrepancies noted), 116.2 % IBW. Weight Source: Bed Scale  Current BMI (kg/m2): 25.7  Usual Body Weight: 170 lb (77.1 kg) (per significant other)  % Weight Change (Calculated): -3.4                    BMI Categories: Overweight (BMI 25.0-29. 9)    Estimated Daily Nutrient Needs:  Energy Requirements Based On: Kcal/kg  Weight Used for Energy Requirements: Admission  Energy (kcal/day): 2145 kcals based on 25 kcals/kg  Weight Used for Protein Requirements: Ideal  Protein (g/day): 101-114 gm protein based on 1.5-1.7 gm/kg         Nutrition Diagnosis:   Inadequate oral intake related to cognitive or neurological impairment, impaired respiratory function as evidenced by NPO or clear liquid status due to medical condition    Nutrition Interventions:   Food and/or Nutrient Delivery: Continue NPO  Nutrition Education/Counseling: No recommendation at this time  Coordination of Nutrition Care: Continue to monitor while inpatient       Goals:  Previous Goal Met: No Progress toward Goal(s)  Goals: Meet at least 75% of estimated needs       Nutrition Monitoring and Evaluation:   Behavioral-Environmental Outcomes: None Identified  Food/Nutrient Intake Outcomes: None Identified  Physical Signs/Symptoms Outcomes: Biochemical Data, Fluid Status or Edema, Hemodynamic Status, Nutrition Focused Physical Findings, Skin, Weight    Discharge Planning:     Too soon to determine     Stacey Son, [address] [place] [address],   [phone]

## 2023-01-16 NOTE — PROGRESS NOTES
Writer was notified by nurse that patient was confused about the process of the PEG tube, and he is currently refusing PEG tube placement. I have seen and evaluated the patient, at that time I explained to him the rationale about PEG tube and he is accepting to have it done, however, he states that the procedure was not really explained to him and what should be done. He also says that somebody told him that the PEG tube operation was canceled. He also states that somebody made a joke about his nails, as patient actually has onycholysis due to what seems to be onychomycosis. Patient was excepting to do PEG tube and verbalized agreement. Nurse then called back, the patient said \"he would like another doctor to do his procedure but he is okay with the PEG tube\". Nurse input is appreciated.

## 2023-01-16 NOTE — PROGRESS NOTES
Department of Internal Medicine  Nephrology Dawson Albert MD  Progress Note    Reason for consultation: Management of acute kidney injury. Consulting physician: Jaycob Portillo MD.    History: Patient was seen and examined today and he is comfortable at rest.  He seems a little depressed and he mentioned some family issues that are bothering him. He currently has an NG tube placed and the plan is to place a PEG tube that is scheduled for today. He is hemodynamically stable and nonoliguric. History of present illness: This is a 60 y.o. male with no significant past medical history [no regular physician follow-up], who was brought to the emergency department via EMS for further evaluation of progressively worsening confusion and lethargy of 2 weeks duration. Patient's spouse said that he became progressively confused prompting her to call EMS on day of presentation 1/1/2023. When EMS arrived, patient was found to be obtunded with oxygen saturation 75% on room air and he was placed on a nonrebreather mask and given a dose of IV Lasix as he also had severe bilateral leg swelling. Patient was placed on BiPAP on arrival to the emergency department and was admitted to the intensive care unit. Initial systolic blood pressure was 106 mmHg and serum creatinine 1.77 mg/dL associated with elevated AST and ALT. On admission to the intensive care unit patient required endotracheal intubation for airway protection and treatment of acute respiratory failure. Nephrology consultation has been placed for management of acute kidney injury. He is currently on Levophed drip at 3 mcg/min. CT scan of the brain performed at presentation showed no acute intracranial abnormality but with mild chronic small vessel ischemic disease.   Repeat CT scan performed 24 hours later [1/2/2023] showed focal area of decreased attenuation with loss of gray/white matter differentiation involving posterior left lobe with somewhat increased conspicuity as compared to prior exam likely reflecting an area of acute to subacute stroke. Scheduled Meds:   potassium bicarb-citric acid  20 mEq Oral Daily    furosemide  20 mg Oral Daily    potassium bicarb-citric acid  40 mEq Oral Daily    potassium chloride  20 mEq Oral Once    spironolactone  25 mg Oral Daily    ziprasidone (GEODON) in sterile water injection  10 mg IntraMUSCular Nightly    carvedilol  6.25 mg Oral BID WC    [Held by provider] ferrous sulfate  325 mg Oral Daily with breakfast    pantoprazole (PROTONIX) 40 mg injection  40 mg IntraVENous Daily    ipratropium-albuterol  1 ampule Inhalation Q4H WA    [Held by provider] heparin (porcine)  5,000 Units SubCUTAneous 3 times per day    nystatin   Topical BID    [Held by provider] miconazole   Topical BID    [Held by provider] aspirin  81 mg Oral Daily    sodium chloride flush  5-40 mL IntraVENous 2 times per day    nicotine  1 patch TransDERmal Daily     Continuous Infusions:   sodium chloride      dexmedetomidine      sodium chloride       PRN Meds:.hydrALAZINE, potassium chloride **OR** potassium alternative oral replacement **OR** potassium chloride, magnesium sulfate, ziprasidone (GEODON) in sterile water injection, labetalol, potassium chloride, sodium chloride, dexmedetomidine, perflutren lipid microspheres, sodium chloride flush, sodium chloride flush, sodium chloride, ondansetron **OR** ondansetron, polyethylene glycol, acetaminophen **OR** acetaminophen, albuterol, guaiFENesin    Physical Exam:    VITALS:  /79   Pulse 91   Temp 98.9 °F (37.2 °C) (Axillary)   Resp 18   Ht 5' 7\" (1.702 m)   Wt 164 lb 3.9 oz (74.5 kg)   SpO2 96%   BMI 25.72 kg/m²   TEMPERATURE:  Current - Temp: 98.9 °F (37.2 °C);  Max - Temp  Av.8 °F (37.1 °C)  Min: 98.3 °F (36.8 °C)  Max: 99.2 °F (37.3 °C)  RESPIRATIONS RANGE: Resp  Av.6  Min: 16  Max: 18  PULSE RANGE: Pulse  Av  Min: 89  Max: 98  BLOOD PRESSURE RANGE:  Systolic (40BXF), WEU:021 , Min:107 , AMQ:316 ; Diastolic (89JNR), TFC:83, Min:71, Max:82  PULSE OXIMETRY RANGE: SpO2  Av.9 %  Min: 93 %  Max: 98 %  24HR INTAKE/OUTPUT:    Intake/Output Summary (Last 24 hours) at 2023 1018  Last data filed at 2023 0030  Gross per 24 hour   Intake 120 ml   Output 450 ml   Net -330 ml     Constitutional: Awake alert, responsive not in acute distress    Skin: Skin color, texture, turgor normal. No rashes or lesions    Head: Normocephalic, without obvious abnormality, atraumatic     Cardiovascular/Edema: regular rate and rhythm, S1, S2 normal, no murmur, click, rub or gallop    Respiratory: Lungs:  Coarse breath sounds bilaterally    Abdomen: soft, non-tender; bowel sounds normal; no masses,  no organomegaly    Back: symmetric, no curvature. ROM normal. No CVA tenderness. Extremities: edema +    Neuro: Nonfocal    CBC:   Recent Labs     23  0508 01/15/23  0514 23  0431   WBC 6.6 6.3 6.4   HGB 12.2* 12.2* 12.4*   * 118* 159     BMP:    Recent Labs     23  0508 01/15/23  0514 23  0431    142 146*   K 3.3* 3.4* 3.8   CL 97* 99 102   CO2 37* 37* 37*   BUN 9 11 11   CREATININE 0.73 0.78 0.81   GLUCOSE 121* 127* 111*     Lab Results   Component Value Date/Time    NITRU NEGATIVE 2023 03:34 PM    COLORU Dark Yellow 2023 03:34 PM    PHUR 5.0 2023 03:34 PM    WBCUA 10 TO 20 2023 03:34 PM    RBCUA TOO NUMEROUS TO COUNT 2023 03:34 PM    BACTERIA FEW 2023 10:25 PM    SPECGRAV 1.015 2023 03:34 PM    LEUKOCYTESUR SMALL 2023 03:34 PM    UROBILINOGEN Normal 2023 03:34 PM    BILIRUBINUR NEGATIVE 2023 03:34 PM    GLUCOSEU NEGATIVE 2023 03:34 PM    KETUA TRACE 2023 03:34 PM     MRI of the brain performed without contrast on 1/3/2023 showed:  Small acute infarcts involving the left cerebellar hemisphere and left   parietal lobe. Small subacute infarct within the left cerebellar hemisphere.        Moderate bilateral subarachnoid hemorrhage within both cerebral hemispheres   which is of uncertain etiology. The hemorrhage is more apparent on MRI than   on previous head CT. Old right caudate nucleus lacune. Small bilateral mastoid effusions and moderate left paranasal sinus disease. IMPRESSION/RECOMMENDATIONS:      1. Acute kidney injury - most consistent with prerenal azotemia and/or ischemic acute tubular necrosis from hypotension. Resolved. 2.  Hypokalemia - Secondary to poor intake and loop diuretic. Potassium has been stable now. Continue Aldactone 25 mg p.o. daily and supplement with oral potassium chloride. 3.  Edema -clinically improved and we will continue with furosemide 20 mg daily per NG tube. Prognosis is guarded. Final attestation by attending to follow. Dann Whiting MD   Resident nephrologist  1/16/2023 10:18 AM       Addendum: Patient was seen and examined alongside the medical resident and his history and physical examination findings were confirmed by me. Patient is awake and alert and oriented. He is scheduled to have PEG tube placed today. He currently has NG tube in place and does not have a headache or dizziness. He is hemodynamically stable and nonoliguric. Laboratory study results were reviewed and GFR is within normal. Serum sodium today is 146 mmol/L. Plan: Hold furosemide. Gentle hydration with IV fluid D5/0.45 normal saline at 40 mill per hour.     Bear Vitale  Attending Clinical Nephrologist

## 2023-01-16 NOTE — PROGRESS NOTES
6 [facility]   INPATIENT OCCUPATIONAL THERAPY  PROGRESS NOTE  Date: 2023  Patient Name: Alonso Cavazos       Room:   MRN: [id number]    : 1960  (60 y.o.)  Gender: male      Diagnosis: Acute respiratory failure,  AMS in the setting of hypoxia and a pneumonia.  multifactoral encehpalopathy. Repeat CT head shows prior strokes and SAH and intraparenchymal hemorrhage    Restrictions/Precautions  Restrictions/Precautions  Restrictions/Precautions: Fall Risk;General Precautions;Contact Precautions;Isolation; Bed Alarm  Required Braces or Orthoses?: No  Implants present? :  (pt denies)  Position Activity Restriction  Other position/activity restrictions: Severe dysphagia- NPO. Unable to tolerate any PO safely. NG tube placed,  on high flow nasal cannula    Vitals  O2 Device: Nasal cannula  Comment: 1L via NC (RT reported to writer lowering to 1L upon entry for therapy. Pt tolerated above 90% during treatment.)    Subjective     Comments: ANGEL Davis ok'd pt for PT/OT co-tx. Pt agreeable to participate and pleasant/cooperative throughout despite confusion and impulsive. Objective  Orientation  Overall Orientation Status: Impaired  Orientation Level: Disoriented to place; Disoriented to time;Oriented to person;Disoriented to situation  Cognition  Overall Cognitive Status: WFL  Arousal/Alertness: Appropriate responses to stimuli  Following Commands:  Follows one step commands consistently  Attention Span: Attends with cues to redirect  Memory: Decreased recall of biographical Information;Decreased recall of precautions;Decreased recall of recent events  Safety Judgement: Decreased awareness of need for assistance;Decreased awareness of need for safety  Problem Solving: Assistance required to generate solutions;Assistance required to implement solutions;Assistance required to identify errors made;Assistance required to correct errors made  Insights: Decreased awareness of deficits  Initiation: Requires cues for some  Sequencing: Requires cues for some  Cognition Comment: Pt with impulsive tendencies noted. ADL  Feeding: NPO  Feeding Skilled Clinical Factors: NG tube in place  Grooming: Stand by assistance  UE Bathing: Stand by assistance  LE Bathing: Moderate assistance  UE Dressing: Minimal assistance  LE Dressing: Moderate assistance  LE Dressing Skilled Clinical Factors: while seated in bedside chair, pt able to demonstrate 4 figure technique however only by pulling on sock/slipper to bring up, pt unable to complete with using already donning dressing items. Toileting: Moderate assistance  Additional Comments: ADL scores based on clinical reasoning and skilled observation unless otherwise noted. Pt is currentl limited due to decreased strength, balance, activity tolerance, and cognitive barriers impacting safety and independence with self care tasks. Balance  Balance  Sitting Balance: Stand by assistance  Standing Balance: Minimal assistance (CGA-min A)  Standing Balance  Time: 4 min, <1 min  Activity: static standing, functional mobility in room, dynamic reaching. Comment: with RW for UE support; pt has posterior lean and lateral lean with cues for awareness and to correct with poor carryover. CGA with static standing, CGA-min A flucuating with reaching outside SELVIN. Transfers/Mobility  Bed mobility  Bed Mobility Comments: Upon entry, pt seated at EOB with BLEs dangling over bed rail. Transfers  Sit to stand: Minimal assistance  Stand to sit: Minimal assistance  Transfer Comments: Verbal cues for hand placement and safety with increased time and Fair carryover. Functional Mobility  Functional - Mobility Device: Rolling Walker  Activity: Other (around bed>bedside chair)  Assist Level: Dependent/Total (mod A x2)  Functional Mobility Comments: pt fatigued quickly, required verbal/tactile cueing for RW mgmt.  Pt can be impulsive and is a high fall risk, posterior lean. Chair follow for safety    Functional Activities  OT Exercises  Dynamic Standing Balance Exercises: x2 reaching tasks while standing in RW with 1 UE support; CGA-min A flucuated while reaching outside SELVIN. Patient Education  Patient Education  Education Given To: Patient  Education Provided: Role of Therapy, Plan of Care, Transfer Training  Education Provided Comments: activity promotion, safety awareness  Education Method: Verbal  Barriers to Learning: Cognition  Education Outcome: Continued education needed, Demonstrated understanding, Verbalized understanding      Goals  Short Term Goals  Time Frame for Short Term Goals: By discharge  Short Term Goal 1: Pt will complete bed mobility mod I to sitting EOB supported for 8+ minutes during self care tasks/met  Short Term Goal 2: Pt will complete simple grooming/self feeding task with mod I and Fair attention to task  Short Term Goal 3: Pt will complete upper body dressing/bathing with Mod I and Good safety/attention to task  Short Term Goal 4: Pt will follow simple 1-2 step commands 75% of the time to increase participation in daily activities/ met 1-11-23  Short Term Goal 5: Pt will participate in 15+ minutes of therapeutic exercises/functional activities to increase safety and independence with self care and mobility/ met 1-11-23  Short Term Goal 6: new goal:  pt to dominik 5-7 min standing with BUE support and min A x 1 for adls.   Short Term Goal 7: Pt will perform functional transfers/mobility with CGA, using least restrictive device and Fair safety  Occupational Therapy Plan  Times Per Week: 5-7  Current Treatment Recommendations: Self-Care / ADL, Strengthening, ROM, Balance training, Functional mobility training, Endurance training, Cognitive reorientation, Neuromuscular re-education, Safety education & training, Patient/Caregiver education & training, Equipment evaluation, education, & procurement, Cognitive/Perceptual training, Coordination training      Assessment  Activity Tolerance  Activity Tolerance: Patient Tolerated treatment well, Patient limited by fatigue  Activity Tolerance Comments: on high flow oxygen  Assessment  Performance deficits / Impairments: Decreased ADL status, Decreased functional mobility , Decreased ROM, Decreased strength, Decreased safe awareness, Decreased cognition, Decreased endurance, Decreased balance, Decreased high-level IADLs, Decreased fine motor control, Decreased coordination, Decreased posture  Treatment Diagnosis: Impaired self care status  Prognosis: Fair  Decision Making: High Complexity  Discharge Recommendations: Patient would benefit from continued therapy after discharge  OT Equipment Recommendations  Other: TBD  Safety Devices  Type of Devices: All fall risk precautions in place, Call light within reach, Chair alarm in place, Gait belt, Patient at risk for falls, Nurse notified, Left in chair, Telesitter in use (bedside table placed in front of pt.  Reinforeced use of call light with pt verbalizing and demonstrating initial understanding.)      AM-PAC Daily Activities Inpatient  AM-PAC Daily Activity Inpatient   How much help for putting on and taking off regular lower body clothing?: A Lot  How much help for Bathing?: A Lot  How much help for Toileting?: A Lot  How much help for putting on and taking off regular upper body clothing?: A Little  How much help for taking care of personal grooming?: A Little  How much help for eating meals?: Total  AM-PAC Inpatient Daily Activity Raw Score: 13  AM-PAC Inpatient ADL T-Scale Score : 32.03  ADL Inpatient CMS 0-100% Score: 63.03  ADL Inpatient CMS G-Code Modifier : CL    OT Minutes  OT Individual Minutes  Time In: 0944  Time Out: 1210  Minutes: 26      Electronically signed by ANITA Clemente on 1/16/2023 at 1:39 PM

## 2023-01-16 NOTE — DISCHARGE INSTR - COC
Continuity of Care Form    Patient Name: Nahun Lainez   :  1960  MRN:  [id number]    Admit date:  2023  Discharge date:  ***    Code Status Order: Full Code   Advance Directives:     Admitting Physician:  Isrrael Brooks MD  PCP: No primary care provider on file. Discharging Nurse: [facility] Unit/Room#: 2116/2116-01  Discharging Unit Phone Number: ***    Emergency Contact:   Extended Emergency Contact Information  Primary Emergency Contact: [address] Phone: [phone]  Relation: Child  Secondary Emergency Contact: Brady Man Phone: [phone]  Relation: Child    Past Surgical History:  History reviewed. No pertinent surgical history.     Immunization History:   Immunization History   Administered Date(s) Administered    COVID-19, J&J, (age 18y+), IM, 0.5 mL 2021, 2021       Active Problems:  Patient Active Problem List   Diagnosis Code    Acute type II respiratory failure (HCC) J96.00    Transaminitis R74.01    Normocytic anemia D64.9    Thrombocytopenia (HCC) D69.6    Agitation R45.1    Acute respiratory failure with hypoxia and hypercapnia (HCC) RLL pneumonia J96.01, J96.02    Altered mental status R41.82    Community acquired pneumonia of right lower lobe of lung J18.9    Prolonged pt (prothrombin time) R79.1    Emphysema lung (HCC) J43.9    Cerebral infarction ([facility] [address] I63.9    ACP (advance care planning) Z71.89    Goals of care, counseling/discussion Z71.89    Encounter for palliative care Z51.5    Delirium due to another medical condition F05    Mild malnutrition ([facility] [address] E44.1    Hepatitis C virus infection without hepatic coma B19.20       Isolation/Infection:   Isolation            Contact          Patient Infection Status       Infection Onset Added Last Indicated Last Indicated By Review Planned Expiration Resolved Resolved By    MRSA 23 MRSA DNA Probe, Nasal        Resolved    COVID-19 (Rule Out) 23 Respiratory Panel, Molecular, with COVID-19 (Restricted: peds pts or suitable admitted adults) (Ordered)   01/03/23 Tomy Alva RN    negative    COVID-19 (Rule Out) 01/01/23 01/01/23 01/01/23 COVID-19 & Influenza Combo (Ordered)   01/01/23 Rule-Out Test Resulted            Nurse Assessment:  Last Vital Signs: /65   Pulse 100   Temp 98.1 °F (36.7 °C) (Oral)   Resp 18   Ht 5' 7\" (1.702 m)   Wt 164 lb 3.9 oz (74.5 kg)   SpO2 93%   BMI 25.72 kg/m²     Last documented pain score (0-10 scale): Pain Level: 4  Last Weight:   Wt Readings from Last 1 Encounters:   01/13/23 164 lb 3.9 oz (74.5 kg)     Mental Status:  {IP PT MENTAL STATUS:20030}    IV Access:  { TARA IV ACCESS:865511256}    Nursing Mobility/ADLs:  Walking   {CHP DME TOQU:431567823}  Transfer  {P DME KSHJ:668638189}  Bathing  {CHP DME IBJX:159715123}  Dressing  {CHP DME JDSV:583631061}  Toileting  {CHP DME MWJD:174011509}  Feeding  {P DME MSFE:804786639}  Med Admin  {P DME QWPE:002137692}  Med Delivery   { TARA MED Delivery:537250544}    Wound Care Documentation and Therapy:        Elimination:  Continence: Bowel: {YES / FF:04276}  Bladder: {YES / EM:50046}  Urinary Catheter: {Urinary Catheter:275463709}   Colostomy/Ileostomy/Ileal Conduit: {YES / KS:78818}       Date of Last BM: ***    Intake/Output Summary (Last 24 hours) at 1/16/2023 1230  Last data filed at 1/16/2023 1002  Gross per 24 hour   Intake 180 ml   Output 450 ml   Net -270 ml     I/O last 3 completed shifts:   In: 36 [NG/GT:820]  Out: 1650 [Urine:1650]    Safety Concerns:     [address] [employer] Safety Concerns:666594039}    Impairments/Disabilities:      [address] TARA Impairments/Disabilities:671692450}    Nutrition Therapy:  Current Nutrition Therapy:   [address] TARA Diet List:403803905}    Routes of Feeding: {CHP DME Other Feedings:856910709}  Liquids: {Slp liquid thickness:98696}  Daily Fluid Restriction: {CHP DME Yes amt example:415435442}  Last Modified Barium Swallow with Video (Video Swallowing Test): {Done Not Done YROE:351017783}    Treatments at the Time of Hospital Discharge:   Respiratory Treatments: ***  Oxygen Therapy:  {Therapy; copd oxygen:75341}  Ventilator:    {[facility] Vent TRNT:725025827}    Rehab Therapies: {THERAPEUTIC INTERVENTION:2882477452}  Weight Bearing Status/Restrictions: { CC Weight Bearin}  Other Medical Equipment (for information only, NOT a DME order):  {EQUIPMENT:435292587}  Other Treatments: ***    Patient's personal belongings (please select all that are sent with patient):  {[facility] DME Belongings:952451278}    RN SIGNATURE:  {Esignature:836148913}    CASE MANAGEMENT/SOCIAL WORK SECTION    Inpatient Status Date: ***    Readmission Risk Assessment Score:  Readmission Risk              Risk of Unplanned Readmission:  21           Discharging to Facility/ Agency   Name:   Address:  Phone:  Fax:    Dialysis Facility (if applicable)   Name:  Address:  Dialysis Schedule:  Phone:  Fax:    / signature: {Esignature:736547572}    PHYSICIAN SECTION    Prognosis: {Prognosis:9146793604}    Condition at Discharge: [facility] Patient Condition:141946160}    Rehab Potential (if transferring to Rehab): {Prognosis:0305071004}    Recommended Labs or Other Treatments After Discharge: ***    Physician Certification: I certify the above information and transfer of Evon Tucker  is necessary for the continuing treatment of the diagnosis listed and that he requires {Admit to Appropriate Level of Care:86115} for {GREATER/LESS:030806747} 30 days.      Update Admission H&P: {P DME Changes in QYYVV:722952277}    PHYSICIAN SIGNATURE:  Electronically signed by Ann-Marie Bills MD on 23 at 12:30 PM EST

## 2023-01-16 NOTE — PROGRESS NOTES
[address]    PROGRESS NOTE             1/16/2023    9:00 AM    Name:   Yared Marie  MRN:     [id number]     Acct:      [de-identified]   Room:   211/2116-[facility] Day:  15  Admit Date:  1/1/2023  9:55 PM    PCP:  No primary care provider on file. Code Status:  Full Code    Subjective:     C/C:   Chief Complaint   Patient presents with    Altered Mental Status     Interval History Status: Same    Patient seen and examined at the bed side. No new complains. Overall continuously improving. No new/acute events overnight        Plt count normalized. Patient to receive a PEG tube today. Notes from nursing staff and Consults had been reviewed, and the overnight progress had been checked with the nursing staff as well. Brief History:     The patient is a 60 y.o.  male with unknown past medical history due to no healthcare visits or follow-up who presents with Altered Mental Status and he is admitted to the hospital for the management of  Acute respiratory failure most likely 2/2 pneumonia. Per patient's wife, she reports that the patient has not been acting himself for the past week. She reports being more slurred, confused and progressively getting worse prior to presentation. Patient prior to the presentation a week ago he had a fall and EMS presented patient was vitally stable and he was not transported to the hospital.  This time upon EMS evaluation of the patient he had an O2 of 75%, he was put on a breather and was given a dose of IV Lasix in route. Wife and patient, cannot recall any precipitating event could have led to his presentation. He also denies chest pain, shortness of breath, or cough. Per patient, he does not recall any complaints or events prior to the presentation. Wife denies recent alcohol use, however, patient does have a history of regular alcohol intake but not on daily basis.   Wife also reports that patient has constant heartburn for which he takes regular antiacids. Patient denies the presence of any abdominal pain or any change in bowel habits, abdominal pain, nausea or vomiting. Upon arrival in the ED, patient was put on BiPAP. Patient was afebrile, normotensive and in normal sinus rhythm. A chest x-ray was completed and the patient had right lower pneumonia with a small pleural effusion. Patient also had a large bullae in the left apex with a pneumothorax that cannot be excluded. Patient ABG was suggestive of pattern of respiratory acidosis with pH 7.295, PCO2 60.3, PO2 63.0, HCO3 29.3. CT head WO did not show any acute findings. Patient had elevated troponins of 184, trended down to 177. Patient also had an elevated BNP of 7797. An elevated creatinine of 1.77 was on presentation, with no previous lab test to be compared to. Also patient had a left elevated liver enzymes ALT was 525, , with prolonged prothrombin time of 24.9, and INR of 2.3. Patient was admitted to the ICU for the management of type II respiratory failure associated altered mental status    Review of Systems:     Constitutional: Positive for fatigue  Hent: Positive for hearing loss. Respiratory: Positive for shortness of breath, negative for chest pain negative for wheezing negative for cough  Cardiovascular: Positive for leg swelling. Negative for chest pain palpitation  Gastrointestinal negative for abdominal pain diarrhea nausea or vomiting  Skin negative for change of color or pallor  Neurological: Overall patient feels weak from being hospitalized. Medications:      Allergies:    No Known Allergies    Current Meds:   Scheduled Meds:    potassium bicarb-citric acid  20 mEq Oral Daily    furosemide  20 mg Oral Daily    potassium bicarb-citric acid  40 mEq Oral Daily    potassium chloride  20 mEq Oral Once    spironolactone  25 mg Oral Daily    ziprasidone (GEODON) in sterile water injection  10 mg IntraMUSCular Nightly    carvedilol  6.25 mg Oral BID WC    [Held by provider] ferrous sulfate  325 mg Oral Daily with breakfast    pantoprazole (PROTONIX) 40 mg injection  40 mg IntraVENous Daily    ipratropium-albuterol  1 ampule Inhalation Q4H WA    [Held by provider] heparin (porcine)  5,000 Units SubCUTAneous 3 times per day    nystatin   Topical BID    [Held by provider] miconazole   Topical BID    [Held by provider] aspirin  81 mg Oral Daily    sodium chloride flush  5-40 mL IntraVENous 2 times per day    nicotine  1 patch TransDERmal Daily     Continuous Infusions:    sodium chloride      dexmedetomidine      sodium chloride       PRN Meds: hydrALAZINE, potassium chloride **OR** potassium alternative oral replacement **OR** potassium chloride, magnesium sulfate, ziprasidone (GEODON) in sterile water injection, labetalol, potassium chloride, sodium chloride, dexmedetomidine, perflutren lipid microspheres, sodium chloride flush, sodium chloride flush, sodium chloride, ondansetron **OR** ondansetron, polyethylene glycol, acetaminophen **OR** acetaminophen, albuterol, guaiFENesin    Data:     Past Medical History:   has no past medical history on file. Social History:   reports that he has been smoking cigarettes. He has a 40.00 pack-year smoking history. He has never used smokeless tobacco. He reports current alcohol use. He reports that he does not currently use drugs. Family History:   History reviewed. No pertinent family history. Vitals:  /79   Pulse 91   Temp 98.9 °F (37.2 °C) (Axillary)   Resp 18   Ht 5' 7\" (1.702 m)   Wt 164 lb 3.9 oz (74.5 kg)   SpO2 96%   BMI 25.72 kg/m²   Temp (24hrs), Av.8 °F (37.1 °C), Min:98.3 °F (36.8 °C), Max:99.2 °F (37.3 °C)      Recent Labs     23  1712   POCGLU 106         I/O(24Hr):     Intake/Output Summary (Last 24 hours) at 2023 0900  Last data filed at 2023 0030  Gross per 24 hour   Intake 120 ml   Output 850 ml Net -730 ml       Labs:    CBC:   Lab Results   Component Value Date/Time    WBC 6.4 01/16/2023 04:31 AM    RBC 5.75 01/16/2023 04:31 AM    HGB 12.4 01/16/2023 04:31 AM    HCT 41.8 01/16/2023 04:31 AM    MCV 72.8 01/16/2023 04:31 AM    MCH 21.6 01/16/2023 04:31 AM    MCHC 29.6 01/16/2023 04:31 AM    RDW 31.8 01/16/2023 04:31 AM     01/16/2023 04:31 AM    MPV 9.3 01/16/2023 04:31 AM     CMP:    Lab Results   Component Value Date/Time     01/16/2023 04:31 AM    K 3.8 01/16/2023 04:31 AM     01/16/2023 04:31 AM    CO2 37 01/16/2023 04:31 AM    BUN 11 01/16/2023 04:31 AM    CREATININE 0.81 01/16/2023 04:31 AM    LABGLOM >60 01/16/2023 04:31 AM    GLUCOSE 111 01/16/2023 04:31 AM    PROT 6.6 01/16/2023 04:31 AM    LABALBU 3.1 01/16/2023 04:31 AM    CALCIUM 8.9 01/16/2023 04:31 AM    BILITOT 1.6 01/16/2023 04:31 AM    ALKPHOS 48 01/16/2023 04:31 AM    AST 27 01/16/2023 04:31 AM    ALT 24 01/16/2023 04:31 AM     PT/INR:    Lab Results   Component Value Date/Time    PROTIME 19.1 01/08/2023 04:38 AM    INR 1.6 01/08/2023 04:38 AM       No results found for: SPECIAL  Lab Results   Component Value Date/Time    CULTURE NO GROWTH 01/03/2023 12:12 PM         Radiology:    CT HEAD WO CONTRAST    Result Date: 1/2/2023  EXAMINATION: CT OF THE HEAD WITHOUT CONTRAST  1/2/2023 10:11 pm TECHNIQUE: CT of the head was performed without the administration of intravenous contrast. Automated exposure control, iterative reconstruction, and/or weight based adjustment of the mA/kV was utilized to reduce the radiation dose to as low as reasonably achievable. COMPARISON: None. HISTORY: ORDERING SYSTEM PROVIDED HISTORY: Altered Mental status TECHNOLOGIST PROVIDED HISTORY: Altered Mental status Reason for Exam: Altered Mental status Additional signs and symptoms: Patient came in with transient alteration of awareness, follow up head CT for any changes.  FINDINGS: BRAIN/VENTRICLES: There is no acute intracranial hemorrhage, mass effect or midline shift. No abnormal extra-axial fluid collection. There is a focal area of decreased attenuation with loss of gray/white matter differentiation involving posterior left parietal lobe with somewhat increased conspicuity as compared to prior exam.  There is stable small focus of encephalomalacia involving left cerebellar hemisphere compatible with prior remote infarct/insult. Chronic lacunar infarct to right basal ganglia redemonstrated. There is no evidence of hydrocephalus. ORBITS: The visualized portion of the orbits demonstrate no acute abnormality. SINUSES: Small air-fluid level right sphenoid sinus. Trace air-fluid level right frontal sinus. Mild mucoperiosteal thickening to bilateral ethmoid air cells. Findings are new from prior exam and likely relate to presence of nasogastric tube. SOFT TISSUES/SKULL:  No acute abnormality of the visualized skull or soft tissues. Focal area of decreased attenuation with loss of gray/white matter differentiation involving posterior left parietal lobe with somewhat increased conspicuity as compared to prior exam likely reflecting an area of acute to subacute infarct. Stable small chronic infarct/insult to left cerebellar hemisphere. Chronic lacunar infarct to right basal ganglia redemonstrated. Paranasal sinus disease likely relating to presence of nasogastric tube. CT HEAD WO CONTRAST    Result Date: 1/1/2023  EXAMINATION: CT OF THE HEAD WITHOUT CONTRAST  1/1/2023 10:48 pm TECHNIQUE: CT of the head was performed without the administration of intravenous contrast. Automated exposure control, iterative reconstruction, and/or weight based adjustment of the mA/kV was utilized to reduce the radiation dose to as low as reasonably achievable. COMPARISON: None. HISTORY: Reason for Exam: AMS Additional signs and symptoms: the patient has been getting more and more confused since 2 weeks ago.   It has progressively been getting worse and today FINDINGS: BRAIN/VENTRICLES: There is no acute intracranial hemorrhage, mass effect or midline shift. No abnormal extra-axial fluid collection. The gray-white differentiation is maintained without evidence of an acute infarct. There is no evidence of hydrocephalus. Changes of mild chronic small vessel ischemic disease. ORBITS: The visualized portion of the orbits demonstrate no acute abnormality. SINUSES: The visualized paranasal sinuses and mastoid air cells demonstrate no acute abnormality. SOFT TISSUES/SKULL:  No acute abnormality of the visualized skull or soft tissues. No acute intracranial abnormality. Mild chronic small vessel ischemic disease. US LIVER    Result Date: 1/2/2023  EXAMINATION: RIGHT UPPER QUADRANT ULTRASOUND 1/2/2023 10:20 am COMPARISON: None. HISTORY: ORDERING SYSTEM PROVIDED HISTORY: elevated AST and ALT, in setting of PT, and INR TECHNOLOGIST PROVIDED HISTORY: elevated AST and ALT, in setting of PT, and INR FINDINGS: Patient body habitus limits the study, as there is increased attenuation of the ultrasound beam. This decreases sensitivity and specificity. LIVER:  There is mild increased echogenicity seen throughout the liver. No intrahepatic ductal dilatation. No perihepatic fluid. BILIARY SYSTEM:  Bladder is contracted. Gallstones are seen. Gallbladder wall thickness measures 6 mm. Common bile duct is within normal limits measuring 5 mm. RIGHT KIDNEY: The right kidney is grossly unremarkable without evidence of hydronephrosis. PANCREAS:  Visualized portions of the pancreas are unremarkable. OTHER: No evidence of right upper quadrant ascites. Portal vein flow appears hepatopetal     Increased echogenicity is seen throughout the liver suggesting diffuse hepatocellular disease such as fatty infiltration Cholelithiasis.   No cholecystitis     XR CHEST PORTABLE    Result Date: 1/2/2023  EXAMINATION: ONE XRAY VIEW OF THE CHEST 1/2/2023 3:15 pm COMPARISON: 01/01/2023 HISTORY: ORDERING SYSTEM PROVIDED HISTORY: intubation TECHNOLOGIST PROVIDED HISTORY: intubation Reason for Exam: intubation FINDINGS: Overlying items external to the patient somewhat limit evaluation. Placement of endotracheal tube with tip 8.2 cm from the robert. Placement of enteric tube seen to extend into the stomach, distal extent not included in field of view. Persistent likely layering right pleural effusion, similar to slightly worsened. Persistent bilateral airspace opacities to bilateral mid to lower lung zones, right worse than left, similar on the left but mildly worsened on the right. No pneumothoraces are seen. Persistent likely bullous change to the left upper lung zone. Cardiac and mediastinal silhouettes are stable. Stable appearance to bony structures. Placement of endotracheal tube which appears high riding with tip 8.2 cm from the robert. Suggest advancement by 2-3 cm. Placement of endotracheal tube seen to extend into the stomach, distal extent not included in field of view. No pneumothoraces. Persistent likely bullous change to the left upper lung zone. Persistent right pleural effusion, similar to slightly worsened. Persistent bilateral airspace opacities, right worse than left, similar on the left but mildly worsened on the right. XR CHEST PORTABLE    Result Date: 1/1/2023  EXAMINATION: ONE XRAY VIEW OF THE CHEST 1/1/2023 7:17 pm COMPARISON: None. HISTORY: ORDERING SYSTEM PROVIDED HISTORY: ams TECHNOLOGIST PROVIDED HISTORY: ams Reason for Exam: Altered mental status, difficulty breathing Additional signs and symptoms: Altered mental status, difficulty breathing Relevant Medical/Surgical History: Altered mental status, difficulty breathing FINDINGS: Large lucent area in the left apex suggesting a large bulla however superimposed pneumothorax cannot be excluded. The cardiac size is normal. Right lower lobe airspace infiltrate and mild right pleural effusion. . Pulmonary vascularity appears normal. There is To continue taking  glipizide regularly and to hold on DOS   Hga1c drawn sent   Fingerstick for glucose level preop mild ectasia of the thoracic aorta. Calcific tendinitis of the right shoulder. No acute bony abnormalities. The hilar structures are normal.     Right lower lobe pneumonia and small right pleural effusion Large lucent area in the left apex suggesting a large bulla however superimposed pneumothorax cannot be excluded. Follow-up CT would be useful for further evaluation. The findings were sent to the Radiology Results [address] at 10:31 pm on 1/1/2023 to be communicated to a licensed caregiver. EKG:    there are no previous tracings available for comparison. Physical Examination:        PHYSICAL EXAM:  General Appearance patient is alert awake and oriented to self Name=full, place=[facility] Secures ROBYN, Time: non. Thinking he was in his work area at [employer]: No agitation, no depression, maintains good eye contact  HEENT - Head is normocephalic, atraumatic. Neck: supple, no rigidity, normal ROM. Small swelling of the left side of the mandibular angle that its been since childhood  Lungs -limited exam, good air entry. No wheezes  Cardiovascular - Heart sounds are normal.  Regular rhythm, normal rate without murmur, gallop or rub.   Neurology: Neurological exam is nonfocal.  Strength is 4 -/5 in all extremities  Abdomen - Soft, nontender, nondistended, no masses or organomegaly  Skin - No bruising or bleeding on exposed skin area  Extremities -lower limb edema  Assessment:        Primary Problem  Acute respiratory failure with hypoxia and hypercapnia [facility])    Active Hospital Problems    Diagnosis Date Noted    Hepatitis C virus infection without hepatic coma [B19.20] 01/14/2023     Priority: Medium    Mild malnutrition ([address] [E44.1] 01/13/2023     Priority: Medium    Delirium due to another medical condition [F05] 01/10/2023     Priority: Medium    ACP (advance care planning) [Z71.89] 01/09/2023     Priority: Medium    Goals of care, counseling/discussion [Z71.89] 01/09/2023     Priority: Medium    Encounter for palliative care [Z51.5] 01/09/2023     Priority: Medium    Prolonged pt (prothrombin time) [R79.1] 01/03/2023     Priority: Medium    Emphysema lung ([facility] [address] [J43.9] 01/03/2023     Priority: Medium    Cerebral infarction ([facility] [address] [I63.9] 01/03/2023     Priority: Medium    Transaminitis [R74.01] 01/02/2023     Priority: Medium    Normocytic anemia [D64.9] 01/02/2023     Priority: Medium    Thrombocytopenia ([facility] [address] [D69.6] 01/02/2023     Priority: Medium    Agitation [R45.1] 01/02/2023     Priority: Medium    Acute respiratory failure with hypoxia and hypercapnia (HCC) RLL pneumonia [J96.01, J96.02] 01/02/2023     Priority: Medium    Altered mental status [R41.82] 01/02/2023     Priority: Medium    Community acquired pneumonia of right lower lobe of lung [J18.9] 01/02/2023     Priority: Medium       Plan: Altered Mental Status in the setting of Acute type 2 respiratory failure secondary to Pneumonia and Acute Heart Failure-Improved. -failed swallow study, reains on NG tube feeds.  -On Geodon 10 mg BID  -ID following: vancoymycin and Unasyn completed  -Ipratropium-Albuterol every 4 hrs  -Echocardiogam completed  -Patient failed swallow study. NG tube placed. PEG Tube today  - On aldactone 25 mg, Coreg 6.25mg BID    Transaminitis with prolonged PTT and INR in the setting of acute HCV  - AST and ALT normalized  -INR PT improving   -Thrombocytopenicresolved  -HCV RNA PCR positive; ID on board       Multiple acute brain infarcts with subarachnoid hemorrhage -EEG showing diffuse slowing without epileptic waves.  -No anticoagulation  -BP parameters of 140.  -Exam is nonfocal    Thrombocytopenia-RESOLVED  -Unknown  baseline  -Transaminates and Prolonged PT and PTT; resolved.   -PLT count  118  -Monitor HH       Microcytic anemia in the setting of elevated INR and PT  -No previous labs to compare?  -Iron IV replacement  -Hb stable    Elevated troponins most likely 2/2 demand ischemia  -no acute changes on EKG  -ECHO showing normal LVF. Mild tricuspid regurge. RV pressure of 25 mmHG    Hypokalemia  -K 3.4 this AM  -Effer K 40 meq daily. DVT prophylaxis: reason for no prophylaxis: Prolonged PT, aPTT  GI prophylaxis: Protonix 40 mg daily    Lu Whitman MD  1/16/2023  9:00 AM   Attending Physician Statement  I have discussed the care of Maria Del Carmen Barker and I have examined the patient myselft and taken ros and hpi , including pertinent history and exam findings,  with the resident. I have reviewed the key elements of all parts of the encounter with the resident. I agree with the assessment, plan and orders as documented by the resident.   Peg placement today  Ng in place  Npo      Electronically signed by Kasie Cannon MD To continue taking  glipizide regularly and to hold on DOS   Hgba1c drawn sent   Fingerstick for glucose level preop

## 2023-01-16 NOTE — PROGRESS NOTES
PALLIATIVE CARE NURSING ASSESSMENT    Patient: Helena Montanez  Room: 2116/2116-01    Reason For Consult   Goals of care evaluation  Distress management  Guidance and support  Facilitate communications  Assistance in coordinating care    Code Status: Full Code    Summary:  Pallaitive Care visit with patient today. Pt resting quietly in bed,  tube feeding off. Discussed with patient that he was having feeding tube placed today. Pt seemed agreeable and aware of plan. Pt also relates he is going to the Acute Rehab Unit and has been discharged already. Palliative Care will continue to follow. Pt has telesitter in room. [facility] documents not able to be filled out at this time due some confusion regarding circumstances. Impression: Kingsley Lamb is a 60y.o. year old male  has no past medical history on file. .  Currently hospitalized for the management of acute respiratory failure. The Palliative Care Team is following to assist with patient and family support, goals of care. Vital Signs  Blood pressure 107/65, pulse 100, temperature 98.1 °F (36.7 °C), temperature source Oral, resp. rate 18, height 5' 7\" (1.702 m), weight 164 lb 3.9 oz (74.5 kg), SpO2 93 %. Patient Active Problem List   Diagnosis    Acute type II respiratory failure (HCC)    Transaminitis    Normocytic anemia    Thrombocytopenia (HCC)    Agitation    Acute respiratory failure with hypoxia and hypercapnia (HCC) RLL pneumonia    Altered mental status    Community acquired pneumonia of right lower lobe of lung    Prolonged pt (prothrombin time)    Emphysema lung (HCC)    Cerebral infarction ([address]    ACP (advance care planning)    Goals of care, counseling/discussion    Encounter for palliative care    Delirium due to another medical condition    Mild malnutrition ([address]    Hepatitis C virus infection without hepatic coma       Palliative Interaction:Continued follow up from palliative care.   Plan is for peg tube placement today and Acute Rehab facility when ready. Met with patient to see if [facility] documents could be filled out. Pt is aware of person, place. But is not completely oriented on circumstances. [facility] documents can not be completed at this time.       Goals/Plan of care  Education/support to staff  Education/support to patient  Communications with primary service  Providing support for coping/adaptation/distress of patient  Validating patient/family distress  Continued communication updates      Palliative Care Coordinator  Sukhi PEMBERTON, RN, ONN-[facility] Office: [phone] [facility] Office: [phone]  [facility] Office: [phone]    For [facility] Management Clinic scheduling please call [phone]

## 2023-01-16 NOTE — PROGRESS NOTES
Patient alert to person only this shift; Disoriented to place, time and situation. Patient has been getting increasingly agitated and intermittently refusing PEG tube procedure today. Family called and informed of situation; Family currently at the bedside. RN updated the family along with the patient of the events today and attempted to clear up any confusion or misunderstanding regarding the current plan of care for the patient. Patient as well as family agreeable to PEG tube placement, but have requested that a different surgeon performs procedure without reasoning. RN informed Internal medicine Residents; New order received for second opinion with Dr. Mauricio Rea. Electronically signed by Emmy Olszewski, RN.

## 2023-01-16 NOTE — CARE COORDINATION
ONGOING DISCHARGE  NOTE:      Writer reviewed notes, Patient is agreeable to discharge to [facility]    Discharge is pending pre cert. Pre cert has not been started. Andrew from [address] [place] [address] will start pre cert today     Patient will have a peg tube placed today     [employer]     Will continue to follow for additional discharge needs.      Electronically signed by Francis Laureano RN on 1/16/2023 at 9:12 AM

## 2023-01-16 NOTE — PROGRESS NOTES
Speech Language Pathology  Speech Language Pathology  [facility]    Dysphagia Treatment Note    Date: 1/16/2023  Patients Name: Jaz Schultz  MRN: [id number]  Diagnosis: Dysphagia  Patient Active Problem List   Diagnosis Code    Acute type II respiratory failure (HCC) J96.00    Transaminitis R74.01    Normocytic anemia D64.9    Thrombocytopenia (HCC) D69.6    Agitation R45.1    Acute respiratory failure with hypoxia and hypercapnia (HCC) RLL pneumonia J96.01, J96.02    Altered mental status R41.82    Community acquired pneumonia of right lower lobe of lung J18.9    Prolonged pt (prothrombin time) R79.1    Emphysema lung (HCC) J43.9    Cerebral infarction (HCC) I63.9    ACP (advance care planning) Z71.89    Goals of care, counseling/discussion Z71.89    Encounter for palliative care Z51.5    Delirium due to another medical condition F05    Mild malnutrition ([facility] [address] E44.1    Hepatitis C virus infection without hepatic coma B19.20       Pain: pt. denies    Dysphagia Treatment  Treatment time: 6364-3472    Subjective: [x] Alert [x] Cooperative     [x] Confused     [] Agitated    [] Lethargic    Objective/Assessment:    Pt. Continues to demo. confusion re: not being able to go home today and plan is for ARU in the near future for continued tx. Pt. Very pleasant and verbalizes understanding with all educ. Provided. Pt completed BOT exercises x3 sets in reps of 15. Pt completed  noe maneuver x5 reps. Pt. Completed lingual oral motor exercises x2, 10 reps each. Pt. Demonstrated moderate oral tongue weakness with fatigue during exercises. Moist oral swab used throughout to assist as pt. Oral cavity very dry, pt. Demo. Impaired resonance  at this time (? If d/t dried secretions/NGT). Education provided re: completion of swallowing exercises throughout the day to improve swallow function. Pt.  To have NGT removed today with PEG placement this pm.     Plan:  [x] Continue ST services    [] Discharge from ST:        Discharge recommendations: [] Inpatient Rehab   [] [place]   [] Outpatient Therapy  [] Follow up at trauma clinic   [] Other:         Treatment completed by: Triny Escobedo A.CCC/SLP

## 2023-01-16 NOTE — PROGRESS NOTES
SC visit with pt and his SO Addis. He was anxious about not being able to get emails as he was sure that's how the dr would contact him about \"that thing they are doing tomorrow. \" When writer inquired, it's the PEG tube placement. Writer assured pt he would not get info via email about the procedure but that the staff would come in and talk about it including time. He calmed down and his SO was appreciative.    01/16/23 3160   Encounter Summary   Encounter Overview/Reason  Spiritual/Emotional Needs   Service Provided For: Patient and family together   Referral/Consult From: [address] [employer] [facility] Significant other;Children   Last Encounter  01/16/23   Complexity of Encounter Moderate   Spiritual/Emotional needs   Type Spiritual Support   Palliative Care   Type Palliative Care, Follow-up   Assessment/Intervention/Outcome   Assessment Anxious   Intervention Active listening;Explored/Affirmed feelings, thoughts, concerns;Prayer (assurance of)/[place];Sustaining Presence/Ministry of presence   Outcome Engaged in conversation;Expressed feelings, needs, and concerns;Expressed Gratitude;Receptive

## 2023-01-16 NOTE — PROGRESS NOTES
Physical Therapy  Facility/Department: [facility] PROGRESSIVE CARE  Daily Treatment Note  NAME: Demetria Johnson  : 1960  MRN: [id number]    Date of Service: 2023    Discharge Recommendations:  Therapy recommended at discharge        Patient Diagnosis(es): The primary encounter diagnosis was Acute respiratory failure with hypoxia and hypercapnia ([facility] [address] Diagnoses of Community acquired pneumonia of right lower lobe of lung and Altered mental status, unspecified altered mental status type were also pertinent to this visit. Assessment   Assessment: Cont therapy recommended to address pt deficits. Activity Tolerance: Patient limited by endurance     Plan    Physcial Therapy Plan  General Plan: 5-7 times per week  Specific Instructions for Next Treatment: Continue to progress with functional mobility training  Current Treatment Recommendations: Strengthening;Balance training;Functional mobility training;Neuromuscular re-education;Cognitive reorientation; Safety education & training;Patient/Caregiver education & training;Equipment evaluation, education, & procurement; Therapeutic activities     Restrictions  Restrictions/Precautions  Restrictions/Precautions: Fall Risk, General Precautions, Contact Precautions, Isolation, Bed Alarm  Required Braces or Orthoses?: No  Implants present? :  (pt denies)  Position Activity Restriction  Other position/activity restrictions: Severe dysphagia- NPO. Unable to tolerate any PO safely. NG tube placed,  on high flow nasal cannula     Subjective    Subjective  Subjective: Upon arrival pt is sitting up with BLE's over bed rails attempting to sit up on EOB. RT just left room and states pt was laying supine upon her daparture. Pt is agreeable to PT/OT tx. Co-treat with Leida's. Per RN, pt will be getting a PEG tube placement/EGD later this date, however ok to participate in therapy.  Pt is left sitting up in his chair with a chair alarm on, call light in reach, and all other needs addressed. RN Sten informed. Pain: denies pain  Orientation  Overall Orientation Status: Impaired  Orientation Level: Disoriented to place; Disoriented to time;Oriented to person;Disoriented to situation  Cognition  Overall Cognitive Status: WFL  Arousal/Alertness: Appropriate responses to stimuli  Following Commands: Follows one step commands consistently  Attention Span: Attends with cues to redirect  Memory: Decreased recall of biographical Information;Decreased recall of precautions;Decreased recall of recent events  Safety Judgement: Decreased awareness of need for assistance;Decreased awareness of need for safety  Problem Solving: Assistance required to generate solutions;Assistance required to implement solutions;Assistance required to identify errors made;Assistance required to correct errors made  Insights: Decreased awareness of deficits  Initiation: Requires cues for some  Sequencing: Requires cues for some  Cognition Comment: Pt with impulsive tendencies noted. Objective   Vitals  O2 Device: Nasal cannula  Bed Mobility Training  Bed Mobility Training: Yes  Interventions: Verbal cues;Manual cues;Demonstration  Rolling: Supervision  Supine to Sit: Stand-by assistance (Pt sat himself up, however requires SBA d/t cognition and safety concerns.)  Scooting: Stand-by assistance  Duration: 10  Balance  Sitting: Without support  Sitting - Static: Good (unsupported)  Sitting - Dynamic: Fair (occasional)  Standing: With support  Standing - Static: Constant support;Fair;Poor  Standing - Dynamic: Fair;Poor;Constant support  Transfer Training  Transfer Training: Yes  Overall Level of Assistance: Minimum assistance; Adaptive equipment; Additional time  Interventions: Safety awareness training;Manual cues; Verbal cues  Sit to Stand: Minimum assistance; Adaptive equipment; Additional time (Cues for hand placement with fair carryover. Upon standing, pt has a posterior left lateral lean.  Min A to correct.)  Stand to Sit: Minimum assistance; Adaptive equipment; Additional time  Gait Training  Gait Training: Yes  Gait  Overall Level of Assistance: Moderate assistance;Assist X2;Adaptive equipment; Additional time (chair follow to increase safety.)  Interventions: Manual cues; Verbal cues; Safety awareness training  Base of Support: Widened  Speed/Radha: Slow  Step Length: Left shortened;Right shortened  Gait Abnormalities:  (flexed forward posture at hips/knees. pushes RW out of SELVIN F and laterally despite cues. Left lateral lean noted.)  Distance (ft): 12 Feet  Assistive Device: Walker, rolling     PT Exercises  A/AROM Exercises: Seated BLE ex's AROM. Reps: 10-15 each. Pressure Relief Exercises: STS x2; x1 from EOB and x1 from chair. Static Sitting Balance Exercises: Seated EOB ~10min  Dynamic Sitting Balance Exercises: Sitting functional dyn act faciliated by Didi Billingsley. Pt is able to complete a forward lean while reaching out of SELVIN. Static Standing Balance Exercises: x~1min, posterior lean noted/min assistance with RW required. .  Dynamic Standing Balance Exercises: Standing dyn UE reaching out of SELVIN tasks. Pt requires Min A x1 with unilateral UE support on RW.          [facility] Mobility Inpatient   How much difficulty turning over in bed?: A Little  How much difficulty sitting down on / standing up from a chair with arms?: A Little  How much difficulty moving from lying on back to sitting on side of bed?: A Little  How much help from another person moving to and from a bed to a chair?: Total  How much help from another person needed to walk in hospital room?: Total  How much help from another person for climbing 3-5 steps with a railing?: Total  AM-PAC Inpatient Mobility Raw Score : 12  AM-PAC Inpatient T-Scale Score : 35.33  Mobility Inpatient CMS 0-100% Score: 68.66  Mobility Inpatient CMS G-Code Modifier : CL      Goals  Short Term Goals  Time Frame for Short Term Goals: 10 visits  Short Term Goal 1: min assist supine<-> sit x1 assist  Short Term Goal 2: sit EOB with min x1 up to 15 min for therex/ADL  Short Term Goal 3: 3+/5 LE strength to prepare for standing and transfers  Short Term Goal 4: standing for functional ADLs with Cyril of 2 for 3-5min  Short Term Goal 5: roll L/R with SBA  Patient Goals   Patient Goals : unable to state    Education  Patient Education  Education Given To: Patient  Education Provided: Transfer Training;Role of Therapy;Plan of Care;Energy Conservation; Fall Prevention Strategies  Education Provided Comments: educated pt on importance of sitting up, calling for nursing assistance pior to any standing functional mobility, and the benefits of participating in therapy.   Education Method: Demonstration;Verbal  Barriers to Learning: Cognition  Education Outcome: Continued education needed;Verbalized understanding    Therapy Time   Individual Concurrent Group Co-treatment   Time In 3177         Time Out 1210         Minutes Michelle  [address] [place]

## 2023-01-16 NOTE — FLOWSHEET NOTE
01/16/23 1501   Treatment Team Notification   Reason for Communication Review case   Team Member Name Dr. Fior Mercedes Provider   Method of Communication Secure Message   Notification Time 46     Dr. Bret Leyva notified of General surgery consult for PEG tube placement.

## 2023-01-16 NOTE — PROGRESS NOTES
Today's Date: 1/16/2023  Patient Name: Po Nielsen  Date of admission: 1/1/2023  9:55 PM  Patient's age: 60 y.o., 1960  Admission Dx: Acute respiratory failure ([facility] [address] [J96.00]  Acute respiratory failure with hypoxia and hypercapnia ([facility] [address] [J96.01, J96.02]  Altered mental status, unspecified altered mental status type [R41.82]  Community acquired pneumonia of right lower lobe of lung [J18.9]  COPD with respiratory failure, acute ([facility] [address] [J44.9, J96.00]    Reason for Consult: management recommendations  Requesting Physician: Kanwal Serrano MD    CHIEF COMPLAINT: Abnormal cell counts. Altered mental status. Pneumonia. Interval history:    Patient seen and examined  Labs and vitals reviewed  Continues to recover well. Condition is improving quickly. Mental status is improving. No active bleeding. Platelets 824. HISTORY OF PRESENT ILLNESS:      The patient is a 60 y.o.  male who is admitted with chief complaint of altered mental status. Patient has not seen a doctor for multiple years. Due to worsening mental status EMS was summoned. Patient oxygen saturation was noted to be at 75%. Patient placed on BiPAP x-ray showed right lower lobe pneumonia. Patient started on antibiotics. Patient also noted to have INR of 2.3 platelet count of 63,687. Patient does have a history of alcohol intake. Patient's ammonia level noted to be 29. Creatinine 1.5. Total bilirubin is within range. Hemoglobin 11.2 with MCV of 69. Platelet count is 85,438. Ultrasound liver done however report is pending. Past Medical History: Hypertension    Past Surgical History: No pertinent surgical history    Medications:    Prior to Admission medications    Medication Sig Start Date End Date Taking?  Authorizing Provider   aspirin 325 MG tablet Take 325 mg by mouth daily Patient takes 2 tabs daily   Yes Historical Provider, MD   psyllium (KONSYL) 28.3 % PACK Take 1 packet by mouth daily   Yes Historical Provider, MD     Current Facility-Administered Medications   Medication Dose Route Frequency Provider Last Rate Last Admin    dextrose 5 % and 0.45 % sodium chloride infusion   IntraVENous Continuous Stevendo David MD 40 mL/hr at 01/16/23 1512 New Bag at 01/16/23 1512    potassium bicarb-citric acid (EFFER-K) effervescent tablet 20 mEq  20 mEq Oral Daily Batsheva Cheung MD   20 mEq at 01/16/23 1002    potassium bicarb-citric acid (EFFER-K) effervescent tablet 40 mEq  40 mEq Oral Daily Fito Duff MD   40 mEq at 01/15/23 0818    potassium chloride (KLOR-CON M) extended release tablet 20 mEq  20 mEq Oral Once Fito Duff MD        hydrALAZINE (APRESOLINE) injection 20 mg  20 mg IntraVENous Q6H PRN Batsheva Cheung MD   20 mg at 01/11/23 1224    potassium chloride (KLOR-CON M) extended release tablet 40 mEq  40 mEq Oral PRN Batsheva Cheung MD        Or    potassium bicarb-citric acid (EFFER-K) effervescent tablet 40 mEq  40 mEq Oral PRN Batsheva Cheung MD        Or    potassium chloride 10 mEq/100 mL IVPB (Peripheral Line)  10 mEq IntraVENous PRN Batsheva Cheung  mL/hr at 01/13/23 0928 10 mEq at 01/13/23 4292    spironolactone (ALDACTONE) tablet 25 mg  25 mg Oral Daily Fito Duff MD   25 mg at 01/16/23 1002    magnesium sulfate 2000 mg in water 50 mL IVPB  2,000 mg IntraVENous PRN Nabeel Mercado MD        ziprasidone (GEODON) 10 mg in sterile water 0.5 mL injection  10 mg IntraMUSCular Nightly Keara Renee APRN - CNP        carvedilol (COREG) tablet 6.25 mg  6.25 mg Oral BID WC Nabeel Mercado MD   6.25 mg at 01/14/23 0800    [Held by provider] ferrous sulfate (IRON 325) tablet 325 mg  325 mg Oral Daily with breakfast Nabeel Mercado MD        pantoprazole (PROTONIX) 40 mg in sodium chloride (PF) 0.9 % 10 mL injection  40 mg IntraVENous Daily Nabeel Mercado MD   40 mg at 01/16/23 1003    ziprasidone (GEODON) 20 mg in sterile water 1 mL injection  20 mg IntraMUSCular Q12H PRN ALAYNA Olivera CNP        labetalol (NORMODYNE;TRANDATE) injection 5 mg  5 mg IntraVENous Q6H PRN Stephanie Solomon MD   5 mg at 01/10/23 1606    potassium chloride 10 mEq/100 mL IVPB (Peripheral Line)  10 mEq IntraVENous PRN Gautam Duggan  mL/hr at 01/13/23 1233 10 mEq at 01/13/23 1233    ipratropium-albuterol (DUONEB) nebulizer solution 1 ampule  1 ampule Inhalation Q4H WA Kalani Esparza MD   1 ampule at 01/16/23 1601    [Held by provider] heparin (porcine) injection 5,000 Units  5,000 Units SubCUTAneous 3 times per day Rick Flores MD   5,000 Units at 01/08/23 0549    0.9 % sodium chloride infusion   IntraVENous PRN Duncan Fuentes MD        dexmedetomidine (PRECEDEX) 400 mcg in sodium chloride 0.9 % 100 mL infusion  0.1-1.5 mcg/kg/hr IntraVENous Continuous PRN Kalani Esparza MD        nystatin (MYCOSTATIN) ointment   Topical BID Yousif Rasheed MD   Given at 01/16/23 1003    [Held by provider] miconazole (MICOTIN) 2 % powder   Topical BID ALAYNA Drew NP   Given at 01/06/23 8151    perflutren lipid microspheres (DEFINITY) injection 1.5 mL  1.5 mL IntraVENous ONCE PRN Gabe Galloway MD        [facility] by provider] aspirin chewable tablet 81 mg  81 mg Oral Daily Yaneth Luna MD   81 mg at 01/04/23 0740    sodium chloride flush 0.9 % injection 10 mL  10 mL IntraVENous PRN Yaneth Luna MD   10 mL at 01/03/23 1836    sodium chloride flush 0.9 % injection 5-40 mL  5-40 mL IntraVENous 2 times per day ALAYNA Drew NP   10 mL at 01/16/23 1004    sodium chloride flush 0.9 % injection 5-40 mL  5-40 mL IntraVENous PRN ALAYNA Marrero NP        0.9 % sodium chloride infusion   IntraVENous PRN ALAYNA Drew NP        ondansetron (ZOFRAN-ODT) disintegrating tablet 4 mg  4 mg Oral Q8H PRN ALAYNA Drew NP        Or    ondansetron [facility] injection 4 mg  4 mg IntraVENous Q6H PRN Tamsen Cuff, APRN - NP        polyethylene glycol (GLYCOLAX) packet 17 g  17 g Oral Daily PRN Tamsen Cuff, APRN - NP        acetaminophen (TYLENOL) tablet 650 mg  650 mg Oral Q6H PRN Tamsen Cuff, APRN - NP        Or    acetaminophen (TYLENOL) suppository 650 mg  650 mg Rectal Q6H PRN Tamsen Cuff, APRN - NP        nicotine (NICODERM CQ) 21 MG/24HR 1 patch  1 patch TransDERmal Daily Tamsen Cuff, APRN - NP   1 patch at 01/16/23 1005    albuterol (PROVENTIL) nebulizer solution 2.5 mg  2.5 mg Nebulization Q2H PRN Tamsen Cuff, APRN - NP   2.5 mg at 01/02/23 1115    guaiFENesin (MUCINEX) extended release tablet 600 mg  600 mg Oral BID PRN Tamsen Cuff, APRN - NP           Allergies:  Patient has no known allergies. Social History:   reports that he has been smoking cigarettes. He has a 40.00 pack-year smoking history. He has never used smokeless tobacco. He reports current alcohol use. He reports that he does not currently use drugs. Family History: Patient not able to give history due to altered mental status    REVIEW OF SYSTEMS:      General: no fever or night sweats, Weight is stable. ENT: No double or blurred vision, no hearing problem, no dysphagia or sore throat   Respiratory: Positive shortness of breath  Cardiovascular: Denies chest pain, PND or orthopnea. No L E swelling or palpitations. Gastrointestinal:    No nausea or vomiting, abdominal pain, diarrhea or constipation. Genitourinary: Denies dysuria, hematuria, frequency, urgency or incontinence. Neurological: Complains of being very weak. Musculoskeletal:  No arthralgia no back pain or joint swelling. Skin: There are no rashes or bleeding.   Psych: Denies hallucinations or intentions to harm self        PHYSICAL EXAM:        /77   Pulse 92   Temp 98 °F (36.7 °C) (Oral)   Resp 20   Ht 5' 7\" (1.702 m)   Wt 164 lb 3.9 oz (74.5 kg)   SpO2 94%   BMI 25.72 kg/m²    Temp (24hrs), Av.4 °F (36.9 °C), Min:98 °F (36.7 °C), Max:98.9 °F (37.2 °C)      General appearance - not in acute distress  Mental status -arousable but somewhat lethargic  Eyes - pupils equal and reactive, extraocular eye movements intact   Ears - bilateral TM's and external ear canals normal   Mouth -mucous membranes moist  Neck - supple, no significant adenopathy   Lymphatics - no palpable lymphadenopathy, no hepatosplenomegaly   Chest -bilateral rales  Heart - normal rate, regular rhythm, normal S1, S2, no murmurs  Abdomen - soft, nontender, nondistended, no masses or organomegaly   Neurological -orally intubated  Musculoskeletal - no joint tenderness, deformity or swelling   Extremities - peripheral pulses normal, no pedal edema, no clubbing or cyanosis   Skin - normal coloration and turgor, no rashes, no suspicious skin lesions noted ,      DATA:      Labs:     Results for orders placed or performed during the hospital encounter of 23   COVID-19 & Influenza Combo    Specimen: Nasopharyngeal Swab   Result Value Ref Range    Specimen Description . NASOPHARYNGEAL SWAB     Source . NASOPHARYNGEAL SWAB     SARS-CoV-2 RNA, RT PCR Not Detected Not Detected    INFLUENZA A Not Detected Not Detected    INFLUENZA B Not Detected Not Detected   Respiratory Panel, Molecular, with COVID-19 (Restricted: peds pts or suitable admitted adults)    Specimen: Nasopharyngeal Swab   Result Value Ref Range    Specimen Description . NASOPHARYNGEAL SWAB     Adenovirus PCR Not Detected Not Detected    Coronavirus 229E PCR Not Detected Not Detected    Coronavirus HKU1 PCR Not Detected Not Detected    Coronavirus NL63 PCR Not Detected Not Detected    Coronavirus OC43 PCR Not Detected Not Detected    SARS-CoV-2, PCR Not Detected Not Detected    Human Metapneumovirus PCR Not Detected Not Detected    Rhino/Enterovirus PCR Not Detected Not Detected    Influenza A by PCR Not Detected Not Detected    Influenza B by PCR Not Detected Not Detected Parainfluenza 1 PCR Not Detected Not Detected    Parainfluenza 2 PCR Not Detected Not Detected    Parainfluenza 3 PCR Not Detected Not Detected    Parainfluenza 4 PCR Not Detected Not Detected    Resp Syncytial Virus PCR Not Detected Not Detected    Bordetella Parapertussis Not Detected Not Detected    B Pertussis by PCR Not Detected Not Detected    Chlamydia pneumoniae By PCR Not Detected Not Detected    Mycoplasma pneumo by PCR Not Detected Not Detected   Legionella Ag, Ur    Specimen: Urine   Result Value Ref Range    Legionella Pneumophilia Ag, Urine NEGATIVE NEGATIVE   STREP PNEUMONIAE ANTIGEN    Specimen: Urine, indwelling catheter   Result Value Ref Range    Source . CLEAN CATCH URINE     Strep pneumo Ag NEGATIVE    Culture, Blood 1    Specimen: Blood   Result Value Ref Range    Specimen Description . BLOOD LEFT ARM     Culture NO GROWTH 5 DAYS    Culture, Blood 1    Specimen: Blood   Result Value Ref Range    Specimen Description . BLOOD RIGHT ARM     Culture NO GROWTH 5 DAYS    Culture, Urine    Specimen: Urine, clean catch   Result Value Ref Range    Specimen Description . CLEAN CATCH URINE     Culture NO GROWTH    MRSA DNA Probe, Nasal    Specimen: Nasal   Result Value Ref Range    Specimen Description . NASAL SWAB     MRSA, DNA, Nasal (A) NEGATIVE     POSITIVE:  MRSA DNA detected by nucleic acid amplification. Culture, Respiratory    Specimen: Sputum Induced   Result Value Ref Range    Specimen Description . INDUCED SPUTUM     Direct Exam >10, <25 NEUTROPHILS/LPF     Direct Exam < 10 EPITHELIAL CELLS/LPF     Direct Exam NO SIGNIFICANT PATHOGENS SEEN     Culture NO GROWTH    Troponin   Result Value Ref Range    Troponin, High Sensitivity 177 (HH) 0 - 22 ng/L   Troponin   Result Value Ref Range    Troponin, High Sensitivity 184 (HH) 0 - 22 ng/L   APTT   Result Value Ref Range    PTT 26.5 24.0 - 36.0 sec   Protime-INR   Result Value Ref Range    Protime 24.9 (H) 11.8 - 14.6 sec    INR 2.3    Phosphorus   Result Value Ref Range    Phosphorus 4.3 2.5 - 4.5 mg/dL   Magnesium   Result Value Ref Range    Magnesium 2.1 1.6 - 2.6 mg/dL   Basic Metabolic Panel   Result Value Ref Range    Glucose 96 70 - 99 mg/dL    BUN 46 (H) 8 - 23 mg/dL    Creatinine 1.77 (H) 0.70 - 1.20 mg/dL    Est, Glom Filt Rate 43 (L) >60 mL/min/1.73m2    Calcium 7.9 (L) 8.6 - 10.4 mg/dL    Sodium 138 135 - 144 mmol/L    Potassium 4.8 3.7 - 5.3 mmol/L    Chloride 97 (L) 98 - 107 mmol/L    CO2 29 20 - 31 mmol/L    Anion Gap 12 9 - 17 mmol/L   CBC with Auto Differential   Result Value Ref Range    WBC 6.8 3.5 - 11.0 k/uL    RBC 5.99 (H) 4.5 - 5.9 m/uL    Hemoglobin 12.1 (L) 13.5 - 17.5 g/dL    Hematocrit 41.4 41 - 53 %    MCV 69.1 (L) 80 - 100 fL    MCH 20.2 (L) 26 - 34 pg    MCHC 29.2 (L) 31 - 37 g/dL    RDW 19.8 (H) 11.5 - 14.9 %    Platelets 55 (L) 794 - 450 k/uL    MPV 8.6 6.0 - 12.0 fL    Seg Neutrophils 79 (H) 36 - 66 %    Lymphocytes 12 (L) 24 - 44 %    Monocytes 8 (H) 1 - 7 %    Eosinophils % 0 0 - 4 %    Basophils 1 0 - 2 %    Segs Absolute 5.40 1.3 - 9.1 k/uL    Absolute Lymph # 0.80 (L) 1.0 - 4.8 k/uL    Absolute Mono # 0.50 0.1 - 1.3 k/uL    Absolute Eos # 0.00 0.0 - 0.4 k/uL    Basophils Absolute 0.10 0.0 - 0.2 k/uL   Blood Gas, Venous   Result Value Ref Range    pH, [place] 7.258 (L) 7.320 - 7.420    pCO2, [place] 63.0 (H) 39.0 - 55.0 mm Hg    pO2, [place] 48.1 30.0 - 50.0 mm Hg    HCO3, Venous 28.1 24.0 - 30.0 mmol/L    Positive Base Excess, [place] 1.0 0.0 - 2.0 mmol/L    O2 Sat, [place] 72.1 60.0 - 85.0 %    Carboxyhemoglobin 4.3 0 - 5 %    Methemoglobin 0.5 0.0 - 1.9 %    Pt Temp 37    Brain Natriuretic Peptide   Result Value Ref Range    Pro-BNP 7,797 (H) <300 pg/mL   Hepatic Function Panel   Result Value Ref Range    Albumin 3.9 3.5 - 5.2 g/dL    Alkaline Phosphatase 68 40 - 129 U/L     (H) 5 - 41 U/L     (H) <40 U/L    Total Bilirubin 1.1 0.3 - 1.2 mg/dL    Bilirubin, Direct 0.9 (H) <0.3 mg/dL    Bilirubin, Indirect 0.2 0.0 - 1.0 mg/dL    Total Protein 6.6 6.4 - 8.3 g/dL   Lipase   Result Value Ref Range    Lipase 17 13 - 60 U/L   Urinalysis with Reflex to Culture    Specimen: Urine   Result Value Ref Range    Color, UA Orange (A) Yellow    Turbidity UA Cloudy (A) Clear    Glucose, Ur NEGATIVE NEGATIVE    Bilirubin Urine NEGATIVE  Verified by ictotest. (A) NEGATIVE    Ketones, Urine TRACE (A) NEGATIVE    Specific Gravity, UA 1.018 1.000 - 1.030    Urine Hgb LARGE (A) NEGATIVE    pH, UA 5.0 5.0 - 8.0    Protein, UA 2+ (A) NEGATIVE    Urobilinogen, Urine ELEVATED (A) Normal    Nitrite, Urine NEGATIVE NEGATIVE    Leukocyte Esterase, Urine SMALL (A) NEGATIVE   Microscopic Urinalysis   Result Value Ref Range    WBC, UA 6 TO 9 /HPF    RBC, UA TOO NUMEROUS TO COUNT /HPF    Casts UA 3 to 5 /LPF    Epithelial Cells UA 0 TO 2 /HPF    Bacteria, UA FEW (A) None   Arterial Blood Gases   Result Value Ref Range    pH, Arterial 7.295 (L) 7.350 - 7.450    pCO2, Arterial 60.3 (HH) 35.0 - 45.0 mmHg    pO2, Arterial 63.0 (L) 80.0 - 100.0 mmHg    HCO3, Arterial 29.3 (H) 22.0 - 26.0 mmol/L    Positive Base Excess, Art 2.8 (H) 0.0 - 2.0 mmol/L    O2 Sat, Arterial 85.8 (LL) 95 - 98 %    Carboxyhemoglobin 3.1 0 - 5 %    Methemoglobin 1.0 0.0 - 1.9 %    Pt Temp 37     O2 Device/Flow/% BIPAP     Respiratory Rate 16     Tyron Test PASS     Sample Site Right Radial Artery     Pt.  Position SEMI-FOWLERS     Mode BIPAP     Text for Respiratory RESULTS TO PHYSICIAN    Comprehensive Metabolic Panel w/ Reflex to MG   Result Value Ref Range    Glucose 101 (H) 70 - 99 mg/dL    BUN 46 (H) 8 - 23 mg/dL    Creatinine 1.50 (H) 0.70 - 1.20 mg/dL    Est, Glom Filt Rate 52 (L) >60 mL/min/1.73m2    Calcium 7.5 (L) 8.6 - 10.4 mg/dL    Sodium 136 135 - 144 mmol/L    Potassium 4.8 3.7 - 5.3 mmol/L    Chloride 99 98 - 107 mmol/L    CO2 27 20 - 31 mmol/L    Anion Gap 10 9 - 17 mmol/L    Alkaline Phosphatase 59 40 - 129 U/L     (H) 5 - 41 U/L     (H) <40 U/L    Total Bilirubin 0.8 0.3 - 1.2 mg/dL    Total Protein 5.9 (L) 6.4 - 8.3 g/dL    Albumin 3.3 (L) 3.5 - 5.2 g/dL   CBC with Auto Differential   Result Value Ref Range    WBC 5.6 3.5 - 11.0 k/uL    RBC 5.57 4.5 - 5.9 m/uL    Hemoglobin 11.2 (L) 13.5 - 17.5 g/dL    Hematocrit 38.8 (L) 41 - 53 %    MCV 69.7 (L) 80 - 100 fL    MCH 20.1 (L) 26 - 34 pg    MCHC 28.8 (L) 31 - 37 g/dL    RDW 19.7 (H) 11.5 - 14.9 %    Platelets 67 (L) 742 - 450 k/uL    MPV 9.1 6.0 - 12.0 fL    Seg Neutrophils 79 (H) 36 - 66 %    Lymphocytes 12 (L) 24 - 44 %    Monocytes 8 (H) 1 - 7 %    Eosinophils % 0 0 - 4 %    Basophils 1 0 - 2 %    nRBC 2 per 100 WBC    Segs Absolute 4.41 1.3 - 9.1 k/uL    Absolute Lymph # 0.67 (L) 1.0 - 4.8 k/uL    Absolute Mono # 0.45 0.1 - 1.3 k/uL    Absolute Eos # 0.00 0.0 - 0.4 k/uL    Basophils Absolute 0.06 0.0 - 0.2 k/uL    Morphology ANISOCYTOSIS PRESENT     Morphology HYPOCHROMIA PRESENT     Morphology 1+ ELLIPTOCYTES    Occ Bld, Fecal Scrn   Result Value Ref Range    Occult Blood, Stool #1 NEGATIVE NEGATIVE    Date, Stool #1 [phone]     Time, Stool #1 300    Hemoglobin and Hematocrit   Result Value Ref Range    Hemoglobin 11.1 (L) 13.5 - 17.5 g/dL    Hematocrit 38.7 (L) 41 - 53 %   Hemoglobin and Hematocrit   Result Value Ref Range    Hemoglobin 10.8 (L) 13.5 - 17.5 g/dL    Hematocrit 37.2 (L) 41 - 53 %   Ammonia   Result Value Ref Range    Ammonia 29 16 - 60 umol/L   Hepatitis Panel, Acute   Result Value Ref Range    Hepatitis B Surface Ag NONREACTIVE NONREACTIVE    Hepatitis C Ab REACTIVE (A) NONREACTIVE    Hep B Core Ab, IgM NONREACTIVE NONREACTIVE    Hep A IgM NONREACTIVE NONREACTIVE   Iron and TIBC   Result Value Ref Range    Iron 14 (L) 59 - 158 ug/dL    TIBC 426 250 - 450 ug/dL    Iron Saturation 3 (L) 20 - 55 %    UIBC 412 (H) 112 - 347 ug/dL   Ferritin   Result Value Ref Range    Ferritin 14 (L) 30 - 400 ng/mL   Mycoplasma Ab,IgM   Result Value Ref Range    Mycoplasma pneumo IgM 0.25 <0.91   Procalcitonin   Result Value Ref Range Procalcitonin 0.09 (H) <0.09 ng/mL   C-Reactive Protein   Result Value Ref Range    CRP 16.4 (H) 0.0 - 5.0 mg/L   Fibrin Split Products   Result Value Ref Range    FDP >5 (H) <5 ug/mL   Sedimentation Rate   Result Value Ref Range    Sed Rate 1 0 - 20 mm/Hr   Drug Scr, Abuse, Ur   Result Value Ref Range    Amphetamine Screen, Ur NEGATIVE NEGATIVE    Barbiturate Screen, Ur NEGATIVE NEGATIVE    Benzodiazepine Screen, Urine NEGATIVE NEGATIVE    Cocaine Metabolite, Urine NEGATIVE NEGATIVE    Methadone Screen, Urine NEGATIVE NEGATIVE    Opiates, Urine NEGATIVE NEGATIVE    Phencyclidine, Urine NEGATIVE NEGATIVE    Cannabinoid Scrn, Ur NEGATIVE NEGATIVE    Oxycodone Screen, Ur NEGATIVE NEGATIVE    Fentanyl, Ur NEGATIVE NEGATIVE    Test Information       Assay provides medical screening only. The absence of expected drug(s) and/or metabolite(s) may indicate diluted or adulterated urine, limitations of testing or timing of collection. Troponin   Result Value Ref Range    Troponin, High Sensitivity 195 (HH) 0 - 22 ng/L   Vitamin B12 & Folate   Result Value Ref Range    Vitamin B-12 1926 (H) 232 - 1245 pg/mL    Folate 10.0 >4.8 ng/mL   HIV Screen   Result Value Ref Range    HIV Ag/Ab NONREACTIVE NONREACTIVE   MONOCLONAL PANEL   Result Value Ref Range    Kappa Free Light Chains QNT 2.79 (H) 0.37 - 1.94 mg/dL    Lambda Free Light Chains QNT 20.89 (H) 0.57 - 2.63 mg/dL    Free Kappa/Lambda Ratio 0.13 (L) 0.26 - 1.65    Serum IFX Interp       A ZONE OF RESTRICTION IS PRESENT IN THE GAMMAGLOBULIN REGION. CONFIRMED BY IMMUNOFIXATION TO BE MONOCLONAL    Pathologist       Reviewed by pathologist:  Brock Briceño D.O.     Total Protein 5.5 (L) 6.4 - 8.3 g/dL    Albumin (calculated) 3.6 3.2 - 5.2 g/dL    Albumin % 65 45 - 65 %    Alpha-1-Globulin 0.2 0.1 - 0.4 g/dL    Alpha 1 % 3 3 - 6 %    Alpha-2-Globulin 0.4 (L) 0.5 - 0.9 g/dL    Alpha 2 % 7 6 - 13 %    Beta Globulin 0.6 0.5 - 1.1 g/dL    Beta Percent 11 11 - 19 %    Gamma Globulin 0.8 0.5 - 1.5 g/dL    Gamma Globulin % 14 9 - 20 %    Total Prot. Sum 5.6 (L) 6.3 - 8.2 g/dL    Total Prot. Sum,% 100 98 - 102 %    Protein Electrophoresis, Serum       A ZONE OF RESTRICTION IS PRESENT IN THE GAMMAGLOBULIN REGION. CONFIRMED BY IMMUNOFIXATION TO BE MONOCLONAL    Pathologist       Reviewed by pathologist:  Robert Vega. Jeremi Jimenez D.O. Path Review, Smear   Result Value Ref Range    Pathologist Review ELECTRONICALLY SIGNED.  Kimberly Agustin MD    Reticulocytes   Result Value Ref Range    Retic % 2.8 (H) 0.5 - 2.0 %    Absolute Retic # 0.157 (H) 0.0245 - 0.098 M/uL   Fibrinogen   Result Value Ref Range    Fibrinogen 141 (L) 210 - 530 mg/dL   Ethanol   Result Value Ref Range    Ethanol <10 <10 mg/dL    Ethanol percent <0.010 %   Lactate Dehydrogenase   Result Value Ref Range     (H) 135 - 225 U/L   Hemoglobin and Hematocrit   Result Value Ref Range    Hemoglobin 10.9 (L) 13.5 - 17.5 g/dL    Hematocrit 38.0 (L) 41 - 53 %   Haptoglobin   Result Value Ref Range    Haptoglobin 60 30 - 200 mg/dL   D-Dimer, Quantitative   Result Value Ref Range    D-Dimer, Quant 6.70 (H) 0.00 - 0.59 mg/L FEU   Urinalysis with Reflex to Culture    Specimen: Urine   Result Value Ref Range    Color, UA Dark Yellow (A) Yellow    Turbidity UA Clear Clear    Glucose, Ur NEGATIVE NEGATIVE    Bilirubin Urine NEGATIVE NEGATIVE    Ketones, Urine TRACE (A) NEGATIVE    Specific [place], UA 1.015 1.000 - 1.030    Urine Hgb LARGE (A) NEGATIVE    pH, UA 5.0 5.0 - 8.0    Protein, UA 1+ (A) NEGATIVE    Urobilinogen, Urine Normal Normal    Nitrite, Urine NEGATIVE NEGATIVE    Leukocyte Esterase, Urine SMALL (A) NEGATIVE   APTT   Result Value Ref Range    PTT 38.7 (H) 24.0 - 36.0 sec   Protime-INR   Result Value Ref Range    Protime 24.4 (H) 11.8 - 14.6 sec    INR 2.2    Microscopic Urinalysis   Result Value Ref Range    WBC, UA 10 TO 20 /HPF    RBC, UA TOO NUMEROUS TO COUNT /HPF    Casts UA HYALINE /LPF    Casts UA 0 TO 2 /LPF Epithelial Cells UA 3 to 5 /HPF   BLOOD GAS, ARTERIAL   Result Value Ref Range    pH, Arterial 7.314 (L) 7.350 - 7.450    pCO2, Arterial 61.9 (HH) 35.0 - 45.0 mmHg    pO2, Arterial 58.1 (LL) 80.0 - 100.0 mmHg    HCO3, Arterial 31.4 (H) 22.0 - 26.0 mmol/L    Positive Base Excess, Art 5.3 (H) 0.0 - 2.0 mmol/L    O2 Sat, Arterial 86.3 (LL) 95 - 98 %    Carboxyhemoglobin 2.1 0 - 5 %    Methemoglobin 0.8 0.0 - 1.9 %    Pt Temp 37.0     O2 Device/Flow/% VENTILATOR     Respiratory Rate 22     Tyron Test PASS     Sample Site Left Radial Artery     Pt. Position SEMI-FOWLERS     Mode PRVC     Set Rate 22     Total Rate 26          FIO2 40     Peep/Cpap 8    SURGICAL PATHOLOGY REPORT   Result Value Ref Range    Surgical Pathology Report       [id number]  [employer]  ANATOMIC PATHOLOGY  [address] [place] [address], [facility] [address]. [place], [address]  [phone]  Fax: [phone]  SURGICAL PATHOLOGY CONSULTATION    Patient Name: Nilesh Salguero  MR#: [id number]  Specimen #[id number]    Procedures/Addenda  PERIPHERAL BLOOD REPORT     Date Ordered:     1/2/2023     Status:  Signed Out       Date Complete:     1/3/2023     By: Luis Davis M.D. Date Reported:     1/3/2023       INTERPRETATION  Peripheral blood:  Microcytic, hypochromic anemia. The patient's iron studies are compatible with iron deficiency anemia. Lymphopenia. Differential considerations include acute illness/infection,  medication effect, smoking, and debilitating diseases. Thrombocytopenia  Differential considerations include bone marrow hypoproduction and  immune destruction (idiopathic thrombocytopenic purpura, drug effect). RESULTS-COMMENTS  PERIPHERAL BLOOD STUDY    CBC: Please see the electronic health record  for CBC parameters  (, 01/02/2023, 05:43). PLATELETS: Decreased platelets. Platelets show normal morphology. LEUKOCYTES: Decreased lymphocytes.   White blood cells show normal  morphology. There are no blasts. ERYTHROCYTES: Microcytic, hypochromic. Anisocytosis and  poikilocytosis. Polychromasia. Reticulocyte count 2.8%. Rare RBC  fragments. Note: The electronic health record is reviewed. Teresa Cardona M.D.                    Source:  A: Peripheral Blood     Comprehensive Metabolic Panel w/ Reflex to MG   Result Value Ref Range    Glucose 91 70 - 99 mg/dL    BUN 32 (H) 8 - 23 mg/dL    Creatinine 0.97 0.70 - 1.20 mg/dL    Est, Glom Filt Rate >60 >60 mL/min/1.73m2    Calcium 7.4 (L) 8.6 - 10.4 mg/dL    Sodium 143 135 - 144 mmol/L    Potassium 3.7 3.7 - 5.3 mmol/L    Chloride 105 98 - 107 mmol/L    CO2 30 20 - 31 mmol/L    Anion Gap 8 (L) 9 - 17 mmol/L    Alkaline Phosphatase 50 40 - 129 U/L     (H) 5 - 41 U/L     (H) <40 U/L    Total Bilirubin 0.9 0.3 - 1.2 mg/dL    Total Protein 5.2 (L) 6.4 - 8.3 g/dL    Albumin 3.1 (L) 3.5 - 5.2 g/dL   CBC with Auto Differential   Result Value Ref Range    WBC 5.7 3.5 - 11.0 k/uL    RBC 5.42 4.5 - 5.9 m/uL    Hemoglobin 11.0 (L) 13.5 - 17.5 g/dL    Hematocrit 37.6 (L) 41 - 53 %    MCV 69.4 (L) 80 - 100 fL    MCH 20.2 (L) 26 - 34 pg    MCHC 29.2 (L) 31 - 37 g/dL    RDW 20.0 (H) 11.5 - 14.9 %    Platelets 66 (L) 276 - 450 k/uL    MPV 9.4 6.0 - 12.0 fL    Seg Neutrophils 85 (H) 36 - 66 %    Lymphocytes 9 (L) 24 - 44 %    Monocytes 5 1 - 7 %    Eosinophils % 0 0 - 4 %    Basophils 0 0 - 2 %    Bands 1 0 - 10 %    nRBC 1 (H) 0 per 100 WBC    Segs Absolute 4.87 1.3 - 9.1 k/uL    Absolute Lymph # 0.52 (L) 1.0 - 4.8 k/uL    Absolute Mono # 0.29 0.1 - 1.3 k/uL    Absolute Eos # 0.00 0.0 - 0.4 k/uL    Basophils Absolute 0.00 0.0 - 0.2 k/uL    Absolute Bands # 0.06 0.0 - 1.0 k/uL    Morphology ANISOCYTOSIS PRESENT     Morphology HYPOCHROMIA PRESENT     Morphology 1+ POLYCHROMASIA     Morphology 1+ ELLIPTOCYTES    BLOOD GAS, ARTERIAL   Result Value Ref Range    pH, Arterial 7.373 7.350 - 7.450    pCO2, Arterial 56.0 (HH) 35.0 - 45.0 mmHg    pO2, Arterial 61.4 (L) 80.0 - 100.0 mmHg    HCO3, Arterial 32.6 (H) 22.0 - 26.0 mmol/L    Positive Base Excess, Art 7.4 (H) 0.0 - 2.0 mmol/L    O2 Sat, Arterial 89.2 (L) 95 - 98 %    Carboxyhemoglobin 1.7 0 - 5 %    Methemoglobin 0.9 0.0 - 1.9 %    Pt Temp 37     O2 Device/Flow/% VENTILATOR     Respiratory Rate 22     Tyron Test PASS     Sample Site Right Radial Artery     Pt. Position SEMI-FOWLERS     Mode PRVC     Set Rate 22     Total Rate 22          FIO2 35     Peep/Cpap 8     Text for Respiratory RESULTS GIVEN TO RN    Protime-INR   Result Value Ref Range    Protime 21.9 (H) 11.8 - 14.6 sec    INR 1.9    APTT   Result Value Ref Range    PTT 38.4 (H) 24.0 - 36.0 sec   Triglyceride   Result Value Ref Range    Triglycerides 85 <150 mg/dL   CHLORIDE, URINE, RANDOM   Result Value Ref Range    Chloride, Ur 34 mmol/L   Protein / Creatinine Ratio, Urine   Result Value Ref Range    Total Protein, Urine 23 mg/dL    Creatinine, Ur 79.8 39.0 - 259.0 mg/dL    Urine Total Protein Creatinine Ratio 0.29 (H) 0.00 - 0.20   SODIUM, URINE, RANDOM   Result Value Ref Range    Sodium,Ur <20 mmol/L   BLOOD GAS, ARTERIAL   Result Value Ref Range    pH, Arterial 7.360 7.350 - 7.450    pCO2, Arterial 55.9 (HH) 35.0 - 45.0 mmHg    pO2, Arterial 71.4 (L) 80.0 - 100.0 mmHg    HCO3, Arterial 31.6 (H) 22.0 - 26.0 mmol/L    Positive Base Excess, Art 6.1 (H) 0.0 - 2.0 mmol/L    O2 Sat, Arterial 91.7 (L) 95 - 98 %    Carboxyhemoglobin 1.2 0 - 5 %    Methemoglobin 1.3 0.0 - 1.9 %    Pt Temp 37     O2 Device/Flow/% VENTILATOR     Tyron Test PASS     Sample Site Right Radial Artery     Pt.  Position SEMI-FOWLERS     Mode PRVC     Text for Respiratory RESULTS GIVEN TO RN    CBC with Auto Differential   Result Value Ref Range    WBC 6.0 3.5 - 11.0 k/uL    RBC 5.56 4.5 - 5.9 m/uL    Hemoglobin 10.8 (L) 13.5 - 17.5 g/dL    Hematocrit 38.2 (L) 41 - 53 %    MCV 68.8 (L) 80 - 100 fL    MCH 19.5 (L) 26 - 34 pg    MCHC 28.3 (L) 31 - 37 g/dL    RDW 20.1 (H) 11.5 - 14.9 %    Platelets 75 (L) 833 - 450 k/uL    MPV 9.3 6.0 - 12.0 fL    Seg Neutrophils 82 (H) 36 - 66 %    Lymphocytes 7 (L) 24 - 44 %    Monocytes 9 (H) 1 - 7 %    Eosinophils % 1 0 - 4 %    Basophils 1 0 - 2 %    Segs Absolute 4.90 1.3 - 9.1 k/uL    Absolute Lymph # 0.40 (L) 1.0 - 4.8 k/uL    Absolute Mono # 0.50 0.1 - 1.3 k/uL    Absolute Eos # 0.10 0.0 - 0.4 k/uL    Basophils Absolute 0.10 0.0 - 0.2 k/uL   Comprehensive Metabolic Panel w/ Reflex to MG   Result Value Ref Range    Glucose 85 70 - 99 mg/dL    BUN 16 8 - 23 mg/dL    Creatinine 0.64 (L) 0.70 - 1.20 mg/dL    Est, Glom Filt Rate >60 >60 mL/min/1.73m2    Calcium 7.4 (L) 8.6 - 10.4 mg/dL    Sodium 143 135 - 144 mmol/L    Potassium 3.3 (L) 3.7 - 5.3 mmol/L    Chloride 107 98 - 107 mmol/L    CO2 29 20 - 31 mmol/L    Anion Gap 7 (L) 9 - 17 mmol/L    Alkaline Phosphatase 49 40 - 129 U/L     (H) 5 - 41 U/L     (H) <40 U/L    Total Bilirubin 0.8 0.3 - 1.2 mg/dL    Total Protein 5.0 (L) 6.4 - 8.3 g/dL    Albumin 2.8 (L) 3.5 - 5.2 g/dL   Vancomycin Level, Random   Result Value Ref Range    Vancomycin Rm 16.1 ug/mL    Vancomycin Random Dose amount 1g     Vancomycin Random Date last dose 1/4/22     Vancomycin Random Time last dose 0232    Magnesium   Result Value Ref Range    Magnesium 2.0 1.6 - 2.6 mg/dL   Hepatitis C RNA, quantitative, PCR   Result Value Ref Range    Source . PLASMA     Hepatitis C RNA-PCR 17,400 IU/mL    Hepatitis C RNA Quant Log 4.24 Log IU/mL    HCV RNA PCR, Quant DETECTED (A) Not Detected   CBC with Auto Differential   Result Value Ref Range    WBC 5.3 3.5 - 11.0 k/uL    RBC 5.80 4.5 - 5.9 m/uL    Hemoglobin 11.4 (L) 13.5 - 17.5 g/dL    Hematocrit 40.0 (L) 41 - 53 %    MCV 68.9 (L) 80 - 100 fL    MCH 19.7 (L) 26 - 34 pg    MCHC 28.5 (L) 31 - 37 g/dL    RDW 20.5 (H) 11.5 - 14.9 %    Platelets 66 (L) 576 - 450 k/uL    MPV 9.1 6.0 - 12.0 fL    Seg Neutrophils 79 (H) 36 - 66 % Lymphocytes 8 (L) 24 - 44 %    Monocytes 10 (H) 1 - 7 %    Eosinophils % 2 0 - 4 %    Basophils 1 0 - 2 %    Segs Absolute 4.19 1.3 - 9.1 k/uL    Absolute Lymph # 0.42 (L) 1.0 - 4.8 k/uL    Absolute Mono # 0.53 0.1 - 1.3 k/uL    Absolute Eos # 0.11 0.0 - 0.4 k/uL    Basophils Absolute 0.05 0.0 - 0.2 k/uL    Morphology ANISOCYTOSIS PRESENT     Morphology MICROCYTOSIS PRESENT     Morphology HYPOCHROMIA PRESENT     Morphology FEW ELLIPTOCYTES     Morphology FEW TARGET CELLS    Comprehensive Metabolic Panel w/ Reflex to MG   Result Value Ref Range    Glucose 84 70 - 99 mg/dL    BUN 11 8 - 23 mg/dL    Creatinine 0.58 (L) 0.70 - 1.20 mg/dL    Est, Glom Filt Rate >60 >60 mL/min/1.73m2    Calcium 7.9 (L) 8.6 - 10.4 mg/dL    Sodium 146 (H) 135 - 144 mmol/L    Potassium 3.4 (L) 3.7 - 5.3 mmol/L    Chloride 110 (H) 98 - 107 mmol/L    CO2 30 20 - 31 mmol/L    Anion Gap 6 (L) 9 - 17 mmol/L    Alkaline Phosphatase 53 40 - 129 U/L     (H) 5 - 41 U/L     (H) <40 U/L    Total Bilirubin 0.8 0.3 - 1.2 mg/dL    Total Protein 5.0 (L) 6.4 - 8.3 g/dL    Albumin 2.9 (L) 3.5 - 5.2 g/dL   Fibrinogen   Result Value Ref Range    Fibrinogen 98 (L) 210 - 530 mg/dL   Protime-INR   Result Value Ref Range    Protime 20.3 (H) 11.8 - 14.6 sec    INR 1.7    APTT   Result Value Ref Range    PTT 41.6 (H) 24.0 - 36.0 sec   BLOOD GAS, ARTERIAL   Result Value Ref Range    pH, Arterial 7.372 7.350 - 7.450    pCO2, Arterial 51.6 (HH) 35.0 - 45.0 mmHg    pO2, Arterial 62.9 (L) 80.0 - 100.0 mmHg    HCO3, Arterial 29.9 (H) 22.0 - 26.0 mmol/L    Positive Base Excess, Art 4.7 (H) 0.0 - 2.0 mmol/L    O2 Sat, Arterial 89.7 (L) 95 - 98 %    Carboxyhemoglobin 1.7 0 - 5 %    Methemoglobin 1.1 0.0 - 1.9 %    Pt Temp 37     O2 Device/Flow/% VENTILATOR     Respiratory Rate 22     Tyron Test PASS     Sample Site Right Radial Artery     Pt.  Position SEMI-FOWLERS     Mode PRVC     Set Rate 22     Total Rate 22          FIO2 40     Peep/Cpap 8     Text for Respiratory RESULTS GIVEN TO RN    Magnesium   Result Value Ref Range    Magnesium 2.0 1.6 - 2.6 mg/dL   CBC with Auto Differential   Result Value Ref Range    WBC 5.2 3.5 - 11.0 k/uL    RBC 5.33 4.5 - 5.9 m/uL    Hemoglobin 10.5 (L) 13.5 - 17.5 g/dL    Hematocrit 38.4 (L) 41 - 53 %    MCV 72.0 (L) 80 - 100 fL    MCH 19.7 (L) 26 - 34 pg    MCHC 27.4 (L) 31 - 37 g/dL    RDW 20.3 (H) 11.5 - 14.9 %    Platelets 71 (L) 215 - 450 k/uL    MPV 9.3 6.0 - 12.0 fL    Seg Neutrophils 90 (H) 36 - 66 %    Lymphocytes 4 (L) 24 - 44 %    Monocytes 5 1 - 7 %    Eosinophils % 0 0 - 4 %    Basophils 0 0 - 2 %    Bands 1 0 - 10 %    Segs Absolute 4.68 1.3 - 9.1 k/uL    Absolute Lymph # 0.21 (L) 1.0 - 4.8 k/uL    Absolute Mono # 0.26 0.1 - 1.3 k/uL    Absolute Eos # 0.00 0.0 - 0.4 k/uL    Basophils Absolute 0.00 0.0 - 0.2 k/uL    Absolute Bands # 0.05 0.0 - 1.0 k/uL    Morphology ANISOCYTOSIS PRESENT     Morphology HYPOCHROMIA PRESENT     Morphology MICROCYTOSIS PRESENT     Morphology 1+ ELLIPTOCYTES     Morphology 1+ TEARDROPS    Comprehensive Metabolic Panel w/ Reflex to MG   Result Value Ref Range    Glucose 111 (H) 70 - 99 mg/dL    BUN 7 (L) 8 - 23 mg/dL    Creatinine 0.42 (L) 0.70 - 1.20 mg/dL    Est, Glom Filt Rate >60 >60 mL/min/1.73m2    Calcium 7.5 (L) 8.6 - 10.4 mg/dL    Sodium 144 135 - 144 mmol/L    Potassium 4.0 3.7 - 5.3 mmol/L    Chloride 111 (H) 98 - 107 mmol/L    CO2 29 20 - 31 mmol/L    Anion Gap 4 (L) 9 - 17 mmol/L    Alkaline Phosphatase 46 40 - 129 U/L     (H) 5 - 41 U/L    AST 74 (H) <40 U/L    Total Bilirubin 0.9 0.3 - 1.2 mg/dL    Total Protein 4.6 (L) 6.4 - 8.3 g/dL    Albumin 2.6 (L) 3.5 - 5.2 g/dL   Vancomycin Level, Random   Result Value Ref Range    Vancomycin Rm 20.3 ug/mL    Vancomycin Random Dose amount 1,250     Vancomycin Random Date last dose 472267     Vancomycin Random Time last dose 0546    Fibrinogen   Result Value Ref Range    Fibrinogen 109 (L) 210 - 530 mg/dL   Protime-INR   Result Value Ref Range    Protime 20.1 (H) 11.8 - 14.6 sec    INR 1.7    Comprehensive Metabolic Panel w/ Reflex to MG   Result Value Ref Range    Glucose 91 70 - 99 mg/dL    BUN 5 (L) 8 - 23 mg/dL    Creatinine <0.40 (L) 0.70 - 1.20 mg/dL    Est, Glom Filt Rate Can not be calculated >60 mL/min/1.73m2    Calcium 8.1 (L) 8.6 - 10.4 mg/dL    Sodium 147 (H) 135 - 144 mmol/L    Potassium 4.1 3.7 - 5.3 mmol/L    Chloride 108 (H) 98 - 107 mmol/L    CO2 26 20 - 31 mmol/L    Anion Gap 13 9 - 17 mmol/L    Alkaline Phosphatase 51 40 - 129 U/L     (H) 5 - 41 U/L    AST 63 (H) <40 U/L    Total Bilirubin 1.6 (H) 0.3 - 1.2 mg/dL    Total Protein 5.3 (L) 6.4 - 8.3 g/dL    Albumin 2.9 (L) 3.5 - 5.2 g/dL   SPECIMEN REJECTION   Result Value Ref Range    Specimen Source . BLOOD     Ordered Test CDP     Reason for Rejection Unable to perform testing: Specimen clotted. CBC with Auto Differential   Result Value Ref Range    WBC 7.1 3.5 - 11.0 k/uL    RBC 5.90 4.5 - 5.9 m/uL    Hemoglobin 11.7 (L) 13.5 - 17.5 g/dL    Hematocrit 41.4 41 - 53 %    MCV 70.2 (L) 80 - 100 fL    MCH 19.8 (L) 26 - 34 pg    MCHC 28.2 (L) 31 - 37 g/dL    RDW 20.4 (H) 11.5 - 14.9 %    Platelets 68 (L) 159 - 450 k/uL    MPV 9.1 6.0 - 12.0 fL    Seg Neutrophils 82 (H) 36 - 66 %    Lymphocytes 10 (L) 24 - 44 %    Monocytes 6 1 - 7 %    Eosinophils % 1 0 - 4 %    Basophils 1 0 - 2 %    Segs Absolute 5.82 1.3 - 9.1 k/uL    Absolute Lymph # 0.71 (L) 1.0 - 4.8 k/uL    Absolute Mono # 0.43 0.1 - 1.3 k/uL    Absolute Eos # 0.07 0.0 - 0.4 k/uL    Basophils Absolute 0.07 0.0 - 0.2 k/uL    Morphology ANISOCYTOSIS PRESENT     Morphology 1+ TARGET CELLS     Morphology 1+ TEARDROPS     Morphology 1+ ELLIPTOCYTES    Protime-INR   Result Value Ref Range    Protime 19.4 (H) 11.8 - 14.6 sec    INR 1.6    APTT   Result Value Ref Range    PTT 35.4 24.0 - 36.0 sec   HEPATITIS C RNA, QUANTITATIVE, PCR   Result Value Ref Range    Source . PLASMA     Hepatitis C RNA-PCR 5,680 IU/mL Hepatitis C RNA Quant Log 3.75 Log IU/mL    HCV RNA PCR, Quant DETECTED (A) Not Detected   CBC with Auto Differential   Result Value Ref Range    WBC 5.9 3.5 - 11.0 k/uL    RBC 5.61 4.5 - 5.9 m/uL    Hemoglobin 11.4 (L) 13.5 - 17.5 g/dL    Hematocrit 39.3 (L) 41 - 53 %    MCV 70.0 (L) 80 - 100 fL    MCH 20.2 (L) 26 - 34 pg    MCHC 28.9 (L) 31 - 37 g/dL    RDW 20.2 (H) 11.5 - 14.9 %    Platelets 63 (L) 067 - 450 k/uL    MPV 8.8 6.0 - 12.0 fL    Seg Neutrophils 76 (H) 36 - 66 %    Lymphocytes 11 (L) 24 - 44 %    Monocytes 11 (H) 1 - 7 %    Eosinophils % 1 0 - 4 %    Basophils 1 0 - 2 %    Segs Absolute 4.48 1.3 - 9.1 k/uL    Absolute Lymph # 0.65 (L) 1.0 - 4.8 k/uL    Absolute Mono # 0.65 0.1 - 1.3 k/uL    Absolute Eos # 0.06 0.0 - 0.4 k/uL    Basophils Absolute 0.06 0.0 - 0.2 k/uL    Morphology ANISOCYTOSIS PRESENT     Morphology MICROCYTOSIS PRESENT     Morphology HYPOCHROMIA PRESENT     Morphology 1+ TARGET CELLS     Morphology 1+ ELLIPTOCYTES    Comprehensive Metabolic Panel w/ Reflex to MG   Result Value Ref Range    Glucose 118 (H) 70 - 99 mg/dL    BUN 4 (L) 8 - 23 mg/dL    Creatinine <0.40 (L) 0.70 - 1.20 mg/dL    Est, Glom Filt Rate Can not be calculated >60 mL/min/1.73m2    Calcium 8.1 (L) 8.6 - 10.4 mg/dL    Sodium 147 (H) 135 - 144 mmol/L    Potassium 3.3 (L) 3.7 - 5.3 mmol/L    Chloride 109 (H) 98 - 107 mmol/L    CO2 34 (H) 20 - 31 mmol/L    Anion Gap 4 (L) 9 - 17 mmol/L    Alkaline Phosphatase 45 40 - 129 U/L    ALT 99 (H) 5 - 41 U/L    AST 38 <40 U/L    Total Bilirubin 1.7 (H) 0.3 - 1.2 mg/dL    Total Protein 5.1 (L) 6.4 - 8.3 g/dL    Albumin 2.8 (L) 3.5 - 5.2 g/dL   Fibrinogen   Result Value Ref Range    Fibrinogen 151 (L) 210 - 530 mg/dL   APTT   Result Value Ref Range    PTT 34.4 24.0 - 36.0 sec   Protime-INR   Result Value Ref Range    Protime 19.1 (H) 11.8 - 14.6 sec    INR 1.6    Magnesium   Result Value Ref Range    Magnesium 1.8 1.6 - 2.6 mg/dL   CBC with Auto Differential   Result Value Ref Range    WBC 6.3 3.5 - 11.0 k/uL    RBC 5.76 4.5 - 5.9 m/uL    Hemoglobin 11.7 (L) 13.5 - 17.5 g/dL    Hematocrit 40.3 (L) 41 - 53 %    MCV 70.0 (L) 80 - 100 fL    MCH 20.3 (L) 26 - 34 pg    MCHC 29.0 (L) 31 - 37 g/dL    RDW 20.5 (H) 11.5 - 14.9 %    Platelets 65 (L) 542 - 450 k/uL    MPV 8.6 6.0 - 12.0 fL    Seg Neutrophils 77 (H) 36 - 66 %    Lymphocytes 11 (L) 24 - 44 %    Monocytes 10 (H) 1 - 7 %    Eosinophils % 1 0 - 4 %    Basophils 1 0 - 2 %    Segs Absolute 4.86 1.3 - 9.1 k/uL    Absolute Lymph # 0.69 (L) 1.0 - 4.8 k/uL    Absolute Mono # 0.63 0.1 - 1.3 k/uL    Absolute Eos # 0.06 0.0 - 0.4 k/uL    Basophils Absolute 0.06 0.0 - 0.2 k/uL    Morphology HYPOCHROMIA PRESENT     Morphology MICROCYTOSIS PRESENT     Morphology ANISOCYTOSIS PRESENT     Morphology 1+ POLYCHROMASIA     Morphology 1+ TARGET CELLS     Morphology 2+ ECHINOCYTES    Comprehensive Metabolic Panel w/ Reflex to MG   Result Value Ref Range    Glucose 101 (H) 70 - 99 mg/dL    BUN 3 (L) 8 - 23 mg/dL    Creatinine <0.40 (L) 0.70 - 1.20 mg/dL    Est, Glom Filt Rate Can not be calculated >60 mL/min/1.73m2    Calcium 8.0 (L) 8.6 - 10.4 mg/dL    Sodium 142 135 - 144 mmol/L    Potassium 3.3 (L) 3.7 - 5.3 mmol/L    Chloride 104 98 - 107 mmol/L    CO2 35 (H) 20 - 31 mmol/L    Anion Gap 3 (L) 9 - 17 mmol/L    Alkaline Phosphatase 47 40 - 129 U/L    ALT 81 (H) 5 - 41 U/L    AST 32 <40 U/L    Total Bilirubin 1.8 (H) 0.3 - 1.2 mg/dL    Total Protein 5.3 (L) 6.4 - 8.3 g/dL    Albumin 2.8 (L) 3.5 - 5.2 g/dL   Brain Natriuretic Peptide   Result Value Ref Range    Pro-BNP 7,250 (H) <300 pg/mL   Magnesium   Result Value Ref Range    Magnesium 1.7 1.6 - 2.6 mg/dL   Comprehensive Metabolic Panel w/ Reflex to MG   Result Value Ref Range    Glucose 96 70 - 99 mg/dL    BUN 3 (L) 8 - 23 mg/dL    Creatinine <0.40 (L) 0.70 - 1.20 mg/dL    Est, Glom Filt Rate Can not be calculated >60 mL/min/1.73m2    Calcium 8.6 8.6 - 10.4 mg/dL    Sodium 140 135 - 144 mmol/L Potassium 3.3 (L) 3.7 - 5.3 mmol/L    Chloride 97 (L) 98 - 107 mmol/L    CO2 33 (H) 20 - 31 mmol/L    Anion Gap 10 9 - 17 mmol/L    Alkaline Phosphatase 52 40 - 129 U/L    ALT 74 (H) 5 - 41 U/L    AST 41 (H) <40 U/L    Total Bilirubin 2.3 (H) 0.3 - 1.2 mg/dL    Total Protein 6.1 (L) 6.4 - 8.3 g/dL    Albumin 3.3 (L) 3.5 - 5.2 g/dL   CBC with Auto Differential   Result Value Ref Range    WBC 7.2 3.5 - 11.0 k/uL    RBC 6.52 (H) 4.5 - 5.9 m/uL    Hemoglobin 13.6 13.5 - 17.5 g/dL    Hematocrit 45.3 41 - 53 %    MCV 69.5 (L) 80 - 100 fL    MCH 20.9 (L) 26 - 34 pg    MCHC 30.0 (L) 31 - 37 g/dL    RDW 21.5 (H) 11.5 - 14.9 %    Platelets 67 (L) 217 - 450 k/uL    MPV 8.6 6.0 - 12.0 fL    Seg Neutrophils 77 (H) 36 - 66 %    Lymphocytes 10 (L) 24 - 44 %    Monocytes 10 (H) 1 - 7 %    Eosinophils % 1 0 - 4 %    Basophils 2 0 - 2 %    Segs Absolute 5.55 1.3 - 9.1 k/uL    Absolute Lymph # 0.72 (L) 1.0 - 4.8 k/uL    Absolute Mono # 0.72 0.1 - 1.3 k/uL    Absolute Eos # 0.07 0.0 - 0.4 k/uL    Basophils Absolute 0.14 0.0 - 0.2 k/uL    Morphology ANISOCYTOSIS PRESENT     Morphology HYPOCHROMIA PRESENT     Morphology MICROCYTOSIS PRESENT     Morphology GIANT PLATELETS    Magnesium   Result Value Ref Range    Magnesium 1.5 (L) 1.6 - 2.6 mg/dL   SPECIMEN REJECTION   Result Value Ref Range    Specimen Source . Random Urine     Ordered Test URIFX     Reason for Rejection       Unable to perform testing: Specimen quantity not sufficient.    LOR Screen with Reflex   Result Value Ref Range    LOR NEGATIVE NEGATIVE    PHAM Antibodies Screen 0.3 <0.7 U/mL    Anti ds DNA 4.2 <10.0 IU/mL   C-Reactive Protein   Result Value Ref Range    CRP 10.0 (H) 0.0 - 5.0 mg/L   Homocysteine   Result Value Ref Range    Homocysteine 8.2 <15.0 umol/L   Myeloperoxidase Ab   Result Value Ref Range    Myeloperoxidase Ab 0 0 - 19 AU/mL   Sedimentation Rate   Result Value Ref Range    Sed Rate 2 0 - 20 mm/Hr   Comprehensive Metabolic Panel w/ Reflex to MG   Result Value Ref Range    Glucose 90 70 - 99 mg/dL    BUN 4 (L) 8 - 23 mg/dL    Creatinine 0.42 (L) 0.70 - 1.20 mg/dL    Est, Glom Filt Rate >60 >60 mL/min/1.73m2    Calcium 8.1 (L) 8.6 - 10.4 mg/dL    Sodium 141 135 - 144 mmol/L    Potassium 3.0 (L) 3.7 - 5.3 mmol/L    Chloride 98 98 - 107 mmol/L    CO2 37 (H) 20 - 31 mmol/L    Anion Gap 6 (L) 9 - 17 mmol/L    Alkaline Phosphatase 48 40 - 129 U/L    ALT 52 (H) 5 - 41 U/L    AST 30 <40 U/L    Total Bilirubin 1.7 (H) 0.3 - 1.2 mg/dL    Total Protein 5.4 (L) 6.4 - 8.3 g/dL    Albumin 2.7 (L) 3.5 - 5.2 g/dL   CBC with Auto Differential   Result Value Ref Range    WBC 6.5 3.5 - 11.0 k/uL    RBC 6.23 (H) 4.5 - 5.9 m/uL    Hemoglobin 12.9 (L) 13.5 - 17.5 g/dL    Hematocrit 43.6 41 - 53 %    MCV 70.0 (L) 80 - 100 fL    MCH 20.8 (L) 26 - 34 pg    MCHC 29.7 (L) 31 - 37 g/dL    RDW 21.4 (H) 11.5 - 14.9 %    Platelets 85 (L) 220 - 450 k/uL    MPV 8.9 6.0 - 12.0 fL    Seg Neutrophils 77 (H) 36 - 66 %    Lymphocytes 11 (L) 24 - 44 %    Monocytes 10 (H) 1 - 7 %    Eosinophils % 1 0 - 4 %    Basophils 1 0 - 2 %    Segs Absolute 4.99 1.3 - 9.1 k/uL    Absolute Lymph # 0.72 (L) 1.0 - 4.8 k/uL    Absolute Mono # 0.65 0.1 - 1.3 k/uL    Absolute Eos # 0.07 0.0 - 0.4 k/uL    Basophils Absolute 0.07 0.0 - 0.2 k/uL    Morphology ANISOCYTOSIS PRESENT     Morphology MICROCYTOSIS PRESENT     Morphology HYPOCHROMIA PRESENT     Morphology 1+ TARGET CELLS    Magnesium   Result Value Ref Range    Magnesium 1.8 1.6 - 2.6 mg/dL   Potassium   Result Value Ref Range    Potassium 3.3 (L) 3.7 - 5.3 mmol/L   Comprehensive Metabolic Panel w/ Reflex to MG   Result Value Ref Range    Glucose 121 (H) 70 - 99 mg/dL    BUN 6 (L) 8 - 23 mg/dL    Creatinine 0.60 (L) 0.70 - 1.20 mg/dL    Est, Glom Filt Rate >60 >60 mL/min/1.73m2    Calcium 8.1 (L) 8.6 - 10.4 mg/dL    Sodium 140 135 - 144 mmol/L    Potassium 3.9 3.7 - 5.3 mmol/L    Chloride 100 98 - 107 mmol/L    CO2 37 (H) 20 - 31 mmol/L    Anion Gap 3 (L) 9 - 17 mmol/L    Alkaline Phosphatase 46 40 - 129 U/L    ALT 39 5 - 41 U/L    AST 25 <40 U/L    Total Bilirubin 1.6 (H) 0.3 - 1.2 mg/dL    Total Protein 5.2 (L) 6.4 - 8.3 g/dL    Albumin 2.7 (L) 3.5 - 5.2 g/dL   CBC with Auto Differential   Result Value Ref Range    WBC 6.4 3.5 - 11.0 k/uL    RBC 5.77 4.5 - 5.9 m/uL    Hemoglobin 12.2 (L) 13.5 - 17.5 g/dL    Hematocrit 40.8 (L) 41 - 53 %    MCV 70.7 (L) 80 - 100 fL    MCH 21.1 (L) 26 - 34 pg    MCHC 29.8 (L) 31 - 37 g/dL    RDW 21.3 (H) 11.5 - 14.9 %    Platelets 88 (L) 037 - 450 k/uL    MPV 8.6 6.0 - 12.0 fL    Seg Neutrophils 79 (H) 36 - 66 %    Lymphocytes 7 (L) 24 - 44 %    Monocytes 11 (H) 1 - 7 %    Eosinophils % 1 0 - 4 %    Basophils 2 0 - 2 %    Segs Absolute 5.06 1.3 - 9.1 k/uL    Absolute Lymph # 0.45 (L) 1.0 - 4.8 k/uL    Absolute Mono # 0.70 0.1 - 1.3 k/uL    Absolute Eos # 0.06 0.0 - 0.4 k/uL    Basophils Absolute 0.13 0.0 - 0.2 k/uL    Morphology ANISOCYTOSIS PRESENT     Morphology HYPOCHROMIA PRESENT     Morphology MICROCYTOSIS PRESENT    Vancomycin Level, Random   Result Value Ref Range    Vancomycin Rm 22.9 ug/mL    Vancomycin Random Dose amount 1250 MG     Vancomycin Random Date last dose [id number]     Vancomycin Random Time last dose 1647    Potassium   Result Value Ref Range    Potassium 4.1 3.7 - 5.3 mmol/L   Phosphorus   Result Value Ref Range    Phosphorus 2.5 2.5 - 4.5 mg/dL   Comprehensive Metabolic Panel w/ Reflex to MG   Result Value Ref Range    Glucose 112 (H) 70 - 99 mg/dL    BUN 7 (L) 8 - 23 mg/dL    Creatinine 0.74 0.70 - 1.20 mg/dL    Est, Glom Filt Rate >60 >60 mL/min/1.73m2    Calcium 8.4 (L) 8.6 - 10.4 mg/dL    Sodium 141 135 - 144 mmol/L    Potassium 3.5 (L) 3.7 - 5.3 mmol/L    Chloride 100 98 - 107 mmol/L    CO2 34 (H) 20 - 31 mmol/L    Anion Gap 7 (L) 9 - 17 mmol/L    Alkaline Phosphatase 49 40 - 129 U/L    ALT 35 5 - 41 U/L    AST 27 <40 U/L    Total Bilirubin 1.6 (H) 0.3 - 1.2 mg/dL    Total Protein 5.7 (L) 6.4 - 8.3 g/dL    Albumin 2.7 (L) 3.5 - 5.2 g/dL   CBC with Auto Differential   Result Value Ref Range    WBC 7.3 3.5 - 11.0 k/uL    RBC 6.14 (H) 4.5 - 5.9 m/uL    Hemoglobin 13.0 (L) 13.5 - 17.5 g/dL    Hematocrit 43.9 41 - 53 %    MCV 71.6 (L) 80 - 100 fL    MCH 21.2 (L) 26 - 34 pg    MCHC 29.6 (L) 31 - 37 g/dL    RDW 25.5 (H) 11.5 - 14.9 %    Platelets 81 (L) 009 - 450 k/uL    MPV 8.6 6.0 - 12.0 fL    Seg Neutrophils 79 (H) 36 - 66 %    Lymphocytes 7 (L) 24 - 44 %    Monocytes 12 (H) 1 - 7 %    Eosinophils % 1 0 - 4 %    Basophils 1 0 - 2 %    Segs Absolute 5.77 1.3 - 9.1 k/uL    Absolute Lymph # 0.51 (L) 1.0 - 4.8 k/uL    Absolute Mono # 0.88 0.1 - 1.3 k/uL    Absolute Eos # 0.07 0.0 - 0.4 k/uL    Basophils Absolute 0.07 0.0 - 0.2 k/uL    Morphology ANISOCYTOSIS PRESENT     Morphology MICROCYTOSIS PRESENT     Morphology HYPOCHROMIA PRESENT     Morphology FEW ELLIPTOCYTES    Magnesium   Result Value Ref Range    Magnesium 1.7 1.6 - 2.6 mg/dL   Potassium   Result Value Ref Range    Potassium 4.0 3.7 - 5.3 mmol/L   Comprehensive Metabolic Panel w/ Reflex to MG   Result Value Ref Range    Glucose 121 (H) 70 - 99 mg/dL    BUN 9 8 - 23 mg/dL    Creatinine 0.73 0.70 - 1.20 mg/dL    Est, Glom Filt Rate >60 >60 mL/min/1.73m2    Calcium 8.4 (L) 8.6 - 10.4 mg/dL    Sodium 139 135 - 144 mmol/L    Potassium 3.3 (L) 3.7 - 5.3 mmol/L    Chloride 97 (L) 98 - 107 mmol/L    CO2 37 (H) 20 - 31 mmol/L    Anion Gap 5 (L) 9 - 17 mmol/L    Alkaline Phosphatase 46 40 - 129 U/L    ALT 30 5 - 41 U/L    AST 23 <40 U/L    Total Bilirubin 1.3 (H) 0.3 - 1.2 mg/dL    Total Protein 5.5 (L) 6.4 - 8.3 g/dL    Albumin 2.8 (L) 3.5 - 5.2 g/dL   CBC with Auto Differential   Result Value Ref Range    WBC 6.6 3.5 - 11.0 k/uL    RBC 5.72 4.5 - 5.9 m/uL    Hemoglobin 12.2 (L) 13.5 - 17.5 g/dL    Hematocrit 41.3 41 - 53 %    MCV 72.1 (L) 80 - 100 fL    MCH 21.3 (L) 26 - 34 pg    MCHC 29.5 (L) 31 - 37 g/dL    RDW 29.8 (H) 11.5 - 14.9 %    Platelets 648 (L) 375 - 450 k/uL    MPV 9.2 6.0 - 12.0 fL    Seg Neutrophils 77 (H) 36 - 66 %    Lymphocytes 9 (L) 24 - 44 %    Monocytes 13 (H) 1 - 7 %    Eosinophils % 0 0 - 4 %    Basophils 1 0 - 2 %    Segs Absolute 5.08 1.3 - 9.1 k/uL    Absolute Lymph # 0.59 (L) 1.0 - 4.8 k/uL    Absolute Mono # 0.86 0.1 - 1.3 k/uL    Absolute Eos # 0.00 0.0 - 0.4 k/uL    Basophils Absolute 0.07 0.0 - 0.2 k/uL    Morphology ANISOCYTOSIS PRESENT     Morphology HYPOCHROMIA PRESENT     Morphology MICROCYTOSIS PRESENT     Morphology FEW ELLIPTOCYTES     Morphology FEW TEARDROPS    Magnesium   Result Value Ref Range    Magnesium 1.7 1.6 - 2.6 mg/dL   CBC with Auto Differential   Result Value Ref Range    WBC 6.3 3.5 - 11.0 k/uL    RBC 5.69 4.5 - 5.9 m/uL    Hemoglobin 12.2 (L) 13.5 - 17.5 g/dL    Hematocrit 40.9 (L) 41 - 53 %    MCV 71.9 (L) 80 - 100 fL    MCH 21.5 (L) 26 - 34 pg    MCHC 29.9 (L) 31 - 37 g/dL    RDW 31.3 (H) 11.5 - 14.9 %    Platelets 457 (L) 561 - 450 k/uL    MPV 9.4 6.0 - 12.0 fL    Seg Neutrophils 76 (H) 36 - 66 %    Lymphocytes 12 (L) 24 - 44 %    Monocytes 10 (H) 1 - 7 %    Eosinophils % 1 0 - 4 %    Basophils 1 0 - 2 %    Segs Absolute 4.79 1.3 - 9.1 k/uL    Absolute Lymph # 0.76 (L) 1.0 - 4.8 k/uL    Absolute Mono # 0.63 0.1 - 1.3 k/uL    Absolute Eos # 0.06 0.0 - 0.4 k/uL    Basophils Absolute 0.06 0.0 - 0.2 k/uL    Morphology ANISOCYTOSIS PRESENT     Morphology HYPOCHROMIA PRESENT     Morphology MICROCYTOSIS PRESENT     Morphology FEW ELLIPTOCYTES    Comprehensive Metabolic Panel w/ Reflex to MG   Result Value Ref Range    Glucose 127 (H) 70 - 99 mg/dL    BUN 11 8 - 23 mg/dL    Creatinine 0.78 0.70 - 1.20 mg/dL    Est, Glom Filt Rate >60 >60 mL/min/1.73m2    Calcium 8.5 (L) 8.6 - 10.4 mg/dL    Sodium 142 135 - 144 mmol/L    Potassium 3.4 (L) 3.7 - 5.3 mmol/L    Chloride 99 98 - 107 mmol/L    CO2 37 (H) 20 - 31 mmol/L    Anion Gap 6 (L) 9 - 17 mmol/L    Alkaline Phosphatase 46 40 - 129 U/L    ALT 24 5 - 41 U/L    AST 26 <40 U/L    Total Bilirubin 1.2 0.3 - 1.2 mg/dL    Total Protein 5.9 (L) 6.4 - 8.3 g/dL    Albumin 2.8 (L) 3.5 - 5.2 g/dL   Magnesium   Result Value Ref Range    Magnesium 1.7 1.6 - 2.6 mg/dL   CBC with Auto Differential   Result Value Ref Range    WBC 6.4 3.5 - 11.0 k/uL    RBC 5.75 4.5 - 5.9 m/uL    Hemoglobin 12.4 (L) 13.5 - 17.5 g/dL    Hematocrit 41.8 41 - 53 %    MCV 72.8 (L) 80 - 100 fL    MCH 21.6 (L) 26 - 34 pg    MCHC 29.6 (L) 31 - 37 g/dL    RDW 31.8 (H) 11.5 - 14.9 %    Platelets 198 576 - 411 k/uL    MPV 9.3 6.0 - 12.0 fL    Seg Neutrophils 77 (H) 36 - 66 %    Lymphocytes 11 (L) 24 - 44 %    Monocytes 10 (H) 1 - 7 %    Eosinophils % 0 0 - 4 %    Basophils 2 0 - 2 %    Segs Absolute 4.93 1.3 - 9.1 k/uL    Absolute Lymph # 0.70 (L) 1.0 - 4.8 k/uL    Absolute Mono # 0.64 0.1 - 1.3 k/uL    Absolute Eos # 0.00 0.0 - 0.4 k/uL    Basophils Absolute 0.13 0.0 - 0.2 k/uL    Morphology ANISOCYTOSIS PRESENT     Morphology HYPOCHROMIA PRESENT     Morphology MICROCYTOSIS PRESENT     Morphology FEW ELLIPTOCYTES    Comprehensive Metabolic Panel w/ Reflex to MG   Result Value Ref Range    Glucose 111 (H) 70 - 99 mg/dL    BUN 11 8 - 23 mg/dL    Creatinine 0.81 0.70 - 1.20 mg/dL    Est, Glom Filt Rate >60 >60 mL/min/1.73m2    Calcium 8.9 8.6 - 10.4 mg/dL    Sodium 146 (H) 135 - 144 mmol/L    Potassium 3.8 3.7 - 5.3 mmol/L    Chloride 102 98 - 107 mmol/L    CO2 37 (H) 20 - 31 mmol/L    Anion Gap 7 (L) 9 - 17 mmol/L    Alkaline Phosphatase 48 40 - 129 U/L    ALT 24 5 - 41 U/L    AST 27 <40 U/L    Total Bilirubin 1.6 (H) 0.3 - 1.2 mg/dL    Total Protein 6.6 6.4 - 8.3 g/dL    Albumin 3.1 (L) 3.5 - 5.2 g/dL   POC Glucose Fingerstick   Result Value Ref Range    POC Glucose 104 75 - 110 mg/dL   POC Glucose Fingerstick   Result Value Ref Range    POC Glucose 106 75 - 110 mg/dL   EKG 12 Lead   Result Value Ref Range    Ventricular Rate 75 BPM    Atrial Rate 75 BPM    P-R Interval 142 ms    QRS Duration 66 ms    Q-T Interval 350 ms    QTc Calculation (Bazett) 390 ms    P Axis 18 degrees    R Axis -165 degrees    T Axis 41 degrees   EKG 12 Lead   Result Value Ref Range    Ventricular Rate 105 BPM    Atrial Rate 105 BPM    P-R Interval 144 ms    QRS Duration 78 ms    Q-T Interval 302 ms    QTc Calculation (Bazett) 399 ms    P Axis 13 degrees    R Axis -30 degrees    T Axis -15 degrees   ECHO Complete 2D W Doppler W Color   Result Value Ref Range    Left Ventricular Ejection Fraction 55     LVEF MODALITY ECHO    PREPARE CRYOPRECIPITATE, 1 Product   Result Value Ref Range    Unit Number [id number]     Product Code Thawed Pooled Cryoprecipitate     Unit Divison 00     Dispense Status TRANSFUSED     Unit Issue Date/Time 745455272065     Product Code Blood Bank [id number]     Blood Bank Unit Type and Rh O POS     Blood Bank ISBT Product Blood Type 5100     Blood Bank Blood Product Expiration Date 139934163161     Transfusion Status OK TO TRANSFUSE      *Note: Due to a large number of results and/or encounters for the requested time period, some results have not been displayed. A complete set of results can be found in Results Review. IMAGING DATA:    CT HEAD WO CONTRAST    Result Date: 1/1/2023  EXAMINATION: CT OF THE HEAD WITHOUT CONTRAST  1/1/2023 10:48 pm TECHNIQUE: CT of the head was performed without the administration of intravenous contrast. Automated exposure control, iterative reconstruction, and/or weight based adjustment of the mA/kV was utilized to reduce the radiation dose to as low as reasonably achievable. COMPARISON: None. HISTORY: Reason for Exam: AMS Additional signs and symptoms: the patient has been getting more and more confused since 2 weeks ago. It has progressively been getting worse and today FINDINGS: BRAIN/VENTRICLES: There is no acute intracranial hemorrhage, mass effect or midline shift. No abnormal extra-axial fluid collection. The gray-white differentiation is maintained without evidence of an acute infarct.   There is no evidence of hydrocephalus. Changes of mild chronic small vessel ischemic disease. ORBITS: The visualized portion of the orbits demonstrate no acute abnormality. SINUSES: The visualized paranasal sinuses and mastoid air cells demonstrate no acute abnormality. SOFT TISSUES/SKULL:  No acute abnormality of the visualized skull or soft tissues. No acute intracranial abnormality. Mild chronic small vessel ischemic disease. XR CHEST PORTABLE    Result Date: 1/1/2023  EXAMINATION: ONE XRAY VIEW OF THE CHEST 1/1/2023 7:17 pm COMPARISON: None. HISTORY: ORDERING SYSTEM PROVIDED HISTORY: ams TECHNOLOGIST PROVIDED HISTORY: ams Reason for Exam: Altered mental status, difficulty breathing Additional signs and symptoms: Altered mental status, difficulty breathing Relevant Medical/Surgical History: Altered mental status, difficulty breathing FINDINGS: Large lucent area in the left apex suggesting a large bulla however superimposed pneumothorax cannot be excluded. The cardiac size is normal. Right lower lobe airspace infiltrate and mild right pleural effusion. . Pulmonary vascularity appears normal. There is mild ectasia of the thoracic aorta. Calcific tendinitis of the right shoulder. No acute bony abnormalities. The hilar structures are normal.     Right lower lobe pneumonia and small right pleural effusion Large lucent area in the left apex suggesting a large bulla however superimposed pneumothorax cannot be excluded. Follow-up CT would be useful for further evaluation. The findings were sent to the Radiology Results [address] at 10:31 pm on 1/1/2023 to be communicated to a licensed caregiver.          IMPRESSION:   Primary Problem  Acute respiratory failure with hypoxia and hypercapnia [facility])    Active Hospital Problems    Diagnosis Date Noted    Dysphagia [R13.10] 01/16/2023     Priority: Medium    Hepatitis C virus infection without hepatic coma [B19.20] 01/14/2023     Priority: Medium    Mild malnutrition ([address] [E44.1] 01/13/2023     Priority: Medium    Delirium due to another medical condition [F05] 01/10/2023     Priority: Medium    ACP (advance care planning) [Z71.89] 01/09/2023     Priority: Medium    Goals of care, counseling/discussion [Z71.89] 01/09/2023     Priority: Medium    Encounter for palliative care [Z51.5] 01/09/2023     Priority: Medium    Prolonged pt (prothrombin time) [R79.1] 01/03/2023     Priority: Medium    Emphysema lung ([address] [J43.9] 01/03/2023     Priority: Medium    Cerebral infarction ([address] [I63.9] 01/03/2023     Priority: Medium    Transaminitis [R74.01] 01/02/2023     Priority: Medium    Normocytic anemia [D64.9] 01/02/2023     Priority: Medium    Thrombocytopenia ([address] [D69.6] 01/02/2023     Priority: Medium    Agitation [R45.1] 01/02/2023     Priority: Medium    Acute respiratory failure with hypoxia and hypercapnia (HCC) RLL pneumonia [J96.01, J96.02] 01/02/2023     Priority: Medium    Altered mental status [R41.82] 01/02/2023     Priority: Medium    Community acquired pneumonia of right lower lobe of lung [J18.9] 01/02/2023     Priority: Medium       Medical apathy  Respiratory failure secondary pneumonia  Abnormal LFT  Microcytic anemia  Thrombocytopenia  History of alcohol dependence  IgG lambda paraproteinemia  Positive HCV screen  Iron deficiency  Coagulopathy    RECOMMENDATIONS:  I reviewed the labs/imaging available to me,outside records and discussed with the patient. I explained to the patient the nature of this problem. 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 Recovering well. Hemoglobin 12.4.   Platelets 475  Patient has coagulopathy secondary to liver disease as mentioned  Cardioembolic strokes with a component of subarachnoid bleeding, not a candidate for anticoagulation  Overall all condition seems to be improving. We talked him about abstaining from alcohol and acetaminophen. We also discovered that he is taking very high dose of supplements almost to a toxic level. We asked him to refrain from doing that. Overall labs are improving. Clinically he is improving as well. Discussed with the patient and his wife at bedside. We will follow with you                            [address] [facility]                            This note is created with the assistance of a speech recognition program.  While intending to generate a document that actually reflects the content of the visit, the document can still have some errors including those of syntax and sound a like substitutions which may escape proof reading. It such instances, actual meaning can be extrapolated by contextual diversion.

## 2023-01-16 NOTE — PROGRESS NOTES
Byron Mckenzie MD/Luis Felipe Escobar MD/ Deejay Valente MD/Dr Yasmeen Stein APRN AGACNP-BC, NP-C      Frankey Sims APRN NP-C     [id number] [address] [facility] APRN NP-C                                           Pulmonary Progress Note    Patient - Aiden Goldstein   Age - 60 y.o.   - 1960  MRN - [id number]  Acct # - [de-identified]  Date of Admission - 2023  9:55 PM    Consulting Service/Physician:       Primary Care Physician: No primary care provider on file. SUBJECTIVE:     Chief Complaint:   Chief Complaint   Patient presents with    Altered Mental Status     Subjective:    Ash Schwab tells me he is feeling okay today. He is pending PEG placement later today. Plans are for acute rehab. He is currently on 2 L nasal cannula. He has been afebrile. VITALS  /79   Pulse 91   Temp 98.9 °F (37.2 °C) (Axillary)   Resp 18   Ht 5' 7\" (1.702 m)   Wt 164 lb 3.9 oz (74.5 kg)   SpO2 96%   BMI 25.72 kg/m²   Wt Readings from Last 3 Encounters:   23 164 lb 3.9 oz (74.5 kg)   23 188 lb (85.3 kg)     I/O (24 Hours)    Intake/Output Summary (Last 24 hours) at 2023 1000  Last data filed at 2023 0030  Gross per 24 hour   Intake 120 ml   Output 450 ml   Net -330 ml     Ventilator:   Settings  FiO2 : 60 %  Insp Time (sec): 0.9 sec  Insp Rise Time (%): 0.15 %  Flow Sensitivity: 5 L/min  Exam:   Physical Exam   Constitutional: Lying in bed on 2 L in no distress   HENT: Unremarkable, NG tube intact and clamped  Head: Normocephalic and atraumatic. Eyes: EOM are normal. Pupils are equal, round, and reactive to light. Neck: Neck supple. Cardiovascular:  Regular rate and rhythm. Normal heart tones. No JVD. Pulmonary/Chest: Respirations even and nonlabored, few scattered expiratory wheezes, on 2 L with pulse ox 96% Abdominal: Soft. Bowel sounds are normal.   Musculoskeletal: Normal range of motion.    Neurological: He is alert and oriented but appears forgetful   Skin: Skin is warm and dry. No rash noted.    Extremities: No edema or discoloration  Infusions:      sodium chloride      dexmedetomidine      sodium chloride       Meds:     Current Facility-Administered Medications:     potassium bicarb-citric acid (EFFER-K) effervescent tablet 20 mEq, 20 mEq, Oral, Daily, Mary Carmen Maguire MD, 20 mEq at 01/15/23 0818    furosemide (LASIX) tablet 20 mg, 20 mg, Oral, Daily, Primo Worthy MD    potassium bicarb-citric acid (EFFER-K) effervescent tablet 40 mEq, 40 mEq, Oral, Daily, Jaciel Ortiz MD, 40 mEq at 01/15/23 0818    potassium chloride (KLOR-CON M) extended release tablet 20 mEq, 20 mEq, Oral, Once, Jaciel Ortiz MD    hydrALAZINE (APRESOLINE) injection 20 mg, 20 mg, IntraVENous, Q6H PRN, Mary Carmen Maguire MD, 20 mg at 01/11/23 1224    potassium chloride (KLOR-CON M) extended release tablet 40 mEq, 40 mEq, Oral, PRN **OR** potassium bicarb-citric acid (EFFER-K) effervescent tablet 40 mEq, 40 mEq, Oral, PRN **OR** potassium chloride 10 mEq/100 mL IVPB (Peripheral Line), 10 mEq, IntraVENous, PRN, Mary Carmen Maguire MD, Last Rate: 100 mL/hr at 01/13/23 0928, 10 mEq at 01/13/23 0654    spironolactone (ALDACTONE) tablet 25 mg, 25 mg, Oral, Daily, Jaciel Ortiz MD, 25 mg at 01/15/23 2172    magnesium sulfate 2000 mg in water 50 mL IVPB, 2,000 mg, IntraVENous, PRN, Moo Foster MD    ziprasidone (GEODON) 10 mg in sterile water 0.5 mL injection, 10 mg, IntraMUSCular, Nightly, Keara Renee APRN - CNP    carvedilol (COREG) tablet 6.25 mg, 6.25 mg, Oral, BID WC, Moo Foster MD, 6.25 mg at 01/14/23 0800    [Held by provider] ferrous sulfate (IRON 325) tablet 325 mg, 325 mg, Oral, Daily with breakfast, Moo Foster MD    pantoprazole (PROTONIX) 40 mg in sodium chloride (PF) 0.9 % 10 mL injection, 40 mg, IntraVENous, Daily, Moo Foster MD, 40 mg at 01/15/23 0819    ziprasidone (GEODON) 20 mg in sterile water 1 mL injection, 20 mg, IntraMUSCular, Q12H PRN, ALAYNA Olivera - CNP    labetalol (NORMODYNE;TRANDATE) injection 5 mg, 5 mg, IntraVENous, Q6H PRN, Yousif Rasheed MD, 5 mg at 01/10/23 1606    potassium chloride 10 mEq/100 mL IVPB (Peripheral Line), 10 mEq, IntraVENous, PRN, Gautam Duggan MD, Last Rate: 100 mL/hr at 01/13/23 1233, 10 mEq at 01/13/23 1233    ipratropium-albuterol (DUONEB) nebulizer solution 1 ampule, 1 ampule, Inhalation, Q4H WA, Kalani Esparza MD, 1 ampule at 01/16/23 0759    [Held by provider] heparin (porcine) injection 5,000 Units, 5,000 Units, SubCUTAneous, 3 times per day, Rick Flores MD, 5,000 Units at 01/08/23 0549    0.9 % sodium chloride infusion, , IntraVENous, PRN, Duncan Fuentes MD    dexmedetomidine (PRECEDEX) 400 mcg in sodium chloride 0.9 % 100 mL infusion, 0.1-1.5 mcg/kg/hr, IntraVENous, Continuous PRN, Kalani Esparza MD    nystatin (MYCOSTATIN) ointment, , Topical, BID, Yousif Rasheed MD, Given at 01/15/23 2221    [Held by provider] miconazole (MICOTIN) 2 % powder, , Topical, BID, ALAYNA Drew - NP, Given at 01/06/23 6911    perflutren lipid microspheres (DEFINITY) injection 1.5 mL, 1.5 mL, IntraVENous, ONCE PRN, Gabe Galloway MD    [facility] [place] by provider] aspirin chewable tablet 81 mg, 81 mg, Oral, Daily, Yaneth Luna MD, 81 mg at 01/04/23 0740    sodium chloride flush 0.9 % injection 10 mL, 10 mL, IntraVENous, PRN, Yaneth Luna MD, 10 mL at 01/03/23 1836    sodium chloride flush 0.9 % injection 5-40 mL, 5-40 mL, IntraVENous, 2 times per day, ALAYNA Drew NP, 10 mL at 01/15/23 2222    sodium chloride flush 0.9 % injection 5-40 mL, 5-40 mL, IntraVENous, PRN, ALAYNA Marrero NP    0.9 % sodium chloride infusion, , IntraVENous, PRN, ALAYNA Drew NP    ondansetron (ZOFRAN-ODT) disintegrating tablet 4 mg, 4 mg, Oral, Q8H PRN **OR** ondansetron (ZOFRAN) injection 4 mg, 4 mg, IntraVENous, Q6H PRN, Taiwo Needs, APRN - NP    polyethylene glycol (GLYCOLAX) packet 17 g, 17 g, Oral, Daily PRN, Taiwo Needs, APRN - NP    acetaminophen (TYLENOL) tablet 650 mg, 650 mg, Oral, Q6H PRN **OR** acetaminophen (TYLENOL) suppository 650 mg, 650 mg, Rectal, Q6H PRN, Taiwo Needs, APRN - NP    nicotine (NICODERM CQ) 21 MG/24HR 1 patch, 1 patch, TransDERmal, Daily, Taiwo Needs, APRN - NP, 1 patch at 01/15/23 0821    albuterol (PROVENTIL) nebulizer solution 2.5 mg, 2.5 mg, Nebulization, Q2H PRN, Taiwo Needs, APRN - NP, 2.5 mg at 01/02/23 1115    guaiFENesin (MUCINEX) extended release tablet 600 mg, 600 mg, Oral, BID PRN, Taiwo Needs, APRN - NP    Lab Results:     Lab Results   Component Value Date    WBC 6.4 01/16/2023    HGB 12.4 (L) 01/16/2023    HCT 41.8 01/16/2023    MCV 72.8 (L) 01/16/2023     01/16/2023     Lab Results   Component Value Date    CALCIUM 8.9 01/16/2023     (H) 01/16/2023    K 3.8 01/16/2023    CO2 37 (H) 01/16/2023     01/16/2023    BUN 11 01/16/2023    CREATININE 0.81 01/16/2023       Lab Results   Component Value Date    INR 1.6 01/08/2023    PROTIME 19.1 (H) 01/08/2023           ASSESSMENT:       Acute hypoxic respiratory failure, extubated 1/5/2023, now on 2 L  Multifocal pneumonia suspect aspiration versus community-acquired  Acute infarcts left cerebral hemisphere, left parietal lobe with bilateral subarachnoid hemorrhages  Dysphagia-still failed swallow eval, due for PEG tube placement today  Delirium-resolved  Metabolic encephalopathy  Transaminitis  Thrombocytopenia-improved  History of alcohol use  Acute kidney injury-resolved  History of tobacco use 40-pack-year history  Suspect COPD-mild wheezing  Positive hepatitis C screen  Chronic liver disease secondary to alcohol use  Debilitated  Full code  PLAN:   Note plan for PEG tube today  Wean oxygen as tolerated, keep pulse ox above 89%  Continue bronchodilators  Encourage cough and deep breathing  Agree with plan for ARU      Electronically signed by ALAYNA Montejo CNP on 01/16/23     This progress note was completed using a voice transcription system. Every effort was made to ensure accuracy. However, inadvertent computerized transcription errors may be present.     Jaye Clay, NP-C, MSN  [facility]

## 2023-01-16 NOTE — PROGRESS NOTES
Pull away alarm was going off d/t pt trying to get out of the chair. Pt was confused and wanting to go home. Pt was saying things that didn't make sense and was saying no to a peg tube when writer and bedside RN were trying to reorient him to his surroundings and the plan for the day. Writer called the pt's daughter Naveed Young to speak to her about her father peg tube placement and who would be giving consent. Jaimie clarified that Quang Stapleton is his girlfriend and she's aware of the surgery planned for this afternoon and is planning on coming up to see her dad. Writer asked if her other three siblings know about the peg tube placement and she stated yes. Naveed Young said her mother just  last night and she's having a a hard time today. She stated that her and her siblings know what her dad would want and he would want the peg tube. Naveed Young said she was going to  Quang Stapleton and she will be here prior to the surgery planned for this afternoon and she's aware of the time. Writer stated ok.

## 2023-01-16 NOTE — PLAN OF CARE
Problem: Safety - Adult  Goal: Free from fall injury  1/16/2023 0347 by Mitul Poole RN  Outcome: Progressing  Note: Pt assessed as a fall risk this shift. Remains free from falls and accidental injury at this time. Fall precautions in place. Floor free from obstacles, and bed is locked and in lowest position. Adequate lighting provided. Pt encouraged to call before getting OOB for any need. Bed alarm activated. Will continue to monitor needs during hourly rounding, and reinforce education on use of call light. Problem: Skin/Tissue Integrity  Goal: Absence of new skin breakdown  Description: 1. Monitor for areas of redness and/or skin breakdown  2. Assess vascular access sites hourly  3. Every 4-6 hours minimum:  Change oxygen saturation probe site  4. Every 4-6 hours:  If on nasal continuous positive airway pressure, respiratory therapy assess nares and determine need for appliance change or resting period. 1/16/2023 0347 by Mitul Poole RN  Outcome: Progressing     Problem: Pain  Goal: Verbalizes/displays adequate comfort level or baseline comfort level  1/16/2023 0347 by Mitul Poole RN  Outcome: Progressing  Note: No pain reported this shift.      Problem: Nutrition Deficit:  Goal: Optimize nutritional status  1/16/2023 0347 by Mitul Poole RN  Outcome: Progressing     Problem: Discharge Planning  Goal: Discharge to home or other facility with appropriate resources  1/16/2023 0347 by Mitul Poole RN  Outcome: Progressing

## 2023-01-16 NOTE — CONSULTS
General Surgery -- Consult    PATIENT NAME: Mark Montanez  AGE: 60 y.o. MEDICAL RECORD NO. [id number]  DATE: 1/16/2023  PRIMARY CARE PHYSICIAN: No primary care provider on file. SURGEON: Nataliia    Patient evaluated at the request of Primary team  Reason for evaluation: Dysphagia    Patient information was obtained from patient and spouse/SO. IMPRESSION:     Principal Problem:    Acute respiratory failure with hypoxia and hypercapnia (HCC) RLL pneumonia  Active Problems:    Transaminitis    Normocytic anemia    Thrombocytopenia (HCC)    Agitation    Altered mental status    Community acquired pneumonia of right lower lobe of lung    Prolonged pt (prothrombin time)    Emphysema lung (HCC)    Cerebral infarction (HCC)    ACP (advance care planning)    Goals of care, counseling/discussion    Encounter for palliative care    Delirium due to another medical condition    Mild malnutrition ([facility] [address]    Hepatitis C virus infection without hepatic coma  Resolved Problems:    COPD with respiratory failure, acute ([facility] [address]        PLAN:     PEG tube tomorrow - time TBD. Keep NPO at midnight. Risks versus benefits discussed with the patient and his significant other at bedside. The patient was alert and oriented at time of describing the procedure. Consent obtained and placed in the patient's chart. HISTORY:     HPI: Khari Adame is a/an 60 y.o. male who was admitted for stroke now had dysphagia. Swallow study showed dysphagia and concern for aspiration. General surgery consulted for feeding tube access. Past Medical History   has no past medical history on file. Past Surgical History   has no past surgical history on file. Medications  Prior to Admission medications    Medication Sig Start Date End Date Taking?  Authorizing Provider   aspirin 325 MG tablet Take 325 mg by mouth daily Patient takes 2 tabs daily   Yes Historical Provider, MD   psyllium (KONSYL) 28.3 % PACK Take 1 packet by mouth daily   Yes Historical Provider, MD    Scheduled Meds:   potassium bicarb-citric acid  20 mEq Oral Daily    potassium bicarb-citric acid  40 mEq Oral Daily    potassium chloride  20 mEq Oral Once    spironolactone  25 mg Oral Daily    ziprasidone (GEODON) in sterile water injection  10 mg IntraMUSCular Nightly    carvedilol  6.25 mg Oral BID WC    [Held by provider] ferrous sulfate  325 mg Oral Daily with breakfast    pantoprazole (PROTONIX) 40 mg injection  40 mg IntraVENous Daily    ipratropium-albuterol  1 ampule Inhalation Q4H WA    [Held by provider] heparin (porcine)  5,000 Units SubCUTAneous 3 times per day    nystatin   Topical BID    [Held by provider] miconazole   Topical BID    [Held by provider] aspirin  81 mg Oral Daily    sodium chloride flush  5-40 mL IntraVENous 2 times per day    nicotine  1 patch TransDERmal Daily     Continuous Infusions:   dextrose 5 % and 0.45 % NaCl 40 mL/hr at 01/16/23 1512    sodium chloride      dexmedetomidine      sodium chloride       PRN Meds:.hydrALAZINE, potassium chloride **OR** potassium alternative oral replacement **OR** potassium chloride, magnesium sulfate, ziprasidone (GEODON) in sterile water injection, labetalol, potassium chloride, sodium chloride, dexmedetomidine, perflutren lipid microspheres, sodium chloride flush, sodium chloride flush, sodium chloride, ondansetron **OR** ondansetron, polyethylene glycol, acetaminophen **OR** acetaminophen, albuterol, guaiFENesin    Allergies  has No Known Allergies. Family History  family history is not on file.     Social History  Social History       Tobacco History       Smoking Status  Every Day Smoking Frequency  1 pack/day for 40.00 years (40.00 pk-yrs) Smoking Tobacco Type  Cigarettes      Smokeless Tobacco Use  Never              Alcohol History       Alcohol Use Status  Yes Comment  beer and scotch              Drug Use       Drug Use Status  Not Currently Comment  over 20 years (stated by daughter)              Sexual Activity       Sexually Active  Not Asked                     Review of Systems  General: Denies fever or chills   HEENT:  + dysphagia  Cardiac: Denies chest pain, palpitations, history of MI or arrhythmias   Pulmonary: Recent respiratory failure requiring intubation  GI: Denies abdominal pain  : Denies dysuria, increased frequency  Heme: Denies bleeding disorder  Endocrine Denies any history of thyroid disease  Neuro Admission for stroke    PHYSICAL:     VITALS:   Vitals:    01/16/23 0759 01/16/23 1135 01/16/23 1215 01/16/23 1601   BP:   107/65    Pulse: 91 93 100 88   Resp: 18 17 18 20   Temp:   98.1 °F (36.7 °C)    TempSrc:   Oral    SpO2: 96% 93% 93% 91%   Weight:       Height:            CONSTITUTIONAL: Alert and oriented  HEAD: Head is normocephalic  EYES: EOMI bilaterally, pupils reactive  NECK: trachea midline  CARDIOVASCULAR: Regular rate and rhythm   LUNGS: No respiratory distress, on supplemental O2. ABDOMEN: Soft, non distended, non tender to palpation. Old scar from traumatic accident in younger age across LLQ, no surgical scars noted. NEUROLOGIC: CN II-XII are grossly intact. EXTREMITIES: no cyanosis, clubbing or edema  SKIN: no skin lesions, erythema noted     LABS:     Recent Labs     01/14/23  0508 01/15/23  0514 01/16/23  0431   WBC 6.6 6.3 6.4   HGB 12.2* 12.2* 12.4*   HCT 41.3 40.9* 41.8   * 118* 159    142 146*   K 3.3* 3.4* 3.8   CL 97* 99 102   CO2 37* 37* 37*   BUN 9 11 11   CREATININE 0.73 0.78 0.81   MG 1.7 1.7  --    CALCIUM 8.4* 8.5* 8.9   AST 23 26 27   ALT 30 24 24   BILITOT 1.3* 1.2 1.6*     Recent Labs     01/16/23  0431   ALKPHOS 48   ALT 24   AST 27   BILITOT 1.6*   LABALBU 3.1*         RADIOLOGY:     None    Thank you for involving me in the care of Alonso Cavazos.     Peg William DO  1/16/2023, 4:24 PM

## 2023-01-16 NOTE — FLOWSHEET NOTE
01/16/23 1445   Treatment Team Notification   Reason for Communication Review case   Team Member Name Dr. Marleny Oquendo Provider   Method of Communication Secure Message   Notification Time [phone]     Dr. Romayne Bunk notified of patient's refusal to come down to per-op for PEG tube placement; Patient to have second opinion consult with Dr. Jimbo Jarvis.

## 2023-01-16 NOTE — PROGRESS NOTES
01/16/23 1000   Encounter Summary   Encounter Overview/Reason   Encounter   Service Provided For: Patient   Last Encounter  01/15/23   Spiritual/Emotional needs   Type Spiritual Support   Rituals, Rites and Sacraments   Type Blessings  (Fr Marcial Thacker 1/15/23) Assessment  1  Chronic pain syndrome    2  Chronic right shoulder pain    3  Uncomplicated opioid dependence ([address] )    4  Long-term current use of opiate analgesic    5  Encounter for long-term opiate analgesic use    6  Spinal stenosis of lumbar region, unspecified whether neurogenic claudication present    7  Lumbar spondylosis    8  Chronic pain disorder    9  Primary osteoarthritis of right shoulder        Plan   76-year-old male with history of urothelial carcinoma status post left nephrectomy and ureterectomy, lumbar degenerative disc disease, spondylosis, stenosis, and chronic pain syndrome returning for follow-up  Patient has a new complaint of right shoulder pain which will occasionally radiate down the arm to the wrist   He will occasionally experience some right-sided neck pain  Arm and neck symptoms are intermittent where as the shoulder pain is constant  Pain began when he was shoveling snow  Was initially having pain in both shoulders  Left side resolved, or as right-side has not  X-ray of the right shoulder shows osteoarthritis of the [facility] joint and glenohumeral joint  He has taken some leftover tramadol which did provide some relief  A course of oral steroids did provide some temporary relief  Patient's right shoulder pain seems to be secondary to osteoarthritis and subacromial bursitis  Less likely cervical radiculitis  Low back pain still significantly improved status post lumbar RFA  He is still experiencing relief  1  I will schedule the patient for right subacromial bursa injection under ultrasound guidance  Of note, the patient does have an upcoming surgical excision of cyst in the right scapular region  Will reach out to general surgery to confirm they have no objections to us performing corticosteroid injection before proceeding  2  I will prescribe tramadol 50 mg q 12 hours p r n  Renetta Crowell   Updated opioid contract was signed while the patient was in the office today and a urine drug screen was also obtained  He has previously been complaint with opioid medications prescribed by this office  3  I will follow up the patient in the weeks        There are risks associated with opioid medications, including dependence, addiction and tolerance  The patient understands and agrees to use these medications only as prescribed  Potential side effects of the medications include, but are not limited to, constipation, drowsiness, addiction, impaired judgment and risk of fatal overdose if not taken as prescribed  The patient was warned against driving while taking sedation medications  Sharing medications is a felony  At this point in time, the patient is showing no signs of addiction, abuse, diversion or suicidal ideation  A urine drug screen was collected at today's office visit as part of our medication management protocol  The point of care testing results were appropriate for what was being prescribed  The specimen will be sent for confirmatory testing  The drug screen is medically necessary because the patient is either dependent on opioid medication or is being considered for opioid medication therapy and the results could impact ongoing or future treatment  The drug screen is to evaluate for the presences or absence of prescribed, non-prescribed, and/or illicit drugs/substances  [place] Prescription Drug Monitoring Program report was reviewed and was appropriate     Complete risks and benefits including bleeding, infection, tissue reaction, nerve injury and allergic reaction were discussed  The approach was demonstrated using models and literature was provided  Verbal and written consent was obtained  My impressions and treatment recommendations were discussed in detail with the patient who verbalized understanding and had no further questions  Discharge instructions were provided  I personally saw and examined the patient and I agree with the above discussed plan of care      No orders of the defined types were placed in this encounter  No orders of the defined types were placed in this encounter  History of Present Illness    Wes Begum is a 66 y o  male with history of urothelial carcinoma status post left nephrectomy and ureterectomy, lumbar degenerative disc disease, spondylosis, stenosis, and chronic pain syndrome returning for follow-up  Patient has a new complaint of right shoulder pain which will occasionally radiate down the arm to the wrist   He will occasionally experience some right-sided neck pain  Arm and neck symptoms are intermittent where as the shoulder pain is constant  Pain began when he was shoveling snow  Was initially having pain in both shoulders  Left side resolved, or as right-side has not  X-ray of the right shoulder shows osteoarthritis of the [facility] joint and glenohumeral joint  He has taken some leftover tramadol which did provide some relief  A course of oral steroids did provide some temporary relief  The patient rates his pain as 7/10 on the pain is not follow any particular pattern throughout the day  The pain is described as constant, burning, sharp, and shooting  Pain is worse with activity and exercise predominantly involving the right arm, especially with overhead activities  Pain is alleviated with relaxation  Other than as stated above, the patient denies any interval changes in medications, medical condition, mental condition, symptoms, or allergies since the last office visit  I have personally reviewed and/or updated the patient's past medical history, past surgical history, family history, social history, current medications, allergies, and vital signs today  Review of Systems   Constitutional: Negative for fever and unexpected weight change  HENT: Negative for trouble swallowing  Eyes: Negative for visual disturbance  Respiratory: Positive for shortness of breath  Negative for wheezing  Cardiovascular: Negative for chest pain and palpitations  Gastrointestinal: Negative for constipation, diarrhea, nausea and vomiting  Endocrine: Negative for cold intolerance, heat intolerance and polydipsia  Genitourinary: Negative for difficulty urinating and frequency  Musculoskeletal: Positive for joint swelling  Negative for arthralgias, gait problem and myalgias  Decreased ROM, swelling, pain in extrmity   Skin: Negative for rash  Neurological: Negative for dizziness, seizures, syncope, weakness and headaches  Hematological: Does not bruise/bleed easily  Psychiatric/Behavioral: Negative for dysphoric mood  All other systems reviewed and are negative        Patient Active Problem List   Diagnosis    Lumbar radiculopathy    Lumbar disc herniation    Lumbar spondylosis    Urothelial cancer ([facility] [address] [facility]    Essential hypertension    Sinus bradycardia    Hyperlipidemia    Cancer of left renal pelvis (HCC)    Drug-induced neutropenia (HCC)    Chronic pain disorder    Spinal stenosis of lumbar region    Encounter for central line care    Hyperuricemia    Encounter for long-term opiate analgesic use    Uncomplicated opioid dependence ([facility] [address] )    Palliative care patient    Decreased appetite    Cancer related pain    Therapeutic opioid induced constipation    Medical marijuana use    Normocytic anemia    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)    Thrombophlebitis of superficial veins of right lower extremity    Renal dysfunction    Stage 3a chronic kidney disease (HCC)    Hypomagnesemia    Platelets decreased ([facility] [address] )    H/O left nephrectomy    Chronic kidney disease-mineral and bone disorder    Vitamin D deficiency    Dehydration       Past Medical History:   Diagnosis Date    Arthritis     Bladder cancer     Cancer ([facility] [address] )     skin melanoma;basal cell    Chronic pain disorder     from arthritis    Colon polyp     Does use hearing aid     bilat    Dry eyes, bilateral     GERD (gastroesophageal reflux disease)     History of kidney stones 10/2019    History of partial knee replacement     bilat    History of vertigo     Hyperlipidemia     Hypertension     Kidney lesion     Lumbar disc disorder     compression of vertebrae L4-5-6    Muscle weakness     left hip area    Renal mass     left    Right ankle injury 2020    missed step of ladder     Right ankle pain     Shortness of breath     with activity    Tinnitus     Urothelial cancer     left    Wears glasses        Past Surgical History:   Procedure Laterality Date    COLONOSCOPY      CYSTOSCOPY Left 2020    Procedure: CYSTOSCOPY; URETERAL CATHETER PLACEMENT;  Surgeon: Francine Yuen MD;  Location: [facility];  Service: Urology    CYSTOSCOPY  2020    CYSTOSCOPY  2021    FL CYSTOGRAM  2020    FL RETROGRADE PYELOGRAM  2020    HERNIA REPAIR      umbilical with mesh    INGUINAL HERNIA REPAIR Right     with mesh    IR PORT PLACEMENT  2020    JOINT REPLACEMENT Bilateral     partials knee    LUMBAR EPIDURAL INJECTION      KS CYSTOURETHROSCOPY,URETER CATHETER Bilateral 2020    Procedure: CYSTOSCOPY; RIGHT RETROGRADE PYELOGRAM WITH RIGHT URETERAL CYTOLOGY SAMPLING; LEFT URETEROSCOPY WITH RENAL PELVIS BIOPSY AND LEFT STENT PLACEMENT;  Surgeon: Francine Yuen MD;  [facility];  Service: Urology    KS NEPHRECTOMY, W/PART   URETECTOMY Left 2020    Procedure: ROBOTIC LAPAROSCOPIC NEPHRO-URETERECTOMY;  Surgeon: Francine Yuen MD;  [facility];  Service: Urology    SKIN CANCER EXCISION      surface melanoma    TONSILLECTOMY      WISDOM TOOTH EXTRACTION         Family History   Problem Relation Age of Onset    Liver cancer Father        Social History     Occupational History    Not on file   Tobacco Use    Smoking status: Former Smoker     Quit date:      Years since quittin 6    Smokeless tobacco: Never Used   Vaping Use    Vaping Use: Never used   Substance and Sexual Activity    Alcohol use: Not Currently     Alcohol/week: 4 0 standard drinks     Types: 4 Cans of beer per week     Comment: daily/socially    Drug use: Never    Sexual activity: Not Currently       Current Outpatient Medications on File Prior to Visit   Medication Sig    acetaminophen (TYLENOL) 325 mg tablet Take 2 tablets (650 mg total) by mouth every 6 (six) hours as needed for mild pain or fever    allopurinol (ZYLOPRIM) 300 mg tablet Take 1 tablet (300 mg total) by mouth daily    ALPRAZolam (XANAX) 0 25 mg tablet Take 1 tablet (0 25 mg total) by mouth daily at bedtime as needed for anxiety (restless legs)    amLODIPine (NORVASC) 5 mg tablet Take 5 mg by mouth daily      atorvastatin (LIPITOR) 80 mg tablet Take 80 mg by mouth every other day evening    cholestyramine (QUESTRAN) 4 g packet Take 1 packet (4 g total) by mouth 2 (two) times a day as needed (diarrhea)    folic acid (FOLVITE) 1 mg tablet Take 1 tablet (1 mg total) by mouth daily    hydrOXYzine HCL (ATARAX) 25 mg tablet Take 1 tablet (25 mg total) by mouth every 6 (six) hours as needed for itching or anxiety    Magnesium 250 MG TABS 1 tablet 2 (two) times a day Taking 500mg in the morning and 500mg at night    metoprolol tartrate (LOPRESSOR) 100 mg tablet Take 50 mg by mouth daily    naloxone (NARCAN) 4 mg/0 1 mL nasal spray Administer 1 spray into a nostril  If breathing does not return to normal or if breathing difficulty resumes after 2-3 minutes, give another dose in the other nostril using a new spray      omeprazole (PriLOSEC) 20 mg delayed release capsule Take 20 mg by mouth daily      predniSONE 10 mg tablet Take 1 tablet (10 mg total) by mouth daily    senna (SENOKOT) 8 6 mg Take 1 tablet (8 6 mg total) by mouth daily at bedtime    sildenafil (VIAGRA) 100 mg tablet TAKE ONE TABLET BY MOUTH  AS NEEDED PRIOR TO SEXUAL ACTIVITY FOR ERECTILE DYSFUNCTION    terazosin (HYTRIN) 5 mg capsule Take 2 capsules (10 mg total) by mouth daily at bedtime    triamcinolone (KENALOG) 0 1 % lotion Apply topically 3 (three) times a day    VITAMIN D PO Take 1,000 Units by mouth daily      No current facility-administered medications on file prior to visit  No Known Allergies    Physical Exam    Ht 5' 8" (1 727 m)   Wt 92 1 kg (203 lb)   BMI 30 87 kg/m²     Constitutional: normal, well developed, well nourished, alert, in no distress and non-toxic and no overt pain behavior  Eyes: anicteric  HEENT: grossly intact  Neck: supple, symmetric, trachea midline and no masses   Pulmonary:even and unlabored  Cardiovascular:No edema or pitting edema present  Skin:Normal without rashes or lesions and well hydrated  Psychiatric:Mood and affect appropriate  Neurologic:Cranial Nerves II-XII grossly intact  Musculoskeletal:normal Gait  Tenderness to palpation over the superior and lateral aspect of the right shoulder  Full range of motion of right shoulder in all planes  Bilateral cervical paraspinals minimally tender to palpation  Right trapezius tender to palpation  Bilateral biceps, triceps, and brachioradialis reflexes were 2/4 and symmetrical   Negative Ross's reflex bilaterally  Bilateral upper extremity strength 5/5 in all muscle groups  Sensation intact to light touch in C5 through T1 dermatomes bilaterally  Negative Spurling's bilaterally  Positive Neer, empty can, and Weston test on the right      Imaging    PACS Images     Show images for XR shoulder 2+ vw right   Study Result    Narrative & Impression   RIGHT SHOULDER     INDICATION:   M25 511: Pain in right shoulder      COMPARISON:  None     VIEWS:  XR SHOULDER 2+ VW RIGHT        FINDINGS:     There is no acute fracture or dislocation      Glenohumeral and AC joints moderate osteoarthritis     No lytic or blastic osseous lesion      Right Mediport present      IMPRESSION:     No acute osseous abnormality            Workstation performed: [id number]

## 2023-01-17 ENCOUNTER — ANESTHESIA EVENT (OUTPATIENT)
Dept: ENDOSCOPY | Age: 63
End: 2023-01-17
Payer: MEDICAID

## 2023-01-17 ENCOUNTER — ANESTHESIA (OUTPATIENT)
Dept: ENDOSCOPY | Age: 63
End: 2023-01-17
Payer: MEDICAID

## 2023-01-17 LAB
ABSOLUTE EOS #: 0.06 K/UL (ref 0–0.4)
ABSOLUTE LYMPH #: 0.68 K/UL (ref 1–4.8)
ABSOLUTE MONO #: 0.62 K/UL (ref 0.1–1.3)
ALBUMIN SERPL-MCNC: 2.9 G/DL (ref 3.5–5.2)
ALP BLD-CCNC: 44 U/L (ref 40–129)
ALT SERPL-CCNC: 23 U/L (ref 5–41)
ANION GAP SERPL CALCULATED.3IONS-SCNC: 6 MMOL/L (ref 9–17)
AST SERPL-CCNC: 29 U/L
BASOPHILS # BLD: 3 % (ref 0–2)
BASOPHILS ABSOLUTE: 0.19 K/UL (ref 0–0.2)
BILIRUB SERPL-MCNC: 1.3 MG/DL (ref 0.3–1.2)
BUN BLDV-MCNC: 10 MG/DL (ref 8–23)
CALCIUM SERPL-MCNC: 8.6 MG/DL (ref 8.6–10.4)
CHLORIDE BLD-SCNC: 103 MMOL/L (ref 98–107)
CO2: 34 MMOL/L (ref 20–31)
CREAT SERPL-MCNC: 0.74 MG/DL (ref 0.7–1.2)
EOSINOPHILS RELATIVE PERCENT: 1 % (ref 0–4)
GFR SERPL CREATININE-BSD FRML MDRD: >60 ML/MIN/1.73M2
GLUCOSE BLD-MCNC: 101 MG/DL (ref 70–99)
HCT VFR BLD CALC: 37.6 % (ref 41–53)
HEMOGLOBIN: 11.1 G/DL (ref 13.5–17.5)
LYMPHOCYTES # BLD: 11 % (ref 24–44)
MCH RBC QN AUTO: 21.7 PG (ref 26–34)
MCHC RBC AUTO-ENTMCNC: 29.6 G/DL (ref 31–37)
MCV RBC AUTO: 73.5 FL (ref 80–100)
MONOCYTES # BLD: 10 % (ref 1–7)
MORPHOLOGY: ABNORMAL
PDW BLD-RTO: 31.1 % (ref 11.5–14.9)
PLATELET # BLD: 171 K/UL (ref 150–450)
PMV BLD AUTO: 8.9 FL (ref 6–12)
POTASSIUM SERPL-SCNC: 3.9 MMOL/L (ref 3.7–5.3)
RBC # BLD: 5.12 M/UL (ref 4.5–5.9)
SEG NEUTROPHILS: 75 % (ref 36–66)
SEGMENTED NEUTROPHILS ABSOLUTE COUNT: 4.65 K/UL (ref 1.3–9.1)
SODIUM BLD-SCNC: 143 MMOL/L (ref 135–144)
TOTAL PROTEIN: 6.3 G/DL (ref 6.4–8.3)
WBC # BLD: 6.2 K/UL (ref 3.5–11)

## 2023-01-17 PROCEDURE — 99232 SBSQ HOSP IP/OBS MODERATE 35: CPT | Performed by: INTERNAL MEDICINE

## 2023-01-17 PROCEDURE — 80053 COMPREHEN METABOLIC PANEL: CPT

## 2023-01-17 PROCEDURE — 94761 N-INVAS EAR/PLS OXIMETRY MLT: CPT

## 2023-01-17 PROCEDURE — 7100000000 HC PACU RECOVERY - FIRST 15 MIN: Performed by: STUDENT IN AN ORGANIZED HEALTH CARE EDUCATION/TRAINING PROGRAM

## 2023-01-17 PROCEDURE — 2580000003 HC RX 258: Performed by: ANESTHESIOLOGY

## 2023-01-17 PROCEDURE — 6370000000 HC RX 637 (ALT 250 FOR IP): Performed by: STUDENT IN AN ORGANIZED HEALTH CARE EDUCATION/TRAINING PROGRAM

## 2023-01-17 PROCEDURE — 3700000000 HC ANESTHESIA ATTENDED CARE: Performed by: STUDENT IN AN ORGANIZED HEALTH CARE EDUCATION/TRAINING PROGRAM

## 2023-01-17 PROCEDURE — 99231 SBSQ HOSP IP/OBS SF/LOW 25: CPT | Performed by: INTERNAL MEDICINE

## 2023-01-17 PROCEDURE — 7100000001 HC PACU RECOVERY - ADDTL 15 MIN: Performed by: STUDENT IN AN ORGANIZED HEALTH CARE EDUCATION/TRAINING PROGRAM

## 2023-01-17 PROCEDURE — 2500000003 HC RX 250 WO HCPCS: Performed by: STUDENT IN AN ORGANIZED HEALTH CARE EDUCATION/TRAINING PROGRAM

## 2023-01-17 PROCEDURE — 3E0G76Z INTRODUCTION OF NUTRITIONAL SUBSTANCE INTO UPPER GI, VIA NATURAL OR ARTIFICIAL OPENING: ICD-10-PCS | Performed by: STUDENT IN AN ORGANIZED HEALTH CARE EDUCATION/TRAINING PROGRAM

## 2023-01-17 PROCEDURE — 85025 COMPLETE CBC W/AUTO DIFF WBC: CPT

## 2023-01-17 PROCEDURE — 6360000002 HC RX W HCPCS: Performed by: NURSE ANESTHETIST, CERTIFIED REGISTERED

## 2023-01-17 PROCEDURE — 2700000000 HC OXYGEN THERAPY PER DAY

## 2023-01-17 PROCEDURE — 2709999900 HC NON-CHARGEABLE SUPPLY: Performed by: STUDENT IN AN ORGANIZED HEALTH CARE EDUCATION/TRAINING PROGRAM

## 2023-01-17 PROCEDURE — 2060000000 HC ICU INTERMEDIATE R&B

## 2023-01-17 PROCEDURE — 0DH63UZ INSERTION OF FEEDING DEVICE INTO STOMACH, PERCUTANEOUS APPROACH: ICD-10-PCS | Performed by: STUDENT IN AN ORGANIZED HEALTH CARE EDUCATION/TRAINING PROGRAM

## 2023-01-17 PROCEDURE — 6360000002 HC RX W HCPCS: Performed by: STUDENT IN AN ORGANIZED HEALTH CARE EDUCATION/TRAINING PROGRAM

## 2023-01-17 PROCEDURE — 3609013300 HC EGD TUBE PLACEMENT: Performed by: STUDENT IN AN ORGANIZED HEALTH CARE EDUCATION/TRAINING PROGRAM

## 2023-01-17 PROCEDURE — 3700000001 HC ADD 15 MINUTES (ANESTHESIA): Performed by: STUDENT IN AN ORGANIZED HEALTH CARE EDUCATION/TRAINING PROGRAM

## 2023-01-17 PROCEDURE — 36415 COLL VENOUS BLD VENIPUNCTURE: CPT

## 2023-01-17 PROCEDURE — 94640 AIRWAY INHALATION TREATMENT: CPT

## 2023-01-17 PROCEDURE — 43246 EGD PLACE GASTROSTOMY TUBE: CPT | Performed by: STUDENT IN AN ORGANIZED HEALTH CARE EDUCATION/TRAINING PROGRAM

## 2023-01-17 PROCEDURE — 2720000010 HC SURG SUPPLY STERILE: Performed by: STUDENT IN AN ORGANIZED HEALTH CARE EDUCATION/TRAINING PROGRAM

## 2023-01-17 RX ORDER — SODIUM CHLORIDE 0.9 % (FLUSH) 0.9 %
5-40 SYRINGE (ML) INJECTION PRN
Status: DISCONTINUED | OUTPATIENT
Start: 2023-01-17 | End: 2023-01-17 | Stop reason: HOSPADM

## 2023-01-17 RX ORDER — LABETALOL HYDROCHLORIDE 5 MG/ML
10 INJECTION, SOLUTION INTRAVENOUS
Status: DISCONTINUED | OUTPATIENT
Start: 2023-01-17 | End: 2023-01-17 | Stop reason: HOSPADM

## 2023-01-17 RX ORDER — SODIUM CHLORIDE 0.9 % (FLUSH) 0.9 %
5-40 SYRINGE (ML) INJECTION EVERY 12 HOURS SCHEDULED
Status: DISCONTINUED | OUTPATIENT
Start: 2023-01-17 | End: 2023-01-17 | Stop reason: HOSPADM

## 2023-01-17 RX ORDER — SODIUM CHLORIDE, SODIUM LACTATE, POTASSIUM CHLORIDE, CALCIUM CHLORIDE 600; 310; 30; 20 MG/100ML; MG/100ML; MG/100ML; MG/100ML
INJECTION, SOLUTION INTRAVENOUS CONTINUOUS
Status: DISCONTINUED | OUTPATIENT
Start: 2023-01-17 | End: 2023-01-17 | Stop reason: HOSPADM

## 2023-01-17 RX ORDER — DIPHENHYDRAMINE HYDROCHLORIDE 50 MG/ML
12.5 INJECTION INTRAMUSCULAR; INTRAVENOUS
Status: DISCONTINUED | OUTPATIENT
Start: 2023-01-17 | End: 2023-01-17 | Stop reason: HOSPADM

## 2023-01-17 RX ORDER — ONDANSETRON 2 MG/ML
4 INJECTION INTRAMUSCULAR; INTRAVENOUS
Status: DISCONTINUED | OUTPATIENT
Start: 2023-01-17 | End: 2023-01-17 | Stop reason: HOSPADM

## 2023-01-17 RX ORDER — PROPOFOL 10 MG/ML
INJECTION, EMULSION INTRAVENOUS CONTINUOUS PRN
Status: DISCONTINUED | OUTPATIENT
Start: 2023-01-17 | End: 2023-01-17 | Stop reason: SDUPTHER

## 2023-01-17 RX ORDER — HEPARIN SODIUM 5000 [USP'U]/ML
5000 INJECTION, SOLUTION INTRAVENOUS; SUBCUTANEOUS EVERY 8 HOURS SCHEDULED
Status: DISCONTINUED | OUTPATIENT
Start: 2023-01-17 | End: 2023-01-19 | Stop reason: HOSPADM

## 2023-01-17 RX ORDER — SODIUM CHLORIDE 9 MG/ML
25 INJECTION, SOLUTION INTRAVENOUS PRN
Status: DISCONTINUED | OUTPATIENT
Start: 2023-01-17 | End: 2023-01-17 | Stop reason: HOSPADM

## 2023-01-17 RX ORDER — FENTANYL CITRATE 0.05 MG/ML
25 INJECTION, SOLUTION INTRAMUSCULAR; INTRAVENOUS EVERY 5 MIN PRN
Status: DISCONTINUED | OUTPATIENT
Start: 2023-01-17 | End: 2023-01-17 | Stop reason: HOSPADM

## 2023-01-17 RX ORDER — METOCLOPRAMIDE HYDROCHLORIDE 5 MG/ML
10 INJECTION INTRAMUSCULAR; INTRAVENOUS
Status: DISCONTINUED | OUTPATIENT
Start: 2023-01-17 | End: 2023-01-17 | Stop reason: HOSPADM

## 2023-01-17 RX ORDER — HYDRALAZINE HYDROCHLORIDE 20 MG/ML
10 INJECTION INTRAMUSCULAR; INTRAVENOUS
Status: DISCONTINUED | OUTPATIENT
Start: 2023-01-17 | End: 2023-01-17 | Stop reason: HOSPADM

## 2023-01-17 RX ADMIN — IPRATROPIUM BROMIDE AND ALBUTEROL SULFATE 1 AMPULE: 2.5; .5 SOLUTION RESPIRATORY (INHALATION) at 15:36

## 2023-01-17 RX ADMIN — HEPARIN SODIUM 5000 UNITS: 5000 INJECTION INTRAVENOUS; SUBCUTANEOUS at 14:52

## 2023-01-17 RX ADMIN — IPRATROPIUM BROMIDE AND ALBUTEROL SULFATE 1 AMPULE: 2.5; .5 SOLUTION RESPIRATORY (INHALATION) at 11:13

## 2023-01-17 RX ADMIN — CARVEDILOL 6.25 MG: 6.25 TABLET, FILM COATED ORAL at 18:53

## 2023-01-17 RX ADMIN — NYSTATIN OINTMENT: 100000 OINTMENT TOPICAL at 21:15

## 2023-01-17 RX ADMIN — HEPARIN SODIUM 5000 UNITS: 5000 INJECTION INTRAVENOUS; SUBCUTANEOUS at 21:15

## 2023-01-17 RX ADMIN — PROPOFOL 50 MCG/KG/MIN: 10 INJECTION, EMULSION INTRAVENOUS at 08:03

## 2023-01-17 RX ADMIN — SODIUM CHLORIDE, POTASSIUM CHLORIDE, SODIUM LACTATE AND CALCIUM CHLORIDE: 600; 310; 30; 20 INJECTION, SOLUTION INTRAVENOUS at 07:52

## 2023-01-17 RX ADMIN — IPRATROPIUM BROMIDE AND ALBUTEROL SULFATE 1 AMPULE: 2.5; .5 SOLUTION RESPIRATORY (INHALATION) at 20:12

## 2023-01-17 RX ADMIN — ANTI-FUNGAL POWDER MICONAZOLE NITRATE TALC FREE: 1.42 POWDER TOPICAL at 21:15

## 2023-01-17 ASSESSMENT — LIFESTYLE VARIABLES: SMOKING_STATUS: 1

## 2023-01-17 NOTE — PROGRESS NOTES
Physical Therapy        Physical Therapy Cancel Note      DATE: 2023    NAME: Peggi Heimlich  MRN: [id number]   : 1960      Patient not seen this date for Physical Therapy due to:    Procedure AM , nursing awaiting activity restrictions PM       Electronically signed by George Daley PTA on 2023 at 3:15 PM

## 2023-01-17 NOTE — PLAN OF CARE
Problem: Discharge Planning  Goal: Discharge to home or other facility with appropriate resources  Outcome: Progressing  Flowsheets (Taken 1/17/2023 1544)  Discharge to home or other facility with appropriate resources:   Identify barriers to discharge with patient and caregiver   Arrange for needed discharge resources and transportation as appropriate   Refer to discharge planning if patient needs post-hospital services based on physician order or complex needs related to functional status, cognitive ability or social support system  Note: Patient to discharge to ARU once medically cleared; PEG tube placed successfully today. Problem: Safety - Adult  Goal: Free from fall injury  Outcome: Progressing  Flowsheets (Taken 1/17/2023 1544)  Free From Fall Injury:   Instruct family/caregiver on patient safety   Based on caregiver fall risk screen, instruct family/caregiver to ask for assistance with transferring infant if caregiver noted to have fall risk factors  Note: Patient remains free from falls during this shift. Bed in lowest position, side rails up x2, call light/personal belongings/side table within reach. Patient calls out appropriately with call light for assistance with ambulation.        Problem: Pain  Goal: Verbalizes/displays adequate comfort level or baseline comfort level  Outcome: Progressing  Flowsheets (Taken 1/17/2023 1544)  Verbalizes/displays adequate comfort level or baseline comfort level:   Encourage patient to monitor pain and request assistance   Assess pain using appropriate pain scale   Implement non-pharmacological measures as appropriate and evaluate response   Administer analgesics based on type and severity of pain and evaluate response     Problem: Nutrition Deficit:  Goal: Optimize nutritional status  Outcome: Progressing  Flowsheets (Taken 1/17/2023 1544)  Nutrient intake appropriate for improving, restoring, or maintaining nutritional needs: Provide specific nutrition education to patient or family as appropriate  Note: PEG tube placed today; RN awaiting tube feed orders.

## 2023-01-17 NOTE — OP NOTE
Operative Note      Patient: Nahun Lainez  YOB: 1960  MRN: [id number]    Date of Procedure: 1/17/2023    Pre-Op Diagnosis: Dysphagia    Post-Op Diagnosis: Same       Procedure(s):  EGD ESOPHAGOGASTRODUODENOSCOPY PEG TUBE INSERTION    Surgeon(s):  Mi Lea DO    Assistant: None    Anesthesia: Monitor Anesthesia Care    Estimated Blood Loss (mL): 1    Complications: None    Specimens: None    Implants: None      Drains:   NG/OG/NJ/NE Tube Nasogastric 16 fr Left nostril (Active)   Surrounding Skin Clean, dry & intact 01/17/23 0725   Securement device Adhesive based byrd 01/17/23 0725   Status Clamped 01/17/23 0725   Placement Verified X-Ray (Initial); Gastric Contents; External Catheter Length 01/15/23 0837   NG/OG/NJ/NE External Measurement (cm) 78 cm 01/17/23 0725   Drainage Appearance None 01/17/23 0725   Tube Feeding Other Tube Feeding (must specify product in comment) 01/15/23 2021   Tube feeding/verify rate (mL/hr) 0 mL/hr 01/17/23 0725   Tube Feeding Intake (mL) 580 ml 01/15/23 0318   Free Water/Flush (mL) 60 mL 01/16/23 1002   Output (mL) 0 ml 01/13/23 0800   Action Taken Placement verified (comment) 01/13/23 2049   Residual Volume (ml) 0 ml 01/16/23 1002       [REMOVED] NG/OG/NJ/NE Tube Nasogastric Left nostril (Removed)   Surrounding Skin Clean, dry & intact 01/05/23 1215   Securement device Bridle 01/05/23 1215   Status Clamped 01/05/23 1215   Placement Verified External Catheter Length;Gastric Contents 01/04/23 0400   NG/OG/NJ/NE External Measurement (cm) 64 cm 01/05/23 1215   Drainage Appearance Bile;Green;Bloody 01/05/23 1215   Free Water/Flush (mL) 30 mL 01/04/23 0800   Output (mL) 200 ml 01/05/23 0500   Action Taken Placement verified (comment) 01/05/23 1215       [REMOVED] Urinary Catheter 01/02/23 Mars (Removed)   $ Urethral catheter insertion $ Not inserted for procedure 01/02/23 1523   Catheter Indications Urinary retention (acute or chronic), continuous bladder irrigation or bladder outlet obstruction 01/13/23 0500   Site Assessment No urethral drainage 01/13/23 0500   Urine Color Yellow 01/13/23 0500   Urine Appearance Clear 01/13/23 0500   Urine Odor Other (Comment) 01/13/23 0500   Collection Container Standard 01/13/23 0500   Securement Method Securing device (Describe) 01/13/23 0500   Catheter Care Completed Yes 01/12/23 0849   Catheter Best Practices  Drainage tube clipped to bed;Catheter secured to thigh; Tamper seal intact; Bag below bladder;Bag not on floor; Lack of dependent loop in tubing;Drainage bag less than half full 01/13/23 0500   Status Draining;Patent 01/13/23 0500   Output (mL) 250 mL 01/13/23 0500       [REMOVED] External Urinary Catheter (Removed)   Site Assessment Painful;Pink;Red; Other (Comment) 01/15/23 2235   Placement Replaced 01/15/23 1606   Securement Method Securing device (Describe) 01/15/23 1606   Catheter Care Catheter/Wick replaced 01/15/23 2235   Perineal Care Yes 01/15/23 2235   Suction N/A 01/15/23 2235   Urine Color Yellow 01/15/23 1850   Urine Appearance Clear 01/15/23 1850   Urine Odor Other (Comment) 01/15/23 1606   Output (mL) 300 mL 01/15/23 2308       Findings: Normal gastric mucosa. PEG placed at 2 cm at the skin. Detailed Description of Procedure:   Natacha Stout was brought back to the endoscopy suite. Formal timeout identifying the patient, procedure, allergies, and antibiotics was performed. The patient did not require antibiotics prior to procedure. Bite block was placed along with nasal cannula supplemental oxygen. Monitored anesthesia was induced by the anesthesia team.    Once sedated, endoscope was inserted into the mouth and intubating the esophagus. Attempted to advance the scope was met with resistance due to a coiling of the NG tube. Patient had a drop in saturations to low 80s, the endoscope was removed.   Once recovered from a respiratory standpoint, the endoscope was inserted back into the mouth on the other side of the NG tube and advanced down the esophagus without difficulty into the stomach. The stomach was insufflated until the rugated were flat. An obvious red light reflex was noted in the left upper quadrant. A one-to-one indentation was confirmed 2 finger breaths inferior to the costal margin and 2 fingerbreadths lateral to midline. The abdomen was prepped with ChloraPrep and draped in the usual sterile fashion. Once again confirmed with a one-to-one indentation where the PEG tube would be placed. A small skin incision was made with an 11 blade after the skin was anesthetized with 1% lidocaine with epinephrine. A needle and sheath were passed into the skin, abdominal wall and into the stomach under direct visualization with the endoscope. The needle was removed, and a guidewire was placed through the sheath, and lasted with a snare. The snare, guidewire, and the scope removed through the mouth without difficulty. The guidewire was tied onto a 20 [place] pull G-tube, and the guidewire and G-tube were advanced back down the esophagus out through the stomach and skin incision previously made. The tube was noted to be 2 cm at skin level. The endoscope was inserted back into the esophagus advanced down to the stomach where there was adequate distance between the tube insertion and the pylorus. There was no obvious bleeding or injury. Insufflation was evacuated through the endoscope. The endoscope was removed while suctioning out the esophagus and pharyngeal region. The bumper was placed through the G-tube in place at 2 cm at the skin. The remainder of the components of the G-tube were added and the G-tube was cut to an adequate length. The G-tube was taped to the abdominal wall to prevent pulling or misplacement. This concluded the procedure. The patient tolerated well without immediate complications. All sponge, needles, instrument counts were correct at the end of the case and disposed of appropriately.   The patient was awoken in the endoscopy suite and transferred to PACU in stable condition.     Electronically signed by Hilary Arreola DO on 1/17/2023 at 8:32 AM

## 2023-01-17 NOTE — PROGRESS NOTES
[facility]    PROGRESS NOTE             1/17/2023    12:10 PM    Name:   Mechelle Benítez  MRN:     [id number]     Acct:      [de-identified]   Room:   [facility] Day:  13  Admit Date:  1/1/2023  9:55 PM    PCP:  No primary care provider on file. Code Status:  Full Code    Subjective:     C/C:   Chief Complaint   Patient presents with    Altered Mental Status     Interval History Status: Same    Patient seen and examined at the bed side. No new complains. Overall continuously improving. No new/acute events overnight        Plt count normalized. Patient to receive a PEG tube today. Notes from nursing staff and Consults had been reviewed, and the overnight progress had been checked with the nursing staff as well. Brief History:     The patient is a 60 y.o.  male with unknown past medical history due to no healthcare visits or follow-up who presents with Altered Mental Status and he is admitted to the hospital for the management of  Acute respiratory failure most likely 2/2 pneumonia. Per patient's wife, she reports that the patient has not been acting himself for the past week. She reports being more slurred, confused and progressively getting worse prior to presentation. Patient prior to the presentation a week ago he had a fall and EMS presented patient was vitally stable and he was not transported to the hospital.  This time upon EMS evaluation of the patient he had an O2 of 75%, he was put on a breather and was given a dose of IV Lasix in route. Wife and patient, cannot recall any precipitating event could have led to his presentation. He also denies chest pain, shortness of breath, or cough. Per patient, he does not recall any complaints or events prior to the presentation. Wife denies recent alcohol use, however, patient does have a history of regular alcohol intake but not on daily basis.   Wife also reports that patient has constant heartburn for which he takes regular antiacids. Patient denies the presence of any abdominal pain or any change in bowel habits, abdominal pain, nausea or vomiting. Upon arrival in the ED, patient was put on BiPAP. Patient was afebrile, normotensive and in normal sinus rhythm. A chest x-ray was completed and the patient had right lower pneumonia with a small pleural effusion. Patient also had a large bullae in the left apex with a pneumothorax that cannot be excluded. Patient ABG was suggestive of pattern of respiratory acidosis with pH 7.295, PCO2 60.3, PO2 63.0, HCO3 29.3. CT head WO did not show any acute findings. Patient had elevated troponins of 184, trended down to 177. Patient also had an elevated BNP of 7797. An elevated creatinine of 1.77 was on presentation, with no previous lab test to be compared to. Also patient had a left elevated liver enzymes ALT was 525, , with prolonged prothrombin time of 24.9, and INR of 2.3. Patient was admitted to the ICU for the management of type II respiratory failure associated altered mental status    Review of Systems:     Constitutional: Positive for fatigue  Hent: Positive for hearing loss. Respiratory: Positive for shortness of breath, negative for chest pain negative for wheezing negative for cough  Cardiovascular: Positive for leg swelling. Negative for chest pain palpitation  Gastrointestinal negative for abdominal pain diarrhea nausea or vomiting  Skin negative for change of color or pallor  Neurological: Overall patient feels weak from being hospitalized. Medications:      Allergies:    No Known Allergies    Current Meds:   Scheduled Meds:    heparin (porcine)  5,000 Units SubCUTAneous 3 times per day    miconazole   Topical BID    potassium bicarb-citric acid  20 mEq Oral Daily    potassium bicarb-citric acid  40 mEq Oral Daily    potassium chloride  20 mEq Oral Once    spironolactone  25 mg Oral Daily    ziprasidone (GEODON) in sterile water injection  10 mg IntraMUSCular Nightly    carvedilol  6.25 mg Oral BID WC    [Held by provider] ferrous sulfate  325 mg Oral Daily with breakfast    pantoprazole (PROTONIX) 40 mg injection  40 mg IntraVENous Daily    ipratropium-albuterol  1 ampule Inhalation Q4H WA    nystatin   Topical BID    [Held by provider] aspirin  81 mg Oral Daily    sodium chloride flush  5-40 mL IntraVENous 2 times per day    nicotine  1 patch TransDERmal Daily     Continuous Infusions:    dextrose 5 % and 0.45 % NaCl 40 mL/hr at 23 1512    sodium chloride      dexmedetomidine      sodium chloride       PRN Meds: hydrALAZINE, potassium chloride **OR** potassium alternative oral replacement **OR** potassium chloride, magnesium sulfate, ziprasidone (GEODON) in sterile water injection, labetalol, potassium chloride, sodium chloride, dexmedetomidine, perflutren lipid microspheres, sodium chloride flush, sodium chloride flush, sodium chloride, ondansetron **OR** ondansetron, polyethylene glycol, acetaminophen **OR** acetaminophen, albuterol, guaiFENesin    Data:     Past Medical History:   has no past medical history on file. Social History:   reports that he has been smoking cigarettes. He has a 40.00 pack-year smoking history. He has never used smokeless tobacco. He reports current alcohol use. He reports that he does not currently use drugs. Family History:   History reviewed. No pertinent family history. Vitals:  BP (!) 144/92   Pulse 89   Temp 99.6 °F (37.6 °C)   Resp 24   Ht 5' 7\" (1.702 m)   Wt 164 lb 3.9 oz (74.5 kg)   SpO2 93%   BMI 25.72 kg/m²   Temp (24hrs), Av.5 °F (36.9 °C), Min:98 °F (36.7 °C), Max:99.6 °F (37.6 °C)      Recent Labs     23  1712   POCGLU 106           I/O(24Hr):     Intake/Output Summary (Last 24 hours) at 2023 1210  Last data filed at 2023 0900  Gross per 24 hour   Intake 549 ml   Output --   Net 549 ml Labs:    CBC:   Lab Results   Component Value Date/Time    WBC 6.2 01/17/2023 05:50 AM    RBC 5.12 01/17/2023 05:50 AM    HGB 11.1 01/17/2023 05:50 AM    HCT 37.6 01/17/2023 05:50 AM    MCV 73.5 01/17/2023 05:50 AM    MCH 21.7 01/17/2023 05:50 AM    MCHC 29.6 01/17/2023 05:50 AM    RDW 31.1 01/17/2023 05:50 AM     01/17/2023 05:50 AM    MPV 8.9 01/17/2023 05:50 AM     CMP:    Lab Results   Component Value Date/Time     01/17/2023 05:50 AM    K 3.9 01/17/2023 05:50 AM     01/17/2023 05:50 AM    CO2 34 01/17/2023 05:50 AM    BUN 10 01/17/2023 05:50 AM    CREATININE 0.74 01/17/2023 05:50 AM    LABGLOM >60 01/17/2023 05:50 AM    GLUCOSE 101 01/17/2023 05:50 AM    PROT 6.3 01/17/2023 05:50 AM    LABALBU 2.9 01/17/2023 05:50 AM    CALCIUM 8.6 01/17/2023 05:50 AM    BILITOT 1.3 01/17/2023 05:50 AM    ALKPHOS 44 01/17/2023 05:50 AM    AST 29 01/17/2023 05:50 AM    ALT 23 01/17/2023 05:50 AM     PT/INR:    Lab Results   Component Value Date/Time    PROTIME 19.1 01/08/2023 04:38 AM    INR 1.6 01/08/2023 04:38 AM       No results found for: SPECIAL  Lab Results   Component Value Date/Time    CULTURE NO GROWTH 01/03/2023 12:12 PM         Radiology:    CT HEAD WO CONTRAST    Result Date: 1/2/2023  EXAMINATION: CT OF THE HEAD WITHOUT CONTRAST  1/2/2023 10:11 pm TECHNIQUE: CT of the head was performed without the administration of intravenous contrast. Automated exposure control, iterative reconstruction, and/or weight based adjustment of the mA/kV was utilized to reduce the radiation dose to as low as reasonably achievable. COMPARISON: None. HISTORY: ORDERING SYSTEM PROVIDED HISTORY: Altered Mental status TECHNOLOGIST PROVIDED HISTORY: Altered Mental status Reason for Exam: Altered Mental status Additional signs and symptoms: Patient came in with transient alteration of awareness, follow up head CT for any changes.  FINDINGS: BRAIN/VENTRICLES: There is no acute intracranial hemorrhage, mass effect or midline shift. No abnormal extra-axial fluid collection. There is a focal area of decreased attenuation with loss of gray/white matter differentiation involving posterior left parietal lobe with somewhat increased conspicuity as compared to prior exam.  There is stable small focus of encephalomalacia involving left cerebellar hemisphere compatible with prior remote infarct/insult. Chronic lacunar infarct to right basal ganglia redemonstrated. There is no evidence of hydrocephalus. ORBITS: The visualized portion of the orbits demonstrate no acute abnormality. SINUSES: Small air-fluid level right sphenoid sinus. Trace air-fluid level right frontal sinus. Mild mucoperiosteal thickening to bilateral ethmoid air cells. Findings are new from prior exam and likely relate to presence of nasogastric tube. SOFT TISSUES/SKULL:  No acute abnormality of the visualized skull or soft tissues. Focal area of decreased attenuation with loss of gray/white matter differentiation involving posterior left parietal lobe with somewhat increased conspicuity as compared to prior exam likely reflecting an area of acute to subacute infarct. Stable small chronic infarct/insult to left cerebellar hemisphere. Chronic lacunar infarct to right basal ganglia redemonstrated. Paranasal sinus disease likely relating to presence of nasogastric tube. CT HEAD WO CONTRAST    Result Date: 1/1/2023  EXAMINATION: CT OF THE HEAD WITHOUT CONTRAST  1/1/2023 10:48 pm TECHNIQUE: CT of the head was performed without the administration of intravenous contrast. Automated exposure control, iterative reconstruction, and/or weight based adjustment of the mA/kV was utilized to reduce the radiation dose to as low as reasonably achievable. COMPARISON: None. HISTORY: Reason for Exam: AMS Additional signs and symptoms: the patient has been getting more and more confused since 2 weeks ago.   It has progressively been getting worse and today FINDINGS: BRAIN/VENTRICLES: There is no acute intracranial hemorrhage, mass effect or midline shift. No abnormal extra-axial fluid collection. The gray-white differentiation is maintained without evidence of an acute infarct. There is no evidence of hydrocephalus. Changes of mild chronic small vessel ischemic disease. ORBITS: The visualized portion of the orbits demonstrate no acute abnormality. SINUSES: The visualized paranasal sinuses and mastoid air cells demonstrate no acute abnormality. SOFT TISSUES/SKULL:  No acute abnormality of the visualized skull or soft tissues. No acute intracranial abnormality. Mild chronic small vessel ischemic disease. US LIVER    Result Date: 1/2/2023  EXAMINATION: RIGHT UPPER QUADRANT ULTRASOUND 1/2/2023 10:20 am COMPARISON: None. HISTORY: ORDERING SYSTEM PROVIDED HISTORY: elevated AST and ALT, in setting of PT, and INR TECHNOLOGIST PROVIDED HISTORY: elevated AST and ALT, in setting of PT, and INR FINDINGS: Patient body habitus limits the study, as there is increased attenuation of the ultrasound beam. This decreases sensitivity and specificity. LIVER:  There is mild increased echogenicity seen throughout the liver. No intrahepatic ductal dilatation. No perihepatic fluid. BILIARY SYSTEM:  Bladder is contracted. Gallstones are seen. Gallbladder wall thickness measures 6 mm. Common bile duct is within normal limits measuring 5 mm. RIGHT KIDNEY: The right kidney is grossly unremarkable without evidence of hydronephrosis. PANCREAS:  Visualized portions of the pancreas are unremarkable. OTHER: No evidence of right upper quadrant ascites. Portal vein flow appears hepatopetal     Increased echogenicity is seen throughout the liver suggesting diffuse hepatocellular disease such as fatty infiltration Cholelithiasis.   No cholecystitis     XR CHEST PORTABLE    Result Date: 1/2/2023  EXAMINATION: ONE XRAY VIEW OF THE CHEST 1/2/2023 3:15 pm COMPARISON: 01/01/2023 HISTORY: ORDERING SYSTEM PROVIDED HISTORY: intubation TECHNOLOGIST PROVIDED HISTORY: intubation Reason for Exam: intubation FINDINGS: Overlying items external to the patient somewhat limit evaluation. Placement of endotracheal tube with tip 8.2 cm from the robert. Placement of enteric tube seen to extend into the stomach, distal extent not included in field of view. Persistent likely layering right pleural effusion, similar to slightly worsened. Persistent bilateral airspace opacities to bilateral mid to lower lung zones, right worse than left, similar on the left but mildly worsened on the right. No pneumothoraces are seen. Persistent likely bullous change to the left upper lung zone. Cardiac and mediastinal silhouettes are stable. Stable appearance to bony structures. Placement of endotracheal tube which appears high riding with tip 8.2 cm from the robert. Suggest advancement by 2-3 cm. Placement of endotracheal tube seen to extend into the stomach, distal extent not included in field of view. No pneumothoraces. Persistent likely bullous change to the left upper lung zone. Persistent right pleural effusion, similar to slightly worsened. Persistent bilateral airspace opacities, right worse than left, similar on the left but mildly worsened on the right. XR CHEST PORTABLE    Result Date: 1/1/2023  EXAMINATION: ONE XRAY VIEW OF THE CHEST 1/1/2023 7:17 pm COMPARISON: None. HISTORY: ORDERING SYSTEM PROVIDED HISTORY: ams TECHNOLOGIST PROVIDED HISTORY: ams Reason for Exam: Altered mental status, difficulty breathing Additional signs and symptoms: Altered mental status, difficulty breathing Relevant Medical/Surgical History: Altered mental status, difficulty breathing FINDINGS: Large lucent area in the left apex suggesting a large bulla however superimposed pneumothorax cannot be excluded. The cardiac size is normal. Right lower lobe airspace infiltrate and mild right pleural effusion. . Pulmonary vascularity appears normal. There is mild ectasia of the thoracic aorta. Calcific tendinitis of the right shoulder. No acute bony abnormalities. The hilar structures are normal.     Right lower lobe pneumonia and small right pleural effusion Large lucent area in the left apex suggesting a large bulla however superimposed pneumothorax cannot be excluded. Follow-up CT would be useful for further evaluation. The findings were sent to the Radiology Results [id number] at 10:31 pm on 1/1/2023 to be communicated to a licensed caregiver. EKG:    there are no previous tracings available for comparison. Physical Examination:        PHYSICAL EXAM:  General Appearance patient is alert awake and oriented to self Name=full, place=[facility] Secures ROBYN, Time: non. Thinking he was in his work area at [employer]: No agitation, no depression, maintains good eye contact  HEENT - Head is normocephalic, atraumatic. Neck: supple, no rigidity, normal ROM. Small swelling of the left side of the mandibular angle that its been since childhood  Lungs -limited exam, good air entry. No wheezes  Cardiovascular - Heart sounds are normal.  Regular rhythm, normal rate without murmur, gallop or rub.   Neurology: Neurological exam is nonfocal.  Strength is 4 -/5 in all extremities  Abdomen - Soft, nontender, nondistended, no masses or organomegaly  Skin - No bruising or bleeding on exposed skin area  Extremities -lower limb edema  Assessment:        Primary Problem  Acute respiratory failure with hypoxia and hypercapnia [facility])    Active Hospital Problems    Diagnosis Date Noted    Dysphagia [R13.10] 01/16/2023     Priority: Medium    Hepatitis C virus infection without hepatic coma [B19.20] 01/14/2023     Priority: Medium    Mild malnutrition ([facility] [address] [E44.1] 01/13/2023     Priority: Medium    Delirium due to another medical condition [F05] 01/10/2023     Priority: Medium    ACP (advance care planning) [Z71.89] 01/09/2023     Priority: Medium    Goals of care, counseling/discussion [Z71.89] 01/09/2023     Priority: Medium    Encounter for palliative care [Z51.5] 01/09/2023     Priority: Medium    Prolonged pt (prothrombin time) [R79.1] 01/03/2023     Priority: Medium    Emphysema lung ([address] [J43.9] 01/03/2023     Priority: Medium    Cerebral infarction ([address] [I63.9] 01/03/2023     Priority: Medium    Transaminitis [R74.01] 01/02/2023     Priority: Medium    Normocytic anemia [D64.9] 01/02/2023     Priority: Medium    Thrombocytopenia ([address] [D69.6] 01/02/2023     Priority: Medium    Agitation [R45.1] 01/02/2023     Priority: Medium    Acute respiratory failure with hypoxia and hypercapnia (HCC) RLL pneumonia [J96.01, J96.02] 01/02/2023     Priority: Medium    Altered mental status [R41.82] 01/02/2023     Priority: Medium    Community acquired pneumonia of right lower lobe of lung [J18.9] 01/02/2023     Priority: Medium       Plan: Altered Mental Status in the setting of Acute type 2 respiratory failure secondary to Pneumonia and Acute Heart Failure-Improved. -failed swallow study, reains on NG tube feeds.  -On Geodon 10 mg BID  -ID following: vancoymycin and Unasyn completed  -Ipratropium-Albuterol every 4 hrs  -Echocardiogam completed  -Patient failed swallow study. NG tube placed. PEG Tube today  - On aldactone 25 mg, Coreg 6.25mg BID    Transaminitis with prolonged PTT and INR in the setting of acute HCV  - AST and ALT normalized  -INR PT improving   -Thrombocytopenicresolved  -HCV RNA PCR positive; ID on board       Multiple acute brain infarcts with subarachnoid hemorrhage -EEG showing diffuse slowing without epileptic waves.  -No anticoagulation  -BP parameters of 140.  -Exam is nonfocal    Thrombocytopenia-RESOLVED  -Unknown  baseline  -Transaminates and Prolonged PT and PTT; resolved.   -PLT count  118  -Monitor HH       Microcytic anemia in the setting of elevated INR and PT  -No previous labs to compare?  -Iron IV replacement  -Hb stable    Elevated troponins most likely 2/2 demand ischemia  -no acute changes on EKG  -ECHO showing normal LVF. Mild tricuspid regurge. RV pressure of 25 mmHG    Hypokalemia  -K 3.4 this AM  -Effer K 40 meq daily. DVT prophylaxis: reason for no prophylaxis: Prolonged PT, aPTT  GI prophylaxis: Protonix 40 mg daily    Arnav Ziegler MD  1/17/2023  12:10 PM   Attending Physician Statement  I have discussed the care of Jaz Scuhltz and I have examined the patient myselft and taken ros and hpi , including pertinent history and exam findings,  with the resident. I have reviewed the key elements of all parts of the encounter with the resident. I agree with the assessment, plan and orders as documented by the resident.   Post PEG tube  Dc plan to ARU        Electronically signed by Arnav Ziegler MD

## 2023-01-17 NOTE — PROGRESS NOTES
[facility] -   Infectious diseases evaluation  admission date 1/1/2023    reason for consultation:   Community-acquired pneumonia    Impression :   Current:  Acute hypoxic respiratory failure required intubation 1/2/22 was subsequently extubated  Community-acquired pneumonia with possible aspiration. Cellulitis /infiltration of left arm PICC line that was removed. Acute CVA with subarachnoid hemorrhage  Positive MRSA nasal swab  Liver disease  Cholelithiasis  Acute renal failure  Thrombocytopenia followed by hematology oncology, possibly related to liver disease  Hepatitis C/elevated liver enzymes    Recommendations      Unasyn course completed 1/8/2023  IV Vancomycin course completed 1/13/23  Swallow study showed severe dysphagia, NG in place ,PEG tube placement planned   Left arm PICC line was removed  Liver ultrasound 1/2/2323 showed increased echogenicity and cholelithiasis, no cholecystitis. HIV screen was negative on 1/2/23  Hepatitis C RNA was 17, 400 IU/mL 4.2 for LOC  The patient received IV ceftriaxone and Zithromax on 1/1/2022  Follow blood and respiratory cultures, no growth to date  No growth on urine culture from 1/3/23  Nasal swab for MRSA was +1/2/23  Respiratory panel with COVID-19 PCR was negative  Urine for Legionella antigen negative  Follow CBC and renal function  Continue supportive care  Follow-up with GI as outpatient for hepatitis C  management        Infection Control Recommendations   Bee Precautions  Contact Isolation       Antimicrobial Stewardship Recommendations   Simplification of therapy  Targeted therapy      History of Present Illness:   Initial history:  Jaz Schultz is a 60y.o.-year-old male was brought to the hospital on 1/1/2023 with altered mental status, confusion associated with decreased responsiveness worsening for 2 weeks.   At the ER he was obtunded, O2 sat was 75% on room air, was placed on nonrebreather initially, subsequently was intubated. He is intubated, sedated unable to provide history that was obtained from chart review and nursing staff. According to his wife he is fully vaccinated for COVID-19 and flu. He has been afebrile since admission  Initial procalcitonin level was 0.09, WBC normal  He was hypotensive was started on Levophed. MRI showed small acute infarcts in the left cerebellar hemisphere and left parietal love with moderate bilateral subarachnoid hemorrhages within both cerebral hemispheres. Chest x-ray showed right lower lobe infiltrates  Interval changes  1/16/2023   He was extubated 1/5/2023. He is awake ,confused, afebrile, NG tube in place,no new events  Peripheral line in place  Patient Vitals for the past 8 hrs:   BP Temp Temp src Pulse Resp SpO2   01/16/23 1830 126/77 98 °F (36.7 °C) Oral 92 20 94 %   01/16/23 1601 -- -- -- 88 20 91 %   01/16/23 1215 107/65 98.1 °F (36.7 °C) Oral 100 18 93 %   01/16/23 1135 -- -- -- 93 17 93 %               I have personally reviewed the past medical history, past surgical history, medications, social history, and family history, and I haveupdated the database accordingly. Allergies:   Patient has no known allergies. Review of Systems:     Review of Systems  12systems reviewed were negative  Physical Examination :   Constitutional:       General: He is not in acute distress. Appearance: He is not ill-appearing. HENT:      Head: Normocephalic and atraumatic. Right Ear: External ear normal.      Left Ear: External ear normal.   Eyes:      General: No scleral icterus. Conjunctiva/sclera: Conjunctivae normal.   Cardiovascular:      Rate and Rhythm: Normal rate and regular rhythm. Heart sounds: Normal heart sounds. Pulmonary:      Effort: Pulmonary effort is normal. No respiratory distress. Abdominal:      General: There is no distension. Palpations: Abdomen is soft. Musculoskeletal:      Cervical back: Neck supple. No rigidity. Right lower leg: No edema. Left lower leg: No edema. Skin:     Coloration: Skin is not jaundiced. Neurological:      Mental Status: He is confused. Past Medical History:   History reviewed. No pertinent past medical history. Past Surgical  History:   History reviewed. No pertinent surgical history.     Medications:      potassium bicarb-citric acid  20 mEq Oral Daily    potassium bicarb-citric acid  40 mEq Oral Daily    potassium chloride  20 mEq Oral Once    spironolactone  25 mg Oral Daily    ziprasidone (GEODON) in sterile water injection  10 mg IntraMUSCular Nightly    carvedilol  6.25 mg Oral BID WC    [Held by provider] ferrous sulfate  325 mg Oral Daily with breakfast    pantoprazole (PROTONIX) 40 mg injection  40 mg IntraVENous Daily    ipratropium-albuterol  1 ampule Inhalation Q4H WA    [Held by provider] heparin (porcine)  5,000 Units SubCUTAneous 3 times per day    nystatin   Topical BID    [Held by provider] miconazole   Topical BID    [Held by provider] aspirin  81 mg Oral Daily    sodium chloride flush  5-40 mL IntraVENous 2 times per day    nicotine  1 patch TransDERmal Daily       Social History:     Social History     Socioeconomic History    Marital status: Single     Spouse name: Not on file    Number of children: Not on file    Years of education: Not on file    Highest education level: Not on file   Occupational History    Not on file   Tobacco Use    Smoking status: Every Day     Packs/day: 1.00     Years: 40.00     Pack years: 40.00     Types: Cigarettes    Smokeless tobacco: Never   Substance and Sexual Activity    Alcohol use: Yes     Comment: beer and scotch    Drug use: Not Currently     Comment: over 20 years (stated by daughter)    Sexual activity: Not on file   Other Topics Concern    Not on file   Social History Narrative    Not on file     Social Determinants of Health     Financial Resource Strain: Not on file   Food Insecurity: Not on file Transportation Needs: Not on file   Physical Activity: Not on file   Stress: Not on file   Social Connections: Not on file   Intimate Partner Violence: Not on file   Housing Stability: Not on file       Family History:   History reviewed. No pertinent family history. Medical Decision Making:   I have independently reviewed/ordered the following labs:    CBC with Differential:   Recent Labs     01/15/23  0514 01/16/23  0431   WBC 6.3 6.4   HGB 12.2* 12.4*   HCT 40.9* 41.8   * 159   LYMPHOPCT 12* 11*   MONOPCT 10* 10*       BMP:  Recent Labs     01/14/23  0508 01/15/23  0514 01/16/23  0431    142 146*   K 3.3* 3.4* 3.8   CL 97* 99 102   CO2 37* 37* 37*   BUN 9 11 11   CREATININE 0.73 0.78 0.81   MG 1.7 1.7  --        Hepatic Function Panel:   Recent Labs     01/15/23  0514 01/16/23  0431   PROT 5.9* 6.6   LABALBU 2.8* 3.1*   BILITOT 1.2 1.6*   ALKPHOS 46 48   ALT 24 24   AST 26 27       No results for input(s): RPR in the last 72 hours. No results for input(s): HIV in the last 72 hours. No results for input(s): BC in the last 72 hours. Lab Results   Component Value Date/Time    CREATININE 0.81 01/16/2023 04:31 AM    GLUCOSE 111 01/16/2023 04:31 AM       Detailed results: Thank you for allowing us to participate in the care of this patient. Please call with questions. This note is created with the assistance of a speech recognition program.  While intending to generate adocument that actually reflects the content of the visit, the document can still have some errors including those of syntax and sound a like substitutions which may escape proof reading. It such instances, actual meaningcan be extrapolated by contextual diversion.     Mariela Nevarez MD  Office: [phone]  Perfect serve / office [phone]

## 2023-01-17 NOTE — PROGRESS NOTES
Byron Mckenzie MD/Karl Elmer File MD Unknown Saint MD/ Arslan Taylor MD/Dr Emma CATALAN AGACNP-BC, NP-C      Cal CATALAN NP-C     Silvia CATALAN NP-C                                           Pulmonary Progress Note    Patient - Demetria Johnson   Age - 60 y.o.   - 1960  MRN - [id number]  Acct # - [de-identified]  Date of Admission - 2023  9:55 PM    Consulting Service/Physician:       Primary Care Physician: No primary care provider on file. SUBJECTIVE:     Chief Complaint:   Chief Complaint   Patient presents with    Altered Mental Status     Subjective:    Melba Kevin tells me he is feeling okay today. He had PEG tube placed this morning. He denies any shortness of breath. He is currently on 2 L nasal cannula pulse ox 95. He has been afebrile. VITALS  BP (!) 141/92   Pulse 84   Temp 98.4 °F (36.9 °C)   Resp 30   Ht 5' 7\" (1.702 m)   Wt 164 lb 3.9 oz (74.5 kg)   SpO2 95%   BMI 25.72 kg/m²   Wt Readings from Last 3 Encounters:   23 164 lb 3.9 oz (74.5 kg)   23 188 lb (85.3 kg)     I/O (24 Hours)    Intake/Output Summary (Last 24 hours) at 2023 0925  Last data filed at 2023 0900  Gross per 24 hour   Intake 609 ml   Output --   Net 609 ml     Ventilator:   Settings  FiO2 : 60 %  Insp Time (sec): 0.9 sec  Insp Rise Time (%): 0.15 %  Flow Sensitivity: 5 L/min  Exam:   Physical Exam   Constitutional: Lying in bed on 2 L in no distress   HENT: Unremarkable, NG tube intact and clamped  Head: Normocephalic and atraumatic. Eyes: EOM are normal. Pupils are equal, round, and reactive to light. Neck: Neck supple. Cardiovascular:  Regular rate and rhythm. Normal heart tones. No JVD. Pulmonary/Chest: Respirations even and nonlabored, few scattered expiratory wheezes, on 2 L with pulse ox 95% Abdominal: Soft. Bowel sounds are normal.   Musculoskeletal: Normal range of motion.    Neurological: He is alert and oriented but appears forgetful   Skin: Skin is warm and dry. No rash noted.    Extremities: No edema or discoloration  Infusions:      dextrose 5 % and 0.45 % NaCl 40 mL/hr at 01/16/23 1512    sodium chloride      dexmedetomidine      sodium chloride       Meds:     Current Facility-Administered Medications:     heparin (porcine) injection 5,000 Units, 5,000 Units, SubCUTAneous, 3 times per day, Jennyfer William, DO    miconazole (MICOTIN) 2 % powder, , Topical, BID, Jennyfer William, DO    dextrose 5 % and 0.45 % sodium chloride infusion, , IntraVENous, Continuous, Jennyfer William, DO, Last Rate: 40 mL/hr at 01/16/23 1512, New Bag at 01/16/23 1512    potassium bicarb-citric acid (EFFER-K) effervescent tablet 20 mEq, 20 mEq, Oral, Daily, Jennyfer William, DO, 20 mEq at 01/16/23 1002    potassium bicarb-citric acid (EFFER-K) effervescent tablet 40 mEq, 40 mEq, Oral, Daily, Jennyfer William, DO, 40 mEq at 01/15/23 0818    potassium chloride (KLOR-CON M) extended release tablet 20 mEq, 20 mEq, Oral, Once, Jennyfer William DO    hydrALAZINE (APRESOLINE) injection 20 mg, 20 mg, IntraVENous, Q6H PRN, Jennyfer William, DO, 20 mg at 01/11/23 1224    potassium chloride (KLOR-CON M) extended release tablet 40 mEq, 40 mEq, Oral, PRN **OR** potassium bicarb-citric acid (EFFER-K) effervescent tablet 40 mEq, 40 mEq, Oral, PRN **OR** potassium chloride 10 mEq/100 mL IVPB (Peripheral Line), 10 mEq, IntraVENous, PRN, Jennyfer William, DO, Last Rate: 100 mL/hr at 01/13/23 0928, 10 mEq at 01/13/23 2771    spironolactone (ALDACTONE) tablet 25 mg, 25 mg, Oral, Daily, Jennyfer William, DO, 25 mg at 01/16/23 1002    magnesium sulfate 2000 mg in water 50 mL IVPB, 2,000 mg, IntraVENous, PRN, Jennyfer William, DO    ziprasidone (GEODON) 10 mg in sterile water 0.5 mL injection, 10 mg, IntraMUSCular, Nightly, Jennyfer William DO, 10 mg at 01/16/23 2046    carvedilol (COREG) tablet 6.25 mg, 6.25 mg, Oral, BID WC, Jennyfer William DO, 6.25 mg at 01/14/23 0800    [Held by provider] ferrous sulfate (IRON 325) tablet 325 mg, 325 mg, Oral, Daily with breakfast, Jennyfer OLMEDO Imel, DO    pantoprazole (PROTONIX) 40 mg in sodium chloride (PF) 0.9 % 10 mL injection, 40 mg, IntraVENous, Daily, Jennyfer Ruizel, DO, 40 mg at 01/16/23 1003    ziprasidone (GEODON) 20 mg in sterile water 1 mL injection, 20 mg, IntraMUSCular, Q12H PRN, Jennyfer OLMEDO Imel, DO    labetalol (NORMODYNE;TRANDATE) injection 5 mg, 5 mg, IntraVENous, Q6H PRN, Jennyfer William, DO, 5 mg at 01/10/23 1606    potassium chloride 10 mEq/100 mL IVPB (Peripheral Line), 10 mEq, IntraVENous, PRN, Jennyfer William, DO, Last Rate: 100 mL/hr at 01/13/23 1233, 10 mEq at 01/13/23 1233    ipratropium-albuterol (DUONEB) nebulizer solution 1 ampule, 1 ampule, Inhalation, Q4H WA, Jennyfer William, DO, 1 ampule at 01/16/23 1918    0.9 % sodium chloride infusion, , IntraVENous, PRN, Jennyfer Ruizel, DO    dexmedetomidine (PRECEDEX) 400 mcg in sodium chloride 0.9 % 100 mL infusion, 0.1-1.5 mcg/kg/hr, IntraVENous, Continuous PRN, Jennyfer OLMEDO Imel, DO    nystatin (MYCOSTATIN) ointment, , Topical, BID, Jennyfer William, DO, Given at 01/16/23 2041    perflutren lipid microspheres (DEFINITY) injection 1.5 mL, 1.5 mL, IntraVENous, ONCE PRN, Jennyfer William, DO    [Held by provider] aspirin chewable tablet 81 mg, 81 mg, Oral, Daily, Jennyfer William, DO, 81 mg at 01/04/23 0740    sodium chloride flush 0.9 % injection 10 mL, 10 mL, IntraVENous, PRN, Jennyfer OLMEDO Imel, DO, 10 mL at 01/03/23 1836    sodium chloride flush 0.9 % injection 5-40 mL, 5-40 mL, IntraVENous, 2 times per day, Jennyfer Ruizel, DO, 10 mL at 01/16/23 1004    sodium chloride flush 0.9 % injection 5-40 mL, 5-40 mL, IntraVENous, PRN, Jennyfer William, DO    0.9 % sodium chloride infusion, , IntraVENous, PRN, Jennyfer William, DO    ondansetron (ZOFRAN-ODT) disintegrating tablet 4 mg, 4 mg, Oral, Q8H PRN **OR** ondansetron (ZOFRAN) injection 4 mg, 4 mg, IntraVENous, Q6H PRN, Jennyfer William,     polyethylene glycol (GLYCOLAX) packet 17 g, 17 g, Oral, Daily PRN, Jennyfer William, DO    acetaminophen (TYLENOL) tablet 650 mg, 650 mg, Oral, Q6H PRN **OR** acetaminophen (TYLENOL) suppository 650 mg, 650 mg, Rectal, Q6H PRN, Jennyfer William, DO    nicotine (NICODERM CQ) 21 MG/24HR 1 patch, 1 patch, TransDERmal, Daily, Jennyfer William, DO, 1 patch at 01/16/23 1005    albuterol (PROVENTIL) nebulizer solution 2.5 mg, 2.5 mg, Nebulization, Q2H PRN, Jennyfer William, DO, 2.5 mg at 01/02/23 1115    guaiFENesin (MUCINEX) extended release tablet 600 mg, 600 mg, Oral, BID PRN, Maurilio William DO    Lab Results:     Lab Results   Component Value Date    WBC 6.2 01/17/2023    HGB 11.1 (L) 01/17/2023    HCT 37.6 (L) 01/17/2023    MCV 73.5 (L) 01/17/2023     01/17/2023     Lab Results   Component Value Date    CALCIUM 8.6 01/17/2023     01/17/2023    K 3.9 01/17/2023    CO2 34 (H) 01/17/2023     01/17/2023    BUN 10 01/17/2023    CREATININE 0.74 01/17/2023       Lab Results   Component Value Date    INR 1.6 01/08/2023    PROTIME 19.1 (H) 01/08/2023           ASSESSMENT:       Acute hypoxic respiratory failure, extubated 1/5/2023, now on 2 L  Multifocal pneumonia suspect aspiration versus community-acquired  Acute infarcts left cerebral hemisphere, left parietal lobe with bilateral subarachnoid hemorrhages  Dysphagia-still failed swallow eval, due for PEG tube placement today  Delirium-resolved  Metabolic encephalopathy  Transaminitis  Thrombocytopenia-improved  History of alcohol use  Acute kidney injury-resolved  History of tobacco use 40-pack-year history  Suspect COPD-mild wheezing  Positive hepatitis C screen  Chronic liver disease secondary to alcohol use  Debilitated  Full code  PLAN:   PEG tube placed today  Wean oxygen as tolerated, keep pulse ox above 89%  Continue bronchodilators  Encourage cough and deep breathing  Agree with plan for ARU      Electronically signed by ALAYNA Kaye CNP on 01/17/23     This progress note was completed using a voice transcription system. Every effort was made to ensure accuracy. However, inadvertent computerized transcription errors may be present.     Ethan Webster, NP-C, MSN  [facility]

## 2023-01-17 NOTE — FLOWSHEET NOTE
01/17/23 1828   Treatment Team Notification   Reason for Communication Review case   Team Member Name Dr. Vinicius Oquendo Provider   Method of Communication Secure Message   Notification Time 0584     RN reached out to General Surgery to see when patient can receive meds flushed through PEG tube; OK to crush and flush meds as well as start tubefeed. Electronically signed by oCncetta Santa RN.

## 2023-01-17 NOTE — PLAN OF CARE
Problem: Discharge Planning  Goal: Discharge to home or other facility with appropriate resources  Outcome: Progressing  Flowsheets (Taken 1/16/2023 1938)  Discharge to home or other facility with appropriate resources:   Identify barriers to discharge with patient and caregiver   Identify discharge learning needs (meds, wound care, etc)   Refer to discharge planning if patient needs post-hospital services based on physician order or complex needs related to functional status, cognitive ability or social support system     Problem: Safety - Adult  Goal: Free from fall injury  Outcome: Progressing  Flowsheets (Taken 1/16/2023 1938)  Free From Fall Injury:   Instruct family/caregiver on patient safety   Based on caregiver fall risk screen, instruct family/caregiver to ask for assistance with transferring infant if caregiver noted to have fall risk factors     Problem: Pain  Goal: Verbalizes/displays adequate comfort level or baseline comfort level  Outcome: Progressing  Flowsheets (Taken 1/16/2023 1938)  Verbalizes/displays adequate comfort level or baseline comfort level:   Encourage patient to monitor pain and request assistance   Assess pain using appropriate pain scale   Administer analgesics based on type and severity of pain and evaluate response   Implement non-pharmacological measures as appropriate and evaluate response

## 2023-01-17 NOTE — PROGRESS NOTES
[place]  Speech Language Pathology    Date: 1/17/2023  Patient Name: Maria Del Carmen Barker  YOB: 1960   AGE: 60 y.o. MRN: [id number]        Patient Not Available for Speech Therapy     Due to:  [] Testing  [] Hemodialysis  [] Cancelled by RN  [] Surgery   [] Intubation/Sedation/Pain Medication  [] Medical instability  [x] Other:1540 - Attempted to see Pt for dysphagia treatment. Pt unavailable for tx-- with respiratory therapy. ST to continue to follow. Next scheduled treatment: 01/18  Completed by:  Michelle Humphreys M.A., CCC-SLP

## 2023-01-17 NOTE — PROGRESS NOTES
Writer went to evaluate patient at bedside. Patient was in Endo pool undergoing PEG tube placement. Will reevaluate once patient's blood evaluate.   Electronically signed by Ramon Becker MD on 1/17/2023 at 8:51 AM

## 2023-01-17 NOTE — CARE COORDINATION
ONGOING DISCHARGE  NOTE:        Writer reviewed notes, Patient is agreeable to discharge to [facility]     Discharge is pending pre cert. Pre cert has not been started. Andrew from [address] [place] [address] will start pre cert today      Patient will have a peg tube placed today      Wean ox      Will continue to follow for additional discharge needs.       Electronically signed by Robbin Hoang RN on 1/17/2023 at 4:13 PM  o

## 2023-01-17 NOTE — ANESTHESIA POSTPROCEDURE EVALUATION
Department of Anesthesiology  Postprocedure Note    Patient: Kingsley Lamb  MRN: [id number]  YOB: 1960  Date of evaluation: 1/17/2023      Procedure Summary     Date: 01/17/23 Room / Location: [address]    Anesthesia Start: 0800 Anesthesia Stop: 8462    Procedure: EGD ESOPHAGOGASTRODUODENOSCOPY PEG TUBE INSERTION (Abdomen) Diagnosis:       Dysphagia, unspecified type      (DYSPHAGIA)      (IP RM 2116)    Surgeons: Dawson Muniz DO Responsible Provider: Kaila Fleming MD    Anesthesia Type: General ASA Status: 3          Anesthesia Type: General    Latanya Phase I: Latanya Score: 10    Latanya Phase II:        Anesthesia Post Evaluation    Comments: POST- ANESTHESIA EVALUATION       Pt Name: Kingsley Lamb  MRN: [id number]  YOB: 1960  Date of evaluation: 1/17/2023  Time:  9:14 AM      BP (!) 141/92   Pulse 84   Temp 98.4 °F (36.9 °C)   Resp 30   Ht 5' 7\" (1.702 m)   Wt 164 lb 3.9 oz (74.5 kg)   SpO2 95%   BMI 25.72 kg/m²      Consciousness Level  Awake  Cardiopulmonary Status  Stable  Pain Adequately Treated YES  Nausea / Vomiting  NO  Adequate Hydration  YES  Anesthesia Related Complications NONE      Electronically signed by Kaila Fleming MD on 1/17/2023 at 9:14 AM

## 2023-01-17 NOTE — PROGRESS NOTES
Patient found with bridle removed on NG tube and pulling at tube. Tube removed slightly from where bridle was placed. NG tube reinserted to bridle placement site and adhesive byrd applied. Air bolus audible in stomach. NG tube remains clamped. Wife and telesitter remain at bedside.

## 2023-01-17 NOTE — PROGRESS NOTES
[id number] [employer]    PROGRESS NOTE             1/17/2023    7:41 AM    Name:   Demetria Johnson  MRN:     [id number]     Acct:      [de-identified]   Room:   [facility]  IP Day:  13  Admit Date:  1/1/2023  9:55 PM    PCP:  No primary care provider on file. Code Status:  Full Code    Subjective:     C/C:   Chief Complaint   Patient presents with    Altered Mental Status     Interval History Status: Same    Patient seen and examined at the bed side. No new complains. No acute events overnight  Patient to receive a PEG tube placement today. Notes from nursing staff and Consults had been reviewed, and the overnight progress had been checked with the nursing staff as well. Brief History:     The patient is a 60 y.o.  male with unknown past medical history due to no healthcare visits or follow-up who presents with Altered Mental Status and he is admitted to the hospital for the management of  Acute respiratory failure most likely 2/2 pneumonia. Per patient's wife, she reports that the patient has not been acting himself for the past week. She reports being more slurred, confused and progressively getting worse prior to presentation. Patient prior to the presentation a week ago he had a fall and EMS presented patient was vitally stable and he was not transported to the hospital.  This time upon EMS evaluation of the patient he had an O2 of 75%, he was put on a breather and was given a dose of IV Lasix in route. Wife and patient, cannot recall any precipitating event could have led to his presentation. He also denies chest pain, shortness of breath, or cough. Per patient, he does not recall any complaints or events prior to the presentation. Wife denies recent alcohol use, however, patient does have a history of regular alcohol intake but not on daily basis.   Wife also reports that patient has constant heartburn for which he takes regular antiacids. Patient denies the presence of any abdominal pain or any change in bowel habits, abdominal pain, nausea or vomiting. Upon arrival in the ED, patient was put on BiPAP. Patient was afebrile, normotensive and in normal sinus rhythm. A chest x-ray was completed and the patient had right lower pneumonia with a small pleural effusion. Patient also had a large bullae in the left apex with a pneumothorax that cannot be excluded. Patient ABG was suggestive of pattern of respiratory acidosis with pH 7.295, PCO2 60.3, PO2 63.0, HCO3 29.3. CT head WO did not show any acute findings. Patient had elevated troponins of 184, trended down to 177. Patient also had an elevated BNP of 7797. An elevated creatinine of 1.77 was on presentation, with no previous lab test to be compared to. Also patient had a left elevated liver enzymes ALT was 525, , with prolonged prothrombin time of 24.9, and INR of 2.3. Patient was admitted to the ICU for the management of type II respiratory failure associated altered mental status    Review of Systems:     Constitutional: Positive for fatigue  Hent: Positive for hearing loss. Respiratory: Positive for shortness of breath, negative for chest pain negative for wheezing negative for cough  Cardiovascular: Positive for leg swelling. Negative for chest pain palpitation  Gastrointestinal negative for abdominal pain diarrhea nausea or vomiting  Skin negative for change of color or pallor  Neurological: Overall patient feels weak from being hospitalized. Medications:      Allergies:    No Known Allergies    Current Meds:   Scheduled Meds:    [MAR Hold] potassium bicarb-citric acid  20 mEq Oral Daily    [MAR Hold] potassium bicarb-citric acid  40 mEq Oral Daily    [MAR Hold] potassium chloride  20 mEq Oral Once    [MAR Hold] spironolactone  25 mg Oral Daily    [MAR Hold] ziprasidone (GEODON) in sterile water injection  10 mg IntraMUSCular Nightly    [MAR Hold] carvedilol  6.25 mg Oral BID WC    [Held by provider] ferrous sulfate  325 mg Oral Daily with breakfast    [MAR Hold] pantoprazole (PROTONIX) 40 mg injection  40 mg IntraVENous Daily    [MAR Hold] ipratropium-albuterol  1 ampule Inhalation Q4H WA    [Held by provider] heparin (porcine)  5,000 Units SubCUTAneous 3 times per day    [MAR Hold] nystatin   Topical BID    [Held by provider] miconazole   Topical BID    [Held by provider] aspirin  81 mg Oral Daily    [MAR Hold] sodium chloride flush  5-40 mL IntraVENous 2 times per day    [MAR Hold] nicotine  1 patch TransDERmal Daily     Continuous Infusions:    [MAR Hold] dextrose 5 % and 0.45 % NaCl 40 mL/hr at 01/16/23 1512    [MAR Hold] sodium chloride      [MAR Hold] dexmedetomidine      [MAR Hold] sodium chloride       PRN Meds: [MAR Hold] hydrALAZINE, [MAR Hold] potassium chloride **OR** [MAR Hold] potassium alternative oral replacement **OR** [MAR Hold] potassium chloride, [MAR Hold] magnesium sulfate, [MAR Hold] ziprasidone (GEODON) in sterile water injection, [MAR Hold] labetalol, [MAR Hold] potassium chloride, [MAR Hold] sodium chloride, [MAR Hold] dexmedetomidine, [MAR Hold] perflutren lipid microspheres, [MAR Hold] sodium chloride flush, [MAR Hold] sodium chloride flush, [MAR Hold] sodium chloride, [MAR Hold] ondansetron **OR** [MAR Hold] ondansetron, [MAR Hold] polyethylene glycol, [MAR Hold] acetaminophen **OR** [MAR Hold] acetaminophen, [MAR Hold] albuterol, [MAR Hold] guaiFENesin    Data:     Past Medical History:   has no past medical history on file. Social History:   reports that he has been smoking cigarettes. He has a 40.00 pack-year smoking history. He has never used smokeless tobacco. He reports current alcohol use. He reports that he does not currently use drugs. Family History:   History reviewed. No pertinent family history.     Vitals:  /73   Pulse 93   Temp 98.2 °F (36.8 °C) (Oral)   Resp 18   Ht 5' 7\" (1.702 m)   Wt 164 lb 3.9 oz (74.5 kg)   SpO2 94%   BMI 25.72 kg/m²   Temp (24hrs), Av.2 °F (36.8 °C), Min:98 °F (36.7 °C), Max:98.6 °F (37 °C)      Recent Labs     23  1712   POCGLU 106         I/O(24Hr): Intake/Output Summary (Last 24 hours) at 2023 0741  Last data filed at 2023 0527  Gross per 24 hour   Intake 509 ml   Output --   Net 509 ml       Labs:    CBC:   Lab Results   Component Value Date/Time    WBC 6.2 2023 05:50 AM    RBC 5.12 2023 05:50 AM    HGB 11.1 2023 05:50 AM    HCT 37.6 2023 05:50 AM    MCV 73.5 2023 05:50 AM    MCH 21.7 2023 05:50 AM    MCHC 29.6 2023 05:50 AM    RDW 31.1 2023 05:50 AM     2023 05:50 AM    MPV 8.9 2023 05:50 AM     CMP:    Lab Results   Component Value Date/Time     2023 05:50 AM    K 3.9 2023 05:50 AM     2023 05:50 AM    CO2 34 2023 05:50 AM    BUN 10 2023 05:50 AM    CREATININE 0.74 2023 05:50 AM    LABGLOM >60 2023 05:50 AM    GLUCOSE 101 2023 05:50 AM    PROT 6.3 2023 05:50 AM    LABALBU 2.9 2023 05:50 AM    CALCIUM 8.6 2023 05:50 AM    BILITOT 1.3 2023 05:50 AM    ALKPHOS 44 2023 05:50 AM    AST 29 2023 05:50 AM    ALT 23 2023 05:50 AM     PT/INR:    Lab Results   Component Value Date/Time    PROTIME 19.1 2023 04:38 AM    INR 1.6 2023 04:38 AM       No results found for: SPECIAL  Lab Results   Component Value Date/Time    CULTURE NO GROWTH 2023 12:12 PM         Radiology:    CT HEAD WO CONTRAST    Result Date: 2023  EXAMINATION: CT OF THE HEAD WITHOUT CONTRAST  2023 10:11 pm TECHNIQUE: CT of the head was performed without the administration of intravenous contrast. Automated exposure control, iterative reconstruction, and/or weight based adjustment of the mA/kV was utilized to reduce the radiation dose to as low as reasonably achievable.  COMPARISON: None. HISTORY: ORDERING SYSTEM PROVIDED HISTORY: Altered Mental status TECHNOLOGIST PROVIDED HISTORY: Altered Mental status Reason for Exam: Altered Mental status Additional signs and symptoms: Patient came in with transient alteration of awareness, follow up head CT for any changes. FINDINGS: BRAIN/VENTRICLES: There is no acute intracranial hemorrhage, mass effect or midline shift. No abnormal extra-axial fluid collection. There is a focal area of decreased attenuation with loss of gray/white matter differentiation involving posterior left parietal lobe with somewhat increased conspicuity as compared to prior exam.  There is stable small focus of encephalomalacia involving left cerebellar hemisphere compatible with prior remote infarct/insult. Chronic lacunar infarct to right basal ganglia redemonstrated. There is no evidence of hydrocephalus. ORBITS: The visualized portion of the orbits demonstrate no acute abnormality. SINUSES: Small air-fluid level right sphenoid sinus. Trace air-fluid level right frontal sinus. Mild mucoperiosteal thickening to bilateral ethmoid air cells. Findings are new from prior exam and likely relate to presence of nasogastric tube. SOFT TISSUES/SKULL:  No acute abnormality of the visualized skull or soft tissues. Focal area of decreased attenuation with loss of gray/white matter differentiation involving posterior left parietal lobe with somewhat increased conspicuity as compared to prior exam likely reflecting an area of acute to subacute infarct. Stable small chronic infarct/insult to left cerebellar hemisphere. Chronic lacunar infarct to right basal ganglia redemonstrated. Paranasal sinus disease likely relating to presence of nasogastric tube.      CT HEAD WO CONTRAST    Result Date: 1/1/2023  EXAMINATION: CT OF THE HEAD WITHOUT CONTRAST  1/1/2023 10:48 pm TECHNIQUE: CT of the head was performed without the administration of intravenous contrast. Automated exposure control, iterative reconstruction, and/or weight based adjustment of the mA/kV was utilized to reduce the radiation dose to as low as reasonably achievable. COMPARISON: None. HISTORY: Reason for Exam: AMS Additional signs and symptoms: the patient has been getting more and more confused since 2 weeks ago.  It has progressively been getting worse and today FINDINGS: BRAIN/VENTRICLES: There is no acute intracranial hemorrhage, mass effect or midline shift.  No abnormal extra-axial fluid collection.  The gray-white differentiation is maintained without evidence of an acute infarct.  There is no evidence of hydrocephalus. Changes of mild chronic small vessel ischemic disease. ORBITS: The visualized portion of the orbits demonstrate no acute abnormality. SINUSES: The visualized paranasal sinuses and mastoid air cells demonstrate no acute abnormality. SOFT TISSUES/SKULL:  No acute abnormality of the visualized skull or soft tissues.     No acute intracranial abnormality. Mild chronic small vessel ischemic disease.     US LIVER    Result Date: 1/2/2023  EXAMINATION: RIGHT UPPER QUADRANT ULTRASOUND 1/2/2023 10:20 am COMPARISON: None. HISTORY: ORDERING SYSTEM PROVIDED HISTORY: elevated AST and ALT, in setting of PT, and INR TECHNOLOGIST PROVIDED HISTORY: elevated AST and ALT, in setting of PT, and INR FINDINGS: Patient body habitus limits the study, as there is increased attenuation of the ultrasound beam. This decreases sensitivity and specificity. LIVER:  There is mild increased echogenicity seen throughout the liver.  No intrahepatic ductal dilatation.  No perihepatic fluid. BILIARY SYSTEM:  Bladder is contracted.  Gallstones are seen.  Gallbladder wall thickness measures 6 mm. Common bile duct is within normal limits measuring 5 mm. RIGHT KIDNEY: The right kidney is grossly unremarkable without evidence of hydronephrosis. PANCREAS:  Visualized portions of the pancreas are unremarkable. OTHER: No evidence of right upper quadrant  ascites. Portal vein flow appears hepatopetal     Increased echogenicity is seen throughout the liver suggesting diffuse hepatocellular disease such as fatty infiltration Cholelithiasis. No cholecystitis     XR CHEST PORTABLE    Result Date: 1/2/2023  EXAMINATION: ONE XRAY VIEW OF THE CHEST 1/2/2023 3:15 pm COMPARISON: 01/01/2023 HISTORY: ORDERING SYSTEM PROVIDED HISTORY: intubation TECHNOLOGIST PROVIDED HISTORY: intubation Reason for Exam: intubation FINDINGS: Overlying items external to the patient somewhat limit evaluation. Placement of endotracheal tube with tip 8.2 cm from the robert. Placement of enteric tube seen to extend into the stomach, distal extent not included in field of view. Persistent likely layering right pleural effusion, similar to slightly worsened. Persistent bilateral airspace opacities to bilateral mid to lower lung zones, right worse than left, similar on the left but mildly worsened on the right. No pneumothoraces are seen. Persistent likely bullous change to the left upper lung zone. Cardiac and mediastinal silhouettes are stable. Stable appearance to bony structures. Placement of endotracheal tube which appears high riding with tip 8.2 cm from the robert. Suggest advancement by 2-3 cm. Placement of endotracheal tube seen to extend into the stomach, distal extent not included in field of view. No pneumothoraces. Persistent likely bullous change to the left upper lung zone. Persistent right pleural effusion, similar to slightly worsened. Persistent bilateral airspace opacities, right worse than left, similar on the left but mildly worsened on the right. XR CHEST PORTABLE    Result Date: 1/1/2023  EXAMINATION: ONE XRAY VIEW OF THE CHEST 1/1/2023 7:17 pm COMPARISON: None.  HISTORY: ORDERING SYSTEM PROVIDED HISTORY: ams TECHNOLOGIST PROVIDED HISTORY: ams Reason for Exam: Altered mental status, difficulty breathing Additional signs and symptoms: Altered mental status, difficulty breathing Relevant Medical/Surgical History: Altered mental status, difficulty breathing FINDINGS: Large lucent area in the left apex suggesting a large bulla however superimposed pneumothorax cannot be excluded. The cardiac size is normal. Right lower lobe airspace infiltrate and mild right pleural effusion. . Pulmonary vascularity appears normal. There is mild ectasia of the thoracic aorta. Calcific tendinitis of the right shoulder. No acute bony abnormalities. The hilar structures are normal.     Right lower lobe pneumonia and small right pleural effusion Large lucent area in the left apex suggesting a large bulla however superimposed pneumothorax cannot be excluded. Follow-up CT would be useful for further evaluation. The findings were sent to the Radiology Results [address] at 10:31 pm on 1/1/2023 to be communicated to a licensed caregiver. EKG:    there are no previous tracings available for comparison. Physical Examination:        PHYSICAL EXAM:  General Appearance patient is alert awake and oriented to self Name=full, place=[facility], Time: non. Thinking he was in his work area at [employer]: No agitation, no depression, maintains good eye contact  HEENT - Head is normocephalic, atraumatic. Neck: supple, no rigidity, normal ROM. Small swelling of the left side of the mandibular angle that its been since childhood  Lungs -limited exam, good air entry. No wheezes  Cardiovascular - Heart sounds are normal.  Regular rhythm, normal rate without murmur, gallop or rub.   Neurology: Neurological exam is nonfocal.  Strength is 4 -/5 in all extremities  Abdomen - Soft, nontender, nondistended, no masses or organomegaly  Skin - No bruising or bleeding on exposed skin area  Extremities -lower limb edema  Assessment:        Primary Problem  Acute respiratory failure with hypoxia and hypercapnia [facility])    Active Hospital Problems    Diagnosis Date Noted    Dysphagia [R13.10] 01/16/2023 Priority: Medium    Hepatitis C virus infection without hepatic coma [B19.20] 01/14/2023     Priority: Medium    Mild malnutrition ([address] [E44.1] 01/13/2023     Priority: Medium    Delirium due to another medical condition [F05] 01/10/2023     Priority: Medium    ACP (advance care planning) [Z71.89] 01/09/2023     Priority: Medium    Goals of care, counseling/discussion [Z71.89] 01/09/2023     Priority: Medium    Encounter for palliative care [Z51.5] 01/09/2023     Priority: Medium    Prolonged pt (prothrombin time) [R79.1] 01/03/2023     Priority: Medium    Emphysema lung ([address] [J43.9] 01/03/2023     Priority: Medium    Cerebral infarction ([address] [I63.9] 01/03/2023     Priority: Medium    Transaminitis [R74.01] 01/02/2023     Priority: Medium    Normocytic anemia [D64.9] 01/02/2023     Priority: Medium    Thrombocytopenia ([address] [D69.6] 01/02/2023     Priority: Medium    Agitation [R45.1] 01/02/2023     Priority: Medium    Acute respiratory failure with hypoxia and hypercapnia (HCC) RLL pneumonia [J96.01, J96.02] 01/02/2023     Priority: Medium    Altered mental status [R41.82] 01/02/2023     Priority: Medium    Community acquired pneumonia of right lower lobe of lung [J18.9] 01/02/2023     Priority: Medium       Plan: Altered Mental Status in the setting of Acute type 2 respiratory failure secondary to Pneumonia and Acute Heart Failure-Improved. -failed swallow study, reains on NG tube feeds.  -On Geodon 10 mg BID  -ID following: vancoymycin and Unasyn completed  -Ipratropium-Albuterol every 4 hrs  -Echocardiogam completed  -Patient failed swallow study. NG tube placed.  PEG Tube today  - On aldactone 25 mg, Coreg 6.25mg BID    Transaminitis with prolonged PTT and INR in the setting of acute HCV  - AST and ALT normalized  -INR PT improving   -Thrombocytopenicresolved  -HCV RNA PCR positive; ID on board       Multiple acute brain infarcts with subarachnoid hemorrhage -EEG showing diffuse slowing without epileptic waves.  -No anticoagulation  -BP parameters of 140.  -Exam is nonfocal    Thrombocytopenia-RESOLVED  -Unknown  baseline  -Transaminates and Prolonged PT and PTT; resolved. -PLT count  118  -Monitor HH       Microcytic anemia in the setting of elevated INR and PT  -No previous labs to compare?  -Iron IV replacement  -Hb stable    Elevated troponins most likely 2/2 demand ischemia  -no acute changes on EKG  -ECHO showing normal LVF. Mild tricuspid regurge. RV pressure of 25 mmHG    Hypokalemia-Resolved    DVT prophylaxis: reason for no prophylaxis: Prolonged PT, aPTT  GI prophylaxis: Protonix 40 mg daily    Vincenzo Glasgow MD  1/17/2023  7:41 AM       Attending Physician Statement  I have discussed the care of Grace Sal and I have examined the patient myselft and taken ros and hpi , including pertinent history and exam findings,  with the resident. I have reviewed the key elements of all parts of the encounter with the resident. I agree with the assessment, plan and orders as documented by the resident.       Electronically signed by Mahamed Werner MD

## 2023-01-17 NOTE — PLAN OF CARE
Problem: Discharge Planning  Goal: Discharge to home or other facility with appropriate resources  1/17/2023 0142 by Winnie Gregory RN  Outcome: Progressing  1/17/2023 0130 by Winnie Gregory RN  Outcome: Progressing  1/16/2023 1938 by Kyree Velasquez RN  Outcome: Progressing  Flowsheets (Taken 1/16/2023 1938)  Discharge to home or other facility with appropriate resources:   Identify barriers to discharge with patient and caregiver   Identify discharge learning needs (meds, wound care, etc)   Refer to discharge planning if patient needs post-hospital services based on physician order or complex needs related to functional status, cognitive ability or social support system     Problem: Safety - Adult  Goal: Free from fall injury  1/17/2023 0142 by Winnie Gregory RN  Outcome: Progressing  Note: Patient confused. Restless at times. Bed alarm on and telesitter in place. 1/17/2023 0130 by Winnie Gregory RN  Outcome: Progressing  Note: Patient confused. Restless at times. Zyprexa given as ordered at HS. Bed alarm on with telesitter present. 1/16/2023 1938 by Kyree Velasquez RN  Outcome: Progressing  Flowsheets (Taken 1/16/2023 1938)  Free From Fall Injury:   Instruct family/caregiver on patient safety   Based on caregiver fall risk screen, instruct family/caregiver to ask for assistance with transferring infant if caregiver noted to have fall risk factors     Problem: ABCDS Injury Assessment  Goal: Absence of physical injury  1/17/2023 0142 by Winnie Gregory RN  Outcome: Progressing  1/17/2023 0130 by Winnie Gregory RN  Outcome: Progressing     Problem: Skin/Tissue Integrity  Goal: Absence of new skin breakdown  Description: 1. Monitor for areas of redness and/or skin breakdown  2. Assess vascular access sites hourly  3. Every 4-6 hours minimum:  Change oxygen saturation probe site  4.   Every 4-6 hours:  If on nasal continuous positive airway pressure, respiratory therapy assess nares and determine need for appliance change or resting period. 1/17/2023 0142 by Antionette Pettit RN  Outcome: Progressing  Note: Buttocks reddened with rash. Cream applied. Skin kept clean and dry. 1/17/2023 0130 by Antionette Pettit RN  Outcome: Progressing  Note: Buttocks reddened with rash. Cream applied. S     Problem: Pain  Goal: Verbalizes/displays adequate comfort level or baseline comfort level  1/17/2023 0142 by Antionette Pettit RN  Outcome: Progressing  Note: Denies pain. 1/17/2023 0130 by Antionette Pettit RN  Outcome: Progressing  1/16/2023 1938 by Alize Lopes RN  Outcome: Progressing  Flowsheets (Taken 1/16/2023 1938)  Verbalizes/displays adequate comfort level or baseline comfort level:   Encourage patient to monitor pain and request assistance   Assess pain using appropriate pain scale   Administer analgesics based on type and severity of pain and evaluate response   Implement non-pharmacological measures as appropriate and evaluate response     Problem: Nutrition Deficit:  Goal: Optimize nutritional status  1/17/2023 0142 by Antionette Pettit RN  Outcome: Progressing  Note: Patient for G tube placement today.   1/17/2023 0130 by Antionette Pettit RN  Outcome: Progressing

## 2023-01-17 NOTE — PROGRESS NOTES
Today's Date: 1/17/2023  Patient Name: Ciera Scott  Date of admission: 1/1/2023  9:55 PM  Patient's age: 60 y.o., 1960  Admission Dx: Acute respiratory failure ([facility] [address] [J96.00]  Acute respiratory failure with hypoxia and hypercapnia ([facility] [address] [J96.01, J96.02]  Altered mental status, unspecified altered mental status type [R41.82]  Community acquired pneumonia of right lower lobe of lung [J18.9]  COPD with respiratory failure, acute ([facility] [address] [J44.9, J96.00]    Reason for Consult: management recommendations  Requesting Physician: Farooq George MD    CHIEF COMPLAINT: Abnormal cell counts. Altered mental status. Pneumonia. Interval history:    Patient seen and examined  Labs and vitals reviewed  Continues to recover well. Condition is improving quickly. Mental status is improving. No active bleeding. Platelets 852. HISTORY OF PRESENT ILLNESS:      The patient is a 60 y.o.  male who is admitted with chief complaint of altered mental status. Patient has not seen a doctor for multiple years. Due to worsening mental status EMS was summoned. Patient oxygen saturation was noted to be at 75%. Patient placed on BiPAP x-ray showed right lower lobe pneumonia. Patient started on antibiotics. Patient also noted to have INR of 2.3 platelet count of 60,716. Patient does have a history of alcohol intake. Patient's ammonia level noted to be 29. Creatinine 1.5. Total bilirubin is within range. Hemoglobin 11.2 with MCV of 69. Platelet count is 07,813. Ultrasound liver done however report is pending. Past Medical History: Hypertension    Past Surgical History: No pertinent surgical history    Medications:    Prior to Admission medications    Medication Sig Start Date End Date Taking?  Authorizing Provider   aspirin 325 MG tablet Take 325 mg by mouth daily Patient takes 2 tabs daily   Yes Historical Provider, MD   psyllium (KONSYL) 28.3 % PACK Take 1 packet by mouth daily   Yes Historical Provider, MD     Current Facility-Administered Medications   Medication Dose Route Frequency Provider Last Rate Last Admin    heparin (porcine) injection 5,000 Units  5,000 Units SubCUTAneous 3 times per day Jennyfervj William, DO   5,000 Units at 01/17/23 1452    miconazole (MICOTIN) 2 % powder   Topical BID Jennyfer William, DO        dextrose 5 % and 0.45 % sodium chloride infusion   IntraVENous Continuous Jennyfervj William, DO 40 mL/hr at 01/16/23 1512 New Bag at 01/16/23 1512    potassium bicarb-citric acid (EFFER-K) effervescent tablet 20 mEq  20 mEq Oral Daily Jennyfervj William, DO   20 mEq at 01/16/23 1002    potassium bicarb-citric acid (EFFER-K) effervescent tablet 40 mEq  40 mEq Oral Daily Jennyfer SHARA William, DO   40 mEq at 01/15/23 0818    potassium chloride (KLOR-CON M) extended release tablet 20 mEq  20 mEq Oral Once Jennyfer William, DO        hydrALAZINE (APRESOLINE) injection 20 mg  20 mg IntraVENous Q6H PRN Jennyfer William, DO   20 mg at 01/11/23 1224    potassium chloride (KLOR-CON M) extended release tablet 40 mEq  40 mEq Oral PRN Jennyfer William, DO        Or    potassium bicarb-citric acid (EFFER-K) effervescent tablet 40 mEq  40 mEq Oral PRN Jennyfer William, DO        Or    potassium chloride 10 mEq/100 mL IVPB (Peripheral Line)  10 mEq IntraVENous PRN Jennyfer William,  mL/hr at 01/13/23 0928 10 mEq at 01/13/23 7525    spironolactone (ALDACTONE) tablet 25 mg  25 mg Oral Daily Jennyfervj William, DO   25 mg at 01/16/23 1002    magnesium sulfate 2000 mg in water 50 mL IVPB  2,000 mg IntraVENous PRN Jennyfer William, DO        ziprasidone (GEODON) 10 mg in sterile water 0.5 mL injection  10 mg IntraMUSCular Nightly Jennyfer William, DO   10 mg at 01/16/23 2046    carvedilol (COREG) tablet 6.25 mg  6.25 mg Oral BID WC Jennyfer William, DO   6.25 mg at 01/14/23 0800    [Held by provider] ferrous sulfate (IRON 325) tablet 325 mg  325 mg Oral Daily with breakfast Jennyfer William DO        pantoprazole (PROTONIX) 40 mg in sodium chloride (PF) 0.9 % 10 mL injection  40 mg IntraVENous Daily Jennyfer William, DO   40 mg at 01/16/23 1003    ziprasidone (GEODON) 20 mg in sterile water 1 mL injection  20 mg IntraMUSCular Q12H PRN Jennyfer OLMEDO Imel, DO        labetalol (NORMODYNE;TRANDATE) injection 5 mg  5 mg IntraVENous Q6H PRN Jennyfer Ruizel, DO   5 mg at 01/10/23 1606    potassium chloride 10 mEq/100 mL IVPB (Peripheral Line)  10 mEq IntraVENous PRN Jennyfer OLMEDO Imel,  mL/hr at 01/13/23 1233 10 mEq at 01/13/23 1233    ipratropium-albuterol (DUONEB) nebulizer solution 1 ampule  1 ampule Inhalation Q4H WA Jennyfer William, DO   1 ampule at 01/17/23 1536    0.9 % sodium chloride infusion   IntraVENous PRN Jennyfer William, DO        dexmedetomidine (PRECEDEX) 400 mcg in sodium chloride 0.9 % 100 mL infusion  0.1-1.5 mcg/kg/hr IntraVENous Continuous PRN Jennyfer William, DO        nystatin (MYCOSTATIN) ointment   Topical BID Jennyfer William, DO   Given at 01/16/23 2041    perflutren lipid microspheres (DEFINITY) injection 1.5 mL  1.5 mL IntraVENous ONCE PRN Jennyfer William, DO        [Held by provider] aspirin chewable tablet 81 mg  81 mg Oral Daily Jennyfer William, DO   81 mg at 01/04/23 0740    sodium chloride flush 0.9 % injection 10 mL  10 mL IntraVENous PRN Jennyfer Ruizel, DO   10 mL at 01/03/23 1836    sodium chloride flush 0.9 % injection 5-40 mL  5-40 mL IntraVENous 2 times per day Jennyfer William, DO   10 mL at 01/16/23 1004    sodium chloride flush 0.9 % injection 5-40 mL  5-40 mL IntraVENous PRN Jennyfer Ruizel, DO        0.9 % sodium chloride infusion   IntraVENous PRN Jennyfer Ruizel, DO        ondansetron (ZOFRAN-ODT) disintegrating tablet 4 mg  4 mg Oral Q8H PRN Jennyfer William, DO        Or    ondansetron (ZOFRAN) injection 4 mg  4 mg IntraVENous Q6H PRN Jennyfer William,         polyethylene glycol (GLYCOLAX) packet 17 g  17 g Oral Daily PRN Jennyfer William DO        acetaminophen (TYLENOL) tablet 650 mg  650 mg Oral Q6H PRN Jennyfer William DO        Or    acetaminophen (TYLENOL) suppository 650 mg  650 mg Rectal Q6H PRN Jennyfer William         nicotine (NICODERM CQ) 21 MG/24HR 1 patch  1 patch TransDERmal Daily Jennyfer SHARA Ruizyoshi DO   1 patch at 23 1019    albuterol (PROVENTIL) nebulizer solution 2.5 mg  2.5 mg Nebulization Q2H PRN Jennyfervj Ruizyoshi, DO   2.5 mg at 23 1115    guaiFENesin (MUCINEX) extended release tablet 600 mg  600 mg Oral BID PRN Jennyfervj Ruizyoshi DO           Allergies:  Patient has no known allergies. Social History:   reports that he has been smoking cigarettes. He has a 40.00 pack-year smoking history. He has never used smokeless tobacco. He reports current alcohol use. He reports that he does not currently use drugs. Family History: Patient not able to give history due to altered mental status    REVIEW OF SYSTEMS:      General: no fever or night sweats, Weight is stable. ENT: No double or blurred vision, no hearing problem, no dysphagia or sore throat   Respiratory: Positive shortness of breath  Cardiovascular: Denies chest pain, PND or orthopnea. No L E swelling or palpitations. Gastrointestinal:    No nausea or vomiting, abdominal pain, diarrhea or constipation. Genitourinary: Denies dysuria, hematuria, frequency, urgency or incontinence. Neurological: Complains of being very weak. Musculoskeletal:  No arthralgia no back pain or joint swelling. Skin: There are no rashes or bleeding.   Psych: Denies hallucinations or intentions to harm self        PHYSICAL EXAM:        BP (!) 144/92   Pulse 86   Temp 99.6 °F (37.6 °C)   Resp 18   Ht 5' 7\" (1.702 m)   Wt 164 lb 3.9 oz (74.5 kg)   SpO2 92%   BMI 25.72 kg/m²    Temp (24hrs), Av.6 °F (37 °C), Min:98.2 °F (36.8 °C), Max:99.6 °F (37.6 °C)      General appearance - not in acute distress  Mental status -arousable but somewhat lethargic  Eyes - pupils equal and reactive, extraocular eye movements intact   Ears - bilateral TM's and external ear canals normal   Mouth -mucous membranes moist  Neck - supple, no significant adenopathy   Lymphatics - no palpable lymphadenopathy, no hepatosplenomegaly   Chest -bilateral rales  Heart - normal rate, regular rhythm, normal S1, S2, no murmurs  Abdomen - soft, nontender, nondistended, no masses or organomegaly   Neurological -orally intubated  Musculoskeletal - no joint tenderness, deformity or swelling   Extremities - peripheral pulses normal, no pedal edema, no clubbing or cyanosis   Skin - normal coloration and turgor, no rashes, no suspicious skin lesions noted ,      DATA:      Labs:     Results for orders placed or performed during the hospital encounter of 01/01/23   COVID-19 & Influenza Combo    Specimen: Nasopharyngeal Swab   Result Value Ref Range    Specimen Description . NASOPHARYNGEAL SWAB     Source . NASOPHARYNGEAL SWAB     SARS-CoV-2 RNA, RT PCR Not Detected Not Detected    INFLUENZA A Not Detected Not Detected    INFLUENZA B Not Detected Not Detected   Respiratory Panel, Molecular, with COVID-19 (Restricted: peds pts or suitable admitted adults)    Specimen: Nasopharyngeal Swab   Result Value Ref Range    Specimen Description . NASOPHARYNGEAL SWAB     Adenovirus PCR Not Detected Not Detected    Coronavirus 229E PCR Not Detected Not Detected    Coronavirus HKU1 PCR Not Detected Not Detected    Coronavirus NL63 PCR Not Detected Not Detected    Coronavirus OC43 PCR Not Detected Not Detected    SARS-CoV-2, PCR Not Detected Not Detected    Human Metapneumovirus PCR Not Detected Not Detected    Rhino/Enterovirus PCR Not Detected Not Detected    Influenza A by PCR Not Detected Not Detected    Influenza B by PCR Not Detected Not Detected    Parainfluenza 1 PCR Not Detected Not Detected    Parainfluenza 2 PCR Not Detected Not Detected    Parainfluenza 3 PCR Not Detected Not Detected    Parainfluenza 4 PCR Not Detected Not Detected    Resp Syncytial Virus PCR Not Detected Not Detected    Bordetella Parapertussis Not Detected Not Detected    B Pertussis by PCR Not Detected Not Detected    Chlamydia pneumoniae By PCR Not Detected Not Detected    Mycoplasma pneumo by PCR Not Detected Not Detected   Legionella Ag, Ur    Specimen: Urine   Result Value Ref Range    Legionella Pneumophilia Ag, Urine NEGATIVE NEGATIVE   STREP PNEUMONIAE ANTIGEN    Specimen: Urine, indwelling catheter   Result Value Ref Range    Source . CLEAN CATCH URINE     Strep pneumo Ag NEGATIVE    Culture, Blood 1    Specimen: Blood   Result Value Ref Range    Specimen Description . BLOOD LEFT ARM     Culture NO GROWTH 5 DAYS    Culture, Blood 1    Specimen: Blood   Result Value Ref Range    Specimen Description . BLOOD RIGHT ARM     Culture NO GROWTH 5 DAYS    Culture, Urine    Specimen: Urine, clean catch   Result Value Ref Range    Specimen Description . CLEAN CATCH URINE     Culture NO GROWTH    MRSA DNA Probe, Nasal    Specimen: Nasal   Result Value Ref Range    Specimen Description . NASAL SWAB     MRSA, DNA, Nasal (A) NEGATIVE     POSITIVE:  MRSA DNA detected by nucleic acid amplification. Culture, Respiratory    Specimen: Sputum Induced   Result Value Ref Range    Specimen Description . INDUCED SPUTUM     Direct Exam >10, <25 NEUTROPHILS/LPF     Direct Exam < 10 EPITHELIAL CELLS/LPF     Direct Exam NO SIGNIFICANT PATHOGENS SEEN     Culture NO GROWTH    Troponin   Result Value Ref Range    Troponin, High Sensitivity 177 (HH) 0 - 22 ng/L   Troponin   Result Value Ref Range    Troponin, High Sensitivity 184 (HH) 0 - 22 ng/L   APTT   Result Value Ref Range    PTT 26.5 24.0 - 36.0 sec   Protime-INR   Result Value Ref Range    Protime 24.9 (H) 11.8 - 14.6 sec    INR 2.3    Phosphorus   Result Value Ref Range    Phosphorus 4.3 2.5 - 4.5 mg/dL   Magnesium   Result Value Ref Range    Magnesium 2.1 1.6 - 2.6 mg/dL   Basic Metabolic Panel   Result Value Ref Range    Glucose 96 70 - 99 mg/dL    BUN 46 (H) 8 - 23 mg/dL    Creatinine 1.77 (H) 0.70 - 1.20 mg/dL    Est, Glom Filt Rate 43 (L) >60 mL/min/1.73m2    Calcium 7.9 (L) 8.6 - 10.4 mg/dL    Sodium 138 135 - 144 mmol/L    Potassium 4.8 3.7 - 5.3 mmol/L    Chloride 97 (L) 98 - 107 mmol/L    CO2 29 20 - 31 mmol/L    Anion Gap 12 9 - 17 mmol/L   CBC with Auto Differential   Result Value Ref Range    WBC 6.8 3.5 - 11.0 k/uL    RBC 5.99 (H) 4.5 - 5.9 m/uL    Hemoglobin 12.1 (L) 13.5 - 17.5 g/dL    Hematocrit 41.4 41 - 53 %    MCV 69.1 (L) 80 - 100 fL    MCH 20.2 (L) 26 - 34 pg    MCHC 29.2 (L) 31 - 37 g/dL    RDW 19.8 (H) 11.5 - 14.9 %    Platelets 55 (L) 579 - 450 k/uL    MPV 8.6 6.0 - 12.0 fL    Seg Neutrophils 79 (H) 36 - 66 %    Lymphocytes 12 (L) 24 - 44 %    Monocytes 8 (H) 1 - 7 %    Eosinophils % 0 0 - 4 %    Basophils 1 0 - 2 %    Segs Absolute 5.40 1.3 - 9.1 k/uL    Absolute Lymph # 0.80 (L) 1.0 - 4.8 k/uL    Absolute Mono # 0.50 0.1 - 1.3 k/uL    Absolute Eos # 0.00 0.0 - 0.4 k/uL    Basophils Absolute 0.10 0.0 - 0.2 k/uL   Blood Gas, Venous   Result Value Ref Range    pH, [place] 7.258 (L) 7.320 - 7.420    pCO2, [place] 63.0 (H) 39.0 - 55.0 mm Hg    pO2, [place] 48.1 30.0 - 50.0 mm Hg    HCO3, Venous 28.1 24.0 - 30.0 mmol/L    Positive Base Excess, [place] 1.0 0.0 - 2.0 mmol/L    O2 Sat, [place] 72.1 60.0 - 85.0 %    Carboxyhemoglobin 4.3 0 - 5 %    Methemoglobin 0.5 0.0 - 1.9 %    Pt Temp 37    Brain Natriuretic Peptide   Result Value Ref Range    Pro-BNP 7,797 (H) <300 pg/mL   Hepatic Function Panel   Result Value Ref Range    Albumin 3.9 3.5 - 5.2 g/dL    Alkaline Phosphatase 68 40 - 129 U/L     (H) 5 - 41 U/L     (H) <40 U/L    Total Bilirubin 1.1 0.3 - 1.2 mg/dL    Bilirubin, Direct 0.9 (H) <0.3 mg/dL    Bilirubin, Indirect 0.2 0.0 - 1.0 mg/dL    Total Protein 6.6 6.4 - 8.3 g/dL   Lipase   Result Value Ref Range    Lipase 17 13 - 60 U/L   Urinalysis with Reflex to Culture    Specimen: Urine   Result Value Ref Range    Color, UA Orange (A) Yellow    Turbidity UA Cloudy (A) Clear    Glucose, Ur NEGATIVE NEGATIVE    Bilirubin Urine NEGATIVE  Verified by ictotest. (A) NEGATIVE Ketones, Urine TRACE (A) NEGATIVE    Specific Gravity, UA 1.018 1.000 - 1.030    Urine Hgb LARGE (A) NEGATIVE    pH, UA 5.0 5.0 - 8.0    Protein, UA 2+ (A) NEGATIVE    Urobilinogen, Urine ELEVATED (A) Normal    Nitrite, Urine NEGATIVE NEGATIVE    Leukocyte Esterase, Urine SMALL (A) NEGATIVE   Microscopic Urinalysis   Result Value Ref Range    WBC, UA 6 TO 9 /HPF    RBC, UA TOO NUMEROUS TO COUNT /HPF    Casts UA 3 to 5 /LPF    Epithelial Cells UA 0 TO 2 /HPF    Bacteria, UA FEW (A) None   Arterial Blood Gases   Result Value Ref Range    pH, Arterial 7.295 (L) 7.350 - 7.450    pCO2, Arterial 60.3 (HH) 35.0 - 45.0 mmHg    pO2, Arterial 63.0 (L) 80.0 - 100.0 mmHg    HCO3, Arterial 29.3 (H) 22.0 - 26.0 mmol/L    Positive Base Excess, Art 2.8 (H) 0.0 - 2.0 mmol/L    O2 Sat, Arterial 85.8 (LL) 95 - 98 %    Carboxyhemoglobin 3.1 0 - 5 %    Methemoglobin 1.0 0.0 - 1.9 %    Pt Temp 37     O2 Device/Flow/% BIPAP     Respiratory Rate 16     Tyron Test PASS     Sample Site Right Radial Artery     Pt.  Position SEMI-FOWLERS     Mode BIPAP     Text for Respiratory RESULTS TO PHYSICIAN    Comprehensive Metabolic Panel w/ Reflex to MG   Result Value Ref Range    Glucose 101 (H) 70 - 99 mg/dL    BUN 46 (H) 8 - 23 mg/dL    Creatinine 1.50 (H) 0.70 - 1.20 mg/dL    Est, Glom Filt Rate 52 (L) >60 mL/min/1.73m2    Calcium 7.5 (L) 8.6 - 10.4 mg/dL    Sodium 136 135 - 144 mmol/L    Potassium 4.8 3.7 - 5.3 mmol/L    Chloride 99 98 - 107 mmol/L    CO2 27 20 - 31 mmol/L    Anion Gap 10 9 - 17 mmol/L    Alkaline Phosphatase 59 40 - 129 U/L     (H) 5 - 41 U/L     (H) <40 U/L    Total Bilirubin 0.8 0.3 - 1.2 mg/dL    Total Protein 5.9 (L) 6.4 - 8.3 g/dL    Albumin 3.3 (L) 3.5 - 5.2 g/dL   CBC with Auto Differential   Result Value Ref Range    WBC 5.6 3.5 - 11.0 k/uL    RBC 5.57 4.5 - 5.9 m/uL    Hemoglobin 11.2 (L) 13.5 - 17.5 g/dL    Hematocrit 38.8 (L) 41 - 53 %    MCV 69.7 (L) 80 - 100 fL    MCH 20.1 (L) 26 - 34 pg    MCHC 28.8 (L) 31 - 37 g/dL    RDW 19.7 (H) 11.5 - 14.9 %    Platelets 67 (L) 011 - 450 k/uL    MPV 9.1 6.0 - 12.0 fL    Seg Neutrophils 79 (H) 36 - 66 %    Lymphocytes 12 (L) 24 - 44 %    Monocytes 8 (H) 1 - 7 %    Eosinophils % 0 0 - 4 %    Basophils 1 0 - 2 %    nRBC 2 per 100 WBC    Segs Absolute 4.41 1.3 - 9.1 k/uL    Absolute Lymph # 0.67 (L) 1.0 - 4.8 k/uL    Absolute Mono # 0.45 0.1 - 1.3 k/uL    Absolute Eos # 0.00 0.0 - 0.4 k/uL    Basophils Absolute 0.06 0.0 - 0.2 k/uL    Morphology ANISOCYTOSIS PRESENT     Morphology HYPOCHROMIA PRESENT     Morphology 1+ ELLIPTOCYTES    Occ Bld, Fecal Scrn   Result Value Ref Range    Occult Blood, Stool #1 NEGATIVE NEGATIVE    Date, Stool #1 [phone],107,[id number]     Time, Stool #1 300    Hemoglobin and Hematocrit   Result Value Ref Range    Hemoglobin 11.1 (L) 13.5 - 17.5 g/dL    Hematocrit 38.7 (L) 41 - 53 %   Hemoglobin and Hematocrit   Result Value Ref Range    Hemoglobin 10.8 (L) 13.5 - 17.5 g/dL    Hematocrit 37.2 (L) 41 - 53 %   Ammonia   Result Value Ref Range    Ammonia 29 16 - 60 umol/L   Hepatitis Panel, Acute   Result Value Ref Range    Hepatitis B Surface Ag NONREACTIVE NONREACTIVE    Hepatitis C Ab REACTIVE (A) NONREACTIVE    Hep B Core Ab, IgM NONREACTIVE NONREACTIVE    Hep A IgM NONREACTIVE NONREACTIVE   Iron and TIBC   Result Value Ref Range    Iron 14 (L) 59 - 158 ug/dL    TIBC 426 250 - 450 ug/dL    Iron Saturation 3 (L) 20 - 55 %    UIBC 412 (H) 112 - 347 ug/dL   Ferritin   Result Value Ref Range    Ferritin 14 (L) 30 - 400 ng/mL   Mycoplasma Ab,IgM   Result Value Ref Range    Mycoplasma pneumo IgM 0.25 <0.91   Procalcitonin   Result Value Ref Range    Procalcitonin 0.09 (H) <0.09 ng/mL   C-Reactive Protein   Result Value Ref Range    CRP 16.4 (H) 0.0 - 5.0 mg/L   Fibrin Split Products   Result Value Ref Range    FDP >5 (H) <5 ug/mL   Sedimentation Rate   Result Value Ref Range    Sed Rate 1 0 - 20 mm/Hr   Drug Scr, Abuse, Ur   Result Value Ref Range    Amphetamine Screen, Ur NEGATIVE NEGATIVE    Barbiturate Screen, Ur NEGATIVE NEGATIVE    Benzodiazepine Screen, Urine NEGATIVE NEGATIVE    Cocaine Metabolite, Urine NEGATIVE NEGATIVE    Methadone Screen, Urine NEGATIVE NEGATIVE    Opiates, Urine NEGATIVE NEGATIVE    Phencyclidine, Urine NEGATIVE NEGATIVE    Cannabinoid Scrn, Ur NEGATIVE NEGATIVE    Oxycodone Screen, Ur NEGATIVE NEGATIVE    Fentanyl, Ur NEGATIVE NEGATIVE    Test Information       Assay provides medical screening only. The absence of expected drug(s) and/or metabolite(s) may indicate diluted or adulterated urine, limitations of testing or timing of collection. Troponin   Result Value Ref Range    Troponin, High Sensitivity 195 (HH) 0 - 22 ng/L   Vitamin B12 & Folate   Result Value Ref Range    Vitamin B-12 1926 (H) 232 - 1245 pg/mL    Folate 10.0 >4.8 ng/mL   HIV Screen   Result Value Ref Range    HIV Ag/Ab NONREACTIVE NONREACTIVE   MONOCLONAL PANEL   Result Value Ref Range    Kappa Free Light Chains QNT 2.79 (H) 0.37 - 1.94 mg/dL    Lambda Free Light Chains QNT 20.89 (H) 0.57 - 2.63 mg/dL    Free Kappa/Lambda Ratio 0.13 (L) 0.26 - 1.65    Serum IFX Interp       A ZONE OF RESTRICTION IS PRESENT IN THE GAMMAGLOBULIN REGION. CONFIRMED BY IMMUNOFIXATION TO BE MONOCLONAL    Pathologist       Reviewed by pathologist:  Louie Drake. Yuliana Harper D.O. Total Protein 5.5 (L) 6.4 - 8.3 g/dL    Albumin (calculated) 3.6 3.2 - 5.2 g/dL    Albumin % 65 45 - 65 %    Alpha-1-Globulin 0.2 0.1 - 0.4 g/dL    Alpha 1 % 3 3 - 6 %    Alpha-2-Globulin 0.4 (L) 0.5 - 0.9 g/dL    Alpha 2 % 7 6 - 13 %    Beta Globulin 0.6 0.5 - 1.1 g/dL    Beta Percent 11 11 - 19 %    Gamma Globulin 0.8 0.5 - 1.5 g/dL    Gamma Globulin % 14 9 - 20 %    Total Prot. Sum 5.6 (L) 6.3 - 8.2 g/dL    Total Prot. Sum,% 100 98 - 102 %    Protein Electrophoresis, Serum       A ZONE OF RESTRICTION IS PRESENT IN THE GAMMAGLOBULIN REGION.   CONFIRMED BY IMMUNOFIXATION TO BE MONOCLONAL    Pathologist       Reviewed by pathologist:  Gaurang Samuel. Amaury Tovar D.O. Path Review, Smear   Result Value Ref Range    Pathologist Review ELECTRONICALLY SIGNED.  Sadia Herzog MD    Reticulocytes   Result Value Ref Range    Retic % 2.8 (H) 0.5 - 2.0 %    Absolute Retic # 0.157 (H) 0.0245 - 0.098 M/uL   Fibrinogen   Result Value Ref Range    Fibrinogen 141 (L) 210 - 530 mg/dL   Ethanol   Result Value Ref Range    Ethanol <10 <10 mg/dL    Ethanol percent <0.010 %   Lactate Dehydrogenase   Result Value Ref Range     (H) 135 - 225 U/L   Hemoglobin and Hematocrit   Result Value Ref Range    Hemoglobin 10.9 (L) 13.5 - 17.5 g/dL    Hematocrit 38.0 (L) 41 - 53 %   Haptoglobin   Result Value Ref Range    Haptoglobin 60 30 - 200 mg/dL   D-Dimer, Quantitative   Result Value Ref Range    D-Dimer, Quant 6.70 (H) 0.00 - 0.59 mg/L FEU   Urinalysis with Reflex to Culture    Specimen: Urine   Result Value Ref Range    Color, UA Dark Yellow (A) Yellow    Turbidity UA Clear Clear    Glucose, Ur NEGATIVE NEGATIVE    Bilirubin Urine NEGATIVE NEGATIVE    Ketones, Urine TRACE (A) NEGATIVE    Specific [place], UA 1.015 1.000 - 1.030    Urine Hgb LARGE (A) NEGATIVE    pH, UA 5.0 5.0 - 8.0    Protein, UA 1+ (A) NEGATIVE    Urobilinogen, Urine Normal Normal    Nitrite, Urine NEGATIVE NEGATIVE    Leukocyte Esterase, Urine SMALL (A) NEGATIVE   APTT   Result Value Ref Range    PTT 38.7 (H) 24.0 - 36.0 sec   Protime-INR   Result Value Ref Range    Protime 24.4 (H) 11.8 - 14.6 sec    INR 2.2    Microscopic Urinalysis   Result Value Ref Range    WBC, UA 10 TO 20 /HPF    RBC, UA TOO NUMEROUS TO COUNT /HPF    Casts UA HYALINE /LPF    Casts UA 0 TO 2 /LPF    Epithelial Cells UA 3 to 5 /HPF   BLOOD GAS, ARTERIAL   Result Value Ref Range    pH, Arterial 7.314 (L) 7.350 - 7.450    pCO2, Arterial 61.9 (HH) 35.0 - 45.0 mmHg    pO2, Arterial 58.1 (LL) 80.0 - 100.0 mmHg    HCO3, Arterial 31.4 (H) 22.0 - 26.0 mmol/L    Positive Base Excess, Art 5.3 (H) 0.0 - 2.0 mmol/L    O2 Sat, Arterial 86.3 (LL) 95 - 98 %    Carboxyhemoglobin 2.1 0 - 5 %    Methemoglobin 0.8 0.0 - 1.9 %    Pt Temp 37.0     O2 Device/Flow/% VENTILATOR     Respiratory Rate 22     Tyron Test PASS     Sample Site Left Radial Artery     Pt. Position SEMI-FOWLERS     Mode PRVC     Set Rate 22     Total Rate 26          FIO2 40     Peep/Cpap 8    SURGICAL PATHOLOGY REPORT   Result Value Ref Range    Surgical Pathology Report       [id number]  [employer]  [facility]  ANATOMIC PATHOLOGY  [address] [place] [address],  [address]. [place], [address] [place], [place] [address] [place]  [phone]  Fax: [phone]  SURGICAL PATHOLOGY CONSULTATION    Patient Name: Mk Ng  MR#: [id number]  Specimen #[id number]    Procedures/Addenda  PERIPHERAL BLOOD REPORT     Date Ordered:     1/2/2023     Status:  Signed Out       Date Complete:     1/3/2023     By: Carlos Villegas M.D. Date Reported:     1/3/2023       INTERPRETATION  Peripheral blood:  Microcytic, hypochromic anemia. The patient's iron studies are compatible with iron deficiency anemia. Lymphopenia. Differential considerations include acute illness/infection,  medication effect, smoking, and debilitating diseases. Thrombocytopenia  Differential considerations include bone marrow hypoproduction and  immune destruction (idiopathic thrombocytopenic purpura, drug effect). RESULTS-COMMENTS  PERIPHERAL BLOOD STUDY    CBC: Please see the electronic health record  for CBC parameters  (, 01/02/2023, 05:43). PLATELETS: Decreased platelets. Platelets show normal morphology. LEUKOCYTES: Decreased lymphocytes. White blood cells show normal  morphology. There are no blasts. ERYTHROCYTES: Microcytic, hypochromic. Anisocytosis and  poikilocytosis. Polychromasia. Reticulocyte count 2.8%. Rare RBC  fragments. Note: The electronic health record is reviewed. Carlos Villegas M.D.                    Source:  A: Peripheral Blood     Comprehensive Metabolic Panel w/ Reflex to MG   Result Value Ref Range    Glucose 91 70 - 99 mg/dL    BUN 32 (H) 8 - 23 mg/dL    Creatinine 0.97 0.70 - 1.20 mg/dL    Est, Glom Filt Rate >60 >60 mL/min/1.73m2    Calcium 7.4 (L) 8.6 - 10.4 mg/dL    Sodium 143 135 - 144 mmol/L    Potassium 3.7 3.7 - 5.3 mmol/L    Chloride 105 98 - 107 mmol/L    CO2 30 20 - 31 mmol/L    Anion Gap 8 (L) 9 - 17 mmol/L    Alkaline Phosphatase 50 40 - 129 U/L     (H) 5 - 41 U/L     (H) <40 U/L    Total Bilirubin 0.9 0.3 - 1.2 mg/dL    Total Protein 5.2 (L) 6.4 - 8.3 g/dL    Albumin 3.1 (L) 3.5 - 5.2 g/dL   CBC with Auto Differential   Result Value Ref Range    WBC 5.7 3.5 - 11.0 k/uL    RBC 5.42 4.5 - 5.9 m/uL    Hemoglobin 11.0 (L) 13.5 - 17.5 g/dL    Hematocrit 37.6 (L) 41 - 53 %    MCV 69.4 (L) 80 - 100 fL    MCH 20.2 (L) 26 - 34 pg    MCHC 29.2 (L) 31 - 37 g/dL    RDW 20.0 (H) 11.5 - 14.9 %    Platelets 66 (L) 975 - 450 k/uL    MPV 9.4 6.0 - 12.0 fL    Seg Neutrophils 85 (H) 36 - 66 %    Lymphocytes 9 (L) 24 - 44 %    Monocytes 5 1 - 7 %    Eosinophils % 0 0 - 4 %    Basophils 0 0 - 2 %    Bands 1 0 - 10 %    nRBC 1 (H) 0 per 100 WBC    Segs Absolute 4.87 1.3 - 9.1 k/uL    Absolute Lymph # 0.52 (L) 1.0 - 4.8 k/uL    Absolute Mono # 0.29 0.1 - 1.3 k/uL    Absolute Eos # 0.00 0.0 - 0.4 k/uL    Basophils Absolute 0.00 0.0 - 0.2 k/uL    Absolute Bands # 0.06 0.0 - 1.0 k/uL    Morphology ANISOCYTOSIS PRESENT     Morphology HYPOCHROMIA PRESENT     Morphology 1+ POLYCHROMASIA     Morphology 1+ ELLIPTOCYTES    BLOOD GAS, ARTERIAL   Result Value Ref Range    pH, Arterial 7.373 7.350 - 7.450    pCO2, Arterial 56.0 (HH) 35.0 - 45.0 mmHg    pO2, Arterial 61.4 (L) 80.0 - 100.0 mmHg    HCO3, Arterial 32.6 (H) 22.0 - 26.0 mmol/L    Positive Base Excess, Art 7.4 (H) 0.0 - 2.0 mmol/L    O2 Sat, Arterial 89.2 (L) 95 - 98 %    Carboxyhemoglobin 1.7 0 - 5 %    Methemoglobin 0.9 0.0 - 1.9 %    Pt Temp 37     O2 Device/Flow/% VENTILATOR     Respiratory Rate 22     Tyron Test PASS     Sample Site Right Radial Artery     Pt. Position SEMI-FOWLERS     Mode PRVC     Set Rate 22     Total Rate 22          FIO2 35     Peep/Cpap 8     Text for Respiratory RESULTS GIVEN TO RN    Protime-INR   Result Value Ref Range    Protime 21.9 (H) 11.8 - 14.6 sec    INR 1.9    APTT   Result Value Ref Range    PTT 38.4 (H) 24.0 - 36.0 sec   Triglyceride   Result Value Ref Range    Triglycerides 85 <150 mg/dL   CHLORIDE, URINE, RANDOM   Result Value Ref Range    Chloride, Ur 34 mmol/L   Protein / Creatinine Ratio, Urine   Result Value Ref Range    Total Protein, Urine 23 mg/dL    Creatinine, Ur 79.8 39.0 - 259.0 mg/dL    Urine Total Protein Creatinine Ratio 0.29 (H) 0.00 - 0.20   SODIUM, URINE, RANDOM   Result Value Ref Range    Sodium,Ur <20 mmol/L   BLOOD GAS, ARTERIAL   Result Value Ref Range    pH, Arterial 7.360 7.350 - 7.450    pCO2, Arterial 55.9 (HH) 35.0 - 45.0 mmHg    pO2, Arterial 71.4 (L) 80.0 - 100.0 mmHg    HCO3, Arterial 31.6 (H) 22.0 - 26.0 mmol/L    Positive Base Excess, Art 6.1 (H) 0.0 - 2.0 mmol/L    O2 Sat, Arterial 91.7 (L) 95 - 98 %    Carboxyhemoglobin 1.2 0 - 5 %    Methemoglobin 1.3 0.0 - 1.9 %    Pt Temp 37     O2 Device/Flow/% VENTILATOR     Tyron Test PASS     Sample Site Right Radial Artery     Pt.  Position SEMI-FOWLERS     Mode PRVC     Text for Respiratory RESULTS GIVEN TO RN    CBC with Auto Differential   Result Value Ref Range    WBC 6.0 3.5 - 11.0 k/uL    RBC 5.56 4.5 - 5.9 m/uL    Hemoglobin 10.8 (L) 13.5 - 17.5 g/dL    Hematocrit 38.2 (L) 41 - 53 %    MCV 68.8 (L) 80 - 100 fL    MCH 19.5 (L) 26 - 34 pg    MCHC 28.3 (L) 31 - 37 g/dL    RDW 20.1 (H) 11.5 - 14.9 %    Platelets 75 (L) 737 - 450 k/uL    MPV 9.3 6.0 - 12.0 fL    Seg Neutrophils 82 (H) 36 - 66 %    Lymphocytes 7 (L) 24 - 44 %    Monocytes 9 (H) 1 - 7 %    Eosinophils % 1 0 - 4 %    Basophils 1 0 - 2 %    Segs Absolute 4.90 1.3 - 9.1 k/uL    Absolute Lymph # 0.40 (L) 1.0 - 4.8 k/uL    Absolute Mono # 0.50 0.1 - 1.3 k/uL    Absolute Eos # 0.10 0.0 - 0.4 k/uL    Basophils Absolute 0.10 0.0 - 0.2 k/uL   Comprehensive Metabolic Panel w/ Reflex to MG   Result Value Ref Range    Glucose 85 70 - 99 mg/dL    BUN 16 8 - 23 mg/dL    Creatinine 0.64 (L) 0.70 - 1.20 mg/dL    Est, Glom Filt Rate >60 >60 mL/min/1.73m2    Calcium 7.4 (L) 8.6 - 10.4 mg/dL    Sodium 143 135 - 144 mmol/L    Potassium 3.3 (L) 3.7 - 5.3 mmol/L    Chloride 107 98 - 107 mmol/L    CO2 29 20 - 31 mmol/L    Anion Gap 7 (L) 9 - 17 mmol/L    Alkaline Phosphatase 49 40 - 129 U/L     (H) 5 - 41 U/L     (H) <40 U/L    Total Bilirubin 0.8 0.3 - 1.2 mg/dL    Total Protein 5.0 (L) 6.4 - 8.3 g/dL    Albumin 2.8 (L) 3.5 - 5.2 g/dL   Vancomycin Level, Random   Result Value Ref Range    Vancomycin Rm 16.1 ug/mL    Vancomycin Random Dose amount 1g     Vancomycin Random Date last dose 1/4/22     Vancomycin Random Time last dose 0232    Magnesium   Result Value Ref Range    Magnesium 2.0 1.6 - 2.6 mg/dL   Hepatitis C RNA, quantitative, PCR   Result Value Ref Range    Source . PLASMA     Hepatitis C RNA-PCR 17,400 IU/mL    Hepatitis C RNA Quant Log 4.24 Log IU/mL    HCV RNA PCR, Quant DETECTED (A) Not Detected   CBC with Auto Differential   Result Value Ref Range    WBC 5.3 3.5 - 11.0 k/uL    RBC 5.80 4.5 - 5.9 m/uL    Hemoglobin 11.4 (L) 13.5 - 17.5 g/dL    Hematocrit 40.0 (L) 41 - 53 %    MCV 68.9 (L) 80 - 100 fL    MCH 19.7 (L) 26 - 34 pg    MCHC 28.5 (L) 31 - 37 g/dL    RDW 20.5 (H) 11.5 - 14.9 %    Platelets 66 (L) 312 - 450 k/uL    MPV 9.1 6.0 - 12.0 fL    Seg Neutrophils 79 (H) 36 - 66 %    Lymphocytes 8 (L) 24 - 44 %    Monocytes 10 (H) 1 - 7 %    Eosinophils % 2 0 - 4 %    Basophils 1 0 - 2 %    Segs Absolute 4.19 1.3 - 9.1 k/uL    Absolute Lymph # 0.42 (L) 1.0 - 4.8 k/uL    Absolute Mono # 0.53 0.1 - 1.3 k/uL    Absolute Eos # 0.11 0.0 - 0.4 k/uL    Basophils Absolute 0.05 0.0 - 0.2 k/uL    Morphology ANISOCYTOSIS PRESENT     Morphology MICROCYTOSIS PRESENT     Morphology HYPOCHROMIA PRESENT     Morphology FEW ELLIPTOCYTES     Morphology FEW TARGET CELLS    Comprehensive Metabolic Panel w/ Reflex to MG   Result Value Ref Range    Glucose 84 70 - 99 mg/dL    BUN 11 8 - 23 mg/dL    Creatinine 0.58 (L) 0.70 - 1.20 mg/dL    Est, Glom Filt Rate >60 >60 mL/min/1.73m2    Calcium 7.9 (L) 8.6 - 10.4 mg/dL    Sodium 146 (H) 135 - 144 mmol/L    Potassium 3.4 (L) 3.7 - 5.3 mmol/L    Chloride 110 (H) 98 - 107 mmol/L    CO2 30 20 - 31 mmol/L    Anion Gap 6 (L) 9 - 17 mmol/L    Alkaline Phosphatase 53 40 - 129 U/L     (H) 5 - 41 U/L     (H) <40 U/L    Total Bilirubin 0.8 0.3 - 1.2 mg/dL    Total Protein 5.0 (L) 6.4 - 8.3 g/dL    Albumin 2.9 (L) 3.5 - 5.2 g/dL   Fibrinogen   Result Value Ref Range    Fibrinogen 98 (L) 210 - 530 mg/dL   Protime-INR   Result Value Ref Range    Protime 20.3 (H) 11.8 - 14.6 sec    INR 1.7    APTT   Result Value Ref Range    PTT 41.6 (H) 24.0 - 36.0 sec   BLOOD GAS, ARTERIAL   Result Value Ref Range    pH, Arterial 7.372 7.350 - 7.450    pCO2, Arterial 51.6 (HH) 35.0 - 45.0 mmHg    pO2, Arterial 62.9 (L) 80.0 - 100.0 mmHg    HCO3, Arterial 29.9 (H) 22.0 - 26.0 mmol/L    Positive Base Excess, Art 4.7 (H) 0.0 - 2.0 mmol/L    O2 Sat, Arterial 89.7 (L) 95 - 98 %    Carboxyhemoglobin 1.7 0 - 5 %    Methemoglobin 1.1 0.0 - 1.9 %    Pt Temp 37     O2 Device/Flow/% VENTILATOR     Respiratory Rate 22     Tyron Test PASS     Sample Site Right Radial Artery     Pt.  Position SEMI-FOWLERS     Mode PRVC     Set Rate 22     Total Rate 22          FIO2 40     Peep/Cpap 8     Text for Respiratory RESULTS GIVEN TO RN    Magnesium   Result Value Ref Range    Magnesium 2.0 1.6 - 2.6 mg/dL   CBC with Auto Differential   Result Value Ref Range    WBC 5.2 3.5 - 11.0 k/uL    RBC 5.33 4.5 - 5.9 m/uL    Hemoglobin 10.5 (L) 13.5 - 17.5 g/dL    Hematocrit 38.4 (L) 41 - 53 %    MCV 72.0 (L) 80 - 100 fL    MCH 19.7 (L) 26 - 34 pg    MCHC 27.4 (L) 31 - 37 g/dL    RDW 20.3 (H) 11.5 - 14.9 %    Platelets 71 (L) 251 - 450 k/uL    MPV 9.3 6.0 - 12.0 fL    Seg Neutrophils 90 (H) 36 - 66 %    Lymphocytes 4 (L) 24 - 44 %    Monocytes 5 1 - 7 %    Eosinophils % 0 0 - 4 %    Basophils 0 0 - 2 %    Bands 1 0 - 10 %    Segs Absolute 4.68 1.3 - 9.1 k/uL    Absolute Lymph # 0.21 (L) 1.0 - 4.8 k/uL    Absolute Mono # 0.26 0.1 - 1.3 k/uL    Absolute Eos # 0.00 0.0 - 0.4 k/uL    Basophils Absolute 0.00 0.0 - 0.2 k/uL    Absolute Bands # 0.05 0.0 - 1.0 k/uL    Morphology ANISOCYTOSIS PRESENT     Morphology HYPOCHROMIA PRESENT     Morphology MICROCYTOSIS PRESENT     Morphology 1+ ELLIPTOCYTES     Morphology 1+ TEARDROPS    Comprehensive Metabolic Panel w/ Reflex to MG   Result Value Ref Range    Glucose 111 (H) 70 - 99 mg/dL    BUN 7 (L) 8 - 23 mg/dL    Creatinine 0.42 (L) 0.70 - 1.20 mg/dL    Est, Glom Filt Rate >60 >60 mL/min/1.73m2    Calcium 7.5 (L) 8.6 - 10.4 mg/dL    Sodium 144 135 - 144 mmol/L    Potassium 4.0 3.7 - 5.3 mmol/L    Chloride 111 (H) 98 - 107 mmol/L    CO2 29 20 - 31 mmol/L    Anion Gap 4 (L) 9 - 17 mmol/L    Alkaline Phosphatase 46 40 - 129 U/L     (H) 5 - 41 U/L    AST 74 (H) <40 U/L    Total Bilirubin 0.9 0.3 - 1.2 mg/dL    Total Protein 4.6 (L) 6.4 - 8.3 g/dL    Albumin 2.6 (L) 3.5 - 5.2 g/dL   Vancomycin Level, Random   Result Value Ref Range    Vancomycin Rm 20.3 ug/mL    Vancomycin Random Dose amount 1,250     Vancomycin Random Date last dose 104387     Vancomycin Random Time last dose 0546    Fibrinogen   Result Value Ref Range    Fibrinogen 109 (L) 210 - 530 mg/dL   Protime-INR   Result Value Ref Range    Protime 20.1 (H) 11.8 - 14.6 sec    INR 1.7    Comprehensive Metabolic Panel w/ Reflex to MG   Result Value Ref Range    Glucose 91 70 - 99 mg/dL    BUN 5 (L) 8 - 23 mg/dL    Creatinine <0.40 (L) 0.70 - 1.20 mg/dL    Est, Glom Filt Rate Can not be calculated >60 mL/min/1.73m2    Calcium 8.1 (L) 8.6 - 10.4 mg/dL    Sodium 147 (H) 135 - 144 mmol/L    Potassium 4.1 3.7 - 5.3 mmol/L    Chloride 108 (H) 98 - 107 mmol/L    CO2 26 20 - 31 mmol/L    Anion Gap 13 9 - 17 mmol/L    Alkaline Phosphatase 51 40 - 129 U/L     (H) 5 - 41 U/L    AST 63 (H) <40 U/L    Total Bilirubin 1.6 (H) 0.3 - 1.2 mg/dL    Total Protein 5.3 (L) 6.4 - 8.3 g/dL    Albumin 2.9 (L) 3.5 - 5.2 g/dL   SPECIMEN REJECTION   Result Value Ref Range    Specimen Source . BLOOD     Ordered Test CDP     Reason for Rejection Unable to perform testing: Specimen clotted. CBC with Auto Differential   Result Value Ref Range    WBC 7.1 3.5 - 11.0 k/uL    RBC 5.90 4.5 - 5.9 m/uL    Hemoglobin 11.7 (L) 13.5 - 17.5 g/dL    Hematocrit 41.4 41 - 53 %    MCV 70.2 (L) 80 - 100 fL    MCH 19.8 (L) 26 - 34 pg    MCHC 28.2 (L) 31 - 37 g/dL    RDW 20.4 (H) 11.5 - 14.9 %    Platelets 68 (L) 910 - 450 k/uL    MPV 9.1 6.0 - 12.0 fL    Seg Neutrophils 82 (H) 36 - 66 %    Lymphocytes 10 (L) 24 - 44 %    Monocytes 6 1 - 7 %    Eosinophils % 1 0 - 4 %    Basophils 1 0 - 2 %    Segs Absolute 5.82 1.3 - 9.1 k/uL    Absolute Lymph # 0.71 (L) 1.0 - 4.8 k/uL    Absolute Mono # 0.43 0.1 - 1.3 k/uL    Absolute Eos # 0.07 0.0 - 0.4 k/uL    Basophils Absolute 0.07 0.0 - 0.2 k/uL    Morphology ANISOCYTOSIS PRESENT     Morphology 1+ TARGET CELLS     Morphology 1+ TEARDROPS     Morphology 1+ ELLIPTOCYTES    Protime-INR   Result Value Ref Range    Protime 19.4 (H) 11.8 - 14.6 sec    INR 1.6    APTT   Result Value Ref Range    PTT 35.4 24.0 - 36.0 sec   HEPATITIS C RNA, QUANTITATIVE, PCR   Result Value Ref Range    Source . PLASMA     Hepatitis C RNA-PCR 5,680 IU/mL    Hepatitis C RNA Quant Log 3.75 Log IU/mL    HCV RNA PCR, Quant DETECTED (A) Not Detected   CBC with Auto Differential   Result Value Ref Range    WBC 5.9 3.5 - 11.0 k/uL    RBC 5.61 4.5 - 5.9 m/uL    Hemoglobin 11.4 (L) 13.5 - 17.5 g/dL    Hematocrit 39.3 (L) 41 - 53 %    MCV 70.0 (L) 80 - 100 fL    MCH 20.2 (L) 26 - 34 pg MCHC 28.9 (L) 31 - 37 g/dL    RDW 20.2 (H) 11.5 - 14.9 %    Platelets 63 (L) 883 - 450 k/uL    MPV 8.8 6.0 - 12.0 fL    Seg Neutrophils 76 (H) 36 - 66 %    Lymphocytes 11 (L) 24 - 44 %    Monocytes 11 (H) 1 - 7 %    Eosinophils % 1 0 - 4 %    Basophils 1 0 - 2 %    Segs Absolute 4.48 1.3 - 9.1 k/uL    Absolute Lymph # 0.65 (L) 1.0 - 4.8 k/uL    Absolute Mono # 0.65 0.1 - 1.3 k/uL    Absolute Eos # 0.06 0.0 - 0.4 k/uL    Basophils Absolute 0.06 0.0 - 0.2 k/uL    Morphology ANISOCYTOSIS PRESENT     Morphology MICROCYTOSIS PRESENT     Morphology HYPOCHROMIA PRESENT     Morphology 1+ TARGET CELLS     Morphology 1+ ELLIPTOCYTES    Comprehensive Metabolic Panel w/ Reflex to MG   Result Value Ref Range    Glucose 118 (H) 70 - 99 mg/dL    BUN 4 (L) 8 - 23 mg/dL    Creatinine <0.40 (L) 0.70 - 1.20 mg/dL    Est, Glom Filt Rate Can not be calculated >60 mL/min/1.73m2    Calcium 8.1 (L) 8.6 - 10.4 mg/dL    Sodium 147 (H) 135 - 144 mmol/L    Potassium 3.3 (L) 3.7 - 5.3 mmol/L    Chloride 109 (H) 98 - 107 mmol/L    CO2 34 (H) 20 - 31 mmol/L    Anion Gap 4 (L) 9 - 17 mmol/L    Alkaline Phosphatase 45 40 - 129 U/L    ALT 99 (H) 5 - 41 U/L    AST 38 <40 U/L    Total Bilirubin 1.7 (H) 0.3 - 1.2 mg/dL    Total Protein 5.1 (L) 6.4 - 8.3 g/dL    Albumin 2.8 (L) 3.5 - 5.2 g/dL   Fibrinogen   Result Value Ref Range    Fibrinogen 151 (L) 210 - 530 mg/dL   APTT   Result Value Ref Range    PTT 34.4 24.0 - 36.0 sec   Protime-INR   Result Value Ref Range    Protime 19.1 (H) 11.8 - 14.6 sec    INR 1.6    Magnesium   Result Value Ref Range    Magnesium 1.8 1.6 - 2.6 mg/dL   CBC with Auto Differential   Result Value Ref Range    WBC 6.3 3.5 - 11.0 k/uL    RBC 5.76 4.5 - 5.9 m/uL    Hemoglobin 11.7 (L) 13.5 - 17.5 g/dL    Hematocrit 40.3 (L) 41 - 53 %    MCV 70.0 (L) 80 - 100 fL    MCH 20.3 (L) 26 - 34 pg    MCHC 29.0 (L) 31 - 37 g/dL    RDW 20.5 (H) 11.5 - 14.9 %    Platelets 65 (L) 457 - 450 k/uL    MPV 8.6 6.0 - 12.0 fL    Seg Neutrophils 77 (H) 36 - 66 %    Lymphocytes 11 (L) 24 - 44 %    Monocytes 10 (H) 1 - 7 %    Eosinophils % 1 0 - 4 %    Basophils 1 0 - 2 %    Segs Absolute 4.86 1.3 - 9.1 k/uL    Absolute Lymph # 0.69 (L) 1.0 - 4.8 k/uL    Absolute Mono # 0.63 0.1 - 1.3 k/uL    Absolute Eos # 0.06 0.0 - 0.4 k/uL    Basophils Absolute 0.06 0.0 - 0.2 k/uL    Morphology HYPOCHROMIA PRESENT     Morphology MICROCYTOSIS PRESENT     Morphology ANISOCYTOSIS PRESENT     Morphology 1+ POLYCHROMASIA     Morphology 1+ TARGET CELLS     Morphology 2+ ECHINOCYTES    Comprehensive Metabolic Panel w/ Reflex to MG   Result Value Ref Range    Glucose 101 (H) 70 - 99 mg/dL    BUN 3 (L) 8 - 23 mg/dL    Creatinine <0.40 (L) 0.70 - 1.20 mg/dL    Est, Glom Filt Rate Can not be calculated >60 mL/min/1.73m2    Calcium 8.0 (L) 8.6 - 10.4 mg/dL    Sodium 142 135 - 144 mmol/L    Potassium 3.3 (L) 3.7 - 5.3 mmol/L    Chloride 104 98 - 107 mmol/L    CO2 35 (H) 20 - 31 mmol/L    Anion Gap 3 (L) 9 - 17 mmol/L    Alkaline Phosphatase 47 40 - 129 U/L    ALT 81 (H) 5 - 41 U/L    AST 32 <40 U/L    Total Bilirubin 1.8 (H) 0.3 - 1.2 mg/dL    Total Protein 5.3 (L) 6.4 - 8.3 g/dL    Albumin 2.8 (L) 3.5 - 5.2 g/dL   Brain Natriuretic Peptide   Result Value Ref Range    Pro-BNP 7,250 (H) <300 pg/mL   Magnesium   Result Value Ref Range    Magnesium 1.7 1.6 - 2.6 mg/dL   Comprehensive Metabolic Panel w/ Reflex to MG   Result Value Ref Range    Glucose 96 70 - 99 mg/dL    BUN 3 (L) 8 - 23 mg/dL    Creatinine <0.40 (L) 0.70 - 1.20 mg/dL    Est, Glom Filt Rate Can not be calculated >60 mL/min/1.73m2    Calcium 8.6 8.6 - 10.4 mg/dL    Sodium 140 135 - 144 mmol/L    Potassium 3.3 (L) 3.7 - 5.3 mmol/L    Chloride 97 (L) 98 - 107 mmol/L    CO2 33 (H) 20 - 31 mmol/L    Anion Gap 10 9 - 17 mmol/L    Alkaline Phosphatase 52 40 - 129 U/L    ALT 74 (H) 5 - 41 U/L    AST 41 (H) <40 U/L    Total Bilirubin 2.3 (H) 0.3 - 1.2 mg/dL    Total Protein 6.1 (L) 6.4 - 8.3 g/dL    Albumin 3.3 (L) 3.5 - 5.2 g/dL CBC with Auto Differential   Result Value Ref Range    WBC 7.2 3.5 - 11.0 k/uL    RBC 6.52 (H) 4.5 - 5.9 m/uL    Hemoglobin 13.6 13.5 - 17.5 g/dL    Hematocrit 45.3 41 - 53 %    MCV 69.5 (L) 80 - 100 fL    MCH 20.9 (L) 26 - 34 pg    MCHC 30.0 (L) 31 - 37 g/dL    RDW 21.5 (H) 11.5 - 14.9 %    Platelets 67 (L) 233 - 450 k/uL    MPV 8.6 6.0 - 12.0 fL    Seg Neutrophils 77 (H) 36 - 66 %    Lymphocytes 10 (L) 24 - 44 %    Monocytes 10 (H) 1 - 7 %    Eosinophils % 1 0 - 4 %    Basophils 2 0 - 2 %    Segs Absolute 5.55 1.3 - 9.1 k/uL    Absolute Lymph # 0.72 (L) 1.0 - 4.8 k/uL    Absolute Mono # 0.72 0.1 - 1.3 k/uL    Absolute Eos # 0.07 0.0 - 0.4 k/uL    Basophils Absolute 0.14 0.0 - 0.2 k/uL    Morphology ANISOCYTOSIS PRESENT     Morphology HYPOCHROMIA PRESENT     Morphology MICROCYTOSIS PRESENT     Morphology GIANT PLATELETS    Magnesium   Result Value Ref Range    Magnesium 1.5 (L) 1.6 - 2.6 mg/dL   SPECIMEN REJECTION   Result Value Ref Range    Specimen Source . Random Urine     Ordered Test URIFX     Reason for Rejection       Unable to perform testing: Specimen quantity not sufficient.    LOR Screen with Reflex   Result Value Ref Range    LOR NEGATIVE NEGATIVE    PHAM Antibodies Screen 0.3 <0.7 U/mL    Anti ds DNA 4.2 <10.0 IU/mL   C-Reactive Protein   Result Value Ref Range    CRP 10.0 (H) 0.0 - 5.0 mg/L   Homocysteine   Result Value Ref Range    Homocysteine 8.2 <15.0 umol/L   Myeloperoxidase Ab   Result Value Ref Range    Myeloperoxidase Ab 0 0 - 19 AU/mL   Sedimentation Rate   Result Value Ref Range    Sed Rate 2 0 - 20 mm/Hr   Comprehensive Metabolic Panel w/ Reflex to MG   Result Value Ref Range    Glucose 90 70 - 99 mg/dL    BUN 4 (L) 8 - 23 mg/dL    Creatinine 0.42 (L) 0.70 - 1.20 mg/dL    Est, Glom Filt Rate >60 >60 mL/min/1.73m2    Calcium 8.1 (L) 8.6 - 10.4 mg/dL    Sodium 141 135 - 144 mmol/L    Potassium 3.0 (L) 3.7 - 5.3 mmol/L    Chloride 98 98 - 107 mmol/L    CO2 37 (H) 20 - 31 mmol/L    Anion Gap 6 (L) 9 - 17 mmol/L    Alkaline Phosphatase 48 40 - 129 U/L    ALT 52 (H) 5 - 41 U/L    AST 30 <40 U/L    Total Bilirubin 1.7 (H) 0.3 - 1.2 mg/dL    Total Protein 5.4 (L) 6.4 - 8.3 g/dL    Albumin 2.7 (L) 3.5 - 5.2 g/dL   CBC with Auto Differential   Result Value Ref Range    WBC 6.5 3.5 - 11.0 k/uL    RBC 6.23 (H) 4.5 - 5.9 m/uL    Hemoglobin 12.9 (L) 13.5 - 17.5 g/dL    Hematocrit 43.6 41 - 53 %    MCV 70.0 (L) 80 - 100 fL    MCH 20.8 (L) 26 - 34 pg    MCHC 29.7 (L) 31 - 37 g/dL    RDW 21.4 (H) 11.5 - 14.9 %    Platelets 85 (L) 908 - 450 k/uL    MPV 8.9 6.0 - 12.0 fL    Seg Neutrophils 77 (H) 36 - 66 %    Lymphocytes 11 (L) 24 - 44 %    Monocytes 10 (H) 1 - 7 %    Eosinophils % 1 0 - 4 %    Basophils 1 0 - 2 %    Segs Absolute 4.99 1.3 - 9.1 k/uL    Absolute Lymph # 0.72 (L) 1.0 - 4.8 k/uL    Absolute Mono # 0.65 0.1 - 1.3 k/uL    Absolute Eos # 0.07 0.0 - 0.4 k/uL    Basophils Absolute 0.07 0.0 - 0.2 k/uL    Morphology ANISOCYTOSIS PRESENT     Morphology MICROCYTOSIS PRESENT     Morphology HYPOCHROMIA PRESENT     Morphology 1+ TARGET CELLS    Magnesium   Result Value Ref Range    Magnesium 1.8 1.6 - 2.6 mg/dL   Potassium   Result Value Ref Range    Potassium 3.3 (L) 3.7 - 5.3 mmol/L   Comprehensive Metabolic Panel w/ Reflex to MG   Result Value Ref Range    Glucose 121 (H) 70 - 99 mg/dL    BUN 6 (L) 8 - 23 mg/dL    Creatinine 0.60 (L) 0.70 - 1.20 mg/dL    Est, Glom Filt Rate >60 >60 mL/min/1.73m2    Calcium 8.1 (L) 8.6 - 10.4 mg/dL    Sodium 140 135 - 144 mmol/L    Potassium 3.9 3.7 - 5.3 mmol/L    Chloride 100 98 - 107 mmol/L    CO2 37 (H) 20 - 31 mmol/L    Anion Gap 3 (L) 9 - 17 mmol/L    Alkaline Phosphatase 46 40 - 129 U/L    ALT 39 5 - 41 U/L    AST 25 <40 U/L    Total Bilirubin 1.6 (H) 0.3 - 1.2 mg/dL    Total Protein 5.2 (L) 6.4 - 8.3 g/dL    Albumin 2.7 (L) 3.5 - 5.2 g/dL   CBC with Auto Differential   Result Value Ref Range    WBC 6.4 3.5 - 11.0 k/uL    RBC 5.77 4.5 - 5.9 m/uL    Hemoglobin 12.2 (L) 13.5 - 17.5 g/dL    Hematocrit 40.8 (L) 41 - 53 %    MCV 70.7 (L) 80 - 100 fL    MCH 21.1 (L) 26 - 34 pg    MCHC 29.8 (L) 31 - 37 g/dL    RDW 21.3 (H) 11.5 - 14.9 %    Platelets 88 (L) 845 - 450 k/uL    MPV 8.6 6.0 - 12.0 fL    Seg Neutrophils 79 (H) 36 - 66 %    Lymphocytes 7 (L) 24 - 44 %    Monocytes 11 (H) 1 - 7 %    Eosinophils % 1 0 - 4 %    Basophils 2 0 - 2 %    Segs Absolute 5.06 1.3 - 9.1 k/uL    Absolute Lymph # 0.45 (L) 1.0 - 4.8 k/uL    Absolute Mono # 0.70 0.1 - 1.3 k/uL    Absolute Eos # 0.06 0.0 - 0.4 k/uL    Basophils Absolute 0.13 0.0 - 0.2 k/uL    Morphology ANISOCYTOSIS PRESENT     Morphology HYPOCHROMIA PRESENT     Morphology MICROCYTOSIS PRESENT    Vancomycin Level, Random   Result Value Ref Range    Vancomycin Rm 22.9 ug/mL    Vancomycin Random Dose amount 1250 MG     Vancomycin Random Date last dose [id number]     Vancomycin Random Time last dose 1646    Potassium   Result Value Ref Range    Potassium 4.1 3.7 - 5.3 mmol/L   Phosphorus   Result Value Ref Range    Phosphorus 2.5 2.5 - 4.5 mg/dL   Comprehensive Metabolic Panel w/ Reflex to MG   Result Value Ref Range    Glucose 112 (H) 70 - 99 mg/dL    BUN 7 (L) 8 - 23 mg/dL    Creatinine 0.74 0.70 - 1.20 mg/dL    Est, Glom Filt Rate >60 >60 mL/min/1.73m2    Calcium 8.4 (L) 8.6 - 10.4 mg/dL    Sodium 141 135 - 144 mmol/L    Potassium 3.5 (L) 3.7 - 5.3 mmol/L    Chloride 100 98 - 107 mmol/L    CO2 34 (H) 20 - 31 mmol/L    Anion Gap 7 (L) 9 - 17 mmol/L    Alkaline Phosphatase 49 40 - 129 U/L    ALT 35 5 - 41 U/L    AST 27 <40 U/L    Total Bilirubin 1.6 (H) 0.3 - 1.2 mg/dL    Total Protein 5.7 (L) 6.4 - 8.3 g/dL    Albumin 2.7 (L) 3.5 - 5.2 g/dL   CBC with Auto Differential   Result Value Ref Range    WBC 7.3 3.5 - 11.0 k/uL    RBC 6.14 (H) 4.5 - 5.9 m/uL    Hemoglobin 13.0 (L) 13.5 - 17.5 g/dL    Hematocrit 43.9 41 - 53 %    MCV 71.6 (L) 80 - 100 fL    MCH 21.2 (L) 26 - 34 pg    MCHC 29.6 (L) 31 - 37 g/dL    RDW 25.5 (H) 11.5 - 14.9 %    Platelets 81 (L) 150 - 450 k/uL    MPV 8.6 6.0 - 12.0 fL    Seg Neutrophils 79 (H) 36 - 66 %    Lymphocytes 7 (L) 24 - 44 %    Monocytes 12 (H) 1 - 7 %    Eosinophils % 1 0 - 4 %    Basophils 1 0 - 2 %    Segs Absolute 5.77 1.3 - 9.1 k/uL    Absolute Lymph # 0.51 (L) 1.0 - 4.8 k/uL    Absolute Mono # 0.88 0.1 - 1.3 k/uL    Absolute Eos # 0.07 0.0 - 0.4 k/uL    Basophils Absolute 0.07 0.0 - 0.2 k/uL    Morphology ANISOCYTOSIS PRESENT     Morphology MICROCYTOSIS PRESENT     Morphology HYPOCHROMIA PRESENT     Morphology FEW ELLIPTOCYTES    Magnesium   Result Value Ref Range    Magnesium 1.7 1.6 - 2.6 mg/dL   Potassium   Result Value Ref Range    Potassium 4.0 3.7 - 5.3 mmol/L   Comprehensive Metabolic Panel w/ Reflex to MG   Result Value Ref Range    Glucose 121 (H) 70 - 99 mg/dL    BUN 9 8 - 23 mg/dL    Creatinine 0.73 0.70 - 1.20 mg/dL    Est, Glom Filt Rate >60 >60 mL/min/1.73m2    Calcium 8.4 (L) 8.6 - 10.4 mg/dL    Sodium 139 135 - 144 mmol/L    Potassium 3.3 (L) 3.7 - 5.3 mmol/L    Chloride 97 (L) 98 - 107 mmol/L    CO2 37 (H) 20 - 31 mmol/L    Anion Gap 5 (L) 9 - 17 mmol/L    Alkaline Phosphatase 46 40 - 129 U/L    ALT 30 5 - 41 U/L    AST 23 <40 U/L    Total Bilirubin 1.3 (H) 0.3 - 1.2 mg/dL    Total Protein 5.5 (L) 6.4 - 8.3 g/dL    Albumin 2.8 (L) 3.5 - 5.2 g/dL   CBC with Auto Differential   Result Value Ref Range    WBC 6.6 3.5 - 11.0 k/uL    RBC 5.72 4.5 - 5.9 m/uL    Hemoglobin 12.2 (L) 13.5 - 17.5 g/dL    Hematocrit 41.3 41 - 53 %    MCV 72.1 (L) 80 - 100 fL    MCH 21.3 (L) 26 - 34 pg    MCHC 29.5 (L) 31 - 37 g/dL    RDW 29.8 (H) 11.5 - 14.9 %    Platelets 192 (L) 028 - 450 k/uL    MPV 9.2 6.0 - 12.0 fL    Seg Neutrophils 77 (H) 36 - 66 %    Lymphocytes 9 (L) 24 - 44 %    Monocytes 13 (H) 1 - 7 %    Eosinophils % 0 0 - 4 %    Basophils 1 0 - 2 %    Segs Absolute 5.08 1.3 - 9.1 k/uL    Absolute Lymph # 0.59 (L) 1.0 - 4.8 k/uL    Absolute Mono # 0.86 0.1 - 1.3 k/uL    Absolute Eos # 0.00 0.0 - 0.4 k/uL    Basophils Absolute 0.07 0.0 - 0.2 k/uL    Morphology ANISOCYTOSIS PRESENT     Morphology HYPOCHROMIA PRESENT     Morphology MICROCYTOSIS PRESENT     Morphology FEW ELLIPTOCYTES     Morphology FEW TEARDROPS    Magnesium   Result Value Ref Range    Magnesium 1.7 1.6 - 2.6 mg/dL   CBC with Auto Differential   Result Value Ref Range    WBC 6.3 3.5 - 11.0 k/uL    RBC 5.69 4.5 - 5.9 m/uL    Hemoglobin 12.2 (L) 13.5 - 17.5 g/dL    Hematocrit 40.9 (L) 41 - 53 %    MCV 71.9 (L) 80 - 100 fL    MCH 21.5 (L) 26 - 34 pg    MCHC 29.9 (L) 31 - 37 g/dL    RDW 31.3 (H) 11.5 - 14.9 %    Platelets 883 (L) 860 - 450 k/uL    MPV 9.4 6.0 - 12.0 fL    Seg Neutrophils 76 (H) 36 - 66 %    Lymphocytes 12 (L) 24 - 44 %    Monocytes 10 (H) 1 - 7 %    Eosinophils % 1 0 - 4 %    Basophils 1 0 - 2 %    Segs Absolute 4.79 1.3 - 9.1 k/uL    Absolute Lymph # 0.76 (L) 1.0 - 4.8 k/uL    Absolute Mono # 0.63 0.1 - 1.3 k/uL    Absolute Eos # 0.06 0.0 - 0.4 k/uL    Basophils Absolute 0.06 0.0 - 0.2 k/uL    Morphology ANISOCYTOSIS PRESENT     Morphology HYPOCHROMIA PRESENT     Morphology MICROCYTOSIS PRESENT     Morphology FEW ELLIPTOCYTES    Comprehensive Metabolic Panel w/ Reflex to MG   Result Value Ref Range    Glucose 127 (H) 70 - 99 mg/dL    BUN 11 8 - 23 mg/dL    Creatinine 0.78 0.70 - 1.20 mg/dL    Est, Glom Filt Rate >60 >60 mL/min/1.73m2    Calcium 8.5 (L) 8.6 - 10.4 mg/dL    Sodium 142 135 - 144 mmol/L    Potassium 3.4 (L) 3.7 - 5.3 mmol/L    Chloride 99 98 - 107 mmol/L    CO2 37 (H) 20 - 31 mmol/L    Anion Gap 6 (L) 9 - 17 mmol/L    Alkaline Phosphatase 46 40 - 129 U/L    ALT 24 5 - 41 U/L    AST 26 <40 U/L    Total Bilirubin 1.2 0.3 - 1.2 mg/dL    Total Protein 5.9 (L) 6.4 - 8.3 g/dL    Albumin 2.8 (L) 3.5 - 5.2 g/dL   Magnesium   Result Value Ref Range    Magnesium 1.7 1.6 - 2.6 mg/dL   CBC with Auto Differential   Result Value Ref Range    WBC 6.4 3.5 - 11.0 k/uL    RBC 5.75 4.5 - 5.9 m/uL    Hemoglobin 12.4 (L) 13.5 - 17.5 g/dL    Hematocrit 41.8 41 - 53 %    MCV 72.8 (L) 80 - 100 fL    MCH 21.6 (L) 26 - 34 pg    MCHC 29.6 (L) 31 - 37 g/dL    RDW 31.8 (H) 11.5 - 14.9 %    Platelets 138 354 - 844 k/uL    MPV 9.3 6.0 - 12.0 fL    Seg Neutrophils 77 (H) 36 - 66 %    Lymphocytes 11 (L) 24 - 44 %    Monocytes 10 (H) 1 - 7 %    Eosinophils % 0 0 - 4 %    Basophils 2 0 - 2 %    Segs Absolute 4.93 1.3 - 9.1 k/uL    Absolute Lymph # 0.70 (L) 1.0 - 4.8 k/uL    Absolute Mono # 0.64 0.1 - 1.3 k/uL    Absolute Eos # 0.00 0.0 - 0.4 k/uL    Basophils Absolute 0.13 0.0 - 0.2 k/uL    Morphology ANISOCYTOSIS PRESENT     Morphology HYPOCHROMIA PRESENT     Morphology MICROCYTOSIS PRESENT     Morphology FEW ELLIPTOCYTES    Comprehensive Metabolic Panel w/ Reflex to MG   Result Value Ref Range    Glucose 111 (H) 70 - 99 mg/dL    BUN 11 8 - 23 mg/dL    Creatinine 0.81 0.70 - 1.20 mg/dL    Est, Glom Filt Rate >60 >60 mL/min/1.73m2    Calcium 8.9 8.6 - 10.4 mg/dL    Sodium 146 (H) 135 - 144 mmol/L    Potassium 3.8 3.7 - 5.3 mmol/L    Chloride 102 98 - 107 mmol/L    CO2 37 (H) 20 - 31 mmol/L    Anion Gap 7 (L) 9 - 17 mmol/L    Alkaline Phosphatase 48 40 - 129 U/L    ALT 24 5 - 41 U/L    AST 27 <40 U/L    Total Bilirubin 1.6 (H) 0.3 - 1.2 mg/dL    Total Protein 6.6 6.4 - 8.3 g/dL    Albumin 3.1 (L) 3.5 - 5.2 g/dL   CBC with Auto Differential   Result Value Ref Range    WBC 6.2 3.5 - 11.0 k/uL    RBC 5.12 4.5 - 5.9 m/uL    Hemoglobin 11.1 (L) 13.5 - 17.5 g/dL    Hematocrit 37.6 (L) 41 - 53 %    MCV 73.5 (L) 80 - 100 fL    MCH 21.7 (L) 26 - 34 pg    MCHC 29.6 (L) 31 - 37 g/dL    RDW 31.1 (H) 11.5 - 14.9 %    Platelets 341 712 - 316 k/uL    MPV 8.9 6.0 - 12.0 fL    Seg Neutrophils 75 (H) 36 - 66 %    Lymphocytes 11 (L) 24 - 44 %    Monocytes 10 (H) 1 - 7 %    Eosinophils % 1 0 - 4 %    Basophils 3 (H) 0 - 2 %    Segs Absolute 4.65 1.3 - 9.1 k/uL    Absolute Lymph # 0.68 (L) 1.0 - 4.8 k/uL    Absolute Mono # 0.62 0.1 - 1.3 k/uL    Absolute Eos # 0.06 0.0 - 0.4 k/uL    Basophils Absolute 0.19 0.0 - 0.2 k/uL    Morphology ANISOCYTOSIS PRESENT     Morphology MICROCYTOSIS PRESENT     Morphology HYPOCHROMIA PRESENT     Morphology 1+ ELLIPTOCYTES    Comprehensive Metabolic Panel w/ Reflex to MG   Result Value Ref Range    Glucose 101 (H) 70 - 99 mg/dL    BUN 10 8 - 23 mg/dL    Creatinine 0.74 0.70 - 1.20 mg/dL    Est, Glom Filt Rate >60 >60 mL/min/1.73m2    Calcium 8.6 8.6 - 10.4 mg/dL    Sodium 143 135 - 144 mmol/L    Potassium 3.9 3.7 - 5.3 mmol/L    Chloride 103 98 - 107 mmol/L    CO2 34 (H) 20 - 31 mmol/L    Anion Gap 6 (L) 9 - 17 mmol/L    Alkaline Phosphatase 44 40 - 129 U/L    ALT 23 5 - 41 U/L    AST 29 <40 U/L    Total Bilirubin 1.3 (H) 0.3 - 1.2 mg/dL    Total Protein 6.3 (L) 6.4 - 8.3 g/dL    Albumin 2.9 (L) 3.5 - 5.2 g/dL     *Note: Due to a large number of results and/or encounters for the requested time period, some results have not been displayed. A complete set of results can be found in Results Review. IMAGING DATA:    CT HEAD WO CONTRAST    Result Date: 1/1/2023  EXAMINATION: CT OF THE HEAD WITHOUT CONTRAST  1/1/2023 10:48 pm TECHNIQUE: CT of the head was performed without the administration of intravenous contrast. Automated exposure control, iterative reconstruction, and/or weight based adjustment of the mA/kV was utilized to reduce the radiation dose to as low as reasonably achievable. COMPARISON: None. HISTORY: Reason for Exam: AMS Additional signs and symptoms: the patient has been getting more and more confused since 2 weeks ago. It has progressively been getting worse and today FINDINGS: BRAIN/VENTRICLES: There is no acute intracranial hemorrhage, mass effect or midline shift. No abnormal extra-axial fluid collection. The gray-white differentiation is maintained without evidence of an acute infarct. There is no evidence of hydrocephalus. Changes of mild chronic small vessel ischemic disease. ORBITS: The visualized portion of the orbits demonstrate no acute abnormality. SINUSES: The visualized paranasal sinuses and mastoid air cells demonstrate no acute abnormality. SOFT TISSUES/SKULL:  No acute abnormality of the visualized skull or soft tissues. No acute intracranial abnormality. Mild chronic small vessel ischemic disease. XR CHEST PORTABLE    Result Date: 1/1/2023  EXAMINATION: ONE XRAY VIEW OF THE CHEST 1/1/2023 7:17 pm COMPARISON: None. HISTORY: ORDERING SYSTEM PROVIDED HISTORY: ams TECHNOLOGIST PROVIDED HISTORY: ams Reason for Exam: Altered mental status, difficulty breathing Additional signs and symptoms: Altered mental status, difficulty breathing Relevant Medical/Surgical History: Altered mental status, difficulty breathing FINDINGS: Large lucent area in the left apex suggesting a large bulla however superimposed pneumothorax cannot be excluded. The cardiac size is normal. Right lower lobe airspace infiltrate and mild right pleural effusion. . Pulmonary vascularity appears normal. There is mild ectasia of the thoracic aorta. Calcific tendinitis of the right shoulder. No acute bony abnormalities. The hilar structures are normal.     Right lower lobe pneumonia and small right pleural effusion Large lucent area in the left apex suggesting a large bulla however superimposed pneumothorax cannot be excluded. Follow-up CT would be useful for further evaluation. The findings were sent to the Radiology Results [address] at 10:31 pm on 1/1/2023 to be communicated to a licensed caregiver.          IMPRESSION:   Primary Problem  Acute respiratory failure with hypoxia and hypercapnia [facility])    Active Hospital Problems    Diagnosis Date Noted    Dysphagia [R13.10] 01/16/2023     Priority: Medium    Hepatitis C virus infection without hepatic coma [B19.20] 01/14/2023     Priority: Medium    Mild malnutrition ([address] [E44.1] 01/13/2023     Priority: Medium    Delirium due to another medical condition [F05] 01/10/2023     Priority: Medium    ACP (advance care planning) [Z71.89] 01/09/2023     Priority: Medium    Goals of care, counseling/discussion [Z71.89] 01/09/2023     Priority: Medium    Encounter for palliative care [Z51.5] 01/09/2023     Priority: Medium    Prolonged pt (prothrombin time) [R79.1] 01/03/2023     Priority: Medium    Emphysema lung ([address] [J43.9] 01/03/2023     Priority: Medium    Cerebral infarction ([address] [I63.9] 01/03/2023     Priority: Medium    Transaminitis [R74.01] 01/02/2023     Priority: Medium    Normocytic anemia [D64.9] 01/02/2023     Priority: Medium    Thrombocytopenia ([address] [D69.6] 01/02/2023     Priority: Medium    Agitation [R45.1] 01/02/2023     Priority: Medium    Acute respiratory failure with hypoxia and hypercapnia (HCC) RLL pneumonia [J96.01, J96.02] 01/02/2023     Priority: Medium    Altered mental status [R41.82] 01/02/2023     Priority: Medium    Community acquired pneumonia of right lower lobe of lung [J18.9] 01/02/2023     Priority: Medium       Medical apathy  Respiratory failure secondary pneumonia  Abnormal LFT  Microcytic anemia  Thrombocytopenia  History of alcohol dependence  IgG lambda paraproteinemia  Positive HCV screen  Iron deficiency  Coagulopathy    RECOMMENDATIONS:  I reviewed the labs/imaging available to me,outside records and discussed with the patient. I explained to the patient the nature of this problem. I explained the significance of these abnormalities and possible etiology and management options  Patient has chronic liver disease secondary to alcohol  No TTP, smear shows no evidence of schistocytes  Anemia and thrombocytopenia are related to liver disease complicated by acute illness. Recovering well. Hemoglobin 11.1 platelets 344  Patient has coagulopathy secondary to liver disease as mentioned  Cardioembolic strokes with a component of subarachnoid bleeding, not a candidate for anticoagulation  Overall all condition seems to be improving. We talked him about abstaining from alcohol and acetaminophen.   We also discovered that he is taking very high dose of supplements almost to a toxic level. We asked him to refrain from doing that. Overall labs are improving. Clinically he is improving as well. Discussed with the patient and his wife at bedside. We will follow with you                            [address] [facility]                            This note is created with the assistance of a speech recognition program.  While intending to generate a document that actually reflects the content of the visit, the document can still have some errors including those of syntax and sound a like substitutions which may escape proof reading. It such instances, actual meaning can be extrapolated by contextual diversion.

## 2023-01-17 NOTE — PROGRESS NOTES
Surgery called and stated that the Peg tube insertion will be done shortly. Called girlfriend Chiquis Jacobson to notify but got no answer and no way to leave a voice mail. Daughter Joshua Wilson called and notfied that her father will be having his peg tube insertion soon. Denny Montgomery asks that the dayshift nurse call her to update her after surgery is complete. Will pass on message to nurse Turcios.

## 2023-01-17 NOTE — PROGRESS NOTES
[facility] -   Infectious diseases evaluation  admission date 1/1/2023    reason for consultation:   Community-acquired pneumonia    Impression :   Current:  Acute hypoxic respiratory failure required intubation 1/2/22 was subsequently extubated  Community-acquired pneumonia with possible aspiration. Cellulitis /infiltration of left arm PICC line that was removed. Acute CVA with subarachnoid hemorrhage  Positive MRSA nasal swab  Liver disease  Cholelithiasis  Acute renal failure  Thrombocytopenia followed by hematology oncology, possibly related to liver disease  Hepatitis C/elevated liver enzymes    Recommendations      Unasyn course completed 1/8/2023  IV Vancomycin course completed 1/13/23  Swallow study showed severe dysphagia  Left arm PICC line was removed  Liver ultrasound 1/2/2323 showed increased echogenicity and cholelithiasis, no cholecystitis. HIV screen was negative on 1/2/23  Hepatitis C RNA was 17, 400 IU/mL 4.2 for LOC  The patient received IV ceftriaxone and Zithromax on 1/1/2022  Follow blood and respiratory cultures, no growth to date  No growth on urine culture from 1/3/23  Nasal swab for MRSA was +1/2/23  Respiratory panel with COVID-19 PCR was negative  Urine for Legionella antigen negative  Follow CBC and renal function  Continue supportive care  Follow-up with GI as outpatient for hepatitis C  management        Infection Control Recommendations   [place] Precautions  Contact Isolation       Antimicrobial Stewardship Recommendations   Simplification of therapy  Targeted therapy      History of Present Illness:   Initial history:  Arvjuan david Lainez is a 60y.o.-year-old male was brought to the hospital on 1/1/2023 with altered mental status, confusion associated with decreased responsiveness worsening for 2 weeks. At the ER he was obtunded, O2 sat was 75% on room air, was placed on nonrebreather initially, subsequently was intubated.   He is intubated, sedated unable to provide history that was obtained from chart review and nursing staff. According to his wife he is fully vaccinated for COVID-19 and flu. He has been afebrile since admission  Initial procalcitonin level was 0.09, WBC normal  He was hypotensive was started on Levophed. MRI showed small acute infarcts in the left cerebellar hemisphere and left parietal love with moderate bilateral subarachnoid hemorrhages within both cerebral hemispheres. Chest x-ray showed right lower lobe infiltrates  Interval changes  1/17/2023   He was extubated 1/5/2023. He is awake , on oxygen per nasal cannula, PEG tube was placed this morning, afebrile, no new events  Patient Vitals for the past 8 hrs:   BP Temp Temp src Pulse Resp SpO2   01/17/23 1113 -- -- -- 82 16 95 %   01/17/23 0900 (!) 141/92 98.4 °F (36.9 °C) -- 84 30 95 %   01/17/23 0850 (!) 144/89 -- -- 84 17 95 %   01/17/23 0840 (!) 137/91 -- -- 89 15 97 %   01/17/23 0835 (!) 145/89 -- -- 86 20 93 %   01/17/23 0833 (!) 145/89 98.4 °F (36.9 °C) Infrared 87 17 94 %   01/17/23 0735 (!) 139/91 -- -- 90 23 99 %   01/17/23 0645 125/73 98.2 °F (36.8 °C) Oral 93 18 94 %               I have personally reviewed the past medical history, past surgical history, medications, social history, and family history, and I haveupdated the database accordingly. Allergies:   Patient has no known allergies. Review of Systems:     Review of Systems  12systems reviewed were negative  Physical Examination :   Constitutional:       General: He is not in acute distress. Appearance: He is not ill-appearing. HENT:      Head: Normocephalic and atraumatic. Right Ear: External ear normal.      Left Ear: External ear normal.   Eyes:      General: No scleral icterus. Conjunctiva/sclera: Conjunctivae normal.   Cardiovascular:      Rate and Rhythm: Normal rate and regular rhythm. Heart sounds: Normal heart sounds.    Pulmonary:      Effort: Pulmonary effort is normal. No respiratory distress. Abdominal:      General: There is no distension. Palpations: Abdomen is soft. Musculoskeletal:      Cervical back: Neck supple. No rigidity. Right lower leg: No edema. Left lower leg: No edema. Skin:     Coloration: Skin is not jaundiced. Neurological:      Mental Status: He is confused. Past Medical History:   History reviewed. No pertinent past medical history. Past Surgical  History:   History reviewed. No pertinent surgical history.     Medications:      heparin (porcine)  5,000 Units SubCUTAneous 3 times per day    miconazole   Topical BID    potassium bicarb-citric acid  20 mEq Oral Daily    potassium bicarb-citric acid  40 mEq Oral Daily    potassium chloride  20 mEq Oral Once    spironolactone  25 mg Oral Daily    ziprasidone (GEODON) in sterile water injection  10 mg IntraMUSCular Nightly    carvedilol  6.25 mg Oral BID WC    [Held by provider] ferrous sulfate  325 mg Oral Daily with breakfast    pantoprazole (PROTONIX) 40 mg injection  40 mg IntraVENous Daily    ipratropium-albuterol  1 ampule Inhalation Q4H WA    nystatin   Topical BID    [Held by provider] aspirin  81 mg Oral Daily    sodium chloride flush  5-40 mL IntraVENous 2 times per day    nicotine  1 patch TransDERmal Daily       Social History:     Social History     Socioeconomic History    Marital status: Single     Spouse name: Not on file    Number of children: Not on file    Years of education: Not on file    Highest education level: Not on file   Occupational History    Not on file   Tobacco Use    Smoking status: Every Day     Packs/day: 1.00     Years: 40.00     Pack years: 40.00     Types: Cigarettes    Smokeless tobacco: Never   Substance and Sexual Activity    Alcohol use: Yes     Comment: beer and scotch    Drug use: Not Currently     Comment: over 20 years (stated by daughter)    Sexual activity: Not on file   Other Topics Concern    Not on file   Social History Narrative    Not on file     Social Determinants of Health     Financial Resource Strain: Not on file   Food Insecurity: Not on file   Transportation Needs: Not on file   Physical Activity: Not on file   Stress: Not on file   Social Connections: Not on file   Intimate Partner Violence: Not on file   Housing Stability: Not on file       Family History:   History reviewed. No pertinent family history. Medical Decision Making:   I have independently reviewed/ordered the following labs:    CBC with Differential:   Recent Labs     01/16/23  0431 01/17/23  0550   WBC 6.4 6.2   HGB 12.4* 11.1*   HCT 41.8 37.6*    171   LYMPHOPCT 11* 11*   MONOPCT 10* 10*       BMP:  Recent Labs     01/15/23  0514 01/16/23  0431 01/17/23  0550    146* 143   K 3.4* 3.8 3.9   CL 99 102 103   CO2 37* 37* 34*   BUN 11 11 10   CREATININE 0.78 0.81 0.74   MG 1.7  --   --        Hepatic Function Panel:   Recent Labs     01/16/23  0431 01/17/23  0550   PROT 6.6 6.3*   LABALBU 3.1* 2.9*   BILITOT 1.6* 1.3*   ALKPHOS 48 44   ALT 24 23   AST 27 29       No results for input(s): RPR in the last 72 hours. No results for input(s): HIV in the last 72 hours. No results for input(s): BC in the last 72 hours. Lab Results   Component Value Date/Time    CREATININE 0.74 01/17/2023 05:50 AM    GLUCOSE 101 01/17/2023 05:50 AM       Detailed results: Thank you for allowing us to participate in the care of this patient. Please call with questions. This note is created with the assistance of a speech recognition program.  While intending to generate adocument that actually reflects the content of the visit, the document can still have some errors including those of syntax and sound a like substitutions which may escape proof reading. It such instances, actual meaningcan be extrapolated by contextual diversion.     Hattie Cooper MD  Office: [phone]  Perfect serve / office [phone]

## 2023-01-18 ENCOUNTER — APPOINTMENT (OUTPATIENT)
Dept: VASCULAR LAB | Age: 63
DRG: 139 | End: 2023-01-18
Payer: MEDICAID

## 2023-01-18 ENCOUNTER — APPOINTMENT (OUTPATIENT)
Dept: GENERAL RADIOLOGY | Age: 63
DRG: 139 | End: 2023-01-18
Payer: MEDICAID

## 2023-01-18 LAB
ABSOLUTE EOS #: 0.06 K/UL (ref 0–0.4)
ABSOLUTE LYMPH #: 0.41 K/UL (ref 1–4.8)
ABSOLUTE MONO #: 0.35 K/UL (ref 0.1–1.3)
ALBUMIN SERPL-MCNC: 3 G/DL (ref 3.5–5.2)
ALP BLD-CCNC: 42 U/L (ref 40–129)
ALT SERPL-CCNC: 22 U/L (ref 5–41)
ANION GAP SERPL CALCULATED.3IONS-SCNC: 8 MMOL/L (ref 9–17)
AST SERPL-CCNC: 24 U/L
BASOPHILS # BLD: 2 % (ref 0–2)
BASOPHILS ABSOLUTE: 0.12 K/UL (ref 0–0.2)
BILIRUB SERPL-MCNC: 1.3 MG/DL (ref 0.3–1.2)
BUN BLDV-MCNC: 10 MG/DL (ref 8–23)
CALCIUM SERPL-MCNC: 8.6 MG/DL (ref 8.6–10.4)
CHLORIDE BLD-SCNC: 103 MMOL/L (ref 98–107)
CO2: 31 MMOL/L (ref 20–31)
CREAT SERPL-MCNC: 0.65 MG/DL (ref 0.7–1.2)
EOSINOPHILS RELATIVE PERCENT: 1 % (ref 0–4)
GFR SERPL CREATININE-BSD FRML MDRD: >60 ML/MIN/1.73M2
GLUCOSE BLD-MCNC: 116 MG/DL (ref 70–99)
HCT VFR BLD CALC: 38.3 % (ref 41–53)
HEMOGLOBIN: 11.4 G/DL (ref 13.5–17.5)
LYMPHOCYTES # BLD: 7 % (ref 24–44)
MCH RBC QN AUTO: 21.9 PG (ref 26–34)
MCHC RBC AUTO-ENTMCNC: 29.7 G/DL (ref 31–37)
MCV RBC AUTO: 73.8 FL (ref 80–100)
MONOCYTES # BLD: 6 % (ref 1–7)
MORPHOLOGY: ABNORMAL
PDW BLD-RTO: 33.2 % (ref 11.5–14.9)
PLATELET # BLD: 188 K/UL (ref 150–450)
PMV BLD AUTO: 9.1 FL (ref 6–12)
POTASSIUM SERPL-SCNC: 3.6 MMOL/L (ref 3.7–5.3)
RBC # BLD: 5.19 M/UL (ref 4.5–5.9)
SEG NEUTROPHILS: 84 % (ref 36–66)
SEGMENTED NEUTROPHILS ABSOLUTE COUNT: 4.86 K/UL (ref 1.3–9.1)
SODIUM BLD-SCNC: 142 MMOL/L (ref 135–144)
TOTAL PROTEIN: 6.2 G/DL (ref 6.4–8.3)
WBC # BLD: 5.8 K/UL (ref 3.5–11)

## 2023-01-18 PROCEDURE — 99231 SBSQ HOSP IP/OBS SF/LOW 25: CPT | Performed by: INTERNAL MEDICINE

## 2023-01-18 PROCEDURE — 99239 HOSP IP/OBS DSCHRG MGMT >30: CPT | Performed by: INTERNAL MEDICINE

## 2023-01-18 PROCEDURE — 92526 ORAL FUNCTION THERAPY: CPT

## 2023-01-18 PROCEDURE — 94761 N-INVAS EAR/PLS OXIMETRY MLT: CPT

## 2023-01-18 PROCEDURE — 94640 AIRWAY INHALATION TREATMENT: CPT

## 2023-01-18 PROCEDURE — 6370000000 HC RX 637 (ALT 250 FOR IP): Performed by: STUDENT IN AN ORGANIZED HEALTH CARE EDUCATION/TRAINING PROGRAM

## 2023-01-18 PROCEDURE — C9113 INJ PANTOPRAZOLE SODIUM, VIA: HCPCS | Performed by: STUDENT IN AN ORGANIZED HEALTH CARE EDUCATION/TRAINING PROGRAM

## 2023-01-18 PROCEDURE — 85025 COMPLETE CBC W/AUTO DIFF WBC: CPT

## 2023-01-18 PROCEDURE — 2580000003 HC RX 258: Performed by: STUDENT IN AN ORGANIZED HEALTH CARE EDUCATION/TRAINING PROGRAM

## 2023-01-18 PROCEDURE — 97530 THERAPEUTIC ACTIVITIES: CPT

## 2023-01-18 PROCEDURE — 97110 THERAPEUTIC EXERCISES: CPT

## 2023-01-18 PROCEDURE — 93970 EXTREMITY STUDY: CPT

## 2023-01-18 PROCEDURE — 6360000002 HC RX W HCPCS: Performed by: STUDENT IN AN ORGANIZED HEALTH CARE EDUCATION/TRAINING PROGRAM

## 2023-01-18 PROCEDURE — 80053 COMPREHEN METABOLIC PANEL: CPT

## 2023-01-18 PROCEDURE — 99232 SBSQ HOSP IP/OBS MODERATE 35: CPT | Performed by: STUDENT IN AN ORGANIZED HEALTH CARE EDUCATION/TRAINING PROGRAM

## 2023-01-18 PROCEDURE — 71045 X-RAY EXAM CHEST 1 VIEW: CPT

## 2023-01-18 PROCEDURE — 2700000000 HC OXYGEN THERAPY PER DAY

## 2023-01-18 PROCEDURE — 36415 COLL VENOUS BLD VENIPUNCTURE: CPT

## 2023-01-18 PROCEDURE — 2060000000 HC ICU INTERMEDIATE R&B

## 2023-01-18 PROCEDURE — A4216 STERILE WATER/SALINE, 10 ML: HCPCS | Performed by: STUDENT IN AN ORGANIZED HEALTH CARE EDUCATION/TRAINING PROGRAM

## 2023-01-18 PROCEDURE — 99232 SBSQ HOSP IP/OBS MODERATE 35: CPT | Performed by: INTERNAL MEDICINE

## 2023-01-18 RX ORDER — SPIRONOLACTONE 25 MG/1
25 TABLET ORAL DAILY
Status: CANCELLED | OUTPATIENT
Start: 2023-01-19

## 2023-01-18 RX ORDER — CARVEDILOL 6.25 MG/1
6.25 TABLET ORAL 2 TIMES DAILY WITH MEALS
Status: CANCELLED | OUTPATIENT
Start: 2023-01-18

## 2023-01-18 RX ORDER — GUAIFENESIN 600 MG/1
600 TABLET, EXTENDED RELEASE ORAL 2 TIMES DAILY PRN
Status: CANCELLED | OUTPATIENT
Start: 2023-01-18

## 2023-01-18 RX ORDER — FERROUS SULFATE 325(65) MG
325 TABLET ORAL
Status: CANCELLED | OUTPATIENT
Start: 2023-01-19

## 2023-01-18 RX ADMIN — SPIRONOLACTONE 25 MG: 25 TABLET ORAL at 09:57

## 2023-01-18 RX ADMIN — ANTI-FUNGAL POWDER MICONAZOLE NITRATE TALC FREE: 1.42 POWDER TOPICAL at 09:30

## 2023-01-18 RX ADMIN — SODIUM CHLORIDE, PRESERVATIVE FREE 40 MG: 5 INJECTION INTRAVENOUS at 09:57

## 2023-01-18 RX ADMIN — POTASSIUM BICARBONATE 20 MEQ: 782 TABLET, EFFERVESCENT ORAL at 10:11

## 2023-01-18 RX ADMIN — HEPARIN SODIUM 5000 UNITS: 5000 INJECTION INTRAVENOUS; SUBCUTANEOUS at 05:07

## 2023-01-18 RX ADMIN — NYSTATIN OINTMENT: 100000 OINTMENT TOPICAL at 21:09

## 2023-01-18 RX ADMIN — HEPARIN SODIUM 5000 UNITS: 5000 INJECTION INTRAVENOUS; SUBCUTANEOUS at 16:35

## 2023-01-18 RX ADMIN — NYSTATIN OINTMENT: 100000 OINTMENT TOPICAL at 09:30

## 2023-01-18 RX ADMIN — ACETAMINOPHEN 650 MG: 325 TABLET ORAL at 12:30

## 2023-01-18 RX ADMIN — SODIUM CHLORIDE, PRESERVATIVE FREE 10 ML: 5 INJECTION INTRAVENOUS at 21:10

## 2023-01-18 RX ADMIN — IPRATROPIUM BROMIDE AND ALBUTEROL SULFATE 1 AMPULE: 2.5; .5 SOLUTION RESPIRATORY (INHALATION) at 19:58

## 2023-01-18 RX ADMIN — ANTI-FUNGAL POWDER MICONAZOLE NITRATE TALC FREE: 1.42 POWDER TOPICAL at 21:10

## 2023-01-18 RX ADMIN — IPRATROPIUM BROMIDE AND ALBUTEROL SULFATE 1 AMPULE: 2.5; .5 SOLUTION RESPIRATORY (INHALATION) at 11:18

## 2023-01-18 RX ADMIN — CARVEDILOL 6.25 MG: 6.25 TABLET, FILM COATED ORAL at 10:11

## 2023-01-18 RX ADMIN — HEPARIN SODIUM 5000 UNITS: 5000 INJECTION INTRAVENOUS; SUBCUTANEOUS at 21:09

## 2023-01-18 ASSESSMENT — ENCOUNTER SYMPTOMS
ABDOMINAL DISTENTION: 0
NAUSEA: 0
SHORTNESS OF BREATH: 0
CHEST TIGHTNESS: 0
VOMITING: 0
EYES NEGATIVE: 1
CHOKING: 0
CONSTIPATION: 0
ABDOMINAL PAIN: 0
COUGH: 0
ABDOMINAL PAIN: 1
WHEEZING: 0
DIARRHEA: 0

## 2023-01-18 ASSESSMENT — PAIN DESCRIPTION - LOCATION: LOCATION: ABDOMEN

## 2023-01-18 NOTE — PROGRESS NOTES
Physical Therapy  Facility/Department: [facility] PROGRESSIVE CARE  Daily Treatment Note  NAME: Leila Potter  : 1960  MRN: [id number]    Date of Service: 2023    Discharge Recommendations:  Therapy recommended at discharge        Patient Diagnosis(es): The primary encounter diagnosis was Acute respiratory failure with hypoxia and hypercapnia ([facility] [address] Diagnoses of Community acquired pneumonia of right lower lobe of lung and Altered mental status, unspecified altered mental status type were also pertinent to this visit. Assessment   Activity Tolerance: Patient limited by endurance; Patient limited by fatigue;Patient limited by pain     Plan    Physcial Therapy Plan  General Plan: 5-7 times per week  Specific Instructions for Next Treatment: Continue to progress with functional mobility training  Current Treatment Recommendations: Strengthening;Balance training;Functional mobility training;Neuromuscular re-education;Cognitive reorientation; Safety education & training;Patient/Caregiver education & training;Equipment evaluation, education, & procurement; Therapeutic activities     Restrictions  Restrictions/Precautions  Restrictions/Precautions: Fall Risk, General Precautions, Contact Precautions, Isolation, Bed Alarm  Required Braces or Orthoses?: No  Implants present? :  (pt denies)  Position Activity Restriction  Other position/activity restrictions: Severe dysphagia- NPO. Unable to tolerate any PO safely. NG tube placed,  on high flow nasal cannula     Subjective    Subjective  Subjective: Pt is in bed upon arrival. Pt reports not having a good day. Pt c/o pain in abdomen from surgical site and also neck pain left side of neck. Pain: 1/10 neck left side, pt also c/o of pain in abdomen but did not rate pain (Simultaneous filing. User may not have seen previous data.)  Orientation  Overall Orientation Status: Impaired (Simultaneous filing.  User may not have seen previous data.)  Orientation Level: Oriented to person;Disoriented to situation;Oriented to place; Disoriented to time (Simultaneous filing. User may not have seen previous data.)  Cognition  Overall Cognitive Status: WFL (Simultaneous filing. User may not have seen previous data.)  Arousal/Alertness: Appropriate responses to stimuli (Simultaneous filing. User may not have seen previous data.)  Following Commands: Follows one step commands consistently (Simultaneous filing. User may not have seen previous data.)  Attention Span: Attends with cues to redirect (Simultaneous filing. User may not have seen previous data.)  Memory: Decreased recall of biographical Information;Decreased recall of precautions;Decreased recall of recent events (Simultaneous filing. User may not have seen previous data.)  Safety Judgement: Decreased awareness of need for assistance;Decreased awareness of need for safety (Simultaneous filing. User may not have seen previous data.)  Problem Solving: Assistance required to generate solutions;Assistance required to implement solutions;Assistance required to identify errors made;Assistance required to correct errors made (Simultaneous filing. User may not have seen previous data.)  Insights: Decreased awareness of deficits (Simultaneous filing. User may not have seen previous data.)  Initiation: Requires cues for some (Simultaneous filing. User may not have seen previous data.)  Sequencing: Requires cues for some (Simultaneous filing. User may not have seen previous data.)  Cognition Comment: Pt with impulsive tendencies noted. Objective   Vitals     Bed Mobility Training  Bed Mobility Training: Yes  Overall Level of Assistance: Minimum assistance  Interventions: Verbal cues;Manual cues;Demonstration  Rolling: Supervision  Supine to Sit: Minimum assistance (assisted with torso)  Sit to Supine:  Moderate assistance (assisted with bilat LE)  Scooting: Stand-by assistance (to EOB)  Balance  Sitting: Without support  Sitting - Static: Fair (occasional)  Sitting - Dynamic: Fair (occasional)  Standing: With support  Standing - Static: Constant support;Poor  Standing - Dynamic: Poor;Constant support  Transfer Training  Transfer Training: Yes  Overall Level of Assistance: Minimum assistance; Adaptive equipment; Additional time  Interventions: Safety awareness training;Manual cues; Verbal cues  Sit to Stand: Minimum assistance; Adaptive equipment; Additional time;Assist X1 (Cues for hand placement with fair carryover. Upon standing, pt has a posterior left lateral lean. Min A to correct.)  Stand to Sit: Minimum assistance; Adaptive equipment; Additional time;Assist X2 (lacking eccentric concern)  Gait Training  Gait Training: Yes  Gait  Overall Level of Assistance: Moderate assistance;Assist X2;Adaptive equipment; Additional time  Interventions: Manual cues; Verbal cues; Safety awareness training  Base of Support: Widened  Speed/Radha: Slow  Step Length: Left shortened;Right shortened  Gait Abnormalities: Decreased step clearance;Shuffling gait  Distance (ft): 2 Feet  Assistive Device: Walker, rolling     PT Exercises  A/AROM Exercises: Seated BLE ex's AROM. [id number]  Pressure Relief Exercises: STS x2; from EOB  Functional Mobility Circuit Training: Standing tolerance 25sec  Static Sitting Balance Exercises: Seated EOB 25 min. Dynamic Sitting Balance Exercises: Seated dynamic reaching out of base of support, occasional trunk support to stabilize     Safety Devices  Type of Devices: All fall risk precautions in place; Bed alarm in place;Call light within reach;Gait belt;Patient at risk for falls; Left in bed;Nurse notified     AM-[facility] Mobility Inpatient   How much difficulty turning over in bed?: A Little  How much difficulty sitting down on / standing up from a chair with arms?: A Little  How much difficulty moving from lying on back to sitting on side of bed?: A Little  How much help from another person moving to and from a bed to a chair?: Total  How much help from another person needed to walk in hospital room?: Total  How much help from another person for climbing 3-5 steps with a railing?: Total  AM-PAC Inpatient Mobility Raw Score : 12  AM-PAC Inpatient T-Scale Score : 35.33  Mobility Inpatient CMS 0-100% Score: 68.66  Mobility Inpatient CMS G-Code Modifier : CL      Goals  Short Term Goals  Time Frame for Short Term Goals: 10 visits  Short Term Goal 1: min assist supine<-> sit x1 assist  Short Term Goal 2: sit EOB with min x1 up to 15 min for therex/ADL  Short Term Goal 3: 3+/5 LE strength to prepare for standing and transfers  Short Term Goal 4: standing for functional ADLs with Cyril of 2 for 3-5min  Short Term Goal 5: roll L/R with SBA  Patient Goals   Patient Goals : unable to state    Education  Patient Education  Education Given To: Patient  Education Provided: Transfer Training;Role of Therapy;Plan of Care;Energy Conservation; Fall Prevention Strategies  Education Method: Demonstration;Verbal  Barriers to Learning: Cognition  Education Outcome: Continued education needed;Verbalized understanding    Therapy Time   Individual Concurrent Group Co-treatment   Time In 1401         Time Out 1502         Minutes 0421 [place], [place]

## 2023-01-18 NOTE — PROGRESS NOTES
[address] [facility].    PROGRESS NOTE             1/18/2023    8:52 AM    Name:   Nolan Lindsay  MRN:     [id number]     Acct:      [de-identified]   Room:   2116/2116-01   Day:  12  Admit Date:  1/1/2023  9:55 PM    PCP:  No primary care provider on file. Code Status:  Full Code    Subjective:     C/C:   Chief Complaint   Patient presents with    Altered Mental Status     Interval History Status: not changed. Patient seen and examined at bedside. No acute events overnight. Vitals have remained stable. PEG tube was placed yesterday. Continue to advance tube feeds as tolerated. He denies any abdominal pain. Denies chest pain, shortness of breath, nausea vomiting diarrhea. He is afebrile. Brief History:     The patient is a 60 y.o.  male with unknown past medical history due to no healthcare visits or follow-up who presents with Altered Mental Status and he is admitted to the hospital for the management of  Acute respiratory failure most likely 2/2 pneumonia. Per patient's wife, she reports that the patient has not been acting himself for the past week. She reports being more slurred, confused and progressively getting worse prior to presentation. Patient prior to the presentation a week ago he had a fall and EMS presented patient was vitally stable and he was not transported to the hospital.  This time upon EMS evaluation of the patient he had an O2 of 75%, he was put on a breather and was given a dose of IV Lasix in route. Wife and patient, cannot recall any precipitating event could have led to his presentation. He also denies chest pain, shortness of breath, or cough. Per patient, he does not recall any complaints or events prior to the presentation. Wife denies recent alcohol use, however, patient does have a history of regular alcohol intake but not on daily basis.   Wife also reports that patient has constant heartburn for which he takes regular antiacids. Patient denies the presence of any abdominal pain or any change in bowel habits, abdominal pain, nausea or vomiting. Upon arrival in the ED, patient was put on BiPAP. Patient was afebrile, normotensive and in normal sinus rhythm. A chest x-ray was completed and the patient had right lower pneumonia with a small pleural effusion. Patient also had a large bullae in the left apex with a pneumothorax that cannot be excluded. Patient ABG was suggestive of pattern of respiratory acidosis with pH 7.295, PCO2 60.3, PO2 63.0, HCO3 29.3. CT head WO did not show any acute findings. Patient had elevated troponins of 184, trended down to 177. Patient also had an elevated BNP of 7797. An elevated creatinine of 1.77 was on presentation, with no previous lab test to be compared to. Also patient had a left elevated liver enzymes ALT was 525, , with prolonged prothrombin time of 24.9, and INR of 2.3. Patient was admitted to the ICU for the management of type II respiratory failure associated altered mental status       Review of Systems:     Review of Systems   Constitutional: Negative. Negative for activity change, chills, diaphoresis, fatigue and fever. HENT: Negative. Eyes: Negative. Respiratory:  Negative for cough, choking, chest tightness, shortness of breath and wheezing. Cardiovascular: Negative. Negative for chest pain, palpitations and leg swelling. Gastrointestinal:  Negative for abdominal distention, abdominal pain, constipation, diarrhea, nausea and vomiting. Genitourinary: Negative. Musculoskeletal: Negative. Skin: Negative. Neurological: Negative. Psychiatric/Behavioral: Negative. Medications:      Allergies:  No Known Allergies    Current Meds:   Scheduled Meds:    heparin (porcine)  5,000 Units SubCUTAneous 3 times per day    miconazole   Topical BID    potassium bicarb-citric acid  20 mEq Oral Daily potassium bicarb-citric acid  40 mEq Oral Daily    potassium chloride  20 mEq Oral Once    spironolactone  25 mg Oral Daily    ziprasidone (GEODON) in sterile water injection  10 mg IntraMUSCular Nightly    carvedilol  6.25 mg Oral BID WC    [Held by provider] ferrous sulfate  325 mg Oral Daily with breakfast    pantoprazole (PROTONIX) 40 mg injection  40 mg IntraVENous Daily    ipratropium-albuterol  1 ampule Inhalation Q4H WA    nystatin   Topical BID    [Held by provider] aspirin  81 mg Oral Daily    sodium chloride flush  5-40 mL IntraVENous 2 times per day    nicotine  1 patch TransDERmal Daily     Continuous Infusions:    dextrose 5 % and 0.45 % NaCl 40 mL/hr at 23 1512    sodium chloride      dexmedetomidine      sodium chloride       PRN Meds: hydrALAZINE, potassium chloride **OR** potassium alternative oral replacement **OR** potassium chloride, magnesium sulfate, ziprasidone (GEODON) in sterile water injection, labetalol, potassium chloride, sodium chloride, dexmedetomidine, perflutren lipid microspheres, sodium chloride flush, sodium chloride flush, sodium chloride, ondansetron **OR** ondansetron, polyethylene glycol, acetaminophen **OR** acetaminophen, albuterol, guaiFENesin    Data:     Past Medical History:   has no past medical history on file. Social History:   reports that he has been smoking cigarettes. He has a 40.00 pack-year smoking history. He has never used smokeless tobacco. He reports current alcohol use. He reports that he does not currently use drugs. Family History: History reviewed. No pertinent family history. Vitals:  /72   Pulse 84   Temp 99.1 °F (37.3 °C) (Axillary)   Resp 17   Ht 5' 7\" (1.702 m)   Wt 154 lb 1.6 oz (69.9 kg)   SpO2 98%   BMI 24.14 kg/m²   Temp (24hrs), Av °F (37.2 °C), Min:98.3 °F (36.8 °C), Max:99.6 °F (37.6 °C)    No results for input(s): POCGLU in the last 72 hours. I/O(24Hr):     Intake/Output Summary (Last 24 hours) at 1/18/2023 0852  Last data filed at 1/18/2023 0616  Gross per 24 hour   Intake 1641 ml   Output 510 ml   Net 1131 ml       Labs:  [unfilled]    No results found for: SPECIAL  Lab Results   Component Value Date/Time    CULTURE NO GROWTH 01/03/2023 12:12 PM       [unfilled]    Radiology:    CT HEAD WO CONTRAST    Result Date: 1/3/2023  EXAMINATION: CT OF THE HEAD WITHOUT CONTRAST  1/2/2023 10:11 pm TECHNIQUE: CT of the head was performed without the administration of intravenous contrast. Automated exposure control, iterative reconstruction, and/or weight based adjustment of the mA/kV was utilized to reduce the radiation dose to as low as reasonably achievable. COMPARISON: None. HISTORY: ORDERING SYSTEM PROVIDED HISTORY: Altered Mental status TECHNOLOGIST PROVIDED HISTORY: Altered Mental status Reason for Exam: Altered Mental status Additional signs and symptoms: Patient came in with transient alteration of awareness, follow up head CT for any changes. FINDINGS: BRAIN/VENTRICLES: There is no acute intracranial hemorrhage, mass effect or midline shift. No abnormal extra-axial fluid collection. There is a focal area of decreased attenuation with loss of gray/white matter differentiation involving posterior left parietal lobe with somewhat increased conspicuity as compared to prior exam.  There is stable small focus of encephalomalacia involving left cerebellar hemisphere compatible with prior remote infarct/insult. Chronic lacunar infarct to right basal ganglia redemonstrated. There is no evidence of hydrocephalus. ORBITS: The visualized portion of the orbits demonstrate no acute abnormality. SINUSES: Small air-fluid level right sphenoid sinus. Trace air-fluid level right frontal sinus. Mild mucoperiosteal thickening to bilateral ethmoid air cells. Findings are new from prior exam and likely relate to presence of nasogastric tube. SOFT TISSUES/SKULL:  No acute abnormality of the visualized skull or soft tissues. Focal area of decreased attenuation with loss of gray/white matter differentiation involving posterior left parietal lobe with somewhat increased conspicuity as compared to prior exam likely reflecting an area of acute to subacute infarct. Stable small chronic infarct/insult to left cerebellar hemisphere. Chronic lacunar infarct to right basal ganglia redemonstrated. Paranasal sinus disease likely relating to presence of nasogastric tube. CT HEAD WO CONTRAST    Result Date: 1/1/2023  EXAMINATION: CT OF THE HEAD WITHOUT CONTRAST  1/1/2023 10:48 pm TECHNIQUE: CT of the head was performed without the administration of intravenous contrast. Automated exposure control, iterative reconstruction, and/or weight based adjustment of the mA/kV was utilized to reduce the radiation dose to as low as reasonably achievable. COMPARISON: None. HISTORY: Reason for Exam: AMS Additional signs and symptoms: the patient has been getting more and more confused since 2 weeks ago. It has progressively been getting worse and today FINDINGS: BRAIN/VENTRICLES: There is no acute intracranial hemorrhage, mass effect or midline shift. No abnormal extra-axial fluid collection. The gray-white differentiation is maintained without evidence of an acute infarct. There is no evidence of hydrocephalus. Changes of mild chronic small vessel ischemic disease. ORBITS: The visualized portion of the orbits demonstrate no acute abnormality. SINUSES: The visualized paranasal sinuses and mastoid air cells demonstrate no acute abnormality. SOFT TISSUES/SKULL:  No acute abnormality of the visualized skull or soft tissues. No acute intracranial abnormality. Mild chronic small vessel ischemic disease.      CT HEAD W CONTRAST    Result Date: 1/10/2023  EXAMINATION: CT OF THE HEAD WITH CONTRAST  1/10/2023 11:09 am TECHNIQUE: CT of the head/brain was performed with the administration of intravenous contrast. Multiplanar reformatted images are provided for review. Automated exposure control, iterative reconstruction, and/or weight based adjustment of the mA/kV was utilized to reduce the radiation dose to as low as reasonably achievable. COMPARISON: CT brain performed 02/2023. HISTORY: ORDERING SYSTEM PROVIDED HISTORY: Follow up subarachnoid hemorrhage TECHNOLOGIST PROVIDED HISTORY: Follow up subarachnoid hemorrhage Reason for Exam: Follow up subarachnoid hemorrhage. CT scan on 1/2/2023 FINDINGS: BRAIN/VENTRICLES: Is minimal scattered subarachnoid hemorrhage within right cerebral hemisphere. Is a focus intraparenchymal hemorrhage in the left parietal lobe. There is no mass effect midline shift. Ventricular structures are symmetric. The infratentorial structures are unremarkable. There is no abnormal postcontrast enhancement. ORBITS: The visualized portion of the orbits demonstrate no acute abnormality. SINUSES: The visualized paranasal sinuses and mastoid air cells demonstrate no acute abnormality. SOFT TISSUES/SKULL:  No acute abnormality of the visualized skull or soft tissues. Scattered foci of subarachnoid hemorrhage in the right cerebral hemisphere. Focus of intraparenchymal hemorrhage in the left parietal lobe. US LIVER    Result Date: 1/2/2023  EXAMINATION: RIGHT UPPER QUADRANT ULTRASOUND 1/2/2023 10:20 am COMPARISON: None. HISTORY: ORDERING SYSTEM PROVIDED HISTORY: elevated AST and ALT, in setting of PT, and INR TECHNOLOGIST PROVIDED HISTORY: elevated AST and ALT, in setting of PT, and INR FINDINGS: Patient body habitus limits the study, as there is increased attenuation of the ultrasound beam. This decreases sensitivity and specificity. LIVER:  There is mild increased echogenicity seen throughout the liver. No intrahepatic ductal dilatation. No perihepatic fluid. BILIARY SYSTEM:  Bladder is contracted. Gallstones are seen. Gallbladder wall thickness measures 6 mm. Common bile duct is within normal limits measuring 5 mm.  RIGHT KIDNEY: The right kidney is grossly unremarkable without evidence of hydronephrosis. PANCREAS:  Visualized portions of the pancreas are unremarkable. OTHER: No evidence of right upper quadrant ascites. Portal vein flow appears hepatopetal     Increased echogenicity is seen throughout the liver suggesting diffuse hepatocellular disease such as fatty infiltration Cholelithiasis. No cholecystitis     XR CHEST PORTABLE    Result Date: 1/5/2023  EXAMINATION: ONE XRAY VIEW OF THE CHEST 1/5/2023 6:07 am COMPARISON: Chest x-ray dated 4 January 2023 HISTORY: ORDERING SYSTEM PROVIDED HISTORY: While on ventilator TECHNOLOGIST PROVIDED HISTORY: While on ventilator Reason for Exam: on vent FINDINGS: Stable cardiomediastinal silhouette. Stable lines and tubes. Stable appearance of small bilateral pleural effusions with bibasilar airspace disease. No pneumothorax. Stable exam with small bilateral pleural effusions and bibasilar airspace disease. Consider pneumonia versus atelectasis in the appropriate clinical setting. XR CHEST PORTABLE    Result Date: 1/4/2023  EXAMINATION: ONE XRAY VIEW OF THE CHEST 1/4/2023 6:08 am COMPARISON: Chest x-ray dated 3 January 2023, 0632 hours HISTORY: ORDERING SYSTEM PROVIDED HISTORY: While on ventilator TECHNOLOGIST PROVIDED HISTORY: While on ventilator Reason for Exam: on vent FINDINGS: Left-sided PICC line with tip in the superior vena cava. Endotracheal tube with the tip above the robert. Enteric tube with the tip and side-port in the stomach. Small bilateral pleural effusions with bibasilar atelectasis. No pneumothorax. Emphysematous changes in the left upper lung. No significant interval change.      XR CHEST PORTABLE    Result Date: 1/3/2023  EXAMINATION: ONE XRAY VIEW OF THE CHEST 1/3/2023 6:09 am COMPARISON: 1/2/2023 HISTORY: ORDERING SYSTEM PROVIDED HISTORY: While on ventilator TECHNOLOGIST PROVIDED HISTORY: While on ventilator Reason for Exam: ON VENT FINDINGS: Small to moderate right pleural effusion. Right lung base atelectasis versus infiltrate. Underlying mild congestion versus interstitial infiltrate. Emphysematous changes in the left upper lung. Left-sided PICC line tip in the superior vena cava right atrial junction. ET tube is 5 cm superior to the robert. Feeding tube terminates below the diaphragm. Multiple wires/leads overlie the chest. Moderate osteopenic changes and degenerative changes noted in the bony structures. No significant will change. See above for more details. XR CHEST PORTABLE    Result Date: 1/2/2023  EXAMINATION: ONE XRAY VIEW OF THE CHEST 1/2/2023 3:15 pm COMPARISON: 01/01/2023 HISTORY: ORDERING SYSTEM PROVIDED HISTORY: intubation TECHNOLOGIST PROVIDED HISTORY: intubation Reason for Exam: intubation FINDINGS: Overlying items external to the patient somewhat limit evaluation. Placement of endotracheal tube with tip 8.2 cm from the robert. Placement of enteric tube seen to extend into the stomach, distal extent not included in field of view. Persistent likely layering right pleural effusion, similar to slightly worsened. Persistent bilateral airspace opacities to bilateral mid to lower lung zones, right worse than left, similar on the left but mildly worsened on the right. No pneumothoraces are seen. Persistent likely bullous change to the left upper lung zone. Cardiac and mediastinal silhouettes are stable. Stable appearance to bony structures. Placement of endotracheal tube which appears high riding with tip 8.2 cm from the robert. Suggest advancement by 2-3 cm. Placement of endotracheal tube seen to extend into the stomach, distal extent not included in field of view. No pneumothoraces. Persistent likely bullous change to the left upper lung zone. Persistent right pleural effusion, similar to slightly worsened. Persistent bilateral airspace opacities, right worse than left, similar on the left but mildly worsened on the right.      XR CHEST PORTABLE    Result Date: 1/1/2023  EXAMINATION: ONE XRAY VIEW OF THE CHEST 1/1/2023 7:17 pm COMPARISON: None. HISTORY: ORDERING SYSTEM PROVIDED HISTORY: ams TECHNOLOGIST PROVIDED HISTORY: ams Reason for Exam: Altered mental status, difficulty breathing Additional signs and symptoms: Altered mental status, difficulty breathing Relevant Medical/Surgical History: Altered mental status, difficulty breathing FINDINGS: Large lucent area in the left apex suggesting a large bulla however superimposed pneumothorax cannot be excluded. The cardiac size is normal. Right lower lobe airspace infiltrate and mild right pleural effusion. . Pulmonary vascularity appears normal. There is mild ectasia of the thoracic aorta. Calcific tendinitis of the right shoulder. No acute bony abnormalities. The hilar structures are normal.     Right lower lobe pneumonia and small right pleural effusion Large lucent area in the left apex suggesting a large bulla however superimposed pneumothorax cannot be excluded. Follow-up CT would be useful for further evaluation. The findings were sent to the Radiology Results [id number] at 10:31 pm on 1/1/2023 to be communicated to a licensed caregiver. CT CHEST PULMONARY EMBOLISM W CONTRAST    Result Date: 1/3/2023  EXAMINATION: CTA OF THE CHEST 1/3/2023 2:43 pm TECHNIQUE: CTA of the chest was performed after the administration of intravenous contrast.  Multiplanar reformatted images are provided for review. MIP images are provided for review. Automated exposure control, iterative reconstruction, and/or weight based adjustment of the mA/kV was utilized to reduce the radiation dose to as low as reasonably achievable. COMPARISON: None. HISTORY: ORDERING SYSTEM PROVIDED HISTORY: Elevated d-dimer. Resp distress. Decreasing bp TECHNOLOGIST PROVIDED HISTORY: Elevated d-dimer. Resp distress. Decreasing bp Reason for Exam: Elevated d-dimer. Resp distress.  Decreasing bp Additional signs and symptoms: pt on vent, eval for pe FINDINGS: Pulmonary Arteries: There is adequate opacification of the pulmonary arteries for evaluation. No evidence of intraluminal filling defect to suggest pulmonary embolism. Dilated main pulmonary artery measuring up to 3.2 cm. Increased RV to LV ratio. Lungs/pleura: Moderate right and small left pleural effusions with adjacent atelectasis and partial collapse of the right lower lobe. Large bullous formation involving the superior lobe of the left lower lobe. Moderate centrilobular emphysema. A few scattered solid pulmonary nodules measuring up to 5 mm in the right middle lobe (4:72). No mass or consolidation. No pneumothorax. Mediastinum: No lymphadenopathy. No pericardial effusion. No mass. Normal thyroid gland. Aortic valve annulus calcifications. Mild coronary artery calcifications. Mild calcifications of the thoracic aorta and its branches. Upper Abdomen: Trace perisplenic free fluid. Irregular appearance of the gallbladder with wall thickening, pericholecystic fluid, and probable cholelithiasis. . Soft Tissues/Bones: Mild anasarca. .  No acute osseous abnormality. Endotracheal and enteric tubes in place. No evidence of pulmonary embolism. Dilated main pulmonary artery suggestive of pulmonary hypertension. Increased RV to LV ratio suggestive of right heart strain. Moderate right small left pleural effusions with partial collapse of the right lower lobe. Moderate centrilobular emphysema with large bullous formation in the left lung. Irregular appearance of the gallbladder with wall thickening, pericholecystic fluid, and probable cholelithiasis. Correlation with right upper quadrant ultrasound recommended. Trace perisplenic free fluid.      VL Lower Extremity Bilateral Venous Duplex    Result Date: 1/3/2023    [facility]  Vascular Lower Extremities DVT Study Procedure   Patient Name  [facility]      Date of Study           01/03/2023                [facility]   Date of Birth 1960  Gender                  Male   Age           60 year(s)  Race                       Room Number   2009        Height:                 67 inch, 170.18 cm   Corporate ID  [id number]    Weight:                 188 pounds, 85.3 kg  #   Patient Acct  [de-identified]   BSA:        1.97 m^2    BMI:       29.44 kg/m^2  #   MR #          [id number]      Sonographer             Kati Jim   Accession #   [id number]  Interpreting Physician  Munira Wolf   Referring                 Referring Physician     Jinny Rubio  Nurse  Practitioner  Procedure Type of Study:   Veins: Lower Extremities DVT Study, Venous Scan Lower Bilateral.  Indications for Study:Elevated D-Dimer and Leg Swelling. Patient Status: In Patient. Technical Quality:Limited visualization. Limitation reason:Edema. Conclusions   Summary   No evidence of deep vein thrombosis in the bilateral lower extremities. Acute superficial vein thrombosis of the right GSV at the saphenofemoral  junction. Signature   ----------------------------------------------------------------  Electronically signed by Oneal Villalobos(Sonographer) on  01/03/2023 09:39 AM  ----------------------------------------------------------------   ----------------------------------------------------------------  Electronically signed by Lupe Pons Reyes,Arthur(Interpreting  physician) on 01/03/2023 07:05 PM  ----------------------------------------------------------------  Findings:   Right Impression:                       Left Impression:  Non visualization of the posterior      Non visualization of the posterior  tibial and peroneal veins. tibial and peroneal veins. Remaining deep veins                    Remaining deep veins  demonstrate normal compressibility. demonstrate normal                                          compressibility.   Non compressibility of the great  saphenous vein at the level of the      Normal compressibility of the junction. great saphenous vein. Normal compressibility of the small     Normal compressibility of the  saphenous vein.                         small saphenous vein. Velocities are measured in cm/s ; Diameters are measured in cm Right Lower Extremities DVT Study Measurements Right 2D Measurements +------------------------------------+----------+---------------+----------+ ! Location                            ! Visualized! Compressibility! Thrombosis! +------------------------------------+----------+---------------+----------+ ! Common Femoral                      !Yes       ! Yes            ! None      ! +------------------------------------+----------+---------------+----------+ ! Prox Femoral                        !Yes       ! Yes            ! None      ! +------------------------------------+----------+---------------+----------+ ! Mid Femoral                         !Yes       ! Yes            ! None      ! +------------------------------------+----------+---------------+----------+ ! Dist Femoral                        !Yes       ! Yes            ! None      ! +------------------------------------+----------+---------------+----------+ ! Popliteal                           !Yes       ! Yes            ! None      ! +------------------------------------+----------+---------------+----------+ ! Sapheno Femoral Junction            ! Yes       ! No             !          ! +------------------------------------+----------+---------------+----------+ ! PTV                                 ! No        !               !          ! +------------------------------------+----------+---------------+----------+ ! Peroneal                            !No        !               !          ! +------------------------------------+----------+---------------+----------+ ! Gastroc                             ! Yes       ! Yes            ! None      ! +------------------------------------+----------+---------------+----------+ !GSV Thigh                           ! Yes       ! Yes            ! None      ! +------------------------------------+----------+---------------+----------+ ! GSV Knee                            ! Yes       ! Yes            ! None      ! +------------------------------------+----------+---------------+----------+ ! GSV Ankle                           ! No        !               !          ! +------------------------------------+----------+---------------+----------+ ! SSV                                 ! Yes       ! Yes            ! None      ! +------------------------------------+----------+---------------+----------+ Right Doppler Measurements +---------------------------+------+------+--------------------------------+ ! Location                   ! Signal!Reflux! Reflux (msec)                   ! +---------------------------+------+------+--------------------------------+ ! Common Femoral             !Phasic!      !                                ! +---------------------------+------+------+--------------------------------+ ! Prox Femoral               !Phasic!      !                                ! +---------------------------+------+------+--------------------------------+ ! Popliteal                  !Phasic!      !                                ! +---------------------------+------+------+--------------------------------+ Left Lower Extremities DVT Study Measurements Left 2D Measurements +------------------------------------+----------+---------------+----------+ ! Location                            ! Visualized! Compressibility! Thrombosis! +------------------------------------+----------+---------------+----------+ ! Common Femoral                      !Yes       ! Yes            ! None      ! +------------------------------------+----------+---------------+----------+ ! Prox Femoral                        !Yes       ! Yes            ! None      ! +------------------------------------+----------+---------------+----------+ ! Mid Femoral                         !Yes       ! Yes            ! None      ! +------------------------------------+----------+---------------+----------+ ! Dist Femoral                        !Yes       ! Yes            ! None      ! +------------------------------------+----------+---------------+----------+ ! Popliteal                           !Yes       ! Yes            ! None      ! +------------------------------------+----------+---------------+----------+ ! Sapheno Femoral Junction            ! Yes       ! Yes            ! None      ! +------------------------------------+----------+---------------+----------+ ! PTV                                 ! No        !               !          ! +------------------------------------+----------+---------------+----------+ ! Peroneal                            !No        !               !          ! +------------------------------------+----------+---------------+----------+ ! Gastroc                             ! Yes       ! Yes            ! None      ! +------------------------------------+----------+---------------+----------+ ! GSV Thigh                           ! Yes       ! Yes            ! None      ! +------------------------------------+----------+---------------+----------+ ! GSV Knee                            ! Yes       ! Yes            ! None      ! +------------------------------------+----------+---------------+----------+ ! GSV Ankle                           ! No        !               !          ! +------------------------------------+----------+---------------+----------+ ! SSV                                 ! Yes       ! Yes            ! None      ! +------------------------------------+----------+---------------+----------+ Left Doppler Measurements +---------------------------+------+------+--------------------------------+ ! Location                   ! Signal!Reflux! Reflux (msec)                   !  +---------------------------+------+------+--------------------------------+ !Common Femoral             !Phasic!      !                                ! +---------------------------+------+------+--------------------------------+ ! Prox Femoral               !Phasic!      !                                ! +---------------------------+------+------+--------------------------------+ ! Popliteal                  !Phasic!      !                                ! +---------------------------+------+------+--------------------------------+    US SPLEEN    Result Date: 1/3/2023  EXAMINATION: ULTRASOUND OF THE SPLEEN 1/3/2023 1:01 pm COMPARISON: None. HISTORY: ORDERING SYSTEM PROVIDED HISTORY: thrombocytpenia TECHNOLOGIST PROVIDED HISTORY: thrombocytpenia FINDINGS: Spleen is normal in size and echotexture. Maximum diameter 9.9 cm. No focal lesion. Normal ultrasound evaluation of the spleen     CTA HEAD NECK W CONTRAST    Result Date: 1/3/2023  EXAMINATION: CTA OF THE HEAD AND NECK WITH CONTRAST 1/3/2023 6:28 pm: TECHNIQUE: CTA of the head and neck was performed with the administration of intravenous contrast. Multiplanar reformatted images are provided for review. MIP images are provided for review. Stenosis of the internal carotid arteries measured using NASCET criteria. Automated exposure control, iterative reconstruction, and/or weight based adjustment of the mA/kV was utilized to reduce the radiation dose to as low as reasonably achievable. COMPARISON: Brain MRI done 01/03/2023. Noncontrast CT head done 01/02/2023. HISTORY: ORDERING SYSTEM PROVIDED HISTORY: MRI stoke TECHNOLOGIST PROVIDED HISTORY: MRI stoke Reason for Exam: eval for Pulm embolism given pt with severe plum hypertension Additional signs and symptoms: MRI stoke Relevant Medical/Surgical History: pt on vent , f/u mri brain FINDINGS: CTA NECK: AORTIC ARCH/ARCH VESSELS: No dissection or arterial injury. No significant stenosis of the brachiocephalic or subclavian arteries.   Atherosclerosis in the visualized thoracic aorta and bilateral subclavian arteries. CAROTID ARTERIES: No dissection, arterial injury, or hemodynamically significant stenosis by NASCET criteria. Right greater than left carotid bulb atherosclerotic plaque. Tortuosity of the bilateral distal cervical internal carotid arteries. VERTEBRAL ARTERIES: No dissection, arterial injury, or significant stenosis. The left vertebral artery arises directly from the aortic arch. Dominant right vertebral artery. SOFT TISSUES: Partially visualized right pleural effusion. Emphysema. Large left apical bulla. No cervical or superior mediastinal lymphadenopathy. The larynx and pharynx are unremarkable. No acute abnormality of the salivary and thyroid glands. Endotracheal and enteric tubes in place. BONES: No acute osseous abnormality. Multilevel degenerative changes in the visualized spine, centered at C4-C5 and C5-C6. CTA HEAD: ANTERIOR CIRCULATION: No significant stenosis of the intracranial internal carotid, anterior cerebral, or middle cerebral arteries. No aneurysm. Atherosclerosis in the bilateral intracranial internal carotid arteries. POSTERIOR CIRCULATION: No significant stenosis of the vertebral, basilar, or posterior cerebral arteries. No aneurysm. Mild atherosclerosis in the dominant right intracranial vertebral artery. OTHER: No dural venous sinus thrombosis on this non-dedicated study. BRAIN: Comparison brain MRI done earlier same day demonstrates acute/subacute strokes which are not well seen on this study. Diffuse leptomeningeal enhancement throughout both cerebral hemispheres. 1.  No focal hemodynamically significant stenosis, occlusion or aneurysm in the large arteries of the head and neck. 2.  Diffuse leptomeningeal enhancement throughout both cerebral hemispheres can be seen in the setting of subarachnoid hemorrhage, encephalitis or meningitis. Consider correlation with CSF studies if there is high clinical concern for underlying infection.      IR PLACE NG TUBE BY DR Mayela Edwards    Result Date: 1/11/2023  PROCEDURE: XR PLACE NASOGASTRIC TUBE PHYS 1/11/2023 HISTORY: ORDERING SYSTEM PROVIDED HISTORY: Resistance to NG tube placement per bedside nurse. Needs to be NG tube fed. TECHNOLOGIST PROVIDED HISTORY: Resistance to NG tube placement per bedside nurse. Needs to be NG tube fed. CONTRAST: None SEDATION: None FLUOROSCOPY DOSE AND TYPE OR TIME AND EXPOSURES: 27 seconds; D  cGy cm2 DESCRIPTION OF PROCEDURE AND FINDINGS: Under intermittent fluoroscopic guidance a 12 [place]ian nasogastric tube was placed through the left nostril and directed into the stomach. The tip is in the distal body of the stomach with the side hole well past the GE junction. Small amount air was injected verifying appropriate tube positioning within the stomach. The catheter was secured in place to the patient's nose. There are no immediate complications. The patient left the department stable condition. EBL: None     16 [place]ian nasogastric tube placed successfully with its tip in the stomach. MRI BRAIN WO CONTRAST    Result Date: 1/11/2023  EXAMINATION: MRI OF THE BRAIN WITHOUT CONTRAST  1/11/2023 3:31 pm TECHNIQUE: Multiplanar multisequence MRI of the brain was performed without the administration of intravenous contrast. COMPARISON: CT brain performed 01/10/2023. MRI brain performed 01/03/2023. HISTORY: ORDERING SYSTEM PROVIDED HISTORY: follow-up strokes and SAH TECHNOLOGIST PROVIDED HISTORY: follow-up strokes and SAH Reason for Exam: follow-up strokes and SAH Additional signs and symptoms: Acute respiratory failure with hypoxia and hypercapnia (HCC) RLL pneumonia, ams FINDINGS: INTRACRANIAL STRUCTURES/VENTRICLES: The sellar and suprasellar structures, optic chiasm, corpus callosum, pineal gland, tectum, and midline brainstem structures are unremarkable. The craniocervical junction is unremarkable. There is chronic microvascular disease.   There is a mild amount of residual subarachnoid hemorrhage within the right frontal and right parietal lobes as well as the left frontal lobe. There is an evolving small focus of hemorrhage in the left parietal lobe posteriorly. There is no mass effect or midline shift. There is redemonstration an evolving area of ischemia in the right frontal lobe and left posterior parietal lobe as well as a few foci within the parasagittal aspect of the right frontoparietal region. ORBITS: The visualized portion of the orbits demonstrate no acute abnormality. SINUSES: There is chronic sinusitis. There is fluid in the mastoid air cells. BONES/SOFT TISSUES: The bone marrow signal intensity appears normal. The soft tissues demonstrate no acute abnormality. Mild amount of residual subarachnoid hemorrhage in the right frontal and right parietal lobes as well as the left frontal lobe. Evolving small focus of hemorrhage in the left parietal lobe posteriorly. Evolving areas of ischemia in the right frontal lobe and left posterior parietal lobe as well as a few foci in the parasagittal aspect of the right frontoparietal region. MRI BRAIN WO CONTRAST    Result Date: 1/3/2023  EXAMINATION: MRI OF THE BRAIN WITHOUT CONTRAST  1/3/2023 3:03 pm TECHNIQUE: Multiplanar multisequence MRI of the brain was performed without the administration of intravenous contrast. COMPARISON: None HISTORY: ORDERING SYSTEM PROVIDED HISTORY: acute to subacute left parietal infarct, AMS, Vent TECHNOLOGIST PROVIDED HISTORY: acute to subacute left parietal infarct, AMS, Vent What is the sedation requirement?->Sedation Reason for Exam: acute to subacute left parietal infarct, AMS, Vent FINDINGS: INTRACRANIAL STRUCTURES/VENTRICLES: There is an subacute to old left cerebellar lacune. Hyperintense FLAIR signal abnormality within the sulci of both cerebral hemispheres is compatible with subarachnoid hemorrhage. There is an old lacune within the right caudate nucleus.  ORBITS: The visualized portion of the orbits demonstrate no acute abnormality. SINUSES: Small bilateral mastoid effusions are identified. There is moderate paranasal sinus disease on the left side. BONES/SOFT TISSUES: The bone marrow signal intensity appears normal. The soft tissues demonstrate no acute abnormality. Small acute infarcts involving the left cerebellar hemisphere and left parietal lobe. Small subacute infarct within the left cerebellar hemisphere. Moderate bilateral subarachnoid hemorrhage within both cerebral hemispheres which is of uncertain etiology. The hemorrhage is more apparent on MRI than on previous head CT. Old right caudate nucleus lacune. Small bilateral mastoid effusions and moderate left paranasal sinus disease. The findings were sent to the Radiology Results [id number] at 4:17 pm on 1/3/2023 to be communicated to a licensed caregiver. [place] MODIFIED BARIUM SWALLOW W VIDEO    Result Date: 1/14/2023  EXAMINATION: MODIFIED BARIUM SWALLOW WAS PERFORMED IN CONJUNCTION WITH SPEECH PATHOLOGY SERVICES TECHNIQUE: Under fluoroscopic evaluation cineradiography/videoradiography recordings were performed in conjunction with the speech-language pathologist (SLP). Various liquid, solid and/or semi-solid barium preparations were used to assess swallowing function. FLUOROSCOPY DOSE AND TYPE OR TIME AND EXPOSURES: 1.43 minutes fluoro time, 13 cine series. Air Kerma 8.1 mGy COMPARISON: January 9, 2023 HISTORY: ORDERING SYSTEM PROVIDED HISTORY: Speech therapy recommendations TECHNOLOGIST PROVIDED HISTORY: Speech therapy recommendations Reason for Exam: Speech therapy recommendations FINDINGS: Pudding: Premature vallecular spillage. Moderate vallecular residue. Maximum amount of piriform sinus cavity residue. Deep penetration. Aspiration with residue lying on top of vocal cords. Cough noted. Nectar thick liquid: Premature vallecular spillage. Minimal amount of vallecular and piriform sinus cavity residue. Deep penetration. No completed aspiration. Premature vallecular spillage. Piriform sinus cavity residue. Deep penetration with both materials. Aspiration with pudding. Please see separate speech pathology report for full discussion of findings and recommendations. FL MODIFIED BARIUM SWALLOW W VIDEO    Result Date: 1/9/2023  EXAMINATION: MODIFIED BARIUM SWALLOW WAS PERFORMED IN CONJUNCTION WITH SPEECH PATHOLOGY SERVICES TECHNIQUE: Under fluoroscopic evaluation cineradiography/videoradiography recordings were performed in conjunction with the speech-language pathologist (SLP). Various liquid, solid and/or semi-solid barium preparations were used to assess swallowing function. FLUOROSCOPY DOSE AND TYPE OR TIME AND EXPOSURES: Fluoro time: 1 minute 52 seconds. Dap: 462 dGy per cm2. Air kerma: 8.9 mGy HISTORY: ORDERING SYSTEM PROVIDED HISTORY: Assess swallow TECHNOLOGIST PROVIDED HISTORY: Assess swallow Reason for Exam: aspiration Additional signs and symptoms: recent stroke FINDINGS/IMPRESSION: (Only 1 image was able to be recorded.)  Radiologist observations: Progressively increased penetration during ingestion of thin liquids and puree/pudding textures. No aspiration identified. Please see separate speech pathology report for full discussion of findings and recommendations. Physical Examination:        Physical Exam  Constitutional:       Appearance: Normal appearance. HENT:      Head: Normocephalic and atraumatic. Eyes:      Extraocular Movements: Extraocular movements intact. Conjunctiva/sclera: Conjunctivae normal.      Pupils: Pupils are equal, round, and reactive to light. Cardiovascular:      Rate and Rhythm: Normal rate and regular rhythm. Pulses: Normal pulses. Heart sounds: Normal heart sounds. Pulmonary:      Effort: Pulmonary effort is normal.      Breath sounds: Normal breath sounds. Abdominal:      General: Abdomen is flat.  Bowel sounds are normal. Palpations: Abdomen is soft. Comments: No abdominal pain present. Abdominal binder in place   Musculoskeletal:         General: Normal range of motion. Cervical back: Normal range of motion and neck supple. Skin:     General: Skin is warm and dry. Neurological:      General: No focal deficit present. Mental Status: He is alert and oriented to person, place, and time. Mental status is at baseline. Assessment:        Primary Problem  Acute respiratory failure with hypoxia and hypercapnia [facility])    Active Hospital Problems    Diagnosis Date Noted    Dysphagia [R13.10] 01/16/2023     Priority: Medium    Hepatitis C virus infection without hepatic coma [B19.20] 01/14/2023     Priority: Medium    Mild malnutrition ([address] [E44.1] 01/13/2023     Priority: Medium    Delirium due to another medical condition [F05] 01/10/2023     Priority: Medium    ACP (advance care planning) [Z71.89] 01/09/2023     Priority: Medium    Goals of care, counseling/discussion [Z71.89] 01/09/2023     Priority: Medium    Encounter for palliative care [Z51.5] 01/09/2023     Priority: Medium    Prolonged pt (prothrombin time) [R79.1] 01/03/2023     Priority: Medium    Emphysema lung ([address] [J43.9] 01/03/2023     Priority: Medium    Cerebral infarction ([address] [I63.9] 01/03/2023     Priority: Medium    Transaminitis [R74.01] 01/02/2023     Priority: Medium    Normocytic anemia [D64.9] 01/02/2023     Priority: Medium    Thrombocytopenia ([address] [D69.6] 01/02/2023     Priority: Medium    Agitation [R45.1] 01/02/2023     Priority: Medium    Acute respiratory failure with hypoxia and hypercapnia (HCC) RLL pneumonia [J96.01, J96.02] 01/02/2023     Priority: Medium    Altered mental status [R41.82] 01/02/2023     Priority: Medium    Community acquired pneumonia of right lower lobe of lung [J18.9] 01/02/2023     Priority: Medium       Plan:         Altered Mental Status in the setting of Acute type 2 respiratory failure secondary to Pneumonia and Acute Heart Failure-Improved. - PEG tube placed yesterday. He has no abdominal pain this morning.  - Per general surgery, okay to discharge to acute rehab once tolerating full feeds  - Follow-up with Dr. Brad Elias in office once he has passed a swallow test and is on full p.o. feeds for PEG tube removal.  -On Geodon 10 mg BID  -ID following: vancoymycin and Unasyn completed  -Ipratropium-Albuterol every 4 hrs  -Echocardiogam completed  - On aldactone 25 mg, Coreg 6.25mg BID     Transaminitis with prolonged PTT and INR in the setting of acute HCV  - AST and ALT normalized  -INR PT improving   -Thrombocytopenicresolved  -HCV RNA PCR positive; ID on board         Multiple acute brain infarcts with subarachnoid hemorrhage -EEG showing diffuse slowing without epileptic waves.  -No anticoagulation  -BP parameters of 140.  -Exam is nonfocal     Thrombocytopenia-RESOLVED  -Unknown  baseline  -Transaminates and Prolonged PT and PTT; resolved. -PLT count  118  -Monitor HH         Microcytic anemia in the setting of elevated INR and PT  -Hb stable     Elevated troponins most likely 2/2 demand ischemia  -no acute changes on EKG  -ECHO showing normal LVF. Mild tricuspid regurge. RV pressure of 25 mmHG     Hypokalemia  -K 3.6 this AM  -Effer K 40 meq daily. DVT prophylaxis: reason for no prophylaxis: Prolonged PT, aPTT  GI prophylaxis: Protonix 40 mg daily    Roz Albert MD  1/18/2023  8:52 AM   Attending Physician Statement  I have discussed the care of [facility] and I have examined the patient myselft and taken ros and hpi , including pertinent history and exam findings,  with the resident. I have reviewed the key elements of all parts of the encounter with the resident. I agree with the assessment, plan and orders as documented by the resident.   Ok to dc to aru  No AC no aspirin  No heparin   Epc cuffs    Electronically signed by Renate Ray MD

## 2023-01-18 NOTE — CARE COORDINATION
[facility]  PRE-ADMISSION ASSESSMENT    Patient Name: Geovanna Madrigal        MRN: [id number]    : 1960  (63 y.o.)  Gender: male   Ethnicity: Not , /a or Panamanian Origin  Race: White    Admitted from:  [facility]     Type of Admission:  New Admission     Date of Onset / Admission to the [address] [place] [address]:  2023    Inpatient Rehabilitation Admitting Diagnosis:  Multi-infarct CVAs, encephalopathy    Did patient have surgery/procedures?   Yes, As Listed Below   2023 : Endotracheal intubation  2023: ELECTROENCEPHALOGRAM  2023: EGD ESOPHAGOGASTRODUODENOSCOPY PEG TUBE INSERTION  Physicians:   Connie Stevens,    Physician   Cardiology     Hilary Maurer, DO   Physician   Cardiology     Jeanne Srinivasan, DO   Physician   General Surgery     Lucila Kauffman MD   Physician   Hematology Oncology     Kita Robison MD   Physician   Hematology Oncology     Geoffrey Lacy MD   Physician   Infectious Disease     Abhinav Damon MD   Physician   Internal Medicine     Shaheed Zarco MD   Physician   Internal Medicine     Jordan Orta MD   Physician   Internal Medicine     Rubi Fletcher MD   Physician   Internal Medicine     Scarlett Giles MD   Physician   Nephrology     Mark Sweeney MD   Physician   Nephrology     Olena Keenan MD   Physician   Nephrology     Shameka Garcia MD   Physician   Neurology     Brady Roque MD   Physician   Specialty:  Neurology     Akin Gu MD   Physician   Oncology     Pelon Eddy MD   Physician   Pulmonology     Angi Cotto MD   Physician   Pulmonology     Risk for Clinical Complications:  High     Co-morbidities:    Multifactorial encephalopathy as well as multi infarct CVAs and subarachnoid hemorrhage-aspirin on hold  Alcohol use-chronic liver disease, hepatitis C, prolonged PT/PTT, thrombocytopenia-platelets 79 last INR -1.6 consider recheck  Acute hypoxic respiratory failure s/p intubation extubated 1/5, community-acquired pneumonia, cellulitis PICC line completed antibiotics of Unasyn and Vanco, on 4 L nasal cannula  Dysphagia with NG tube-awaiting follow-up swallow study per speech  Status post acute kidney injury  Agitated/confusion-on Geodon continues nightly and as needed  Hypertension-Coreg, Lasix, spironolactone  GERD-Protonix    Financial Information  Primary insurance: Medicaid     Secondary Insurance: None     Precautions:   []Cardiac Precautions: No Cardiac Precautions  []Total hip precautions: No Total Hip Precautions  []Weight Bearing status: No Weight Bearing Restrictions  [x]Safety Precautions/Concerns:  Fall Risk, General Precautions  [x]Visually impaired: Yes: Wears glasses at all times    [x]Hard of Hearing: Yes: Hard of hearing/hearing concerns (Ringing in ears)        Communication Aids, Devices or Interpretation Services Required: None    Isolation Precautions:  Yes: Contact Precautions for MRSA        Physiatrist: Dr. aMria C Lawson      Patients Occupation: Retired    Reviewed Lab and Diagnostic reports from Current Admission: Yes    Patients Prior Functional  Level: Prior Function  ADL Assistance: Independent  Homemaking Assistance: Independent  Ambulation Assistance: Independent  Transfer Assistance: Independent  Additional Comments: Spouse is home all the time and able to assist as needed. Daughter lives close by and able to assist as needed. Daughter works and has family. History of current illness, per PM&R Consult:  Mr. Lottie Sinha is a 60 y.o.  male who was [facility] to [facility] on 1/1/[facility]23 with Altered Mental Status     79-year-old male with altered mental status admitted with acute respiratory failure secondary to pneumonia. On admission O2 sats 95% he was placed on nonrebreather does have a history of regular alcohol intake but denies any recent use.   Patient was placed on BiPAP chest x-ray showed right lower pneumonia and large bullae in left apex with a pneumothorax cannot be excluded had respiratory acidosis with a liver enzymes and INR 2.3     Hematology/oncology-chronic liver disease secondary alcohol anemia and thrombocytopenia related to liver disease as well as coagulopathy, cardioembolic strokes with component of subarachnoid bleeding not a candidate for anticoagulation-discussed abstaining alcohol and acetaminophen and avoid aspirin he is taking high-dose of supplements almost a toxic level     ID-acute hypoxic restaurant failure s/p intubation 1/2 subsequently extubated, community-acquired pneumonia, cellulitis of PICC line that was removed possible MRSA completed Unasyn 1/8 continue IV vancomycin day 12 plan to discontinue, severe dysphagia has NG tube placed positive hepatitis C COVID-19 negative follow-up with GI as outpatient for hepatitis C management     Internal medicine-failed swallow study getting feeding through NG tube remains on Geodon?,  IVC dilated without respiratory collapse noted on echo on 4 L nasal cannula, noted multiple acute infarcts with subarachnoid hemorrhage versus meningitis-neurology following no new strokes and subarachnoid hemorrhage resolving neurology signed off,, ALT AST back to normal platelets improving in the 80s outpatient follow-up with GI no DVT prophylaxis due to prolonged PT and PTT     Nephrology acute kidney injury sec. Prerenal and ischemic acute tubular necrosis, replace electrolytes remove Mars on potassium supplement and IV magnesium     Neurology felt that multifactor encephalopathy.   Appearance of strokes on MRI unusual appear more small vessel in nature evaluating for autoimmune labs     Psychiatry-decrease Geodon by discontinue morning dose remain on schedule 10 mg nightly     Pulmonary-continue EPC cuffs weaning O2 as tolerated on DuoNeb treatments-noted acute hypoxic respiratory failure extubated 1/5, CAD episodes agitation/delirium    Prognosis: Fair    Current functional status for upper extremity ADLs:  UE Bathing: Stand by assistance  UE Dressing: Minimal assistance    Current functional status for lower extremity ADLs:  LE Bathing: Moderate assistance  LE Dressing: Moderate assistance    Current functional status for bed, chair, wheelchair transfers:  Transfers  Sit to Stand: Unable to assess (pt unalbe to bear wt through LEs to attempt due to weakness and cognitive concerns/ safety)    Current functional status for toilet transfers:   Toilet Transfers  Toilet - Technique: Ambulating  Equipment Used: Standard bedside commode  Toilet Transfer: 2 Person assistance, Minimal assistance  Toilet Transfers Comments: Verbal cues for hand placement and safety with Fair carryover    Current functional status for locomotion:  Ambulation  Comments: NT    Current functional status for comprehension: Exceptions To Functional Limits    Current functional status for expression: Exceptions To Functional Limits    Current functional status for social interaction: Exceptions To Functional Limits    Current functional status for problem solving: Exceptions To Functional Limits    Current functional status for memory: Exceptions To Functional Limits    Current Deficits R/T Impairment: Impaired Functional Mobility and Decreased ADLs    Required Therapy Services:  Physical Therapy: Yes  Occupational Therapy: Yes  Speech Therapy: Yes      Additional Services:    [x] /Case Management    [] Recreational Therapy, as appropriate    [x] Nutrition Consult, as appropriate  [x] Dietary Needs/Preferences: Specialized Dietary Needs/Preferences indicated as follows: ADULT TUBE FEEDING; PEG; Peptide Based; Continuous; 20; Yes; 10; Q 4 hours; 70; 200; Q 6 hours   [] Dialysis  [x] Cultural/Lutheran Needs/Preferences: No Specialized Cultural/Lutheran Needs/Preferences Identified  [] Special Equipment Needs  [] Special Medication Needs  [x] Other information relevant to patient's care needs: Palliative Care, Psychiatry and Spiritual Care following     Preferred Language: English    Does the patient need or want an  to communicate with a doctor or health care staff? No    Rehab Justification:  Needs 3 hrs therapy per day or 15 hours per week:  Yes  Identified Rehab Nursing needs: Yes  Intense Interdisciplinary need:  Yes  Need for 24 hr physician supervision:  Yes  Measurable improved quality of life:  Yes  Willingness to participate:  Yes  Medical Necessity:  Yes  Patient able to tolerate care proposed:  Yes    Expected Discharge Destination/Functional Level:  Home with assist  Expected length of time to achieve that level of improvement: 1-2 weeks  Expected Post Discharge Treatments: Home with possible Home Care    Acute Inpatient Rehabilitation Disclosure Statement will be provided to patient upon admission to ARU with patient's verbalization of understanding. I have reviewed and concur with the findings and results of the pre-admission screening assessment completed by the Inpatient Rehabilitation Admissions Coordinator.

## 2023-01-18 NOTE — PROGRESS NOTES
Lori Hearn was seen at bedside. No abdominal pain. Tube has been to gravity since PEG tube placed this morning. Okay to start medications and trickle tube feeds. Okay to advance tube feeds to goal as tolerated. Surgery site is clean, dry and intact. Patient may follow-up with me in my office once he has passed a swallow test and is on full p.o. feeds for PEG tube removal.  If he is unable to tolerate full feeds at 4 to 6 months, would recommend him follow-up for tube exchange in the office. If patient tolerates feeds prior to a 6-week nancy, the earliest I will remove a PEG tube is at 6 weeks due to the need for adequate time for a gastrocutaneous fistula to could be created. Okay to discharge to acute rehab once tolerating full feeds.     Electronically signed by Rola Mon DO on 1/17/2023 at 8:04 PM

## 2023-01-18 NOTE — PROGRESS NOTES
Department of Internal Medicine  Nephrology Concetta Naranjo MD  Progress Note    Reason for consultation: Management of acute kidney injury. Consulting physician: Chepe Fontenot MD.    Subjective. Patient seen and examined, no new complains. SNa improved, K improved  S/p PEG tube placement    History of present illness: This is a 60 y.o. male with no significant past medical history [no regular physician follow-up], who was brought to the emergency department via EMS for further evaluation of progressively worsening confusion and lethargy of 2 weeks duration. Patient's spouse said that he became progressively confused prompting her to call EMS on day of presentation 1/1/2023. When EMS arrived, patient was found to be obtunded with oxygen saturation 75% on room air and he was placed on a nonrebreather mask and given a dose of IV Lasix as he also had severe bilateral leg swelling. Patient was placed on BiPAP on arrival to the emergency department and was admitted to the intensive care unit. Initial systolic blood pressure was 106 mmHg and serum creatinine 1.77 mg/dL associated with elevated AST and ALT. On admission to the intensive care unit patient required endotracheal intubation for airway protection and treatment of acute respiratory failure. Nephrology consultation has been placed for management of acute kidney injury. He is currently on Levophed drip at 3 mcg/min. CT scan of the brain performed at presentation showed no acute intracranial abnormality but with mild chronic small vessel ischemic disease. Repeat CT scan performed 24 hours later [1/2/2023] showed focal area of decreased attenuation with loss of gray/white matter differentiation involving posterior left lobe with somewhat increased conspicuity as compared to prior exam likely reflecting an area of acute to subacute stroke.     Scheduled Meds:   heparin (porcine)  5,000 Units SubCUTAneous 3 times per day    miconazole Topical BID    potassium bicarb-citric acid  20 mEq Oral Daily    potassium bicarb-citric acid  40 mEq Oral Daily    potassium chloride  20 mEq Oral Once    spironolactone  25 mg Oral Daily    ziprasidone (GEODON) in sterile water injection  10 mg IntraMUSCular Nightly    carvedilol  6.25 mg Oral BID WC    [Held by provider] ferrous sulfate  325 mg Oral Daily with breakfast    pantoprazole (PROTONIX) 40 mg injection  40 mg IntraVENous Daily    ipratropium-albuterol  1 ampule Inhalation Q4H WA    nystatin   Topical BID    [Held by provider] aspirin  81 mg Oral Daily    sodium chloride flush  5-40 mL IntraVENous 2 times per day    nicotine  1 patch TransDERmal Daily     Continuous Infusions:   dextrose 5 % and 0.45 % NaCl 40 mL/hr at 23 1512    sodium chloride      dexmedetomidine      sodium chloride       PRN Meds:.hydrALAZINE, potassium chloride **OR** potassium alternative oral replacement **OR** potassium chloride, magnesium sulfate, ziprasidone (GEODON) in sterile water injection, labetalol, potassium chloride, sodium chloride, dexmedetomidine, perflutren lipid microspheres, sodium chloride flush, sodium chloride flush, sodium chloride, ondansetron **OR** ondansetron, polyethylene glycol, acetaminophen **OR** acetaminophen, albuterol, guaiFENesin    Physical Exam:    VITALS:  /80   Pulse 88   Temp 98.3 °F (36.8 °C) (Oral)   Resp 18   Ht 5' 7\" (1.702 m)   Wt 164 lb 3.9 oz (74.5 kg)   SpO2 97%   BMI 25.72 kg/m²   TEMPERATURE:  Current - Temp: 98.3 °F (36.8 °C);  Max - Temp  Av.6 °F (37 °C)  Min: 98.2 °F (36.8 °C)  Max: 99.6 °F (37.6 °C)  RESPIRATIONS RANGE: Resp  Av.2  Min: 15  Max: 30  PULSE RANGE: Pulse  Av.3  Min: 82  Max: 102  BLOOD PRESSURE RANGE:  Systolic (89PVC), AR , Min:120 , GNZ:245 ; Diastolic (28JJK), SDV:33, Min:73, Max:92  PULSE OXIMETRY RANGE: SpO2  Av.9 %  Min: 92 %  Max: 99 %  24HR INTAKE/OUTPUT:    Intake/Output Summary (Last 24 hours) at 2023 2217  Last data filed at 1/17/2023 2000  Gross per 24 hour   Intake 669 ml   Output 170 ml   Net 499 ml     Constitutional: Awake alert, responsive not in acute distress    Skin: Skin color, texture, turgor normal. No rashes or lesions    Head: Normocephalic, without obvious abnormality, atraumatic     Cardiovascular/Edema: regular rate and rhythm, S1, S2 normal, no murmur, click, rub or gallop    Respiratory: Lungs:  Coarse breath sounds bilaterally    Abdomen: soft, non-tender; bowel sounds normal; no masses,  no organomegaly    Back: symmetric, no curvature. ROM normal. No CVA tenderness. Extremities: edema +    Neuro: Nonfocal    CBC:   Recent Labs     01/15/23  0514 01/16/23  0431 01/17/23  0550   WBC 6.3 6.4 6.2   HGB 12.2* 12.4* 11.1*   * 159 171     BMP:    Recent Labs     01/15/23  0514 01/16/23  0431 01/17/23  0550    146* 143   K 3.4* 3.8 3.9   CL 99 102 103   CO2 37* 37* 34*   BUN 11 11 10   CREATININE 0.78 0.81 0.74   GLUCOSE 127* 111* 101*     Lab Results   Component Value Date/Time    NITRU NEGATIVE 01/02/2023 03:34 PM    COLORU Dark Yellow 01/02/2023 03:34 PM    PHUR 5.0 01/02/2023 03:34 PM    WBCUA 10 TO 20 01/02/2023 03:34 PM    RBCUA TOO NUMEROUS TO COUNT 01/02/2023 03:34 PM    BACTERIA FEW 01/01/2023 10:25 PM    SPECGRAV 1.015 01/02/2023 03:34 PM    LEUKOCYTESUR SMALL 01/02/2023 03:34 PM    UROBILINOGEN Normal 01/02/2023 03:34 PM    BILIRUBINUR NEGATIVE 01/02/2023 03:34 PM    GLUCOSEU NEGATIVE 01/02/2023 03:34 PM    KETUA TRACE 01/02/2023 03:34 PM     MRI of the brain performed without contrast on 1/3/2023 showed:  Small acute infarcts involving the left cerebellar hemisphere and left   parietal lobe. Small subacute infarct within the left cerebellar hemisphere. Moderate bilateral subarachnoid hemorrhage within both cerebral hemispheres   which is of uncertain etiology. The hemorrhage is more apparent on MRI than   on previous head CT.        Old right caudate nucleus lacune. Small bilateral mastoid effusions and moderate left paranasal sinus disease. IMPRESSION/RECOMMENDATIONS:      1. Acute kidney injury - most consistent with prerenal azotemia and/or ischemic acute tubular necrosis from hypotension. Resolved. 2.  Hypokalemia - Secondary to poor intake and loop diuretic. Potassium has been stable now. Continue Aldactone 25 mg p.o. daily and supplement with oral potassium chloride. Hypernatremia improved    3. Edema -clinically improved and we will continue with furosemide 20 mg daily per NG tube. Plan  Continue IVF   S/p PEG tube placement. Tube feeding initiation pending  Can DC IVF once tube feeding is initiated. Will sign off and follow on as needed basis. Prognosis is guarded.

## 2023-01-18 NOTE — PROGRESS NOTES
01/18/2023 @ 4:27pm. Notified RN Davey Do that patient lower extremity venous duplex exam preliminary report is negative.

## 2023-01-18 NOTE — PROGRESS NOTES
[facility]   INPATIENT OCCUPATIONAL THERAPY  PROGRESS NOTE  Date: 2023  Patient Name: Amelia Crowell       Room:   MRN: [id number]    : 1960  (60 y.o.)  Gender: male      Diagnosis: Acute respiratory failure,  AMS in the setting of hypoxia and a pneumonia.  multifactoral encehpalopathy. Repeat CT head shows prior strokes and SAH and intraparenchymal hemorrhage    Restrictions/Precautions  Restrictions/Precautions  Restrictions/Precautions: Fall Risk;General Precautions;Contact Precautions;Isolation; Bed Alarm  Required Braces or Orthoses?: No    Vitals  O2 Device: Nasal cannula    Subjective  Subjective  Subjective: \"I really should buy the newspaper\" Pt stated in regards to recalling the date. Subjective  Pain: Pt reported 1/10 L-sided neck pain (worse with movement) and pain at peg site  Comments: Okay for OT PT co-treatment per ANGEL Floyd    Objective  Orientation  Overall Orientation Status: Impaired  Orientation Level: Oriented to person;Disoriented to place; Disoriented to time;Disoriented to situation  Cognition  Overall Cognitive Status: Exceptions  Arousal/Alertness: Appropriate responses to stimuli  Following Commands:  Follows one step commands consistently  Attention Span: Attends with cues to redirect  Memory: Decreased recall of recent events;Decreased short term memory  Safety Judgement: Decreased awareness of need for assistance;Decreased awareness of need for safety  Problem Solving: Assistance required to implement solutions;Assistance required to identify errors made;Assistance required to correct errors made;Assistance required to generate solutions  Insights: Decreased awareness of deficits  Initiation: Requires cues for some  Sequencing: Requires cues for some  ADL  Feeding: Dependent/Total;NPO  Feeding Skilled Clinical Factors: Peg tube feeds  Grooming: Stand by assistance  UE Bathing: Stand by assistance  LE Bathing: Maximum assistance  UE Dressing: Contact guard assistance  LE Dressing: Maximum assistance  Toileting: Maximum assistance  Additional Comments: ADL scores based on skilled observation and clinical reasoning, unless otherwise noted. Pt politely declined completing ADLs this date. Pt is limited due to cognitive barriers (impulsivity, reduced safety), decreased strength, balance, and activity tolerance, impacting safety and independence with self care         Balance  Balance  Sitting Balance: Contact guard assistance (SBA-CGA)  Standing Balance: Dependent/Total (Two-person assist)  Standing Balance  Time: 1-2 minutes x2  Activity: functional transfers, functional mobility  Comment: with RW for UE support. Pt fatigues easily    Transfers/Mobility  Bed mobility  Supine to Sit: Minimal assistance (Assist at trunk)  Sit to Supine: Moderate assistance (Assist with BLEs)  Scooting: Stand by assistance  Bed Mobility Comments: HOB elevated, verbal cues for sequencing. Pt sat EOB for 30+ minutes, SBA with BUE supported on bed and CGA for dynamic sitting balance tasks due to pain at peg site. Transfers  Sit to stand: Contact guard assistance  Stand to sit: Contact guard assistance  Transfer Comments: Cued for hand/foot placement with fair carry over. Functional Mobility  Functional - Mobility Device: Rolling Walker  Activity: Other (Few side steps towards HOB)  Assist Level: Dependent/Total (Two-person assist)  Functional Mobility Comments: Annalee x2, cued for RW placement and sequencing    Functional Activities  OT Exercises  A/AROM Exercises: OT faciltiated pt's engagement in BUE exercises, 1 set x 15 reps, at each joint in all available planes. Frequent prolonged therapeutic rest breaks. Fair tolerance with cues for technique.       Patient Education  Patient Education  Education Given To: Patient  Education Provided: Role of Therapy, Plan of Care, Transfer Training  Education Provided Comments: Safety awareness with functional transfers and mobility  Education Method: Verbal  Barriers to Learning: Cognition  Education Outcome: Continued education needed, Demonstrated understanding, Verbalized understanding      Goals  Short Term Goals  Time Frame for Short Term Goals: By discharge  Short Term Goal 1: Pt will complete bed mobility mod I to sitting EOB supported for 8+ minutes during self care tasks/met  Short Term Goal 2: Pt will complete simple grooming/self feeding task with mod I and Fair attention to task  Short Term Goal 3: Pt will complete upper body dressing/bathing with Mod I and Good safety/attention to task  Short Term Goal 4: Pt will follow simple 1-2 step commands 75% of the time to increase participation in daily activities/ met 1-11-23  Short Term Goal 5: Pt will participate in 15+ minutes of therapeutic exercises/functional activities to increase safety and independence with self care and mobility/ met 1-11-23  Short Term Goal 6: new goal:  pt to dominik 5-7 min standing with BUE support and min A x 1 for adls.   Short Term Goal 7: Pt will perform functional transfers/mobility with CGA, using least restrictive device and Fair safety  Occupational Therapy Plan  Times Per Week: 5-7  Current Treatment Recommendations: Self-Care / ADL, Strengthening, ROM, Balance training, Functional mobility training, Endurance training, Cognitive reorientation, Neuromuscular re-education, Safety education & training, Patient/Caregiver education & training, Equipment evaluation, education, & procurement, Cognitive/Perceptual training, Coordination training      Assessment  Activity Tolerance  Activity Tolerance: Patient limited by fatigue, Patient Tolerated treatment well  Activity Tolerance Comments: on high flow oxygen  Assessment  Performance deficits / Impairments: Decreased ADL status, Decreased functional mobility , Decreased ROM, Decreased strength, Decreased safe awareness, Decreased cognition, Decreased endurance, Decreased balance, Decreased high-level IADLs, Decreased fine motor control, Decreased coordination, Decreased posture  Treatment Diagnosis: Impaired self care status  Prognosis: Fair  Decision Making: High Complexity  Discharge Recommendations: Patient would benefit from continued therapy after discharge  OT Equipment Recommendations  Other: TBD  Safety Devices  Type of Devices:  All fall risk precautions in place, Bed alarm in place, Call light within reach, Gait belt, Patient at risk for falls, Left in bed, Nurse notified      AM-PAC Daily Activities Inpatient  AM-PAC Daily Activity Inpatient   How much help for putting on and taking off regular lower body clothing?: A Lot  How much help for Bathing?: A Lot  How much help for Toileting?: A Lot  How much help for putting on and taking off regular upper body clothing?: A Little  How much help for taking care of personal grooming?: A Little  How much help for eating meals?: A Little  AM-[facility] Inpatient Daily Activity Raw Score: 15  AM-PAC Inpatient ADL T-Scale Score : 34.69  ADL Inpatient CMS 0-100% Score: 56.46  ADL Inpatient CMS G-Code Modifier : CK    OT Minutes  OT Individual Minutes  Time In: 1401  Time Out: 1501  Minutes: 60  Time Code Minutes   Timed Code Treatment Minutes: 60 Minutes      Alexa Arora OT

## 2023-01-18 NOTE — CARE COORDINATION
Pre-Admission Assessment completed and co-signed by [facility] physiatrist Dr. Finesse Huang. Preadmission assessment valid for 48 hours after co-signature. Discharging provider responsible for Discharge/Readmit medication reconciliation action. Status: Completed. Orders verified as signed/held, will be released by [facility] upon admission    DVT Prophylaxis Plan: BLE Dopplers Required: Status: Needs completed / Only the preliminary report is needed for result interpretation/impression, do not need to wait for final report / If Negative: Patient may admit / If POSITIVE: Patient MUST be medically cleared and have treatment plan initiated prior to admission     Discharging nurse to call nursing report to [facility] nursing team at extension [phone] before patient departure. Transportation Time: Pending BLE Dopplers    Updated Gloria CM: Via Telephone at this time.

## 2023-01-18 NOTE — PLAN OF CARE
Problem: Discharge Planning  Goal: Discharge to home or other facility with appropriate resources  Outcome: Progressing  Flowsheets (Taken 1/17/2023 1544 by Adrián Calle, RN)  Discharge to home or other facility with appropriate resources:   Identify barriers to discharge with patient and caregiver   Arrange for needed discharge resources and transportation as appropriate   Refer to discharge planning if patient needs post-hospital services based on physician order or complex needs related to functional status, cognitive ability or social support system     Problem: Safety - Adult  Goal: Free from fall injury  Outcome: Progressing  Flowsheets (Taken 1/17/2023 1544 by Adrián Calle, RN)  Free From Fall Injury:   Instruct family/caregiver on patient safety   Based on caregiver fall risk screen, instruct family/caregiver to ask for assistance with transferring infant if caregiver noted to have fall risk factors  Note: The patient remained free from falls this shift, call light within reach, bed in locked and lowest position. Side rails up x2. Continue to monitor closely. Problem: ABCDS Injury Assessment  Goal: Absence of physical injury  Outcome: Progressing  Flowsheets (Taken 1/15/2023 1105 by Michelle Aldana RN)  Absence of Physical Injury: Implement safety measures based on patient assessment  Note: Fall assessment performed and appropriate measures implemented. Room freed from clutter. Bed in lowest position with wheels locked. Call light in place. ID band in place. Problem: Skin/Tissue Integrity  Goal: Absence of new skin breakdown  Description: 1. Monitor for areas of redness and/or skin breakdown  2. Assess vascular access sites hourly  3. Every 4-6 hours minimum:  Change oxygen saturation probe site  4. Every 4-6 hours:  If on nasal continuous positive airway pressure, respiratory therapy assess nares and determine need for appliance change or resting period.   Outcome: Progressing  Note: Skin assessment complete. See flowsheets. Will continue to monitor for additional needs .       Problem: Pain  Goal: Verbalizes/displays adequate comfort level or baseline comfort level  Outcome: Progressing  Flowsheets (Taken 1/17/2023 1544 by Nasim Burgos RN)  Verbalizes/displays adequate comfort level or baseline comfort level:   Encourage patient to monitor pain and request assistance   Assess pain using appropriate pain scale   Implement non-pharmacological measures as appropriate and evaluate response   Administer analgesics based on type and severity of pain and evaluate response     Problem: Nutrition Deficit:  Goal: Optimize nutritional status  Outcome: Progressing  Flowsheets (Taken 1/18/2023 1323 by Sondra Campos, CAROLE, LD)  Nutrient intake appropriate for improving, restoring, or maintaining nutritional needs: Recommend, monitor, and adjust tube feedings and TPN/PPN based on assessed needs

## 2023-01-18 NOTE — PLAN OF CARE
Problem: Discharge Planning  Goal: Discharge to home or other facility with appropriate resources  1/18/2023 0140 by Tenzin West RN  Outcome: Progressing  1/17/2023 1544 by Sade Hutton RN  Outcome: Progressing  Flowsheets (Taken 1/17/2023 1544)  Discharge to home or other facility with appropriate resources:   Identify barriers to discharge with patient and caregiver   Arrange for needed discharge resources and transportation as appropriate   Refer to discharge planning if patient needs post-hospital services based on physician order or complex needs related to functional status, cognitive ability or social support system  Note: Patient to discharge to ARU once medically cleared; PEG tube placed successfully today. Problem: Safety - Adult  Goal: Free from fall injury  1/18/2023 0140 by Tenzin West RN  Outcome: Progressing  Note: Patient confused and weak. Telesitter remains in place. Bed alarm on.  1/17/2023 1544 by Sade Hutton RN  Outcome: Progressing  Flowsheets (Taken 1/17/2023 1544)  Free From Fall Injury:   Instruct family/caregiver on patient safety   Based on caregiver fall risk screen, instruct family/caregiver to ask for assistance with transferring infant if caregiver noted to have fall risk factors  Note: Patient remains free from falls during this shift. Bed in lowest position, side rails up x2, call light/personal belongings/side table within reach. Patient calls out appropriately with call light for assistance with ambulation. Problem: ABCDS Injury Assessment  Goal: Absence of physical injury  Outcome: Progressing     Problem: Skin/Tissue Integrity  Goal: Absence of new skin breakdown  Description: 1. Monitor for areas of redness and/or skin breakdown  2. Assess vascular access sites hourly  3. Every 4-6 hours minimum:  Change oxygen saturation probe site  4.   Every 4-6 hours:  If on nasal continuous positive airway pressure, respiratory therapy assess nares and determine need for appliance change or resting period. Outcome: Progressing  Note: Buttocks and low back reddened with a rash. Nystatin cream applied. Problem: Pain  Goal: Verbalizes/displays adequate comfort level or baseline comfort level  1/18/2023 0140 by Anne Diehl RN  Outcome: Progressing  Note: Patient denies pain. 1/17/2023 1544 by Rosie Flores RN  Outcome: Progressing  Flowsheets (Taken 1/17/2023 1544)  Verbalizes/displays adequate comfort level or baseline comfort level:   Encourage patient to monitor pain and request assistance   Assess pain using appropriate pain scale   Implement non-pharmacological measures as appropriate and evaluate response   Administer analgesics based on type and severity of pain and evaluate response     Problem: Nutrition Deficit:  Goal: Optimize nutritional status  1/18/2023 0140 by Anne Diehl RN  Outcome: Progressing  Note: Ube feedings started through Peg tube. No residuals. 1/17/2023 1544 by Rosie Flores RN  Outcome: Progressing  Flowsheets (Taken 1/17/2023 1544)  Nutrient intake appropriate for improving, restoring, or maintaining nutritional needs: Provide specific nutrition education to patient or family as appropriate  Note: PEG tube placed today; RN awaiting tube feed orders.

## 2023-01-18 NOTE — PROGRESS NOTES
Today's Date: 1/18/2023  Patient Name: Frank Marx  Date of admission: 1/1/2023  9:55 PM  Patient's age: 60 y.o., 1960  Admission Dx: Acute respiratory failure ([facility] [address] [J96.00]  Acute respiratory failure with hypoxia and hypercapnia ([facility] [address] [J96.01, J96.02]  Altered mental status, unspecified altered mental status type [R41.82]  Community acquired pneumonia of right lower lobe of lung [J18.9]  COPD with respiratory failure, acute ([facility] [address] [J44.9, J96.00]    Reason for Consult: management recommendations  Requesting Physician: Jenise Street MD    CHIEF COMPLAINT: Abnormal cell counts. Altered mental status. Pneumonia. Interval history:    Patient seen and examined  Labs and vitals reviewed  Continues to recover well. Condition is improving quickly. Mental status is improving. No active bleeding. Platelets normal.    HISTORY OF PRESENT ILLNESS:      The patient is a 60 y.o.  male who is admitted with chief complaint of altered mental status. Patient has not seen a doctor for multiple years. Due to worsening mental status EMS was summoned. Patient oxygen saturation was noted to be at 75%. Patient placed on BiPAP x-ray showed right lower lobe pneumonia. Patient started on antibiotics. Patient also noted to have INR of 2.3 platelet count of 30,159. Patient does have a history of alcohol intake. Patient's ammonia level noted to be 29. Creatinine 1.5. Total bilirubin is within range. Hemoglobin 11.2 with MCV of 69. Platelet count is 34,923. Ultrasound liver done however report is pending. Past Medical History: Hypertension    Past Surgical History: No pertinent surgical history    Medications:    Prior to Admission medications    Medication Sig Start Date End Date Taking?  Authorizing Provider   aspirin 325 MG tablet Take 325 mg by mouth daily Patient takes 2 tabs daily   Yes Historical Provider, MD   psyllium (KONSYL) 28.3 % PACK Take 1 packet by mouth daily   Yes Historical Provider, MD     Current Facility-Administered Medications   Medication Dose Route Frequency Provider Last Rate Last Admin    heparin (porcine) injection 5,000 Units  5,000 Units SubCUTAneous 3 times per day Jennyfer William DO   5,000 Units at 01/18/23 0507    miconazole (MICOTIN) 2 % powder   Topical BID Jennyfer William DO   Given at 01/17/23 2115    dextrose 5 % and 0.45 % sodium chloride infusion   IntraVENous Continuous Jennyfer William, DO 40 mL/hr at 01/16/23 1512 New Bag at 01/16/23 1512    potassium bicarb-citric acid (EFFER-K) effervescent tablet 20 mEq  20 mEq Oral Daily Jennyfer William, DO   20 mEq at 01/18/23 1011    potassium chloride (KLOR-CON M) extended release tablet 20 mEq  20 mEq Oral Once Jennyfer William DO        hydrALAZINE (APRESOLINE) injection 20 mg  20 mg IntraVENous Q6H PRN Jennyfer William, DO   20 mg at 01/11/23 1224    potassium chloride (KLOR-CON M) extended release tablet 40 mEq  40 mEq Oral PRN Jennyfer William, DO        Or    potassium bicarb-citric acid (EFFER-K) effervescent tablet 40 mEq  40 mEq Oral PRN Jennyfer William, DO        Or    potassium chloride 10 mEq/100 mL IVPB (Peripheral Line)  10 mEq IntraVENous PRN Jennyfer William  mL/hr at 01/13/23 0928 10 mEq at 01/13/23 6786    spironolactone (ALDACTONE) tablet 25 mg  25 mg Oral Daily Jennyfer William, DO   25 mg at 01/18/23 0957    magnesium sulfate 2000 mg in water 50 mL IVPB  2,000 mg IntraVENous PRN Jennyfer William, DO        ziprasidone (GEODON) 10 mg in sterile water 0.5 mL injection  10 mg IntraMUSCular Nightly Jennyfer William DO   10 mg at 01/16/23 2046    carvedilol (COREG) tablet 6.25 mg  6.25 mg Oral BID WC Jennyfer William DO   6.25 mg at 01/18/23 1011    [Held by provider] ferrous sulfate (IRON 325) tablet 325 mg  325 mg Oral Daily with breakfast Jennyfer William DO        pantoprazole (PROTONIX) 40 mg in sodium chloride (PF) 0.9 % 10 mL injection  40 mg IntraVENous Daily Jennyfer William DO   40 mg at 01/18/23 0957    ziprasidone (GEODON) 20 mg in sterile water 1 mL injection  20 mg IntraMUSCular Q12H PRN Jennyfer Ruizel, DO        labetalol (NORMODYNE;TRANDATE) injection 5 mg  5 mg IntraVENous Q6H PRN Jennyfer William, DO   5 mg at 01/10/23 1606    potassium chloride 10 mEq/100 mL IVPB (Peripheral Line)  10 mEq IntraVENous PRN Jennyfer William,  mL/hr at 01/13/23 1233 10 mEq at 01/13/23 1233    ipratropium-albuterol (DUONEB) nebulizer solution 1 ampule  1 ampule Inhalation Q4H WA Jennyfer William, DO   1 ampule at 01/17/23 2012    0.9 % sodium chloride infusion   IntraVENous PRN Jennyfer William, DO        dexmedetomidine (PRECEDEX) 400 mcg in sodium chloride 0.9 % 100 mL infusion  0.1-1.5 mcg/kg/hr IntraVENous Continuous PRN Jennyfer William, DO        nystatin (MYCOSTATIN) ointment   Topical BID Jennyfer William, DO   Given at 01/17/23 2115    perflutren lipid microspheres (DEFINITY) injection 1.5 mL  1.5 mL IntraVENous ONCE PRN Jennyfer William, DO        [Held by provider] aspirin chewable tablet 81 mg  81 mg Oral Daily Jennyfer William, DO   81 mg at 01/04/23 0740    sodium chloride flush 0.9 % injection 10 mL  10 mL IntraVENous PRN Jennyfer William, DO   10 mL at 01/03/23 1836    sodium chloride flush 0.9 % injection 5-40 mL  5-40 mL IntraVENous 2 times per day Jennyfer William, DO   10 mL at 01/16/23 1004    sodium chloride flush 0.9 % injection 5-40 mL  5-40 mL IntraVENous PRN Jennyfer William, DO        0.9 % sodium chloride infusion   IntraVENous PRN Jennyfer William, DO        ondansetron (ZOFRAN-ODT) disintegrating tablet 4 mg  4 mg Oral Q8H PRN Jennyfer William, DO        Or    ondansetron (ZOFRAN) injection 4 mg  4 mg IntraVENous Q6H PRN Jennyfer Ruizel, DO        polyethylene glycol (GLYCOLAX) packet 17 g  17 g Oral Daily PRN Jennyfer William, DO        acetaminophen (TYLENOL) tablet 650 mg  650 mg Oral Q6H PRN Jennyfer William,         Or    acetaminophen (TYLENOL) suppository 650 mg  650 mg Rectal Q6H PRN Jennyfer William, DO        nicotine (NICODERM CQ) 21 MG/24HR 1 patch  1 patch TransDERmal Daily Jennyfer William DO   1 patch at 23 0958    albuterol (PROVENTIL) nebulizer solution 2.5 mg  2.5 mg Nebulization Q2H PRN Jennyfer William DO   2.5 mg at 23 1115    guaiFENesin (MUCINEX) extended release tablet 600 mg  600 mg Oral BID PRN Jennyfer William DO           Allergies:  Patient has no known allergies. Social History:   reports that he has been smoking cigarettes. He has a 40.00 pack-year smoking history. He has never used smokeless tobacco. He reports current alcohol use. He reports that he does not currently use drugs. Family History: Patient not able to give history due to altered mental status    REVIEW OF SYSTEMS:      General: no fever or night sweats, Weight is stable. ENT: No double or blurred vision, no hearing problem, no dysphagia or sore throat   Respiratory: Positive shortness of breath  Cardiovascular: Denies chest pain, PND or orthopnea. No L E swelling or palpitations. Gastrointestinal:    No nausea or vomiting, abdominal pain, diarrhea or constipation. Genitourinary: Denies dysuria, hematuria, frequency, urgency or incontinence. Neurological: Complains of being very weak. Musculoskeletal:  No arthralgia no back pain or joint swelling. Skin: There are no rashes or bleeding.   Psych: Denies hallucinations or intentions to harm self        PHYSICAL EXAM:        /72   Pulse 84   Temp 99.1 °F (37.3 °C) (Axillary)   Resp 17   Ht 5' 7\" (1.702 m)   Wt 154 lb 1.6 oz (69.9 kg)   SpO2 98%   BMI 24.14 kg/m²    Temp (24hrs), Av.1 °F (37.3 °C), Min:98.3 °F (36.8 °C), Max:99.6 °F (37.6 °C)      General appearance - not in acute distress  Mental status -arousable but somewhat lethargic  Eyes - pupils equal and reactive, extraocular eye movements intact   Ears - bilateral TM's and external ear canals normal   Mouth -mucous membranes moist  Neck - supple, no significant adenopathy   Lymphatics - no palpable lymphadenopathy, no hepatosplenomegaly   Chest -bilateral rales  Heart - normal rate, regular rhythm, normal S1, S2, no murmurs  Abdomen - soft, nontender, nondistended, no masses or organomegaly   Neurological -orally intubated  Musculoskeletal - no joint tenderness, deformity or swelling   Extremities - peripheral pulses normal, no pedal edema, no clubbing or cyanosis   Skin - normal coloration and turgor, no rashes, no suspicious skin lesions noted ,      DATA:      Labs:     Results for orders placed or performed during the hospital encounter of 01/01/23   COVID-19 & Influenza Combo    Specimen: Nasopharyngeal Swab   Result Value Ref Range    Specimen Description . NASOPHARYNGEAL SWAB     Source . NASOPHARYNGEAL SWAB     SARS-CoV-2 RNA, RT PCR Not Detected Not Detected    INFLUENZA A Not Detected Not Detected    INFLUENZA B Not Detected Not Detected   Respiratory Panel, Molecular, with COVID-19 (Restricted: peds pts or suitable admitted adults)    Specimen: Nasopharyngeal Swab   Result Value Ref Range    Specimen Description . NASOPHARYNGEAL SWAB     Adenovirus PCR Not Detected Not Detected    Coronavirus 229E PCR Not Detected Not Detected    Coronavirus HKU1 PCR Not Detected Not Detected    Coronavirus NL63 PCR Not Detected Not Detected    Coronavirus OC43 PCR Not Detected Not Detected    SARS-CoV-2, PCR Not Detected Not Detected    Human Metapneumovirus PCR Not Detected Not Detected    Rhino/Enterovirus PCR Not Detected Not Detected    Influenza A by PCR Not Detected Not Detected    Influenza B by PCR Not Detected Not Detected    Parainfluenza 1 PCR Not Detected Not Detected    Parainfluenza 2 PCR Not Detected Not Detected    Parainfluenza 3 PCR Not Detected Not Detected    Parainfluenza 4 PCR Not Detected Not Detected    Resp Syncytial Virus PCR Not Detected Not Detected    Bordetella Parapertussis Not Detected Not Detected    B Pertussis by PCR Not Detected Not Detected    Chlamydia pneumoniae By PCR Not Detected Not Detected    Mycoplasma pneumo by PCR Not Detected Not Detected   Legionella Ag, Ur    Specimen: Urine   Result Value Ref Range    Legionella Pneumophilia Ag, Urine NEGATIVE NEGATIVE   STREP PNEUMONIAE ANTIGEN    Specimen: Urine, indwelling catheter   Result Value Ref Range    Source . CLEAN CATCH URINE     Strep pneumo Ag NEGATIVE    Culture, Blood 1    Specimen: Blood   Result Value Ref Range    Specimen Description . BLOOD LEFT ARM     Culture NO GROWTH 5 DAYS    Culture, Blood 1    Specimen: Blood   Result Value Ref Range    Specimen Description . BLOOD RIGHT ARM     Culture NO GROWTH 5 DAYS    Culture, Urine    Specimen: Urine, clean catch   Result Value Ref Range    Specimen Description . CLEAN CATCH URINE     Culture NO GROWTH    MRSA DNA Probe, Nasal    Specimen: Nasal   Result Value Ref Range    Specimen Description . NASAL SWAB     MRSA, DNA, Nasal (A) NEGATIVE     POSITIVE:  MRSA DNA detected by nucleic acid amplification. Culture, Respiratory    Specimen: Sputum Induced   Result Value Ref Range    Specimen Description . INDUCED SPUTUM     Direct Exam >10, <25 NEUTROPHILS/LPF     Direct Exam < 10 EPITHELIAL CELLS/LPF     Direct Exam NO SIGNIFICANT PATHOGENS SEEN     Culture NO GROWTH    Troponin   Result Value Ref Range    Troponin, High Sensitivity 177 (HH) 0 - 22 ng/L   Troponin   Result Value Ref Range    Troponin, High Sensitivity 184 (HH) 0 - 22 ng/L   APTT   Result Value Ref Range    PTT 26.5 24.0 - 36.0 sec   Protime-INR   Result Value Ref Range    Protime 24.9 (H) 11.8 - 14.6 sec    INR 2.3    Phosphorus   Result Value Ref Range    Phosphorus 4.3 2.5 - 4.5 mg/dL   Magnesium   Result Value Ref Range    Magnesium 2.1 1.6 - 2.6 mg/dL   Basic Metabolic Panel   Result Value Ref Range    Glucose 96 70 - 99 mg/dL    BUN 46 (H) 8 - 23 mg/dL    Creatinine 1.77 (H) 0.70 - 1.20 mg/dL    Est, Glom Filt Rate 43 (L) >60 mL/min/1.73m2    Calcium 7.9 (L) 8.6 - 10.4 mg/dL    Sodium 138 135 - 144 mmol/L    Potassium 4.8 3.7 - 5.3 mmol/L    Chloride 97 (L) 98 - 107 mmol/L    CO2 29 20 - 31 mmol/L    Anion Gap 12 9 - 17 mmol/L   CBC with Auto Differential   Result Value Ref Range    WBC 6.8 3.5 - 11.0 k/uL    RBC 5.99 (H) 4.5 - 5.9 m/uL    Hemoglobin 12.1 (L) 13.5 - 17.5 g/dL    Hematocrit 41.4 41 - 53 %    MCV 69.1 (L) 80 - 100 fL    MCH 20.2 (L) 26 - 34 pg    MCHC 29.2 (L) 31 - 37 g/dL    RDW 19.8 (H) 11.5 - 14.9 %    Platelets 55 (L) 275 - 450 k/uL    MPV 8.6 6.0 - 12.0 fL    Seg Neutrophils 79 (H) 36 - 66 %    Lymphocytes 12 (L) 24 - 44 %    Monocytes 8 (H) 1 - 7 %    Eosinophils % 0 0 - 4 %    Basophils 1 0 - 2 %    Segs Absolute 5.40 1.3 - 9.1 k/uL    Absolute Lymph # 0.80 (L) 1.0 - 4.8 k/uL    Absolute Mono # 0.50 0.1 - 1.3 k/uL    Absolute Eos # 0.00 0.0 - 0.4 k/uL    Basophils Absolute 0.10 0.0 - 0.2 k/uL   Blood Gas, Venous   Result Value Ref Range    pH, [place] 7.258 (L) 7.320 - 7.420    pCO2, [place] 63.0 (H) 39.0 - 55.0 mm Hg    pO2, [place] 48.1 30.0 - 50.0 mm Hg    HCO3, Venous 28.1 24.0 - 30.0 mmol/L    Positive Base Excess, [place] 1.0 0.0 - 2.0 mmol/L    O2 Sat, [place] 72.1 60.0 - 85.0 %    Carboxyhemoglobin 4.3 0 - 5 %    Methemoglobin 0.5 0.0 - 1.9 %    Pt Temp 37    Brain Natriuretic Peptide   Result Value Ref Range    Pro-BNP 7,797 (H) <300 pg/mL   Hepatic Function Panel   Result Value Ref Range    Albumin 3.9 3.5 - 5.2 g/dL    Alkaline Phosphatase 68 40 - 129 U/L     (H) 5 - 41 U/L     (H) <40 U/L    Total Bilirubin 1.1 0.3 - 1.2 mg/dL    Bilirubin, Direct 0.9 (H) <0.3 mg/dL    Bilirubin, Indirect 0.2 0.0 - 1.0 mg/dL    Total Protein 6.6 6.4 - 8.3 g/dL   Lipase   Result Value Ref Range    Lipase 17 13 - 60 U/L   Urinalysis with Reflex to Culture    Specimen: Urine   Result Value Ref Range    Color, UA Orange (A) Yellow    Turbidity UA Cloudy (A) Clear    Glucose, Ur NEGATIVE NEGATIVE    Bilirubin Urine NEGATIVE  Verified by ictotest. (A) NEGATIVE    Ketones, Urine TRACE (A) NEGATIVE    Specific Gravity, UA 1.018 1.000 - 1.030    Urine Hgb LARGE (A) NEGATIVE    pH, UA 5.0 5.0 - 8.0    Protein, UA 2+ (A) NEGATIVE    Urobilinogen, Urine ELEVATED (A) Normal    Nitrite, Urine NEGATIVE NEGATIVE    Leukocyte Esterase, Urine SMALL (A) NEGATIVE   Microscopic Urinalysis   Result Value Ref Range    WBC, UA 6 TO 9 /HPF    RBC, UA TOO NUMEROUS TO COUNT /HPF    Casts UA 3 to 5 /LPF    Epithelial Cells UA 0 TO 2 /HPF    Bacteria, UA FEW (A) None   Arterial Blood Gases   Result Value Ref Range    pH, Arterial 7.295 (L) 7.350 - 7.450    pCO2, Arterial 60.3 (HH) 35.0 - 45.0 mmHg    pO2, Arterial 63.0 (L) 80.0 - 100.0 mmHg    HCO3, Arterial 29.3 (H) 22.0 - 26.0 mmol/L    Positive Base Excess, Art 2.8 (H) 0.0 - 2.0 mmol/L    O2 Sat, Arterial 85.8 (LL) 95 - 98 %    Carboxyhemoglobin 3.1 0 - 5 %    Methemoglobin 1.0 0.0 - 1.9 %    Pt Temp 37     O2 Device/Flow/% BIPAP     Respiratory Rate 16     Tyron Test PASS     Sample Site Right Radial Artery     Pt.  Position SEMI-FOWLERS     Mode BIPAP     Text for Respiratory RESULTS TO PHYSICIAN    Comprehensive Metabolic Panel w/ Reflex to MG   Result Value Ref Range    Glucose 101 (H) 70 - 99 mg/dL    BUN 46 (H) 8 - 23 mg/dL    Creatinine 1.50 (H) 0.70 - 1.20 mg/dL    Est, Glom Filt Rate 52 (L) >60 mL/min/1.73m2    Calcium 7.5 (L) 8.6 - 10.4 mg/dL    Sodium 136 135 - 144 mmol/L    Potassium 4.8 3.7 - 5.3 mmol/L    Chloride 99 98 - 107 mmol/L    CO2 27 20 - 31 mmol/L    Anion Gap 10 9 - 17 mmol/L    Alkaline Phosphatase 59 40 - 129 U/L     (H) 5 - 41 U/L     (H) <40 U/L    Total Bilirubin 0.8 0.3 - 1.2 mg/dL    Total Protein 5.9 (L) 6.4 - 8.3 g/dL    Albumin 3.3 (L) 3.5 - 5.2 g/dL   CBC with Auto Differential   Result Value Ref Range    WBC 5.6 3.5 - 11.0 k/uL    RBC 5.57 4.5 - 5.9 m/uL    Hemoglobin 11.2 (L) 13.5 - 17.5 g/dL    Hematocrit 38.8 (L) 41 - 53 %    MCV 69.7 (L) 80 - 100 fL    MCH 20.1 (L) 26 - 34 pg    MCHC 28.8 (L) 31 - 37 g/dL    RDW 19.7 (H) 11.5 - 14.9 %    Platelets 67 (L) 674 - 450 k/uL    MPV 9.1 6.0 - 12.0 fL    Seg Neutrophils 79 (H) 36 - 66 %    Lymphocytes 12 (L) 24 - 44 %    Monocytes 8 (H) 1 - 7 %    Eosinophils % 0 0 - 4 %    Basophils 1 0 - 2 %    nRBC 2 per 100 WBC    Segs Absolute 4.41 1.3 - 9.1 k/uL    Absolute Lymph # 0.67 (L) 1.0 - 4.8 k/uL    Absolute Mono # 0.45 0.1 - 1.3 k/uL    Absolute Eos # 0.00 0.0 - 0.4 k/uL    Basophils Absolute 0.06 0.0 - 0.2 k/uL    Morphology ANISOCYTOSIS PRESENT     Morphology HYPOCHROMIA PRESENT     Morphology 1+ ELLIPTOCYTES    Occ Bld, Fecal Scrn   Result Value Ref Range    Occult Blood, Stool #1 NEGATIVE NEGATIVE    Date, Stool #1 [phone]     Time, Stool #1 300    Hemoglobin and Hematocrit   Result Value Ref Range    Hemoglobin 11.1 (L) 13.5 - 17.5 g/dL    Hematocrit 38.7 (L) 41 - 53 %   Hemoglobin and Hematocrit   Result Value Ref Range    Hemoglobin 10.8 (L) 13.5 - 17.5 g/dL    Hematocrit 37.2 (L) 41 - 53 %   Ammonia   Result Value Ref Range    Ammonia 29 16 - 60 umol/L   Hepatitis Panel, Acute   Result Value Ref Range    Hepatitis B Surface Ag NONREACTIVE NONREACTIVE    Hepatitis C Ab REACTIVE (A) NONREACTIVE    Hep B Core Ab, IgM NONREACTIVE NONREACTIVE    Hep A IgM NONREACTIVE NONREACTIVE   Iron and TIBC   Result Value Ref Range    Iron 14 (L) 59 - 158 ug/dL    TIBC 426 250 - 450 ug/dL    Iron Saturation 3 (L) 20 - 55 %    UIBC 412 (H) 112 - 347 ug/dL   Ferritin   Result Value Ref Range    Ferritin 14 (L) 30 - 400 ng/mL   Mycoplasma Ab,IgM   Result Value Ref Range    Mycoplasma pneumo IgM 0.25 <0.91   Procalcitonin   Result Value Ref Range    Procalcitonin 0.09 (H) <0.09 ng/mL   C-Reactive Protein   Result Value Ref Range    CRP 16.4 (H) 0.0 - 5.0 mg/L   Fibrin Split Products   Result Value Ref Range    FDP >5 (H) <5 ug/mL   Sedimentation Rate   Result Value Ref Range    Sed Rate 1 0 - 20 mm/Hr   Drug Scr, Abuse, Ur   Result Value Ref Range    Amphetamine Screen, Ur NEGATIVE NEGATIVE    Barbiturate Screen, Ur NEGATIVE NEGATIVE    Benzodiazepine Screen, Urine NEGATIVE NEGATIVE    Cocaine Metabolite, Urine NEGATIVE NEGATIVE    Methadone Screen, Urine NEGATIVE NEGATIVE    Opiates, Urine NEGATIVE NEGATIVE    Phencyclidine, Urine NEGATIVE NEGATIVE    Cannabinoid Scrn, Ur NEGATIVE NEGATIVE    Oxycodone Screen, Ur NEGATIVE NEGATIVE    Fentanyl, Ur NEGATIVE NEGATIVE    Test Information       Assay provides medical screening only. The absence of expected drug(s) and/or metabolite(s) may indicate diluted or adulterated urine, limitations of testing or timing of collection. Troponin   Result Value Ref Range    Troponin, High Sensitivity 195 (HH) 0 - 22 ng/L   Vitamin B12 & Folate   Result Value Ref Range    Vitamin B-12 1926 (H) 232 - 1245 pg/mL    Folate 10.0 >4.8 ng/mL   HIV Screen   Result Value Ref Range    HIV Ag/Ab NONREACTIVE NONREACTIVE   MONOCLONAL PANEL   Result Value Ref Range    Kappa Free Light Chains QNT 2.79 (H) 0.37 - 1.94 mg/dL    Lambda Free Light Chains QNT 20.89 (H) 0.57 - 2.63 mg/dL    Free Kappa/Lambda Ratio 0.13 (L) 0.26 - 1.65    Serum IFX Interp       A ZONE OF RESTRICTION IS PRESENT IN THE GAMMAGLOBULIN REGION. CONFIRMED BY IMMUNOFIXATION TO BE MONOCLONAL    Pathologist       Reviewed by pathologist:  Davida Voss Mai, D.O. Total Protein 5.5 (L) 6.4 - 8.3 g/dL    Albumin (calculated) 3.6 3.2 - 5.2 g/dL    Albumin % 65 45 - 65 %    Alpha-1-Globulin 0.2 0.1 - 0.4 g/dL    Alpha 1 % 3 3 - 6 %    Alpha-2-Globulin 0.4 (L) 0.5 - 0.9 g/dL    Alpha 2 % 7 6 - 13 %    Beta Globulin 0.6 0.5 - 1.1 g/dL    Beta Percent 11 11 - 19 %    Gamma Globulin 0.8 0.5 - 1.5 g/dL    Gamma Globulin % 14 9 - 20 %    Total Prot. Sum 5.6 (L) 6.3 - 8.2 g/dL    Total Prot. Sum,% 100 98 - 102 %    Protein Electrophoresis, Serum       A ZONE OF RESTRICTION IS PRESENT IN THE GAMMAGLOBULIN REGION. CONFIRMED BY IMMUNOFIXATION TO BE MONOCLONAL    Pathologist       Reviewed by pathologist:  Davida Voss Mai, D.O.    Path Review, Smear   Result Value Ref Range    Pathologist Review ELECTRONICALLY SIGNED.  Julia Patino MD    Reticulocytes   Result Value Ref Range    Retic % 2.8 (H) 0.5 - 2.0 %    Absolute Retic # 0.157 (H) 0.0245 - 0.098 M/uL   Fibrinogen   Result Value Ref Range    Fibrinogen 141 (L) 210 - 530 mg/dL   Ethanol   Result Value Ref Range    Ethanol <10 <10 mg/dL    Ethanol percent <0.010 %   Lactate Dehydrogenase   Result Value Ref Range     (H) 135 - 225 U/L   Hemoglobin and Hematocrit   Result Value Ref Range    Hemoglobin 10.9 (L) 13.5 - 17.5 g/dL    Hematocrit 38.0 (L) 41 - 53 %   Haptoglobin   Result Value Ref Range    Haptoglobin 60 30 - 200 mg/dL   D-Dimer, Quantitative   Result Value Ref Range    D-Dimer, Quant 6.70 (H) 0.00 - 0.59 mg/L FEU   Urinalysis with Reflex to Culture    Specimen: Urine   Result Value Ref Range    Color, UA Dark Yellow (A) Yellow    Turbidity UA Clear Clear    Glucose, Ur NEGATIVE NEGATIVE    Bilirubin Urine NEGATIVE NEGATIVE    Ketones, Urine TRACE (A) NEGATIVE    Specific [place], UA 1.015 1.000 - 1.030    Urine Hgb LARGE (A) NEGATIVE    pH, UA 5.0 5.0 - 8.0    Protein, UA 1+ (A) NEGATIVE    Urobilinogen, Urine Normal Normal    Nitrite, Urine NEGATIVE NEGATIVE    Leukocyte Esterase, Urine SMALL (A) NEGATIVE   APTT   Result Value Ref Range    PTT 38.7 (H) 24.0 - 36.0 sec   Protime-INR   Result Value Ref Range    Protime 24.4 (H) 11.8 - 14.6 sec    INR 2.2    Microscopic Urinalysis   Result Value Ref Range    WBC, UA 10 TO 20 /HPF    RBC, UA TOO NUMEROUS TO COUNT /HPF    Casts UA HYALINE /LPF    Casts UA 0 TO 2 /LPF    Epithelial Cells UA 3 to 5 /HPF   BLOOD GAS, ARTERIAL   Result Value Ref Range    pH, Arterial 7.314 (L) 7.350 - 7.450    pCO2, Arterial 61.9 (HH) 35.0 - 45.0 mmHg    pO2, Arterial 58.1 (LL) 80.0 - 100.0 mmHg    HCO3, Arterial 31.4 (H) 22.0 - 26.0 mmol/L    Positive Base Excess, Art 5.3 (H) 0.0 - 2.0 mmol/L    O2 Sat, Arterial 86.3 (LL) 95 - 98 %    Carboxyhemoglobin 2.1 0 - 5 %    Methemoglobin 0.8 0.0 - 1.9 %    Pt Temp 37.0     O2 Device/Flow/% VENTILATOR     Respiratory Rate 22     Tyron Test PASS     Sample Site Left Radial Artery     Pt. Position SEMI-FOWLERS     Mode PRVC     Set Rate 22     Total Rate 26          FIO2 40     Peep/Cpap 8    SURGICAL PATHOLOGY REPORT   Result Value Ref Range    Surgical Pathology Report       [id number]  [facility]  [employer]  [facility]  ANATOMIC PATHOLOGY  [address] [place] [address],  [address]. [facility], [address]  [phone]  Fax: [phone]  SURGICAL PATHOLOGY CONSULTATION    Patient Name: Myla Figueredo  MR#: [id number]  Specimen #[id number]    Procedures/Addenda  PERIPHERAL BLOOD REPORT     Date Ordered:     1/2/2023     Status:  Signed Out       Date Complete:     1/3/2023     By: Fadi Santos M.D. Date Reported:     1/3/2023       INTERPRETATION  Peripheral blood:  Microcytic, hypochromic anemia. The patient's iron studies are compatible with iron deficiency anemia. Lymphopenia. Differential considerations include acute illness/infection,  medication effect, smoking, and debilitating diseases. Thrombocytopenia  Differential considerations include bone marrow hypoproduction and  immune destruction (idiopathic thrombocytopenic purpura, drug effect). RESULTS-COMMENTS  PERIPHERAL BLOOD STUDY    CBC: Please see the electronic health record  for CBC parameters  (, 01/02/2023, 05:43). PLATELETS: Decreased platelets. Platelets show normal morphology. LEUKOCYTES: Decreased lymphocytes. White blood cells show normal  morphology. There are no blasts. ERYTHROCYTES: Microcytic, hypochromic. Anisocytosis and  poikilocytosis. Polychromasia. Reticulocyte count 2.8%. Rare RBC  fragments. Note: The electronic health record is reviewed. Fadi Santos M.D.                    Source:  A: Peripheral Blood     Comprehensive Metabolic Panel w/ Reflex to MG   Result Value Ref Range    Glucose 91 70 - 99 mg/dL    BUN 32 (H) 8 - 23 mg/dL    Creatinine 0.97 0.70 - 1.20 mg/dL    Est, Glom Filt Rate >60 >60 mL/min/1.73m2    Calcium 7.4 (L) 8.6 - 10.4 mg/dL    Sodium 143 135 - 144 mmol/L    Potassium 3.7 3.7 - 5.3 mmol/L    Chloride 105 98 - 107 mmol/L    CO2 30 20 - 31 mmol/L    Anion Gap 8 (L) 9 - 17 mmol/L    Alkaline Phosphatase 50 40 - 129 U/L     (H) 5 - 41 U/L     (H) <40 U/L    Total Bilirubin 0.9 0.3 - 1.2 mg/dL    Total Protein 5.2 (L) 6.4 - 8.3 g/dL    Albumin 3.1 (L) 3.5 - 5.2 g/dL   CBC with Auto Differential   Result Value Ref Range    WBC 5.7 3.5 - 11.0 k/uL    RBC 5.42 4.5 - 5.9 m/uL    Hemoglobin 11.0 (L) 13.5 - 17.5 g/dL    Hematocrit 37.6 (L) 41 - 53 %    MCV 69.4 (L) 80 - 100 fL    MCH 20.2 (L) 26 - 34 pg    MCHC 29.2 (L) 31 - 37 g/dL    RDW 20.0 (H) 11.5 - 14.9 %    Platelets 66 (L) 606 - 450 k/uL    MPV 9.4 6.0 - 12.0 fL    Seg Neutrophils 85 (H) 36 - 66 %    Lymphocytes 9 (L) 24 - 44 %    Monocytes 5 1 - 7 %    Eosinophils % 0 0 - 4 %    Basophils 0 0 - 2 %    Bands 1 0 - 10 %    nRBC 1 (H) 0 per 100 WBC    Segs Absolute 4.87 1.3 - 9.1 k/uL    Absolute Lymph # 0.52 (L) 1.0 - 4.8 k/uL    Absolute Mono # 0.29 0.1 - 1.3 k/uL    Absolute Eos # 0.00 0.0 - 0.4 k/uL    Basophils Absolute 0.00 0.0 - 0.2 k/uL    Absolute Bands # 0.06 0.0 - 1.0 k/uL    Morphology ANISOCYTOSIS PRESENT     Morphology HYPOCHROMIA PRESENT     Morphology 1+ POLYCHROMASIA     Morphology 1+ ELLIPTOCYTES    BLOOD GAS, ARTERIAL   Result Value Ref Range    pH, Arterial 7.373 7.350 - 7.450    pCO2, Arterial 56.0 (HH) 35.0 - 45.0 mmHg    pO2, Arterial 61.4 (L) 80.0 - 100.0 mmHg    HCO3, Arterial 32.6 (H) 22.0 - 26.0 mmol/L    Positive Base Excess, Art 7.4 (H) 0.0 - 2.0 mmol/L    O2 Sat, Arterial 89.2 (L) 95 - 98 %    Carboxyhemoglobin 1.7 0 - 5 %    Methemoglobin 0.9 0.0 - 1.9 %    Pt Temp 37     O2 Device/Flow/% VENTILATOR     Respiratory Rate 22     Tyron Test PASS     Sample Site Right Radial Artery     Pt.  Position SEMI-FOWLERS     Mode PRVC     Set Rate 22     Total Rate 22          FIO2 35     Peep/Cpap 8     Text for Respiratory RESULTS GIVEN TO RN    Protime-INR   Result Value Ref Range    Protime 21.9 (H) 11.8 - 14.6 sec    INR 1.9    APTT   Result Value Ref Range    PTT 38.4 (H) 24.0 - 36.0 sec   Triglyceride   Result Value Ref Range    Triglycerides 85 <150 mg/dL   CHLORIDE, URINE, RANDOM   Result Value Ref Range    Chloride, Ur 34 mmol/L   Protein / Creatinine Ratio, Urine   Result Value Ref Range    Total Protein, Urine 23 mg/dL    Creatinine, Ur 79.8 39.0 - 259.0 mg/dL    Urine Total Protein Creatinine Ratio 0.29 (H) 0.00 - 0.20   SODIUM, URINE, RANDOM   Result Value Ref Range    Sodium,Ur <20 mmol/L   BLOOD GAS, ARTERIAL   Result Value Ref Range    pH, Arterial 7.360 7.350 - 7.450    pCO2, Arterial 55.9 (HH) 35.0 - 45.0 mmHg    pO2, Arterial 71.4 (L) 80.0 - 100.0 mmHg    HCO3, Arterial 31.6 (H) 22.0 - 26.0 mmol/L    Positive Base Excess, Art 6.1 (H) 0.0 - 2.0 mmol/L    O2 Sat, Arterial 91.7 (L) 95 - 98 %    Carboxyhemoglobin 1.2 0 - 5 %    Methemoglobin 1.3 0.0 - 1.9 %    Pt Temp 37     O2 Device/Flow/% VENTILATOR     Tyron Test PASS     Sample Site Right Radial Artery     Pt.  Position SEMI-FOWLERS     Mode PRVC     Text for Respiratory RESULTS GIVEN TO RN    CBC with Auto Differential   Result Value Ref Range    WBC 6.0 3.5 - 11.0 k/uL    RBC 5.56 4.5 - 5.9 m/uL    Hemoglobin 10.8 (L) 13.5 - 17.5 g/dL    Hematocrit 38.2 (L) 41 - 53 %    MCV 68.8 (L) 80 - 100 fL    MCH 19.5 (L) 26 - 34 pg    MCHC 28.3 (L) 31 - 37 g/dL    RDW 20.1 (H) 11.5 - 14.9 %    Platelets 75 (L) 776 - 450 k/uL    MPV 9.3 6.0 - 12.0 fL    Seg Neutrophils 82 (H) 36 - 66 %    Lymphocytes 7 (L) 24 - 44 %    Monocytes 9 (H) 1 - 7 %    Eosinophils % 1 0 - 4 %    Basophils 1 0 - 2 %    Segs Absolute 4.90 1.3 - 9.1 k/uL    Absolute Lymph # 0.40 (L) 1.0 - 4.8 k/uL    Absolute Mono # 0.50 0.1 - 1.3 k/uL    Absolute Eos # 0.10 0.0 - 0.4 k/uL Basophils Absolute 0.10 0.0 - 0.2 k/uL   Comprehensive Metabolic Panel w/ Reflex to MG   Result Value Ref Range    Glucose 85 70 - 99 mg/dL    BUN 16 8 - 23 mg/dL    Creatinine 0.64 (L) 0.70 - 1.20 mg/dL    Est, Glom Filt Rate >60 >60 mL/min/1.73m2    Calcium 7.4 (L) 8.6 - 10.4 mg/dL    Sodium 143 135 - 144 mmol/L    Potassium 3.3 (L) 3.7 - 5.3 mmol/L    Chloride 107 98 - 107 mmol/L    CO2 29 20 - 31 mmol/L    Anion Gap 7 (L) 9 - 17 mmol/L    Alkaline Phosphatase 49 40 - 129 U/L     (H) 5 - 41 U/L     (H) <40 U/L    Total Bilirubin 0.8 0.3 - 1.2 mg/dL    Total Protein 5.0 (L) 6.4 - 8.3 g/dL    Albumin 2.8 (L) 3.5 - 5.2 g/dL   Vancomycin Level, Random   Result Value Ref Range    Vancomycin Rm 16.1 ug/mL    Vancomycin Random Dose amount 1g     Vancomycin Random Date last dose 1/4/22     Vancomycin Random Time last dose 0232    Magnesium   Result Value Ref Range    Magnesium 2.0 1.6 - 2.6 mg/dL   Hepatitis C RNA, quantitative, PCR   Result Value Ref Range    Source . PLASMA     Hepatitis C RNA-PCR 17,400 IU/mL    Hepatitis C RNA Quant Log 4.24 Log IU/mL    HCV RNA PCR, Quant DETECTED (A) Not Detected   CBC with Auto Differential   Result Value Ref Range    WBC 5.3 3.5 - 11.0 k/uL    RBC 5.80 4.5 - 5.9 m/uL    Hemoglobin 11.4 (L) 13.5 - 17.5 g/dL    Hematocrit 40.0 (L) 41 - 53 %    MCV 68.9 (L) 80 - 100 fL    MCH 19.7 (L) 26 - 34 pg    MCHC 28.5 (L) 31 - 37 g/dL    RDW 20.5 (H) 11.5 - 14.9 %    Platelets 66 (L) 682 - 450 k/uL    MPV 9.1 6.0 - 12.0 fL    Seg Neutrophils 79 (H) 36 - 66 %    Lymphocytes 8 (L) 24 - 44 %    Monocytes 10 (H) 1 - 7 %    Eosinophils % 2 0 - 4 %    Basophils 1 0 - 2 %    Segs Absolute 4.19 1.3 - 9.1 k/uL    Absolute Lymph # 0.42 (L) 1.0 - 4.8 k/uL    Absolute Mono # 0.53 0.1 - 1.3 k/uL    Absolute Eos # 0.11 0.0 - 0.4 k/uL    Basophils Absolute 0.05 0.0 - 0.2 k/uL    Morphology ANISOCYTOSIS PRESENT     Morphology MICROCYTOSIS PRESENT     Morphology HYPOCHROMIA PRESENT     Morphology FEW ELLIPTOCYTES     Morphology FEW TARGET CELLS    Comprehensive Metabolic Panel w/ Reflex to MG   Result Value Ref Range    Glucose 84 70 - 99 mg/dL    BUN 11 8 - 23 mg/dL    Creatinine 0.58 (L) 0.70 - 1.20 mg/dL    Est, Glom Filt Rate >60 >60 mL/min/1.73m2    Calcium 7.9 (L) 8.6 - 10.4 mg/dL    Sodium 146 (H) 135 - 144 mmol/L    Potassium 3.4 (L) 3.7 - 5.3 mmol/L    Chloride 110 (H) 98 - 107 mmol/L    CO2 30 20 - 31 mmol/L    Anion Gap 6 (L) 9 - 17 mmol/L    Alkaline Phosphatase 53 40 - 129 U/L     (H) 5 - 41 U/L     (H) <40 U/L    Total Bilirubin 0.8 0.3 - 1.2 mg/dL    Total Protein 5.0 (L) 6.4 - 8.3 g/dL    Albumin 2.9 (L) 3.5 - 5.2 g/dL   Fibrinogen   Result Value Ref Range    Fibrinogen 98 (L) 210 - 530 mg/dL   Protime-INR   Result Value Ref Range    Protime 20.3 (H) 11.8 - 14.6 sec    INR 1.7    APTT   Result Value Ref Range    PTT 41.6 (H) 24.0 - 36.0 sec   BLOOD GAS, ARTERIAL   Result Value Ref Range    pH, Arterial 7.372 7.350 - 7.450    pCO2, Arterial 51.6 (HH) 35.0 - 45.0 mmHg    pO2, Arterial 62.9 (L) 80.0 - 100.0 mmHg    HCO3, Arterial 29.9 (H) 22.0 - 26.0 mmol/L    Positive Base Excess, Art 4.7 (H) 0.0 - 2.0 mmol/L    O2 Sat, Arterial 89.7 (L) 95 - 98 %    Carboxyhemoglobin 1.7 0 - 5 %    Methemoglobin 1.1 0.0 - 1.9 %    Pt Temp 37     O2 Device/Flow/% VENTILATOR     Respiratory Rate 22     Tyron Test PASS     Sample Site Right Radial Artery     Pt.  Position SEMI-FOWLERS     Mode PRVC     Set Rate 22     Total Rate 22          FIO2 40     Peep/Cpap 8     Text for Respiratory RESULTS GIVEN TO RN    Magnesium   Result Value Ref Range    Magnesium 2.0 1.6 - 2.6 mg/dL   CBC with Auto Differential   Result Value Ref Range    WBC 5.2 3.5 - 11.0 k/uL    RBC 5.33 4.5 - 5.9 m/uL    Hemoglobin 10.5 (L) 13.5 - 17.5 g/dL    Hematocrit 38.4 (L) 41 - 53 %    MCV 72.0 (L) 80 - 100 fL    MCH 19.7 (L) 26 - 34 pg    MCHC 27.4 (L) 31 - 37 g/dL    RDW 20.3 (H) 11.5 - 14.9 %    Platelets 71 (L) 383 - 450 k/uL    MPV 9.3 6.0 - 12.0 fL    Seg Neutrophils 90 (H) 36 - 66 %    Lymphocytes 4 (L) 24 - 44 %    Monocytes 5 1 - 7 %    Eosinophils % 0 0 - 4 %    Basophils 0 0 - 2 %    Bands 1 0 - 10 %    Segs Absolute 4.68 1.3 - 9.1 k/uL    Absolute Lymph # 0.21 (L) 1.0 - 4.8 k/uL    Absolute Mono # 0.26 0.1 - 1.3 k/uL    Absolute Eos # 0.00 0.0 - 0.4 k/uL    Basophils Absolute 0.00 0.0 - 0.2 k/uL    Absolute Bands # 0.05 0.0 - 1.0 k/uL    Morphology ANISOCYTOSIS PRESENT     Morphology HYPOCHROMIA PRESENT     Morphology MICROCYTOSIS PRESENT     Morphology 1+ ELLIPTOCYTES     Morphology 1+ TEARDROPS    Comprehensive Metabolic Panel w/ Reflex to MG   Result Value Ref Range    Glucose 111 (H) 70 - 99 mg/dL    BUN 7 (L) 8 - 23 mg/dL    Creatinine 0.42 (L) 0.70 - 1.20 mg/dL    Est, Glom Filt Rate >60 >60 mL/min/1.73m2    Calcium 7.5 (L) 8.6 - 10.4 mg/dL    Sodium 144 135 - 144 mmol/L    Potassium 4.0 3.7 - 5.3 mmol/L    Chloride 111 (H) 98 - 107 mmol/L    CO2 29 20 - 31 mmol/L    Anion Gap 4 (L) 9 - 17 mmol/L    Alkaline Phosphatase 46 40 - 129 U/L     (H) 5 - 41 U/L    AST 74 (H) <40 U/L    Total Bilirubin 0.9 0.3 - 1.2 mg/dL    Total Protein 4.6 (L) 6.4 - 8.3 g/dL    Albumin 2.6 (L) 3.5 - 5.2 g/dL   Vancomycin Level, Random   Result Value Ref Range    Vancomycin Rm 20.3 ug/mL    Vancomycin Random Dose amount 1,250     Vancomycin Random Date last dose 109629     Vancomycin Random Time last dose 0546    Fibrinogen   Result Value Ref Range    Fibrinogen 109 (L) 210 - 530 mg/dL   Protime-INR   Result Value Ref Range    Protime 20.1 (H) 11.8 - 14.6 sec    INR 1.7    Comprehensive Metabolic Panel w/ Reflex to MG   Result Value Ref Range    Glucose 91 70 - 99 mg/dL    BUN 5 (L) 8 - 23 mg/dL    Creatinine <0.40 (L) 0.70 - 1.20 mg/dL    Est, Glom Filt Rate Can not be calculated >60 mL/min/1.73m2    Calcium 8.1 (L) 8.6 - 10.4 mg/dL    Sodium 147 (H) 135 - 144 mmol/L    Potassium 4.1 3.7 - 5.3 mmol/L    Chloride 108 (H) 98 - 107 mmol/L    CO2 26 20 - 31 mmol/L    Anion Gap 13 9 - 17 mmol/L    Alkaline Phosphatase 51 40 - 129 U/L     (H) 5 - 41 U/L    AST 63 (H) <40 U/L    Total Bilirubin 1.6 (H) 0.3 - 1.2 mg/dL    Total Protein 5.3 (L) 6.4 - 8.3 g/dL    Albumin 2.9 (L) 3.5 - 5.2 g/dL   SPECIMEN REJECTION   Result Value Ref Range    Specimen Source . BLOOD     Ordered Test CDP     Reason for Rejection Unable to perform testing: Specimen clotted. CBC with Auto Differential   Result Value Ref Range    WBC 7.1 3.5 - 11.0 k/uL    RBC 5.90 4.5 - 5.9 m/uL    Hemoglobin 11.7 (L) 13.5 - 17.5 g/dL    Hematocrit 41.4 41 - 53 %    MCV 70.2 (L) 80 - 100 fL    MCH 19.8 (L) 26 - 34 pg    MCHC 28.2 (L) 31 - 37 g/dL    RDW 20.4 (H) 11.5 - 14.9 %    Platelets 68 (L) 094 - 450 k/uL    MPV 9.1 6.0 - 12.0 fL    Seg Neutrophils 82 (H) 36 - 66 %    Lymphocytes 10 (L) 24 - 44 %    Monocytes 6 1 - 7 %    Eosinophils % 1 0 - 4 %    Basophils 1 0 - 2 %    Segs Absolute 5.82 1.3 - 9.1 k/uL    Absolute Lymph # 0.71 (L) 1.0 - 4.8 k/uL    Absolute Mono # 0.43 0.1 - 1.3 k/uL    Absolute Eos # 0.07 0.0 - 0.4 k/uL    Basophils Absolute 0.07 0.0 - 0.2 k/uL    Morphology ANISOCYTOSIS PRESENT     Morphology 1+ TARGET CELLS     Morphology 1+ TEARDROPS     Morphology 1+ ELLIPTOCYTES    Protime-INR   Result Value Ref Range    Protime 19.4 (H) 11.8 - 14.6 sec    INR 1.6    APTT   Result Value Ref Range    PTT 35.4 24.0 - 36.0 sec   HEPATITIS C RNA, QUANTITATIVE, PCR   Result Value Ref Range    Source . PLASMA     Hepatitis C RNA-PCR 5,680 IU/mL    Hepatitis C RNA Quant Log 3.75 Log IU/mL    HCV RNA PCR, Quant DETECTED (A) Not Detected   CBC with Auto Differential   Result Value Ref Range    WBC 5.9 3.5 - 11.0 k/uL    RBC 5.61 4.5 - 5.9 m/uL    Hemoglobin 11.4 (L) 13.5 - 17.5 g/dL    Hematocrit 39.3 (L) 41 - 53 %    MCV 70.0 (L) 80 - 100 fL    MCH 20.2 (L) 26 - 34 pg    MCHC 28.9 (L) 31 - 37 g/dL    RDW 20.2 (H) 11.5 - 14.9 %    Platelets 63 (L) 375 - 450 k/uL    MPV 8.8 6.0 - 12.0 fL    Seg Neutrophils 76 (H) 36 - 66 %    Lymphocytes 11 (L) 24 - 44 %    Monocytes 11 (H) 1 - 7 %    Eosinophils % 1 0 - 4 %    Basophils 1 0 - 2 %    Segs Absolute 4.48 1.3 - 9.1 k/uL    Absolute Lymph # 0.65 (L) 1.0 - 4.8 k/uL    Absolute Mono # 0.65 0.1 - 1.3 k/uL    Absolute Eos # 0.06 0.0 - 0.4 k/uL    Basophils Absolute 0.06 0.0 - 0.2 k/uL    Morphology ANISOCYTOSIS PRESENT     Morphology MICROCYTOSIS PRESENT     Morphology HYPOCHROMIA PRESENT     Morphology 1+ TARGET CELLS     Morphology 1+ ELLIPTOCYTES    Comprehensive Metabolic Panel w/ Reflex to MG   Result Value Ref Range    Glucose 118 (H) 70 - 99 mg/dL    BUN 4 (L) 8 - 23 mg/dL    Creatinine <0.40 (L) 0.70 - 1.20 mg/dL    Est, Glom Filt Rate Can not be calculated >60 mL/min/1.73m2    Calcium 8.1 (L) 8.6 - 10.4 mg/dL    Sodium 147 (H) 135 - 144 mmol/L    Potassium 3.3 (L) 3.7 - 5.3 mmol/L    Chloride 109 (H) 98 - 107 mmol/L    CO2 34 (H) 20 - 31 mmol/L    Anion Gap 4 (L) 9 - 17 mmol/L    Alkaline Phosphatase 45 40 - 129 U/L    ALT 99 (H) 5 - 41 U/L    AST 38 <40 U/L    Total Bilirubin 1.7 (H) 0.3 - 1.2 mg/dL    Total Protein 5.1 (L) 6.4 - 8.3 g/dL    Albumin 2.8 (L) 3.5 - 5.2 g/dL   Fibrinogen   Result Value Ref Range    Fibrinogen 151 (L) 210 - 530 mg/dL   APTT   Result Value Ref Range    PTT 34.4 24.0 - 36.0 sec   Protime-INR   Result Value Ref Range    Protime 19.1 (H) 11.8 - 14.6 sec    INR 1.6    Magnesium   Result Value Ref Range    Magnesium 1.8 1.6 - 2.6 mg/dL   CBC with Auto Differential   Result Value Ref Range    WBC 6.3 3.5 - 11.0 k/uL    RBC 5.76 4.5 - 5.9 m/uL    Hemoglobin 11.7 (L) 13.5 - 17.5 g/dL    Hematocrit 40.3 (L) 41 - 53 %    MCV 70.0 (L) 80 - 100 fL    MCH 20.3 (L) 26 - 34 pg    MCHC 29.0 (L) 31 - 37 g/dL    RDW 20.5 (H) 11.5 - 14.9 %    Platelets 65 (L) 927 - 450 k/uL    MPV 8.6 6.0 - 12.0 fL    Seg Neutrophils 77 (H) 36 - 66 %    Lymphocytes 11 (L) 24 - 44 %    Monocytes 10 (H) 1 - 7 %    Eosinophils % 1 0 - 4 % Basophils 1 0 - 2 %    Segs Absolute 4.86 1.3 - 9.1 k/uL    Absolute Lymph # 0.69 (L) 1.0 - 4.8 k/uL    Absolute Mono # 0.63 0.1 - 1.3 k/uL    Absolute Eos # 0.06 0.0 - 0.4 k/uL    Basophils Absolute 0.06 0.0 - 0.2 k/uL    Morphology HYPOCHROMIA PRESENT     Morphology MICROCYTOSIS PRESENT     Morphology ANISOCYTOSIS PRESENT     Morphology 1+ POLYCHROMASIA     Morphology 1+ TARGET CELLS     Morphology 2+ ECHINOCYTES    Comprehensive Metabolic Panel w/ Reflex to MG   Result Value Ref Range    Glucose 101 (H) 70 - 99 mg/dL    BUN 3 (L) 8 - 23 mg/dL    Creatinine <0.40 (L) 0.70 - 1.20 mg/dL    Est, Glom Filt Rate Can not be calculated >60 mL/min/1.73m2    Calcium 8.0 (L) 8.6 - 10.4 mg/dL    Sodium 142 135 - 144 mmol/L    Potassium 3.3 (L) 3.7 - 5.3 mmol/L    Chloride 104 98 - 107 mmol/L    CO2 35 (H) 20 - 31 mmol/L    Anion Gap 3 (L) 9 - 17 mmol/L    Alkaline Phosphatase 47 40 - 129 U/L    ALT 81 (H) 5 - 41 U/L    AST 32 <40 U/L    Total Bilirubin 1.8 (H) 0.3 - 1.2 mg/dL    Total Protein 5.3 (L) 6.4 - 8.3 g/dL    Albumin 2.8 (L) 3.5 - 5.2 g/dL   Brain Natriuretic Peptide   Result Value Ref Range    Pro-BNP 7,250 (H) <300 pg/mL   Magnesium   Result Value Ref Range    Magnesium 1.7 1.6 - 2.6 mg/dL   Comprehensive Metabolic Panel w/ Reflex to MG   Result Value Ref Range    Glucose 96 70 - 99 mg/dL    BUN 3 (L) 8 - 23 mg/dL    Creatinine <0.40 (L) 0.70 - 1.20 mg/dL    Est, Glom Filt Rate Can not be calculated >60 mL/min/1.73m2    Calcium 8.6 8.6 - 10.4 mg/dL    Sodium 140 135 - 144 mmol/L    Potassium 3.3 (L) 3.7 - 5.3 mmol/L    Chloride 97 (L) 98 - 107 mmol/L    CO2 33 (H) 20 - 31 mmol/L    Anion Gap 10 9 - 17 mmol/L    Alkaline Phosphatase 52 40 - 129 U/L    ALT 74 (H) 5 - 41 U/L    AST 41 (H) <40 U/L    Total Bilirubin 2.3 (H) 0.3 - 1.2 mg/dL    Total Protein 6.1 (L) 6.4 - 8.3 g/dL    Albumin 3.3 (L) 3.5 - 5.2 g/dL   CBC with Auto Differential   Result Value Ref Range    WBC 7.2 3.5 - 11.0 k/uL    RBC 6.52 (H) 4.5 - 5.9 m/uL    Hemoglobin 13.6 13.5 - 17.5 g/dL    Hematocrit 45.3 41 - 53 %    MCV 69.5 (L) 80 - 100 fL    MCH 20.9 (L) 26 - 34 pg    MCHC 30.0 (L) 31 - 37 g/dL    RDW 21.5 (H) 11.5 - 14.9 %    Platelets 67 (L) 645 - 450 k/uL    MPV 8.6 6.0 - 12.0 fL    Seg Neutrophils 77 (H) 36 - 66 %    Lymphocytes 10 (L) 24 - 44 %    Monocytes 10 (H) 1 - 7 %    Eosinophils % 1 0 - 4 %    Basophils 2 0 - 2 %    Segs Absolute 5.55 1.3 - 9.1 k/uL    Absolute Lymph # 0.72 (L) 1.0 - 4.8 k/uL    Absolute Mono # 0.72 0.1 - 1.3 k/uL    Absolute Eos # 0.07 0.0 - 0.4 k/uL    Basophils Absolute 0.14 0.0 - 0.2 k/uL    Morphology ANISOCYTOSIS PRESENT     Morphology HYPOCHROMIA PRESENT     Morphology MICROCYTOSIS PRESENT     Morphology GIANT PLATELETS    Magnesium   Result Value Ref Range    Magnesium 1.5 (L) 1.6 - 2.6 mg/dL   SPECIMEN REJECTION   Result Value Ref Range    Specimen Source . Random Urine     Ordered Test URIFX     Reason for Rejection       Unable to perform testing: Specimen quantity not sufficient.    LOR Screen with Reflex   Result Value Ref Range    LOR NEGATIVE NEGATIVE    PHAM Antibodies Screen 0.3 <0.7 U/mL    Anti ds DNA 4.2 <10.0 IU/mL   C-Reactive Protein   Result Value Ref Range    CRP 10.0 (H) 0.0 - 5.0 mg/L   Homocysteine   Result Value Ref Range    Homocysteine 8.2 <15.0 umol/L   Myeloperoxidase Ab   Result Value Ref Range    Myeloperoxidase Ab 0 0 - 19 AU/mL   Sedimentation Rate   Result Value Ref Range    Sed Rate 2 0 - 20 mm/Hr   Comprehensive Metabolic Panel w/ Reflex to MG   Result Value Ref Range    Glucose 90 70 - 99 mg/dL    BUN 4 (L) 8 - 23 mg/dL    Creatinine 0.42 (L) 0.70 - 1.20 mg/dL    Est, Glom Filt Rate >60 >60 mL/min/1.73m2    Calcium 8.1 (L) 8.6 - 10.4 mg/dL    Sodium 141 135 - 144 mmol/L    Potassium 3.0 (L) 3.7 - 5.3 mmol/L    Chloride 98 98 - 107 mmol/L    CO2 37 (H) 20 - 31 mmol/L    Anion Gap 6 (L) 9 - 17 mmol/L    Alkaline Phosphatase 48 40 - 129 U/L    ALT 52 (H) 5 - 41 U/L    AST 30 <40 U/L Total Bilirubin 1.7 (H) 0.3 - 1.2 mg/dL    Total Protein 5.4 (L) 6.4 - 8.3 g/dL    Albumin 2.7 (L) 3.5 - 5.2 g/dL   CBC with Auto Differential   Result Value Ref Range    WBC 6.5 3.5 - 11.0 k/uL    RBC 6.23 (H) 4.5 - 5.9 m/uL    Hemoglobin 12.9 (L) 13.5 - 17.5 g/dL    Hematocrit 43.6 41 - 53 %    MCV 70.0 (L) 80 - 100 fL    MCH 20.8 (L) 26 - 34 pg    MCHC 29.7 (L) 31 - 37 g/dL    RDW 21.4 (H) 11.5 - 14.9 %    Platelets 85 (L) 106 - 450 k/uL    MPV 8.9 6.0 - 12.0 fL    Seg Neutrophils 77 (H) 36 - 66 %    Lymphocytes 11 (L) 24 - 44 %    Monocytes 10 (H) 1 - 7 %    Eosinophils % 1 0 - 4 %    Basophils 1 0 - 2 %    Segs Absolute 4.99 1.3 - 9.1 k/uL    Absolute Lymph # 0.72 (L) 1.0 - 4.8 k/uL    Absolute Mono # 0.65 0.1 - 1.3 k/uL    Absolute Eos # 0.07 0.0 - 0.4 k/uL    Basophils Absolute 0.07 0.0 - 0.2 k/uL    Morphology ANISOCYTOSIS PRESENT     Morphology MICROCYTOSIS PRESENT     Morphology HYPOCHROMIA PRESENT     Morphology 1+ TARGET CELLS    Magnesium   Result Value Ref Range    Magnesium 1.8 1.6 - 2.6 mg/dL   Potassium   Result Value Ref Range    Potassium 3.3 (L) 3.7 - 5.3 mmol/L   Comprehensive Metabolic Panel w/ Reflex to MG   Result Value Ref Range    Glucose 121 (H) 70 - 99 mg/dL    BUN 6 (L) 8 - 23 mg/dL    Creatinine 0.60 (L) 0.70 - 1.20 mg/dL    Est, Glom Filt Rate >60 >60 mL/min/1.73m2    Calcium 8.1 (L) 8.6 - 10.4 mg/dL    Sodium 140 135 - 144 mmol/L    Potassium 3.9 3.7 - 5.3 mmol/L    Chloride 100 98 - 107 mmol/L    CO2 37 (H) 20 - 31 mmol/L    Anion Gap 3 (L) 9 - 17 mmol/L    Alkaline Phosphatase 46 40 - 129 U/L    ALT 39 5 - 41 U/L    AST 25 <40 U/L    Total Bilirubin 1.6 (H) 0.3 - 1.2 mg/dL    Total Protein 5.2 (L) 6.4 - 8.3 g/dL    Albumin 2.7 (L) 3.5 - 5.2 g/dL   CBC with Auto Differential   Result Value Ref Range    WBC 6.4 3.5 - 11.0 k/uL    RBC 5.77 4.5 - 5.9 m/uL    Hemoglobin 12.2 (L) 13.5 - 17.5 g/dL    Hematocrit 40.8 (L) 41 - 53 %    MCV 70.7 (L) 80 - 100 fL    MCH 21.1 (L) 26 - 34 pg MCHC 29.8 (L) 31 - 37 g/dL    RDW 21.3 (H) 11.5 - 14.9 %    Platelets 88 (L) 005 - 450 k/uL    MPV 8.6 6.0 - 12.0 fL    Seg Neutrophils 79 (H) 36 - 66 %    Lymphocytes 7 (L) 24 - 44 %    Monocytes 11 (H) 1 - 7 %    Eosinophils % 1 0 - 4 %    Basophils 2 0 - 2 %    Segs Absolute 5.06 1.3 - 9.1 k/uL    Absolute Lymph # 0.45 (L) 1.0 - 4.8 k/uL    Absolute Mono # 0.70 0.1 - 1.3 k/uL    Absolute Eos # 0.06 0.0 - 0.4 k/uL    Basophils Absolute 0.13 0.0 - 0.2 k/uL    Morphology ANISOCYTOSIS PRESENT     Morphology HYPOCHROMIA PRESENT     Morphology MICROCYTOSIS PRESENT    Vancomycin Level, Random   Result Value Ref Range    Vancomycin Rm 22.9 ug/mL    Vancomycin Random Dose amount 1250 MG     Vancomycin Random Date last dose [id number]     Vancomycin Random Time last dose 1646    Potassium   Result Value Ref Range    Potassium 4.1 3.7 - 5.3 mmol/L   Phosphorus   Result Value Ref Range    Phosphorus 2.5 2.5 - 4.5 mg/dL   Comprehensive Metabolic Panel w/ Reflex to MG   Result Value Ref Range    Glucose 112 (H) 70 - 99 mg/dL    BUN 7 (L) 8 - 23 mg/dL    Creatinine 0.74 0.70 - 1.20 mg/dL    Est, Glom Filt Rate >60 >60 mL/min/1.73m2    Calcium 8.4 (L) 8.6 - 10.4 mg/dL    Sodium 141 135 - 144 mmol/L    Potassium 3.5 (L) 3.7 - 5.3 mmol/L    Chloride 100 98 - 107 mmol/L    CO2 34 (H) 20 - 31 mmol/L    Anion Gap 7 (L) 9 - 17 mmol/L    Alkaline Phosphatase 49 40 - 129 U/L    ALT 35 5 - 41 U/L    AST 27 <40 U/L    Total Bilirubin 1.6 (H) 0.3 - 1.2 mg/dL    Total Protein 5.7 (L) 6.4 - 8.3 g/dL    Albumin 2.7 (L) 3.5 - 5.2 g/dL   CBC with Auto Differential   Result Value Ref Range    WBC 7.3 3.5 - 11.0 k/uL    RBC 6.14 (H) 4.5 - 5.9 m/uL    Hemoglobin 13.0 (L) 13.5 - 17.5 g/dL    Hematocrit 43.9 41 - 53 %    MCV 71.6 (L) 80 - 100 fL    MCH 21.2 (L) 26 - 34 pg    MCHC 29.6 (L) 31 - 37 g/dL    RDW 25.5 (H) 11.5 - 14.9 %    Platelets 81 (L) 390 - 450 k/uL    MPV 8.6 6.0 - 12.0 fL    Seg Neutrophils 79 (H) 36 - 66 %    Lymphocytes 7 (L) 24 - 44 %    Monocytes 12 (H) 1 - 7 %    Eosinophils % 1 0 - 4 %    Basophils 1 0 - 2 %    Segs Absolute 5.77 1.3 - 9.1 k/uL    Absolute Lymph # 0.51 (L) 1.0 - 4.8 k/uL    Absolute Mono # 0.88 0.1 - 1.3 k/uL    Absolute Eos # 0.07 0.0 - 0.4 k/uL    Basophils Absolute 0.07 0.0 - 0.2 k/uL    Morphology ANISOCYTOSIS PRESENT     Morphology MICROCYTOSIS PRESENT     Morphology HYPOCHROMIA PRESENT     Morphology FEW ELLIPTOCYTES    Magnesium   Result Value Ref Range    Magnesium 1.7 1.6 - 2.6 mg/dL   Potassium   Result Value Ref Range    Potassium 4.0 3.7 - 5.3 mmol/L   Comprehensive Metabolic Panel w/ Reflex to MG   Result Value Ref Range    Glucose 121 (H) 70 - 99 mg/dL    BUN 9 8 - 23 mg/dL    Creatinine 0.73 0.70 - 1.20 mg/dL    Est, Glom Filt Rate >60 >60 mL/min/1.73m2    Calcium 8.4 (L) 8.6 - 10.4 mg/dL    Sodium 139 135 - 144 mmol/L    Potassium 3.3 (L) 3.7 - 5.3 mmol/L    Chloride 97 (L) 98 - 107 mmol/L    CO2 37 (H) 20 - 31 mmol/L    Anion Gap 5 (L) 9 - 17 mmol/L    Alkaline Phosphatase 46 40 - 129 U/L    ALT 30 5 - 41 U/L    AST 23 <40 U/L    Total Bilirubin 1.3 (H) 0.3 - 1.2 mg/dL    Total Protein 5.5 (L) 6.4 - 8.3 g/dL    Albumin 2.8 (L) 3.5 - 5.2 g/dL   CBC with Auto Differential   Result Value Ref Range    WBC 6.6 3.5 - 11.0 k/uL    RBC 5.72 4.5 - 5.9 m/uL    Hemoglobin 12.2 (L) 13.5 - 17.5 g/dL    Hematocrit 41.3 41 - 53 %    MCV 72.1 (L) 80 - 100 fL    MCH 21.3 (L) 26 - 34 pg    MCHC 29.5 (L) 31 - 37 g/dL    RDW 29.8 (H) 11.5 - 14.9 %    Platelets 145 (L) 412 - 450 k/uL    MPV 9.2 6.0 - 12.0 fL    Seg Neutrophils 77 (H) 36 - 66 %    Lymphocytes 9 (L) 24 - 44 %    Monocytes 13 (H) 1 - 7 %    Eosinophils % 0 0 - 4 %    Basophils 1 0 - 2 %    Segs Absolute 5.08 1.3 - 9.1 k/uL    Absolute Lymph # 0.59 (L) 1.0 - 4.8 k/uL    Absolute Mono # 0.86 0.1 - 1.3 k/uL    Absolute Eos # 0.00 0.0 - 0.4 k/uL    Basophils Absolute 0.07 0.0 - 0.2 k/uL    Morphology ANISOCYTOSIS PRESENT     Morphology HYPOCHROMIA PRESENT Morphology MICROCYTOSIS PRESENT     Morphology FEW ELLIPTOCYTES     Morphology FEW TEARDROPS    Magnesium   Result Value Ref Range    Magnesium 1.7 1.6 - 2.6 mg/dL   CBC with Auto Differential   Result Value Ref Range    WBC 6.3 3.5 - 11.0 k/uL    RBC 5.69 4.5 - 5.9 m/uL    Hemoglobin 12.2 (L) 13.5 - 17.5 g/dL    Hematocrit 40.9 (L) 41 - 53 %    MCV 71.9 (L) 80 - 100 fL    MCH 21.5 (L) 26 - 34 pg    MCHC 29.9 (L) 31 - 37 g/dL    RDW 31.3 (H) 11.5 - 14.9 %    Platelets 566 (L) 303 - 450 k/uL    MPV 9.4 6.0 - 12.0 fL    Seg Neutrophils 76 (H) 36 - 66 %    Lymphocytes 12 (L) 24 - 44 %    Monocytes 10 (H) 1 - 7 %    Eosinophils % 1 0 - 4 %    Basophils 1 0 - 2 %    Segs Absolute 4.79 1.3 - 9.1 k/uL    Absolute Lymph # 0.76 (L) 1.0 - 4.8 k/uL    Absolute Mono # 0.63 0.1 - 1.3 k/uL    Absolute Eos # 0.06 0.0 - 0.4 k/uL    Basophils Absolute 0.06 0.0 - 0.2 k/uL    Morphology ANISOCYTOSIS PRESENT     Morphology HYPOCHROMIA PRESENT     Morphology MICROCYTOSIS PRESENT     Morphology FEW ELLIPTOCYTES    Comprehensive Metabolic Panel w/ Reflex to MG   Result Value Ref Range    Glucose 127 (H) 70 - 99 mg/dL    BUN 11 8 - 23 mg/dL    Creatinine 0.78 0.70 - 1.20 mg/dL    Est, Glom Filt Rate >60 >60 mL/min/1.73m2    Calcium 8.5 (L) 8.6 - 10.4 mg/dL    Sodium 142 135 - 144 mmol/L    Potassium 3.4 (L) 3.7 - 5.3 mmol/L    Chloride 99 98 - 107 mmol/L    CO2 37 (H) 20 - 31 mmol/L    Anion Gap 6 (L) 9 - 17 mmol/L    Alkaline Phosphatase 46 40 - 129 U/L    ALT 24 5 - 41 U/L    AST 26 <40 U/L    Total Bilirubin 1.2 0.3 - 1.2 mg/dL    Total Protein 5.9 (L) 6.4 - 8.3 g/dL    Albumin 2.8 (L) 3.5 - 5.2 g/dL   Magnesium   Result Value Ref Range    Magnesium 1.7 1.6 - 2.6 mg/dL   CBC with Auto Differential   Result Value Ref Range    WBC 6.4 3.5 - 11.0 k/uL    RBC 5.75 4.5 - 5.9 m/uL    Hemoglobin 12.4 (L) 13.5 - 17.5 g/dL    Hematocrit 41.8 41 - 53 %    MCV 72.8 (L) 80 - 100 fL    MCH 21.6 (L) 26 - 34 pg    MCHC 29.6 (L) 31 - 37 g/dL    RDW 31. 8 (H) 11.5 - 14.9 %    Platelets 076 517 - 462 k/uL    MPV 9.3 6.0 - 12.0 fL    Seg Neutrophils 77 (H) 36 - 66 %    Lymphocytes 11 (L) 24 - 44 %    Monocytes 10 (H) 1 - 7 %    Eosinophils % 0 0 - 4 %    Basophils 2 0 - 2 %    Segs Absolute 4.93 1.3 - 9.1 k/uL    Absolute Lymph # 0.70 (L) 1.0 - 4.8 k/uL    Absolute Mono # 0.64 0.1 - 1.3 k/uL    Absolute Eos # 0.00 0.0 - 0.4 k/uL    Basophils Absolute 0.13 0.0 - 0.2 k/uL    Morphology ANISOCYTOSIS PRESENT     Morphology HYPOCHROMIA PRESENT     Morphology MICROCYTOSIS PRESENT     Morphology FEW ELLIPTOCYTES    Comprehensive Metabolic Panel w/ Reflex to MG   Result Value Ref Range    Glucose 111 (H) 70 - 99 mg/dL    BUN 11 8 - 23 mg/dL    Creatinine 0.81 0.70 - 1.20 mg/dL    Est, Glom Filt Rate >60 >60 mL/min/1.73m2    Calcium 8.9 8.6 - 10.4 mg/dL    Sodium 146 (H) 135 - 144 mmol/L    Potassium 3.8 3.7 - 5.3 mmol/L    Chloride 102 98 - 107 mmol/L    CO2 37 (H) 20 - 31 mmol/L    Anion Gap 7 (L) 9 - 17 mmol/L    Alkaline Phosphatase 48 40 - 129 U/L    ALT 24 5 - 41 U/L    AST 27 <40 U/L    Total Bilirubin 1.6 (H) 0.3 - 1.2 mg/dL    Total Protein 6.6 6.4 - 8.3 g/dL    Albumin 3.1 (L) 3.5 - 5.2 g/dL   CBC with Auto Differential   Result Value Ref Range    WBC 6.2 3.5 - 11.0 k/uL    RBC 5.12 4.5 - 5.9 m/uL    Hemoglobin 11.1 (L) 13.5 - 17.5 g/dL    Hematocrit 37.6 (L) 41 - 53 %    MCV 73.5 (L) 80 - 100 fL    MCH 21.7 (L) 26 - 34 pg    MCHC 29.6 (L) 31 - 37 g/dL    RDW 31.1 (H) 11.5 - 14.9 %    Platelets 728 932 - 560 k/uL    MPV 8.9 6.0 - 12.0 fL    Seg Neutrophils 75 (H) 36 - 66 %    Lymphocytes 11 (L) 24 - 44 %    Monocytes 10 (H) 1 - 7 %    Eosinophils % 1 0 - 4 %    Basophils 3 (H) 0 - 2 %    Segs Absolute 4.65 1.3 - 9.1 k/uL    Absolute Lymph # 0.68 (L) 1.0 - 4.8 k/uL    Absolute Mono # 0.62 0.1 - 1.3 k/uL    Absolute Eos # 0.06 0.0 - 0.4 k/uL    Basophils Absolute 0.19 0.0 - 0.2 k/uL    Morphology ANISOCYTOSIS PRESENT     Morphology MICROCYTOSIS PRESENT Morphology HYPOCHROMIA PRESENT     Morphology 1+ ELLIPTOCYTES    Comprehensive Metabolic Panel w/ Reflex to MG   Result Value Ref Range    Glucose 101 (H) 70 - 99 mg/dL    BUN 10 8 - 23 mg/dL    Creatinine 0.74 0.70 - 1.20 mg/dL    Est, Glom Filt Rate >60 >60 mL/min/1.73m2    Calcium 8.6 8.6 - 10.4 mg/dL    Sodium 143 135 - 144 mmol/L    Potassium 3.9 3.7 - 5.3 mmol/L    Chloride 103 98 - 107 mmol/L    CO2 34 (H) 20 - 31 mmol/L    Anion Gap 6 (L) 9 - 17 mmol/L    Alkaline Phosphatase 44 40 - 129 U/L    ALT 23 5 - 41 U/L    AST 29 <40 U/L    Total Bilirubin 1.3 (H) 0.3 - 1.2 mg/dL    Total Protein 6.3 (L) 6.4 - 8.3 g/dL    Albumin 2.9 (L) 3.5 - 5.2 g/dL     *Note: Due to a large number of results and/or encounters for the requested time period, some results have not been displayed. A complete set of results can be found in Results Review. IMAGING DATA:    CT HEAD WO CONTRAST    Result Date: 1/1/2023  EXAMINATION: CT OF THE HEAD WITHOUT CONTRAST  1/1/2023 10:48 pm TECHNIQUE: CT of the head was performed without the administration of intravenous contrast. Automated exposure control, iterative reconstruction, and/or weight based adjustment of the mA/kV was utilized to reduce the radiation dose to as low as reasonably achievable. COMPARISON: None. HISTORY: Reason for Exam: AMS Additional signs and symptoms: the patient has been getting more and more confused since 2 weeks ago. It has progressively been getting worse and today FINDINGS: BRAIN/VENTRICLES: There is no acute intracranial hemorrhage, mass effect or midline shift. No abnormal extra-axial fluid collection. The gray-white differentiation is maintained without evidence of an acute infarct. There is no evidence of hydrocephalus. Changes of mild chronic small vessel ischemic disease. ORBITS: The visualized portion of the orbits demonstrate no acute abnormality.  SINUSES: The visualized paranasal sinuses and mastoid air cells demonstrate no acute abnormality. SOFT TISSUES/SKULL:  No acute abnormality of the visualized skull or soft tissues. No acute intracranial abnormality. Mild chronic small vessel ischemic disease. XR CHEST PORTABLE    Result Date: 1/1/2023  EXAMINATION: ONE XRAY VIEW OF THE CHEST 1/1/2023 7:17 pm COMPARISON: None. HISTORY: ORDERING SYSTEM PROVIDED HISTORY: ams TECHNOLOGIST PROVIDED HISTORY: ams Reason for Exam: Altered mental status, difficulty breathing Additional signs and symptoms: Altered mental status, difficulty breathing Relevant Medical/Surgical History: Altered mental status, difficulty breathing FINDINGS: Large lucent area in the left apex suggesting a large bulla however superimposed pneumothorax cannot be excluded. The cardiac size is normal. Right lower lobe airspace infiltrate and mild right pleural effusion. . Pulmonary vascularity appears normal. There is mild ectasia of the thoracic aorta. Calcific tendinitis of the right shoulder. No acute bony abnormalities. The hilar structures are normal.     Right lower lobe pneumonia and small right pleural effusion Large lucent area in the left apex suggesting a large bulla however superimposed pneumothorax cannot be excluded. Follow-up CT would be useful for further evaluation. The findings were sent to the Radiology Results [address] at 10:31 pm on 1/1/2023 to be communicated to a licensed caregiver.          IMPRESSION:   Primary Problem  Acute respiratory failure with hypoxia and hypercapnia [facility])    Active Hospital Problems    Diagnosis Date Noted    Dysphagia [R13.10] 01/16/2023     Priority: Medium    Hepatitis C virus infection without hepatic coma [B19.20] 01/14/2023     Priority: Medium    Mild malnutrition ([address] [E44.1] 01/13/2023     Priority: Medium    Delirium due to another medical condition [F05] 01/10/2023     Priority: Medium    ACP (advance care planning) [Z71.89] 01/09/2023     Priority: Medium    Goals of care, counseling/discussion [Z71.89] 01/09/2023     Priority: Medium    Encounter for palliative care [Z51.5] 01/09/2023     Priority: Medium    Prolonged pt (prothrombin time) [R79.1] 01/03/2023     Priority: Medium    Emphysema lung ([address] [J43.9] 01/03/2023     Priority: Medium    Cerebral infarction ([address] [I63.9] 01/03/2023     Priority: Medium    Transaminitis [R74.01] 01/02/2023     Priority: Medium    Normocytic anemia [D64.9] 01/02/2023     Priority: Medium    Thrombocytopenia ([address] [D69.6] 01/02/2023     Priority: Medium    Agitation [R45.1] 01/02/2023     Priority: Medium    Acute respiratory failure with hypoxia and hypercapnia (HCC) RLL pneumonia [J96.01, J96.02] 01/02/2023     Priority: Medium    Altered mental status [R41.82] 01/02/2023     Priority: Medium    Community acquired pneumonia of right lower lobe of lung [J18.9] 01/02/2023     Priority: Medium       Medical apathy  Respiratory failure secondary pneumonia  Abnormal LFT  Microcytic anemia  Thrombocytopenia  History of alcohol dependence  IgG lambda paraproteinemia  Positive HCV screen  Iron deficiency  Coagulopathy    RECOMMENDATIONS:  I reviewed the labs/imaging available to me,outside records and discussed with the patient. I explained to the patient the nature of this problem. I explained the significance of these abnormalities and possible etiology and management options  Patient has chronic liver disease secondary to alcohol  No TTP, smear shows no evidence of schistocytes  Anemia and thrombocytopenia are related to liver disease complicated by acute illness. Recovering well. Hemoglobin 11.4 platelets 606. Normal.  Patient has coagulopathy secondary to liver disease as mentioned  Cardioembolic strokes with a component of subarachnoid bleeding, not a candidate for anticoagulation  Overall all condition seems to be improving. We talked him about abstaining from alcohol and acetaminophen.   We also discovered that he is taking very high dose of supplements almost to a toxic level. We asked him to refrain from doing that. Overall labs are improving. Clinically he is improving as well. Discussed with the patient and his wife at bedside. We will follow with you                            [address] [facility]                            This note is created with the assistance of a speech recognition program.  While intending to generate a document that actually reflects the content of the visit, the document can still have some errors including those of syntax and sound a like substitutions which may escape proof reading. It such instances, actual meaning can be extrapolated by contextual diversion.

## 2023-01-18 NOTE — PROGRESS NOTES
Speech Language Pathology  Speech Language Pathology  [facility]    Dysphagia Treatment Note    Date: 1/18/2023  Patients Name: Kingsley Lamb  MRN: [id number]  Diagnosis: Dysphagia  Patient Active Problem List   Diagnosis Code    Acute type II respiratory failure (HCC) J96.00    Transaminitis R74.01    Normocytic anemia D64.9    Thrombocytopenia (HCC) D69.6    Agitation R45.1    Acute respiratory failure with hypoxia and hypercapnia (HCC) RLL pneumonia J96.01, J96.02    Altered mental status R41.82    Community acquired pneumonia of right lower lobe of lung J18.9    Prolonged pt (prothrombin time) R79.1    Emphysema lung (HCC) J43.9    Cerebral infarction (HCC) I63.9    ACP (advance care planning) Z71.89    Goals of care, counseling/discussion Z71.89    Encounter for palliative care Z51.5    Delirium due to another medical condition F05    Mild malnutrition [address] E44.1    Hepatitis C virus infection without hepatic coma B19.20    Dysphagia R13.10       Pain: c/o pain at PEG site, nursing notified    Dysphagia Treatment  Treatment time: 8158-4926    Subjective: [x] Alert [x] Cooperative     [x] Confused     [] Agitated    [] Lethargic    Objective/Assessment:    Pt continues to demo impaired resonance at this time following NG removal, Pt reports \"sometimes\" he feels secretions/drainage. Pt did exhibit congested sounding cough expelling thick yellow secretions x 1. Pt c/o dry mouth; ST provided moist oral swab. Pt completed oral/pharyngeal/laryngeal exercises x 10 reps x 1 set to strengthen swallowing mechanism. Frequent rest breaks needed d/t fatigue. Education provided re: completion of swallowing exercises throughout the day to improve swallow function.       Plan:  [x] Continue ST services    [] Discharge from ST:        Discharge recommendations: [] Inpatient Rehab   [] [place]   [] Outpatient Therapy  [] Follow up at trauma clinic   [] Other:         Treatment completed by: Ike Rivera Lio Barnhart M.S., CCC-SLP

## 2023-01-18 NOTE — PROGRESS NOTES
Comprehensive Nutrition Assessment    Type and Reason for Visit:  Consult (TF ordering and management)    Nutrition Recommendations/Plan:   Tube feedings started via PEG tube, follow for tolerance. Malnutrition Assessment:  Malnutrition Status:  Mild malnutrition (Based on available criteria) (01/13/23 1725)    Context:  Acute Illness     Findings of the 6 clinical characteristics of malnutrition:  Energy Intake:  75% or less of estimated energy requirements for 7 or more days  Weight Loss:   (Has lost dry wt; unsure of quantity and time frame.)     Body Fat Loss:  Unable to assess     Muscle Mass Loss:  Unable to assess    Fluid Accumulation:  Moderate to Severe (Unsure if related to nutritional status, but may mask wt loss) Generalized, Extremities   Strength:  Not Performed    Nutrition Assessment:    PEG tube placed yesterday with Vital AF started at 20 ml to increase gradually to 70 ml/h with 200 ml water every 6 hours. Plan is for pt to discharge to ARU for therapy. Nutrition Related Findings:    Edema: +1 Generalized, BM-1/15. Labs & meds reviewed. Wound Type: None       Current Nutrition Intake & Therapies:    Average Meal Intake: NPO     ADULT TUBE FEEDING; PEG; Peptide Based; Continuous; 20; Yes; 10; Q 4 hours; 70; 200; Q 6 hours  Current Tube Feeding (TF) Orders:  Feeding Route: PEG  Formula: Peptide Based  Schedule: Continuous  Feeding Regimen: 20 ml to 70 ml  Additives/Modulars: None  Water Flushes: 200 ml every 6 hours  Goal TF & Flush Orders Provides: 2016 kcals, 126 gm protein    Anthropometric Measures:  Height: 5' 7\" (170.2 cm)  Ideal Body Weight (IBW): 148 lbs (67 kg)    Admission Body Weight: 188 lb (85.3 kg)  Current Body Weight: 154 lb (69.9 kg), 116.2 % IBW. Weight Source: Bed Scale  Current BMI (kg/m2): 24.1  Usual Body Weight: 170 lb (77.1 kg) (per significant other)  % Weight Change (Calculated): -3.4                    BMI Categories: Overweight (BMI 25.0-29. 9)    Estimated Daily Nutrient Needs:  Energy Requirements Based On: Kcal/kg  Weight Used for Energy Requirements: Admission  Energy (kcal/day): 2145 kcals based on 25 kcals/kg  Weight Used for Protein Requirements: Ideal  Protein (g/day): 101-114 gm protein based on 1.5-1.7 gm/kg         Nutrition Diagnosis:   Inadequate oral intake related to cognitive or neurological impairment, impaired respiratory function as evidenced by NPO or clear liquid status due to medical condition    Nutrition Interventions:   Food and/or Nutrient Delivery: Continue NPO, Start Tube Feeding  Nutrition Education/Counseling: No recommendation at this time  Coordination of Nutrition Care: Continue to monitor while inpatient       Goals:  Previous Goal Met: No Progress toward Goal(s)  Goals:  Tolerate nutrition support at goal rate       Nutrition Monitoring and Evaluation:   Behavioral-Environmental Outcomes: None Identified  Food/Nutrient Intake Outcomes: Enteral Nutrition Intake/Tolerance  Physical Signs/Symptoms Outcomes: Biochemical Data, Fluid Status or Edema, Hemodynamic Status, Nutrition Focused Physical Findings, Skin, Weight    Discharge Planning:    Enteral Nutrition     Sondra Campos, 65 [address] [facility]

## 2023-01-18 NOTE — PROGRESS NOTES
01/18/23 1534   Encounter Summary   Encounter Overview/Reason  Attempted Encounter   Service Provided For: Patient not available  (Staff with PT)

## 2023-01-18 NOTE — PROGRESS NOTES
[facility] -   Infectious diseases evaluation  admission date 1/1/2023    reason for consultation:   Community-acquired pneumonia    Impression :   Current:  Acute hypoxic respiratory failure required intubation 1/2/22 was subsequently extubated  Community-acquired pneumonia with possible aspiration. Cellulitis /infiltration of left arm PICC line that was removed. Acute CVA with subarachnoid hemorrhage  Positive MRSA nasal swab  Liver disease  Cholelithiasis  Acute renal failure  Thrombocytopenia followed by hematology oncology, possibly related to liver disease  Hepatitis C/elevated liver enzymes    Recommendations      Unasyn course completed 1/8/2023  IV Vancomycin course completed 1/13/23  Swallow study showed severe dysphagia. PEG tube placement 1/17/2023  Lower extremity venous Doppler from earlier today showed no  DVT  Left arm PICC line was removed  Liver ultrasound 1/2/2323 showed increased echogenicity and cholelithiasis, no cholecystitis. HIV screen was negative on 1/2/23  Hepatitis C RNA was 17, 400 IU/mL 4.2 for LOC  The patient received IV ceftriaxone and Zithromax on 1/1/2022  Follow blood and respiratory cultures, no growth to date  No growth on urine culture from 1/3/23  Nasal swab for MRSA was +1/2/23  Respiratory panel with COVID-19 PCR was negative  Urine for Legionella antigen negative  Follow CBC and renal function  Continue supportive care  Follow-up with GI as outpatient for hepatitis C  management  No objection for discharge to acute rehab from infectious disease point of view.         Infection Control Recommendations   [place] Precautions  Contact Isolation       Antimicrobial Stewardship Recommendations   Simplification of therapy  Targeted therapy      History of Present Illness:   Initial history:  Nelson Kamara is a 60y.o.-year-old male was brought to the hospital on 1/1/2023 with altered mental status, confusion associated with decreased responsiveness worsening for 2 weeks. At the ER he was obtunded, O2 sat was 75% on room air, was placed on nonrebreather initially, subsequently was intubated. He is intubated, sedated unable to provide history that was obtained from chart review and nursing staff. According to his wife he is fully vaccinated for COVID-19 and flu. He has been afebrile since admission  Initial procalcitonin level was 0.09, WBC normal  He was hypotensive was started on Levophed. MRI showed small acute infarcts in the left cerebellar hemisphere and left parietal love with moderate bilateral subarachnoid hemorrhages within both cerebral hemispheres. Chest x-ray showed right lower lobe infiltrates  Interval changes  1/18/2023   He was extubated 1/5/2023. He is awake , on oxygen per nasal cannula, PEG tube was placed yesterday, was started on tube feed, remains confused  Patient Vitals for the past 8 hrs:   BP Temp Temp src Pulse Resp SpO2   01/18/23 1235 104/71 98.2 °F (36.8 °C) Oral 81 18 97 %   01/18/23 1118 -- -- -- 84 18 95 %               I have personally reviewed the past medical history, past surgical history, medications, social history, and family history, and I haveupdated the database accordingly. Allergies:   Patient has no known allergies. Review of Systems:     Review of Systems  12systems reviewed were negative  Physical Examination :   Constitutional:       General: He is not in acute distress. Appearance: He is not ill-appearing. HENT:      Head: Normocephalic and atraumatic. Right Ear: External ear normal.      Left Ear: External ear normal.   Eyes:      General: No scleral icterus. Conjunctiva/sclera: Conjunctivae normal.   Cardiovascular:      Rate and Rhythm: Normal rate and regular rhythm. Heart sounds: Normal heart sounds. Pulmonary:      Effort: Pulmonary effort is normal. No respiratory distress. Abdominal:      General: There is no distension.       Palpations: Abdomen is soft. Musculoskeletal:      Cervical back: Neck supple. No rigidity. Right lower leg: No edema. Left lower leg: No edema. Skin:     Coloration: Skin is not jaundiced. Neurological:      Mental Status: He is confused. Past Medical History:   History reviewed. No pertinent past medical history.     Past Surgical  History:     Past Surgical History:   Procedure Laterality Date    UPPER GASTROINTESTINAL ENDOSCOPY N/A 1/17/2023    EGD ESOPHAGOGASTRODUODENOSCOPY PEG TUBE INSERTION performed by Nima Sanchez DO at [facility]       Medications:      heparin (porcine)  5,000 Units SubCUTAneous 3 times per day    miconazole   Topical BID    potassium bicarb-citric acid  20 mEq Oral Daily    potassium chloride  20 mEq Oral Once    spironolactone  25 mg Oral Daily    ziprasidone (GEODON) in sterile water injection  10 mg IntraMUSCular Nightly    carvedilol  6.25 mg Oral BID WC    [Held by provider] ferrous sulfate  325 mg Oral Daily with breakfast    pantoprazole (PROTONIX) 40 mg injection  40 mg IntraVENous Daily    ipratropium-albuterol  1 ampule Inhalation Q4H WA    nystatin   Topical BID    [Held by provider] aspirin  81 mg Oral Daily    sodium chloride flush  5-40 mL IntraVENous 2 times per day    nicotine  1 patch TransDERmal Daily       Social History:     Social History     Socioeconomic History    Marital status: Single     Spouse name: Not on file    Number of children: Not on file    Years of education: Not on file    Highest education level: Not on file   Occupational History    Not on file   Tobacco Use    Smoking status: Every Day     Packs/day: 1.00     Years: 40.00     Pack years: 40.00     Types: Cigarettes    Smokeless tobacco: Never   Substance and Sexual Activity    Alcohol use: Yes     Comment: beer and scotch    Drug use: Not Currently     Comment: over 20 years (stated by daughter)    Sexual activity: Not on file   Other Topics Concern    Not on file   Social History Narrative    Not on file     Social Determinants of Health     Financial Resource Strain: Not on file   Food Insecurity: Not on file   Transportation Needs: Not on file   Physical Activity: Not on file   Stress: Not on file   Social Connections: Not on file   Intimate Partner Violence: Not on file   Housing Stability: Not on file       Family History:   History reviewed. No pertinent family history. Medical Decision Making:   I have independently reviewed/ordered the following labs:    CBC with Differential:   Recent Labs     01/17/23  0550 01/18/23  0549   WBC 6.2 5.8   HGB 11.1* 11.4*   HCT 37.6* 38.3*    188   LYMPHOPCT 11* 7*   MONOPCT 10* 6       BMP:  Recent Labs     01/17/23  0550 01/18/23  0549    142   K 3.9 3.6*    103   CO2 34* 31   BUN 10 10   CREATININE 0.74 0.65*       Hepatic Function Panel:   Recent Labs     01/17/23  0550 01/18/23  0549   PROT 6.3* 6.2*   LABALBU 2.9* 3.0*   BILITOT 1.3* 1.3*   ALKPHOS 44 42   ALT 23 22   AST 29 24       No results for input(s): RPR in the last 72 hours. No results for input(s): HIV in the last 72 hours. No results for input(s): BC in the last 72 hours. Lab Results   Component Value Date/Time    CREATININE 0.65 01/18/2023 05:49 AM    GLUCOSE 116 01/18/2023 05:49 AM       Detailed results: Thank you for allowing us to participate in the care of this patient. Please call with questions. This note is created with the assistance of a speech recognition program.  While intending to generate adocument that actually reflects the content of the visit, the document can still have some errors including those of syntax and sound a like substitutions which may escape proof reading. It such instances, actual meaningcan be extrapolated by contextual diversion.     Shawna lFores MD  Office: [phone]  Perfect serve / office [phone]

## 2023-01-18 NOTE — CARE COORDINATION
ONGOING DISCHARGE PLAN:    Patient is alert and oriented x4. Spoke with patient regarding discharge plan and patient confirms that plan is still to discharge to [address] [place] [address]    Patient will need dopplers before patient can discharge to [address] [place] [address]    Will continue to follow for additional discharge needs.     Electronically signed by Lissa Ling RN on 1/18/2023 at 1:45 PM

## 2023-01-18 NOTE — PROGRESS NOTES
Byron Mckenzie MD/Luis Felipe Gonzalez MD/ Milo Bullock MD/Dr Byron Engle APRN AGACNP-BC, NP-C      Tiarra Reilly APRN NP-C     Manolo Vazquez APRN NP-C                                           Pulmonary Progress Note    Patient - Yesi Peak   Age - 60 y.o.   - 1960  MRN - [id number]  Acct # - [de-identified]  Date of Admission - 2023  9:55 PM    Consulting Service/Physician:       Primary Care Physician: No primary care provider on file. SUBJECTIVE:     Chief Complaint:   Chief Complaint   Patient presents with    Altered Mental Status     Subjective:    Phani Rodriguez tells me he is feeling okay today. He is currently on 3 L with pulse ox 92%. He denies any shortness of breath. T-max for 24 hours is 99.4. He has completed his antibiotics. Sodium today is 142. He has been started on tube feedings via his new PEG tube. Plan is for acute rehab per notes. VITALS  /72   Pulse 84   Temp 99.1 °F (37.3 °C) (Axillary)   Resp 17   Ht 5' 7\" (1.702 m)   Wt 154 lb 1.6 oz (69.9 kg)   SpO2 98%   BMI 24.14 kg/m²   Wt Readings from Last 3 Encounters:   23 154 lb 1.6 oz (69.9 kg)   23 188 lb (85.3 kg)     I/O (24 Hours)    Intake/Output Summary (Last 24 hours) at 2023 0844  Last data filed at 2023 0616  Gross per 24 hour   Intake 1641 ml   Output 510 ml   Net 1131 ml     Ventilator:   Settings  FiO2 : 60 %  Insp Time (sec): 0.9 sec  Insp Rise Time (%): 0.15 %  Flow Sensitivity: 5 L/min  Exam:   Physical Exam   Constitutional: Lying in bed on 3 L in no distress   HENT: Unremarkable, NG tube intact and clamped  Head: Normocephalic and atraumatic. Eyes: EOM are normal. Pupils are equal, round, and reactive to light. Neck: Neck supple. Cardiovascular:  Regular rate and rhythm. Normal heart tones. No JVD.     Pulmonary/Chest: Respirations even and nonlabored, diminished in bases, on 3 L with pulse ox 92% Abdominal: Soft. Bowel sounds are normal.   Musculoskeletal: Normal range of motion. Neurological: He is alert and oriented but appears forgetful   Skin: Skin is warm and dry. No rash noted.    Extremities: No edema or discoloration  Infusions:      dextrose 5 % and 0.45 % NaCl 40 mL/hr at 01/16/23 1512    sodium chloride      dexmedetomidine      sodium chloride       Meds:     Current Facility-Administered Medications:     heparin (porcine) injection 5,000 Units, 5,000 Units, SubCUTAneous, 3 times per day, Jennyfer William, DO, 5,000 Units at 01/18/23 0507    miconazole (MICOTIN) 2 % powder, , Topical, BID, Jennyfer William DO, Given at 01/17/23 2115    dextrose 5 % and 0.45 % sodium chloride infusion, , IntraVENous, Continuous, Jennyfer William DO, Last Rate: 40 mL/hr at 01/16/23 1512, New Bag at 01/16/23 1512    potassium bicarb-citric acid (EFFER-K) effervescent tablet 20 mEq, 20 mEq, Oral, Daily, Jennyfer William, DO, 20 mEq at 01/16/23 1002    potassium bicarb-citric acid (EFFER-K) effervescent tablet 40 mEq, 40 mEq, Oral, Daily, Jennyfer William, DO, 40 mEq at 01/15/23 0818    potassium chloride (KLOR-CON M) extended release tablet 20 mEq, 20 mEq, Oral, Once, Jennyfer William, DO    hydrALAZINE (APRESOLINE) injection 20 mg, 20 mg, IntraVENous, Q6H PRN, Jennyfer William, DO, 20 mg at 01/11/23 1224    potassium chloride (KLOR-CON M) extended release tablet 40 mEq, 40 mEq, Oral, PRN **OR** potassium bicarb-citric acid (EFFER-K) effervescent tablet 40 mEq, 40 mEq, Oral, PRN **OR** potassium chloride 10 mEq/100 mL IVPB (Peripheral Line), 10 mEq, IntraVENous, PRN, Jennyfer Ruizel, DO, Last Rate: 100 mL/hr at 01/13/23 0928, 10 mEq at 01/13/23 4687    spironolactone (ALDACTONE) tablet 25 mg, 25 mg, Oral, Daily, Jennyfer William, DO, 25 mg at 01/16/23 1002    magnesium sulfate 2000 mg in water 50 mL IVPB, 2,000 mg, IntraVENous, PRN, Jennyfer William DO    ziprasidone (GEODON) 10 mg in sterile water 0.5 mL injection, 10 mg, IntraMUSCular, Nightly, Jennyfer Ruizel, DO, 10 mg at 01/16/23 2046    carvedilol (COREG) tablet 6.25 mg, 6.25 mg, Oral, BID WC, Jennyfer William, DO, 6.25 mg at 01/17/23 1853    [Held by provider] ferrous sulfate (IRON 325) tablet 325 mg, 325 mg, Oral, Daily with breakfast, Jennyfer Ruizel, DO    pantoprazole (PROTONIX) 40 mg in sodium chloride (PF) 0.9 % 10 mL injection, 40 mg, IntraVENous, Daily, Jennyfer William, DO, 40 mg at 01/16/23 1003    ziprasidone (GEODON) 20 mg in sterile water 1 mL injection, 20 mg, IntraMUSCular, Q12H PRN, Jennyfer William, DO    labetalol (NORMODYNE;TRANDATE) injection 5 mg, 5 mg, IntraVENous, Q6H PRN, Jennyfer William, DO, 5 mg at 01/10/23 1606    potassium chloride 10 mEq/100 mL IVPB (Peripheral Line), 10 mEq, IntraVENous, PRN, Jennyfer William, DO, Last Rate: 100 mL/hr at 01/13/23 1233, 10 mEq at 01/13/23 1233    ipratropium-albuterol (DUONEB) nebulizer solution 1 ampule, 1 ampule, Inhalation, Q4H WA, Jennyfer William, DO, 1 ampule at 01/17/23 2012    0.9 % sodium chloride infusion, , IntraVENous, PRN, Jennyfer William, DO    dexmedetomidine (PRECEDEX) 400 mcg in sodium chloride 0.9 % 100 mL infusion, 0.1-1.5 mcg/kg/hr, IntraVENous, Continuous PRN, Jennyfer Ruizel, DO    nystatin (MYCOSTATIN) ointment, , Topical, BID, Jennyfer William, DO, Given at 01/17/23 2115    perflutren lipid microspheres (DEFINITY) injection 1.5 mL, 1.5 mL, IntraVENous, ONCE PRN, Jennyfer William, DO    [Held by provider] aspirin chewable tablet 81 mg, 81 mg, Oral, Daily, Jennyfer William, DO, 81 mg at 01/04/23 0740    sodium chloride flush 0.9 % injection 10 mL, 10 mL, IntraVENous, PRN, Jennyfer William, DO, 10 mL at 01/03/23 1836    sodium chloride flush 0.9 % injection 5-40 mL, 5-40 mL, IntraVENous, 2 times per day, Jennyfer William, DO, 10 mL at 01/16/23 1004    sodium chloride flush 0.9 % injection 5-40 mL, 5-40 mL, IntraVENous, PRN, Jennyfer William, DO    0.9 % sodium chloride infusion, , IntraVENous, PRN, Jennyfer William, DO    ondansetron (ZOFRAN-ODT) disintegrating tablet 4 mg, 4 mg, Oral, Q8H PRN **OR** ondansetron (ZOFRAN) injection 4 mg, 4 mg, IntraVENous, Q6H PRN, Jennyfer OLMEDO Imel, DO    polyethylene glycol (GLYCOLAX) packet 17 g, 17 g, Oral, Daily PRN, Jennyfer OLMEDO Imel, DO    acetaminophen (TYLENOL) tablet 650 mg, 650 mg, Oral, Q6H PRN **OR** acetaminophen (TYLENOL) suppository 650 mg, 650 mg, Rectal, Q6H PRN, Jennyfer OLMEDO Imel, DO    nicotine (NICODERM CQ) 21 MG/24HR 1 patch, 1 patch, TransDERmal, Daily, Jennyfer OLMEDO Imel, DO, 1 patch at 01/17/23 1019    albuterol (PROVENTIL) nebulizer solution 2.5 mg, 2.5 mg, Nebulization, Q2H PRN, Jennyfer OLMEDO Imel, DO, 2.5 mg at 01/02/23 1115    guaiFENesin (MUCINEX) extended release tablet 600 mg, 600 mg, Oral, BID PRN, Elyssa Jb Imel, DO    Lab Results:     Lab Results   Component Value Date    WBC 5.8 01/18/2023    HGB 11.4 (L) 01/18/2023    HCT 38.3 (L) 01/18/2023    MCV 73.8 (L) 01/18/2023     01/18/2023     Lab Results   Component Value Date    CALCIUM 8.6 01/18/2023     01/18/2023    K 3.6 (L) 01/18/2023    CO2 31 01/18/2023     01/18/2023    BUN 10 01/18/2023    CREATININE 0.65 (L) 01/18/2023       Lab Results   Component Value Date    INR 1.6 01/08/2023    PROTIME 19.1 (H) 01/08/2023           ASSESSMENT:       Acute hypoxic respiratory failure, extubated 1/5/2023, now on 2 L  Multifocal pneumonia suspect aspiration versus community-acquired  Acute infarcts left cerebral hemisphere, left parietal lobe with bilateral subarachnoid hemorrhages  Dysphagia-still failed swallow eval, due for PEG tube placement today  Delirium-resolved  Metabolic encephalopathy  Transaminitis  Thrombocytopenia-improved  History of alcohol use  Acute kidney injury-resolved  History of tobacco use 40-pack-year history  Suspect COPD-mild wheezing  Positive hepatitis C screen  Chronic liver disease secondary to alcohol use  Debilitated  Full code  PLAN:   PEG tube placed yesterday  Wean oxygen as tolerated, keep pulse ox above 89%  Continue bronchodilators  Incentive spirometer  We will check chest x-ray  Encourage cough and deep breathing  Agree with plan for ARU      Electronically signed by ALAYNA Manley CNP on 01/18/23     This progress note was completed using a voice transcription system. Every effort was made to ensure accuracy. However, inadvertent computerized transcription errors may be present.     Vasiliy Catherine, NP-C, MSN  [facility], Critical [facility] Sleep

## 2023-01-18 NOTE — PROGRESS NOTES
Physical Medicine & Rehabilitation  Progress Note        Admitting Physician:  Farooq George MD    Primary Care Provider:  No primary care provider on file. Chief Complaint:  Altered mental status    Brief History: This is a follow up to the initial consult on Mr. Ciera Scott who is a 60 y.o. male admitted to [facility] on 1/1/2023 with Altered Mental Status    He initially presented with worsening altered mental status. Per records, he had a fall about 1 week prior to presentation. At the time of current presentation, he was noted to have hypoxia by EMS and was placed on bipap in the ED. He was found to have pneumonia, elevated troponin, elevated BNP, elevated creatinine, and elevated LFTs. Initial CT head showed no acute intracranial abnormality. He required intubation on 1/2/23. Repeat CT head showed focal area of decreased attenuation with loss of gray/white matter differentiation involving posterior left parietal lobe, likely reflecting an area of acute to subacute infarction. MRI brain showed small acute infarcts involving the left cerebellar hemisphere and left parietal lobe, small subacute infarct within the left cerebellar hemisphere, and moderate bilateral subarachnoid hemorrhage. Extubated 1/5/23. Subsequent CT head also showed focus of intraparenchymal hemorrhage in the left parietal lobe. He has been treated for agitation, and he also tested positive for hepatitis C during admission. He underwent PEG tube placement 1/17/23 (Dr. Roland Lund). ID, nephrology, hematology, neurology, pulmonology, and psychiatry have followed. Subjective:  He reports doing okay today. He notes some abdominal pain but denies any nausea. He does report some mild neck pain as well. He feels that his left side is a little bit weaker than the right. He denies any numbness/tingling. ROS:  Review of Systems   Constitutional:  Negative for fever. Gastrointestinal:  Positive for abdominal pain.  Negative for nausea. Musculoskeletal:  Positive for neck pain. Neurological:  Positive for weakness. Negative for sensory change. Rehabilitation:    Physical Therapy    Restrictions/Precautions: Fall Risk, General Precautions, Contact Precautions, Isolation, Bed Alarm  Implants present? :  (pt denies)  Other position/activity restrictions: Severe dysphagia- NPO. Unable to tolerate any PO safely. NG tube placed,  on high flow nasal cannula    Bed mobility  Rolling to Left: Moderate assistance  Rolling to Right: Minimal assistance  Supine to Sit: Minimal assistance (Assist at trunk)  Sit to Supine: Moderate assistance (Assist with BLEs)  Scooting: Stand by assistance  Bed Mobility Comments: HOB elevated, verbal cues for sequencing. Pt sat EOB for 30+ minutes, SBA with BUE supported on bed and CGA for dynamic sitting balance tasks due to pain at peg site. Transfers  Sit to Stand: Unable to assess (pt unalbe to bear wt through LEs to attempt due to weakness and cognitive concerns/ safety)    Ambulation  Comments: NT      Occupational Therapy    ADL  Feeding: Dependent/Total, NPO  Feeding Skilled Clinical Factors: Peg tube feeds  Grooming: Stand by assistance  UE Bathing: Stand by assistance  LE Bathing: Maximum assistance  UE Dressing: Contact guard assistance  LE Dressing: Maximum assistance  LE Dressing Skilled Clinical Factors: while seated in bedside chair, pt able to demonstrate 4 figure technique however only by pulling on sock/slipper to bring up, pt unable to complete with using already donning dressing items. Toileting: Maximum assistance  Toileting Skilled Clinical Factors: Toileting task completed on BS. Pt required 2 person A while standing with therapist A for clothing management and hygiene. Additional Comments: ADL scores based on skilled observation and clinical reasoning, unless otherwise noted. Pt politely declined completing ADLs this date.  Pt is limited due to cognitive barriers (impulsivity, reduced safety), decreased strength, balance, and activity tolerance, impacting safety and independence with self care          Balance  Sitting Balance: Contact guard assistance (SBA-CGA)  Standing Balance: Dependent/Total (Two-person assist)  Standing Balance  Time: 1-2 minutes x2  Activity: functional transfers, functional mobility  Comment: with RW for UE support. Pt fatigues easily  Functional Mobility  Functional - Mobility Device: Rolling Walker  Activity: Other (Few side steps towards HOB)  Assist Level: Dependent/Total (Two-person assist)  Functional Mobility Comments: Annalee x2, cued for RW placement and sequencing     Transfers  Sit to stand: Contact guard assistance  Stand to sit: Contact guard assistance  Transfer Comments: Cued for hand/foot placement with fair carry over. Toilet Transfers  Toilet - Technique: Ambulating  Equipment Used: Standard bedside commode  Toilet Transfer: 2 Person assistance, Minimal assistance  Toilet Transfers Comments: Verbal cues for hand placement and safety with Fair carryover               Speech Therapy  Subjective: [x] Alert     [x] Cooperative     [x] Confused     [] Agitated    [] Lethargic     Objective/Assessment:     Pt continues to demo impaired resonance at this time following NG removal, Pt reports \"sometimes\" he feels secretions/drainage. Pt did exhibit congested sounding cough expelling thick yellow secretions x 1. Pt c/o dry mouth; ST provided moist oral swab. Pt completed oral/pharyngeal/laryngeal exercises x 10 reps x 1 set to strengthen swallowing mechanism. Frequent rest breaks needed d/t fatigue. Education provided re: completion of swallowing exercises throughout the day to improve swallow function.         Current Medications:   Current Facility-Administered Medications: heparin (porcine) injection 5,000 Units, 5,000 Units, SubCUTAneous, 3 times per day  miconazole (MICOTIN) 2 % powder, , Topical, BID  dextrose 5 % and 0.45 % sodium chloride infusion, , IntraVENous, Continuous  potassium bicarb-citric acid (EFFER-K) effervescent tablet 20 mEq, 20 mEq, Oral, Daily  potassium chloride (KLOR-CON M) extended release tablet 20 mEq, 20 mEq, Oral, Once  hydrALAZINE (APRESOLINE) injection 20 mg, 20 mg, IntraVENous, Q6H PRN  potassium chloride (KLOR-CON M) extended release tablet 40 mEq, 40 mEq, Oral, PRN **OR** potassium bicarb-citric acid (EFFER-K) effervescent tablet 40 mEq, 40 mEq, Oral, PRN **OR** potassium chloride 10 mEq/100 mL IVPB (Peripheral Line), 10 mEq, IntraVENous, PRN  spironolactone (ALDACTONE) tablet 25 mg, 25 mg, Oral, Daily  magnesium sulfate 2000 mg in water 50 mL IVPB, 2,000 mg, IntraVENous, PRN  ziprasidone (GEODON) 10 mg in sterile water 0.5 mL injection, 10 mg, IntraMUSCular, Nightly  carvedilol (COREG) tablet 6.25 mg, 6.25 mg, Oral, BID WC  [Held by provider] ferrous sulfate (IRON 325) tablet 325 mg, 325 mg, Oral, Daily with breakfast  pantoprazole (PROTONIX) 40 mg in sodium chloride (PF) 0.9 % 10 mL injection, 40 mg, IntraVENous, Daily  ziprasidone (GEODON) 20 mg in sterile water 1 mL injection, 20 mg, IntraMUSCular, Q12H PRN  labetalol (NORMODYNE;TRANDATE) injection 5 mg, 5 mg, IntraVENous, Q6H PRN  potassium chloride 10 mEq/100 mL IVPB (Peripheral Line), 10 mEq, IntraVENous, PRN  ipratropium-albuterol (DUONEB) nebulizer solution 1 ampule, 1 ampule, Inhalation, Q4H WA  0.9 % sodium chloride infusion, , IntraVENous, PRN  dexmedetomidine (PRECEDEX) 400 mcg in sodium chloride 0.9 % 100 mL infusion, 0.1-1.5 mcg/kg/hr, IntraVENous, Continuous PRN  nystatin (MYCOSTATIN) ointment, , Topical, BID  perflutren lipid microspheres (DEFINITY) injection 1.5 mL, 1.5 mL, IntraVENous, ONCE PRN  [Held by provider] aspirin chewable tablet 81 mg, 81 mg, Oral, Daily  sodium chloride flush 0.9 % injection 10 mL, 10 mL, IntraVENous, PRN  sodium chloride flush 0.9 % injection 5-40 mL, 5-40 mL, IntraVENous, 2 times per day  sodium chloride flush 0.9 % injection 5-40 mL, 5-40 mL, IntraVENous, PRN  0.9 % sodium chloride infusion, , IntraVENous, PRN  ondansetron (ZOFRAN-ODT) disintegrating tablet 4 mg, 4 mg, Oral, Q8H PRN **OR** ondansetron (ZOFRAN) injection 4 mg, 4 mg, IntraVENous, Q6H PRN  polyethylene glycol (GLYCOLAX) packet 17 g, 17 g, Oral, Daily PRN  acetaminophen (TYLENOL) tablet 650 mg, 650 mg, Oral, Q6H PRN **OR** acetaminophen (TYLENOL) suppository 650 mg, 650 mg, Rectal, Q6H PRN  nicotine (NICODERM CQ) 21 MG/24HR 1 patch, 1 patch, TransDERmal, Daily  albuterol (PROVENTIL) nebulizer solution 2.5 mg, 2.5 mg, Nebulization, Q2H PRN  guaiFENesin (MUCINEX) extended release tablet 600 mg, 600 mg, Oral, BID PRN    Objective:  BP 98/71   Pulse 89   Temp 98.8 °F (37.1 °C) (Oral)   Resp 18   Ht 5' 7\" (1.702 m)   Wt 154 lb 1.6 oz (69.9 kg)   SpO2 97%   BMI 24.14 kg/m²       GEN: Well developed, well nourished, no acute distress  HEENT: NCAT. EOM grossly intact. Hearing grossly intact. Mucous membranes pink and moist.  RESP: Normal breath sounds with no wheezing, rales, or rhonchi. Respirations WNL and unlabored. On nasal cannula oxygen. CV: Regular rate and rhythm. No murmurs, rubs, or gallops. ABD: Soft, non-distended, BS+ and equal.  PEG tube in place. NEURO: Alert. Oriented to year but disoriented to month and day. Speech fluent but slowed. Sensation to light touch intact. MSK: Muscle tone and bulk are normal bilaterally. Strength 4+/5 in all limbs. LIMBS: No edema in bilateral lower limbs. SKIN: Warm and dry with good turgor. PSYCH: Mood WNL. Flat affect. Appropriately interactive.     Diagnostics:     CBC:   Recent Labs     01/16/23  0431 01/17/23  0550 01/18/23  0549   WBC 6.4 6.2 5.8   RBC 5.75 5.12 5.19   HGB 12.4* 11.1* 11.4*   HCT 41.8 37.6* 38.3*   MCV 72.8* 73.5* 73.8*   RDW 31.8* 31.1* 33.2*    171 188     BMP:   Recent Labs     01/16/23  0431 01/17/23  0550 01/18/23  0549   * 143 142   K 3.8 3.9 3.6*    103 103   CO2 37* 34* 31 BUN 11 10 10   CREATININE 0.81 0.74 0.65*     BNP: No results for input(s): BNP in the last 72 hours. PT/INR: No results for input(s): PROTIME, INR in the last 72 hours. APTT: No results for input(s): APTT in the last 72 hours. CARDIAC ENZYMES: No results for input(s): CKMB, CKMBINDEX, TROPONINT in the last 72 hours. Invalid input(s): CKTOTAL;3  FASTING LIPID PANEL:  Lab Results   Component Value Date    TRIG 85 01/03/2023     LIVER PROFILE:   Recent Labs     01/16/23  0431 01/17/23  0550 01/18/23  0549   AST 27 29 24   ALT 24 23 22   BILITOT 1.6* 1.3* 1.3*   ALKPHOS 48 44 42       Imaging:  Bilateral lower limb venous duplex, 1/18/23:   Summary        No evidence of superficial or deep venous thrombosis in both lower    extremities. Impression:    Multifocal CVA, subarachnoid hemorrhage  Dysphagia s/p PEG tube placement on 1/17  Agitation - improved  Acute respiratory failure requiring intubation - improving  Acute heart failure  Pneumonia - improved  KENTRELL - resolved  Elevated LFTs - improved  Hepatitis C  Anemia  Alcohol use    Recommendations:    Diagnosis:  Multifocal CVA, subarachnoid hemorrhage  Therapy: Has PT/OT/SLP needs  Medical Necessity: As above  Support: Lives with significant other  Rehab Recommendation: The patient will benefit from acute inpatient rehabilitation once medically stable per primary service. Anticipate he will be able to tolerate 3 hours of therapy per day in rehabilitation. The patient requires multidisciplinary rehabilitation treatment including medical management by a PM&R physician, 24 hour rehabilitation nursing, physical therapy, occupational therapy, speech therapy, rehabilitation social work, and nutrition services. Patient and family also require education in post-hospital precautions and home exercise routine, adaptive techniques and deficit compensation strategies, strengthening and conditioning, equipment prescription and instructions in use.   DVT Prophylaxis: SCDs, patient remains on heparin also       Electronically signed by Marisol Flores MD on 1/18/2023 at 9:49 PM

## 2023-01-18 NOTE — CARE COORDINATION
ACUTE INPATIENT REHABILITATION  Financial Disclosure Statement: Eligibility and Benefit Verification    Patient Name: Khari Adame MRN: [id number]     Disclosure Statement  [facility] at [facility] provides 24 hour individualized service to patients with functional limitations due to, but not limited to stroke, brain injury, spinal cord injury, major multiple trauma, hip fracture, amputation, and neurological disorders. Acute Inpatient Rehabilitation provides rehabilitative nursing, physician coverage, as well as physical therapy, occupational therapy, speech therapy, recreational therapy and other services as deemed necessary by our Board Certified Physical Medicine and [facility], Dr. Neelima Prince and Dr. Oscar Rodriguez and Dr. Kylah Mathew. [facility] [address] Acute Inpatient Rehabilitation at [facility] is fully accredited by the Commission on Accreditation of Rehabilitation Facilities (CARF) and The Joint Commission (TJC). At a minimum, you will receive 5 days of therapy services totaling at least 15 hours per week. Your treatment program will consist of physical therapy at least 7.5 hours per week; occupational therapy 7.5 hours per week; and speech therapy 1.5 hours per week, as applicable. Your estimated length of stay is currently:  7-10 days. Your insurance coverage has been verified as follows:   Primary Insurance: [address]                      Coverage: Per [employer], Per [employer] Period      Secondary Insurance: Not Applicable  Secondary insurance policies often cover co-payments amounts. Please contact your insurance company to verify benefits. [facility]: [phone] for all billing questions.     Thank You for choosing [address] [place] [address] at [facility].                   Revised 11/21/2022

## 2023-01-19 ENCOUNTER — HOSPITAL ENCOUNTER (INPATIENT)
Age: 63
LOS: 19 days | Discharge: HOME HEALTH | DRG: 139 | End: 2023-02-07
Attending: PHYSICAL MEDICINE & REHABILITATION | Admitting: PHYSICAL MEDICINE & REHABILITATION
Payer: MEDICAID

## 2023-01-19 VITALS
BODY MASS INDEX: 25.05 KG/M2 | DIASTOLIC BLOOD PRESSURE: 74 MMHG | RESPIRATION RATE: 18 BRPM | SYSTOLIC BLOOD PRESSURE: 109 MMHG | WEIGHT: 159.61 LBS | OXYGEN SATURATION: 95 % | HEIGHT: 67 IN | HEART RATE: 96 BPM | TEMPERATURE: 99 F

## 2023-01-19 DIAGNOSIS — J44.9 CHRONIC OBSTRUCTIVE PULMONARY DISEASE, UNSPECIFIED COPD TYPE (HCC): ICD-10-CM

## 2023-01-19 DIAGNOSIS — J96.02 ACUTE RESPIRATORY FAILURE WITH HYPOXIA AND HYPERCAPNIA (HCC): Primary | ICD-10-CM

## 2023-01-19 DIAGNOSIS — J18.9 MULTIFOCAL PNEUMONIA: ICD-10-CM

## 2023-01-19 DIAGNOSIS — J96.01 ACUTE RESPIRATORY FAILURE WITH HYPOXIA AND HYPERCAPNIA (HCC): Primary | ICD-10-CM

## 2023-01-19 PROBLEM — I63.9 IMPENDING CEREBROVASCULAR ACCIDENT (HCC): Status: ACTIVE | Noted: 2023-01-19

## 2023-01-19 PROBLEM — E44.0 MODERATE MALNUTRITION (HCC): Status: ACTIVE | Noted: 2023-01-13

## 2023-01-19 LAB
ABSOLUTE EOS #: 0.13 K/UL (ref 0–0.4)
ABSOLUTE LYMPH #: 0.45 K/UL (ref 1–4.8)
ABSOLUTE MONO #: 0.45 K/UL (ref 0.1–1.3)
ALBUMIN SERPL-MCNC: 2.9 G/DL (ref 3.5–5.2)
ALP BLD-CCNC: 41 U/L (ref 40–129)
ALT SERPL-CCNC: 19 U/L (ref 5–41)
ANION GAP SERPL CALCULATED.3IONS-SCNC: 5 MMOL/L (ref 9–17)
AST SERPL-CCNC: 17 U/L
BASOPHILS # BLD: 1 % (ref 0–2)
BASOPHILS ABSOLUTE: 0.06 K/UL (ref 0–0.2)
BILIRUB SERPL-MCNC: 0.9 MG/DL (ref 0.3–1.2)
BUN BLDV-MCNC: 14 MG/DL (ref 8–23)
CALCIUM SERPL-MCNC: 8.3 MG/DL (ref 8.6–10.4)
CHLORIDE BLD-SCNC: 104 MMOL/L (ref 98–107)
CO2: 32 MMOL/L (ref 20–31)
CREAT SERPL-MCNC: 0.65 MG/DL (ref 0.7–1.2)
EOSINOPHILS RELATIVE PERCENT: 2 % (ref 0–4)
GFR SERPL CREATININE-BSD FRML MDRD: >60 ML/MIN/1.73M2
GLUCOSE BLD-MCNC: 122 MG/DL (ref 70–99)
HCT VFR BLD CALC: 37.2 % (ref 41–53)
HEMOGLOBIN: 11 G/DL (ref 13.5–17.5)
LYMPHOCYTES # BLD: 7 % (ref 24–44)
MAGNESIUM: 1.6 MG/DL (ref 1.6–2.6)
MCH RBC QN AUTO: 22.1 PG (ref 26–34)
MCHC RBC AUTO-ENTMCNC: 29.5 G/DL (ref 31–37)
MCV RBC AUTO: 75.1 FL (ref 80–100)
MONOCYTES # BLD: 7 % (ref 1–7)
MORPHOLOGY: ABNORMAL
PDW BLD-RTO: 32.2 % (ref 11.5–14.9)
PLATELET # BLD: 199 K/UL (ref 150–450)
PMV BLD AUTO: 9.3 FL (ref 6–12)
POTASSIUM SERPL-SCNC: 3.5 MMOL/L (ref 3.7–5.3)
RBC # BLD: 4.96 M/UL (ref 4.5–5.9)
SEG NEUTROPHILS: 83 % (ref 36–66)
SEGMENTED NEUTROPHILS ABSOLUTE COUNT: 5.31 K/UL (ref 1.3–9.1)
SODIUM BLD-SCNC: 141 MMOL/L (ref 135–144)
TOTAL PROTEIN: 6 G/DL (ref 6.4–8.3)
WBC # BLD: 6.4 K/UL (ref 3.5–11)

## 2023-01-19 PROCEDURE — 97116 GAIT TRAINING THERAPY: CPT

## 2023-01-19 PROCEDURE — 80053 COMPREHEN METABOLIC PANEL: CPT

## 2023-01-19 PROCEDURE — 92526 ORAL FUNCTION THERAPY: CPT

## 2023-01-19 PROCEDURE — 85025 COMPLETE CBC W/AUTO DIFF WBC: CPT

## 2023-01-19 PROCEDURE — 94761 N-INVAS EAR/PLS OXIMETRY MLT: CPT

## 2023-01-19 PROCEDURE — 6370000000 HC RX 637 (ALT 250 FOR IP): Performed by: STUDENT IN AN ORGANIZED HEALTH CARE EDUCATION/TRAINING PROGRAM

## 2023-01-19 PROCEDURE — 99223 1ST HOSP IP/OBS HIGH 75: CPT | Performed by: PHYSICAL MEDICINE & REHABILITATION

## 2023-01-19 PROCEDURE — 97110 THERAPEUTIC EXERCISES: CPT

## 2023-01-19 PROCEDURE — 1180000000 HC REHAB R&B

## 2023-01-19 PROCEDURE — 94640 AIRWAY INHALATION TREATMENT: CPT

## 2023-01-19 PROCEDURE — 97530 THERAPEUTIC ACTIVITIES: CPT

## 2023-01-19 PROCEDURE — 6360000002 HC RX W HCPCS: Performed by: STUDENT IN AN ORGANIZED HEALTH CARE EDUCATION/TRAINING PROGRAM

## 2023-01-19 PROCEDURE — 99223 1ST HOSP IP/OBS HIGH 75: CPT | Performed by: INTERNAL MEDICINE

## 2023-01-19 PROCEDURE — 36415 COLL VENOUS BLD VENIPUNCTURE: CPT

## 2023-01-19 PROCEDURE — 6370000000 HC RX 637 (ALT 250 FOR IP): Performed by: INTERNAL MEDICINE

## 2023-01-19 PROCEDURE — 2700000000 HC OXYGEN THERAPY PER DAY

## 2023-01-19 PROCEDURE — 97162 PT EVAL MOD COMPLEX 30 MIN: CPT

## 2023-01-19 PROCEDURE — 2500000003 HC RX 250 WO HCPCS: Performed by: STUDENT IN AN ORGANIZED HEALTH CARE EDUCATION/TRAINING PROGRAM

## 2023-01-19 PROCEDURE — 92523 SPEECH SOUND LANG COMPREHEN: CPT

## 2023-01-19 PROCEDURE — 97535 SELF CARE MNGMENT TRAINING: CPT

## 2023-01-19 PROCEDURE — 83735 ASSAY OF MAGNESIUM: CPT

## 2023-01-19 PROCEDURE — 97167 OT EVAL HIGH COMPLEX 60 MIN: CPT

## 2023-01-19 RX ORDER — SPIRONOLACTONE 25 MG/1
25 TABLET ORAL DAILY
Status: DISCONTINUED | OUTPATIENT
Start: 2023-01-20 | End: 2023-02-07 | Stop reason: HOSPADM

## 2023-01-19 RX ORDER — POLYETHYLENE GLYCOL 3350 17 G/17G
17 POWDER, FOR SOLUTION ORAL DAILY
Status: DISCONTINUED | OUTPATIENT
Start: 2023-01-19 | End: 2023-02-07 | Stop reason: HOSPADM

## 2023-01-19 RX ORDER — FERROUS SULFATE 325(65) MG
325 TABLET ORAL
Status: DISCONTINUED | OUTPATIENT
Start: 2023-01-20 | End: 2023-02-07 | Stop reason: HOSPADM

## 2023-01-19 RX ORDER — BISACODYL 10 MG
10 SUPPOSITORY, RECTAL RECTAL DAILY PRN
Status: DISCONTINUED | OUTPATIENT
Start: 2023-01-19 | End: 2023-02-07 | Stop reason: HOSPADM

## 2023-01-19 RX ORDER — GUAIFENESIN 600 MG/1
600 TABLET, EXTENDED RELEASE ORAL 2 TIMES DAILY PRN
Status: DISCONTINUED | OUTPATIENT
Start: 2023-01-19 | End: 2023-02-07 | Stop reason: HOSPADM

## 2023-01-19 RX ORDER — SENNA PLUS 8.6 MG/1
2 TABLET ORAL DAILY PRN
Status: DISCONTINUED | OUTPATIENT
Start: 2023-01-19 | End: 2023-02-07 | Stop reason: HOSPADM

## 2023-01-19 RX ORDER — CARVEDILOL 6.25 MG/1
6.25 TABLET ORAL 2 TIMES DAILY WITH MEALS
Status: DISCONTINUED | OUTPATIENT
Start: 2023-01-19 | End: 2023-02-07 | Stop reason: HOSPADM

## 2023-01-19 RX ORDER — ACETAMINOPHEN 325 MG/1
650 TABLET ORAL EVERY 4 HOURS PRN
Status: DISCONTINUED | OUTPATIENT
Start: 2023-01-19 | End: 2023-02-07 | Stop reason: HOSPADM

## 2023-01-19 RX ADMIN — ANTI-FUNGAL POWDER MICONAZOLE NITRATE TALC FREE: 1.42 POWDER TOPICAL at 08:29

## 2023-01-19 RX ADMIN — HEPARIN SODIUM 5000 UNITS: 5000 INJECTION INTRAVENOUS; SUBCUTANEOUS at 05:36

## 2023-01-19 RX ADMIN — CARVEDILOL 6.25 MG: 6.25 TABLET, FILM COATED ORAL at 08:28

## 2023-01-19 RX ADMIN — SPIRONOLACTONE 25 MG: 25 TABLET ORAL at 08:29

## 2023-01-19 RX ADMIN — MAGNESIUM SULFATE HEPTAHYDRATE 2000 MG: 40 INJECTION, SOLUTION INTRAVENOUS at 06:36

## 2023-01-19 RX ADMIN — NYSTATIN OINTMENT: 100000 OINTMENT TOPICAL at 08:29

## 2023-01-19 RX ADMIN — CARVEDILOL 6.25 MG: 6.25 TABLET, FILM COATED ORAL at 17:32

## 2023-01-19 RX ADMIN — IPRATROPIUM BROMIDE AND ALBUTEROL SULFATE 1 AMPULE: 2.5; .5 SOLUTION RESPIRATORY (INHALATION) at 08:23

## 2023-01-19 RX ADMIN — POTASSIUM BICARBONATE 40 MEQ: 782 TABLET, EFFERVESCENT ORAL at 06:34

## 2023-01-19 RX ADMIN — POTASSIUM BICARBONATE 20 MEQ: 782 TABLET, EFFERVESCENT ORAL at 08:32

## 2023-01-19 ASSESSMENT — PAIN SCALES - WONG BAKER

## 2023-01-19 ASSESSMENT — PAIN SCALES - GENERAL
PAINLEVEL_OUTOF10: 0

## 2023-01-19 NOTE — CARE COORDINATION
Case Management Assessment  Initial Evaluation    Date/Time of Evaluation: 1/19/2023 9:50 AM  Assessment Completed by: Robert Chong RN    If patient is discharged prior to next notation, then this note serves as note for discharge by case management. Patient Name: Andres Anthony                     Date / Time: 1/19/2023  9:12 AM  YOB: 1960  Diagnosis: Impending cerebrovascular accident [facility]) [I63.9]                     Patient Admission Status: REHAB IP   Readmission Risk (Low < 19, Mod (19-27), High > 27): Readmission Risk Score: 16      Current PCP: No primary care provider on file. PCP verified by CM? No (Will establish while in ARU)  Chart Reviewed: Yes      History Provided by: Patient  Patient Orientation: Alert and Oriented, Person, Place, Situation, Self    Patient Cognition: Alert    Advance Directives:    Code Status: Full Code   Patient's Primary Decision Maker is: Legal Next of Kin    Primary Decision Maker: Armond Dubin - Child - [phone]    Primary Decision Maker: Robby Montanez - Child - [phone]    Primary Decision Maker: Jerzy Montanez - Child - [phone]    Primary Decision Maker: Eleazar Montanez - Child - [phone]    Primary Decision Maker: Kavita Montanez - Child      Discharge Planning:  Patient lives with: Spouse/Significant Other   Type of Home: House  Primary Care Giver: Self  Patient Support Systems include: Spouse/Significant Other, Children   Family can provide assistance at DC: Yes  Does patient have 24 hour assistance at home:  Yes  Current Financial resources:  None  Current community resources: None  Current services prior to admission: None  Type of Home Care services:  None    Is patient agreeable to VNS/Outpatient therapy TBD  [place] of choice provided:  Yes  List of Home Care Agencies/Outpatient therapy provided: yes  VNS/Outpatient therapy chosen:  {YES/NO:[id number]  Current DME:  none  Do you have a wheelchair ramp/Plans to build?  No  Home Oxygen: No  Nebulizer: No  CPAP/BIPAP: No  Supplier: N/A  Handicap Placard: needs  Does patient go to outpatient dialysis: No  If yes, location and chair time: no    Would you like Case Management to discuss the discharge plan with any other family members/significant others, and if so, who? Yes FrankAddis  Plans to Return to Present Housing: Yes  Potential Assistance needed at discharge: TBD  Patient expects to discharge to: [address] [place] [address] for transportation at discharge:  family    ADLS  Prior functional level: Independent in ADLs/IADLs  Current functional level: Independent in ADLs/IADLs    PT AM-PAC:   /24  OT AM-PAC:   /24    Financial  Payor: MEDICAID [place] / Plan: 40 [facility] DEPT OF JOB / Product     What Pharmacy do you use: [facility] [address]  Meds-to-Beds request:  no  Does insurance require precert for SNF: Yes  Potential assistance Purchasing Medications: No      Notes:  Factors facilitating achievement of predicted outcomes: Family support  Barriers to discharge: Endurance  Other Identified Issues/Barriers to RETURNING to current housing: None      The Plan for Transition of Care is related to the following treatment goals of Impending cerebrovascular accident ([facility] [address] [G51.7]    IF APPLICABLE: The Patient and/or patient representative Lulu Nogueira and his family were provided with a choice of provider and agrees with the discharge plan. Freedom of choice list with basic dialogue that supports the patient's individualized plan of care/goals and shares the quality data associated with the providers was provided to: Patient       The Patient and/or Patient Representative Agree with the Discharge Plan?   Yes    Additional Case Management Notes: Dispo:Home w S/O DME none Placard needs PHQ9 0    Alexa Cristina RN  ARU/Case Management Department  HY:[phone] Fax: [phone]  ACUTE [address] [facility]    Case Management Assessment: IRF CHRISTINA 4.0   If score is above 15, Notify PM&R Physician    Patient Health Questionnaire-9 (PHQ-2 to 9)   Over the last 2 weeks, how often have you been bothered by any of the following problems? 1. Little Interest or pleasure in doing things? Never or 1 Day - Score 0    2. Feeling down, depressed or hopeless? Never or 1 Day - Score 0    3. Trouble falling or staying asleep, or sleeping too much? Never or 1 Day - Score 0    4. Feeling tired or having little energy? Never or 1 Day - Score 0    5. Poor apettite or overeating? Never or 1 Day - Score 0    6. Feeling bad about yourself-or that you are a failure or have let yourself or your family down? Never or 1 Day - Score 0    7. Trouble concentrating on things, such as reading the newspaper or watching television? Never or 1 Day - Score 0    8. Moving or speaking so slowly that other people could have noticed? Or the opposite-being so fidgety or restless that you have been moving around a lot more than usual?   Never or 1 Day - Score 0    9. Thoughts that you would be better off dead or of hurting yourself in some way? Never or 1 Day - Score 0    Total Score: 0    If you checked off any problems, how difficult have these problems made it for you to do your work, take care of things at home, or get along with other people? [x] Not difficult at all     [] Somewhat Difficult     [] Very Difficult     [] Extremely Difficult           Social Isolation     How often do you feel lonely or isolated from those around you? [x] Never  [] Rarely  [] Sometimes  [] Often  [] Always  [] Patient Unable to Respond       Access To Transportation     2. In the past six months to a year, has lack of transportation kept you from medical appointments or from getting your medications?  No    3. In the past six months to a year, has lack of transportation kept you from non-medical meetings, appointments, work, or from getting things that you need?  No

## 2023-01-19 NOTE — PROGRESS NOTES
[address]    Patient Name: Richar Whitehead  MRN: [id number]   Date of Admit: 1/19/2023  Time of Arrival: 0912    Patient admitted to the Acute Inpatient Rehabilitation Unit via Bed    Patient oriented to room, unit and fall prevention safety measures. Education provided on the rehabilitation routine and therapy schedules. Drug / Medication Regimen Review   Admitting medication orders compared with acute stay medications; home medication list reviewed with patient/family. Medication Issues Identified ? No If Yes, Check All That Apply   []  Allergy to medication   []  Drug interactions (drug/drug, drug/food, drug/disease interactions)   []  Duplicate drug   []  Omission (drug missing from prescribed regimen)   []  Non adherence   []  Adverse reaction   []  Wrong patient, drug, dose, route, time error   []  Ineffective drug therapy       High-Risk Drug Classes: Use and Indication   Check if the patient is taking any medications by pharmacological classification If yes, check if there is an indication noted for all meds in the drug class   Antipsychotic No If yes: indication noted? [] If no indication noted, follow up with provider for order clarification   Anticoagulant No If yes: indication noted? []    Antibiotic No If yes: indication noted? []    Opioid No If yes: indication noted? []    Antiplatelet No If yes: indication noted? []    Hypoglycemic (Including Insulin) No If yes: indication noted? []      Attending Physical Medicine & Rehabilitation (PM&R) Admitting Order Review   Admission orders reviewed with Acute Inpatient Rehabilitation Attending PM&R Physician: Victor Hugo Haney MD     Skin Assessment   Skin assessment performed by two nurses: all active alterations in skin integrity, wounds, lines, drains and airways assessed, measured, recorded and reconciled. Refer to [facility] avatar and LDA flowsheet for additional information.     Nurse 1 Completing Skin Assessment Sonia Barth RN   Nurse 2 Completing Skin Assessment Leonela, LPN     Active pressure injuries or complex wounds identified? No  If yes: contact provider for wound care consult order []       Admission Weight   Patient's Weight Upon Admission  163       Bowel and Bladder Functional Assessment   Prior history of bladder problems:  no problems with bladder   Number of pads used per day: N/A   Frequency of night time voiding:   Unable to determine at this time. Fluid intake volume and pattern: Pt is NPO   Last Bowel Movement 01/19/23   Prior history of bowel problems:  No If Yes, Check All That Apply  []Incontinence   []Frequent Diarrhea  []Constipation   []Hemorrhoids  []Diverticulitis  []Bowel Surgery     Pain Assessment   Over the past 5 days, how much of the time has pain made it hard for you to sleep at night? Rarely or not at all   Over the past 5 days, how often have you limited your participation in rehabilitation therapy sessions due to pain? Rarely or not at all   Over the past 5 days, how often have you limited your day-to-day activities (excluding rehabilitation therapy session)? Rarely or not at all     Special Treatments, Procedures, and Programs   Check all of the following treatments, procedures, and programs that apply on admission.     Cancer Treatments   Chemotherapy No   If Yes, Check All That Apply   []IV Chemotherapy   []Oral Chemotherapy    []Chemotherapy    Radiation No   Respiratory Therapies   Oxygen Therapy Yes  If Yes, Check All That Apply   [x]Continuous   []Intermittent   []High-Concentration    Suctioning No  If Yes, Check All That Apply   []Scheduled   []As Needed   Tracheostomy Care No   Invasive Mechanical Ventilator   (Ventilator or Respirator) No  If Yes, Check All That Apply   []Non-invasive Mechanical Ventilator   []BiPap   []CPAP   Other   IV Medications No  If Yes, Check All That Apply   []IV Vasoactive Medications   []IV Antibiotics   []IV Anticoagulation []Other IV Medications   Transfusions No   Dialysis No  If Yes, Check All That Apply   []Hemodialysis   []Peritoneal Dialysis   IV Access No  If Yes, Check All That Apply   []Peripheral   []Midline   [](PICC, tunneled, port)       Admission folder with the following documents provided to patient/responsible party:   1. \"[facility] Individualized Disclosure Statement\"  2. \"Data Collection Information Summary for Patients in Inpatient Rehabilitation Facilities\"  3. \"Privacy Act Statement - Health Care Records\"    Care plan was created with patient/responsible party input and goals were agreed upon. Please refer to the admission navigator for further information.

## 2023-01-19 NOTE — H&P
Physical Medicine & Rehabilitation History and Physical  [facility] Acute Rehabilitation Unit     Primary care provider: No primary care provider on file. Chief Complaint and Reason for Rehabilitation Admission:   ADL and Mobility deficits secondary to Impending cerebrovascular accident    History of Present Illness:  Shiloh Tellez  is a 60 y.o. right-handed male admitted to the [address] [place] [address] unit on 1/19/2023. He was originally admitted to [facility]  on 1/1/2023 for acute respiratory failure 2/2 RLL pneumonia. Blood/resp/urine cultures negative, although his MRSA swab was +. He completed course of Unasyn and vancomycin. Imaging showed numerous acute, subacute, and chronic infarcts in the left parietal lobe, left cerebellar hemisphere, as well as bilateral SAH. PEG tube was placed 1/17/23 for severe dysphagia. HCV+. ID, nephrology, heme-onc, neuro, pulm, and psych are following. Patient admitted to ARU this morning. He reports doing well this morning. He denies any pain aside from some abdominal pain when he coughs. He does note some wheezes and tenderness around the PEG tube site. He does endorse some generalized weakness. He is currently requiring assistance for self-care activities and mobility prompting this admission. Review of Systems:  CONSTITUTIONAL:  Denies fevers, chills, sweats or fatigue. EYES:  Denies diplopia, blind spots, blurring. HEENT:  Denies hearing loss, trouble chewing or swallowing. RESPIRATORY:  No wheezing, coughing, shortness of breath. CARDIOVASCULAR:  Denies chest pain, palpitations, lightheadedness. GASTROINTESTINAL:  Denies heartburn, nausea, constipation, diarrhea. Positive for abdominal pain. GENITOURINARY:  No urgency, frequency, incontinence, dysuria. ENDOCRINE:  Denies hot or cold intolerance. MUSCULOSKELETAL:  Denies focal joint pain, back pain, neck pain.   NEUROLOGICAL:  Denies focal numbness, tingling, balance loss, headache. BEHAVIOR/PSYCH:  Denies depression, anxiety, memory loss, insomnia. SKIN:  No ulcers, rash, bruises. Premorbid function:  Patient states he was able to walk prior to admission and was independent in ADLs. Current Function:  PT:  pending eval  Restrictions:  Restrictions/Precautions  Restrictions/Precautions: Fall Risk, General Precautions, Contact Precautions, Isolation, Bed Alarm  Required Braces or Orthoses?: No  Implants present? :  (pt denies)    Bed mobility  Rolling to Left: Moderate assistance  Rolling to Right: Minimal assistance  Supine to Sit: Minimal assistance (Assist at trunk)  Sit to Supine: Moderate assistance (Assist with BLEs)  Scooting: Stand by assistance  Bed Mobility Comments: HOB elevated, verbal cues for sequencing. Pt sat EOB for 30+ minutes, SBA with BUE supported on bed and CGA for dynamic sitting balance tasks due to pain at peg site. Transfers  Sit to Stand: Unable to assess (pt unalbe to bear wt through LEs to attempt due to weakness and cognitive concerns/ safety)    Ambulation  Comments: NT      OT: pending eval  ADLs-         SPEECH:  Pt. Seen for O/M treatment program for dysphagia. Pt. Completed O/M exercises x2, 12 reps each, Kim maneuver x5 and simple tongue base strengthening exercises x4, 12 reps each. Pt c/o dry mouth; ST provided moist oral swab. Pt. Continues c mildly impaired resonance (? If d/t dried secretions). List of dysphagia exercises given to pt and SO for independent practice. They verbalized understanding of dysphagia exercise program.        Past Medical History:  No past medical history on file. Past Surgical History:      Procedure Laterality Date    UPPER GASTROINTESTINAL ENDOSCOPY N/A 1/17/2023    EGD ESOPHAGOGASTRODUODENOSCOPY PEG TUBE INSERTION performed by Denise Farrell DO at [facility] ENDO       Allergies:    Patient has no known allergies.     Medications   Scheduled Meds:   carvedilol  6.25 mg Oral BID WC    [START ON 1/20/2023] ferrous sulfate  325 mg Oral Daily with breakfast    [START ON 1/20/2023] spironolactone  25 mg Oral Daily    polyethylene glycol  17 g Oral Daily     Continuous Infusions:  PRN Meds:.guaiFENesin, acetaminophen, senna, bisacodyl     Social History:  Dwelling House - 1 story. Steps to enter: 6 steps from front with rails, 4 steps from back with no rails,  Bed/bathroom level:  1. Lives with: Spouse  Devices: Cane at home  Activity level:  normal activities of daily living  Social History     Socioeconomic History    Marital status: Single   Tobacco Use    Smoking status: Every Day     Packs/day: 1.00     Years: 40.00     Pack years: 40.00     Types: Cigarettes    Smokeless tobacco: Never   Substance and Sexual Activity    Alcohol use: Yes     Comment: beer and scotch    Drug use: Not Currently     Comment: over 20 years (stated by daughter)       Family History:   No family history on file. Diagnostics:     CBC:   Recent Labs     01/17/23  0550 01/18/23  0549 01/19/23  0450   WBC 6.2 5.8 6.4   RBC 5.12 5.19 4.96   HGB 11.1* 11.4* 11.0*   HCT 37.6* 38.3* 37.2*   MCV 73.5* 73.8* 75.1*   RDW 31.1* 33.2* 32.2*    188 199     BMP:    Recent Labs     01/17/23  0550 01/18/23  0549 01/19/23  0450   GLUCOSE 101* 116* 122*   BUN 10 10 14   CREATININE 0.74 0.65* 0.65*   CALCIUM 8.6 8.6 8.3*    142 141   K 3.9 3.6* 3.5*    103 104   CO2 34* 31 32*   ANIONGAP 6* 8* 5*   LABGLOM >60 >60 >60     HbA1c: No results found for: LABA1C  BNP: No results for input(s): BNP in the last 72 hours. PT/INR: No results for input(s): PROTIME, INR in the last 72 hours. APTT: No results for input(s): APTT in the last 72 hours. CARDIAC ENZYMES: No results for input(s): CKMB, CKMBINDEX, TROPONINT, TROPHS, TROPII in the last 72 hours.     Invalid input(s): CKTOTAL;3   FASTING LIPID PANEL:  Lab Results   Component Value Date    TRIG 85 01/03/2023     LIVER PROFILE:   Recent Labs     01/17/23  0550 01/18/23  0549 01/19/23  0450   AST 29 24 17   ALT 23 22 19   BILITOT 1.3* 1.3* 0.9   ALKPHOS 44 43 41          Radiology:    CT HEAD WO CONTRAST    Result Date: 1/3/2023  EXAMINATION: CT OF THE HEAD WITHOUT CONTRAST  1/2/2023 10:11 pm TECHNIQUE: CT of the head was performed without the administration of intravenous contrast. Automated exposure control, iterative reconstruction, and/or weight based adjustment of the mA/kV was utilized to reduce the radiation dose to as low as reasonably achievable. COMPARISON: None. HISTORY: ORDERING SYSTEM PROVIDED HISTORY: Altered Mental status TECHNOLOGIST PROVIDED HISTORY: Altered Mental status Reason for Exam: Altered Mental status Additional signs and symptoms: Patient came in with transient alteration of awareness, follow up head CT for any changes. FINDINGS: BRAIN/VENTRICLES: There is no acute intracranial hemorrhage, mass effect or midline shift. No abnormal extra-axial fluid collection. There is a focal area of decreased attenuation with loss of gray/white matter differentiation involving posterior left parietal lobe with somewhat increased conspicuity as compared to prior exam.  There is stable small focus of encephalomalacia involving left cerebellar hemisphere compatible with prior remote infarct/insult. Chronic lacunar infarct to right basal ganglia redemonstrated. There is no evidence of hydrocephalus. ORBITS: The visualized portion of the orbits demonstrate no acute abnormality. SINUSES: Small air-fluid level right sphenoid sinus. Trace air-fluid level right frontal sinus. Mild mucoperiosteal thickening to bilateral ethmoid air cells. Findings are new from prior exam and likely relate to presence of nasogastric tube. SOFT TISSUES/SKULL:  No acute abnormality of the visualized skull or soft tissues.      Focal area of decreased attenuation with loss of gray/white matter differentiation involving posterior left parietal lobe with somewhat increased conspicuity as compared to prior exam likely reflecting an area of acute to subacute infarct. Stable small chronic infarct/insult to left cerebellar hemisphere. Chronic lacunar infarct to right basal ganglia redemonstrated. Paranasal sinus disease likely relating to presence of nasogastric tube. CT HEAD WO CONTRAST    Result Date: 1/1/2023  EXAMINATION: CT OF THE HEAD WITHOUT CONTRAST  1/1/2023 10:48 pm TECHNIQUE: CT of the head was performed without the administration of intravenous contrast. Automated exposure control, iterative reconstruction, and/or weight based adjustment of the mA/kV was utilized to reduce the radiation dose to as low as reasonably achievable. COMPARISON: None. HISTORY: Reason for Exam: AMS Additional signs and symptoms: the patient has been getting more and more confused since 2 weeks ago. It has progressively been getting worse and today FINDINGS: BRAIN/VENTRICLES: There is no acute intracranial hemorrhage, mass effect or midline shift. No abnormal extra-axial fluid collection. The gray-white differentiation is maintained without evidence of an acute infarct. There is no evidence of hydrocephalus. Changes of mild chronic small vessel ischemic disease. ORBITS: The visualized portion of the orbits demonstrate no acute abnormality. SINUSES: The visualized paranasal sinuses and mastoid air cells demonstrate no acute abnormality. SOFT TISSUES/SKULL:  No acute abnormality of the visualized skull or soft tissues. No acute intracranial abnormality. Mild chronic small vessel ischemic disease. CT HEAD W CONTRAST    Result Date: 1/10/2023  EXAMINATION: CT OF THE HEAD WITH CONTRAST  1/10/2023 11:09 am TECHNIQUE: CT of the head/brain was performed with the administration of intravenous contrast. Multiplanar reformatted images are provided for review.  Automated exposure control, iterative reconstruction, and/or weight based adjustment of the mA/kV was utilized to reduce the radiation dose to as low as reasonably achievable. COMPARISON: CT brain performed 02/2023. HISTORY: ORDERING SYSTEM PROVIDED HISTORY: Follow up subarachnoid hemorrhage TECHNOLOGIST PROVIDED HISTORY: Follow up subarachnoid hemorrhage Reason for Exam: Follow up subarachnoid hemorrhage. CT scan on 1/2/2023 FINDINGS: BRAIN/VENTRICLES: Is minimal scattered subarachnoid hemorrhage within right cerebral hemisphere. Is a focus intraparenchymal hemorrhage in the left parietal lobe. There is no mass effect midline shift. Ventricular structures are symmetric. The infratentorial structures are unremarkable. There is no abnormal postcontrast enhancement. ORBITS: The visualized portion of the orbits demonstrate no acute abnormality. SINUSES: The visualized paranasal sinuses and mastoid air cells demonstrate no acute abnormality. SOFT TISSUES/SKULL:  No acute abnormality of the visualized skull or soft tissues. Scattered foci of subarachnoid hemorrhage in the right cerebral hemisphere. Focus of intraparenchymal hemorrhage in the left parietal lobe. US LIVER    Result Date: 1/2/2023  EXAMINATION: RIGHT UPPER QUADRANT ULTRASOUND 1/2/2023 10:20 am COMPARISON: None. HISTORY: ORDERING SYSTEM PROVIDED HISTORY: elevated AST and ALT, in setting of PT, and INR TECHNOLOGIST PROVIDED HISTORY: elevated AST and ALT, in setting of PT, and INR FINDINGS: Patient body habitus limits the study, as there is increased attenuation of the ultrasound beam. This decreases sensitivity and specificity. LIVER:  There is mild increased echogenicity seen throughout the liver. No intrahepatic ductal dilatation. No perihepatic fluid. BILIARY SYSTEM:  Bladder is contracted. Gallstones are seen. Gallbladder wall thickness measures 6 mm. Common bile duct is within normal limits measuring 5 mm. RIGHT KIDNEY: The right kidney is grossly unremarkable without evidence of hydronephrosis. PANCREAS:  Visualized portions of the pancreas are unremarkable.  OTHER: No evidence of right upper quadrant ascites. Portal vein flow appears hepatopetal     Increased echogenicity is seen throughout the liver suggesting diffuse hepatocellular disease such as fatty infiltration Cholelithiasis. No cholecystitis     XR CHEST PORTABLE    Result Date: 1/18/2023  EXAMINATION: ONE XRAY VIEW OF THE CHEST 1/18/2023 10:40 am COMPARISON: AP chest from 01/05/2023 HISTORY: ORDERING SYSTEM PROVIDED HISTORY: follow up dyspnea/pna TECHNOLOGIST PROVIDED HISTORY: follow up dyspnea/pna FINDINGS: Interval extubation/NG tube removal. Overlying respiratory tubing/ECG monitor leads. Cardiomediastinal shadow stable. Similar or slightly improved bibasilar opacities; possible slightly greater GGO right mid lung. Upper zone emphysema/bulla, greater on left. No large pleural effusion or PTX. Bones unchanged. Interval extubation and NG tube removal.  Otherwise similar findings, with perhaps slightly greater hazy ground-glass opacity right mid lung. Correlate clinically. XR CHEST PORTABLE    Result Date: 1/5/2023  EXAMINATION: ONE XRAY VIEW OF THE CHEST 1/5/2023 6:07 am COMPARISON: Chest x-ray dated 4 January 2023 HISTORY: ORDERING SYSTEM PROVIDED HISTORY: While on ventilator TECHNOLOGIST PROVIDED HISTORY: While on ventilator Reason for Exam: on vent FINDINGS: Stable cardiomediastinal silhouette. Stable lines and tubes. Stable appearance of small bilateral pleural effusions with bibasilar airspace disease. No pneumothorax. Stable exam with small bilateral pleural effusions and bibasilar airspace disease. Consider pneumonia versus atelectasis in the appropriate clinical setting.      XR CHEST PORTABLE    Result Date: 1/4/2023  EXAMINATION: ONE XRAY VIEW OF THE CHEST 1/4/2023 6:08 am COMPARISON: Chest x-ray dated 3 January 2023, 0632 hours HISTORY: ORDERING SYSTEM PROVIDED HISTORY: While on ventilator TECHNOLOGIST PROVIDED HISTORY: While on ventilator Reason for Exam: on vent FINDINGS: Left-sided PICC line with tip in the superior vena cava. Endotracheal tube with the tip above the robert. Enteric tube with the tip and side-port in the stomach. Small bilateral pleural effusions with bibasilar atelectasis. No pneumothorax. Emphysematous changes in the left upper lung. No significant interval change. XR CHEST PORTABLE    Result Date: 1/3/2023  EXAMINATION: ONE XRAY VIEW OF THE CHEST 1/3/2023 6:09 am COMPARISON: 1/2/2023 HISTORY: ORDERING SYSTEM PROVIDED HISTORY: While on ventilator TECHNOLOGIST PROVIDED HISTORY: While on ventilator Reason for Exam: ON VENT FINDINGS: Small to moderate right pleural effusion. Right lung base atelectasis versus infiltrate. Underlying mild congestion versus interstitial infiltrate. Emphysematous changes in the left upper lung. Left-sided PICC line tip in the superior vena cava right atrial junction. ET tube is 5 cm superior to the robert. Feeding tube terminates below the diaphragm. Multiple wires/leads overlie the chest. Moderate osteopenic changes and degenerative changes noted in the bony structures. No significant will change. See above for more details. XR CHEST PORTABLE    Result Date: 1/2/2023  EXAMINATION: ONE XRAY VIEW OF THE CHEST 1/2/2023 3:15 pm COMPARISON: 01/01/2023 HISTORY: ORDERING SYSTEM PROVIDED HISTORY: intubation TECHNOLOGIST PROVIDED HISTORY: intubation Reason for Exam: intubation FINDINGS: Overlying items external to the patient somewhat limit evaluation. Placement of endotracheal tube with tip 8.2 cm from the robert. Placement of enteric tube seen to extend into the stomach, distal extent not included in field of view. Persistent likely layering right pleural effusion, similar to slightly worsened. Persistent bilateral airspace opacities to bilateral mid to lower lung zones, right worse than left, similar on the left but mildly worsened on the right. No pneumothoraces are seen. Persistent likely bullous change to the left upper lung zone.   Cardiac and mediastinal silhouettes are stable. Stable appearance to bony structures. Placement of endotracheal tube which appears high riding with tip 8.2 cm from the robert. Suggest advancement by 2-3 cm. Placement of endotracheal tube seen to extend into the stomach, distal extent not included in field of view. No pneumothoraces. Persistent likely bullous change to the left upper lung zone. Persistent right pleural effusion, similar to slightly worsened. Persistent bilateral airspace opacities, right worse than left, similar on the left but mildly worsened on the right. XR CHEST PORTABLE    Result Date: 1/1/2023  EXAMINATION: ONE XRAY VIEW OF THE CHEST 1/1/2023 7:17 pm COMPARISON: None. HISTORY: ORDERING SYSTEM PROVIDED HISTORY: ams TECHNOLOGIST PROVIDED HISTORY: ams Reason for Exam: Altered mental status, difficulty breathing Additional signs and symptoms: Altered mental status, difficulty breathing Relevant Medical/Surgical History: Altered mental status, difficulty breathing FINDINGS: Large lucent area in the left apex suggesting a large bulla however superimposed pneumothorax cannot be excluded. The cardiac size is normal. Right lower lobe airspace infiltrate and mild right pleural effusion. . Pulmonary vascularity appears normal. There is mild ectasia of the thoracic aorta. Calcific tendinitis of the right shoulder. No acute bony abnormalities. The hilar structures are normal.     Right lower lobe pneumonia and small right pleural effusion Large lucent area in the left apex suggesting a large bulla however superimposed pneumothorax cannot be excluded. Follow-up CT would be useful for further evaluation. The findings were sent to the Radiology Results [address] at 10:31 pm on 1/1/2023 to be communicated to a licensed caregiver.      CT CHEST PULMONARY EMBOLISM W CONTRAST    Result Date: 1/3/2023  EXAMINATION: CTA OF THE CHEST 1/3/2023 2:43 pm TECHNIQUE: CTA of the chest was performed after the administration of intravenous contrast.  Multiplanar reformatted images are provided for review. MIP images are provided for review. Automated exposure control, iterative reconstruction, and/or weight based adjustment of the mA/kV was utilized to reduce the radiation dose to as low as reasonably achievable. COMPARISON: None. HISTORY: ORDERING SYSTEM PROVIDED HISTORY: Elevated d-dimer. Resp distress. Decreasing bp TECHNOLOGIST PROVIDED HISTORY: Elevated d-dimer. Resp distress. Decreasing bp Reason for Exam: Elevated d-dimer. Resp distress. Decreasing bp Additional signs and symptoms: pt on vent, eval for pe FINDINGS: Pulmonary Arteries: There is adequate opacification of the pulmonary arteries for evaluation. No evidence of intraluminal filling defect to suggest pulmonary embolism. Dilated main pulmonary artery measuring up to 3.2 cm. Increased RV to LV ratio. Lungs/pleura: Moderate right and small left pleural effusions with adjacent atelectasis and partial collapse of the right lower lobe. Large bullous formation involving the superior lobe of the left lower lobe. Moderate centrilobular emphysema. A few scattered solid pulmonary nodules measuring up to 5 mm in the right middle lobe (4:72). No mass or consolidation. No pneumothorax. Mediastinum: No lymphadenopathy. No pericardial effusion. No mass. Normal thyroid gland. Aortic valve annulus calcifications. Mild coronary artery calcifications. Mild calcifications of the thoracic aorta and its branches. Upper Abdomen: Trace perisplenic free fluid. Irregular appearance of the gallbladder with wall thickening, pericholecystic fluid, and probable cholelithiasis. . Soft Tissues/Bones: Mild anasarca. .  No acute osseous abnormality. Endotracheal and enteric tubes in place. No evidence of pulmonary embolism. Dilated main pulmonary artery suggestive of pulmonary hypertension. Increased RV to LV ratio suggestive of right heart strain.  Moderate right small left pleural effusions with partial collapse of the right lower lobe. Moderate centrilobular emphysema with large bullous formation in the left lung. Irregular appearance of the gallbladder with wall thickening, pericholecystic fluid, and probable cholelithiasis. Correlation with right upper quadrant ultrasound recommended. Trace perisplenic free fluid. VL Lower Extremity Bilateral Venous Duplex    Result Date: 1/18/2023    Conclusions   Summary   No evidence of superficial or deep venous thrombosis in both lower  extremities. VL Lower Extremity Bilateral Venous Duplex    Result Date: 1/3/2023     Conclusions   Summary   No evidence of deep vein thrombosis in the bilateral lower extremities. Acute superficial vein thrombosis of the right GSV at the saphenofemoral  junction. US SPLEEN    Result Date: 1/3/2023  EXAMINATION: ULTRASOUND OF THE SPLEEN 1/3/2023 1:01 pm COMPARISON: None. HISTORY: ORDERING SYSTEM PROVIDED HISTORY: thrombocytpenia TECHNOLOGIST PROVIDED HISTORY: thrombocytpenia FINDINGS: Spleen is normal in size and echotexture. Maximum diameter 9.9 cm. No focal lesion. Normal ultrasound evaluation of the spleen     CTA HEAD NECK W CONTRAST    Result Date: 1/3/2023  EXAMINATION: CTA OF THE HEAD AND NECK WITH CONTRAST 1/3/2023 6:28 pm: TECHNIQUE: CTA of the head and neck was performed with the administration of intravenous contrast. Multiplanar reformatted images are provided for review. MIP images are provided for review. Stenosis of the internal carotid arteries measured using NASCET criteria. Automated exposure control, iterative reconstruction, and/or weight based adjustment of the mA/kV was utilized to reduce the radiation dose to as low as reasonably achievable. COMPARISON: Brain MRI done 01/03/2023. Noncontrast CT head done 01/02/2023.  HISTORY: ORDERING SYSTEM PROVIDED HISTORY: MRI stoke TECHNOLOGIST PROVIDED HISTORY: MRI stoke Reason for Exam: eval for Pulm embolism given pt with severe plum hypertension Additional signs and symptoms: MRI stoke Relevant Medical/Surgical History: pt on vent , f/u mri brain FINDINGS: CTA NECK: AORTIC ARCH/ARCH VESSELS: No dissection or arterial injury. No significant stenosis of the brachiocephalic or subclavian arteries. Atherosclerosis in the visualized thoracic aorta and bilateral subclavian arteries. CAROTID ARTERIES: No dissection, arterial injury, or hemodynamically significant stenosis by NASCET criteria. Right greater than left carotid bulb atherosclerotic plaque. Tortuosity of the bilateral distal cervical internal carotid arteries. VERTEBRAL ARTERIES: No dissection, arterial injury, or significant stenosis. The left vertebral artery arises directly from the aortic arch. Dominant right vertebral artery. SOFT TISSUES: Partially visualized right pleural effusion. Emphysema. Large left apical bulla. No cervical or superior mediastinal lymphadenopathy. The larynx and pharynx are unremarkable. No acute abnormality of the salivary and thyroid glands. Endotracheal and enteric tubes in place. BONES: No acute osseous abnormality. Multilevel degenerative changes in the visualized spine, centered at C4-C5 and C5-C6. CTA HEAD: ANTERIOR CIRCULATION: No significant stenosis of the intracranial internal carotid, anterior cerebral, or middle cerebral arteries. No aneurysm. Atherosclerosis in the bilateral intracranial internal carotid arteries. POSTERIOR CIRCULATION: No significant stenosis of the vertebral, basilar, or posterior cerebral arteries. No aneurysm. Mild atherosclerosis in the dominant right intracranial vertebral artery. OTHER: No dural venous sinus thrombosis on this non-dedicated study. BRAIN: Comparison brain MRI done earlier same day demonstrates acute/subacute strokes which are not well seen on this study. Diffuse leptomeningeal enhancement throughout both cerebral hemispheres.      1.  No focal hemodynamically significant stenosis, occlusion or aneurysm in the large arteries of the head and neck. 2.  Diffuse leptomeningeal enhancement throughout both cerebral hemispheres can be seen in the setting of subarachnoid hemorrhage, encephalitis or meningitis. Consider correlation with CSF studies if there is high clinical concern for underlying infection. IR PLACE NG TUBE BY DR Zamorano     Result Date: 1/11/2023  PROCEDURE: XR PLACE NASOGASTRIC TUBE PHYS 1/11/2023 HISTORY: ORDERING SYSTEM PROVIDED HISTORY: Resistance to NG tube placement per bedside nurse. Needs to be NG tube fed. TECHNOLOGIST PROVIDED HISTORY: Resistance to NG tube placement per bedside nurse. Needs to be NG tube fed. CONTRAST: None SEDATION: None FLUOROSCOPY DOSE AND TYPE OR TIME AND EXPOSURES: 27 seconds; D  cGy cm2 DESCRIPTION OF PROCEDURE AND FINDINGS: Under intermittent fluoroscopic guidance a 12 Welsh nasogastric tube was placed through the left nostril and directed into the stomach. The tip is in the distal body of the stomach with the side hole well past the GE junction. Small amount air was injected verifying appropriate tube positioning within the stomach. The catheter was secured in place to the patient's nose. There are no immediate complications. The patient left the department stable condition. EBL: None     16 Welsh nasogastric tube placed successfully with its tip in the stomach. MRI BRAIN WO CONTRAST    Result Date: 1/11/2023  EXAMINATION: MRI OF THE BRAIN WITHOUT CONTRAST  1/11/2023 3:31 pm TECHNIQUE: Multiplanar multisequence MRI of the brain was performed without the administration of intravenous contrast. COMPARISON: CT brain performed 01/10/2023. MRI brain performed 01/03/2023.  HISTORY: ORDERING SYSTEM PROVIDED HISTORY: follow-up strokes and SAH TECHNOLOGIST PROVIDED HISTORY: follow-up strokes and SAH Reason for Exam: follow-up strokes and SAH Additional signs and symptoms: Acute respiratory failure with hypoxia and hypercapnia (HCC) RLL pneumonia, ams FINDINGS: INTRACRANIAL STRUCTURES/VENTRICLES: The sellar and suprasellar structures, optic chiasm, corpus callosum, pineal gland, tectum, and midline brainstem structures are unremarkable. The craniocervical junction is unremarkable. There is chronic microvascular disease. There is a mild amount of residual subarachnoid hemorrhage within the right frontal and right parietal lobes as well as the left frontal lobe. There is an evolving small focus of hemorrhage in the left parietal lobe posteriorly. There is no mass effect or midline shift. There is redemonstration an evolving area of ischemia in the right frontal lobe and left posterior parietal lobe as well as a few foci within the parasagittal aspect of the right frontoparietal region. ORBITS: The visualized portion of the orbits demonstrate no acute abnormality. SINUSES: There is chronic sinusitis. There is fluid in the mastoid air cells. BONES/SOFT TISSUES: The bone marrow signal intensity appears normal. The soft tissues demonstrate no acute abnormality. Mild amount of residual subarachnoid hemorrhage in the right frontal and right parietal lobes as well as the left frontal lobe. Evolving small focus of hemorrhage in the left parietal lobe posteriorly. Evolving areas of ischemia in the right frontal lobe and left posterior parietal lobe as well as a few foci in the parasagittal aspect of the right frontoparietal region.      MRI BRAIN WO CONTRAST    Result Date: 1/3/2023  EXAMINATION: MRI OF THE BRAIN WITHOUT CONTRAST  1/3/2023 3:03 pm TECHNIQUE: Multiplanar multisequence MRI of the brain was performed without the administration of intravenous contrast. COMPARISON: None HISTORY: ORDERING SYSTEM PROVIDED HISTORY: acute to subacute left parietal infarct, AMS, Vent TECHNOLOGIST PROVIDED HISTORY: acute to subacute left parietal infarct, AMS, Vent What is the sedation requirement?->Sedation Reason for Exam: acute to subacute left parietal infarct, AMS, Vent FINDINGS: INTRACRANIAL STRUCTURES/VENTRICLES: There is an subacute to old left cerebellar lacune. Hyperintense FLAIR signal abnormality within the sulci of both cerebral hemispheres is compatible with subarachnoid hemorrhage. There is an old lacune within the right caudate nucleus. ORBITS: The visualized portion of the orbits demonstrate no acute abnormality. SINUSES: Small bilateral mastoid effusions are identified. There is moderate paranasal sinus disease on the left side. BONES/SOFT TISSUES: The bone marrow signal intensity appears normal. The soft tissues demonstrate no acute abnormality. Small acute infarcts involving the left cerebellar hemisphere and left parietal lobe. Small subacute infarct within the left cerebellar hemisphere. Moderate bilateral subarachnoid hemorrhage within both cerebral hemispheres which is of uncertain etiology. The hemorrhage is more apparent on MRI than on previous head CT. Old right caudate nucleus lacune. Small bilateral mastoid effusions and moderate left paranasal sinus disease. The findings were sent to the Radiology Results [address] at 4:17 pm on 1/3/2023 to be communicated to a licensed caregiver. FL MODIFIED BARIUM SWALLOW W VIDEO    Result Date: 1/14/2023  EXAMINATION: MODIFIED BARIUM SWALLOW WAS PERFORMED IN CONJUNCTION WITH SPEECH PATHOLOGY SERVICES TECHNIQUE: Under fluoroscopic evaluation cineradiography/videoradiography recordings were performed in conjunction with the speech-language pathologist (SLP). Various liquid, solid and/or semi-solid barium preparations were used to assess swallowing function. FLUOROSCOPY DOSE AND TYPE OR TIME AND EXPOSURES: 1.43 minutes fluoro time, 13 cine series.   Air Kerma 8.1 mGy COMPARISON: January 9, 2023 HISTORY: ORDERING SYSTEM PROVIDED HISTORY: Speech therapy recommendations TECHNOLOGIST PROVIDED HISTORY: Speech therapy recommendations Reason for Exam: Speech therapy recommendations FINDINGS: Pudding: Premature vallecular spillage. Moderate vallecular residue. Maximum amount of piriform sinus cavity residue. Deep penetration. Aspiration with residue lying on top of vocal cords. Cough noted. Nectar thick liquid: Premature vallecular spillage. Minimal amount of vallecular and piriform sinus cavity residue. Deep penetration. No completed aspiration. Premature vallecular spillage. Piriform sinus cavity residue. Deep penetration with both materials. Aspiration with pudding. Please see separate speech pathology report for full discussion of findings and recommendations. FL MODIFIED BARIUM SWALLOW W VIDEO    Result Date: 1/9/2023  EXAMINATION: MODIFIED BARIUM SWALLOW WAS PERFORMED IN CONJUNCTION WITH SPEECH PATHOLOGY SERVICES TECHNIQUE: Under fluoroscopic evaluation cineradiography/videoradiography recordings were performed in conjunction with the speech-language pathologist (SLP). Various liquid, solid and/or semi-solid barium preparations were used to assess swallowing function. FLUOROSCOPY DOSE AND TYPE OR TIME AND EXPOSURES: Fluoro time: 1 minute 52 seconds. Dap: 462 dGy per cm2. Air kerma: 8.9 mGy HISTORY: ORDERING SYSTEM PROVIDED HISTORY: Assess swallow TECHNOLOGIST PROVIDED HISTORY: Assess swallow Reason for Exam: aspiration Additional signs and symptoms: recent stroke FINDINGS/IMPRESSION: (Only 1 image was able to be recorded.)  Radiologist observations: Progressively increased penetration during ingestion of thin liquids and puree/pudding textures. No aspiration identified. Please see separate speech pathology report for full discussion of findings and recommendations. Physical Exam:  /80   Pulse 93   Temp 99.5 °F (37.5 °C) (Oral)   Resp 18   Ht 5' 7\" (1.702 m)   SpO2 94%   BMI 25.00 kg/m²     GEN: Well developed, well nourished, in NAD  HEENT:  NCAT. PERRL. EOMI. Mucous membranes pink and moist.   PULM:  Wheezes auscultated. No rales or rhonchi.  Respirations WNL and unlabored. On 2L NC.   CV:  Regular rate rhythm. No murmurs or gallops. GI:  Abdomen soft. Mild tenderness to palpation at PEG tube site. Non-distended. BS + and equal.  Abdominal binder in place  NEUROLOGICAL: A&O x3. Sensation intact to light touch. CN II-XII intact. MSK:  Functional ROM normal bilaterally. Motor testing 4/5 with hip flexion of the RLE, otherwise 5/5. SKIN: Warm dry and intact. Good turgor. EXTREMITIES:  No calf tenderness to palpation. No edema BLEs. PSYCH: Mood WNL. Appropriately interactive. Affect WNL. Principal Diagnosis/plan:  The patient is a 60y.o. year old with ADL and Mobility deficits secondary to Impending cerebrovascular accident    He will require close medical monitoring for the comorbidities listed below. He will benefit from intensive interdisciplinary therapies and rehab nursing care and is appropriate for inpatient rehabilitation. The post admission physician evaluation (ROCIO) is consistent with the pre-admission assessment. See above findings to reflect the elements required in the ROCIO. Patient's admitting condition is consistent with the findings of the preadmission assessment by the rehabilitation admissions coordinator. Diagnoses/plan:    Multifocal cardioembolic CVA, subarachnoid hemorrhage:  PT/OT for gait, mobility, strengthening, endurance, ADLs, and self care. Dysphagia: s/p PEG tube placement on 1/17. SLP to eval and treat. Dietitian managing tube feed - transition from around the clock to nocturnal/bolus as tolerated. Agitation:  improved. No current medication  Acute respiratory failure: required intubation. Currently on 2L NC - will wean as tolerated  Pneumonia: Improved. completed course of Unasyn and Vanco  KENTRELL: resolved. Will monitor  Elevated LFTs:  resolved. Will monitor  HCV: will need f/u with GI for treatment outpatient  Anemia: Hb low but stable. Will monitor  Thrombocytopenia: Platelet count low but stable.  Will monitor. Bowel Management: Miralax daily, senokot prn, dulcolax prn. DVT Prophylaxis:   Holding anticoagulation due to [facility] - BLE doppler negative 1/18. TONI stockings during the day, EPC cuffs at night. IM for medical management      Estimated Length of Stay:  2 weeks. Prognosis  fair    Goals    Home at Independent  Supervision at Discharge: None    MD Bettina Cardoza MD     This note is created with the assistance of a speech recognition program.  While intending to generate a document that actually reflects the content of the visit, the document can still have some errors including those of syntax and sound a like substitutions which may escape proof reading. In such instances, actual meaning can be extrapolated by contextual diversion.

## 2023-01-19 NOTE — PROGRESS NOTES
Speech Language Pathology  Speech Language Pathology  [facility] Unit at [facility]     Dysphagia Treatment Note    Date: 1/19/2023  Patients Name: Shiloh Tellez  MRN: [id number]  Diagnosis: dysphagia s/p CVA  Patient Active Problem List   Diagnosis Code    Acute type II respiratory failure (HCC) J96.00    Transaminitis R74.01    Normocytic anemia D64.9    Thrombocytopenia (HCC) D69.6    Agitation R45.1    Acute respiratory failure with hypoxia and hypercapnia (HCC) RLL pneumonia J96.01, J96.02    Altered mental status R41.82    Community acquired pneumonia of right lower lobe of lung J18.9    Prolonged pt (prothrombin time) R79.1    Emphysema lung (HCC) J43.9    Cerebral infarction ([facility] I63.9    ACP (advance care planning) Z71.89    Goals of care, counseling/discussion Z71.89    Encounter for palliative care Z51.5    Delirium due to another medical condition F05    Mild malnutrition ([facility] E44.1    Hepatitis C virus infection without hepatic coma B19.20    Dysphagia R13.10    Impending cerebrovascular accident ([facility] I63.9       Pain: pt. denies    Dysphagia Treatment  Treatment time: 7292-1479    Subjective: [x] Alert [x] Cooperative     [] Confused     [] Agitated    [] Lethargic    Objective/Assessment:    Pt. Seen for O/M treatment program for dysphagia. Oral trials were not attempted at this time as pt. Is NPO c PEG following recommendations from [address] completed on 01/14: Oral Preparation / Oral Phase   Premature vallecular spillage of both consistencies presented           Pharyngeal Phase     Mildly thick:  min vallecular and pyriform sinus cavity residue. Deep penetration  Pudding:  mod amt vallecular and max amt of pyriform sinus cavity residue. Deep penetration and +aspiration with residue lying atop vocal cords. Weak cough/congested respirations noted. Test discontinued at this time d/t pt. At great risk for aspiration with all PO intake with reduced airway protection.       Pt. had NGT at time of this test.     Pt. Completed O/M exercises x2, 12 reps each, Kim maneuver x5 and simple tongue base strengthening exercises x4, 12 reps each. Pt c/o dry mouth; ST provided moist oral swab. Pt. Continues c mildly impaired resonance (? If d/t dried secretions). List of dysphagia exercises given to pt and SO for independent practice. They verbalized understanding of dysphagia exercise program.       Plan:  [x] Continue ST services    [] Discharge from ST:        Discharge recommendations: []  Further therapy recommended at discharge. The patient should be able to tolerate at least 3 hours of therapy per day over 5 days or 15 hours over 7 days. [] Further therapy recommended at discharge. [] No therapy recommended at discharge. Treatment completed by: Zaire Vinson A.CCC/SLP

## 2023-01-19 NOTE — PROGRESS NOTES
Physical Therapy  Facility/Department: [facility] ACUTE REHAB  Rehabilitation Physical Therapy Treatment Note     NAME: Virginia Tejeda  : 1960 (8 y.o.)  MRN: [id number]  CODE STATUS: Full Code    Date of Service: 23        Additional Pertinent Hx: History of Present Illness:  Virginia Tejeda  is a 60 y.o. right-handed male admitted to the [address] [place] [address] on 2023. He was originally admitted to [facility] on 2023 for acute respiratory failure 2/2 RLL pneumonia. Blood/resp/urine cultures negative, although his MRSA swab was +. He completed course of Unasyn and vancomycin. Imaging showed numerous acute, subacute, and chronic infarcts in the left parietal lobe, left cerebellar hemisphere, as well as bilateral SAH. PEG tube was placed 23 for severe dysphagia. HCV+. ID, nephrology, heme-onc, neuro, pulm, and psych are following. Patient admitted to ARU this morning. He reports doing well this morning. He denies any pain aside from some abdominal pain when he coughs. He does note some wheezes and tenderness around the PEG tube site. He does endorse some generalized weakness. He is currently requiring assistance for self-care activities and mobility prompting this admission. Family / Caregiver Present: Yes (significant other)    Restrictions:  Restrictions/Precautions: Fall Risk;General Precautions;Contact Precautions;Isolation; Bed Alarm  Position Activity Restriction  Other position/activity restrictions: Severe dysphagia- NPO. Unable to tolerate any PO safely.    NG tube placed,  on high flow nasal cannula     SUBJECTIVE  Subjective: pt reports fatigue requesting to return to bed  Pain: denies        OBJECTIVE  Vision  Vision: Impaired  Vision Exceptions: Wears glasses at all times    Hearing  Hearing: Exceptions to [facility]  Hearing Exceptions: Hard of hearing/hearing concerns    Cognition  Overall Cognitive Status: WFL  Arousal/Alertness: Appropriate responses to stimuli  Following Commands: Follows one step commands consistently  Attention Span: Attends with cues to redirect  Memory: Decreased recall of biographical Information;Decreased recall of precautions;Decreased recall of recent events  Safety Judgement: Decreased awareness of need for assistance;Decreased awareness of need for safety  Problem Solving: Assistance required to generate solutions;Assistance required to implement solutions;Assistance required to identify errors made;Assistance required to correct errors made  Insights: Decreased awareness of deficits  Initiation: Requires cues for some  Sequencing: Requires cues for some  Cognition Comment: Pt with impulsive tendencies noted. Functional Mobility  Bed mobility  Sit to Supine: Moderate assistance (Assist with BLEs)  Scooting: Stand by assistance  Bed Mobility Comments: HOB flat left handrail  Transfers  Sit to Stand: Moderate Assistance (from [facility])  Stand to Sit: Minimal Assistance (decreased eccentric controll.)  Bed to Chair: Moderate assistance (RW)  Balance  Posture: Fair (slumped posture at EOB vs pt leaning posteriorly, not engaging core mm)  Sitting - Static: Fair;+ (with B UE support)  Sitting - Dynamic: Fair;-  Standing - Static: Fair;- (Rolling walker)  Standing - Dynamic: Fair;- (Rolling walker)  Comments: Standing with rolling walker. ASSESSMENT  Vitals  Heart Rate: 92  SpO2: 94 %  O2 Device: Nasal cannula (2 lt)    Activity Tolerance  Activity Tolerance: Patient limited by endurance; Patient limited by fatigue;Patient limited by pain    Assessment  Assessment: 59 y/o male adm with AMS and found to have [address] with recent fall at home. Pt is a high fall risk as he has decreased safety awareness and B LE weakness L hemibody > R. Pt will benefit from continued therapy while in rehab unit to improve fucntion and safety. Performance Deficits/Impairments: Decreased functional mobility ; Decreased ADL status; Decreased body mechanics; Decreased strength;Decreased safe awareness;Decreased cognition;Decreased endurance;Decreased balance;Decreased coordination; Increased pain;Decreased posture  Activity Tolerance Comment: Monitor SP02 throughout therapy sessions  Treatment Diagnosis: impared mobility s/p AMS/SAH  Therapy Prognosis: Good  Decision Making: Medium Complexity  Barriers to Learning: cognition, enduranceTreatment Initiated : eval, bed mobiltiy, sitting balance, transfers and gait  Discharge Recommendations: Therapy recommended at discharge;Home with assist PRN;Patient would benefit from continued therapy after discharge    CLINICAL IMPRESSION   61 y/o male adm with AMS and found to have [address] with recent fall at home. Pt is a high fall risk as he has decreased safety awareness and B LE weakness L hemibody > R. Pt will benefit from continued therapy while in rehab unit to improve fucntion and safety. GOALS  Patient Goals   Patient Goals : Want to be independent to go home. Short Term Goals  Time Frame for Short Term Goals: 9 days  Short Term Goal 1: CGA assist supine<-> sit x1 assist  Short Term Goal 2: sit EOB withsupervsion up to 15 min for therex/ADL  Short Term Goal 3: 3+/5 LE strength to prepare for standing activiites and gait  Short Term Goal 4: ambulation with rolling walker distance of 70 to 100 ft, CGA, level surface  Short Term Goal 5: roll L/R with SBA  Short Term Goal 6: Pt able to go up and down 5 steps with 2 rails, min A  Long Term Goals  Time Frame for Long Term Goals : By DC  Long Term Goal 1: pt able to roll L/R indepndently  Long Term Goal 2: pt able to perform supine<>sit mod-I  Long Term Goal 3: pt able to perform sit<>stand and perform pivot/step to transfers at mod-I  Long Term Goal 4: pt able to ambulate household distance with appropriate device, mod-I  Long Term Goal 5: pt able to ambulate > 50 ft with appropriate device, at SBA/supervsion level, level as well as incline surface.   Long term goal 6: pt able to go up and down 4 or more steps with B UE support, CGA. Long term goal 7: Improve PASS score to 27/36 improve function/stability. Long term goal 8: improve Tinetti balance score to atleast 23/28 to reduce fall risk. PLAN OF CARE  Frequency: 1-2 treatment sessions per day, 5-7 days per week  Physcial Therapy Plan  General Plan:  minutes of therapy at least 5 out of 7 days a week (5 to 7 days/week.)  Specific Instructions for Next Treatment: Continue to progress with functional mobility training  Current Treatment Recommendations: Strengthening;Balance training;Functional mobility training;Neuromuscular re-education;Cognitive reorientation; Safety education & training;Patient/Caregiver education & training;Equipment evaluation, education, & procurement; Therapeutic activities;Transfer training; Wheelchair mobility training;Gait training;Stair training;Home exercise program;Positioning  Safety Devices  Type of Devices: All fall risk precautions in place;Call light within reach;Gait belt;Patient at risk for falls;Nurse notified; Left in chair  Restraints  Restraints Initially in Place: No    EDUCATION  Education  Education Given To: Patient; Family  Education Provided: Safety; Fall Prevention Strategies;Transfer Training  Education Method: Demonstration;Verbal  Barriers to Learning: Cognition; Hearing  Education Outcome: Verbalized understanding;Demonstrated understanding;Continued education needed     01/19/23 1559   PT Individual Minutes   Time In 1448   Time Out 1513   Minutes [address] [place] [address], 01/19/23 at 3:59 PM

## 2023-01-19 NOTE — PROGRESS NOTES
[address]    Acute Rehabilitation Occupational Therapy Evaluation     Date: 23  Patient Name: Jorge Magana       Room: 6612/2275-56  MRN: [id number]  Account: [de-identified]   : 1960  (60 y.o.) Gender: male       Referring Practitioner: Cherri Powell MD  Diagnosis: Cerebrovascular accident  Additional Pertinent Hx: Per MRI Impression on 23: Mild amount of residual subarachnoid hemorrhage in the right frontal and  right parietal lobes as well as the left frontal lobe. Evolving small focus of hemorrhage in the left parietal lobe posteriorly. Evolving areas of ischemia in the right frontal lobe and left posterior parietal lobe as well as a few foci in the parasagittal aspect of the right frontoparietal region. Treatment Diagnosis: Impaired self care status    Per Physical Medicine & Rehabilitation History of Present Illness:  Jorge Magana  is a 60 y.o. right-handed male admitted to the [address] [place] [address] on 2023. He was originally admitted to [facility] on 2023 for acute respiratory failure 2/2 RLL pneumonia. Blood/resp/urine cultures negative, although his MRSA swab was +. He completed course of Unasyn and vancomycin. Imaging showed numerous acute, subacute, and chronic infarcts in the left parietal lobe, left cerebellar hemisphere, as well as bilateral SAH. PEG tube was placed 23 for severe dysphagia. HCV+. ID, nephrology, heme-onc, neuro, pulm, and psych are following. Patient admitted to ARU this morning. He is currently requiring assistance for self-care activities and mobility prompting this admission. Past Medical History:  has no past medical history on file. Past Surgical History:   has a past surgical history that includes Upper gastrointestinal endoscopy (N/A, 2023).     Restrictions     Restrictions/Precautions: Fall Risk, General Precautions, Contact Precautions, Isolation, Bed Alarm  Required Braces or Orthoses?: No  Implants present? :  (pt denies)         Position Activity Restriction  Other position/activity restrictions: Severe dysphagia- NPO. Unable to tolerate any PO safely. NG tube placed,  on nasal cannula. Physical therapy and occupational therapy staff may titrate oxygen down as tolerated in therapy, but may only titrate up to maximum 4 L NC as needed in therapy. Nursing should perform oxygen titration > 4 L. Vitals  OT monitored patient's oxygen saturation intermittently during OT session. Patient on 2L via NC throughout session with oxygen saturation 89-93% throughout. Minimal verbal cues for pursed lip breathing and therapeutic rest breaks as needed. Subjective  Subjective: \"I will be 63. ... so I must be. ..no, I will be 64 so I must be 63\" patient is not oriented to his current age. States his name and birth date correctly. Subjective: \"Something is not right here\" patient states after threading B UE into head hole of shirt. Orientation provided. Pain: Patient denies pain.     Social/Functional History  Social/Functional History  Lives With: Significant other (patient states he considers Gilda his spouse, however they are not )  Type of Home: House  Home Layout: One level  Home Access: Stairs to enter with rails  Entrance Stairs - Number of Steps: 6 steps from front with B HR; 4 steps from back with no rails  Entrance Stairs - Rails: Both (Both rails needs to be fixed)  Bathroom Shower/Tub: Tub/Shower unit, Curtain, Shower chair without back (also states he has an enclosed walk-in shower)  Bathroom Toilet: Standard (Raised seat in the master bed room bathroom)  Bathroom Equipment: Grab bars in shower, Hand-held shower, Toilet raiser, Shower chair  Bathroom Accessibility: Accessible, Walker accessible  Home Equipment: Lad[employer]ist Help From: Family  ADL Assistance: Independent  Homemaking Assistance: Independent  Homemaking Responsibilities: Yes  Ambulation Assistance: Independent  Transfer Assistance: Independent  Active : Yes  Mode of Transportation: [facility]  Occupation: Retired  Type of Occupation:   IADL Comments: Pt sleeps in flat bed. Additional Comments: Spouse is home all the time and able to assist minimally physically due to her medical condition. Daughter and son Aaron Gastelum only with advance notice for appointment, son and daughter both work full time and have children. Objective  Vision  Vision: Impaired  Vision Exceptions: Wears glasses at all times  Vision - Basic Assessment  Prior Vision: Wears glasses all the time    Hearing  Hearing: Exceptions to [facility]  Hearing Exceptions: Hard of hearing/hearing concerns, No hearing aid    Perception  Overall Perceptual Status: Impaired  Initiation: Cues to initiate tasks  Motor Planning: Cues to use objects appropriately    Sensation  Overall Sensation Status: WFL (denies)    Observation/Palpation  Posture: Fair (slumped at EOB vs leaning posteriorly)  Observation: bruising UEs, rash on buttocks    Cognition  Overall Orientation Status: Impaired  Orientation Level: Oriented to person, Disoriented to situation, Oriented to place, Disoriented to time    Cognition  Overall Cognitive Status: Exceptions  Arousal/Alertness: Delayed responses to stimuli  Following Commands:  Follows multistep commands with repitition  Attention Span: Attends with cues to redirect  Memory: Decreased recall of biographical Information, Decreased recall of precautions, Decreased recall of recent events  Safety Judgement: Decreased awareness of need for assistance, Decreased awareness of need for safety  Problem Solving: Assistance required to generate solutions, Assistance required to implement solutions, Assistance required to identify errors made, Assistance required to correct errors made  Insights: Decreased awareness of deficits  Initiation: Requires cues for some  Sequencing: Requires cues for some  Cognition Comment: Pt with impulsive tendencies noted. Activities of Daily Living       Feeding: Dependent/Total, NPO  Feeding Skilled Clinical Factors: Peg tube feeds    Grooming: Moderate assistance  Grooming Skilled Clinical Factors: Patient declines oral hygiene. Moderate assist for brushing hair due to significant tangles. UE Bathing: Stand by assistance  UE Bathing Skilled Clinical Factors: Seated on tub transfer bench in shower with SBA for safety due to impulsivity    LE Bathing: Maximum assistance  LE Bathing Skilled Clinical Factors: Assist for B feet/lower legs and buttocks. Minimal/Moderate assist for standing balance for buttocks    UE Dressing: Minimal assistance  UE Dressing Skilled Clinical Factors: Assist for orientation and to pull down over back    LE Dressing: Maximum assistance  LE Dressing Skilled Clinical Factors: Assist to thread B LE into pull-up. Patient attempted to thread B LE into pants, however threaded B LE into same pant leg. Assist to thread R LE into correct pant leg. Maximal assist to pull over hips    Toileting: Dependent/Total  Toileting Skilled Clinical Factors: Total assist for personal hygiene with BM. Assist for clothing management    Additional Comments: OT facilitated patient engagement in showering routine this date including bathing, dressing, grooming, and toileting tasks. Throughout self-care session, patient required Min/Mod verbal cues for sequencing, safety, attention to task, and problem solving. Patient is impulsive at times and has poor insight into deficits.  Please see above for details regarding level of assist.    OT scores   Eating  Reason if not Attempted: Not attempted due to medical condition or safety concerns  CARE Score: 88  Discharge Goal: Not Attempted    Oral Hygiene  Reason if not Attempted: Patient refused  CARE Score: 7  Discharge Goal: [address] needed: Dependent  CARE Score: 1  Discharge Goal: Independent    Shower/Bathe Self  Assistance Needed: Substantial/maximal assistance  CARE Score: 2  Discharge Goal: Independent    Upper Body Dressing  Assistance Needed: Partial/moderate assistance  CARE Score: 3  Discharge Goal: Independent    Lower Body Dressing  Assistance Needed: Substantial/maximal assistance  CARE Score: 2  Discharge Goal: Independent    Putting On/Taking Off Footwear  Assistance Needed: Dependent  CARE Score: 1  Discharge Goal: Independent    Toilet Transfer  Assistance needed: Partial/moderate assistance  CARE Score: 3  Discharge Goal: Independent      UE Function  LUE PROM (degrees)  LUE PROM: WFL  LUE AROM (degrees)  LUE AROM : WFL  Left Hand PROM (degrees)  Left Hand PROM: WFL     Tone RUE  RUE Tone: Normotonic    LUE Strength  Gross LUE Strength: WFL  L Hand General: 4/5  LUE Strength Comment: Grossly 4/5              RUE PROM (degrees)  RUE PROM: WFL  RUE AROM (degrees)  RUE AROM : WFL  Right Hand PROM (degrees)  Right Hand PROM: WFL  Right Hand AROM (degrees)  Right Hand AROM: WFL  Tone LUE  LUE Tone: Normotonic    RUE Strength  Gross RUE Strength: WFL  R Hand General: 4-/5  RUE Strength Comment: Grossly 4-/5              Fine Motor Skills/Coordination  Hand Dominance  Hand Dominance: Right  Coordination  Movements Are Fluid And Coordinated: No  Coordination and Movement Description: Left UE, Right UE, Gross motor impairments, Fine motor impairments          Mobility  Bed mobility  Bed Mobility Comments: Patient seated in recliner at start of session and w/c at end of session       Balance  Balance  Sitting Balance: Stand by assistance  Standing Balance: Moderate assistance (Fluctuates between Minimal/Moderate assist)       Transfers  Transfers  Stand Pivot Transfers: Moderate assistance  Sit to stand:  Moderate assistance  Stand to sit: Moderate assistance  Transfer Comments: with Moderate verbal cues for hand placemetn and safety  Toilet Transfers  Toilet - Technique: Stand pivot, To right, To left  Equipment Used: Grab bars  Toilet Transfer: Moderate assistance  Toilet Transfers Comments: with Moderate verbal cues for hand placement and safety     Shower Transfers  Shower - Transfer From: Wheelchair  Shower - Transfer Type: To and From  Shower - Transfer To: Transfer tub bench  Shower - Technique: Stand pivot, To right, To left  Shower Transfers: Moderate assistance  Shower Transfers Comments: with Moderate verbal cues for hand placement and safety               Functional mobility  Functional Mobility  Functional - Mobility Device: Rolling Walker  Activity:  (2-3 feet from recliner to w/c)  Assist Level: Moderate assistance (Moderate assist x 1, SBA x 1 for safety)  Functional Mobility Comments: with Moderate verbal cues for safety    Assessment  Activity Tolerance  Activity Tolerance: Patient Tolerated treatment well  Assessment  Performance deficits / Impairments: Decreased ADL status, Decreased functional mobility , Decreased ROM, Decreased strength, Decreased safe awareness, Decreased cognition, Decreased endurance, Decreased balance, Decreased high-level IADLs, Decreased fine motor control, Decreased coordination, Decreased posture  Treatment Diagnosis: Impaired self care status  Prognosis: Good  Decision Making: High Complexity  Discharge Recommendations: Home with assist PRN, Outpatient OT    Patient Education  Education  Education Given To: Patient  Education Provided: Role of Therapy, Plan of Care, Precautions, Safety, ADL Function  Education Method: Verbal, Demonstration  Barriers to Learning: Cognition, Hearing  Education Outcome: Verbalized understanding, Demonstrated understanding         Safety Devices  Type of Devices: All fall risk precautions in place, Call light within reach, Gait belt, Patient at risk for falls, Nurse notified, Left in chair (with PTA Daphne present)  Restraints  Restraints Initially in Place: No    Goals  Patient Goals   Patient goals : \"To learn how to walk properly. ..all that goes without saying (referring to bathing, dressing and toileting independence)\"  Short Term Goals  Time Frame for Short Term Goals: One week  Short Term Goal 1: Patient will perform oral hygiene with SBA and Good safety. Short Term Goal 2: Patient will perform hair brushing with SBA. Short Term Goal 3: Patient will perform upper body dressing with SBA. Short Term Goal 4: Patient will perform lower body dressing, lower body bathing, and toileting tasks with Minimal assist.  Short Term Goal 5: Patient will perform functional moblity and transfers during self-care with CGA, least restrictive mobility device, and Good safety. Short Term Goal 6: Patient will verbalize/demonstrate Good understanding of assistive equipment/durable medical equipment/modified techniques to maximize safety and independence with self-care. Short Term Goal 7: Patient will actively participate in 30+ minutes of therapeutic exercise/functional activities to promote increased independence with self-care and mobility. Long Term Goals  Time Frame for Long Term Goals : By discharge  Long Term Goal 1: Patient will perform BADLs with modified independence and Good safety. Long Term Goal 2: Patient will perform functional mobility and transfers during self-care with modified independence, least restrictive mobility device, and Good safety. Long Term Goal 3: Patient will stand for 5+ minutes with 0-1 UE support, modified independence while engaging in functional activity of choice. Long Term Goal 4: Patient will perform simple meal prep and light housekeeping with SBA and Good safety. Long Term Goal 5: Patient will verbalize/demonstrate Good understanding of Fall Prevention Strategies to maximize safety and independence with self-care.   Long Term Goal 6: Bilateral  strength and motor control to be assessed and goals updated    Plan  Occupational Therapy Plan  Times Per Week: 5-7  Times Per Day: Twice a day  Current Treatment Recommendations: Self-Care / ADL, Strengthening, ROM, Balance training, Functional mobility training, Endurance training, Cognitive reorientation, Neuromuscular re-education, Safety education & training, Patient/Caregiver education & training, Equipment evaluation, education, & procurement, Cognitive/Perceptual training, Coordination training, Home management training      Electronically signed by STEFFI Trivedi/L on 1/19/23 at 3:45 PM EST           01/19/23 1308   Time Code Minutes    Timed Code Treatment Minutes 80 Minutes   OT Individual Minutes   Time In 1308   Time Out 1447   Minutes 99

## 2023-01-19 NOTE — CARE COORDINATION
ONGOING ARU DISCHARGE PLAN:    Patient is alert and oriented x4. Spoke with patient regarding discharge plan and patient confirms plan is to go home with S/O. He is unsure if he going to have OP or [facility]    DME:None    Outside appointments: Will continue to follow for additional discharge needs.     Electronically signed by Marko Ann RN on 1/19/2023 at 12:50 PM

## 2023-01-19 NOTE — PLAN OF CARE
Problem: Discharge Planning  Goal: Discharge to home or other facility with appropriate resources  1/19/2023 0902 by Maricarmen Garber RN  Outcome: Adequate for Discharge  1/19/2023 0902 by Maricarmen Garber RN  Outcome: Adequate for Discharge  Flowsheets (Taken 1/18/2023 2100 by Vinod Lopez RN)  Discharge to home or other facility with appropriate resources:   Identify barriers to discharge with patient and caregiver   Arrange for needed discharge resources and transportation as appropriate     Problem: Safety - Adult  Goal: Free from fall injury  1/19/2023 0902 by Maricarmen Garber RN  Outcome: Adequate for Discharge  1/19/2023 0902 by Maricarmen Garber RN  Outcome: Adequate for Discharge     Problem: ABCDS Injury Assessment  Goal: Absence of physical injury  1/19/2023 0902 by Maricarmen Garber RN  Outcome: Adequate for Discharge  1/19/2023 0902 by Maricarmen Garber RN  Outcome: Adequate for Discharge  Flowsheets (Taken 1/18/2023 2100 by Vinod Lopez RN)  Absence of Physical Injury: Implement safety measures based on patient assessment     Problem: Skin/Tissue Integrity  Goal: Absence of new skin breakdown  Description: 1. Monitor for areas of redness and/or skin breakdown  2. Assess vascular access sites hourly  3. Every 4-6 hours minimum:  Change oxygen saturation probe site  4. Every 4-6 hours:  If on nasal continuous positive airway pressure, respiratory therapy assess nares and determine need for appliance change or resting period.   1/19/2023 0902 by Maricarmen Garber RN  Outcome: Adequate for Discharge  1/19/2023 0902 by Maricarmen Garber RN  Outcome: Adequate for Discharge     Problem: Pain  Goal: Verbalizes/displays adequate comfort level or baseline comfort level  1/19/2023 0902 by Maricarmen Garber RN  Outcome: Adequate for Discharge  1/19/2023 0902 by Maricarmen Garber RN  Outcome: Adequate for Discharge  Flowsheets (Taken 1/18/2023 2045 by Vinod Lopez RN)  Verbalizes/displays adequate comfort level or baseline comfort level:   Encourage patient to monitor pain and request assistance   Assess pain using appropriate pain scale   Administer analgesics based on type and severity of pain and evaluate response   Implement non-pharmacological measures as appropriate and evaluate response     Problem: Nutrition Deficit:  Goal: Optimize nutritional status  1/19/2023 0902 by Divya Dhaliwal RN  Outcome: Adequate for Discharge  1/19/2023 0902 by Divya Dhaliwal RN  Outcome: Adequate for Discharge

## 2023-01-19 NOTE — CARE COORDINATION
ONGOING DISCHARGE PLAN:    ARU accepted patient on 1/18/2023. Pt to be transferred this am. ANGEL ibarra. Will continue to follow for additional discharge needs.     Electronically signed by Cecile Mcbride RN on 1/19/2023 at 8:55 AM

## 2023-01-19 NOTE — CONSULTS
[address] Internal Medicine  Sally Marley MD; Marleny Ching MD; Pascual Aschoff, MD; MD Jerald Clark MD; Xiomara Piña MD    [facility]   [address] / HISTORY AND PHYSICAL EXAMINATION            Date:   1/19/2023  Patient name:  Rafael Mesa  Date of admission:  1/19/2023  9:12 AM  MRN:   [id number]  Account:  [de-identified]  YOB: 1960  PCP:    No primary care provider on file. Room:   [address] [place] [address]  Code Status:    Full Code    Physician Requesting Consult: Arnold Garduno MD    Reason for Consult: Medical management    Chief Complaint:     No chief complaint on file. Acute CVA, generalized weakness, metabolic encephalopathy, multifactorial with underlying acute respiratory failure with pneumonia and heart failure    History Obtained From:     patient    History of Present Illness:     79-year-old gentleman with unknown past medical history presented to inpatient [facility] with metabolic encephalopathy found to have acute respiratory failure, pneumonia, also had slurred speech, confusion progressively was getting worse, EMS brought the patient to the hospital his saturations were  75%, found to have acute respiratory failure with pneumonia, in the ED patient was placed on BiPAP, chest x-ray confirmed right lower lobe pneumonia with pleural effusion. ABG showed acute respiratory failure with hypercapnia and hypoxia. Subsequently also found to have NSTEMI, acute heart failure with transaminitis possible shock liver, with also hepatitis C acute with hepatic failure, slowly improved,  Also had multiple acute brain infarct with subarachnoid hemorrhage,,  Subsequently patient was evaluated for acute inpatient rehab, accepted and admitted right now we are consulted for medical management      Past Medical History:     No past medical history on file.      Past Surgical History: Past Surgical History:   Procedure Laterality Date    UPPER GASTROINTESTINAL ENDOSCOPY N/A 1/17/2023    EGD ESOPHAGOGASTRODUODENOSCOPY PEG TUBE INSERTION performed by Shalonda Edmonds DO at [facility]        Medications Prior to Admission:     Prior to Admission medications    Medication Sig Start Date End Date Taking? Authorizing Provider   aspirin 325 MG tablet Take 325 mg by mouth daily Patient takes 2 tabs daily    Historical Provider, MD   psyllium (KONSYL) 28.3 % PACK Take 1 packet by mouth daily    Historical Provider, MD        Allergies:     Patient has no known allergies. Social History:     Tobacco:    reports that he has been smoking cigarettes. He has a 40.00 pack-year smoking history. He has never used smokeless tobacco.  Alcohol:      reports current alcohol use. Drug Use:  reports that he does not currently use drugs. Family History:     No family history on file. Review of Systems:     Positive and Negative as described in HPI. CONSTITUTIONAL:  negative for fevers, chills, sweats, fatigue, weight loss  HEENT:  negative for vision, hearing changes, runny nose, throat pain  RESPIRATORY:  negative for shortness of breath, cough, congestion, wheezing. CARDIOVASCULAR:  negative for chest pain, palpitations.   GASTROINTESTINAL:  negative for nausea, vomiting, diarrhea, constipation, change in bowel habits, abdominal pain   GENITOURINARY:  negative for difficulty of urination, burning with urination, frequency   INTEGUMENT:  negative for rash, skin lesions, easy bruising   HEMATOLOGIC/LYMPHATIC:  negative for swelling/edema   ALLERGIC/IMMUNOLOGIC:  negative for urticaria , itching  ENDOCRINE:  negative increase in drinking, increase in urination, hot or cold intolerance  MUSCULOSKELETAL:  negative joint pains, muscle aches, swelling of joints  NEUROLOGICAL:  negative for headaches, dizziness, lightheadedness, numbness, pain, tingling extremities  BEHAVIOR/PSYCH:  negative for depression, anxiety    Physical Exam:     /80   Pulse 93   Temp 99.5 °F (37.5 °C) (Oral)   Resp 18   Ht 5' 7\" (1.702 m)   SpO2 94%   BMI 25.00 kg/m²   Temp (24hrs), Av.5 °F (36.9 °C), Min:97.7 °F (36.5 °C), Max:99.5 °F (37.5 °C)    No results for input(s): POCGLU in the last 72 hours. No intake or output data in the 24 hours ending 23 1529    General Appearance:  alert, well appearing, and in no acute distress  Mental status: oriented to person, place, and time with normal affect  Head:  normocephalic, atraumatic. Eye: no icterus, redness, pupils equal and reactive, extraocular eye movements intact, conjunctiva clear  Ear: normal external ear, no discharge, hearing intact  Nose:  no drainage noted  Mouth: mucous membranes moist  Neck: supple, no carotid bruits, thyroid not palpable  Lungs: Bilateral equal air entry, clear to ausculation, no wheezing, rales or rhonchi, normal effort  Cardiovascular: normal rate, regular rhythm, no murmur, gallop, rub.   Abdomen: Soft, nontender, nondistended, normal bowel sounds, no hepatomegaly or splenomegaly  Neurologic: There are no new focal motor or sensory deficits, normal muscle tone and bulk, no abnormal sensation, normal speech, cranial nerves II through XII grossly intact  Skin: No gross lesions, rashes, bruising or bleeding on exposed skin area  Extremities:  peripheral pulses palpable, no pedal edema or calf pain with palpation  Psych: normal affect    Investigations:      Laboratory Testing:  Recent Results (from the past 24 hour(s))   CBC with Auto Differential    Collection Time: 23  4:50 AM   Result Value Ref Range    WBC 6.4 3.5 - 11.0 k/uL    RBC 4.96 4.5 - 5.9 m/uL    Hemoglobin 11.0 (L) 13.5 - 17.5 g/dL    Hematocrit 37.2 (L) 41 - 53 %    MCV 75.1 (L) 80 - 100 fL    MCH 22.1 (L) 26 - 34 pg    MCHC 29.5 (L) 31 - 37 g/dL    RDW 32.2 (H) 11.5 - 14.9 %    Platelets 019 825 - 556 k/uL    MPV 9.3 6.0 - 12.0 fL    Seg Neutrophils 83 (H) 36 - 66 % Lymphocytes 7 (L) 24 - 44 %    Monocytes 7 1 - 7 %    Eosinophils % 2 0 - 4 %    Basophils 1 0 - 2 %    Segs Absolute 5.31 1.3 - 9.1 k/uL    Absolute Lymph # 0.45 (L) 1.0 - 4.8 k/uL    Absolute Mono # 0.45 0.1 - 1.3 k/uL    Absolute Eos # 0.13 0.0 - 0.4 k/uL    Basophils Absolute 0.06 0.0 - 0.2 k/uL    Morphology ANISOCYTOSIS PRESENT     Morphology HYPOCHROMIA PRESENT     Morphology 1+ ELLIPTOCYTES     Morphology MICROCYTOSIS PRESENT    Comprehensive Metabolic Panel w/ Reflex to MG    Collection Time: 01/19/23  4:50 AM   Result Value Ref Range    Glucose 122 (H) 70 - 99 mg/dL    BUN 14 8 - 23 mg/dL    Creatinine 0.65 (L) 0.70 - 1.20 mg/dL    Est, Glom Filt Rate >60 >60 mL/min/1.73m2    Calcium 8.3 (L) 8.6 - 10.4 mg/dL    Sodium 141 135 - 144 mmol/L    Potassium 3.5 (L) 3.7 - 5.3 mmol/L    Chloride 104 98 - 107 mmol/L    CO2 32 (H) 20 - 31 mmol/L    Anion Gap 5 (L) 9 - 17 mmol/L    Alkaline Phosphatase 41 40 - 129 U/L    ALT 19 5 - 41 U/L    AST 17 <40 U/L    Total Bilirubin 0.9 0.3 - 1.2 mg/dL    Total Protein 6.0 (L) 6.4 - 8.3 g/dL    Albumin 2.9 (L) 3.5 - 5.2 g/dL   Magnesium    Collection Time: 01/19/23  4:50 AM   Result Value Ref Range    Magnesium 1.6 1.6 - 2.6 mg/dL       Imaging/Diagonstics:  XR CHEST PORTABLE    Result Date: 1/18/2023  Interval extubation and NG tube removal.  Otherwise similar findings, with perhaps slightly greater hazy ground-glass opacity right mid lung. Correlate clinically. FL MODIFIED BARIUM SWALLOW W VIDEO    Result Date: 1/14/2023  Premature vallecular spillage. Piriform sinus cavity residue. Deep penetration with both materials. Aspiration with pudding. Please see separate speech pathology report for full discussion of findings and recommendations.        Assessment :      Hospital Problems             Last Modified POA    * (Principal) Impending cerebrovascular accident ([address] 1/19/2023 Yes    Acute type II respiratory failure ([facility] 1/19/2023 Yes    Transaminitis 1/19/2023 Yes    Normocytic anemia 1/19/2023 Yes    Thrombocytopenia ([address] 1/19/2023 Yes    Acute respiratory failure with hypoxia and hypercapnia (HCC) RLL pneumonia 1/19/2023 Yes    Community acquired pneumonia of right lower lobe of lung 1/19/2023 Yes    Emphysema lung ([address] 1/19/2023 Yes    Cerebral infarction ([address] 1/19/2023 Yes    Moderate malnutrition ([address] 1/19/2023 Yes    Hepatitis C virus infection without hepatic coma 1/19/2023 Yes    Dysphagia 1/19/2023 Yes       Plan:     Acute hypoxic and hypercapnic respiratory failure secondary to likely aspiration pneumonia, treated with vancomycin and Unasyn, also was positive for MRSA nasal swab, required intubation, extubated subsequently  Acute CVA with subarachnoid hemorrhage, cardioembolic, did not qualify for tPA,  Acute liver failure possible multifactorial, alcoholic liver disease with hepatitis C,  Hypercoagulable state secondary to acute liver disease, INR was over 2,  Thrombocytopenia secondary to alcoholic liver disease and hepatitis C  Dysphagia , PEG placed   Anemia , of ch disease multifactorial   DVT PPX with SCD only, as he had cerebral h'age  Full code status   PT/OT       Consultations:   Gualberto Cantrell MD  1/19/2023  3:29 PM    Copy sent to Dr. Leonel Dove primary care provider on file. Please note that this chart was generated using voice recognition Dragon dictation software. Although every effort was made to ensure the accuracy of this automated transcription, some errors in transcription may have occurred.

## 2023-01-19 NOTE — PROGRESS NOTES
Report called ARU. All questions asked and answered. Pt left with all belongings. NO concerns noted.

## 2023-01-19 NOTE — CARE COORDINATION
Pre-Admission Assessment completed and co-signed by PM&R physiatrist Dr. Siobhan Thrasher. Preadmission assessment valid for 48 hours after co-signature. Discharging provider responsible for Discharge/Readmit medication reconciliation action. Status: Completed. Orders verified as signed/held, will be released by Acute Inpatient Rehabilitation upon admission    DVT Prophylaxis Plan: BLE Dopplers Required: Status: Completed, Negative     Discharging nurse to call nursing report to Acute Inpatient Rehabilitation nursing team at extension [phone] before patient departure. Transportation Time: 9:00 am    Updated CM: Via Telephone at this time.     Electronically signed by Tyra Phillips PTA on 1/19/2023 at 8:37 AM

## 2023-01-19 NOTE — PROGRESS NOTES
Speech Language Pathology  Facility/Department: [facility]  Initial Speech/Language/Cognitive Assessment    NAME: Jere Pollock  : 1960   MRN: [id number]  ADMISSION DATE: 2023  ADMITTING DIAGNOSIS: has Acute type II respiratory failure ([address] Transaminitis; Normocytic anemia; Thrombocytopenia ([address] Agitation; Acute respiratory failure with hypoxia and hypercapnia (HCC) RLL pneumonia; Altered mental status; Community acquired pneumonia of right lower lobe of lung; Prolonged pt (prothrombin time); Emphysema lung ([facility] Cerebral infarction [facility]); ACP (advance care planning); Goals of care, counseling/discussion; Encounter for palliative care; Delirium due to another medical condition; Mild malnutrition ([facility] [facility] Hepatitis C virus infection without hepatic coma; Dysphagia; and Impending cerebrovascular accident [facility]) on their problem list.    Date of Eval: 2023   Evaluating Therapist: SHAHIDA Patrick    RECENT RESULTS  CT OF HEAD/MRI: - Brain MRI-   Mild amount of residual subarachnoid hemorrhage in the right frontal and   right parietal lobes as well as the left frontal lobe. Evolving small focus of hemorrhage in the left parietal lobe posteriorly. Evolving areas of ischemia in the right frontal lobe and left posterior   parietal lobe as well as a few foci in the parasagittal aspect of the right   frontoparietal region. Primary Complaint: Per PM&R: This is a follow up to the initial consult on Mr. Jere Pollock who is a 60 y.o. male admitted to [facility] on 2023 with Altered Mental Status     He initially presented with worsening altered mental status. Per records, he had a fall about 1 week prior to presentation. At the time of current presentation, he was noted to have hypoxia by EMS and was placed on bipap in the ED. He was found to have pneumonia, elevated troponin, elevated BNP, elevated creatinine, and elevated LFTs.   Initial CT head showed no acute intracranial abnormality. He required intubation on 1/2/23. Repeat CT head showed focal area of decreased attenuation with loss of gray/white matter differentiation involving posterior left parietal lobe, likely reflecting an area of acute to subacute infarction. MRI brain showed small acute infarcts involving the left cerebellar hemisphere and left parietal lobe, small subacute infarct within the left cerebellar hemisphere, and moderate bilateral subarachnoid hemorrhage. Extubated 1/5/23. Subsequent CT head also showed focus of intraparenchymal hemorrhage in the left parietal lobe. He has been treated for agitation, and he also tested positive for hepatitis C during admission. He underwent PEG tube placement 1/17/23 (Dr. Lorraine Anderson). ID, nephrology, hematology, neurology, pulmonology, and psychiatry have followed. Pain:   Pt. denies    Vision/ Hearing  Vision  Vision: Impaired  Vision Exceptions: Wears glasses at all times  Hearing  Hearing: Exceptions to [facility]  Hearing Exceptions: Hard of hearing/hearing concerns    Assessment:      Diagnosis: Pt. demonstrates mildly impaired orientation, recall and reasoning/ problem solving. Pt. also demo. mild dysarthria of speech, possibly d/t dry oral cavity (though pt. was given frequent mouth swabs), speech intelligibility is rated at 100%. Pt. demo. good awareness of deficits and very cooperative when participating in assessment. Pt. Also has severe dysphagia, as he is NPO c PEG. Pt. most recent video swallow study was on 01/14:Oral Preparation / Oral Phase   Premature vallecular spillage of both consistencies presented           Pharyngeal Phase     Mildly thick:  min vallecular and pyriform sinus cavity residue. Deep penetration  Pudding:  mod amt vallecular and max amt of pyriform sinus cavity residue. Deep penetration and +aspiration with residue lying atop vocal cords. Weak cough/congested respirations noted. Test discontinued at this time d/t pt.  At great risk for aspiration with all PO intake with reduced airway protection. Pt. had NGT at time of this test.  Treatment to focus on dysphagia and cognition, to bring these areas to a functional level for safe home d/c. Recommendations:  Recommendations  Requires SLP Intervention: Yes  Patient Education: Pt. Akilah Horowitz, present and verbalized understanding of information given. Patient Education Response: Verbalizes understanding;Needs reinforcement               Goals:  Short Term Goals  Goal 1: Increase swallow function with or without use of NMES via oral motor,  tongue base strengthening and laryngeal excursion exercises. Goal 2: Increase oreintation, divergent naming, convergent inferences and delayed recall tasks to 90%   Patient/family involved in developing goals and treatment plan: yes, Gilda BEDOYA, Present    Subjective:   Previous level of function and limitations: IND        Vision  Vision: Impaired  Vision Exceptions: Wears glasses at all times  Hearing  Hearing: Exceptions to [facility]  Hearing Exceptions: Hard of hearing/hearing concerns           Objective:     Oral motor:   Pt. Demo. Reduced lingual/labial strength and ROM       Motor Speech  Apraxic Characteristics: None  Dysarthric Characteristics: Imprecise  Intelligibility: No impairment  Overall Impairment Severity: Minimal    Auditory Comprehension  Comprehension: Within Functional Limits         Expression  Primary Mode of Expression: Verbal    Verbal Expression  Verbal Expression: Within functional limits                   Cognition:      Orientation  Overall Orientation Status: Impaired  Orientation Level: Oriented to person;Disoriented to time;Disoriented to place;Oriented to situation  Attention  Attention: Within Functional Limits  Memory  Memory: Exceptions to [facility]  Short-term Memory: Mild  Working Memory: Mild  Problem Solving  Problem Solving: Exceptions to [facility]  Verbal Reasoning Skills:  Moderate  Initiation: WFL  Sequencing: WFL  Abstract Reasoning  Abstract Reasoning: Exceptions to [facility]  Convergent Thinking: Mild  Divergent Thinking: Severe  Safety/Judgment  Safety/Judgment: Within Functional Limits        Education:  Patient Education: Pt. Nel Osorio, present and verbalized understanding of information given. Patient Education Response: Verbalizes understanding;Needs reinforcement          Therapy Time:   Individual Concurrent Group Co-treatment   Time In 4890         Time Out 1115         Minutes 30                 Electronically signed by Leon Ackerman A.CCC/SLP    on 1/19/2023 at 11:48 AM

## 2023-01-19 NOTE — PROGRESS NOTES
Byron Mckenzie MD/Luis Felipe Echavarria MD/ Reggie Nam MD/Dr Tenisha CATALAN AGACNP-BC, NP-C      Baylee CATALAN NP-C     Nayeli Herrera APRLEIA NP-C                                           Pulmonary Progress Note    Patient - Josie Sauceda   Age - 60 y.o.   - 1960  MRN - [id number]  Acct # - [de-identified]  Date of Admission - 2023  9:55 PM    Consulting Service/Physician:       Primary Care Physician: No primary care provider on file. SUBJECTIVE:     Chief Complaint:   Chief Complaint   Patient presents with    Altered Mental Status     Subjective:    Linwood Zuniga tells me he is feeling good today. He was just moved over to acute rehab unit. He continues on 2 L. Venous Dopplers were completed and negative. Chest x-ray shows some increase in congestion. IV fluid was discontinued. He is on tube feeding via PEG. VITALS  /74   Pulse 96   Temp 99 °F (37.2 °C) (Oral)   Resp 18   Ht 5' 7\" (1.702 m)   Wt 159 lb 9.8 oz (72.4 kg)   SpO2 95%   BMI 25.00 kg/m²   Wt Readings from Last 3 Encounters:   23 159 lb 9.8 oz (72.4 kg)   23 188 lb (85.3 kg)     I/O (24 Hours)    Intake/Output Summary (Last 24 hours) at 2023 0843  Last data filed at 2023 0612  Gross per 24 hour   Intake 1164 ml   Output 500 ml   Net 664 ml     Ventilator:   Settings  FiO2 : 60 %  Insp Time (sec): 0.9 sec  Insp Rise Time (%): 0.15 %  Flow Sensitivity: 5 L/min  Exam:   Physical Exam   Constitutional: Lying in bed on 2 L in no distress   HENT: Unremarkable  Head: Normocephalic and atraumatic. Eyes: EOM are normal. Pupils are equal, round, and reactive to light. Neck: Neck supple. Cardiovascular:  Regular rate and rhythm. Normal heart tones. No JVD. Pulmonary/Chest: Respirations even and nonlabored, diminished in bases, on 2 L with pulse ox 91%   Abdominal: Soft.  Bowel sounds are normal.   Musculoskeletal: Normal range of motion. Neurological: He is alert and oriented but appears forgetful   Skin: Skin is warm and dry. No rash noted.    Extremities: No edema or discoloration  Infusions:      dextrose 5 % and 0.45 % NaCl 40 mL/hr at 01/16/23 1512    sodium chloride      dexmedetomidine      sodium chloride       Meds:     Current Facility-Administered Medications:     heparin (porcine) injection 5,000 Units, 5,000 Units, SubCUTAneous, 3 times per day, Jennyfer William, DO, 5,000 Units at 01/19/23 0536    miconazole (MICOTIN) 2 % powder, , Topical, BID, Jennyfer William DO, Given at 01/19/23 0829    dextrose 5 % and 0.45 % sodium chloride infusion, , IntraVENous, Continuous, Jennyfer William DO, Last Rate: 40 mL/hr at 01/16/23 1512, New Bag at 01/16/23 1512    potassium bicarb-citric acid (EFFER-K) effervescent tablet 20 mEq, 20 mEq, Oral, Daily, Jennyfer William, DO, 20 mEq at 01/19/23 0832    potassium chloride (KLOR-CON M) extended release tablet 20 mEq, 20 mEq, Oral, Once, Jennyfer William, DO    hydrALAZINE (APRESOLINE) injection 20 mg, 20 mg, IntraVENous, Q6H PRN, Jennyfer William, DO, 20 mg at 01/11/23 1224    potassium chloride (KLOR-CON M) extended release tablet 40 mEq, 40 mEq, Oral, PRN **OR** potassium bicarb-citric acid (EFFER-K) effervescent tablet 40 mEq, 40 mEq, Oral, PRN, 40 mEq at 01/19/23 0634 **OR** potassium chloride 10 mEq/100 mL IVPB (Peripheral Line), 10 mEq, IntraVENous, PRN, Jennyfer William, DO, Last Rate: 100 mL/hr at 01/13/23 0928, 10 mEq at 01/13/23 2088    spironolactone (ALDACTONE) tablet 25 mg, 25 mg, Oral, Daily, Jennyfer William, DO, 25 mg at 01/19/23 0829    magnesium sulfate 2000 mg in water 50 mL IVPB, 2,000 mg, IntraVENous, PRN, Jennyfer William, DO, Stopped at 01/19/23 0830    ziprasidone (GEODON) 10 mg in sterile water 0.5 mL injection, 10 mg, IntraMUSCular, Nightly, Jennyfer Wliliam DO, 10 mg at 01/16/23 2046    carvedilol (COREG) tablet 6.25 mg, 6.25 mg, Oral, BID WC, Jennyfer William DO, 6.25 mg at 01/19/23 0828    [Held by provider] ferrous sulfate (IRON 325) tablet 325 mg, 325 mg, Oral, Daily with breakfast, Jennyfer OLMEDO Imel, DO    pantoprazole (PROTONIX) 40 mg in sodium chloride (PF) 0.9 % 10 mL injection, 40 mg, IntraVENous, Daily, Jennyfer Ruizel, DO, 40 mg at 01/18/23 0957    ziprasidone (GEODON) 20 mg in sterile water 1 mL injection, 20 mg, IntraMUSCular, Q12H PRN, Jennyfer OLMEDO Imel, DO    labetalol (NORMODYNE;TRANDATE) injection 5 mg, 5 mg, IntraVENous, Q6H PRN, Jennyfer OLMEDO Imel, DO, 5 mg at 01/10/23 1606    potassium chloride 10 mEq/100 mL IVPB (Peripheral Line), 10 mEq, IntraVENous, PRN, Jennyfer Ruizel, DO, Last Rate: 100 mL/hr at 01/13/23 1233, 10 mEq at 01/13/23 1233    ipratropium-albuterol (DUONEB) nebulizer solution 1 ampule, 1 ampule, Inhalation, Q4H WA, Jennyfer William, DO, 1 ampule at 01/19/23 0823    0.9 % sodium chloride infusion, , IntraVENous, PRN, Jennyfer OLMEDO Imel, DO    dexmedetomidine (PRECEDEX) 400 mcg in sodium chloride 0.9 % 100 mL infusion, 0.1-1.5 mcg/kg/hr, IntraVENous, Continuous PRN, Jennyfer OLMEDO Imel, DO    nystatin (MYCOSTATIN) ointment, , Topical, BID, Jennyfer William, DO, Given at 01/19/23 0829    perflutren lipid microspheres (DEFINITY) injection 1.5 mL, 1.5 mL, IntraVENous, ONCE PRN, Jennyfer William, DO    [Held by provider] aspirin chewable tablet 81 mg, 81 mg, Oral, Daily, Jennyfer OLMEDO Imyoshi, DO, 81 mg at 01/04/23 0740    sodium chloride flush 0.9 % injection 10 mL, 10 mL, IntraVENous, PRN, Jennyfer OLMEDO Imel, DO, 10 mL at 01/03/23 1836    sodium chloride flush 0.9 % injection 5-40 mL, 5-40 mL, IntraVENous, 2 times per day, Jennyfer OLMEDO Imel, DO, 10 mL at 01/18/23 2110    sodium chloride flush 0.9 % injection 5-40 mL, 5-40 mL, IntraVENous, PRN, Jennyfer William DO    0.9 % sodium chloride infusion, , IntraVENous, PRN, Jennyfer William DO    ondansetron (ZOFRAN-ODT) disintegrating tablet 4 mg, 4 mg, Oral, Q8H PRN **OR** ondansetron (ZOFRAN) injection 4 mg, 4 mg, IntraVENous, Q6H PRN, Jennyfer William DO    polyethylene glycol (GLYCOLAX) packet 17 g, 17 g, Oral, Daily PRN, Jennyfer OLMEDO Imel, DO    acetaminophen (TYLENOL) tablet 650 mg, 650 mg, Oral, Q6H PRN, 650 mg at 01/18/23 1230 **OR** acetaminophen (TYLENOL) suppository 650 mg, 650 mg, Rectal, Q6H PRN, Jennyfer SHARA Imel, DO    nicotine (NICODERM CQ) 21 MG/24HR 1 patch, 1 patch, TransDERmal, Daily, Jennyfer OLMEDO Imel, DO, 1 patch at 01/18/23 0958    albuterol (PROVENTIL) nebulizer solution 2.5 mg, 2.5 mg, Nebulization, Q2H PRN, Jennyfer J Imel, DO, 2.5 mg at 01/02/23 1115    guaiFENesin (MUCINEX) extended release tablet 600 mg, 600 mg, Oral, BID PRN, Teresa Denisha Imel, DO    Lab Results:     Lab Results   Component Value Date    WBC 6.4 01/19/2023    HGB 11.0 (L) 01/19/2023    HCT 37.2 (L) 01/19/2023    MCV 75.1 (L) 01/19/2023     01/19/2023     Lab Results   Component Value Date    CALCIUM 8.3 (L) 01/19/2023     01/19/2023    K 3.5 (L) 01/19/2023    CO2 32 (H) 01/19/2023     01/19/2023    BUN 14 01/19/2023    CREATININE 0.65 (L) 01/19/2023       Lab Results   Component Value Date    INR 1.6 01/08/2023    PROTIME 19.1 (H) 01/08/2023 1/18/2023 1/5/2023      ASSESSMENT:       Acute hypoxic respiratory failure, extubated 1/5/2023, now on 2 L  Multifocal pneumonia suspect aspiration versus community-acquired  Acute infarcts left cerebral hemisphere, left parietal lobe with bilateral subarachnoid hemorrhages  Dysphagia-still failed swallow eval, due for PEG tube placement today  Delirium-resolved  Metabolic encephalopathy  Transaminitis  Thrombocytopenia-improved  History of alcohol use  Acute kidney injury-resolved  History of tobacco use 40-pack-year history  Suspect COPD-mild wheezing  Positive hepatitis C screen  Chronic liver disease secondary to alcohol use  Debilitated  Full code  PLAN:   Discontinue IV fluid  Wean oxygen as tolerated, keep pulse ox above 89%  Continue bronchodilators  Monitor for fluid overload  Encourage cough and deep breathing        Electronically signed by ALAYNA Bhagat - CNP on 01/19/23     This progress note was completed using a voice transcription system. Every effort was made to ensure accuracy. However, inadvertent computerized transcription errors may be present.     Radha Hoang NP-C, MSN  [facility]

## 2023-01-19 NOTE — CONSULTS
Comprehensive Nutrition Assessment    Type and Reason for Visit:  Initial, Consult (Evaluate and Treat)    Nutrition Recommendations/Plan:   Vital AF 1.2 tube feeding. Transition to goal of 300 mL bolus x 3 and nocturnal feeding at 70 mL per hr. 50 mL water flush before and after each bolus and nocturnal feeding. Additional 300 ml water daily for meds. NPO. Malnutrition Assessment:  Malnutrition Status: Moderate malnutrition (01/19/23 1419)    Context:  Acute Illness     Findings of the 6 clinical characteristics of malnutrition:  Energy Intake:  75% or less of estimated energy requirements for 7 or more days  Weight Loss:  Greater than 5% over 1 month     Body Fat Loss:  Unable to assess     Muscle Mass Loss:  Unable to assess    Fluid Accumulation:  No significant fluid accumulation     Strength:  Not Performed    Nutrition Assessment:    Pt admitted to rehab due to ADL and Mobility deficits secondary to CVA. PEG tube placed 1/17 due dysphagia and had been advanced to goal tube feeding. Pt had received NG feedings previously, although pt was without adequate nutrition for portion of hospital stay. Tube feeding is being switched to nocturnal and bolus feeds; first bolus of 120 mL. Pt has noted some wt loss; would like to maintain wt without gain. Nutrition Related Findings:    No edema. BM: 1/15. Labs and meds reviewed. Current Nutrition Intake & Therapies:    Average Meal Intake: NPO     Diet NPO  ADULT TUBE FEEDING; PEG; Peptide Based; Cyclic, Bolus; 70; 0:29 PM; 6:00 AM; 3 Times Daily; 300; Gravity; 50; Other (specify); 50 mL water before and after each bolus and cyclic feeding. Additional 300 mL water daily for meds. Current Tube Feeding (TF) Orders:  Feeding Route: PEG  Formula: Peptide Based  Schedule: Cyclic, Bolus  Feeding Regimen: 70 mL x 10 hrs; bolus of 300 mL x 3 daily. Water Flushes: 50 mL before and after each bolus and nocturnal feeding.  Addiitonal 300 mL to be used with meds daily.  Goal TF & Flush Orders Provides: 1920 kcal, 120 gm protein, 1998 mL free fluid. Anthropometric Measures:  Height: 5' 7\" (170.2 cm)  Ideal Body Weight (IBW): 148 lbs (67 kg)    Admission Body Weight: 159 lb 9.8 oz (72.4 kg) ([facility] 1/19 ([facility] hospital))  Current Body Weight: 159 lb 9.8 oz (72.4 kg), 107.8 % IBW. Weight Source: Bed Scale  Current BMI (kg/m2): 25  Usual Body Weight: 170 lb (77.1 kg) (Per significant other)  % Weight Change (Calculated): -6.1                    BMI Categories: Overweight (BMI 25.0-29. 9)    Estimated Daily Nutrient Needs:  Energy Requirements Based On: Formula  Weight Used for Energy Requirements: Admission  Energy (kcal/day): 0499-7813 based on [place] Hammer with 1.2-1.3 factor  Weight Used for Protein Requirements: Ideal  Protein (g/day): 101 based on 1.5 gm per kg  Method Used for Fluid Requirements: 1 ml/kcal  Fluid (ml/day): 1920 mL    Nutrition Diagnosis:   Moderate malnutrition related to inadequate protein-energy intake as evidenced by Criteria as identified in malnutrition assessment    Nutrition Interventions:   Food and/or Nutrient Delivery: Continue NPO, Modify Tube Feeding  Nutrition Education/Counseling:  (Briefly explained bolus and nocturnal tube feeding to pt and significant other)  Coordination of Nutrition Care: Continue to monitor while inpatient, Speech Therapy       Goals:     Goals: Tolerate nutrition support at goal rate       Nutrition Monitoring and Evaluation:   Behavioral-Environmental Outcomes: None Identified  Food/Nutrient Intake Outcomes: Enteral Nutrition Intake/Tolerance  Physical Signs/Symptoms Outcomes: Biochemical Data, Chewing or Swallowing, GI Status, Weight    Discharge Planning:     Too soon to determine     Donna Tucker RD, LD  Contact: [phone]

## 2023-01-19 NOTE — PLAN OF CARE
Problem: Safety - Adult  Goal: Free from fall injury  Outcome: Progressing  Flowsheets (Taken 1/19/2023 8354)  Free From Fall Injury: Instruct family/caregiver on patient safety  Note: Patient remains free of falls and injuries throughout shift. Bed remains in the lowest position, wheels locked, call light and bedside table are within reach. Problem: ABCDS Injury Assessment  Goal: Absence of physical injury  Outcome: Progressing     Problem: Skin/Tissue Integrity  Goal: Absence of new skin breakdown  Description: 1. Monitor for areas of redness and/or skin breakdown  2. Assess vascular access sites hourly  3. Every 4-6 hours minimum:  Change oxygen saturation probe site  4. Every 4-6 hours:  If on nasal continuous positive airway pressure, respiratory therapy assess nares and determine need for appliance change or resting period. Outcome: Progressing  Note: No signs of increased skin or tissue breakdown is noted. See Head to Toe/LDA assessments in flowsheets.       Problem: Nutrition Deficit:  Goal: Optimize nutritional status  Outcome: Progressing     Problem: Discharge Planning  Goal: Discharge to home or other facility with appropriate resources  Outcome: Progressing

## 2023-01-20 PROBLEM — L30.8 DERMATITIS ASSOCIATED WITH MOISTURE: Status: ACTIVE | Noted: 2023-01-20

## 2023-01-20 LAB
ABSOLUTE EOS #: 0.16 K/UL (ref 0–0.4)
ABSOLUTE LYMPH #: 0.54 K/UL (ref 1–4.8)
ABSOLUTE MONO #: 0.92 K/UL (ref 0.1–1.3)
ALBUMIN SERPL-MCNC: 3.2 G/DL (ref 3.5–5.2)
ALP BLD-CCNC: 40 U/L (ref 40–129)
ALT SERPL-CCNC: 18 U/L (ref 5–41)
ANION GAP SERPL CALCULATED.3IONS-SCNC: 7 MMOL/L (ref 9–17)
AST SERPL-CCNC: 21 U/L
BASOPHILS # BLD: 0 % (ref 0–2)
BASOPHILS ABSOLUTE: 0 K/UL (ref 0–0.2)
BILIRUB SERPL-MCNC: 0.8 MG/DL (ref 0.3–1.2)
BILIRUBIN DIRECT: 0.4 MG/DL
BILIRUBIN, INDIRECT: 0.4 MG/DL (ref 0–1)
BUN BLDV-MCNC: 13 MG/DL (ref 8–23)
CALCIUM SERPL-MCNC: 8.6 MG/DL (ref 8.6–10.4)
CHLORIDE BLD-SCNC: 105 MMOL/L (ref 98–107)
CO2: 32 MMOL/L (ref 20–31)
CREAT SERPL-MCNC: 0.7 MG/DL (ref 0.7–1.2)
EOSINOPHILS RELATIVE PERCENT: 3 % (ref 0–4)
GFR SERPL CREATININE-BSD FRML MDRD: >60 ML/MIN/1.73M2
GLUCOSE BLD-MCNC: 111 MG/DL (ref 70–99)
HCT VFR BLD CALC: 37.1 % (ref 41–53)
HEMOGLOBIN: 10.9 G/DL (ref 13.5–17.5)
LYMPHOCYTES # BLD: 10 % (ref 24–44)
MCH RBC QN AUTO: 22.1 PG (ref 26–34)
MCHC RBC AUTO-ENTMCNC: 29.3 G/DL (ref 31–37)
MCV RBC AUTO: 75.5 FL (ref 80–100)
MONOCYTES # BLD: 17 % (ref 1–7)
MORPHOLOGY: ABNORMAL
PDW BLD-RTO: 33.2 % (ref 11.5–14.9)
PLATELET # BLD: 212 K/UL (ref 150–450)
PMV BLD AUTO: 9.1 FL (ref 6–12)
POTASSIUM SERPL-SCNC: 4 MMOL/L (ref 3.7–5.3)
RBC # BLD: 4.92 M/UL (ref 4.5–5.9)
SEG NEUTROPHILS: 70 % (ref 36–66)
SEGMENTED NEUTROPHILS ABSOLUTE COUNT: 3.78 K/UL (ref 1.3–9.1)
SODIUM BLD-SCNC: 144 MMOL/L (ref 135–144)
TOTAL PROTEIN: 6.2 G/DL (ref 6.4–8.3)
WBC # BLD: 5.4 K/UL (ref 3.5–11)

## 2023-01-20 PROCEDURE — 80048 BASIC METABOLIC PNL TOTAL CA: CPT

## 2023-01-20 PROCEDURE — 36415 COLL VENOUS BLD VENIPUNCTURE: CPT

## 2023-01-20 PROCEDURE — 99222 1ST HOSP IP/OBS MODERATE 55: CPT

## 2023-01-20 PROCEDURE — 92526 ORAL FUNCTION THERAPY: CPT

## 2023-01-20 PROCEDURE — 80076 HEPATIC FUNCTION PANEL: CPT

## 2023-01-20 PROCEDURE — 85025 COMPLETE CBC W/AUTO DIFF WBC: CPT

## 2023-01-20 PROCEDURE — 99231 SBSQ HOSP IP/OBS SF/LOW 25: CPT | Performed by: INTERNAL MEDICINE

## 2023-01-20 PROCEDURE — 1180000000 HC REHAB R&B

## 2023-01-20 PROCEDURE — 97535 SELF CARE MNGMENT TRAINING: CPT

## 2023-01-20 PROCEDURE — 6370000000 HC RX 637 (ALT 250 FOR IP): Performed by: PHYSICAL MEDICINE & REHABILITATION

## 2023-01-20 PROCEDURE — 99232 SBSQ HOSP IP/OBS MODERATE 35: CPT | Performed by: PHYSICAL MEDICINE & REHABILITATION

## 2023-01-20 PROCEDURE — 6370000000 HC RX 637 (ALT 250 FOR IP): Performed by: INTERNAL MEDICINE

## 2023-01-20 RX ORDER — TRAZODONE HYDROCHLORIDE 50 MG/1
50 TABLET ORAL NIGHTLY
Status: DISCONTINUED | OUTPATIENT
Start: 2023-01-20 | End: 2023-01-21

## 2023-01-20 RX ADMIN — CARVEDILOL 6.25 MG: 6.25 TABLET, FILM COATED ORAL at 17:12

## 2023-01-20 RX ADMIN — GUAIFENESIN 600 MG: 600 TABLET, EXTENDED RELEASE ORAL at 17:12

## 2023-01-20 RX ADMIN — FERROUS SULFATE TAB 325 MG (65 MG ELEMENTAL FE) 325 MG: 325 (65 FE) TAB at 08:25

## 2023-01-20 RX ADMIN — SPIRONOLACTONE 25 MG: 25 TABLET ORAL at 08:25

## 2023-01-20 RX ADMIN — TRAZODONE HYDROCHLORIDE 50 MG: 50 TABLET ORAL at 20:03

## 2023-01-20 RX ADMIN — CARVEDILOL 6.25 MG: 6.25 TABLET, FILM COATED ORAL at 08:25

## 2023-01-20 ASSESSMENT — PAIN SCALES - WONG BAKER
WONGBAKER_NUMERICALRESPONSE: 0

## 2023-01-20 NOTE — PROGRESS NOTES
Patient remained NPO throughout shift. Tube feed continuously infused at 70 mL/hr through night, and patient remained at least at 30 degrees. During rounds, writer consistently observed patient's oxygen removed, and writer reinforced importance of keeping it on at all times.

## 2023-01-20 NOTE — PROGRESS NOTES
Wound Ostomy Continence Nursing  Follow Up Visit      NAME:  [id number] [employer] RECORD NUMBER:  [id number]  AGE: 60 y.o. GENDER: male  : 1960  TODAY'S DATE:  2023    [facility] nursing consulted for discoloration on buttocks and in bilateral groin areas. Review of Systems:  Constitutional: negative for chills and fevers  Respiratory: negative for cough and shortness of breath  Cardiovascular: negative for chest pain and palpitations  Gastrointestinal: negative for abdo pain or nausea  Genitourinary:positive for urinary incontinence  Integument: reddened skin on buttocks  Endocrine: negative  Musculoskeletal:negative  Behavioral/Psych: negative  Pain: negative      Objective     Vitals:  /80   Pulse 90   Temp 99.1 °F (37.3 °C) (Oral)   Resp 16   Ht 5' 7\" (1.702 m)   Wt 163 lb (73.9 kg)   SpO2 93%   BMI 25.53 kg/m²    TEMPERATURE:  Current - Temp: 99.1 °F (37.3 °C); Max - Temp  Av.8 °F (37.1 °C)  Min: 98.1 °F (36.7 °C)  Max: 99.1 °F (37.3 °C)    Gio Risk Score Gio Scale Score: 16    INTAKE/OUTPUT:      Intake/Output Summary (Last 24 hours) at 2023 1333  Last data filed at 2023 1112  Gross per 24 hour   Intake 1070 ml   Output --   Net 1070 ml                 Physical Exam:  General Appearance: alert and oriented to person, place and time, well-developed and well-nourished, in no acute distress  Head: normocephalic and atraumatic  Pulmonary/Chest: normal air movement, no respiratory distress  Skin: erythematous plaque covering most of buttock and into perineal area.   Cardiovascular/Circulation: minimal edema, no cyanosis, palpable peripheral pulses  Extremities: no cyanosis and no clubbing  Musculoskeletal: no joint deformity or tenderness, no extremity amputations  Neurologic: stands with walker and assist x2, coordination normal and speech normal      Data Review:  LABS:  CBC:   Recent Labs     23  0549 23  0450 23  0620   WBC 5.8 6.4 5. 4   HGB 11.4* 11.0* 10.9*    199 212     BMP:    Lab Results   Component Value Date/Time     01/20/2023 06:20 AM    K 4.0 01/20/2023 06:20 AM     01/20/2023 06:20 AM    CO2 32 01/20/2023 06:20 AM    BUN 13 01/20/2023 06:20 AM    LABALBU 3.2 01/20/2023 06:20 AM    CREATININE 0.70 01/20/2023 06:20 AM    CALCIUM 8.6 01/20/2023 06:20 AM    LABGLOM >60 01/20/2023 06:20 AM    GLUCOSE 111 01/20/2023 06:20 AM     Coagulation:   Recent Labs     01/18/23  0549 01/19/23  0450 01/20/23  0620   PROT 6.2* 6.0* 6.2*         Assessment       Patient Active Problem List   Diagnosis    Acute type II respiratory failure (HCC)    Transaminitis    Normocytic anemia    Thrombocytopenia (HCC)    Agitation    Acute respiratory failure with hypoxia and hypercapnia (HCC) RLL pneumonia    Altered mental status    Community acquired pneumonia of right lower lobe of lung    Prolonged pt (prothrombin time)    Emphysema lung (HCC)    Cerebral infarction ([address]    ACP (advance care planning)    Goals of care, counseling/discussion    Encounter for palliative care    Delirium due to another medical condition    Moderate malnutrition ([address]    Hepatitis C virus infection without hepatic coma    Dysphagia    Impending cerebrovascular accident ([address]    Dermatitis associated with moisture       Fungal rash to buttock and tiffany area. Appears to be improving. Recommend local care with keeping clean and using zinc paste. Plan       Fungal rash mgmt of buttock and tiffany/groin areas: keep clean and dry with foaming soap and water, pat dry. Apply thin layer zinc paste to affected areas.

## 2023-01-20 NOTE — PROGRESS NOTES
Speech Language Pathology  Speech Language Pathology  [facility]    Dysphagia Treatment Note    Date: 1/20/2023  Patients Name: Ragini Marin  MRN: [id number]  Diagnosis: Dysphagia  Patient Active Problem List   Diagnosis Code    Acute type II respiratory failure (HCC) J96.00    Transaminitis R74.01    Normocytic anemia D64.9    Thrombocytopenia (HCC) D69.6    Agitation R45.1    Acute respiratory failure with hypoxia and hypercapnia (HCC) RLL pneumonia J96.01, J96.02    Altered mental status R41.82    Community acquired pneumonia of right lower lobe of lung J18.9    Prolonged pt (prothrombin time) R79.1    Emphysema lung (HCC) J43.9    Cerebral infarction (HCC) I63.9    ACP (advance care planning) Z71.89    Goals of care, counseling/discussion Z71.89    Encounter for palliative care Z51.5    Delirium due to another medical condition F05    Moderate malnutrition ([facility] E44.0    Hepatitis C virus infection without hepatic coma B19.20    Dysphagia R13.10    Impending cerebrovascular accident [facility]) I63.9       Pain: no c/o pain    Dysphagia Treatment  Treatment time: 1653-2178    Subjective: [] Alert [] Cooperative     [] Confused     [] Agitated    [x] Lethargic    Objective/Assessment:    Pt seen for dysphagia management. Pt very lethargic this date, requiring frequent cues to maintain alertness. Pt c/o xerostomia; ST provided moist toothette. Pt completed lingual protrusion/retraction exercises x 10 reps. Unable to maintain sufficient alertness for further participation.        Plan:  [x] Continue ST services    [] Discharge from ST:        Discharge recommendations: [] Inpatient Rehab   [] [place]   [] Outpatient Therapy  [] Follow up at trauma clinic   [] Other:         Treatment completed by: Allison Cotto M.S., [id number] [facility]

## 2023-01-20 NOTE — PROGRESS NOTES
Physical Therapy Cancel Note      DATE: 2023    NAME: Virginia Tejeda  MRN: [id number]   : 1960      Patient not seen this date for Physical Therapy due to:    Patient declined treatment in AM and PM on this date due to fatigue and feeling ill.         Electronically signed by Puneet Maciel PTA on 2023 at 5:04 PM

## 2023-01-20 NOTE — PROGRESS NOTES
01/20/23 1245   Encounter Summary   Encounter Overview/Reason  Volunteer Encounter   Service Provided For: Patient   Referral/Consult From: [facility]   Last Encounter  01/20/23  (V)   Assessment/Intervention/Outcome   Intervention Prayer (assurance of)/[place]

## 2023-01-20 NOTE — PLAN OF CARE
Problem: Discharge Planning  Goal: Discharge to home or other facility with appropriate resources  1/20/2023 0536 by Megha Ly RN  Outcome: Progressing     Problem: Safety - Medical Restraint  Goal: Remains free of injury from restraints (Restraint for Interference with Medical Device)  Description: INTERVENTIONS:  1. Determine that other, less restrictive measures have been tried or would not be effective before applying the restraint  2. Evaluate the patient's condition at the time of restraint application  3. Inform patient/family regarding the reason for restraint  4. Q2H: Monitor safety, psychosocial status, comfort, nutrition and hydration  Outcome: Progressing     Problem: Nutrition Deficit:  Goal: Optimize nutritional status  1/20/2023 0536 by Megha Ly RN  Outcome: Progressing     Problem: Safety - Adult  Goal: Free from fall injury  1/20/2023 0536 by Megha Ly RN  Outcome: Progressing     Problem: ABCDS Injury Assessment  Goal: Absence of physical injury  1/20/2023 0536 by Megha Ly RN  Outcome: Progressing     Problem: Skin/Tissue Integrity  Goal: Absence of new skin breakdown  Description: 1. Monitor for areas of redness and/or skin breakdown  2. Assess vascular access sites hourly  3. Every 4-6 hours minimum:  Change oxygen saturation probe site  4. Every 4-6 hours:  If on nasal continuous positive airway pressure, respiratory therapy assess nares and determine need for appliance change or resting period.   1/20/2023 0536 by Megha Ly RN  Outcome: Progressing

## 2023-01-20 NOTE — PROGRESS NOTES
[address] [facility] Internal Medicine  Coreen Macdonald MD; Kandyce Jeans, MD; Shameka Lopez MD; MD Garland Martínez MD; Radha Portillo MD    [facility]   [address] / HISTORY AND PHYSICAL EXAMINATION            Date:   1/20/2023  Patient name:  Paulino Whitaker  Date of admission:  1/19/2023  9:12 AM  MRN:   [id number]  Account:  [de-identified]  YOB: 1960  PCP:    Radha Portillo MD  Room:   [facility]  Code Status:    Full Code    Physician Requesting Consult: Mena Ayala MD    Reason for Consult: Medical management    Chief Complaint:     No chief complaint on file. Acute CVA, generalized weakness, metabolic encephalopathy, multifactorial with underlying acute respiratory failure with pneumonia and heart failure    History Obtained From:     patient    History of Present Illness:     59-year-old gentleman with unknown past medical history presented to inpatient [facility] with metabolic encephalopathy found to have acute respiratory failure, pneumonia, also had slurred speech, confusion progressively was getting worse, EMS brought the patient to the hospital his saturations were  75%, found to have acute respiratory failure with pneumonia, in the ED patient was placed on BiPAP, chest x-ray confirmed right lower lobe pneumonia with pleural effusion. ABG showed acute respiratory failure with hypercapnia and hypoxia.   Subsequently also found to have NSTEMI, acute heart failure with transaminitis possible shock liver, with also hepatitis C acute with hepatic failure, slowly improved,  Also had multiple acute brain infarct with subarachnoid hemorrhage,,  Subsequently patient was evaluated for acute inpatient rehab, accepted and admitted right now we are consulted for medical management    1/20  Patient was sitting on the chair on my encounter, states that he has been feeling drained  Just finished doing physical therapy, vitals have been stable  Past Medical History:     History reviewed. No pertinent past medical history. Past Surgical History:     Past Surgical History:   Procedure Laterality Date    UPPER GASTROINTESTINAL ENDOSCOPY N/A 1/17/2023    EGD ESOPHAGOGASTRODUODENOSCOPY PEG TUBE INSERTION performed by Heather Maldonado DO at [facility]        Medications Prior to Admission:     Prior to Admission medications    Medication Sig Start Date End Date Taking? Authorizing Provider   aspirin 325 MG tablet Take 325 mg by mouth daily Patient takes 2 tabs daily    Historical Provider, MD   psyllium (KONSYL) 28.3 % PACK Take 1 packet by mouth daily    Historical Provider, MD        Allergies:     Patient has no known allergies. Social History:     Tobacco:    reports that he has been smoking cigarettes. He has a 40.00 pack-year smoking history. He has never used smokeless tobacco.  Alcohol:      reports current alcohol use. Drug Use:  reports that he does not currently use drugs. Family History:     History reviewed. No pertinent family history. Review of Systems:     Positive and Negative as described in HPI. CONSTITUTIONAL:  negative for fevers, chills, sweats, fatigue, weight loss  HEENT:  negative for vision, hearing changes, runny nose, throat pain  RESPIRATORY:  negative for shortness of breath, cough, congestion, wheezing. CARDIOVASCULAR:  negative for chest pain, palpitations.   GASTROINTESTINAL:  negative for nausea, vomiting, diarrhea, constipation, change in bowel habits, abdominal pain   GENITOURINARY:  negative for difficulty of urination, burning with urination, frequency   INTEGUMENT:  negative for rash, skin lesions, easy bruising   HEMATOLOGIC/LYMPHATIC:  negative for swelling/edema   ALLERGIC/IMMUNOLOGIC:  negative for urticaria , itching  ENDOCRINE:  negative increase in drinking, increase in urination, hot or cold intolerance  MUSCULOSKELETAL:  negative joint pains, muscle aches, swelling of joints  NEUROLOGICAL:  negative for headaches, dizziness, lightheadedness, numbness, pain, tingling extremities  BEHAVIOR/PSYCH:  negative for depression, anxiety    Physical Exam:     /80   Pulse 90   Temp 99.1 °F (37.3 °C) (Oral)   Resp 16   Ht 5' 7\" (1.702 m)   Wt 163 lb (73.9 kg)   SpO2 93%   BMI 25.53 kg/m²   Temp (24hrs), Av.8 °F (37.1 °C), Min:98.1 °F (36.7 °C), Max:99.1 °F (37.3 °C)    No results for input(s): POCGLU in the last 72 hours. Intake/Output Summary (Last 24 hours) at 2023 1608  Last data filed at 2023 1200  Gross per 24 hour   Intake 1770 ml   Output --   Net 1770 ml       General Appearance:  alert, well appearing, and in no acute distress  Mental status: oriented to person, place, and time with normal affect  Head:  normocephalic, atraumatic. Eye: no icterus, redness, pupils equal and reactive, extraocular eye movements intact, conjunctiva clear  Ear: normal external ear, no discharge, hearing intact  Nose:  no drainage noted  Mouth: mucous membranes moist  Neck: supple, no carotid bruits, thyroid not palpable  Lungs: Bilateral equal air entry, clear to ausculation, no wheezing, rales or rhonchi, normal effort  Cardiovascular: normal rate, regular rhythm, no murmur, gallop, rub.   Abdomen: Soft, nontender, nondistended, normal bowel sounds, no hepatomegaly or splenomegaly  Neurologic: There are no new focal motor or sensory deficits, normal muscle tone and bulk, no abnormal sensation, normal speech, cranial nerves II through XII grossly intact  Skin: No gross lesions, rashes, bruising or bleeding on exposed skin area  Extremities:  peripheral pulses palpable, no pedal edema or calf pain with palpation  Psych: normal affect    Investigations:      Laboratory Testing:  Recent Results (from the past 24 hour(s))   Hepatic function panel    Collection Time: 23  6:20 AM   Result Value Ref Range    Albumin 3.2 (L) 3.5 - 5.2 g/dL    Alkaline Phosphatase 40 40 - 129 U/L    ALT 18 5 - 41 U/L    AST 21 <40 U/L    Total Bilirubin 0.8 0.3 - 1.2 mg/dL    Bilirubin, Direct 0.4 (H) <0.3 mg/dL    Bilirubin, Indirect 0.4 0.0 - 1.0 mg/dL    Total Protein 6.2 (L) 6.4 - 8.3 g/dL   CBC auto differential    Collection Time: 01/20/23  6:20 AM   Result Value Ref Range    WBC 5.4 3.5 - 11.0 k/uL    RBC 4.92 4.5 - 5.9 m/uL    Hemoglobin 10.9 (L) 13.5 - 17.5 g/dL    Hematocrit 37.1 (L) 41 - 53 %    MCV 75.5 (L) 80 - 100 fL    MCH 22.1 (L) 26 - 34 pg    MCHC 29.3 (L) 31 - 37 g/dL    RDW 33.2 (H) 11.5 - 14.9 %    Platelets 775 446 - 232 k/uL    MPV 9.1 6.0 - 12.0 fL    Seg Neutrophils 70 (H) 36 - 66 %    Lymphocytes 10 (L) 24 - 44 %    Monocytes 17 (H) 1 - 7 %    Eosinophils % 3 0 - 4 %    Basophils 0 0 - 2 %    Segs Absolute 3.78 1.3 - 9.1 k/uL    Absolute Lymph # 0.54 (L) 1.0 - 4.8 k/uL    Absolute Mono # 0.92 0.1 - 1.3 k/uL    Absolute Eos # 0.16 0.0 - 0.4 k/uL    Basophils Absolute 0.00 0.0 - 0.2 k/uL    Morphology ANISOCYTOSIS PRESENT     Morphology MICROCYTOSIS PRESENT     Morphology 1+ ELLIPTOCYTES    Basic Metabolic Panel w/ Reflex to MG    Collection Time: 01/20/23  6:20 AM   Result Value Ref Range    Glucose 111 (H) 70 - 99 mg/dL    BUN 13 8 - 23 mg/dL    Creatinine 0.70 0.70 - 1.20 mg/dL    Est, Glom Filt Rate >60 >60 mL/min/1.73m2    Calcium 8.6 8.6 - 10.4 mg/dL    Sodium 144 135 - 144 mmol/L    Potassium 4.0 3.7 - 5.3 mmol/L    Chloride 105 98 - 107 mmol/L    CO2 32 (H) 20 - 31 mmol/L    Anion Gap 7 (L) 9 - 17 mmol/L       Imaging/Diagonstics:  XR CHEST PORTABLE    Result Date: 1/18/2023  Interval extubation and NG tube removal.  Otherwise similar findings, with perhaps slightly greater hazy ground-glass opacity right mid lung. Correlate clinically. FL MODIFIED BARIUM SWALLOW W VIDEO    Result Date: 1/14/2023  Premature vallecular spillage. Piriform sinus cavity residue. Deep penetration with both materials. Aspiration with pudding.  Please see separate speech pathology report for full discussion of findings and recommendations. Assessment :      Hospital Problems             Last Modified POA    * (Principal) Impending cerebrovascular accident ([address] 1/19/2023 Yes    Acute type II respiratory failure ([address] 1/19/2023 Yes    Transaminitis 1/19/2023 Yes    Normocytic anemia 1/19/2023 Yes    Thrombocytopenia ([address] 1/19/2023 Yes    Acute respiratory failure with hypoxia and hypercapnia ([address] RLL pneumonia 1/19/2023 Yes    Community acquired pneumonia of right lower lobe of lung 1/19/2023 Yes    Emphysema lung ([address] 1/19/2023 Yes    Cerebral infarction ([address] 1/19/2023 Yes    Moderate malnutrition ([address] 1/19/2023 Yes    Hepatitis C virus infection without hepatic coma 1/19/2023 Yes    Dysphagia 1/19/2023 Yes    Dermatitis associated with moisture 1/20/2023 Yes       Plan:     Acute hypoxic and hypercapnic respiratory failure secondary to likely aspiration pneumonia, treated with vancomycin and Unasyn, also was positive for MRSA nasal swab, required intubation, extubated subsequently  Acute CVA with subarachnoid hemorrhage, cardioembolic, did not qualify for tPA,  Acute liver failure possible multifactorial, alcoholic liver disease with hepatitis C,  Hypercoagulable state secondary to acute liver disease, INR currently 1.6  Thrombocytopenia secondary to alcoholic liver disease and hepatitis C, improved  Dysphagia , PEG placed   Anemia , of ch disease multifactorial   DVT PPX with SCD only, as he had cerebral h'age  Full code status   PT/OT       Consultations:   IP CONSULT TO DIETITIAN  IP CONSULT TO SOCIAL WORK  IP CONSULT TO INTERNAL MEDICINE  IP CONSULT TO Susy Vang MD  1/20/2023  4:08 PM    Copy sent to Dr. Lucero Pickett MD    Please note that this chart was generated using voice recognition Dragon dictation software.   Although every effort was made to ensure the accuracy of this automated transcription, some errors in transcription may have occurred.

## 2023-01-20 NOTE — PROGRESS NOTES
Nutrition Note    Nurse reports pt appears to be tolerating tube feeding fairly well. Pt has had some diarrhea and has been \"gasey\"; possibly 2-3 episodes since yesterday. Will continue to monitor. Nurse to contact RD if indicated. Glycolax held yesterday and today. Tube feeding to continue as ordered. Mariposa Hamilton R.D., L.D.   Phone: [phone]

## 2023-01-20 NOTE — CARE COORDINATION
Alphonse Coe, RN   Registered Nurse   Acute Rehab   Care Coordination       Addendum   Date of Service:  2023  2:06 PM          Related encounter: ED to Hosp-Admission (Discharged) from 2023 in [facility]  PRE-ADMISSION ASSESSMENT     Patient Name: Ragini Marin        MRN:   [id number]    : 1960  (60 y.o.)  Gender: male   Ethnicity: Not , /a or Urdu Origin  Race: White     Admitted from:  [address] [place] [address]      Type of Admission:  New Admission      Date of Onset / Admission to the [address] [place] [address]:  2023     Inpatient Rehabilitation Admitting Diagnosis:  Multi-infarct CVAs, encephalopathy     Did patient have surgery/procedures?   Yes, As Listed Below   2023 : Endotracheal intubation  2023: ELECTROENCEPHALOGRAM  2023: EGD ESOPHAGOGASTRODUODENOSCOPY PEG TUBE INSERTION  Physicians:   Mica Cohen, DO   Physician   Cardiology      Carlos Manuel Jean, DO   Physician   Cardiology      Rose Brady, DO   Physician   General Surgery      Jozef Montgomery MD   Physician   Hematology Oncology      Anderson Quezada MD   Physician   Hematology Oncology      Kelsey Conner MD   Physician   Infectious Disease      Juan Velasquez MD   Physician   Internal Medicine      Kenyetta Rowland MD   Physician   Internal Medicine      Rosita Peabody, MD   Physician   Internal Medicine      Laisha Crooks MD   Physician   Internal Medicine      Fadumo Eid MD   Physician   Nephrology      Maribell Terry MD   Physician   Nephrology      Ita Melissa MD   Physician   Nephrology      Teodora Topete MD   Physician   Neurology      Hiren Gardner MD   Physician   Specialty: Neurology      Ladi Kuo MD   Physician   Oncology      Vivian Brown MD   Physician   Pulmonology      Ritesh Collazo MD   Physician   Pulmonology      Risk for Clinical Complications:  High      Co-morbidities:    Multifactorial encephalopathy as well as multi infarct CVAs and subarachnoid hemorrhage-aspirin on hold  Alcohol use-chronic liver disease, hepatitis C, prolonged PT/PTT, thrombocytopenia-platelets 78 last INR 1/s7-1.6 consider recheck  Acute hypoxic respiratory failure s/p intubation extubated 1/5, community-acquired pneumonia, cellulitis PICC line completed antibiotics of Unasyn and Vanco, on 4 L nasal cannula  Dysphagia with NG tube-awaiting follow-up swallow study per speech  Status post acute kidney injury  Agitated/confusion-on Geodon continues nightly and as needed  Hypertension-Coreg, Lasix, spironolactone  GERD-Protonix     Financial Information  Primary insurance: Medicaid      Secondary Insurance: None      Precautions:   []Cardiac Precautions: No Cardiac Precautions  []Total hip precautions: No Total Hip Precautions  []Weight Bearing status: No Weight Bearing Restrictions  [x]Safety Precautions/Concerns:  Fall Risk, General Precautions  [x]Visually impaired: Yes: Wears glasses at all times             [x]Hard of Hearing: Yes: Hard of hearing/hearing concerns (Ringing in ears)               Communication Aids, Devices or Interpretation Services Required: None     Isolation Precautions:  Yes: Contact Precautions for MRSA                   Physiatrist: Dr. Vidal Van       Patients Occupation: Retired     Reviewed Lab and Diagnostic reports from Current Admission: Yes     Patients Prior Functional  Level: Prior Function  ADL Assistance: Independent  Homemaking Assistance: Independent  Ambulation Assistance: Independent  Transfer Assistance: Independent  Additional Comments: Spouse is home all the time and able to assist as needed.  Daughter lives close by and able to assist as needed. Daughter works and has family. History of current illness, per PM&R Consult:  Mr. Richar Whitehead is a 60 y.o.  male who was admitted to [facility] on 1/1/2023 with Altered Mental Status     58-year-old male with altered mental status admitted with acute respiratory failure secondary to pneumonia. On admission O2 sats 95% he was placed on nonrebreather does have a history of regular alcohol intake but denies any recent use. Patient was placed on BiPAP chest x-ray showed right lower pneumonia and large bullae in left apex with a pneumothorax cannot be excluded had respiratory acidosis with a liver enzymes and INR 2.3     Hematology/oncology-chronic liver disease secondary alcohol anemia and thrombocytopenia related to liver disease as well as coagulopathy, cardioembolic strokes with component of subarachnoid bleeding not a candidate for anticoagulation-discussed abstaining alcohol and acetaminophen and avoid aspirin he is taking high-dose of supplements almost a toxic level     ID-acute hypoxic restaurant failure s/p intubation 1/2 subsequently extubated, community-acquired pneumonia, cellulitis of PICC line that was removed possible MRSA completed Unasyn 1/8 continue IV vancomycin day 12 plan to discontinue, severe dysphagia has NG tube placed positive hepatitis C COVID-19 negative follow-up with GI as outpatient for hepatitis C management     Internal medicine-failed swallow study getting feeding through NG tube remains on Geodon?,  IVC dilated without respiratory collapse noted on echo on 4 L nasal cannula, noted multiple acute infarcts with subarachnoid hemorrhage versus meningitis-neurology following no new strokes and subarachnoid hemorrhage resolving neurology signed off,, ALT AST back to normal platelets improving in the 80s outpatient follow-up with GI no DVT prophylaxis due to prolonged PT and PTT     Nephrology acute kidney injury sec.   Prerenal and ischemic acute tubular necrosis, replace electrolytes remove Mars on potassium supplement and IV magnesium     Neurology felt that multifactor encephalopathy. Appearance of strokes on MRI unusual appear more small vessel in nature evaluating for autoimmune labs     Psychiatry-decrease Geodon by discontinue morning dose remain on schedule 10 mg nightly     Pulmonary-continue EPC cuffs weaning O2 as tolerated on DuoNeb treatments-noted acute hypoxic respiratory failure extubated 1/5, CAD episodes agitation/delirium     Prognosis: Fair     Current functional status for upper extremity ADLs:  UE Bathing: Stand by assistance  UE Dressing: Minimal assistance     Current functional status for lower extremity ADLs:  LE Bathing: Moderate assistance  LE Dressing: Moderate assistance     Current functional status for bed, chair, wheelchair transfers:  Transfers  Sit to Stand: Unable to assess (pt unalbe to bear wt through LEs to attempt due to weakness and cognitive concerns/ safety)     Current functional status for toilet transfers:   Toilet Transfers  Toilet - Technique: Ambulating  Equipment Used: Standard bedside commode  Toilet Transfer: 2 Person assistance, Minimal assistance  Toilet Transfers Comments: Verbal cues for hand placement and safety with Fair carryover     Current functional status for locomotion:  Ambulation  Comments: NT     Current functional status for comprehension: Exceptions To Functional Limits     Current functional status for expression: Exceptions To Functional Limits     Current functional status for social interaction: Exceptions To Functional Limits     Current functional status for problem solving: Exceptions To Functional Limits     Current functional status for memory: Exceptions To Functional Limits     Current Deficits R/T Impairment: Impaired Functional Mobility and Decreased ADLs     Required Therapy Services:  Physical Therapy: Yes  Occupational Therapy: Yes  Speech Therapy: Yes       Additional Services:    [x] Social Services/Case Management    [] Recreational Therapy, as appropriate    [x] Nutrition Consult, as appropriate  [x] Dietary Needs/Preferences: Specialized Dietary Needs/Preferences indicated as follows: ADULT TUBE FEEDING; PEG; Peptide Based; Continuous; 20; Yes; 10; Q 4 hours; 70; 200; Q 6 hours   [] Dialysis  [x] Cultural/Evangelical Needs/Preferences: No Specialized Cultural/Evangelical Needs/Preferences Identified  [] Special Equipment Needs  [] Special Medication Needs  [x] Other information relevant to patient's care needs: Palliative Care, Psychiatry and Spiritual Care following      Preferred Language: English     Does the patient need or want an  to communicate with a doctor or health care staff? No     Rehab Justification:  Needs 3 hrs therapy per day or 15 hours per week:  Yes  Identified Rehab Nursing needs: Yes  Intense Interdisciplinary need:  Yes  Need for 24 hr physician supervision:  Yes  Measurable improved quality of life:  Yes  Willingness to participate:  Yes  Medical Necessity:  Yes  Patient able to tolerate care proposed:  Yes     Expected Discharge Destination/Functional Level:  Home with assist  Expected length of time to achieve that level of improvement: 1-2 weeks  Expected Post Discharge Treatments: Home with possible Home Care     Acute Inpatient Rehabilitation Disclosure Statement will be provided to patient upon admission to ARU with patient's verbalization of understanding. I have reviewed and concur with the findings and results of the pre-admission screening assessment completed by the Inpatient Rehabilitation Admissions Coordinator.            Cosigned by: Verle Halsted, MD at 1/18/2023  3:22 PM

## 2023-01-20 NOTE — CARE COORDINATION
ARU CASE MANAGEMENT NOTE:    Patient is alert and oriented x4. Spoke with patient regarding discharge plan and patient confirms plan is to go home with S/O. He is unsure if he going to have OP or HHC/ Will discuss in Teams for a recommendation     DME:None     Outside appointments: Will continue to follow for additional discharge needs.     Electronically signed by Bao Adams RN on 1/20/2023 at 10:12 AM

## 2023-01-20 NOTE — PROGRESS NOTES
Physical Medicine & Rehabilitation  Progress Note      Subjective:      Patient is well, and has had no acute complaints or problems. He denies pain aside from abdominal pain with coughing. He states he is tired after therapy this morning. Per nursing staff, patient has been having episodes of confusion and not sleeping well at night. ROS:  Denies fevers, chills, sweats. No chest pain, palpitations, lightheadedness. Denies coughing, wheezing or shortness of breath. Positive for abdominal pain. No nausea, diarrhea or constipation. No new areas of joint pain. Denies new areas of numbness or weakness. Denies new anxiety or depression issues. No new skin problems. Rehabilitation:   Progressing in therapies. PT:    Bed mobility  Rolling to Left: Moderate assistance  Rolling to Right: Moderate assistance  Supine to Sit: Moderate assistance (Assist at trunk, flat bed, used rail.)  Sit to Supine: Moderate assistance (Assist with BLEs)  Scooting: Stand by assistance  Bed Mobility Comments: HOB flat left handrail         Transfers  Sit to Stand: Moderate Assistance (from [facility])  Stand to Sit: Minimal Assistance (decreased eccentric controll.)  Bed to Chair: Moderate assistance (RW)         Ambulation  Surface: Level tile  Device: Rolling Walker  Other Apparatus: O2 (2 lt o2)  Assistance: Moderate assistance  Quality of Gait: Needs cues for sequencing , step length. Katalina 2 91% after activity, increased to 94% with rest.  Gait Deviations: Slow Radha, Decreased step length, Decreased step height, Increased SELVIN  Distance: 4 steps to turn to chair, and 3 steps forward and 3 steps backward- total of 10 steps  Comments: NT       OT: pending eval             SPEECH:  Pt. Seen for O/M treatment program for dysphagia. Pt. Completed O/M exercises x2, 12 reps each, Kim maneuver x5 and simple tongue base strengthening exercises x4, 12 reps each. Pt c/o dry mouth; ST provided moist oral swab. Pt.  Continues c mildly impaired resonance (? If d/t dried secretions). List of dysphagia exercises given to pt and SO for independent practice. They verbalized understanding of dysphagia exercise program.     Objective:  /80   Pulse 90   Temp 99.1 °F (37.3 °C) (Oral)   Resp 16   Ht 5' 7\" (1.702 m)   Wt 163 lb (73.9 kg)   SpO2 93%   BMI 25.53 kg/m²       GEN: well developed, well nourished, NAD  HEENT: NCAT, PERRL, EOMI, mucous membranes pink and moist  CV: RRR, no murmurs, rubs or gallops  PULM: Faint wheezes, no rales or rhonchi. Respirations WNL and unlabored. ABD: soft, NT, ND, BS+ and equal. PEG tube in place, no erythema or discharge. NEURO: A&O x3. Sensation intact to light touch. MSK: Functional ROM normal bilaterally . Strength 4/5 with hip flexion of the RLE, otherwise 5/5  EXTREMITIES: No calf tenderness to palpation bilaterally. No edema BLEs  SKIN: warm dry and intact with good turgor  PSYCH: appropriately interactive. Affect WNL. Diagnostics:     CBC:   Recent Labs     01/18/23  0549 01/19/23  0450 01/20/23  0620   WBC 5.8 6.4 5.4   RBC 5.19 4.96 4.92   HGB 11.4* 11.0* 10.9*   HCT 38.3* 37.2* 37.1*   MCV 73.8* 75.1* 75.5*   RDW 33.2* 32.2* 33.2*    199 212     BMP:   Recent Labs     01/18/23  0549 01/19/23  0450 01/20/23  0620    141 144   K 3.6* 3.5* 4.0    104 105   CO2 31 32* 32*   BUN 10 14 13   CREATININE 0.65* 0.65* 0.70   GLUCOSE 116* 122* 111*     BNP: No results for input(s): BNP in the last 72 hours. PT/INR: No results for input(s): PROTIME, INR in the last 72 hours. APTT: No results for input(s): APTT in the last 72 hours. CARDIAC ENZYMES: No results for input(s): CKMB, CKMBINDEX, TROPONINT in the last 72 hours.     Invalid input(s): CKTOTAL;3 troponins   FASTING LIPID PANEL:  Lab Results   Component Value Date    TRIG 85 01/03/2023     LIVER PROFILE:   Recent Labs     01/18/23  0549 01/19/23  0450 01/20/23  0620   AST 24 17 21   ALT 22 19 18   BILIDIR  --   --  0.4* BILITOT 1.3* 0.9 0.8   ALKPHOS 42 41 40        Current Medications:   Current Facility-Administered Medications: guaiFENesin (MUCINEX) extended release tablet 600 mg, 600 mg, Oral, BID PRN  carvedilol (COREG) tablet 6.25 mg, 6.25 mg, Oral, BID WC  ferrous sulfate (IRON 325) tablet 325 mg, 325 mg, Oral, Daily with breakfast  spironolactone (ALDACTONE) tablet 25 mg, 25 mg, Oral, Daily  acetaminophen (TYLENOL) tablet 650 mg, 650 mg, Oral, Q4H PRN  polyethylene glycol (GLYCOLAX) packet 17 g, 17 g, Oral, Daily  senna (SENOKOT) tablet 17.2 mg, 2 tablet, Oral, Daily PRN  bisacodyl (DULCOLAX) suppository 10 mg, 10 mg, Rectal, Daily PRN      Impression/Plan:   Impaired ADLs, gait, and mobility due to:      Multifocal cardioembolic CVA, subarachnoid hemorrhage:  PT/OT for gait, mobility, strengthening, endurance, ADLs, and self care. Dysphagia: s/p PEG tube placement on 1/17. SLP to eval and treat. Dietitian managing tube feed - transition from around the clock to nocturnal/bolus as tolerated. Agitation:  improved. No current medication  Acute respiratory failure: required intubation. Currently on 2L NC - will wean as tolerated  Pneumonia: Improved. completed course of Unasyn and Vanco  KENTRELL: resolved. Will monitor  Elevated LFTs:  resolved. Will monitor  HCV: will need f/u with GI for treatment outpatient  Anemia: Hb low but stable. Will monitor  Thrombocytopenia: Platelet count low but stable. Will monitor. Bowel Management: Miralax daily, senokot prn, dulcolax prn. DVT Prophylaxis:   Holding DVT chemoprophylaxis due to [facility] - BLE doppler negative 1/18. TONI stockings during the day, EPC cuffs at night.   IM for medical management      Electronically signed by Susana Tinoco MD on 1/20/2023 at 9:38 AM      This note is created with the assistance of a speech recognition program.  While intending to generate a document that actually reflects the content of the visit, the document can still have some errors including those of syntax and sound a like substitutions which may escape proof reading. In such instances, actual meaning can be extrapolated by contextual diversion.

## 2023-01-20 NOTE — PROGRESS NOTES
Writer received SC consult for patient;     01/20/23 0264   Encounter Summary   Encounter Overview/Reason  Attempted Encounter   Service Provided For: Patient not available  (patient sleeping)   Assessment/Intervention/Outcome   Intervention Prayer (assurance of)/[place]

## 2023-01-20 NOTE — PLAN OF CARE
Individualized Plan of Care  [facility]    Rehabilitation physician: Dr Vinay Bonner Date: 1/19/2023     Rehabilitation Diagnosis: Impending cerebrovascular accident [facility])     Rehabilitation impairments: self care, mobility, motor dysfunction, pain management, safety, cognitive function, and communication    Factors facilitating achievement of predicted outcomes: Family support, Motivated, Cooperative, Pleasant, Good insight into deficits, and Has homemaker services  Barriers to the achievement of predicted outcomes: Limited insight into deficits, Decreased endurance, Upper extremity weakness, Lower extremity weakness, Medical complications, and Medication managment    Patient Goals: Improve independence with mobility, Increase overall strength and endurance, Increase balance, Increase independence with activities of daily living, Improve cognition, Increase safety awareness, Integrate appropriate pain management plan, Assure adequate nutritional option for discharge, and Provide appropriate patient and family education      NURSING:  Nursing goals for Ann Killer while on the rehabilitation unit will include:  Adequate number of bowel movements, Urinate with no urinary retention >300ml in bladder, Maintain O2 SATs at an acceptable level during stay, Effective pain management while on the rehabilitation unit, Establish adequate pain control plan for discharge, Absence of skin breakdown while on the rehabilitation unit, Improved skin integrity via assessments including wound measurements, Avoidance of any hospital acquired infections, No signs/symptoms of infection at the wound site, Freedom from injury during hospitalization, and Complete education with patient/family with understanding demonstrated regarding disease process and resultant impairment     In order to achieve these goals, nursing interventions may include bowel/bladder training, education for medical assistive devices, medication education, O2 saturation management, energy conservation, stress management techniques, fall prevention, alarms protocol, seating and positioning, skin/wound care, pressure relief instruction, dressing changes, infection protection, DVT prophylaxis, assistance with safe transfers , and/or assistance with bathroom activities and hygiene. PHYSICAL THERAPY:  Goals:        Short Term Goals  Time Frame for Short Term Goals: 9 days  Short Term Goal 1: CGA assist supine<-> sit x1 assist  Short Term Goal 2: sit EOB withsupervsion up to 15 min for therex/ADL  Short Term Goal 3: 3+/5 LE strength to prepare for standing activiites and gait  Short Term Goal 4: ambulation with rolling walker distance of 70 to 100 ft, CGA, level surface  Short Term Goal 5: roll L/R with SBA  Short Term Goal 6: Pt able to go up and down 5 steps with 2 rails, min A  Long Term Goals  Time Frame for Long Term Goals : By DC  Long Term Goal 1: pt able to roll L/R indepndently  Long Term Goal 2: pt able to perform supine<>sit mod-I  Long Term Goal 3: pt able to perform sit<>stand and perform pivot/step to transfers at mod-I  Long Term Goal 4: pt able to ambulate household distance with appropriate device, mod-I  Long Term Goal 5: pt able to ambulate > 50 ft with appropriate device, at SBA/supervsion level, level as well as incline surface. Long term goal 6: pt able to go up and down 4 or more steps with B UE support, CGA. Long term goal 7: Improve PASS score to 27/36 improve function/stability. Long term goal 8: improve Tinetti balance score to atleast 23/28 to reduce fall risk. Plan of Care: Pt to be seen by physical therapy services 1 Hour 30 Minutes per day at least 5 out of 7 days per week     Anticipated interventions may include therapeutic exercises, gait training, neuromuscular re-ed, transfer training, community reintegration, bed mobility, w/c mobility and training.       OCCUPATIONAL THERAPY:  Goals: Short Term Goals  Time Frame for Short Term Goals: One week  Short Term Goal 1: Patient will perform oral hygiene with SBA and Good safety. Short Term Goal 2: Patient will perform hair brushing with SBA. Short Term Goal 3: Patient will perform upper body dressing with SBA. Short Term Goal 4: Patient will perform lower body dressing, lower body bathing, and toileting tasks with Minimal assist.  Short Term Goal 5: Patient will perform functional moblity and transfers during self-care with CGA, least restrictive mobility device, and Good safety. Short Term Goal 6: Patient will verbalize/demonstrate Good understanding of assistive equipment/durable medical equipment/modified techniques to maximize safety and independence with self-care. Short Term Goal 7: Patient will actively participate in 30+ minutes of therapeutic exercise/functional activities to promote increased independence with self-care and mobility. Long Term Goals  Time Frame for Long Term Goals : By discharge  Long Term Goal 1: Patient will perform BADLs with modified independence and Good safety. Long Term Goal 2: Patient will perform functional mobility and transfers during self-care with modified independence, least restrictive mobility device, and Good safety. Long Term Goal 3: Patient will stand for 5+ minutes with 0-1 UE support, modified independence while engaging in functional activity of choice. Long Term Goal 4: Patient will perform simple meal prep and light housekeeping with SBA and Good safety. Long Term Goal 5: Patient will verbalize/demonstrate Good understanding of Fall Prevention Strategies to maximize safety and independence with self-care.   Long Term Goal 6: Bilateral  strength and motor control to be assessed and goals updated    Plan of Care: Patient to be seen by occupational therapy services 1 Hour 30 Minutes per day at least 5 out of 7 days per week     Anticipated interventions may include ADL and IADL retraining, strengthening, safety education and training, patient/caregiver education and training, equipment evaluation/ training/procurement, neuromuscular reeducation, wheelchair mobility training. SPEECH THERAPY:   Goals:             Short Term Goals  Goal 1: Increase swallow function with or without use of NMES via oral motor,  tongue base strengthening and laryngeal excursion exercises. Goal 2: Increase oreintation, divergent naming, convergent inferences and delayed recall tasks to 90%        Plan of Care: Pt to be seen by speech therapy services 1 Hour per day at least 5 out of 7 days per week    Anticipated interventions may include speech/language/communication therapy, cognitive training, group therapy, education, and/or dysphagia therapy based on the above goals. CASE MANAGEMENT:  Goals:   Assist patient/family with discharge planning, patient/family counseling,  and coordination with insurance during the inpatient rehabilitation stay. Other members of the multidisciplinary rehabilitation team that will be involved in the patient's plan of care include recreational therapy, dietary, respiratory therapy, and neuropsychology. Medical issues being managed closely and that require 24 hour availability of a physician:  Pain management, Infection protection, DVT prophylaxis, Fall precautions, Fluid/Electrolyte balance, Nutritional status, Respiratory needs, Anemia, and History of heart disease                                           Physician anticipated functional outcomes: Improved independence with functional measures   Estimated length of stay for this admission 2 weeks  Medical Prognosis: Fair  Anticipated disposition: Home. The potential to achieve the above medical and rehabilitative goals is fair. This plan of care has been developed with the assistance and input of the multidisciplinary rehabilitation team.  The plan was reviewed with the patient on 1/20/2023.   The patient has had the opportunity to provide input to the therapy team.    I have reviewed this Individualized Plan of Care and agree with its contents. Above documentation has been expanded, modified, adjusted to reflect the findings of my evaluations and goals for the patient.     Physician:  Electronically signed by Gretchen To MD on 1/20/23 at 9:49 AM EST

## 2023-01-20 NOTE — PROGRESS NOTES
[address]    Acute Rehabilitation Occupational Therapy Daily Treatment Note    Date: 23  Patient Name: Jere Pollock       Room: 7323/8951-53  MRN: [id number]  Account: [de-identified]   : 1960  (60 y.o.) Gender: male       Referring Practitioner: Arlin Cotter MD  Diagnosis: Cerebrovascular accident  Additional Pertinent Hx: Per MRI Impression on 23: Mild amount of residual subarachnoid hemorrhage in the right frontal and  right parietal lobes as well as the left frontal lobe. Evolving small focus of hemorrhage in the left parietal lobe posteriorly. Evolving areas of ischemia in the right frontal lobe and left posterior parietal lobe as well as a few foci in the parasagittal aspect of the right frontoparietal region. Treatment Diagnosis: Impaired self care status    Past Medical History:  has no past medical history on file. Past Surgical History:   has a past surgical history that includes Upper gastrointestinal endoscopy (N/A, 2023). Restrictions  Restrictions/Precautions  Restrictions/Precautions: Fall Risk, General Precautions, Contact Precautions, Isolation, Bed Alarm  Required Braces or Orthoses?: No  Implants present? :  (pt denies)     Position Activity Restriction  Other position/activity restrictions: Severe dysphagia- NPO. Unable to tolerate any PO safely. NG tube placed,  on high flow nasal cannula            Subjective  Subjective  Subjective: \"Something's not right\" patient states regarding donning his pants. OT provided reorientation to clothing. Pain: Patient denies. Objective  Cognition          Cognition  Overall Cognitive Status: Exceptions  Arousal/Alertness: Appropriate responses to stimuli  Following Commands:  Follows one step commands consistently  Attention Span: Attends with cues to redirect  Memory: Decreased recall of biographical Information;Decreased recall of precautions;Decreased recall of recent events  Safety Judgement: Decreased awareness of need for assistance;Decreased awareness of need for safety  Problem Solving: Assistance required to generate solutions;Assistance required to implement solutions;Assistance required to identify errors made;Assistance required to correct errors made  Insights: Decreased awareness of deficits  Initiation: Requires cues for some  Sequencing: Requires cues for some    Activities of Daily Living  Feeding  Assistance Level: Set-up    Grooming/Oral Hygiene  Assistance Level: Stand by assist  Skilled Clinical Factors: SBA with VCs to appropriately spit. Use of oral swabs and mouth wash        Lower Extremity Dressing  Assistance Level: Maximum assistance  Skilled Clinical Factors: Assist to orient and intermittent assist to thread B LE into pants. Assist to pull over hips while standing with Minimal assist. Patient demonstrates deficits in motor planning and sequencing, demonstrating apraxia with regard to the sequence of donning clothing. OT to continue to address    Putting On/Taking Off Footwear  Assistance Level: Maximum assistance  Skilled Clinical Factors: Assist to don B TONI hose and Moderate assist to don B socks due to deficits in motor planning and sequencing. Patient demonstrates apraxia with regard to the sequence of donning clothing. OT to continue to address    Toileting  Assistance Level: Maximum assistance  Skilled Clinical Factors: Assist to pull pants over hips while standing with Minimal assist. Patient performs personal hygiene after BM while seated on toilet    Toilet Transfers  Technique: Stand pivot; To the left; To the right  Equipment: Grab bars;Raised toilet seat without arms  Additional Factors: Verbal cues;Cues for hand placement;Set-up  Assistance Level: Moderate assistance      Mobility  Transfers  Surface: From bed; To chair with arms;Raised toilet Seat  Additional Factors: Set-up; Verbal cues; Hand placement cues  Sit to Stand  Assistance Level:  Moderate assistance  Stand to Sit  Assistance Level: Moderate assistance                      Functional Mobility  Device: Rolling walker  Activity:  (2-3 feet)  Assistance Level: Moderate assistance  Skilled Clinical Factors: with Moderate verbal cues for safety      Assessment  Assessment  Activity Tolerance: Patient limited by endurance; Patient limited by fatigue;Patient limited by pain  Discharge Recommendations: Home with assist PRN;Outpatient OT    Patient Education  Education  Education Given To: Patient  Education Provided: Role of Therapy;Plan of Care;Precautions; Safety;ADL Function  Education Method: Verbal;Demonstration  Barriers to Learning: Cognition; Hearing  Education Outcome: Verbalized understanding;Demonstrated understanding         Safety Devices  Safety Devices in place: Yes  Type of devices: All fall risk precautions in place;Call light within reach; Chair alarm in place;Gait belt;Patient at risk for falls; Left in chair;Nurse notified       Goals  Patient Goals   Patient goals : \"To learn how to walk properly. ..all that goes without saying (referring to bathing, dressing and toileting independence)\"  Short Term Goals  Time Frame for Short Term Goals: One week  Short Term Goal 1: Patient will perform oral hygiene with SBA and Good safety. Short Term Goal 2: Patient will perform hair brushing with SBA. Short Term Goal 3: Patient will perform upper body dressing with SBA. Short Term Goal 4: Patient will perform lower body dressing, lower body bathing, and toileting tasks with Minimal assist.  Short Term Goal 5: Patient will perform functional moblity and transfers during self-care with CGA, least restrictive mobility device, and Good safety. Short Term Goal 6: Patient will verbalize/demonstrate Good understanding of assistive equipment/durable medical equipment/modified techniques to maximize safety and independence with self-care.   Short Term Goal 7: Patient will actively participate in 30+ minutes of therapeutic exercise/functional activities to promote increased independence with self-care and mobility. Long Term Goals  Time Frame for Long Term Goals : By discharge  Long Term Goal 1: Patient will perform BADLs with modified independence and Good safety. Long Term Goal 2: Patient will perform functional mobility and transfers during self-care with modified independence, least restrictive mobility device, and Good safety. Long Term Goal 3: Patient will stand for 5+ minutes with 0-1 UE support, modified independence while engaging in functional activity of choice. Long Term Goal 4: Patient will perform simple meal prep and light housekeeping with SBA and Good safety. Long Term Goal 5: Patient will verbalize/demonstrate Good understanding of Fall Prevention Strategies to maximize safety and independence with self-care. Long Term Goal 6: Bilateral  strength and motor control to be assessed and goals updated    Plan  Occupational Therapy Plan  Times Per Week: 5-7  Times Per Day: Twice a day  Current Treatment Recommendations: Self-Care / ADL, Strengthening, ROM, Balance training, Functional mobility training, Endurance training, Cognitive reorientation, Neuromuscular re-education, Safety education & training, Patient/Caregiver education & training, Equipment evaluation, education, & procurement, Cognitive/Perceptual training, Coordination training, Home management training       01/20/23 0805 01/20/23 1405   OT Individual Minutes   Time In 0805  --    Time Out 0928  --    Minutes 83  --    Minute Variance   Variance  --  7   Reason  --  Illness  (Patient declines PM session, stating \"I feel like I have a cold\" patient reports nasal congestion and chest congestion. States it hurts to cough due to his PEG tube.  ANGEL Parks notified)       Electronically signed by ASHUTOSH Li on 1/20/23 at 3:21 PM EST

## 2023-01-20 NOTE — PROGRESS NOTES
[facility]  Spe[facility]ology    Date: 1/20/2023  Patient Name: Virginia Tejeda  YOB: 1960   AGE: 60 y.o. MRN: [id number]        Patient Not Available for Speech Therapy     Due to:  [] Testing  [] Hemodialysis  [] Cancelled by RN  [] Surgery   [] Intubation/Sedation/Pain Medication  [] Medical instability  [x] Other: 8595 - Attempted to see Pt for ST. Pt refused at this time stating, \"I'm sorry I can't\" and \"It's like I got a cold. I don't feel very good at all\". Pt c/o sinus congestion and lethargy. RN notified. Next scheduled treatment: 01/21  Completed by:  Anjel Gross M.A., CCC-SLP

## 2023-01-20 NOTE — PROGRESS NOTES
Writer spoke with patient regarding AD consult received; patient stated he is \"tired\" and did not want to have ACP discussion or execute document; patient stated he would advise SC with further needs or concerns in this regard; writer updated patient's nurse;      01/20/23 [phone]   Encounter Summary   Encounter Overview/Reason  Advance Care Planning   Service Provided For: Patient   Referral/Consult From: Nurse   Support System Significant other;Children   Last Encounter  01/20/23   Complexity of Encounter Moderate   Advance Care Planning   Type ACP conversation

## 2023-01-21 ENCOUNTER — APPOINTMENT (OUTPATIENT)
Dept: CT IMAGING | Age: 63
DRG: 139 | End: 2023-01-21
Attending: PHYSICAL MEDICINE & REHABILITATION
Payer: MEDICAID

## 2023-01-21 PROCEDURE — 97530 THERAPEUTIC ACTIVITIES: CPT

## 2023-01-21 PROCEDURE — 99231 SBSQ HOSP IP/OBS SF/LOW 25: CPT | Performed by: INTERNAL MEDICINE

## 2023-01-21 PROCEDURE — 97535 SELF CARE MNGMENT TRAINING: CPT

## 2023-01-21 PROCEDURE — 94761 N-INVAS EAR/PLS OXIMETRY MLT: CPT

## 2023-01-21 PROCEDURE — 97116 GAIT TRAINING THERAPY: CPT

## 2023-01-21 PROCEDURE — 6370000000 HC RX 637 (ALT 250 FOR IP): Performed by: INTERNAL MEDICINE

## 2023-01-21 PROCEDURE — 6370000000 HC RX 637 (ALT 250 FOR IP): Performed by: PHYSICAL MEDICINE & REHABILITATION

## 2023-01-21 PROCEDURE — 97129 THER IVNTJ 1ST 15 MIN: CPT

## 2023-01-21 PROCEDURE — 99232 SBSQ HOSP IP/OBS MODERATE 35: CPT | Performed by: PHYSICAL MEDICINE & REHABILITATION

## 2023-01-21 PROCEDURE — 92526 ORAL FUNCTION THERAPY: CPT

## 2023-01-21 PROCEDURE — 1180000000 HC REHAB R&B

## 2023-01-21 PROCEDURE — 97130 THER IVNTJ EA ADDL 15 MIN: CPT

## 2023-01-21 PROCEDURE — 70450 CT HEAD/BRAIN W/O DYE: CPT

## 2023-01-21 PROCEDURE — 94640 AIRWAY INHALATION TREATMENT: CPT

## 2023-01-21 PROCEDURE — 97110 THERAPEUTIC EXERCISES: CPT

## 2023-01-21 PROCEDURE — 2700000000 HC OXYGEN THERAPY PER DAY

## 2023-01-21 RX ORDER — QUETIAPINE FUMARATE 25 MG/1
25 TABLET, FILM COATED ORAL 2 TIMES DAILY
Status: DISCONTINUED | OUTPATIENT
Start: 2023-01-21 | End: 2023-01-22

## 2023-01-21 RX ORDER — IPRATROPIUM BROMIDE AND ALBUTEROL SULFATE 2.5; .5 MG/3ML; MG/3ML
1 SOLUTION RESPIRATORY (INHALATION)
Status: DISCONTINUED | OUTPATIENT
Start: 2023-01-21 | End: 2023-02-07 | Stop reason: HOSPADM

## 2023-01-21 RX ADMIN — ACETAMINOPHEN 650 MG: 325 TABLET ORAL at 20:02

## 2023-01-21 RX ADMIN — CARVEDILOL 6.25 MG: 6.25 TABLET, FILM COATED ORAL at 07:59

## 2023-01-21 RX ADMIN — SPIRONOLACTONE 25 MG: 25 TABLET ORAL at 07:59

## 2023-01-21 RX ADMIN — QUETIAPINE FUMARATE 25 MG: 25 TABLET ORAL at 15:29

## 2023-01-21 RX ADMIN — IPRATROPIUM BROMIDE AND ALBUTEROL SULFATE 1 AMPULE: 2.5; .5 SOLUTION RESPIRATORY (INHALATION) at 15:11

## 2023-01-21 RX ADMIN — QUETIAPINE FUMARATE 25 MG: 25 TABLET ORAL at 20:02

## 2023-01-21 RX ADMIN — CARVEDILOL 6.25 MG: 6.25 TABLET, FILM COATED ORAL at 17:41

## 2023-01-21 RX ADMIN — FERROUS SULFATE TAB 325 MG (65 MG ELEMENTAL FE) 325 MG: 325 (65 FE) TAB at 07:59

## 2023-01-21 ASSESSMENT — PAIN SCALES - WONG BAKER: WONGBAKER_NUMERICALRESPONSE: 0

## 2023-01-21 NOTE — PROGRESS NOTES
Pt noted very restless while resting in bed continues to take oxygen off pt difficult to redirect.  notified. Via [employer].

## 2023-01-21 NOTE — PROGRESS NOTES
Dr. J Carlos Woodson notified pt's change in condition; pt confused, anxious and agitated. Pt reminded repeatedly to leave oxygen on, pt also, repeatedly setting off bed-alarm trying to get up out of bed. Pt was encouraged to use call-light for help. Pt non-compliant with safety measures. New orders received per Dr. J Carlos Woodson. Pt's wife updated.

## 2023-01-21 NOTE — DISCHARGE SUMMARY
[address]    Discharge Summary     Patient ID: Geovanna Madrigal  :  1960   MRN: [id number]     ACCOUNT:  [de-identified]   Patient's PCP: Shandra Soto MD  Admit Date: 2023   Discharge Date: 2023     Length of Stay: 17  Code Status:  Full Code  Admitting Physician: Jordan Orta MD  Discharge Physician: Dutch Orozco MD     Active Discharge Diagnoses:       Primary Problem  Acute respiratory failure with hypoxia and hypercapnia [facility])      [facility]    Diagnosis Date Noted    Dysphagia [R13.10] 2023     Priority: Medium    Hepatitis C virus infection without hepatic coma [B19.20] 2023     Priority: Medium    Moderate malnutrition ([address] [E44.0] 2023     Priority: Medium    Delirium due to another medical condition [F05] 01/10/2023     Priority: Medium    ACP (advance care planning) [Z71.89] 2023     Priority: Medium    Goals of care, counseling/discussion [Z71.89] 2023     Priority: Medium    Encounter for palliative care [Z51.5] 2023     Priority: Medium    Prolonged pt (prothrombin time) [R79.1] 2023     Priority: Medium    Emphysema lung ([address] [J43.9] 2023     Priority: Medium    Cerebral infarction ([address] [I63.9] 2023     Priority: Medium    Transaminitis [R74.01] 2023     Priority: Medium    Normocytic anemia [D64.9] 2023     Priority: Medium    Thrombocytopenia ([address] [D69.6] 2023     Priority: Medium    Agitation [R45.1] 2023     Priority: Medium    Acute respiratory failure with hypoxia and hypercapnia ([address] RLL pneumonia [J96.01, J96.02] 2023     Priority: Medium    Altered mental status [R41.82] 2023     Priority: Medium    Community acquired pneumonia of right lower lobe of lung [J18.9] 2023     Priority: Medium       Admission Condition:  poor     Discharged Condition: stable    Hospital Stay: Hospital Course:  Alonso Cavazos is a 60 y.o. male who was admitted for the management of   Acute respiratory failure with hypoxia and hypercapnia ([address] , presented to ER with Altered Mental Status  Without known past medical history. Presented to the ED with altered mental status and in acute respiratory distress secondary to pneumonia. In the ED, patient was placed on BiPAP and chest x-ray confirmed right lower lobe pneumonia and was MRSA positive. ABG showed acute respiratory failure with hypercapnia and hypoxia. Soon after required intubation. Through the course of his hospital admission, patient developed acute heart failure/NSTEMI, he had elevated ALT and AST, was found to be in acute coagulopathy with INR of 2.3 and patient was also thrombocytopenic platelet count around 50. Patient was found to have a reactive hepatitis C exam and PCR test was also positive. During stay patient was agitated and altered while intubated. Upon presentation patient had a CT head without contrast that showed patient also had CT head showed no acute intracranial abnormalities. However repeat CT scan of the head of the 2 and 3 January showed that patient had areas of acute to subacute infarcts mainly on the posterior left parietal lobe as well as areas of subarachnoid hemorrhage, [those areas were suggestive of leptomeningeal enhancement which can be seen with meningitis, however, patient medical condition was not suggestive of such. Neurology also held on going for lumbar puncture as patient platelets were low and elevated INR without distinct findings clinically]. Therefore, patient was not given aspirin or anticoagulation. Neurology were consulted, at that time. Also hematology oncology were consulted due to the presence of coagulopathy and thrombocytopenia, which was attributed to his acute liver disease as mentioned above, there was no suggestion of DIC.     Patient course of stay actually remained the same, until patient significantly improved on 10/11/2022. Patient became alert and oriented, he had a little bit of slurred speech which is somehow compared to baseline. Patient had had multiple swallow studies which she felt, patient then had a PEG tube placement and was discharged to acute rehab      By time of discharge, patient AST and ALT normalized. The INR and PTT were also improving. Patient thrombocytopenia resolved. Significant therapeutic interventions: Intubation, Geodon, vancomycin, Unasyn, Levophed, propofol, Rocephin, metronidazole, PEG tube    Significant Diagnostic Studies: Echocardiogram, CT head, CTA head, chest CT, barium swallow  Labs / Micro:  CBC:   Lab Results   Component Value Date/Time    WBC 5.4 01/20/2023 06:20 AM    RBC 4.92 01/20/2023 06:20 AM    HGB 10.9 01/20/2023 06:20 AM    HCT 37.1 01/20/2023 06:20 AM    MCV 75.5 01/20/2023 06:20 AM    MCH 22.1 01/20/2023 06:20 AM    MCHC 29.3 01/20/2023 06:20 AM    RDW 33.2 01/20/2023 06:20 AM     01/20/2023 06:20 AM         Radiology:    CT HEAD WO CONTRAST    Result Date: 1/3/2023  EXAMINATION: CT OF THE HEAD WITHOUT CONTRAST  1/2/2023 10:11 pm TECHNIQUE: CT of the head was performed without the administration of intravenous contrast. Automated exposure control, iterative reconstruction, and/or weight based adjustment of the mA/kV was utilized to reduce the radiation dose to as low as reasonably achievable. COMPARISON: None. HISTORY: ORDERING SYSTEM PROVIDED HISTORY: Altered Mental status TECHNOLOGIST PROVIDED HISTORY: Altered Mental status Reason for Exam: Altered Mental status Additional signs and symptoms: Patient came in with transient alteration of awareness, follow up head CT for any changes. FINDINGS: BRAIN/VENTRICLES: There is no acute intracranial hemorrhage, mass effect or midline shift. No abnormal extra-axial fluid collection.   There is a focal area of decreased attenuation with loss of gray/white matter differentiation involving posterior left parietal lobe with somewhat increased conspicuity as compared to prior exam.  There is stable small focus of encephalomalacia involving left cerebellar hemisphere compatible with prior remote infarct/insult. Chronic lacunar infarct to right basal ganglia redemonstrated. There is no evidence of hydrocephalus. ORBITS: The visualized portion of the orbits demonstrate no acute abnormality. SINUSES: Small air-fluid level right sphenoid sinus. Trace air-fluid level right frontal sinus. Mild mucoperiosteal thickening to bilateral ethmoid air cells. Findings are new from prior exam and likely relate to presence of nasogastric tube. SOFT TISSUES/SKULL:  No acute abnormality of the visualized skull or soft tissues. Focal area of decreased attenuation with loss of gray/white matter differentiation involving posterior left parietal lobe with somewhat increased conspicuity as compared to prior exam likely reflecting an area of acute to subacute infarct. Stable small chronic infarct/insult to left cerebellar hemisphere. Chronic lacunar infarct to right basal ganglia redemonstrated. Paranasal sinus disease likely relating to presence of nasogastric tube. CT HEAD WO CONTRAST    Result Date: 1/1/2023  EXAMINATION: CT OF THE HEAD WITHOUT CONTRAST  1/1/2023 10:48 pm TECHNIQUE: CT of the head was performed without the administration of intravenous contrast. Automated exposure control, iterative reconstruction, and/or weight based adjustment of the mA/kV was utilized to reduce the radiation dose to as low as reasonably achievable. COMPARISON: None. HISTORY: Reason for Exam: AMS Additional signs and symptoms: the patient has been getting more and more confused since 2 weeks ago. It has progressively been getting worse and today FINDINGS: BRAIN/VENTRICLES: There is no acute intracranial hemorrhage, mass effect or midline shift. No abnormal extra-axial fluid collection.   The gray-white differentiation is maintained without evidence of an acute infarct. There is no evidence of hydrocephalus. Changes of mild chronic small vessel ischemic disease. ORBITS: The visualized portion of the orbits demonstrate no acute abnormality. SINUSES: The visualized paranasal sinuses and mastoid air cells demonstrate no acute abnormality. SOFT TISSUES/SKULL:  No acute abnormality of the visualized skull or soft tissues. No acute intracranial abnormality. Mild chronic small vessel ischemic disease. CT HEAD W CONTRAST    Result Date: 1/10/2023  EXAMINATION: CT OF THE HEAD WITH CONTRAST  1/10/2023 11:09 am TECHNIQUE: CT of the head/brain was performed with the administration of intravenous contrast. Multiplanar reformatted images are provided for review. Automated exposure control, iterative reconstruction, and/or weight based adjustment of the mA/kV was utilized to reduce the radiation dose to as low as reasonably achievable. COMPARISON: CT brain performed 02/2023. HISTORY: ORDERING SYSTEM PROVIDED HISTORY: Follow up subarachnoid hemorrhage TECHNOLOGIST PROVIDED HISTORY: Follow up subarachnoid hemorrhage Reason for Exam: Follow up subarachnoid hemorrhage. CT scan on 1/2/2023 FINDINGS: BRAIN/VENTRICLES: Is minimal scattered subarachnoid hemorrhage within right cerebral hemisphere. Is a focus intraparenchymal hemorrhage in the left parietal lobe. There is no mass effect midline shift. Ventricular structures are symmetric. The infratentorial structures are unremarkable. There is no abnormal postcontrast enhancement. ORBITS: The visualized portion of the orbits demonstrate no acute abnormality. SINUSES: The visualized paranasal sinuses and mastoid air cells demonstrate no acute abnormality. SOFT TISSUES/SKULL:  No acute abnormality of the visualized skull or soft tissues. Scattered foci of subarachnoid hemorrhage in the right cerebral hemisphere.  Focus of intraparenchymal hemorrhage in the left parietal lobe. US LIVER    Result Date: 1/2/2023  EXAMINATION: RIGHT UPPER QUADRANT ULTRASOUND 1/2/2023 10:20 am COMPARISON: None. HISTORY: ORDERING SYSTEM PROVIDED HISTORY: elevated AST and ALT, in setting of PT, and INR TECHNOLOGIST PROVIDED HISTORY: elevated AST and ALT, in setting of PT, and INR FINDINGS: Patient body habitus limits the study, as there is increased attenuation of the ultrasound beam. This decreases sensitivity and specificity. LIVER:  There is mild increased echogenicity seen throughout the liver. No intrahepatic ductal dilatation. No perihepatic fluid. BILIARY SYSTEM:  Bladder is contracted. Gallstones are seen. Gallbladder wall thickness measures 6 mm. Common bile duct is within normal limits measuring 5 mm. RIGHT KIDNEY: The right kidney is grossly unremarkable without evidence of hydronephrosis. PANCREAS:  Visualized portions of the pancreas are unremarkable. OTHER: No evidence of right upper quadrant ascites. Portal vein flow appears hepatopetal     Increased echogenicity is seen throughout the liver suggesting diffuse hepatocellular disease such as fatty infiltration Cholelithiasis. No cholecystitis     XR CHEST PORTABLE    Result Date: 1/18/2023  EXAMINATION: ONE XRAY VIEW OF THE CHEST 1/18/2023 10:40 am COMPARISON: AP chest from 01/05/2023 HISTORY: ORDERING SYSTEM PROVIDED HISTORY: follow up dyspnea/pna TECHNOLOGIST PROVIDED HISTORY: follow up dyspnea/pna FINDINGS: Interval extubation/NG tube removal. Overlying respiratory tubing/ECG monitor leads. Cardiomediastinal shadow stable. Similar or slightly improved bibasilar opacities; possible slightly greater GGO right mid lung. Upper zone emphysema/bulla, greater on left. No large pleural effusion or PTX. Bones unchanged. Interval extubation and NG tube removal.  Otherwise similar findings, with perhaps slightly greater hazy ground-glass opacity right mid lung. Correlate clinically.      XR CHEST PORTABLE    Result Date: 1/5/2023  EXAMINATION: ONE XRAY VIEW OF THE CHEST 1/5/2023 6:07 am COMPARISON: Chest x-ray dated 4 January 2023 HISTORY: ORDERING SYSTEM PROVIDED HISTORY: While on ventilator TECHNOLOGIST PROVIDED HISTORY: While on ventilator Reason for Exam: on vent FINDINGS: Stable cardiomediastinal silhouette. Stable lines and tubes. Stable appearance of small bilateral pleural effusions with bibasilar airspace disease. No pneumothorax. Stable exam with small bilateral pleural effusions and bibasilar airspace disease. Consider pneumonia versus atelectasis in the appropriate clinical setting. XR CHEST PORTABLE    Result Date: 1/4/2023  EXAMINATION: ONE XRAY VIEW OF THE CHEST 1/4/2023 6:08 am COMPARISON: Chest x-ray dated 3 January 2023, 0632 hours HISTORY: ORDERING SYSTEM PROVIDED HISTORY: While on ventilator TECHNOLOGIST PROVIDED HISTORY: While on ventilator Reason for Exam: on vent FINDINGS: Left-sided PICC line with tip in the superior vena cava. Endotracheal tube with the tip above the robert. Enteric tube with the tip and side-port in the stomach. Small bilateral pleural effusions with bibasilar atelectasis. No pneumothorax. Emphysematous changes in the left upper lung. No significant interval change. XR CHEST PORTABLE    Result Date: 1/3/2023  EXAMINATION: ONE XRAY VIEW OF THE CHEST 1/3/2023 6:09 am COMPARISON: 1/2/2023 HISTORY: ORDERING SYSTEM PROVIDED HISTORY: While on ventilator TECHNOLOGIST PROVIDED HISTORY: While on ventilator Reason for Exam: ON VENT FINDINGS: Small to moderate right pleural effusion. Right lung base atelectasis versus infiltrate. Underlying mild congestion versus interstitial infiltrate. Emphysematous changes in the left upper lung. Left-sided PICC line tip in the superior vena cava right atrial junction. ET tube is 5 cm superior to the robert. Feeding tube terminates below the diaphragm.   Multiple wires/leads overlie the chest. Moderate osteopenic changes and degenerative changes noted in the bony structures. No significant will change. See above for more details. XR CHEST PORTABLE    Result Date: 1/2/2023  EXAMINATION: ONE XRAY VIEW OF THE CHEST 1/2/2023 3:15 pm COMPARISON: 01/01/2023 HISTORY: ORDERING SYSTEM PROVIDED HISTORY: intubation TECHNOLOGIST PROVIDED HISTORY: intubation Reason for Exam: intubation FINDINGS: Overlying items external to the patient somewhat limit evaluation. Placement of endotracheal tube with tip 8.2 cm from the robert. Placement of enteric tube seen to extend into the stomach, distal extent not included in field of view. Persistent likely layering right pleural effusion, similar to slightly worsened. Persistent bilateral airspace opacities to bilateral mid to lower lung zones, right worse than left, similar on the left but mildly worsened on the right. No pneumothoraces are seen. Persistent likely bullous change to the left upper lung zone. Cardiac and mediastinal silhouettes are stable. Stable appearance to bony structures. Placement of endotracheal tube which appears high riding with tip 8.2 cm from the robert. Suggest advancement by 2-3 cm. Placement of endotracheal tube seen to extend into the stomach, distal extent not included in field of view. No pneumothoraces. Persistent likely bullous change to the left upper lung zone. Persistent right pleural effusion, similar to slightly worsened. Persistent bilateral airspace opacities, right worse than left, similar on the left but mildly worsened on the right. XR CHEST PORTABLE    Result Date: 1/1/2023  EXAMINATION: ONE XRAY VIEW OF THE CHEST 1/1/2023 7:17 pm COMPARISON: None. HISTORY: ORDERING SYSTEM PROVIDED HISTORY: ams TECHNOLOGIST PROVIDED HISTORY: ams Reason for Exam: Altered mental status, difficulty breathing Additional signs and symptoms: Altered mental status, difficulty breathing Relevant Medical/Surgical History:  Altered mental status, difficulty breathing FINDINGS: Large Additional signs and symptoms: Altered mental status, difficulty breathing Relevant Medical/Surgical History: Altered mental status, difficulty breathing FINDINGS: Large lucent area in the left apex suggesting a large bulla however superimposed pneumothorax cannot be excluded. The cardiac size is normal. Right lower lobe airspace infiltrate and mild right pleural effusion. . Pulmonary vascularity appears normal. There is mild ectasia of the thoracic aorta. Calcific tendinitis of the right shoulder. No acute bony abnormalities. The hilar structures are normal.     Right lower lobe pneumonia and small right pleural effusion Large lucent area in the left apex suggesting a large bulla however superimposed pneumothorax cannot be excluded. Follow-up CT would be useful for further evaluation. The findings were sent to the Radiology Results [id number] at 10:31 pm on 1/1/2023 to be communicated to a licensed caregiver. CT CHEST PULMONARY EMBOLISM W CONTRAST    Result Date: 1/3/2023  EXAMINATION: CTA OF THE CHEST 1/3/2023 2:43 pm TECHNIQUE: CTA of the chest was performed after the administration of intravenous contrast.  Multiplanar reformatted images are provided for review. MIP images are provided for review. Automated exposure control, iterative reconstruction, and/or weight based adjustment of the mA/kV was utilized to reduce the radiation dose to as low as reasonably achievable. COMPARISON: None. HISTORY: ORDERING SYSTEM PROVIDED HISTORY: Elevated d-dimer. Resp distress. Decreasing bp TECHNOLOGIST PROVIDED HISTORY: Elevated d-dimer. Resp distress. Decreasing bp Reason for Exam: Elevated d-dimer. Resp distress. Decreasing bp Additional signs and symptoms: pt on vent, eval for pe FINDINGS: Pulmonary Arteries: There is adequate opacification of the pulmonary arteries for evaluation. No evidence of intraluminal filling defect to suggest pulmonary embolism. Dilated main pulmonary artery measuring up to 3.2 cm. bullous formation involving the superior lobe of the left lower lobe. Moderate centrilobular emphysema. A few scattered solid pulmonary nodules measuring up to 5 mm in the right middle lobe (4:72). No mass or consolidation. No pneumothorax. Mediastinum: No lymphadenopathy. No pericardial effusion. No mass. Normal thyroid gland. Aortic valve annulus calcifications. Mild coronary artery calcifications. Mild calcifications of the thoracic aorta and its branches. Upper Abdomen: Trace perisplenic free fluid. Irregular appearance of the gallbladder with wall thickening, pericholecystic fluid, and probable cholelithiasis. . Soft Tissues/Bones: Mild anasarca. .  No acute osseous abnormality. Endotracheal and enteric tubes in place. No evidence of pulmonary embolism. Dilated main pulmonary artery suggestive of pulmonary hypertension. Increased RV to LV ratio suggestive of right heart strain. Moderate right small left pleural effusions with partial collapse of the right lower lobe. Moderate centrilobular emphysema with large bullous formation in the left lung. Irregular appearance of the gallbladder with wall thickening, pericholecystic fluid, and probable cholelithiasis. Correlation with right upper quadrant ultrasound recommended. Trace perisplenic free fluid.      VL Lower Extremity Bilateral Venous Duplex    Result Date: 1/18/2023    [facility]  Vascular Lower Extremities DVT Study Procedure   Patient Name  [facility]      Date of Study           01/18/2023                Antoinette Hodgkins   Date of Birth 1960  Gender                  Male   Age           60 year(s)  Race                       Room Number   2116        Height:                 67 inch, 170.18 cm   Corporate ID  [id number]    Weight:                 188 pounds, 85.3 kg  #   Patient Acct  [de-identified]   BSA:        1.97 m^2    BMI:       29.44 kg/m^2  #   MR #          [id number]      Sonographer             Francis Muñoz   Accession # [id number]  Interpreting Physician  Masha Goldsmiht   Referring                 Referring Physician     Michelle Desai  Nurse  Practitioner  Procedure Type of Study:   Veins: Lower Extremities DVT Study, Venous Scan Lower Bilateral.  Indications for Study:R/O DVT. Patient Status: In Patient. Technical Quality:Adequate visualization. Conclusions   Summary   No evidence of superficial or deep venous thrombosis in both lower  extremities. Signature   ----------------------------------------------------------------  Electronically signed by Francis Muñoz(Sonographer) on  01/18/2023 04:25 PM  ----------------------------------------------------------------   ----------------------------------------------------------------  Electronically signed by Judith Killian Reyes,Arthur(Interpreting  physician) on 01/18/2023 04:53 PM  ----------------------------------------------------------------  Findings:   Right Impression:                    Left Impression:   The common femoral, femoral,         The common femoral, femoral,  popliteal and tibial veins           popliteal and tibial veins  demonstrate normal compressibility   demonstrate normal compressibility  and augmentation. and augmentation. Normal compressibility of the great  Normal compressibility of the great  saphenous vein. saphenous vein. Normal compressibility of the small  Normal compressibility of the small  saphenous vein. saphenous vein. Velocities are measured in cm/s ; Diameters are measured in cm Right Lower Extremities DVT Study Measurements Right 2D Measurements +------------------------------------+----------+---------------+----------+ ! Location                            ! Visualized! Compressibility! Thrombosis! +------------------------------------+----------+---------------+----------+ ! Common Femoral                      !Yes       ! Yes            ! None      ! +------------------------------------+----------+---------------+----------+ ! Prox Femoral                        !Yes       ! Yes            ! None      ! +------------------------------------+----------+---------------+----------+ ! Mid Femoral                         !Yes       ! Yes            ! None      ! +------------------------------------+----------+---------------+----------+ ! Dist Femoral                        !Yes       ! Yes            ! None      ! +------------------------------------+----------+---------------+----------+ ! Deep Femoral                        !Partial   !Yes            ! None      ! +------------------------------------+----------+---------------+----------+ ! Popliteal                           !Yes       ! Yes            ! None      ! +------------------------------------+----------+---------------+----------+ ! Sapheno Femoral Junction            ! Yes       ! Yes            ! None      ! +------------------------------------+----------+---------------+----------+ ! PTV                                 ! Yes       ! Yes            ! None      ! +------------------------------------+----------+---------------+----------+ ! Peroneal                            !Partial   !Yes            ! None      ! +------------------------------------+----------+---------------+----------+ ! Gastroc                             ! Yes       ! Yes            ! None      ! +------------------------------------+----------+---------------+----------+ ! GSV Thigh                           ! Yes       ! Yes            ! None      ! +------------------------------------+----------+---------------+----------+ ! GSV Knee                            ! Yes       ! Yes            ! None      ! +------------------------------------+----------+---------------+----------+ ! GSV Ankle                           ! Yes       ! Yes            ! None      ! +------------------------------------+----------+---------------+----------+ ! SSV !Yes       !Yes            ! None      ! +------------------------------------+----------+---------------+----------+ Right Doppler Measurements +---------------------------+------+------+--------------------------------+ ! Location                   ! Signal!Reflux! Reflux (msec)                   ! +---------------------------+------+------+--------------------------------+ ! Common Femoral             !Phasic!      !                                ! +---------------------------+------+------+--------------------------------+ ! Prox Femoral               !Phasic!      !                                ! +---------------------------+------+------+--------------------------------+ ! Popliteal                  !Phasic!      !                                ! +---------------------------+------+------+--------------------------------+ Left Lower Extremities DVT Study Measurements Left 2D Measurements +------------------------------------+----------+---------------+----------+ ! Location                            ! Visualized! Compressibility! Thrombosis! +------------------------------------+----------+---------------+----------+ ! Common Femoral                      !Yes       ! Yes            ! None      ! +------------------------------------+----------+---------------+----------+ ! Prox Femoral                        !Yes       ! Yes            ! None      ! +------------------------------------+----------+---------------+----------+ ! Mid Femoral                         !Yes       ! Yes            ! None      ! +------------------------------------+----------+---------------+----------+ ! Dist Femoral                        !Yes       ! Yes            ! None      ! +------------------------------------+----------+---------------+----------+ ! Deep Femoral                        !No        !               !          ! +------------------------------------+----------+---------------+----------+ ! Popliteal !Yes       !Yes            ! None      ! +------------------------------------+----------+---------------+----------+ ! Sapheno Femoral Junction            ! Yes       ! Yes            ! None      ! +------------------------------------+----------+---------------+----------+ ! PTV                                 ! Yes       ! Yes            ! None      ! +------------------------------------+----------+---------------+----------+ ! Peroneal                            !Yes       ! Yes            ! None      ! +------------------------------------+----------+---------------+----------+ ! Gastroc                             ! Yes       ! Yes            ! None      ! +------------------------------------+----------+---------------+----------+ ! GSV Thigh                           ! Yes       ! Yes            ! None      ! +------------------------------------+----------+---------------+----------+ ! GSV Knee                            ! Yes       ! Yes            ! None      ! +------------------------------------+----------+---------------+----------+ ! GSV Ankle                           ! Yes       ! Yes            ! None      ! +------------------------------------+----------+---------------+----------+ ! SSV                                 ! Yes       ! Yes            ! None      ! +------------------------------------+----------+---------------+----------+ Left Doppler Measurements +---------------------------+------+------+--------------------------------+ ! Location                   ! Signal!Reflux! Reflux (msec)                   ! +---------------------------+------+------+--------------------------------+ ! Common Femoral             !Phasic!      !                                ! +---------------------------+------+------+--------------------------------+ ! Prox Femoral               !Phasic!      !                                ! +---------------------------+------+------+--------------------------------+ ! Popliteal !Phasic!      !                                ! +---------------------------+------+------+--------------------------------+    VL Lower Extremity Bilateral Venous Duplex    Result Date: 1/3/2023    [facility]  Vascular Lower Extremities DVT Study Procedure   Patient Name  [facility]      Date of Study           01/03/2023                Hilda Morel   Date of Birth 1960  Gender                  Male   Age           60 year(s)  Race                       Room Number   2009        Height:                 67 inch, 170.18 cm   Corporate ID  [id number]    Weight:                 188 pounds, 85.3 kg  #   Patient Acct  [de-identified]   BSA:        1.97 m^2    BMI:       29.44 kg/m^2  #   MR #          [id number]      Sonographer             Cristóbal Zaldivar   Accession #   [id number]  Interpreting Physician  [address] [place] [address]   Referring                 Referring Physician     Charlie Contreras  Nurse  Practitioner  Procedure Type of Study:   Veins: Lower Extremities DVT Study, Venous Scan Lower Bilateral.  Indications for Study:Elevated D-Dimer and Leg Swelling. Patient Status: In Patient. Technical Quality:Limited visualization. Limitation reason:Edema. Conclusions   Summary   No evidence of deep vein thrombosis in the bilateral lower extremities. Acute superficial vein thrombosis of the right GSV at the saphenofemoral  junction.    Signature   ----------------------------------------------------------------  Electronically signed by Oneal Mitchell(Sonographer) on  01/03/2023 09:39 AM  ----------------------------------------------------------------   ----------------------------------------------------------------  Electronically signed by Sherlean Sacramento Reyes,Arthur(Interpreting  physician) on 01/03/2023 07:05 PM  ----------------------------------------------------------------  Findings:   Right Impression:                       Left Impression:  Non visualization of the posterior      Non visualization of the posterior tibial and peroneal veins. tibial and peroneal veins. Remaining deep veins                    Remaining deep veins  demonstrate normal compressibility. demonstrate normal                                          compressibility. Non compressibility of the great  saphenous vein at the level of the      Normal compressibility of the  junction. great saphenous vein. Normal compressibility of the small     Normal compressibility of the  saphenous vein.                         small saphenous vein. Velocities are measured in cm/s ; Diameters are measured in cm Right Lower Extremities DVT Study Measurements Right 2D Measurements +------------------------------------+----------+---------------+----------+ ! Location                            ! Visualized! Compressibility! Thrombosis! +------------------------------------+----------+---------------+----------+ ! Common Femoral                      !Yes       ! Yes            ! None      ! +------------------------------------+----------+---------------+----------+ ! Prox Femoral                        !Yes       ! Yes            ! None      ! +------------------------------------+----------+---------------+----------+ ! Mid Femoral                         !Yes       ! Yes            ! None      ! +------------------------------------+----------+---------------+----------+ ! Dist Femoral                        !Yes       ! Yes            ! None      ! +------------------------------------+----------+---------------+----------+ ! Popliteal                           !Yes       ! Yes            ! None      ! +------------------------------------+----------+---------------+----------+ ! Sapheno Femoral Junction            ! Yes       ! No             !          ! +------------------------------------+----------+---------------+----------+ ! PTV                                 ! No        !               !          ! !          ! +------------------------------------+----------+---------------+----------+ ! PTV                                 ! No        !               !          ! +------------------------------------+----------+---------------+----------+ ! Peroneal                            !No        !               !          ! +------------------------------------+----------+---------------+----------+ ! Gastroc                             ! Yes       ! Yes            ! None      ! +------------------------------------+----------+---------------+----------+ ! GSV Thigh                           ! Yes       ! Yes            ! None      ! +------------------------------------+----------+---------------+----------+ ! GSV Knee                            ! Yes       ! Yes            ! None      ! +------------------------------------+----------+---------------+----------+ ! GSV Ankle                           ! No        !               !          ! +------------------------------------+----------+---------------+----------+ ! SSV                                 ! Yes       ! Yes            ! None      ! +------------------------------------+----------+---------------+----------+ Right Doppler Measurements +---------------------------+------+------+--------------------------------+ ! Location                   ! Signal!Reflux! Reflux (msec)                   ! +---------------------------+------+------+--------------------------------+ ! Common Femoral             !Phasic!      !                                ! +---------------------------+------+------+--------------------------------+ ! Prox Femoral               !Phasic!      !                                ! +---------------------------+------+------+--------------------------------+ ! Popliteal                  !Phasic!      !                                ! +---------------------------+------+------+--------------------------------+ Left Lower Extremities DVT Study Measurements Left +------------------------------------+----------+---------------+----------+ ! Common Femoral                      !Yes       ! Yes            ! None      ! +------------------------------------+----------+---------------+----------+ ! Prox Femoral                        !Yes       ! Yes            ! None      ! +------------------------------------+----------+---------------+----------+ ! Mid Femoral                         !Yes       ! Yes            ! None      ! +------------------------------------+----------+---------------+----------+ ! Dist Femoral                        !Yes       ! Yes            ! None      ! +------------------------------------+----------+---------------+----------+ ! Popliteal                           !Yes       ! Yes            ! None      ! +------------------------------------+----------+---------------+----------+ ! Sapheno Femoral Junction            ! Yes       ! Yes            ! None      ! +------------------------------------+----------+---------------+----------+ ! PTV                                 ! No        !               !          ! +------------------------------------+----------+---------------+----------+ ! Peroneal                            !No        !               !          ! +------------------------------------+----------+---------------+----------+ ! Gastroc                             ! Yes       ! Yes            ! None      ! +------------------------------------+----------+---------------+----------+ ! GSV Thigh                           ! Yes       ! Yes            ! None      ! +------------------------------------+----------+---------------+----------+ ! GSV Knee                            ! Yes       ! Yes            ! None      ! +------------------------------------+----------+---------------+----------+ ! GSV Ankle                           ! No        !               !          ! +------------------------------------+----------+---------------+----------+ ! SSV !Yes       !Yes            ! None      ! +------------------------------------+----------+---------------+----------+ Left Doppler Measurements +---------------------------+------+------+--------------------------------+ ! Location                   ! Signal!Reflux! Reflux (msec)                   ! +---------------------------+------+------+--------------------------------+ ! Common Femoral             !Phasic!      !                                ! +---------------------------+------+------+--------------------------------+ ! Prox Femoral               !Phasic!      !                                ! +---------------------------+------+------+--------------------------------+ ! Popliteal                  !Phasic!      !                                ! +---------------------------+------+------+--------------------------------+    US SPLEEN    Result Date: 1/3/2023  EXAMINATION: ULTRASOUND OF THE SPLEEN 1/3/2023 1:01 pm COMPARISON: None. HISTORY: ORDERING SYSTEM PROVIDED HISTORY: thrombocytpenia TECHNOLOGIST PROVIDED HISTORY: thrombocytpenia FINDINGS: Spleen is normal in size and echotexture. Maximum diameter 9.9 cm. No focal lesion. Normal ultrasound evaluation of the spleen     CTA HEAD NECK W CONTRAST    Result Date: 1/3/2023  EXAMINATION: CTA OF THE HEAD AND NECK WITH CONTRAST 1/3/2023 6:28 pm: TECHNIQUE: CTA of the head and neck was performed with the administration of intravenous contrast. Multiplanar reformatted images are provided for review. MIP images are provided for review. Stenosis of the internal carotid arteries measured using NASCET criteria. Automated exposure control, iterative reconstruction, and/or weight based adjustment of the mA/kV was utilized to reduce the radiation dose to as low as reasonably achievable. COMPARISON: Brain MRI done 01/03/2023. Noncontrast CT head done 01/02/2023.  HISTORY: ORDERING SYSTEM PROVIDED HISTORY: MRI stoke TECHNOLOGIST PROVIDED HISTORY: MRI stoke COMPARISON: Brain MRI done 01/03/2023. Noncontrast CT head done 01/02/2023. HISTORY: ORDERING SYSTEM PROVIDED HISTORY: MRI stoke TECHNOLOGIST PROVIDED HISTORY: MRI stoke Reason for Exam: eval for Pulm embolism given pt with severe plum hypertension Additional signs and symptoms: MRI stoke Relevant Medical/Surgical History: pt on vent , f/u mri brain FINDINGS: CTA NECK: AORTIC ARCH/ARCH VESSELS: No dissection or arterial injury. No significant stenosis of the brachiocephalic or subclavian arteries. Atherosclerosis in the visualized thoracic aorta and bilateral subclavian arteries. CAROTID ARTERIES: No dissection, arterial injury, or hemodynamically significant stenosis by NASCET criteria. Right greater than left carotid bulb atherosclerotic plaque. Tortuosity of the bilateral distal cervical internal carotid arteries. VERTEBRAL ARTERIES: No dissection, arterial injury, or significant stenosis. The left vertebral artery arises directly from the aortic arch. Dominant right vertebral artery. SOFT TISSUES: Partially visualized right pleural effusion. Emphysema. Large left apical bulla. No cervical or superior mediastinal lymphadenopathy. The larynx and pharynx are unremarkable. No acute abnormality of the salivary and thyroid glands. Endotracheal and enteric tubes in place. BONES: No acute osseous abnormality. Multilevel degenerative changes in the visualized spine, centered at C4-C5 and C5-C6. CTA HEAD: ANTERIOR CIRCULATION: No significant stenosis of the intracranial internal carotid, anterior cerebral, or middle cerebral arteries. No aneurysm. Atherosclerosis in the bilateral intracranial internal carotid arteries. POSTERIOR CIRCULATION: No significant stenosis of the vertebral, basilar, or posterior cerebral arteries. No aneurysm. Mild atherosclerosis in the dominant right intracranial vertebral artery. OTHER: No dural venous sinus thrombosis on this non-dedicated study.  BRAIN: Comparison brain MRI done 1.  No focal hemodynamically significant stenosis, occlusion or aneurysm in the large arteries of the head and neck. 2.  Diffuse leptomeningeal enhancement throughout both cerebral hemispheres can be seen in the setting of subarachnoid hemorrhage, encephalitis or meningitis. Consider correlation with CSF studies if there is high clinical concern for underlying infection. IR PLACE NG TUBE BY DR Althea Wu    Result Date: 1/11/2023  PROCEDURE: XR PLACE NASOGASTRIC TUBE PHYS 1/11/2023 HISTORY: ORDERING SYSTEM PROVIDED HISTORY: Resistance to NG tube placement per bedside nurse. Needs to be NG tube fed. TECHNOLOGIST PROVIDED HISTORY: Resistance to NG tube placement per bedside nurse. Needs to be NG tube fed. CONTRAST: None SEDATION: None FLUOROSCOPY DOSE AND TYPE OR TIME AND EXPOSURES: 27 seconds; D  cGy cm2 DESCRIPTION OF PROCEDURE AND FINDINGS: Under intermittent fluoroscopic guidance a 12 Grenadian nasogastric tube was placed through the left nostril and directed into the stomach. The tip is in the distal body of the stomach with the side hole well past the GE junction. Small amount air was injected verifying appropriate tube positioning within the stomach. The catheter was secured in place to the patient's nose. There are no immediate complications. The patient left the department stable condition. EBL: None     16 [place]adian nasogastric tube placed successfully with its tip in the stomach. MRI BRAIN WO CONTRAST    Result Date: 1/11/2023  EXAMINATION: MRI OF THE BRAIN WITHOUT CONTRAST  1/11/2023 3:31 pm TECHNIQUE: Multiplanar multisequence MRI of the brain was performed without the administration of intravenous contrast. COMPARISON: CT brain performed 01/10/2023. MRI brain performed 01/03/2023.  HISTORY: ORDERING SYSTEM PROVIDED HISTORY: follow-up strokes and SAH TECHNOLOGIST PROVIDED HISTORY: follow-up strokes and SAH Reason for Exam: follow-up strokes and SAH Additional signs and symptoms: Acute respiratory failure with hypoxia and hypercapnia (HCC) RLL pneumonia, ams FINDINGS: INTRACRANIAL STRUCTURES/VENTRICLES: The sellar and suprasellar structures, optic chiasm, corpus callosum, pineal gland, tectum, and midline brainstem structures are unremarkable. The craniocervical junction is unremarkable. There is chronic microvascular disease. There is a mild amount of residual subarachnoid hemorrhage within the right frontal and right parietal lobes as well as the left frontal lobe. There is an evolving small focus of hemorrhage in the left parietal lobe posteriorly. There is no mass effect or midline shift. There is redemonstration an evolving area of ischemia in the right frontal lobe and left posterior parietal lobe as well as a few foci within the parasagittal aspect of the right frontoparietal region. ORBITS: The visualized portion of the orbits demonstrate no acute abnormality. SINUSES: There is chronic sinusitis. There is fluid in the mastoid air cells. BONES/SOFT TISSUES: The bone marrow signal intensity appears normal. The soft tissues demonstrate no acute abnormality. Mild amount of residual subarachnoid hemorrhage in the right frontal and right parietal lobes as well as the left frontal lobe. Evolving small focus of hemorrhage in the left parietal lobe posteriorly. Evolving areas of ischemia in the right frontal lobe and left posterior parietal lobe as well as a few foci in the parasagittal aspect of the right frontoparietal region.      MRI BRAIN WO CONTRAST    Result Date: 1/3/2023  EXAMINATION: MRI OF THE BRAIN WITHOUT CONTRAST  1/3/2023 3:03 pm TECHNIQUE: Multiplanar multisequence MRI of the brain was performed without the administration of intravenous contrast. COMPARISON: None HISTORY: ORDERING SYSTEM PROVIDED HISTORY: acute to subacute left parietal infarct, AMS, Vent TECHNOLOGIST PROVIDED HISTORY: acute to subacute left parietal infarct, AMS, Vent What is the sedation requirement?->Sedation Reason for Exam: acute to subacute left parietal infarct, AMS, Vent FINDINGS: INTRACRANIAL STRUCTURES/VENTRICLES: There is an subacute to old left cerebellar lacune. Hyperintense FLAIR signal abnormality within the sulci of both cerebral hemispheres is compatible with subarachnoid hemorrhage. There is an old lacune within the right caudate nucleus. ORBITS: The visualized portion of the orbits demonstrate no acute abnormality. SINUSES: Small bilateral mastoid effusions are identified. There is moderate paranasal sinus disease on the left side. BONES/SOFT TISSUES: The bone marrow signal intensity appears normal. The soft tissues demonstrate no acute abnormality. Small acute infarcts involving the left cerebellar hemisphere and left parietal lobe. Small subacute infarct within the left cerebellar hemisphere. Moderate bilateral subarachnoid hemorrhage within both cerebral hemispheres which is of uncertain etiology. The hemorrhage is more apparent on MRI than on previous head CT. Old right caudate nucleus lacune. Small bilateral mastoid effusions and moderate left paranasal sinus disease. The findings were sent to the Radiology Results [address] at 4:17 pm on 1/3/2023 to be communicated to a licensed caregiver. FL MODIFIED BARIUM SWALLOW W VIDEO    Result Date: 1/14/2023  EXAMINATION: MODIFIED BARIUM SWALLOW WAS PERFORMED IN CONJUNCTION WITH SPEECH PATHOLOGY SERVICES TECHNIQUE: Under fluoroscopic evaluation cineradiography/videoradiography recordings were performed in conjunction with the speech-language pathologist (SLP). Various liquid, solid and/or semi-solid barium preparations were used to assess swallowing function. FLUOROSCOPY DOSE AND TYPE OR TIME AND EXPOSURES: 1.43 minutes fluoro time, 13 cine series.   Air Kerma 8.1 mGy COMPARISON: January 9, 2023 HISTORY: ORDERING SYSTEM PROVIDED HISTORY: Speech therapy recommendations TECHNOLOGIST PROVIDED HISTORY: Speech therapy recommendations Reason for Exam: Speech therapy recommendations FINDINGS: Pudding: Premature vallecular spillage. Moderate vallecular residue. Maximum amount of piriform sinus cavity residue. Deep penetration. Aspiration with residue lying on top of vocal cords. Cough noted. Nectar thick liquid: Premature vallecular spillage. Minimal amount of vallecular and piriform sinus cavity residue. Deep penetration. No completed aspiration. Premature vallecular spillage. Piriform sinus cavity residue. Deep penetration with both materials. Aspiration with pudding. Please see separate speech pathology report for full discussion of findings and recommendations. FL MODIFIED BARIUM SWALLOW W VIDEO    Result Date: 1/9/2023  EXAMINATION: MODIFIED BARIUM SWALLOW WAS PERFORMED IN CONJUNCTION WITH SPEECH PATHOLOGY SERVICES TECHNIQUE: Under fluoroscopic evaluation cineradiography/videoradiography recordings were performed in conjunction with the speech-language pathologist (SLP). Various liquid, solid and/or semi-solid barium preparations were used to assess swallowing function. FLUOROSCOPY DOSE AND TYPE OR TIME AND EXPOSURES: Fluoro time: 1 minute 52 seconds. Dap: 462 dGy per cm2. Air kerma: 8.9 mGy HISTORY: ORDERING SYSTEM PROVIDED HISTORY: Assess swallow TECHNOLOGIST PROVIDED HISTORY: Assess swallow Reason for Exam: aspiration Additional signs and symptoms: recent stroke FINDINGS/IMPRESSION: (Only 1 image was able to be recorded.)  Radiologist observations: Progressively increased penetration during ingestion of thin liquids and puree/pudding textures. No aspiration identified. Please see separate speech pathology report for full discussion of findings and recommendations.          Consultations:    Consults:     Final Specialist Recommendations/Findings:   IP CONSULT TO INTERNAL MEDICINE  IP CONSULT TO CRITICAL CARE  IP CONSULT TO SOCIAL WORK  IP CONSULT TO HEM/ONC  PHARMACY TO DOSE VANCOMYCIN  IP CONSULT TO NEPHROLOGY  IP CONSULT TO NEUROLOGY  IP CONSULT TO INFECTIOUS DISEASES  IP CONSULT TO DIETITIAN  IP CONSULT TO CARDIOLOGY  IP CONSULT TO PALLIATIVE CARE  IP CONSULT TO PSYCHIATRY  IP CONSULT TO DIETITIAN  IP CONSULT TO PHYSICAL MEDICINE REHAB  IP CONSULT TO GENERAL SURGERY  IP CONSULT TO GENERAL SURGERY  IP CONSULT TO DIETITIAN  IP CONSULT TO INTERNAL MEDICINE      The patient was seen and examined on day of discharge and this discharge summary is in conjunction with any daily progress note from day of discharge. Discharge plan:       Disposition: Inpatient rehab    Physician Follow Up:     No follow-up provider specified. Requiring Further Evaluation/Follow Up POST HOSPITALIZATION/Incidental Findings: Coagulopathy      Activity: As tolerated    Instructions to Patient: Adhere to follow-up and continue current management plans    Discharge Medications:      Medication List        ASK your doctor about these medications      aspirin 325 MG tablet     psyllium 28.3 % Pack  Commonly known as: KONSYL              Time Spent on discharge is  35 mins in patient examination, evaluation, counseling as well as medication reconciliation, prescriptions for required medications, discharge plan and follow up. Electronically signed by     Electronically signed by Juani Morales MD on 1/22/2023 at 7:42 AM      Thank you Dr. Dara Estrada MD for the opportunity to be involved in this patient's care.

## 2023-01-21 NOTE — PROGRESS NOTES
Speech Language Pathology  Speech Language Pathology  [facility]    Dysphagia Treatment Note    Date: 1/21/2023  Patients Name: Alfred Alva  MRN: [id number]  Diagnosis: Dysphagia  Patient Active Problem List   Diagnosis Code    Acute type II respiratory failure (HCC) J96.00    Transaminitis R74.01    Normocytic anemia D64.9    Thrombocytopenia (HCC) D69.6    Agitation R45.1    Acute respiratory failure with hypoxia and hypercapnia (HCC) RLL pneumonia J96.01, J96.02    Altered mental status R41.82    Community acquired pneumonia of right lower lobe of lung J18.9    Prolonged pt (prothrombin time) R79.1    Emphysema lung (HCC) J43.9    Cerebral infarction (HCC) I63.9    ACP (advance care planning) Z71.89    Goals of care, counseling/discussion Z71.89    Encounter for palliative care Z51.5    Delirium due to another medical condition F05    Moderate malnutrition ([facility] E44.0    Hepatitis C virus infection without hepatic coma B19.20    Dysphagia R13.10    Impending cerebrovascular accident ([facility] [address] I63.9    Dermatitis associated with moisture L30.8       Pain: no c/o pain    Cognitive/Dysphagia Treatment  Treatment time: 6015-6122; 6509-7501    Subjective: [] Alert [] Cooperative     [x] Confused     [] Agitated    [] Lethargic    Objective/Assessment:    Dysphagia:  Pt seen for dysphagia management. Pt mildly lethargic this date but responsive to verbal cues. Pt demo difficulty attending to exercises and decreased carryover of prior instruction. Pt responded to mod-max A to complete oral/pharyngeal/laryngeal exercises x 10 reps x 1 set to strengthen swallowing mechanism. ST utilized moist toothette to remove small amount of dried secretions from oral cavity. Cognition: Orientation: 0/4 temporal, 1/2 spatial concepts. Difficulty recalling purpose despite max cues. Recall: able to recall CT scan this afternoon, unable to accurately relate any other STM details.    Organization: Pt completed naming in concrete categories with 50% accuracy given min A/extended time, improved to 75% with mod-max verbal cues. Other: Pt demoing significantly increased confusion form baseline. Exhibits poor insight into deficits and limitations and increased impulsivity. Nursing/physician notified. Pt has been moved to room across form nurses' station and has telesitter present.        Plan:  [x] Continue ST services    [] Discharge from ST:        Discharge recommendations: [] Inpatient Rehab   [] [place]   [] Outpatient Therapy  [] Follow up at trauma clinic   [] Other:         Treatment completed by: Gabriel Figueredo M.S., Shree Corrigan

## 2023-01-21 NOTE — PLAN OF CARE
Problem: Discharge Planning  Goal: Discharge to home or other facility with appropriate resources  1/21/2023 1421 by Paige Del Real LPN  Outcome: Progressing  Flowsheets (Taken 1/21/2023 1421)  Discharge to home or other facility with appropriate resources:   Identify barriers to discharge with patient and caregiver   Arrange for needed discharge resources and transportation as appropriate   Identify discharge learning needs (meds, wound care, etc)   Arrange for interpreters to assist at discharge as needed   Refer to discharge planning if patient needs post-hospital services based on physician order or complex needs related to functional status, cognitive ability or social support system     Problem: Safety - Medical Restraint  Goal: Remains free of injury from restraints (Restraint for Interference with Medical Device)  Description: INTERVENTIONS:  1. Determine that other, less restrictive measures have been tried or would not be effective before applying the restraint  2. Evaluate the patient's condition at the time of restraint application  3. Inform patient/family regarding the reason for restraint  4.  Q2H: Monitor safety, psychosocial status, comfort, nutrition and hydration  1/21/2023 1421 by Kenya Fleming II, LPN  Outcome: Progressing  Flowsheets (Taken 1/21/2023 1421)  Remains free of injury from restraints (restraint for interference with medical device):   Determine that other, less restrictive measures have been tried or would not be effective before applying the restraint   Inform patient/family regarding the reason for restraint   Evaluate the patient's condition at the time of restraint application   Every 2 hours: Monitor safety, psychosocial status, comfort, nutrition and hydration     Problem: Nutrition Deficit:  Goal: Optimize nutritional status  1/21/2023 1421 by Kenya Fleming II, LPN  Outcome: Progressing  Flowsheets (Taken 1/21/2023 1421)  Nutrient intake appropriate for improving, restoring, or maintaining nutritional needs:   Assess nutritional status and recommend course of action   Monitor oral intake, labs, and treatment plans   Recommend appropriate diets, oral nutritional supplements, and vitamin/mineral supplements   Order, calculate, and assess calorie counts as needed   Recommend, monitor, and adjust tube feedings and TPN/PPN based on assessed needs   Provide specific nutrition education to patient or family as appropriate     Problem: Safety - Adult  Goal: Free from fall injury  1/21/2023 1421 by Kemal Goodson LPN  Outcome: Progressing  Flowsheets (Taken 1/21/2023 1421)  Free From Fall Injury:   Instruct family/caregiver on patient safety   Based on caregiver fall risk screen, instruct family/caregiver to ask for assistance with transferring infant if caregiver noted to have fall risk factors     Problem: ABCDS Injury Assessment  Goal: Absence of physical injury  1/21/2023 1421 by Rachel Saucedo II, LPN  Outcome: Progressing  Flowsheets (Taken 1/21/2023 1421)  Absence of Physical Injury: Implement safety measures based on patient assessment     Problem: Skin/Tissue Integrity  Goal: Absence of new skin breakdown  Description: 1. Monitor for areas of redness and/or skin breakdown  2. Assess vascular access sites hourly  3. Every 4-6 hours minimum:  Change oxygen saturation probe site  4. Every 4-6 hours:  If on nasal continuous positive airway pressure, respiratory therapy assess nares and determine need for appliance change or resting period.   1/21/2023 1421 by Kemal Goodson LPN  Outcome: Progressing

## 2023-01-21 NOTE — PROGRESS NOTES
[address] Internal Medicine  Sally Marley MD; Marleny Ching MD; Pascual Aschoff, MD; MD Jerald Clark MD; Xiomara Piña MD    [facility]   [address] / HISTORY AND PHYSICAL EXAMINATION            Date:   1/21/2023  Patient name:  Rafael Mesa  Date of admission:  1/19/2023  9:12 AM  MRN:   [id number]  Account:  [de-identified]  YOB: 1960  PCP:    Xiomara Piña MD  Room:   [address] [place] [address]  Code Status:    Full Code    Physician Requesting Consult: Arnold Garduno MD    Reason for Consult: Medical management    Chief Complaint:     No chief complaint on file. Acute CVA, generalized weakness, metabolic encephalopathy, multifactorial with underlying acute respiratory failure with pneumonia and heart failure    History Obtained From:     patient    History of Present Illness:     79-year-old gentleman with unknown past medical history presented to inpatient [facility] with metabolic encephalopathy found to have acute respiratory failure, pneumonia, also had slurred speech, confusion progressively was getting worse, EMS brought the patient to the hospital his saturations were  75%, found to have acute respiratory failure with pneumonia, in the ED patient was placed on BiPAP, chest x-ray confirmed right lower lobe pneumonia with pleural effusion. ABG showed acute respiratory failure with hypercapnia and hypoxia.   Subsequently also found to have NSTEMI, acute heart failure with transaminitis possible shock liver, with also hepatitis C acute with hepatic failure, slowly improved,  Also had multiple acute brain infarct with subarachnoid hemorrhage,,  Subsequently patient was evaluated for acute inpatient rehab, accepted and admitted right now we are consulted for medical management    1/20  Patient was sitting on the chair on my encounter, states that he has been feeling drained  Just finished doing physical therapy, vitals have been stable  Past Medical History:     History reviewed. No pertinent past medical history. Past Surgical History:     Past Surgical History:   Procedure Laterality Date    UPPER GASTROINTESTINAL ENDOSCOPY N/A 1/17/2023    EGD ESOPHAGOGASTRODUODENOSCOPY PEG TUBE INSERTION performed by Denise Farrell DO at [facility] AND [facility]        Medications Prior to Admission:     Prior to Admission medications    Medication Sig Start Date End Date Taking? Authorizing Provider   aspirin 325 MG tablet Take 325 mg by mouth daily Patient takes 2 tabs daily    Historical Provider, MD   psyllium (KONSYL) 28.3 % PACK Take 1 packet by mouth daily    Historical Provider, MD        Allergies:     Patient has no known allergies. Social History:     Tobacco:    reports that he has been smoking cigarettes. He has a 40.00 pack-year smoking history. He has never used smokeless tobacco.  Alcohol:      reports current alcohol use. Drug Use:  reports that he does not currently use drugs. Family History:     History reviewed. No pertinent family history. Review of Systems:     Positive and Negative as described in HPI. CONSTITUTIONAL:  negative for fevers, chills, sweats, fatigue, weight loss  HEENT:  negative for vision, hearing changes, runny nose, throat pain  RESPIRATORY:  negative for shortness of breath, cough, congestion, wheezing. CARDIOVASCULAR:  negative for chest pain, palpitations.   GASTROINTESTINAL:  negative for nausea, vomiting, diarrhea, constipation, change in bowel habits, abdominal pain   GENITOURINARY:  negative for difficulty of urination, burning with urination, frequency   INTEGUMENT:  negative for rash, skin lesions, easy bruising   HEMATOLOGIC/LYMPHATIC:  negative for swelling/edema   ALLERGIC/IMMUNOLOGIC:  negative for urticaria , itching  ENDOCRINE:  negative increase in drinking, increase in urination, hot or cold intolerance  MUSCULOSKELETAL:  negative joint pains, muscle aches, swelling of joints  NEUROLOGICAL:  negative for headaches, dizziness, lightheadedness, numbness, pain, tingling extremities  BEHAVIOR/PSYCH:  negative for depression, anxiety    Physical Exam:     /76   Pulse 96   Temp 99.7 °F (37.6 °C)   Resp 18   Ht 5' 7\" (1.702 m)   Wt 163 lb (73.9 kg)   SpO2 93%   BMI 25.53 kg/m²   Temp (24hrs), Av.5 °F (36.9 °C), Min:97.9 °F (36.6 °C), Max:99.7 °F (37.6 °C)    No results for input(s): POCGLU in the last 72 hours. Intake/Output Summary (Last 24 hours) at 2023 1726  Last data filed at 2023 1154  Gross per 24 hour   Intake 1400 ml   Output 100 ml   Net 1300 ml         General Appearance:  alert, well appearing, and in no acute distress  Mental status: oriented to person, place, and time with normal affect  Head:  normocephalic, atraumatic. Eye: no icterus, redness, pupils equal and reactive, extraocular eye movements intact, conjunctiva clear  Ear: normal external ear, no discharge, hearing intact  Nose:  no drainage noted  Mouth: mucous membranes moist  Neck: supple, no carotid bruits, thyroid not palpable  Lungs: Bilateral equal air entry, clear to ausculation, no wheezing, rales or rhonchi, normal effort  Cardiovascular: normal rate, regular rhythm, no murmur, gallop, rub. Abdomen: Soft, nontender, nondistended, normal bowel sounds, no hepatomegaly or splenomegaly  Neurologic: There are no new focal motor or sensory deficits, normal muscle tone and bulk, no abnormal sensation, normal speech, cranial nerves II through XII grossly intact  Skin: No gross lesions, rashes, bruising or bleeding on exposed skin area  Extremities:  peripheral pulses palpable, no pedal edema or calf pain with palpation  Psych: normal affect    Investigations:      Laboratory Testing:  No results found for this or any previous visit (from the past 24 hour(s)).       Imaging/Diagonstics:  XR CHEST PORTABLE    Result Date: 2023  Interval extubation and NG tube removal.  Otherwise similar findings, with perhaps slightly greater hazy ground-glass opacity right mid lung. Correlate clinically. FL MODIFIED BARIUM SWALLOW W VIDEO    Result Date: 1/14/2023  Premature vallecular spillage. Piriform sinus cavity residue. Deep penetration with both materials. Aspiration with pudding. Please see separate speech pathology report for full discussion of findings and recommendations.        Assessment :      Hospital Problems             Last Modified POA    * (Principal) Impending cerebrovascular accident ([address] 1/19/2023 Yes    Acute type II respiratory failure ([address] 1/19/2023 Yes    Transaminitis 1/19/2023 Yes    Normocytic anemia 1/19/2023 Yes    Thrombocytopenia ([address] 1/19/2023 Yes    Acute respiratory failure with hypoxia and hypercapnia ([address] RLL pneumonia 1/19/2023 Yes    Community acquired pneumonia of right lower lobe of lung 1/19/2023 Yes    Emphysema lung ([address] 1/19/2023 Yes    Cerebral infarction ([address] 1/19/2023 Yes    Moderate malnutrition ([address] 1/19/2023 Yes    Hepatitis C virus infection without hepatic coma 1/19/2023 Yes    Dysphagia 1/19/2023 Yes    Dermatitis associated with moisture 1/20/2023 Yes     Plan:     Acute hypoxic and hypercapnic respiratory failure secondary to likely aspiration pneumonia, treated with vancomycin and Unasyn, also was positive for MRSA nasal swab, required intubation, extubated subsequently  Acute CVA with subarachnoid hemorrhage, cardioembolic, did not qualify for tPA,  Acute liver failure possible multifactorial, alcoholic liver disease with hepatitis C,  Hypercoagulable state secondary to acute liver disease, INR currently 1.6  Thrombocytopenia secondary to alcoholic liver disease and hepatitis C, improved  Dysphagia , PEG placed   Anemia , of ch disease multifactorial   DVT PPX with SCD only, as he had cerebral h'age  Full code status   PT/OT     1/21  Patient seen to be resting comfortably  Was able to participate in physical therapy today  No new issues  Repeat CT head showed no new strokes had no hemorrhage  Continue current management      Consultations:   Andrew BEASLEY  IP CONSULT TO INTERNAL MEDICINE  IP CONSULT TO Mi Musa MD  1/21/2023  5:26 PM    Copy sent to Dr. Chanell House MD    Please note that this chart was generated using voice recognition Dragon dictation software. Although every effort was made to ensure the accuracy of this automated transcription, some errors in transcription may have occurred.

## 2023-01-21 NOTE — PROGRESS NOTES
Physical Medicine & Rehabilitation  Progress Note      Subjective:      60year-old male with multifocal embolic CVA and SAH. Patient is having problems with insomnia/difficulty sleeping and personality change. Nursing reports confusion overnight. Today he is restless and trying to stand without assistance, trying to leave the rehab unit to attend an appointment with his granddaughter. Wife is present and reorienting him. No new focal deficits. ROS:  Denies fevers, chills, sweats. No chest pain, palpitations, lightheadedness. Denies coughing, wheezing or shortness of breath. Denies abdominal pain, nausea, diarrhea or constipation. No new areas of joint pain. Denies new areas of numbness or weakness. Denies new anxiety or depression issues. No new skin problems. Rehabilitation:   Progressing in therapies. PT:    Bed mobility  Rolling to Left: Moderate assistance  Rolling to Right: Moderate assistance  Supine to Sit: Moderate assistance (Assist at trunk, flat bed, used rail.)  Sit to Supine: Moderate assistance (Assist with BLEs)  Scooting: Stand by assistance  Bed Mobility Comments: HOB flat left handrail         Transfers  Sit to Stand: Moderate Assistance (from [facility])  Stand to Sit: Minimal Assistance (decreased eccentric controll.)  Bed to Chair: Moderate assistance (RW)         Ambulation  Surface: Level tile  Device: Rolling Walker  Other Apparatus: O2 (2 lt o2)  Assistance: Moderate assistance  Quality of Gait: Needs cues for sequencing , step length. Katalina 2 91% after activity, increased to 94% with rest.  Gait Deviations: Slow Radha, Decreased step length, Decreased step height, Increased SELVIN  Distance: 4 steps to turn to chair, and 3 steps forward and 3 steps backward- total of 10 steps  Comments: NT       OT:  Grooming/Oral Hygiene  Assistance Level: Stand by assist  Skilled Clinical Factors: SBA with VCs to appropriately spit.  Use of oral swabs and mouth wash  Lower Extremity Dressing  Assistance Level: Maximum assistance  Skilled Clinical Factors: Assist to orient and intermittent assist to thread B LE into pants. Assist to pull over hips while standing with Minimal assist. Patient demonstrates deficits in motor planning and sequencing, demonstrating apraxia with regard to the sequence of donning clothing. OT to continue to address  Putting On/Taking Off Footwear  Assistance Level: Maximum assistance  Skilled Clinical Factors: Assist to don B TONI hose and Moderate assist to don B socks due to deficits in motor planning and sequencing. Patient demonstrates apraxia with regard to the sequence of donning clothing. OT to continue to address  Toileting  Assistance Level: Maximum assistance  Skilled Clinical Factors: Assist to pull pants over hips while standing with Minimal assist. Patient performs personal hygiene after BM while seated on toilet   Toilet Transfers  Technique: Stand pivot, To the left, To the right  Equipment: Grab bars, Raised toilet seat without arms  Additional Factors: Verbal cues, Cues for hand placement, Set-up  Assistance Level: Moderate assistance      SPEECH:      Objective:  /78   Pulse 92   Temp 99.7 °F (37.6 °C)   Resp 16   Ht 5' 7\" (1.702 m)   Wt 163 lb (73.9 kg)   SpO2 90%   BMI 25.53 kg/m²       GEN: well developed, well nourished, NAD  HEENT: NCAT, PERRL, EOMI, mucous membranes pink and moist  CV: RRR, no murmurs, rubs or gallops  PULM: CTAB, no rales or rhonchi. Respirations WNL and unlabored  ABD: soft, NT, ND, BS+ and equal  NEURO: A&O to person and place. Restless and mildly agitated at times. Sensation intact to light touch. CNs II-XII WNL. MSK: Functional ROM all extremities . Strength 4+/5 key muscles all extremities  EXTREMITIES: No calf tenderness to palpation bilaterally. No edema BLEs  SKIN: warm dry and intact with good turgor  PSYCH: appropriately interactive. Affect WNL.      Diagnostics:     CBC:   Recent Labs     01/19/23  0450 01/20/23  0620   WBC 6.4 5.4   RBC 4.96 4.92   HGB 11.0* 10.9*   HCT 37.2* 37.1*   MCV 75.1* 75.5*   RDW 32.2* 33.2*    212     BMP:   Recent Labs     01/19/23  0450 01/20/23  0620    144   K 3.5* 4.0    105   CO2 32* 32*   BUN 14 13   CREATININE 0.65* 0.70   GLUCOSE 122* 111*     BNP: No results for input(s): BNP in the last 72 hours. PT/INR: No results for input(s): PROTIME, INR in the last 72 hours. APTT: No results for input(s): APTT in the last 72 hours. CARDIAC ENZYMES: No results for input(s): CKMB, CKMBINDEX, TROPONINT in the last 72 hours. Invalid input(s): CKTOTAL;3 troponins   FASTING LIPID PANEL:  Lab Results   Component Value Date    TRIG 85 01/03/2023     LIVER PROFILE:   Recent Labs     01/19/23 0450 01/20/23  0620   AST 17 21   ALT 19 18   BILIDIR  --  0.4*   BILITOT 0.9 0.8   ALKPHOS 41 40        Current Medications:   Current Facility-Administered Medications: traZODone (DESYREL) tablet 50 mg, 50 mg, Oral, Nightly  guaiFENesin (MUCINEX) extended release tablet 600 mg, 600 mg, Oral, BID PRN  carvedilol (COREG) tablet 6.25 mg, 6.25 mg, Oral, BID WC  ferrous sulfate (IRON 325) tablet 325 mg, 325 mg, Oral, Daily with breakfast  spironolactone (ALDACTONE) tablet 25 mg, 25 mg, Oral, Daily  acetaminophen (TYLENOL) tablet 650 mg, 650 mg, Oral, Q4H PRN  polyethylene glycol (GLYCOLAX) packet 17 g, 17 g, Oral, Daily  senna (SENOKOT) tablet 17.2 mg, 2 tablet, Oral, Daily PRN  bisacodyl (DULCOLAX) suppository 10 mg, 10 mg, Rectal, Daily PRN      Impression/Plan:   Impaired ADLs, gait, and mobility due to:    Multifocal cardioembolic CVA, subarachnoid hemorrhage:  PT/OT for gait, mobility, strengthening, endurance, ADLs, and self care. Dysphagia: s/p PEG tube placement on 1/17. SLP treating. Dietitian managing tube feed - transition from around the clock to nocturnal/bolus as tolerated. Agitation:  Recurring today.  Patient moved to room closer to the nurses station and telesitter initiated. Staff and spouse attempting to redirect. No current medication. May need 1:1 sitter if no improvement. Will check CT head and start seroquel BID for now. Insomnia: started Trazodone at hs 1/20 - failed. Will start Seroquel as above  Acute respiratory failure: required intubation. Currently on 2L NC - weaning as tolerated  Pneumonia: Improved. completed course of Unasyn and Vanco  KENTRELL: resolved. Will monitor  Elevated LFTs:  resolved. Will monitor  HCV: will need f/u with GI for treatment outpatient  Anemia: Hb low but stable. Monitoring  Thrombocytopenia: Platelet count low but stable. Will monitor. Bowel Management: Miralax daily, senokot prn, dulcolax prn. DVT Prophylaxis:   Holding DVT chemoprophylaxis due to [facility] - BLE doppler negative 1/18. Will review with medicine and neurology when chemoprophylaxis may be appropriate. TONI stockings during the day, EPC cuffs at night. IM for medical management      Electronically signed by Lila Lopez MD on 1/21/2023 at 11:47 AM      This note is created with the assistance of a speech recognition program.  While intending to generate a document that actually reflects the content of the visit, the document can still have some errors including those of syntax and sound a like substitutions which may escape proof reading. In such instances, actual meaning can be extrapolated by contextual diversion.

## 2023-01-21 NOTE — PROGRESS NOTES
Patient's PEG residual at this time was 450 mL Nocturnal tube feed held at this time and will inform IM.

## 2023-01-21 NOTE — PLAN OF CARE
Problem: Discharge Planning  Goal: Discharge to home or other facility with appropriate resources  Outcome: Progressing     Problem: Safety - Medical Restraint  Goal: Remains free of injury from restraints (Restraint for Interference with Medical Device)  Description: INTERVENTIONS:  1. Determine that other, less restrictive measures have been tried or would not be effective before applying the restraint  2. Evaluate the patient's condition at the time of restraint application  3. Inform patient/family regarding the reason for restraint  4. Q2H: Monitor safety, psychosocial status, comfort, nutrition and hydration  Outcome: Progressing     Problem: Nutrition Deficit:  Goal: Optimize nutritional status  Outcome: Progressing     Problem: Safety - Adult  Goal: Free from fall injury  Outcome: Progressing     Problem: ABCDS Injury Assessment  Goal: Absence of physical injury  Outcome: Progressing     Problem: Skin/Tissue Integrity  Goal: Absence of new skin breakdown  Description: 1. Monitor for areas of redness and/or skin breakdown  2. Assess vascular access sites hourly  3. Every 4-6 hours minimum:  Change oxygen saturation probe site  4. Every 4-6 hours:  If on nasal continuous positive airway pressure, respiratory therapy assess nares and determine need for appliance change or resting period.   Outcome: Progressing

## 2023-01-21 NOTE — PROGRESS NOTES
Physical Therapy  Facility/Department: [facility] ACUTE REHAB    NAME: Ramiro Akhtar  : 1960 (73 y.o.)  MRN: [id number]  CODE STATUS: Full Code    Date of Service: 23      History reviewed. No pertinent past medical history. Past Surgical History:   Procedure Laterality Date    UPPER GASTROINTESTINAL ENDOSCOPY N/A 2023    EGD ESOPHAGOGASTRODUODENOSCOPY PEG TUBE INSERTION performed by Hyacinth Nunez DO at [facility]       Additional Pertinent Hx: History of Present Illness:  Ramiro Akhtar  is a 60 y.o. right-handed male admitted to the [address] [place] [address] on 2023. He was originally admitted to [facility] on 2023 for acute respiratory failure 2/2 RLL pneumonia. Blood/resp/urine cultures negative, although his MRSA swab was +. He completed course of Unasyn and vancomycin. Imaging showed numerous acute, subacute, and chronic infarcts in the left parietal lobe, left cerebellar hemisphere, as well as bilateral SAH. PEG tube was placed 23 for severe dysphagia. HCV+. ID, nephrology, heme-onc, neuro, pulm, and psych are following. Patient admitted to ARU this morning. He reports doing well this morning. He denies any pain aside from some abdominal pain when he coughs. He does note some wheezes and tenderness around the PEG tube site. He does endorse some generalized weakness. He is currently requiring assistance for self-care activities and mobility prompting this admission. Family / Caregiver Present: Yes (significant other)    Restrictions:  Restrictions/Precautions: Fall Risk;General Precautions;Contact Precautions;Isolation; Bed Alarm  Position Activity Restriction  Other position/activity restrictions: Severe dysphagia- NPO. Unable to tolerate any PO safely. NG tube placed,  on high flow nasal cannula     SUBJECTIVE  Subjective: Patient demonstrating confusion this AM but willing to come to therapy.   Spouse present in PM.  Patient moved to a different room and given a telesitter due to confusion and impulsive. Patient refused coming down for therapy in the PM.  Spouse not able to persuade him either. Pain: denies      OBJECTIVE  Vision  Vision: Impaired  Vision Exceptions: Wears glasses at all times    Hearing  Hearing: Exceptions to [facility]  Hearing Exceptions: Hard of hearing/hearing concerns          Functional Mobility  Bed mobility  Supine to Sit: Minimal assistance  Scooting: Stand by assistance  Bed Mobility Comments: HOB flat left handrail  Transfers  Sit to Stand: Minimal Assistance (from [facility])  Stand to Sit: Minimal Assistance (decreased eccentric controll.)  Bed to Chair: Moderate assistance (RW)  Stand Pivot Transfers: Moderate Assistance  Balance  Posture: Fair (slumped posture at EOB vs pt leaning posteriorly, not engaging core mm)  Sitting - Static: Fair;+ (with B UE support)  Sitting - Dynamic: Fair;-  Standing - Static: Fair;- (Rolling walker)  Standing - Dynamic: Fair;- (Rolling walker)  Comments: Standing with rolling walker. Environmental Mobility  Ambulation  Surface: Level tile  Device: Rolling Walker  Other Apparatus: O2  Assistance: Moderate assistance  Quality of Gait: Needs cues for sequencing , step length. SpO2  95% with HR 92.  Gait Deviations: Slow Radha;Decreased step length;Decreased step height; Increased SELVIN  Distance: 15 ft twice. PT Exercises  Exercise Treatment: educated pt on importance of sitting up, increasing activity. A/AROM Exercises: Seated BLE ex's AROM. 2# and yellow/green band. Circulation/Endurance Exercises: UBE  5 mins. FWD and RETRO    Vitals  O2 Device: Nasal cannula (2 lt)    Activity Tolerance  Activity Tolerance: Patient limited by endurance; Patient limited by fatigue;Patient limited by pain    Assessment  Performance Deficits/Impairments: Decreased functional mobility ; Decreased ADL status; Decreased body mechanics; Decreased strength;Decreased safe awareness;Decreased cognition;Decreased endurance;Decreased balance;Decreased coordination; Increased pain;Decreased posture  Treatment Diagnosis: impared mobility s/p AMS/SAH  Therapy Prognosis: Good  Exam: L side weakness, decreased activity tolerance  Barriers to Learning: cognition, endurance  Discharge Recommendations: Therapy recommended at discharge;Home with assist PRN;Patient would benefit from continued therapy after discharge        GOALS  Patient Goals   Patient Goals : Want to be independent to go home. Short Term Goals  Time Frame for Short Term Goals: 9 days  Short Term Goal 1: CGA assist supine<-> sit x1 assist  Short Term Goal 2: sit EOB withsupervsion up to 15 min for therex/ADL  Short Term Goal 3: 3+/5 LE strength to prepare for standing activiites and gait  Short Term Goal 4: ambulation with rolling walker distance of 70 to 100 ft, CGA, level surface  Short Term Goal 5: roll L/R with SBA  Short Term Goal 6: Pt able to go up and down 5 steps with 2 rails, min A  Long Term Goals  Time Frame for Long Term Goals : By DC  Long Term Goal 1: pt able to roll L/R indepndently  Long Term Goal 2: pt able to perform supine<>sit mod-I  Long Term Goal 3: pt able to perform sit<>stand and perform pivot/step to transfers at mod-I  Long Term Goal 4: pt able to ambulate household distance with appropriate device, mod-I  Long Term Goal 5: pt able to ambulate > 50 ft with appropriate device, at SBA/supervsion level, level as well as incline surface. Long term goal 6: pt able to go up and down 4 or more steps with B UE support, CGA. Long term goal 7: Improve PASS score to 27/36 improve function/stability. Long term goal 8: improve Tinetti balance score to atleast 23/28 to reduce fall risk. PLAN OF CARE  Physcial Therapy Plan  General Plan:  minutes of therapy at least 5 out of 7 days a week (5 to 7 days/week.)  Current Treatment Recommendations: Strengthening;Balance training;Functional mobility training;Neuromuscular re-education;Cognitive reorientation; Safety education & training;Patient/Caregiver education & training;Equipment evaluation, education, & procurement; Therapeutic activities;Transfer training; Wheelchair mobility training;Gait training;Stair training;Home exercise program;Positioning  Safety Devices  Type of Devices: All fall risk precautions in place;Call light within reach;Gait belt;Patient at risk for falls;Nurse notified; Left in chair    EDUCATION  Education  Education Given To: Patient; Family  Education Provided: Safety; Fall Prevention Strategies;Transfer Training  Education Method: Demonstration;Verbal  Barriers to Learning: Cognition; Hearing  Education Outcome: Verbalized understanding;Demonstrated understanding;Continued education needed    Therapy Time   Individual Concurrent Group Co-treatment   Time In 1006         Time Out 1100         Minutes 54              Variance: 30  Reason: Refusal    Leora Hadley PTA, 01/21/23 at 4:15 PM

## 2023-01-21 NOTE — PROGRESS NOTES
Patient restless and impulsive throughout night. During rounds, writer found patient with oxygen removed, and writer assisted patient each time with re-applying it. At 0300, patient was moving his hands in the air, appearing to be reaching for something in the air. Writer asked patient what he was reaching for, and he stated, \"Nothing, I am just in here playing video games. \"   Writer re-oriented patient, and re-adjusted him in bed. Patient closed his eyes and stated he was finally tired. Will continue to assess.

## 2023-01-21 NOTE — PROGRESS NOTES
Patient's PEG residual now at 25 mL. Nocturnal tube feed started at 70 mL/hr, per order. Patient resting at 45 degrees. Will continue to assess.

## 2023-01-22 PROCEDURE — 6370000000 HC RX 637 (ALT 250 FOR IP): Performed by: PHYSICAL MEDICINE & REHABILITATION

## 2023-01-22 PROCEDURE — 6360000002 HC RX W HCPCS: Performed by: PHYSICAL MEDICINE & REHABILITATION

## 2023-01-22 PROCEDURE — 99232 SBSQ HOSP IP/OBS MODERATE 35: CPT | Performed by: PHYSICAL MEDICINE & REHABILITATION

## 2023-01-22 PROCEDURE — 94640 AIRWAY INHALATION TREATMENT: CPT

## 2023-01-22 PROCEDURE — 97129 THER IVNTJ 1ST 15 MIN: CPT

## 2023-01-22 PROCEDURE — 99232 SBSQ HOSP IP/OBS MODERATE 35: CPT | Performed by: INTERNAL MEDICINE

## 2023-01-22 PROCEDURE — 2700000000 HC OXYGEN THERAPY PER DAY

## 2023-01-22 PROCEDURE — 94761 N-INVAS EAR/PLS OXIMETRY MLT: CPT

## 2023-01-22 PROCEDURE — 1180000000 HC REHAB R&B

## 2023-01-22 PROCEDURE — 6370000000 HC RX 637 (ALT 250 FOR IP): Performed by: INTERNAL MEDICINE

## 2023-01-22 PROCEDURE — 97530 THERAPEUTIC ACTIVITIES: CPT

## 2023-01-22 RX ORDER — ENOXAPARIN SODIUM 100 MG/ML
40 INJECTION SUBCUTANEOUS DAILY
Status: DISCONTINUED | OUTPATIENT
Start: 2023-01-22 | End: 2023-02-07 | Stop reason: HOSPADM

## 2023-01-22 RX ORDER — QUETIAPINE FUMARATE 25 MG/1
25 TABLET, FILM COATED ORAL NIGHTLY
Status: DISCONTINUED | OUTPATIENT
Start: 2023-01-23 | End: 2023-01-23

## 2023-01-22 RX ADMIN — FERROUS SULFATE TAB 325 MG (65 MG ELEMENTAL FE) 325 MG: 325 (65 FE) TAB at 08:59

## 2023-01-22 RX ADMIN — CARVEDILOL 6.25 MG: 6.25 TABLET, FILM COATED ORAL at 08:59

## 2023-01-22 RX ADMIN — QUETIAPINE FUMARATE 25 MG: 25 TABLET ORAL at 08:59

## 2023-01-22 RX ADMIN — ENOXAPARIN SODIUM 40 MG: 100 INJECTION SUBCUTANEOUS at 13:12

## 2023-01-22 RX ADMIN — IPRATROPIUM BROMIDE AND ALBUTEROL SULFATE 1 AMPULE: 2.5; .5 SOLUTION RESPIRATORY (INHALATION) at 15:23

## 2023-01-22 RX ADMIN — SPIRONOLACTONE 25 MG: 25 TABLET ORAL at 08:59

## 2023-01-22 RX ADMIN — IPRATROPIUM BROMIDE AND ALBUTEROL SULFATE 1 AMPULE: 2.5; .5 SOLUTION RESPIRATORY (INHALATION) at 11:24

## 2023-01-22 RX ADMIN — POLYETHYLENE GLYCOL 3350 17 G: 17 POWDER, FOR SOLUTION ORAL at 09:00

## 2023-01-22 RX ADMIN — IPRATROPIUM BROMIDE AND ALBUTEROL SULFATE 1 AMPULE: 2.5; .5 SOLUTION RESPIRATORY (INHALATION) at 19:33

## 2023-01-22 RX ADMIN — IPRATROPIUM BROMIDE AND ALBUTEROL SULFATE 1 AMPULE: 2.5; .5 SOLUTION RESPIRATORY (INHALATION) at 07:53

## 2023-01-22 ASSESSMENT — PAIN SCALES - WONG BAKER: WONGBAKER_NUMERICALRESPONSE: 0

## 2023-01-22 NOTE — PROGRESS NOTES
Speech Language Pathology  Speech Language Pathology  [facility]    Cognitive Treatment Note    Date: 1/22/2023  Patients Name: Alfred Alva  MRN: [id number]  Diagnosis: Dysphagia  Patient Active Problem List   Diagnosis Code    Acute type II respiratory failure (HCC) J96.00    Transaminitis R74.01    Normocytic anemia D64.9    Thrombocytopenia (HCC) D69.6    Agitation R45.1    Acute respiratory failure with hypoxia and hypercapnia (HCC) RLL pneumonia J96.01, J96.02    Altered mental status R41.82    Community acquired pneumonia of right lower lobe of lung J18.9    Prolonged pt (prothrombin time) R79.1    Emphysema lung ([address] J43.9    Cerebral infarction ([address] I63.9    ACP (advance care planning) Z71.89    Goals of care, counseling/discussion Z71.89    Encounter for palliative care Z51.5    Delirium due to another medical condition F05    Moderate malnutrition ([address] E44.0    Hepatitis C virus infection without hepatic coma B19.20    Dysphagia R13.10    Impending cerebrovascular accident ([address] I63.9    Dermatitis associated with moisture L30.8       Pain: no c/o pain    Cognitive Treatment  Treatment time: 1440-0076 (session shortened d/t Pt inability to maintain alertness)    Subjective: [] Alert [] Cooperative     [] Confused     [] Agitated    [x] Lethargic    Objective/Assessment:    Orientation: 0/4 temporal, 1/2 spatial concepts (I). Referred to visual supports with mod A. Able to recall purpose MI. Recall: Required max A (yes/no questions) to recall morning events. Organization: NT    Problem solving: Stating reasons to use call light: 1/3 (I), improved to 3/3 with max A. ST provided ongoing instruction to Pt  re: need for assistance and purpose of therapy 2/2 deficits as Pt continues to show decreased insight/safety awareness. Other: Pt demoing decreased confusion however is very lethargic and requiring frequent cues to maintain alertness.  Unable to sustain for duration of scheduled session. Telesitter present.        Plan:  [x] Continue ST services    [] Discharge from :        Discharge recommendations: [] Inpatient Rehab   [] [place]   [] Outpatient Therapy  [] Follow up at trauma clinic   [] Other:         Treatment completed by: Ana Hauser M.S., [address] [facility]

## 2023-01-22 NOTE — PROGRESS NOTES
[place]  Speech Language Pathology    Date: 1/22/2023  Patient Name: Rolf Marroquin  YOB: 1960   AGE: 60 y.o. MRN: [id number]        Patient Not Available for Speech Therapy     Due to:  [] Testing  [] Hemodialysis  [] Cancelled by RN  [] Surgery   [] Intubation/Sedation/Pain Medication  [] Medical instability  [x] Other: ST returned for PM session; Pt very lethargic and unable to maintain sufficient alertness for meaningful participation despite verbal cues.      Next scheduled treatment: 01/23/2023  Completed by: Ajith Vazquez M.S., CCC-SLP

## 2023-01-22 NOTE — PROGRESS NOTES
[address] Internal Medicine  Brian Goldsmith MD; Manuel Ayala MD; Anthony Castellanos MD; MD Sylvia Esquivel MD; Chanell House MD    [facility]   [address] / HISTORY AND PHYSICAL EXAMINATION            Date:   1/22/2023  Patient name:  Ramiro Akhtar  Date of admission:  1/19/2023  9:12 AM  MRN:   [id number]  Account:  [de-identified]  YOB: 1960  PCP:    Chanell House MD  Room:   [address] [place] [address]  Code Status:    Full Code    Physician Requesting Consult: Emi Engle MD    Reason for Consult: Medical management    Chief Complaint:     No chief complaint on file. Acute CVA, generalized weakness, metabolic encephalopathy, multifactorial with underlying acute respiratory failure with pneumonia and heart failure    History Obtained From:     patient    History of Present Illness:     79-year-old gentleman with unknown past medical history presented to inpatient [facility] with metabolic encephalopathy found to have acute respiratory failure, pneumonia, also had slurred speech, confusion progressively was getting worse, EMS brought the patient to the hospital his saturations were  75%, found to have acute respiratory failure with pneumonia, in the ED patient was placed on BiPAP, chest x-ray confirmed right lower lobe pneumonia with pleural effusion. ABG showed acute respiratory failure with hypercapnia and hypoxia.   Subsequently also found to have NSTEMI, acute heart failure with transaminitis possible shock liver, with also hepatitis C acute with hepatic failure, slowly improved,  Also had multiple acute brain infarct with subarachnoid hemorrhage,,  Subsequently patient was evaluated for acute inpatient rehab, accepted and admitted right now we are consulted for medical management    1/20  Patient was sitting on the chair on my encounter, states that he has been feeling drained  Just finished doing physical therapy, vitals have been stable  Past Medical History:     History reviewed. No pertinent past medical history. Past Surgical History:     Past Surgical History:   Procedure Laterality Date    UPPER GASTROINTESTINAL ENDOSCOPY N/A 1/17/2023    EGD ESOPHAGOGASTRODUODENOSCOPY PEG TUBE INSERTION performed by Raciel Harmon DO at [facility] ENDO        Medications Prior to Admission:     Prior to Admission medications    Medication Sig Start Date End Date Taking? Authorizing Provider   aspirin 325 MG tablet Take 325 mg by mouth daily Patient takes 2 tabs daily    Historical Provider, MD   psyllium (KONSYL) 28.3 % PACK Take 1 packet by mouth daily    Historical Provider, MD        Allergies:     Patient has no known allergies. Social History:     Tobacco:    reports that he has been smoking cigarettes. He has a 40.00 pack-year smoking history. He has never used smokeless tobacco.  Alcohol:      reports current alcohol use. Drug Use:  reports that he does not currently use drugs. Family History:     History reviewed. No pertinent family history. Review of Systems:     Positive and Negative as described in HPI. CONSTITUTIONAL:  negative for fevers, chills, sweats, fatigue, weight loss  HEENT:  negative for vision, hearing changes, runny nose, throat pain  RESPIRATORY:  negative for shortness of breath, cough, congestion, wheezing. CARDIOVASCULAR:  negative for chest pain, palpitations.   GASTROINTESTINAL:  negative for nausea, vomiting, diarrhea, constipation, change in bowel habits, abdominal pain   GENITOURINARY:  negative for difficulty of urination, burning with urination, frequency   INTEGUMENT:  negative for rash, skin lesions, easy bruising   HEMATOLOGIC/LYMPHATIC:  negative for swelling/edema   ALLERGIC/IMMUNOLOGIC:  negative for urticaria , itching  ENDOCRINE:  negative increase in drinking, increase in urination, hot or cold intolerance  MUSCULOSKELETAL:  negative joint pains, muscle aches, swelling of joints  NEUROLOGICAL:  negative for headaches, dizziness, lightheadedness, numbness, pain, tingling extremities  BEHAVIOR/PSYCH:  negative for depression, anxiety    Physical Exam:     /77   Pulse 87   Temp 97.9 °F (36.6 °C) (Axillary)   Resp 18   Ht 5' 7\" (1.702 m)   Wt 163 lb (73.9 kg)   SpO2 95%   BMI 25.53 kg/m²   Temp (24hrs), Av.5 °F (36.9 °C), Min:97.6 °F (36.4 °C), Max:99.9 °F (37.7 °C)    No results for input(s): POCGLU in the last 72 hours. Intake/Output Summary (Last 24 hours) at 2023 1419  Last data filed at 2023  Gross per 24 hour   Intake 800 ml   Output --   Net 800 ml         General Appearance:  alert, well appearing, and in no acute distress  Mental status: oriented to person, place, and time with normal affect  Head:  normocephalic, atraumatic. Eye: no icterus, redness, pupils equal and reactive, extraocular eye movements intact, conjunctiva clear  Ear: normal external ear, no discharge, hearing intact  Nose:  no drainage noted  Mouth: mucous membranes moist  Neck: supple, no carotid bruits, thyroid not palpable  Lungs: Bilateral equal air entry, clear to ausculation, no wheezing, rales or rhonchi, normal effort  Cardiovascular: normal rate, regular rhythm, no murmur, gallop, rub. Abdomen: Soft, nontender, nondistended, normal bowel sounds, no hepatomegaly or splenomegaly  Neurologic: There are no new focal motor or sensory deficits, normal muscle tone and bulk, no abnormal sensation, normal speech, cranial nerves II through XII grossly intact  Skin: No gross lesions, rashes, bruising or bleeding on exposed skin area  Extremities:  peripheral pulses palpable, no pedal edema or calf pain with palpation  Psych: normal affect    Investigations:      Laboratory Testing:  No results found for this or any previous visit (from the past 24 hour(s)).       Imaging/Diagonstics:  XR CHEST PORTABLE    Result Date: 2023  Interval extubation and NG tube removal.  Otherwise similar findings, with perhaps slightly greater hazy ground-glass opacity right mid lung. Correlate clinically. FL MODIFIED BARIUM SWALLOW W VIDEO    Result Date: 1/14/2023  Premature vallecular spillage. Piriform sinus cavity residue. Deep penetration with both materials. Aspiration with pudding. Please see separate speech pathology report for full discussion of findings and recommendations.        Assessment :      Hospital Problems             Last Modified POA    * (Principal) Impending cerebrovascular accident ([address] 1/19/2023 Yes    Acute type II respiratory failure ([address] 1/19/2023 Yes    Transaminitis 1/19/2023 Yes    Normocytic anemia 1/19/2023 Yes    Thrombocytopenia ([address] 1/19/2023 Yes    Acute respiratory failure with hypoxia and hypercapnia ([address] RLL pneumonia 1/19/2023 Yes    Community acquired pneumonia of right lower lobe of lung 1/19/2023 Yes    Emphysema lung ([address] 1/19/2023 Yes    Cerebral infarction ([address] 1/19/2023 Yes    Moderate malnutrition ([address] 1/19/2023 Yes    Hepatitis C virus infection without hepatic coma 1/19/2023 Yes    Dysphagia 1/19/2023 Yes    Dermatitis associated with moisture 1/20/2023 Yes     Plan:     Acute hypoxic and hypercapnic respiratory failure secondary to likely aspiration pneumonia, treated with vancomycin and Unasyn, also was positive for MRSA nasal swab, required intubation, extubated subsequently  Acute CVA with subarachnoid hemorrhage, cardioembolic, did not qualify for tPA,  Acute liver failure possible multifactorial, alcoholic liver disease with hepatitis C,  Hypercoagulable state secondary to acute liver disease, INR currently 1.6  Thrombocytopenia secondary to alcoholic liver disease and hepatitis C, improved  Dysphagia , PEG placed   Anemia , of ch disease multifactorial   DVT PPX with SCD only, as he had cerebral h'age  Full code status   PT/OT     1/21  Patient seen to be resting comfortably  Was able to participate in physical therapy today  No new issues  Repeat CT head showed no new strokes had no hemorrhage  Continue current management    1/22  Since no bleed noted on repeat CT head okay to resume DVT prophylaxis  Hemoglobin is stable, thrombocytopenia has resolved platelet count 381 on labs 1/20  No new issues continue current management    Consultations:   Montserrat [place]pa  IP CONSULT TO INTERNAL MEDICINE  IP CONSULT TO Barbara Marques MD  1/22/2023  2:19 PM    Copy sent to Dr. Minor Boxer, MD    Please note that this chart was generated using voice recognition Dragon dictation software. Although every effort was made to ensure the accuracy of this automated transcription, some errors in transcription may have occurred.

## 2023-01-22 NOTE — PROGRESS NOTES
[address]    Acute Rehabilitation Occupational Therapy Daily Treatment Note    Date: 23  Patient Name: Ann Poe       Room: 2191/7458-53  MRN: [id number]  Account: [de-identified]   : 1960  (60 y.o.) Gender: male       Referring Practitioner: Ashley Garay MD  Diagnosis: Cerebrovascular accident  Additional Pertinent Hx: Per MRI Impression on 23: Mild amount of residual subarachnoid hemorrhage in the right frontal and  right parietal lobes as well as the left frontal lobe. Evolving small focus of hemorrhage in the left parietal lobe posteriorly. Evolving areas of ischemia in the right frontal lobe and left posterior parietal lobe as well as a few foci in the parasagittal aspect of the right frontoparietal region. Treatment Diagnosis: Impaired self care status    Past Medical History:  has no past medical history on file. Past Surgical History:   has a past surgical history that includes Upper gastrointestinal endoscopy (N/A, 2023). Restrictions  Restrictions/Precautions  Restrictions/Precautions: Fall Risk, General Precautions, Contact Precautions, Isolation, Bed Alarm  Required Braces or Orthoses?: No  Implants present? :  (pt denies)     Position Activity Restriction  Other position/activity restrictions: Severe dysphagia- NPO. Unable to tolerate any PO safely. NG tube placed,  on high flow nasal cannula          Vitals  Vital Signs  BP Location: Right upper arm  O2 Device: Nasal cannula     Subjective  Subjective  Subjective: Pt alert, but not responding to verbal cues to participate. Writer provides max encouragment to participate. P return noted. Writer requested LPN Carlos Manuel provide feeding to encourage alertness. P return noted. Writer edu pt on importance of participating in therapy. Pt refused despite numerous attempts to encourage participation. Pain: Patient denies.   Pain Assessment  Baldwin-Morris Pain Rating: No hurt    Objective  Cognition  Overall Orientation Status: Impaired  Orientation Level: Oriented to person;Disoriented to situation;Oriented to place; Disoriented to time    Cognition  Overall Cognitive Status: Exceptions  Arousal/Alertness: Appropriate responses to stimuli  Following Commands: Follows one step commands consistently  Attention Span: Attends with cues to redirect  Memory: Decreased recall of biographical Information;Decreased recall of precautions;Decreased recall of recent events  Safety Judgement: Decreased awareness of need for assistance;Decreased awareness of need for safety  Problem Solving: Assistance required to generate solutions;Assistance required to implement solutions;Assistance required to identify errors made;Assistance required to correct errors made  Insights: Decreased awareness of deficits  Initiation: Requires cues for some  Sequencing: Requires cues for some  Cognition Comment: Pt with impulsive tendencies noted. Assessment  Assessment  Activity Tolerance: Patient limited by endurance; Patient limited by fatigue;Patient limited by pain  Discharge Recommendations: Home with assist PRN;Outpatient OT    Patient Education  Education  Education Given To: Patient  Education Provided: Role of Therapy;Plan of Care  Education Method: Verbal;Demonstration  Barriers to Learning: Cognition; Hearing  Education Outcome: Verbalized understanding;Demonstrated understanding    OT Equipment Recommendations  Other: TBD    Safety Devices  Safety Devices in place: Yes  Type of devices: All fall risk precautions in place;Call light within reach; Chair alarm in place;Gait belt;Patient at risk for falls; Left in chair;Nurse notified       Goals  Patient Goals   Patient goals : \"To learn how to walk properly. ..all that goes without saying (referring to bathing, dressing and toileting independence)\"  Short Term Goals  Time Frame for Short Term Goals:  One week  Short Term Goal 1: Patient will perform oral hygiene with SBA and Good safety. Short Term Goal 2: Patient will perform hair brushing with SBA. Short Term Goal 3: Patient will perform upper body dressing with SBA. Short Term Goal 4: Patient will perform lower body dressing, lower body bathing, and toileting tasks with Minimal assist.  Short Term Goal 5: Patient will perform functional moblity and transfers during self-care with CGA, least restrictive mobility device, and Good safety. Short Term Goal 6: Patient will verbalize/demonstrate Good understanding of assistive equipment/durable medical equipment/modified techniques to maximize safety and independence with self-care. Short Term Goal 7: Patient will actively participate in 30+ minutes of therapeutic exercise/functional activities to promote increased independence with self-care and mobility. Long Term Goals  Time Frame for Long Term Goals : By discharge  Long Term Goal 1: Patient will perform BADLs with modified independence and Good safety. Long Term Goal 2: Patient will perform functional mobility and transfers during self-care with modified independence, least restrictive mobility device, and Good safety. Long Term Goal 3: Patient will stand for 5+ minutes with 0-1 UE support, modified independence while engaging in functional activity of choice. Long Term Goal 4: Patient will perform simple meal prep and light housekeeping with SBA and Good safety. Long Term Goal 5: Patient will verbalize/demonstrate Good understanding of Fall Prevention Strategies to maximize safety and independence with self-care.   Long Term Goal 6: Bilateral  strength and motor control to be assessed and goals updated    Plan  Occupational Therapy Plan  Times Per Week: 5-7  Times Per Day: Twice a day  Current Treatment Recommendations: Self-Care / ADL, Strengthening, ROM, Balance training, Functional mobility training, Endurance training, Cognitive reorientation, Neuromuscular re-education, Safety education & training, Patient/Caregiver education & training, Equipment evaluation, education, & procurement, Cognitive/Perceptual training, Coordination training, Home management training      OT Individual Minutes  OT Individual Minutes  Time In: 9571  Time Out: 0192 ([id number])  Minutes: 15  Time Code Minutes   Timed Code Treatment Minutes: 15 Minutes      01/22/23 0803   Minute Variance   Variance 43   Reason Refusal       Electronically signed by ANITA Beauchamp on 1/22/23 at 4:29 PM EST

## 2023-01-22 NOTE — PROGRESS NOTES
Physical Medicine & Rehabilitation  Progress Note      Subjective:      60year-old male with multifocal embolic CVA and SAH. Patient is drowsy today but exhibiting no restlessness, confusion, or agitation. ROS:  Denies fevers, chills, sweats. No chest pain, palpitations, lightheadedness. Denies coughing, wheezing or shortness of breath. Denies abdominal pain, nausea, diarrhea or constipation. No new areas of joint pain. Denies new areas of numbness or weakness. Denies new anxiety or depression issues. No new skin problems. Rehabilitation:   Progressing in therapies. PT:    Bed mobility  Rolling to Left: Moderate assistance  Rolling to Right: Moderate assistance  Supine to Sit: Minimal assistance  Sit to Supine: Moderate assistance (Assist with BLEs)  Scooting: Stand by assistance  Bed Mobility Comments: HOB flat left handrail         Transfers  Sit to Stand: Minimal Assistance (from [facility])  Stand to Sit: Minimal Assistance (decreased eccentric controll.)  Bed to Chair: Moderate assistance (RW)  Stand Pivot Transfers: Moderate Assistance         Ambulation  Surface: Level tile  Device: Rolling Walker  Other Apparatus: O2  Assistance: Moderate assistance  Quality of Gait: Needs cues for sequencing , step length. SpO2  95% with HR 92.  Gait Deviations: Slow Radha, Decreased step length, Decreased step height, Increased SELVIN  Distance: 15 ft twice. Comments: NT       OT:  Grooming/Oral Hygiene  Assistance Level: Stand by assist  Skilled Clinical Factors: SBA with VCs to appropriately spit. Use of oral swabs and mouth wash  Upper Extremity Bathing  Assistance Level: Moderate assistance  Skilled Clinical Factors: Completed seated on shower bench with moderate verbal and tactile cues throughout for attention to task for initiation and sequencing.   Lower Extremity Bathing  Assistance Level: Minimal assistance  Skilled Clinical Factors: Completed seated on shower bench with moderate verbal and tactile cues throughout for attention to task for initiation and sequencing. Min A for washing BLE below the knee d/t confusion. Upper Extremity Dressing  Assistance Level: Moderate assistance  Skilled Clinical Factors: Assist to orient and intermittent assist to thread BUE into t shirt. v/c to doff shirt. pt failed to initate task. Assist in doffing over head. Pt able to unthread arms after assistance given to initiate task. Able to pull tshirt over head and pull down. Patient demonstrates deficits in motor planning and sequencing, demonstrating apraxia with regard to the sequence of donning/doffing clothing. Lower Extremity Dressing  Assistance Level: Moderate assistance  Skilled Clinical Factors: Assist to orient and intermittent assist to thread B LE into brief and pants. Pt utilizes figure 4 positioning to thread brief and pants while seated on shower bench. Pt able to pull over hips while standing in shower utilizing GB for balance. Patient demonstrates deficits in motor planning and sequencing, demonstrating apraxia with regard to the sequence of donning clothing. Putting On/Taking Off Footwear  Assistance Level: Minimal assistance  Skilled Clinical Factors: able to don B footies while seated on shower bench. Assist to orient footies due to deficits in motor planning and sequencing. Patient demonstrates apraxia with regard to the sequence of donning clothing. Toileting  Assistance Level: Maximum assistance  Skilled Clinical Factors: Assist to pull pants over hips while standing with Minimal assist. Patient performs personal hygiene after BM while seated on toilet   Toilet Transfers  Technique: Stand pivot, To the left, To the right  Equipment: Grab bars, Raised toilet seat without arms  Additional Factors: Verbal cues, Cues for hand placement, Set-up  Assistance Level:  Moderate assistance      SPEECH:  Subjective: [] Alert     [] Cooperative     [] Confused     [] Agitated    [x] Lethargic     Objective/Assessment: Orientation: 0/4 temporal, 1/2 spatial concepts (I). Referred to visual supports with mod A. Able to recall purpose MI. Recall: Required max A (yes/no questions) to recall morning events. Organization: NT     Problem solving: Stating reasons to use call light: 1/3 (I), improved to 3/3 with max A. ST provided ongoing instruction to Pt  re: need for assistance and purpose of therapy 2/2 deficits as Pt continues to show decreased insight/safety awareness. Other: Pt demoing decreased confusion however is very lethargic and requiring frequent cues to maintain alertness. Unable to sustain for duration of scheduled session. Telesitter present. Objective:  /77   Pulse 85   Temp 97.9 °F (36.6 °C) (Axillary)   Resp 18   Ht 5' 7\" (1.702 m)   Wt 163 lb (73.9 kg)   SpO2 97%   BMI 25.53 kg/m²       GEN: well developed, well nourished, NAD  HEENT: NCAT, PERRL, EOMI, mucous membranes pink and moist  CV: RRR, no murmurs, rubs or gallops  PULM: CTAB, no rales or rhonchi. Respirations WNL and unlabored  ABD: soft, NT, ND, BS+ and equal  NEURO: A&O to person and place. Restless and mildly agitated at times. Sensation intact to light touch. CNs II-XII WNL. MSK: Functional ROM all extremities . Strength 4+/5 key muscles all extremities  EXTREMITIES: No calf tenderness to palpation bilaterally. No edema BLEs  SKIN: warm dry and intact with good turgor  PSYCH: appropriately interactive. Affect WNL. Diagnostics:     CBC:   Recent Labs     01/20/23  0620   WBC 5.4   RBC 4.92   HGB 10.9*   HCT 37.1*   MCV 75.5*   RDW 33.2*        BMP:   Recent Labs     01/20/23  0620      K 4.0      CO2 32*   BUN 13   CREATININE 0.70   GLUCOSE 111*     BNP: No results for input(s): BNP in the last 72 hours. PT/INR: No results for input(s): PROTIME, INR in the last 72 hours. APTT: No results for input(s): APTT in the last 72 hours.   CARDIAC ENZYMES: No results for input(s): CKMB, CKMBINDEX, TROPONINT in the last 72 hours. Invalid input(s): CKTOTAL;3 troponins   FASTING LIPID PANEL:  Lab Results   Component Value Date    TRIG 85 01/03/2023     LIVER PROFILE:   Recent Labs     01/20/23  0620   AST 21   ALT 18   BILIDIR 0.4*   BILITOT 0.8   ALKPHOS 40      CT HEAD 1/21/23  FINDINGS:   BRAIN/VENTRICLES: There is no residual subarachnoid hemorrhage. There is no   residual left parietal intraparenchymal hemorrhage. There is no acute   intracranial hemorrhage, mass effect or midline shift. No abnormal   extra-axial fluid collection. The gray-white differentiation is maintained   without evidence of an acute infarct. There is no evidence of hydrocephalus. ORBITS: The visualized portion of the orbits demonstrate no acute abnormality. SINUSES: Air-fluid levels are noted in the the left frontal sinus and left   maxillary sinus, in keeping with bacterial sinusitis. SOFT TISSUES/SKULL:  No acute abnormality of the visualized skull or soft   tissues. Impression:     No acute intracranial abnormality. No residual subarachnoid hemorrhage in no residual left parietal   intraparenchymal hemorrhage. fluid levels are noted in the the left frontal sinus and left maxillary   sinus, in keeping with bacterial sinusitis.         Current Medications:   Current Facility-Administered Medications: QUEtiapine (SEROQUEL) tablet 25 mg, 25 mg, Oral, BID  ipratropium-albuterol (DUONEB) nebulizer solution 1 ampule, 1 ampule, Inhalation, Q4H WA  guaiFENesin (MUCINEX) extended release tablet 600 mg, 600 mg, Oral, BID PRN  carvedilol (COREG) tablet 6.25 mg, 6.25 mg, Oral, BID WC  ferrous sulfate (IRON 325) tablet 325 mg, 325 mg, Oral, Daily with breakfast  spironolactone (ALDACTONE) tablet 25 mg, 25 mg, Oral, Daily  acetaminophen (TYLENOL) tablet 650 mg, 650 mg, Oral, Q4H PRN  polyethylene glycol (GLYCOLAX) packet 17 g, 17 g, Oral, Daily  senna (SENOKOT) tablet 17.2 mg, 2 tablet, Oral, Daily PRN  bisacodyl (DULCOLAX) suppository 10 mg, 10 mg, Rectal, Daily PRN      Impression/Plan:   Impaired ADLs, gait, and mobility due to:    Multifocal cardioembolic CVA, subarachnoid hemorrhage:  PT/OT for gait, mobility, strengthening, endurance, ADLs, and self care. No residual SAH on CT head 1/21. Dysphagia: s/p PEG tube placement on 1/17. SLP treating. Dietitian managing tube feed - transition from around the clock to nocturnal/bolus as tolerated. Agitation:  Recurred 1/21. Patient moved to room closer to the nurses station and telesitter initiated. Repeat CT head improved. Seroquel BID started 1/21 - will reduce to hs dose on 1/22 for daytime drowsiness affecting therapy participation. Insomnia: started Trazodone at hs 1/20 - failed. On Seroquel at hs. Acute respiratory failure: required intubation. Currently on 2L NC - weaning as tolerated  Pneumonia: Improved. completed course of Unasyn and Vanco  KENTRELL: resolved. Will monitor  Elevated LFTs:  resolved. Will monitor  HCV: will need f/u with GI for treatment outpatient  Anemia: Hb low but stable. Monitoring  Thrombocytopenia: Platelet count low but stable. Will monitor. Bowel Management: Miralax daily, senokot prn, dulcolax prn. DVT Prophylaxis:  Repeat CT - SAH resolved 1/21 - reviewed with Dr. Yamil Helton 1/22 - ok to initiate DVT chemoprophylaxis. Started Lovenox 1/22. TONI stockings during the day, EPC cuffs at night. IM for medical management      Electronically signed by Andres King MD on 1/22/2023 at 10:27 AM      This note is created with the assistance of a speech recognition program.  While intending to generate a document that actually reflects the content of the visit, the document can still have some errors including those of syntax and sound a like substitutions which may escape proof reading. In such instances, actual meaning can be extrapolated by contextual diversion.

## 2023-01-22 NOTE — PLAN OF CARE
Problem: Discharge Planning  Goal: Discharge to home or other facility with appropriate resources  1/22/2023 0015 by Falguni Isaacs RN  Outcome: Progressing     Problem: Safety - Medical Restraint  Goal: Remains free of injury from restraints (Restraint for Interference with Medical Device)  Description: INTERVENTIONS:  1. Determine that other, less restrictive measures have been tried or would not be effective before applying the restraint  2. Evaluate the patient's condition at the time of restraint application  3. Inform patient/family regarding the reason for restraint  4.  Q2H: Monitor safety, psychosocial status, comfort, nutrition and hydration  1/22/2023 0015 by Falguni Isaacs RN  Outcome: Progressing     Problem: Nutrition Deficit:  Goal: Optimize nutritional status  1/22/2023 0015 by Falguni Isaacs RN  Outcome: Progressing     Problem: Safety - Adult  Goal: Free from fall injury  1/22/2023 0015 by Falguni Isaacs RN  Outcome: Progressing     Problem: ABCDS Injury Assessment  Goal: Absence of physical injury  1/22/2023 0015 by Falguni Isaacs RN  Outcome: Progressing

## 2023-01-22 NOTE — PROGRESS NOTES
Physical Therapy        Physical Therapy Cancel Note      DATE: 2023    NAME: Tami Botello  MRN: [id number]   : 1960      Patient not seen this date for Physical Therapy due to:  Patient also not seen this PM for physical therapy.           Electronically signed by Teresita Milian PTA on 2023 at 4:20 PM

## 2023-01-22 NOTE — PROGRESS NOTES
Physical Therapy        Physical Therapy Cancel Note      DATE: 2023    NAME: Jere Pollock  MRN: [id number]   : 1960      Patient not seen this date for Physical Therapy due to:    Patient refusal this AM.  Reports he will participate in PM.  Writer educated on the importance of therapy on a daily basis.       Electronically signed by Jerica Frausto PTA on 2023 at 10:33 AM

## 2023-01-22 NOTE — PROGRESS NOTES
Pt in bed got up to go to bathroom with assistance but was incont. Of urine. Pt ready to go to bed. Hs care complete. Residual 200 meds given.

## 2023-01-23 PROCEDURE — 94640 AIRWAY INHALATION TREATMENT: CPT

## 2023-01-23 PROCEDURE — 94761 N-INVAS EAR/PLS OXIMETRY MLT: CPT

## 2023-01-23 PROCEDURE — 97129 THER IVNTJ 1ST 15 MIN: CPT

## 2023-01-23 PROCEDURE — 1180000000 HC REHAB R&B

## 2023-01-23 PROCEDURE — 6370000000 HC RX 637 (ALT 250 FOR IP): Performed by: PHYSICAL MEDICINE & REHABILITATION

## 2023-01-23 PROCEDURE — 97130 THER IVNTJ EA ADDL 15 MIN: CPT

## 2023-01-23 PROCEDURE — 97530 THERAPEUTIC ACTIVITIES: CPT

## 2023-01-23 PROCEDURE — 92507 TX SP LANG VOICE COMM INDIV: CPT

## 2023-01-23 PROCEDURE — 6370000000 HC RX 637 (ALT 250 FOR IP)

## 2023-01-23 PROCEDURE — 99232 SBSQ HOSP IP/OBS MODERATE 35: CPT | Performed by: PHYSICAL MEDICINE & REHABILITATION

## 2023-01-23 PROCEDURE — 92526 ORAL FUNCTION THERAPY: CPT

## 2023-01-23 PROCEDURE — 6370000000 HC RX 637 (ALT 250 FOR IP): Performed by: INTERNAL MEDICINE

## 2023-01-23 PROCEDURE — 2700000000 HC OXYGEN THERAPY PER DAY

## 2023-01-23 PROCEDURE — 97535 SELF CARE MNGMENT TRAINING: CPT

## 2023-01-23 PROCEDURE — 99212 OFFICE O/P EST SF 10 MIN: CPT

## 2023-01-23 PROCEDURE — 6360000002 HC RX W HCPCS: Performed by: PHYSICAL MEDICINE & REHABILITATION

## 2023-01-23 PROCEDURE — 99231 SBSQ HOSP IP/OBS SF/LOW 25: CPT | Performed by: INTERNAL MEDICINE

## 2023-01-23 RX ORDER — CLOTRIMAZOLE 1 %
CREAM (GRAM) TOPICAL 2 TIMES DAILY
Status: DISCONTINUED | OUTPATIENT
Start: 2023-01-23 | End: 2023-01-31 | Stop reason: ALTCHOICE

## 2023-01-23 RX ADMIN — SPIRONOLACTONE 25 MG: 25 TABLET ORAL at 10:05

## 2023-01-23 RX ADMIN — IPRATROPIUM BROMIDE AND ALBUTEROL SULFATE 1 AMPULE: 2.5; .5 SOLUTION RESPIRATORY (INHALATION) at 07:06

## 2023-01-23 RX ADMIN — IPRATROPIUM BROMIDE AND ALBUTEROL SULFATE 1 AMPULE: 2.5; .5 SOLUTION RESPIRATORY (INHALATION) at 15:28

## 2023-01-23 RX ADMIN — FERROUS SULFATE TAB 325 MG (65 MG ELEMENTAL FE) 325 MG: 325 (65 FE) TAB at 10:05

## 2023-01-23 RX ADMIN — CLOTRIMAZOLE: 10 CREAM TOPICAL at 22:45

## 2023-01-23 RX ADMIN — CARVEDILOL 6.25 MG: 6.25 TABLET, FILM COATED ORAL at 10:04

## 2023-01-23 RX ADMIN — IPRATROPIUM BROMIDE AND ALBUTEROL SULFATE 1 AMPULE: 2.5; .5 SOLUTION RESPIRATORY (INHALATION) at 10:46

## 2023-01-23 RX ADMIN — ENOXAPARIN SODIUM 40 MG: 100 INJECTION SUBCUTANEOUS at 10:03

## 2023-01-23 RX ADMIN — IPRATROPIUM BROMIDE AND ALBUTEROL SULFATE 1 AMPULE: 2.5; .5 SOLUTION RESPIRATORY (INHALATION) at 19:36

## 2023-01-23 RX ADMIN — CARVEDILOL 6.25 MG: 6.25 TABLET, FILM COATED ORAL at 17:32

## 2023-01-23 RX ADMIN — POLYETHYLENE GLYCOL 3350 17 G: 17 POWDER, FOR SOLUTION ORAL at 10:04

## 2023-01-23 RX ADMIN — CLOTRIMAZOLE: 10 CREAM TOPICAL at 13:32

## 2023-01-23 ASSESSMENT — 9 HOLE PEG TEST
TESTTIME_SECONDS: 42.01
TEST_RESULT: IMPAIRED
TESTTIME_SECONDS: 36.43
TEST_RESULT: IMPAIRED

## 2023-01-23 ASSESSMENT — PAIN SCALES - WONG BAKER: WONGBAKER_NUMERICALRESPONSE: 0

## 2023-01-23 NOTE — PROGRESS NOTES
Comprehensive Nutrition Assessment    Type and Reason for Visit:  Reassess    Nutrition Recommendations/Plan:   Continue current tube feeding as tolerated. Malnutrition Assessment:  Malnutrition Status: Moderate malnutrition (01/19/23 1419)    Context:  Acute Illness     Findings of the 6 clinical characteristics of malnutrition:  Energy Intake:  75% or less of estimated energy requirements for 7 or more days  Weight Loss:  Greater than 5% over 1 month     Body Fat Loss:  Unable to assess     Muscle Mass Loss:  Unable to assess    Fluid Accumulation:  No significant fluid accumulation     Strength:  Not Performed    Nutrition Assessment:    Nurse reports pt tolerated bolus feeding this morning without difficulty. Nocturnal tube feeding has been delayed at times due to elevated residuals. Small, loosely formed stool passed. Nutrition Related Findings:    No edema. BM 1/23. Labs and meds reviewed. Current Nutrition Intake & Therapies:    Average Meal Intake: NPO     Diet NPO  ADULT TUBE FEEDING; PEG; Peptide Based; Cyclic, Bolus; 70; 8:83 PM; 6:00 AM; 3 Times Daily; 300; Gravity; 50; Other (specify); 50 mL water before and after each bolus and cyclic feeding. Additional 300 mL water daily for meds. Current Tube Feeding (TF) Orders:  Feeding Route: PEG  Formula: Peptide Based  Schedule: Cyclic, Bolus  Feeding Regimen: 70 mL x 10 hrs; bolus of 300 mL x 3 daily. Water Flushes: 50 mL before and after each bolus and nocturnal feeding. Addiitonal 300 mL to be used with meds daily. Goal TF & Flush Orders Provides: 1920 kcal, 120 gm protein, 1998 mL free fluid. Anthropometric Measures:  Height: 5' 7\" (170.2 cm)  Ideal Body Weight (IBW): 148 lbs (67 kg)    Admission Body Weight: 159 lb 9.8 oz (72.4 kg) ([facility] 1/19 ([facility]  Current Body Weight: 162 lb 14.7 oz (73.9 kg), 107.8 % IBW.  Weight Source: Bed Scale  Current BMI (kg/m2): 25.5  Usual Body Weight: 170 lb (77.1 kg) (Per significant other)  % Weight Change (Calculated): -6.1                    BMI Categories: Overweight (BMI 25.0-29. 9)    Estimated Daily Nutrient Needs:  Energy Requirements Based On: Formula  Weight Used for Energy Requirements: Admission  Energy (kcal/day): 7315-4036 based on [facility] with 1.2-1.3 factor  Weight Used for Protein Requirements: Ideal  Protein (g/day): 101 based on 1.5 gm per kg  Method Used for Fluid Requirements: 1 ml/kcal  Fluid (ml/day): 1920 mL    Nutrition Diagnosis:   Moderate malnutrition related to inadequate protein-energy intake as evidenced by Criteria as identified in malnutrition assessment    Nutrition Interventions:   Food and/or Nutrient Delivery: Continue NPO, Continue Current Tube Feeding  Nutrition Education/Counseling: No recommendation at this time  Coordination of Nutrition Care: Continue to monitor while inpatient, Speech Therapy       Goals:  Previous Goal Met: Progressing toward Goal(s)  Goals: Tolerate nutrition support at goal rate       Nutrition Monitoring and Evaluation:   Behavioral-Environmental Outcomes: None Identified  Food/Nutrient Intake Outcomes: Enteral Nutrition Intake/Tolerance  Physical Signs/Symptoms Outcomes: Biochemical Data, Chewing or Swallowing, GI Status, Weight    Discharge Planning:     Too soon to determine     Donna Tucker, CAROLE, LD  Contact: [phone]

## 2023-01-23 NOTE — PLAN OF CARE
Problem: Discharge Planning  Goal: Discharge to home or other facility with appropriate resources  1/22/2023 2150 by Bonnie Marie RN  Outcome: Progressing     Problem: Safety - Medical Restraint  Goal: Remains free of injury from restraints (Restraint for Interference with Medical Device)  Description: INTERVENTIONS:  1. Determine that other, less restrictive measures have been tried or would not be effective before applying the restraint  2. Evaluate the patient's condition at the time of restraint application  3. Inform patient/family regarding the reason for restraint  4. Q2H: Monitor safety, psychosocial status, comfort, nutrition and hydration  1/22/2023 2150 by Bonnie Marie RN  Outcome: Progressing     Problem: Nutrition Deficit:  Goal: Optimize nutritional status  1/22/2023 2150 by Bonnie Marie RN  Outcome: Progressing     Problem: Safety - Adult  Goal: Free from fall injury  1/22/2023 2150 by Bonnie Marie RN  Outcome: Progressing     Problem: ABCDS Injury Assessment  Goal: Absence of physical injury  1/22/2023 2150 by Bonnie Marie RN  Outcome: Progressing     Problem: Skin/Tissue Integrity  Goal: Absence of new skin breakdown  Description: 1. Monitor for areas of redness and/or skin breakdown  2. Assess vascular access sites hourly  3. Every 4-6 hours minimum:  Change oxygen saturation probe site  4. Every 4-6 hours:  If on nasal continuous positive airway pressure, respiratory therapy assess nares and determine need for appliance change or resting period.   1/22/2023 2150 by Bonnie Marie RN  Outcome: Progressing

## 2023-01-23 NOTE — PROGRESS NOTES
[address]    Acute Rehabilitation Occupational Therapy Daily Treatment Note    Date: 23  Patient Name: Charanjit Ferrell       Room: 4879/4575-74  MRN: [id number]  Account: [de-identified]   : 1960  (60 y.o.) Gender: male       Referring Practitioner: Galileo Samuel MD  Diagnosis: Cerebrovascular accident  Additional Pertinent Hx: Per MRI Impression on 23: Mild amount of residual subarachnoid hemorrhage in the right frontal and  right parietal lobes as well as the left frontal lobe. Evolving small focus of hemorrhage in the left parietal lobe posteriorly. Evolving areas of ischemia in the right frontal lobe and left posterior parietal lobe as well as a few foci in the parasagittal aspect of the right frontoparietal region. Treatment Diagnosis: Impaired self care status    Past Medical History:  has no past medical history on file. Past Surgical History:   has a past surgical history that includes Upper gastrointestinal endoscopy (N/A, 2023). Restrictions  Restrictions/Precautions  Restrictions/Precautions: Fall Risk, General Precautions, Contact Precautions, Isolation, Bed Alarm  Required Braces or Orthoses?: No  Implants present? :  (pt denies)     Position Activity Restriction  Other position/activity restrictions: Severe dysphagia- NPO. Unable to tolerate any PO safely. NG tube placed,  on high flow nasal cannula    Vitals  Vital Signs  BP Location: Right upper arm  O2 Device: Nasal cannula     Subjective  Subjective  Subjective: Pt in bed. Alert. Declines morning ADLs. Willing to participate with therapy. Pain: Patient denies. Pain Assessment  Baldwin-Morris Pain Rating: No hurt    Objective  Cognition  Overall Orientation Status: Impaired  Orientation Level: Oriented to person;Disoriented to situation;Oriented to place; Disoriented to time    Cognition  Overall Cognitive Status: Exceptions  Arousal/Alertness: Appropriate responses to stimuli  Following Commands: Follows one step commands consistently  Attention Span: Attends with cues to redirect  Memory: Decreased recall of biographical Information;Decreased recall of precautions;Decreased recall of recent events  Safety Judgement: Decreased awareness of need for assistance;Decreased awareness of need for safety  Problem Solving: Assistance required to generate solutions;Assistance required to implement solutions;Assistance required to identify errors made;Assistance required to correct errors made  Insights: Decreased awareness of deficits  Initiation: Requires cues for some  Sequencing: Requires cues for some  Cognition Comment: Pt with impulsive tendencies noted. Activities of Daily Living    Grooming/Oral Hygiene  Assistance Level: Set-up  Skilled Clinical Factors: completed oral care with mouth swab while seated EOB. Mobility  Bed Mobility  Overall Assistance Level: Supervision     Roll Left  Assistance Level: Supervision  Skilled Clinical Factors: utilized hand rails with raised HOB    Supine to Sit  Assistance Level: Supervision  Skilled Clinical Factors: SUP d/t safety unawareness  Scooting  Assistance Level: Supervision  Skilled Clinical Factors: SUP d/t safety unawareness    OT Exercises  Dynamic Sitting Balance Exercises: AM: pt tolerated EOB performing dynamic tasks for 30 minutes completing assessments and therapeutic activities. Motor Control/Coordination: AM: Pt participated in completing ADL boards while seated EOB to increase independence when performing self care tasks. Lacing: Pt is able to identify errors, yet demo difficulty in correcting errors. \"I line it up in my head, but can't seem to do it. \" Pt rotates board clockwise with each attempt to correct errors. Able to complete after 3rd attempt. Zipper: Pt completes with no sign of difficulty. Arias: Pt unbuckles and rotates 180 degrees clockwise to buckle. \"If am hoping if I turn it around, I will see how to do it. \" Pt completes without assistance. Buttons: Pt unbuttons and rotates 180 degrees clockwise to button 2 buttons, then rotates clockwise to position it sideways to button the last 3 buttons from the outside of the board to the middle. Patient demonstrates deficits in motor planning and sequencing, demonstrating apraxia with regard to the sequence of donning clothing. Additional Activities  Additional Activities Comment: PM: Pt engagement in safety cards to develop problem solving skills to maximize safety and independence with self-care. Pt demo good visual scanning and good problem solving for all scenerios except two. 1. leaving keys in the door; pt thought it was a good place to store the keys and did not see the danger even when writer prompted to reconsider. 2. Overloaded outlet; pt idenified the type of elecrical outlet, but failed to see that the outlet was overloaded even when prompted to consider if there were too many items plugged in. Left Hand Strength -  (lbs)  Handle Setting 1: 42.3# (54, 30,43) (Norms: #)        Right Hand Strength -  (lbs)  Handle Setting 1: 45.6# (50,37,50) (Norms: #)        Fine Motor Skills  Left 9-Hole Peg Test: Impaired  Left 9 Hole Peg Test Time (secs): 42.01 (Norms: 21-29 sec.)  Right 9-Hole Peg Test: Impaired  Right 9 Hole Peg Test Time (secs): 36.43 (Norms: 21-29 sec.)       Assessment  Assessment  Assessment: WIN/L faciliated 9 hole peg and dynamometer assessment on this date. Activity Tolerance: Patient limited by endurance; Patient limited by fatigue;Patient limited by pain  Discharge Recommendations: Home with assist PRN;Outpatient OT    Patient Education  Education  Education Given To: Patient  Education Provided: Role of Therapy;Plan of Care  Education Method: Verbal;Demonstration  Barriers to Learning: Cognition; Hearing  Education Outcome: Verbalized understanding;Demonstrated understanding    OT Equipment Recommendations  Other: TBD    Safety Devices  Safety Devices in place: Yes  Type of devices: All fall risk precautions in place;Call light within reach; Chair alarm in place;Gait belt;Patient at risk for falls; Left in chair;Nurse notified       Goals  Patient Goals   Patient goals : \"To learn how to walk properly. ..all that goes without saying (referring to bathing, dressing and toileting independence)\"  Short Term Goals  Time Frame for Short Term Goals: One week  Short Term Goal 1: Patient will perform oral hygiene with SBA and Good safety. Short Term Goal 2: Patient will perform hair brushing with SBA. Short Term Goal 3: Patient will perform upper body dressing with SBA. Short Term Goal 4: Patient will perform lower body dressing, lower body bathing, and toileting tasks with Minimal assist.  Short Term Goal 5: Patient will perform functional moblity and transfers during self-care with CGA, least restrictive mobility device, and Good safety. Short Term Goal 6: Patient will verbalize/demonstrate Good understanding of assistive equipment/durable medical equipment/modified techniques to maximize safety and independence with self-care. Short Term Goal 7: Patient will actively participate in 30+ minutes of therapeutic exercise/functional activities to promote increased independence with self-care and mobility. Long Term Goals  Time Frame for Long Term Goals : By discharge  Long Term Goal 1: Patient will perform BADLs with modified independence and Good safety. Long Term Goal 2: Patient will perform functional mobility and transfers during self-care with modified independence, least restrictive mobility device, and Good safety. Long Term Goal 3: Patient will stand for 5+ minutes with 0-1 UE support, modified independence while engaging in functional activity of choice. Long Term Goal 4: Patient will perform simple meal prep and light housekeeping with SBA and Good safety.   Long Term Goal 5: Patient will verbalize/demonstrate Good understanding of Fall Prevention Strategies to maximize safety and independence with self-care.   Long Term Goal 6: Bilateral  strength and motor control to be assessed and goals updated    Plan  Occupational Therapy Plan  Times Per Week: 5-7  Times Per Day: Twice a day  Current Treatment Recommendations: Self-Care / ADL, Strengthening, ROM, Balance training, Functional mobility training, Endurance training, Cognitive reorientation, Neuromuscular re-education, Safety education & training, Patient/Caregiver education & training, Equipment evaluation, education, & procurement, Cognitive/Perceptual training, Coordination training, Home management training       01/23/23 0836 01/23/23 1000   OT Individual Minutes   Time In [id number]   Time Out [id number]   [id number]         Electronically signed by ANITA Velasquez on 1/23/23 at 3:34 PM EST

## 2023-01-23 NOTE — PROGRESS NOTES
Physical Therapy          Physical Therapy Note      DATE: 2023    NAME: Tami Botello  MRN: [id number]   : 1960      Patient only see for physical therapy this AM for 10 mins. Due to having respiratory treatment and then Speech coming in. Completed bed mobility at Mod x 1 A for supine to sit. Dangled at EOB x 5 mins. Patient declined PM session and then respiratory therapist came in to start a treatment.           Electronically signed by Teresita Milian PTA on 2023 at 5:03 PM

## 2023-01-23 NOTE — PROGRESS NOTES
Physical Medicine & Rehabilitation  Progress Note      Subjective:      Patient is a 62M with multifocal embolic CVA and SAH. Patient is well, but has had some minor complaints of insomnia/difficulty sleeping. Patient denies drowsiness, confusion, and agitation. ROS:  Denies fevers, chills, sweats. No chest pain, palpitations, lightheadedness. Denies coughing, wheezing or shortness of breath. Denies abdominal pain, nausea, diarrhea or constipation. No new areas of joint pain. Denies new areas of numbness or weakness. Denies new anxiety or depression issues. No new skin problems. Rehabilitation:   Progressing in therapies. PT:    Bed mobility  Rolling to Left: Moderate assistance  Rolling to Right: Moderate assistance  Supine to Sit: Minimal assistance  Sit to Supine: Moderate assistance (Assist with BLEs)  Scooting: Stand by assistance  Bed Mobility Comments: HOB flat left handrail         Transfers  Sit to Stand: Minimal Assistance (from [facility])  Stand to Sit: Minimal Assistance (decreased eccentric controll.)  Bed to Chair: Moderate assistance (RW)  Stand Pivot Transfers: Moderate Assistance         Ambulation  Surface: Level tile  Device: Rolling Walker  Other Apparatus: O2  Assistance: Moderate assistance  Quality of Gait: Needs cues for sequencing , step length. SpO2  95% with HR 92.  Gait Deviations: Slow Radha, Decreased step length, Decreased step height, Increased SELVIN  Distance: 15 ft twice. Comments: NT       OT:  Grooming/Oral Hygiene  Assistance Level: Stand by assist  Skilled Clinical Factors: SBA with VCs to appropriately spit. Use of oral swabs and mouth wash  Upper Extremity Bathing  Assistance Level: Moderate assistance  Skilled Clinical Factors: Completed seated on shower bench with moderate verbal and tactile cues throughout for attention to task for initiation and sequencing.   Lower Extremity Bathing  Assistance Level: Minimal assistance  Skilled Clinical Factors: Completed seated on shower bench with moderate verbal and tactile cues throughout for attention to task for initiation and sequencing. Min A for washing BLE below the knee d/t confusion. Upper Extremity Dressing  Assistance Level: Moderate assistance  Skilled Clinical Factors: Assist to orient and intermittent assist to thread BUE into t shirt. v/c to doff shirt. pt failed to initate task. Assist in doffing over head. Pt able to unthread arms after assistance given to initiate task. Able to pull tshirt over head and pull down. Patient demonstrates deficits in motor planning and sequencing, demonstrating apraxia with regard to the sequence of donning/doffing clothing. Lower Extremity Dressing  Assistance Level: Moderate assistance  Skilled Clinical Factors: Assist to orient and intermittent assist to thread B LE into brief and pants. Pt utilizes figure 4 positioning to thread brief and pants while seated on shower bench. Pt able to pull over hips while standing in shower utilizing GB for balance. Patient demonstrates deficits in motor planning and sequencing, demonstrating apraxia with regard to the sequence of donning clothing. Putting On/Taking Off Footwear  Assistance Level: Minimal assistance  Skilled Clinical Factors: able to don B footies while seated on shower bench. Assist to orient footies due to deficits in motor planning and sequencing. Patient demonstrates apraxia with regard to the sequence of donning clothing. Toileting  Assistance Level: Maximum assistance  Skilled Clinical Factors: Assist to pull pants over hips while standing with Minimal assist. Patient performs personal hygiene after BM while seated on toilet   Toilet Transfers  Technique: Stand pivot, To the left, To the right  Equipment: Grab bars, Raised toilet seat without arms  Additional Factors: Verbal cues, Cues for hand placement, Set-up  Assistance Level:  Moderate assistance      SPEECH: 1/22  Subjective: [] Alert     [] Cooperative     [] Confused     [] Agitated    [x] Lethargic     Objective/Assessment:     Orientation: 0/4 temporal, 1/2 spatial concepts (I). Referred to visual supports with mod A. Able to recall purpose MI. Recall: Required max A (yes/no questions) to recall morning events. Organization: NT     Problem solving: Stating reasons to use call light: 1/3 (I), improved to 3/3 with max A. ST provided ongoing instruction to Pt  re: need for assistance and purpose of therapy 2/2 deficits as Pt continues to show decreased insight/safety awareness. Other: Pt demoing decreased confusion however is very lethargic and requiring frequent cues to maintain alertness. Unable to sustain for duration of scheduled session. Telesitter present. Objective:  /77   Pulse 87   Temp 100 °F (37.8 °C) (Oral)   Resp 18   Ht 5' 7\" (1.702 m)   Wt 163 lb (73.9 kg)   SpO2 94%   BMI 25.53 kg/m²       GEN: well developed, well nourished, NAD. Appears appropriately alert and non-lethargic. HEENT: NCAT, PERRL, EOMI, mucous membranes pink and moist  CV: RRR, no murmurs, rubs or gallops  PULM: CTAB, no rales or rhonchi. Respirations WNL and unlabored  ABD: soft, NT, ND, BS+ and equal. PEG tube in place, there appears to be some crusted dried feeding solution around the insertion site. No erythema, swelling, or purulent discharge. NEURO: A&O x3. Sensation intact to light touch. CN II-XII WNL. MSK: Functional ROM all extremities. Strength 5/5 in all extremities except BLE which are 4/5. EXTREMITIES: No calf tenderness to palpation bilaterally. No edema BLEs  SKIN: warm dry and intact with good turgor  PSYCH: appropriately interactive. Affect WNL. Diagnostics:     CBC: No results for input(s): WBC, RBC, HGB, HCT, MCV, RDW, PLT in the last 72 hours. BMP: No results for input(s): NA, K, CL, CO2, PHOS, BUN, CREATININE, CA, GLUCOSE in the last 72 hours. BNP: No results for input(s): BNP in the last 72 hours.   PT/INR: No results for input(s): PROTIME, INR in the last 72 hours. APTT: No results for input(s): APTT in the last 72 hours. CARDIAC ENZYMES: No results for input(s): CKMB, CKMBINDEX, TROPONINT in the last 72 hours. Invalid input(s): CKTOTAL;3 troponins   FASTING LIPID PANEL:  Lab Results   Component Value Date    TRIG 85 01/03/2023     LIVER PROFILE: No results for input(s): AST, ALT, ALB, BILIDIR, BILITOT, ALKPHOS in the last 72 hours. Current Medications:   Current Facility-Administered Medications: QUEtiapine (SEROQUEL) tablet 25 mg, 25 mg, Oral, Nightly  enoxaparin (LOVENOX) injection 40 mg, 40 mg, SubCUTAneous, Daily  ipratropium-albuterol (DUONEB) nebulizer solution 1 ampule, 1 ampule, Inhalation, Q4H WA  guaiFENesin (MUCINEX) extended release tablet 600 mg, 600 mg, Oral, BID PRN  carvedilol (COREG) tablet 6.25 mg, 6.25 mg, Oral, BID WC  ferrous sulfate (IRON 325) tablet 325 mg, 325 mg, Oral, Daily with breakfast  spironolactone (ALDACTONE) tablet 25 mg, 25 mg, Oral, Daily  acetaminophen (TYLENOL) tablet 650 mg, 650 mg, Oral, Q4H PRN  polyethylene glycol (GLYCOLAX) packet 17 g, 17 g, Oral, Daily  senna (SENOKOT) tablet 17.2 mg, 2 tablet, Oral, Daily PRN  bisacodyl (DULCOLAX) suppository 10 mg, 10 mg, Rectal, Daily PRN      Impression/Plan:   Impaired ADLs, gait, and mobility due to:      Multifocal cardioembolic CVA, subarachnoid hemorrhage:  PT/OT for gait, mobility, strengthening, endurance, ADLs, and self care. No residual SAH on CT head 1/21. Dysphagia: s/p PEG tube placement on 1/17. SLP treating. Dietitian managing tube feed - transition from around the clock to nocturnal/bolus as tolerated. Agitation:  Recurred 1/21. Patient moved to room closer to the nurses station and telesitter initiated. Repeat CT head improved. Seroquel BID started 1/21 - will reduce to hs dose on 1/22 for daytime drowsiness affecting therapy participation. Insomnia: started Trazodone at hs 1/20 - failed. On Seroquel at hs.    Acute respiratory failure: required intubation. Currently on 2L NC - weaning as tolerated  Pneumonia: Improved. completed course of Unasyn and Vanco  KENTRELL: resolved. Will monitor  Elevated LFTs:  resolved. Will monitor  HCV: will need f/u with GI for treatment outpatient  Anemia: Hb low but stable. Monitoring  Thrombocytopenia: Platelet count low but stable. Will monitor. Wound on buttocks. Wound care consulted. Will start lotrimin cream for concern of fungal infection. Bowel Management: Miralax daily, senokot prn, dulcolax prn. DVT Prophylaxis:  Repeat CT - SAH resolved 1/21 - reviewed with Dr. Christiano Echavarria 1/22 - ok to initiate DVT chemoprophylaxis. Started Lovenox 1/22. TONI stockings during the day, EPC cuffs at night. IM for medical management      Electronically signed by Sarah Roger MD on 1/23/2023 at 9:44 AM      This note is created with the assistance of a speech recognition program.  While intending to generate a document that actually reflects the content of the visit, the document can still have some errors including those of syntax and sound a like substitutions which may escape proof reading. In such instances, actual meaning can be extrapolated by contextual diversion.

## 2023-01-23 NOTE — PROGRESS NOTES
Speech Language Pathology  Speech Language Pathology  [facility] Acute Rehab Unit at [facility] Note    Date: 1/23/2023  Patients Name: Andres Mail  MRN: [id number]  Diagnosis: Dysphagia s/p CVA  Patient Active Problem List   Diagnosis Code    Acute type II respiratory failure (HCC) J96.00    Transaminitis R74.01    Normocytic anemia D64.9    Thrombocytopenia (HCC) D69.6    Agitation R45.1    Acute respiratory failure with hypoxia and hypercapnia (HCC) RLL pneumonia J96.01, J96.02    Altered mental status R41.82    Community acquired pneumonia of right lower lobe of lung J18.9    Prolonged pt (prothrombin time) R79.1    Emphysema lung (HCC) J43.9    Cerebral infarction (HCC) I63.9    ACP (advance care planning) Z71.89    Goals of care, counseling/discussion Z71.89    Encounter for palliative care Z51.5    Delirium due to another medical condition F05    Moderate malnutrition ([facility] [address] E44.0    Hepatitis C virus infection without hepatic coma B19.20    Dysphagia R13.10    Impending cerebrovascular accident ([facility] [address] I63.9    Dermatitis associated with moisture L30.8       Pain: no c/o pain    Cognitive Treatment  Treatment time: 8430-8041    Subjective: [x] Alert [x] Cooperative     [] Confused     [] Agitated    [] Lethargic    Objective/Assessment:    Orientation: 71% (not oriented to year or date)      Recall: n/a    Organization: NT    Problem solving: name 9 items in concrete category:  states- 88%, 100% c cues, vegetables-  56%, 100% c cues. Dysphagia:  Pt. Completed simple tongue base strength exercises x4, 15 reps each, Kim maneuver x6, oral motor exercises x2, 12 reps each. Lingual weakness noted t/o. Telesitter present.        Plan:  [x] Continue ST services    [] Discharge from ST:        Discharge recommendations: [] Inpatient Rehab   [] [place]   [] Outpatient Therapy  [] Follow up at trauma clinic   [] Other:         Treatment completed by: Jerod Anderson M. A. ANNA MARIE/SLP

## 2023-01-23 NOTE — CARE COORDINATION
ARU CASE MANAGEMENT NOTE:    Patient is alert and oriented x4. Spoke with patient regarding discharge plan and patient confirms plan is to go home with S.O. and go to OP therapy    DME: none    Outside appointments: not while in ARU    Will continue to follow for additional discharge needs.     Electronically signed by Dom Ruffin RN on 1/23/2023 at 11:04 AM

## 2023-01-23 NOTE — PROGRESS NOTES
[facility]  Progress Note      NAME:  [id number] RECORD NUMBER:  [id number]  AGE: 60 y.o. GENDER: male  : 1960  TODAY'S DATE:  2023    [facility] nurse follow up visit for discoloration to buttocks/groin. Staff have been applying zinc paste. Buttocks continue to be quite red with satellite lesions present. The skin is dry and flaky. Recommend changing treatment to antifungal cream twice daily. Apply thin layer and allow to absorb before putting pants back on. Pt states that he was using antifungal cream prior to admission. Will continue to follow.      Malinda Deal, LISAN, [facility]  Wound, Ostomy, and Continence Nursing  [phone]

## 2023-01-23 NOTE — PATIENT CARE CONFERENCE
[facility]patient [facility]  TEAM CONFERENCE NOTE  Date: 23  Patient Name: eJssee Fernandez       Room: 7611/6559-84  MRN: [id number]       : 1960  (63 y.o.)     Gender: male           PRINCIPAL DIAGNOSIS: Impending cerebrovascular accident [facility])    NURSING    Bladder Continence  Always Incontinent  Bowel Continence Always Continent    Date of Last BM: 23    Bladder/Bowel Program Interventions: Both Bowel & Bladder Program In Place     Wounds/Incisions/Ulcers:  Fungal rash on buttocks and groin, treatment initiated 23. Pain Control: No pain concerns to address    Pain Medication Regimen Usage Pattern: MAR reviewed and pain medications are being used at the following frequency (Specify Medication, # Doses Administered on average per day, identified patterns of use - for example: time of day, prior to activity/therapy)  Acetaminophen 650mg q4h prn - has only taken once since admission    Fall Risk:  Falling star program initiated    Medication Education Program: Patient currently unable to manage medications and responsible party being educated    Discharge Preparation Patient/Responsible Party Education In Progress:   Enteral Tube Feeding Management    Nursing specific communication for TEAM: No additional information identified requiring communication at this time    PHYSICAL THERAPY       Functional Mobility  Bed mobility  Supine to Sit: Minimal assistance  Scooting: Stand by assistance  Bed Mobility Comments: HOB flat left handrail    Transfers  Sit to Stand: Minimal Assistance (from [facility])  Stand to Sit: Minimal Assistance (decreased eccentric controll.)  Bed to Chair: Moderate assistance (RW)  Stand Pivot Transfers: Moderate Assistance     Ambulation  Surface: Level tile  Device: Rolling Walker  Other Apparatus: O2  Assistance: Moderate assistance  Quality of Gait: Needs cues for sequencing , step length.   SpO2  95% with HR 92.  Gait Deviations: Slow Radha;Decreased step length;Decreased step height; Increased SELVIN  Distance: 15 ft twice. Assessment: 59 y/o male adm with AMS and found to have [address] with recent fall at home. Pt is a high fall risk as he has decreased safety awareness and B LE weakness L hemibody > R. Pt will benefit from continued therapy while in rehab unit to improve fucntion and safety. Goals  Short Term Goals  Time Frame for Short Term Goals: 9 days  Short Term Goal 1: CGA assist supine<-> sit x1 assist  Short Term Goal 2: sit EOB withsupervsion up to 15 min for therex/ADL  Short Term Goal 3: 3+/5 LE strength to prepare for standing activiites and gait  Short Term Goal 4: ambulation with rolling walker distance of 70 to 100 ft, CGA, level surface  Short Term Goal 5: roll L/R with SBA  Short Term Goal 6: Pt able to go up and down 5 steps with 2 rails, min A      OCCUPATIONAL THERAPY  SELF CARE          Feeding  Assistance Level: NPO, PEG tube         Grooming/Oral Hygiene  Assistance Level: Stand by assist  Skilled Clinical Factors: SBA with VCs to appropriately spit. Use of oral swabs and mouth wash        Upper Extremity Bathing  Assistance Level: Moderate assistance  Skilled Clinical Factors: Completed seated on shower bench with moderate verbal and tactile cues throughout for attention to task for initiation and sequencing. Lower Extremity Bathing  Assistance Level: Minimal assistance  Skilled Clinical Factors: Completed seated on shower bench with moderate verbal and tactile cues throughout for attention to task for initiation and sequencing. Min A for washing BLE below the knee d/t confusion. Upper Extremity Dressing  Assistance Level: Moderate assistance  Skilled Clinical Factors: Assist to orient and intermittent assist to thread BUE into t shirt. v/c to doff shirt. pt failed to initate task. Assist in doffing over head. Pt able to unthread arms after assistance given to initiate task.  Able to pull tshirt over head and pull down. Patient demonstrates deficits in motor planning and sequencing, demonstrating apraxia with regard to the sequence of donning/doffing clothing. Lower Extremity Dressing  Assistance Level: Moderate assistance  Skilled Clinical Factors: Assist to orient and intermittent assist to thread B LE into brief and pants. Pt utilizes figure 4 positioning to thread brief and pants while seated on shower bench. Pt able to pull over hips while standing in shower utilizing GB for balance. Patient demonstrates deficits in motor planning and sequencing, demonstrating apraxia with regard to the sequence of donning clothing. Putting On/Taking Off Footwear  Assistance Level: Minimal assistance  Skilled Clinical Factors: able to don B footies while seated on shower bench. Assist to orient footies due to deficits in motor planning and sequencing. Patient demonstrates apraxia with regard to the sequence of donning clothing. Toileting  Assistance Level: Maximum assistance  Skilled Clinical Factors: Assist to pull pants over hips while standing with Minimal assist. Patient performs personal hygiene after BM while seated on toilet       Toilet Transfers  Technique: Stand pivot, To the left, To the right  Equipment: Grab bars, Raised toilet seat without arms  Additional Factors: Verbal cues, Cues for hand placement, Set-up  Assistance Level: Moderate assistance    Short Term Goals  Time Frame for Short Term Goals: One week  Short Term Goal 1: Patient will perform oral hygiene with SBA and Good safety. Short Term Goal 2: Patient will perform hair brushing with SBA. Short Term Goal 3: Patient will perform upper body dressing with SBA. Short Term Goal 4: Patient will perform lower body dressing, lower body bathing, and toileting tasks with Minimal assist.  Short Term Goal 5: Patient will perform functional moblity and transfers during self-care with CGA, least restrictive mobility device, and Good safety.   Short Term Goal 6: Patient will verbalize/demonstrate Good understanding of assistive equipment/durable medical equipment/modified techniques to maximize safety and independence with self-care. Short Term Goal 7: Patient will actively participate in 30+ minutes of therapeutic exercise/functional activities to promote increased independence with self-care and mobility. SPEECH THERAPY  Min to mod A memory and problem solving. Pt. Is NPO   Short Term Goal: min A memory and problem solving, diet at least restrictive consistency    NUTRITION  Weight: 163 lb (73.9 kg) / Body mass index is 25.53 kg/m². Diet Rx: NPO. Tube Feeding: Vital AF 1.2 at 70 mL per hr x 10 hrs nocturnally; 300 mL bolus x 3 daily. Total of 700 mL additional water daily via PEG. Fair tolerance to tube feeding; nocturnal feeding incomplete at times due to delay for increased residuals. Pt met criteria for Moderate Malnutrition upon admission to rehab. Will continue to monitor. Please see nutrition note for details.     CASE MANAGEMENT ASSESSMENT    Discharge Disposition: Home  Family Support: Significant other and children    PCP Established: [x] Yes [] No  Kinsey Crow MD    Services Being Followed In Preparation For Discharge: (Check All That Apply)  [x] [address]  TBD  [] [address] [place] [address]  [] Dialysis   [] Hemodialysis (Specify Location/Days/Chair Times)   [] Peritoneal Dialysis  [] IV Infusion Services  [] Enteral Nutrition Services  [] Oxygen  [] Stoma/Ostomy  [] Catheter  [x] Lovenox    Patient Mood Interview PHQ-2 to 9 Score: 0    Pre-Admission Status:  Lives With: Significant other  Type of Home: House  Home Layout: One level  Home Access: Stairs to enter with rails  Entrance Stairs - Number of Steps: 6 ALISA from front; 4 ALISA frmo back with no rails  Entrance Stairs - Rails: Both (Both rails needs to be fixed)  Bathroom Shower/Tub: Tub/Shower unit, Curtain, Shower chair without back  [employer]: Standard (Raised seat in [facility] master bed room bathroom)  Bathroom Equipment: Grab bars in shower, Hand-held shower, Toilet raiser  Bathroom Accessibility: Accessible, Walker accessible  Home Equipment: Anahy Dennison Help From: Family  ADL Assistance: [address]: Independent  Homemaking Responsibilities: Yes  Ambulation Assistance: Independent  Transfer Assistance: Independent  Active : Yes  Mode of Transportation: SUV  Occupation: Retired  Type of Occupation:   IADL Comments: Pt sleeps in flat bed. Additional Comments: Spouse is home all the time and able to assist minimally physically @8 her medical condition. Daughter and son Mancil Banana only with advance notice for appointment, son and daughter both work full time and have children. Family Education: Family Education initiated and Ongoing    Percentage Risk for Readmission: Low 0 - 18%   Readmission Risk              Risk of Unplanned Readmission:  14       %    Critical Items: None       Problem / Barrier Intervention / Plan  Results   dysphagia Tongue base strengthening    Impaired cognition  Cognitive retraining exercises    Altered ability to care for self Remediation and training in modified care strategies to maximize safety and independence with self-care    Impaired functional mobility 2* CVA related deficits Strengthening, functional mobility training, DME procurement, Balance and Neuro re-ed, Family training and Edu                     Total Self Care Score    Total Mobility Score  Admission Score:  11      Admission Score:  27  Goal:  37/42         Goal:  72/90    THERAPY MINUTE COMPLIANCE  Patient is participating and compliant with meeting therapy minutes as outlined in plan of care: No - Patient has documented refusals for participation `    Discharge Plan   Estimated Discharge Date: 2/7/2023  Home evaluation needed?  Requires Re-evaluation  Overnight or Day Pass: No  Factors facilitating achievement of predicted outcomes: Family support, Cooperative, and Has homemaker services  Barriers to the achievement of predicted outcomes: Confusion, Cognitive deficit, Limited safety awareness, Limited insight into deficits, Decreased endurance, Upper extremity weakness, Lower extremity weakness, Medical complications, Stairs at home, and Medication managment    Functional Goals at discharge:  Predicted Outcome: Home with familyPATIENT'S LEVEL OF ASSISTANCE: Contact Guard / [address] and Stand By Assistance   Discharge therapy goals:  PT: Long Term Goals  Time Frame for Long Term Goals : By DC  Long Term Goal 1: pt able to roll L/R indepndently  Long Term Goal 2: pt able to perform supine<>sit mod-I  Long Term Goal 3: pt able to perform sit<>stand and perform pivot/step to transfers at mod-I  Long Term Goal 4: pt able to ambulate household distance with appropriate device, mod-I  Long Term Goal 5: pt able to ambulate > 50 ft with appropriate device, at SBA/supervsion level, level as well as incline surface. Long term goal 6: pt able to go up and down 4 or more steps with B UE support, CGA. Long term goal 7: Improve PASS score to 27/36 improve function/stability. Long term goal 8: improve Tinetti balance score to atleast 23/28 to reduce fall risk. OT:Long Term Goals  Time Frame for Long Term Goals : By discharge  Long Term Goal 1: Patient will perform BADLs with modified independence and Good safety. Long Term Goal 2: Patient will perform functional mobility and transfers during self-care with modified independence, least restrictive mobility device, and Good safety. Long Term Goal 3: Patient will stand for 5+ minutes with 0-1 UE support, modified independence while engaging in functional activity of choice. Long Term Goal 4: Patient will perform simple meal prep and light housekeeping with SBA and Good safety.   Long Term Goal 5: Patient will verbalize/demonstrate Good understanding of Fall Prevention Strategies to maximize safety and independence with self-care. Long Term Goal 6: Bilateral  strength and motor control to be assessed and goals updated  ST: diet at least restrictive consistency, supervision level memory and problem solving. Participating Team Members:  /:  Hanna Kuhn RN  Occupational Therapist: Juan M Marie OT    Physical Therapist: Brandon Lugo PT  Speech Therapist:  Jules Heimlich MA CCC-SLP  Nurse: Cornelio Estevez RN    Dietary/Nutrition: Jenny Pierson RD, LD  Pastoral Care: Chaplain Johnathan Valdivia  CMG: María Alvarado RN    I approve the established interdisciplinary plan of care as documented within the medical record of Tami Botello.     Mulu Giang MD

## 2023-01-23 NOTE — PLAN OF CARE
Problem: Safety - Adult  Goal: Free from fall injury  Outcome: Progressing  Flowsheets (Taken 1/23/2023 3241)  Free From Fall Injury: Instruct family/caregiver on patient safety  Note: Patient remains free of falls and injuries throughout shift. Bed remains in the lowest position, wheels locked, call light and bedside table are within reach. Problem: ABCDS Injury Assessment  Goal: Absence of physical injury  Outcome: Progressing     Problem: Skin/Tissue Integrity  Goal: Absence of new skin breakdown  Description: 1. Monitor for areas of redness and/or skin breakdown  2. Assess vascular access sites hourly  3. Every 4-6 hours minimum:  Change oxygen saturation probe site  4. Every 4-6 hours:  If on nasal continuous positive airway pressure, respiratory therapy assess nares and determine need for appliance change or resting period. Outcome: Progressing  Note: No signs of increased skin or tissue breakdown is noted. See Head to Toe/LDA assessments in flowsheets.       Problem: Nutrition Deficit:  Goal: Optimize nutritional status  Outcome: Progressing  Nutrient intake appropriate for improving, restoring, or maintaining nutritional needs: Recommend, monitor, and adjust tube feedings and TPN/PPN based on assessed needs    Problem: Discharge Planning  Goal: Discharge to home or other facility with appropriate resources  Outcome: Progressing

## 2023-01-23 NOTE — PROGRESS NOTES
Speech Language Pathology  Speech Language Pathology  [facility]hab Unit at [facility]    Cognitive Treatment Note    Date: 1/23/2023  Patients Name: Jona Omalley  MRN: [id number]  Diagnosis: Dysphagia s/p CVA  Patient Active Problem List   Diagnosis Code    Acute type II respiratory failure ([facility] [address] J96.00    Transaminitis R74.01    Normocytic anemia D64.9    Thrombocytopenia (HCC) D69.6    Agitation R45.1    Acute respiratory failure with hypoxia and hypercapnia (HCC) RLL pneumonia J96.01, J96.02    Altered mental status R41.82    Community acquired pneumonia of right lower lobe of lung J18.9    Prolonged pt (prothrombin time) R79.1    Emphysema lung (HCC) J43.9    Cerebral infarction (HCC) I63.9    ACP (advance care planning) Z71.89    Goals of care, counseling/discussion Z71.89    Encounter for palliative care Z51.5    Delirium due to another medical condition F05    Moderate malnutrition ([facility] [address] E44.0    Hepatitis C virus infection without hepatic coma B19.20    Dysphagia R13.10    Impending cerebrovascular accident ([facility] [address] I63.9    Dermatitis associated with moisture L30.8       Pain: no c/o pain    Cognitive Treatment  Treatment time: 5283-7891    Subjective: [x] Alert [x] Cooperative     [] Confused     [] Agitated    [] Lethargic    Objective/Assessment:    Orientation: n/a    Recall: n/a    Organization: NT    Problem solving: missing equipment- 80%, long response latency demo. Word deductions- 80%, 90% c cues. Dysphagia:  Pt. Completed Kim maneuver x5, oral motor exercises x1, 12 reps each. Lingual weakness noted t/o. ST encouraged pt. To continue to practice exercises on his own, pt. Verbalized understanding. Telesitter present.        Plan:  [x] Continue ST services    [] Discharge from :        Discharge recommendations: [] Inpatient Rehab   [] [place]   [] Outpatient Therapy  [] Follow up at trauma clinic   [] Other:         Treatment completed by: Erica Gustafson M. A. ANNA MARIE/SLP

## 2023-01-23 NOTE — PROGRESS NOTES
[address] [facility] Internal Medicine  Mansi Harmon MD; Nahun Burton MD; Vasiliy Ortiz MD; MD Stevan Pulido MD; Karis Thomas MD    [facility] Internal Medicine   [address] / HISTORY AND PHYSICAL EXAMINATION            Date:   1/23/2023  Patient name:  Virginia Tejeda  Date of admission:  1/19/2023  9:12 AM  MRN:   [id number]  Account:  [de-identified]  YOB: 1960  PCP:    Karis Thomas MD  Room:   [facility]1079-  Code Status:    Full Code    Physician Requesting Consult: Jhonny Parks MD    Reason for Consult: Medical management    Chief Complaint:     No chief complaint on file. Acute CVA, generalized weakness, metabolic encephalopathy, multifactorial with underlying acute respiratory failure with pneumonia and heart failure    History Obtained From:     patient    History of Present Illness:     58-year-old gentleman with unknown past medical history presented to inpatient [facility] with metabolic encephalopathy found to have acute respiratory failure, pneumonia, also had slurred speech, confusion progressively was getting worse, EMS brought the patient to the hospital his saturations were  75%, found to have acute respiratory failure with pneumonia, in the ED patient was placed on BiPAP, chest x-ray confirmed right lower lobe pneumonia with pleural effusion. ABG showed acute respiratory failure with hypercapnia and hypoxia.   Subsequently also found to have NSTEMI, acute heart failure with transaminitis possible shock liver, with also hepatitis C acute with hepatic failure, slowly improved,  Also had multiple acute brain infarct with subarachnoid hemorrhage,,  Subsequently patient was evaluated for acute inpatient rehab, accepted and admitted right now we are consulted for medical management    1/20  Patient was sitting on the chair on my encounter, states that he has been feeling drained  Just finished doing physical therapy, vitals have been stable  Past Medical History:     History reviewed. No pertinent past medical history. Past Surgical History:     Past Surgical History:   Procedure Laterality Date    UPPER GASTROINTESTINAL ENDOSCOPY N/A 1/17/2023    EGD ESOPHAGOGASTRODUODENOSCOPY PEG TUBE INSERTION performed by Ethan Trujillo DO at [address] [place] [address]        Medications Prior to Admission:     Prior to Admission medications    Medication Sig Start Date End Date Taking? Authorizing Provider   aspirin 325 MG tablet Take 325 mg by mouth daily Patient takes 2 tabs daily    Historical Provider, MD   psyllium (KONSYL) 28.3 % PACK Take 1 packet by mouth daily    Historical Provider, MD        Allergies:     Patient has no known allergies. Social History:     Tobacco:    reports that he has been smoking cigarettes. He has a 40.00 pack-year smoking history. He has never used smokeless tobacco.  Alcohol:      reports current alcohol use. Drug Use:  reports that he does not currently use drugs. Family History:     History reviewed. No pertinent family history. Review of Systems:     Positive and Negative as described in HPI. CONSTITUTIONAL:  negative for fevers, chills, sweats, fatigue, weight loss  HEENT:  negative for vision, hearing changes, runny nose, throat pain  RESPIRATORY:  negative for shortness of breath, cough, congestion, wheezing. CARDIOVASCULAR:  negative for chest pain, palpitations.   GASTROINTESTINAL:  negative for nausea, vomiting, diarrhea, constipation, change in bowel habits, abdominal pain   GENITOURINARY:  negative for difficulty of urination, burning with urination, frequency   INTEGUMENT:  negative for rash, skin lesions, easy bruising   HEMATOLOGIC/LYMPHATIC:  negative for swelling/edema   ALLERGIC/IMMUNOLOGIC:  negative for urticaria , itching  ENDOCRINE:  negative increase in drinking, increase in urination, hot or cold intolerance  MUSCULOSKELETAL:  negative joint pains, muscle aches, swelling of joints  NEUROLOGICAL:  negative for headaches, dizziness, lightheadedness, numbness, pain, tingling extremities  BEHAVIOR/PSYCH:  negative for depression, anxiety    Physical Exam:     /73   Pulse 82   Temp 98.7 °F (37.1 °C) (Oral)   Resp 18   Ht 5' 7\" (1.702 m)   Wt 163 lb (73.9 kg)   SpO2 95%   BMI 25.53 kg/m²   Temp (24hrs), Av.9 °F (37.2 °C), Min:98.1 °F (36.7 °C), Max:100 °F (37.8 °C)    No results for input(s): POCGLU in the last 72 hours. Intake/Output Summary (Last 24 hours) at 2023 1451  Last data filed at 2023 1406  Gross per 24 hour   Intake 1360 ml   Output --   Net 1360 ml         General Appearance:  alert, well appearing, and in no acute distress  Mental status: oriented to person, place, and time with normal affect  Head:  normocephalic, atraumatic. Eye: no icterus, redness, pupils equal and reactive, extraocular eye movements intact, conjunctiva clear  Ear: normal external ear, no discharge, hearing intact  Nose:  no drainage noted  Mouth: mucous membranes moist  Neck: supple, no carotid bruits, thyroid not palpable  Lungs: Bilateral equal air entry, clear to ausculation, no wheezing, rales or rhonchi, normal effort  Cardiovascular: normal rate, regular rhythm, no murmur, gallop, rub. Abdomen: Soft, nontender, nondistended, normal bowel sounds, no hepatomegaly or splenomegaly  Neurologic: There are no new focal motor or sensory deficits, normal muscle tone and bulk, no abnormal sensation, normal speech, cranial nerves II through XII grossly intact  Skin: No gross lesions, rashes, bruising or bleeding on exposed skin area  Extremities:  peripheral pulses palpable, no pedal edema or calf pain with palpation  Psych: normal affect    Investigations:      Laboratory Testing:  No results found for this or any previous visit (from the past 24 hour(s)).       Imaging/Diagonstics:  XR CHEST PORTABLE    Result Date: 2023  Interval extubation and NG tube removal.  Otherwise similar findings, with perhaps slightly greater hazy ground-glass opacity right mid lung. Correlate clinically. FL MODIFIED BARIUM SWALLOW W VIDEO    Result Date: 1/14/2023  Premature vallecular spillage. Piriform sinus cavity residue. Deep penetration with both materials. Aspiration with pudding. Please see separate speech pathology report for full discussion of findings and recommendations.        Assessment :      Hospital Problems             Last Modified POA    * (Principal) Impending cerebrovascular accident ([address] 1/19/2023 Yes    Acute type II respiratory failure ([address] 1/19/2023 Yes    Transaminitis 1/19/2023 Yes    Normocytic anemia 1/19/2023 Yes    Thrombocytopenia ([address] 1/19/2023 Yes    Acute respiratory failure with hypoxia and hypercapnia ([address] RLL pneumonia 1/19/2023 Yes    Community acquired pneumonia of right lower lobe of lung 1/19/2023 Yes    Emphysema lung ([address] 1/19/2023 Yes    Cerebral infarction ([address] 1/19/2023 Yes    Moderate malnutrition ([address] 1/19/2023 Yes    Hepatitis C virus infection without hepatic coma 1/19/2023 Yes    Dysphagia 1/19/2023 Yes    Dermatitis associated with moisture 1/20/2023 Yes   Plan:     Acute hypoxic and hypercapnic respiratory failure secondary to likely aspiration pneumonia, treated with vancomycin and Unasyn, also was positive for MRSA nasal swab, required intubation, extubated subsequently  Acute CVA with subarachnoid hemorrhage, cardioembolic, did not qualify for tPA,  Acute liver failure possible multifactorial, alcoholic liver disease with hepatitis C,  Hypercoagulable state secondary to acute liver disease, INR currently 1.6  Thrombocytopenia secondary to alcoholic liver disease and hepatitis C, improved  Dysphagia , PEG placed   Anemia , of ch disease multifactorial   DVT PPX with SCD only, as he had cerebral h'age  Full code status   PT/OT     1/21  Patient seen to be resting comfortably  Was able to participate in physical therapy today  No new issues  Repeat CT head showed no new strokes had no hemorrhage  Continue current management    1/22  Since no bleed noted on repeat CT head okay to resume DVT prophylaxis  Hemoglobin is stable, thrombocytopenia has resolved platelet count 383 on labs 1/20  No new issues continue current management    1/23  Patient reports no new complaints. Engaging with physical therapy. Labs and vitals reviewed. No new issues. Continue with current care. Consultations:   Jeferson Jamison TO SOCIAL WORK  IP CONSULT TO INTERNAL MEDICINE  IP CONSULT TO Bc Pereyra MD  1/23/2023  2:51 PM    Copy sent to Dr. Amy Russell MD    Please note that this chart was generated using voice recognition Dragon dictation software. Although every effort was made to ensure the accuracy of this automated transcription, some errors in transcription may have occurred.

## 2023-01-24 ENCOUNTER — APPOINTMENT (OUTPATIENT)
Dept: GENERAL RADIOLOGY | Age: 63
DRG: 139 | End: 2023-01-24
Attending: PHYSICAL MEDICINE & REHABILITATION
Payer: MEDICAID

## 2023-01-24 PROCEDURE — 97116 GAIT TRAINING THERAPY: CPT

## 2023-01-24 PROCEDURE — 99231 SBSQ HOSP IP/OBS SF/LOW 25: CPT | Performed by: INTERNAL MEDICINE

## 2023-01-24 PROCEDURE — 6370000000 HC RX 637 (ALT 250 FOR IP): Performed by: PHYSICAL MEDICINE & REHABILITATION

## 2023-01-24 PROCEDURE — 97530 THERAPEUTIC ACTIVITIES: CPT

## 2023-01-24 PROCEDURE — 6370000000 HC RX 637 (ALT 250 FOR IP): Performed by: INTERNAL MEDICINE

## 2023-01-24 PROCEDURE — 97535 SELF CARE MNGMENT TRAINING: CPT

## 2023-01-24 PROCEDURE — 94664 DEMO&/EVAL PT USE INHALER: CPT

## 2023-01-24 PROCEDURE — 94761 N-INVAS EAR/PLS OXIMETRY MLT: CPT

## 2023-01-24 PROCEDURE — 99233 SBSQ HOSP IP/OBS HIGH 50: CPT | Performed by: PHYSICAL MEDICINE & REHABILITATION

## 2023-01-24 PROCEDURE — 2700000000 HC OXYGEN THERAPY PER DAY

## 2023-01-24 PROCEDURE — 1180000000 HC REHAB R&B

## 2023-01-24 PROCEDURE — 92526 ORAL FUNCTION THERAPY: CPT

## 2023-01-24 PROCEDURE — 94640 AIRWAY INHALATION TREATMENT: CPT

## 2023-01-24 PROCEDURE — 71045 X-RAY EXAM CHEST 1 VIEW: CPT

## 2023-01-24 PROCEDURE — 97110 THERAPEUTIC EXERCISES: CPT

## 2023-01-24 PROCEDURE — 97129 THER IVNTJ 1ST 15 MIN: CPT

## 2023-01-24 PROCEDURE — 97130 THER IVNTJ EA ADDL 15 MIN: CPT

## 2023-01-24 PROCEDURE — 6360000002 HC RX W HCPCS: Performed by: PHYSICAL MEDICINE & REHABILITATION

## 2023-01-24 RX ADMIN — ENOXAPARIN SODIUM 40 MG: 100 INJECTION SUBCUTANEOUS at 07:28

## 2023-01-24 RX ADMIN — CLOTRIMAZOLE: 10 CREAM TOPICAL at 07:29

## 2023-01-24 RX ADMIN — FERROUS SULFATE TAB 325 MG (65 MG ELEMENTAL FE) 325 MG: 325 (65 FE) TAB at 07:29

## 2023-01-24 RX ADMIN — IPRATROPIUM BROMIDE AND ALBUTEROL SULFATE 1 AMPULE: 2.5; .5 SOLUTION RESPIRATORY (INHALATION) at 11:55

## 2023-01-24 RX ADMIN — IPRATROPIUM BROMIDE AND ALBUTEROL SULFATE 1 AMPULE: 2.5; .5 SOLUTION RESPIRATORY (INHALATION) at 07:54

## 2023-01-24 RX ADMIN — IPRATROPIUM BROMIDE AND ALBUTEROL SULFATE 1 AMPULE: 2.5; .5 SOLUTION RESPIRATORY (INHALATION) at 19:26

## 2023-01-24 RX ADMIN — POLYETHYLENE GLYCOL 3350 17 G: 17 POWDER, FOR SOLUTION ORAL at 07:29

## 2023-01-24 RX ADMIN — CARVEDILOL 6.25 MG: 6.25 TABLET, FILM COATED ORAL at 07:29

## 2023-01-24 RX ADMIN — SPIRONOLACTONE 25 MG: 25 TABLET ORAL at 07:29

## 2023-01-24 RX ADMIN — CLOTRIMAZOLE: 10 CREAM TOPICAL at 20:03

## 2023-01-24 ASSESSMENT — PAIN SCALES - WONG BAKER: WONGBAKER_NUMERICALRESPONSE: 0

## 2023-01-24 NOTE — PROGRESS NOTES
[address] [facility] Internal Medicine  Ernesto Schaffer MD; Guzman Walden MD; George Luke MD; MD Emily Sales MD; Pamela Dempsey MD    [facility]   [address] / HISTORY AND PHYSICAL EXAMINATION            Date:   1/24/2023  Patient name:  Xu Arce  Date of admission:  1/19/2023  9:12 AM  MRN:   [id number]  Account:  [de-identified]  YOB: 1960  PCP:    Pamela Dempsey MD  Room:   [address] [place] [address]  Code Status:    Full Code    Physician Requesting Consult: Julián Greco MD    Reason for Consult: Medical management    Chief Complaint:     No chief complaint on file. Acute CVA, generalized weakness, metabolic encephalopathy, multifactorial with underlying acute respiratory failure with pneumonia and heart failure    History Obtained From:     patient    History of Present Illness:     70-year-old gentleman with unknown past medical history presented to inpatient [facility] with metabolic encephalopathy found to have acute respiratory failure, pneumonia, also had slurred speech, confusion progressively was getting worse, EMS brought the patient to the hospital his saturations were  75%, found to have acute respiratory failure with pneumonia, in the ED patient was placed on BiPAP, chest x-ray confirmed right lower lobe pneumonia with pleural effusion. ABG showed acute respiratory failure with hypercapnia and hypoxia.   Subsequently also found to have NSTEMI, acute heart failure with transaminitis possible shock liver, with also hepatitis C acute with hepatic failure, slowly improved,  Also had multiple acute brain infarct with subarachnoid hemorrhage,,  Subsequently patient was evaluated for acute inpatient rehab, accepted and admitted right now we are consulted for medical management    1/20  Patient was sitting on the chair on my encounter, states that he has been feeling drained  Just finished doing physical therapy, vitals have been stable  Past Medical History:     History reviewed. No pertinent past medical history. Past Surgical History:     Past Surgical History:   Procedure Laterality Date    UPPER GASTROINTESTINAL ENDOSCOPY N/A 1/17/2023    EGD ESOPHAGOGASTRODUODENOSCOPY PEG TUBE INSERTION performed by Zeny Rasheed DO at [facility] ENDO        Medications Prior to Admission:     Prior to Admission medications    Medication Sig Start Date End Date Taking? Authorizing Provider   aspirin 325 MG tablet Take 325 mg by mouth daily Patient takes 2 tabs daily    Historical Provider, MD   psyllium (KONSYL) 28.3 % PACK Take 1 packet by mouth daily    Historical Provider, MD        Allergies:     Patient has no known allergies. Social History:     Tobacco:    reports that he has been smoking cigarettes. He has a 40.00 pack-year smoking history. He has never used smokeless tobacco.  Alcohol:      reports current alcohol use. Drug Use:  reports that he does not currently use drugs. Family History:     History reviewed. No pertinent family history. Review of Systems:     Positive and Negative as described in HPI. CONSTITUTIONAL:  negative for fevers, chills, sweats, fatigue, weight loss  HEENT:  negative for vision, hearing changes, runny nose, throat pain  RESPIRATORY:  negative for shortness of breath, cough, congestion, wheezing. CARDIOVASCULAR:  negative for chest pain, palpitations.   GASTROINTESTINAL:  negative for nausea, vomiting, diarrhea, constipation, change in bowel habits, abdominal pain   GENITOURINARY:  negative for difficulty of urination, burning with urination, frequency   INTEGUMENT:  negative for rash, skin lesions, easy bruising   HEMATOLOGIC/LYMPHATIC:  negative for swelling/edema   ALLERGIC/IMMUNOLOGIC:  negative for urticaria , itching  ENDOCRINE:  negative increase in drinking, increase in urination, hot or cold intolerance  MUSCULOSKELETAL:  negative joint pains, muscle aches, swelling of joints  NEUROLOGICAL:  negative for headaches, dizziness, lightheadedness, numbness, pain, tingling extremities  BEHAVIOR/PSYCH:  negative for depression, anxiety    Physical Exam:     /64   Pulse 83   Temp 98.9 °F (37.2 °C) (Oral)   Resp 16   Ht 5' 7\" (1.702 m)   Wt 163 lb (73.9 kg)   SpO2 94%   BMI 25.53 kg/m²   Temp (24hrs), Av °F (37.2 °C), Min:98.9 °F (37.2 °C), Max:99 °F (37.2 °C)    No results for input(s): POCGLU in the last 72 hours. Intake/Output Summary (Last 24 hours) at 2023 1354  Last data filed at 2023 1212  Gross per 24 hour   Intake 4676 ml   Output 550 ml   Net 4126 ml         General Appearance:  alert, well appearing, and in no acute distress  Mental status: oriented to person, place, and time with normal affect  Head:  normocephalic, atraumatic. Eye: no icterus, redness, pupils equal and reactive, extraocular eye movements intact, conjunctiva clear  Ear: normal external ear, no discharge, hearing intact  Nose:  no drainage noted  Mouth: mucous membranes moist  Neck: supple, no carotid bruits, thyroid not palpable  Lungs: Bilateral equal air entry, clear to ausculation, no wheezing, rales or rhonchi, normal effort  Cardiovascular: normal rate, regular rhythm, no murmur, gallop, rub. Abdomen: Soft, nontender, nondistended, normal bowel sounds, no hepatomegaly or splenomegaly  Neurologic: There are no new focal motor or sensory deficits, normal muscle tone and bulk, no abnormal sensation, normal speech, cranial nerves II through XII grossly intact  Skin: No gross lesions, rashes, bruising or bleeding on exposed skin area  Extremities:  peripheral pulses palpable, no pedal edema or calf pain with palpation  Psych: normal affect    Investigations:      Laboratory Testing:  No results found for this or any previous visit (from the past 24 hour(s)).       Imaging/Diagonstics:  XR CHEST PORTABLE    Result Date: 2023  Interval extubation and NG tube removal.  Otherwise similar findings, with perhaps slightly greater hazy ground-glass opacity right mid lung. Correlate clinically. FL MODIFIED BARIUM SWALLOW W VIDEO    Result Date: 1/14/2023  Premature vallecular spillage. Piriform sinus cavity residue. Deep penetration with both materials. Aspiration with pudding. Please see separate speech pathology report for full discussion of findings and recommendations.        Assessment :      Hospital Problems             Last Modified POA    * (Principal) Impending cerebrovascular accident ([address] 1/19/2023 Yes    Acute type II respiratory failure ([address] 1/19/2023 Yes    Transaminitis 1/19/2023 Yes    Normocytic anemia 1/19/2023 Yes    Thrombocytopenia ([address] 1/19/2023 Yes    Acute respiratory failure with hypoxia and hypercapnia ([address] RLL pneumonia 1/19/2023 Yes    Community acquired pneumonia of right lower lobe of lung 1/19/2023 Yes    Emphysema lung ([address] 1/19/2023 Yes    Cerebral infarction ([address] 1/19/2023 Yes    Moderate malnutrition ([address] 1/19/2023 Yes    Hepatitis C virus infection without hepatic coma 1/19/2023 Yes    Dysphagia 1/19/2023 Yes    Dermatitis associated with moisture 1/20/2023 Yes   Plan:     Acute hypoxic and hypercapnic respiratory failure secondary to likely aspiration pneumonia, treated with vancomycin and Unasyn, also was positive for MRSA nasal swab, required intubation, extubated subsequently  Acute CVA with subarachnoid hemorrhage, cardioembolic, did not qualify for tPA,  Acute liver failure possible multifactorial, alcoholic liver disease with hepatitis C,  Hypercoagulable state secondary to acute liver disease, INR currently 1.6  Thrombocytopenia secondary to alcoholic liver disease and hepatitis C, improved  Dysphagia , PEG placed   Anemia , of ch disease multifactorial   DVT PPX with SCD only, as he had cerebral h'age  Full code status   PT/OT     1/21  Patient seen to be resting comfortably  Was able to participate in physical therapy today  No new issues  Repeat CT head showed no new strokes had no hemorrhage  Continue current management    1/22  Since no bleed noted on repeat CT head okay to resume DVT prophylaxis  Hemoglobin is stable, thrombocytopenia has resolved platelet count 022 on labs 1/20  No new issues continue current management  1/24  Patient reports no new complaints. Engaging with physical therapy. Labs and vitals reviewed. No new issues. Continue with current care. Consultations:   Clark Krabbe TO Steve Jorge MD  1/24/2023  1:54 PM    Copy sent to Dr. Torin Herndon MD    Please note that this chart was generated using voice recognition Dragon dictation software. Although every effort was made to ensure the accuracy of this automated transcription, some errors in transcription may have occurred.

## 2023-01-24 NOTE — PROGRESS NOTES
Per pt wife, pt doesn't need to take Coreg in the evening any longer. Pt wife states \"A male doctor came in sometime and said he doesn't need the Coreg medication in the evening, and soon won't be taking it at all. \" Pt stated \"I remember this because it sounds like Christa Danae. I don't need this medication. \"  LPN provided education to pt and family. Pt continues to refuse. Pt wife approached nursing station to inform staff that pt had removed his nasal cannula. Pt wife then stated \"Whoever put his oxygen on him didn't even turn the oxygen on.\" Upon entering pt room, Oxygen was set at 2L and LPN provided education to pt and family on how to determine if O2 is on, and importance of keeping nasal cannula in. Pt stated \"I'm not going to look like a dork and wear this. \" Pt is not wearing nasal cannula with supplementary oxygen at this time. Care ongoing.

## 2023-01-24 NOTE — PLAN OF CARE
Problem: Discharge Planning  Goal: Discharge to home or other facility with appropriate resources  1/24/2023 1331 by Bebe Merino LPN  Outcome: Progressing     Problem: Safety - Medical Restraint  Goal: Remains free of injury from restraints (Restraint for Interference with Medical Device)  Description: INTERVENTIONS:  1. Determine that other, less restrictive measures have been tried or would not be effective before applying the restraint  2. Evaluate the patient's condition at the time of restraint application  3. Inform patient/family regarding the reason for restraint  4. Q2H: Monitor safety, psychosocial status, comfort, nutrition and hydration  1/24/2023 1331 by Bebe Merino LPN  Outcome: Progressing     Problem: Nutrition Deficit:  Goal: Optimize nutritional status  1/24/2023 1331 by Bebe Merino LPN  Outcome: Progressing     Problem: Safety - Adult  Goal: Free from fall injury  1/24/2023 1331 by Bebe Merino LPN  Outcome: Ana Ramirez (Taken 1/24/2023 0958)  Free From Fall Injury: Sean Perla family/caregiver on patient safety     Problem: ABCDS Injury Assessment  Goal: Absence of physical injury  1/24/2023 1331 by Bebe Merino LPN  Outcome: Progressing  Flowsheets (Taken 1/24/2023 0958)  Absence of Physical Injury: Implement safety measures based on patient assessment     Problem: Skin/Tissue Integrity  Goal: Absence of new skin breakdown  Description: 1. Monitor for areas of redness and/or skin breakdown  2. Assess vascular access sites hourly  3. Every 4-6 hours minimum:  Change oxygen saturation probe site  4. Every 4-6 hours:  If on nasal continuous positive airway pressure, respiratory therapy assess nares and determine need for appliance change or resting period.   1/24/2023 1331 by Bebe Merino LPN  Outcome: Progressing

## 2023-01-24 NOTE — PROGRESS NOTES
[address]    Acute Rehabilitation Occupational Therapy    Date: 23  Patient Name: Judd Oglesby       Room: [facility]/9229-48  MRN: [id number]  Account: [de-identified]   : 1960  (63 y.o.) Gender: male     Plan of care updated to update Long Term Goal #6 as patient was able to functionally participate in  strength and fine motor control assessments on 23:      Long Term Goals  Time Frame for Long Term Goals : By discharge  Long Term Goal 1: Patient will perform BADLs with modified independence and Good safety. Long Term Goal 2: Patient will perform functional mobility and transfers during self-care with modified independence, least restrictive mobility device, and Good safety. Long Term Goal 3: Patient will stand for 5+ minutes with 0-1 UE support, modified independence while engaging in functional activity of choice. Long Term Goal 4: Patient will perform simple meal prep and light housekeeping with SBA and Good safety. Long Term Goal 5: Patient will verbalize/demonstrate Good understanding of Fall Prevention Strategies to maximize safety and independence with self-care. Long Term Goal 6: Patient will perform self-care with improved bilateral  strength as evidenced by 10#  strength improvement and improved bilateral fine motor control as evidenced by 5 second improved in 9 hole peg test (Goal updated by ASHUTOSH Gandhi on 23).         Electronically signed by ASHUTOSH Gandhi on 23 at 9:38 AM EST

## 2023-01-24 NOTE — PROGRESS NOTES
[address] [facility]    Acute Rehabilitation Occupational Therapy Daily Treatment Note    Date: 23  Patient Name: Deejay Fuentes       Room: 2700/2649-95  MRN: [id number]  Account: [de-identified]   : 1960  (60 y.o.) Gender: male       Referring Practitioner: Dliip Paul MD  Diagnosis: Cerebrovascular accident  Additional Pertinent Hx: Per MRI Impression on 23: Mild amount of residual subarachnoid hemorrhage in the right frontal and  right parietal lobes as well as the left frontal lobe. Evolving small focus of hemorrhage in the left parietal lobe posteriorly. Evolving areas of ischemia in the right frontal lobe and left posterior parietal lobe as well as a few foci in the parasagittal aspect of the right frontoparietal region. Treatment Diagnosis: Impaired self care status    Past Medical History:  has no past medical history on file. Past Surgical History:   has a past surgical history that includes Upper gastrointestinal endoscopy (N/A, 2023). Restrictions  Restrictions/Precautions  Restrictions/Precautions: Fall Risk, General Precautions, Contact Precautions, Isolation, Bed Alarm  Required Braces or Orthoses?: No  Implants present? :  (pt denies)     Position Activity Restriction  Other position/activity restrictions: Severe dysphagia- NPO. Unable to tolerate any PO safely. NG tube placed,  on high flow nasal cannula          Vitals  Vital Signs  BP Location: Left upper arm  O2 Device: Nasal cannula     Subjective  Subjective  Subjective: AM: Pt found in bed with spouse and daughter in room. \"Can I get a shower after I've sweat a little with physical therapy? \" Pt agreeable to perform ADLs in afternoon session and participate in therapeutic activies in the morning. PM: pt agreeable to shower this afternoon. Pain: Patient denies.   Pain Assessment  Baldwin-Morris Pain Rating: No hurt    Objective  Cognition  Overall Orientation Status: Impaired  Orientation Level: Oriented to person;Disoriented to situation;Oriented to place; Disoriented to time    Cognition  Overall Cognitive Status: Exceptions  Arousal/Alertness: Appropriate responses to stimuli  Following Commands: Follows one step commands consistently  Attention Span: Attends with cues to redirect  Memory: Decreased recall of biographical Information;Decreased recall of precautions;Decreased recall of recent events  Safety Judgement: Decreased awareness of need for assistance;Decreased awareness of need for safety  Problem Solving: Assistance required to generate solutions;Assistance required to implement solutions;Assistance required to identify errors made;Assistance required to correct errors made  Insights: Decreased awareness of deficits  Initiation: Requires cues for some  Sequencing: Requires cues for some  Cognition Comment: Pt with impulsive tendencies noted. Activities of Daily Living    Grooming/Oral Hygiene  Assistance Level: Set-up  Skilled Clinical Factors: completed oral care with mouth swab while seated EOB. Upper Extremity Bathing  Assistance Level: Moderate assistance  Skilled Clinical Factors: Completed seated on shower bench with moderate verbal and tactile cues throughout for attention to task for initiation and sequencing. Lower Extremity Bathing  Assistance Level: Minimal assistance  Skilled Clinical Factors: Completed seated on shower bench with moderate verbal and tactile cues throughout for attention to task for initiation and sequencing. Min A for washing BLE below the knee d/t confusion. Upper Extremity Dressing  Assistance Level: Moderate assistance  Skilled Clinical Factors: Assist to orient and intermittent assist to thread BUE into t shirt. v/c to doff shirt. pt failed to initate task. Assist in doffing over head. Pt able to unthread arms after assistance given to initiate task. Able to pull tshirt over head and pull down.  Patient demonstrates deficits in motor planning and sequencing, demonstrating apraxia with regard to the sequence of donning/doffing clothing. Lower Extremity Dressing  Assistance Level: Moderate assistance  Skilled Clinical Factors: Assist to orient and intermittent assist to thread B LE into brief and pants. Pt utilizes figure 4 positioning to thread brief and pants while seated on shower bench. Pt able to pull over hips while standing in shower utilizing GB for balance. Patient demonstrates deficits in motor planning and sequencing, demonstrating apraxia with regard to the sequence of donning clothing. Putting On/Taking Off Footwear  Assistance Level: Minimal assistance  Skilled Clinical Factors: able to doff B footies while seated on shower bench. Assist to orient when donning footies due to deficits in motor planning and sequencing. Patient demonstrates apraxia with regard to the sequence of donning clothing. Tub/Shower Transfers  Type: Shower  Transfer From: Rolling walker  Transfer To: Shower chair with back  Additional Factors: Verbal cues;Cues for hand placement; Increased time to complete;Set-up  Assistance Level: Contact guard assist  Skilled Clinical Factors: CGA and v/c continuous for safety d/t impulsivity and lack of safety awareness from cognitive defects    Mobility  Bed Mobility  Overall Assistance Level: Supervision     Roll Left  Assistance Level: Supervision  Skilled Clinical Factors: utilized hand rails with raised HOB    Supine to Sit  Assistance Level: Supervision  Skilled Clinical Factors: SUP d/t safety unawareness  Scooting  Assistance Level: Supervision  Skilled Clinical Factors: SUP d/t safety unawareness    Transfers  Surface: From bed; To chair with arms  Additional Factors: Set-up; Verbal cues; Hand placement cues  Sit to Stand  Assistance Level: Moderate assistance  Skilled Clinical Factors: verbal and tactile cues for hand placement and position of RW  Stand to Sit  Assistance Level:  Moderate assistance  Skilled Clinical Factors: verbal and tactile cues for hand placement and postioning for safet decent. Functional Mobility  Device: Rolling walker  Activity: To/From bathroom  Assistance Level: Contact Guard assistance  Skilled Clinical Factors: Moderate verbal cues for positioning of RW and CGA for impulsivity and lack of safety awareness. OT Exercises  Motor Control/Coordination: AM: Pt participated in completing wooden block puzzle with puzzle guide placed inside of wooden frame while seated EOB to improve visual scanning and motor planning when performing functional tasks. Pt req increased time, 3 visual cues, and 3 tactile cues to fix errors of orientation. Pt is able to identify errors, however, demonstrates deficits in motor planning and sequencing, demonstrating apraxia. Assessment  Assessment  Activity Tolerance: Patient limited by endurance; Patient limited by fatigue;Patient limited by pain  Discharge Recommendations: Home with assist PRN;Outpatient OT    Patient Education  Education  Education Given To: Patient; Family  Education Provided: Role of Therapy;Plan of Care;Cognition; Energy Conservation  Education Provided Comments: AM: QUIRINO/L facilitated edu pt, spouse, and daughter on the importance of participating in therapy and POC to improve pt's overall independence when returning home. F return noted. Education Method: Verbal;Demonstration  Barriers to Learning: Cognition; Hearing  Education Outcome: Verbalized understanding;Demonstrated understanding    OT Equipment Recommendations  Other: TBD    Safety Devices  Safety Devices in place: Yes  Type of devices: All fall risk precautions in place;Call light within reach; Chair alarm in place;Gait belt;Patient at risk for falls; Left in chair;Nurse notified    Goals  Patient Goals   Patient goals : \"To learn how to walk properly. ..all that goes without saying (referring to bathing, dressing and toileting independence)\"  Short Term Goals  Time Frame for Short Term Goals: One week  Short Term Goal 1: Patient will perform oral hygiene with SBA and Good safety. Short Term Goal 2: Patient will perform hair brushing with SBA. Short Term Goal 3: Patient will perform upper body dressing with SBA. Short Term Goal 4: Patient will perform lower body dressing, lower body bathing, and toileting tasks with Minimal assist.  Short Term Goal 5: Patient will perform functional moblity and transfers during self-care with CGA, least restrictive mobility device, and Good safety. Short Term Goal 6: Patient will verbalize/demonstrate Good understanding of assistive equipment/durable medical equipment/modified techniques to maximize safety and independence with self-care. Short Term Goal 7: Patient will actively participate in 30+ minutes of therapeutic exercise/functional activities to promote increased independence with self-care and mobility. Long Term Goals  Time Frame for Long Term Goals : By discharge  Long Term Goal 1: Patient will perform BADLs with modified independence and Good safety. Long Term Goal 2: Patient will perform functional mobility and transfers during self-care with modified independence, least restrictive mobility device, and Good safety. Long Term Goal 3: Patient will stand for 5+ minutes with 0-1 UE support, modified independence while engaging in functional activity of choice. Long Term Goal 4: Patient will perform simple meal prep and light housekeeping with SBA and Good safety. Long Term Goal 5: Patient will verbalize/demonstrate Good understanding of Fall Prevention Strategies to maximize safety and independence with self-care. Long Term Goal 6: Patient will perform self-care with improved bilateral  strength as evidenced by 10#  strength improvement and improved bilateral fine motor control as evidenced by 5 second improved in 9 hole peg test (Goal updated by Marco Campa OTR/L on 1/24/23).     Plan  Occupational Therapy Plan  Times Per Week: 5-7  Times Per Day: Twice a day  Current Treatment Recommendations: Self-Care / ADL, Strengthening, ROM, Balance training, Functional mobility training, Endurance training, Cognitive reorientation, Neuromuscular re-education, Safety education & training, Patient/Caregiver education & training, Equipment evaluation, education, & procurement, Cognitive/Perceptual training, Coordination training, Home management training         01/24/23 4835 01/24/23 1004   OT Individual Minutes   Time In [id number]   [facility] KIRIT [phone]   Minutes 62 42         Electronically signed by ANITA Flores on 1/24/23 at 3:11 PM EST

## 2023-01-24 NOTE — PROGRESS NOTES
Speech Language Pathology  Speech Language Pathology  [facility] Acute Rehab Unit at [facility] Note    Date: 1/24/2023  Patients Name: Ragini Marin  MRN: [id number]  Diagnosis: Dysphagia s/p CVA  Patient Active Problem List   Diagnosis Code    Acute type II respiratory failure (HCC) J96.00    Transaminitis R74.01    Normocytic anemia D64.9    Thrombocytopenia (HCC) D69.6    Agitation R45.1    Acute respiratory failure with hypoxia and hypercapnia (HCC) RLL pneumonia J96.01, J96.02    Altered mental status R41.82    Community acquired pneumonia of right lower lobe of lung J18.9    Prolonged pt (prothrombin time) R79.1    Emphysema lung (HCC) J43.9    Cerebral infarction (HCC) I63.9    ACP (advance care planning) Z71.89    Goals of care, counseling/discussion Z71.89    Encounter for palliative care Z51.5    Delirium due to another medical condition F05    Moderate malnutrition ([address] E44.0    Hepatitis C virus infection without hepatic coma B19.20    Dysphagia R13.10    Impending cerebrovascular accident ([facility] [address] I63.9    Dermatitis associated with moisture L30.8       Pain: no c/o pain    Cognitive Treatment  Treatment time: 1540-5753    Subjective: [x] Alert [x] Cooperative     [] Confused     [] Agitated    [] Lethargic    Objective/Assessment:    Orientation: 57% (not oriented to year, month, date)    Recall:   Pt. And SO educ. Re: Free Water Protocol. Pt. Libra Gibbs. Poor carryover as he was unable to recall rules of protocol. Organization: NT    Problem solving: n/a     Dysphagia:  Pt. Completed Kim maneuver x5, simple tongue base strength x4, 15 reps, oral motor exercises x2,  12 reps each. Lingual weakness noted t/o. ST encouraged pt. To continue to practice exercises on his own, pt. Verbalized understanding. ST provided set up for oral care for Free Water protocol. Telesitter present.    Pt. C increased confusion today and visual hallucinations in room (seeing rodents running on wall. Pt. Perseverating t/o session on rodents, but was easily redirected. Plan:  [x] Continue ST services    [] Discharge from ST:        Discharge recommendations: [] Inpatient Rehab   [] [place]   [] Outpatient Therapy  [] Follow up at trauma clinic   [] Other:         Treatment completed by: Tiffany Maravilla A.CCC/SLP

## 2023-01-24 NOTE — PROGRESS NOTES
Speech Language Pathology  Speech Language Pathology  [facility] Acute Rehab Unit at [facility]    Cognitive Treatment Note    Date: 1/24/2023  Patients Name: Mino Koroma  MRN: [id number]  Diagnosis: Dysphagia s/p CVA  Patient Active Problem List   Diagnosis Code    Acute type II respiratory failure (HCC) J96.00    Transaminitis R74.01    Normocytic anemia D64.9    Thrombocytopenia (HCC) D69.6    Agitation R45.1    Acute respiratory failure with hypoxia and hypercapnia (HCC) RLL pneumonia J96.01, J96.02    Altered mental status R41.82    Community acquired pneumonia of right lower lobe of lung J18.9    Prolonged pt (prothrombin time) R79.1    Emphysema lung (HCC) J43.9    Cerebral infarction (HCC) I63.9    ACP (advance care planning) Z71.89    Goals of care, counseling/discussion Z71.89    Encounter for palliative care Z51.5    Delirium due to another medical condition F05    Moderate malnutrition ([facility] [address] E44.0    Hepatitis C virus infection without hepatic coma B19.20    Dysphagia R13.10    Impending cerebrovascular accident ([facility] [address] I63.9    Dermatitis associated with moisture L30.8       Pain: no c/o pain    Cognitive Treatment  Treatment time: [id number]    Subjective: [x] Alert [x] Cooperative     [] Confused     [] Agitated    [] Lethargic    Objective/Assessment:    Orientation: n/a    Recall:   Pt. Unable to recall rules of Free Water Protocol, with reinstruction, pt. Able to recall with min A. Pt. Able to recall d/c date. 3 word memory and mental manipulation (ranking)- 75%, 100% c cues. Organization: NT    Problem solving: name 14 items in category (parts of car)- 79% (long response latency), 100% c cues. Pt. Provided 2 meanings of given word c 100% accuracy. Dysphagia:    ST provided set up for oral care for Free Water protocol. Telesitter present.      Plan:  [x] Continue ST services    [] Discharge from ST:        Discharge recommendations: [] Inpatient Rehab   [] [place]   [] Outpatient Therapy  [] Follow up at trauma clinic   [] Other:         Treatment completed by: Yusra Estrada A.CCC/SLP

## 2023-01-24 NOTE — PROGRESS NOTES
Physical Therapy  Facility/Department: [facility] ACUTE REHAB  Rehabilitation Physical Therapy Daily Treatment Note    NAME: Jona Omalley  : 1960 (79 y.o.)  MRN: [id number]  CODE STATUS: Full Code    Date of Service: 23      History reviewed. No pertinent past medical history. Past Surgical History:   Procedure Laterality Date    UPPER GASTROINTESTINAL ENDOSCOPY N/A 2023    EGD ESOPHAGOGASTRODUODENOSCOPY PEG TUBE INSERTION performed by Raciel Harmon DO at [facility]       Chart Reviewed: Yes  Patient assessed for rehabilitation services?: Yes  Additional Pertinent Hx: History of Present Illness:  Jona Omalley  is a 60 y.o. right-handed male admitted to the [address] [place] [address] unit on 2023. He was originally admitted to [facility] on 2023 for acute respiratory failure 2/2 RLL pneumonia. Blood/resp/urine cultures negative, although his MRSA swab was +. He completed course of Unasyn and vancomycin. Imaging showed numerous acute, subacute, and chronic infarcts in the left parietal lobe, left cerebellar hemisphere, as well as bilateral SAH. PEG tube was placed 23 for severe dysphagia. HCV+. ID, nephrology, heme-onc, neuro, pulm, and psych are following. Patient admitted to ARU this morning. He reports doing well this morning. He denies any pain aside from some abdominal pain when he coughs. He does note some wheezes and tenderness around the PEG tube site. He does endorse some generalized weakness. He is currently requiring assistance for self-care activities and mobility prompting this admission.   Family / Caregiver Present: Yes (significant other)  Referring Practitioner: ALAYNA Armstrong NP  Referral Date : 23  Diagnosis: Acute respiratory failure  General Comment  Comments: Nursing Gregary Felty  approves treatment, pt attempts reciprical conversation with mumbled speech , eyes closed , appropriate response with VCs bilateral  and ankle PF/DF    Restrictions:  Restrictions/Precautions: Fall Risk;General Precautions;Contact Precautions;Isolation; Bed Alarm  Position Activity Restriction  Other position/activity restrictions: Severe dysphagia- NPO. Unable to tolerate any PO safely. NG tube placed,  on high flow nasal cannula     SUBJECTIVE  Subjective: Patient demonstrating confusion this AM but willing to come to therapy. Spouse present in PM.  Patient moved to a different room and given a telesitter due to confusion and impulsive. Patient refused coming down for therapy in the PM.  Spouse not able to persuade him either. Pain: denies    Prior Level of Function:  Social/Functional History  Lives With: Significant other  Type of Home: House  Home Layout: One level  Home Access: Stairs to enter with rails  Entrance Stairs - Number of Steps: 6 ALISA from front; 4 ALISA frmo back with no rails  Entrance Stairs - Rails: Both (Both rails needs to be fixed)  Bathroom Shower/Tub: Tub/Shower unit, Curtain, Shower chair without back  [employer]: Standard (Raised seat in [facility] master bed room bathroom)  Bathroom Equipment: Grab bars in shower, Hand-held shower, Toilet raiser  Bathroom Accessibility: Accessible, Walker accessible  Home Equipment: [facility] Help From: Family  ADL Assistance: Independent  Homemaking Assistance: Independent  Homemaking Responsibilities: Yes  Ambulation Assistance: Independent  Transfer Assistance: Independent  Active : Yes  Mode of Transportation: [facility]  Occupation: Retired  Type of Occupation:   IADL Comments: Pt sleeps in flat bed. Additional Comments: Spouse is home all the time and able to assist minimally physically @8 her medical condition. Daughter and son Tamy Metcalf only with advance notice for appointment, son and daughter both work full time and have children.       OBJECTIVE  Vision  Vision: Impaired  Vision Exceptions: Wears glasses at all times    Hearing  Hearing: Exceptions to WFL  Hearing Exceptions: Hard of hearing/hearing concerns    Cognition  Overall Cognitive Status: Exceptions  Arousal/Alertness: Appropriate responses to stimuli  Following Commands: Follows one step commands consistently  Attention Span: Attends with cues to redirect  Memory: Decreased recall of biographical Information;Decreased recall of precautions;Decreased recall of recent events  Safety Judgement: Decreased awareness of need for assistance;Decreased awareness of need for safety  Problem Solving: Assistance required to generate solutions;Assistance required to implement solutions;Assistance required to identify errors made;Assistance required to correct errors made  Insights: Decreased awareness of deficits  Initiation: Requires cues for some  Sequencing: Requires cues for some  Cognition Comment: Pt with impulsive tendencies noted.     ROM  AROM RLE (degrees)  RLE General AROM: AAROM at hip/knee, ankle WFL  AROM LLE (degrees)  LLE General AROM: AROM hip/knee , ankle WFL  AROM RUE (degrees)  RUE General AROM: hand over hand to complete end tranges, myoclonus  AROM LUE (degrees)  LUE General AROM: handover hand to complete end ROM, dysmetria/ myoclonus noted    Strength  Strength RLE  Comment: grossly 2+/5 hip/knee, 3/5 ankle  Strength LLE  Comment: grossly 3-/5 hip/knee, 3/+5 ankle  Strength RUE  Comment: 3-/5grossly- see OT  Strength LUE  Comment: 2+/5grossly, see OT, weak grasp, incoordination    Quality of Movement  Tone RLE  RLE Tone: Normotonic  Tone LLE  LLE Tone: Hypotonic (vs weakness)    Sensation  Overall Sensation Status: WFL (denies)    Functional Mobility  Bed mobility  Supine to Sit: Stand by assistance  Sit to Supine: Stand by assistance (Assist with BLEs)  Scooting: Stand by assistance  Bed Mobility Comments: HOB flat right handrail  Transfers  Sit to Stand: Stand by assistance (variable surfaces)  Stand to Sit: Stand by assistance (variable surfaces)  Bed to Chair: Stand by assistance (RW)  Stand Pivot Transfers: Stand by assistance  Comment: patient demo'd impulsive movement, vcs for safety  Balance  Posture: Fair (slumped posture at EOB vs pt leaning posteriorly, not engaging core mm)  Sitting - Static: Good (with B UE support)  Sitting - Dynamic: Good;+  Standing - Static: Good;- (Rolling walker)  Standing - Dynamic: Fair;+ (Rolling walker)  Comments: Standing with rolling walker for dynamic stands only    Environmental Mobility  Ambulation  Surface: Level tile  Device: Rolling Walker  Other Apparatus: O2 (2L)  Assistance: Contact guard assistance  Quality of Gait: Needs cues for sequencing , step length. SpO2  95% with HR 92. (Noted improvement in reciprocal step legnth, SpO2 >93% on 2L HR 99 bpm.)  Gait Deviations: Slow Radha; Increased SELVIN  Distance: 188ft and 30ft (wc follow for safety)    PT Exercises  Exercise Treatment: supine  A/AROM Exercises: x10 reps x2 sets SLR, bridging, clamshells, knee>chest, hip abd, heel slides, ankle pumps, quad sets, glut sets, SAQ  Pressure Relief Exercises: independently repositions self in bed with vcs  Functional Mobility Circuit Training: STS x10 reps  Dynamic Sitting Balance Exercises: reaching oustide SELVIN weight shifting x11 min and x14 min (trunk unsupported)  Static Standing Balance Exercises: static stand at RW 3 min  Dynamic Standing Balance Exercises: weight shifting, romberg stance, staggard stance, eyes closed    ASSESSMENT  Vitals  O2 Device: Nasal cannula (2L)    Activity Tolerance  Activity Tolerance: Patient limited by endurance; Patient limited by fatigue;Patient limited by pain  Activity Tolerance Comments: 8 liter high flow 02    Assessment  Assessment: 59 y/o male adm with AMS and found to have [address] with recent fall at home. Pt is a high fall risk as he has decreased safety awareness and B LE weakness L hemibody > R. Pt will benefit from continued therapy while in rehab unit to improve fucntion and safety.   Performance Deficits/Impairments: Decreased functional mobility ; Decreased ADL status; Decreased body mechanics; Decreased strength;Decreased safe awareness;Decreased cognition;Decreased endurance;Decreased balance;Decreased coordination; Increased pain;Decreased posture  Activity Tolerance Comment: Monitor SP02 throughout therapy sessions  Treatment Diagnosis: impared mobility s/p AMS/SAH  Therapy Prognosis: Good  Decision Making: Medium Complexity  History: falls, 1 week  AMS PTA  Exam: L side weakness, decreased activity tolerance  Barriers to Learning: cognition, endurance  Treatment Initiated : eval, bed mobiltiy, sitting balance, transfers and gait  Discharge Recommendations: Therapy recommended at discharge;Home with assist PRN;Patient would benefit from continued therapy after discharge    CLINICAL IMPRESSION   61 y/o male adm with AMS and found to have [address] with recent fall at home. Pt is a high fall risk as he has decreased safety awareness and B LE weakness L hemibody > R. Pt will benefit from continued therapy while in rehab unit to improve fucntion and safety. GOALS  Patient Goals   Patient Goals : Want to be independent to go home.   Short Term Goals  Time Frame for Short Term Goals: 9 days  Short Term Goal 1: CGA assist supine<-> sit x1 assist  Short Term Goal 2: sit EOB withsupervsion up to 15 min for therex/ADL  Short Term Goal 3: 3+/5 LE strength to prepare for standing activiites and gait  Short Term Goal 4: ambulation with rolling walker distance of 70 to 100 ft, CGA, level surface  Short Term Goal 5: roll L/R with SBA  Short Term Goal 6: Pt able to go up and down 5 steps with 2 rails, min A  Long Term Goals  Time Frame for Long Term Goals : By DC  Long Term Goal 1: pt able to roll L/R indepndently  Long Term Goal 2: pt able to perform supine<>sit mod-I  Long Term Goal 3: pt able to perform sit<>stand and perform pivot/step to transfers at mod-I  Long Term Goal 4: pt able to ambulate household distance with appropriate device, mod-I  Long Term Goal 5: pt able to ambulate > 50 ft with appropriate device, at SBA/supervsion level, level as well as incline surface. Long term goal 6: pt able to go up and down 4 or more steps with B UE support, CGA. Long term goal 7: Improve PASS score to 27/36 improve function/stability. Long term goal 8: improve Tinetti balance score to atleast 23/28 to reduce fall risk. PLAN OF CARE  Frequency: 1-2 treatment sessions per day, 5-7 days per week  Physcial Therapy Plan  General Plan:  minutes of therapy at least 5 out of 7 days a week (5 to 7 days/week.)  Specific Instructions for Next Treatment: Continue to progress with functional mobility training  Current Treatment Recommendations: Strengthening;Balance training;Functional mobility training;Neuromuscular re-education;Cognitive reorientation; Safety education & training;Patient/Caregiver education & training;Equipment evaluation, education, & procurement; Therapeutic activities;Transfer training; Wheelchair mobility training;Gait training;Stair training;Home exercise program;Positioning  Safety Devices  Type of Devices: All fall risk precautions in place;Call light within reach;Gait belt;Patient at risk for falls;Nurse notified; Left in chair  Restraints  Restraints Initially in Place: No  Restraints: bilat wrist restraints- no longer needed    EDUCATION  Education  Education Given To: Patient; Family  Education Provided: Safety; Fall Prevention Strategies;Transfer Training  Education Method: Demonstration;Verbal  Barriers to Learning: Cognition; Hearing  Education Outcome: Verbalized understanding;Demonstrated understanding;Continued education needed      Therapy Time       01/24/23 1004 01/24/23 1445   PT Individual Minutes   Time In [phone]   Time Out [phone]   Minutes [address] [place], [place], 01/24/23 at 6:24 PM

## 2023-01-24 NOTE — CARE COORDINATION
ARU CASE MANAGEMENT NOTE:    Patient is alert and oriented x4    Spoke with patient regarding discharge plan and patient confirms plan is to go home  with significant other. He has now changed his mind regarding OP therapy since his S.O. does not drive. Reached out to [facility], awaiting a determination. [address] unable to accept. [facility] has accepted    DME: Possible home 02/ Tube feedings/ formula will need ordered closer to discharge since it could change. Outside appointments: not while in ARU    Will continue to follow for additional discharge needs.     Electronically signed by Dimitrios Brito RN on 1/24/2023 at 9:42 AM

## 2023-01-24 NOTE — PROGRESS NOTES
Physical Medicine & Rehabilitation  Progress Note      Subjective:      Patent is a 62M with multifocal embolic CVA and SAH. Patient states he is doing well, but continues to have episodes of intermittent confusion. This morning, he states he has been seeing shadows running underneath the TV. Patient is alert and able to answer all orientation questions successfully. The lights were turned on and blinds raised with resolution of hallucinations. He also notes cough productive of gray sputum that has increased in quantity over the last several days. ROS:  Denies fevers, chills, sweats. No chest pain, palpitations, lightheadedness. Positive for coughing, negative for wheezing or shortness of breath. Denies abdominal pain, nausea, diarrhea or constipation. No new areas of joint pain. Denies new areas of numbness or weakness. Denies new anxiety or depression issues. No new skin problems. Rehabilitation:   Progressing in therapies. PT:    Bed mobility  Rolling to Left: Moderate assistance  Rolling to Right: Moderate assistance  Supine to Sit: Minimal assistance  Sit to Supine: Moderate assistance (Assist with BLEs)  Scooting: Stand by assistance  Bed Mobility Comments: HOB flat left handrail         Transfers  Sit to Stand: Minimal Assistance (from [facility])  Stand to Sit: Minimal Assistance (decreased eccentric controll.)  Bed to Chair: Moderate assistance (RW)  Stand Pivot Transfers: Moderate Assistance         Ambulation  Surface: Level tile  Device: Rolling Walker  Other Apparatus: O2  Assistance: Moderate assistance  Quality of Gait: Needs cues for sequencing , step length. SpO2  95% with HR 92.  Gait Deviations: Slow Radha, Decreased step length, Decreased step height, Increased SELVIN  Distance: 15 ft twice. Comments: NT       OT:  Grooming/Oral Hygiene  Assistance Level: Set-up  Skilled Clinical Factors: completed oral care with mouth swab while seated EOB.   Upper Extremity Bathing  Assistance Level: Moderate assistance  Skilled Clinical Factors: Completed seated on shower bench with moderate verbal and tactile cues throughout for attention to task for initiation and sequencing. Lower Extremity Bathing  Assistance Level: Minimal assistance  Skilled Clinical Factors: Completed seated on shower bench with moderate verbal and tactile cues throughout for attention to task for initiation and sequencing. Min A for washing BLE below the knee d/t confusion. Upper Extremity Dressing  Assistance Level: Moderate assistance  Skilled Clinical Factors: Assist to orient and intermittent assist to thread BUE into t shirt. v/c to doff shirt. pt failed to initate task. Assist in doffing over head. Pt able to unthread arms after assistance given to initiate task. Able to pull tshirt over head and pull down. Patient demonstrates deficits in motor planning and sequencing, demonstrating apraxia with regard to the sequence of donning/doffing clothing. Lower Extremity Dressing  Assistance Level: Moderate assistance  Skilled Clinical Factors: Assist to orient and intermittent assist to thread B LE into brief and pants. Pt utilizes figure 4 positioning to thread brief and pants while seated on shower bench. Pt able to pull over hips while standing in shower utilizing GB for balance. Patient demonstrates deficits in motor planning and sequencing, demonstrating apraxia with regard to the sequence of donning clothing. Putting On/Taking Off Footwear  Assistance Level: Minimal assistance  Skilled Clinical Factors: able to don B footies while seated on shower bench. Assist to orient footies due to deficits in motor planning and sequencing. Patient demonstrates apraxia with regard to the sequence of donning clothing.   Toileting  Assistance Level: Maximum assistance  Skilled Clinical Factors: Assist to pull pants over hips while standing with Minimal assist. Patient performs personal hygiene after BM while seated on toilet   Toilet Transfers  Technique: Stand pivot, To the left, To the right  Equipment: Grab bars, Raised toilet seat without arms  Additional Factors: Verbal cues, Cues for hand placement, Set-up  Assistance Level: Moderate assistance      SPEECH: 1/24/23  Subjective: [x] Alert     [x] Cooperative     [] Confused     [] Agitated    [] Lethargic     Objective/Assessment:     Orientation: n/a     Recall: n/a     Organization: NT     Problem solving: missing equipment- 80%, long response latency demo. Word deductions- 80%, 90% c cues. Dysphagia:  Pt. Completed Kim maneuver x5, oral motor exercises x1, 12 reps each. Lingual weakness noted t/o. ST encouraged pt. To continue to practice exercises on his own, pt. Verbalized understanding. Telesitter present. Objective:  /64   Pulse 83   Temp 98.9 °F (37.2 °C) (Oral)   Resp 16   Ht 5' 7\" (1.702 m)   Wt 163 lb (73.9 kg)   SpO2 95%   BMI 25.53 kg/m²       GEN: well developed, well nourished, NAD. HEENT: NCAT, PERRL, EOMI, mucous membranes pink and moist  CV: RRR, no murmurs, rubs or gallops  PULM: CTAB, no rales or rhonchi. Respirations WNL and unlabored. Mildly diminished breath sounds. ABD: soft, NT, ND, BS+ and equal. PEG tube in place. NEURO: A&O x3. Sensation intact to light touch. MSK: Functional ROM in all extremities . Strength 5/5 in all extremities except BLEs which are 4+/5. EXTREMITIES: No calf tenderness to palpation bilaterally. No edema BLEs  SKIN: warm dry and intact with good turgor  PSYCH: appropriately interactive. Affect WNL. Diagnostics:     CBC: No results for input(s): WBC, RBC, HGB, HCT, MCV, RDW, PLT in the last 72 hours. BMP: No results for input(s): NA, K, CL, CO2, PHOS, BUN, CREATININE, CA, GLUCOSE in the last 72 hours. BNP: No results for input(s): BNP in the last 72 hours. PT/INR: No results for input(s): PROTIME, INR in the last 72 hours. APTT: No results for input(s): APTT in the last 72 hours.   CARDIAC ENZYMES: No results for input(s): CKMB, CKMBINDEX, TROPONINT in the last 72 hours. Invalid input(s): CKTOTAL;3 troponins   FASTING LIPID PANEL:  Lab Results   Component Value Date    TRIG 85 01/03/2023     LIVER PROFILE: No results for input(s): AST, ALT, ALB, BILIDIR, BILITOT, ALKPHOS in the last 72 hours. Current Medications:   Current Facility-Administered Medications: clotrimazole (LOTRIMIN) 1 % cream, , Topical, BID  enoxaparin (LOVENOX) injection 40 mg, 40 mg, SubCUTAneous, Daily  ipratropium-albuterol (DUONEB) nebulizer solution 1 ampule, 1 ampule, Inhalation, Q4H WA  guaiFENesin (MUCINEX) extended release tablet 600 mg, 600 mg, Oral, BID PRN  carvedilol (COREG) tablet 6.25 mg, 6.25 mg, Oral, BID WC  ferrous sulfate (IRON 325) tablet 325 mg, 325 mg, Oral, Daily with breakfast  spironolactone (ALDACTONE) tablet 25 mg, 25 mg, Oral, Daily  acetaminophen (TYLENOL) tablet 650 mg, 650 mg, Oral, Q4H PRN  polyethylene glycol (GLYCOLAX) packet 17 g, 17 g, Oral, Daily  senna (SENOKOT) tablet 17.2 mg, 2 tablet, Oral, Daily PRN  bisacodyl (DULCOLAX) suppository 10 mg, 10 mg, Rectal, Daily PRN      Impression/Plan:   Impaired ADLs, gait, and mobility due to:      Multifocal cardioembolic CVA, subarachnoid hemorrhage:  PT/OT for gait, mobility, strengthening, endurance, ADLs, and self care. No residual SAH on CT head 1/21. Dysphagia: s/p PEG tube placement on 1/17. SLP treating. Dietitian managing tube feed - transition from around the clock to nocturnal/bolus as tolerated. Changing BMP to MWF. Agitation:  Recurred 1/23. Continue telesitter. Repeat CT head improved. Seroquel was discontinued yesterday as he was able to sleep the night before without it. Insomnia: started Trazodone at hs 1/20 - failed. On Seroquel at hs. Acute respiratory failure: required intubation. Currently on 2L NC - weaning as tolerated  Pneumonia: Improved. completed course of Unasyn and Vanco  KENTRELL: resolved.  Will monitor  Elevated LFTs: resolved. Will monitor  HCV: will need f/u with GI for treatment outpatient  Anemia: Hb low but stable. Monitoring  Thrombocytopenia: Platelet count low but stable. Will monitor. Wound on buttocks. Wound care consulted. Continue lotrimin cream for concern of fungal infection. Moderate protein calorie malnutrition: Dietician following and managing tube feeds. Bowel Management: Miralax daily, senokot prn, dulcolax prn. DVT Prophylaxis:  Repeat CT - SAH resolved 1/21 - reviewed with Dr. Donovan Garcia 1/22 - ok to initiate DVT chemoprophylaxis. Started Lovenox 1/22. TONI stockings during the day, EPC cuffs at night. IM for medical management      Electronically signed by Ike Chavira MD on 1/24/2023 at 9:47 AM      This note is created with the assistance of a speech recognition program.  While intending to generate a document that actually reflects the content of the visit, the document can still have some errors including those of syntax and sound a like substitutions which may escape proof reading. In such instances, actual meaning can be extrapolated by contextual diversion.

## 2023-01-24 NOTE — PROGRESS NOTES
BRONCHOSPASM/BRONCHOCONSTRICTION     [x]         IMPROVE AERATION/BREATH SOUNDS  [x]   ADMINISTER BRONCHODILATOR THERAPY AS APPROPRIATE  [x]   ASSESS BREATH SOUNDS  []   IMPLEMENT AEROSOL/MDI PROTOCOL  [x]   PATIENT EDUCATION AS NEEDED   PROVIDE ADEQUATE OXYGENATION WITH ACCEPTABLE SP02/ABG'S    [x]  IDENTIFY APPROPRIATE OXYGEN THERAPY  [x]   MONITOR SP02/ABG'S AS NEEDED   [x]   PATIENT EDUCATION AS NEEDED     Pt currently on 2lnc SpO2 95% diminished breath sounds throughout

## 2023-01-24 NOTE — PLAN OF CARE
Problem: Discharge Planning  Goal: Discharge to home or other facility with appropriate resources  1/24/2023 0103 by Vasiliy Andrews RN  Outcome: Progressing     Problem: Safety - Medical Restraint  Goal: Remains free of injury from restraints (Restraint for Interference with Medical Device)  Description: INTERVENTIONS:  1. Determine that other, less restrictive measures have been tried or would not be effective before applying the restraint  2. Evaluate the patient's condition at the time of restraint application  3. Inform patient/family regarding the reason for restraint  4. Q2H: Monitor safety, psychosocial status, comfort, nutrition and hydration  1/24/2023 0103 by Vasiliy Andrews RN  Outcome: Progressing     Problem: Nutrition Deficit:  Goal: Optimize nutritional status  1/24/2023 0103 by Vasiliy Andrews RN  Outcome: Progressing     Problem: Safety - Adult  Goal: Free from fall injury  1/24/2023 0103 by Vasiliy Andrews RN  Outcome: Progressing     Problem: ABCDS Injury Assessment  Goal: Absence of physical injury  1/24/2023 0103 by Vasiliy Andrews RN  Outcome: Progressing  Flowsheets (Taken 1/24/2023 0051)  Absence of Physical Injury: Implement safety measures based on patient assessment     Problem: Skin/Tissue Integrity  Goal: Absence of new skin breakdown  Description: 1. Monitor for areas of redness and/or skin breakdown  2. Assess vascular access sites hourly  3. Every 4-6 hours minimum:  Change oxygen saturation probe site  4. Every 4-6 hours:  If on nasal continuous positive airway pressure, respiratory therapy assess nares and determine need for appliance change or resting period.   1/24/2023 0103 by Vasiliy Andrews RN  Outcome: Progressing

## 2023-01-24 NOTE — PROGRESS NOTES
Pt restless throughout the night. Pt set off bed alarm several times during the night due to episodes of impulsiveness and pt trying to get out of bed without assistance/using call light. Writer had to remind pt multiple times during the night to replace oxygen or instruct on proper placement. Noctural tube feed running at 70ml/hr. Pt positioned between 30-45 degrees throughout the night when writer checked on pt. Call light within reach.

## 2023-01-24 NOTE — PLAN OF CARE
Problem: Discharge Planning  Goal: Discharge to home or other facility with appropriate resources  1/24/2023 1729 by Giancarlo Mckinnon RN  Outcome: Progressing  1/24/2023 1331 by Fred Cortes LPN  Outcome: Progressing     Problem: Safety - Medical Restraint  Goal: Remains free of injury from restraints (Restraint for Interference with Medical Device)  Description: INTERVENTIONS:  1. Determine that other, less restrictive measures have been tried or would not be effective before applying the restraint  2. Evaluate the patient's condition at the time of restraint application  3. Inform patient/family regarding the reason for restraint  4. Q2H: Monitor safety, psychosocial status, comfort, nutrition and hydration  1/24/2023 1729 by Giancarlo Mckinnon RN  Outcome: Progressing  1/24/2023 1331 by Fred Cortes LPN  Outcome: Progressing     Problem: Nutrition Deficit:  Goal: Optimize nutritional status  1/24/2023 1729 by Giancarlo Mckinnon RN  Outcome: Progressing  1/24/2023 1331 by Fred Cortes LPN  Outcome: Progressing     Problem: Safety - Adult  Goal: Free from fall injury  1/24/2023 1729 by Giancarlo Mckinnon RN  Outcome: Progressing  1/24/2023 1331 by Fred Cortes LPN  Outcome: Progressing  Flowsheets (Taken 1/24/2023 0958)  Free From Fall Injury: Vinay Bermudez family/caregiver on patient safety     Problem: ABCDS Injury Assessment  Goal: Absence of physical injury  1/24/2023 1729 by Giancarlo Mckinnon RN  Outcome: Progressing  1/24/2023 1331 by Fred Cortes LPN  Outcome: Progressing  Flowsheets (Taken 1/24/2023 0958)  Absence of Physical Injury: Implement safety measures based on patient assessment     Problem: Skin/Tissue Integrity  Goal: Absence of new skin breakdown  Description: 1. Monitor for areas of redness and/or skin breakdown  2. Assess vascular access sites hourly  3. Every 4-6 hours minimum:  Change oxygen saturation probe site  4.   Every 4-6 hours:  If on nasal continuous positive airway pressure, respiratory therapy assess nares and determine need for appliance change or resting period.   1/24/2023 1729 by Bianca Mcgee RN  Outcome: Progressing  1/24/2023 1331 by Lashell Muniz LPN  Outcome: Progressing

## 2023-01-25 LAB
ANION GAP SERPL CALCULATED.3IONS-SCNC: 6 MMOL/L (ref 9–17)
BUN BLDV-MCNC: 15 MG/DL (ref 8–23)
CALCIUM SERPL-MCNC: 8.2 MG/DL (ref 8.6–10.4)
CHLORIDE BLD-SCNC: 100 MMOL/L (ref 98–107)
CO2: 30 MMOL/L (ref 20–31)
CREAT SERPL-MCNC: 0.45 MG/DL (ref 0.7–1.2)
GFR SERPL CREATININE-BSD FRML MDRD: >60 ML/MIN/1.73M2
GLUCOSE BLD-MCNC: 119 MG/DL (ref 70–99)
POTASSIUM SERPL-SCNC: 3.9 MMOL/L (ref 3.7–5.3)
SODIUM BLD-SCNC: 136 MMOL/L (ref 135–144)

## 2023-01-25 PROCEDURE — 2700000000 HC OXYGEN THERAPY PER DAY

## 2023-01-25 PROCEDURE — 97535 SELF CARE MNGMENT TRAINING: CPT

## 2023-01-25 PROCEDURE — 97116 GAIT TRAINING THERAPY: CPT

## 2023-01-25 PROCEDURE — 97530 THERAPEUTIC ACTIVITIES: CPT

## 2023-01-25 PROCEDURE — 1180000000 HC REHAB R&B

## 2023-01-25 PROCEDURE — 97129 THER IVNTJ 1ST 15 MIN: CPT

## 2023-01-25 PROCEDURE — 94640 AIRWAY INHALATION TREATMENT: CPT

## 2023-01-25 PROCEDURE — 97130 THER IVNTJ EA ADDL 15 MIN: CPT

## 2023-01-25 PROCEDURE — 99231 SBSQ HOSP IP/OBS SF/LOW 25: CPT | Performed by: PHYSICAL MEDICINE & REHABILITATION

## 2023-01-25 PROCEDURE — 6370000000 HC RX 637 (ALT 250 FOR IP): Performed by: PHYSICAL MEDICINE & REHABILITATION

## 2023-01-25 PROCEDURE — 36415 COLL VENOUS BLD VENIPUNCTURE: CPT

## 2023-01-25 PROCEDURE — 97110 THERAPEUTIC EXERCISES: CPT

## 2023-01-25 PROCEDURE — 94761 N-INVAS EAR/PLS OXIMETRY MLT: CPT

## 2023-01-25 PROCEDURE — 6360000002 HC RX W HCPCS: Performed by: PHYSICAL MEDICINE & REHABILITATION

## 2023-01-25 PROCEDURE — 99231 SBSQ HOSP IP/OBS SF/LOW 25: CPT | Performed by: INTERNAL MEDICINE

## 2023-01-25 PROCEDURE — 6370000000 HC RX 637 (ALT 250 FOR IP): Performed by: INTERNAL MEDICINE

## 2023-01-25 PROCEDURE — 80048 BASIC METABOLIC PNL TOTAL CA: CPT

## 2023-01-25 PROCEDURE — 92526 ORAL FUNCTION THERAPY: CPT

## 2023-01-25 RX ADMIN — CLOTRIMAZOLE: 10 CREAM TOPICAL at 08:02

## 2023-01-25 RX ADMIN — SPIRONOLACTONE 25 MG: 25 TABLET ORAL at 08:01

## 2023-01-25 RX ADMIN — CARVEDILOL 6.25 MG: 6.25 TABLET, FILM COATED ORAL at 16:24

## 2023-01-25 RX ADMIN — IPRATROPIUM BROMIDE AND ALBUTEROL SULFATE 1 AMPULE: 2.5; .5 SOLUTION RESPIRATORY (INHALATION) at 10:32

## 2023-01-25 RX ADMIN — ENOXAPARIN SODIUM 40 MG: 100 INJECTION SUBCUTANEOUS at 08:01

## 2023-01-25 RX ADMIN — FERROUS SULFATE TAB 325 MG (65 MG ELEMENTAL FE) 325 MG: 325 (65 FE) TAB at 08:01

## 2023-01-25 RX ADMIN — IPRATROPIUM BROMIDE AND ALBUTEROL SULFATE 1 AMPULE: 2.5; .5 SOLUTION RESPIRATORY (INHALATION) at 14:33

## 2023-01-25 RX ADMIN — POLYETHYLENE GLYCOL 3350 17 G: 17 POWDER, FOR SOLUTION ORAL at 08:01

## 2023-01-25 RX ADMIN — IPRATROPIUM BROMIDE AND ALBUTEROL SULFATE 1 AMPULE: 2.5; .5 SOLUTION RESPIRATORY (INHALATION) at 19:52

## 2023-01-25 RX ADMIN — IPRATROPIUM BROMIDE AND ALBUTEROL SULFATE 1 AMPULE: 2.5; .5 SOLUTION RESPIRATORY (INHALATION) at 07:00

## 2023-01-25 ASSESSMENT — PAIN SCALES - GENERAL: PAINLEVEL_OUTOF10: 0

## 2023-01-25 NOTE — PROGRESS NOTES
[address]    Acute Rehabilitation Occupational Therapy Daily Treatment Note    Date: 23  Patient Name: Richar Whitehead       Room: [facility]/9708-60  MRN: [id number]  Account: [de-identified]   : 1960  (60 y.o.) Gender: male       Referring Practitioner: Tori Christie MD  Diagnosis: Cerebrovascular accident  Additional Pertinent Hx: Per MRI Impression on 23: Mild amount of residual subarachnoid hemorrhage in the right frontal and  right parietal lobes as well as the left frontal lobe. Evolving small focus of hemorrhage in the left parietal lobe posteriorly. Evolving areas of ischemia in the right frontal lobe and left posterior parietal lobe as well as a few foci in the parasagittal aspect of the right frontoparietal region. Treatment Diagnosis: Impaired self care status    Past Medical History:  has no past medical history on file. Past Surgical History:   has a past surgical history that includes Upper gastrointestinal endoscopy (N/A, 2023). Restrictions  Restrictions/Precautions  Restrictions/Precautions: Fall Risk, General Precautions, Contact Precautions, Isolation, Bed Alarm  Required Braces or Orthoses?: No  Implants present? :  (pt denies)     Position Activity Restriction  Other position/activity restrictions: Severe dysphagia- NPO. Unable to tolerate any PO safely. NG tube placed,  on high flow nasal cannula     Vitals      Subjective  Subjective  Subjective: AM: Pt. in wheelchair upon arrival. Had just returned to room from physical therapy session. Pleasant. Cooperative. Requesting to take a shower. PM: Pt. seated EOB upon arrival. Respiratory was present initially. Pain: Patient denies. Objective  Cognition  Overall Orientation Status: Impaired  Orientation Level: Oriented to person;Disoriented to situation;Oriented to place; Disoriented to time    Cognition  Overall Cognitive Status: Exceptions  Arousal/Alertness: Appropriate responses to stimuli  Following Commands: Follows one step commands consistently  Attention Span: Attends with cues to redirect  Memory: Decreased recall of biographical Information;Decreased recall of precautions;Decreased recall of recent events  Safety Judgement: Decreased awareness of need for assistance;Decreased awareness of need for safety  Problem Solving: Assistance required to generate solutions;Assistance required to implement solutions;Assistance required to identify errors made;Assistance required to correct errors made  Insights: Decreased awareness of deficits  Initiation: Requires cues for some  Sequencing: Requires cues for some  Cognition Comment: Pt with impulsive tendencies noted. Activities of Daily Living     Grooming/Oral Hygiene  Assistance Level: Set-up  Skilled Clinical Factors: Seated at sink. Upper Extremity Bathing  Assistance Level: Stand by assist  Skilled Clinical Factors: Seated on shower bench. Minimal verbal cuing this date. Lower Extremity Bathing  Assistance Level: Contact guard assist  Skilled Clinical Factors: Seated on shower bench for all LB bathing- weight shifting for tiffany hygiene and washing of buttocks. Minimal verbal cues for task initiation and some sequencing. Upper Extremity Dressing  Assistance Level: Stand by assist  Skilled Clinical Factors: Pt. able to doff/sendy overhead tshirts with minimal verbal cuing. Lower Extremity Dressing  Assistance Level: Moderate assistance  Skilled Clinical Factors: Assist to orient and intermittent assist to thread B LE into brief and pants. Pt utilizes figure 4 positioning to thread brief and pants while seated on shower bench. Pt able to pull over hips while standing in shower utilizing GB for balance. Putting On/Taking Off Footwear  Assistance Level: Contact guard assist  Skilled Clinical Factors: Able to doff/sendy gripper socks without AE. Tub/Shower Transfers  Type: Shower  Transfer From: Wheelchair  Transfer To:  Tub transfer bench  Assistance Level: Contact guard assist  Skilled Clinical Factors: Pt. requested to complete shower transfer from wheelchair this date versus rw. Mobility     Sit to Stand  Assistance Level: Minimal assistance  Skilled Clinical Factors: verbal and tactile cues for hand placement and position of RW  Stand to Sit  Assistance Level: Minimal assistance  Skilled Clinical Factors: verbal and tactile cues for hand placement and postioning for safe descend. Functional Mobility  Device: Wheelchair (Pt. requested to use wheelchair for mobility into bathroom and shower this date.)  Activity: To/From bathroom    OT Exercises  Exercise Treatment: Instruction and participation in B UE AROM exercises and str exercises with minimal resistance, 20 reps in all planes, rest breaks taken as needed, to continue to increase general str and activity tolerance for maximized indep and safety during all daily self care tasks and transfers. Assessment  Assessment  Activity Tolerance: Patient limited by endurance; Patient limited by fatigue  Discharge Recommendations: Home with assist PRN;Outpatient OT    Patient Education  Education  Education Given To: Patient  Education Provided: Role of Therapy;Plan of Care;Cognition; Energy Conservation  Education Method: Verbal;Demonstration  Barriers to Learning: Cognition; Hearing  Education Outcome: Verbalized understanding;Demonstrated understanding    OT Equipment Recommendations  Other: TBD    Goals  Patient Goals   Patient goals : \"To learn how to walk properly. ..all that goes without saying (referring to bathing, dressing and toileting independence)\"  Short Term Goals  Time Frame for Short Term Goals: One week  Short Term Goal 1: Patient will perform oral hygiene with SBA and Good safety. Short Term Goal 2: Patient will perform hair brushing with SBA. Short Term Goal 3: Patient will perform upper body dressing with SBA.   Short Term Goal 4: Patient will perform lower body dressing, lower body bathing, and toileting tasks with Minimal assist.  Short Term Goal 5: Patient will perform functional moblity and transfers during self-care with CGA, least restrictive mobility device, and Good safety. Short Term Goal 6: Patient will verbalize/demonstrate Good understanding of assistive equipment/durable medical equipment/modified techniques to maximize safety and independence with self-care. Short Term Goal 7: Patient will actively participate in 30+ minutes of therapeutic exercise/functional activities to promote increased independence with self-care and mobility. Long Term Goals  Time Frame for Long Term Goals : By discharge  Long Term Goal 1: Patient will perform BADLs with modified independence and Good safety. Long Term Goal 2: Patient will perform functional mobility and transfers during self-care with modified independence, least restrictive mobility device, and Good safety. Long Term Goal 3: Patient will stand for 5+ minutes with 0-1 UE support, modified independence while engaging in functional activity of choice. Long Term Goal 4: Patient will perform simple meal prep and light housekeeping with SBA and Good safety. Long Term Goal 5: Patient will verbalize/demonstrate Good understanding of Fall Prevention Strategies to maximize safety and independence with self-care. Long Term Goal 6: Patient will perform self-care with improved bilateral  strength as evidenced by 10#  strength improvement and improved bilateral fine motor control as evidenced by 5 second improved in 9 hole peg test (Goal updated by STEFFI Aleman/L on 1/24/23).     Plan  Occupational Therapy Plan  Times Per Week: 5-7  Times Per Day: Twice a day  Current Treatment Recommendations: Self-Care / ADL, Strengthening, ROM, Balance training, Functional mobility training, Endurance training, Cognitive reorientation, Neuromuscular re-education, Safety education & training, Patient/Caregiver education & training, Equipment evaluation, education, & procurement, Cognitive/Perceptual training, Coordination training, Home management training       01/25/23 0745 01/25/23 1507   OT Individual Minutes   Time In 0135 8724   Time Out 1000 1447   Minutes 55 15   Minute Variance   Variance  --  15   Reason  --  Refusal  (Due to fatigue.)       Electronically signed by ANITA Kong on 1/25/23 at 3:11 PM EST

## 2023-01-25 NOTE — PROGRESS NOTES
[facility] Internal Medicine  Yue Gutierrez MD; Tito Sawant MD; Luis Fernando Ortiz MD; MD June Saldivar MD; Yasmin Perez MD    [facility]   [address] / HISTORY AND PHYSICAL EXAMINATION            Date:   1/25/2023  Patient name:  Jessee Fernandez  Date of admission:  1/19/2023  9:12 AM  MRN:   [id number]  Account:  [de-identified]  YOB: 1960  PCP:    Yasmin Perez MD  Room:   [address] [place] [address]  Code Status:    Full Code    Physician Requesting Consult: Myles Moreno MD    Reason for Consult: Medical management    Chief Complaint:     No chief complaint on file. Acute CVA, generalized weakness, metabolic encephalopathy, multifactorial with underlying acute respiratory failure with pneumonia and heart failure    History Obtained From:     patient    History of Present Illness:     58-year-old gentleman with unknown past medical history presented to inpatient [facility] with metabolic encephalopathy found to have acute respiratory failure, pneumonia, also had slurred speech, confusion progressively was getting worse, EMS brought the patient to the hospital his saturations were  75%, found to have acute respiratory failure with pneumonia, in the ED patient was placed on BiPAP, chest x-ray confirmed right lower lobe pneumonia with pleural effusion. ABG showed acute respiratory failure with hypercapnia and hypoxia.   Subsequently also found to have NSTEMI, acute heart failure with transaminitis possible shock liver, with also hepatitis C acute with hepatic failure, slowly improved,  Also had multiple acute brain infarct with subarachnoid hemorrhage,,  Subsequently patient was evaluated for acute inpatient rehab, accepted and admitted right now we are consulted for medical management    1/20  Patient was sitting on the chair on my encounter, states that he has been feeling drained  Just finished doing physical therapy, vitals have been stable  Past Medical History:     History reviewed. No pertinent past medical history. Past Surgical History:     Past Surgical History:   Procedure Laterality Date    UPPER GASTROINTESTINAL ENDOSCOPY N/A 1/17/2023    EGD ESOPHAGOGASTRODUODENOSCOPY PEG TUBE INSERTION performed by Franco Figueredo DO at [address] [place] [address]        Medications Prior to Admission:     Prior to Admission medications    Medication Sig Start Date End Date Taking? Authorizing Provider   aspirin 325 MG tablet Take 325 mg by mouth daily Patient takes 2 tabs daily    Historical Provider, MD   psyllium (KONSYL) 28.3 % PACK Take 1 packet by mouth daily    Historical Provider, MD        Allergies:     Patient has no known allergies. Social History:     Tobacco:    reports that he has been smoking cigarettes. He has a 40.00 pack-year smoking history. He has never used smokeless tobacco.  Alcohol:      reports current alcohol use. Drug Use:  reports that he does not currently use drugs. Family History:     History reviewed. No pertinent family history. Review of Systems:     Positive and Negative as described in HPI. CONSTITUTIONAL:  negative for fevers, chills, sweats, fatigue, weight loss  HEENT:  negative for vision, hearing changes, runny nose, throat pain  RESPIRATORY:  negative for shortness of breath, cough, congestion, wheezing. CARDIOVASCULAR:  negative for chest pain, palpitations.   GASTROINTESTINAL:  negative for nausea, vomiting, diarrhea, constipation, change in bowel habits, abdominal pain   GENITOURINARY:  negative for difficulty of urination, burning with urination, frequency   INTEGUMENT:  negative for rash, skin lesions, easy bruising   HEMATOLOGIC/LYMPHATIC:  negative for swelling/edema   ALLERGIC/IMMUNOLOGIC:  negative for urticaria , itching  ENDOCRINE:  negative increase in drinking, increase in urination, hot or cold intolerance  MUSCULOSKELETAL:  negative joint pains, muscle aches, swelling of joints  NEUROLOGICAL:  negative for headaches, dizziness, lightheadedness, numbness, pain, tingling extremities  BEHAVIOR/PSYCH:  negative for depression, anxiety    Physical Exam:     /72   Pulse 82   Temp 98.8 °F (37.1 °C) (Oral)   Resp 20   Ht 5' 7\" (1.702 m)   Wt 163 lb (73.9 kg)   SpO2 (!) 89%   BMI 25.53 kg/m²   Temp (24hrs), Av.7 °F (37.1 °C), Min:98.5 °F (36.9 °C), Max:98.8 °F (37.1 °C)    No results for input(s): POCGLU in the last 72 hours. Intake/Output Summary (Last 24 hours) at 2023 1546  Last data filed at 2023 1200  Gross per 24 hour   Intake 4062 ml   Output 350 ml   Net 3712 ml         General Appearance:  alert, well appearing, and in no acute distress  Mental status: oriented to person, place, and time with normal affect  Head:  normocephalic, atraumatic. Eye: no icterus, redness, pupils equal and reactive, extraocular eye movements intact, conjunctiva clear  Ear: normal external ear, no discharge, hearing intact  Nose:  no drainage noted  Mouth: mucous membranes moist  Neck: supple, no carotid bruits, thyroid not palpable  Lungs: Bilateral equal air entry, clear to ausculation, no wheezing, rales or rhonchi, normal effort  Cardiovascular: normal rate, regular rhythm, no murmur, gallop, rub.   Abdomen: Soft, nontender, nondistended, normal bowel sounds, no hepatomegaly or splenomegaly  Neurologic: There are no new focal motor or sensory deficits, normal muscle tone and bulk, no abnormal sensation, normal speech, cranial nerves II through XII grossly intact  Skin: No gross lesions, rashes, bruising or bleeding on exposed skin area  Extremities:  peripheral pulses palpable, no pedal edema or calf pain with palpation  Psych: normal affect    Investigations:      Laboratory Testing:  Recent Results (from the past 24 hour(s))   Basic Metabolic Panel w/ Reflex to MG    Collection Time: 23  6:21 AM   Result Value Ref Range    Glucose 119 (H) 70 - 99 mg/dL    BUN 15 8 - 23 mg/dL    Creatinine 0.45 (L) 0.70 - 1.20 mg/dL    Est, Glom Filt Rate >60 >60 mL/min/1.73m2    Calcium 8.2 (L) 8.6 - 10.4 mg/dL    Sodium 136 135 - 144 mmol/L    Potassium 3.9 3.7 - 5.3 mmol/L    Chloride 100 98 - 107 mmol/L    CO2 30 20 - 31 mmol/L    Anion Gap 6 (L) 9 - 17 mmol/L         Imaging/[employer]agonstics:  XR CHEST PORTABLE    Result Date: 1/18/2023  Interval extubation and NG tube removal.  Otherwise similar findings, with perhaps slightly greater hazy ground-glass opacity right mid lung. Correlate clinically. FL MODIFIED BARIUM SWALLOW W VIDEO    Result Date: 1/14/2023  Premature vallecular spillage. Piriform sinus cavity residue. Deep penetration with both materials. Aspiration with pudding. Please see separate speech pathology report for full discussion of findings and recommendations.        Assessment :      Hospital Problems             Last Modified POA    * (Principal) Impending cerebrovascular accident ([address] 1/19/2023 Yes    Acute type II respiratory failure ([address] 1/19/2023 Yes    Transaminitis 1/19/2023 Yes    Normocytic anemia 1/19/2023 Yes    Thrombocytopenia ([address] 1/19/2023 Yes    Acute respiratory failure with hypoxia and hypercapnia ([address] RLL pneumonia 1/19/2023 Yes    Community acquired pneumonia of right lower lobe of lung 1/19/2023 Yes    Emphysema lung ([address] 1/19/2023 Yes    Cerebral infarction ([address] 1/19/2023 Yes    Moderate malnutrition ([address] 1/19/2023 Yes    Hepatitis C virus infection without hepatic coma 1/19/2023 Yes    Dysphagia 1/19/2023 Yes    Dermatitis associated with moisture 1/20/2023 Yes   Plan:     Acute hypoxic and hypercapnic respiratory failure secondary to likely aspiration pneumonia, treated with vancomycin and Unasyn, also was positive for MRSA nasal swab, required intubation, extubated subsequently  Acute CVA with subarachnoid hemorrhage, cardioembolic, did not qualify for tPA,  Acute liver failure possible multifactorial, alcoholic liver disease with hepatitis C,  Hypercoagulable state secondary to acute liver disease, INR currently 1.6  Thrombocytopenia secondary to alcoholic liver disease and hepatitis C, improved  Dysphagia , PEG placed   Anemia , of ch disease multifactorial   DVT PPX with SCD only, as he had cerebral h'age  Full code status   PT/OT     1/21  Patient seen to be resting comfortably  Was able to participate in physical therapy today  No new issues  Repeat CT head showed no new strokes had no hemorrhage  Continue current management    1/22  Since no bleed noted on repeat CT head okay to resume DVT prophylaxis  Hemoglobin is stable,thrombocytopenia has resolved platelet count 703 on labs 1/20  No new issues continue current management      1/25  Patient reports no new complaints. Engaging with physical therapy. Labs and vitals reviewed. No new issues. Continue with current care. Consultations:   Christianne Berumen TO SOCIAL WORK  IP CONSULT TO INTERNAL MEDICINE  IP CONSULT TO Iirna Moffett MD  1/25/2023  3:46 PM    Copy sent to Dr. Brock Piedra MD    Please note that this chart was generated using voice recognition Dragon dictation software. Although every effort was made to ensure the accuracy of this automated transcription, some errors in transcription may have occurred.

## 2023-01-25 NOTE — PLAN OF CARE
Problem: Discharge Planning  Goal: Discharge to home or other facility with appropriate resources  1/25/2023 1435 by Mega Hernandez RN  Outcome: Progressing  Flowsheets (Taken 1/25/2023 0900)  Discharge to home or other facility with appropriate resources:   Identify barriers to discharge with patient and caregiver   Arrange for needed discharge resources and transportation as appropriate     Problem: Safety - Medical Restraint  Goal: Remains free of injury from restraints (Restraint for Interference with Medical Device)  Description: INTERVENTIONS:  1. Determine that other, less restrictive measures have been tried or would not be effective before applying the restraint  2. Evaluate the patient's condition at the time of restraint application  3. Inform patient/family regarding the reason for restraint  4. Q2H: Monitor safety, psychosocial status, comfort, nutrition and hydration  1/25/2023 1435 by Mega Hernandez RN  Outcome: Progressing     Problem: Nutrition Deficit:  Goal: Optimize nutritional status  1/25/2023 1435 by Mega Hernandez RN  Outcome: Progressing     Problem: Safety - Adult  Goal: Free from fall injury  1/25/2023 1435 by Mega Hernandez RN  Outcome: Progressing  Flowsheets (Taken 1/25/2023 1200)  Free From Fall Injury:   Lulu Sanz family/caregiver on patient safety   Based on caregiver fall risk screen, instruct family/caregiver to ask for assistance with transferring infant if caregiver noted to have fall risk factors     Problem: ABCDS Injury Assessment  Goal: Absence of physical injury  1/25/2023 1435 by Mega Hernandez RN  Outcome: Progressing  Flowsheets (Taken 1/25/2023 1200)  Absence of Physical Injury: Implement safety measures based on patient assessment     Problem: Skin/Tissue Integrity  Goal: Absence of new skin breakdown  Description: 1. Monitor for areas of redness and/or skin breakdown  2. Assess vascular access sites hourly  3. Every 4-6 hours minimum:  Change oxygen saturation probe site  4. Every 4-6 hours:  If on nasal continuous positive airway pressure, respiratory therapy assess nares and determine need for appliance change or resting period.   1/25/2023 1435 by Otilio Kumar RN  Outcome: Progressing

## 2023-01-25 NOTE — PROGRESS NOTES
Physical Therapy  Facility/Department: [facility] ACUTE REHAB  Rehabilitation Physical Therapy Treatment Note    NAME: Deejay Fuentes  : 1960 (42 y.o.)  MRN: [id number]  CODE STATUS: Full Code  Date of Service: 23    Restrictions:  Restrictions/Precautions: Fall Risk;General Precautions;Contact Precautions;Isolation; Bed Alarm     SUBJECTIVE  Subjective  Subjective: Pt states he does not know how he feels yet on AM entry; states in PM that he feels OK, toward end, feels tired. Pain Assessment  Pain Assessment: None - Denies Pain    OBJECTIVE  Functional Mobility  Bed Mobility  Overall Assistance Level: Stand By Assist  Additional Factors: Head of bed raised; With handrails  Supine to Sit  Assistance Level: Stand by assist  Scooting  Assistance Level: Stand by assist  Balance  Sitting Balance: Supervision  Standing Balance: Contact guard assistance  Standing Balance  Time: ~1 min. Activity: amb  Comments: Rolling walker  Transfers  Surface: To bed;From bed; Wheelchair; To chair with arms;From chair with arms  Additional Factors: Hand placement cues; With handrails  Device:  (with, without rolling walker)  Sit to Stand  Assistance Level: Contact guard assist  Stand to Sit  Assistance Level: Contact guard assist  Bed To/From Chair  Technique: Stand step  Assistance Level: Contact guard assist  Skilled Clinical Factors: Rolling walker, writer managing O2 tubing. Stand Pivot  Assistance Level: Contact guard assist  Skilled Clinical Factors: With, without rolling walker    Environmental Mobility  Ambulation  Surface: Level surface  Device: Rolling walker  Distance: 76'  Additional Factors: Verbal cues  Assistance Level: Contact guard assist  Gait Deviations: Slow kevin;Decreased step length bilateral  Skilled Clinical Factors: Downward gaze with G return to cues. CGA for safety based on previous day's note; progressing to SBA. PT Exercises  A/AROM Exercises: Seated L LE, 15x each.   Resistive Exercises: Seated R LE, 1#, 15x each, green/moderate resistance band. Functional Mobility Circuit Training: Stand/squat pivot transfers x3, CGA  Breathing Techniques: Reviewed pursed lip breathing. Exercise Equipment: NuStep, workload 4, 5 min. ASSESSMENT/PROGRESS TOWARDS GOALS  Vital Signs  Heart Rate: 82  SpO2: 93 %  O2 Device: Nasal cannula (2.5L AM, 1.5L PM)    Assessment  Activity Tolerance: Patient limited by endurance; Patient limited by fatigue    Goals  Short Term Goals  Time Frame for Short Term Goals: 9 days  Short Term Goal 1: CGA assist supine<-> sit x1 assist  Short Term Goal 2: sit EOB withsupervsion up to 15 min for therex/ADL  Short Term Goal 3: 3+/5 LE strength to prepare for standing activiites and gait  Short Term Goal 4: ambulation with rolling walker distance of 70 to 100 ft, CGA, level surface  Short Term Goal 5: roll L/R with SBA  Short Term Goal 6: Pt able to go up and down 5 steps with 2 rails, min A  Long Term Goals  Time Frame for Long Term Goals : By DC  Long Term Goal 1: pt able to roll L/R indepndently  Long Term Goal 2: pt able to perform supine<>sit mod-I  Long Term Goal 3: pt able to perform sit<>stand and perform pivot/step to transfers at mod-I  Long Term Goal 4: pt able to ambulate household distance with appropriate device, mod-I  Long Term Goal 5: pt able to ambulate > 50 ft with appropriate device, at SBA/supervsion level, level as well as incline surface. Long term goal 6: pt able to go up and down 4 or more steps with B UE support, CGA. Long term goal 7: Improve PASS score to 27/36 improve function/stability. Long term goal 8: improve Tinetti balance score to atleast 23/28 to reduce fall risk.     PLAN OF CARE/SAFETY  Physcial Therapy Plan  General Plan:  minutes of therapy at least 5 out of 7 days a week (5 to 7 days/week.)  Specific Instructions for Next Treatment: Continue to progress with functional mobility training  Current Treatment Recommendations: Strengthening;Balance training;Functional mobility training;Neuromuscular re-education;Cognitive reorientation; Safety education & training;Patient/Caregiver education & training;Equipment evaluation, education, & procurement; Therapeutic activities;Transfer training; Wheelchair mobility training;Gait training;Stair training;Home exercise program;Positioning  Safety Devices  Type of Devices: All fall risk precautions in place;Call light within reach;Gait belt;Patient at risk for falls;Nurse notified; Left in bed (EOB facing nurse's station in PM)    Therapy Time     01/25/23 0808 01/25/23 0809   PT Individual Minutes   Time In 0815 1335   Time Out 2413 4523   Minutes [address], PTA, 01/25/23 at 3:11 PM

## 2023-01-25 NOTE — PROGRESS NOTES
Speech Language Pathology  Speech Language Pathology  [facility] Acute Rehab Unit at [facility]    Cognitive Treatment Note    Date: 1/25/2023  Patients Name: Paulino Whitaker  MRN: [id number]  Diagnosis: Dysphagia s/p CVA  Patient Active Problem List   Diagnosis Code    Acute type II respiratory failure (HCC) J96.00    Transaminitis R74.01    Normocytic anemia D64.9    Thrombocytopenia (HCC) D69.6    Agitation R45.1    Acute respiratory failure with hypoxia and hypercapnia (HCC) RLL pneumonia J96.01, J96.02    Altered mental status R41.82    Community acquired pneumonia of right lower lobe of lung J18.9    Prolonged pt (prothrombin time) R79.1    Emphysema lung (HCC) J43.9    Cerebral infarction (HCC) I63.9    ACP (advance care planning) Z71.89    Goals of care, counseling/discussion Z71.89    Encounter for palliative care Z51.5    Delirium due to another medical condition F05    Moderate malnutrition ([facility] E44.0    Hepatitis C virus infection without hepatic coma B19.20    Dysphagia R13.10    Impending cerebrovascular accident ([facility] [address] I63.9    Dermatitis associated with moisture L30.8       Pain: none reported    Cognitive Treatment  Treatment time: 7549-6622    Subjective: [x] Alert [x] Cooperative     [] Confused     [] Agitated    [] Lethargic    Objective/Assessment:    Orientation: n/a    Recall:   paragraph recall- 70%, 100% c cues. 4 word category inclusion- 60%, 100% c cues. Organization: NT    Problem solving: state 14 items in category (kitchen items)- 71%, 100% c cues. Pt. Demo long response latency. Dysphagia:    ST provided set up for oral care for water protocol. Pt. Completed oral motor exercises x2, 12 reps and Kim maneuver x5. Telesitter present. Some confusion noted.       Plan:  [x] Continue ST services    [] Discharge from ST:        Discharge recommendations: [] Inpatient Rehab   [] [place]   [] Outpatient Therapy  [] Follow up at trauma clinic   [] Other: Treatment completed by: No Jacobs A.CCC/SLP

## 2023-01-25 NOTE — PROGRESS NOTES
Writer informed during shift report pt spo2 dropped into 80's with 2L o2 via NC in attempt to wean. Pt currently sitting bedside with o2 off, confused and attempting to figure out where it is connected. Writer educated pt on o2 connection/proper placement and helped pt reapply NC. NC set to 3L. Will continue to monitor.

## 2023-01-25 NOTE — PROGRESS NOTES
Speech Language Pathology  Speech Language Pathology  [facility] Acute Rehab Unit at [facility] Treatment Note    Date: 2023  Patients Name: Pilar Medrano  MRN: [id number]  Diagnosis: Dysphagia s/p CVA  Patient Active Problem List   Diagnosis Code    Acute type II respiratory failure (HCC) J96.00    Transaminitis R74.01    Normocytic anemia D64.9    Thrombocytopenia (HCC) D69.6    Agitation R45.1    Acute respiratory failure with hypoxia and hypercapnia (HCC) RLL pneumonia J96.01, J96.02    Altered mental status R41.82    Community acquired pneumonia of right lower lobe of lung J18.9    Prolonged pt (prothrombin time) R79.1    Emphysema lung (HCC) J43.9    Cerebral infarction (HCC) I63.9    ACP (advance care planning) Z71.89    Goals of care, counseling/discussion Z71.89    Encounter for palliative care Z51.5    Delirium due to another medical condition F05    Moderate malnutrition ([address] E44.0    Hepatitis C virus infection without hepatic coma B19.20    Dysphagia R13.10    Impending cerebrovascular accident ([address] I63.9    Dermatitis associated with moisture L30.8       Pain: 2/10 (PEG tube site)    Cognitive Treatment  Treatment time: 8149-6404    Subjective: [x] Alert [x] Cooperative     [] Confused     [] Agitated    [] Lethargic    Objective/Assessment:    Orientation:  (not oriented to year, date, or time of day)    Recall:       Organization: NT    Problem solvin word list- odd one out- 100%. Dysphagia:    Pt. Completed oral care c OT during ADLs, pt. Given small sips of water x3 t/o session to assist c swallowing exercises. Pt. Completed oral motor exercises x2, 12 reps, Kim maneuver x6 and simple tongue base strengthening exercises x4, 15 reps each. Modified Shaker exercise completed (chin tuck using rolled towel for resistance):  repetitions chin tuck x10 and sustained tuck x2 for 10 seconds each. Telesitter present. Some confusion noted. Pt. Required cues and educ. Re: proper placement of O2 cannula. Plan:  [x] Continue ST services    [] Discharge from ST:        Discharge recommendations: [] Inpatient Rehab   [] [place]   [] Outpatient Therapy  [] Follow up at trauma clinic   [] Other:         Treatment completed by: Shelly Ford A.CCC/SLP

## 2023-01-25 NOTE — PLAN OF CARE
Problem: Discharge Planning  Goal: Discharge to home or other facility with appropriate resources  1/25/2023 0309 by Jake Garcia RN  Outcome: Progressing     Problem: Safety - Medical Restraint  Goal: Remains free of injury from restraints (Restraint for Interference with Medical Device)  Description: INTERVENTIONS:  1. Determine that other, less restrictive measures have been tried or would not be effective before applying the restraint  2. Evaluate the patient's condition at the time of restraint application  3. Inform patient/family regarding the reason for restraint  4. Q2H: Monitor safety, psychosocial status, comfort, nutrition and hydration  1/25/2023 0309 by Jake Garcia RN  Outcome: Progressing     Problem: Nutrition Deficit:  Goal: Optimize nutritional status  1/25/2023 0309 by Jake Garcia RN  Outcome: Progressing     Problem: Safety - Adult  Goal: Free from fall injury  1/25/2023 0309 by Jake Garcia RN  Outcome: Progressing     Problem: ABCDS Injury Assessment  Goal: Absence of physical injury  1/25/2023 0309 by Jake Garcia RN  Outcome: Progressing  Flowsheets (Taken 1/25/2023 0215)  Absence of Physical Injury: Implement safety measures based on patient assessment     Problem: Skin/Tissue Integrity  Goal: Absence of new skin breakdown  Description: 1. Monitor for areas of redness and/or skin breakdown  2. Assess vascular access sites hourly  3. Every 4-6 hours minimum:  Change oxygen saturation probe site  4. Every 4-6 hours:  If on nasal continuous positive airway pressure, respiratory therapy assess nares and determine need for appliance change or resting period.   1/25/2023 0309 by Jake Garcia RN  Outcome: Progressing

## 2023-01-25 NOTE — PROGRESS NOTES
01/25/23 1443   Encounter Summary   Encounter Overview/Reason  Volunteer Encounter   Service Provided For: Patient   Referral/Consult From: [facility]   Last Encounter  01/25/23  (V)   Complexity of Encounter Low   Spiritual/Emotional needs   Type Spiritual Support   Assessment/Intervention/Outcome   Intervention Prayer (assurance of)/[place]

## 2023-01-25 NOTE — DISCHARGE INSTR - COC
Continuity of Care Form    Patient Name: Andres Mail   :  1960  MRN:  [id number]    Admit date:  2023  Discharge date:  23    Code Status Order: Full Code   Advance Directives:     Admitting Physician:  Ailyn Ramesh MD  PCP: Ollie Shah MD    Discharging Nurse: Jc Gomez Unit/Room#: 7177/9835-13  Discharging Unit Phone Number: [phone]    Emergency Contact:   Extended Emergency Contact Information  Primary Emergency Contact: [address] Phone: [phone]  Relation: Child  Secondary Emergency Contact: Brady Man Phone: [phone]  Relation: Child    Past Surgical History:  Past Surgical History:   Procedure Laterality Date    UPPER GASTROINTESTINAL ENDOSCOPY N/A 2023    EGD ESOPHAGOGASTRODUODENOSCOPY PEG TUBE INSERTION performed by Ian Cook DO at [address] ENDO       Immunization History:   Immunization History   Administered Date(s) Administered    COVID-19, J&J, (age 18y+), IM, 0.5 mL 2021, 2021       Active Problems:  Patient Active Problem List   Diagnosis Code    Acute type II respiratory failure (HCC) J96.00    Transaminitis R74.01    Normocytic anemia D64.9    Thrombocytopenia (HCC) D69.6    Agitation R45.1    Acute respiratory failure with hypoxia and hypercapnia (HCC) RLL pneumonia J96.01, J96.02    Altered mental status R41.82    Community acquired pneumonia of right lower lobe of lung J18.9    Prolonged pt (prothrombin time) R79.1    Emphysema lung ([address] J43.9    Cerebral infarction ([address] I63.9    ACP (advance care planning) Z71.89    Goals of care, counseling/discussion Z71.89    Encounter for palliative care Z51.5    Delirium due to another medical condition F05    Moderate malnutrition ([address] E44.0    Hepatitis C virus infection without hepatic coma B19.20    Dysphagia R13.10    Impending cerebrovascular accident ([address] I63.9    Dermatitis associated with moisture L30.8       Isolation/Infection:   Isolation Contact          Patient Infection Status       Infection Onset Added Last Indicated Last Indicated By Review Planned Expiration Resolved Resolved By    MRSA 01/02/23 01/03/23 01/02/23 MRSA DNA Probe, Nasal        Resolved    COVID-19 (Rule Out) 01/02/23 01/02/23 01/01/23 Respiratory Panel, Molecular, with COVID-19 (Restricted: peds pts or suitable admitted adults) (Ordered)   01/03/23 Susan Rodriguez RN    negative    COVID-19 (Rule Out) 01/01/23 01/01/23 01/01/23 COVID-19 & Influenza Combo (Ordered)   01/01/23 Rule-Out Test Resulted            Nurse Assessment:  Last Vital Signs: /72   Pulse 82   Temp 98.8 °F (37.1 °C) (Oral)   Resp 20   Ht 5' 7\" (1.702 m)   Wt 163 lb (73.9 kg)   SpO2 93%   BMI 25.53 kg/m²     Last documented pain score (0-10 scale): Pain Level: 0  Last Weight:   Wt Readings from Last 1 Encounters:   01/20/23 163 lb (73.9 kg)     Mental Status:  disoriented and oriented- forgetful/impulsive at times    IV Access:  - None    Nursing Mobility/ADLs:  Walking   Assisted  Transfer  Independent  Bathing  Assisted  Dressing  Assisted  Toileting  Independent  Feeding  Assisted- NPO tube feed boluses  Med Admin  Assisted  Med Delivery   crushed- per peg tube    Wound Care Documentation and Therapy:        Elimination:  Continence: Bowel: Yes  Bladder: Yes  Urinary Catheter: None   Colostomy/Ileostomy/Ileal Conduit: No       Date of Last BM: 2/5/23    Intake/Output Summary (Last 24 hours) at 1/25/2023 0952  Last data filed at 1/25/2023 0615  Gross per 24 hour   Intake 3962 ml   Output 350 ml   Net 3612 ml     I/O last 3 completed shifts: In: 7188 [NG/GT:7188]  Out: 800 [Urine:800]    Safety Concerns:      At Risk for Falls    Impairments/Disabilities:      Vision    Nutrition Therapy:  Current Nutrition Therapy:   - Oral Diet:  NPO    Routes of Feeding: Gastrostomy Tube  Liquids: No Liquids  Daily Fluid Restriction: no  Last Modified Barium Swallow with Video (Video Swallowing Test): done on 02/01/23    Treatments at the Time of Hospital Discharge:   Respiratory Treatments: breathing treatments every 4 hours via nebulizer  Oxygen Therapy:  is on oxygen at 2 L/min per nasal cannula. - 2LNC with exertion and while sleeping. None needed at rest.   Ventilator:    - No ventilator support    Rehab Therapies: Physical Therapy, Occupational Therapy, and Speech/Language Therapy  Weight Bearing Status/Restrictions: No weight bearing restrictions  Other Medical Equipment (for information only, NOT a DME order):  wheelchair and walker  Other Treatments: DME for walker, nebulizer and home oxygen    Patient's personal belongings (please select all that are sent with patient):  Glasses, clothes, shoes    RN SIGNATURE:  Electronically signed by Jenny Pope RN on 2/5/23 at 7:26 PM EST    CASE MANAGEMENT/SOCIAL WORK SECTION    Inpatient Status Date: 1/19/2023    Readmission Risk Assessment Score:  Readmission Risk              Risk of Unplanned Readmission:  15           Discharging to [facility]/ [address] [place] [address] Dr Nieves Lopez. [facility]  [phone]     [employer]~ for Oxygen needs  [address] [place] [address]  [phone]     Dialysis Facility (if applicable)   Name:  Address:  Dialysis Schedule:  Phone:  Fax:    / signature: Electronically signed by Jessica Carlson RN on 1/25/23 at 9:54 AM EST    PHYSICIAN SECTION    Prognosis: Good    Condition at Discharge: Stable    Rehab Potential (if transferring to Rehab): Good    Recommended Labs or Other Treatments After Discharge: Physical and occupational therapy for ongoing functional deficits related to multifocal CVA, debility secondary to pneumonia. Speech therapy for ongoing dysphagia. Nursing for medication management, PEG tube care and tube feeds, and oxygen monitoring. follow up 1. PCP 1-2 weeks,- Dr Anisa Poe 2. PM&R 4-6 weeks, 3. GI Hep  C management, 4. Neurology-[place], 5. pulmonary Dr. Rubi Bustillos 4 weeks, will need pulmonary function test , home PT/OT/Sp/Nsg, PEG site care, follow-up swallow study in few weeks,  home O2 with exertion and nocturnal, nebulizer, discussed no driving - pt agreeable    CBC/BMP 2/9 to Dr Lucero Pickett      Physician Certification: I certify the above information and transfer of Judd Oglesby  is necessary for the continuing treatment of the diagnosis listed and that he requires [address] for less than 30 days. Update Admission H&P: No change in H&P    PHYSICIAN SIGNATURE:  Electronically signed by Amarilys Blankenship. Robby Stiles MD on 2/6/23 at 7:52 AM EST

## 2023-01-25 NOTE — CARE COORDINATION
Case Management Note    Received confirmation that [facility] will follow patient for discharge PT/OT/Skilled nursing. Attempt to notify Significant Other but her phone does not have a mailbox that has been set up. Will attempt to reach her again at a later time. TARA updated.     Signed Diego De Leon RN

## 2023-01-26 PROCEDURE — 97535 SELF CARE MNGMENT TRAINING: CPT

## 2023-01-26 PROCEDURE — 6370000000 HC RX 637 (ALT 250 FOR IP): Performed by: PHYSICAL MEDICINE & REHABILITATION

## 2023-01-26 PROCEDURE — 94761 N-INVAS EAR/PLS OXIMETRY MLT: CPT

## 2023-01-26 PROCEDURE — 97129 THER IVNTJ 1ST 15 MIN: CPT

## 2023-01-26 PROCEDURE — 6360000002 HC RX W HCPCS: Performed by: PHYSICAL MEDICINE & REHABILITATION

## 2023-01-26 PROCEDURE — 6370000000 HC RX 637 (ALT 250 FOR IP): Performed by: INTERNAL MEDICINE

## 2023-01-26 PROCEDURE — 97110 THERAPEUTIC EXERCISES: CPT

## 2023-01-26 PROCEDURE — 99231 SBSQ HOSP IP/OBS SF/LOW 25: CPT | Performed by: INTERNAL MEDICINE

## 2023-01-26 PROCEDURE — 2700000000 HC OXYGEN THERAPY PER DAY

## 2023-01-26 PROCEDURE — 94640 AIRWAY INHALATION TREATMENT: CPT

## 2023-01-26 PROCEDURE — 97530 THERAPEUTIC ACTIVITIES: CPT

## 2023-01-26 PROCEDURE — 99231 SBSQ HOSP IP/OBS SF/LOW 25: CPT | Performed by: PHYSICAL MEDICINE & REHABILITATION

## 2023-01-26 PROCEDURE — 92526 ORAL FUNCTION THERAPY: CPT

## 2023-01-26 PROCEDURE — 1180000000 HC REHAB R&B

## 2023-01-26 RX ADMIN — IPRATROPIUM BROMIDE AND ALBUTEROL SULFATE 1 AMPULE: 2.5; .5 SOLUTION RESPIRATORY (INHALATION) at 15:25

## 2023-01-26 RX ADMIN — SPIRONOLACTONE 25 MG: 25 TABLET ORAL at 07:36

## 2023-01-26 RX ADMIN — FERROUS SULFATE TAB 325 MG (65 MG ELEMENTAL FE) 325 MG: 325 (65 FE) TAB at 07:36

## 2023-01-26 RX ADMIN — CLOTRIMAZOLE: 10 CREAM TOPICAL at 07:36

## 2023-01-26 RX ADMIN — ENOXAPARIN SODIUM 40 MG: 100 INJECTION SUBCUTANEOUS at 07:35

## 2023-01-26 RX ADMIN — IPRATROPIUM BROMIDE AND ALBUTEROL SULFATE 1 AMPULE: 2.5; .5 SOLUTION RESPIRATORY (INHALATION) at 08:30

## 2023-01-26 RX ADMIN — CLOTRIMAZOLE: 10 CREAM TOPICAL at 20:09

## 2023-01-26 RX ADMIN — CARVEDILOL 6.25 MG: 6.25 TABLET, FILM COATED ORAL at 07:36

## 2023-01-26 RX ADMIN — CARVEDILOL 6.25 MG: 6.25 TABLET, FILM COATED ORAL at 17:50

## 2023-01-26 RX ADMIN — IPRATROPIUM BROMIDE AND ALBUTEROL SULFATE 1 AMPULE: 2.5; .5 SOLUTION RESPIRATORY (INHALATION) at 20:49

## 2023-01-26 RX ADMIN — IPRATROPIUM BROMIDE AND ALBUTEROL SULFATE 1 AMPULE: 2.5; .5 SOLUTION RESPIRATORY (INHALATION) at 11:34

## 2023-01-26 NOTE — PLAN OF CARE
Problem: Discharge Planning  Goal: Discharge to home or other facility with appropriate resources  1/26/2023 0406 by Noemy Brooks RN  Outcome: Progressing  Flowsheets (Taken 1/25/2023 0900 by Jia Harvey RN)  Discharge to home or other facility with appropriate resources:   Identify barriers to discharge with patient and caregiver   Arrange for needed discharge resources and transportation as appropriate  1/25/2023 1435 by Jia Harvey RN  Outcome: Progressing  Flowsheets (Taken 1/25/2023 0900)  Discharge to home or other facility with appropriate resources:   Identify barriers to discharge with patient and caregiver   Arrange for needed discharge resources and transportation as appropriate     Problem: Safety - Medical Restraint  Goal: Remains free of injury from restraints (Restraint for Interference with Medical Device)  Description: INTERVENTIONS:  1. Determine that other, less restrictive measures have been tried or would not be effective before applying the restraint  2. Evaluate the patient's condition at the time of restraint application  3. Inform patient/family regarding the reason for restraint  4.  Q2H: Monitor safety, psychosocial status, comfort, nutrition and hydration  1/25/2023 1435 by Jia Harvey RN  Outcome: Progressing     Problem: Nutrition Deficit:  Goal: Optimize nutritional status  1/26/2023 0406 by Noemy Brooks RN  Outcome: Progressing  Flowsheets (Taken 1/23/2023 1102 by Serafin Tucker, CAROLE, LD)  Nutrient intake appropriate for improving, restoring, or maintaining nutritional needs: Recommend, monitor, and adjust tube feedings and TPN/PPN based on assessed needs  1/25/2023 1435 by Jia Harvey RN  Outcome: Progressing     Problem: Safety - Adult  Goal: Free from fall injury  1/26/2023 0406 by Noemy Brooks RN  Outcome: Progressing  Flowsheets (Taken 1/26/2023 0406)  Free From Fall Injury: Instruct family/caregiver on patient safety  1/25/2023 1435 by Jia Harvey RN  Outcome: Progressing  Flowsheets (Taken 1/25/2023 1200)  Free From Fall Injury:   Instruct family/caregiver on patient safety   Based on caregiver fall risk screen, instruct family/caregiver to ask for assistance with transferring infant if caregiver noted to have fall risk factors     Problem: ABCDS Injury Assessment  Goal: Absence of physical injury  1/26/2023 0406 by Rita Cat RN  Outcome: Progressing  Flowsheets (Taken 1/25/2023 1200 by Amira Rosario RN)  Absence of Physical Injury: Implement safety measures based on patient assessment  1/25/2023 1435 by Amira Rosario RN  Outcome: Progressing  Flowsheets (Taken 1/25/2023 1200)  Absence of Physical Injury: Implement safety measures based on patient assessment     Problem: Skin/Tissue Integrity  Goal: Absence of new skin breakdown  Description: 1. Monitor for areas of redness and/or skin breakdown  2. Assess vascular access sites hourly  3. Every 4-6 hours minimum:  Change oxygen saturation probe site  4. Every 4-6 hours:  If on nasal continuous positive airway pressure, respiratory therapy assess nares and determine need for appliance change or resting period.   1/26/2023 0406 by Rita Cat RN  Outcome: Progressing  1/25/2023 1435 by Amira Rosario RN  Outcome: Progressing

## 2023-01-26 NOTE — PROGRESS NOTES
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 Patient is alert and oriented x3 this morning and doing well. Denies confusion. He has been tolerating tube feed well. This morning, he c/o some mild abdominal tenderness at PEG tube site. He states his cough has improved, no longer productive, and is at baseline for him. ROS:  Denies fevers, chills, sweats. No chest pain, palpitations, lightheadedness. Denies coughing, wheezing or shortness of breath. Denies abdominal pain, nausea, diarrhea or constipation. No new areas of joint pain. Denies new areas of numbness or weakness. Denies new anxiety or depression issues. No new skin problems. Rehabilitation:   Progressing in therapies. PT:    Bed mobility  Rolling to Left: Moderate assistance  Rolling to Right: Moderate assistance  Supine to Sit: Stand by assistance  Sit to Supine: Stand by assistance (Assist with BLEs)  Scooting: Stand by assistance  Bed Mobility Comments: HOB flat right handrail  Bed Mobility  Overall Assistance Level: Stand By Assist  Additional Factors: Head of bed raised, With handrails  Supine to Sit  Assistance Level: Stand by assist  Scooting  Assistance Level: Stand by assist      Transfers  Sit to Stand: Stand by assistance (variable surfaces)  Stand to Sit: Stand by assistance (variable surfaces)  Bed to Chair: Stand by assistance (RW)  Stand Pivot Transfers: Stand by assistance  Comment: patient demo'd impulsive movement, vcs for safety  Transfers  Surface:  To bed, From bed, Wheelchair, To chair with arms, From chair with arms  Additional Factors: Hand placement cues, With handrails  Device:  (with, without rolling walker)  Sit to Stand  Assistance Level: Contact guard assist  Stand to Sit  Assistance Level: Contact guard assist  Bed To/From Chair  Technique: Stand step  Assistance Level: Contact guard assist  Skilled Clinical Factors: Rolling walker, writer managing O2 tubing. Stand Pivot  Assistance Level: Contact guard assist  Skilled Clinical Factors: With, without rolling walker      Ambulation  Surface: Level tile  Device: Rolling Walker  Other Apparatus: O2 (2L)  Assistance: Contact guard assistance  Quality of Gait: Needs cues for sequencing , step length. SpO2  95% with HR 92. (Noted improvement in reciprocal step legnth, SpO2 >93% on 2L HR 99 bpm.)  Gait Deviations: Slow Radha, Increased SELVIN  Distance: 188ft and 30ft (wc follow for safety)  Comments: NT  Ambulation  Surface: Level surface  Device: Rolling walker  Distance: 76'  Additional Factors: Verbal cues  Assistance Level: Contact guard assist  Gait Deviations: Slow radha, Decreased step length bilateral  Skilled Clinical Factors: Downward gaze with G return to cues. CGA for safety based on previous day's note; progressing to SBA. OT:  Grooming/Oral Hygiene  Assistance Level: Set-up  Skilled Clinical Factors: Seated at sink. Upper Extremity Bathing  Assistance Level: Stand by assist  Skilled Clinical Factors: Seated on shower bench. Minimal verbal cuing this date. Lower Extremity Bathing  Assistance Level: Contact guard assist  Skilled Clinical Factors: Seated on shower bench for all LB bathing- weight shifting for tiffany hygiene and washing of buttocks. Minimal verbal cues for task initiation and some sequencing. Upper Extremity Dressing  Assistance Level: Stand by assist  Skilled Clinical Factors: Pt. able to doff/sendy overhead tshirts with minimal verbal cuing. Lower Extremity Dressing  Assistance Level: Moderate assistance  Skilled Clinical Factors: Assist to orient and intermittent assist to thread B LE into brief and pants. Pt utilizes figure 4 positioning to thread brief and pants while seated on shower bench. Pt able to pull over hips while standing in shower utilizing GB for balance.   Putting On/Taking Off Footwear  Assistance Level: Contact guard assist  Skilled Clinical Factors: Able to doff/sendy gripper socks without AE. Toileting  Assistance Level: Maximum assistance  Skilled Clinical Factors: Assist to pull pants over hips while standing with Minimal assist. Patient performs personal hygiene after BM while seated on toilet   Toilet Transfers  Technique: Stand pivot, To the left, To the right  Equipment: Grab bars, Raised toilet seat without arms  Additional Factors: Verbal cues, Cues for hand placement, Set-up  Assistance Level: Moderate assistance      SPEECH: 23  Subjective: [x] Alert     [x] Cooperative     [] Confused     [] Agitated    [] Lethargic     Objective/Assessment:     Orientation:  (not oriented to year, date, or time of day)     Recall:        Organization: NT     Problem solvin word list- odd one out- 100%. Dysphagia:    Pt. Completed oral care c OT during ADLs, pt. Given small sips of water x3 t/o session to assist c swallowing exercises. Pt. Completed oral motor exercises x2, 12 reps, Kim maneuver x6 and simple tongue base strengthening exercises x4, 15 reps each. Modified Shaker exercise completed (chin tuck using rolled towel for resistance):  repetitions chin tuck x10 and sustained tuck x2 for 10 seconds each. Telesitter present. Some confusion noted. Pt. Required cues and educ. Re: proper placement of O2 cannula. Plan:  [x] Continue ST services    [] Discharge from ST:      Objective:  /80   Pulse 83   Temp 98.6 °F (37 °C) (Oral)   Resp 18   Ht 5' 7\" (1.702 m)   Wt 163 lb (73.9 kg)   SpO2 92%   BMI 25.53 kg/m²       GEN: well developed, well nourished, NAD  HEENT: NCAT, PERRL, EOMI, mucous membranes pink and moist. NC in place. CV: RRR, no murmurs, rubs or gallops  PULM: Mild wheezes bilaterally, no rales or rhonchi. Respirations WNL and unlabored  ABD: soft, NT, ND, BS+ and equal. Peg tube in place. Insertion site does have some brown crusting, but no erythema or purulent discharge. NEURO: A&O x3.  Sensation intact to light touch. MSK: Functional ROM in all extremities . Strength 5/5 in all extremities except BLEs which are 4+/5. EXTREMITIES: No calf tenderness to palpation bilaterally. No edema BLEs  SKIN: warm dry and intact with good turgor  PSYCH: appropriately interactive. Affect WNL. Diagnostics:     CBC: No results for input(s): WBC, RBC, HGB, HCT, MCV, RDW, PLT in the last 72 hours. BMP:   Recent Labs     01/25/23  0621      K 3.9      CO2 30   BUN 15   CREATININE 0.45*   GLUCOSE 119*     BNP: No results for input(s): BNP in the last 72 hours. PT/INR: No results for input(s): PROTIME, INR in the last 72 hours. APTT: No results for input(s): APTT in the last 72 hours. CARDIAC ENZYMES: No results for input(s): CKMB, CKMBINDEX, TROPONINT in the last 72 hours. Invalid input(s): CKTOTAL;3 troponins   FASTING LIPID PANEL:  Lab Results   Component Value Date    TRIG 85 01/03/2023     LIVER PROFILE: No results for input(s): AST, ALT, ALB, BILIDIR, BILITOT, ALKPHOS in the last 72 hours.      Current Medications:   Current Facility-Administered Medications: clotrimazole (LOTRIMIN) 1 % cream, , Topical, BID  enoxaparin (LOVENOX) injection 40 mg, 40 mg, SubCUTAneous, Daily  ipratropium-albuterol (DUONEB) nebulizer solution 1 ampule, 1 ampule, Inhalation, Q4H WA  guaiFENesin (MUCINEX) extended release tablet 600 mg, 600 mg, Oral, BID PRN  carvedilol (COREG) tablet 6.25 mg, 6.25 mg, Oral, BID WC  ferrous sulfate (IRON 325) tablet 325 mg, 325 mg, Oral, Daily with breakfast  spironolactone (ALDACTONE) tablet 25 mg, 25 mg, Oral, Daily  acetaminophen (TYLENOL) tablet 650 mg, 650 mg, Oral, Q4H PRN  polyethylene glycol (GLYCOLAX) packet 17 g, 17 g, Oral, Daily  senna (SENOKOT) tablet 17.2 mg, 2 tablet, Oral, Daily PRN  bisacodyl (DULCOLAX) suppository 10 mg, 10 mg, Rectal, Daily PRN      Impression/Plan:   Impaired ADLs, gait, and mobility due to:      Multifocal cardioembolic CVA, subarachnoid hemorrhage:  PT/OT for gait, mobility, strengthening, endurance, ADLs, and self care. No residual SAH on CT head 1/21. Dysphagia: s/p PEG tube placement on 1/17. SLP treating. Dietitian managing tube feed - nocturnal with bolus - some high residuals at night. Monitor BMP TIW. Free water protocol. Agitation:  Recurred 1/21. Patient moved to room closer to the nurses station and telesitter initiated. Repeat CT head improved. Seroquel BID started 1/21 - mental status improved and Seroquel was discontinued. Insomnia: started Trazodone at hs 1/20 - failed. Improved - no current medication. Acute respiratory failure: required intubation. Currently on 2L NC - weaning as tolerated  Pneumonia: Improved. completed course of Unasyn and Vanco. CXR 1/24 for increased productive cough - stable hazy opacities. KENTRELL: resolved. Will monitor  Elevated LFTs:  resolved. Will monitor  HCV: will need f/u with GI for treatment outpatient  Anemia: Hb low but stable. Monitoring  Thrombocytopenia: Platelet count low but stable. Will monitor. Bowel Management: Miralax daily, senokot prn, dulcolax prn. DVT Prophylaxis:  Repeat CT - SAH resolved 1/21 - reviewed with Dr. Yamil Helton 1/22 - ok to initiate DVT chemoprophylaxis. Started Lovenox 1/22. TONI stockings during the day, EPC cuffs at night. IM for medical management        Electronically signed by Kwasi Conner MD on 1/26/2023 at 9:40 AM      This note is created with the assistance of a speech recognition program.  While intending to generate a document that actually reflects the content of the visit, the document can still have some errors including those of syntax and sound a like substitutions which may escape proof reading. In such instances, actual meaning can be extrapolated by contextual diversion.

## 2023-01-26 NOTE — PROGRESS NOTES
[facility]  Yue Gutierrez MD; Tito Sawant MD; Luis Fernando Ortiz MD; MD June Saldivar MD; Yasmin Perez MD    [facility]   [address] / HISTORY AND PHYSICAL EXAMINATION            Date:   1/26/2023  Patient name:  Jessee Fernandez  Date of admission:  1/19/2023  9:12 AM  MRN:   [id number]  Account:  [de-identified]  YOB: 1960  PCP:    Yasmin Perez MD  Room:   [address] [place] [address]  Code Status:    Full Code    Physician Requesting Consult: Myles Moreno MD    Reason for Consult: Medical management    Chief Complaint:     No chief complaint on file. Acute CVA, generalized weakness, metabolic encephalopathy, multifactorial with underlying acute respiratory failure with pneumonia and heart failure    History Obtained From:     patient    History of Present Illness:     58-year-old gentleman with unknown past medical history presented to inpatient [facility] with metabolic encephalopathy found to have acute respiratory failure, pneumonia, also had slurred speech, confusion progressively was getting worse, EMS brought the patient to the hospital his saturations were  75%, found to have acute respiratory failure with pneumonia, in the ED patient was placed on BiPAP, chest x-ray confirmed right lower lobe pneumonia with pleural effusion. ABG showed acute respiratory failure with hypercapnia and hypoxia.   Subsequently also found to have NSTEMI, acute heart failure with transaminitis possible shock liver, with also hepatitis C acute with hepatic failure, slowly improved,  Also had multiple acute brain infarct with subarachnoid hemorrhage,,  Subsequently patient was evaluated for acute inpatient rehab, accepted and admitted right now we are consulted for medical management    1/20  Patient was sitting on the chair on my encounter, states that he has been feeling drained  Just finished doing physical therapy, vitals have been stable  Past Medical History:     History reviewed. No pertinent past medical history. Past Surgical History:     Past Surgical History:   Procedure Laterality Date    UPPER GASTROINTESTINAL ENDOSCOPY N/A 1/17/2023    EGD ESOPHAGOGASTRODUODENOSCOPY PEG TUBE INSERTION performed by Justino Umanzor DO at [facility] ENDO        Medications Prior to Admission:     Prior to Admission medications    Medication Sig Start Date End Date Taking? Authorizing Provider   aspirin 325 MG tablet Take 325 mg by mouth daily Patient takes 2 tabs daily    Historical Provider, MD   psyllium (KONSYL) 28.3 % PACK Take 1 packet by mouth daily    Historical Provider, MD        Allergies:     Patient has no known allergies. Social History:     Tobacco:    reports that he has been smoking cigarettes. He has a 40.00 pack-year smoking history. He has never used smokeless tobacco.  Alcohol:      reports current alcohol use. Drug Use:  reports that he does not currently use drugs. Family History:     History reviewed. No pertinent family history. Review of Systems:     Positive and Negative as described in HPI. CONSTITUTIONAL:  negative for fevers, chills, sweats, fatigue, weight loss  HEENT:  negative for vision, hearing changes, runny nose, throat pain  RESPIRATORY:  negative for shortness of breath, cough, congestion, wheezing. CARDIOVASCULAR:  negative for chest pain, palpitations.   GASTROINTESTINAL:  negative for nausea, vomiting, diarrhea, constipation, change in bowel habits, abdominal pain   GENITOURINARY:  negative for difficulty of urination, burning with urination, frequency   INTEGUMENT:  negative for rash, skin lesions, easy bruising   HEMATOLOGIC/LYMPHATIC:  negative for swelling/edema   ALLERGIC/IMMUNOLOGIC:  negative for urticaria , itching  ENDOCRINE:  negative increase in drinking, increase in urination, hot or cold intolerance  MUSCULOSKELETAL:  negative joint pains, muscle aches, swelling of joints  NEUROLOGICAL:  negative for headaches, dizziness, lightheadedness, numbness, pain, tingling extremities  BEHAVIOR/PSYCH:  negative for depression, anxiety    Physical Exam:     /80   Pulse 83   Temp 98.6 °F (37 °C) (Oral)   Resp 14   Ht 5' 7\" (1.702 m)   Wt 163 lb (73.9 kg)   SpO2 94% Comment: pt had o2 off on arrival and spo2 was 88%  BMI 25.53 kg/m²   Temp (24hrs), Av.8 °F (37.1 °C), Min:98.6 °F (37 °C), Max:99 °F (37.2 °C)    No results for input(s): POCGLU in the last 72 hours. Intake/Output Summary (Last 24 hours) at 2023 1748  Last data filed at 2023 1255  Gross per 24 hour   Intake 850 ml   Output --   Net 850 ml         General Appearance:  alert, well appearing, and in no acute distress  Mental status: oriented to person, place, and time with normal affect  Head:  normocephalic, atraumatic. Eye: no icterus, redness, pupils equal and reactive, extraocular eye movements intact, conjunctiva clear  Ear: normal external ear, no discharge, hearing intact  Nose:  no drainage noted  Mouth: mucous membranes moist  Neck: supple, no carotid bruits, thyroid not palpable  Lungs: Bilateral equal air entry, clear to ausculation, no wheezing, rales or rhonchi, normal effort  Cardiovascular: normal rate, regular rhythm, no murmur, gallop, rub. Abdomen: Soft, nontender, nondistended, normal bowel sounds, no hepatomegaly or splenomegaly  Neurologic: There are no new focal motor or sensory deficits, normal muscle tone and bulk, no abnormal sensation, normal speech, cranial nerves II through XII grossly intact  Skin: No gross lesions, rashes, bruising or bleeding on exposed skin area  Extremities:  peripheral pulses palpable, no pedal edema or calf pain with palpation  Psych: normal affect    Investigations:      Laboratory Testing:  No results found for this or any previous visit (from the past 24 hour(s)).         Imaging/Diagonstics:  XR CHEST PORTABLE    Result Date: 1/18/2023  Interval extubation and NG tube removal.  Otherwise similar findings, with perhaps slightly greater hazy ground-glass opacity right mid lung. Correlate clinically. FL MODIFIED BARIUM SWALLOW W VIDEO    Result Date: 1/14/2023  Premature vallecular spillage. Piriform sinus cavity residue. Deep penetration with both materials. Aspiration with pudding. Please see separate speech pathology report for full discussion of findings and recommendations.        Assessment :      Hospital Problems             Last Modified POA    * (Principal) Impending cerebrovascular accident ([address] 1/19/2023 Yes    Acute type II respiratory failure ([address] 1/19/2023 Yes    Transaminitis 1/19/2023 Yes    Normocytic anemia 1/19/2023 Yes    Thrombocytopenia ([address] 1/19/2023 Yes    Acute respiratory failure with hypoxia and hypercapnia ([address] RLL pneumonia 1/19/2023 Yes    Community acquired pneumonia of right lower lobe of lung 1/19/2023 Yes    Emphysema lung ([address] 1/19/2023 Yes    Cerebral infarction ([address] 1/19/2023 Yes    Moderate malnutrition ([address] 1/19/2023 Yes    Hepatitis C virus infection without hepatic coma 1/19/2023 Yes    Dysphagia 1/19/2023 Yes    Dermatitis associated with moisture 1/20/2023 Yes   Plan:     Acute hypoxic and hypercapnic respiratory failure secondary to likely aspiration pneumonia, treated with vancomycin and Unasyn, also was positive for MRSA nasal swab, required intubation, extubated subsequently  Acute CVA with subarachnoid hemorrhage, cardioembolic, did not qualify for tPA,  Acute liver failure possible multifactorial, alcoholic liver disease with hepatitis C,  Hypercoagulable state secondary to acute liver disease, INR currently 1.6  Thrombocytopenia secondary to alcoholic liver disease and hepatitis C, improved  Dysphagia , PEG placed   Anemia , of ch disease multifactorial   DVT PPX with SCD only, as he had cerebral h'age  Full code status   PT/OT     1/21  Patient seen to be resting comfortably  Was able to participate in physical therapy today  No new issues  Repeat CT head showed no new strokes had no hemorrhage  Continue current management    1/22  Since no bleed noted on repeat CT head okay to resume DVT prophylaxis  Hemoglobin is stable,thrombocytopenia has resolved platelet count 277 on labs 1/20  No new issues continue current management    1/26  Patient reports no new complaints. Engaging with physical therapy. Labs and vitals reviewed. No new issues. Continue with current care. Consultations:   Stacy Monroe CONSULT TO INTERNAL MEDICINE  IP CONSULT TO Quan Del Cid MD  1/26/2023  5:48 PM    Copy sent to Dr. Verónica Grider MD    Please note that this chart was generated using voice recognition Dragon dictation software. Although every effort was made to ensure the accuracy of this automated transcription, some errors in transcription may have occurred.

## 2023-01-26 NOTE — CARE COORDINATION
ARU CASE MANAGEMENT NOTE:  s alert and oriented x4     Spoke with patient regarding discharge plan and patient confirms plan is to go home  with significant other. And [facility]    DME: Possible home 02/ Tube feedings/ formula will need ordered closer to discharge since it could change. Outside appointments: not while in ARU     Will continue to follow for additional discharge needs.       Electronically signed by Connor Simmons RN on 1/26/2023 at 3:37 PM

## 2023-01-26 NOTE — PROGRESS NOTES
Acute Rehabilitation Unit Physical Therapy Cancel Note      DATE: 2023    NAME: Jose Henley  MRN: [id number]   : 1960    Patient not seen this date for Physical Therapy due to:    Patient Declined: received bad news regarding family.  Agreed to resume therapies .     23 1400   Minute Variance   Variance 90   Reason Refusal     Electronically signed by Capri Miles PTA on 2023 at 3:42 PM

## 2023-01-26 NOTE — PROGRESS NOTES
Speech Language Pathology  Speech Language Pathology  [facility] Acute Rehab Unit at [address] [place] [address]    Cognitive/Dysphagia Treatment Note    Date: 1/26/2023  Patients Name: Xin Hallman  MRN: [id number]  Diagnosis: Dysphagia s/p CVA  Patient Active Problem List   Diagnosis Code    Acute type II respiratory failure (HCC) J96.00    Transaminitis R74.01    Normocytic anemia D64.9    Thrombocytopenia (HCC) D69.6    Agitation R45.1    Acute respiratory failure with hypoxia and hypercapnia (HCC) RLL pneumonia J96.01, J96.02    Altered mental status R41.82    Community acquired pneumonia of right lower lobe of lung J18.9    Prolonged pt (prothrombin time) R79.1    Emphysema lung (HCC) J43.9    Cerebral infarction (HCC) I63.9    ACP (advance care planning) Z71.89    Goals of care, counseling/discussion Z71.89    Encounter for palliative care Z51.5    Delirium due to another medical condition F05    Moderate malnutrition ([facility] E44.0    Hepatitis C virus infection without hepatic coma B19.20    Dysphagia R13.10    Impending cerebrovascular accident ([facility] I63.9    Dermatitis associated with moisture L30.8       Pain: none reported    Cognitive Treatment  Treatment time: 4125-5718 & 7921-2515    Subjective: [x] Alert [x] Cooperative     [] Confused     [] Agitated    [] Lethargic    Objective/Assessment:    Orientation: Orientation log administered, O-Log score- 20/30 (disoriented to name of [facility], [facility], date, year and time). Recall: Working memory:  Mental manipulation (3 units, word order)- 90% accuracy (I), 100% c cues    Organization: n/a    Problem solving: Multiple uses for object- 60% accuracy (I), 100% cued. Pt. demo long response latency. Dysphagia: ST provided set up for oral care for water protocol. Pt. completed oral motor exercises x4 sets in reps of 20, BOT strengthening exercises x5 sets in reps of 20, effortful volitional swallow x6 reps and Kim maneuver x5 reps.     Pt states feeling emotional today d/t recent passing of daughter's girlfriend. Emotional support provided by writer. Telesitter present. Pt's wife present for AM and PM sessions. Plan:  [x] Continue ST services    [] Discharge from ST:        Discharge recommendations: [] Inpatient Rehab   [] [place]   [] Outpatient Therapy  [] Follow up at trauma clinic   [] Other:         Treatment completed by:  Junie Antunez M.A., CCC-SLP

## 2023-01-26 NOTE — PROGRESS NOTES
Patient sitting @ [facility], just returned from therapy. Writer was made aware of the lost that patient had just a experience with his son-in-law death. Patient did not mention any thing throughout visit. Spiritual care staff has been made aware and will be available as needed. 01/26/23 1150   Encounter Summary   Encounter Overview/Reason  Spiritual/Emotional Needs   Service Provided For: Patient;Significant other  Aleks Smith)   Referral/Consult From: Other (comment)  (Nelly SMITH, felt that patient needed spiritual support.)   Support System Significant other;Children   Last Encounter  01/26/23   Complexity of Encounter Moderate   Spiritual/Emotional needs   Type Spiritual Support   Assessment/Intervention/Outcome   Assessment Coping   Intervention Active listening;Discussed illness injury and its impact; Explored/Affirmed feelings, thoughts, concerns;Prayer (assurance of)/Ernul;Read/Provided Scripture   Outcome Coping;Engaged in conversation;Receptive

## 2023-01-26 NOTE — PROGRESS NOTES
[address]   Acute Rehabilitation Occupational Therapy Daily Treatment Note    Date: 23  Patient Name: Jose Henley       Room: 1114/9010-04  MRN: [id number]  Account: [de-identified]   : 1960  (60 y.o.) Gender: male       Referring Practitioner: Ian Grossman MD  Diagnosis: Cerebrovascular accident  Additional Pertinent Hx: Per MRI Impression on 23: Mild amount of residual subarachnoid hemorrhage in the right frontal and  right parietal lobes as well as the left frontal lobe. Evolving small focus of hemorrhage in the left parietal lobe posteriorly. Evolving areas of ischemia in the right frontal lobe and left posterior parietal lobe as well as a few foci in the parasagittal aspect of the right frontoparietal region. Treatment Diagnosis: Impaired self care status    Past Medical History:  has no past medical history on file. Past Surgical History:   has a past surgical history that includes Upper gastrointestinal endoscopy (N/A, 2023). Restrictions  Restrictions/Precautions  Restrictions/Precautions: Fall Risk, General Precautions, Contact Precautions, Isolation, Bed Alarm  Required Braces or Orthoses?: No  Implants present? :  (pt denies)     Position Activity Restriction  Other position/activity restrictions: Severe dysphagia- NPO. Unable to tolerate any PO safely. NG tube placed,  on high flow nasal cannula    Vitals      Subjective  Subjective  Subjective: AM: Pt. in bathroom upon arrival- nursing present to assist. Louise Elaine took over. Pt. pleasant and cooperative. PM: Pt. relaxing in bed upon arrival. Wife present. Pt. pleasantly declined to participate in afternoon session due to recently hearing of a tragic family incident. Pt. Stating \"Tomorrow should be a better day. \"    Pain: Patient denies. Objective  Cognition  Overall Orientation Status: Impaired  Orientation Level: Oriented to person;Disoriented to situation;Oriented to place; Disoriented to time    Cognition  Overall Cognitive Status: Exceptions  Arousal/Alertness: Appropriate responses to stimuli  Following Commands: Follows one step commands consistently  Attention Span: Attends with cues to redirect  Memory: Decreased recall of biographical Information;Decreased recall of precautions;Decreased recall of recent events  Safety Judgement: Decreased awareness of need for assistance;Decreased awareness of need for safety  Problem Solving: Assistance required to generate solutions;Assistance required to implement solutions;Assistance required to identify errors made;Assistance required to correct errors made  Insights: Decreased awareness of deficits  Initiation: Requires cues for some  Sequencing: Requires cues for some  Cognition Comment: Pt with impulsive tendencies noted. Activities of Daily Living     Grooming/Oral Hygiene  Assistance Level: Set-up  Skilled Clinical Factors: Seated at sink. Toileting  Assistance Level: Contact guard assist  Skilled Clinical Factors: CGA/SBA during clothing management before and after transferring to toilet. Pt. performs hygiene after BM while seated. Toilet Transfers  Technique:  (Ambulating with rw.)  Equipment: Standard toilet;Grab bars  Assistance Level: Contact guard assist    Mobility     Sit to Supine  Assistance Level: Supervision  Skilled Clinical Factors: No safety concerns. Supine to Sit  Assistance Level: Supervision  Skilled Clinical Factors: No safety concerns. Scooting  Assistance Level: Supervision  Skilled Clinical Factors: Hips closer to EOB. Sit to Stand  Assistance Level: Contact guard assist  Stand to Sit  Assistance Level: Contact guard assist    Functional Mobility  Device: Rolling walker  Activity: To/From bathroom  Assistance Level: Contact guard assist  Skilled Clinical Factors: CGA/SBA. Verbal cuing for safety with rw.     OT Exercises  Exercise Treatment: Instruction and participation in B UE AROM exercises and str exercises with minimal resistance, 20 reps in all planes, rest breaks taken as needed, to continue to increase general str and activity tolerance for maximized indep and safety during all daily self care tasks and transfers. Assessment  Assessment  Discharge Recommendations: Home with assist PRN;Outpatient OT    Patient Education  Education  Education Given To: Patient  Education Provided: Role of Therapy;Plan of Care;Cognition; Energy Conservation  Education Method: Verbal;Demonstration; Teach Back  Barriers to Learning: Cognition; Hearing  Education Outcome: Verbalized understanding;Demonstrated understanding;Continued education needed    OT Equipment Recommendations  Other: TBD    Safety Devices  Safety Devices in place: Yes  Type of devices: All fall risk precautions in place     Goals  Patient Goals   Patient goals : \"To learn how to walk properly. ..all that goes without saying (referring to bathing, dressing and toileting independence)\"  Short Term Goals  Time Frame for Short Term Goals: One week  Short Term Goal 1: Patient will perform oral hygiene with SBA and Good safety. Short Term Goal 2: Patient will perform hair brushing with SBA. Short Term Goal 3: Patient will perform upper body dressing with SBA. Short Term Goal 4: Patient will perform lower body dressing, lower body bathing, and toileting tasks with Minimal assist.  Short Term Goal 5: Patient will perform functional moblity and transfers during self-care with CGA, least restrictive mobility device, and Good safety. Short Term Goal 6: Patient will verbalize/demonstrate Good understanding of assistive equipment/durable medical equipment/modified techniques to maximize safety and independence with self-care. Short Term Goal 7: Patient will actively participate in 30+ minutes of therapeutic exercise/functional activities to promote increased independence with self-care and mobility.     Long Term Goals  Time Frame for Long Term Goals : By discharge  Long Term Goal 1: Patient will perform BADLs with modified independence and Good safety. Long Term Goal 2: Patient will perform functional mobility and transfers during self-care with modified independence, least restrictive mobility device, and Good safety. Long Term Goal 3: Patient will stand for 5+ minutes with 0-1 UE support, modified independence while engaging in functional activity of choice. Long Term Goal 4: Patient will perform simple meal prep and light housekeeping with SBA and Good safety. Long Term Goal 5: Patient will verbalize/demonstrate Good understanding of Fall Prevention Strategies to maximize safety and independence with self-care. Long Term Goal 6: Patient will perform self-care with improved bilateral  strength as evidenced by 10#  strength improvement and improved bilateral fine motor control as evidenced by 5 second improved in 9 hole peg test (Goal updated by ASHUTOSH Bueno on 1/24/23).     Plan  Occupational Therapy Plan  Times Per Week: 5-7  Times Per Day: Twice a day  Current Treatment Recommendations: Self-Care / ADL, Strengthening, ROM, Balance training, Functional mobility training, Endurance training, Cognitive reorientation, Neuromuscular re-education, Safety education & training, Patient/Caregiver education & training, Equipment evaluation, education, & procurement, Cognitive/Perceptual training, Coordination training, Home management training       01/26/23 3392   OT Individual Minutes   Time In 0901   Time Out 0954   Minutes 53   Minute Variance   Variance 37   Reason Refusal       Electronically signed by ANITA Andres on 1/26/23 at 3:48 PM EST

## 2023-01-27 PROBLEM — I63.40 CEREBROVASCULAR ACCIDENT (CVA) DUE TO EMBOLISM OF CEREBRAL ARTERY (HCC): Status: ACTIVE | Noted: 2023-01-27

## 2023-01-27 LAB
ABSOLUTE EOS #: 0.08 K/UL (ref 0–0.4)
ABSOLUTE LYMPH #: 0.8 K/UL (ref 1–4.8)
ABSOLUTE MONO #: 0.6 K/UL (ref 0.1–1.3)
ANION GAP SERPL CALCULATED.3IONS-SCNC: 7 MMOL/L (ref 9–17)
BASOPHILS # BLD: 1 % (ref 0–2)
BASOPHILS ABSOLUTE: 0.04 K/UL (ref 0–0.2)
BUN BLDV-MCNC: 15 MG/DL (ref 8–23)
CALCIUM SERPL-MCNC: 8.6 MG/DL (ref 8.6–10.4)
CHLORIDE BLD-SCNC: 102 MMOL/L (ref 98–107)
CO2: 28 MMOL/L (ref 20–31)
CREAT SERPL-MCNC: 0.49 MG/DL (ref 0.7–1.2)
EOSINOPHILS RELATIVE PERCENT: 2 % (ref 0–4)
GFR SERPL CREATININE-BSD FRML MDRD: >60 ML/MIN/1.73M2
GLUCOSE BLD-MCNC: 101 MG/DL (ref 70–99)
HCT VFR BLD CALC: 34.9 % (ref 41–53)
HEMOGLOBIN: 10.8 G/DL (ref 13.5–17.5)
LYMPHOCYTES # BLD: 20 % (ref 24–44)
MCH RBC QN AUTO: 23.4 PG (ref 26–34)
MCHC RBC AUTO-ENTMCNC: 31.1 G/DL (ref 31–37)
MCV RBC AUTO: 75.3 FL (ref 80–100)
MONOCYTES # BLD: 15 % (ref 1–7)
MORPHOLOGY: ABNORMAL
PDW BLD-RTO: 33.8 % (ref 11.5–14.9)
PLATELET # BLD: 156 K/UL (ref 150–450)
PMV BLD AUTO: 8.9 FL (ref 6–12)
POTASSIUM SERPL-SCNC: 3.9 MMOL/L (ref 3.7–5.3)
RBC # BLD: 4.63 M/UL (ref 4.5–5.9)
SEG NEUTROPHILS: 62 % (ref 36–66)
SEGMENTED NEUTROPHILS ABSOLUTE COUNT: 2.48 K/UL (ref 1.3–9.1)
SODIUM BLD-SCNC: 137 MMOL/L (ref 135–144)
WBC # BLD: 4 K/UL (ref 3.5–11)

## 2023-01-27 PROCEDURE — 85025 COMPLETE CBC W/AUTO DIFF WBC: CPT

## 2023-01-27 PROCEDURE — 1180000000 HC REHAB R&B

## 2023-01-27 PROCEDURE — 80048 BASIC METABOLIC PNL TOTAL CA: CPT

## 2023-01-27 PROCEDURE — 97110 THERAPEUTIC EXERCISES: CPT

## 2023-01-27 PROCEDURE — 94640 AIRWAY INHALATION TREATMENT: CPT

## 2023-01-27 PROCEDURE — 6370000000 HC RX 637 (ALT 250 FOR IP): Performed by: PHYSICAL MEDICINE & REHABILITATION

## 2023-01-27 PROCEDURE — 97130 THER IVNTJ EA ADDL 15 MIN: CPT

## 2023-01-27 PROCEDURE — 92526 ORAL FUNCTION THERAPY: CPT

## 2023-01-27 PROCEDURE — 2700000000 HC OXYGEN THERAPY PER DAY

## 2023-01-27 PROCEDURE — 99232 SBSQ HOSP IP/OBS MODERATE 35: CPT | Performed by: PHYSICAL MEDICINE & REHABILITATION

## 2023-01-27 PROCEDURE — 6370000000 HC RX 637 (ALT 250 FOR IP): Performed by: INTERNAL MEDICINE

## 2023-01-27 PROCEDURE — 99231 SBSQ HOSP IP/OBS SF/LOW 25: CPT | Performed by: INTERNAL MEDICINE

## 2023-01-27 PROCEDURE — 97530 THERAPEUTIC ACTIVITIES: CPT

## 2023-01-27 PROCEDURE — 97535 SELF CARE MNGMENT TRAINING: CPT

## 2023-01-27 PROCEDURE — 94761 N-INVAS EAR/PLS OXIMETRY MLT: CPT

## 2023-01-27 PROCEDURE — 6360000002 HC RX W HCPCS: Performed by: PHYSICAL MEDICINE & REHABILITATION

## 2023-01-27 PROCEDURE — 36415 COLL VENOUS BLD VENIPUNCTURE: CPT

## 2023-01-27 PROCEDURE — 97129 THER IVNTJ 1ST 15 MIN: CPT

## 2023-01-27 RX ADMIN — CARVEDILOL 6.25 MG: 6.25 TABLET, FILM COATED ORAL at 07:28

## 2023-01-27 RX ADMIN — IPRATROPIUM BROMIDE AND ALBUTEROL SULFATE 1 AMPULE: 2.5; .5 SOLUTION RESPIRATORY (INHALATION) at 16:54

## 2023-01-27 RX ADMIN — CLOTRIMAZOLE: 10 CREAM TOPICAL at 19:22

## 2023-01-27 RX ADMIN — ENOXAPARIN SODIUM 40 MG: 100 INJECTION SUBCUTANEOUS at 07:28

## 2023-01-27 RX ADMIN — IPRATROPIUM BROMIDE AND ALBUTEROL SULFATE 1 AMPULE: 2.5; .5 SOLUTION RESPIRATORY (INHALATION) at 20:07

## 2023-01-27 RX ADMIN — IPRATROPIUM BROMIDE AND ALBUTEROL SULFATE 1 AMPULE: 2.5; .5 SOLUTION RESPIRATORY (INHALATION) at 07:57

## 2023-01-27 RX ADMIN — CARVEDILOL 6.25 MG: 6.25 TABLET, FILM COATED ORAL at 17:36

## 2023-01-27 RX ADMIN — CLOTRIMAZOLE: 10 CREAM TOPICAL at 07:26

## 2023-01-27 RX ADMIN — SPIRONOLACTONE 25 MG: 25 TABLET ORAL at 07:28

## 2023-01-27 RX ADMIN — FERROUS SULFATE TAB 325 MG (65 MG ELEMENTAL FE) 325 MG: 325 (65 FE) TAB at 07:29

## 2023-01-27 ASSESSMENT — PAIN SCALES - GENERAL: PAINLEVEL_OUTOF10: 0

## 2023-01-27 NOTE — PLAN OF CARE
Problem: Discharge Planning  Goal: Discharge to home or other facility with appropriate resources  1/27/2023 0430 by Jem Del Angel LPN  Outcome: Progressing  1/26/2023 1925 by Yahir Martins RN  Outcome: Progressing     Problem: Safety - Medical Restraint  Goal: Remains free of injury from restraints (Restraint for Interference with Medical Device)  Description: INTERVENTIONS:  1. Determine that other, less restrictive measures have been tried or would not be effective before applying the restraint  2. Evaluate the patient's condition at the time of restraint application  3. Inform patient/family regarding the reason for restraint  4. Q2H: Monitor safety, psychosocial status, comfort, nutrition and hydration  1/27/2023 0430 by Jem Del Angel LPN  Outcome: Progressing     Problem: Nutrition Deficit:  Goal: Optimize nutritional status  1/27/2023 0430 by Jem Del Angel LPN  Outcome: Progressing     Problem: Safety - Adult  Goal: Free from fall injury  1/27/2023 0430 by Jem Del Angel LPN  Outcome: Progressing     Problem: ABCDS Injury Assessment  Goal: Absence of physical injury  1/27/2023 0430 by Jem Del Angel LPN  Outcome: Progressing     Problem: Skin/Tissue Integrity  Goal: Absence of new skin breakdown  Description: 1. Monitor for areas of redness and/or skin breakdown  2. Assess vascular access sites hourly  3. Every 4-6 hours minimum:  Change oxygen saturation probe site  4. Every 4-6 hours:  If on nasal continuous positive airway pressure, respiratory therapy assess nares and determine need for appliance change or resting period.   1/27/2023 0430 by Jem Del Angel LPN  Outcome: Progressing

## 2023-01-27 NOTE — PROGRESS NOTES
Patient not in room; volunteer left cross     01/27/23 4087   Encounter Summary   Encounter Overview/Reason  Attempted Encounter   Service Provided For: Patient not available   Referral/Consult From: Nikos

## 2023-01-27 NOTE — PROGRESS NOTES
Pt spouse belligerent and accusatory with staff. Regarding oxygen tube \"not hooked\" up   went to room and explained to pt and S/O that the tube she was looking at was for the breathing tx and oxygen was hooked to wall. S/O very accusatory and disrespectful with staff over numerous small things.

## 2023-01-27 NOTE — PROGRESS NOTES
Speech Language Pathology  Speech Language Pathology  [facility] Acute Rehab Unit at [facility]    Cognitive/Dysphagia Treatment Note    Date: 1/27/2023  Patients Name: Mukund Grover  MRN: [id number]  Diagnosis: Dysphagia s/p CVA  Patient Active Problem List   Diagnosis Code    Acute type II respiratory failure (HCC) J96.00    Transaminitis R74.01    Normocytic anemia D64.9    Thrombocytopenia (HCC) D69.6    Agitation R45.1    Acute respiratory failure with hypoxia and hypercapnia (HCC) RLL pneumonia J96.01, J96.02    Altered mental status R41.82    Community acquired pneumonia of right lower lobe of lung J18.9    Prolonged pt (prothrombin time) R79.1    Emphysema lung (HCC) J43.9    Cerebral infarction ([facility] [address] I63.9    ACP (advance care planning) Z71.89    Goals of care, counseling/discussion Z71.89    Encounter for palliative care Z51.5    Delirium due to another medical condition F05    Moderate malnutrition ([address] E44.0    Hepatitis C virus infection without hepatic coma B19.20    Dysphagia R13.10    Impending cerebrovascular accident ([address] I63.9    Dermatitis associated with moisture L30.8       Pain: none reported    Cognitive Treatment  Treatment time: 8219-7697 & 0622-6237    Subjective: [x] Alert [x] Cooperative     [] Confused     [] Agitated    [] Lethargic    Objective/Assessment:    Orientation:   Pt. Having difficulty with time concepts ie, calculating 30 min from time brushed teeth to time can drink water. Max cues provided by ST and pt. Wife. Pt. Also required max cues with calendar to figure out amt of time left in rehab. Pt. Is not oriented to date. Recall:  n/a    Organization: n/a    Problem solving: word deductions- 55%, 95% c cues. Generate word in category given letter- 63%, 100% c cues     Dysphagia: ST provided set up for oral care for water protocol.   Pt. completed oral motor exercises x3 sets in reps of 12, BOT strengthening exercises x5 sets in reps of 20 and Kim maneuver x12 reps.    Telesitter present. Pt's wife present for AM and PM sessions. In PM, pt. More lethargic, required additional cues to remain awake. Plan:  [x] Continue ST services    [] Discharge from ST:        Discharge recommendations: [] Inpatient Rehab   [] [place]   [] Outpatient Therapy  [] Follow up at trauma clinic   [] Other:         Treatment completed by: Yousif Silver A.CCC/SLP

## 2023-01-27 NOTE — PLAN OF CARE
Problem: Nutrition Deficit:  Goal: Optimize nutritional status  1/27/2023 0844 by Humberto Terry RN  Outcome: Progressing  1/27/2023 0430 by Jem Del Angel LPN  Outcome: Progressing  1/26/2023 1925 by Yahir Martins RN  Outcome: Progressing

## 2023-01-27 NOTE — PROGRESS NOTES
Pt on rowe free protocol. Mouth care given. Pt sitting upright, 90 degrees drinking water. Pt tolerating well. No coughing noted at this time.

## 2023-01-27 NOTE — PROGRESS NOTES
[address]    Acute Rehabilitation Occupational Therapy Daily Treatment Note    Date: 23  Patient Name: Twyla Lopez       Room: 4355/0865-76  MRN: [id number]  Account: [de-identified]   : 1960  (60 y.o.) Gender: male       Referring Practitioner: Michael Patrick MD  Diagnosis: Cerebrovascular accident  Additional Pertinent Hx: Per MRI Impression on 23: Mild amount of residual subarachnoid hemorrhage in the right frontal and  right parietal lobes as well as the left frontal lobe. Evolving small focus of hemorrhage in the left parietal lobe posteriorly. Evolving areas of ischemia in the right frontal lobe and left posterior parietal lobe as well as a few foci in the parasagittal aspect of the right frontoparietal region. Treatment Diagnosis: Impaired self care status    Past Medical History:  has no past medical history on file. Past Surgical History:   has a past surgical history that includes Upper gastrointestinal endoscopy (N/A, 2023). Restrictions  Restrictions/Precautions  Restrictions/Precautions: Fall Risk, General Precautions, Contact Precautions, Isolation, Bed Alarm  Required Braces or Orthoses?: No  Implants present? :  (pt denies)     Position Activity Restriction  Other position/activity restrictions: Severe dysphagia- NPO. Unable to tolerate any PO safely. NG tube placed,  on high flow nasal cannula    Vitals      Subjective  Subjective  Subjective: AM: Pt. resting in bed upon arrival. Easy to wake. Difficult to motivate, with pt. agreeing \"I know I need to get motivated again. \" Agreeable to participate in short session. PM: Agreeable to complete some str exercises then wanted to take a nap. Pain: No c/o any pain. Objective  Cognition  Overall Orientation Status: Impaired  Orientation Level: Oriented to person;Disoriented to situation;Oriented to place; Disoriented to time    Cognition  Overall Cognitive Status: Exceptions  Arousal/Alertness: Appropriate responses to stimuli  Following Commands: Follows one step commands consistently  Attention Span: Attends with cues to redirect  Memory: Decreased recall of biographical Information;Decreased recall of precautions;Decreased recall of recent events  Safety Judgement: Decreased awareness of need for assistance;Decreased awareness of need for safety  Problem Solving: Assistance required to generate solutions;Assistance required to implement solutions;Assistance required to identify errors made;Assistance required to correct errors made  Insights: Decreased awareness of deficits  Initiation: Requires cues for some  Sequencing: Requires cues for some  Cognition Comment: Pt with impulsive tendencies noted. Activities of Daily Living    Grooming/Oral Hygiene  Assistance Level: Set-up  Skilled Clinical Factors: Seated at sink. Upper Extremity Dressing  Assistance Level: Supervision  Skilled Clinical Factors: Able to doff and sendy overhead shirt. Putting On/Taking Off Footwear  Assistance Level: Set-up  Skilled Clinical Factors: Able to sendy socks and slippers after set up. Toileting  Assistance Level: Stand by assist  Skilled Clinical Factors: SBA during clothing management before and after transferring to toilet. Pt. performs hygiene after BM while seated. Toilet Transfers  Technique:  (Ambulating with rw.)  Equipment: Standard toilet;Grab bars  Assistance Level: Contact guard assist  Skilled Clinical Factors: CGA/SBA    Mobility  Bed Mobility  Overall Assistance Level: Supervision     Sit to Supine  Assistance Level: Supervision  Skilled Clinical Factors: No safety concerns. Supine to Sit  Assistance Level: Supervision  Skilled Clinical Factors: No safety concerns. Scooting  Assistance Level: Supervision  Skilled Clinical Factors: Hips closer to EOB.     Functional Mobility  Device: Rolling walker  Activity: To/From bathroom- CGA/SBA    OT Exercises  Exercise Treatment: Instruction and participation in B UE AROM exercises and str exercises with minimal resistance, 20 reps in all planes, rest breaks taken as needed, to continue to increase general str and activity tolerance for maximized indep and safety during all daily self care tasks and transfers. Assessment  Assessment  Activity Tolerance: Patient limited by endurance; Patient limited by fatigue  Discharge Recommendations: Home with assist PRN;Outpatient OT    Patient Education  Education  Education Given To: Patient  Education Provided: Role of Therapy;Plan of Care;Cognition; Energy Conservation  Education Method: Verbal;Demonstration; Teach Back  Barriers to Learning: Cognition; Hearing  Education Outcome: Verbalized understanding;Demonstrated understanding;Continued education needed    OT Equipment Recommendations  Other: TBD    Safety Devices  Safety Devices in place: Yes  Type of devices: All fall risk precautions in place     Goals  Patient Goals   Patient goals : \"To learn how to walk properly. ..all that goes without saying (referring to bathing, dressing and toileting independence)\"  Short Term Goals  Time Frame for Short Term Goals: One week  Short Term Goal 1: Patient will perform oral hygiene with SBA and Good safety. Short Term Goal 2: Patient will perform hair brushing with SBA. Short Term Goal 3: Patient will perform upper body dressing with SBA. Short Term Goal 4: Patient will perform lower body dressing, lower body bathing, and toileting tasks with Minimal assist.  Short Term Goal 5: Patient will perform functional moblity and transfers during self-care with CGA, least restrictive mobility device, and Good safety. Short Term Goal 6: Patient will verbalize/demonstrate Good understanding of assistive equipment/durable medical equipment/modified techniques to maximize safety and independence with self-care.   Short Term Goal 7: Patient will actively participate in 30+ minutes of therapeutic exercise/functional activities to promote increased independence with self-care and mobility. Long Term Goals  Time Frame for Long Term Goals : By discharge  Long Term Goal 1: Patient will perform BADLs with modified independence and Good safety. Long Term Goal 2: Patient will perform functional mobility and transfers during self-care with modified independence, least restrictive mobility device, and Good safety. Long Term Goal 3: Patient will stand for 5+ minutes with 0-1 UE support, modified independence while engaging in functional activity of choice. Long Term Goal 4: Patient will perform simple meal prep and light housekeeping with SBA and Good safety. Long Term Goal 5: Patient will verbalize/demonstrate Good understanding of Fall Prevention Strategies to maximize safety and independence with self-care. Long Term Goal 6: Patient will perform self-care with improved bilateral  strength as evidenced by 10#  strength improvement and improved bilateral fine motor control as evidenced by 5 second improved in 9 hole peg test (Goal updated by ASHUTOSH Morales on 1/24/23).     Plan  Occupational Therapy Plan  Times Per Week: 5-7  Times Per Day: Twice a day  Current Treatment Recommendations: Self-Care / ADL, Strengthening, ROM, Balance training, Functional mobility training, Endurance training, Cognitive reorientation, Neuromuscular re-education, Safety education & training, Patient/Caregiver education & training, Equipment evaluation, education, & procurement, Cognitive/Perceptual training, Coordination training, Home management training       01/27/23 0741 01/27/23 1457   OT Individual Minutes   Time In 2747 9106   Time Out 5705 6548   Minutes 30 23   Minute Variance   Variance 30  --    Reason Refusal  --        Electronically signed by ANITA Kwon on 1/27/23 at 2:58 PM EST

## 2023-01-27 NOTE — PROGRESS NOTES
jose daniel Macdonald present with writer; patient talked about his estranged relationship with his daughter Jerod Molina; patient stated he learned by watching the news yesterday of the violent death of Rosi's sig other; patient shared details of his spiritual beliefs and discussed his relationship with God; patient talked about his coping skills; patient also talked about his parents, grandparents, aunts and uncles, their [address] and the work ethic instilled they instilled in him; listening presence and support; (patient does not pray)     01/26/23 2029   Encounter Summary   Encounter Overview/Reason  Grief, Loss, and Adjustments   Service Provided For: Patient   Referral/Consult From: Other    Support System Significant other;Children   Last Encounter  01/26/23   Complexity of Encounter High   Begin Time 1930   End Time  2000   Total Time Calculated 30 min   Spiritual/Emotional needs   Type Difficult news received;Spiritual Support   Assessment/Intervention/Outcome   Assessment Coping;Powerlessness; Complicated grieving   Intervention Active listening;Discussed belief system/Rastafari practices/vijay; Explored/Affirmed feelings, thoughts, concerns;Explored Coping Skills/Resources;Sustaining Presence/Ministry of presence   Outcome Comfort;Coping;Engaged in conversation;Expressed feelings, needs, and concerns;Expressed Gratitude;Receptive

## 2023-01-27 NOTE — PROGRESS NOTES
Pt noted to be restless and fidgety through the night. Sleep very poor. Eyes closed for short periods of time. Vitals WNL. Oxygen on via NC at 1.5 ml. No distress noted. Able to redirect. Lying in bed at this time. Telesitter present. Bed alarm on. Call light within reach.

## 2023-01-27 NOTE — PROGRESS NOTES
Physical Therapy  Facility/Department: [facility] ACUTE REHAB  Rehabilitation Physical Therapy Treatment Note    NAME: Jessee Fernandez  : 1960 (89 y.o.)  MRN: [id number]  CODE STATUS: Full Code  Date of Service: 23    Restrictions:  Restrictions/Precautions: Fall Risk;General Precautions;Contact Precautions;Isolation; Bed Alarm     SUBJECTIVE  Subjective  Subjective: Pt reports feeling short of breath overnight, so supp. O2 was turned up (>2L). Stated previous day that moistened air is more tolerable. Agreeable to PT AM, asked to defer PM session, but ultimately agreed to EOB ex + toilet transfer at end of session. C/o pain in L side during short in-room amb AM, not his PEG site, relieved by sitting. No similar complaint in PM. SO present in PM, asked Pt to remove O2 cannula \"for safety\" during amb to bathroom despite consistent desat (89%) during seated ex. Pain Assessment  Pain Assessment: None - Denies Pain    OBJECTIVE  Functional Mobility  Bed Mobility  Overall Assistance Level: Stand By Assist  Additional Factors:  (Mat, wedge, 2 pillows)  Bridging  Assistance Level: Stand by assist  Skilled Clinical Factors: Pt states movement disturbs his abdomen. Roll Left  Assistance Level: Stand by assist  Roll Right  Assistance Level: Stand by assist  Sit to Supine  Assistance Level: Stand by assist  Supine to Sit  Assistance Level: Stand by assist  Scooting  Assistance Level: Stand by assist  Balance  Sitting Balance: Supervision  Standing Balance: Stand by assistance  Standing Balance  Time: ~1 min. Activity: amb/toilet transfer  Comments:  (With, without rolling walker)  Transfers  Surface: To bed;From bed; Wheelchair; To chair with arms;From chair with arms;From mat; To mat  Additional Factors: Hand placement cues; With handrails  Device:  (with, without rolling walker)  Sit to Stand  Assistance Level: Stand by assist  Skilled Clinical Factors: Can be impulsive  Stand to Sit  Assistance Level: Stand by assist  Bed To/From Chair  Technique: Stand step  Assistance Level: Contact guard assist  Skilled Clinical Factors: Rolling walker, writer managing O2 tubing. Stand Pivot  Assistance Level: Contact guard assist  Skilled Clinical Factors: With, without rolling walker    Environmental Mobility  Ambulation  Surface: Level surface  Device: Rolling walker  Distance: 34' (Deferred amb in AM (\"too risky\"))  Additional Factors: Verbal cues  Assistance Level: Stand by assist (incidental touching)  Gait Deviations: Slow kevin;Decreased step length bilateral  Skilled Clinical Factors: Downward gaze with G return to cues. Pt decided to place RW across room, ignored safety cues, then walked around end of bed without device to sit down. Incidental touching after Pt abandons walker. No deterioration in gait noted without AD. Stairs  Skilled Clinical Factors - Comments: Pt deferred. PT Exercises  A/AROM Exercises: Supine & sidelying bilateral LEs, 10x each. (Pt defers ankle weights; states bridging hurts his abdomen/PEG site.)  Resistive Exercises: Seated EOB LEs, 1#, green/moderate resistance band, 15x each. (Occasional cues for focus/finishing tasks. 89-90% SpO2 throughout despite rest breaks between exercises.)  Exercise Equipment: [employer], workload 2, 8 min. ASSESSMENT/PROGRESS TOWARDS GOALS  Vital Signs  Heart Rate: 78  Heart Rate Source: Monitor  SpO2: (!) 89 % (PM lowest reading; remained WNL during AM session.)  O2 Device: Nasal cannula (1.5L)    Assessment  Activity Tolerance: Patient limited by endurance; Patient limited by fatigue; Other (comment) (Reluctant/requires encouragement to participate; extra time required to redirect.)    Goals  Short Term Goals  Time Frame for Short Term Goals: 9 days  Short Term Goal 1: CGA assist supine<-> sit x1 assist  Short Term Goal 2: sit EOB withsupervsion up to 15 min for therex/ADL  Short Term Goal 3: 3+/5 LE strength to prepare for standing activiites and gait  Short Term Goal 4: ambulation with rolling walker distance of 70 to 100 ft, CGA, level surface  Short Term Goal 5: roll L/R with SBA  Short Term Goal 6: Pt able to go up and down 5 steps with 2 rails, min A  Long Term Goals  Time Frame for Long Term Goals : By DC  Long Term Goal 1: pt able to roll L/R indepndently  Long Term Goal 2: pt able to perform supine<>sit mod-I  Long Term Goal 3: pt able to perform sit<>stand and perform pivot/step to transfers at mod-I  Long Term Goal 4: pt able to ambulate household distance with appropriate device, mod-I  Long Term Goal 5: pt able to ambulate > 50 ft with appropriate device, at SBA/supervsion level, level as well as incline surface. Long term goal 6: pt able to go up and down 4 or more steps with B UE support, CGA. Long term goal 7: Improve PASS score to 27/36 improve function/stability. Long term goal 8: improve Tinetti balance score to atleast 23/28 to reduce fall risk. PLAN OF CARE/SAFETY  Physcial Therapy Plan  General Plan:  minutes of therapy at least 5 out of 7 days a week (5 to 7 days/week.)  Specific Instructions for Next Treatment: Continue to progress with functional mobility training  Current Treatment Recommendations: Strengthening;Balance training;Functional mobility training;Neuromuscular re-education;Cognitive reorientation; Safety education & training;Patient/Caregiver education & training;Equipment evaluation, education, & procurement; Therapeutic activities;Transfer training; Wheelchair mobility training;Gait training;Stair training;Home exercise program;Positioning  Safety Devices  Type of Devices: All fall risk precautions in place;Call light within reach;Gait belt;Patient at risk for falls; Left in bed (EOB, spouse present on exit.)    Therapy Time     01/27/23 0751 01/27/23 0752   PT Individual Minutes   Time In 1012 1335   Time Out 1110 1405   Minutes 62 [address] [place], [place], 01/27/23 at 4:06 PM

## 2023-01-27 NOTE — PROGRESS NOTES
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 Patient is alert and oriented x3 this morning and doing well. He did not get much sleep last night and he states he was confused last night. Tolerating tube feeds, cough is improved, no other complaints at this time. ROS:  Denies fevers, chills, sweats. No chest pain, palpitations, lightheadedness. Denies coughing, wheezing or shortness of breath. Denies abdominal pain, nausea, diarrhea or constipation. No new areas of joint pain. Denies new areas of numbness or weakness. Denies new anxiety or depression issues. No new skin problems. Rehabilitation:   Progressing in therapies. PT:    Bed mobility  Rolling to Left: Moderate assistance  Rolling to Right: Moderate assistance  Supine to Sit: Stand by assistance  Sit to Supine: Stand by assistance (Assist with BLEs)  Scooting: Stand by assistance  Bed Mobility Comments: HOB flat right handrail  Bed Mobility  Overall Assistance Level: Stand By Assist  Additional Factors: Head of bed raised, With handrails  Supine to Sit  Assistance Level: Stand by assist  Scooting  Assistance Level: Stand by assist      Transfers  Sit to Stand: Stand by assistance (variable surfaces)  Stand to Sit: Stand by assistance (variable surfaces)  Bed to Chair: Stand by assistance (RW)  Stand Pivot Transfers: Stand by assistance  Comment: patient demo'd impulsive movement, vcs for safety  Transfers  Surface: To bed, From bed, Wheelchair, To chair with arms, From chair with arms  Additional Factors: Hand placement cues, With handrails  Device:  (with, without rolling walker)  Sit to Stand  Assistance Level: Contact guard assist  Stand to Sit  Assistance Level: Contact guard assist  Bed To/From Chair  Technique: Stand step  Assistance Level: Contact guard assist  Skilled Clinical Factors: Rolling walker, writer managing O2 tubing.   Stand Pivot  Assistance Level: Contact guard assist  Skilled Clinical Factors: With, without rolling walker      Ambulation  Surface: Level tile  Device: Rolling Walker  Other Apparatus: O2 (2L)  Assistance: Contact guard assistance  Quality of Gait: Needs cues for sequencing , step length. SpO2  95% with HR 92. (Noted improvement in reciprocal step legnth, SpO2 >93% on 2L HR 99 bpm.)  Gait Deviations: Slow Radha, Increased SELVIN  Distance: 188ft and 30ft (wc follow for safety)  Comments: NT  Ambulation  Surface: Level surface  Device: Rolling walker  Distance: 76'  Additional Factors: Verbal cues  Assistance Level: Contact guard assist  Gait Deviations: Slow radha, Decreased step length bilateral  Skilled Clinical Factors: Downward gaze with G return to cues. CGA for safety based on previous day's note; progressing to SBA. OT:  Grooming/Oral Hygiene  Assistance Level: Set-up  Skilled Clinical Factors: Seated at sink. Upper Extremity Bathing  Assistance Level: Stand by assist  Skilled Clinical Factors: Seated on shower bench. Minimal verbal cuing this date. Lower Extremity Bathing  Assistance Level: Contact guard assist  Skilled Clinical Factors: Seated on shower bench for all LB bathing- weight shifting for tiffany hygiene and washing of buttocks. Minimal verbal cues for task initiation and some sequencing. Upper Extremity Dressing  Assistance Level: Stand by assist  Skilled Clinical Factors: Pt. able to doff/sendy overhead tshirts with minimal verbal cuing. Lower Extremity Dressing  Assistance Level: Moderate assistance  Skilled Clinical Factors: Assist to orient and intermittent assist to thread B LE into brief and pants. Pt utilizes figure 4 positioning to thread brief and pants while seated on shower bench. Pt able to pull over hips while standing in shower utilizing GB for balance.   Putting On/Taking Off Footwear  Assistance Level: Contact guard assist  Skilled Clinical Factors: Able to doff/sendy gripper socks without AE.  Toileting  Assistance Level: Contact guard assist  Skilled Clinical Factors: CGA/SBA during clothing management before and after transferring to toilet. Pt. performs hygiene after BM while seated. Toilet Transfers  Technique:  (Ambulating with rw.)  Equipment: Standard toilet, Grab bars  Additional Factors: Verbal cues, Cues for hand placement, Set-up  Assistance Level: Contact guard assist      SPEECH:  Subjective: [x] Alert     [x] Cooperative     [] Confused     [] Agitated    [] Lethargic     Objective/Assessment:     Orientation: Orientation log administered, O-Log score- 20/30 (disoriented to name of [facility], [facility], date, year and time). Recall: Working memory:  Mental manipulation (3 units, word order)- 90% accuracy (I), 100% c cues     Organization: n/a     Problem solving: Multiple uses for object- 60% accuracy (I), 100% cued. Pt. demo long response latency. Dysphagia: ST provided set up for oral care for water protocol. Pt. completed oral motor exercises x4 sets in reps of 20, BOT strengthening exercises x5 sets in reps of 20, effortful volitional swallow x6 reps and Kim maneuver x5 reps. Pt states feeling emotional today d/t recent passing of daughter's girlfriend. Emotional support provided by writer. Telesitter present. Pt's wife present for AM and PM sessions. Objective:  /83   Pulse 79   Temp 98.7 °F (37.1 °C) (Oral)   Resp 18   Ht 5' 7\" (1.702 m)   Wt 163 lb (73.9 kg)   SpO2 94%   BMI 25.53 kg/m²       GEN: well developed, well nourished, NAD  HEENT: NCAT, PERRL, EOMI, mucous membranes pink and moist. NC in place. CV: RRR, no murmurs, rubs or gallops  PULM: CTAB, no wheezes, rales or rhonchi. Respirations WNL and unlabored  ABD: soft, NT, ND, BS+ and equal. Peg tube in place, no signs of infection  NEURO: A&O x3. Sensation intact to light touch. MSK: Functional ROM in all extremities . Strength 5/5 in all extremities except BLEs (4+/5). EXTREMITIES: No calf tenderness to palpation bilaterally. No edema BLEs  SKIN: warm dry and intact with good turgor  PSYCH: appropriately interactive. Affect WNL. Diagnostics:     CBC:   Recent Labs     01/27/23  0639   WBC 4.0   RBC 4.63   HGB 10.8*   HCT 34.9*   MCV 75.3*   RDW 33.8*        BMP:   Recent Labs     01/25/23  0621 01/27/23  0639    137   K 3.9 3.9    102   CO2 30 28   BUN 15 15   CREATININE 0.45* 0.49*   GLUCOSE 119* 101*     BNP: No results for input(s): BNP in the last 72 hours. PT/INR: No results for input(s): PROTIME, INR in the last 72 hours. APTT: No results for input(s): APTT in the last 72 hours. CARDIAC ENZYMES: No results for input(s): CKMB, CKMBINDEX, TROPONINT in the last 72 hours. Invalid input(s): CKTOTAL;3 troponins   FASTING LIPID PANEL:  Lab Results   Component Value Date    TRIG 85 01/03/2023     LIVER PROFILE: No results for input(s): AST, ALT, ALB, BILIDIR, BILITOT, ALKPHOS in the last 72 hours.      Current Medications:   Current Facility-Administered Medications: clotrimazole (LOTRIMIN) 1 % cream, , Topical, BID  enoxaparin (LOVENOX) injection 40 mg, 40 mg, SubCUTAneous, Daily  ipratropium-albuterol (DUONEB) nebulizer solution 1 ampule, 1 ampule, Inhalation, Q4H WA  guaiFENesin (MUCINEX) extended release tablet 600 mg, 600 mg, Oral, BID PRN  carvedilol (COREG) tablet 6.25 mg, 6.25 mg, Oral, BID WC  ferrous sulfate (IRON 325) tablet 325 mg, 325 mg, Oral, Daily with breakfast  spironolactone (ALDACTONE) tablet 25 mg, 25 mg, Oral, Daily  acetaminophen (TYLENOL) tablet 650 mg, 650 mg, Oral, Q4H PRN  polyethylene glycol (GLYCOLAX) packet 17 g, 17 g, Oral, Daily  senna (SENOKOT) tablet 17.2 mg, 2 tablet, Oral, Daily PRN  bisacodyl (DULCOLAX) suppository 10 mg, 10 mg, Rectal, Daily PRN      Impression/Plan:   Impaired ADLs, gait, and mobility due to:      Multifocal cardioembolic CVA, subarachnoid hemorrhage:  PT/OT for gait, mobility, strengthening, endurance, ADLs, and self care. No residual SAH on CT head 1/21. Dysphagia: s/p PEG tube placement on 1/17. SLP treating. Dietitian managing tube feed - nocturnal with bolus - some high residuals at night. Monitor BMP TIW. Free water protocol. Agitation:  Recurred 1/21. Patient moved to room closer to the nurses station and telesitter initiated. Repeat CT head improved. Seroquel BID started 1/21 - mental status improved and Seroquel was discontinued. Insomnia: started Trazodone at hs 1/20 - failed. Improved - no current medication. Acute respiratory failure: required intubation. Currently on 1.5 to 2L NC - weaning as tolerated. Pneumonia: Improved. completed course of Unasyn and Vanco. CXR 1/24 for increased productive cough - stable hazy opacities. KENTRELL: resolved. Will monitor  Elevated LFTs:  resolved. Will monitor  HCV: will need f/u with GI for treatment outpatient  Anemia: Hb low but stable. Monitoring  Thrombocytopenia: Platelet count low but stable. Will monitor. Bowel Management: Miralax daily, senokot prn, dulcolax prn. DVT Prophylaxis:  Repeat CT - SAH resolved 1/21 - reviewed with Dr. Ann Kang 1/22 - ok to initiate DVT chemoprophylaxis. Started Lovenox 1/22. TONI stockings during the day, EPC cuffs at night. IM for medical management      Electronically signed by Sixto Evans MD on 1/27/2023 at 9:42 AM      This note is created with the assistance of a speech recognition program.  While intending to generate a document that actually reflects the content of the visit, the document can still have some errors including those of syntax and sound a like substitutions which may escape proof reading. In such instances, actual meaning can be extrapolated by contextual diversion.

## 2023-01-27 NOTE — PROGRESS NOTES
Comprehensive Nutrition Assessment    Type and Reason for Visit:  Reassess    Nutrition Recommendations/Plan:   Continue current tube feeding. Malnutrition Assessment:  Malnutrition Status: Moderate malnutrition (01/19/23 1419)    Context:  Acute Illness     Findings of the 6 clinical characteristics of malnutrition:  Energy Intake:  75% or less of estimated energy requirements for 7 or more days  Weight Loss:  Greater than 5% over 1 month     Body Fat Loss:  Unable to assess     Muscle Mass Loss:  Unable to assess    Fluid Accumulation:  No significant fluid accumulation     Strength:  Not Performed    Nutrition Assessment:    Nurse states pt seems to be tolerating tube feeding well. No issues with high residuals reported. Stool somewhat loose. Nutrition Related Findings:    No edema. Labs and meds reviewed. Current Nutrition Intake & Therapies:    Average Meal Intake: NPO     Diet NPO  ADULT TUBE FEEDING; PEG; Peptide Based; Cyclic, Bolus; 70; 6:27 PM; 6:00 AM; 3 Times Daily; 300; Gravity; 50; Other (specify); 50 mL water before and after each bolus and cyclic feeding. Additional 300 mL water daily for meds. Current Tube Feeding (TF) Orders:  Feeding Route: PEG  Formula: Peptide Based  Schedule: Cyclic, Bolus  Feeding Regimen: 70 mL x 10 hrs; bolus of 300 mL x 3 daily. Water Flushes: 50 mL before and after each bolus and nocturnal feeding. Addiitonal 300 mL to be used with meds daily. Current and Goal TF & Flush Orders Provides: 1920 kcal, 120 gm protein, 1998 mL free fluid. Anthropometric Measures:  Height: 5' 7\" (170.2 cm)  Ideal Body Weight (IBW): 148 lbs (67 kg)    Admission Body Weight: 159 lb 9.8 oz (72.4 kg) ([facility] 1/19 ([facility]))  Current Body Weight: 162 lb 14.7 oz (73.9 kg), 107.8 % IBW.  Weight Source: Bed Scale  Current BMI (kg/m2): 25.5  Usual Body Weight: 170 lb (77.1 kg) (Per significant other)  % Weight Change (Calculated): -6.1                    BMI Categories: Overweight (BMI 25.0-29. 9)    Estimated Daily Nutrient Needs:  Energy Requirements Based On: Formula  Weight Used for Energy Requirements: Admission  Energy (kcal/day): 3650-5269 based on [facility] with 1.2-1.3 factor  Weight Used for Protein Requirements: Ideal  Protein (g/day): 101 based on 1.5 gm per kg  Method Used for Fluid Requirements: 1 ml/kcal  Fluid (ml/day): 1920 mL    Nutrition Diagnosis:   Moderate malnutrition related to inadequate protein-energy intake as evidenced by Criteria as identified in malnutrition assessment    Nutrition Interventions:   Food and/or Nutrient Delivery: Continue NPO, Continue Current Tube Feeding  Nutrition Education/Counseling: No recommendation at this time  Coordination of Nutrition Care: Continue to monitor while inpatient, Speech Therapy       Goals:  Previous Goal Met: Progressing toward Goal(s)  Goals: Tolerate nutrition support at goal rate       Nutrition Monitoring and Evaluation:   Behavioral-Environmental Outcomes: None Identified  Food/Nutrient Intake Outcomes: Enteral Nutrition Intake/Tolerance  Physical Signs/Symptoms Outcomes: Biochemical Data, Chewing or Swallowing, GI Status, Weight    Discharge Planning:     Too soon to determine     Donna Tucker RD, LD  Contact: [phone]

## 2023-01-27 NOTE — CARE COORDINATION
ARU CASE MANAGEMENT NOTE:    Patient is alert and oriented x4. Spoke with patient regarding discharge plan and patient confirms plan is to go home with his wife and [facility]. DME: Possible home 02, tube feedings, formula    Outside appointments: None    Will continue to follow for additional discharge needs.     Electronically signed by Juan Morales RN on 1/27/2023 at 12:15 PMARU CASE MANAGEMENT NOTE:

## 2023-01-27 NOTE — PLAN OF CARE
Problem: Discharge Planning  Goal: Discharge to home or other facility with appropriate resources  1/27/2023 1629 by Marcelo Russell RN  Outcome: Progressing  1/27/2023 0844 by Marcelo Russell RN  Outcome: Progressing  1/27/2023 0430 by Alejandra Lam LPN  Outcome: Progressing     Problem: Nutrition Deficit:  Goal: Optimize nutritional status  1/27/2023 1629 by Marcelo Russell RN  Outcome: Progressing  1/27/2023 0844 by Marcelo Russell RN  Outcome: Progressing  1/27/2023 0430 by Alejandra Lam LPN  Outcome: Progressing     Problem: Safety - Adult  Goal: Free from fall injury  1/27/2023 1629 by Marcelo Russell RN  Outcome: Progressing  1/27/2023 0844 by Marcelo Russell RN  Outcome: Progressing  1/27/2023 0430 by Alejandra Lam LPN  Outcome: Progressing     Problem: ABCDS Injury Assessment  Goal: Absence of physical injury  1/27/2023 1629 by Marcelo Russell RN  Outcome: Progressing  1/27/2023 0844 by Marcelo Russell RN  Outcome: Progressing  1/27/2023 0430 by Alejandra Lam LPN  Outcome: Progressing     Problem: Skin/Tissue Integrity  Goal: Absence of new skin breakdown  Description: 1. Monitor for areas of redness and/or skin breakdown  2. Assess vascular access sites hourly  3. Every 4-6 hours minimum:  Change oxygen saturation probe site  4. Every 4-6 hours:  If on nasal continuous positive airway pressure, respiratory therapy assess nares and determine need for appliance change or resting period.   1/27/2023 1629 by Marcelo Russell RN  Outcome: Progressing  1/27/2023 0844 by Marcelo Russell RN  Outcome: Progressing  1/27/2023 0430 by Alejandra Lam LPN  Outcome: Progressing

## 2023-01-27 NOTE — PLAN OF CARE
Problem: Safety - Adult  Goal: Free from fall injury  Outcome: Progressing     Problem: Nutrition Deficit:  Goal: Optimize nutritional status  Outcome: Progressing     Problem: ABCDS Injury Assessment  Goal: Absence of physical injury  Outcome: Progressing     Problem: Skin/Tissue Integrity  Goal: Absence of new skin breakdown  Description: 1. Monitor for areas of redness and/or skin breakdown  2. Assess vascular access sites hourly  3. Every 4-6 hours minimum:  Change oxygen saturation probe site  4. Every 4-6 hours:  If on nasal continuous positive airway pressure, respiratory therapy assess nares and determine need for appliance change or resting period.   Outcome: Progressing     Problem: Nutrition Deficit:  Goal: Optimize nutritional status  Outcome: Progressing     Problem: Discharge Planning  Goal: Discharge to home or other facility with appropriate resources  Outcome: Progressing

## 2023-01-27 NOTE — PROGRESS NOTES
[address] Internal Medicine  Brian Goldsmith MD; Manuel Ayala MD; Anthony Castellanos MD; MD Sylvia Esquivel MD; Chanell House MD    [facility]   [address] / HISTORY AND PHYSICAL EXAMINATION            Date:   1/27/2023  Patient name:  Ramiro Akhtar  Date of admission:  1/19/2023  9:12 AM  MRN:   [id number]  Account:  [de-identified]  YOB: 1960  PCP:    Chanell House MD  Room:   [address] [place], [place] [id number]  Code Status:    Full Code    Physician Requesting Consult: Emi Engle MD    Reason for Consult: Medical management    Chief Complaint:     No chief complaint on file. Acute CVA, generalized weakness, metabolic encephalopathy, multifactorial with underlying acute respiratory failure with pneumonia and heart failure    History Obtained From:     patient    History of Present Illness:     48-year-old gentleman with unknown past medical history presented to inpatient [facility] with metabolic encephalopathy found to have acute respiratory failure, pneumonia, also had slurred speech, confusion progressively was getting worse, EMS brought the patient to the hospital his saturations were  75%, found to have acute respiratory failure with pneumonia, in the ED patient was placed on BiPAP, chest x-ray confirmed right lower lobe pneumonia with pleural effusion. ABG showed acute respiratory failure with hypercapnia and hypoxia.   Subsequently also found to have NSTEMI, acute heart failure with transaminitis possible shock liver, with also hepatitis C acute with hepatic failure, slowly improved,  Also had multiple acute brain infarct with subarachnoid hemorrhage,,  Subsequently patient was evaluated for acute inpatient rehab, accepted and admitted right now we are consulted for medical management    1/20  Patient was sitting on the chair on my encounter, states that he has been feeling drained  Just finished doing physical therapy, vitals have been stable  Past Medical History:     History reviewed. No pertinent past medical history. Past Surgical History:     Past Surgical History:   Procedure Laterality Date    UPPER GASTROINTESTINAL ENDOSCOPY N/A 1/17/2023    EGD ESOPHAGOGASTRODUODENOSCOPY PEG TUBE INSERTION performed by Sammie Sarmiento DO at [address] [place] [address]        Medications Prior to Admission:     Prior to Admission medications    Medication Sig Start Date End Date Taking? Authorizing Provider   aspirin 325 MG tablet Take 325 mg by mouth daily Patient takes 2 tabs daily    Historical Provider, MD   psyllium (KONSYL) 28.3 % PACK Take 1 packet by mouth daily    Historical Provider, MD        Allergies:     Patient has no known allergies. Social History:     Tobacco:    reports that he has been smoking cigarettes. He has a 40.00 pack-year smoking history. He has never used smokeless tobacco.  Alcohol:      reports current alcohol use. Drug Use:  reports that he does not currently use drugs. Family History:     History reviewed. No pertinent family history. Review of Systems:     Positive and Negative as described in HPI. CONSTITUTIONAL:  negative for fevers, chills, sweats, fatigue, weight loss  HEENT:  negative for vision, hearing changes, runny nose, throat pain  RESPIRATORY:  negative for shortness of breath, cough, congestion, wheezing. CARDIOVASCULAR:  negative for chest pain, palpitations.   GASTROINTESTINAL:  negative for nausea, vomiting, diarrhea, constipation, change in bowel habits, abdominal pain   GENITOURINARY:  negative for difficulty of urination, burning with urination, frequency   INTEGUMENT:  negative for rash, skin lesions, easy bruising   HEMATOLOGIC/LYMPHATIC:  negative for swelling/edema   ALLERGIC/IMMUNOLOGIC:  negative for urticaria , itching  ENDOCRINE:  negative increase in drinking, increase in urination, hot or cold intolerance  MUSCULOSKELETAL:  negative joint pains, muscle aches, swelling of joints  NEUROLOGICAL:  negative for headaches, dizziness, lightheadedness, numbness, pain, tingling extremities  BEHAVIOR/PSYCH:  negative for depression, anxiety    Physical Exam:     BP (!) 109/90   Pulse 84   Temp 98.4 °F (36.9 °C) (Oral)   Resp 18   Ht 5' 7\" (1.702 m)   Wt 163 lb (73.9 kg)   SpO2 93%   BMI 25.53 kg/m²   Temp (24hrs), Av.5 °F (36.9 °C), Min:98.3 °F (36.8 °C), Max:98.7 °F (37.1 °C)    No results for input(s): POCGLU in the last 72 hours. Intake/Output Summary (Last 24 hours) at 2023 0994  Last data filed at 2023 1737  Gross per 24 hour   Intake 1935 ml   Output --   Net 1935 ml         General Appearance:  alert, well appearing, and in no acute distress  Mental status: oriented to person, place, and time with normal affect  Head:  normocephalic, atraumatic. Eye: no icterus, redness, pupils equal and reactive, extraocular eye movements intact, conjunctiva clear  Ear: normal external ear, no discharge, hearing intact  Nose:  no drainage noted  Mouth: mucous membranes moist  Neck: supple, no carotid bruits, thyroid not palpable  Lungs: Bilateral equal air entry, clear to ausculation, no wheezing, rales or rhonchi, normal effort  Cardiovascular: normal rate, regular rhythm, no murmur, gallop, rub.   Abdomen: Soft, nontender, nondistended, normal bowel sounds, no hepatomegaly or splenomegaly  Neurologic: There are no new focal motor or sensory deficits, normal muscle tone and bulk, no abnormal sensation, normal speech, cranial nerves II through XII grossly intact  Skin: No gross lesions, rashes, bruising or bleeding on exposed skin area  Extremities:  peripheral pulses palpable, no pedal edema or calf pain with palpation  Psych: normal affect    Investigations:      Laboratory Testing:  Recent Results (from the past 24 hour(s))   Basic Metabolic Panel w/ Reflex to MG    Collection Time: 23  6:39 AM   Result Value Ref Range    Glucose 101 (H) 70 - 99 mg/dL    BUN 15 8 - 23 mg/dL    Creatinine 0.49 (L) 0.70 - 1.20 mg/dL    Est, Glom Filt Rate >60 >60 mL/min/1.73m2    Calcium 8.6 8.6 - 10.4 mg/dL    Sodium 137 135 - 144 mmol/L    Potassium 3.9 3.7 - 5.3 mmol/L    Chloride 102 98 - 107 mmol/L    CO2 28 20 - 31 mmol/L    Anion Gap 7 (L) 9 - 17 mmol/L   CBC auto differential    Collection Time: 01/27/23  6:39 AM   Result Value Ref Range    WBC 4.0 3.5 - 11.0 k/uL    RBC 4.63 4.5 - 5.9 m/uL    Hemoglobin 10.8 (L) 13.5 - 17.5 g/dL    Hematocrit 34.9 (L) 41 - 53 %    MCV 75.3 (L) 80 - 100 fL    MCH 23.4 (L) 26 - 34 pg    MCHC 31.1 31 - 37 g/dL    RDW 33.8 (H) 11.5 - 14.9 %    Platelets 913 691 - 926 k/uL    MPV 8.9 6.0 - 12.0 fL    Seg Neutrophils 62 36 - 66 %    Lymphocytes 20 (L) 24 - 44 %    Monocytes 15 (H) 1 - 7 %    Eosinophils % 2 0 - 4 %    Basophils 1 0 - 2 %    Segs Absolute 2.48 1.3 - 9.1 k/uL    Absolute Lymph # 0.80 (L) 1.0 - 4.8 k/uL    Absolute Mono # 0.60 0.1 - 1.3 k/uL    Absolute Eos # 0.08 0.0 - 0.4 k/uL    Basophils Absolute 0.04 0.0 - 0.2 k/uL    Morphology ANISOCYTOSIS PRESENT     Morphology HYPOCHROMIA PRESENT     Morphology MICROCYTOSIS PRESENT     Morphology 1+ ELLIPTOCYTES            Imaging/Diagonstics:  XR CHEST PORTABLE    Result Date: 1/18/2023  Interval extubation and NG tube removal.  Otherwise similar findings, with perhaps slightly greater hazy ground-glass opacity right mid lung. Correlate clinically. FL MODIFIED BARIUM SWALLOW W VIDEO    Result Date: 1/14/2023  Premature vallecular spillage. Piriform sinus cavity residue. Deep penetration with both materials. Aspiration with pudding. Please see separate speech pathology report for full discussion of findings and recommendations.        Assessment :      Hospital Problems             Last Modified POA    * (Principal) Cerebrovascular accident (CVA) due to embolism of cerebral artery ([facility] 1/27/2023 Yes    Acute type II respiratory failure ([facility] 1/19/2023 Yes    Transaminitis 1/19/2023 Yes    Normocytic anemia 1/19/2023 Yes    Thrombocytopenia ([address] 1/19/2023 Yes    Acute respiratory failure with hypoxia and hypercapnia ([facility]) RLL pneumonia 1/19/2023 Yes    Community acquired pneumonia of right lower lobe of lung 1/19/2023 Yes    Emphysema lung ([address] 1/19/2023 Yes    Cerebral infarction ([address] 1/19/2023 Yes    Moderate malnutrition ([address] 1/19/2023 Yes    Hepatitis C virus infection without hepatic coma 1/19/2023 Yes    Dysphagia 1/19/2023 Yes    Impending cerebrovascular accident [facility]) 1/27/2023 Yes    Dermatitis associated with moisture 1/20/2023 Yes   Plan:     Acute hypoxic and hypercapnic respiratory failure secondary to likely aspiration pneumonia, treated with vancomycin and Unasyn, also was positive for MRSA nasal swab, required intubation, extubated subsequently  Acute CVA with subarachnoid hemorrhage, cardioembolic, did not qualify for tPA,  Acute liver failure possible multifactorial, alcoholic liver disease with hepatitis C,  Hypercoagulable state secondary to acute liver disease, INR currently 1.6  Thrombocytopenia secondary to alcoholic liver disease and hepatitis C, improved  Dysphagia , PEG placed   Anemia , of ch disease multifactorial   DVT PPX with SCD only, as he had cerebral h'age  Full code status   PT/OT     1/21  Patient seen to be resting comfortably  Was able to participate in physical therapy today  No new issues  Repeat CT head showed no new strokes had no hemorrhage  Continue current management    1/22  Since no bleed noted on repeat CT head okay to resume DVT prophylaxis  Hemoglobin is stable,thrombocytopenia has resolved platelet count 568 on labs 1/20  No new issues continue current management    1/27  Labs, radiology, medications, vitals reviewed,  Episodes of desaturation, 88%, improved,  No shortness of breath or chest pain at this time,  Labs anemia, hemoglobin 10.8,  Microcytic, MCV 75.3,  Patient probably will need anemia of chronic disease,  Tolerating physical therapy,                         Consultations:   Timur Brooks CONSULT TO SOCIAL WORK  IP CONSULT TO INTERNAL MEDICINE  IP CONSULT TO [facility]      Farida Garcia MD  1/27/2023  6:24 PM    Copy sent to Dr. Farida Garcia MD    Please note that this chart was generated using voice recognition Dragon dictation software. Although every effort was made to ensure the accuracy of this automated transcription, some errors in transcription may have occurred.

## 2023-01-28 PROCEDURE — 6370000000 HC RX 637 (ALT 250 FOR IP): Performed by: INTERNAL MEDICINE

## 2023-01-28 PROCEDURE — 97129 THER IVNTJ 1ST 15 MIN: CPT

## 2023-01-28 PROCEDURE — 97110 THERAPEUTIC EXERCISES: CPT

## 2023-01-28 PROCEDURE — 94761 N-INVAS EAR/PLS OXIMETRY MLT: CPT

## 2023-01-28 PROCEDURE — 6370000000 HC RX 637 (ALT 250 FOR IP): Performed by: PHYSICAL MEDICINE & REHABILITATION

## 2023-01-28 PROCEDURE — 99231 SBSQ HOSP IP/OBS SF/LOW 25: CPT | Performed by: INTERNAL MEDICINE

## 2023-01-28 PROCEDURE — 6360000002 HC RX W HCPCS: Performed by: PHYSICAL MEDICINE & REHABILITATION

## 2023-01-28 PROCEDURE — 92526 ORAL FUNCTION THERAPY: CPT

## 2023-01-28 PROCEDURE — 1180000000 HC REHAB R&B

## 2023-01-28 PROCEDURE — 97112 NEUROMUSCULAR REEDUCATION: CPT

## 2023-01-28 PROCEDURE — 97116 GAIT TRAINING THERAPY: CPT

## 2023-01-28 PROCEDURE — 99232 SBSQ HOSP IP/OBS MODERATE 35: CPT | Performed by: STUDENT IN AN ORGANIZED HEALTH CARE EDUCATION/TRAINING PROGRAM

## 2023-01-28 PROCEDURE — 94640 AIRWAY INHALATION TREATMENT: CPT

## 2023-01-28 RX ADMIN — IPRATROPIUM BROMIDE AND ALBUTEROL SULFATE 1 AMPULE: 2.5; .5 SOLUTION RESPIRATORY (INHALATION) at 19:55

## 2023-01-28 RX ADMIN — CARVEDILOL 6.25 MG: 6.25 TABLET, FILM COATED ORAL at 16:40

## 2023-01-28 RX ADMIN — ENOXAPARIN SODIUM 40 MG: 100 INJECTION SUBCUTANEOUS at 08:08

## 2023-01-28 RX ADMIN — SPIRONOLACTONE 25 MG: 25 TABLET ORAL at 08:08

## 2023-01-28 RX ADMIN — FERROUS SULFATE TAB 325 MG (65 MG ELEMENTAL FE) 325 MG: 325 (65 FE) TAB at 08:08

## 2023-01-28 RX ADMIN — CLOTRIMAZOLE: 10 CREAM TOPICAL at 08:09

## 2023-01-28 RX ADMIN — CARVEDILOL 6.25 MG: 6.25 TABLET, FILM COATED ORAL at 08:08

## 2023-01-28 RX ADMIN — IPRATROPIUM BROMIDE AND ALBUTEROL SULFATE 1 AMPULE: 2.5; .5 SOLUTION RESPIRATORY (INHALATION) at 12:01

## 2023-01-28 RX ADMIN — POLYETHYLENE GLYCOL 3350 17 G: 17 POWDER, FOR SOLUTION ORAL at 08:09

## 2023-01-28 RX ADMIN — IPRATROPIUM BROMIDE AND ALBUTEROL SULFATE 1 AMPULE: 2.5; .5 SOLUTION RESPIRATORY (INHALATION) at 07:51

## 2023-01-28 RX ADMIN — IPRATROPIUM BROMIDE AND ALBUTEROL SULFATE 1 AMPULE: 2.5; .5 SOLUTION RESPIRATORY (INHALATION) at 15:59

## 2023-01-28 ASSESSMENT — PAIN SCALES - GENERAL
PAINLEVEL_OUTOF10: 0

## 2023-01-28 ASSESSMENT — PAIN SCALES - WONG BAKER
WONGBAKER_NUMERICALRESPONSE: 0

## 2023-01-28 NOTE — PLAN OF CARE
Problem: Discharge Planning  Goal: Discharge to home or other facility with appropriate resources  1/28/2023 1228 by Nadeen Siemens, LPN  Outcome: Progressing     Problem: Nutrition Deficit:  Goal: Optimize nutritional status  1/28/2023 1228 by Nadeen Siemens, LPN  Outcome: Progressing     Problem: Safety - Adult  Goal: Free from fall injury  1/28/2023 1228 by Nadeen Siemens, LPN  Outcome: Progressing     Problem: ABCDS Injury Assessment  Goal: Absence of physical injury  1/28/2023 1228 by Nadeen Siemens, LPN  Outcome: Progressing     Problem: Skin/Tissue Integrity  Goal: Absence of new skin breakdown  Description: 1. Monitor for areas of redness and/or skin breakdown  2. Assess vascular access sites hourly  3. Every 4-6 hours minimum:  Change oxygen saturation probe site  4. Every 4-6 hours:  If on nasal continuous positive airway pressure, respiratory therapy assess nares and determine need for appliance change or resting period.   1/28/2023 1228 by Nadeen Siemens, LPN  Outcome: Progressing

## 2023-01-28 NOTE — PROGRESS NOTES
Physical Therapy  Facility/Department: [facility]  Rehabilitation Physical Therapy   NAME: Jere Pollock  : 1960 (18 y.o.)  MRN: [id number]  CODE STATUS: Full Code    Date of Service: 23      History reviewed. No pertinent past medical history. Past Surgical History:   Procedure Laterality Date    UPPER GASTROINTESTINAL ENDOSCOPY N/A 2023    EGD ESOPHAGOGASTRODUODENOSCOPY PEG TUBE INSERTION performed by Franco Figueredo DO at [address] [place] [address]       Chart Reviewed: Yes  Patient assessed for rehabilitation services?: Yes  Additional Pertinent Hx: History of Present Illness:  Jere Pollock  is a 60 y.o. right-handed male admitted to the [address] [place] [address] on 2023. He was originally admitted to [address] [place] [address] on 2023 for acute respiratory failure 2/2 RLL pneumonia. Blood/resp/urine cultures negative, although his MRSA swab was +. He completed course of Unasyn and vancomycin. Imaging showed numerous acute, subacute, and chronic infarcts in the left parietal lobe, left cerebellar hemisphere, as well as bilateral SAH. PEG tube was placed 23 for severe dysphagia. HCV+. ID, nephrology, heme-onc, neuro, pulm, and psych are following. Patient admitted to ARU this morning. He reports doing well this morning. He denies any pain aside from some abdominal pain when he coughs. He does note some wheezes and tenderness around the PEG tube site. He does endorse some generalized weakness. He is currently requiring assistance for self-care activities and mobility prompting this admission. Referring Practitioner: ALAYNA Najera NP  Referral Date : 23  Diagnosis: Acute respiratory failure  General Comment  Comments: Pt voicing not wanting to go down to therapy for afternoon session. Pt was agreeable to complete bedside ex with writer. Restrictions:  Restrictions/Precautions: Fall Risk;General Precautions;Contact Precautions;Isolation; Bed Alarm  Position Activity Restriction  Other position/activity restrictions: Severe dysphagia- NPO. Unable to tolerate any PO safely. NG tube placed,  on high flow nasal cannula     SUBJECTIVE  Subjective: Pt resting in bed upon arrival to room. Pleasant. Reports having a rough night due to PEG tube leaking all over him last night. Functional Mobility  Bed mobility  Supine to Sit: Supervision  Sit to Supine: Supervision  Scooting: Supervision  Bed Mobility Comments: HOB flat, exit to pt's right with no HR  Transfers  Sit to Stand: Stand by assistance  Stand to Sit: Stand by assistance  Stand Pivot Transfers: Stand by assistance (with and without AD)  Balance  Posture: Fair  Sitting - Static: Good;+  Sitting - Dynamic: Good  Standing - Static: Good;- (Rolling walker)  Standing - Dynamic: Fair;+ (Rolling walker)  Comments: Standing with rolling walker    Environmental Mobility  Ambulation  Surface: Level tile  Device: Rolling Walker  Other Apparatus: O2 (2L)  Assistance: Stand by assistance  Distance: 200ft  Comments: SPO2 97% pre gait, 95% post gait  More Ambulation?: Yes  Ambulation 2  Surface - 2: level tile  Device 2: Rolling Walker  Assistance 2: Stand by assistance  Quality of Gait 2: Pt continues to amb pushing RW too far fwd outside his SELVIN, however appears to be steady. Slow pace. Slight increase in SOB amb without oxygen. Gait Deviations: Slow Radha  Distance: 205ft  Comments: SPO2 89% pre gait, 86% post gait on room air. Rebounded with pursed lip breathing within 56sec. to 90% Oxygen placed back on pt. Stairs/Curb  Stairs?: Yes  Stairs  # Steps : 5  Stairs Height: 4\" (and 6\")  Rails: Bilateral  Device: No Device  Other Apparatus:  (oxygen at 1.5L)  Assistance: Stand by assistance  Comment: demonstrated reciprocal pattern ascending/decending 4\" steps and reciprocal pattern ascending 6\" and step to decending 6\" steps.  SPO2 95% post stairs on 1.5L    PT Exercises  A/AROM Exercises: Supine (B) LE x 10-15 (NO SLR's per pts request as they pull on his PEG tube site too much)  Resistive Exercises: Hip abd against lime green t-band x 10, hooklying marching with lime green t-band around distal thighs x 10ea. Circulation/Endurance Exercises: dynamic stanidng dominik in // bars: 2min [id number],4min x 1  Dynamic Standing Balance Exercises: toileting transfer x 2( am/pm) Able to demonstrate the ability to  front of the toilet while urinating while balancing self with holding onto sink for external support. Washes/dries his hands and is able to turn around and throw paper towel into trash without loosing his balance- writer providing supervision assistance. Breathing Techniques: pursed lip breathing completed after taking a walk without supplemental oxygen. SPO2 dropped to 86% from 89% on room air. Able to reboud from 86% up to 90% on room air in 56seconds. Standing Open/Closed Kinetic Chain Exercises: (B) LE standing ex x 10 (SPO2 above 94% throughout session on 1.5L)    ASSESSMENT  Vitals  O2 Device: Nasal cannula  Comment: 1.5L    Activity Tolerance  Activity Tolerance: Patient limited by endurance; Patient limited by fatigue; Other (comment)    Assessment  Assessment: 59 y/o male adm with AMS and found to have [facility] with recent fall at home. Pt is a high fall risk as he has decreased safety awareness and B LE weakness L hemibody > R. Pt will benefit from continued therapy while in rehab unit to improve fucntion and safety. Performance Deficits/Impairments: Decreased functional mobility ; Decreased ADL status; Decreased body mechanics; Decreased strength;Decreased safe awareness;Decreased cognition;Decreased endurance;Decreased balance;Decreased coordination; Increased pain;Decreased posture  Activity Tolerance Comment: Monitor SP02 throughout therapy sessions  Treatment Diagnosis: impared mobility s/p AMS/SAH  Therapy Prognosis: Good  Decision Making: Medium Complexity  History: falls, 1 week  AMS PTA  Exam: L side weakness, decreased activity tolerance  Barriers to Learning: cognition, endurance    GOALS  Patient Goals   Patient Goals : Want to be independent to go home. Short Term Goals  Time Frame for Short Term Goals: 9 days  Short Term Goal 1: CGA assist supine<-> sit x1 assist  Short Term Goal 2: sit EOB withsupervsion up to 15 min for therex/ADL  Short Term Goal 3: 3+/5 LE strength to prepare for standing activiites and gait  Short Term Goal 4: ambulation with rolling walker distance of 70 to 100 ft, CGA, level surface  Short Term Goal 5: roll L/R with SBA  Short Term Goal 6: Pt able to go up and down 5 steps with 2 rails, min A  Long Term Goals  Time Frame for Long Term Goals : By DC  Long Term Goal 1: pt able to roll L/R indepndently  Long Term Goal 2: pt able to perform supine<>sit mod-I  Long Term Goal 3: pt able to perform sit<>stand and perform pivot/step to transfers at mod-I  Long Term Goal 4: pt able to ambulate household distance with appropriate device, mod-I  Long Term Goal 5: pt able to ambulate > 50 ft with appropriate device, at SBA/supervsion level, level as well as incline surface. Long term goal 6: pt able to go up and down 4 or more steps with B UE support, CGA. Long term goal 7: Improve PASS score to 27/36 improve function/stability. Long term goal 8: improve Tinetti balance score to atleast 23/28 to reduce fall risk. PLAN OF CARE  Frequency: 1-2 treatment sessions per day, 5-7 days per week  Physcial Therapy Plan  General Plan:  minutes of therapy at least 5 out of 7 days a week (5 to 7 days/week.)  Specific Instructions for Next Treatment: Continue to progress with functional mobility training  Current Treatment Recommendations: Strengthening;Balance training;Functional mobility training;Neuromuscular re-education;Cognitive reorientation; Safety education & training;Patient/Caregiver education & training;Equipment evaluation, education, & procurement; Therapeutic activities;Transfer training; Wheelchair mobility training;Gait training;Stair training;Home exercise program;Positioning  Safety Devices  Type of Devices: All fall risk precautions in place;Gait belt;Left in chair;Telesitter in use    EDUCATION  Education  Education Given To: Patient  Education Provided:  Mobility Training;Precautions  Education Provided Comments: pacing to maintain good SPO2 level with activity  Education Method: Verbal  Education Outcome: Verbalized understanding;Demonstrated understanding      Therapy Time   01/28/23 1014 01/28/23 1428   PT Individual Minutes   Time In 2982 1914   Time Out 1106 1500   Minutes 46 [address] [place], [place], 01/28/23 at 3:25 PM

## 2023-01-28 NOTE — PROGRESS NOTES
[facility] Internal Medicine  Shreyas Nation MD; Miller Pérez MD; Justina Almanzar MD; MD Kinga Pak MD; Magdalene Schneider MD    [facility]   [address] / HISTORY AND PHYSICAL EXAMINATION            Date:   1/28/2023  Patient name:  Julio Kingston  Date of admission:  1/19/2023  9:12 AM  MRN:   [id number]  Account:  [de-identified]  YOB: 1960  PCP:    Magdalene Schneider MD  Room:   [facility]  Code Status:    Full Code    Physician Requesting Consult: Mahesh Watters MD    Reason for Consult: Medical management    Chief Complaint:     No chief complaint on file. Acute CVA, generalized weakness, metabolic encephalopathy, multifactorial with underlying acute respiratory failure with pneumonia and heart failure    History Obtained From:     patient    History of Present Illness:     60-year-old gentleman with unknown past medical history presented to inpatient [facility] with metabolic encephalopathy found to have acute respiratory failure, pneumonia, also had slurred speech, confusion progressively was getting worse, EMS brought the patient to the hospital his saturations were  75%, found to have acute respiratory failure with pneumonia, in the ED patient was placed on BiPAP, chest x-ray confirmed right lower lobe pneumonia with pleural effusion. ABG showed acute respiratory failure with hypercapnia and hypoxia.   Subsequently also found to have NSTEMI, acute heart failure with transaminitis possible shock liver, with also hepatitis C acute with hepatic failure, slowly improved,  Also had multiple acute brain infarct with subarachnoid hemorrhage,,  Subsequently patient was evaluated for acute inpatient rehab, accepted and admitted right now we are consulted for medical management    1/20  Patient was sitting on the chair on my encounter, states that he has been feeling drained  Just finished doing physical therapy, vitals have been stable    January 28  No new symptoms  Tolerating rehab therapies  Past Medical History:     History reviewed. No pertinent past medical history. Past Surgical History:     Past Surgical History:   Procedure Laterality Date    UPPER GASTROINTESTINAL ENDOSCOPY N/A 1/17/2023    EGD ESOPHAGOGASTRODUODENOSCOPY PEG TUBE INSERTION performed by Lila Grijalva DO at [facility] ENDO        Medications Prior to Admission:     Prior to Admission medications    Medication Sig Start Date End Date Taking? Authorizing Provider   aspirin 325 MG tablet Take 325 mg by mouth daily Patient takes 2 tabs daily    Historical Provider, MD   psyllium (KONSYL) 28.3 % PACK Take 1 packet by mouth daily    Historical Provider, MD        Allergies:     Patient has no known allergies. Social History:     Tobacco:    reports that he has been smoking cigarettes. He has a 40.00 pack-year smoking history. He has never used smokeless tobacco.  Alcohol:      reports current alcohol use. Drug Use:  reports that he does not currently use drugs. Family History:     History reviewed. No pertinent family history. Review of Systems:     Positive and Negative as described in HPI. CONSTITUTIONAL:  negative for fevers, chills, sweats, fatigue, weight loss  HEENT:  negative for vision, hearing changes, runny nose, throat pain  RESPIRATORY:  negative for shortness of breath, cough, congestion, wheezing. CARDIOVASCULAR:  negative for chest pain, palpitations.   GASTROINTESTINAL:  negative for nausea, vomiting, diarrhea, constipation, change in bowel habits, abdominal pain   GENITOURINARY:  negative for difficulty of urination, burning with urination, frequency   INTEGUMENT:  negative for rash, skin lesions, easy bruising   HEMATOLOGIC/LYMPHATIC:  negative for swelling/edema   ALLERGIC/IMMUNOLOGIC:  negative for urticaria , itching  ENDOCRINE:  negative increase in drinking, increase in urination, hot or cold intolerance  MUSCULOSKELETAL:  negative joint pains, muscle aches, swelling of joints  NEUROLOGICAL:  negative for headaches, dizziness, lightheadedness, numbness, pain, tingling extremities  BEHAVIOR/PSYCH:  negative for depression, anxiety    Physical Exam:     /69   Pulse 81   Temp 98.4 °F (36.9 °C) (Oral)   Resp 18   Ht 5' 7\" (1.702 m)   Wt 163 lb (73.9 kg)   SpO2 (!) 89% Comment: after up to BR  BMI 25.53 kg/m²   Temp (24hrs), Av.5 °F (36.9 °C), Min:98.4 °F (36.9 °C), Max:98.6 °F (37 °C)    No results for input(s): POCGLU in the last 72 hours. Intake/Output Summary (Last 24 hours) at 2023 1023  Last data filed at 2023 0800  Gross per 24 hour   Intake 2060 ml   Output 200 ml   Net 1860 ml       General Appearance:  alert, well appearing, and in no acute distress  Mental status: oriented to person, place, and time with normal affect  Head:  normocephalic, atraumatic. Eye: no icterus, redness, pupils equal and reactive, extraocular eye movements intact, conjunctiva clear  Ear: normal external ear, no discharge, hearing intact  Nose:  no drainage noted  Mouth: mucous membranes moist  Neck: supple, no carotid bruits, thyroid not palpable  Lungs: Bilateral equal air entry, clear to ausculation, no wheezing, rales or rhonchi, normal effort  Cardiovascular: normal rate, regular rhythm, no murmur, gallop, rub.   Abdomen: Soft, nontender, nondistended, normal bowel sounds, no hepatomegaly or splenomegaly  Neurologic: There are no new focal motor or sensory deficits, normal muscle tone and bulk, no abnormal sensation, normal speech, cranial nerves II through XII grossly intact  Skin: No gross lesions, rashes, bruising or bleeding on exposed skin area  Extremities:  peripheral pulses palpable, no pedal edema or calf pain with palpation  Psych: normal affect    Investigations:      Laboratory Testing:  No results found for this or any previous visit (from the past 24 hour(s)). Imaging/Diagonstics:  XR CHEST PORTABLE    Result Date: 1/18/2023  Interval extubation and NG tube removal.  Otherwise similar findings, with perhaps slightly greater hazy ground-glass opacity right mid lung. Correlate clinically. FL MODIFIED BARIUM SWALLOW W VIDEO    Result Date: 1/14/2023  Premature vallecular spillage. Piriform sinus cavity residue. Deep penetration with both materials. Aspiration with pudding. Please see separate speech pathology report for full discussion of findings and recommendations. Assessment :      Medications:      Allergies:  No Known Allergies    Current Meds:   Scheduled Meds:    clotrimazole   Topical BID    enoxaparin  40 mg SubCUTAneous Daily    ipratropium-albuterol  1 ampule Inhalation Q4H WA    carvedilol  6.25 mg Oral BID WC    ferrous sulfate  325 mg Oral Daily with breakfast    spironolactone  25 mg Oral Daily    polyethylene glycol  17 g Oral Daily     Continuous Infusions:   PRN Meds: guaiFENesin, acetaminophen, senna, bisacodyl    Hospital Problems             Last Modified POA    * (Principal) Cerebrovascular accident (CVA) due to embolism of cerebral artery ([address] 1/27/2023 Yes    Acute type II respiratory failure ([address] 1/19/2023 Yes    Transaminitis 1/19/2023 Yes    Normocytic anemia 1/19/2023 Yes    Thrombocytopenia ([address] 1/19/2023 Yes    Acute respiratory failure with hypoxia and hypercapnia ([address] RLL pneumonia 1/19/2023 Yes    Community acquired pneumonia of right lower lobe of lung 1/19/2023 Yes    Emphysema lung ([address] 1/19/2023 Yes    Cerebral infarction ([address] 1/19/2023 Yes    Moderate malnutrition ([address] 1/19/2023 Yes    Hepatitis C virus infection without hepatic coma 1/19/2023 Yes    Dysphagia 1/19/2023 Yes    Impending cerebrovascular accident [facility]) 1/27/2023 Yes    Dermatitis associated with moisture 1/20/2023 Yes   Plan:     Acute hypoxic and hypercapnic respiratory failure secondary to likely aspiration pneumonia, treated with vancomycin and Unasyn, also was positive for MRSA nasal swab, required intubation, extubated subsequently  Acute CVA with subarachnoid hemorrhage, cardioembolic, did not qualify for tPA,  Acute liver failure possible multifactorial, alcoholic liver disease with hepatitis C,  Hypercoagulable state secondary to acute liver disease, INR currently 1.6  Thrombocytopenia secondary to alcoholic liver disease and hepatitis C, improved  Dysphagia , PEG placed   Anemia , of ch disease multifactorial   DVT PPX with SCD only, as he had cerebral h'age  Full code status   PT/OT     1/21  Patient seen to be resting comfortably  Was able to participate in physical therapy today  No new issues  Repeat CT head showed no new strokes had no hemorrhage  Continue current management    1/22  Since no bleed noted on repeat CT head okay to resume DVT prophylaxis  Hemoglobin is stable,thrombocytopenia has resolved platelet count 234 on labs 1/20  No new issues continue current management    1/27  Labs, radiology, medications, vitals reviewed,  Episodes of desaturation, 88%, improved,  No shortness of breath or chest pain at this time,  Labs anemia, hemoglobin 10.8,  Microcytic, MCV 75.3,  Patient probably will need anemia of chronic disease,  Tolerating physical therapy,    January 28, 2023  Vitals labs stable  Denies chest pain shortness of breath palpitations  Patient is on carvedilol spironolactone ferrous sulfate bronchodilators and Lovenox                Consultations:   IP CONSULT TO DIETITIAN  IP CONSULT TO SOCIAL WORK  IP CONSULT TO INTERNAL MEDICINE  IP CONSULT TO Jose G Francisco MD  1/28/2023  10:23 AM    Copy sent to Dr. Ollie Shah MD    Please note that this chart was generated using voice recognition Dragon dictation software. Although every effort was made to ensure the accuracy of this automated transcription, some errors in transcription may have occurred.

## 2023-01-28 NOTE — PROGRESS NOTES
Speech Language Pathology  Speech Language Pathology  [facility] Acute Rehab Unit at [facility]    Cognitive/Dysphagia Treatment Note    Date: 1/28/2023  Patients Name: Rafael Mesa  MRN: [id number]  Diagnosis: Dysphagia s/p CVA  Patient Active Problem List   Diagnosis Code    Acute type II respiratory failure (HCC) J96.00    Transaminitis R74.01    Normocytic anemia D64.9    Thrombocytopenia (HCC) D69.6    Agitation R45.1    Acute respiratory failure with hypoxia and hypercapnia (HCC) RLL pneumonia J96.01, J96.02    Altered mental status R41.82    Community acquired pneumonia of right lower lobe of lung J18.9    Prolonged pt (prothrombin time) R79.1    Emphysema lung (HCC) J43.9    Cerebral infarction ([address] I63.9    ACP (advance care planning) Z71.89    Goals of care, counseling/discussion Z71.89    Encounter for palliative care Z51.5    Delirium due to another medical condition F05    Moderate malnutrition ([address] E44.0    Hepatitis C virus infection without hepatic coma B19.20    Dysphagia R13.10    Impending cerebrovascular accident ([address] I63.9    Dermatitis associated with moisture L30.8    Cerebrovascular accident (CVA) due to embolism of cerebral artery ([address] I63.40       Pain: none reported    Cognitive Treatment  Treatment time: 0316-7355 & 4795-8835  *shortened AM session d/t Pt returning to room late from PT     Subjective: [x] Alert [x] Cooperative     [] Confused     [] Agitated    [] Lethargic    Objective/Assessment:    Orientation: Orientation- 40% accuracy (I), 100% c mod-max cues. Pt oriented to city and president; disoriented to name of hospital and date). Recall: Word list retention (4 units, category inclusion)- 90% accuracy (I), 100% cued. Organization: Convergent categorization, ID category of 3 similar items (concrete)- 100% accuracy (I). Divergent thinking, add 1 to list of 3 similar items (concrete)- 100% accuracy (I).      Problem solving: Pt demonstrating decreased safety awareness, attempted to stand up to transfer from wheelchair to bed independently. Education provided re: fall risk and use of call button when in need of assistance. Pt verbalized understanding, agreeable to notifying RN. RN present to assist Pt from wheelchair to bed. Bed alarm on. Dysphagia: Oral care completed prior to writer arrival.  Inconsistent cough noted with sips of thin liquid via [employer] Protocol (FFW) throughout session. Pt. completed oral motor exercises x4 sets in reps of 20, BOT strengthening exercises x6 sets in reps of 20 and Kim maneuver x11 reps. Telesitter present. No family present. Plan:  [x] Continue ST services    [] Discharge from ST:        Discharge recommendations: [] Inpatient Rehab   [] [place]   [] Outpatient Therapy  [] Follow up at trauma clinic   [] Other:         Treatment completed by:  Nikki Ochoa M.A., CCC-SLP

## 2023-01-28 NOTE — PROGRESS NOTES
Physical Medicine & Rehabilitation  Progress Note      Subjective:      Haja Hollingsworth is a 60 y.o. male with multifocal CVA. He reports doing fine today. He denies any increase in shortness of breath, dysuria, and any other acute concerns. ROS:  Denies fevers, chills, sweats. No chest pain, palpitations, lightheadedness. Denies coughing, wheezing or shortness of breath. Denies abdominal pain, nausea, diarrhea or constipation. No new areas of joint pain. Denies new areas of numbness or weakness. Denies new anxiety or depression issues. No new skin problems. Rehabilitation:     Physical Therapy    Restrictions/Precautions: Fall Risk, General Precautions, Contact Precautions, Isolation, Bed Alarm  Implants present? :  (pt denies)  Other position/activity restrictions: Severe dysphagia- NPO. Unable to tolerate any PO safely. NG tube placed,  on high flow nasal cannula    Bed mobility  Rolling to Left: Moderate assistance  Rolling to Right: Moderate assistance  Supine to Sit: Supervision  Sit to Supine: Supervision  Scooting: Supervision  Bed Mobility Comments: HOB flat, exit to pt's right with no HR    Transfers  Sit to Stand: Stand by assistance  Stand to Sit: Stand by assistance  Bed to Chair: Stand by assistance (RW)  Stand Pivot Transfers: Stand by assistance (with and without AD)  Comment: patient demo'd impulsive movement, vcs for safety    Ambulation  Surface: Level tile  Device: Rolling Walker  Other Apparatus: O2 (2L)  Assistance: Stand by assistance  Quality of Gait: Needs cues for sequencing , step length. SpO2  95% with HR 92. (Noted improvement in reciprocal step legnth, SpO2 >93% on 2L HR 99 bpm.)  Gait Deviations: Slow Radha, Increased SELVIN  Distance: 200ft  Comments: SPO2 97% pre gait, 95% post gait  More Ambulation?: Yes      Occupational Therapy    ADL  Feeding: Dependent/Total, NPO  Feeding Skilled Clinical Factors: Peg tube feeds  Grooming:  Moderate assistance  Grooming Skilled Clinical Factors: Patient declines oral hygiene. Moderate assist for brushing hair due to significant tangles. UE Bathing: Stand by assistance  UE Bathing Skilled Clinical Factors: Seated on tub transfer bench in shower with SBA for safety due to impulsivity  LE Bathing: Maximum assistance  LE Bathing Skilled Clinical Factors: Assist for B feet/lower legs and buttocks. Minimal/Moderate assist for standing balance for buttocks  UE Dressing: Minimal assistance  UE Dressing Skilled Clinical Factors: Assist for orientation and to pull down over back  LE Dressing: Maximum assistance  LE Dressing Skilled Clinical Factors: Assist to thread B LE into pull-up. Patient attempted to thread B LE into pants, however threaded B LE into same pant leg. Assist to thread R LE into correct pant leg. Maximal assist to pull over hips  Toileting: Dependent/Total  Toileting Skilled Clinical Factors: Total assist for personal hygiene with BM. Assist for clothing management  Additional Comments: OT facilitated patient engagement in showering routine this date including bathing, dressing, grooming, and toileting tasks. Throughout self-care session, patient required Min/Mod verbal cues for sequencing, safety, attention to task, and problem solving. Patient is impulsive at times and has poor insight into deficits. Please see above for details regarding level of assist.          Balance  Sitting Balance: Stand by assistance  Standing Balance: Moderate assistance (Fluctuates between Minimal/Moderate assist)     Functional Mobility  Functional - Mobility Device: Rolling Walker  Activity:  (2-3 feet from recliner to w/c)  Assist Level: Moderate assistance (Moderate assist x 1, SBA x 1 for safety)  Functional Mobility Comments: with Moderate verbal cues for safety     Transfers  Stand Pivot Transfers: Moderate assistance  Sit to stand:  Moderate assistance  Stand to sit: Moderate assistance  Transfer Comments: with Moderate verbal cues for hand placemetn and safety  Toilet Transfers  Toilet - Technique: Stand pivot, To right, To left  Equipment Used: Grab bars  Toilet Transfer: Moderate assistance  Toilet Transfers Comments: with Moderate verbal cues for hand placement and safety     Shower Transfers  Shower - Transfer From: Wheelchair  Shower - Transfer Type: To and From  Shower - Transfer To: Transfer tub bench  Shower - Technique: Stand pivot, To right, To left  Shower Transfers: Moderate assistance  Shower Transfers Comments: with Moderate verbal cues for hand placement and safety         Speech Therapy  Subjective: [x] Alert     [x] Cooperative     [] Confused     [] Agitated    [] Lethargic     Objective/Assessment:     Orientation: Orientation- 40% accuracy (I), 100% c mod-max cues. Pt oriented to city and president; disoriented to name of hospital and date). Recall: Word list retention (4 units, category inclusion)- 90% accuracy (I), 100% cued. Organization: Convergent categorization, ID category of 3 similar items (concrete)- 100% accuracy (I). Divergent thinking, add 1 to list of 3 similar items (concrete)- 100% accuracy (I). Problem solving: Pt demonstrating decreased safety awareness, attempted to stand up to transfer from wheelchair to bed independently. Education provided re: fall risk and use of call button when in need of assistance. Pt verbalized understanding, agreeable to notifying RN. RN present to assist Pt from wheelchair to bed. Bed alarm on. Dysphagia: Oral care completed prior to writer arrival.  Inconsistent cough noted with sips of thin liquid via [employer] Protocol (FFW) throughout session. Pt. completed oral motor exercises x4 sets in reps of 20, BOT strengthening exercises x6 sets in reps of 20 and Kim maneuver x11 reps. Telesitter present. No family present.        Current Medications:   Current Facility-Administered Medications: clotrimazole (LOTRIMIN) 1 % cream, , Topical, BID  enoxaparin (LOVENOX) injection 40 mg, 40 mg, SubCUTAneous, Daily  ipratropium-albuterol (DUONEB) nebulizer solution 1 ampule, 1 ampule, Inhalation, Q4H WA  guaiFENesin (MUCINEX) extended release tablet 600 mg, 600 mg, Oral, BID PRN  carvedilol (COREG) tablet 6.25 mg, 6.25 mg, Oral, BID WC  ferrous sulfate (IRON 325) tablet 325 mg, 325 mg, Oral, Daily with breakfast  spironolactone (ALDACTONE) tablet 25 mg, 25 mg, Oral, Daily  acetaminophen (TYLENOL) tablet 650 mg, 650 mg, Oral, Q4H PRN  polyethylene glycol (GLYCOLAX) packet 17 g, 17 g, Oral, Daily  senna (SENOKOT) tablet 17.2 mg, 2 tablet, Oral, Daily PRN  bisacodyl (DULCOLAX) suppository 10 mg, 10 mg, Rectal, Daily PRN      Objective:  /82   Pulse 79   Temp 98.4 °F (36.9 °C) (Oral)   Resp 16   Ht 5' 7\" (1.702 m)   Wt 163 lb (73.9 kg)   SpO2 95%   BMI 25.53 kg/m²       GEN: Well developed, well nourished, no acute distress  HEENT: NCAT. EOM grossly intact. Hearing grossly intact. Mucous membranes pink and moist.  RESP: Normal breath sounds with no wheezing, rales, or rhonchi. Respirations WNL and unlabored. On nasal cannula oxygen. CV: Regular rate and rhythm. No murmurs, rubs, or gallops. ABD: Soft, non-distended, BS+ and equal.  NEURO: Alert. Speech fluent. Sensation to light touch intact. MSK: Muscle tone and bulk are normal bilaterally. Strength 5/5 in bilateral upper limbs. Strength 5/5 with bilateral ankle dorsiflexion and plantarflexion. LIMBS: No edema in bilateral lower limbs. SKIN: Warm and dry with good turgor. PSYCH: Mood WNL. Affect WNL. Appropriately interactive. Diagnostics:     CBC:   Recent Labs     01/27/23  0639   WBC 4.0   RBC 4.63   HGB 10.8*   HCT 34.9*   MCV 75.3*   RDW 33.8*        BMP:   Recent Labs     01/27/23  0639      K 3.9      CO2 28   BUN 15   CREATININE 0.49*   GLUCOSE 101*     BNP: No results for input(s): BNP in the last 72 hours.   PT/INR: No results for input(s): PROTIME, INR in the last 72 hours. APTT: No results for input(s): APTT in the last 72 hours. CARDIAC ENZYMES: No results for input(s): CKMB, CKMBINDEX, TROPONINT in the last 72 hours. Invalid input(s): CKTOTAL;3  FASTING LIPID PANEL:  Lab Results   Component Value Date    TRIG 85 01/03/2023     LIVER PROFILE: No results for input(s): AST, ALT, ALB, BILIDIR, BILITOT, ALKPHOS in the last 72 hours. Reviewed note from internal medicine. Impression/Plan:   Impaired ADLs, gait, and mobility due to:    Multifocal cardioembolic CVA, subarachnoid hemorrhage:  PT/OT for gait, mobility, strengthening, endurance, ADLs, and self care. No residual SAH on CT head 1/21. Dysphagia:  S/p PEG tube placement on 1/17. SLP treating. Dietitian managing tube feed - nocturnal with bolus - some high residuals at night. Monitor BMP TIW. Free water protocol. Agitation:  Recurred 1/21. Patient moved to room closer to the nurses station and telesitter initiated. Repeat CT head improved. Seroquel BID started 1/21 - mental status improved and Seroquel was discontinued. Insomnia: Trazodone started nightly on 1/20 - failed. Improved - no current medication. Acute respiratory failure requiring intubation. Currently on 1.5 to 2L NC - weaning as tolerated. On duo-neb every 4 hours while awake. Pneumonia: Improved. Completed course of Unasyn and Vanco. CXR 1/24 for increased productive cough - stable hazy opacities. KENTRELL: Resolved. Will monitor. Elevated LFTs:  Resolved. Will monitor  HCV:  Will need follow up with GI for treatment as an outpatient  Anemia: Hemoglobin 10.8 on 1/27, stable. Monitoring. On oral iron supplementation. Thrombocytopenia: Resolved. Will monitor. Bowel Management: Miralax daily, senokot prn, dulcolax prn. DVT Prophylaxis:  Repeat CT - SAH resolved 1/21 - okay to initiate DVT chemoprophylaxis per IM. Lovenox started 1/22. TONI stockings during the day, EPC cuffs at night.   Internal Medicine for medical management  Follow up PCP 1-2 weeks, PM&R 4-6 weeks, GI, Neurology      Electronically signed by Ketty Manning MD on 1/28/2023 at 9:13 PM

## 2023-01-28 NOTE — PROGRESS NOTES
[address] [facility]    OCCUPATIONAL THERAPY MISSED TREATMENT NOTE   ACUTE REHAB  Date: 23  Patient Name: Kenji Rosado       Room: 9472/8216-73  MRN: [id number]   Account #: [de-identified]    : 1960  (60 y.o.)  Gender: male   Referring Practitioner: Yolande Wiggins MD  Diagnosis: Cerebrovascular accident             REASON FOR MISSED TREATMENT:  Patient declined   -   Pt. Declined to participate in both AM and PM OT sessions this date due to fatigue. Will continue to follow and encourage/educate patient.        23 1517   Minute Variance   Variance 90   Reason Refusal  (Pt. refusing to participate in both AM and PM OT sessions this date.)         Refugia Conception, WIN/L

## 2023-01-28 NOTE — PROGRESS NOTES
Pt up every 1 to 2 hr to urinate. Unable to a good night rest. Pt also has increased SOB with exertion and feels exhausted.

## 2023-01-28 NOTE — PLAN OF CARE
Problem: Discharge Planning  Goal: Discharge to home or other facility with appropriate resources  1/28/2023 0428 by Jem Del Angel LPN  Outcome: Progressing     Problem: Nutrition Deficit:  Goal: Optimize nutritional status  1/28/2023 0428 by Jem Del Angel LPN  Outcome: Progressing     Problem: Safety - Adult  Goal: Free from fall injury  1/28/2023 0428 by Jem Del Angel LPN  Outcome: Progressing     Problem: ABCDS Injury Assessment  Goal: Absence of physical injury  1/28/2023 0428 by Jem Del Angel LPN  Outcome: Progressing  Flowsheets (Taken 1/27/2023 2022 by Loren Reed RN)  Absence of Physical Injury: Implement safety measures based on patient assessment     Problem: Skin/Tissue Integrity  Goal: Absence of new skin breakdown  Description: 1. Monitor for areas of redness and/or skin breakdown  2. Assess vascular access sites hourly  3. Every 4-6 hours minimum:  Change oxygen saturation probe site  4. Every 4-6 hours:  If on nasal continuous positive airway pressure, respiratory therapy assess nares and determine need for appliance change or resting period.   1/28/2023 0428 by Jem Del Angel LPN  Outcome: Progressing

## 2023-01-29 PROCEDURE — 94761 N-INVAS EAR/PLS OXIMETRY MLT: CPT

## 2023-01-29 PROCEDURE — 97129 THER IVNTJ 1ST 15 MIN: CPT

## 2023-01-29 PROCEDURE — 1180000000 HC REHAB R&B

## 2023-01-29 PROCEDURE — 97535 SELF CARE MNGMENT TRAINING: CPT

## 2023-01-29 PROCEDURE — 97110 THERAPEUTIC EXERCISES: CPT

## 2023-01-29 PROCEDURE — 99231 SBSQ HOSP IP/OBS SF/LOW 25: CPT | Performed by: INTERNAL MEDICINE

## 2023-01-29 PROCEDURE — 6370000000 HC RX 637 (ALT 250 FOR IP): Performed by: PHYSICAL MEDICINE & REHABILITATION

## 2023-01-29 PROCEDURE — 6360000002 HC RX W HCPCS: Performed by: PHYSICAL MEDICINE & REHABILITATION

## 2023-01-29 PROCEDURE — 97116 GAIT TRAINING THERAPY: CPT

## 2023-01-29 PROCEDURE — 97112 NEUROMUSCULAR REEDUCATION: CPT

## 2023-01-29 PROCEDURE — 97530 THERAPEUTIC ACTIVITIES: CPT

## 2023-01-29 PROCEDURE — 94640 AIRWAY INHALATION TREATMENT: CPT

## 2023-01-29 PROCEDURE — 2700000000 HC OXYGEN THERAPY PER DAY

## 2023-01-29 PROCEDURE — 92526 ORAL FUNCTION THERAPY: CPT

## 2023-01-29 PROCEDURE — 6370000000 HC RX 637 (ALT 250 FOR IP): Performed by: INTERNAL MEDICINE

## 2023-01-29 RX ADMIN — IPRATROPIUM BROMIDE AND ALBUTEROL SULFATE 1 AMPULE: 2.5; .5 SOLUTION RESPIRATORY (INHALATION) at 15:41

## 2023-01-29 RX ADMIN — CARVEDILOL 6.25 MG: 6.25 TABLET, FILM COATED ORAL at 17:06

## 2023-01-29 RX ADMIN — FERROUS SULFATE TAB 325 MG (65 MG ELEMENTAL FE) 325 MG: 325 (65 FE) TAB at 08:19

## 2023-01-29 RX ADMIN — IPRATROPIUM BROMIDE AND ALBUTEROL SULFATE 1 AMPULE: 2.5; .5 SOLUTION RESPIRATORY (INHALATION) at 20:26

## 2023-01-29 RX ADMIN — POLYETHYLENE GLYCOL 3350 17 G: 17 POWDER, FOR SOLUTION ORAL at 08:19

## 2023-01-29 RX ADMIN — SPIRONOLACTONE 25 MG: 25 TABLET ORAL at 08:19

## 2023-01-29 RX ADMIN — CARVEDILOL 6.25 MG: 6.25 TABLET, FILM COATED ORAL at 08:19

## 2023-01-29 RX ADMIN — ENOXAPARIN SODIUM 40 MG: 100 INJECTION SUBCUTANEOUS at 08:19

## 2023-01-29 RX ADMIN — IPRATROPIUM BROMIDE AND ALBUTEROL SULFATE 1 AMPULE: 2.5; .5 SOLUTION RESPIRATORY (INHALATION) at 07:02

## 2023-01-29 RX ADMIN — CLOTRIMAZOLE: 10 CREAM TOPICAL at 08:21

## 2023-01-29 RX ADMIN — IPRATROPIUM BROMIDE AND ALBUTEROL SULFATE 1 AMPULE: 2.5; .5 SOLUTION RESPIRATORY (INHALATION) at 11:50

## 2023-01-29 ASSESSMENT — PAIN SCALES - GENERAL
PAINLEVEL_OUTOF10: 0

## 2023-01-29 ASSESSMENT — PAIN SCALES - WONG BAKER

## 2023-01-29 NOTE — PROGRESS NOTES
DARLENE KINGSLEY [facility] Internal Medicine  Ahmet Sands MD; Sylvia Kaba MD; Otilia Win MD; MD Celina Graves Cera, MD; Trav Cancino MD    [facility] Internal Medicine   [facility]    Progress note          Date:   1/29/2023  Patient name:  Richar Whitehead  Date of admission:  1/19/2023  9:12 AM  MRN:   [id number]  Account:  [de-identified]  YOB: 1960  PCP:    Trav Cancino MD  Room:   [address] [place] [address]  Code Status:    Full Code    Physician Requesting Consult: Tori Christie MD    Reason for Consult: Medical management    Chief Complaint:     No chief complaint on file. Acute CVA, generalized weakness, metabolic encephalopathy, multifactorial with underlying acute respiratory failure with pneumonia and heart failure    History Obtained From:     patient    History of Present Illness:     77-year-old gentleman with unknown past medical history presented to inpatient [facility] with metabolic encephalopathy found to have acute respiratory failure, pneumonia, also had slurred speech, confusion progressively was getting worse, EMS brought the patient to the hospital his saturations were  75%, found to have acute respiratory failure with pneumonia, in the ED patient was placed on BiPAP, chest x-ray confirmed right lower lobe pneumonia with pleural effusion. ABG showed acute respiratory failure with hypercapnia and hypoxia. Subsequently also found to have NSTEMI, acute heart failure with transaminitis possible shock liver, with also hepatitis C acute with hepatic failure, slowly improved,  Also had multiple acute brain infarct with subarachnoid hemorrhage,,  Subsequently patient was evaluated for acute inpatient rehab, accepted and admitted right now we are consulted for medical management    January 28  No new symptoms  Tolerating rehab therapies  Past Medical History:     History reviewed. No pertinent past medical history. Past Surgical History:     Past Surgical History:   Procedure Laterality Date    UPPER GASTROINTESTINAL ENDOSCOPY N/A 1/17/2023    EGD ESOPHAGOGASTRODUODENOSCOPY PEG TUBE INSERTION performed by Edwin Singer DO at [address] [place] [address]        Medications Prior to Admission:     Prior to Admission medications    Medication Sig Start Date End Date Taking? Authorizing Provider   aspirin 325 MG tablet Take 325 mg by mouth daily Patient takes 2 tabs daily    Historical Provider, MD   psyllium (KONSYL) 28.3 % PACK Take 1 packet by mouth daily    Historical Provider, MD        Allergies:     Patient has no known allergies. Social History:     Tobacco:    reports that he has been smoking cigarettes. He has a 40.00 pack-year smoking history. He has never used smokeless tobacco.  Alcohol:      reports current alcohol use. Drug Use:  reports that he does not currently use drugs. Family History:     History reviewed. No pertinent family history. Review of Systems:     Positive and Negative as described in HPI. CONSTITUTIONAL:  negative for fevers, chills, sweats, fatigue, weight loss  HEENT:  negative for vision, hearing changes, runny nose, throat pain  RESPIRATORY:  negative for shortness of breath, cough, congestion, wheezing. CARDIOVASCULAR:  negative for chest pain, palpitations.   GASTROINTESTINAL:  negative for nausea, vomiting, diarrhea, constipation, change in bowel habits, abdominal pain   GENITOURINARY:  negative for difficulty of urination, burning with urination, frequency   INTEGUMENT:  negative for rash, skin lesions, easy bruising   HEMATOLOGIC/LYMPHATIC:  negative for swelling/edema   ALLERGIC/IMMUNOLOGIC:  negative for urticaria , itching  ENDOCRINE:  negative increase in drinking, increase in urination, hot or cold intolerance  MUSCULOSKELETAL:  negative joint pains, muscle aches, swelling of joints  NEUROLOGICAL:  negative for headaches, dizziness, lightheadedness, numbness, pain, tingling extremities  BEHAVIOR/PSYCH:  negative for depression, anxiety    Physical Exam:     BP (!) 109/90   Pulse 78   Temp 97.7 °F (36.5 °C) (Oral)   Resp 16   Ht 5' 7\" (1.702 m)   Wt 163 lb (73.9 kg)   SpO2 94%   BMI 25.53 kg/m²   Temp (24hrs), Av.1 °F (36.7 °C), Min:97.7 °F (36.5 °C), Max:98.4 °F (36.9 °C)    No results for input(s): POCGLU in the last 72 hours. Intake/Output Summary (Last 24 hours) at 2023 1028  Last data filed at 2023 0830  Gross per 24 hour   Intake 1300 ml   Output 125 ml   Net 1175 ml         General Appearance:  alert, well appearing, and in no acute distress  Mental status: oriented to person, place, and time with normal affect  Head:  normocephalic, atraumatic. Eye: no icterus, redness, pupils equal and reactive, extraocular eye movements intact, conjunctiva clear  Ear: normal external ear, no discharge, hearing intact  Nose:  no drainage noted  Mouth: mucous membranes moist  Neck: supple, no carotid bruits, thyroid not palpable  Lungs: Bilateral equal air entry, clear to ausculation, no wheezing, rales or rhonchi, normal effort  Cardiovascular: normal rate, regular rhythm, no murmur, gallop, rub. Abdomen: Soft, nontender, nondistended, normal bowel sounds, no hepatomegaly or splenomegaly  Neurologic: There are no new focal motor or sensory deficits, normal muscle tone and bulk, no abnormal sensation, normal speech, cranial nerves II through XII grossly intact  Skin: No gross lesions, rashes, bruising or bleeding on exposed skin area  Extremities:  peripheral pulses palpable, no pedal edema or calf pain with palpation  Psych: normal affect    Investigations:      Laboratory Testing:  No results found for this or any previous visit (from the past 24 hour(s)).           Imaging/Diagonstics:  XR CHEST PORTABLE    Result Date: 2023  Interval extubation and NG tube removal.  Otherwise similar findings, with perhaps slightly greater hazy ground-glass opacity right mid lung.  Correlate clinically. FL MODIFIED BARIUM SWALLOW W VIDEO    Result Date: 1/14/2023  Premature vallecular spillage. Piriform sinus cavity residue. Deep penetration with both materials. Aspiration with pudding. Please see separate speech pathology report for full discussion of findings and recommendations. Assessment :      Medications:      Allergies:  No Known Allergies    Current Meds:   Scheduled Meds:    clotrimazole   Topical BID    enoxaparin  40 mg SubCUTAneous Daily    ipratropium-albuterol  1 ampule Inhalation Q4H WA    carvedilol  6.25 mg Oral BID WC    ferrous sulfate  325 mg Oral Daily with breakfast    spironolactone  25 mg Oral Daily    polyethylene glycol  17 g Oral Daily     Continuous Infusions:   PRN Meds: guaiFENesin, acetaminophen, senna, bisacodyl    Hospital Problems             Last Modified POA    * (Principal) Cerebrovascular accident (CVA) due to embolism of cerebral artery ([address] 1/27/2023 Yes    Acute type II respiratory failure ([address] 1/19/2023 Yes    Transaminitis 1/19/2023 Yes    Normocytic anemia 1/19/2023 Yes    Thrombocytopenia ([address] 1/19/2023 Yes    Acute respiratory failure with hypoxia and hypercapnia ([address] RLL pneumonia 1/19/2023 Yes    Community acquired pneumonia of right lower lobe of lung 1/19/2023 Yes    Emphysema lung ([address] 1/19/2023 Yes    Cerebral infarction ([address] 1/19/2023 Yes    Moderate malnutrition ([address] 1/19/2023 Yes    Hepatitis C virus infection without hepatic coma 1/19/2023 Yes    Dysphagia 1/19/2023 Yes    Impending cerebrovascular accident [facility]) 1/27/2023 Yes    Dermatitis associated with moisture 1/20/2023 Yes   Plan:     Acute hypoxic and hypercapnic respiratory failure secondary to likely aspiration pneumonia, treated with vancomycin and Unasyn, also was positive for MRSA nasal swab, required intubation, extubated subsequently  Acute CVA with subarachnoid hemorrhage, cardioembolic, did not qualify for tPA,  Acute liver failure possible multifactorial, alcoholic liver disease with hepatitis C,  Hypercoagulable state secondary to acute liver disease, INR currently 1.6  Thrombocytopenia secondary to alcoholic liver disease and hepatitis C, improved  Dysphagia , PEG placed   Anemia , of ch disease multifactorial   DVT PPX with SCD only, as he had cerebral h'age  Full code status   PT/OT     1/21  Patient seen to be resting comfortably  Was able to participate in physical therapy today  No new issues  Repeat CT head showed no new strokes had no hemorrhage  Continue current management    1/22  Since no bleed noted on repeat CT head okay to resume DVT prophylaxis  Hemoglobin is stable,thrombocytopenia has resolved platelet count 345 on labs 1/20  No new issues continue current management    1/27  Labs, radiology, medications, vitals reviewed,  Episodes of desaturation, 88%, improved,  No shortness of breath or chest pain at this time,  Labs anemia, hemoglobin 10.8,  Microcytic, MCV 75.3,  Patient probably will need anemia of chronic disease,  Tolerating physical therapy,    January 28, 2023  Vitals labs stable  Denies chest pain shortness of breath palpitations  Patient is on carvedilol spironolactone ferrous sulfate bronchodilators and Lovenox    1/29/23  Progressing well . Progressing satisfactory with rehab therapies . Consultations:   IP CONSULT TO DIETITIAN  IP CONSULT TO SOCIAL WORK  IP CONSULT TO INTERNAL MEDICINE  IP CONSULT TO Dwayne Yeh MD  1/29/2023  10:28 AM    Copy sent to Dr. Warden Yunier MD    Please note that this chart was generated using voice recognition Dragon dictation software. Although every effort was made to ensure the accuracy of this automated transcription, some errors in transcription may have occurred.

## 2023-01-29 NOTE — PROGRESS NOTES
Physical Therapy  Facility/Department: \[facility]\"" ACUTE REHAB  Rehabilitation Physical Therapy  NAME: Rakesh Elena  : 1960 (6 y.o.)  MRN: [id number]  CODE STATUS: Full Code    Date of Service: 23      History reviewed. No pertinent past medical history. Past Surgical History:   Procedure Laterality Date    UPPER GASTROINTESTINAL ENDOSCOPY N/A 2023    EGD ESOPHAGOGASTRODUODENOSCOPY PEG TUBE INSERTION performed by Henna Dale DO at [facility] ENDO       Patient assessed for rehabilitation services?: Yes  Additional Pertinent Hx: History of Present Illness:  Rakesh Elena  is a 60 y.o. right-handed male admitted to the [address] [place] [address] unit on 2023. He was originally admitted to [facility] on 2023 for acute respiratory failure 2/2 RLL pneumonia. Blood/resp/urine cultures negative, although his MRSA swab was +. He completed course of Unasyn and vancomycin. Imaging showed numerous acute, subacute, and chronic infarcts in the left parietal lobe, left cerebellar hemisphere, as well as bilateral SAH. PEG tube was placed 23 for severe dysphagia. HCV+. ID, nephrology, heme-onc, neuro, pulm, and psych are following. Patient admitted to ARU this morning. He reports doing well this morning. He denies any pain aside from some abdominal pain when he coughs. He does note some wheezes and tenderness around the PEG tube site. He does endorse some generalized weakness. He is currently requiring assistance for self-care activities and mobility prompting this admission. Family / Caregiver Present: No  Referring Practitioner: ALAYNA Wallis NP  Referral Date : 23  Diagnosis: Acute respiratory failure  General Comment  Comments: Pt just finished working with OT upon arrival to room. Pt resting SPO2 on room air 92%. Will continue to monitor O2 levels throughout session and only use prn to maintain levels 90%and greater.  Pt still sitting up in recliner upon entering room for pm session. Reported it felt good to not be laying in bed. Restrictions:  Restrictions/Precautions: Fall Risk;General Precautions;Contact Precautions;Isolation; Bed Alarm  Position Activity Restriction  Other position/activity restrictions: Severe dysphagia- NPO. Unable to tolerate any PO safely. NG tube placed,  on high flow nasal cannula    Functional Mobility  Bed mobility  Bed Mobility Comments: Not completed on this date  Transfers  Sit to Stand: Stand by assistance;Supervision  Stand to Sit: Stand by assistance;Supervision  Bed to Chair: Stand by assistance  Stand Pivot Transfers: Stand by assistance (transfers completed with and without AD safely)  Balance  Posture: Fair  Sitting - Static: Good;+  Sitting - Dynamic: Good  Standing - Static: Good;-  Standing - Dynamic: Fair;-  Comments: Standing with rolling walker    Environmental Mobility  Ambulation  Surface: Level tile  Device: Rolling Walker  Assistance: Stand by assistance  Quality of Gait: VC's to stay up inside SELVIN of RW  Gait Deviations: Slow Radha;Decreased step length;Decreased step height  Distance: 200ft  Comments: SPO2 93% pre gait on RA, dropped to 87% at lowest during amb. Took 1min to reboud back to 90% without supplumental oxygen. Ambulation 2  Surface - 2: level tile  Device 2: No device  Assistance 2: Supervision  Gait Deviations: Slow Radha;Decreased step length;Decreased step height  Distance: 15ft x 1 10ft x 1  Comments: short distances within room. No LOB SPO2 92% on RA  Ambulation 3  Surface - 3: ramp  Device 3: Rolling Walker  Other Apparatus 3: O2 (1L)  Assistance 3: Stand by assistance  Quality of Gait 3: continue to remind pt to stay up inside RW SELVIN with amb. Slow but steady.   Gait Deviations: Slow Radha;Decreased step length;Decreased step height  Distance: 74ft  Comments: SPO2 95% pre and post gait  Stairs/Curb  Stairs?: Yes  Stairs  # Steps : 5  Stairs Height: 4\" (and 6\")  Rails: Bilateral  Device: No Device  Assistance: Stand by assistance  Comment: demonstrated reciprocal pattern ascending/decending 4\" steps and reciprocal pattern ascending 6\" and step to decending 6\" steps. Destated to 88% on RA. Supplemental O2 given to pt at 1L. SPO2 returned to 95%. Slow pace. increased time to complete task. PT Exercises  Resistive Exercises: (B) LE seated ex 2# x 15 including lime green t-band (SPO2 at 92% on RA throughout)  Static Standing Balance Exercises: rebounder with ball x 5 throws. Slow reaction time and pt experienced LOB x 1. Max A from writer to prevent pt from falling/recover  Dynamic Standing Balance Exercises: using reacher standing at RW and sitting in w/c picking objects up off floor  Motor Control/Coordination: Pt demonstrated the ability to collect toothbrush/toothpase off bedside table enter bathroom and brush his teeth standing at the sink and wash his glasses. Pt also used toilet to urinate. All activities completed with supervision on RA. Pt left sitting up in recliner at end of session on RA. SPO2 92%  Exercise Equipment: NuStep, workload 3, 10 min. (Pt on RA. Able to maintain SPO2 levels above 90% throughout time while maintaining 25steps per minute. Good pacing skills.)    Activity Tolerance  Activity Tolerance: Patient limited by endurance; Patient limited by fatigue; Other (comment)    Assessment  Assessment: 61 y/o male adm with AMS and found to have [address] with recent fall at home. Pt is a high fall risk as he has decreased safety awareness and B LE weakness L hemibody > R. Pt will benefit from continued therapy while in rehab unit to improve fucntion and safety. Performance Deficits/Impairments: Decreased functional mobility ; Decreased ADL status; Decreased body mechanics; Decreased strength;Decreased safe awareness;Decreased cognition;Decreased endurance;Decreased balance;Decreased coordination; Increased pain;Decreased posture  Activity Tolerance Comment: Monitor SP02 throughout therapy sessions  Treatment Diagnosis: impared mobility s/p AMS/SAH  Therapy Prognosis: Good  Decision Making: Medium Complexity  History: falls, 1 week  AMS PTA  Exam: L side weakness, decreased activity tolerance  Barriers to Learning: cognition, endurance  Discharge Recommendations: Therapy recommended at discharge;Home with assist PRN;Patient would benefit from continued therapy after discharge      GOALS  Patient Goals   Patient Goals : Want to be independent to go home. Short Term Goals  Time Frame for Short Term Goals: 9 days  Short Term Goal 1: CGA assist supine<-> sit x1 assist  Short Term Goal 2: sit EOB withsupervsion up to 15 min for therex/ADL  Short Term Goal 3: 3+/5 LE strength to prepare for standing activiites and gait  Short Term Goal 4: ambulation with rolling walker distance of 70 to 100 ft, CGA, level surface  Short Term Goal 5: roll L/R with SBA  Additional Goals?: Yes  Short Term Goal 6: Pt able to go up and down 5 steps with 2 rails, min A  Long Term Goals  Time Frame for Long Term Goals : By DC  Long Term Goal 1: pt able to roll L/R indepndently  Long Term Goal 2: pt able to perform supine<>sit mod-I  Long Term Goal 3: pt able to perform sit<>stand and perform pivot/step to transfers at mod-I  Long Term Goal 4: pt able to ambulate household distance with appropriate device, mod-I  Long Term Goal 5: pt able to ambulate > 50 ft with appropriate device, at SBA/supervsion level, level as well as incline surface. Additional Goals?: Yes  Long term goal 6: pt able to go up and down 4 or more steps with B UE support, CGA. Long term goal 7: Improve PASS score to 27/36 improve function/stability. Long term goal 8: improve Tinetti balance score to atleast 23/28 to reduce fall risk.     PLAN OF CARE  Frequency: 1-2 treatment sessions per day, 5-7 days per week  Physcial Therapy Plan  General Plan:  minutes of therapy at least 5 out of 7 days a week (5 to 7 days/week.)  Specific Instructions for Next Treatment: Continue to progress with functional mobility training  Current Treatment Recommendations: Strengthening;Balance training;Functional mobility training;Neuromuscular re-education;Cognitive reorientation; Safety education & training;Patient/Caregiver education & training;Equipment evaluation, education, & procurement; Therapeutic activities;Transfer training; Wheelchair mobility training;Gait training;Stair training;Home exercise program;Positioning  Safety Devices  Type of Devices: All fall risk precautions in place;Gait belt;Left in chair;Telesitter in use    EDUCATION  Education  Education Given To: Patient  Education Provided: Mobility Training (pacing skills)  Education Provided Comments: Ed on process of continuing to ween pt off oxygen and onto RA. Still needing supplemental O2 with activity, also benefits of sitting up in chair vs laying in bed keeping SPO2 levels at or above 90%.   Education Method: Verbal  Education Outcome: Verbalized understanding    ELOS:          Therapy Time   Individual Concurrent Group Co-treatment   Time In 6608         Time Out 1425         Minutes [address] [employer] [place], [place], 01/29/23 at 4:08 PM

## 2023-01-29 NOTE — PROGRESS NOTES
Speech Language Pathology  Speech Language Pathology  [facility] Acute Rehab Unit at [facility]    Cognitive/Dysphagia Treatment Note    Date: 1/29/2023  Patients Name: Pilar Medrano  MRN: [id number]  Diagnosis: Dysphagia s/p CVA  Patient Active Problem List   Diagnosis Code    Acute type II respiratory failure (HCC) J96.00    Transaminitis R74.01    Normocytic anemia D64.9    Thrombocytopenia (HCC) D69.6    Agitation R45.1    Acute respiratory failure with hypoxia and hypercapnia (HCC) RLL pneumonia J96.01, J96.02    Altered mental status R41.82    Community acquired pneumonia of right lower lobe of lung J18.9    Prolonged pt (prothrombin time) R79.1    Emphysema lung (HCC) J43.9    Cerebral infarction ([address] I63.9    ACP (advance care planning) Z71.89    Goals of care, counseling/discussion Z71.89    Encounter for palliative care Z51.5    Delirium due to another medical condition F05    Moderate malnutrition ([address] E44.0    Hepatitis C virus infection without hepatic coma B19.20    Dysphagia R13.10    Impending cerebrovascular accident ([address] I63.9    Dermatitis associated with moisture L30.8    Cerebrovascular accident (CVA) due to embolism of cerebral artery ([address] I63.40       Pain: none reported    Cognitive Treatment  Treatment time: 0056-0728  *shortened session d/t Pt still working with PT upon arrival     Subjective: [x] Alert [x] Cooperative     [] Confused     [] Agitated    [] Lethargic    Objective/Assessment:    Orientation: Orientation log administered, O-Log score- 30/30 (oriented to all concepts, improvement from previous score of 20/30 on 01/26). Recall: Not addressed directly. Organization: Convergent categorization, ID category of 3 similar items (abstract)- 90% accuracy (I), 100% cued. Divergent thinking, add 1 to list of 3 similar items (abstract)- 90% accuracy (I), 100% cued. Problem solving: n/a    Dysphagia: Oral care completed at end of PT session.   Inconsistent cough noted with sips of thin liquid per Free Water Protocol taken at end of session. Pt. completed oral motor exercises x2 sets in reps of 20, BOT strengthening exercises x2 sets in reps of 20 and Kim maneuver x6 reps. Telesitter present. No family present. Plan:  [x] Continue ST services    [] Discharge from ST:        Discharge recommendations: [] Inpatient Rehab   [] [place]   [] Outpatient Therapy  [] Follow up at trauma clinic   [] Other:         Treatment completed by:  Mariam Arguello M.A., CCC-SLP

## 2023-01-29 NOTE — PROGRESS NOTES
Physical Medicine & Rehabilitation  Progress Note      Subjective:      Jose Henley is a 60 y.o. male with multifocal CVA. He reports doing fine today. He states that his breathing feels improved. He has not been requiring nasal cannula oxygen at rest this morning and did not require it during morning PT. He notes sleeping well overnight. He denies any other acute concerns. ROS:  Denies fevers, chills, sweats. No chest pain, palpitations, lightheadedness. Denies coughing, wheezing or shortness of breath. Denies abdominal pain, nausea, diarrhea or constipation. No new areas of joint pain. Denies new areas of numbness or weakness. Denies new anxiety or depression issues. No new skin problems. Rehabilitation:     Physical Therapy    Restrictions/Precautions: Fall Risk, General Precautions, Contact Precautions, Isolation, Bed Alarm  Implants present? :  (pt denies)  Other position/activity restrictions: Severe dysphagia- NPO. Unable to tolerate any PO safely. NG tube placed,  on high flow nasal cannula    Bed mobility  Rolling to Left: Moderate assistance  Rolling to Right: Moderate assistance  Supine to Sit: Supervision  Sit to Supine: Supervision  Scooting: Supervision  Bed Mobility Comments: Not completed on this date    Transfers  Sit to Stand: Stand by assistance, Supervision  Stand to Sit: Stand by assistance, Supervision  Bed to Chair: Stand by assistance  Stand Pivot Transfers: Stand by assistance (transfers completed with and without AD safely)  Comment: patient demo'd impulsive movement, vcs for safety    Ambulation  Surface: Level tile  Device: Rolling Walker  Other Apparatus: O2 (2L)  Assistance: Stand by assistance  Quality of Gait: VC's to stay up inside SELVIN of RW  Gait Deviations: Slow Radha, Decreased step length, Decreased step height  Distance: 200ft  Comments: SPO2 93% pre gait on RA, dropped to 87% at lowest during amb.  Took 1min to reboud back to 90% without supplumental oxygen. More Ambulation?: Yes      Occupational Therapy  Grooming/Oral Hygiene  Assistance Level: Supervision  Skilled Clinical Factors: standing sinkside to complete hand hygiene; pt requries maximum encouragement and education to complete oral hygiene this date however declines     Upper Extremity Bathing  Assistance Level: Supervision  Skilled Clinical Factors: Seated on shower bench; minimal verbal cues to sequence this date     Lower Extremity Bathing  Assistance Level: Stand by assist  Skilled Clinical Factors: Seated on shower bench for all LB bathing- weight shifting for tiffany hygiene and washing of buttocks. Minimal verbal cues for task initiation and some sequencing. Upper Extremity Dressing  Assistance Level: Supervision  Skilled Clinical Factors: seated on shower chair to doff/don overhead shirt     Lower Extremity Dressing  Assistance Level: Contact guard assist  Skilled Clinical Factors: pt able to thread BLEs into brief/pants, standing with CGA to pull up over hips. requires VCs this date for sequencing     Putting On/Taking Off Footwear  Assistance Level: Supervision  Skilled Clinical Factors: to don B socks and slippers     Toileting  Assistance Level: Supervision  Skilled Clinical Factors: standing at toilet to urinate        Tub/Shower Transfers  Type: Shower  Transfer From: Rolling walker  Transfer To: Tub transfer bench  Additional Factors: Verbal cues;Cues for hand placement; Increased time to complete  Assistance Level: Contact guard assist  Skilled Clinical Factors: CGA over threshold for safety due to slippery/wet environment        Light Housekeeping  Light Housekeeping Level: [employer] Holes [employer] Level of Assistance: Supervision  Light Housekeeping: PM: patient participated in linen adjustment and bed making task. Pt demonstrates bending and reaching outside base of support with no loss of balance. Pt tolerated activity fair, taking 2 seated rest breaks throughout.       Speech Therapy  Subjective: [x] Alert     [x] Cooperative     [] Confused     [] Agitated    [] Lethargic     Objective/Assessment:     Orientation: Orientation log administered, O-Log score- 30/30 (oriented to all concepts, improvement from previous score of 20/30 on 01/26). Recall: Not addressed directly. Organization: Convergent categorization, ID category of 3 similar items (abstract)- 90% accuracy (I), 100% cued. Divergent thinking, add 1 to list of 3 similar items (abstract)- 90% accuracy (I), 100% cued. Problem solving: n/a     Dysphagia: Oral care completed at end of PT session. Inconsistent cough noted with sips of thin liquid per Free Water Protocol taken at end of session. Pt. completed oral motor exercises x2 sets in reps of 20, BOT strengthening exercises x2 sets in reps of 20 and Kim maneuver x6 reps. Telesitter present. No family present. Current Medications:   Current Facility-Administered Medications: clotrimazole (LOTRIMIN) 1 % cream, , Topical, BID  enoxaparin (LOVENOX) injection 40 mg, 40 mg, SubCUTAneous, Daily  ipratropium-albuterol (DUONEB) nebulizer solution 1 ampule, 1 ampule, Inhalation, Q4H WA  guaiFENesin (MUCINEX) extended release tablet 600 mg, 600 mg, Oral, BID PRN  carvedilol (COREG) tablet 6.25 mg, 6.25 mg, Oral, BID WC  ferrous sulfate (IRON 325) tablet 325 mg, 325 mg, Oral, Daily with breakfast  spironolactone (ALDACTONE) tablet 25 mg, 25 mg, Oral, Daily  acetaminophen (TYLENOL) tablet 650 mg, 650 mg, Oral, Q4H PRN  polyethylene glycol (GLYCOLAX) packet 17 g, 17 g, Oral, Daily  senna (SENOKOT) tablet 17.2 mg, 2 tablet, Oral, Daily PRN  bisacodyl (DULCOLAX) suppository 10 mg, 10 mg, Rectal, Daily PRN      Objective:  BP (!) 109/90   Pulse 76   Temp 97.7 °F (36.5 °C) (Oral)   Resp 16   Ht 5' 7\" (1.702 m)   Wt 163 lb (73.9 kg)   SpO2 90%   BMI 25.53 kg/m²       GEN: Well developed, well nourished, no acute distress  HEENT: NCAT. EOM grossly intact. Hearing grossly intact. Mucous membranes pink and moist.  RESP: Normal breath sounds with no wheezing, rales, or rhonchi. Respirations WNL and unlabored. CV: Regular rate and rhythm. No murmurs, rubs, or gallops. ABD: Soft, non-distended, BS+ and equal.  NEURO: Alert. Speech fluent. Sensation to light touch intact in bilateral lower limbs. MSK: Muscle tone and bulk are normal bilaterally. Moving bilateral upper limbs with at least antigravity strength. Strength 4+/5 in bilateral lower limbs. LIMBS: No edema in bilateral lower limbs. SKIN: Warm and dry with good turgor. PSYCH: Mood WNL. Flat affect. Appropriately interactive. Diagnostics:     CBC:   Recent Labs     01/27/23  0639   WBC 4.0   RBC 4.63   HGB 10.8*   HCT 34.9*   MCV 75.3*   RDW 33.8*        BMP:   Recent Labs     01/27/23  0639      K 3.9      CO2 28   BUN 15   CREATININE 0.49*   GLUCOSE 101*     BNP: No results for input(s): BNP in the last 72 hours. PT/INR: No results for input(s): PROTIME, INR in the last 72 hours. APTT: No results for input(s): APTT in the last 72 hours. CARDIAC ENZYMES: No results for input(s): CKMB, CKMBINDEX, TROPONINT in the last 72 hours. Invalid input(s): CKTOTAL;3  FASTING LIPID PANEL:  Lab Results   Component Value Date    TRIG 85 01/03/2023     LIVER PROFILE: No results for input(s): AST, ALT, ALB, BILIDIR, BILITOT, ALKPHOS in the last 72 hours. Reviewed notes from internal medicine, SLP, OT, PT. Impression/Plan:   Impaired ADLs, gait, and mobility due to:    Multifocal cardioembolic CVA, subarachnoid hemorrhage:  PT/OT for gait, mobility, strengthening, endurance, ADLs, and self care. No residual SAH on CT head 1/21. Dysphagia:  S/p PEG tube placement on 1/17. SLP treating. Dietitian managing tube feed - nocturnal with bolus - some high residuals at night. Monitor BMP TIW. Free water protocol. Agitation:  Recurred 1/21.  Patient moved to room closer to the nurses station and telesitter initiated. Repeat CT head improved. Seroquel BID started 1/21 - mental status improved and Seroquel was discontinued. Insomnia: Trazodone started nightly on 1/20 - failed. Improved - no current medication. Acute respiratory failure requiring intubation. Currently on 1.5 to 2L NC - weaning as tolerated - improvement today. On duo-neb every 4 hours while awake. Pneumonia: Improved. Completed course of Unasyn and Vanco. CXR 1/24 for increased productive cough - stable hazy opacities. KENTRELL: Resolved. Will monitor. Elevated LFTs:  Resolved. Will monitor  HCV:  Will need follow up with GI for treatment as an outpatient  Anemia: Hemoglobin 10.8 on 1/27, stable. Monitoring. On oral iron supplementation. Thrombocytopenia: Resolved. Will monitor. Bowel Management: Miralax daily, senokot prn, dulcolax prn. DVT Prophylaxis:  Repeat CT - SAH resolved 1/21 - okay to initiate DVT chemoprophylaxis per IM. Lovenox started 1/22. TONI stockings during the day, EPC cuffs at night.   Internal Medicine for medical management  Follow up PCP 1-2 weeks, PM&R 4-6 weeks, GI, Neurology      Electronically signed by Tricia Youngblood MD on 1/29/2023 at 4:28 PM

## 2023-01-29 NOTE — PLAN OF CARE
Problem: Discharge Planning  Goal: Discharge to home or other facility with appropriate resources  1/29/2023 0220 by Lizz Dinh LPN  Outcome: Progressing  Flowsheets (Taken 1/28/2023 2158)  Discharge to home or other facility with appropriate resources: Identify barriers to discharge with patient and caregiver     Problem: Nutrition Deficit:  Goal: Optimize nutritional status  1/29/2023 0220 by Lizz Dinh LPN  Outcome: Progressing     Problem: Safety - Adult  Goal: Free from fall injury  1/29/2023 0220 by Lizz Dinh LPN  Outcome: Progressing  Flowsheets (Taken 1/29/2023 0217)  Free From Fall Injury: Instruct family/caregiver on patient safety     Problem: ABCDS Injury Assessment  Goal: Absence of physical injury  1/29/2023 0220 by Lizz Dinh LPN  Outcome: Progressing  Flowsheets (Taken 1/29/2023 0217)  Absence of Physical Injury: Implement safety measures based on patient assessment     Problem: Skin/Tissue Integrity  Goal: Absence of new skin breakdown  Description: 1. Monitor for areas of redness and/or skin breakdown  2. Assess vascular access sites hourly  3. Every 4-6 hours minimum:  Change oxygen saturation probe site  4. Every 4-6 hours:  If on nasal continuous positive airway pressure, respiratory therapy assess nares and determine need for appliance change or resting period.   1/29/2023 0220 by Lizz Dinh LPN  Outcome: Progressing

## 2023-01-29 NOTE — PLAN OF CARE
Problem: Discharge Planning  Goal: Discharge to home or other facility with appropriate resources  1/29/2023 1244 by Car Merritt LPN  Outcome: Progressing     Problem: Nutrition Deficit:  Goal: Optimize nutritional status  1/29/2023 1244 by Car Merritt LPN  Outcome: Progressing     Problem: Safety - Adult  Goal: Free from fall injury  1/29/2023 1244 by Car Merritt LPN  Outcome: Progressing     Problem: ABCDS Injury Assessment  Goal: Absence of physical injury  1/29/2023 1244 by Car Merritt LPN  Outcome: Progressing     Problem: Skin/Tissue Integrity  Goal: Absence of new skin breakdown  Description: 1. Monitor for areas of redness and/or skin breakdown  2. Assess vascular access sites hourly  3. Every 4-6 hours minimum:  Change oxygen saturation probe site  4. Every 4-6 hours:  If on nasal continuous positive airway pressure, respiratory therapy assess nares and determine need for appliance change or resting period.   1/29/2023 1244 by Car Merritt LPN  Outcome: Progressing

## 2023-01-29 NOTE — PROGRESS NOTES
[address] [facility]   Acute Rehabilitation Occupational Therapy Daily Treatment Note    Date: 23  Patient Name: Xu Arce       Room: 1207/7014-37  MRN: [id number]  Account: [de-identified]   : 1960  (60 y.o.) Gender: male       Referring Practitioner: Julián Greco MD  Diagnosis: Cerebrovascular accident  Additional Pertinent Hx: Per MRI Impression on 23: Mild amount of residual subarachnoid hemorrhage in the right frontal and  right parietal lobes as well as the left frontal lobe. Evolving small focus of hemorrhage in the left parietal lobe posteriorly. Evolving areas of ischemia in the right frontal lobe and left posterior parietal lobe as well as a few foci in the parasagittal aspect of the right frontoparietal region. Treatment Diagnosis: Impaired self care status    Past Medical History:  has no past medical history on file. Past Surgical History:   has a past surgical history that includes Upper gastrointestinal endoscopy (N/A, 2023). Restrictions  Restrictions/Precautions  Restrictions/Precautions: Fall Risk, General Precautions, Contact Precautions, Isolation, Bed Alarm  Required Braces or Orthoses?: No  Implants present? :  (pt denies)     Position Activity Restriction  Other position/activity restrictions: Severe dysphagia- NPO. Unable to tolerate any PO safely. NG tube placed,  on high flow nasal cannula          Vitals  Vital Signs  O2 Device: None (Room air)     Subjective  Subjective  Subjective: \"Oh, ok thats right\" pt states after pt attempts to use rolling walker backwards and writer provides verbal cues and assisstance to correct. Pain Assessment  Pain Assessment: None - Denies Pain    Objective  Cognition  Overall Orientation Status: Impaired       Cognition  Overall Cognitive Status: Exceptions  Arousal/Alertness: Appropriate responses to stimuli  Following Commands:  Follows one step commands consistently  Attention Span: Attends with cues to redirect  Memory: Decreased recall of biographical Information;Decreased recall of precautions;Decreased recall of recent events  Safety Judgement: Decreased awareness of need for assistance;Decreased awareness of need for safety  Problem Solving: Assistance required to generate solutions;Assistance required to implement solutions;Assistance required to identify errors made;Assistance required to correct errors made  Insights: Decreased awareness of deficits  Initiation: Requires cues for some  Sequencing: Requires cues for some  Cognition Comment: Pt with impulsive tendencies noted. Activities of Daily Living       Grooming/Oral Hygiene  Assistance Level: Supervision  Skilled Clinical Factors: standing sinkside to complete hand hygiene; pt requries maximum encouragement and education to complete oral hygiene this date however declines    Upper Extremity Bathing  Assistance Level: Supervision  Skilled Clinical Factors: Seated on shower bench; minimal verbal cues to sequence this date    Lower Extremity Bathing  Assistance Level: Stand by assist  Skilled Clinical Factors: Seated on shower bench for all LB bathing- weight shifting for tiffany hygiene and washing of buttocks. Minimal verbal cues for task initiation and some sequencing. Upper Extremity Dressing  Assistance Level: Supervision  Skilled Clinical Factors: seated on shower chair to doff/don overhead shirt    Lower Extremity Dressing  Assistance Level: Contact guard assist  Skilled Clinical Factors: pt able to thread BLEs into brief/pants, standing with CGA to pull up over hips. requires VCs this date for sequencing    Putting On/Taking Off Footwear  Assistance Level: Supervision  Skilled Clinical Factors: to don B socks and slippers    Toileting  Assistance Level: Supervision  Skilled Clinical Factors: standing at toilet to urinate         Tub/Shower Transfers  Type: Shower  Transfer From: Rolling walker  Transfer To:  Tub transfer bench  Additional Factors: Verbal cues;Cues for hand placement; Increased time to complete  Assistance Level: Contact guard assist  Skilled Clinical Factors: CGA over threshold for safety due to slippery/wet environment      Light Housekeeping  Light Housekeeping Level: Chucky Matos [employer] Level of Assistance: Supervision  Light Housekeeping: PM: patient participated in linen adjustment and bed making task. Pt demonstrates bending and reaching outside base of support with no loss of balance. Pt tolerated activity fair, taking 2 seated rest breaks throughout. Mobility     Sit to Stand  Assistance Level: Stand by assist  Skilled Clinical Factors: increased time to complete, utilizing RW  Stand to Sit  Assistance Level: Stand by assist  Skilled Clinical Factors: utilizing RW     Functional Mobility  Device: Rolling walker  Activity: To/From bathroom  Assistance Level: Stand by assist  Skilled Clinical Factors: verbal cues for safety with RW    Additional Activities  Additional Activities Comment: PM: OTR facilitated pt engagement in discussion of potential DME needed upon discharge. Writer engages in bathroom setup and pt states \"I have a walk-in shower with grab bars, a toilet rise with handles, and multiple canes. I don't need anything right now, and if I did the visiting nurses will bring it. I don't need a walker either, I just want to use my cane at home\". Assessment  Assessment  Activity Tolerance: Patient limited by fatigue;Patient limited by endurance (During shower, pt desaturates to 82% on room air and then completes pursed lip breathing techs and O2 rises to 88%. Pt averages about 86% on room air and then placed on 1.5L O2 upon OT exit.)    Patient Education  Education  Education Given To: Patient  Education Provided: Role of Therapy;Plan of Care;Cognition;Equipment;DME/Home Modifications; ADL Function; Safety  Education Method: Verbal;Demonstration  Barriers to Learning: Cognition; Hearing  Education Outcome: Verbalized understanding;Demonstrated understanding;Continued education needed    OT Equipment Recommendations  Equipment Needed: No    Safety Devices  Safety Devices in place: Yes  Type of devices: All fall risk precautions in place; Left in chair;Call light within reach  Restraints  Initially in place: No    Goals  Patient Goals   Patient goals : \"To learn how to walk properly. ..all that goes without saying (referring to bathing, dressing and toileting independence)\"  Short Term Goals  Time Frame for Short Term Goals: One week  Short Term Goal 1: Patient will perform oral hygiene with SBA and Good safety. Short Term Goal 2: Patient will perform hair brushing with SBA. Short Term Goal 3: Patient will perform upper body dressing with SBA. Short Term Goal 4: Patient will perform lower body dressing, lower body bathing, and toileting tasks with Minimal assist.  Short Term Goal 5: Patient will perform functional moblity and transfers during self-care with CGA, least restrictive mobility device, and Good safety. Short Term Goal 6: Patient will verbalize/demonstrate Good understanding of assistive equipment/durable medical equipment/modified techniques to maximize safety and independence with self-care. Short Term Goal 7: Patient will actively participate in 30+ minutes of therapeutic exercise/functional activities to promote increased independence with self-care and mobility. Long Term Goals  Time Frame for Long Term Goals : By discharge  Long Term Goal 1: Patient will perform BADLs with modified independence and Good safety. Long Term Goal 2: Patient will perform functional mobility and transfers during self-care with modified independence, least restrictive mobility device, and Good safety. Long Term Goal 3: Patient will stand for 5+ minutes with 0-1 UE support, modified independence while engaging in functional activity of choice.   Long Term Goal 4: Patient will perform simple meal prep and light housekeeping with SBA and Good safety. Long Term Goal 5: Patient will verbalize/demonstrate Good understanding of Fall Prevention Strategies to maximize safety and independence with self-care. Long Term Goal 6: Patient will perform self-care with improved bilateral  strength as evidenced by 10#  strength improvement and improved bilateral fine motor control as evidenced by 5 second improved in 9 hole peg test (Goal updated by STEFFI Colón/L on 1/24/23).     Plan  Occupational Therapy Plan  Times Per Week: 5-7  Times Per Day: Twice a day  Current Treatment Recommendations: Self-Care / ADL, Strengthening, ROM, Balance training, Functional mobility training, Endurance training, Cognitive reorientation, Neuromuscular re-education, Safety education & training, Patient/Caregiver education & training, Equipment evaluation, education, & procurement, Cognitive/Perceptual training, Coordination training, Home management training      OT Individual Minutes   01/29/23 1310 01/29/23 1457   OT Individual Minutes   Time In [id number]   Time Out 0364 [id number]   IEAKHIC 83 26   Time Code Minutes    Timed Code Treatment Minutes 46 Minutes 33 Minutes       Electronically signed by STEFFI Villafuerte/L on 1/29/23 at 2:58 PM EST

## 2023-01-30 PROCEDURE — 97129 THER IVNTJ 1ST 15 MIN: CPT

## 2023-01-30 PROCEDURE — 99231 SBSQ HOSP IP/OBS SF/LOW 25: CPT | Performed by: INTERNAL MEDICINE

## 2023-01-30 PROCEDURE — 6360000002 HC RX W HCPCS: Performed by: PHYSICAL MEDICINE & REHABILITATION

## 2023-01-30 PROCEDURE — 2700000000 HC OXYGEN THERAPY PER DAY

## 2023-01-30 PROCEDURE — 97110 THERAPEUTIC EXERCISES: CPT

## 2023-01-30 PROCEDURE — 97530 THERAPEUTIC ACTIVITIES: CPT

## 2023-01-30 PROCEDURE — 1180000000 HC REHAB R&B

## 2023-01-30 PROCEDURE — 94640 AIRWAY INHALATION TREATMENT: CPT

## 2023-01-30 PROCEDURE — 99232 SBSQ HOSP IP/OBS MODERATE 35: CPT | Performed by: STUDENT IN AN ORGANIZED HEALTH CARE EDUCATION/TRAINING PROGRAM

## 2023-01-30 PROCEDURE — 92526 ORAL FUNCTION THERAPY: CPT

## 2023-01-30 PROCEDURE — 97535 SELF CARE MNGMENT TRAINING: CPT

## 2023-01-30 PROCEDURE — 6370000000 HC RX 637 (ALT 250 FOR IP): Performed by: PHYSICAL MEDICINE & REHABILITATION

## 2023-01-30 PROCEDURE — 6370000000 HC RX 637 (ALT 250 FOR IP): Performed by: INTERNAL MEDICINE

## 2023-01-30 PROCEDURE — 97116 GAIT TRAINING THERAPY: CPT

## 2023-01-30 PROCEDURE — 94761 N-INVAS EAR/PLS OXIMETRY MLT: CPT

## 2023-01-30 PROCEDURE — 97130 THER IVNTJ EA ADDL 15 MIN: CPT

## 2023-01-30 RX ADMIN — CARVEDILOL 6.25 MG: 6.25 TABLET, FILM COATED ORAL at 07:58

## 2023-01-30 RX ADMIN — FERROUS SULFATE TAB 325 MG (65 MG ELEMENTAL FE) 325 MG: 325 (65 FE) TAB at 07:58

## 2023-01-30 RX ADMIN — ENOXAPARIN SODIUM 40 MG: 100 INJECTION SUBCUTANEOUS at 07:59

## 2023-01-30 RX ADMIN — IPRATROPIUM BROMIDE AND ALBUTEROL SULFATE 1 AMPULE: 2.5; .5 SOLUTION RESPIRATORY (INHALATION) at 20:50

## 2023-01-30 RX ADMIN — IPRATROPIUM BROMIDE AND ALBUTEROL SULFATE 1 AMPULE: 2.5; .5 SOLUTION RESPIRATORY (INHALATION) at 12:48

## 2023-01-30 RX ADMIN — IPRATROPIUM BROMIDE AND ALBUTEROL SULFATE 1 AMPULE: 2.5; .5 SOLUTION RESPIRATORY (INHALATION) at 08:05

## 2023-01-30 RX ADMIN — SPIRONOLACTONE 25 MG: 25 TABLET ORAL at 07:58

## 2023-01-30 RX ADMIN — IPRATROPIUM BROMIDE AND ALBUTEROL SULFATE 1 AMPULE: 2.5; .5 SOLUTION RESPIRATORY (INHALATION) at 16:24

## 2023-01-30 RX ADMIN — CARVEDILOL 6.25 MG: 6.25 TABLET, FILM COATED ORAL at 17:00

## 2023-01-30 ASSESSMENT — PAIN SCALES - WONG BAKER

## 2023-01-30 ASSESSMENT — PAIN SCALES - GENERAL: PAINLEVEL_OUTOF10: 0

## 2023-01-30 NOTE — PLAN OF CARE
Problem: Safety - Adult  Goal: Free from fall injury  1/30/2023 1814 by Jia De Luna RN  Outcome: Progressing  Flowsheets (Taken 1/30/2023 1814)  Free From Fall Injury: Instruct family/caregiver on patient safety  Note: Patient remains free of falls and injuries throughout shift. Bed remains in the lowest position, wheels locked, call light and bedside table are within reach. Problem: Nutrition Deficit:  Goal: Optimize nutritional status  1/30/2023 1814 by Jia De Luna RN  Outcome: Progressing     Problem: Skin/Tissue Integrity  Goal: Absence of new skin breakdown  Description: 1. Monitor for areas of redness and/or skin breakdown  2. Assess vascular access sites hourly  3. Every 4-6 hours minimum:  Change oxygen saturation probe site  4. Every 4-6 hours:  If on nasal continuous positive airway pressure, respiratory therapy assess nares and determine need for appliance change or resting period.   1/30/2023 1814 by Jia De Luna RN  Outcome: Progressing     Problem: ABCDS Injury Assessment  Goal: Absence of physical injury  1/30/2023 1814 by Jai De Luna RN  Outcome: Progressing     Problem: Chronic Conditions and Co-morbidities  Goal: Patient's chronic conditions and co-morbidity symptoms are monitored and maintained or improved  Outcome: Progressing     Problem: Discharge Planning  Goal: Discharge to home or other facility with appropriate resources  1/30/2023 1814 by Jia De Luna RN  Outcome: Progressing

## 2023-01-30 NOTE — PATIENT CARE CONFERENCE
[facility] Acute Inpatient Rehabilitation  TEAM CONFERENCE NOTE  Date: 23  Patient Name: Ann Poe       Room: 6113/2660-03  MRN: [id number]       : 1960  (60 y.o.)     Gender: male     Referring Practitioner: Bernardine Fabry, APRN - NP     PRINCIPAL DIAGNOSIS: Cerebrovascular accident (CVA) due to embolism of cerebral artery [facility]    NURSING    Bladder Continence  Always Continent  Bowel Continence Always Continent    Date of Last BM: 23    Bladder/Bowel Program Interventions: Both Bowel & Bladder Program In Place     Wounds/Incisions/Ulcers:  Fungal rash to buttocks healing well. Pt refusing Lotrimin cream.    Pain Control: No pain concerns to address    Pain Medication Regimen Usage Pattern: MAR reviewed and pain medications are being used at the following frequency (Specify Medication, # Doses Administered on average per day, identified patterns of use - for example: time of day, prior to activity/therapy)  Tylenol 650 mg q 4 hours PRN. Takes occasionally. Last given on 23.     Fall Risk:  Falling star program initiated    Medication Education Program: Patient currently unable to manage medications and responsible party being educated    Discharge Preparation Patient/Responsible Party Education In Progress:   Enteral Tube Feeding Management    Nursing specific communication for TEAM: No additional information identified requiring communication at this time    PHYSICAL THERAPY  Bed mobility  Rolling to Left: Supervision  Rolling to Right: Supervision  Supine to Sit: Supervision  Sit to Supine: Supervision  Scooting: Supervision  Bed Mobility Comments: no use of grab rails with bed in flat position  Bed Mobility  Scooting: Supervision      Transfers:  Sit to Stand: Supervision  Stand to Sit: Supervision  Bed to Chair: Supervision    Ambulation  Surface: Level tile  Device: Rolling Walker  Other Apparatus: O2 (2L, SpO2 WNL throguhout gait training)  Assistance: Stand by assistance  Quality of Gait: vcs for proximity to RW  Gait Deviations: Slow Radha;Decreased step length;Decreased step height  Distance: 257ft  Comments: SpO2 WNL on 2Lt      Stairs  # Steps : 5  Stairs Height: 4\" (4\" and 6\")  Rails: Bilateral  Device: No Device  Other Apparatus:  (oxygen at 1.5L)  Assistance: Stand by assistance  Comment: step-to gait pattern ascending with left descending with right         OCCUPATIONAL THERAPY  SELF CARE          Feeding  Assistance Level: Set-up          Grooming/Oral Hygiene  Assistance Level: Supervision  Skilled Clinical Factors: standing sinkside to complete hand hygiene; pt requries maximum encouragement and education to complete oral hygiene this date however declines        Upper Extremity Bathing  Assistance Level: Supervision  Skilled Clinical Factors: Seated on shower bench; minimal verbal cues to sequence this date   Lower Extremity Bathing  Assistance Level: Stand by assist  Skilled Clinical Factors: Seated on shower bench for all LB bathing- weight shifting for tiffany hygiene and washing of buttocks. Minimal verbal cues for task initiation and some sequencing. Upper Extremity Dressing  Assistance Level: Supervision  Skilled Clinical Factors: seated on toilet seat to doff/don overhead shirt         Lower Extremity Dressing  Assistance Level: Supervision  Skilled Clinical Factors: pt able to thread BLEs into brief/pants while seated on toilet seat.  able to pull up brief and pants over hips while standing       Putting On/Taking Off Footwear  Assistance Level: Supervision  Skilled Clinical Factors: to don B socks and slippers       Toileting  Assistance Level: Supervision  Skilled Clinical Factors: sitting on toilet to urinate       Toilet Transfers  Technique:  (Ambulating with rw.)  Equipment: Standard toilet, Grab bars  Additional Factors: Verbal cues, Cues for hand placement, Set-up  Assistance Level: Contact guard assist  Skilled Clinical Factors: CGA/SBA    Short Term Goals  Time Frame for Short Term Goals: One week  Short Term Goal 1: Patient will perform oral hygiene with SBA and Good safety. Short Term Goal 2: Patient will perform hair brushing with SBA. Short Term Goal 3: Patient will perform upper body dressing with SBA. Short Term Goal 4: Patient will perform lower body dressing, lower body bathing, and toileting tasks with Minimal assist.  Short Term Goal 5: Patient will perform functional moblity and transfers during self-care with CGA, least restrictive mobility device, and Good safety. Short Term Goal 6: Patient will verbalize/demonstrate Good understanding of assistive equipment/durable medical equipment/modified techniques to maximize safety and independence with self-care. Short Term Goal 7: Patient will actively participate in 30+ minutes of therapeutic exercise/functional activities to promote increased independence with self-care and mobility. SPEECH THERAPY  Min  A memory and problem solving. NPO, better about following Free Water Protocol. Plan for repeat MBS this week. Short Term Goal: supervision level memory and problem solving, diet at least restrictive consistency. NUTRITION  Weight: 146 lb 12.8 oz (66.6 kg) / Body mass index is 22.99 kg/m². Diet Rx: NPO. Tube Feeding: Vital AF 1.2 nocturnal at 70 mL per hr x 10 hrs; 300 mL bolus x 3 daily. 700 mL total additional free water via PEG daily. Pt appears to be tolerating tube feeding well. Will monitor for possible wt discrepancies. Please see nutrition note for details. CASE MANAGEMENT ASSESSMENT    Discharge Disposition: home with S.O.   Family Support: S.O and children w/ advance notice    PCP Established: [x] Yes [] No Dr. Baez Minus Being Followed In Preparation For Discharge: (Check All That Apply)  [x] [address] [place] [address]  [] Outpatient Therapy(Specify Location)  [] Dialysis   [] Hemodialysis (Specify Location/Days/Chair Times)   [] Peritoneal Dialysis  [] IV Infusion Services  [x] Enteral Nutrition Services  [x] Oxygen  [] Stoma/Ostomy  [] Catheter  [x] Lovenox    Patient Mood Interview PHQ-2 to 9 Score: 0    Pre-Admission Status:  Lives With: Significant other  Type of Home: House  Home Layout: One level  Home Access: Stairs to enter with rails  Entrance Stairs - Number of Steps: 6 ALISA from front; 4 ALISA frmo back with no rails  Entrance Stairs - Rails: Both (Both rails needs to be fixed)  Bathroom Shower/Tub: Tub/Shower unit, Curtain, Shower chair without back  [employer]: Standard (Raised seat in [facility] master bed room bathroom)  Bathroom Equipment: Grab bars in shower, Hand-held shower, Toilet raiser  Bathroom Accessibility: Accessible, Walker accessible  Home Equipment: Derl Brood Help From: Family  ADL Assistance: [address]: Independent  Homemaking Responsibilities: Yes  Ambulation Assistance: Independent  Transfer Assistance: Independent  Active : Yes  Mode of Transportation: [employer]  Occupation: Retired  Type of Occupation:   IADL Comments: Pt sleeps in flat bed. Additional Comments: Spouse is home all the time and able to assist minimally physically @8 her medical condition. Daughter and son Will Alex only with advance notice for appointment, son and daughter both work full time and have children.      Family Education: Family Education initiated and Ongoing    Percentage Risk for Readmission: Low 0 - 18%   Readmission Risk              Risk of Unplanned Readmission:  14       %    Critical Items: None       Problem / Barrier Intervention / Plan  Results   Dysphagia Tongue base strengthening    Impaired cognition Cognitive retraining     Altered ability to care for self Remediation and training in modified care strategies to maximize safety and independence with self-care    Impaired functional mobility 2* CVA related deficits Strengthening, functional mobility training, DME procurement, Balance and Neuro re-ed, Family training and [facility]                     Total Self Care Score    Total Mobility Score  Admission Score:  11      Admission Score:  27  Goal:  37/42         Goal:  72/90    THERAPY MINUTE COMPLIANCE  Patient is participating and compliant with meeting therapy minutes as outlined in plan of care: No - Patient has documented refusals for participation `    Discharge Plan   Estimated Discharge Date: 2/6/23  Home evaluation needed? No  Overnight or Day Pass: No  Factors facilitating achievement of predicted outcomes: Family support, Motivated, Cooperative, and Pleasant  Barriers to the achievement of predicted outcomes: Cognitive deficit, Decreased endurance, Skin Care, and Medication managment    Functional Goals at discharge:  Predicted Outcome: Home with familyPATIENT'S LEVEL OF ASSISTANCE: Modified independence  Discharge therapy goals:  PT: Long Term Goals  Time Frame for Long Term Goals : By DC  Long Term Goal 1: pt able to roll L/R indepndently  Long Term Goal 2: pt able to perform supine<>sit mod-I  Long Term Goal 3: pt able to perform sit<>stand and perform pivot/step to transfers at mod-I  Long Term Goal 4: pt able to ambulate household distance with appropriate device, mod-I  Long Term Goal 5: pt able to ambulate > 50 ft with appropriate device, at SBA/supervsion level, level as well as incline surface. Additional Goals?: Yes  Long term goal 6: pt able to go up and down 4 or more steps with B UE support, CGA. Long term goal 7: Improve PASS score to 27/36 improve function/stability. Long term goal 8: improve Tinetti balance score to atleast 23/28 to reduce fall risk. OT:Long Term Goals  Time Frame for Long Term Goals : By discharge  Long Term Goal 1: Patient will perform BADLs with modified independence and Good safety.   Long Term Goal 2: Patient will perform functional mobility and transfers during self-care with modified independence, least restrictive mobility device, and Good safety. Long Term Goal 3: Patient will stand for 5+ minutes with 0-1 UE support, modified independence while engaging in functional activity of choice. Long Term Goal 4: Patient will perform simple meal prep and light housekeeping with SBA and Good safety. Long Term Goal 5: Patient will verbalize/demonstrate Good understanding of Fall Prevention Strategies to maximize safety and independence with self-care. Long Term Goal 6: Patient will perform self-care with improved bilateral  strength as evidenced by 10#  strength improvement and improved bilateral fine motor control as evidenced by 5 second improved in 9 hole peg test (Goal updated by Ester Grover, OTR/L on 1/24/23). ST: diet at least restrictive consistency. Mod I memory and problem solving    Participating Team Members:  /:  Jose Mcintyre RN  Occupational Therapist: Ester Grover OT    Physical Therapist: Naseem Meyer PT  Speech Therapist:  Yfn Almeida [place] [address] [facility]  Nurse: Oscar Gregory RN    Dietary/Nutrition: Caroline Méndez RD, LD  Pastoral Care: Chaplain Diamante Moreno  CMG: Deanna Pool RN    I approve the established interdisciplinary plan of care as documented within the medical record of Victor Manuel Mcclelland.     Ricardo Milian MD

## 2023-01-30 NOTE — PROGRESS NOTES
[address]    Acute Rehabilitation Occupational Therapy Daily Treatment Note    Date: 23  Patient Name: Rafael Mesa       Room: 3788/7858-31  MRN: [id number]  Account: [de-identified]   : 1960  (60 y.o.) Gender: male       Referring Practitioner: Arnold Garduno MD  Diagnosis: Cerebrovascular accident  Additional Pertinent Hx: Per MRI Impression on 23: Mild amount of residual subarachnoid hemorrhage in the right frontal and  right parietal lobes as well as the left frontal lobe. Evolving small focus of hemorrhage in the left parietal lobe posteriorly. Evolving areas of ischemia in the right frontal lobe and left posterior parietal lobe as well as a few foci in the parasagittal aspect of the right frontoparietal region. Treatment Diagnosis: Impaired self care status    Past Medical History:  has no past medical history on file. Past Surgical History:   has a past surgical history that includes Upper gastrointestinal endoscopy (N/A, 2023). Restrictions  Restrictions/Precautions  Restrictions/Precautions: Fall Risk, General Precautions, Contact Precautions, Isolation, Bed Alarm  Required Braces or Orthoses?: No  Implants present? :  (pt denies)     Position Activity Restriction  Other position/activity restrictions: Severe dysphagia- NPO. Unable to tolerate any PO safely. NG tube placed,  on high flow nasal cannula    Vitals  Vital Signs  BP Location: Left upper arm  O2 Device: None (Room air)     Subjective  Subjective  Subjective: AM: Pt seated up in bed with bag of dirty clothing. Pt declines washing this AM, but willing to change into clean clothing. PM: Pt refuses therapy. Writer encourages pt to participate. Pt agreeable to try in 30 min. Writer returns to find pt asleep. Spouse in room. Spouse requests therapy return later. Pain: No c/o any pain.   Pain Assessment  Baldwin-Morris Pain Rating: No hurt    Objective  Cognition  Overall Orientation Status: Impaired  Orientation Level: Oriented to person;Disoriented to situation;Oriented to place; Disoriented to time    Cognition  Overall Cognitive Status: Exceptions  Arousal/Alertness: Appropriate responses to stimuli  Following Commands: Follows one step commands consistently  Attention Span: Attends with cues to redirect  Memory: Decreased recall of biographical Information;Decreased recall of precautions;Decreased recall of recent events  Safety Judgement: Decreased awareness of need for assistance;Decreased awareness of need for safety  Problem Solving: Assistance required to generate solutions;Assistance required to implement solutions;Assistance required to identify errors made;Assistance required to correct errors made  Insights: Decreased awareness of deficits  Initiation: Requires cues for some  Sequencing: Requires cues for some  Cognition Comment: Pt with impulsive tendencies noted. Activities of Daily Living     Upper Extremity Dressing  Assistance Level: Supervision  Skilled Clinical Factors: seated on toilet seat to doff/don overhead shirt    Lower Extremity Dressing  Assistance Level: Supervision  Skilled Clinical Factors: pt able to thread BLEs into brief/pants while seated on toilet seat. able to pull up brief and pants over hips while standing. Pt demo SOB.     Putting On/Taking Off Footwear  Assistance Level: Supervision  Skilled Clinical Factors: to don B socks and slippers    Toileting  Assistance Level: Supervision  Skilled Clinical Factors: sitting on toilet to urinate    Toilet Transfers  Equipment: Standard toilet;Grab bars  Additional Factors: Verbal cues;Cues for hand placement;Set-up  Assistance Level: Contact guard assist  Skilled Clinical Factors: CGA/SBA    Mobility  Bed Mobility  Overall Assistance Level: Supervision     Roll Left  Assistance Level: Supervision  Skilled Clinical Factors: utilized hand rails with raised HOB     Sit to Supine  Assistance Level: Supervision  Skilled Clinical Factors: No safety concerns  Supine to Sit  Assistance Level: Supervision  Skilled Clinical Factors: Pt impulsively sit>stand without RW. Writer educated pt on utilizing RW to for safety and balance. Fair return noted. Scooting  Assistance Level: Supervision  Skilled Clinical Factors: Hips closer to EOB. Transfers  Surface: From bed; To chair with arms  Additional Factors: Set-up; Verbal cues; Hand placement cues  Sit to Stand  Assistance Level: Stand by assist  Skilled Clinical Factors: increased time to complete, utilizing RW  Stand to Sit  Assistance Level: Stand by assist  Skilled Clinical Factors: utilizing RW    Functional Mobility  Device: Rolling walker  Activity: To/From bathroom  Assistance Level: Stand by assist  Skilled Clinical Factors: Pt began impulsively amb without RW. Writer edu pt about utilizing RW for safety and balance. \"I need to learn how to not use this because I will not be using this at home. I understand why you want me to use it. \" pt demo paused for balance when amb with heavy L lateral lean noted x 2. OT Exercises  Static Sitting Balance Exercises: dominik ~20 min sitting EOB while participating in therapeutic activities. Additional Activities  Additional Activities Comment: Writer facilitated sequencing and visual scanning activities while seated EOB to develop problem solving skills and maximize safety and independence with self care. Pt participated in placing months of the year, days of the week, and numbers 1-18. Pt demo no difficulty with months and days of the week, although slight confusion when distinguishing #13 from #3 and #15 and #5. Pt recognize the error with no v/c. With increased time, Pt was able to fix error.     Assessment  Assessment  Activity Tolerance: Patient limited by fatigue;Patient limited by endurance  Discharge Recommendations: Home with assist PRN;Outpatient OT    Patient Education  Education  Education Given To: Patient  Education Provided: Role of Therapy;Plan of Care;Cognition;Equipment;DME/Home Modifications; ADL Function; Safety  Education Method: Verbal;Demonstration  Barriers to Learning: Cognition; Hearing  Education Outcome: Verbalized understanding;Demonstrated understanding;Continued education needed    OT Equipment Recommendations  Other: TBD    Safety Devices  Safety Devices in place: Yes  Type of devices: All fall risk precautions in place; Left in chair;Call light within reach  Restraints  Initially in place: No    Goals  Patient Goals   Patient goals : \"To learn how to walk properly. ..all that goes without saying (referring to bathing, dressing and toileting independence)\"  Short Term Goals  Time Frame for Short Term Goals: One week  Short Term Goal 1: Patient will perform oral hygiene with SBA and Good safety. Short Term Goal 2: Patient will perform hair brushing with SBA. Short Term Goal 3: Patient will perform upper body dressing with SBA. Short Term Goal 4: Patient will perform lower body dressing, lower body bathing, and toileting tasks with Minimal assist.  Short Term Goal 5: Patient will perform functional moblity and transfers during self-care with CGA, least restrictive mobility device, and Good safety. Short Term Goal 6: Patient will verbalize/demonstrate Good understanding of assistive equipment/durable medical equipment/modified techniques to maximize safety and independence with self-care. Short Term Goal 7: Patient will actively participate in 30+ minutes of therapeutic exercise/functional activities to promote increased independence with self-care and mobility. Long Term Goals  Time Frame for Long Term Goals : By discharge  Long Term Goal 1: Patient will perform BADLs with modified independence and Good safety. Long Term Goal 2: Patient will perform functional mobility and transfers during self-care with modified independence, least restrictive mobility device, and Good safety.   Long Term Goal 3: Patient will stand for 5+ minutes with 0-1 UE support, modified independence while engaging in functional activity of choice. Long Term Goal 4: Patient will perform simple meal prep and light housekeeping with SBA and Good safety. Long Term Goal 5: Patient will verbalize/demonstrate Good understanding of Fall Prevention Strategies to maximize safety and independence with self-care. Long Term Goal 6: Patient will perform self-care with improved bilateral  strength as evidenced by 10#  strength improvement and improved bilateral fine motor control as evidenced by 5 second improved in 9 hole peg test (Goal updated by ASHUTOSH Aleman on 1/24/23).     Plan  Occupational Therapy Plan  Times Per Week: 5-7  Times Per Day: Twice a day  Current Treatment Recommendations: Self-Care / ADL, Strengthening, ROM, Balance training, Functional mobility training, Endurance training, Cognitive reorientation, Neuromuscular re-education, Safety education & training, Patient/Caregiver education & training, Equipment evaluation, education, & procurement, Cognitive/Perceptual training, Coordination training, Home management training         01/30/23 1110   OT Individual Minutes   Time In 1110   Time Out 1210   Minutes 60   Minute Variance   Variance 30   Reason Refusal         Electronically signed by ANITA Perdue on 1/30/23 at 5:11 PM EST

## 2023-01-30 NOTE — PLAN OF CARE
Problem: Discharge Planning  Goal: Discharge to home or other facility with appropriate resources  Outcome: Progressing  Flowsheets (Taken 1/29/2023 2000)  Discharge to home or other facility with appropriate resources:   Identify barriers to discharge with patient and caregiver   Arrange for needed discharge resources and transportation as appropriate   Identify discharge learning needs (meds, wound care, etc)   Refer to discharge planning if patient needs post-hospital services based on physician order or complex needs related to functional status, cognitive ability or social support system     Problem: Nutrition Deficit:  Goal: Optimize nutritional status  Outcome: Progressing     Problem: Safety - Adult  Goal: Free from fall injury  Outcome: Progressing     Problem: ABCDS Injury Assessment  Goal: Absence of physical injury  Outcome: Progressing     Problem: Skin/Tissue Integrity  Goal: Absence of new skin breakdown  Description: 1. Monitor for areas of redness and/or skin breakdown  2. Assess vascular access sites hourly  3. Every 4-6 hours minimum:  Change oxygen saturation probe site  4. Every 4-6 hours:  If on nasal continuous positive airway pressure, respiratory therapy assess nares and determine need for appliance change or resting period.   Outcome: Progressing

## 2023-01-30 NOTE — PROGRESS NOTES
Physical Therapy  Facility/Department: [facility] ACUTE REHAB  Rehabilitation Physical Therapy Daily Treatment Note    NAME: Jose Henley  : 1960 (18 y.o.)  MRN: [id number]  CODE STATUS: Full Code    Date of Service: 23      History reviewed. No pertinent past medical history. Past Surgical History:   Procedure Laterality Date    UPPER GASTROINTESTINAL ENDOSCOPY N/A 2023    EGD ESOPHAGOGASTRODUODENOSCOPY PEG TUBE INSERTION performed by Sunil Morfin DO at [facility] ENDO       Chart Reviewed: Yes  Patient assessed for rehabilitation services?: Yes  Additional Pertinent Hx: History of Present Illness:  Jose Henley  is a 60 y.o. right-handed male admitted to the [address] [place] [address] on 2023. He was originally admitted to [facility] on 2023 for acute respiratory failure 2/2 RLL pneumonia. Blood/resp/urine cultures negative, although his MRSA swab was +. He completed course of Unasyn and vancomycin. Imaging showed numerous acute, subacute, and chronic infarcts in the left parietal lobe, left cerebellar hemisphere, as well as bilateral SAH. PEG tube was placed 23 for severe dysphagia. HCV+. ID, nephrology, heme-onc, neuro, pulm, and psych are following. Patient admitted to ARU this morning. He reports doing well this morning. He denies any pain aside from some abdominal pain when he coughs. He does note some wheezes and tenderness around the PEG tube site. He does endorse some generalized weakness. He is currently requiring assistance for self-care activities and mobility prompting this admission.   Family / Caregiver Present: No  Referring Practitioner: ALAYNA Paredes NP  Referral Date : 23  Diagnosis: Acute respiratory failure  General Comment  Comments: Patient supine inn bed upon approach, SpO2 recorded at 81% on room air at rest. Provided nasal cannula with SpO2 recovering <15 sec on 2Lt 93%    Restrictions:  Restrictions/Precautions: Fall Risk;General Precautions;Contact Precautions;Isolation; Bed Alarm  Position Activity Restriction  Other position/activity restrictions: Severe dysphagia- NPO. Unable to tolerate any PO safely. NG tube placed,  on high flow nasal cannula     SUBJECTIVE  Subjective: Patient reports feeling \"fine\" with no shortness of breath, just feeling \"a little tired. \"  Pain: denies    Prior Level of Function:  Social/Functional History  Lives With: Significant other  Type of Home: House  Home Layout: One level  Home Access: Stairs to enter with rails  Entrance Stairs - Number of Steps: 6 ALISA from front; 4 ALISA frmo back with no rails  Entrance Stairs - Rails: Both (Both rails needs to be fixed)  Bathroom Shower/Tub: Tub/Shower unit, Curtain, Shower chair without back  [employer]: Standard (Raised seat in [facility] master bed room bathroom)  Bathroom Equipment: Grab bars in shower, Hand-held shower, Toilet raiser  Bathroom Accessibility: Accessible, Walker accessible  Home Equipment: Radha Torres Help From: Family  ADL Assistance: [address] [place] [address] Avenue: Independent  Homemaking Responsibilities: Yes  Ambulation Assistance: Independent  Transfer Assistance: Independent  Active : Yes  Mode of Transportation: [employer]  Occupation: Retired  Type of Occupation:   IADL Comments: Pt sleeps in flat bed. Additional Comments: Spouse is home all the time and able to assist minimally physically @8 her medical condition. Daughter and son [employer] only with advance notice for appointment, son and daughter both work full time and have children. OBJECTIVE  Vision  Vision: Impaired  Vision Exceptions: Wears glasses at all times    Hearing  Hearing: Exceptions to [facility]  Hearing Exceptions: Hard of hearing/hearing concerns    Cognition  Overall Cognitive Status: Exceptions  Arousal/Alertness: Appropriate responses to stimuli  Following Commands:  Follows one step commands consistently  Attention Span: Attends with cues to redirect  Memory: Decreased recall of biographical Information;Decreased recall of precautions;Decreased recall of recent events  Safety Judgement: Decreased awareness of need for assistance;Decreased awareness of need for safety  Problem Solving: Assistance required to generate solutions;Assistance required to implement solutions;Assistance required to identify errors made;Assistance required to correct errors made  Insights: Decreased awareness of deficits  Initiation: Requires cues for some  Sequencing: Requires cues for some  Cognition Comment: Pt with impulsive tendencies noted.     ROM  AROM RLE (degrees)  RLE General AROM: AAROM at hip/knee, ankle WFL  AROM LLE (degrees)  LLE General AROM: AROM hip/knee , ankle WFL  AROM RUE (degrees)  RUE General AROM: hand over hand to complete end tranges, myoclonus  AROM LUE (degrees)  LUE General AROM: handover hand to complete end ROM, dysmetria/ myoclonus noted    Strength  Strength RLE  Comment: grossly 2+/5 hip/knee, 3/5 ankle  Strength LLE  Comment: grossly 3-/5 hip/knee, 3/+5 ankle  Strength RUE  Comment: 3-/5grossly- see OT  Strength LUE  Comment: 2+/5grossly, see OT, weak grasp, incoordination    Quality of Movement  Tone RLE  RLE Tone: Normotonic  Tone LLE  LLE Tone: Hypotonic (vs weakness)    Sensation  Overall Sensation Status: WFL (denies)    Functional Mobility  Bed mobility  Rolling to Left: Supervision  Rolling to Right: Supervision  Supine to Sit: Supervision  Sit to Supine: Supervision  Scooting: Supervision  Bed Mobility Comments: no use of grab rails with bed in flat position  Transfers  Sit to Stand: Supervision  Stand to Sit: Supervision  Bed to Chair: Supervision  Stand Pivot Transfers: Supervision (transfers completed with and without AD safely)  Comment: patient demo'd impulsive movement, vcs for safety and O2 line management  Balance  Posture: Fair  Sitting - Static: Good;+  Sitting - Dynamic: Good  Standing - Static: Good;-  Standing - Dynamic: Fair;-  Comments: Standing with rolling walker    Environmental Mobility  Ambulation  Surface: Level tile  Device: Rolling Walker  Other Apparatus: O2 (2L, SpO2 WNL throguhout gait training)  Assistance: Stand by assistance  Quality of Gait: vcs for proximity to RW  Gait Deviations: Slow Radha;Decreased step length;Decreased step height  Distance: 257ft  Comments: SpO2 WNL on 2Lt  More Ambulation?: No  Ambulation 3  Other Apparatus 3:  (1L)  Stairs/Curb  Stairs?: Yes  Stairs  # Steps : 5  Stairs Height: 4\" (4\" and 6\")  Rails: Bilateral  Device: No Device  Other Apparatus:  (oxygen at 1.5L)  Assistance: Stand by assistance  Comment: step-to gait pattern ascending with left descending with right  Wheelchair Activities  Propulsion: Yes  Propulsion 1  Propulsion: Manual  Level: Level Tile  Method: RUE;LUE  Level of Assistance: Supervision  Description/ Details: vcs for LE management with poor carryover, wide turns  Distance: 257ft    PT Exercises  Exercise Treatment: seated  A/AROM Exercises:  (NO SLR's per pts request as they pull on his PEG tube site too much)  Resistive Exercises: (B) LE seated ex 2# x 15 including lime green t-band x20 reps (SPO2 WNL on 2Lt)  Circulation/Endurance Exercises: NuStep x10 min onL3 (bilat UE/LE)  Pressure Relief Exercises: independently repositions self in bed  Functional Mobility Circuit Training: STS x8 reps  Dynamic Sitting Balance Exercises: reaching oustide SELVIN weight shifting x11 min and x14 min (trunk unsupported)  Breathing Techniques: pursed lip breathing at start of seesion due to SpO2 at 81% on room air  Standing Open/Closed Kinetic Chain Exercises: (B) LE standing ex x 10 (SPO2 above 94% throughout session on 1.5L)  Exercise Equipment:  (Pt on RA. Able to maintain SPO2 levels above 90% throughout time while maintaining 25steps per minute.  Good pacing skills.)    ASSESSMENT  Vitals  O2 Device: None (Room air)    Activity Tolerance  Activity Tolerance: Patient limited by fatigue;Patient limited by endurance (During shower, pt desaturates to 82% on room air and then completes pursed lip breathing techs and O2 rises to 88%. Pt averages about 86% on room air and then placed on 1.5L O2 upon OT exit.)  Activity Tolerance Comments: 8 liter high flow 02    Assessment  Performance Deficits/Impairments: Decreased functional mobility ; Decreased ADL status; Decreased body mechanics; Decreased strength;Decreased safe awareness;Decreased cognition;Decreased endurance;Decreased balance;Decreased coordination; Increased pain;Decreased posture  Activity Tolerance Comment: Monitor SP02 throughout therapy sessions  Treatment Diagnosis: impared mobility s/p AMS/SAH  Therapy Prognosis: Good  Decision Making: Medium Complexity  History: falls, 1 week  AMS PTA  Exam: L side weakness, decreased activity tolerance  Barriers to Learning: cognition, endurance  Treatment Initiated : eval, bed mobiltiy, sitting balance, transfers and gait  Discharge Recommendations: Therapy recommended at discharge;Home with assist PRN;Patient would benefit from continued therapy after discharge     GOALS  Patient Goals   Patient Goals : Want to be independent to go home.   Short Term Goals  Time Frame for Short Term Goals: 9 days  Short Term Goal 1: CGA assist supine<-> sit x1 assist  Short Term Goal 2: sit EOB withsupervsion up to 15 min for therex/ADL  Short Term Goal 3: 3+/5 LE strength to prepare for standing activiites and gait  Short Term Goal 4: ambulation with rolling walker distance of 70 to 100 ft, CGA, level surface  Short Term Goal 5: roll L/R with SBA  Additional Goals?: Yes  Short Term Goal 6: Pt able to go up and down 5 steps with 2 rails, min A  Long Term Goals  Time Frame for Long Term Goals : By DC  Long Term Goal 1: pt able to roll L/R indepndently  Long Term Goal 2: pt able to perform supine<>sit mod-I  Long Term Goal 3: pt able to perform sit<>stand and perform pivot/step to transfers at mod-I  Long Term Goal 4: pt able to ambulate household distance with appropriate device, mod-I  Long Term Goal 5: pt able to ambulate > 50 ft with appropriate device, at SBA/supervsion level, level as well as incline surface. Additional Goals?: Yes  Long term goal 6: pt able to go up and down 4 or more steps with B UE support, CGA. Long term goal 7: Improve PASS score to 27/36 improve function/stability. Long term goal 8: improve Tinetti balance score to atleast 23/28 to reduce fall risk. PLAN OF CARE  Frequency: 1-2 treatment sessions per day, 5-7 days per week  Physcial Therapy Plan  General Plan:  minutes of therapy at least 5 out of 7 days a week (5 to 7 days/week.)  Specific Instructions for Next Treatment: Continue to progress with functional mobility training  Current Treatment Recommendations: Strengthening;Balance training;Functional mobility training;Neuromuscular re-education;Cognitive reorientation; Safety education & training;Patient/Caregiver education & training;Equipment evaluation, education, & procurement; Therapeutic activities;Transfer training; Wheelchair mobility training;Gait training;Stair training;Home exercise program;Positioning  Safety Devices  Type of Devices: All fall risk precautions in place;Gait belt;Left in chair;Telesitter in use  Restraints  Restraints Initially in Place: No  Restraints: bilat wrist restraints- no longer needed    EDUCATION  Education  Education Given To: Patient  Education Provided: Mobility Training (pacing skills)  Education Provided Comments: Ed on process of continuing to ween pt off oxygen and onto RA. Still needing supplemental O2 with activity, also benefits of sitting up in chair vs laying in bed keeping SPO2 levels at or above 90%. Education Method: Verbal  Barriers to Learning: Cognition; Hearing  Education Outcome: Verbalized understanding      Therapy Time   Individual Concurrent Group Co-treatment   Time In 1005         Time Out 1105         Minutes 60 Variance: 30  Reason: Refusal (Patient refused PM session reporting increased fatigue, x2 attempts made at [phone] and 1603.)    Barbara Encinas PTA, 01/30/23 at 4:13 PM

## 2023-01-30 NOTE — PROGRESS NOTES
[address]   Date: 23  Patient Name: Judd Oglesby       Room: 1361/0710-21  MRN: [id number]   Account #: [de-identified]    : 1960  (63 y.o.)  Gender: male      Pt has been impulsive and has telesitter in room. Dietary Changes: No NPO, Plaza Free water    Bowel and Bladder:   Bowel: Continent   Bladder: Incontinent   Device(s) Utilized: Urinal, raised toilet     Wounds: No; pt has PEG tube    CHG Bath Required: Yes     Sleep Patterns: Adequate     Pain Management:   Is the patient experiencing any pain? No   What pain management options have been implemented? Repositioning, increased activity     Transfers/Mobility:  One-assist with RW  Does the patient require a chair/bed alarm for safety? Yes telesitter  Is the patient independent/modified independent in their room (as determined by occupational therapy and physical therapy)? No    Family/Caregiver Education:  Has the patient's family/caregiver visited the family since admission? Yes   Has the patient's family/caregiver participated in therapy sessions? No Pt limits hands on assistance from family  Upcoming Appointments Scheduled:  Does the patient have an appointment, procedure, and/or testing scheduled?   No

## 2023-01-30 NOTE — PROGRESS NOTES
Comprehensive Nutrition Assessment    Type and Reason for Visit:  Reassess    Nutrition Recommendations/Plan:   Continue current tube feeding. NPO. Monitor wt. Malnutrition Assessment:  Malnutrition Status: Moderate malnutrition (01/19/23 1419)    Context:  Acute Illness     Findings of the 6 clinical characteristics of malnutrition:  Energy Intake:  75% or less of estimated energy requirements for 7 or more days  Weight Loss:  Greater than 5% over 1 month     Body Fat Loss:  Unable to assess     Muscle Mass Loss:  Unable to assess    Fluid Accumulation:  No significant fluid accumulation     Strength:  Not Performed    Nutrition Assessment:    Nurse states pt seems to be tolerating tube feeding well. Possible discepancy in wt measurements noted. Nutrition Related Findings:    No edema. Labs and meds reviewed. Current Nutrition Intake & Therapies:    Average Meal Intake: NPO     Diet NPO  ADULT TUBE FEEDING; PEG; Peptide Based; Cyclic, Bolus; 70; 1:10 PM; 6:00 AM; 3 Times Daily; 300; Gravity; 50; Other (specify); 50 mL water before and after each bolus and cyclic feeding. Additional 300 mL water daily for meds. Anthropometric Measures:  Height: 5' 7\" (170.2 cm)  Ideal Body Weight (IBW): 148 lbs (67 kg)    Admission Body Weight: 159 lb 9.8 oz (72.4 kg) ([facility] 1/19 ([facility]))  Current Body Weight: 146 lb 13.2 oz (66.6 kg), 107.8 % IBW. Weight Source: Not Specified  Current BMI (kg/m2): 23  Usual Body Weight: 170 lb (77.1 kg) (Per significant other)  % Weight Change (Calculated): -6.1                    BMI Categories: Overweight (BMI 25.0-29. 9)    Estimated Daily Nutrient Needs:  Energy Requirements Based On: Formula  Weight Used for Energy Requirements: Admission  Energy (kcal/day): 3105-4988 [facility] with 1.2-1.3 factor  Weight Used for Protein Requirements: Ideal  Protein (g/day): 101 based on 1.5 gm per kg  Method Used for Fluid Requirements: 1 ml/kcal  Fluid (ml/day): 1920 mL    Nutrition Diagnosis:   Moderate malnutrition related to inadequate protein-energy intake as evidenced by Criteria as identified in malnutrition assessment    Nutrition Interventions:   Food and/or Nutrient Delivery: Continue NPO, Continue Current Tube Feeding  Nutrition Education/Counseling: No recommendation at this time  Coordination of Nutrition Care: Continue to monitor while inpatient, Speech Therapy       Goals:  Previous Goal Met: Progressing toward Goal(s)  Goals: Tolerate nutrition support at goal rate       Nutrition Monitoring and Evaluation:   Behavioral-Environmental Outcomes: None Identified  Food/Nutrient Intake Outcomes: Enteral Nutrition Intake/Tolerance  Physical Signs/Symptoms Outcomes: Biochemical Data, Chewing or Swallowing, GI Status, Weight    Discharge Planning:     Too soon to determine     Donna Tucker RD, LD  Contact: [phone]

## 2023-01-30 NOTE — PROGRESS NOTES
Speech Language Pathology  Speech Language Pathology  [facility] Acute Rehab Unit at [place]    Cognitive/Dysphagia Treatment Note    Date: 1/30/2023  Patients Name: Xu Arce  MRN: [id number]  Diagnosis: Dysphagia s/p CVA  Patient Active Problem List   Diagnosis Code    Acute type II respiratory failure (HCC) J96.00    Transaminitis R74.01    Normocytic anemia D64.9    Thrombocytopenia (HCC) D69.6    Agitation R45.1    Acute respiratory failure with hypoxia and hypercapnia (HCC) RLL pneumonia J96.01, J96.02    Altered mental status R41.82    Community acquired pneumonia of right lower lobe of lung J18.9    Prolonged pt (prothrombin time) R79.1    Emphysema lung (HCC) J43.9    Cerebral infarction (HCC) I63.9    ACP (advance care planning) Z71.89    Goals of care, counseling/discussion Z71.89    Encounter for palliative care Z51.5    Delirium due to another medical condition F05    Moderate malnutrition ([address] E44.0    Hepatitis C virus infection without hepatic coma B19.20    Dysphagia R13.10    Impending cerebrovascular accident ([address] I63.9    Dermatitis associated with moisture L30.8    Cerebrovascular accident (CVA) due to embolism of cerebral artery ([facility] I63.40       Pain: none reported    Cognitive Treatment  Treatment time: 9823-2928    Subjective: [x] Alert [x] Cooperative     [] Confused     [] Agitated    [] Lethargic    Objective/Assessment:    Orientation: Pt. Recalled date, day week. Recall: n/a    Organization: generate word in category beginning c given letter- 50%, 100% c cues. Problem solving: Pt. Asking good questions re: upcoming repeat video swallow study, demonstrating good problem solving re: his current situation. Dysphagia: Oral care completed this am, educ. Pt. He is due for repeat oral care in ~one hour, set up provided. No overt s/s aspiration demo. C sips water x3. Pt. Completed Kim maneuver x8. Telesitter present. Pt. SO present.      Plan:  [x] Continue ST services    [] Discharge from ST:        Discharge recommendations: [] Inpatient Rehab   [] [place]   [] Outpatient Therapy  [] Follow up at trauma clinic   [] Other:         Treatment completed by: Simona Vallejo A.CCC/SLP

## 2023-01-30 NOTE — PROGRESS NOTES
Speech Language Pathology  Speech Language Pathology  [facility] Acute Rehab Unit at [facility]    Cognitive/Dysphagia Treatment Note    Date: 1/30/2023  Patients Name: Charanjit Ferrell  MRN: [id number]  Diagnosis: Dysphagia s/p CVA  Patient Active Problem List   Diagnosis Code    Acute type II respiratory failure (HCC) J96.00    Transaminitis R74.01    Normocytic anemia D64.9    Thrombocytopenia (HCC) D69.6    Agitation R45.1    Acute respiratory failure with hypoxia and hypercapnia (HCC) RLL pneumonia J96.01, J96.02    Altered mental status R41.82    Community acquired pneumonia of right lower lobe of lung J18.9    Prolonged pt (prothrombin time) R79.1    Emphysema lung (HCC) J43.9    Cerebral infarction (HCC) I63.9    ACP (advance care planning) Z71.89    Goals of care, counseling/discussion Z71.89    Encounter for palliative care Z51.5    Delirium due to another medical condition F05    Moderate malnutrition ([address] E44.0    Hepatitis C virus infection without hepatic coma B19.20    Dysphagia R13.10    Impending cerebrovascular accident ([address] I63.9    Dermatitis associated with moisture L30.8    Cerebrovascular accident (CVA) due to embolism of cerebral artery ([address] I63.40       Pain: none reported    Cognitive Treatment  Treatment time: 6751-5547    Subjective: [x] Alert [x] Cooperative     [] Confused     [] Agitated    [] Lethargic    Objective/Assessment:    Orientation: Orientation- 86% (pt remains unoriented to the year)    Recall: n/a    Organization: name 14 items in category- 86%, 100% c cues. Problem solving: Pt. Found walking around room per self when ST entered room, telesitter did not go off, RN alerted. ST educ. Pt. Re: not getting up per self and wait for staff to help  him get ready. Dysphagia: Oral care completed c ST set up. Pt. completed oral motor exercises x2 sets in reps of 15, BOT strengthening exercises x4 sets in reps of 15 and Kim maneuver x6 reps.   Pt. Completed Modified Shaker exercises x2 repetitions of 15 second chin tucks, 15 repetitions of consecutive chin tucks. Telesitter present. No family present. Plan:  [x] Continue ST services    [] Discharge from ST:        Discharge recommendations: [] Inpatient Rehab   [] [place]   [] Outpatient Therapy  [] Follow up at trauma clinic   [] Other:         Treatment completed by: Vince Harris A.CCC/SLP

## 2023-01-30 NOTE — PROGRESS NOTES
Physical Medicine & Rehabilitation  Progress Note      Subjective:      Ann Poe is a 60 y.o. male with multifocal CVA. He reports doing pretty well today. Per patient and nurse, he has not been requiring any oxygen yesterday and this morning, although he does have it on again at the time of evaluation. He states that he is sleeping pretty well. He denies any other acute concerns. ROS:  Denies fevers, chills, sweats. No chest pain, palpitations, lightheadedness. Denies coughing, wheezing or shortness of breath. Denies abdominal pain, nausea, diarrhea or constipation. No new areas of joint pain. Denies new areas of numbness or weakness. Denies new anxiety or depression issues. No new skin problems. Rehabilitation:     Physical Therapy    Restrictions/Precautions: Fall Risk, General Precautions, Contact Precautions, Isolation, Bed Alarm  Implants present? :  (pt denies)  Other position/activity restrictions: Severe dysphagia- NPO. Unable to tolerate any PO safely.    NG tube placed,  on high flow nasal cannula    Bed mobility  Rolling to Left: Supervision  Rolling to Right: Supervision  Supine to Sit: Supervision  Sit to Supine: Supervision  Scooting: Supervision  Bed Mobility Comments: no use of grab rails with bed in flat position    Transfers  Sit to Stand: Supervision  Stand to Sit: Supervision  Bed to Chair: Supervision  Stand Pivot Transfers: Supervision (transfers completed with and without AD safely)  Comment: patient demo'd impulsive movement, vcs for safety and O2 line management    Ambulation  Surface: Level tile  Device: Rolling Walker  Other Apparatus: O2 (2L, SpO2 WNL throguhout gait training)  Assistance: Stand by assistance  Quality of Gait: vcs for proximity to RW  Gait Deviations: Slow Radha, Decreased step length, Decreased step height  Distance: 257ft  Comments: SpO2 WNL on 2Lt  More Ambulation?: No      Occupational Therapy  Upper Extremity Dressing  Assistance Level: Supervision  Skilled Clinical Factors: seated on toilet seat to doff/don overhead shirt     Lower Extremity Dressing  Assistance Level: Supervision  Skilled Clinical Factors: pt able to thread BLEs into brief/pants while seated on toilet seat. able to pull up brief and pants over hips while standing. Pt demo SOB. Putting On/Taking Off Footwear  Assistance Level: Supervision  Skilled Clinical Factors: to don B socks and slippers     Toileting  Assistance Level: Supervision  Skilled Clinical Factors: sitting on toilet to urinate     Toilet Transfers  Equipment: Standard toilet;Grab bars  Additional Factors: Verbal cues;Cues for hand placement;Set-up  Assistance Level: Contact guard assist  Skilled Clinical Factors: CGA/SBA      Speech Therapy  Subjective: [x] Alert     [x] Cooperative     [] Confused     [] Agitated    [] Lethargic     Objective/Assessment:     Orientation: Orientation- 86% (pt remains unoriented to the year)     Recall: n/a     Organization: name 14 items in category- 86%, 100% c cues. Problem solving: Pt. Found walking around room per self when ST entered room, telesitter did not go off, RN alerted. ST educ. Pt. Re: not getting up per self and wait for staff to help  him get ready. Dysphagia: Oral care completed c ST set up. Pt. completed oral motor exercises x2 sets in reps of 15, BOT strengthening exercises x4 sets in reps of 15 and Kim maneuver x6 reps. Pt. Completed Modified Shaker exercises x2 repetitions of 15 second chin tucks, 15 repetitions of consecutive chin tucks. Telesitter present. No family present. Subjective: [x] Alert     [x] Cooperative     [] Confused     [] Agitated    [] Lethargic     Objective/Assessment:     Orientation: Pt. Recalled date, day week. Recall: n/a     Organization: generate word in category beginning c given letter- 50%, 100% c cues. Problem solving: Pt.  Asking good questions re: upcoming repeat video swallow study, demonstrating good problem solving re: his current situation. Dysphagia: Oral care completed this am, educ. Pt. He is due for repeat oral care in ~one hour, set up provided. No overt s/s aspiration demo. C sips water x3. Pt. Completed Kim maneuver x8. Telesitter present. Pt. SO present. Current Medications:   Current Facility-Administered Medications: clotrimazole (LOTRIMIN) 1 % cream, , Topical, BID  enoxaparin (LOVENOX) injection 40 mg, 40 mg, SubCUTAneous, Daily  ipratropium-albuterol (DUONEB) nebulizer solution 1 ampule, 1 ampule, Inhalation, Q4H WA  guaiFENesin (MUCINEX) extended release tablet 600 mg, 600 mg, Oral, BID PRN  carvedilol (COREG) tablet 6.25 mg, 6.25 mg, Oral, BID WC  ferrous sulfate (IRON 325) tablet 325 mg, 325 mg, Oral, Daily with breakfast  spironolactone (ALDACTONE) tablet 25 mg, 25 mg, Oral, Daily  acetaminophen (TYLENOL) tablet 650 mg, 650 mg, Oral, Q4H PRN  polyethylene glycol (GLYCOLAX) packet 17 g, 17 g, Oral, Daily  senna (SENOKOT) tablet 17.2 mg, 2 tablet, Oral, Daily PRN  bisacodyl (DULCOLAX) suppository 10 mg, 10 mg, Rectal, Daily PRN      Objective:  /73   Pulse 80   Temp 98.4 °F (36.9 °C) (Oral)   Resp 18   Ht 5' 7\" (1.702 m)   Wt 146 lb 12.8 oz (66.6 kg)   SpO2 90%   BMI 22.99 kg/m²       GEN: Well developed, well nourished, no acute distress  HEENT: NCAT. EOM grossly intact. Hearing grossly intact. Mucous membranes pink and moist.  RESP: Normal breath sounds with no wheezing, rales, or rhonchi. Respirations WNL and unlabored. CV: Regular rate and rhythm. No murmurs, rubs, or gallops. ABD: Soft, non-distended, BS+ and equal.  NEURO: Alert, oriented to person, place, and time. Speech fluent. Sensation to light touch intact in bilateral lower limbs. MSK: Muscle tone and bulk are normal bilaterally. Moving bilateral upper limbs with at least antigravity strength. Strength 4+/5 in bilateral lower limbs.   LIMBS: No edema in bilateral lower limbs. SKIN: Warm and dry with good turgor. PSYCH: Mood WNL  Affect WNL. Appropriately interactive. Diagnostics:     CBC:   No results for input(s): WBC, RBC, HGB, HCT, MCV, RDW, PLT in the last 72 hours. BMP:   No results for input(s): NA, K, CL, CO2, PHOS, BUN, CREATININE, CA, GLUCOSE in the last 72 hours. BNP: No results for input(s): BNP in the last 72 hours. PT/INR: No results for input(s): PROTIME, INR in the last 72 hours. APTT: No results for input(s): APTT in the last 72 hours. CARDIAC ENZYMES: No results for input(s): CKMB, CKMBINDEX, TROPONINT in the last 72 hours. Invalid input(s): CKTOTAL;3  FASTING LIPID PANEL:  Lab Results   Component Value Date    TRIG 85 01/03/2023     LIVER PROFILE: No results for input(s): AST, ALT, ALB, BILIDIR, BILITOT, ALKPHOS in the last 72 hours. Reviewed notes from SLP, OT, PT. Impression/Plan:   Impaired ADLs, gait, and mobility due to:    Multifocal cardioembolic CVA, subarachnoid hemorrhage:  PT/OT for gait, mobility, strengthening, endurance, ADLs, and self care. No residual SAH on CT head 1/21. Dysphagia:  S/p PEG tube placement on 1/17. SLP treating. Dietitian managing tube feed - nocturnal with bolus. Monitor BMP TIW. Free water protocol. Agitation:  Recurred 1/21. Patient moved to room closer to the nurses station and telesitter initiated. Repeat CT head improved. Seroquel BID started 1/21 - mental status improved and Seroquel was discontinued. Insomnia: Trazodone started nightly on 1/20 - failed. Improved - no current medication. Acute respiratory failure requiring intubation. Currently on 1.5 to 2L NC - weaning as tolerated - improvement today. On duo-neb every 4 hours while awake. Pneumonia: Improved. Completed course of Unasyn and Vanco. CXR 1/24 for increased productive cough - stable hazy opacities. KENTRELL: Resolved. Will monitor. Elevated LFTs:  Resolved.  Will monitor  HCV:  Will need follow up with GI for treatment as an outpatient  Anemia: Hemoglobin 10.8 on 1/27, stable. Monitoring. On oral iron supplementation. Thrombocytopenia: Resolved. Will monitor. Bowel Management: Miralax daily, senokot prn, dulcolax prn. DVT Prophylaxis:  Repeat CT - SAH resolved 1/21 - okay to initiate DVT chemoprophylaxis per IM. Lovenox started 1/22. TONI stockings during the day, EPC cuffs at night.   Internal Medicine for medical management  Follow up PCP 1-2 weeks, PM&R 4-6 weeks, GI, Neurology      Electronically signed by Jagruti Chavez MD on 1/31/2023 at 1:39 AM

## 2023-01-30 NOTE — CARE COORDINATION
ARU CASE MANAGEMENT NOTE:    Patient is alert and oriented x4. Spoke with patient regarding discharge plan and patient confirms plan is to go home with wife and [facility]/ will need tube feedings/ orders closer to discharge. States he is improving and not using is O2 like he was before    DME: none    Outside appointments: none while in ARU    Will continue to follow for additional discharge needs.     Electronically signed by Danyelle Hutson RN on 1/30/2023 at 11:02 AM

## 2023-01-31 LAB
ANION GAP SERPL CALCULATED.3IONS-SCNC: 8 MMOL/L (ref 9–17)
BUN BLDV-MCNC: 18 MG/DL (ref 8–23)
CALCIUM SERPL-MCNC: 8.8 MG/DL (ref 8.6–10.4)
CHLORIDE BLD-SCNC: 103 MMOL/L (ref 98–107)
CO2: 27 MMOL/L (ref 20–31)
CREAT SERPL-MCNC: 0.51 MG/DL (ref 0.7–1.2)
GFR SERPL CREATININE-BSD FRML MDRD: >60 ML/MIN/1.73M2
GLUCOSE BLD-MCNC: 118 MG/DL (ref 70–99)
POTASSIUM SERPL-SCNC: 3.9 MMOL/L (ref 3.7–5.3)
SODIUM BLD-SCNC: 138 MMOL/L (ref 135–144)

## 2023-01-31 PROCEDURE — 99212 OFFICE O/P EST SF 10 MIN: CPT

## 2023-01-31 PROCEDURE — 36415 COLL VENOUS BLD VENIPUNCTURE: CPT

## 2023-01-31 PROCEDURE — 97535 SELF CARE MNGMENT TRAINING: CPT

## 2023-01-31 PROCEDURE — 97129 THER IVNTJ 1ST 15 MIN: CPT

## 2023-01-31 PROCEDURE — 94640 AIRWAY INHALATION TREATMENT: CPT

## 2023-01-31 PROCEDURE — 6370000000 HC RX 637 (ALT 250 FOR IP): Performed by: PHYSICAL MEDICINE & REHABILITATION

## 2023-01-31 PROCEDURE — 94761 N-INVAS EAR/PLS OXIMETRY MLT: CPT

## 2023-01-31 PROCEDURE — 97110 THERAPEUTIC EXERCISES: CPT

## 2023-01-31 PROCEDURE — 92526 ORAL FUNCTION THERAPY: CPT

## 2023-01-31 PROCEDURE — 6370000000 HC RX 637 (ALT 250 FOR IP): Performed by: INTERNAL MEDICINE

## 2023-01-31 PROCEDURE — 99231 SBSQ HOSP IP/OBS SF/LOW 25: CPT | Performed by: STUDENT IN AN ORGANIZED HEALTH CARE EDUCATION/TRAINING PROGRAM

## 2023-01-31 PROCEDURE — 1180000000 HC REHAB R&B

## 2023-01-31 PROCEDURE — 97530 THERAPEUTIC ACTIVITIES: CPT

## 2023-01-31 PROCEDURE — 97116 GAIT TRAINING THERAPY: CPT

## 2023-01-31 PROCEDURE — 6360000002 HC RX W HCPCS: Performed by: PHYSICAL MEDICINE & REHABILITATION

## 2023-01-31 PROCEDURE — 97112 NEUROMUSCULAR REEDUCATION: CPT

## 2023-01-31 PROCEDURE — 80048 BASIC METABOLIC PNL TOTAL CA: CPT

## 2023-01-31 RX ADMIN — IPRATROPIUM BROMIDE AND ALBUTEROL SULFATE 1 AMPULE: 2.5; .5 SOLUTION RESPIRATORY (INHALATION) at 16:09

## 2023-01-31 RX ADMIN — CARVEDILOL 6.25 MG: 6.25 TABLET, FILM COATED ORAL at 07:30

## 2023-01-31 RX ADMIN — FERROUS SULFATE TAB 325 MG (65 MG ELEMENTAL FE) 325 MG: 325 (65 FE) TAB at 07:30

## 2023-01-31 RX ADMIN — IPRATROPIUM BROMIDE AND ALBUTEROL SULFATE 1 AMPULE: 2.5; .5 SOLUTION RESPIRATORY (INHALATION) at 20:45

## 2023-01-31 RX ADMIN — ENOXAPARIN SODIUM 40 MG: 100 INJECTION SUBCUTANEOUS at 07:30

## 2023-01-31 RX ADMIN — SPIRONOLACTONE 25 MG: 25 TABLET ORAL at 07:30

## 2023-01-31 RX ADMIN — CARVEDILOL 6.25 MG: 6.25 TABLET, FILM COATED ORAL at 16:59

## 2023-01-31 ASSESSMENT — PAIN SCALES - WONG BAKER: WONGBAKER_NUMERICALRESPONSE: 0

## 2023-01-31 NOTE — PROGRESS NOTES
[address]    Acute Rehabilitation Occupational Therapy Daily Treatment Note    Date: 23  Patient Name: Mukund Grover       Room: 1709/2901-59  MRN: [id number]  Account: [de-identified]   : 1960  (60 y.o.) Gender: male       Referring Practitioner: Darnell Patiño MD  Diagnosis: Cerebrovascular accident  Additional Pertinent Hx: Per MRI Impression on 23: Mild amount of residual subarachnoid hemorrhage in the right frontal and  right parietal lobes as well as the left frontal lobe. Evolving small focus of hemorrhage in the left parietal lobe posteriorly. Evolving areas of ischemia in the right frontal lobe and left posterior parietal lobe as well as a few foci in the parasagittal aspect of the right frontoparietal region. Treatment Diagnosis: Impaired self care status    Past Medical History:  has no past medical history on file. Past Surgical History:   has a past surgical history that includes Upper gastrointestinal endoscopy (N/A, 2023). Restrictions  Restrictions/Precautions  Restrictions/Precautions: Fall Risk, General Precautions, Contact Precautions, Isolation, Bed Alarm  Required Braces or Orthoses?: No  Implants present? :  (pt denies)     Position Activity Restriction  Other position/activity restrictions: Severe dysphagia- NPO. Unable to tolerate any PO safely. NG tube placed,  on high flow nasal cannula    Vitals  SpO2: 94 %  O2 Device: None (Room air)     Subjective  Subjective  Subjective: AM: Pt found at gym and excited to get a shower. Pain: No c/o any pain. Pain Assessment  Baldwin-Morris Pain Rating: No hurt    Objective  Cognition  Overall Orientation Status: Impaired  Orientation Level: Oriented to person;Disoriented to situation;Oriented to place; Disoriented to time    Cognition  Overall Cognitive Status: Exceptions  Arousal/Alertness: Appropriate responses to stimuli  Following Commands:  Follows one step commands consistently  Attention Span: Attends with cues to redirect  Memory: Decreased recall of biographical Information;Decreased recall of precautions;Decreased recall of recent events  Safety Judgement: Decreased awareness of need for assistance;Decreased awareness of need for safety  Problem Solving: Assistance required to generate solutions;Assistance required to implement solutions;Assistance required to identify errors made;Assistance required to correct errors made  Insights: Decreased awareness of deficits  Initiation: Requires cues for some  Sequencing: Requires cues for some  Cognition Comment: Pt with impulsive tendencies noted. Activities of Daily Living    Upper Extremity Bathing  Assistance Level: Stand by assist  Skilled Clinical Factors: completed while seated on shower bench. During shower, pt O2 levels remained at 94% on room air. Lower Extremity Bathing  Assistance Level: Stand by assist  Skilled Clinical Factors: completed while seated on shower bench, increased trunk flexion for BLE, Lateral leans for washing buttocks, seated for tiffany area. During shower, pt O2 levels remained at 94% on room air. Upper Extremity Dressing  Assistance Level: Supervision  Skilled Clinical Factors: doff/don overhead shirt while standing with RW    Lower Extremity Dressing  Assistance Level: Supervision  Skilled Clinical Factors: pt able to thread BLEs into underwear/pants while seated on toilet seat. able to pull up underwear and pants over hips while standing. Pt demo slight SOB.     Putting On/Taking Off Footwear  Assistance Level: Supervision  Skilled Clinical Factors: to don B socks and slippers while seated on shower bench    Toileting  Assistance Level: Stand by assist  Skilled Clinical Factors: AM: sitting on toilet to urinate, SBA d/t impulsivity to amb without RW PM: completed urinating while standing at toilet with SBA    Toilet Transfers  Equipment: Standard toilet;Grab bars  Additional Factors: Verbal cues;Cues for hand placement;Set-up  Assistance Level: Stand by assist    Tub/Shower Transfers  Type: Shower  Transfer From: Rolling walker  Transfer To: Tub transfer bench  Additional Factors: Verbal cues;Cues for hand placement; Increased time to complete  Assistance Level: Contact guard assist  Skilled Clinical Factors: CGA over threshold for safety due to pt impulsively amb to shower without RW. Writer edu pt on utiliziing RW for safety. P return noted. Mobility  Bed Mobility  Overall Assistance Level: Supervision     Roll Left  Assistance Level: Supervision  Skilled Clinical Factors: utilized hand rails with raised HOB     Sit to Supine  Assistance Level: Supervision  Skilled Clinical Factors: No safety concerns  Supine to Sit  Assistance Level: Supervision  Skilled Clinical Factors: Pt impulsively sit>stand without RW. Writer educated pt on utilizing RW to for safety and balance. Fair return noted. Scooting  Assistance Level: Supervision  Skilled Clinical Factors: Hips closer to EOB. Transfers  Surface: From bed; To chair with arms  Additional Factors: Set-up; Verbal cues; Hand placement cues  Sit to Stand  Assistance Level: Stand by assist  Skilled Clinical Factors: Close SBA d/t impulsivity when coming from sit>stand  Stand to Sit  Assistance Level: Supervision  Skilled Clinical Factors: G controlled decent    Functional Mobility  Device: Rolling walker  Activity: To/From bathroom  Assistance Level: Stand by assist  Skilled Clinical Factors: Pt began impulsively amb without RW. Writer edu pt about utilizing RW for safety and balance. OT Exercises  Exercise Treatment: AM: Pt participated in B UE exercises #2 x 20 reps in all planes, rest breaks taken as needed, to continue to increase general str and activity tolerance for maximized indep and safety during all daily self care tasks and transfers. Pt O2 levels desaturated to 91%on room air after seated UE exercises upon WIN/L exit during AM session.     Motor Control/Coordination: PM: Pt participated in completing wooden block puzzle with puzzle guide placed inside of wooden frame while seated EOB to improve visual scanning and motor planning when performing functional tasks. Pt demo great improvement. Writer upgraded activity by placing picture of puzzle next to the wooden box and asked pt to duplicate puzzle inside the wooden box. Pt completed with increased time and no v/c req to fix errors. Pt demo G problem solving. Writer upgraded to a 3D puzzle. Pt attempted, but was unable to complete. \"This may be too much for me right now. \"  Pt is able to identify errors, however, demonstrates deficits in motor planning and sequencing, demonstrating apraxia. Additional Activities  Additional Activities Comment: . Assessment  Assessment  Activity Tolerance: Patient limited by fatigue;Patient limited by endurance  Discharge Recommendations: Home with assist PRN;Outpatient OT    Patient Education  Education  Education Given To: Patient  Education Provided: Role of Therapy;Plan of Care;Cognition;Equipment;DME/Home Modifications; ADL Function; Safety  Education Method: Verbal;Demonstration  Barriers to Learning: Cognition; Hearing  Education Outcome: Verbalized understanding;Demonstrated understanding;Continued education needed    OT Equipment Recommendations  Other: TBD    Safety Devices  Safety Devices in place: Yes  Type of devices: All fall risk precautions in place; Left in chair;Call light within reach  Restraints  Initially in place: No    Goals  Patient Goals   Patient goals : \"To learn how to walk properly. ..all that goes without saying (referring to bathing, dressing and toileting independence)\"  Short Term Goals  Time Frame for Short Term Goals: One week  Short Term Goal 1: Patient will perform oral hygiene with SBA and Good safety. Short Term Goal 2: Patient will perform hair brushing with SBA.   Short Term Goal 3: Patient will perform upper body dressing with SBA.  Short Term Goal 4: Patient will perform lower body dressing, lower body bathing, and toileting tasks with Minimal assist.  Short Term Goal 5: Patient will perform functional moblity and transfers during self-care with CGA, least restrictive mobility device, and Good safety. Short Term Goal 6: Patient will verbalize/demonstrate Good understanding of assistive equipment/durable medical equipment/modified techniques to maximize safety and independence with self-care. Short Term Goal 7: Patient will actively participate in 30+ minutes of therapeutic exercise/functional activities to promote increased independence with self-care and mobility. Long Term Goals  Time Frame for Long Term Goals : By discharge  Long Term Goal 1: Patient will perform BADLs with modified independence and Good safety. Long Term Goal 2: Patient will perform functional mobility and transfers during self-care with modified independence, least restrictive mobility device, and Good safety. Long Term Goal 3: Patient will stand for 5+ minutes with 0-1 UE support, modified independence while engaging in functional activity of choice. Long Term Goal 4: Patient will perform simple meal prep and light housekeeping with SBA and Good safety. Long Term Goal 5: Patient will verbalize/demonstrate Good understanding of Fall Prevention Strategies to maximize safety and independence with self-care. Long Term Goal 6: Patient will perform self-care with improved bilateral  strength as evidenced by 10#  strength improvement and improved bilateral fine motor control as evidenced by 5 second improved in 9 hole peg test (Goal updated by STEFFI Mcmahan/L on 1/24/23).     Plan  Occupational Therapy Plan  Times Per Week: 5-7  Times Per Day: Twice a day  Current Treatment Recommendations: Self-Care / ADL, Strengthening, ROM, Balance training, Functional mobility training, Endurance training, Cognitive reorientation, Neuromuscular re-education, Safety education & training, Patient/Caregiver education & training, Equipment evaluation, education, & procurement, Cognitive/Perceptual training, Coordination training, Home management training       01/31/23 1101 01/31/23 1600   OT Individual Minutes   Time In [id number]   Time Out 9113 1704   Minutes 65 34         Electronically signed by ANITA Ruelas on 1/31/23 at 4:34 PM EST

## 2023-01-31 NOTE — PROGRESS NOTES
Speech Language Pathology  Speech Language Pathology  [facility] [place] Acute Rehab Unit at [facility]    Cognitive/Dysphagia Treatment Note    Date: 1/31/2023  Patients Name: Trish Neely  MRN: [id number]  Diagnosis: Dysphagia s/p CVA  Patient Active Problem List   Diagnosis Code    Acute type II respiratory failure (HCC) J96.00    Transaminitis R74.01    Normocytic anemia D64.9    Thrombocytopenia (HCC) D69.6    Agitation R45.1    Acute respiratory failure with hypoxia and hypercapnia (HCC) RLL pneumonia J96.01, J96.02    Altered mental status R41.82    Community acquired pneumonia of right lower lobe of lung J18.9    Prolonged pt (prothrombin time) R79.1    Emphysema lung (HCC) J43.9    Cerebral infarction (HCC) I63.9    ACP (advance care planning) Z71.89    Goals of care, counseling/discussion Z71.89    Encounter for palliative care Z51.5    Delirium due to another medical condition F05    Moderate malnutrition ([address] E44.0    Hepatitis C virus infection without hepatic coma B19.20    Dysphagia R13.10    Impending cerebrovascular accident ([address] I63.9    Dermatitis associated with moisture L30.8    Cerebrovascular accident (CVA) due to embolism of cerebral artery ([address] I63.40       Pain: none reported    Cognitive Treatment  Treatment time: 1156-1566    Subjective: [x] Alert [x] Cooperative     [] Confused     [] Agitated    [] Lethargic    Objective/Assessment:    Orientation: Oriented x4. Recall: Not addressed directly. Organization: n/a    Problem solving: Reasoning, ID similar category items/odd one out (field of 3)- 87% accuracy (I), 100% cued. Dysphagia: Oral care completed prior to ST session. No overt s/s of aspiration observed with sips of thin liquid via Free Water Protocol taken throughout. Pt. completed Kim maneuver x11, simple tongue base strengthening exercises x3, 20 reps each, oral motor exercises x2, 20 reps each. Encouraged Pt to practice exercises throughout day.   Pt verbalized understanding and agreeable. Telesitter present. Pt. SO present. Plan:  [x] Continue ST services    [] Discharge from ST:        Discharge recommendations: [] Inpatient Rehab   [] [place]   [] Outpatient Therapy  [] Follow up at trauma clinic   [] Other:         Treatment completed by:  Russ Gan M.A., CCC-SLP

## 2023-01-31 NOTE — PROGRESS NOTES
Speech Language Pathology  Speech Language Pathology  [facility] Acute Rehab Unit at [facility]    Cognitive/Dysphagia Treatment Note    Date: 1/31/2023  Patients Name: Twyla Lopez  MRN: [id number]  Diagnosis: Dysphagia s/p CVA  Patient Active Problem List   Diagnosis Code    Acute type II respiratory failure (HCC) J96.00    Transaminitis R74.01    Normocytic anemia D64.9    Thrombocytopenia (HCC) D69.6    Agitation R45.1    Acute respiratory failure with hypoxia and hypercapnia (HCC) RLL pneumonia J96.01, J96.02    Altered mental status R41.82    Community acquired pneumonia of right lower lobe of lung J18.9    Prolonged pt (prothrombin time) R79.1    Emphysema lung (HCC) J43.9    Cerebral infarction (HCC) I63.9    ACP (advance care planning) Z71.89    Goals of care, counseling/discussion Z71.89    Encounter for palliative care Z51.5    Delirium due to another medical condition F05    Moderate malnutrition ([address] E44.0    Hepatitis C virus infection without hepatic coma B19.20    Dysphagia R13.10    Impending cerebrovascular accident ([address] I63.9    Dermatitis associated with moisture L30.8    Cerebrovascular accident (CVA) due to embolism of cerebral artery ([address] I63.40       Pain: none reported    Cognitive Treatment  Treatment time: 1211-6046    Subjective: [x] Alert [x] Cooperative     [] Confused     [] Agitated    [] Lethargic    Objective/Assessment:    Orientation: n/a     Recall: Pt. Not recalling appropriate amt of time for Free Water Protocol, from oral care to drinking, he stated \"20 mins\". Pt. Reeduc. Re: Free Water Protocol guidelines    Organization: n/a    Problem solving: Household problem solving- 88%, 100% c cues. Dysphagia: Oral care completed c ST set up. Pt. Completed Kim maneuver x7, simple tongue base strengthening exercises x4, 15 reps each, oral motor exercises x2, 12 reps each. Pt. Jo Bucy. Short lingual frenulum. Telesitter present. Pt. SO present.      Plan:  [x] Continue ST services    [] Discharge from ST:        Discharge recommendations: [] Inpatient Rehab   [] [place]   [] Outpatient Therapy  [] Follow up at trauma clinic   [] Other:         Treatment completed by: Bushra Marlow A.CCC/SLP

## 2023-01-31 NOTE — PROGRESS NOTES
[facility] Internal Medicine  Yue Gutierrez MD; Tito Sawant MD; Luis Fernando Ortiz MD; MD June Saldivar MD; Yasmin Perez MD    [facility]   [facility]    Progress note    Late entry from 1/30/23        Date:   1/31/2023  Patient name:  Jessee Fernandez  Date of admission:  1/19/2023  9:12 AM  MRN:   [id number]  Account:  [de-identified]  YOB: 1960  PCP:    Yasmin Perez MD  Room:   [facility]8779-  Code Status:    Full Code    Physician Requesting Consult: Myles Moreno MD    Reason for Consult: Medical management    Chief Complaint:     No chief complaint on file. Acute CVA, generalized weakness, metabolic encephalopathy, multifactorial with underlying acute respiratory failure with pneumonia and heart failure    History Obtained From:     patient    History of Present Illness:     58-year-old gentleman with unknown past medical history presented to inpatient [facility] with metabolic encephalopathy found to have acute respiratory failure, pneumonia, also had slurred speech, confusion progressively was getting worse, EMS brought the patient to the hospital his saturations were  75%, found to have acute respiratory failure with pneumonia, in the ED patient was placed on BiPAP, chest x-ray confirmed right lower lobe pneumonia with pleural effusion. ABG showed acute respiratory failure with hypercapnia and hypoxia. Subsequently also found to have NSTEMI, acute heart failure with transaminitis possible shock liver, with also hepatitis C acute with hepatic failure, slowly improved,  Also had multiple acute brain infarct with subarachnoid hemorrhage,,  Subsequently patient was evaluated for acute inpatient rehab, accepted and admitted right now we are consulted for medical management    January 28  No new symptoms  Tolerating rehab therapies  Past Medical History:     History reviewed.  No pertinent past medical history. Past Surgical History:     Past Surgical History:   Procedure Laterality Date    UPPER GASTROINTESTINAL ENDOSCOPY N/A 1/17/2023    EGD ESOPHAGOGASTRODUODENOSCOPY PEG TUBE INSERTION performed by Nighat Lara DO at [facility] AND [facility]        Medications Prior to Admission:     Prior to Admission medications    Medication Sig Start Date End Date Taking? Authorizing Provider   aspirin 325 MG tablet Take 325 mg by mouth daily Patient takes 2 tabs daily    Historical Provider, MD   psyllium (KONSYL) 28.3 % PACK Take 1 packet by mouth daily    Historical Provider, MD        Allergies:     Patient has no known allergies. Social History:     Tobacco:    reports that he has been smoking cigarettes. He has a 40.00 pack-year smoking history. He has never used smokeless tobacco.  Alcohol:      reports current alcohol use. Drug Use:  reports that he does not currently use drugs. Family History:     History reviewed. No pertinent family history. Review of Systems:     Positive and Negative as described in HPI. CONSTITUTIONAL:  negative for fevers, chills, sweats, fatigue, weight loss  HEENT:  negative for vision, hearing changes, runny nose, throat pain  RESPIRATORY:  negative for shortness of breath, cough, congestion, wheezing. CARDIOVASCULAR:  negative for chest pain, palpitations.   GASTROINTESTINAL:  negative for nausea, vomiting, diarrhea, constipation, change in bowel habits, abdominal pain   GENITOURINARY:  negative for difficulty of urination, burning with urination, frequency   INTEGUMENT:  negative for rash, skin lesions, easy bruising   HEMATOLOGIC/LYMPHATIC:  negative for swelling/edema   ALLERGIC/IMMUNOLOGIC:  negative for urticaria , itching  ENDOCRINE:  negative increase in drinking, increase in urination, hot or cold intolerance  MUSCULOSKELETAL:  negative joint pains, muscle aches, swelling of joints  NEUROLOGICAL:  negative for headaches, dizziness, lightheadedness, numbness, pain, tingling extremities  BEHAVIOR/PSYCH:  negative for depression, anxiety    Physical Exam:     /65   Pulse 77   Temp 98.3 °F (36.8 °C) (Oral)   Resp 18   Ht 5' 7\" (1.702 m)   Wt 146 lb 12.8 oz (66.6 kg)   SpO2 (!) 88%   BMI 22.99 kg/m²   Temp (24hrs), Av.4 °F (36.9 °C), Min:98.3 °F (36.8 °C), Max:98.4 °F (36.9 °C)    No results for input(s): POCGLU in the last 72 hours. Intake/Output Summary (Last 24 hours) at 2023 1644  Last data filed at 2023 1343  Gross per 24 hour   Intake 2068 ml   Output 350 ml   Net 1718 ml         General Appearance:  alert, well appearing, and in no acute distress  Mental status: oriented to person, place, and time with normal affect  Head:  normocephalic, atraumatic. Eye: no icterus, redness, pupils equal and reactive, extraocular eye movements intact, conjunctiva clear  Ear: normal external ear, no discharge, hearing intact  Nose:  no drainage noted  Mouth: mucous membranes moist  Neck: supple, no carotid bruits, thyroid not palpable  Lungs: Bilateral equal air entry, clear to ausculation, no wheezing, rales or rhonchi, normal effort  Cardiovascular: normal rate, regular rhythm, no murmur, gallop, rub.   Abdomen: Soft, nontender, nondistended, normal bowel sounds, no hepatomegaly or splenomegaly  Neurologic: There are no new focal motor or sensory deficits, normal muscle tone and bulk, no abnormal sensation, normal speech, cranial nerves II through XII grossly intact  Skin: No gross lesions, rashes, bruising or bleeding on exposed skin area  Extremities:  peripheral pulses palpable, no pedal edema or calf pain with palpation  Psych: normal affect    Investigations:      Laboratory Testing:  Recent Results (from the past 24 hour(s))   Basic Metabolic Panel w/ Reflex to MG    Collection Time: 23  5:58 AM   Result Value Ref Range    Glucose 118 (H) 70 - 99 mg/dL    BUN 18 8 - 23 mg/dL    Creatinine 0.51 (L) 0.70 - 1.20 mg/dL    Est, Glom Filt Rate >60 >60 mL/min/1.73m2    Calcium 8.8 8.6 - 10.4 mg/dL    Sodium 138 135 - 144 mmol/L    Potassium 3.9 3.7 - 5.3 mmol/L    Chloride 103 98 - 107 mmol/L    CO2 27 20 - 31 mmol/L    Anion Gap 8 (L) 9 - 17 mmol/L             Imaging/Diagonstics:  XR CHEST PORTABLE    Result Date: 1/18/2023  Interval extubation and NG tube removal.  Otherwise similar findings, with perhaps slightly greater hazy ground-glass opacity right mid lung. Correlate clinically. FL MODIFIED BARIUM SWALLOW W VIDEO    Result Date: 1/14/2023  Premature vallecular spillage. Piriform sinus cavity residue. Deep penetration with both materials. Aspiration with pudding. Please see separate speech pathology report for full discussion of findings and recommendations. Assessment :      Medications:      Allergies:  No Known Allergies    Current Meds:   Scheduled Meds:    enoxaparin  40 mg SubCUTAneous Daily    ipratropium-albuterol  1 ampule Inhalation Q4H WA    carvedilol  6.25 mg Oral BID WC    ferrous sulfate  325 mg Oral Daily with breakfast    spironolactone  25 mg Oral Daily    polyethylene glycol  17 g Oral Daily     Continuous Infusions:   PRN Meds: guaiFENesin, acetaminophen, senna, bisacodyl    Hospital Problems             Last Modified POA    * (Principal) Cerebrovascular accident (CVA) due to embolism of cerebral artery ([address] 1/27/2023 Yes    Acute type II respiratory failure ([address] 1/19/2023 Yes    Transaminitis 1/19/2023 Yes    Normocytic anemia 1/19/2023 Yes    Thrombocytopenia ([address] 1/19/2023 Yes    Acute respiratory failure with hypoxia and hypercapnia ([address] RLL pneumonia 1/19/2023 Yes    Community acquired pneumonia of right lower lobe of lung 1/19/2023 Yes    Emphysema lung ([address] 1/19/2023 Yes    Cerebral infarction ([address] 1/19/2023 Yes    Moderate malnutrition ([address] 1/19/2023 Yes    Hepatitis C virus infection without hepatic coma 1/19/2023 Yes    Dysphagia 1/19/2023 Yes    Impending cerebrovascular accident ([address] 1/27/2023 Yes Dermatitis associated with moisture 1/20/2023 Yes   Plan:     Acute hypoxic and hypercapnic respiratory failure secondary to likely aspiration pneumonia, treated with vancomycin and Unasyn, also was positive for MRSA nasal swab, required intubation, extubated subsequently  Acute CVA with subarachnoid hemorrhage, cardioembolic, did not qualify for tPA,  Acute liver failure possible multifactorial, alcoholic liver disease with hepatitis C,  Hypercoagulable state secondary to acute liver disease, INR currently 1.6  Thrombocytopenia secondary to alcoholic liver disease and hepatitis C, improved  Dysphagia , PEG placed   Anemia , of ch disease multifactorial   DVT PPX with SCD only, as he had cerebral h'age  Full code status   PT/OT     1/21  Patient seen to be resting comfortably  Was able to participate in physical therapy today  No new issues  Repeat CT head showed no new strokes had no hemorrhage  Continue current management    1/22  Since no bleed noted on repeat CT head okay to resume DVT prophylaxis  Hemoglobin is stable,thrombocytopenia has resolved platelet count 222 on labs 1/20  No new issues continue current management    1/27  Labs, radiology, medications, vitals reviewed,  Episodes of desaturation, 88%, improved,  No shortness of breath or chest pain at this time,  Labs anemia, hemoglobin 10.8,  Microcytic, MCV 75.3,  Patient probably will need anemia of chronic disease,  Tolerating physical therapy,    January 28, 2023  Vitals labs stable  Denies chest pain shortness of breath palpitations  Patient is on carvedilol spironolactone ferrous sulfate bronchodilators and Lovenox    1/30  Labs/meds/radiology reviewed   Progressing well . Progressing satisfactory with rehab therapies .              Consultations:   IP CONSULT TO DIETITIAN  IP CONSULT TO SOCIAL WORK  IP CONSULT TO SPIRITUAL SERVICES  IP CONSULT TO INTERNAL MEDICINE      Kelsey Adams MD  1/31/2023  4:44 PM    Copy sent to Dr. Kelsey Adams, MD    Please note that this chart was generated using voice recognition Dragon dictation software. Although every effort was made to ensure the accuracy of this automated transcription, some errors in transcription may have occurred.

## 2023-01-31 NOTE — CARE COORDINATION
ARU CASE MANAGEMENT NOTE:    Patient is alert and oriented x4. Spoke with patient regarding discharge plan and patient confirms plan is to go home with wife and [facility]/ will need tube feedings/ orders closer to discharge. Continues to feel he  is improving and not using is O2 like he was before. DME: none     Outside appointments: none while in ARU     Will continue to follow for additional discharge needs.     Electronically signed by Nghia Krause RN on 1/31/2023 at 11:03 AM

## 2023-01-31 NOTE — PROGRESS NOTES
Physical Medicine & Rehabilitation  Progress Note      Subjective:      Rolf Marroquin is a 60 y.o. male with multifocal CVA. He reports doing well today. He has not been requiring nasal cannula oxygen. He denies any significant shortness of breath. Discussed plan for repeat MBS tomorrow with SLP - he is agreeable. He denies any other acute concerns. ROS:  Denies fevers, chills, sweats. No chest pain, palpitations, lightheadedness. Denies coughing, wheezing or shortness of breath. Denies abdominal pain, nausea, diarrhea or constipation. No new areas of joint pain. Denies new areas of numbness or weakness. Denies new anxiety or depression issues. No new skin problems. Rehabilitation:     Physical Therapy    Restrictions/Precautions: Fall Risk, General Precautions, Contact Precautions, Isolation, Bed Alarm  Implants present? :  (pt denies)  Other position/activity restrictions: Severe dysphagia- NPO. Unable to tolerate any PO safely.    NG tube placed,  on high flow nasal cannula    Bed mobility  Rolling to Left: Supervision  Rolling to Right: Supervision  Supine to Sit: Supervision  Sit to Supine: Supervision  Scooting: Supervision  Bed Mobility Comments: no use of grab rails with bed in flat position    Transfers  Sit to Stand: Supervision  Stand to Sit: Supervision  Bed to Chair: Supervision  Stand Pivot Transfers: Supervision (transfers completed with and without AD safely)  Comment: patient demo'd impulsive movement, vcs for safety and O2 line management    Ambulation  Surface: Level tile  Device: Rolling Walker  Other Apparatus: O2 (2L, SpO2 WNL throguhout gait training)  Assistance: Stand by assistance  Quality of Gait: vcs for proximity to RW  Gait Deviations: Slow Radha, Decreased step length, Decreased step height  Distance: 257ft  Comments: SpO2 WNL on 2Lt  More Ambulation?: No      Occupational Therapy  Upper Extremity Bathing  Assistance Level: Stand by assist  Skilled Clinical Factors: completed while seated on shower bench. During shower, pt O2 levels remained at 94% on room air. Lower Extremity Bathing  Assistance Level: Stand by assist  Skilled Clinical Factors: completed while seated on shower bench, increased trunk flexion for BLE, Lateral leans for washing buttocks, seated for tiffany area. During shower, pt O2 levels remained at 94% on room air. Upper Extremity Dressing  Assistance Level: Supervision  Skilled Clinical Factors: doff/don overhead shirt while standing with RW     Lower Extremity Dressing  Assistance Level: Supervision  Skilled Clinical Factors: pt able to thread BLEs into underwear/pants while seated on toilet seat. able to pull up underwear and pants over hips while standing. Pt demo slight SOB. Putting On/Taking Off Footwear  Assistance Level: Supervision  Skilled Clinical Factors: to don B socks and slippers while seated on shower bench     Toileting  Assistance Level: Stand by assist  Skilled Clinical Factors: AM: sitting on toilet to urinate, SBA d/t impulsivity to amb without RW PM: completed urinating while standing at toilet with SBA     Toilet Transfers  Equipment: Standard toilet;Grab bars  Additional Factors: Verbal cues;Cues for hand placement;Set-up  Assistance Level: Stand by assist     Tub/Shower Transfers  Type: Shower  Transfer From: Rolling walker  Transfer To: Tub transfer bench  Additional Factors: Verbal cues;Cues for hand placement; Increased time to complete  Assistance Level: Contact guard assist  Skilled Clinical Factors: CGA over threshold for safety due to pt impulsively amb to shower without RW. Writer edu pt on utiliziing RW for safety. P return noted. Speech Therapy  Subjective: [x] Alert     [x] Cooperative     [] Confused     [] Agitated    [] Lethargic     Objective/Assessment:     Orientation: n/a      Recall: Pt.  Not recalling appropriate amt of time for Free Water Protocol, from oral care to drinking, he stated \"20 mins\". Pt. Reeduc. Re: Free Water Protocol guidelines     Organization: n/a     Problem solving: Household problem solving- 88%, 100% c cues. Dysphagia: Oral care completed c ST set up. Pt. Completed Kim maneuver x7, simple tongue base strengthening exercises x4, 15 reps each, oral motor exercises x2, 12 reps each. Pt. Nola Valiente. Short lingual frenulum. Telesitter present. Pt. SO present. Subjective: [x] Alert     [x] Cooperative     [] Confused     [] Agitated    [] Lethargic     Objective/Assessment:     Orientation: Oriented x4. Recall: Not addressed directly. Organization: n/a     Problem solving: Reasoning, ID similar category items/odd one out (field of 3)- 87% accuracy (I), 100% cued. Dysphagia: Oral care completed prior to ST session. No overt s/s of aspiration observed with sips of thin liquid via Free Water Protocol taken throughout. Pt. completed Kim maneuver x11, simple tongue base strengthening exercises x3, 20 reps each, oral motor exercises x2, 20 reps each. Encouraged Pt to practice exercises throughout day. Pt verbalized understanding and agreeable.          Current Medications:   Current Facility-Administered Medications: enoxaparin (LOVENOX) injection 40 mg, 40 mg, SubCUTAneous, Daily  ipratropium-albuterol (DUONEB) nebulizer solution 1 ampule, 1 ampule, Inhalation, Q4H WA  guaiFENesin (MUCINEX) extended release tablet 600 mg, 600 mg, Oral, BID PRN  carvedilol (COREG) tablet 6.25 mg, 6.25 mg, Oral, BID WC  ferrous sulfate (IRON 325) tablet 325 mg, 325 mg, Oral, Daily with breakfast  spironolactone (ALDACTONE) tablet 25 mg, 25 mg, Oral, Daily  acetaminophen (TYLENOL) tablet 650 mg, 650 mg, Oral, Q4H PRN  polyethylene glycol (GLYCOLAX) packet 17 g, 17 g, Oral, Daily  senna (SENOKOT) tablet 17.2 mg, 2 tablet, Oral, Daily PRN  bisacodyl (DULCOLAX) suppository 10 mg, 10 mg, Rectal, Daily PRN      Objective:  /80   Pulse 78   Temp 97.8 °F (36.6 °C) (Oral)   Resp 18   Ht 5' 7\" (1.702 m)   Wt 146 lb 12.8 oz (66.6 kg)   SpO2 (!) 89%   BMI 22.99 kg/m²       GEN: Well developed, well nourished, no acute distress  HEENT: NCAT. EOM grossly intact. Hearing grossly intact. Mucous membranes pink and moist.  Mass present on left neck/cheek (chronic for 50 years per patient). RESP: Normal breath sounds with no wheezing, rales, or rhonchi. Respirations WNL and unlabored. CV: Regular rate and rhythm. No murmurs, rubs, or gallops. ABD: Soft, non-distended, BS+ and equal.  NEURO: Alert. Speech fluent. Sensation to light touch intact in bilateral lower limbs. MSK: Muscle tone and bulk are normal bilaterally. Moving bilateral upper limbs with at least antigravity strength. Strength 4+/5 in bilateral lower limbs. LIMBS: No edema in bilateral lower limbs. SKIN: Warm and dry with good turgor. PSYCH: Mood WNL  Affect WNL. Appropriately interactive. Diagnostics:     CBC:   No results for input(s): WBC, RBC, HGB, HCT, MCV, RDW, PLT in the last 72 hours. BMP:   Recent Labs     01/31/23  0558      K 3.9      CO2 27   BUN 18   CREATININE 0.51*   GLUCOSE 118*       BNP: No results for input(s): BNP in the last 72 hours. PT/INR: No results for input(s): PROTIME, INR in the last 72 hours. APTT: No results for input(s): APTT in the last 72 hours. CARDIAC ENZYMES: No results for input(s): CKMB, CKMBINDEX, TROPONINT in the last 72 hours. Invalid input(s): CKTOTAL;3  FASTING LIPID PANEL:  Lab Results   Component Value Date    TRIG 85 01/03/2023     LIVER PROFILE: No results for input(s): AST, ALT, ALB, BILIDIR, BILITOT, ALKPHOS in the last 72 hours. Reviewed notes from SLP, OT, PT, internal medcicine. Impression/Plan:   Impaired ADLs, gait, and mobility due to:    Multifocal cardioembolic CVA, subarachnoid hemorrhage:  PT/OT for gait, mobility, strengthening, endurance, ADLs, and self care. No residual SAH on CT head 1/21.    Dysphagia:  S/p PEG tube placement on 1/17. SLP treating. Dietitian managing tube feed - nocturnal with bolus. Monitor BMP TIW. Free water protocol. Agitation:  Recurred 1/21. Patient moved to room closer to the nurses station and telesitter initiated. Repeat CT head improved. Seroquel BID started 1/21 - mental status improved and Seroquel was discontinued. Insomnia: Trazodone started nightly on 1/20 - failed. Improved - no current medication. Acute respiratory failure requiring intubation. Currently on 1.5 to 2L NC - weaning as tolerated - improvement today. On duo-neb every 4 hours while awake. Pneumonia: Improved. Completed course of Unasyn and Vanco. CXR 1/24 for increased productive cough - stable hazy opacities. KENTRELL: Resolved. Will monitor. Elevated LFTs:  Resolved. Will monitor  HCV:  Will need follow up with GI for treatment as an outpatient  Anemia: Hemoglobin 10.8 on 1/27, stable. Monitoring. On oral iron supplementation. Thrombocytopenia: Resolved. Will monitor. Bowel Management: Miralax daily, senokot prn, dulcolax prn. DVT Prophylaxis:  Repeat CT - SAH resolved 1/21 - okay to initiate DVT chemoprophylaxis per IM. Lovenox started 1/22. TONI stockings during the day, EPC cuffs at night. Internal Medicine for medical management  Follow up PCP 1-2 weeks, PM&R 4-6 weeks, GI, Neurology      Shiloh Tellez was evaluated today and a DME order was entered for a wheeled walker because he requires this to successfully complete daily living tasks of toileting, personal cares, and ambulating. A wheeled walker is necessary due to the patient's unsteady gait, upper body weakness, and inability to  an ambulation device; and he can ambulate only by pushing a walker instead of a lesser assistive device such as a cane, crutch, or standard walker. The need for this equipment was discussed with the patient and he understands and is in agreement.       Electronically signed by Alexa Pascal MD on 2/1/2023 at 2:20 AM

## 2023-01-31 NOTE — PROGRESS NOTES
Physical Therapy  Facility/Department: [facility] ACUTE REHAB  Rehabilitation Physical Therapy Treatment Note    NAME: Jacqui Marie  : 1960 (7 y.o.)  MRN: [id number]  CODE STATUS: Full Code  Date of Service: 23    Restrictions:  Restrictions/Precautions: Fall Risk;General Precautions;Contact Precautions;Isolation; Bed Alarm     SUBJECTIVE  Subjective  Subjective: Anticipating swallow study; states in AM he'd like to \"get cleaned up,\" had an accident with the urinal. Agreeable to changing pants, waiting to shower with OT. Pt requires encouragement to participate. AM, PM entered room & Pt did not have nasal cannula on (O2 turned off in PM); both baseline SpO2 readings were in 80s (87% AM, 82% PM); added 1L supp. O2 with return to normal readings. Reviewed education regarding why supplemental O2 is used in AM, breathing techniques in PM.  Pain Assessment  Pain Assessment: None - Denies Pain    OBJECTIVE  Functional Mobility  Bed Mobility  Overall Assistance Level: Supervision  Supine to Sit  Assistance Level: Supervision  Scooting  Assistance Level: Supervision  Balance  Sitting Balance: Modified independent   Standing Balance: Stand by assistance  Standing Balance  Time: ~7 min. Activity: grocery shopping simulation  Comments: Using grocery cart + 1L/min. supp. O2. Transfers  Surface: To bed;From bed; Wheelchair;Standard toilet  Device:  (with, without rolling walker)  Sit to Stand  Assistance Level: Supervision  Stand to Sit  Assistance Level: Supervision  Bed To/From Chair  Technique: Stand step  Assistance Level: Stand by assist  Skilled Clinical Factors: Writer managing O2 tubing  Stand Pivot  Assistance Level: Stand by assist  Skilled Clinical Factors: Writer managing O2 tubing; cues to keep feet within walker's base of support during some turns. Environmental Mobility  Ambulation  Surface: Level surface  Device: Rolling walker (No device or supp.  O2 ~30' PM)  Distance: 80'  Additional Factors: Verbal cues  Assistance Level: Stand by assist (Writer managing O2 tubing, tank; without wheelchair brought behind.)  Gait Deviations: Unsteady gait  Skilled Clinical Factors: Base of support and step length vary. With walker: Requires cues for door clearance, to keep feet within walker's base of support, particularly when turning. With 1L supp. O2, HR & SpO2 remain WNL. Without device and supp. O2: Pt states he needs \"to think about\" his turns to make them more steady (with improved LE placement). SpO2 79% upon sitting after, HR 83; within 1 min. of adding 1L/min. supp. O2, SpO2 91%, HR 80. Stairs  Stair Height: 4'';6''  Device: Bilateral handrails  Number of Stairs: 5  Additional Factors: Non-reciprocal going up;Non-reciprocal going down  Assistance Level: Stand by assist  Skilled Clinical Factors: With 1L/min. supp. O2, SpO2 94%, HR 81 upon sitting afterward. Pt states he feels his R LE is stronger, leads with it ascending; attempts each LE descending 6\" step (maintaining step-to pattern) without reaching conclusion about which \"felt\" better. PT Exercises  Functional Mobility Circuit Training: Grocery shopping simulation with various weights, bottles, objects, using shopping cart, reaching outside base of support, crouching, reaching toward floor, ~7 min. with seated rest break x1  Dynamic Sitting Balance Exercises: Reaching outside SELVIN, weight shifting: x5 min. Dynamic Standing Balance Exercises: Reaching outside SELVIN, weight-shifting, briefly SLS/0-1 UE support: 2 min. x2  Exercise Equipment: NuStep, workload 2, 10 min. (1L/min. supp. O2; HR & SpO2 WNL throughout.)    ASSESSMENT/PROGRESS TOWARDS GOALS  Vital Signs  Heart Rate: 82  SpO2: (!) 78 % (lowest reading, no supp. O2 & amb without walker; low 80s without supp. O2 sitting EOB; returned WNL with addition of 1L supp.  O2.)  O2 Device: None (Room air)    Assessment  Activity Tolerance: Patient limited by fatigue;Patient limited by endurance (Requires encouragement to participate.)    Goals  Short Term Goals  Time Frame for Short Term Goals: 9 days  Short Term Goal 1: CGA assist supine<-> sit x1 assist  Short Term Goal 2: sit EOB withsupervsion up to 15 min for therex/ADL  Short Term Goal 3: 3+/5 LE strength to prepare for standing activiites and gait  Short Term Goal 4: ambulation with rolling walker distance of 70 to 100 ft, CGA, level surface  Short Term Goal 5: roll L/R with SBA  Additional Goals?: Yes  Short Term Goal 6: Pt able to go up and down 5 steps with 2 rails, min A  Long Term Goals  Time Frame for Long Term Goals : By DC  Long Term Goal 1: pt able to roll L/R indepndently  Long Term Goal 2: pt able to perform supine<>sit mod-I  Long Term Goal 3: pt able to perform sit<>stand and perform pivot/step to transfers at mod-I  Long Term Goal 4: pt able to ambulate household distance with appropriate device, mod-I  Long Term Goal 5: pt able to ambulate > 50 ft with appropriate device, at SBA/supervsion level, level as well as incline surface. Additional Goals?: Yes  Long term goal 6: pt able to go up and down 4 or more steps with B UE support, CGA. Long term goal 7: Improve PASS score to 27/36 improve function/stability. Long term goal 8: improve Tinetti balance score to atleast 23/28 to reduce fall risk. PLAN OF CARE/SAFETY  Physcial Therapy Plan  General Plan:  minutes of therapy at least 5 out of 7 days a week (5 to 7 days/week.)  Specific Instructions for Next Treatment: Continue to progress with functional mobility training  Current Treatment Recommendations: Strengthening;Balance training;Functional mobility training;Neuromuscular re-education;Cognitive reorientation; Safety education & training;Patient/Caregiver education & training;Equipment evaluation, education, & procurement; Therapeutic activities;Transfer training; Wheelchair mobility training;Gait training;Stair training;Home exercise program;Positioning  Safety Devices  Type of Devices: All fall risk precautions in place;Gait belt;Telesitter in use (Transferred care to [facility] AM & PM)    Therapy Time     01/31/23 1001 01/31/23 1002   PT Individual Minutes   Time In 1002 1440   Time Out 1100 1513   Minutes 58 33   Minute Variance   Variance  --  32   Reason  --  Make up minutes  (3 min. to be applied to min. missed on 01/30/2023; 29 min.  to be applied to min. missed on 01/28/2023)     Regino Laguna PTA, 01/31/23 at 6:11 PM

## 2023-01-31 NOTE — PROGRESS NOTES
[facility]  Progress Note      NAME:  [id number] [address] RECORD NUMBER:  [id number]  AGE: 60 y.o. GENDER: male  : 1960  TODAY'S DATE:  2023    [facility] nurse follow up visit for discoloration to buttocks/groin. Redness and satellite lesions have improved. Pt states that \"they are using diaper rash cream and it healed right up. \" Primary RN states that pt was using the clotrimazole cream, but has refused it for the last day or so. With area being improved, ok to discontinue clotrimazole cream and apply zinc paste to protect due to occasional incontinence.      Funmilayo Maza, NILAY, [facility]  Wound, Ostomy, and Continence Nursing  [phone]

## 2023-01-31 NOTE — PLAN OF CARE
Problem: Discharge Planning  Goal: Discharge to home or other facility with appropriate resources  1/31/2023 0455 by Deloris Woodward LPN  Outcome: Progressing  Flowsheets (Taken 1/30/2023 2000)  Discharge to home or other facility with appropriate resources:   Identify barriers to discharge with patient and caregiver   Arrange for needed discharge resources and transportation as appropriate   Identify discharge learning needs (meds, wound care, etc)   Refer to discharge planning if patient needs post-hospital services based on physician order or complex needs related to functional status, cognitive ability or social support system     Problem: Nutrition Deficit:  Goal: Optimize nutritional status  1/31/2023 0455 by Deloris Woodward LPN  Outcome: Progressing     Problem: Safety - Adult  Goal: Free from fall injury  1/31/2023 0455 by Deloris Woodward LPN  Outcome: Progressing     Problem: ABCDS Injury Assessment  Goal: Absence of physical injury  1/31/2023 0455 by Deloris Woodward LPN  Outcome: Progressing     Problem: Skin/Tissue Integrity  Goal: Absence of new skin breakdown  Description: 1. Monitor for areas of redness and/or skin breakdown  2. Assess vascular access sites hourly  3. Every 4-6 hours minimum:  Change oxygen saturation probe site  4. Every 4-6 hours:  If on nasal continuous positive airway pressure, respiratory therapy assess nares and determine need for appliance change or resting period.   1/31/2023 0455 by Deloris Woodward LPN  Outcome: Progressing     Problem: Chronic Conditions and Co-morbidities  Goal: Patient's chronic conditions and co-morbidity symptoms are monitored and maintained or improved  1/31/2023 0455 by Deloris Woodward LPN  Outcome: Progressing  Flowsheets (Taken 1/30/2023 2000)  Care Plan - Patient's Chronic Conditions and Co-Morbidity Symptoms are Monitored and Maintained or Improved:   Monitor and assess patient's chronic conditions and comorbid symptoms for stability, deterioration, or improvement   Collaborate with multidisciplinary team to address chronic and comorbid conditions and prevent exacerbation or deterioration   Update acute care plan with appropriate goals if chronic or comorbid symptoms are exacerbated and prevent overall improvement and discharge

## 2023-01-31 NOTE — PLAN OF CARE
Problem: Safety - Adult  Goal: Free from fall injury  1/31/2023 1341 by Charis Miller RN  Outcome: Progressing  Flowsheets (Taken 1/31/2023 1341)  Free From Fall Injury: Instruct family/caregiver on patient safety  Note: Patient remains free of falls and injuries throughout shift. Bed remains in the lowest position, wheels locked, call light and bedside table are within reach. Problem: ABCDS Injury Assessment  Goal: Absence of physical injury  1/31/2023 1341 by Charis Miller RN  Outcome: Progressing     Problem: Skin/Tissue Integrity  Goal: Absence of new skin breakdown  Description: 1. Monitor for areas of redness and/or skin breakdown  2. Assess vascular access sites hourly  3. Every 4-6 hours minimum:  Change oxygen saturation probe site  4. Every 4-6 hours:  If on nasal continuous positive airway pressure, respiratory therapy assess nares and determine need for appliance change or resting period. 1/31/2023 1341 by Charis Miller RN  Outcome: Progressing  Note: No signs of increased skin or tissue breakdown is noted. See Head to Toe/LDA assessments in flowsheets.       Problem: Chronic Conditions and Co-morbidities  Goal: Patient's chronic conditions and co-morbidity symptoms are monitored and maintained or improved  1/31/2023 1341 by Charis Miller RN  Outcome: Progressing     Problem: Discharge Planning  Goal: Discharge to home or other facility with appropriate resources  1/31/2023 1341 by Charis Miller RN  Outcome: Progressing

## 2023-02-01 ENCOUNTER — APPOINTMENT (OUTPATIENT)
Dept: GENERAL RADIOLOGY | Age: 63
DRG: 139 | End: 2023-02-01
Attending: PHYSICAL MEDICINE & REHABILITATION
Payer: MEDICAID

## 2023-02-01 LAB
ANION GAP SERPL CALCULATED.3IONS-SCNC: 8 MMOL/L (ref 9–17)
BUN SERPL-MCNC: 19 MG/DL (ref 8–23)
CALCIUM SERPL-MCNC: 8.9 MG/DL (ref 8.6–10.4)
CHLORIDE SERPL-SCNC: 104 MMOL/L (ref 98–107)
CO2 SERPL-SCNC: 27 MMOL/L (ref 20–31)
CREAT SERPL-MCNC: 0.48 MG/DL (ref 0.7–1.2)
GFR SERPL CREATININE-BSD FRML MDRD: >60 ML/MIN/1.73M2
GLUCOSE SERPL-MCNC: 140 MG/DL (ref 70–99)
POTASSIUM SERPL-SCNC: 4.2 MMOL/L (ref 3.7–5.3)
SODIUM SERPL-SCNC: 139 MMOL/L (ref 135–144)

## 2023-02-01 PROCEDURE — 97530 THERAPEUTIC ACTIVITIES: CPT

## 2023-02-01 PROCEDURE — 94640 AIRWAY INHALATION TREATMENT: CPT

## 2023-02-01 PROCEDURE — 97535 SELF CARE MNGMENT TRAINING: CPT

## 2023-02-01 PROCEDURE — 6370000000 HC RX 637 (ALT 250 FOR IP): Performed by: PHYSICAL MEDICINE & REHABILITATION

## 2023-02-01 PROCEDURE — 74230 X-RAY XM SWLNG FUNCJ C+: CPT

## 2023-02-01 PROCEDURE — 2700000000 HC OXYGEN THERAPY PER DAY

## 2023-02-01 PROCEDURE — 97110 THERAPEUTIC EXERCISES: CPT

## 2023-02-01 PROCEDURE — 97129 THER IVNTJ 1ST 15 MIN: CPT

## 2023-02-01 PROCEDURE — 80048 BASIC METABOLIC PNL TOTAL CA: CPT

## 2023-02-01 PROCEDURE — 6370000000 HC RX 637 (ALT 250 FOR IP): Performed by: INTERNAL MEDICINE

## 2023-02-01 PROCEDURE — 1180000000 HC REHAB R&B

## 2023-02-01 PROCEDURE — 99231 SBSQ HOSP IP/OBS SF/LOW 25: CPT | Performed by: INTERNAL MEDICINE

## 2023-02-01 PROCEDURE — 36415 COLL VENOUS BLD VENIPUNCTURE: CPT

## 2023-02-01 PROCEDURE — 94761 N-INVAS EAR/PLS OXIMETRY MLT: CPT

## 2023-02-01 PROCEDURE — 99232 SBSQ HOSP IP/OBS MODERATE 35: CPT | Performed by: STUDENT IN AN ORGANIZED HEALTH CARE EDUCATION/TRAINING PROGRAM

## 2023-02-01 PROCEDURE — 92611 MOTION FLUOROSCOPY/SWALLOW: CPT

## 2023-02-01 PROCEDURE — 92526 ORAL FUNCTION THERAPY: CPT

## 2023-02-01 PROCEDURE — 6360000002 HC RX W HCPCS: Performed by: PHYSICAL MEDICINE & REHABILITATION

## 2023-02-01 RX ADMIN — IPRATROPIUM BROMIDE AND ALBUTEROL SULFATE 1 AMPULE: 2.5; .5 SOLUTION RESPIRATORY (INHALATION) at 11:57

## 2023-02-01 RX ADMIN — FERROUS SULFATE TAB 325 MG (65 MG ELEMENTAL FE) 325 MG: 325 (65 FE) TAB at 07:59

## 2023-02-01 RX ADMIN — IPRATROPIUM BROMIDE AND ALBUTEROL SULFATE 1 AMPULE: 2.5; .5 SOLUTION RESPIRATORY (INHALATION) at 20:36

## 2023-02-01 RX ADMIN — IPRATROPIUM BROMIDE AND ALBUTEROL SULFATE 1 AMPULE: 2.5; .5 SOLUTION RESPIRATORY (INHALATION) at 08:00

## 2023-02-01 RX ADMIN — CARVEDILOL 6.25 MG: 6.25 TABLET, FILM COATED ORAL at 17:18

## 2023-02-01 RX ADMIN — ENOXAPARIN SODIUM 40 MG: 100 INJECTION SUBCUTANEOUS at 08:00

## 2023-02-01 RX ADMIN — SPIRONOLACTONE 25 MG: 25 TABLET ORAL at 07:59

## 2023-02-01 RX ADMIN — POLYETHYLENE GLYCOL 3350 17 G: 17 POWDER, FOR SOLUTION ORAL at 08:00

## 2023-02-01 RX ADMIN — CARVEDILOL 6.25 MG: 6.25 TABLET, FILM COATED ORAL at 07:59

## 2023-02-01 NOTE — PROGRESS NOTES
Physical Medicine & Rehabilitation  Progress Note      Subjective:      Rakesh Elena is a 60 y.o. male with multifocal CVA. He reports doing well today. Respiratory therapist suggesting that patient may need intermittent oxygen at home - will plan for overnight pulse ox as well as home O2 eval closer to discharge. He denies any acute concerns. ROS:  Denies fevers, chills, sweats. No chest pain, palpitations, lightheadedness. Denies coughing, wheezing or shortness of breath. Denies abdominal pain, nausea, diarrhea or constipation. No new areas of joint pain. Denies new areas of numbness or weakness. Denies new anxiety or depression issues. No new skin problems. Rehabilitation:     Physical Therapy    Restrictions/Precautions: Fall Risk, General Precautions, Contact Precautions, Isolation, Bed Alarm  Implants present? :  (pt denies)  Other position/activity restrictions: PT/OT staff may titrateO2 down as tolerated in therapy, but may only titrate up to maximum 4 L NC as needed in therapy. Nursing should perform oxygen titration > 4 L. Per RT SpO2 Goal is 92% or higher, use O2 as needed to maintain SpO2 goal with rest and activity.     Bed mobility  Rolling to Left: Supervision  Rolling to Right: Supervision  Supine to Sit: Supervision  Sit to Supine: Supervision  Scooting: Supervision  Bed Mobility Comments: no use of grab rails with bed in flat position    Transfers  Sit to Stand: Supervision  Stand to Sit: Supervision  Bed to Chair: Supervision  Stand Pivot Transfers: Supervision (transfers completed with and without AD safely)  Comment: patient demo'd impulsive movement, vcs for safety and O2 line management    Ambulation  Surface: Level tile  Device: Rolling Walker  Other Apparatus: O2 (2L, SpO2 WNL [facility] gait training)  Assistance: Stand by assistance  Quality of Gait: vcs for proximity to RW  Gait Deviations: Slow Radha, Decreased step length, Decreased step height  Distance: 257ft  Comments: SpO2 WNL on 2Lt  More Ambulation?: No      Occupational Therapy  Toileting  Assistance Level: Stand by assist  Skilled Clinical Factors: SBA/close SUP during clothing management. Toilet Transfers  Technique:  (Ambulating with rw.)  Equipment: Standard toilet;Grab bars  Assistance Level: Stand by assist  Skilled Clinical Factors: SBA/close SUP    Speech Therapy  Subjective: [x] Alert     [x] Cooperative     [] Confused     [] Agitated    [] Lethargic     Objective/Assessment:     Orientation: Oriented x4. Recall: Paragraph recall- 89%. Pt. Able to consistently recall rules of Free Water Protocol t/o session and calculate time he is able to drink water. Organization: n/a     Problem solving: n/a      Dysphagia: Oral care completed c ST set up. Pt. completed Kim maneuver x6, simple tongue base strengthening exercises x5, 20 reps each. Encouraged Pt to practice exercises throughout day. Pt verbalized understanding and agreeable. Telesitter present. Pt. To have MBS this pm at 1500. Impressions:      Patient Position: Lateral         Consistencies Administered: Pureed;Mildly Thick cup     Oral Phase:   Premature vallecular spillage     Pharyngeal Phase:  min amt vallecular and pyriform sinus cavity residue c both consistencies. Deep penetration of both consistencies. Test discontinued at this time as pt. C risk of aspiration of all consistencies. Esophageal Screen: WNL     Dysphagia Outcome Severity Scale: Level 1: Severe dysphagia- NPO.  Unable to tolerate any PO safely  Penetration-Aspiration Scale (PAS): 3 - Material enters the airway, remains above the vocal folds, and is not ejected from airway     Recommended Diet:  Solid consistency: NPO  Liquid consistency: NPO     Medication administration: Via NG/PEG              Recommendations/Treatment  Requires SLP Intervention: Yes  D/C Recommendations: Ongoing speech therapy is recommended during this hospitalization; Ongoing speech therapy is recommended at next level of care      Current Medications:   Current Facility-Administered Medications: enoxaparin (LOVENOX) injection 40 mg, 40 mg, SubCUTAneous, Daily  ipratropium-albuterol (DUONEB) nebulizer solution 1 ampule, 1 ampule, Inhalation, Q4H WA  guaiFENesin (MUCINEX) extended release tablet 600 mg, 600 mg, Oral, BID PRN  carvedilol (COREG) tablet 6.25 mg, 6.25 mg, Oral, BID WC  ferrous sulfate (IRON 325) tablet 325 mg, 325 mg, Oral, Daily with breakfast  spironolactone (ALDACTONE) tablet 25 mg, 25 mg, Oral, Daily  acetaminophen (TYLENOL) tablet 650 mg, 650 mg, Oral, Q4H PRN  polyethylene glycol (GLYCOLAX) packet 17 g, 17 g, Oral, Daily  senna (SENOKOT) tablet 17.2 mg, 2 tablet, Oral, Daily PRN  bisacodyl (DULCOLAX) suppository 10 mg, 10 mg, Rectal, Daily PRN      Objective:  /79   Pulse 80   Temp 98.4 °F (36.9 °C)   Resp 18   Ht 5' 7\" (1.702 m)   Wt 147 lb 1.6 oz (66.7 kg)   SpO2 95%   BMI 23.04 kg/m²       GEN: Well developed, well nourished, no acute distress  HEENT: NCAT. EOM grossly intact. Hearing grossly intact. Mucous membranes pink and moist.  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 RESP: Normal breath sounds with no wheezing, rales, or rhonchi. Respirations WNL and unlabored. CV: Regular rate and rhythm. No murmurs, rubs, or gallops. ABD: Soft, non-distended, BS+ and equal.  NEURO: Alert. Speech fluent. Sensation to light touch intact in bilateral lower limbs. MSK: Muscle tone and bulk are normal bilaterally. Moving bilateral upper limbs with at least antigravity strength. Strength 4+/5 with bilateral ankle dorsifleixon and plantarflexion. LIMBS: No edema in bilateral lower limbs. SKIN: Warm and dry with good turgor. PSYCH: Mood WNL  Affect WNL. Appropriately interactive.     Diagnostics:     CBC:   No results for input(s): WBC, RBC, HGB, HCT, MCV, RDW, PLT in the last 72 hours.    BMP:   Recent Labs     01/31/23  0558 02/01/23  0613    139   K 3.9 4.2    104   CO2 27 27   BUN 18 19   CREATININE 0.51* 0.48*   GLUCOSE 118* 140*       BNP: No results for input(s): BNP in the last 72 hours. PT/INR: No results for input(s): PROTIME, INR in the last 72 hours. APTT: No results for input(s): APTT in the last 72 hours. CARDIAC ENZYMES: No results for input(s): CKMB, CKMBINDEX, TROPONINT in the last 72 hours. Invalid input(s): CKTOTAL;3  FASTING LIPID PANEL:  Lab Results   Component Value Date    TRIG 85 01/03/2023     LIVER PROFILE: No results for input(s): AST, ALT, ALB, BILIDIR, BILITOT, ALKPHOS in the last 72 hours. Reviewed notes from SLP, OT, PT, internal medcicine. Impression/Plan:   Impaired ADLs, gait, and mobility due to:    Multifocal cardioembolic CVA, subarachnoid hemorrhage:  PT/OT for gait, mobility, strengthening, endurance, ADLs, and self care. No residual SAH on CT head 1/21. Dysphagia:  S/p PEG tube placement on 1/17. SLP treating. Dietitian managing tube feed - nocturnal with bolus. Monitor BMP TIW. Free water protocol. Repeat MBS completed today - recommending NPO. Reached out to dietician regarding switching tube feeds to all boluses for home. Agitation:  Recurred 1/21. Patient moved to room closer to the nurses station and telesitter initiated. Repeat CT head improved. Seroquel BID started 1/21 - mental status improved and Seroquel was discontinued. Insomnia: Trazodone started nightly on 1/20 - failed. Improved - no current medication. Acute respiratory failure requiring intubation. Currently on 1.5 to 2L NC - weaning as tolerated - improvement today. On duo-neb every 4 hours while awake. Will plan for overnight pulse ox and home O2 evaluation closer to discharge. Pneumonia: Improved. Completed course of Unasyn and Vanco. CXR 1/24 for increased productive cough - stable hazy opacities. KENTRELL: Resolved. Will monitor.   Elevated LFTs:  Resolved. Will monitor  HCV:  Will need follow up with GI for treatment as an outpatient  Anemia: Hemoglobin 10.8 on 1/27, stable. Monitoring. On oral iron supplementation. Thrombocytopenia: Resolved. Will monitor. Bowel Management: Miralax daily, senokot prn, dulcolax prn. DVT Prophylaxis:  Repeat CT - SAH resolved 1/21 - okay to initiate DVT chemoprophylaxis per IM. Lovenox started 1/22. TONI stockings during the day, EPC cuffs at night.   Internal Medicine for medical management  Follow up PCP 1-2 weeks, PM&R 4-6 weeks, GI, Neurology      Electronically signed by Melva Bradford MD on 2/2/2023 at 1:35 AM

## 2023-02-01 NOTE — PROGRESS NOTES
Speech Language Pathology  Speech Language Pathology  [facility] Acute Rehab Unit at [facility]    Cognitive/Dysphagia Treatment Note    Date: 2/1/2023  Patients Name: Mino Koroma  MRN: [id number]  Diagnosis: Dysphagia s/p CVA  Patient Active Problem List   Diagnosis Code    Acute type II respiratory failure (HCC) J96.00    Transaminitis R74.01    Normocytic anemia D64.9    Thrombocytopenia (HCC) D69.6    Agitation R45.1    Acute respiratory failure with hypoxia and hypercapnia (HCC) RLL pneumonia J96.01, J96.02    Altered mental status R41.82    Community acquired pneumonia of right lower lobe of lung J18.9    Prolonged pt (prothrombin time) R79.1    Emphysema lung ([address] J43.9    Cerebral infarction ([address] I63.9    ACP (advance care planning) Z71.89    Goals of care, counseling/discussion Z71.89    Encounter for palliative care Z51.5    Delirium due to another medical condition F05    Moderate malnutrition ([address] E44.0    Hepatitis C virus infection without hepatic coma B19.20    Dysphagia R13.10    Impending cerebrovascular accident ([address] I63.9    Dermatitis associated with moisture L30.8    Cerebrovascular accident (CVA) due to embolism of cerebral artery ([address] I63.40       Pain: none reported    Cognitive Treatment  Treatment time: 9983-5014    Subjective: [x] Alert [x] Cooperative     [] Confused     [] Agitated    [] Lethargic    Objective/Assessment:    Orientation: Oriented x4. Recall: Paragraph recall- 89%. Pt. Able to consistently recall rules of Free Water Protocol t/o session and calculate time he is able to drink water. Organization: n/a    Problem solving: n/a     Dysphagia: Oral care completed c ST set up. Pt. completed Kim maneuver x6, simple tongue base strengthening exercises x5, 20 reps each. Encouraged Pt to practice exercises throughout day. Pt verbalized understanding and agreeable. Telesitter present. Pt. To have MBS this pm at 1500.     Plan:  [x] Continue ST services    [] Discharge from [address] [place] [address] Dr:        Discharge recommendations: [] Inpatient Rehab   [] [place]   [] Outpatient Therapy  [] Follow up at trauma clinic   [] Other:         Treatment completed by: Montserrat Ramírez A.CCC/SLP

## 2023-02-01 NOTE — PROCEDURES
INSTRUMENTAL SWALLOW REPORT  MODIFIED BARIUM SWALLOW    NAME: Haja Hollingsworth   : 1960  MRN: [id number]       Date of Eval: 2023              Referring Diagnosis(es):  dysphagia    Past Medical History:  has no past medical history on file. Past Surgical History:  has a past surgical history that includes Upper gastrointestinal endoscopy (N/A, 2023). Date of Prior Study: 23  Type of Study: Repeat MBS  Results of Prior Study: Oral Preparation / Oral Phase   Premature vallecular spillage of both consistencies presented           Pharyngeal Phase     Mildly thick:  min vallecular and pyriform sinus cavity residue. Deep penetration  Pudding:  mod amt vallecular and max amt of pyriform sinus cavity residue. Deep penetration and +aspiration with residue lying atop vocal cords. Weak cough/congested respirations noted. Test discontinued at this time d/t pt. At great risk for aspiration with all PO intake with reduced airway protection. Recent CXR/CT of Chest: Date - CXR- Similar mild hazy bibasilar opacities. Patient Complaints/Reason for Referral:  Haja Hollingsworth was referred for a MBS to assess the efficiency of his/her swallow function, assess for aspiration, and to make recommendations regarding safe dietary consistencies, effective compensatory strategies, and safe eating environment. Onset of problem:      Pt. Has been receiving ST for dysphagia while in ARU s/p CVA. Behavior/Cognition/Vision/Hearing:  Behavior/Cognition: Alert; Cooperative;Pleasant mood  Vision: Impaired  Vision Exceptions: Wears glasses at all times  Hearing: Exceptions to [facility]  Hearing Exceptions: Hard of hearing/hearing concerns    Impressions:     Patient Position: Lateral       Consistencies Administered: Pureed;Mildly Thick cup          Oral Phase:   Premature vallecular spillage       Pharyngeal Phase:  min amt vallecular and pyriform sinus cavity residue c both consistencies. Deep penetration of both consistencies. Test discontinued at this time as pt. C risk of aspiration of all consistencies. Esophageal Screen: WNL       Dysphagia Outcome Severity Scale: Level 1: Severe dysphagia- NPO. Unable to tolerate any PO safely  Penetration-Aspiration Scale (PAS): 3 - Material enters the airway, remains above the vocal folds, and is not ejected from airway    Recommended Diet:  Solid consistency: NPO  Liquid consistency: NPO       Medication administration: Via NG/PEG          Recommendations/Treatment  Requires SLP Intervention: Yes        D/C Recommendations: Ongoing speech therapy is recommended during this hospitalization; Ongoing speech therapy is recommended at next level of care         Recommended Exercises:    Therapeutic Interventions: Pharyngeal exercises; Laryngeal exercises;Oral care;Oral motor exercises; Patient/Family education         Education: Results and recommendations were reviewed with pt.  following this exam.      Patient Education Response: Verbalizes understanding;Needs reinforcement           Goals:    Long Term:       Diet at least restrictive consistency        Short Term:     Goal 1: Pt will complete BOT/laryngeal/pharyngeal strengthening exercises with 100% returned demonstration                                  Pain      Pain Level: 0      Therapy Time:   Individual Concurrent Group Co-treatment   Time In 1500         Time Out 1515         Minutes 15               Miguel A OZUNA A.CCC/SLP       2/1/2023, 3:26 PM

## 2023-02-01 NOTE — PROGRESS NOTES
Comprehensive Nutrition Assessment    Type and Reason for Visit:  Reassess    Nutrition Recommendations/Plan:   Provide Vital AF 1.2 tube feedin mL x 6 daily. 50 ml water before and after each bolus. Additional 100-300 mL water daily to be used with meds. Provision will be: 2160 kcal, 135 gm protein, 2060 mL free fluid excluding water used for meds. NPO. Malnutrition Assessment:  Malnutrition Status: Moderate malnutrition (23 1419)    Context:  Acute Illness     Findings of the 6 clinical characteristics of malnutrition:  Energy Intake:  75% or less of estimated energy requirements for 7 or more days  Weight Loss:  Greater than 5% over 1 month     Body Fat Loss:  Unable to assess     Muscle Mass Loss:  Unable to assess    Fluid Accumulation:  No significant fluid accumulation     Strength:  Not Performed    Nutrition Assessment:    Nurse states stool has been formed. Pt appears to be tolerating current tube feeding formula well. Pt failed swallow study today with plan for continued NPO. In preparation for possible discharge, tube feeding changed to all bolus feedings. Wt is down from admission wt; tube feeding provision to be increased somewhat. Nutrition Related Findings:    No edema. Labs and meds reviewed. Current Nutrition Intake & Therapies:    Average Meal Intake: NPO     Diet NPO  ADULT TUBE FEEDING; PEG; Peptide Based; Bolus; 3 Times Daily, 6 Times Daily; 300; Syringe Push; 50; Other (specify); 50 mL water before and after each bolus feeding. Additional 100-300 mL water daily for meds. Current Tube Feeding (TF) Orders:  Feeding Route: PEG  Formula: Peptide Based  Schedule: Cyclic, Bolus  Feeding Regimen: 300 mL x 6 daily  Water Flushes: 50 mL before and after each bolus feeding. Additional 100-300 mL water to be used with meds daily.   Goal TF & Flush Orders Provides: 2160 kcal, 135 gm protein, 2060 mL free fluid excluding water used for meds    Anthropometric Measures:  Height: 5' 7\" (170.2 cm)  Ideal Body Weight (IBW): 148 lbs (67 kg)    Admission Body Weight: 159 lb 9.8 oz (72.4 kg) ([facility] 1/19 ([facility]))  Current Body Weight: 147 lb 0.8 oz (66.7 kg), 99.4 % IBW. Weight Source: Bed Scale  Current BMI (kg/m2): 23  Usual Body Weight: 170 lb (77.1 kg) (170 lb Per significant other.)  % Weight Change (Calculated): -13.5                    BMI Categories: Normal Weight (BMI 18.5-24. 9)    Estimated Daily Nutrient Needs:  Energy Requirements Based On: Formula  Weight Used for Energy Requirements: Current  Energy (kcal/day): 9231-0507 based on Dubois-St. Nata  with 1.3 factor plus 250 additional kcal to avoid further wt loss  Weight Used for Protein Requirements: Current  Protein (g/day): 87 based on 1.3 gm per kg  Method Used for Fluid Requirements: 1 ml/kcal  Fluid (ml/day): 2100 based on 1 mL per kg    Nutrition Diagnosis:   Moderate malnutrition related to inadequate protein-energy intake as evidenced by Criteria as identified in malnutrition assessment    Nutrition Interventions:   Food and/or Nutrient Delivery: Continue NPO, Modify Tube Feeding  Nutrition Education/Counseling: Education needed  Coordination of Nutrition Care: Continue to monitor while inpatient, Speech Therapy       Goals:  Previous Goal Met: Progressing toward Goal(s)  Goals: Tolerate nutrition support at goal rate       Nutrition Monitoring and Evaluation:   Behavioral-Environmental Outcomes: None Identified  Food/Nutrient Intake Outcomes: Enteral Nutrition Intake/Tolerance  Physical Signs/Symptoms Outcomes: Biochemical Data, Chewing or Swallowing, GI Status, Weight    Discharge Planning:     Too soon to determine     Donna Tucker RD, LD  Contact: [phone]

## 2023-02-01 NOTE — PLAN OF CARE
Problem: Discharge Planning  Goal: Discharge to home or other facility with appropriate resources  Outcome: Progressing     Problem: Nutrition Deficit:  Goal: Optimize nutritional status  Outcome: Progressing     Problem: Safety - Adult  Goal: Free from fall injury  Outcome: Progressing     Problem: ABCDS Injury Assessment  Goal: Absence of physical injury  Outcome: Progressing     Problem: Skin/Tissue Integrity  Goal: Absence of new skin breakdown  Description: 1. Monitor for areas of redness and/or skin breakdown  2. Assess vascular access sites hourly  3. Every 4-6 hours minimum:  Change oxygen saturation probe site  4. Every 4-6 hours:  If on nasal continuous positive airway pressure, respiratory therapy assess nares and determine need for appliance change or resting period.   Outcome: Progressing     Problem: Chronic Conditions and Co-morbidities  Goal: Patient's chronic conditions and co-morbidity symptoms are monitored and maintained or improved  Outcome: Progressing

## 2023-02-01 NOTE — PROGRESS NOTES
[address] Internal Medicine  Siobhan Moeller MD; Leslie Guzman MD; Ether Osgood, MD; MD Darnell Vargas MD; Ting Kauffman MD    [facility]   [facility]    Progress note    Late entry from 1/30/23        Date:   2/1/2023  Patient name:  Tami Botello  Date of admission:  1/19/2023  9:12 AM  MRN:   [id number]  Account:  [de-identified]  YOB: 1960  PCP:    Ting Kauffman MD  Room:   7152/1949-82  Code Status:    Full Code    Physician Requesting Consult: Chaparro Mcgowan MD    Reason for Consult: Medical management    Chief Complaint:     No chief complaint on file. Acute CVA, generalized weakness, metabolic encephalopathy, multifactorial with underlying acute respiratory failure with pneumonia and heart failure    History Obtained From:     patient    History of Present Illness:     48-year-old gentleman with unknown past medical history presented to inpatient [facility] with metabolic encephalopathy found to have acute respiratory failure, pneumonia, also had slurred speech, confusion progressively was getting worse, EMS brought the patient to the hospital his saturations were  75%, found to have acute respiratory failure with pneumonia, in the ED patient was placed on BiPAP, chest x-ray confirmed right lower lobe pneumonia with pleural effusion. ABG showed acute respiratory failure with hypercapnia and hypoxia. Subsequently also found to have NSTEMI, acute heart failure with transaminitis possible shock liver, with also hepatitis C acute with hepatic failure, slowly improved,  Also had multiple acute brain infarct with subarachnoid hemorrhage,,  Subsequently patient was evaluated for acute inpatient rehab, accepted and admitted right now we are consulted for medical management    January 28  No new symptoms  Tolerating rehab therapies  Past Medical History:     History reviewed.  No pertinent past medical history. Past Surgical History:     Past Surgical History:   Procedure Laterality Date    UPPER GASTROINTESTINAL ENDOSCOPY N/A 1/17/2023    EGD ESOPHAGOGASTRODUODENOSCOPY PEG TUBE INSERTION performed by Hiren Quinn DO at [address] [place] [address]        Medications Prior to Admission:     Prior to Admission medications    Medication Sig Start Date End Date Taking? Authorizing Provider   aspirin 325 MG tablet Take 325 mg by mouth daily Patient takes 2 tabs daily    Historical Provider, MD   psyllium (KONSYL) 28.3 % PACK Take 1 packet by mouth daily    Historical Provider, MD        Allergies:     Patient has no known allergies. Social History:     Tobacco:    reports that he has been smoking cigarettes. He has a 40.00 pack-year smoking history. He has never used smokeless tobacco.  Alcohol:      reports current alcohol use. Drug Use:  reports that he does not currently use drugs. Family History:     History reviewed. No pertinent family history. Review of Systems:     Positive and Negative as described in HPI. CONSTITUTIONAL:  negative for fevers, chills, sweats, fatigue, weight loss  HEENT:  negative for vision, hearing changes, runny nose, throat pain  RESPIRATORY:  negative for shortness of breath, cough, congestion, wheezing. CARDIOVASCULAR:  negative for chest pain, palpitations.   GASTROINTESTINAL:  negative for nausea, vomiting, diarrhea, constipation, change in bowel habits, abdominal pain   GENITOURINARY:  negative for difficulty of urination, burning with urination, frequency   INTEGUMENT:  negative for rash, skin lesions, easy bruising   HEMATOLOGIC/LYMPHATIC:  negative for swelling/edema   ALLERGIC/IMMUNOLOGIC:  negative for urticaria , itching  ENDOCRINE:  negative increase in drinking, increase in urination, hot or cold intolerance  MUSCULOSKELETAL:  negative joint pains, muscle aches, swelling of joints  NEUROLOGICAL:  negative for headaches, dizziness, lightheadedness, numbness, pain, tingling extremities  BEHAVIOR/PSYCH:  negative for depression, anxiety    Physical Exam:     /86   Pulse 74   Temp 98.2 °F (36.8 °C)   Resp 18   Ht 5' 7\" (1.702 m)   Wt 147 lb 1.6 oz (66.7 kg)   SpO2 90%   BMI 23.04 kg/m²   Temp (24hrs), Av.2 °F (36.8 °C), Min:98.2 °F (36.8 °C), Max:98.2 °F (36.8 °C)    No results for input(s): POCGLU in the last 72 hours. Intake/Output Summary (Last 24 hours) at 2023 1851  Last data filed at 2023 1718  Gross per 24 hour   Intake 2200 ml   Output 800 ml   Net 1400 ml         General Appearance:  alert, well appearing, and in no acute distress  Mental status: oriented to person, place, and time with normal affect  Head:  normocephalic, atraumatic. Eye: no icterus, redness, pupils equal and reactive, extraocular eye movements intact, conjunctiva clear  Ear: normal external ear, no discharge, hearing intact  Nose:  no drainage noted  Mouth: mucous membranes moist  Neck: supple, no carotid bruits, thyroid not palpable  Lungs: Bilateral equal air entry, clear to ausculation, no wheezing, rales or rhonchi, normal effort  Cardiovascular: normal rate, regular rhythm, no murmur, gallop, rub.   Abdomen: Soft, nontender, nondistended, normal bowel sounds, no hepatomegaly or splenomegaly  Neurologic: There are no new focal motor or sensory deficits, normal muscle tone and bulk, no abnormal sensation, normal speech, cranial nerves II through XII grossly intact  Skin: No gross lesions, rashes, bruising or bleeding on exposed skin area  Extremities:  peripheral pulses palpable, no pedal edema or calf pain with palpation  Psych: normal affect    Investigations:      Laboratory Testing:  Recent Results (from the past 24 hour(s))   Basic Metabolic Panel w/ Reflex to MG    Collection Time: 23  6:13 AM   Result Value Ref Range    Glucose 140 (H) 70 - 99 mg/dL    BUN 19 8 - 23 mg/dL    Creatinine 0.48 (L) 0.70 - 1.20 mg/dL    Est, Glom Filt Rate >60 >60 mL/min/1.73m2 Calcium 8.9 8.6 - 10.4 mg/dL    Sodium 139 135 - 144 mmol/L    Potassium 4.2 3.7 - 5.3 mmol/L    Chloride 104 98 - 107 mmol/L    CO2 27 20 - 31 mmol/L    Anion Gap 8 (L) 9 - 17 mmol/L             Imaging/Diagonstics:  XR CHEST PORTABLE    Result Date: 1/18/2023  Interval extubation and NG tube removal.  Otherwise similar findings, with perhaps slightly greater hazy ground-glass opacity right mid lung. Correlate clinically. FL MODIFIED BARIUM SWALLOW W VIDEO    Result Date: 1/14/2023  Premature vallecular spillage. Piriform sinus cavity residue. Deep penetration with both materials. Aspiration with pudding. Please see separate speech pathology report for full discussion of findings and recommendations. Assessment :      Medications:      Allergies:  No Known Allergies    Current Meds:   Scheduled Meds:    enoxaparin  40 mg SubCUTAneous Daily    ipratropium-albuterol  1 ampule Inhalation Q4H WA    carvedilol  6.25 mg Oral BID WC    ferrous sulfate  325 mg Oral Daily with breakfast    spironolactone  25 mg Oral Daily    polyethylene glycol  17 g Oral Daily     Continuous Infusions:   PRN Meds: guaiFENesin, acetaminophen, senna, bisacodyl    Hospital Problems             Last Modified POA    * (Principal) Cerebrovascular accident (CVA) due to embolism of cerebral artery ([address] 1/27/2023 Yes    Acute type II respiratory failure ([address] 1/19/2023 Yes    Transaminitis 1/19/2023 Yes    Normocytic anemia 1/19/2023 Yes    Thrombocytopenia ([address] 1/19/2023 Yes    Acute respiratory failure with hypoxia and hypercapnia ([address] RLL pneumonia 1/19/2023 Yes    Community acquired pneumonia of right lower lobe of lung 1/19/2023 Yes    Emphysema lung ([address] 1/19/2023 Yes    Cerebral infarction ([address] 1/19/2023 Yes    Moderate malnutrition ([address] 1/19/2023 Yes    Hepatitis C virus infection without hepatic coma 1/19/2023 Yes    Dysphagia 1/19/2023 Yes    Impending cerebrovascular accident ([address] 1/27/2023 Yes    Dermatitis associated with moisture 1/20/2023 Yes   Plan:     Acute hypoxic and hypercapnic respiratory failure secondary to likely aspiration pneumonia, treated with vancomycin and Unasyn, also was positive for MRSA nasal swab, required intubation, extubated subsequently  Acute CVA with subarachnoid hemorrhage, cardioembolic, did not qualify for tPA,  Acute liver failure possible multifactorial, alcoholic liver disease with hepatitis C,  Hypercoagulable state secondary to acute liver disease, INR currently 1.6  Thrombocytopenia secondary to alcoholic liver disease and hepatitis C, improved  Dysphagia , PEG placed   Anemia , of ch disease multifactorial   DVT PPX with SCD only, as he had cerebral h'age  Full code status   PT/OT     1/21  Patient seen to be resting comfortably  Was able to participate in physical therapy today  No new issues  Repeat CT head showed no new strokes had no hemorrhage  Continue current management    1/22  Since no bleed noted on repeat CT head okay to resume DVT prophylaxis  Hemoglobin is stable,thrombocytopenia has resolved platelet count 574 on labs 1/20  No new issues continue current management    1/27  Labs, radiology, medications, vitals reviewed,  Episodes of desaturation, 88%, improved,  No shortness of breath or chest pain at this time,  Labs anemia, hemoglobin 10.8,  Microcytic, MCV 75.3,  Patient probably will need anemia of chronic disease,  Tolerating physical therapy,    January 28, 2023  Vitals labs stable  Denies chest pain shortness of breath palpitations  Patient is on carvedilol spironolactone ferrous sulfate bronchodilators and Lovenox    2/1  Labs/meds/radiology reviewed   Dysphagia, getting swallow study today,  Abdomen soft,                Consultations:   IP CONSULT TO DIETITIAN  IP CONSULT TO SOCIAL WORK  IP CONSULT TO SPIRITUAL SERVICES  IP CONSULT TO INTERNAL MEDICINE      Ting Kauffman MD  2/1/2023  6:51 PM    Copy sent to Dr. Ting Kauffman MD    Please note that this chart was generated using voice recognition Dragon dictation software. Although every effort was made to ensure the accuracy of this automated transcription, some errors in transcription may have occurred.

## 2023-02-01 NOTE — PROGRESS NOTES
02/01/23 1520   Encounter Summary   Encounter Overview/Reason  Volunteer Encounter   Service Provided For: Patient   Referral/Consult From: [facility]   Last Encounter  02/01/23  (V)   Complexity of Encounter Low   Spiritual/Emotional needs   Type Spiritual Support   Assessment/Intervention/Outcome   Intervention Prayer (assurance of)/[facility]

## 2023-02-01 NOTE — PROGRESS NOTES
Physical Therapy  Facility/Department: [facility] ACUTE REHAB  Rehabilitation Physical Therapy Treatment Note    NAME: Pancho Han  : 1960 (26 y.o.)  MRN: [id number]  CODE STATUS: Full Code  Date of Service: 23    Restrictions:  Restrictions/Precautions: Fall Risk;General Precautions;Contact Precautions;Isolation; Bed Alarm     SUBJECTIVE  Subjective  Subjective: Pt refuses PM session pending swallow study. Updated respiratory orders per PT Christen: SpO2 Goal of 92%; may use supp. O2 to maintain this goal. Pt SpO2 WNL during AM session after steps, with 2L supp. O2 without supp. O2 during NuStep. Pain Assessment  Pain Assessment: None - Denies Pain    OBJECTIVE  Functional Mobility  Bed Mobility  Overall Assistance Level: Modified Independent  Supine to Sit  Assistance Level: Modified independent  Scooting  Assistance Level: Modified independent  Balance  Sitting Balance: Modified independent   Standing Balance: Supervision  Transfers  Surface: To bed;From bed; Wheelchair;Standard toilet;From chair with arms; To chair with arms  Device:  (with, without rolling walker)  Sit to Stand  Assistance Level: Supervision  Stand to Sit  Assistance Level: Supervision  Bed To/From Chair  Technique: Stand step  Assistance Level: Supervision  Skilled Clinical Factors: Writer managing O2 tubing  Stand Pivot  Assistance Level: Supervision  Skilled Clinical Factors: Writer managing O2 tubing; cues to keep feet within walker's base of support during some turns. Environmental Mobility  Ambulation  Surface: Level surface  Device:  (Rolling walker or no device)  Distance: short distances within room, gym  Assistance Level: Stand by assist (Writer managing O2 tubing, tank)  Gait Deviations: Unsteady gait  Skilled Clinical Factors: Base of support and step length vary. With walker: Requires cues for door clearance, to keep feet within walker's base of support, particularly when turning. With 1L supp. O2, HR & SpO2 remain WNL.  Without device and supp. O2: Pt states he needs \"to think about\" his turns to make them more steady (with improved LE placement). SpO2 79% upon sitting after, HR 83; within 1 min. of adding 1L/min. supp. O2, SpO2 91%, HR 80. Stairs  Stair Height: 4'';6''  Device: Bilateral handrails  Number of Stairs: 5  Additional Factors: Non-reciprocal going up;Non-reciprocal going down  Assistance Level: Supervision  Skilled Clinical Factors: With 2L/min. supp. O2, SpO2 94%, HR 83 upon sitting afterward. No difficulty observed. PT Exercises  Resistive Exercises: Seated bilateral LEs, 1#, green/moderate resistance band, 20x each. Dynamic Sitting Balance Exercises: Reaching outside SELVIN, weight shifting: x5 min., rest breaks PRN as Pt doffs cannula to don/doff clothing. Dynamic Standing Balance Exercises: Reaching outside SELVIN, weight-shifting,: 2 min. x2. Rest breaks PRN as Pt doffs cannula to don/doff clothing. Exercise Equipment: NuStep, workload 3, 10 min. (No supp. O2; HR & SpO2 WNL throughout.)    ASSESSMENT/PROGRESS TOWARDS GOALS  Vital Signs  O2 Device: None (Room air)    Assessment  Activity Tolerance: Patient limited by endurance; Other (comment) (Requires encouragement to participate.)  PT Equipment Recommendations  Equipment Needed: Yes  Claudette Just: Rolling (ordered, per PT Christen)    Goals  Short Term Goals  Time Frame for Short Term Goals: 9 days  Short Term Goal 1: CGA assist supine<-> sit x1 assist  Short Term Goal 2: sit EOB withsupervsion up to 15 min for therex/ADL  Short Term Goal 3: 3+/5 LE strength to prepare for standing activiites and gait  Short Term Goal 4: ambulation with rolling walker distance of 70 to 100 ft, CGA, level surface  Short Term Goal 5: roll L/R with SBA  Additional Goals?: Yes  Short Term Goal 6: Pt able to go up and down 5 steps with 2 rails, min A  Long Term Goals  Time Frame for Long Term Goals : By DC  Long Term Goal 1: pt able to roll L/R indepndently  Long Term Goal 2: pt able to perform supine<>sit mod-I  Long Term Goal 3: pt able to perform sit<>stand and perform pivot/step to transfers at mod-I  Long Term Goal 4: pt able to ambulate household distance with appropriate device, mod-I  Long Term Goal 5: pt able to ambulate > 50 ft with appropriate device, at SBA/supervsion level, level as well as incline surface. Additional Goals?: Yes  Long term goal 6: pt able to go up and down 4 or more steps with B UE support, CGA. Long term goal 7: Improve PASS score to 27/36 improve function/stability. Long term goal 8: improve Tinetti balance score to atleast 23/28 to reduce fall risk. PLAN OF CARE/SAFETY  Physcial Therapy Plan  General Plan:  minutes of therapy at least 5 out of 7 days a week (5 to 7 days/week.)  Specific Instructions for Next Treatment: Continue to progress with functional mobility training  Current Treatment Recommendations: Strengthening;Balance training;Functional mobility training;Neuromuscular re-education;Cognitive reorientation; Safety education & training;Patient/Caregiver education & training;Equipment evaluation, education, & procurement; Therapeutic activities;Transfer training; Wheelchair mobility training;Gait training;Stair training;Home exercise program;Positioning  Safety Devices  Type of Devices: All fall risk precautions in place;Gait belt;Telesitter in use (Transferred care to [facility]/ [facility])    EDUCATION  Education  Education Given To: Patient  Education Provided: Safety;Precautions; Energy Conservation  Education Provided Comments: Discussed pacing, with Pt demonstrating well; also reviewed education regarding use of walker to provide support and improve endurance for longer distance ambulation.   Education Method: Verbal  Barriers to Learning: Cognition  Education Outcome: Verbalized understanding;Continued education needed    Therapy Time   Individual Concurrent Group Co-treatment   Time In 1005         Time Out 1103         Minutes 62            Variance: 32  Reason: Refusal (Pt reluctant to participate prior to MBS.)    Katie Garza, PTA, 02/01/23 at 6:58 PM

## 2023-02-01 NOTE — CARE COORDINATION
ARU CASE MANAGEMENT NOTE:    Patient is alert and oriented x4. Spoke with patient regarding discharge plan and patient confirms plan is to go home w family    DME: Adiel Hamilton ordered through [facility]  Waiting for swallow studies to see if patient will need to go home with Tube feedings. Outside appointments: Outside appointments: none while in ARU    Will continue to follow for additional discharge needs.     Electronically signed by Fred Gonzalez RN on 2/1/2023 at 12:41 PM

## 2023-02-01 NOTE — PLAN OF CARE
Problem: Safety - Adult  Goal: Free from fall injury  2/1/2023 1705 by Darren Rhodes RN  Outcome: Progressing  Flowsheets (Taken 2/1/2023 1705)  Free From Fall Injury: Instruct family/caregiver on patient safety  Note: Patient's pain is well controlled with current regimen. See MAR. Problem: ABCDS Injury Assessment  Goal: Absence of physical injury  2/1/2023 1705 by Darren Rhodes RN  Outcome: Progressing     Problem: Skin/Tissue Integrity  Goal: Absence of new skin breakdown  Description: 1. Monitor for areas of redness and/or skin breakdown  2. Assess vascular access sites hourly  3. Every 4-6 hours minimum:  Change oxygen saturation probe site  4. Every 4-6 hours:  If on nasal continuous positive airway pressure, respiratory therapy assess nares and determine need for appliance change or resting period. 2/1/2023 1705 by Darren Rhodes RN  Outcome: Progressing  Note: No signs of increased skin or tissue breakdown is noted. See Head to Toe/LDA assessments in flowsheets.       Problem: Chronic Conditions and Co-morbidities  Goal: Patient's chronic conditions and co-morbidity symptoms are monitored and maintained or improved  2/1/2023 1705 by Darren Rhodes RN  Outcome: Progressing     Problem: Nutrition Deficit:  Goal: Optimize nutritional status  2/1/2023 1705 by Darren Rhodes RN  Outcome: Progressing     Problem: Discharge Planning  Goal: Discharge to home or other facility with appropriate resources  2/1/2023 1705 by Darren Rhodes RN  Outcome: Progressing

## 2023-02-01 NOTE — PROGRESS NOTES
[address]    Acute Rehabilitation Occupational Therapy Daily Treatment Note    Date: 23  Patient Name: Alfred Alva       Room: 8351/4749-06  MRN: [id number]  Account: [de-identified]   : 1960  (60 y.o.) Gender: male       Referring Practitioner: Nisha Shi MD  Diagnosis: Cerebrovascular accident  Additional Pertinent Hx: Per MRI Impression on 23: Mild amount of residual subarachnoid hemorrhage in the right frontal and  right parietal lobes as well as the left frontal lobe. Evolving small focus of hemorrhage in the left parietal lobe posteriorly. Evolving areas of ischemia in the right frontal lobe and left posterior parietal lobe as well as a few foci in the parasagittal aspect of the right frontoparietal region. Treatment Diagnosis: Impaired self care status    Past Medical History:  has no past medical history on file. Past Surgical History:   has a past surgical history that includes Upper gastrointestinal endoscopy (N/A, 2023). Restrictions  Restrictions/Precautions  Restrictions/Precautions: Fall Risk, General Precautions, Contact Precautions, Isolation, Bed Alarm  Required Braces or Orthoses?: No  Implants present? :  (pt denies)     Position Activity Restriction  Other position/activity restrictions: PT/OT staff may titrateO2 down as tolerated in therapy, but may only titrate up to maximum 4 L NC as needed in therapy. Nursing should perform oxygen titration > 4 L. Per RT SpO2 Goal is 92% or higher, use O2 as needed to maintain SpO2 goal with rest and activity. Vitals      Subjective  Subjective  Subjective: AM: Pt. in bed upon arrival. Alert. Declining participation in any self care at this time. Agreeable to B UE str. PM: Pt. in bed upon arrival. Found out he did not pass his swallow study- but in G spirits.  Pt and pt's spouse have some questions regarding discharge plans in which writer communicated with supervising OT Tony Ashton in which she stated she will bring up/address during tomorrows team meeting. Pain: No c/o any pain. Objective  Cognition  Overall Orientation Status: Impaired  Orientation Level: Oriented to person;Disoriented to situation;Oriented to place; Disoriented to time    Cognition  Overall Cognitive Status: Exceptions  Arousal/Alertness: Appropriate responses to stimuli  Following Commands: Follows one step commands consistently  Attention Span: Attends with cues to redirect  Memory: Decreased recall of biographical Information;Decreased recall of precautions;Decreased recall of recent events  Safety Judgement: Decreased awareness of need for assistance;Decreased awareness of need for safety  Problem Solving: Assistance required to generate solutions;Assistance required to implement solutions;Assistance required to identify errors made;Assistance required to correct errors made  Insights: Decreased awareness of deficits  Initiation: Requires cues for some  Sequencing: Requires cues for some  Cognition Comment: Pt with impulsive tendencies noted. Activities of Daily Living    Toileting  Assistance Level: Stand by assist  Skilled Clinical Factors: SBA/close SUP during clothing management. Toilet Transfers  Technique:  (Ambulating with rw.)  Equipment: Standard toilet;Grab bars  Assistance Level: Stand by assist  Skilled Clinical Factors: SBA/close SUP    Mobility  Bed Mobility  Overall Assistance Level: Modified Independent     Sit to Supine  Assistance Level: Modified independent  Supine to Sit  Assistance Level: Modified independent  Scooting  Assistance Level: Modified independent    Sit to Stand  Assistance Level: Stand by assist  Skilled Clinical Factors: SBA/close SUP.   Stand to Sit  Assistance Level: Supervision  Skilled Clinical Factors: G controlled decend    Functional Mobility  Device: Rolling walker  Activity: To/From bathroom (Within/around room for item retreival and transport.)  Assistance Level: Stand by assist  Skilled Clinical Factors: SBA/close SUP. OT Exercises  Exercise Treatment: Pt participated in B UE exercises #2 x 20 reps in all planes, rest breaks taken as needed, to continue to increase general str and activity tolerance for maximized indep and safety during all daily self care tasks and transfers. Assessment  Assessment  Activity Tolerance: Patient limited by fatigue;Patient limited by endurance (Requires encouragement to participate.)  Discharge Recommendations: Home with assist PRN;Outpatient OT    Patient Education  Education  Education Given To: Patient  Education Provided: Role of Therapy;Plan of Care;Cognition;Equipment;DME/Home Modifications; ADL Function; Safety  Education Method: Verbal;Demonstration; Teach Back  Barriers to Learning: Cognition; Hearing  Education Outcome: Verbalized understanding;Demonstrated understanding;Continued education needed    OT Equipment Recommendations  Other: TBD    Safety Devices  Safety Devices in place: Yes  Type of devices: All fall risk precautions in place     Goals  Patient Goals   Patient goals : \"To learn how to walk properly. ..all that goes without saying (referring to bathing, dressing and toileting independence)\"  Short Term Goals  Time Frame for Short Term Goals: One week  Short Term Goal 1: Patient will perform oral hygiene with SBA and Good safety. Short Term Goal 2: Patient will perform hair brushing with SBA. Short Term Goal 3: Patient will perform upper body dressing with SBA. Short Term Goal 4: Patient will perform lower body dressing, lower body bathing, and toileting tasks with Minimal assist.  Short Term Goal 5: Patient will perform functional moblity and transfers during self-care with CGA, least restrictive mobility device, and Good safety.   Short Term Goal 6: Patient will verbalize/demonstrate Good understanding of assistive equipment/durable medical equipment/modified techniques to maximize safety and independence with self-care. Short Term Goal 7: Patient will actively participate in 30+ minutes of therapeutic exercise/functional activities to promote increased independence with self-care and mobility. Long Term Goals  Time Frame for Long Term Goals : By discharge  Long Term Goal 1: Patient will perform BADLs with modified independence and Good safety. Long Term Goal 2: Patient will perform functional mobility and transfers during self-care with modified independence, least restrictive mobility device, and Good safety. Long Term Goal 3: Patient will stand for 5+ minutes with 0-1 UE support, modified independence while engaging in functional activity of choice. Long Term Goal 4: Patient will perform simple meal prep and light housekeeping with SBA and Good safety. Long Term Goal 5: Patient will verbalize/demonstrate Good understanding of Fall Prevention Strategies to maximize safety and independence with self-care. Long Term Goal 6: Patient will perform self-care with improved bilateral  strength as evidenced by 10#  strength improvement and improved bilateral fine motor control as evidenced by 5 second improved in 9 hole peg test (Goal updated by STEFFI Powell/L on 1/24/23). Plan  Occupational Therapy Plan  Times Per Week: 5-7  Times Per Day: Twice a day  Current Treatment Recommendations: Self-Care / ADL, Strengthening, ROM, Balance training, Functional mobility training, Endurance training, Cognitive reorientation, Neuromuscular re-education, Safety education & training, Patient/Caregiver education & training, Equipment evaluation, education, & procurement, Cognitive/Perceptual training, Coordination training, Home management training       02/01/23 0756 02/01/23 1635   OT Individual Minutes   Time In 1100 1530   Time Out [id number]   Minutes 23 30   Minute Variance   Variance 40  --    Reason   (Pt. difficult to motivate-  focused on swallow study being done this afternoon. )  --          Electronically signed by Halima Pena, WIN/L on 2/1/23 at 4:36 PM EST

## 2023-02-02 PROCEDURE — 6360000002 HC RX W HCPCS: Performed by: PHYSICAL MEDICINE & REHABILITATION

## 2023-02-02 PROCEDURE — 6370000000 HC RX 637 (ALT 250 FOR IP): Performed by: INTERNAL MEDICINE

## 2023-02-02 PROCEDURE — 6370000000 HC RX 637 (ALT 250 FOR IP): Performed by: PHYSICAL MEDICINE & REHABILITATION

## 2023-02-02 PROCEDURE — 94761 N-INVAS EAR/PLS OXIMETRY MLT: CPT

## 2023-02-02 PROCEDURE — 97110 THERAPEUTIC EXERCISES: CPT

## 2023-02-02 PROCEDURE — 97116 GAIT TRAINING THERAPY: CPT

## 2023-02-02 PROCEDURE — 97535 SELF CARE MNGMENT TRAINING: CPT

## 2023-02-02 PROCEDURE — 97129 THER IVNTJ 1ST 15 MIN: CPT

## 2023-02-02 PROCEDURE — 92526 ORAL FUNCTION THERAPY: CPT

## 2023-02-02 PROCEDURE — 94640 AIRWAY INHALATION TREATMENT: CPT

## 2023-02-02 PROCEDURE — 99232 SBSQ HOSP IP/OBS MODERATE 35: CPT | Performed by: INTERNAL MEDICINE

## 2023-02-02 PROCEDURE — 1180000000 HC REHAB R&B

## 2023-02-02 PROCEDURE — 97112 NEUROMUSCULAR REEDUCATION: CPT

## 2023-02-02 PROCEDURE — 97530 THERAPEUTIC ACTIVITIES: CPT

## 2023-02-02 PROCEDURE — 2700000000 HC OXYGEN THERAPY PER DAY

## 2023-02-02 PROCEDURE — 99232 SBSQ HOSP IP/OBS MODERATE 35: CPT | Performed by: STUDENT IN AN ORGANIZED HEALTH CARE EDUCATION/TRAINING PROGRAM

## 2023-02-02 RX ORDER — LACTOSE-REDUCED FOOD
300 LIQUID (ML) ORAL
Qty: 54000 ML | Refills: 5 | Status: SHIPPED | OUTPATIENT
Start: 2023-02-02

## 2023-02-02 RX ADMIN — CARVEDILOL 6.25 MG: 6.25 TABLET, FILM COATED ORAL at 18:43

## 2023-02-02 RX ADMIN — ENOXAPARIN SODIUM 40 MG: 100 INJECTION SUBCUTANEOUS at 09:28

## 2023-02-02 RX ADMIN — ACETAMINOPHEN 650 MG: 325 TABLET ORAL at 10:59

## 2023-02-02 RX ADMIN — IPRATROPIUM BROMIDE AND ALBUTEROL SULFATE 1 AMPULE: 2.5; .5 SOLUTION RESPIRATORY (INHALATION) at 20:42

## 2023-02-02 RX ADMIN — SPIRONOLACTONE 25 MG: 25 TABLET ORAL at 09:07

## 2023-02-02 RX ADMIN — FERROUS SULFATE TAB 325 MG (65 MG ELEMENTAL FE) 325 MG: 325 (65 FE) TAB at 09:07

## 2023-02-02 RX ADMIN — IPRATROPIUM BROMIDE AND ALBUTEROL SULFATE 1 AMPULE: 2.5; .5 SOLUTION RESPIRATORY (INHALATION) at 15:00

## 2023-02-02 RX ADMIN — IPRATROPIUM BROMIDE AND ALBUTEROL SULFATE 1 AMPULE: 2.5; .5 SOLUTION RESPIRATORY (INHALATION) at 06:48

## 2023-02-02 ASSESSMENT — PAIN DESCRIPTION - LOCATION: LOCATION: NECK

## 2023-02-02 ASSESSMENT — PAIN DESCRIPTION - DESCRIPTORS: DESCRIPTORS: ACHING

## 2023-02-02 NOTE — CARE COORDINATION
ARU CASE MANAGEMENT NOTE:    Patient is alert and oriented x4. Spoke with patient regarding discharge plan and patient confirms plan to return home with family and [facility]. Pt remains NPO at this time after SS results. Will follow for home tube feeding recommendations    DME:spoke with Amara Sun in regards to Home oxygen and nebulizer    Outside appointments: none while on ARU    Will continue to follow for additional discharge needs.     Electronically signed by Davida Gentile RN on 2/2/2023 at 10:06 AM

## 2023-02-02 NOTE — PROGRESS NOTES
[address] [facility]    Acute Rehabilitation Occupational Therapy Daily Treatment Note    Date: 23  Patient Name: Deejay Fuentes       Room: 9336/0619-91  MRN: [id number]  Account: [de-identified]   : 1960  (60 y.o.) Gender: male       Referring Practitioner: Dilip Paul MD  Diagnosis: Cerebrovascular accident  Additional Pertinent Hx: Per MRI Impression on 23: Mild amount of residual subarachnoid hemorrhage in the right frontal and  right parietal lobes as well as the left frontal lobe. Evolving small focus of hemorrhage in the left parietal lobe posteriorly. Evolving areas of ischemia in the right frontal lobe and left posterior parietal lobe as well as a few foci in the parasagittal aspect of the right frontoparietal region. Treatment Diagnosis: Impaired self care status    Past Medical History:  has no past medical history on file. Past Surgical History:   has a past surgical history that includes Upper gastrointestinal endoscopy (N/A, 2023). Restrictions  Restrictions/Precautions  Restrictions/Precautions: Fall Risk, General Precautions, Contact Precautions, Isolation, Bed Alarm  Required Braces or Orthoses?: No  Implants present? :  (pt denies)     Position Activity Restriction  Other position/activity restrictions: PT/OT staff may titrateO2 down as tolerated in therapy, but may only titrate up to maximum 4 L NC as needed in therapy. Nursing should perform oxygen titration > 4 L. Per RT SpO2 Goal is 92% or higher, use O2 as needed to maintain SpO2 goal with rest and activity. Vitals      Subjective  Subjective  Subjective: AM: Pt. in bed upon arrival. Nursing present. Agreeable to participate in short session. Objective  Cognition  Overall Orientation Status: Impaired  Orientation Level: Oriented to person;Disoriented to situation;Oriented to place; Disoriented to time    Cognition  Overall Cognitive Status: Exceptions  Arousal/Alertness: Appropriate responses to stimuli  Following Commands: Follows one step commands consistently  Attention Span: Attends with cues to redirect  Memory: Decreased recall of biographical Information;Decreased recall of precautions;Decreased recall of recent events  Safety Judgement: Decreased awareness of need for assistance;Decreased awareness of need for safety  Problem Solving: Assistance required to generate solutions;Assistance required to implement solutions;Assistance required to identify errors made;Assistance required to correct errors made  Insights: Decreased awareness of deficits  Initiation: Requires cues for some  Sequencing: Requires cues for some  Cognition Comment: Pt with impulsive tendencies noted. Activities of Daily Living     Grooming/Oral Hygiene  Assistance Level: Supervision  Skilled Clinical Factors: Standing at sink for hand hygiene and oral hygiene. Upper Extremity Dressing  Assistance Level: Supervision  Skilled Clinical Factors: doff/don overhead shirt while standing with RW    Putting On/Taking Off Footwear  Assistance Level: Set-up  Skilled Clinical Factors: Slippers while seatd at EOB. Toileting  Assistance Level: Supervision  Skilled Clinical Factors: During clothing management. (I) for hygiene. Toilet Transfers  Technique:  (Ambulating with rw.)  Equipment: Standard toilet;Grab bars  Assistance Level: Supervision  Skilled Clinical Factors: No LOB noted. Mobility  Bed Mobility  Overall Assistance Level: Modified Independent     Sit to Stand  Assistance Level: Supervision  Stand to Sit  Assistance Level: Supervision    Functional Mobility  Device: Rolling walker  Activity: To/From bathroom (Within/around room.)  Assistance Level: Supervision  Skilled Clinical Factors: Close SUP.     OT Exercises  Exercise Treatment: Pt participated in B UE exercises #2 x 20 reps in all planes, rest breaks taken as needed, to continue to increase general str and activity tolerance for maximized indep and safety during all daily self care tasks and transfers. Assessment  Assessment  Activity Tolerance: Patient limited by endurance  Discharge Recommendations: Home with assist PRN;Outpatient OT    Patient Education  Education  Education Given To: Patient  Education Provided: Role of Therapy;Plan of Care;Cognition;Equipment;DME/Home Modifications; ADL Function; Safety  Education Method: Verbal;Demonstration; Teach Back  Barriers to Learning: Cognition; Hearing  Education Outcome: Verbalized understanding;Demonstrated understanding;Continued education needed    OT Equipment Recommendations  Other: TBD    Safety Devices  Safety Devices in place: Yes  Type of devices: All fall risk precautions in place     Goals  Patient Goals   Patient goals : \"To learn how to walk properly. ..all that goes without saying (referring to bathing, dressing and toileting independence)\"  Short Term Goals  Time Frame for Short Term Goals: One week  Short Term Goal 1: Patient will perform oral hygiene with SBA and Good safety. Short Term Goal 2: Patient will perform hair brushing with SBA. Short Term Goal 3: Patient will perform upper body dressing with SBA. Short Term Goal 4: Patient will perform lower body dressing, lower body bathing, and toileting tasks with Minimal assist.  Short Term Goal 5: Patient will perform functional moblity and transfers during self-care with CGA, least restrictive mobility device, and Good safety. Short Term Goal 6: Patient will verbalize/demonstrate Good understanding of assistive equipment/durable medical equipment/modified techniques to maximize safety and independence with self-care. Short Term Goal 7: Patient will actively participate in 30+ minutes of therapeutic exercise/functional activities to promote increased independence with self-care and mobility.     Long Term Goals  Time Frame for Long Term Goals : By discharge  Long Term Goal 1: Patient will perform BADLs with modified independence and Good safety. Long Term Goal 2: Patient will perform functional mobility and transfers during self-care with modified independence, least restrictive mobility device, and Good safety. Long Term Goal 3: Patient will stand for 5+ minutes with 0-1 UE support, modified independence while engaging in functional activity of choice. Long Term Goal 4: Patient will perform simple meal prep and light housekeeping with SBA and Good safety. Long Term Goal 5: Patient will verbalize/demonstrate Good understanding of Fall Prevention Strategies to maximize safety and independence with self-care. Long Term Goal 6: Patient will perform self-care with improved bilateral  strength as evidenced by 10#  strength improvement and improved bilateral fine motor control as evidenced by 5 second improved in 9 hole peg test (Goal updated by ASHUTOSH Aleman on 1/24/23).     Plan  Occupational Therapy Plan  Times Per Week: 5-7  Times Per Day: Twice a day  Current Treatment Recommendations: Self-Care / ADL, Strengthening, ROM, Balance training, Functional mobility training, Endurance training, Cognitive reorientation, Neuromuscular re-education, Safety education & training, Patient/Caregiver education & training, Equipment evaluation, education, & procurement, Cognitive/Perceptual training, Coordination training, Home management training       02/02/23 0740 02/02/23 1613   OT Individual Minutes   Time In 1103 1331   Time Out 1133 1400   Minutes 30 29   Minute Variance   Variance 30  --    Reason Refusal  --        Electronically signed by ANITA Costa on 2/2/23 at 4:14 PM EST

## 2023-02-02 NOTE — PLAN OF CARE
Problem: Discharge Planning  Goal: Discharge to home or other facility with appropriate resources  2/2/2023 0058 by Swati Moreno RN  Outcome: Progressing     Problem: Nutrition Deficit:  Goal: Optimize nutritional status  2/2/2023 0058 by Swati Moreno RN  Outcome: Progressing     Problem: Safety - Adult  Goal: Free from fall injury  2/2/2023 0058 by Swati Moreno RN  Outcome: Progressing     Problem: ABCDS Injury Assessment  Goal: Absence of physical injury  2/2/2023 0058 by Swati Moreno RN  Outcome: Progressing     Problem: Skin/Tissue Integrity  Goal: Absence of new skin breakdown  Description: 1. Monitor for areas of redness and/or skin breakdown  2. Assess vascular access sites hourly  3. Every 4-6 hours minimum:  Change oxygen saturation probe site  4. Every 4-6 hours:  If on nasal continuous positive airway pressure, respiratory therapy assess nares and determine need for appliance change or resting period.   2/2/2023 0058 by Swati Moreno RN  Outcome: Progressing     Problem: Chronic Conditions and Co-morbidities  Goal: Patient's chronic conditions and co-morbidity symptoms are monitored and maintained or improved  2/2/2023 0058 by Swati Moreno RN  Outcome: Progressing

## 2023-02-02 NOTE — PROGRESS NOTES
[facility]LEN[facility] Internal Medicine  Sally Marley MD; Marleny Ching MD; Pascual Aschoff, MD; MD Jerald Clark MD; Xiomara Piña MD    [facility]   [facility]    Progress note    Late entry from 1/30/23        Date:   2/2/2023  Patient name:  Rafael Mesa  Date of admission:  1/19/2023  9:12 AM  MRN:   [id number]  Account:  [de-identified]  YOB: 1960  PCP:    Xiomara Piña MD  Room:   [address] [place] [address]  Code Status:    Full Code    Physician Requesting Consult: Arnold Garduno MD    Reason for Consult: Medical management    Chief Complaint:     No chief complaint on file. Acute CVA, generalized weakness, metabolic encephalopathy, multifactorial with underlying acute respiratory failure with pneumonia and heart failure    History Obtained From:     patient    History of Present Illness:     67-year-old gentleman with unknown past medical history presented to inpatient [facility] with metabolic encephalopathy found to have acute respiratory failure, pneumonia, also had slurred speech, confusion progressively was getting worse, EMS brought the patient to the hospital his saturations were  75%, found to have acute respiratory failure with pneumonia, in the ED patient was placed on BiPAP, chest x-ray confirmed right lower lobe pneumonia with pleural effusion. ABG showed acute respiratory failure with hypercapnia and hypoxia. Subsequently also found to have NSTEMI, acute heart failure with transaminitis possible shock liver, with also hepatitis C acute with hepatic failure, slowly improved,  Also had multiple acute brain infarct with subarachnoid hemorrhage,,  Subsequently patient was evaluated for acute inpatient rehab, accepted and admitted right now we are consulted for medical management    January 28  No new symptoms  Tolerating rehab therapies  Past Medical History:     History reviewed.  No pertinent past medical history. Past Surgical History:     Past Surgical History:   Procedure Laterality Date    UPPER GASTROINTESTINAL ENDOSCOPY N/A 1/17/2023    EGD ESOPHAGOGASTRODUODENOSCOPY PEG TUBE INSERTION performed by Kaila Sarabia DO at [facility]        Medications Prior to Admission:     Prior to Admission medications    Medication Sig Start Date End Date Taking? Authorizing Provider   Nutritional Supplements (VITAL AF 1.2 MIGUELITO) LIQD 300 mLs by PEG Tube route 6 times daily Boluses at 6am, 9am, 12pm, 3pm, 6pm, and 9pm.  50mL free water before and after each bolus. Additional 100-300mL water for meds. 2/2/23  Yes Wesley Dyer MD   aspirin 325 MG tablet Take 325 mg by mouth daily Patient takes 2 tabs daily    Historical Provider, MD   psyllium (KONSYL) 28.3 % PACK Take 1 packet by mouth daily    Historical Provider, MD        Allergies:     Patient has no known allergies. Social History:     Tobacco:    reports that he has been smoking cigarettes. He has a 40.00 pack-year smoking history. He has never used smokeless tobacco.  Alcohol:      reports current alcohol use. Drug Use:  reports that he does not currently use drugs. Family History:     History reviewed. No pertinent family history. Review of Systems:     Positive and Negative as described in HPI. CONSTITUTIONAL:  negative for fevers, chills, sweats, fatigue, weight loss  HEENT:  negative for vision, hearing changes, runny nose, throat pain  RESPIRATORY:  negative for shortness of breath, cough, congestion, wheezing. CARDIOVASCULAR:  negative for chest pain, palpitations.   GASTROINTESTINAL:  negative for nausea, vomiting, diarrhea, constipation, change in bowel habits, abdominal pain   GENITOURINARY:  negative for difficulty of urination, burning with urination, frequency   INTEGUMENT:  negative for rash, skin lesions, easy bruising   HEMATOLOGIC/LYMPHATIC:  negative for swelling/edema   ALLERGIC/IMMUNOLOGIC:  negative for urticaria , itching  ENDOCRINE:  negative increase in drinking, increase in urination, hot or cold intolerance  MUSCULOSKELETAL:  negative joint pains, muscle aches, swelling of joints  NEUROLOGICAL:  negative for headaches, dizziness, lightheadedness, numbness, pain, tingling extremities  BEHAVIOR/PSYCH:  negative for depression, anxiety    Physical Exam:     BP (!) 99/57   Pulse 85   Temp 98.4 °F (36.9 °C)   Resp 18   Ht 5' 7\" (1.702 m)   Wt 147 lb 1.6 oz (66.7 kg)   SpO2 92%   BMI 23.04 kg/m²   Temp (24hrs), Av.4 °F (36.9 °C), Min:98.4 °F (36.9 °C), Max:98.4 °F (36.9 °C)    No results for input(s): POCGLU in the last 72 hours. Intake/Output Summary (Last 24 hours) at 2023 1705  Last data filed at 2023 7964  Gross per 24 hour   Intake 1250 ml   Output 1700 ml   Net -450 ml         General Appearance:  alert, well appearing, and in no acute distress  Mental status: oriented to person, place, and time with normal affect  Head:  normocephalic, atraumatic. Eye: no icterus, redness, pupils equal and reactive, extraocular eye movements intact, conjunctiva clear  Ear: normal external ear, no discharge, hearing intact  Nose:  no drainage noted  Mouth: mucous membranes moist  Neck: supple, no carotid bruits, thyroid not palpable  Lungs: Bilateral equal air entry, clear to ausculation, no wheezing, rales or rhonchi, normal effort  Cardiovascular: normal rate, regular rhythm, no murmur, gallop, rub.   Abdomen: Soft, nontender, nondistended, normal bowel sounds, no hepatomegaly or splenomegaly  Neurologic: There are no new focal motor or sensory deficits, normal muscle tone and bulk, no abnormal sensation, normal speech, cranial nerves II through XII grossly intact  Skin: No gross lesions, rashes, bruising or bleeding on exposed skin area  Extremities:  peripheral pulses palpable, no pedal edema or calf pain with palpation  Psych: normal affect    Investigations:      Laboratory Testing:  No results found for this or any previous visit (from the past 24 hour(s)). Imaging/Diagonstics:  XR CHEST PORTABLE    Result Date: 1/18/2023  Interval extubation and NG tube removal.  Otherwise similar findings, with perhaps slightly greater hazy ground-glass opacity right mid lung. Correlate clinically. FL MODIFIED BARIUM SWALLOW W VIDEO    Result Date: 1/14/2023  Premature vallecular spillage. Piriform sinus cavity residue. Deep penetration with both materials. Aspiration with pudding. Please see separate speech pathology report for full discussion of findings and recommendations. Assessment :      Medications:      Allergies:  No Known Allergies    Current Meds:   Scheduled Meds:    enoxaparin  40 mg SubCUTAneous Daily    ipratropium-albuterol  1 ampule Inhalation Q4H WA    carvedilol  6.25 mg Oral BID WC    ferrous sulfate  325 mg Oral Daily with breakfast    spironolactone  25 mg Oral Daily    polyethylene glycol  17 g Oral Daily     Continuous Infusions:   PRN Meds: guaiFENesin, acetaminophen, senna, bisacodyl    Hospital Problems             Last Modified POA    * (Principal) Cerebrovascular accident (CVA) due to embolism of cerebral artery ([address] 1/27/2023 Yes    Acute type II respiratory failure ([address] 1/19/2023 Yes    Transaminitis 1/19/2023 Yes    Normocytic anemia 1/19/2023 Yes    Thrombocytopenia ([address] 1/19/2023 Yes    Acute respiratory failure with hypoxia and hypercapnia ([address] RLL pneumonia 1/19/2023 Yes    Community acquired pneumonia of right lower lobe of lung 1/19/2023 Yes    Emphysema lung ([address] 1/19/2023 Yes    Cerebral infarction ([address] 1/19/2023 Yes    Moderate malnutrition ([address] 1/19/2023 Yes    Hepatitis C virus infection without hepatic coma 1/19/2023 Yes    Dysphagia 1/19/2023 Yes    Impending cerebrovascular accident [facility]) 1/27/2023 Yes    Dermatitis associated with moisture 1/20/2023 Yes   Plan:     Acute hypoxic and hypercapnic respiratory failure secondary to likely aspiration pneumonia, treated with vancomycin and Unasyn, also was positive for MRSA nasal swab, required intubation, extubated subsequently  Acute CVA with subarachnoid hemorrhage, cardioembolic, did not qualify for tPA,  Acute liver failure possible multifactorial, alcoholic liver disease with hepatitis C,  Hypercoagulable state secondary to acute liver disease, INR currently 1.6  Thrombocytopenia secondary to alcoholic liver disease and hepatitis C, improved  Dysphagia , PEG placed   Anemia , of ch disease multifactorial   DVT PPX with SCD only, as he had cerebral h'age  Full code status   PT/OT     1/21  Patient seen to be resting comfortably  Was able to participate in physical therapy today  No new issues  Repeat CT head showed no new strokes had no hemorrhage  Continue current management    1/22  Since no bleed noted on repeat CT head okay to resume DVT prophylaxis  Hemoglobin is stable,thrombocytopenia has resolved platelet count 771 on labs 1/20  No new issues continue current management    1/27  Labs, radiology, medications, vitals reviewed,  Episodes of desaturation, 88%, improved,  No shortness of breath or chest pain at this time,  Labs anemia, hemoglobin 10.8,  Microcytic, MCV 75.3,  Patient probably will need anemia of chronic disease,  Tolerating physical therapy,    January 28, 2023  Vitals labs stable  Denies chest pain shortness of breath palpitations  Patient is on carvedilol spironolactone ferrous sulfate bronchodilators and Lovenox    2/2  Labs/meds/radiology reviewed   Dysphagia, failed swallow again   Speech working with him  BP running on lower side , monitor   On coreg, aldactone , will lower dose if BP goes more down                   Consultations:   [place] TO INTERNAL MEDICINE      Verónica Grider MD  2/2/2023  5:05 PM    Copy sent to Dr. Verónica Grider MD    Please note that this chart was generated using voice recognition Dragon dictation software. Although every effort was made to ensure the accuracy of this automated transcription, some errors in transcription may have occurred.

## 2023-02-02 NOTE — PROGRESS NOTES
Physical Therapy  Facility/Department: [facility] ACUTE REHAB  Rehabilitation Physical Therapy  NAME: Kenji Rosado  : 1960 (67 y.o.)  MRN: [id number]  CODE STATUS: Full Code    Date of Service: 23      History reviewed. No pertinent past medical history. Past Surgical History:   Procedure Laterality Date    UPPER GASTROINTESTINAL ENDOSCOPY N/A 2023    EGD ESOPHAGOGASTRODUODENOSCOPY PEG TUBE INSERTION performed by Shanti Moffett DO at [facility]       Chart Reviewed: Yes  Patient assessed for rehabilitation services?: Yes  Family / Caregiver Present: No  Referring Practitioner: ALAYNA Siegel NP  Referral Date : 23  Diagnosis: Acute respiratory failure  General Comment  Comments: Patient supine rest in bed upon arrival to room, SpO2  at 92% on room air at rest.    Restrictions:  Restrictions/Precautions: Fall Risk;General Precautions;Contact Precautions;Isolation; Bed Alarm  Position Activity Restriction  Other position/activity restrictions: PT/OT staff may titrateO2 down as tolerated in therapy, but may only titrate up to maximum 4 L NC as needed in therapy. Nursing should perform oxygen titration > 4 L. Per RT SpO2 Goal is 92% or higher, use O2 as needed to maintain SpO2 goal with rest and activity. SUBJECTIVE  Subjective: Wife present during pm therapy session. Discusses safety within the home, with transfers and amb. Also educated in  pacing. Verbalized good understanding. Pain: 8/10 neck pain.  ANGEL Sarah notified     Functional Mobility  Bed mobility  Rolling to Left: Modified independent  Rolling to Right: Modified independent  Supine to Sit: Supervision  Sit to Supine: Supervision  Scooting: Supervision  Bed Mobility Comments: no use of grab rails with bed in flat position  Transfers  Sit to Stand: Supervision  Stand to Sit: Supervision  Bed to Chair: Supervision  Stand Pivot Transfers: Supervision (transfers completed with and without AD)  Balance  Posture: Good  Sitting - Static: Good;+  Sitting - Dynamic: Good  Standing - Static: Good;-  Standing - Dynamic: Fair;-    Environmental Mobility  Ambulation  Surface: Level tile  Device: Rolling Walker  Other Apparatus: O2 (1L)  Assistance: Supervision  Quality of Gait: vcs to stay up inside SELVIN of  RW with fair, but fleeting return  Gait Deviations: Slow Radha;Decreased step height  Distance: 267ft  Comments: SPO2 93% post amb on 1L (pt may benefit from rollator for energy conservation. Try tomorrow.)  Ambulation 2  Surface - 2: level tile  Device 2: No device  Assistance 2: Stand by assistance  Gait Deviations: Slow Radha;Decreased step length;Decreased step height  Distance: 10ft x 2  Comments: short distances within pt gym. Pt on 1L oxygen to maintain SPO2 greater than 92%  Stairs/Curb  Stairs?: Yes  Stairs  # Steps : 5  Stairs Height: 4\" (4\" and 6\")  Rails: Bilateral  Device: No Device  Other Apparatus:  (Pt requires supplemental O2 @ 1L/min to maintain levels above 92%)  Assistance: Supervision  Comment: step-to gait pattern ascending with left descending with right    PT Exercises  Resistive Exercises: Seated and Standing (B) LE ex 2# x 20 including lime green t-band (SPO2 92% on RA with ex)  Functional Mobility Circuit Training: STS from 17in chair height completed x5 in 30sec, however required use of UE's on his knees to achieve standing. Static Standing Balance Exercises: Tinetti activities completed challenging pt's bal. Scored 19/28. (goal not met)Pass Balance activities completed challenging pt's bal. Scored 29/36(goal met)  Dynamic Standing Balance Exercises: standing step tap alt LE's on 4\" box x 10-CGA  Exercise Equipment: NuStep, workload 3, x5min. (Required supplemental O2 at 1.5L to maintain 92% and greater.)      Activity Tolerance  Activity Tolerance: Patient limited by endurance    Assessment  Performance Deficits/Impairments: Decreased functional mobility ; Decreased ADL status; Decreased body mechanics; Decreased strength;Decreased safe awareness;Decreased cognition;Decreased endurance;Decreased balance;Decreased coordination; Increased pain;Decreased posture  Activity Tolerance Comment: Monitor SP02 throughout therapy sessions  Treatment Diagnosis: impared mobility s/p AMS/SAH  Therapy Prognosis: Good  Decision Making: Medium Complexity  History: falls, 1 week  AMS PTA  Exam: L side weakness, decreased activity tolerance  Barriers to Learning: cognition, endurance  Discharge Recommendations: Therapy recommended at discharge;Home with assist PRN;Patient would benefit from continued therapy after discharge  PT D/C Equipment  Walker: Rolling (ordered, per PT Christen)  Other: Possibly discuss with family looking for Rollator at mobility center or other resources to increase pt's independence/safety within and out of the home. PT Equipment Recommendations  Equipment Needed: Yes  Walker: Rolling (ordered, per PT Christen)  Other: Possibly discuss with family looking for Rollator at mobility center or other resources to increase pt's independence/safety within and out of the home.     GOALS  Short Term Goals  Time Frame for Short Term Goals: 9 days  Short Term Goal 1: CGA assist supine<-> sit x1 assist  Short Term Goal 2: sit EOB withsupervsion up to 15 min for therex/ADL  Short Term Goal 3: 3+/5 LE strength to prepare for standing activiites and gait  Short Term Goal 4: ambulation with rolling walker distance of 70 to 100 ft, CGA, level surface  Short Term Goal 5: roll L/R with SBA  Additional Goals?: Yes  Short Term Goal 6: Pt able to go up and down 5 steps with 2 rails, min A  Long Term Goals  Time Frame for Long Term Goals : By DC  Long Term Goal 1: pt able to roll L/R indepndently  Long Term Goal 2: pt able to perform supine<>sit mod-I  Long Term Goal 3: pt able to perform sit<>stand and perform pivot/step to transfers at mod-I  Long Term Goal 4: pt able to ambulate household distance with appropriate device, mod-I  Long Term Goal 5: pt able to ambulate > 50 ft with appropriate device, at SBA/supervsion level, level as well as incline surface. Additional Goals?: Yes  Long term goal 6: pt able to go up and down 4 or more steps with B UE support, CGA. Long term goal 7: Improve PASS score to 27/36 improve function/stability. Long term goal 8: improve Tinetti balance score to atleast 23/28 to reduce fall risk. PLAN OF CARE  Frequency: 1-2 treatment sessions per day, 5-7 days per week  Physcial Therapy Plan  General Plan:  minutes of therapy at least 5 out of 7 days a week (5 to 7 days/week.)  Specific Instructions for Next Treatment: challenging balance  activities  Current Treatment Recommendations: Strengthening;Balance training;Functional mobility training;Neuromuscular re-education;Cognitive reorientation; Safety education & training;Patient/Caregiver education & training;Equipment evaluation, education, & procurement; Therapeutic activities;Transfer training; Wheelchair mobility training;Gait training;Stair training;Home exercise program;Positioning  Safety Devices  Type of Devices: All fall risk precautions in place; Bed alarm in place;Call light within reach;Gait belt;Left in bed;Telesitter in use;Sitter present    EDUCATION  Education  Education Given To: Patient; Family  Education Provided: Safety;Precautions; Energy Conservation  Education Method: Verbal  Barriers to Learning: Cognition  Education Outcome: Verbalized understanding        Therapy Time   02/02/23 1101 02/02/23 1503   PT Individual Minutes   Time In 1004 1410   Time Out 1056 1500   Minutes 46 [address] [place], [place], 02/02/23 at 4:17 PM

## 2023-02-02 NOTE — PROGRESS NOTES
Speech Language Pathology  Speech Language Pathology  [facility] Acute Rehab Unit at [address] [place] [address]    Cognitive/Dysphagia Treatment Note    Date: 2/2/2023  Patients Name: Pancho Han  MRN: [id number]  Diagnosis: Dysphagia s/p CVA  Patient Active Problem List   Diagnosis Code    Acute type II respiratory failure (HCC) J96.00    Transaminitis R74.01    Normocytic anemia D64.9    Thrombocytopenia (HCC) D69.6    Agitation R45.1    Acute respiratory failure with hypoxia and hypercapnia (HCC) RLL pneumonia J96.01, J96.02    Altered mental status R41.82    Community acquired pneumonia of right lower lobe of lung J18.9    Prolonged pt (prothrombin time) R79.1    Emphysema lung ([address] J43.9    Cerebral infarction ([address] I63.9    ACP (advance care planning) Z71.89    Goals of care, counseling/discussion Z71.89    Encounter for palliative care Z51.5    Delirium due to another medical condition F05    Moderate malnutrition ([address] E44.0    Hepatitis C virus infection without hepatic coma B19.20    Dysphagia R13.10    Impending cerebrovascular accident ([address] I63.9    Dermatitis associated with moisture L30.8    Cerebrovascular accident (CVA) due to embolism of cerebral artery ([address] I63.40       Pain: 7/10 (neck)    Cognitive Treatment  Treatment time: [id number]    Subjective: [x] Alert [x] Cooperative     [] Confused     [] Agitated    [] Lethargic    Objective/Assessment:    Orientation: Oriented x4. Recall: Not addressed directly. Pt. able to consistently recall rules of Free Water Protocol t/o session and calculate time he is able to drink water. Organization: n/a    Problem solving: Reasoning, ID similarities between 2 items- 100% accuracy (I). ID differences between 2 items- 100% accuracy (I). Dysphagia: Oral care completed c ST set up.   Education provided re: results and recommendations of repeat MBS, continued NPO and Free Water Protocol following d/c, continued dysphagia treatment and repeat MBS prior to diet advancement. Handout of Free Water Protocol information and dysphagia exercises reviewed and given to Pt/wife. Pt and wife verbalized understanding of all information provided. Pt. completed Kim maneuver x8, simple tongue base strengthening exercises x3, 20 reps each and OMEs x2, 20 reps each. Encouraged Pt to practice exercises throughout day. Pt and wife verbalized understanding and agreeable. Plan:  [x] Continue ST services    [] Discharge from ST:        Discharge recommendations: [] Inpatient Rehab   [] [place]   [] Outpatient Therapy  [] Follow up at trauma clinic   [] Other:         Treatment completed by:  Maryann Keyes M.A., CCC-SLP

## 2023-02-02 NOTE — CARE COORDINATION
[facility] CASE MANAGEMENT NOTE:    Orders for Tube feeding faxed to [facility]    Spoke with [facility] regarding Nebulizer and Home O2. Patient's family requesting Pulse Oximeter, will ask [facility] if they cover it. They are also asking for BP cuff/ this is not covered by patients insurance. Will print places patient may purchase this and provide to patient. Family is also asking for transportation to and from appointment. Called and spoke w/[facility] and they asked that we return the call today after 230pm    Also filled out a Transportation Needs Assessment to submit to Dept [facility]/ faxed for patient. Will provide the patient and family with the forms and with confirmation copy.       Electronically signed by Alexa Cristina RN on 2/2/2023 at 11:25 AM

## 2023-02-02 NOTE — PLAN OF CARE
Problem: Discharge Planning  Goal: Discharge to home or other facility with appropriate resources  Outcome: Progressing  Flowsheets (Taken 2/2/2023 1026)  Discharge to home or other facility with appropriate resources:   Identify barriers to discharge with patient and caregiver   Arrange for needed discharge resources and transportation as appropriate   Identify discharge learning needs (meds, wound care, etc)   Refer to discharge planning if patient needs post-hospital services based on physician order or complex needs related to functional status, cognitive ability or social support system     Problem: Nutrition Deficit:  Goal: Optimize nutritional status  Outcome: Progressing     Problem: Safety - Adult  Goal: Free from fall injury  Outcome: Progressing     Problem: ABCDS Injury Assessment  Goal: Absence of physical injury  Outcome: Progressing     Problem: Chronic Conditions and Co-morbidities  Goal: Patient's chronic conditions and co-morbidity symptoms are monitored and maintained or improved  Outcome: Progressing  Flowsheets (Taken 2/2/2023 1026)  Care Plan - Patient's Chronic Conditions and Co-Morbidity Symptoms are Monitored and Maintained or Improved: Monitor and assess patient's chronic conditions and comorbid symptoms for stability, deterioration, or improvement

## 2023-02-02 NOTE — PROGRESS NOTES
Virginia Tejeda was evaluated today and a DME order was entered for a nebulizer compressor in order to administer Albuterol and Ipratropium due to the diagnosis of acute hypoxic respiratory failure, multifocal pneumonia, COPD. The need for this equipment and treatment was discussed with the patient and he understands and is in agreement.         Tiffany Crooks MD

## 2023-02-02 NOTE — PROGRESS NOTES
Speech Language Pathology  [facility] Rehab Unit at [address] [place] [address]  Speech Language Pathology    Date: 2/2/2023  Patient Name: Kenji Rosado  YOB: 1960   AGE: 60 y.o. MRN: [id number]        Patient Not Available for Speech Therapy     Due to:  [] Testing  [] Hemodialysis  [] Cancelled by RN  [] Surgery   [] Intubation/Sedation/Pain Medication  [] Medical instability  [x] Other:  0930- Missed ST session as pt. Took self to shower. ST asked pt. At [address] if he was ready for ST, pt. Responds Lissy Rayo me 10 mins\". ST unable to move tx to 1000 d/t scheduled meeting and pt. Has PT at 8. Will plan to see pt. This pm.     1308- Unable to see for ST pm session as RN in room doing family training c pt. And his wife. Will attempt session later this pm pending availability. Abdi OZUNA A.CCC/SLP

## 2023-02-02 NOTE — PROGRESS NOTES
Physical Medicine & Rehabilitation  Progress Note      Subjective:      Xu Arce is a 60 y.o. male with multifocal CVA. He reports doing fine today. He reports having regular bowel movements. Discussed results of MBS and switch to all bolus tube feeds. Nurse performing education with patient and family on tube feeds for home. He denies any other acute concerns. ROS:  Denies fevers, chills, sweats. No chest pain, palpitations, lightheadedness. Denies coughing, wheezing or shortness of breath. Denies abdominal pain, nausea, diarrhea or constipation. No new areas of joint pain. Denies new areas of numbness or weakness. Denies new anxiety or depression issues. No new skin problems. Rehabilitation:     Physical Therapy    Restrictions/Precautions: Fall Risk, General Precautions, Contact Precautions, Isolation, Bed Alarm  Implants present? :  (pt denies)  Other position/activity restrictions: PT/OT staff may titrateO2 down as tolerated in therapy, but may only titrate up to maximum 4 L NC as needed in therapy. Nursing should perform oxygen titration > 4 L. Per RT SpO2 Goal is 92% or higher, use O2 as needed to maintain SpO2 goal with rest and activity.     Bed mobility  Rolling to Left: Modified independent  Rolling to Right: Modified independent  Supine to Sit: Supervision  Sit to Supine: Supervision  Scooting: Supervision  Bed Mobility Comments: no use of grab rails with bed in flat position    Transfers  Sit to Stand: Supervision  Stand to Sit: Supervision  Bed to Chair: Supervision  Stand Pivot Transfers: Supervision (transfers completed with and without AD)  Comment: patient demo'd impulsive movement, vcs for safety and O2 line management    Ambulation  Surface: Level tile  Device: Rolling Walker  Other Apparatus: O2 (1L)  Assistance: Supervision  Quality of Gait: vcs to stay up inside SELVIN of  RW with fair, but fleeting return  Gait Deviations: Slow Radha, Decreased step height  Distance: 267ft  Comments: SPO2 93% post amb on 1L (pt may benefit from rollator for energy conservation. Try tomorrow.)  More Ambulation?: No      Occupational Therapy  Grooming/Oral Hygiene  Assistance Level: Supervision  Skilled Clinical Factors: Standing at sink for hand hygiene and oral hygiene. Upper Extremity Dressing  Assistance Level: Supervision  Skilled Clinical Factors: doff/don overhead shirt while standing with RW     Putting On/Taking Off Footwear  Assistance Level: Set-up  Skilled Clinical Factors: Slippers while seatd at EOB. Toileting  Assistance Level: Supervision  Skilled Clinical Factors: During clothing management. (I) for hygiene. Toilet Transfers  Technique:  (Ambulating with rw.)  Equipment: Standard toilet;Grab bars  Assistance Level: Supervision  Skilled Clinical Factors: No LOB noted. Speech Therapy  Subjective: [x] Alert     [x] Cooperative     [] Confused     [] Agitated    [] Lethargic     Objective/Assessment:     Orientation: Oriented x4. Recall: Not addressed directly. Pt. able to consistently recall rules of Free Water Protocol t/o session and calculate time he is able to drink water. Organization: n/a     Problem solving: Reasoning, ID similarities between 2 items- 100% accuracy (I). ID differences between 2 items- 100% accuracy (I). Dysphagia: Oral care completed c ST set up. Education provided re: results and recommendations of repeat MBS, continued NPO and Free Water Protocol following d/c, continued dysphagia treatment and repeat MBS prior to diet advancement. Handout of Free Water Protocol information and dysphagia exercises reviewed and given to Pt/wife. Pt and wife verbalized understanding of all information provided. Pt. completed Kim maneuver x8, simple tongue base strengthening exercises x3, 20 reps each and OMEs x2, 20 reps each. Encouraged Pt to practice exercises throughout day. Pt and wife verbalized understanding and agreeable. Current Medications:   Current Facility-Administered Medications: enoxaparin (LOVENOX) injection 40 mg, 40 mg, SubCUTAneous, Daily  ipratropium-albuterol (DUONEB) nebulizer solution 1 ampule, 1 ampule, Inhalation, Q4H WA  guaiFENesin (MUCINEX) extended release tablet 600 mg, 600 mg, Oral, BID PRN  carvedilol (COREG) tablet 6.25 mg, 6.25 mg, Oral, BID WC  ferrous sulfate (IRON 325) tablet 325 mg, 325 mg, Oral, Daily with breakfast  spironolactone (ALDACTONE) tablet 25 mg, 25 mg, Oral, Daily  acetaminophen (TYLENOL) tablet 650 mg, 650 mg, Oral, Q4H PRN  polyethylene glycol (GLYCOLAX) packet 17 g, 17 g, Oral, Daily  senna (SENOKOT) tablet 17.2 mg, 2 tablet, Oral, Daily PRN  bisacodyl (DULCOLAX) suppository 10 mg, 10 mg, Rectal, Daily PRN      Objective:  /77   Pulse 80   Temp 98.5 °F (36.9 °C) (Oral)   Resp 18   Ht 5' 7\" (1.702 m)   Wt 147 lb 1.6 oz (66.7 kg)   SpO2 93%   BMI 23.04 kg/m²       GEN: Well developed, well nourished, no acute distress  HEENT: NCAT. EOM grossly intact. Hearing grossly intact. Mucous membranes pink and moist.  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 RESP: Normal breath sounds with no wheezing, rales, or rhonchi. Respirations WNL and unlabored. CV: Regular rate and rhythm. No murmurs, rubs, or gallops. ABD: Soft, non-distended, BS+ and equal.  NEURO: Alert. Speech fluent. Sensation to light touch intact in bilateral lower limbs. MSK: Muscle tone and bulk are normal bilaterally. Moving bilateral upper limbs with at least antigravity strength. Strength 4+/5 with bilateral ankle dorsifleixon and plantarflexion. Able to lift bilateral lower limbs off of bed against gravity. LIMBS: No edema in bilateral lower limbs. SKIN: Warm and dry with good turgor. PSYCH: Mood WNL  Affect WNL. Appropriately interactive.     Diagnostics:     CBC:   No results for input(s): WBC, RBC, HGB, HCT, MCV, RDW, PLT in the last 72 hours.    BMP:   Recent Labs     01/31/23  0558 02/01/23  0613    139   K 3.9 4.2    104   CO2 27 27   BUN 18 19   CREATININE 0.51* 0.48*   GLUCOSE 118* 140*       BNP: No results for input(s): BNP in the last 72 hours. PT/INR: No results for input(s): PROTIME, INR in the last 72 hours. APTT: No results for input(s): APTT in the last 72 hours. CARDIAC ENZYMES: No results for input(s): CKMB, CKMBINDEX, TROPONINT in the last 72 hours. Invalid input(s): CKTOTAL;3  FASTING LIPID PANEL:  Lab Results   Component Value Date    TRIG 85 01/03/2023     LIVER PROFILE: No results for input(s): AST, ALT, ALB, BILIDIR, BILITOT, ALKPHOS in the last 72 hours. Reviewed notes from SLP, OT, PT, internal medicine. Impression/Plan:   Impaired ADLs, gait, and mobility due to:    Multifocal cardioembolic CVA, subarachnoid hemorrhage:  PT/OT for gait, mobility, strengthening, endurance, ADLs, and self care. No residual SAH on CT head 1/21. Dysphagia:  S/p PEG tube placement on 1/17. SLP treating. Dietitian managing tube feed - switched to all bolus tube fees on 2/1 after repeat MBS completed and NPO recommended. Monitor BMP TIW. Free water protocol. Agitation:  Recurred 1/21. Patient moved to room closer to the nurses station and telesitter initiated. Repeat CT head improved. Seroquel BID started 1/21 - mental status improved and Seroquel was discontinued. Insomnia: Trazodone started nightly on 1/20 - failed. Improved - no current medication. Acute respiratory failure requiring intubation. Currently on 1.5 to 2L NC - weaning as tolerated. On duo-neb every 4 hours while awake. Will plan for overnight pulse ox and home O2 evaluation closer to discharge. Pneumonia: Improved. Completed course of Unasyn and Vanco. CXR 1/24 for increased productive cough - stable hazy opacities. KENTRELL: Resolved. Will monitor. Elevated LFTs:  Resolved.  Will monitor  HCV:  Will need follow up with GI for treatment as an outpatient  Anemia: Hemoglobin 10.8 on 1/27, stable. Monitoring. On oral iron supplementation. Thrombocytopenia: Resolved. Will monitor. Bowel Management: Miralax daily, senokot prn, dulcolax prn. DVT Prophylaxis:  Repeat CT - SAH resolved 1/21 - okay to initiate DVT chemoprophylaxis per IM. Lovenox started 1/22. TONI stockings during the day, EPC cuffs at night.   Internal Medicine for medical management  Follow up PCP 1-2 weeks, PM&R 4-6 weeks, GI, Neurology      Electronically signed by Vidal Van MD on 2/2/2023 at 11:35 PM

## 2023-02-02 NOTE — PROGRESS NOTES
Comprehensive Nutrition Assessment    Type and Reason for Visit:  Reassess    Nutrition Recommendations/Plan:   Continue current tube feeding. NPO. Malnutrition Assessment:  Malnutrition Status: Moderate malnutrition (01/19/23 1419)    Context:  Acute Illness     Findings of the 6 clinical characteristics of malnutrition:  Energy Intake:  75% or less of estimated energy requirements for 7 or more days  Weight Loss:  Greater than 5% over 1 month     Body Fat Loss:  Unable to assess     Muscle Mass Loss:  Unable to assess    Fluid Accumulation:  No significant fluid accumulation     Strength:  Not Performed    Nutrition Assessment:    Nurse states pt seems to be tolerating bolus tube feeding well. Await insurance determination for coverage of tube feedings for home use. Nursing providing education to pt and family. Nutrition Related Findings:    No edema. Labs and meds reviewed. Current Nutrition Intake & Therapies:    Average Meal Intake: NPO     Diet NPO  ADULT TUBE FEEDING; PEG; Peptide Based; Bolus; 3 Times Daily, 6 Times Daily; 300; Syringe Push; 50; Other (specify); 50 mL water before and after each bolus feeding. Additional 100-300 mL water daily for meds. Current Tube Feeding (TF) Orders:  Feeding Route: PEG  Formula: Peptide Based  Schedule: Cyclic, Bolus  Feeding Regimen: 300 mL x 6 daily  Water Flushes: 50 mL before and after each bolus feeding. Additional 100-300 mL water to be used with meds daily. Current and Goal TF & Flush Orders Provides: 2160 kcal, 135 gm protein, 2060 mL free fluid excluding water used for meds    Anthropometric Measures:  Height: 5' 7\" (170.2 cm)  Ideal Body Weight (IBW): 148 lbs (67 kg)    Admission Body Weight: 159 lb 9.8 oz (72.4 kg) ([facility] 1/19 ([facility]  Current Body Weight: 147 lb 0.8 oz (66.7 kg), 99.4 % IBW.  Weight Source: Bed Scale  Current BMI (kg/m2): 23  Usual Body Weight: 170 lb (77.1 kg) (170 lb Per significant other.)  % Weight Change (Calculated): -13.5                    BMI Categories: Normal Weight (BMI 18.5-24. 9)    Estimated Daily Nutrient Needs:  Energy Requirements Based On: Formula  Weight Used for Energy Requirements: Current  Energy (kcal/day): 0765-2379 based on [place] with 1.3 factor plus 250 additional kcal to avoid further wt loss  Weight Used for Protein Requirements: Current  Protein (g/day): 87 based on 1.3 gm per kg  Method Used for Fluid Requirements: 1 ml/kcal  Fluid (ml/day): 2100 based on 1 mL per kg    Nutrition Diagnosis:   Moderate malnutrition related to inadequate protein-energy intake as evidenced by Criteria as identified in malnutrition assessment    Nutrition Interventions:   Food and/or Nutrient Delivery: Continue NPO, Continue Current Tube Feeding  Nutrition Education/Counseling:  (Nursing providing education for tube feeding)  Coordination of Nutrition Care: Continue to monitor while inpatient, Speech Therapy       Goals:  Previous Goal Met: Progressing toward Goal(s)  Goals:  Tolerate nutrition support at goal rate       Nutrition Monitoring and Evaluation:   Behavioral-Environmental Outcomes: None Identified  Food/Nutrient Intake Outcomes: Enteral Nutrition Intake/Tolerance  Physical Signs/Symptoms Outcomes: Biochemical Data, Chewing or Swallowing, GI Status, Weight    Discharge Planning:    Enteral Nutrition     Donna Tucker RD, LD  Contact: [phone]

## 2023-02-03 LAB
ABSOLUTE EOS #: 0.14 K/UL (ref 0–0.4)
ABSOLUTE LYMPH #: 1.05 K/UL (ref 1–4.8)
ABSOLUTE MONO #: 0.7 K/UL (ref 0.1–1.3)
ANION GAP SERPL CALCULATED.3IONS-SCNC: 9 MMOL/L (ref 9–17)
BASOPHILS # BLD: 1 % (ref 0–2)
BASOPHILS ABSOLUTE: 0.07 K/UL (ref 0–0.2)
BUN SERPL-MCNC: 18 MG/DL (ref 8–23)
CALCIUM SERPL-MCNC: 8.8 MG/DL (ref 8.6–10.4)
CHLORIDE SERPL-SCNC: 102 MMOL/L (ref 98–107)
CO2 SERPL-SCNC: 27 MMOL/L (ref 20–31)
CREAT SERPL-MCNC: 0.49 MG/DL (ref 0.7–1.2)
EOSINOPHILS RELATIVE PERCENT: 2 % (ref 0–4)
GFR SERPL CREATININE-BSD FRML MDRD: >60 ML/MIN/1.73M2
GLUCOSE SERPL-MCNC: 127 MG/DL (ref 70–99)
HCT VFR BLD AUTO: 35.9 % (ref 41–53)
HGB BLD-MCNC: 11.2 G/DL (ref 13.5–17.5)
LYMPHOCYTES # BLD: 15 % (ref 24–44)
MCH RBC QN AUTO: 24.5 PG (ref 26–34)
MCHC RBC AUTO-ENTMCNC: 31.2 G/DL (ref 31–37)
MCV RBC AUTO: 78.4 FL (ref 80–100)
MONOCYTES # BLD: 10 % (ref 1–7)
MORPHOLOGY: ABNORMAL
PDW BLD-RTO: 33.5 % (ref 11.5–14.9)
PLATELET # BLD AUTO: 173 K/UL (ref 150–450)
PMV BLD AUTO: 8.5 FL (ref 6–12)
POTASSIUM SERPL-SCNC: 3.9 MMOL/L (ref 3.7–5.3)
RBC # BLD: 4.57 M/UL (ref 4.5–5.9)
SEG NEUTROPHILS: 72 % (ref 36–66)
SEGMENTED NEUTROPHILS ABSOLUTE COUNT: 5.04 K/UL (ref 1.3–9.1)
SODIUM SERPL-SCNC: 138 MMOL/L (ref 135–144)
WBC # BLD AUTO: 7 K/UL (ref 3.5–11)

## 2023-02-03 PROCEDURE — 97110 THERAPEUTIC EXERCISES: CPT

## 2023-02-03 PROCEDURE — 97530 THERAPEUTIC ACTIVITIES: CPT

## 2023-02-03 PROCEDURE — 92526 ORAL FUNCTION THERAPY: CPT

## 2023-02-03 PROCEDURE — 80048 BASIC METABOLIC PNL TOTAL CA: CPT

## 2023-02-03 PROCEDURE — 1180000000 HC REHAB R&B

## 2023-02-03 PROCEDURE — 6370000000 HC RX 637 (ALT 250 FOR IP): Performed by: INTERNAL MEDICINE

## 2023-02-03 PROCEDURE — 97112 NEUROMUSCULAR REEDUCATION: CPT

## 2023-02-03 PROCEDURE — 99232 SBSQ HOSP IP/OBS MODERATE 35: CPT | Performed by: INTERNAL MEDICINE

## 2023-02-03 PROCEDURE — 97130 THER IVNTJ EA ADDL 15 MIN: CPT

## 2023-02-03 PROCEDURE — 2700000000 HC OXYGEN THERAPY PER DAY

## 2023-02-03 PROCEDURE — 85025 COMPLETE CBC W/AUTO DIFF WBC: CPT

## 2023-02-03 PROCEDURE — 94640 AIRWAY INHALATION TREATMENT: CPT

## 2023-02-03 PROCEDURE — 6360000002 HC RX W HCPCS: Performed by: PHYSICAL MEDICINE & REHABILITATION

## 2023-02-03 PROCEDURE — 97116 GAIT TRAINING THERAPY: CPT

## 2023-02-03 PROCEDURE — 99232 SBSQ HOSP IP/OBS MODERATE 35: CPT | Performed by: STUDENT IN AN ORGANIZED HEALTH CARE EDUCATION/TRAINING PROGRAM

## 2023-02-03 PROCEDURE — 6370000000 HC RX 637 (ALT 250 FOR IP): Performed by: PHYSICAL MEDICINE & REHABILITATION

## 2023-02-03 PROCEDURE — 94761 N-INVAS EAR/PLS OXIMETRY MLT: CPT

## 2023-02-03 PROCEDURE — 36415 COLL VENOUS BLD VENIPUNCTURE: CPT

## 2023-02-03 PROCEDURE — 97129 THER IVNTJ 1ST 15 MIN: CPT

## 2023-02-03 RX ADMIN — CARVEDILOL 6.25 MG: 6.25 TABLET, FILM COATED ORAL at 18:39

## 2023-02-03 RX ADMIN — IPRATROPIUM BROMIDE AND ALBUTEROL SULFATE 1 AMPULE: 2.5; .5 SOLUTION RESPIRATORY (INHALATION) at 20:24

## 2023-02-03 RX ADMIN — FERROUS SULFATE TAB 325 MG (65 MG ELEMENTAL FE) 325 MG: 325 (65 FE) TAB at 09:26

## 2023-02-03 RX ADMIN — SPIRONOLACTONE 25 MG: 25 TABLET ORAL at 09:26

## 2023-02-03 RX ADMIN — IPRATROPIUM BROMIDE AND ALBUTEROL SULFATE 1 AMPULE: 2.5; .5 SOLUTION RESPIRATORY (INHALATION) at 08:13

## 2023-02-03 RX ADMIN — CARVEDILOL 6.25 MG: 6.25 TABLET, FILM COATED ORAL at 09:26

## 2023-02-03 RX ADMIN — IPRATROPIUM BROMIDE AND ALBUTEROL SULFATE 1 AMPULE: 2.5; .5 SOLUTION RESPIRATORY (INHALATION) at 16:00

## 2023-02-03 RX ADMIN — ENOXAPARIN SODIUM 40 MG: 100 INJECTION SUBCUTANEOUS at 09:26

## 2023-02-03 ASSESSMENT — PAIN DESCRIPTION - DESCRIPTORS
DESCRIPTORS: ACHING
DESCRIPTORS: SORE

## 2023-02-03 ASSESSMENT — PAIN SCALES - GENERAL: PAINLEVEL_OUTOF10: 0

## 2023-02-03 ASSESSMENT — 9 HOLE PEG TEST
TEST_RESULT: IMPAIRED
TESTTIME_SECONDS: 29.73
TESTTIME_SECONDS: 32.33
TEST_RESULT: FUNCTIONAL

## 2023-02-03 ASSESSMENT — PAIN DESCRIPTION - ORIENTATION: ORIENTATION: RIGHT;LEFT

## 2023-02-03 ASSESSMENT — PAIN DESCRIPTION - LOCATION
LOCATION: NECK
LOCATION: NECK

## 2023-02-03 ASSESSMENT — PAIN DESCRIPTION - PAIN TYPE: TYPE: ACUTE PAIN

## 2023-02-03 ASSESSMENT — PAIN SCALES - WONG BAKER
WONGBAKER_NUMERICALRESPONSE: 4
WONGBAKER_NUMERICALRESPONSE: 0

## 2023-02-03 NOTE — PLAN OF CARE
Problem: Discharge Planning  Goal: Discharge to home or other facility with appropriate resources  2/3/2023 1527 by Rosa Dowell RN  Outcome: Progressing  Flowsheets (Taken 2/3/2023 0845)  Discharge to home or other facility with appropriate resources:   Identify barriers to discharge with patient and caregiver   Arrange for needed discharge resources and transportation as appropriate   Identify discharge learning needs (meds, wound care, etc)   Refer to discharge planning if patient needs post-hospital services based on physician order or complex needs related to functional status, cognitive ability or social support system     Problem: Nutrition Deficit:  Goal: Optimize nutritional status  2/3/2023 1527 by Rosa Dowell RN  Outcome: Progressing     Problem: Safety - Adult  Goal: Free from fall injury  2/3/2023 1527 by Rosa Dowell RN  Outcome: Progressing     Problem: Skin/Tissue Integrity  Goal: Absence of new skin breakdown  Description: 1. Monitor for areas of redness and/or skin breakdown  2. Assess vascular access sites hourly  3. Every 4-6 hours minimum:  Change oxygen saturation probe site  4. Every 4-6 hours:  If on nasal continuous positive airway pressure, respiratory therapy assess nares and determine need for appliance change or resting period.   2/3/2023 1527 by Rosa Dowell RN  Outcome: Progressing     Problem: Chronic Conditions and Co-morbidities  Goal: Patient's chronic conditions and co-morbidity symptoms are monitored and maintained or improved  2/3/2023 1527 by Rosa Dowell RN  Outcome: Progressing  Flowsheets (Taken 2/3/2023 0845)  Care Plan - Patient's Chronic Conditions and Co-Morbidity Symptoms are Monitored and Maintained or Improved:   Monitor and assess patient's chronic conditions and comorbid symptoms for stability, deterioration, or improvement   Collaborate with multidisciplinary team to address chronic and comorbid conditions and prevent exacerbation or deterioration   Update acute care plan with appropriate goals if chronic or comorbid symptoms are exacerbated and prevent overall improvement and discharge     Problem: Pain  Goal: Verbalizes/displays adequate comfort level or baseline comfort level  Outcome: Progressing

## 2023-02-03 NOTE — PROGRESS NOTES
02/03/23 1208   Encounter Summary   Encounter Overview/Reason  Volunteer Encounter   Service Provided For: Patient   Referral/Consult From: [facility]   Last Encounter  02/03/23  (V)   Complexity of Encounter Low   Spiritual/Emotional needs   Type Spiritual Support   Assessment/Intervention/Outcome   Intervention Prayer (assurance of)/[place]

## 2023-02-03 NOTE — PROGRESS NOTES
Speech Language Pathology  Speech Language Pathology  [facility] Acute Rehab Unit at [facility]    Cognitive/Dysphagia Treatment Note    Date: 2/3/2023  Patients Name: Pilar Medrano  MRN: [id number]  Diagnosis: Dysphagia s/p CVA  Patient Active Problem List   Diagnosis Code    Acute type II respiratory failure (HCC) J96.00    Transaminitis R74.01    Normocytic anemia D64.9    Thrombocytopenia (HCC) D69.6    Agitation R45.1    Acute respiratory failure with hypoxia and hypercapnia (HCC) RLL pneumonia J96.01, J96.02    Altered mental status R41.82    Community acquired pneumonia of right lower lobe of lung J18.9    Prolonged pt (prothrombin time) R79.1    Emphysema lung ([address] J43.9    Cerebral infarction ([address] I63.9    ACP (advance care planning) Z71.89    Goals of care, counseling/discussion Z71.89    Encounter for palliative care Z51.5    Delirium due to another medical condition F05    Moderate malnutrition ([address] E44.0    Hepatitis C virus infection without hepatic coma B19.20    Dysphagia R13.10    Impending cerebrovascular accident ([address] I63.9    Dermatitis associated with moisture L30.8    Cerebrovascular accident (CVA) due to embolism of cerebral artery ([address] I63.40       Pain: 0/10    Cognitive Treatment  Treatment time: 4634-1845    Subjective: [x] Alert [x] Cooperative     [] Confused     [] Agitated    [] Lethargic    Objective/Assessment:    Recall: n/a     Organization: state item in category beginning c given letter- 69%, 100% c cues. State word to description beginning c given letter- 100%. Problem solving: Pt. Asking good questions re: dysphagia and swallow function. Dysphagia: Oral care completed earlier today. Pt. completed Kim maneuver x10,    Encouraged Pt to practice exercises throughout day. Pt. States continued pain in posterior neck, ST deffered modified Shaker exercise.         Plan:  [x] Continue ST services    [] Discharge from ST:        Discharge recommendations: [] Inpatient Rehab   [] Nakul Sykes   [] Outpatient Therapy  [] Follow up at trauma clinic   [] Other:         Treatment completed by: Nicola Marin A.CCC/SLP

## 2023-02-03 NOTE — PLAN OF CARE
Problem: Discharge Planning  Goal: Discharge to home or other facility with appropriate resources  2/3/2023 0341 by Rossi Greene LPN  Outcome: Progressing  Flowsheets (Taken 2/2/2023 2050)  Discharge to home or other facility with appropriate resources:   Identify barriers to discharge with patient and caregiver   Arrange for needed discharge resources and transportation as appropriate   Identify discharge learning needs (meds, wound care, etc)     Problem: Nutrition Deficit:  Goal: Optimize nutritional status  2/3/2023 0341 by Rossi Greene LPN  Outcome: Progressing     Problem: Safety - Adult  Goal: Free from fall injury  2/3/2023 0341 by Rossi Greene LPN  Outcome: Progressing     Problem: ABCDS Injury Assessment  Goal: Absence of physical injury  2/3/2023 0341 by Rossi Greene LPN  Outcome: Progressing     Problem: Skin/Tissue Integrity  Goal: Absence of new skin breakdown  Description: 1. Monitor for areas of redness and/or skin breakdown  2. Assess vascular access sites hourly  3. Every 4-6 hours minimum:  Change oxygen saturation probe site  4. Every 4-6 hours:  If on nasal continuous positive airway pressure, respiratory therapy assess nares and determine need for appliance change or resting period.   2/3/2023 0341 by Rossi Greene LPN  Outcome: Progressing     Problem: Chronic Conditions and Co-morbidities  Goal: Patient's chronic conditions and co-morbidity symptoms are monitored and maintained or improved  2/3/2023 0341 by Rossi Greene LPN  Outcome: Progressing  Flowsheets (Taken 2/2/2023 2050)  Care Plan - Patient's Chronic Conditions and Co-Morbidity Symptoms are Monitored and Maintained or Improved:   Monitor and assess patient's chronic conditions and comorbid symptoms for stability, deterioration, or improvement   Collaborate with multidisciplinary team to address chronic and comorbid conditions and prevent exacerbation or deterioration   Update acute care plan with appropriate goals if chronic or comorbid symptoms are exacerbated and prevent overall improvement and discharge

## 2023-02-03 NOTE — PROGRESS NOTES
Physical Medicine & Rehabilitation  Progress Note      Subjective:      Jose Henley is a 60 y.o. male with multifocal CVA. He reports doing okay toady. He notes right-sided neck and upper back pain - discussed trialing heat or ice as needed and voltaren gel as needed. He denies any other acute concerns. Plan is for discharge on Monday. ROS:  Denies fevers, chills, sweats. No chest pain, palpitations, lightheadedness. Denies coughing, wheezing or shortness of breath. Denies abdominal pain, nausea, diarrhea or constipation. No new areas of joint pain. Denies new areas of numbness or weakness. Denies new anxiety or depression issues. No new skin problems. Rehabilitation:     Physical Therapy    Restrictions/Precautions: Fall Risk, General Precautions, Contact Precautions, Isolation, Bed Alarm  Implants present? :  (pt denies)  Other position/activity restrictions: PT/OT staff may titrateO2 down as tolerated in therapy, but may only titrate up to maximum 4 L NC as needed in therapy. Nursing should perform oxygen titration > 4 L. Per RT SpO2 Goal is 92% or higher, use O2 as needed to maintain SpO2 goal with rest and activity.     Bed mobility  Rolling to Left: Modified independent  Rolling to Right: Modified independent  Supine to Sit: Supervision  Sit to Supine: Supervision  Scooting: Supervision  Bed Mobility Comments: no use of grab rails with bed in flat position    Transfers  Sit to Stand: Supervision  Stand to Sit: Supervision  Bed to Chair: Supervision  Stand Pivot Transfers: Supervision (transfers completed with and without AD)  Comment: patient demo'd impulsive movement, vcs for safety and O2 line management    Ambulation  Surface: Level tile  Device: Rolling Walker  Other Apparatus: O2 (1L)  Assistance: Supervision  Quality of Gait: vcs to stay up inside SELVIN of  RW with fair, but fleeting return  Gait Deviations: Slow Radha, Decreased step height  Distance: 267ft  Comments: SPO2 93% post amb on 1L (pt may benefit from rollator for energy conservation. Try tomorrow.)  More Ambulation?: No      Occupational Therapy  Sit to Supine  Assistance Level: Independent  Skilled Clinical Factors: completed without difficulty  Supine to Sit  Assistance Level: Independent  Skilled Clinical Factors: completed without difficulty. OT Exercises  Exercise Treatment: Pt participated in B UE exercises #2 x 20 reps in all planes, rest breaks taken as needed, to continue to increase general str and activity tolerance for maximized indep and safety during all daily self care tasks and transfers. Pt req increased time, RB, c/o of neck pain and demo SOB between each set on this date  Static Sitting Balance Exercises: PM: dominik ~20 min sitting EOB while participating in assessments. Left Hand Strength -  (lbs)  Handle Setting 1: 38.6# (45, 35, 36) (Norms: #)  Right Hand Strength -  (lbs)  Handle Setting 1: 36# (43, 40, 25) (Norms: #)  Fine Motor Skills  Left 9-Hole Peg Test: Impaired  Left 9 Hole Peg Test Time (secs): 32.33 (Norms: 21-29 sec.)  Right 9-Hole Peg Test: Functional  Right 9 Hole Peg Test Time (secs): 29.73 (Norms: 21-29 sec.)      Speech Therapy  Subjective: [x] Alert     [x] Cooperative     [] Confused     [] Agitated    [] Lethargic     Objective/Assessment:     Recall: 3 word memory and mental manipulation- ranking- (both controlled and uncontrolled)- 100%      Organization: n/a     Problem solving: Pt. Reported he needed to use BR, \"will that thing (telesitter) go off if I get up? \" Writer educ, it is supposed to as that is the purpose but there is an alternative that will not make it go off (call light). Pt. IND uses call light. Dysphagia: Oral care completed c ST set up. Pt. completed Kim maneuver x5, simple tongue base strengthening exercises x3, 20 reps each and OMEs x2, 20 reps each. Encouraged Pt to practice exercises throughout day.   Pt. Inquiring wether the neck pain he has been experiencing could be causing his swallowing issues. ST educ. Pt. That is unknown as writer was unaware of pt. Ever having neck pain as this was never reported. RN also never aware of neck pain complaints, but stated she will notify Dr. Guille Marcelo. Subjective: [x] Alert     [x] Cooperative     [] Confused     [] Agitated    [] Lethargic     Objective/Assessment:     Recall: n/a      Organization: state item in category beginning c given letter- 69%, 100% c cues. State word to description beginning c given letter- 100%. Problem solving: Pt. Asking good questions re: dysphagia and swallow function. Dysphagia: Oral care completed earlier today. Pt. completed Kim maneuver x10,    Encouraged Pt to practice exercises throughout day. Pt. States continued pain in posterior neck, ST deffered modified Shaker exercise. Current Medications:   Current Facility-Administered Medications: diclofenac sodium (VOLTAREN) 1 % gel 2 g, 2 g, Topical, TID PRN  enoxaparin (LOVENOX) injection 40 mg, 40 mg, SubCUTAneous, Daily  ipratropium-albuterol (DUONEB) nebulizer solution 1 ampule, 1 ampule, Inhalation, Q4H WA  guaiFENesin (MUCINEX) extended release tablet 600 mg, 600 mg, Oral, BID PRN  carvedilol (COREG) tablet 6.25 mg, 6.25 mg, Oral, BID WC  ferrous sulfate (IRON 325) tablet 325 mg, 325 mg, Oral, Daily with breakfast  spironolactone (ALDACTONE) tablet 25 mg, 25 mg, Oral, Daily  acetaminophen (TYLENOL) tablet 650 mg, 650 mg, Oral, Q4H PRN  polyethylene glycol (GLYCOLAX) packet 17 g, 17 g, Oral, Daily  senna (SENOKOT) tablet 17.2 mg, 2 tablet, Oral, Daily PRN  bisacodyl (DULCOLAX) suppository 10 mg, 10 mg, Rectal, Daily PRN      Objective:  /70   Pulse 79   Temp 97.8 °F (36.6 °C) (Oral)   Resp 20   Ht 5' 7\" (1.702 m)   Wt 147 lb 1.6 oz (66.7 kg)   SpO2 94%   BMI 23.04 kg/m²       GEN: Well developed, well nourished, no acute distress  HEENT: NCAT.   EOM grossly intact. Hearing grossly intact. Mucous membranes pink and moist.  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 RESP: Normal breath sounds with no wheezing, rales, or rhonchi. Respirations WNL and unlabored. CV: Regular rate and rhythm. No murmurs, rubs, or gallops. ABD: Soft, non-distended, BS+ and equal.  NEURO: Alert. Speech fluent. Sensation to light touch intact in bilateral lower limbs. MSK: Muscle tone and bulk are normal bilaterally. Moving bilateral upper limbs with at least antigravity strength. Strength 4+/5 with bilateral ankle dorsifleixon and plantarflexion. Able to lift bilateral lower limbs off of bed against gravity. No significant tenderness to palpation of the cervical/thoracic spine or bilateral paraspinal musculature; tightness of the musculature noted on the right. LIMBS: No edema in bilateral lower limbs. SKIN: Warm and dry with good turgor. PSYCH: Mood WNL  Affect WNL. Appropriately interactive. Diagnostics:     CBC:   Recent Labs     02/03/23  0655   WBC 7.0   RBC 4.57   HGB 11.2*   HCT 35.9*   MCV 78.4*   RDW 33.5*          BMP:   Recent Labs     02/01/23  0613 02/03/23  0655    138   K 4.2 3.9    102   CO2 27 27   BUN 19 18   CREATININE 0.48* 0.49*   GLUCOSE 140* 127*       BNP: No results for input(s): BNP in the last 72 hours. PT/INR: No results for input(s): PROTIME, INR in the last 72 hours. APTT: No results for input(s): APTT in the last 72 hours. CARDIAC ENZYMES: No results for input(s): CKMB, CKMBINDEX, TROPONINT in the last 72 hours. Invalid input(s): CKTOTAL;3  FASTING LIPID PANEL:  Lab Results   Component Value Date    TRIG 85 01/03/2023     LIVER PROFILE: No results for input(s): AST, ALT, ALB, BILIDIR, BILITOT, ALKPHOS in the last 72 hours. Reviewed notes from SLP, OT, PT, internal medicine.       Impression/Plan:   Impaired ADLs, gait, and mobility due to:    Multifocal cardioembolic CVA, subarachnoid hemorrhage:  PT/OT for gait, mobility, strengthening, endurance, ADLs, and self care. No residual SAH on CT head 1/21. Dysphagia:  S/p PEG tube placement on 1/17. SLP treating. Dietitian managing tube feed - switched to all bolus tube fees on 2/1 after repeat MBS completed and NPO recommended. Monitor BMP TIW. Free water protocol. Agitation:  Recurred 1/21. Patient moved to room closer to the nurses station and telesitter initiated. Repeat CT head improved. Seroquel BID started 1/21 - mental status improved and Seroquel was discontinued. Insomnia: Trazodone started nightly on 1/20 - failed. Improved - no current medication. Acute respiratory failure requiring intubation. Currently on 1.5 to 2L NC - weaning as tolerated. On duo-neb every 4 hours while awake. Will plan for overnight pulse ox and home O2 evaluation over the weekend (orders placed). Pneumonia: Improved. Completed course of Unasyn and Vanco. CXR 1/24 for increased productive cough - stable hazy opacities. KENTRELL: Resolved. Will monitor. Elevated LFTs:  Resolved. Will monitor  HCV:  Will need follow up with GI for treatment as an outpatient  Anemia: Hemoglobin 11.2 on 2/3, stable. Monitoring. On oral iron supplementation. Thrombocytopenia: Resolved. Will monitor. Right neck/upper back pain:  May be related to tight musculature in this area. May use heat or ice as needed. Added voltaren gel as needed on 2/3. Bowel Management: Miralax daily, senokot prn, dulcolax prn. DVT Prophylaxis:  Repeat CT - SAH resolved 1/21 - okay to initiate DVT chemoprophylaxis per IM. Lovenox started 1/22. TONI stockings during the day, EPC cuffs at night.   Internal Medicine for medical management  Follow up PCP 1-2 weeks, PM&R 4-6 weeks, GI, Neurology      Electronically signed by Radha Lawton MD on 2/4/2023 at 1:43 AM

## 2023-02-03 NOTE — PROGRESS NOTES
Speech Language Pathology  Speech Language Pathology  [facility] at [facility]    Cognitive/Dysphagia Treatment Note    Date: 2/3/2023  Patients Name: Tanya Mitchell  MRN: [id number]  Diagnosis: Dysphagia s/p CVA  Patient Active Problem List   Diagnosis Code    Acute type II respiratory failure (HCC) J96.00    Transaminitis R74.01    Normocytic anemia D64.9    Thrombocytopenia (HCC) D69.6    Agitation R45.1    Acute respiratory failure with hypoxia and hypercapnia (HCC) RLL pneumonia J96.01, J96.02    Altered mental status R41.82    Community acquired pneumonia of right lower lobe of lung J18.9    Prolonged pt (prothrombin time) R79.1    Emphysema lung ([address] J43.9    Cerebral infarction ([address] I63.9    ACP (advance care planning) Z71.89    Goals of care, counseling/discussion Z71.89    Encounter for palliative care Z51.5    Delirium due to another medical condition F05    Moderate malnutrition ([address] E44.0    Hepatitis C virus infection without hepatic coma B19.20    Dysphagia R13.10    Impending cerebrovascular accident ([address] I63.9    Dermatitis associated with moisture L30.8    Cerebrovascular accident (CVA) due to embolism of cerebral artery ([address] I63.40       Pain: 0/10    Cognitive Treatment  Treatment time: 0077-2462    Subjective: [x] Alert [x] Cooperative     [] Confused     [] Agitated    [] Lethargic    Objective/Assessment:    Recall: 3 word memory and mental manipulation- ranking- (both controlled and uncontrolled)- 100%     Organization: n/a    Problem solving: Pt. Reported he needed to use BR, \"will that thing (telesitter) go off if I get up? \" Writer educ, it is supposed to as that is the purpose but there is an alternative that will not make it go off (call light). Pt. IND uses call light. Dysphagia: Oral care completed c ST set up. Pt. completed Kim maneuver x5, simple tongue base strengthening exercises x3, 20 reps each and OMEs x2, 20 reps each.   Encouraged Pt to practice exercises throughout day. Pt. Travis henry the neck pain he has been experiencing could be causing his swallowing issues. ST educ. Pt. That is unknown as writer was unaware of pt. Ever having neck pain as this was never reported. RN also never aware of neck pain complaints, but stated she will notify Dr. Vanesa Webb. Plan:  [x] Continue ST services    [] Discharge from ST:        Discharge recommendations: [] Inpatient Rehab   [] [place]   [] Outpatient Therapy  [] Follow up at trauma clinic   [] Other:         Treatment completed by: Bean oY A.CCC/SLP

## 2023-02-03 NOTE — PROGRESS NOTES
[facility] Internal Medicine  Teresita Solano MD; Doretha Valenzuela MD; Kayleen Palencia MD; MD Misty Villegas MD; Edil Minaya MD    [facility] Internal Medicine   [facility]    Progress note    Late entry from 1/30/23        Date:   2/3/2023  Patient name:  Ann Poe  Date of admission:  1/19/2023  9:12 AM  MRN:   [id number]  Account:  [de-identified]  YOB: 1960  PCP:    Edil Minaya MD  Room:   [address] [place] [address]  Code Status:    Full Code    Physician Requesting Consult: Ashley Garay MD    Reason for Consult: Medical management    Chief Complaint:     No chief complaint on file. Acute CVA, generalized weakness, metabolic encephalopathy, multifactorial with underlying acute respiratory failure with pneumonia and heart failure    History Obtained From:     patient    History of Present Illness:     54-year-old gentleman with unknown past medical history presented to inpatient [facility] with metabolic encephalopathy found to have acute respiratory failure, pneumonia, also had slurred speech, confusion progressively was getting worse, EMS brought the patient to the hospital his saturations were  75%, found to have acute respiratory failure with pneumonia, in the ED patient was placed on BiPAP, chest x-ray confirmed right lower lobe pneumonia with pleural effusion. ABG showed acute respiratory failure with hypercapnia and hypoxia. Subsequently also found to have NSTEMI, acute heart failure with transaminitis possible shock liver, with also hepatitis C acute with hepatic failure, slowly improved,  Also had multiple acute brain infarct with subarachnoid hemorrhage,,  Subsequently patient was evaluated for acute inpatient rehab, accepted and admitted right now we are consulted for medical management    January 28  No new symptoms  Tolerating rehab therapies  Past Medical History:     History reviewed.  No pertinent past medical history. Past Surgical History:     Past Surgical History:   Procedure Laterality Date    UPPER GASTROINTESTINAL ENDOSCOPY N/A 1/17/2023    EGD ESOPHAGOGASTRODUODENOSCOPY PEG TUBE INSERTION performed by Ian Cook DO at [facility]        Medications Prior to Admission:     Prior to Admission medications    Medication Sig Start Date End Date Taking? Authorizing Provider   Nutritional Supplements (VITAL AF 1.2 MIGUELITO) LIQD 300 mLs by PEG Tube route 6 times daily Boluses at 6am, 9am, 12pm, 3pm, 6pm, and 9pm.  50mL free water before and after each bolus. Additional 100-300mL water for meds. 2/2/23  Yes Coreen Velez MD   aspirin 325 MG tablet Take 325 mg by mouth daily Patient takes 2 tabs daily    Historical Provider, MD   psyllium (KONSYL) 28.3 % PACK Take 1 packet by mouth daily    Historical Provider, MD        Allergies:     Patient has no known allergies. Social History:     Tobacco:    reports that he has been smoking cigarettes. He has a 40.00 pack-year smoking history. He has never used smokeless tobacco.  Alcohol:      reports current alcohol use. Drug Use:  reports that he does not currently use drugs. Family History:     History reviewed. No pertinent family history. Review of Systems:     Positive and Negative as described in HPI. CONSTITUTIONAL:  negative for fevers, chills, sweats, fatigue, weight loss  HEENT:  negative for vision, hearing changes, runny nose, throat pain  RESPIRATORY:  negative for shortness of breath, cough, congestion, wheezing. CARDIOVASCULAR:  negative for chest pain, palpitations.   GASTROINTESTINAL:  negative for nausea, vomiting, diarrhea, constipation, change in bowel habits, abdominal pain   GENITOURINARY:  negative for difficulty of urination, burning with urination, frequency   INTEGUMENT:  negative for rash, skin lesions, easy bruising   HEMATOLOGIC/LYMPHATIC:  negative for swelling/edema   ALLERGIC/IMMUNOLOGIC:  negative for urticaria , itching  ENDOCRINE:  negative increase in drinking, increase in urination, hot or cold intolerance  MUSCULOSKELETAL:  negative joint pains, muscle aches, swelling of joints  NEUROLOGICAL:  negative for headaches, dizziness, lightheadedness, numbness, pain, tingling extremities  BEHAVIOR/PSYCH:  negative for depression, anxiety    Physical Exam:     /62   Pulse 78   Temp 98.2 °F (36.8 °C)   Resp 18   Ht 5' 7\" (1.702 m)   Wt 147 lb 1.6 oz (66.7 kg)   SpO2 96%   BMI 23.04 kg/m²   Temp (24hrs), Av.4 °F (36.9 °C), Min:98.2 °F (36.8 °C), Max:98.5 °F (36.9 °C)    No results for input(s): POCGLU in the last 72 hours. Intake/Output Summary (Last 24 hours) at 2/3/2023 1646  Last data filed at 2/3/2023 0845  Gross per 24 hour   Intake 1200 ml   Output 1050 ml   Net 150 ml         General Appearance:  alert, well appearing, and in no acute distress  Mental status: oriented to person, place, and time with normal affect  Head:  normocephalic, atraumatic. Eye: no icterus, redness, pupils equal and reactive, extraocular eye movements intact, conjunctiva clear  Ear: normal external ear, no discharge, hearing intact  Nose:  no drainage noted  Mouth: mucous membranes moist  Neck: supple, no carotid bruits, thyroid not palpable  Lungs: Bilateral equal air entry, clear to ausculation, no wheezing, rales or rhonchi, normal effort  Cardiovascular: normal rate, regular rhythm, no murmur, gallop, rub.   Abdomen: Soft, nontender, nondistended, normal bowel sounds, no hepatomegaly or splenomegaly  Neurologic: There are no new focal motor or sensory deficits, normal muscle tone and bulk, no abnormal sensation, normal speech, cranial nerves II through XII grossly intact  Skin: No gross lesions, rashes, bruising or bleeding on exposed skin area  Extremities:  peripheral pulses palpable, no pedal edema or calf pain with palpation  Psych: normal affect    Investigations:      Laboratory Testing:  Recent Results (from the past 24 hour(s))   Basic Metabolic Panel w/ Reflex to MG    Collection Time: 02/03/23  6:55 AM   Result Value Ref Range    Glucose 127 (H) 70 - 99 mg/dL    BUN 18 8 - 23 mg/dL    Creatinine 0.49 (L) 0.70 - 1.20 mg/dL    Est, Glom Filt Rate >60 >60 mL/min/1.73m2    Calcium 8.8 8.6 - 10.4 mg/dL    Sodium 138 135 - 144 mmol/L    Potassium 3.9 3.7 - 5.3 mmol/L    Chloride 102 98 - 107 mmol/L    CO2 27 20 - 31 mmol/L    Anion Gap 9 9 - 17 mmol/L   CBC with Auto Differential    Collection Time: 02/03/23  6:55 AM   Result Value Ref Range    WBC 7.0 3.5 - 11.0 k/uL    RBC 4.57 4.5 - 5.9 m/uL    Hemoglobin 11.2 (L) 13.5 - 17.5 g/dL    Hematocrit 35.9 (L) 41 - 53 %    MCV 78.4 (L) 80 - 100 fL    MCH 24.5 (L) 26 - 34 pg    MCHC 31.2 31 - 37 g/dL    RDW 33.5 (H) 11.5 - 14.9 %    Platelets 164 113 - 951 k/uL    MPV 8.5 6.0 - 12.0 fL    Seg Neutrophils 72 (H) 36 - 66 %    Lymphocytes 15 (L) 24 - 44 %    Monocytes 10 (H) 1 - 7 %    Eosinophils % 2 0 - 4 %    Basophils 1 0 - 2 %    Segs Absolute 5.04 1.3 - 9.1 k/uL    Absolute Lymph # 1.05 1.0 - 4.8 k/uL    Absolute Mono # 0.70 0.1 - 1.3 k/uL    Absolute Eos # 0.14 0.0 - 0.4 k/uL    Basophils Absolute 0.07 0.0 - 0.2 k/uL    Morphology ANISOCYTOSIS PRESENT     Morphology HYPOCHROMIA PRESENT     Morphology 1+ ELLIPTOCYTES     Morphology 1+ TEARDROPS     Morphology MICROCYTOSIS PRESENT     Morphology TOXIC GRANULATION PRESENT                Imaging/Diagonstics:  XR CHEST PORTABLE    Result Date: 1/18/2023  Interval extubation and NG tube removal.  Otherwise similar findings, with perhaps slightly greater hazy ground-glass opacity right mid lung. Correlate clinically. FL MODIFIED BARIUM SWALLOW W VIDEO    Result Date: 1/14/2023  Premature vallecular spillage. Piriform sinus cavity residue. Deep penetration with both materials. Aspiration with pudding. Please see separate speech pathology report for full discussion of findings and recommendations.        Assessment :      Medications: Allergies:  No Known Allergies    Current Meds:   Scheduled Meds:    enoxaparin  40 mg SubCUTAneous Daily    ipratropium-albuterol  1 ampule Inhalation Q4H WA    carvedilol  6.25 mg Oral BID WC    ferrous sulfate  325 mg Oral Daily with breakfast    spironolactone  25 mg Oral Daily    polyethylene glycol  17 g Oral Daily     Continuous Infusions:   PRN Meds: diclofenac sodium, guaiFENesin, acetaminophen, senna, bisacodyl    Hospital Problems             Last Modified POA    * (Principal) Cerebrovascular accident (CVA) due to embolism of cerebral artery ([address] 1/27/2023 Yes    Acute type II respiratory failure ([address] 1/19/2023 Yes    Transaminitis 1/19/2023 Yes    Normocytic anemia 1/19/2023 Yes    Thrombocytopenia ([address] 1/19/2023 Yes    Acute respiratory failure with hypoxia and hypercapnia ([address] RLL pneumonia 1/19/2023 Yes    Community acquired pneumonia of right lower lobe of lung 1/19/2023 Yes    Emphysema lung ([address] 1/19/2023 Yes    Cerebral infarction ([address] 1/19/2023 Yes    Moderate malnutrition ([address] 1/19/2023 Yes    Hepatitis C virus infection without hepatic coma 1/19/2023 Yes    Dysphagia 1/19/2023 Yes    Impending cerebrovascular accident [facility]) 1/27/2023 Yes    Dermatitis associated with moisture 1/20/2023 Yes   Plan:     Acute hypoxic and hypercapnic respiratory failure secondary to likely aspiration pneumonia, treated with vancomycin and Unasyn, also was positive for MRSA nasal swab, required intubation, extubated subsequently  Acute CVA with subarachnoid hemorrhage, cardioembolic, did not qualify for tPA,  Acute liver failure possible multifactorial, alcoholic liver disease with hepatitis C,  Hypercoagulable state secondary to acute liver disease, INR currently 1.6  Thrombocytopenia secondary to alcoholic liver disease and hepatitis C, improved  Dysphagia , PEG placed   Anemia , of ch disease multifactorial   DVT PPX with SCD only, as he had cerebral h'age  Full code status   PT/OT     1/21  Patient seen to be resting comfortably  Was able to participate in physical therapy today  No new issues  Repeat CT head showed no new strokes had no hemorrhage  Continue current management    1/22  Since no bleed noted on repeat CT head okay to resume DVT prophylaxis  Hemoglobin is stable,thrombocytopenia has resolved platelet count 546 on labs 1/20  No new issues continue current management    1/27  Labs, radiology, medications, vitals reviewed,  Episodes of desaturation, 88%, improved,  No shortness of breath or chest pain at this time,  Labs anemia, hemoglobin 10.8,  Microcytic, MCV 75.3,  Patient probably will need anemia of chronic disease,  Tolerating physical therapy,    January 28, 2023  Vitals labs stable  Denies chest pain shortness of breath palpitations  Patient is on carvedilol spironolactone ferrous sulfate bronchodilators and Lovenox    2/3  Labs/meds/radiology reviewed   Dysphagia, failed swallow again   Speech working with him  BP running on lower side , monitor   On coreg, aldactone , will lower dose if BP goes more down   Hypoxic event , placed on 1L oxygen , will monitor   IS ordered                   Consultations:   Noel Wesley MD  2/3/2023  4:46 PM    Copy sent to Dr. Amanda Wesley MD    Please note that this chart was generated using voice recognition Dragon dictation software. Although every effort was made to ensure the accuracy of this automated transcription, some errors in transcription may have occurred.

## 2023-02-03 NOTE — PROGRESS NOTES
Physical Therapy  Facility/Department: [facility] ACUTE REHAB  Rehabilitation Physical Therapy Treatment Note    NAME: Mino Koroma  : 1960 (88 y.o.)  MRN: [id number]  CODE STATUS: Full Code  Date of Service: 2/3/23    Restrictions:  Restrictions/Precautions: Fall Risk;General Precautions;Contact Precautions;Isolation; Bed Alarm     SUBJECTIVE  Subjective  Subjective: Pt complaining of neck pain with bilateral rotation this AM; c/o fatigue/exhaustion throughout AM session. No supp. O2 on entry, SpO2 87%; added 1L/min. supp. O2, SpO2 90-92%. SO asking in PM about \"evaluation\" after which they are informed about Pt's limitations; discussed family training from previous day, which SO denies having taken place (but mentions having to remove throw rugs). Discussed educational elements possible from speech, OT, respiratory; discussing possibility of driving with physician prior to attempt. Pt also complaining of difficulty with balance activities/low endurance this PM, though Pt previously has complained throughout his stay about inability to tolerate an hour of activity in the AM. ANGEL Sarah notified of SO request to be present for respiratory O2 eval.  Pain Assessment  Pain Assessment: Baldwin-Baker FACES  Baldwin-Baker Pain Rating: Hurts little more  Pain Location: Neck  Pain Orientation: Right;Left  Pain Descriptors: Sore  Pain Type: Acute pain  Non-Pharmaceutical Pain Intervention(s): Rest;Repositioned; Ambulation/Increased Activity  Response to Pain Intervention: None (pain unchanged or no improvement)    OBJECTIVE  Functional Mobility  Bed Mobility  Overall Assistance Level: Modified Independent  Additional Factors: With handrails; Head of bed flat  Roll Left  Assistance Level: Modified independent  Roll Right  Assistance Level: Modified independent  Sit to Supine  Assistance Level: Modified independent  Supine to Sit  Assistance Level: Modified independent  Scooting  Assistance Level: Modified independent  Balance  Sitting Balance: Modified independent   Standing Balance: Supervision  Standing Balance  Time: ~3 min. x2  Activity: amb, toilet transfer  Comments: rolling walker  Transfers  Surface: To bed;From bed; To chair with arms;From chair with arms; Wheelchair  Additional Factors: Hand placement cues  Device:  (Rolling walker)  Sit to Stand  Assistance Level: Supervision  Skilled Clinical Factors: Cues for hand placement (using walker to brace himself)  Stand to Sit  Assistance Level: Modified independent  Bed To/From Chair  Technique: Stand step;Stand pivot  Assistance Level: Modified independent  Skilled Clinical Factors: No device, seeks UE support  Stand Pivot  Assistance Level: Modified independent  Skilled Clinical Factors: No device, seeks UE support    Environmental Mobility  Ambulation  Surface: Level surface  Device: Rolling walker  Distance: 150', short distances within room, gym  Assistance Level: Supervision (Writer managing O2 tank/line)  Skilled Clinical Factors: Long distance required extra supp. O2, from 1L/min. to 1.5L/min. upon sitting to recover (87% to 90% on 1L/min., 94% on 1.5L/min.)  Stairs  Stair Height: 4'';6''  Device: Bilateral handrails  Number of Stairs: 8  Additional Factors: Non-reciprocal going up;Non-reciprocal going down  Assistance Level: Supervision  Skilled Clinical Factors: SpO2 remained WNL on 1L/min. supp. O2    PT Exercises  Exercise Treatment: Toilet transfers x2, Mod I  Resistive Exercises: Seated bilateral LEs, x 20 each, 1.5#, green/moderate resistance band (SPO2 92% on RA with ex)  Functional Mobility Circuit Training: Stand pivot transfers x5, with, without rolling walker. Static Standing Balance Exercises: Level & foam surface: [place], tandem, SLS, up to 30 sec. each, 0-1 UE support. (Pt is easily frustrated with tasks)  Dynamic Standing Balance Exercises: Short, tall hurdles over 20', overturned/nudged 3 hurdles first time, 1 second time.  Single UE support, no loss of balance. Exercise Equipment: NuStep, workload 3, 10 min. (Supp. O2 at 1L/min., SpO2 WNL)    ASSESSMENT/PROGRESS TOWARDS GOALS  Vital Signs  Heart Rate: 83  SpO2: (!) 87 % (lowest reading, without supp. O2 & after amb. with 1L/min. supp. O2.)  O2 Device: Nasal cannula  Comment: 1.5L (Room air in PM: dropped to 88%, recovering to 90% within 20 sec. of seated rest.)    Assessment  Activity Tolerance: Patient limited by endurance; Patient limited by fatigue (Rest breaks PRN)    Goals  Short Term Goals  Time Frame for Short Term Goals: 9 days  Short Term Goal 1: CGA assist supine<-> sit x1 assist  Short Term Goal 2: sit EOB withsupervsion up to 15 min for therex/ADL  Short Term Goal 3: 3+/5 LE strength to prepare for standing activiites and gait  Short Term Goal 4: ambulation with rolling walker distance of 70 to 100 ft, CGA, level surface  Short Term Goal 5: roll L/R with SBA  Additional Goals?: Yes  Short Term Goal 6: Pt able to go up and down 5 steps with 2 rails, min A  Long Term Goals  Time Frame for Long Term Goals : By DC  Long Term Goal 1: pt able to roll L/R independently  Long Term Goal 2: pt able to perform supine<>sit mod-I  Long Term Goal 3: pt able to perform sit<>stand and perform pivot/step to transfers at mod-I  Long Term Goal 4: pt able to ambulate household distance with appropriate device, mod-I  Long Term Goal 5: pt able to ambulate > 50 ft with appropriate device, at SBA/supervsion level, level as well as incline surface. Additional Goals?: Yes  Long term goal 6: pt able to go up and down 4 or more steps with B UE support, CGA. Long term goal 7: Improve PASS score to 27/36 improve function/stability. Long term goal 8: improve Tinetti balance score to atleast 23/28 to reduce fall risk.     PLAN OF CARE/SAFETY  Physcial Therapy Plan  General Plan:  minutes of therapy at least 5 out of 7 days a week (5 to 7 days/week.)  Specific Instructions for Next Treatment: challenging balance activities  Current Treatment Recommendations: Strengthening;Balance training;Functional mobility training;Neuromuscular re-education;Cognitive reorientation; Safety education & training;Patient/Caregiver education & training;Equipment evaluation, education, & procurement; Therapeutic activities;Transfer training; Wheelchair mobility training;Gait training;Stair training;Home exercise program;Positioning  Safety Devices  Type of Devices: All fall risk precautions in place; Bed alarm in place;Call light within reach;Gait belt;Left in bed;Telesitter in use    EDUCATION  Education  Education Given To: Patient; Family  Education Provided: Plan of Care;Family Education  Education Method: Verbal  Barriers to Learning: Cognition  Education Outcome: Verbalized understanding (Denied further questions)    Therapy Time     02/03/23 1016 02/03/23 1017   PT Individual Minutes   Time In [id number]   Time Out 1105 Peg [email or web address], PTA, 02/03/23 at 4:41 PM

## 2023-02-03 NOTE — CARE COORDINATION
ARU CASE MANAGEMENT NOTE:    Patient is alert and oriented x4. Spoke with patient regarding discharge plan and patient confirms plan is to go home with family/ [facility] will supply Tube Feedings/ Spoke with Sandra Wright  [phone] They wont be able to get tube feeding to patient until Tuesday 2/72023. Will ask Donna Tucker RD, WENDIE to send home patient with a supple to cover patient through Wednesday 2/8/23  [facility] will ship their supply on Monday 2/6/2023 via [employer]. Duong Eng for nebulizer~ Kamille Cruise Liaison was here and obtained orders. Patient will have home O2 eval this weekend. Based on the results we will order home 02. Patient's significant other is asking for a pulse oximeter and home BP machines. Insurance will not cover these items. Printed example where to purchase. Megan Stinson has both these items for less than 20 dollars each. Transportation Needs Assessment to submit to Dept [facility]/ faxed for patient. Will provide the patient and family with the forms and with confirmation copy. DME:[facility] will deliver on Monday 2/6/23    Outside appointments: Will continue to follow for additional discharge needs.     Electronically signed by Luciana Cai RN on 2/3/2023 at 12:11 PM

## 2023-02-03 NOTE — PROGRESS NOTES
[address]    Acute Rehabilitation Occupational Therapy Daily Treatment Note    Date: 2/3/23  Patient Name: Jere Pollock       Room: 6722/6941-75  MRN: [id number]  Account: [de-identified]   : 1960  (60 y.o.) Gender: male       Referring Practitioner: Arlin Cotter MD  Diagnosis: Cerebrovascular accident  Additional Pertinent Hx: Per MRI Impression on 23: Mild amount of residual subarachnoid hemorrhage in the right frontal and  right parietal lobes as well as the left frontal lobe. Evolving small focus of hemorrhage in the left parietal lobe posteriorly. Evolving areas of ischemia in the right frontal lobe and left posterior parietal lobe as well as a few foci in the parasagittal aspect of the right frontoparietal region. Treatment Diagnosis: Impaired self care status    Past Medical History:  has no past medical history on file. Past Surgical History:   has a past surgical history that includes Upper gastrointestinal endoscopy (N/A, 2023). Restrictions  Restrictions/Precautions  Restrictions/Precautions: Fall Risk, General Precautions, Contact Precautions, Isolation, Bed Alarm  Required Braces or Orthoses?: No  Implants present? :  (pt denies)     Position Activity Restriction  Other position/activity restrictions: PT/OT staff may titrateO2 down as tolerated in therapy, but may only titrate up to maximum 4 L NC as needed in therapy. Nursing should perform oxygen titration > 4 L. Per RT SpO2 Goal is 92% or higher, use O2 as needed to maintain SpO2 goal with rest and activity. Vitals  Vital Signs  BP Location: Left upper arm  O2 Device: Nasal cannula     Subjective  Subjective  Subjective: AM: Pt. in bed upon arrival. Agreeable to participate in AM session if in room. Pt c/o of neck pain and fatigue throughout session. PM: Agreeable to participate in PM session if done in bed. \"I'm having a lot of pain. I don't know why, but I'm having a lazy day. \"    Pain: \"Just in my neck. \" pt states regarding pain    Pain Assessment  Pain Assessment: 0-10  Baldwin-Baker Pain Rating: No hurt  Patient's Stated Pain Goal: 6  Pain Location: Neck  Pain Descriptors: Aching    Objective  Cognition  Overall Orientation Status: Impaired  Orientation Level: Oriented to person;Disoriented to situation;Oriented to place; Disoriented to time    Cognition  Overall Cognitive Status: Exceptions  Arousal/Alertness: Appropriate responses to stimuli  Following Commands: Follows one step commands consistently  Attention Span: Attends with cues to redirect  Memory: Decreased recall of biographical Information;Decreased recall of precautions;Decreased recall of recent events  Safety Judgement: Decreased awareness of need for assistance;Decreased awareness of need for safety  Problem Solving: Assistance required to generate solutions;Assistance required to implement solutions;Assistance required to identify errors made;Assistance required to correct errors made  Insights: Decreased awareness of deficits  Initiation: Requires cues for some  Sequencing: Requires cues for some  Cognition Comment: Pt with impulsive tendencies noted. Mobility    Sit to Supine  Assistance Level: Independent  Skilled Clinical Factors: completed without difficulty  Supine to Sit  Assistance Level: Independent  Skilled Clinical Factors: completed without difficulty. OT Exercises  Exercise Treatment: Pt participated in B UE exercises #2 x 20 reps in all planes, rest breaks taken as needed, to continue to increase general str and activity tolerance for maximized indep and safety during all daily self care tasks and transfers. Pt req increased time, RB, c/o of neck pain and demo SOB between each set on this date  Static Sitting Balance Exercises: PM: dominik ~20 min sitting EOB while participating in assessments.        Left Hand Strength -  (lbs)  Handle Setting 1: 38.6# (45, 35, 36) (Norms: #)        Right Hand Strength -  (lbs)  Handle Setting 1: 36# (43, 40, 25) (Norms: #)        Fine Motor Skills  Left 9-Hole Peg Test: Impaired  Left 9 Hole Peg Test Time (secs): 32.33 (Norms: 21-29 sec.)  Right 9-Hole Peg Test: Functional  Right 9 Hole Peg Test Time (secs): 29.73 (Norms: 21-29 sec.)       Assessment  Assessment  Assessment: WIN/L faciliated 9 hole peg and dynamometer assessment on this date. Activity Tolerance: Patient limited by endurance  Discharge Recommendations: Home with assist PRN;Outpatient OT    Patient Education  Education  Education Given To: Patient  Education Provided: Role of Therapy;Plan of Care;Cognition;Equipment;DME/Home Modifications; ADL Function; Safety; Energy Conservation;Precautions    Education Provided Comments: WIN/L facilitated discussion with pt regarding fall prevention, energy conservation, and home safety. \"There is not a whole lot I do since jail. \" Pt believes his wife will prevent him from doing things that might cause a fall or is beyond his capabilities. For energy conservation, pt believes he will work on the computer or watch television for [address] when needed or as a low impact day. \"I think I am going to have to get used to this oxygen tube. \" During home safety, writer edu pt on keeping a cell phone in case of an emergency or fall. Pt assures writer that all throw rugs have been thrown away a long time ago. The main concern is transportation. \"I have always been the one to drive. \" Writer edu on seeking doctor recommendations before driving after discharge. Education Method: Verbal;Demonstration; Teach Back  Barriers to Learning: Cognition; Hearing  Education Outcome: Verbalized understanding;Demonstrated understanding;Continued education needed    OT Equipment Recommendations  Other: TBD    Safety Devices  Safety Devices in place: Yes  Type of devices: All fall risk precautions in place  Restraints  Initially in place: No    Goals  Patient Goals   Patient goals :  \"To learn how to walk properly. ..all that goes without saying (referring to bathing, dressing and toileting independence)\"  Short Term Goals  Time Frame for Short Term Goals: One week  Short Term Goal 1: Patient will perform oral hygiene with SBA and Good safety. Short Term Goal 2: Patient will perform hair brushing with SBA. Short Term Goal 3: Patient will perform upper body dressing with SBA. Short Term Goal 4: Patient will perform lower body dressing, lower body bathing, and toileting tasks with Minimal assist.  Short Term Goal 5: Patient will perform functional moblity and transfers during self-care with CGA, least restrictive mobility device, and Good safety. Short Term Goal 6: Patient will verbalize/demonstrate Good understanding of assistive equipment/durable medical equipment/modified techniques to maximize safety and independence with self-care. Short Term Goal 7: Patient will actively participate in 30+ minutes of therapeutic exercise/functional activities to promote increased independence with self-care and mobility. Long Term Goals  Time Frame for Long Term Goals : By discharge  Long Term Goal 1: Patient will perform BADLs with modified independence and Good safety. Long Term Goal 2: Patient will perform functional mobility and transfers during self-care with modified independence, least restrictive mobility device, and Good safety. Long Term Goal 3: Patient will stand for 5+ minutes with 0-1 UE support, modified independence while engaging in functional activity of choice. Long Term Goal 4: Patient will perform simple meal prep and light housekeeping with SBA and Good safety. Long Term Goal 5: Patient will verbalize/demonstrate Good understanding of Fall Prevention Strategies to maximize safety and independence with self-care.   Long Term Goal 6: Patient will perform self-care with improved bilateral  strength as evidenced by 10#  strength improvement and improved bilateral fine motor control as evidenced by 5 second improved in 9 hole peg test (Goal updated by ASHUTOSH Daugherty on 1/24/23).     Plan  Occupational Therapy Plan  Times Per Week: 5-7  Times Per Day: Twice a day  Current Treatment Recommendations: Self-Care / ADL, Strengthening, ROM, Balance training, Functional mobility training, Endurance training, Cognitive reorientation, Neuromuscular re-education, Safety education & training, Patient/Caregiver education & training, Equipment evaluation, education, & procurement, Cognitive/Perceptual training, Coordination training, Home management training         02/03/23 1112 02/03/23 1600   OT Individual Minutes   Time In 1875 1330   Time Out [id number]   [id number]         Electronically signed by ANITA Guerrero on 2/3/23 at 5:26 PM EST

## 2023-02-04 PROCEDURE — 99231 SBSQ HOSP IP/OBS SF/LOW 25: CPT | Performed by: INTERNAL MEDICINE

## 2023-02-04 PROCEDURE — 97530 THERAPEUTIC ACTIVITIES: CPT

## 2023-02-04 PROCEDURE — 97110 THERAPEUTIC EXERCISES: CPT

## 2023-02-04 PROCEDURE — 97535 SELF CARE MNGMENT TRAINING: CPT

## 2023-02-04 PROCEDURE — 97116 GAIT TRAINING THERAPY: CPT

## 2023-02-04 PROCEDURE — 6370000000 HC RX 637 (ALT 250 FOR IP): Performed by: INTERNAL MEDICINE

## 2023-02-04 PROCEDURE — 97129 THER IVNTJ 1ST 15 MIN: CPT

## 2023-02-04 PROCEDURE — 94761 N-INVAS EAR/PLS OXIMETRY MLT: CPT

## 2023-02-04 PROCEDURE — 6360000002 HC RX W HCPCS: Performed by: PHYSICAL MEDICINE & REHABILITATION

## 2023-02-04 PROCEDURE — 6370000000 HC RX 637 (ALT 250 FOR IP): Performed by: PHYSICAL MEDICINE & REHABILITATION

## 2023-02-04 PROCEDURE — 92526 ORAL FUNCTION THERAPY: CPT

## 2023-02-04 PROCEDURE — 97112 NEUROMUSCULAR REEDUCATION: CPT

## 2023-02-04 PROCEDURE — 94640 AIRWAY INHALATION TREATMENT: CPT

## 2023-02-04 PROCEDURE — 1180000000 HC REHAB R&B

## 2023-02-04 RX ADMIN — ENOXAPARIN SODIUM 40 MG: 100 INJECTION SUBCUTANEOUS at 08:11

## 2023-02-04 RX ADMIN — IPRATROPIUM BROMIDE AND ALBUTEROL SULFATE 1 AMPULE: 2.5; .5 SOLUTION RESPIRATORY (INHALATION) at 14:47

## 2023-02-04 RX ADMIN — IPRATROPIUM BROMIDE AND ALBUTEROL SULFATE 1 AMPULE: 2.5; .5 SOLUTION RESPIRATORY (INHALATION) at 07:43

## 2023-02-04 RX ADMIN — CARVEDILOL 6.25 MG: 6.25 TABLET, FILM COATED ORAL at 08:12

## 2023-02-04 RX ADMIN — FERROUS SULFATE TAB 325 MG (65 MG ELEMENTAL FE) 325 MG: 325 (65 FE) TAB at 08:12

## 2023-02-04 RX ADMIN — IPRATROPIUM BROMIDE AND ALBUTEROL SULFATE 1 AMPULE: 2.5; .5 SOLUTION RESPIRATORY (INHALATION) at 19:12

## 2023-02-04 RX ADMIN — CARVEDILOL 6.25 MG: 6.25 TABLET, FILM COATED ORAL at 18:27

## 2023-02-04 RX ADMIN — SPIRONOLACTONE 25 MG: 25 TABLET ORAL at 08:12

## 2023-02-04 RX ADMIN — IPRATROPIUM BROMIDE AND ALBUTEROL SULFATE 1 AMPULE: 2.5; .5 SOLUTION RESPIRATORY (INHALATION) at 12:09

## 2023-02-04 ASSESSMENT — PAIN SCALES - WONG BAKER
WONGBAKER_NUMERICALRESPONSE: 2
WONGBAKER_NUMERICALRESPONSE: 4

## 2023-02-04 ASSESSMENT — PAIN DESCRIPTION - ORIENTATION: ORIENTATION: RIGHT;LEFT

## 2023-02-04 ASSESSMENT — PAIN DESCRIPTION - LOCATION: LOCATION: NECK

## 2023-02-04 NOTE — PLAN OF CARE
Problem: Nutrition Deficit:  Goal: Optimize nutritional status  2/4/2023 0334 by Ronaldo Hernandez RN  Outcome: Progressing  2/3/2023 1527 by Jd Clancy RN  Outcome: Progressing     Problem: Safety - Adult  Goal: Free from fall injury  2/4/2023 0334 by Ronaldo Hernandez RN  Outcome: Progressing  Flowsheets (Taken 2/3/2023 2030)  Free From Fall Injury: Instruct family/caregiver on patient safety  2/3/2023 1527 by Jd Clancy RN  Outcome: Progressing     Problem: ABCDS Injury Assessment  Goal: Absence of physical injury  2/4/2023 0334 by Ronaldo Hernandez RN  Outcome: Progressing  Flowsheets (Taken 2/3/2023 2030)  Absence of Physical Injury: Implement safety measures based on patient assessment  2/3/2023 1527 by Jd Clancy RN  Outcome: Progressing     Problem: Skin/Tissue Integrity  Goal: Absence of new skin breakdown  Description: 1. Monitor for areas of redness and/or skin breakdown  2. Assess vascular access sites hourly  3. Every 4-6 hours minimum:  Change oxygen saturation probe site  4. Every 4-6 hours:  If on nasal continuous positive airway pressure, respiratory therapy assess nares and determine need for appliance change or resting period.   2/4/2023 0334 by Ronaldo Hernandez RN  Outcome: Progressing  2/3/2023 1527 by Jd Clancy RN  Outcome: Progressing     Problem: Pain  Goal: Verbalizes/displays adequate comfort level or baseline comfort level  2/4/2023 0334 by Ronaldo Hernandez RN  Outcome: Progressing  2/3/2023 1527 by Jd Clancy RN  Outcome: Progressing     Problem: Chronic Conditions and Co-morbidities  Goal: Patient's chronic conditions and co-morbidity symptoms are monitored and maintained or improved  2/4/2023 0334 by Ronaldo Hernandez RN  Outcome: Progressing  Flowsheets (Taken 2/3/2023 2030)  Care Plan - Patient's Chronic Conditions and Co-Morbidity Symptoms are Monitored and Maintained or Improved: Monitor and assess patient's chronic conditions and comorbid symptoms for stability, deterioration, or improvement  2/3/2023 1527 by Adair Mcdonnell RN  Outcome: Progressing  Flowsheets (Taken 2/3/2023 0845)  Care Plan - Patient's Chronic Conditions and Co-Morbidity Symptoms are Monitored and Maintained or Improved:   Monitor and assess patient's chronic conditions and comorbid symptoms for stability, deterioration, or improvement   Collaborate with multidisciplinary team to address chronic and comorbid conditions and prevent exacerbation or deterioration   Update acute care plan with appropriate goals if chronic or comorbid symptoms are exacerbated and prevent overall improvement and discharge

## 2023-02-04 NOTE — PROGRESS NOTES
Speech Language Pathology  Speech Language Pathology  [facility] Acute Rehab Unit at [facility]    Cognitive/Dysphagia Treatment Note    Date: 2/4/2023  Patients Name: Tami Botello  MRN: [id number]  Diagnosis: Dysphagia s/p CVA  Patient Active Problem List   Diagnosis Code    Acute type II respiratory failure (HCC) J96.00    Transaminitis R74.01    Normocytic anemia D64.9    Thrombocytopenia (HCC) D69.6    Agitation R45.1    Acute respiratory failure with hypoxia and hypercapnia (HCC) RLL pneumonia J96.01, J96.02    Altered mental status R41.82    Community acquired pneumonia of right lower lobe of lung J18.9    Prolonged pt (prothrombin time) R79.1    Emphysema lung (HCC) J43.9    Cerebral infarction (HCC) I63.9    ACP (advance care planning) Z71.89    Goals of care, counseling/discussion Z71.89    Encounter for palliative care Z51.5    Delirium due to another medical condition F05    Moderate malnutrition ([address] E44.0    Hepatitis C virus infection without hepatic coma B19.20    Dysphagia R13.10    Impending cerebrovascular accident ([address] I63.9    Dermatitis associated with moisture L30.8    Cerebrovascular accident (CVA) due to embolism of cerebral artery ([address] I63.40       Pain: 0/10    Cognitive Treatment  Treatment time: 0521-7343    Subjective: [x] Alert [x] Cooperative     [] Confused     [] Agitated    [] Lethargic    Objective/Assessment:    Recall: Recall of daily events: in A for mod level of detail. Organization: NT    Problem solving: Multiple causes for problem scenarios: 60% (I) improved to 80% with mod-max A. Dysphagia: ST provided S/U for oral care. Pt able to state components of FFW protocol (MI). ST instructed Pt in ongoing completion of swallowing exercises. Pt verbalized comprehension.        Plan:  [x] Continue ST services    [] Discharge from ST:        Discharge recommendations: [] Inpatient Rehab   [] [place]   [] Outpatient Therapy  [] Follow up at trauma clinic   [] Other:         Treatment completed by: Tomeka Lechuga M.S., CCC-SLP

## 2023-02-04 NOTE — PROGRESS NOTES
[address]    Acute Rehabilitation Occupational Therapy Daily Treatment Note    Date: 23  Patient Name: Baron Nicole       Room: 1216/1763-75  MRN: [id number]  Account: [de-identified]   : 1960  (60 y.o.) Gender: male       Referring Practitioner: Catalina Sparks MD  Diagnosis: Cerebrovascular accident  Additional Pertinent Hx: Per MRI Impression on 23: Mild amount of residual subarachnoid hemorrhage in the right frontal and  right parietal lobes as well as the left frontal lobe. Evolving small focus of hemorrhage in the left parietal lobe posteriorly. Evolving areas of ischemia in the right frontal lobe and left posterior parietal lobe as well as a few foci in the parasagittal aspect of the right frontoparietal region. Treatment Diagnosis: Impaired self care status    Past Medical History:  has no past medical history on file. Past Surgical History:   has a past surgical history that includes Upper gastrointestinal endoscopy (N/A, 2023). Restrictions  Restrictions/Precautions  Restrictions/Precautions: Fall Risk, General Precautions, Contact Precautions, Isolation, Bed Alarm  Required Braces or Orthoses?: No  Implants present? :  (pt denies)     Position Activity Restriction  Other position/activity restrictions: PT/OT staff may titrateO2 down as tolerated in therapy, but may only titrate up to maximum 4 L NC as needed in therapy. Nursing should perform oxygen titration > 4 L. Per RT SpO2 Goal is 92% or higher, use O2 as needed to maintain SpO2 goal with rest and activity. Vitals  Vital Signs  BP Location: Right upper arm  O2 Device: Nasal cannula     Subjective  Subjective  Subjective: AM: Pt. in bed upon arrival. Pt declines showering on this date. Pt req showering tomorrow. PM: Agreeable to participate in therapy at therapy gym.   Pain Assessment  Pain Assessment: None - Denies Pain  Baldwin-Morris Pain Rating: Hurts little more  Response to Pain Intervention: None (pain unchanged or no improvement)    Objective  Cognition  Overall Orientation Status: Impaired  Orientation Level: Oriented to person;Disoriented to situation;Oriented to place; Disoriented to time    Cognition  Overall Cognitive Status: Exceptions  Arousal/Alertness: Appropriate responses to stimuli  Following Commands: Follows one step commands consistently  Attention Span: Attends with cues to redirect  Memory: Decreased recall of biographical Information;Decreased recall of precautions;Decreased recall of recent events  Safety Judgement: Decreased awareness of need for assistance;Decreased awareness of need for safety  Problem Solving: Assistance required to generate solutions;Assistance required to implement solutions;Assistance required to identify errors made;Assistance required to correct errors made  Insights: Decreased awareness of deficits  Initiation: Requires cues for some  Sequencing: Requires cues for some  Cognition Comment: Pt with impulsive tendencies noted. Activities of Daily Living    Toileting  Assistance Level: Supervision  Skilled Clinical Factors: AM/PM: Distant SUP, completed while standing for urinating    Toilet Transfers  Equipment: Standard toilet;Grab bars  Additional Factors: Verbal cues;Cues for hand placement;Set-up  Assistance Level: Supervision  Skilled Clinical Factors: No LOB noted. Tub/Shower Transfers  Type: Shower  Transfer From: Rolling walker  Transfer To: Tub transfer bench  Additional Factors: Verbal cues;Cues for hand placement; Increased time to complete  Assistance Level: Contact guard assist  Skilled Clinical Factors: CGA over threshold for safety due to pt impulsively amb to shower without RW. Writer john pt on utiliziing RW for safety. P return noted.     Light Housekeeping  Light Housekeeping Level: Zo Beyer Housekeeping Level of Assistance: Stand by assistance    Light Housekeeping:   AM: Writer simulated cleaning dry erase marks in bathroom placed at various heights to encourage performing simple light housekeeping with SBA and Good safety. Pt tolerated ~5 min. ambulating around bathroom. Demo G safety. No LOB noted. PM: Writer simulated reaching into cabinets to retrieve/return items, washing a dish in sink, and retrieve/return items in refridgerator. Pt tolerated ~4 min. ambulating around kitchen before becoming SOB and req RB. Writer engaged pt in conversation regarding EC when becoming fatigued during light house keeping. Pt verbalized that a chair in the kitchen or near the sink might be a good idea for needed RB. Mobility  Bed Mobility  Overall Assistance Level: Modified Independent     Roll Left  Assistance Level: Modified independent  Skilled Clinical Factors: utilized hand rails with raised HOB     Sit to Supine  Assistance Level: Independent  Skilled Clinical Factors: completed without difficulty  Supine to Sit  Assistance Level: Independent  Skilled Clinical Factors: completed without difficulty. Scooting  Assistance Level: Modified independent  Skilled Clinical Factors: Hips closer to EOB. Transfers  Surface: From bed; To chair with arms  Additional Factors: Set-up; Verbal cues; Hand placement cues  Sit to Stand  Assistance Level: Modified independent  Skilled Clinical Factors: Completes with G safety  Stand to Sit  Assistance Level: Modified independent  Skilled Clinical Factors: G controlled decend    Functional Mobility  Device: Rolling walker  Activity: To/From bathroom (AM: Within/around room. PM: around kitchen area at therapy gym.)  Assistance Level: Supervision  Skilled Clinical Factors: Close SUP. OT Exercises  Exercise Treatment: AM: Pt participated in B UE exercises #2 x 20 reps in all planes, rest breaks taken as needed, to continue to increase general str and activity tolerance for maximized indep and safety during all daily self care tasks and transfers. Pt req only 1 RB on this date.     Dynamic Standing Balance Exercises: AM: Pt participates in balloon taps x 3 to increase overall SELVIN to increase overall standing tolerance and balance when performing functional tasks. Pt tolerated 1min 51 sec, 2 min 8 sec, 1 min 32 sec before req RB. No LOB noted. Patient Education  Education  Education Given To: Patient  Education Provided: Role of Therapy;Plan of Care;Cognition;Equipment;DME/Home Modifications; ADL Function; Safety; Energy Conservation;Precautions  Education Method: Verbal;Demonstration; Teach Back  Barriers to Learning: Cognition; Hearing  Education Outcome: Verbalized understanding;Demonstrated understanding;Continued education needed    OT Equipment Recommendations  Other: TBD    Safety Devices  Safety Devices in place: Yes  Type of devices: All fall risk precautions in place  Restraints  Initially in place: No    Goals  Patient Goals   Patient goals : \"To learn how to walk properly. ..all that goes without saying (referring to bathing, dressing and toileting independence)\"  Short Term Goals  Time Frame for Short Term Goals: One week  Short Term Goal 1: Patient will perform oral hygiene with SBA and Good safety. Short Term Goal 2: Patient will perform hair brushing with SBA. Short Term Goal 3: Patient will perform upper body dressing with SBA. Short Term Goal 4: Patient will perform lower body dressing, lower body bathing, and toileting tasks with Minimal assist.  Short Term Goal 5: Patient will perform functional moblity and transfers during self-care with CGA, least restrictive mobility device, and Good safety. Short Term Goal 6: Patient will verbalize/demonstrate Good understanding of assistive equipment/durable medical equipment/modified techniques to maximize safety and independence with self-care. Short Term Goal 7: Patient will actively participate in 30+ minutes of therapeutic exercise/functional activities to promote increased independence with self-care and mobility.     Long Term Goals  Time Frame for Long Term Goals : By discharge  Long Term Goal 1: Patient will perform BADLs with modified independence and Good safety. Long Term Goal 2: Patient will perform functional mobility and transfers during self-care with modified independence, least restrictive mobility device, and Good safety. Long Term Goal 3: Patient will stand for 5+ minutes with 0-1 UE support, modified independence while engaging in functional activity of choice. Long Term Goal 4: Patient will perform simple meal prep and light housekeeping with SBA and Good safety. Long Term Goal 5: Patient will verbalize/demonstrate Good understanding of Fall Prevention Strategies to maximize safety and independence with self-care. Long Term Goal 6: Patient will perform self-care with improved bilateral  strength as evidenced by 10#  strength improvement and improved bilateral fine motor control as evidenced by 5 second improved in 9 hole peg test (Goal updated by STEFFI Teresa/L on 1/24/23).     Plan  Occupational Therapy Plan  Times Per Week: 5-7  Times Per Day: Twice a day  Current Treatment Recommendations: Self-Care / ADL, Strengthening, ROM, Balance training, Functional mobility training, Endurance training, Cognitive reorientation, Neuromuscular re-education, Safety education & training, Patient/Caregiver education & training, Equipment evaluation, education, & procurement, Cognitive/Perceptual training, Coordination training, Home management training     02/04/23 1103 02/04/23 1209   OT Individual Minutes   Time In [phone]   VIOL [facility] [phone]   MOHBRAR [id number]     Electronically signed by ANITA Jennings on 2/4/23 at 4:03 PM EST

## 2023-02-04 NOTE — PLAN OF CARE
Problem: Discharge Planning  Goal: Discharge to home or other facility with appropriate resources  2/4/2023 1311 by Alexandra Garland RN  Outcome: Progressing  Flowsheets (Taken 2/4/2023 9845)  Discharge to home or other facility with appropriate resources: Identify barriers to discharge with patient and caregiver   Pt to discharge to home once medically stable. Problem: Nutrition Deficit:  Goal: Optimize nutritional status  2/4/2023 1311 by Alexandra Garland RN  Outcome: Progressing   Pt continues on tube feedings bolus. Tolerating well. Problem: Safety - Adult  Goal: Free from fall injury  2/4/2023 1311 by Alexandra Garland RN  Outcome: Progressing  Flowsheets (Taken 2/4/2023 0816)  Free From Fall Injury: Instruct family/caregiver on patient safety   No injury noted this shift. Problem: ABCDS Injury Assessment  Goal: Absence of physical injury  2/4/2023 1311 by Alexandra Garland RN  Outcome: Progressing  Flowsheets (Taken 2/4/2023 5460)  Absence of Physical Injury: Implement safety measures based on patient assessment   No injury noted this shift. Problem: Skin/Tissue Integrity  Goal: Absence of new skin breakdown  Description: 1. Monitor for areas of redness and/or skin breakdown  2. Assess vascular access sites hourly  3. Every 4-6 hours minimum:  Change oxygen saturation probe site  4. Every 4-6 hours:  If on nasal continuous positive airway pressure, respiratory therapy assess nares and determine need for appliance change or resting period. 2/4/2023 1311 by Alexandra Garland RN  Outcome: Progressing   No new skin breakdown noted this shift.    Problem: Chronic Conditions and Co-morbidities  Goal: Patient's chronic conditions and co-morbidity symptoms are monitored and maintained or improved  2/4/2023 1311 by Alexandra Garland RN  Outcome: Progressing  Flowsheets (Taken 2/4/2023 0817)  Care Plan - Patient's Chronic Conditions and Co-Morbidity Symptoms are Monitored and Maintained or Improved: Monitor and assess patient's chronic conditions and comorbid symptoms for stability, deterioration, or improvement   Monitoring. Problem: Pain  Goal: Verbalizes/displays adequate comfort level or baseline comfort level  2/4/2023 1311 by Amanda Ragsdale RN  Outcome: Progressing   Monitoring. Pt rated pain as high as 4 this shift.

## 2023-02-04 NOTE — PROGRESS NOTES
Physical Medicine & Rehabilitation  Progress Note      Subjective:      Kenji Rosado is a 60 y.o. male with multifocal CVA. He reports doing okay toady. Has had nocturnal pulse ox last night and awaiting for the second study tomorrow . Seems to be dropping to 83% saturation over 30% of the time. Plan is for discharge on Monday. ROS:  Denies fevers, chills, sweats. No chest pain, palpitations, lightheadedness. Denies coughing, wheezing or shortness of breath. Denies abdominal pain, nausea, diarrhea or constipation. No new areas of joint pain. Denies new areas of numbness or weakness. Denies new anxiety or depression issues. No new skin problems. Rehabilitation:     Physical Therapy    Functional Mobility  Bed Mobility  Overall Assistance Level: Modified Independent  Additional Factors: With handrails; Head of bed flat  Roll Left  Assistance Level: Modified independent  Roll Right  Assistance Level: Modified independent  Sit to Supine  Assistance Level: Modified independent  Supine to Sit  Assistance Level: Modified independent  Scooting  Assistance Level: Modified independent  Balance  Sitting Balance: Modified independent   Standing Balance: Supervision  Standing Balance  Time: ~3 min. x2  Activity: amb, toilet transfer  Comments: rolling walker  Transfers  Surface: To bed;From bed; To chair with arms;From chair with arms; Wheelchair  Additional Factors: Hand placement cues  Device:  (Rolling walker)  Sit to Stand  Assistance Level: Supervision  Skilled Clinical Factors: Cues for hand placement (using walker to brace himself)  Stand to Sit  Assistance Level: Modified independent  Bed To/From Chair  Technique: Stand step;Stand pivot  Assistance Level: Modified independent  Skilled Clinical Factors: No device, seeks UE support  Stand Pivot  Assistance Level: Modified independent  Skilled Clinical Factors: No device, seeks UE support     Environmental Mobility  Ambulation  Surface: Level surface  Device: Rolling walker  Distance: 150', short distances within room, gym  Assistance Level: Supervision (Writer managing O2 tank/line)  Skilled Clinical Factors: Long distance required extra supp. O2, from 1L/min. to 1.5L/min. upon sitting to recover (87% to 90% on 1L/min., 94% on 1.5L/min.)  Stairs  Stair Height: 4'';6''  Device: Bilateral handrails  Number of Stairs: 8  Additional Factors: Non-reciprocal going up;Non-reciprocal going down  Assistance Level: Supervision  Skilled Clinical Factors: SpO2 remained WNL on 1L/min. supp. O2     PT Exercises  Exercise Treatment: Toilet transfers x2, Mod I  Resistive Exercises: Seated bilateral LEs, x 20 each, 1.5#, green/moderate resistance band (SPO2 92% on RA with ex)  Functional Mobility Circuit Training: Stand pivot transfers x5, with, without rolling walker. Static Standing Balance Exercises: Level & foam surface: [place], tandem, SLS, up to 30 sec. each, 0-1 UE support. (Pt is easily frustrated with tasks)  Dynamic Standing Balance Exercises: Short, tall hurdles over 20', overturned/nudged 3 hurdles first time, 1 second time. Single UE support, no loss of balance. Exercise Equipment: NuStep, workload 3, 10 min. (Supp. O2 at 1L/min., SpO2 WNL)     ASSESSMENT/PROGRESS TOWARDS GOALS  Vital Signs  Heart Rate: 83  SpO2: (!) 87 % (lowest reading, without supp. O2 & after amb. with 1L/min. supp. O2.)  O2 Device: Nasal cannula  Comment: 1.5L (Room air in PM: dropped to 88%, recovering to 90% within 20 sec. of seated rest.)     Assessment  Activity Tolerance: Patient limited by endurance; Patient limited by fatigue (Rest breaks PRN)     Goals  Short Term Goals  Time Frame for Short Term Goals: 9 days  Short Term Goal 1: CGA assist supine<-> sit x1 assist  Short Term Goal 2: sit EOB withsupervsion up to 15 min for therex/ADL  Short Term Goal 3: 3+/5 LE strength to prepare for standing activiites and gait  Short Term Goal 4: ambulation with rolling walker distance of 70 to 100 ft, CGA, level surface  Short Term Goal 5: roll L/R with SBA  Additional Goals?: Yes  Short Term Goal 6: Pt able to go up and down 5 steps with 2 rails, min A  Long Term Goals  Time Frame for Long Term Goals : By DC  Long Term Goal 1: pt able to roll L/R independently  Long Term Goal 2: pt able to perform supine<>sit mod-I  Long Term Goal 3: pt able to perform sit<>stand and perform pivot/step to transfers at mod-I  Long Term Goal 4: pt able to ambulate household distance with appropriate device, mod-I  Long Term Goal 5: pt able to ambulate > 50 ft with appropriate device, at SBA/supervsion level, level as well as incline surface. Additional Goals?: Yes  Long term goal 6: pt able to go up and down 4 or more steps with B UE support, CGA. Long term goal 7: Improve PASS score to 27/36 improve function/stability. Long term goal 8: improve Tinetti balance score to atleast 23/28 to reduce fall risk. PLAN OF CARE/SAFETY  Physcial Therapy Plan  General Plan:  minutes of therapy at least 5 out of 7 days a week (5 to 7 days/week.)  Specific Instructions for Next Treatment: challenging balance  activities  Current Treatment Recommendations: Strengthening;Balance training;Functional mobility training;Neuromuscular re-education;Cognitive reorientation; Safety education & training;Patient/Caregiver education & training;Equipment evaluation, education, & procurement; Therapeutic activities;Transfer training; Wheelchair mobility training;Gait training;Stair training;Home exercise program;Positioning  Safety Devices  Type of Devices: All fall risk precautions in place; Bed alarm in place;Call light within reach;Gait belt;Left in bed;Telesitter in use     EDUCATION  Education  Education Given To: Patient; Family  Education Provided: Plan of Care;Family Education  Education Method: Verbal  Barriers to Learning: Cognition  Education Outcome: Verbalized understanding (Denied further questions) OT    Cognition  Overall Cognitive Status: Exceptions  Arousal/Alertness: Appropriate responses to stimuli  Following Commands: Follows one step commands consistently  Attention Span: Attends with cues to redirect  Memory: Decreased recall of biographical Information;Decreased recall of precautions;Decreased recall of recent events  Safety Judgement: Decreased awareness of need for assistance;Decreased awareness of need for safety  Problem Solving: Assistance required to generate solutions;Assistance required to implement solutions;Assistance required to identify errors made;Assistance required to correct errors made  Insights: Decreased awareness of deficits  Initiation: Requires cues for some  Sequencing: Requires cues for some  Cognition Comment: Pt with impulsive tendencies noted. Mobility     Sit to Supine  Assistance Level: Independent  Skilled Clinical Factors: completed without difficulty  Supine to Sit  Assistance Level: Independent  Skilled Clinical Factors: completed without difficulty. OT Exercises  Exercise Treatment: Pt participated in B UE exercises #2 x 20 reps in all planes, rest breaks taken as needed, to continue to increase general str and activity tolerance for maximized indep and safety during all daily self care tasks and transfers. Pt req increased time, RB, c/o of neck pain and demo SOB between each set on this date  Static Sitting Balance Exercises: PM: dominik ~20 min sitting EOB while participating in assessments.      Left Hand Strength -  (lbs)  Handle Setting 1: 38.6# (45, 35, 36) (Norms: #)  Right Hand Strength -  (lbs)  Handle Setting 1: 36# (43, 40, 25) (Norms: #)  Fine Motor Skills  Left 9-Hole Peg Test: Impaired  Left 9 Hole Peg Test Time (secs): 32.33 (Norms: 21-29 sec.)  Right 9-Hole Peg Test: Functional  Right 9 Hole Peg Test Time (secs): 29.73 (Norms: 21-29 sec.)     Assessment  Assessment  Assessment: WIN/L faciliated 9 hole peg and dynamometer assessment on this date. Activity Tolerance: Patient limited by endurance  Discharge Recommendations: Home with assist PRN;Outpatient OT     Patient Education  Education  Education Given To: Patient  Education Provided: Role of Therapy;Plan of Care;Cognition;Equipment;DME/Home Modifications; ADL Function; Safety; Energy Conservation;Precautions     Education Provided Comments: QUIRINO/L facilitated discussion with pt regarding fall prevention, energy conservation, and home safety. \"There is not a whole lot I do since long-term. \" Pt believes his wife will prevent him from doing things that might cause a fall or is beyond his capabilities. For energy conservation, pt believes he will work on the computer or watch television for [address] when needed or as a low impact day. \"I think I am going to have to get used to this oxygen tube. \" During home safety, writer john pt on keeping a cell phone in case of an emergency or fall. Pt assures writer that all throw rugs have been thrown away a long time ago. The main concern is transportation. \"I have always been the one to drive. \" Writer edu on seeking doctor recommendations before driving after discharge. Education Method: Verbal;Demonstration; Teach Back  Barriers to Learning: Cognition; Hearing  Education Outcome: Verbalized understanding;Demonstrated understanding;Continued education needed     OT Equipment Recommendations  Other: TBD     Safety Devices  Safety Devices in place: Yes  Type of devices: All fall risk precautions in place  Restraints  Initially in place: No     Goals  Patient Goals   Patient goals : \"To learn how to walk properly. ..all that goes without saying (referring to bathing, dressing and toileting independence)\"  Short Term Goals  Time Frame for Short Term Goals: One week  Short Term Goal 1: Patient will perform oral hygiene with SBA and Good safety. Short Term Goal 2: Patient will perform hair brushing with SBA.   Short Term Goal 3: Patient will perform upper body dressing with SBA. Short Term Goal 4: Patient will perform lower body dressing, lower body bathing, and toileting tasks with Minimal assist.  Short Term Goal 5: Patient will perform functional moblity and transfers during self-care with CGA, least restrictive mobility device, and Good safety. Short Term Goal 6: Patient will verbalize/demonstrate Good understanding of assistive equipment/durable medical equipment/modified techniques to maximize safety and independence with self-care. Short Term Goal 7: Patient will actively participate in 30+ minutes of therapeutic exercise/functional activities to promote increased independence with self-care and mobility. Long Term Goals  Time Frame for Long Term Goals : By discharge  Long Term Goal 1: Patient will perform BADLs with modified independence and Good safety. Long Term Goal 2: Patient will perform functional mobility and transfers during self-care with modified independence, least restrictive mobility device, and Good safety. Long Term Goal 3: Patient will stand for 5+ minutes with 0-1 UE support, modified independence while engaging in functional activity of choice. Long Term Goal 4: Patient will perform simple meal prep and light housekeeping with SBA and Good safety. Long Term Goal 5: Patient will verbalize/demonstrate Good understanding of Fall Prevention Strategies to maximize safety and independence with self-care. Long Term Goal 6: Patient will perform self-care with improved bilateral  strength as evidenced by 10#  strength improvement and improved bilateral fine motor control as evidenced by 5 second improved in 9 hole peg test (Goal updated by Venita Restrepo, OTR/L on 1/24/23).     Current Medications:   Current Facility-Administered Medications: diclofenac sodium (VOLTAREN) 1 % gel 2 g, 2 g, Topical, TID PRN  enoxaparin (LOVENOX) injection 40 mg, 40 mg, SubCUTAneous, Daily  ipratropium-albuterol (DUONEB) nebulizer solution 1 ampule, 1 ampule, Inhalation, Q4H WA  guaiFENesin (MUCINEX) extended release tablet 600 mg, 600 mg, Oral, BID PRN  carvedilol (COREG) tablet 6.25 mg, 6.25 mg, Oral, BID WC  ferrous sulfate (IRON 325) tablet 325 mg, 325 mg, Oral, Daily with breakfast  spironolactone (ALDACTONE) tablet 25 mg, 25 mg, Oral, Daily  acetaminophen (TYLENOL) tablet 650 mg, 650 mg, Oral, Q4H PRN  polyethylene glycol (GLYCOLAX) packet 17 g, 17 g, Oral, Daily  senna (SENOKOT) tablet 17.2 mg, 2 tablet, Oral, Daily PRN  bisacodyl (DULCOLAX) suppository 10 mg, 10 mg, Rectal, Daily PRN      Objective:  /72   Pulse 78   Temp 98.5 °F (36.9 °C) (Oral)   Resp 20   Ht 5' 7\" (1.702 m)   Wt 147 lb 1.6 oz (66.7 kg)   SpO2 92%   BMI 23.04 kg/m²       GEN: Well developed, well nourished, no acute distress  HEENT: NCAT. EOM grossly intact. Hearing grossly intact. Mucous membranes pink and moist.  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 RESP: Normal breath sounds with no wheezing, rales, or rhonchi. Respirations WNL and unlabored. CV: Regular rate and rhythm. No murmurs, rubs, or gallops. ABD: Soft, non-distended, BS+ and equal.  NEURO: Alert. Speech fluent. Sensation to light touch intact in bilateral lower limbs. MSK: Muscle tone and bulk are normal bilaterally. Moving bilateral upper limbs with at least antigravity strength. Strength 4+/5 with bilateral ankle dorsifleixon and plantarflexion. Able to lift bilateral lower limbs off of bed against gravity. No significant tenderness to palpation of the cervical/thoracic spine or bilateral paraspinal musculature; tightness of the musculature noted on the right. LIMBS: No edema in bilateral lower limbs. SKIN: Warm and dry with good turgor. PSYCH: Mood WNL  Affect WNL. Appropriately interactive.     Diagnostics:     CBC:   Recent Labs     02/03/23  0655   WBC 7.0   RBC 4.57   HGB 11.2*   HCT 35.9*   MCV 78.4*   RDW 33.5*    BMP:   Recent Labs     02/03/23  0655      K 3.9      CO2 27   BUN 18   CREATININE 0.49*   GLUCOSE 127*       BNP: No results for input(s): BNP in the last 72 hours. PT/INR: No results for input(s): PROTIME, INR in the last 72 hours. APTT: No results for input(s): APTT in the last 72 hours. CARDIAC ENZYMES: No results for input(s): CKMB, CKMBINDEX, TROPONINT in the last 72 hours. Invalid input(s): CKTOTAL;3  FASTING LIPID PANEL:  Lab Results   Component Value Date    TRIG 85 01/03/2023     LIVER PROFILE: No results for input(s): AST, ALT, ALB, BILIDIR, BILITOT, ALKPHOS in the last 72 hours. Reviewed notes from SLP, OT, PT, internal medicine. Impression/Plan:   Impaired ADLs, gait, and mobility due to:    Multifocal cardioembolic CVA, subarachnoid hemorrhage:  PT/OT for gait, mobility, strengthening, endurance, ADLs, and self care. No residual SAH on CT head 1/21. Dysphagia:  S/p PEG tube placement on 1/17. SLP treating. Dietitian managing tube feed - switched to all bolus tube fees on 2/1 after repeat MBS completed and NPO recommended. Monitor BMP TIW. Free water protocol. Agitation:  Recurred 1/21. Patient moved to room closer to the nurses station and telesitter initiated. Repeat CT head improved. Seroquel BID started 1/21 - mental status improved and Seroquel was discontinued. Insomnia: Trazodone started nightly on 1/20 - failed. Improved - no current medication. Acute respiratory failure requiring intubation. now on RA -  Has had nocturnal pulse ox last night  Will plan for the second overnight pulse ox tomorrow evening.  home O2 evaluation over the weekend (orders placed). Pneumonia: Improved. Completed course of Unasyn and Vanco. CXR 1/24 for increased productive cough - stable hazy opacities. KENTRELL: Resolved. Will monitor. Elevated LFTs:  Resolved.  Will monitor  HCV:  Will need follow up with GI for treatment as an outpatient  Anemia: Hemoglobin 11.2 on 2/3, stable. Monitoring. On oral iron supplementation. Thrombocytopenia: Resolved. Will monitor. Right neck/upper back pain:  May be related to tight musculature in this area. May use heat or ice as needed. Added voltaren gel as needed on 2/3. Bowel Management: Miralax daily, senokot prn, dulcolax prn. DVT Prophylaxis:  Repeat CT - SAH resolved 1/21 - okay to initiate DVT chemoprophylaxis per IM. Lovenox started 1/22. TONI stockings during the day, EPC cuffs at night.   Internal Medicine for medical management  Follow up PCP 1-2 weeks, PM&R 4-6 weeks, GI, Neurology      Electronically signed by Jennifer Hill MD on 2/4/2023 at 1:46 PM

## 2023-02-04 NOTE — PROGRESS NOTES
Physical Therapy  Facility/Department: [facility] ACUTE REHAB  Rehabilitation Physical Therapy Treatment Note    NAME: Charanjit Ferrell  : 1960 (27 y.o.)  MRN: [id number]  CODE STATUS: Full Code  Date of Service: 23    Restrictions:  Restrictions/Precautions: Fall Risk;General Precautions;Contact Precautions;Isolation; Bed Alarm     SUBJECTIVE  Subjective  Subjective: Pt states neck pain is improved, has improved rotation ROM. States he slept well. Impulsive with walker; tends to lift it to turn rather than guide it through turn on ground. Agreeable to Rollator trial with G demonstration of safety and appropriate questions. Pain Assessment  Pain Assessment: Baldwin-Baker FACES  Baldwin-Baker Pain Rating: Hurts a little bit  Pain Location: Neck  Pain Orientation: Right;Left  Non-Pharmaceutical Pain Intervention(s): Ambulation/Increased Activity;Repositioned; Rest  Response to Pain Intervention: None (pain unchanged or no improvement)    OBJECTIVE   23 0835   Cognition   Arousal/Alertness Appropriate responses to stimuli   Safety Judgement Decreased awareness of need for safety;Decreased awareness of need for assistance  (Pt demonstrates inconsistent understanding of need for supp. O2, reports contradictory education from staff;)   Problem Solving Decreased awareness of errors   Insights Decreased awareness of deficits     Functional Mobility  Balance  Standing Balance: Supervision (with, without rolling walker)  Standing Balance  Time: ~3 min. x4  Activity: ambulation  Comments: rolling walker  Transfers  Surface: To bed;From bed; To chair with arms;From chair with arms; Wheelchair  Device:  (Rolling walker)  Sit to Stand  Assistance Level: Modified independent  Stand to Sit  Assistance Level: Modified independent  Bed To/From Chair  Technique: Stand step;Stand pivot  Assistance Level: Modified independent  Skilled Clinical Factors: No device, seeks UE support  Stand Pivot  Assistance Level: Modified independent  Skilled Clinical Factors: No device, seeks UE support    Environmental Mobility  Ambulation  Surface: Level surface; Ramp  Device: Rolling walker;Rollator (Short distance without AD)  Distance: AM: 180' ramp c RW; 61' level with Rollator, including seated rest break; 150' level with RW, all Supervision. PM: 200' RW Mod I, 20' without device, SBA. Assistance Level: Supervision;Modified independent (Writer managing O2 tank/line)  Skilled Clinical Factors: Added 0.5L supp. O2 upon 85% SpO2 after walking on ramp; recovered to 94% within 30 sec. additional seated rest. Remained WNL throughout rest of AM session. PM: Remained WNL during NuStep, 88% after amb., recovering to WNL within 1 min. seated rest without addition of supplemental O2. Stairs  Stair Height: 8''  Device: Bilateral handrails  Number of Stairs: 9  Additional Factors: Non-reciprocal going up;Non-reciprocal going down  Assistance Level: Stand by assist  Skilled Clinical Factors: SpO2 remained WNL on room air. PT Exercises  Functional Mobility Circuit Training: Stand pivot transfers x4, with, without rolling walker. Dynamic Sitting Balance Exercises: Seated dressing, 5 min. Rest breaks PRN due to SOB  Dynamic Standing Balance Exercises: Obstacle course over 20' x2, including standing/marching on unstable surface, alternating step-taps, hurdles. Searching for & retrieving objects while walking; Pt overlooks several cones on first pass, requires second pass, directions and assistance from onlookers to locate last cones. Exercise Equipment: NuStep, workload 3, 10 min. (Supp. O2 at 1L/min., SpO2 WNL)    ASSESSMENT/PROGRESS TOWARDS GOALS  Vital Signs  SpO2: (!) 85 % (p ramp amb.; 94% thereafter on 0.5L/min. supp. O2)  O2 Device: Nasal cannula  Comment: 0.5L/min. supp.  O2 in AM.    Assessment  Activity Tolerance: Patient limited by endurance (Rest breaks PRN & to encourage participation without c/o excessive fatigue/inability to participate further.)    Goals  Short Term Goals  Time Frame for Short Term Goals: 9 days  Short Term Goal 1: CGA assist supine<-> sit x1 assist  Short Term Goal 2: sit EOB withsupervsion up to 15 min for therex/ADL  Short Term Goal 3: 3+/5 LE strength to prepare for standing activiites and gait  Short Term Goal 4: ambulation with rolling walker distance of 70 to 100 ft, CGA, level surface  Short Term Goal 5: roll L/R with SBA  Additional Goals?: Yes  Short Term Goal 6: Pt able to go up and down 5 steps with 2 rails, min A  Long Term Goals  Time Frame for Long Term Goals : By DC  Long Term Goal 1: pt able to roll L/R independently  Long Term Goal 2: pt able to perform supine<>sit mod-I  Long Term Goal 3: pt able to perform sit<>stand and perform pivot/step to transfers at mod-I  Long Term Goal 4: pt able to ambulate household distance with appropriate device, mod-I  Long Term Goal 5: pt able to ambulate > 50 ft with appropriate device, at SBA/supervsion level, level as well as incline surface. Additional Goals?: Yes  Long term goal 6: pt able to go up and down 4 or more steps with B UE support, CGA. Long term goal 7: Improve PASS score to 27/36 improve function/stability. Long term goal 8: improve Tinetti balance score to atleast 23/28 to reduce fall risk. PLAN OF CARE/SAFETY  Physcial Therapy Plan  General Plan:  minutes of therapy at least 5 out of 7 days a week (5 to 7 days/week.)  Specific Instructions for Next Treatment: challenging balance  activities  Current Treatment Recommendations: Strengthening;Balance training;Functional mobility training;Neuromuscular re-education;Cognitive reorientation; Safety education & training;Patient/Caregiver education & training;Equipment evaluation, education, & procurement; Therapeutic activities;Transfer training; Wheelchair mobility training;Gait training;Stair training;Home exercise program;Positioning  Safety Devices  Type of Devices:  All fall risk precautions in place;Call light within reach;Gait belt;Telesitter in use;Left in chair;Patient at risk for falls (Doctor stating he planned to enter room shortly p PM session.)    EDUCATION  Education  Education Given To: Patient  Education Provided: Mobility Training;Equipment;Home Exercise Program  Education Provided Comments: Educated patient regarding Rollator, discussing safety, increased mobility/instability, use for seating & carrying objects, stowing for transport, brakes and safety. Education Method: Demonstration;Verbal;Printed Information/Hand-outs  Barriers to Learning: Cognition  Education Outcome: Verbalized understanding;Demonstrated understanding  Skilled Clinical Factors: HEP below. Access Code: [facility]  URL: [email or web address]  Date: 02/04/2023  Prepared by: Nelly Milian    Program Notes  Do 1-2 sets (laying down, seated and/or standing) a day. If performing standing exercise set, use a kitchen counter for stability, making sure to turn so you don't stub your toes or hit your knees on the cabinets below.      Exercises  Supine Ankle Dorsiflexion and Plantarflexion AROM - 1 x daily - 7 x weekly - 15-20 reps  Supine Heel Slide - 1 x daily - 7 x weekly - 15-20 reps  Supine Short Arc Quad - 1 x daily - 7 x weekly - 15-20 reps  Supine Bridge - 1 x daily - 7 x weekly - 10 reps - 1-5 hold  Supine Hip Abduction - 1 x daily - 7 x weekly - 15-20 reps  Seated Long Arc Quad - 1 x daily - 7 x weekly - 15-20 reps - 1-5 hold  Seated March - 1 x daily - 7 x weekly - 15-20 reps  Seated Hip Adduction Squeeze with Ball - 1 x daily - 7 x weekly - 15-20 reps - 3-5 hold  Seated Hip Abduction with Resistance - 1 x daily - 7 x weekly - 15-20 reps  Arm-chair Push Up - 1 x daily - 7 x weekly - 10 reps  Heel Toe Raises with Counter Support - 1 x daily - 7 x weekly - 15-20 reps  March in Place - 1 x daily - 7 x weekly - 15-20 reps  Standing Knee Flexion - 1 x daily - 7 x weekly - 15-20 reps  Standing 3-way Hip with Walker - 1 x daily - 7 x weekly - 15-20 reps  Romberg Stance - 1 x daily - 7 x weekly - as long as possible hold  Tandem Stance with Support - 1 x daily - 7 x weekly - as long as possible hold  Single Leg Stance with Support - 1 x daily - 7 x weekly - as long as possible hold    Patient Education  Oxygen Safety    Therapy Time     02/04/23 0835 02/04/23 0836   PT Individual Minutes   Time In 1000 1403   Time Out 1103 1438   Minutes 61 [address], PTA, 02/04/23 at 5:35 PM

## 2023-02-04 NOTE — PROGRESS NOTES
[address] [facility] Internal Medicine  Harleen King MD; Eloy Turner MD; Rubina Rm MD; MD Denise Bennett MD; Farida Garcia MD    [facility]   [facility]    Progress note    Late entry from 1/30/23        Date:   2/4/2023  Patient name:  Tanya Mitchell  Date of admission:  1/19/2023  9:12 AM  MRN:   [id number]  Account:  [de-identified]  YOB: 1960  PCP:    Farida Garcia MD  Room:   [address] [place], [place] [address]  Code Status:    Full Code    Physician Requesting Consult: Jeri Brown MD    Reason for Consult: Medical management    Chief Complaint:     No chief complaint on file. Acute CVA, generalized weakness, metabolic encephalopathy, multifactorial with underlying acute respiratory failure with pneumonia and heart failure    History Obtained From:     patient    History of Present Illness:     76-year-old gentleman with unknown past medical history presented to inpatient [facility] with metabolic encephalopathy found to have acute respiratory failure, pneumonia, also had slurred speech, confusion progressively was getting worse, EMS brought the patient to the hospital his saturations were  75%, found to have acute respiratory failure with pneumonia, in the ED patient was placed on BiPAP, chest x-ray confirmed right lower lobe pneumonia with pleural effusion. ABG showed acute respiratory failure with hypercapnia and hypoxia. Subsequently also found to have NSTEMI, acute heart failure with transaminitis possible shock liver, with also hepatitis C acute with hepatic failure, slowly improved,  Also had multiple acute brain infarct with subarachnoid hemorrhage,,  Subsequently patient was evaluated for acute inpatient rehab, accepted and admitted right now we are consulted for medical management    January 28  No new symptoms  Tolerating rehab therapies  Past Medical History:     History reviewed.  No pertinent past medical history. Past Surgical History:     Past Surgical History:   Procedure Laterality Date    UPPER GASTROINTESTINAL ENDOSCOPY N/A 1/17/2023    EGD ESOPHAGOGASTRODUODENOSCOPY PEG TUBE INSERTION performed by Rosette Hernandez DO at [address] [place] [address]        Medications Prior to Admission:     Prior to Admission medications    Medication Sig Start Date End Date Taking? Authorizing Provider   Nutritional Supplements (VITAL AF 1.2 MIGUELITO) LIQD 300 mLs by PEG Tube route 6 times daily Boluses at 6am, 9am, 12pm, 3pm, 6pm, and 9pm.  50mL free water before and after each bolus. Additional 100-300mL water for meds. 2/2/23  Yes Elvis Abarca MD   aspirin 325 MG tablet Take 325 mg by mouth daily Patient takes 2 tabs daily    Historical Provider, MD   psyllium (KONSYL) 28.3 % PACK Take 1 packet by mouth daily    Historical Provider, MD        Allergies:     Patient has no known allergies. Social History:     Tobacco:    reports that he has been smoking cigarettes. He has a 40.00 pack-year smoking history. He has never used smokeless tobacco.  Alcohol:      reports current alcohol use. Drug Use:  reports that he does not currently use drugs. Family History:     History reviewed. No pertinent family history. Review of Systems:     Positive and Negative as described in HPI. CONSTITUTIONAL:  negative for fevers, chills, sweats, fatigue, weight loss  HEENT:  negative for vision, hearing changes, runny nose, throat pain  RESPIRATORY:  negative for shortness of breath, cough, congestion, wheezing. CARDIOVASCULAR:  negative for chest pain, palpitations.   GASTROINTESTINAL:  negative for nausea, vomiting, diarrhea, constipation, change in bowel habits, abdominal pain   GENITOURINARY:  negative for difficulty of urination, burning with urination, frequency   INTEGUMENT:  negative for rash, skin lesions, easy bruising   HEMATOLOGIC/LYMPHATIC:  negative for swelling/edema   ALLERGIC/IMMUNOLOGIC:  negative for urticaria , itching  ENDOCRINE:  negative increase in drinking, increase in urination, hot or cold intolerance  MUSCULOSKELETAL:  negative joint pains, muscle aches, swelling of joints  NEUROLOGICAL:  negative for headaches, dizziness, lightheadedness, numbness, pain, tingling extremities  BEHAVIOR/PSYCH:  negative for depression, anxiety    Physical Exam:     /72   Pulse 78   Temp 98.5 °F (36.9 °C) (Oral)   Resp 20   Ht 5' 7\" (1.702 m)   Wt 147 lb 1.6 oz (66.7 kg)   SpO2 92%   BMI 23.04 kg/m²   Temp (24hrs), Av.2 °F (36.8 °C), Min:97.8 °F (36.6 °C), Max:98.5 °F (36.9 °C)    No results for input(s): POCGLU in the last 72 hours. Intake/Output Summary (Last 24 hours) at 2023 1422  Last data filed at 2023 1220  Gross per 24 hour   Intake 1650 ml   Output 800 ml   Net 850 ml         General Appearance:  alert, well appearing, and in no acute distress  Mental status: oriented to person, place, and time with normal affect  Head:  normocephalic, atraumatic. Eye: no icterus, redness, pupils equal and reactive, extraocular eye movements intact, conjunctiva clear  Ear: normal external ear, no discharge, hearing intact  Nose:  no drainage noted  Mouth: mucous membranes moist  Neck: supple, no carotid bruits, thyroid not palpable  Lungs: Bilateral equal air entry, clear to ausculation, no wheezing, rales or rhonchi, normal effort  Cardiovascular: normal rate, regular rhythm, no murmur, gallop, rub.   Abdomen: Soft, nontender, nondistended, normal bowel sounds, no hepatomegaly or splenomegaly  Neurologic: There are no new focal motor or sensory deficits, normal muscle tone and bulk, no abnormal sensation, normal speech, cranial nerves II through XII grossly intact  Skin: No gross lesions, rashes, bruising or bleeding on exposed skin area  Extremities:  peripheral pulses palpable, no pedal edema or calf pain with palpation  Psych: normal affect    Investigations:      Laboratory Testing:  No results found for this or any previous visit (from the past 24 hour(s)). Imaging/Diagonstics:  XR CHEST PORTABLE    Result Date: 1/18/2023  Interval extubation and NG tube removal.  Otherwise similar findings, with perhaps slightly greater hazy ground-glass opacity right mid lung. Correlate clinically. FL MODIFIED BARIUM SWALLOW W VIDEO    Result Date: 1/14/2023  Premature vallecular spillage. Piriform sinus cavity residue. Deep penetration with both materials. Aspiration with pudding. Please see separate speech pathology report for full discussion of findings and recommendations. Assessment :      Medications:      Allergies:  No Known Allergies    Current Meds:   Scheduled Meds:    enoxaparin  40 mg SubCUTAneous Daily    ipratropium-albuterol  1 ampule Inhalation Q4H WA    carvedilol  6.25 mg Oral BID WC    ferrous sulfate  325 mg Oral Daily with breakfast    spironolactone  25 mg Oral Daily    polyethylene glycol  17 g Oral Daily     Continuous Infusions:   PRN Meds: diclofenac sodium, guaiFENesin, acetaminophen, senna, bisacodyl    Hospital Problems             Last Modified POA    * (Principal) Cerebrovascular accident (CVA) due to embolism of cerebral artery ([address] 1/27/2023 Yes    Acute type II respiratory failure ([address] 1/19/2023 Yes    Transaminitis 1/19/2023 Yes    Normocytic anemia 1/19/2023 Yes    Thrombocytopenia ([address] 1/19/2023 Yes    Acute respiratory failure with hypoxia and hypercapnia ([address] RLL pneumonia 1/19/2023 Yes    Community acquired pneumonia of right lower lobe of lung 1/19/2023 Yes    Emphysema lung ([address] 1/19/2023 Yes    Cerebral infarction ([address] 1/19/2023 Yes    Moderate malnutrition ([address] 1/19/2023 Yes    Hepatitis C virus infection without hepatic coma 1/19/2023 Yes    Dysphagia 1/19/2023 Yes    Impending cerebrovascular accident ([address] 1/27/2023 Yes    Dermatitis associated with moisture 1/20/2023 Yes   Plan:     Acute hypoxic and hypercapnic respiratory failure secondary to likely aspiration pneumonia, treated with vancomycin and Unasyn, also was positive for MRSA nasal swab, required intubation, extubated subsequently  Acute CVA with subarachnoid hemorrhage, cardioembolic, did not qualify for tPA,  Acute liver failure possible multifactorial, alcoholic liver disease with hepatitis C,  Hypercoagulable state secondary to acute liver disease, INR currently 1.6  Thrombocytopenia secondary to alcoholic liver disease and hepatitis C, improved  Dysphagia , PEG placed   Anemia , of ch disease multifactorial   DVT PPX with SCD only, as he had cerebral h'age  Full code status   PT/OT     1/21  Patient seen to be resting comfortably  Was able to participate in physical therapy today  No new issues  Repeat CT head showed no new strokes had no hemorrhage  Continue current management    1/22  Since no bleed noted on repeat CT head okay to resume DVT prophylaxis  Hemoglobin is stable,thrombocytopenia has resolved platelet count 229 on labs 1/20  No new issues continue current management    1/27  Labs, radiology, medications, vitals reviewed,  Episodes of desaturation, 88%, improved,  No shortness of breath or chest pain at this time,  Labs anemia, hemoglobin 10.8,  Microcytic, MCV 75.3,  Patient probably will need anemia of chronic disease,  Tolerating physical therapy,    January 28, 2023  Vitals labs stable  Denies chest pain shortness of breath palpitations  Patient is on carvedilol spironolactone ferrous sulfate bronchodilators and Lovenox    2/3  Labs/meds/radiology reviewed   Dysphagia, failed swallow again   Speech working with him  BP running on lower side , monitor   On coreg, aldactone , will lower dose if BP goes more down   Hypoxic event , placed on 1L oxygen , will monitor   IS ordered        2/4/2023    Home on entreal feeding tomorrow .  Repeat swallowing eval as out patient in few weeks                          Consultations:   [facility] Jordi Irwin MD  2/4/2023  2:22 PM    Copy sent to Dr. Verónica Grider MD    Please note that this chart was generated using voice recognition Dragon dictation software. Although every effort was made to ensure the accuracy of this automated transcription, some errors in transcription may have occurred.

## 2023-02-05 ENCOUNTER — APPOINTMENT (OUTPATIENT)
Dept: GENERAL RADIOLOGY | Age: 63
DRG: 139 | End: 2023-02-05
Attending: PHYSICAL MEDICINE & REHABILITATION
Payer: MEDICAID

## 2023-02-05 ENCOUNTER — APPOINTMENT (OUTPATIENT)
Dept: CT IMAGING | Age: 63
DRG: 139 | End: 2023-02-05
Attending: PHYSICAL MEDICINE & REHABILITATION
Payer: MEDICAID

## 2023-02-05 PROCEDURE — 97110 THERAPEUTIC EXERCISES: CPT

## 2023-02-05 PROCEDURE — 97130 THER IVNTJ EA ADDL 15 MIN: CPT

## 2023-02-05 PROCEDURE — 99231 SBSQ HOSP IP/OBS SF/LOW 25: CPT | Performed by: INTERNAL MEDICINE

## 2023-02-05 PROCEDURE — 97112 NEUROMUSCULAR REEDUCATION: CPT

## 2023-02-05 PROCEDURE — 71045 X-RAY EXAM CHEST 1 VIEW: CPT

## 2023-02-05 PROCEDURE — 6370000000 HC RX 637 (ALT 250 FOR IP): Performed by: INTERNAL MEDICINE

## 2023-02-05 PROCEDURE — 71250 CT THORAX DX C-: CPT

## 2023-02-05 PROCEDURE — 97535 SELF CARE MNGMENT TRAINING: CPT

## 2023-02-05 PROCEDURE — 6360000002 HC RX W HCPCS: Performed by: PHYSICAL MEDICINE & REHABILITATION

## 2023-02-05 PROCEDURE — 97129 THER IVNTJ 1ST 15 MIN: CPT

## 2023-02-05 PROCEDURE — 94640 AIRWAY INHALATION TREATMENT: CPT

## 2023-02-05 PROCEDURE — 94761 N-INVAS EAR/PLS OXIMETRY MLT: CPT

## 2023-02-05 PROCEDURE — 97530 THERAPEUTIC ACTIVITIES: CPT

## 2023-02-05 PROCEDURE — 97116 GAIT TRAINING THERAPY: CPT

## 2023-02-05 PROCEDURE — 1180000000 HC REHAB R&B

## 2023-02-05 PROCEDURE — 6370000000 HC RX 637 (ALT 250 FOR IP): Performed by: PHYSICAL MEDICINE & REHABILITATION

## 2023-02-05 PROCEDURE — 2700000000 HC OXYGEN THERAPY PER DAY

## 2023-02-05 RX ORDER — POLYETHYLENE GLYCOL 3350 17 G/17G
17 POWDER, FOR SOLUTION ORAL DAILY PRN
COMMUNITY
Start: 2023-02-05

## 2023-02-05 RX ORDER — ACETAMINOPHEN 325 MG/1
650 TABLET ORAL EVERY 6 HOURS PRN
COMMUNITY
Start: 2023-02-05

## 2023-02-05 RX ORDER — SPIRONOLACTONE 25 MG/1
25 TABLET ORAL DAILY
Qty: 30 TABLET | Refills: 0 | Status: SHIPPED | OUTPATIENT
Start: 2023-02-06

## 2023-02-05 RX ORDER — CARVEDILOL 6.25 MG/1
6.25 TABLET ORAL 2 TIMES DAILY WITH MEALS
Qty: 60 TABLET | Refills: 0 | Status: SHIPPED | OUTPATIENT
Start: 2023-02-05

## 2023-02-05 RX ORDER — FERROUS SULFATE 325(65) MG
325 TABLET ORAL
Qty: 30 TABLET | Refills: 0 | Status: SHIPPED | OUTPATIENT
Start: 2023-02-06

## 2023-02-05 RX ORDER — ALBUTEROL SULFATE 90 UG/1
2 AEROSOL, METERED RESPIRATORY (INHALATION) EVERY 4 HOURS PRN
Qty: 18 G | Refills: 3 | Status: SHIPPED | OUTPATIENT
Start: 2023-02-05 | End: 2023-02-06 | Stop reason: SDUPTHER

## 2023-02-05 RX ORDER — IPRATROPIUM BROMIDE AND ALBUTEROL SULFATE 2.5; .5 MG/3ML; MG/3ML
3 SOLUTION RESPIRATORY (INHALATION) 4 TIMES DAILY
Qty: 360 ML | Refills: 3 | Status: SHIPPED | OUTPATIENT
Start: 2023-02-05 | End: 2023-02-06 | Stop reason: SDUPTHER

## 2023-02-05 RX ADMIN — IPRATROPIUM BROMIDE AND ALBUTEROL SULFATE 1 AMPULE: 2.5; .5 SOLUTION RESPIRATORY (INHALATION) at 20:47

## 2023-02-05 RX ADMIN — IPRATROPIUM BROMIDE AND ALBUTEROL SULFATE 1 AMPULE: 2.5; .5 SOLUTION RESPIRATORY (INHALATION) at 11:46

## 2023-02-05 RX ADMIN — ENOXAPARIN SODIUM 40 MG: 100 INJECTION SUBCUTANEOUS at 09:05

## 2023-02-05 RX ADMIN — SPIRONOLACTONE 25 MG: 25 TABLET ORAL at 09:05

## 2023-02-05 RX ADMIN — CARVEDILOL 6.25 MG: 6.25 TABLET, FILM COATED ORAL at 18:23

## 2023-02-05 RX ADMIN — IPRATROPIUM BROMIDE AND ALBUTEROL SULFATE 1 AMPULE: 2.5; .5 SOLUTION RESPIRATORY (INHALATION) at 07:15

## 2023-02-05 RX ADMIN — IPRATROPIUM BROMIDE AND ALBUTEROL SULFATE 1 AMPULE: 2.5; .5 SOLUTION RESPIRATORY (INHALATION) at 15:18

## 2023-02-05 RX ADMIN — CARVEDILOL 6.25 MG: 6.25 TABLET, FILM COATED ORAL at 09:05

## 2023-02-05 RX ADMIN — FERROUS SULFATE TAB 325 MG (65 MG ELEMENTAL FE) 325 MG: 325 (65 FE) TAB at 09:05

## 2023-02-05 NOTE — PROGRESS NOTES
02/05/23 1405   Encounter Summary   Encounter Overview/Reason  Volunteer Encounter   Service Provided For: Patient   Referral/Consult From: [facility]   Last Encounter  02/05/23  (V)   Complexity of Encounter Low   Spiritual/Emotional needs   Type Spiritual Support   Assessment/Intervention/Outcome   Intervention   (Refuse/Declined)

## 2023-02-05 NOTE — PROGRESS NOTES
Speech Language Pathology  Speech Language Pathology  [facility] Acute Rehab Unit at [address] [place] [address]    Cognitive Treatment Note    Date: 2/5/2023  Patients Name: Judd Oglesby  MRN: [id number]  Diagnosis: Dysphagia s/p CVA  Patient Active Problem List   Diagnosis Code    Acute type II respiratory failure (HCC) J96.00    Transaminitis R74.01    Normocytic anemia D64.9    Thrombocytopenia (HCC) D69.6    Agitation R45.1    Acute respiratory failure with hypoxia and hypercapnia (HCC) RLL pneumonia J96.01, J96.02    Altered mental status R41.82    Community acquired pneumonia of right lower lobe of lung J18.9    Prolonged pt (prothrombin time) R79.1    Emphysema lung ([address] J43.9    Cerebral infarction ([address] I63.9    ACP (advance care planning) Z71.89    Goals of care, counseling/discussion Z71.89    Encounter for palliative care Z51.5    Delirium due to another medical condition F05    Moderate malnutrition ([address] E44.0    Hepatitis C virus infection without hepatic coma B19.20    Dysphagia R13.10    Impending cerebrovascular accident ([address] I63.9    Dermatitis associated with moisture L30.8    Cerebrovascular accident (CVA) due to embolism of cerebral artery ([address] I63.40       Pain: 0/10    Cognitive Treatment  Treatment time: 2876-6787    Subjective: [x] Alert [x] Cooperative     [] Confused     [] Agitated    [] Lethargic    Objective/Assessment:    Recall: Recall of daily events: SUP for mod level of detail. Working memory: Category inclusion/exclusion (f=4) at 70% (I) improved to 90% with min A. Recall/sequence 3-item lists: 100% MI (extended time)    Organization: NT    Problem solving: Functional time calculations: 85% (I) to 100% with min A/repeated stimuli. Other: No family present.        Plan:  [x] Continue ST services    [] Discharge from ST:        Discharge recommendations: [] Inpatient Rehab   [] [place]   [] Outpatient Therapy  [] Follow up at trauma clinic   [] Other:         Treatment completed by: Ankita Díaz M.S., CCC-SLP

## 2023-02-05 NOTE — PROGRESS NOTES
Home Oxygen Evaluation    Home Oxygen Evaluation completed. Patient is on 2 liters per minute via nasal cannula. Resting SpO2 = 92%  Resting SpO2 on room air = 87%    SpO2 on room air with exercise = n/a%  SpO2 on oxygen as above with exercise = n/a%    Nocturnal Oximetry with patient on room air is recommended is SpO2 is between 89% and 95% (requires additional order).

## 2023-02-05 NOTE — PROGRESS NOTES
[facility] Internal Medicine  Siobhan Moeller MD; eLslie Guzman MD; Ether Osgood, MD; MD Darnell Vargas MD; Ting Kauffman MD    DELFIN [facility] Internal Medicine   [facility]    Progress note    Late entry from 1/30/23        Date:   2/5/2023  Patient name:  Tami Botello  Date of admission:  1/19/2023  9:12 AM  MRN:   [id number]  Account:  [de-identified]  YOB: 1960  PCP:    Ting Kauffman MD  Room:   [address] [place] [address]  Code Status:    Full Code    Physician Requesting Consult: Chaparro Mcgowan MD    Reason for Consult: Medical management    Chief Complaint:     No chief complaint on file. Acute CVA, generalized weakness, metabolic encephalopathy, multifactorial with underlying acute respiratory failure with pneumonia and heart failure    History Obtained From:     patient    History of Present Illness:     41-year-old gentleman with unknown past medical history presented to inpatient [facility] with metabolic encephalopathy found to have acute respiratory failure, pneumonia, also had slurred speech, confusion progressively was getting worse, EMS brought the patient to the hospital his saturations were  75%, found to have acute respiratory failure with pneumonia, in the ED patient was placed on BiPAP, chest x-ray confirmed right lower lobe pneumonia with pleural effusion. ABG showed acute respiratory failure with hypercapnia and hypoxia. Subsequently also found to have NSTEMI, acute heart failure with transaminitis possible shock liver, with also hepatitis C acute with hepatic failure, slowly improved,  Also had multiple acute brain infarct with subarachnoid hemorrhage,,  Subsequently patient was evaluated for acute inpatient rehab, accepted and admitted right now we are consulted for medical management    January 28  No new symptoms  Tolerating rehab therapies  Past Medical History:     History reviewed.  No pertinent past medical history. Past Surgical History:     Past Surgical History:   Procedure Laterality Date    UPPER GASTROINTESTINAL ENDOSCOPY N/A 1/17/2023    EGD ESOPHAGOGASTRODUODENOSCOPY PEG TUBE INSERTION performed by James Nielsen DO at [facility]        Medications Prior to Admission:     Prior to Admission medications    Medication Sig Start Date End Date Taking? Authorizing Provider   ipratropium-albuterol (DUONEB) 0.5-2.5 (3) MG/3ML SOLN nebulizer solution Inhale 3 mLs into the lungs 4 times daily 2/5/23  Yes Tonya Martinez MD   albuterol sulfate HFA (PROVENTIL HFA) 108 (90 Base) MCG/ACT inhaler Inhale 2 puffs into the lungs every 4 hours as needed for Wheezing 2/5/23  Yes Tonya Martinez MD   Nutritional Supplements (VITAL AF 1.2 MIGUELITO) LIQD 300 mLs by PEG Tube route 6 times daily Boluses at 6am, 9am, 12pm, 3pm, 6pm, and 9pm.  50mL free water before and after each bolus. Additional 100-300mL water for meds. 2/2/23  Yes Jeimy Pike MD   aspirin 325 MG tablet Take 325 mg by mouth daily Patient takes 2 tabs daily    Historical Provider, MD   psyllium (KONSYL) 28.3 % PACK Take 1 packet by mouth daily    Historical Provider, MD        Allergies:     Patient has no known allergies. Social History:     Tobacco:    reports that he has been smoking cigarettes. He has a 40.00 pack-year smoking history. He has never used smokeless tobacco.  Alcohol:      reports current alcohol use. Drug Use:  reports that he does not currently use drugs. Family History:     History reviewed. No pertinent family history. Review of Systems:     Positive and Negative as described in HPI. CONSTITUTIONAL:  negative for fevers, chills, sweats, fatigue, weight loss  HEENT:  negative for vision, hearing changes, runny nose, throat pain  RESPIRATORY:  negative for shortness of breath, cough, congestion, wheezing. CARDIOVASCULAR:  negative for chest pain, palpitations.   GASTROINTESTINAL:  negative for nausea, vomiting, diarrhea, constipation, change in bowel habits, abdominal pain   GENITOURINARY:  negative for difficulty of urination, burning with urination, frequency   INTEGUMENT:  negative for rash, skin lesions, easy bruising   HEMATOLOGIC/LYMPHATIC:  negative for swelling/edema   ALLERGIC/IMMUNOLOGIC:  negative for urticaria , itching  ENDOCRINE:  negative increase in drinking, increase in urination, hot or cold intolerance  MUSCULOSKELETAL:  negative joint pains, muscle aches, swelling of joints  NEUROLOGICAL:  negative for headaches, dizziness, lightheadedness, numbness, pain, tingling extremities  BEHAVIOR/PSYCH:  negative for depression, anxiety    Physical Exam:     /73   Pulse 83   Temp 98.4 °F (36.9 °C)   Resp 18   Ht 5' 7\" (1.702 m)   Wt 147 lb 1.6 oz (66.7 kg)   SpO2 93%   BMI 23.04 kg/m²   Temp (24hrs), Av.7 °F (37.1 °C), Min:98.4 °F (36.9 °C), Max:99 °F (37.2 °C)    No results for input(s): POCGLU in the last 72 hours. Intake/Output Summary (Last 24 hours) at 2023 1505  Last data filed at 2023 1205  Gross per 24 hour   Intake 2125 ml   Output 1350 ml   Net 775 ml         General Appearance:  alert, well appearing, and in no acute distress  Mental status: oriented to person, place, and time with normal affect  Head:  normocephalic, atraumatic. Eye: no icterus, redness, pupils equal and reactive, extraocular eye movements intact, conjunctiva clear  Ear: normal external ear, no discharge, hearing intact  Nose:  no drainage noted  Mouth: mucous membranes moist  Neck: supple, no carotid bruits, thyroid not palpable  Lungs: Bilateral equal air entry, clear to ausculation, no wheezing, rales or rhonchi, normal effort  Cardiovascular: normal rate, regular rhythm, no murmur, gallop, rub.   Abdomen: Soft, nontender, nondistended, normal bowel sounds, no hepatomegaly or splenomegaly  Neurologic: There are no new focal motor or sensory deficits, normal muscle tone and bulk, no abnormal sensation, normal speech, cranial nerves II through XII grossly intact  Skin: No gross lesions, rashes, bruising or bleeding on exposed skin area  Extremities:  peripheral pulses palpable, no pedal edema or calf pain with palpation  Psych: normal affect    Investigations:      Laboratory Testing:  No results found for this or any previous visit (from the past 24 hour(s)). Imaging/Diagonstics:  XR CHEST PORTABLE    Result Date: 1/18/2023  Interval extubation and NG tube removal.  Otherwise similar findings, with perhaps slightly greater hazy ground-glass opacity right mid lung. Correlate clinically. FL MODIFIED BARIUM SWALLOW W VIDEO    Result Date: 1/14/2023  Premature vallecular spillage. Piriform sinus cavity residue. Deep penetration with both materials. Aspiration with pudding. Please see separate speech pathology report for full discussion of findings and recommendations. Assessment :      Medications:      Allergies:  No Known Allergies    Current Meds:   Scheduled Meds:    enoxaparin  40 mg SubCUTAneous Daily    ipratropium-albuterol  1 ampule Inhalation Q4H WA    carvedilol  6.25 mg Oral BID WC    ferrous sulfate  325 mg Oral Daily with breakfast    spironolactone  25 mg Oral Daily    polyethylene glycol  17 g Oral Daily     Continuous Infusions:   PRN Meds: diclofenac sodium, guaiFENesin, acetaminophen, senna, bisacodyl    Hospital Problems             Last Modified POA    * (Principal) Cerebrovascular accident (CVA) due to embolism of cerebral artery ([address] 1/27/2023 Yes    Acute type II respiratory failure ([address] 1/19/2023 Yes    Transaminitis 1/19/2023 Yes    Normocytic anemia 1/19/2023 Yes    Thrombocytopenia ([address] 1/19/2023 Yes    Acute respiratory failure with hypoxia and hypercapnia ([address] RLL pneumonia 1/19/2023 Yes    Community acquired pneumonia of right lower lobe of lung 1/19/2023 Yes    Emphysema lung ([address] 1/19/2023 Yes    Cerebral infarction ([address] 1/19/2023 Yes    Moderate malnutrition ([facility] 1/19/2023 Yes    Hepatitis C virus infection without hepatic coma 1/19/2023 Yes    Dysphagia 1/19/2023 Yes    Impending cerebrovascular accident [facility]) 1/27/2023 Yes    Dermatitis associated with moisture 1/20/2023 Yes   Plan:     Acute hypoxic and hypercapnic respiratory failure secondary to likely aspiration pneumonia, treated with vancomycin and Unasyn, also was positive for MRSA nasal swab, required intubation, extubated subsequently  Acute CVA with subarachnoid hemorrhage, cardioembolic, did not qualify for tPA,  Acute liver failure possible multifactorial, alcoholic liver disease with hepatitis C,  Hypercoagulable state secondary to acute liver disease, INR currently 1.6  Thrombocytopenia secondary to alcoholic liver disease and hepatitis C, improved  Dysphagia , PEG placed   Anemia , of ch disease multifactorial   DVT PPX with SCD only, as he had cerebral h'age  Full code status   PT/OT     1/21  Patient seen to be resting comfortably  Was able to participate in physical therapy today  No new issues  Repeat CT head showed no new strokes had no hemorrhage  Continue current management    1/22  Since no bleed noted on repeat CT head okay to resume DVT prophylaxis  Hemoglobin is stable,thrombocytopenia has resolved platelet count 866 on labs 1/20  No new issues continue current management    1/27  Labs, radiology, medications, vitals reviewed,  Episodes of desaturation, 88%, improved,  No shortness of breath or chest pain at this time,  Labs anemia, hemoglobin 10.8,  Microcytic, MCV 75.3,  Patient probably will need anemia of chronic disease,  Tolerating physical therapy,    January 28, 2023  Vitals labs stable  Denies chest pain shortness of breath palpitations  Patient is on carvedilol spironolactone ferrous sulfate bronchodilators and Lovenox    2/3  Labs/meds/radiology reviewed   Dysphagia, failed swallow again   Speech working with him  BP running on lower side , monitor   On coreg, aldactone , will lower dose if BP goes more down   Hypoxic event , placed on 1L oxygen , will monitor   IS ordered       2/4/23    Home on entreal feeding tomorrow . Repeat swallowing eval as out patient in few weeks            2/5/23  Progressing ok . Home tomorrow. Vitals , labs ok            Consultations:   [facility]  IP CONSULT TO SPIRITUAL SERVICES  IP CONSULT TO INTERNAL MEDICINE  IP CONSULT TO PULMONOLOGY      Delmar Crow MD  2/5/2023  3:05 PM    Copy sent to Dr. Carina Scott MD    Please note that this chart was generated using voice recognition Dragon dictation software. Although every effort was made to ensure the accuracy of this automated transcription, some errors in transcription may have occurred.

## 2023-02-05 NOTE — PROGRESS NOTES
Per Dr. Beti Gonzalez, patient qualifies for [facility] with exertion and while sleeping. Room air at rest. Pt is to go home with nebulizer. Follow up in 4-6 weeks. Pt will have CT tonight. If nothing on CT, then he can go tomorrow.

## 2023-02-05 NOTE — PROGRESS NOTES
Physical Medicine & Rehabilitation  Progress Note      Subjective:      Jona Omalley is a 60 y.o. male with multifocal CVA. He reports doing okay toady. Has had nocturnal pulse O2 on 2/3 and awaiting for the second study tomorrow . Seems to be dropping to 83% saturation over 30% of the time with activities. Nl sat at rest. Plan is for discharge on Monday. ROS:  Denies fevers, chills, sweats. No chest pain, palpitations, lightheadedness. Denies coughing, wheezing or shortness of breath. Denies abdominal pain, nausea, diarrhea or constipation. No new areas of joint pain. Denies new areas of numbness or weakness. Denies new anxiety or depression issues. No new skin problems. Rehabilitation:     Physical Therapy    Functional Mobility  Balance  Standing Balance: Supervision (with, without rolling walker)  Standing Balance  Time: ~3 min. x4  Activity: ambulation  Comments: rolling walker  Transfers  Surface: To bed;From bed; To chair with arms;From chair with arms; Wheelchair  Device:  (Rolling walker)  Sit to Stand  Assistance Level: Modified independent  Stand to Sit  Assistance Level: Modified independent  Bed To/From Chair  Technique: Stand step;Stand pivot  Assistance Level: Modified independent  Skilled Clinical Factors: No device, seeks UE support  Stand Pivot  Assistance Level: Modified independent  Skilled Clinical Factors: No device, seeks UE support     Environmental Mobility  Ambulation  Surface: Level surface; Ramp  Device: Rolling walker;Rollator (Short distance without AD)  Distance: AM: 180' ramp c RW; 61' level with Rollator, including seated rest break; 150' level with RW, all Supervision. PM: 200' RW Mod I, 20' without device, SBA. Assistance Level: Supervision;Modified independent (Writer managing O2 tank/line)  Skilled Clinical Factors: Added 0.5L supp.  O2 upon 85% SpO2 after walking on ramp; recovered to 94% within 30 sec. additional seated rest. Remained WNL throughout rest of AM session. PM: Remained WNL during NuStep, 88% after amb., recovering to WNL within 1 min. seated rest without addition of supplemental O2. Stairs  Stair Height: 8''  Device: Bilateral handrails  Number of Stairs: 9  Additional Factors: Non-reciprocal going up;Non-reciprocal going down  Assistance Level: Stand by assist  Skilled Clinical Factors: SpO2 remained WNL on room air. PT Exercises  Functional Mobility Circuit Training: Stand pivot transfers x4, with, without rolling walker. Dynamic Sitting Balance Exercises: Seated dressing, 5 min. Rest breaks PRN due to SOB  Dynamic Standing Balance Exercises: Obstacle course over 20' x2, including standing/marching on unstable surface, alternating step-taps, hurdles. Searching for & retrieving objects while walking; Pt overlooks several cones on first pass, requires second pass, directions and assistance from onlookers to locate last cones. Exercise Equipment: NuStep, workload 3, 10 min. (Supp. O2 at 1L/min., SpO2 WNL)       OT    Cognition  Overall Orientation Status: Impaired  Orientation Level: Oriented to person;Disoriented to situation;Oriented to place; Disoriented to time     Cognition  Overall Cognitive Status: Exceptions  Arousal/Alertness: Appropriate responses to stimuli  Following Commands: Follows one step commands consistently  Attention Span: Attends with cues to redirect  Memory: Decreased recall of biographical Information;Decreased recall of precautions;Decreased recall of recent events  Safety Judgement: Decreased awareness of need for assistance;Decreased awareness of need for safety  Problem Solving: Assistance required to generate solutions;Assistance required to implement solutions;Assistance required to identify errors made;Assistance required to correct errors made  Insights: Decreased awareness of deficits  Initiation: Requires cues for some  Sequencing: Requires cues for some  Cognition Comment: Pt with impulsive tendencies noted. Activities of Daily Living     Toileting  Assistance Level: Supervision  Skilled Clinical Factors: AM/PM: Distant SUP, completed while standing for urinating     Toilet Transfers  Equipment: Standard toilet;Grab bars  Additional Factors: Verbal cues;Cues for hand placement;Set-up  Assistance Level: Supervision  Skilled Clinical Factors: No LOB noted. Tub/Shower Transfers  Type: Shower  Transfer From: Rolling walker  Transfer To: Tub transfer bench  Additional Factors: Verbal cues;Cues for hand placement; Increased time to complete  Assistance Level: Contact guard assist  Skilled Clinical Factors: CGA over threshold for safety due to pt impulsively amb to shower without RW. Writer edu pt on utiliziing RW for safety. P return noted. Light Housekeeping  Light Housekeeping Level: Walker  Light Housekeeping Level of Assistance: Stand by assistance     Light Housekeeping:   AM: Writer simulated cleaning dry erase marks in bathroom placed at various heights to encourage performing simple light housekeeping with SBA and Good safety. Pt tolerated ~5 min. ambulating around bathroom. Demo G safety. No LOB noted. PM: Writer simulated reaching into cabinets to retrieve/return items, washing a dish in sink, and retrieve/return items in refridgerator. Pt tolerated ~4 min. ambulating around kitchen before becoming SOB and req RB. Writer engaged pt in conversation regarding EC when becoming fatigued during light house keeping. Pt verbalized that a chair in the kitchen or near the sink might be a good idea for needed RB. Mobility  Bed Mobility  Overall Assistance Level: Modified Independent  Roll Left  Assistance Level: Modified independent  Skilled Clinical Factors: utilized hand rails with raised HOB  Sit to Supine  Assistance Level: Independent  Skilled Clinical Factors: completed without difficulty  Supine to Sit  Assistance Level:  Independent  Skilled Clinical Factors: completed without difficulty. Scooting  Assistance Level: Modified independent  Skilled Clinical Factors: Hips closer to EOB. Transfers  Surface: From bed; To chair with arms  Additional Factors: Set-up; Verbal cues; Hand placement cues  Sit to Stand  Assistance Level: Modified independent  Skilled Clinical Factors: Completes with G safety  Stand to Sit  Assistance Level: Modified independent  Skilled Clinical Factors: G controlled decend     Functional Mobility  Device: Rolling walker  Activity: To/From bathroom (AM: Within/around room. PM: around kitchen area at therapy gym.)  Assistance Level: Supervision  Skilled Clinical Factors: Close SUP. ST    Objective/Assessment:     Recall: Recall of daily events: in A for mod level of detail. Organization: NT     Problem solving: Multiple causes for problem scenarios: 60% (I) improved to 80% with mod-max A. Dysphagia: ST provided S/U for oral care. Pt able to state components of FFW protocol (MI). ST instructed Pt in ongoing completion of swallowing exercises. Pt verbalized comprehension.         Current Medications:   Current Facility-Administered Medications: diclofenac sodium (VOLTAREN) 1 % gel 2 g, 2 g, Topical, TID PRN  enoxaparin (LOVENOX) injection 40 mg, 40 mg, SubCUTAneous, Daily  ipratropium-albuterol (DUONEB) nebulizer solution 1 ampule, 1 ampule, Inhalation, Q4H WA  guaiFENesin (MUCINEX) extended release tablet 600 mg, 600 mg, Oral, BID PRN  carvedilol (COREG) tablet 6.25 mg, 6.25 mg, Oral, BID WC  ferrous sulfate (IRON 325) tablet 325 mg, 325 mg, Oral, Daily with breakfast  spironolactone (ALDACTONE) tablet 25 mg, 25 mg, Oral, Daily  acetaminophen (TYLENOL) tablet 650 mg, 650 mg, Oral, Q4H PRN  polyethylene glycol (GLYCOLAX) packet 17 g, 17 g, Oral, Daily  senna (SENOKOT) tablet 17.2 mg, 2 tablet, Oral, Daily PRN  bisacodyl (DULCOLAX) suppository 10 mg, 10 mg, Rectal, Daily PRN      Objective:  /73   Pulse 83   Temp 98.4 °F (36.9 °C)   Resp 18   Ht 5' 7\" (1.702 m)   Wt 147 lb 1.6 oz (66.7 kg)   SpO2 94%   BMI 23.04 kg/m²       GEN: Well developed, well nourished, no acute distress  HEENT: NCAT. EOM grossly intact. Hearing grossly intact. Mucous membranes pink and moist.  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 RESP: Normal breath sounds with no wheezing, rales, or rhonchi. Respirations WNL and unlabored. CV: Regular rate and rhythm. No murmurs, rubs, or gallops. ABD: Soft, non-distended, BS+ and equal.  NEURO: Alert. Speech fluent. Sensation to light touch intact in bilateral lower limbs. MSK: Muscle tone and bulk are normal bilaterally. Moving bilateral upper limbs with at least antigravity strength. Strength 4+/5 with bilateral ankle dorsifleixon and plantarflexion. Able to lift bilateral lower limbs off of bed against gravity. No significant tenderness to palpation of the cervical/thoracic spine or bilateral paraspinal musculature; tightness of the musculature noted on the right. LIMBS: No edema in bilateral lower limbs. SKIN: Warm and dry with good turgor. PSYCH: Mood WNL  Affect WNL. Appropriately interactive. Diagnostics:     CBC:   Recent Labs     02/03/23  0655   WBC 7.0   RBC 4.57   HGB 11.2*   HCT 35.9*   MCV 78.4*   RDW 33.5*          BMP:   Recent Labs     02/03/23  0655      K 3.9      CO2 27   BUN 18   CREATININE 0.49*   GLUCOSE 127*       BNP: No results for input(s): BNP in the last 72 hours. PT/INR: No results for input(s): PROTIME, INR in the last 72 hours. APTT: No results for input(s): APTT in the last 72 hours. CARDIAC ENZYMES: No results for input(s): CKMB, CKMBINDEX, TROPONINT in the last 72 hours. Invalid input(s): CKTOTAL;3  FASTING LIPID PANEL:  Lab Results   Component Value Date    TRIG 85 01/03/2023     LIVER PROFILE: No results for input(s): AST, ALT, ALB, BILIDIR, BILITOT, ALKPHOS in the last 72 hours.        Reviewed notes from SLP, OT, PT, internal medicine. Impression/Plan:   Impaired ADLs, gait, and mobility due to:    Multifocal cardioembolic CVA, subarachnoid hemorrhage:  PT/OT for gait, mobility, strengthening, endurance, ADLs, and self care. No residual SAH on CT head 1/21. Dysphagia:  S/p PEG tube placement on 1/17. SLP treating. Dietitian managing tube feed - switched to all bolus tube fees on 2/1 after repeat MBS completed and NPO recommended. Monitor BMP TIW. Free water protocol. Agitation:  Recurred 1/21. Patient moved to room closer to the nurses station and telesitter initiated. Repeat CT head improved. Seroquel BID started 1/21 - mental status improved and Seroquel was discontinued. Insomnia: Trazodone started nightly on 1/20 - failed. Improved - no current medication. Acute respiratory failure requiring intubation. now on RA -  Has had nocturnal pulse ox last night  Will plan for the second overnight pulse ox tomorrow evening.  home O2 evaluation over the weekend (orders placed). Will follow. Pneumonia: Improved. Completed course of Unasyn and Vanco. CXR 1/24 for increased productive cough - stable hazy opacities. will obtain a new X ray prior to D/C   KENTRELL: Resolved. Will monitor. Elevated LFTs:  Resolved. Will monitor  HCV:  Will need follow up with GI for treatment as an outpatient  Anemia: Hemoglobin 11.2 on 2/3, stable. Monitoring. On oral iron supplementation. Thrombocytopenia: Resolved. Will monitor. Right neck/upper back pain:  May be related to tight musculature in this area. May use heat or ice as needed. Added voltaren gel as needed on 2/3. Bowel Management: Miralax daily, senokot prn, dulcolax prn. DVT Prophylaxis:  Repeat CT - SAH resolved 1/21 - okay to initiate DVT chemoprophylaxis per IM. Lovenox started 1/22. TONI stockings during the day, EPC cuffs at night.   Internal Medicine for medical management  Follow up PCP 1-2 weeks, PM&R 4-6 weeks, GI, Neurology      Electronically signed by [facility] Dimitrios Mercedes MD on 2/5/2023 at 10:55 AM

## 2023-02-05 NOTE — PROGRESS NOTES
[facility]: BRIDGET MONTES   Acute Rehabilitation Occupational Therapy Daily Treatment Note    Date: 23  Patient Name: Jessee Fernandez       Room: 8901/1984-61  MRN: [id number]  Account: [de-identified]   : 1960  (60 y.o.) Gender: male       Referring Practitioner: Myles Moreno MD  Diagnosis: Cerebrovascular accident  Additional Pertinent Hx: Per MRI Impression on 23: Mild amount of residual subarachnoid hemorrhage in the right frontal and  right parietal lobes as well as the left frontal lobe. Evolving small focus of hemorrhage in the left parietal lobe posteriorly. Evolving areas of ischemia in the right frontal lobe and left posterior parietal lobe as well as a few foci in the parasagittal aspect of the right frontoparietal region. Treatment Diagnosis: Impaired self care status    Past Medical History:  has no past medical history on file. Past Surgical History:   has a past surgical history that includes Upper gastrointestinal endoscopy (N/A, 2023). Restrictions  Restrictions/Precautions  Restrictions/Precautions: Fall Risk, General Precautions, Contact Precautions, Isolation, Bed Alarm  Required Braces or Orthoses?: No  Implants present? :  (pt denies)     Position Activity Restriction  Other position/activity restrictions: PT/OT staff may titrateO2 down as tolerated in therapy, but may only titrate up to maximum 4 L NC as needed in therapy. Nursing should perform oxygen titration > 4 L. Per RT SpO2 Goal is 92% or higher, use O2 as needed to maintain SpO2 goal with rest and activity. Vitals  Vital Signs  O2 Device: None (Room air)     Subjective  Subjective  Subjective: AM: Pt particpates in performance day on this date. Pt demo G safety by taking RB when needed. PM: Writer facilitated discussion and edu on home safety and energy conservation.     Objective  Cognition  Overall Orientation Status: Impaired  Orientation Level: Oriented to person;Disoriented to situation;Oriented to place; Disoriented to time    Cognition  Overall Cognitive Status: Exceptions  Arousal/Alertness: Appropriate responses to stimuli  Following Commands: Follows one step commands consistently  Attention Span: Attends with cues to redirect  Memory: Decreased recall of biographical Information;Decreased recall of precautions;Decreased recall of recent events  Safety Judgement: Decreased awareness of need for safety;Decreased awareness of need for assistance (Pt demonstrates inconsistent understanding of need for supp. O2, reports contradictory education from staff;)  Problem Solving: Decreased awareness of errors  Insights: Decreased awareness of deficits  Initiation: Requires cues for some  Sequencing: Requires cues for some  Cognition Comment: Pt with impulsive tendencies noted. Activities of Daily Living  Feeding  Assistance Level: Set-up    Grooming/Oral Hygiene  Assistance Level: Modified independent  Skilled Clinical Factors: Standing at sink for oral care. Completed brushing hair while seated at EOB. Upper Extremity Bathing  Assistance Level: Modified independent  Skilled Clinical Factors: completed while seated on shower bench    Lower Extremity Bathing  Assistance Level: Modified independent  Skilled Clinical Factors: completed while seated on shower bench, increased trunk flexion for BLE. Upper Extremity Dressing  Assistance Level: Modified independent  Skilled Clinical Factors: doff/don overhead shirt while seated on toilet. Lower Extremity Dressing  Assistance Level: Modified independent  Skilled Clinical Factors: Pt threaded brief and pants while seated on toilet. Able to pull up/down brief and pants while standing. G safety noted. Putting On/Taking Off Footwear  Assistance Level: Modified independent  Skilled Clinical Factors: doff/sendy footies while seated on toilet using figure four position.     Toileting  Assistance Level: Modified independent  Skilled Clinical Factors: completed BM while seated on toilet seat. G safety noted. Toilet Transfers  Equipment: Standard toilet;Grab bars  Additional Factors: Increased time to complete  Assistance Level: Modified independent  Skilled Clinical Factors: No LOB noted. Tub/Shower Transfers  Type: Shower  Transfer From: Standing without device  Transfer To: Tub transfer bench  Additional Factors: Increased time to complete  Assistance Level: Modified independent    Mobility  Bed Mobility  Overall Assistance Level: Independent     Roll Left  Assistance Level: Modified independent  Skilled Clinical Factors: utilized hand rails with raised HOB     Sit to Supine  Assistance Level: Modified independent  Skilled Clinical Factors: completed without difficulty    Supine to Sit  Assistance Level: Modified independent  Skilled Clinical Factors: completed without difficulty. Scooting  Assistance Level: Independent  Skilled Clinical Factors: able to bring hips to EOB with ease    Transfers  Surface: From bed; To chair with arms  Additional Factors: Set-up; Verbal cues; Hand placement cues  Sit to Stand  Assistance Level: Modified independent  Skilled Clinical Factors: Completes with G safety  Stand to Sit  Assistance Level: Modified independent  Skilled Clinical Factors: G controlled decend    Functional Mobility  Device: Rolling walker  Activity: To/From bathroom; Retrieve items;Transport items (AM: retrieved and transported clothing within/around room)  Assistance Level: Modified independent  Skilled Clinical Factors: no safety concerns noted. Assessment  Assessment  Activity Tolerance: Patient tolerated treatment well  Discharge Recommendations: Home with assist PRN;Outpatient OT    Patient Education  Education  Education Given To: Patient  Education Provided: Role of Therapy;Plan of Care;Cognition;Equipment;DME/Home Modifications; ADL Function; Safety; Energy Conservation;Precautions; Fall Prevention Strategies    Education Provided Comments: QUIRINO/L facilitated discussion and edu with pt and spouse regarding home safety and energy conservation. Writer provided handouts for home safety and energy conservation to take home. When discussing home safety, spouse stated, \"He does need a cell phone of his own. \" Both pt and spouse were agreeable that a cell phone would be a good idea to keep on pt at all times. Writer edu on the importance of a night light for toileting in the middle of the night. \"We don't have any electricity in the bathroom. \" Writer asked spouse to clarify. \"We have an extension cord running from the bedroom to plug in anything in the bathroom. We have extension cords from the basement that plug into a power strip for the bedroom. \" Writer inquired about any cords running across walkways. Spouse and pt denied any cords running across walkways. Writer edu pt and spouse about the fall risk cords would pose to pt when returning home. Spouse reassured that all area rugs would be removed prior to pt returning home. Education Method: Verbal;Demonstration; Teach Back  Barriers to Learning: Cognition; Hearing  Education Outcome: Verbalized understanding;Demonstrated understanding;Continued education needed    OT Equipment Recommendations  Other: TBD    Safety Devices  Safety Devices in place: Yes  Type of devices: All fall risk precautions in place  Restraints  Initially in place: No    Goals  Patient Goals   Patient goals : \"To learn how to walk properly. ..all that goes without saying (referring to bathing, dressing and toileting independence)\"  Short Term Goals  Time Frame for Short Term Goals: One week  Short Term Goal 1: Patient will perform oral hygiene with SBA and Good safety. Short Term Goal 2: Patient will perform hair brushing with SBA. Short Term Goal 3: Patient will perform upper body dressing with SBA.   Short Term Goal 4: Patient will perform lower body dressing, lower body bathing, and toileting tasks with Minimal assist.  Short Term Goal 5: Patient will perform functional moblity and transfers during self-care with CGA, least restrictive mobility device, and Good safety. Short Term Goal 6: Patient will verbalize/demonstrate Good understanding of assistive equipment/durable medical equipment/modified techniques to maximize safety and independence with self-care. Short Term Goal 7: Patient will actively participate in 30+ minutes of therapeutic exercise/functional activities to promote increased independence with self-care and mobility. Long Term Goals  Time Frame for Long Term Goals : By discharge  Long Term Goal 1: Patient will perform BADLs with modified independence and Good safety. Long Term Goal 2: Patient will perform functional mobility and transfers during self-care with modified independence, least restrictive mobility device, and Good safety. Long Term Goal 3: Patient will stand for 5+ minutes with 0-1 UE support, modified independence while engaging in functional activity of choice. Long Term Goal 4: Patient will perform simple meal prep and light housekeeping with SBA and Good safety. Long Term Goal 5: Patient will verbalize/demonstrate Good understanding of Fall Prevention Strategies to maximize safety and independence with self-care. Long Term Goal 6: Patient will perform self-care with improved bilateral  strength as evidenced by 10#  strength improvement and improved bilateral fine motor control as evidenced by 5 second improved in 9 hole peg test (Goal updated by Ronal Crooks, OTR/L on 1/24/23).     Plan  Occupational Therapy Plan  Times Per Week: 5-7  Times Per Day: Twice a day  Current Treatment Recommendations: Self-Care / ADL, Strengthening, ROM, Balance training, Functional mobility training, Endurance training, Cognitive reorientation, Neuromuscular re-education, Safety education & training, Patient/Caregiver education & training, Equipment evaluation, education, & procurement, Cognitive/Perceptual training, Coordination training, Home management training       02/05/23 0800 02/05/23 0900   OT Individual Minutes   Time In 0800 1304   Time Out 0901 1342   Minutes 61 38       Electronically signed by ANITA Cho on 2/5/23 at 5:26 PM EST

## 2023-02-05 NOTE — DISCHARGE SUMMARY
Physical Medicine & Rehabilitation  Discharge Summary     Patient Identification:  Pilar Medrano  : 1960  Admit date: 2023  Discharge date: 23***  Attending provider: Nancy Flores MD        Primary care provider: Amy Russell MD     Discharge Diagnoses:   Multifocal CVA  Subarachnoid hemorrhage - improved  Dysphagia  Acute respiratory failure requiring intubation - improving  COPD  Pneumonia - improved  Right neck/upper back pain  Agitation - resolved  Insomnia - resolved  Anemia  Thrombocytopenia - resolved  KENTRELL - resolved  Elevated LFTs - resolved  Hepatitis C    Discharge Functional Status:      Physical Therapy    Restrictions/Precautions: Fall Risk, General Precautions, Contact Precautions, Isolation, Bed Alarm  Implants present? :  (pt denies)  Other position/activity restrictions: PT/OT staff may titrateO2 down as tolerated in therapy, but may only titrate up to maximum 4 L NC as needed in therapy. Nursing should perform oxygen titration > 4 L. Per RT SpO2 Goal is 92% or higher, use O2 as needed to maintain SpO2 goal with rest and activity. Bed mobility  Rolling to Left: Modified independent  Rolling to Right: Modified independent  Supine to Sit: Supervision  Sit to Supine: Supervision  Scooting: Supervision  Bed Mobility Comments: no use of grab rails with bed in flat position    Transfers  Sit to Stand: Supervision  Stand to Sit: Supervision  Bed to Chair: Supervision  Stand Pivot Transfers: Supervision (transfers completed with and without AD)  Comment: patient demo'd impulsive movement, vcs for safety and O2 line management    Ambulation  Surface: Level tile  Device: Rolling Walker  Other Apparatus: O2 (1L)  Assistance: Supervision  Quality of Gait: vcs to stay up inside SELVIN of  RW with fair, but fleeting return  Gait Deviations: Slow Radha, Decreased step height  Distance: 267ft  Comments: SPO2 93% post amb on 1L (pt may benefit from rollator for energy conservation. Try tomorrow.)  More Ambulation?: No      Occupational Therapy    ADL  Feeding: Dependent/Total, NPO  Feeding Skilled Clinical Factors: Peg tube feeds  Grooming: Moderate assistance  Grooming Skilled Clinical Factors: Patient declines oral hygiene. Moderate assist for brushing hair due to significant tangles. UE Bathing: Stand by assistance  UE Bathing Skilled Clinical Factors: Seated on tub transfer bench in shower with SBA for safety due to impulsivity  LE Bathing: Maximum assistance  LE Bathing Skilled Clinical Factors: Assist for B feet/lower legs and buttocks. Minimal/Moderate assist for standing balance for buttocks  UE Dressing: Minimal assistance  UE Dressing Skilled Clinical Factors: Assist for orientation and to pull down over back  LE Dressing: Maximum assistance  LE Dressing Skilled Clinical Factors: Assist to thread B LE into pull-up. Patient attempted to thread B LE into pants, however threaded B LE into same pant leg. Assist to thread R LE into correct pant leg. Maximal assist to pull over hips  Toileting: Dependent/Total  Toileting Skilled Clinical Factors: Total assist for personal hygiene with BM. Assist for clothing management  Additional Comments: OT facilitated patient engagement in showering routine this date including bathing, dressing, grooming, and toileting tasks. Throughout self-care session, patient required Min/Mod verbal cues for sequencing, safety, attention to task, and problem solving. Patient is impulsive at times and has poor insight into deficits. Please see above for details regarding level of assist.          Balance  Sitting Balance: Stand by assistance  Standing Balance: Moderate assistance (Fluctuates between Minimal/Moderate assist)     Functional Mobility  Functional - Mobility Device: Rolling Walker  Activity:  (2-3 feet from recliner to w/c)  Assist Level:  Moderate assistance (Moderate assist x 1, SBA x 1 for safety)  Functional Mobility Comments: with Moderate verbal cues for safety     Transfers  Stand Pivot Transfers: Moderate assistance  Sit to stand: Moderate assistance  Stand to sit: Moderate assistance  Transfer Comments: with Moderate verbal cues for hand placemetn and safety  Toilet Transfers  Toilet - Technique: Stand pivot, To right, To left  Equipment Used: Grab bars  Toilet Transfer: Moderate assistance  Toilet Transfers Comments: with Moderate verbal cues for hand placement and safety     Shower Transfers  Shower - Transfer From: Wheelchair  Shower - Transfer Type: To and From  Shower - Transfer To: Transfer tub bench  Shower - Technique: Stand pivot, To right, To left  Shower Transfers: Moderate assistance  Shower Transfers Comments: with Moderate verbal cues for hand placement and safety         Speech Therapy  Comprehension: 6 - Complex ideas 90% or device (hearing aid or glasses- if patient is primarily a visual learner)  Expression: 6 - Device used to express complex ideas/needs  Social Interaction: 6 - Patient requires medication for mood and/or effect  Problem Solvin - Patient able to solve simple/routine tasks  Memory: 5 - Patient requires prompting with stress/unfamiliar situations      Inpatient Rehabilitation Course:   Jessee Fernandez is a 60 y.o. male admitted to inpatient rehabilitation on 2023. He was originally admitted to [address] [place] [address] on 2023 for acute respiratory failure secondary to right lower lobe pneumonia. Blood/resp/urine cultures were negative, although his MRSA swab was positive. He completed a course of Unasyn and vancomycin. Imaging showed numerous acute, subacute, and chronic infarcts in the left parietal lobe and left cerebellar hemisphere as well as bilateral subarachnoid hemorrhage. PEG tube was placed on 23 for severe dysphagia. He was found to be positive for hepatitis C and will need outpatient follow up with GI for treatment. ID, nephrology, heme-onc, neuro, pulm, and psych were following.      He was requiring assistance for self-care activities and mobility, prompting admission to acute rehab. Rehab course: The patient participated in an aggressive multidisciplinary inpatient rehabilitation program involving 3 hours per day, 5 days per week of rehabilitation. Patient benefited from inpatient rehab and was discharged in good stable condition. CT head on 1/21/23 showed no residual subarachnoid hemorrhage. He was treated with seroquel for agitation, but this medication was subsequently discontinued after agitation improved. Oxygen was weaned, but he continued to require it intermittently. He remained on duo-neb 4 times daily. He is NPO with bolus tube feeds 6 times daily at the time of discharge. He has been instructed on the free water protocol. Chronic conditions were otherwise stable on medications. Pulmonary consulted-Home O2 evaluation completed. Qualify for O2 2 L with exertion and night. He is to continue with home nebulizer 3 times a day and albuterol inhaler 2 puffs 3 times a day as needed he will need to follow-up outpatient with pulmonary for pulmonary function test.  He completed course of Unasyn and Vanco for pneumonia. CT showedExtensive emphysematous changes within the lungs with massive bulla replacing predominant part left upper lobe density reviewed by pulmonary and myself. Advised a increase in pain shortness of breath, etc. he should go to ER notify physician immediately    Continue with dysphagia, NPO.  PEG site clean. Difficulty obtaining tube feeds. [facility] provided 6 to 8 weeks of tube feeds for patient to take home.         Patient evaluated today:  See progress note    Consults:   Pulmonology and Internal Medicine    Significant Diagnostics:   CBC:   Lab Results   Component Value Date/Time    WBC 7.4 02/09/2023 05:04 PM    RBC 4.44 02/09/2023 05:04 PM    HGB 11.6 02/09/2023 05:04 PM    HCT 36.2 02/09/2023 05:04 PM    MCV 81.6 02/09/2023 05:04 PM    MCH 26.1 differentiation is maintained without evidence of an acute infarct. There is no evidence of hydrocephalus. ORBITS: The visualized portion of the orbits demonstrate no acute abnormality. SINUSES: Air-fluid levels are noted in the the left frontal sinus and left maxillary sinus, in keeping with bacterial sinusitis. SOFT TISSUES/SKULL:  No acute abnormality of the visualized skull or soft tissues. No acute intracranial abnormality. No residual subarachnoid hemorrhage in no residual left parietal intraparenchymal hemorrhage. fluid levels are noted in the the left frontal sinus and left maxillary sinus, in keeping with bacterial sinusitis. CT CHEST WO CONTRAST    Result Date: 2/5/2023  EXAMINATION: CT OF THE CHEST WITHOUT CONTRAST 2/5/2023 4:18 pm TECHNIQUE: CT of the chest was performed without the administration of intravenous contrast. Multiplanar reformatted images are provided for review. Automated exposure control, iterative reconstruction, and/or weight based adjustment of the mA/kV was utilized to reduce the radiation dose to as low as reasonably achievable. COMPARISON: None. HISTORY: ORDERING SYSTEM PROVIDED HISTORY: Follow-up pneumonia TECHNOLOGIST PROVIDED HISTORY: Follow-up pneumonia Reason for Exam: Follow-up pneumonia Relevant Medical/Surgical History: emphysema FINDINGS: CT chest: Lines and tubes:  None. Lungs and Airways and Pleura:  Central airways are patent. .  No pleural effusion. No pneumothorax. Extensive emphysematous changes within the lungs with massive bulla replacing predominant part left upper lobe. There are scattered areas subpleural nodular consolidative change with adjacent linear atelectatic change within the posterior aspect of the right lower lobe. Findings can be sequel to the previous reported pneumonia. Linear atelectatic change are also noted within the left lower lobe. . Lymph nodes: No pathologically enlarged mediastinal, hilar, lower cervical, or chest wall lymph nodes. Cardiovascular and Mediastinum: No acute aortic pathology. Coronary arterial calcification. Cardiac chamber sizes appear to measure within normal limits on this non ECG gated study. Small pericardial effusion. The thyroid gland is unremarkable. The esophagus is unremarkable. Bones/Soft tissues: No fracture. No definite suspicious lytic or blastic foci. Visualized upper abdomen: Bilateral 2-5 mm nonobstructive kidney stones. Gastrostomy tube is in place. There are scattered areas subpleural nodular consolidative change with adjacent linear atelectatic change within the posterior aspect of the right lower lobe. Findings can be sequel to the previous reported pneumonia. Linear atelectatic change are also noted within the left lower lobe. . Extensive emphysematous changes within the lungs with massive bulla replacing predominant part left upper lobe. Small pericardial effusion. Bilateral 2-5 mm nonobstructive kidney stones       XR CHEST PORTABLE    Result Date: 2/5/2023  EXAMINATION: ONE XRAY VIEW OF THE CHEST 2/5/2023 10:04 am COMPARISON: Chest radiograph 01/24/2023, 01/18/2023, CT chest 01/03/2023 HISTORY: ORDERING SYSTEM PROVIDED HISTORY: increased oxygen requirements TECHNOLOGIST PROVIDED HISTORY: increased oxygen requirements Reason for Exam: increased oxygen requirements FINDINGS: Lungs: Hazy bibasilar opacities (right greater than left). Bullous emphysematous changes within the upper lung zones bilaterally (left greater than right). Pleura: No effusion or pneumothorax. Cardiomediastinal silhouette: Normal contours. Bones: No acute osseous findings. Soft tissues: Normal.     Hazy bibasilar opacities (right greater than left).        XR CHEST PORTABLE    Result Date: 1/24/2023  EXAMINATION: ONE XRAY VIEW OF THE CHEST 1/24/2023 1:56 pm COMPARISON: 01/18/2023, 01/05/2023 HISTORY: ORDERING SYSTEM PROVIDED HISTORY: Increased cough productive of gray sputum TECHNOLOGIST PROVIDED HISTORY: Increased cough productive of gray sputum Reason for Exam: sob, cough FINDINGS: Stable heart size without evidence of vascular congestion. Slightly ectatic thoracic aorta. Emphysematous changes with biapical bullous changes, more prominent on the left. Skin fold right upper chest.  Similar mild hazy bibasilar opacities, slightly greater than left, which may be due to atelectasis versus developing or resolving pneumonia. Possible small left pleural effusion. No new focal lung consolidation is seen. Similar mild hazy bibasilar opacities. FL MODIFIED BARIUM SWALLOW W VIDEO    Result Date: 2/2/2023  EXAMINATION: MODIFIED BARIUM SWALLOW WAS PERFORMED IN CONJUNCTION WITH SPEECH PATHOLOGY SERVICES TECHNIQUE: Under fluoroscopic evaluation cineradiography/videoradiography recordings were performed in conjunction with the speech-language pathologist (SLP). Various liquid, solid and/or semi-solid barium preparations were used to assess swallowing function. FLUOROSCOPY DOSE AND TYPE OR TIME AND EXPOSURES: Fluoroscopy time-1 minute and 2 seconds AK-5.1 mGy COMPARISON: None HISTORY: ORDERING SYSTEM PROVIDED HISTORY: follow up dysphagia TECHNOLOGIST PROVIDED HISTORY: follow up dysphagia FINDINGS: Premature vallecular spillage, vallecular residue, piriform sinus cavity residue in deep laryngeal penetration were noted with nectar thick liquid and puree/pudding. No evidence of aspiration. Swallowing mechanism as above. Please see separate speech pathology report for full discussion of findings and recommendations. Patient Instructions:    No driving until cleared by a physician    Medications, precautions and follow up reviewed with patient and family    Follow-up visits: See after visit summary from hospitalization  Follow up PCP 1-2 weeks, PM&R 4-6 weeks, Neurology, [facility]dir[facility] - Dr. Valentino Doty 4-6 weeks    He will also need follow up with GI for treatment of hepatitis C.     Disposition:  Home with home health      Discharge Medications:     Medication List        START taking these medications      acetaminophen 325 MG tablet  Commonly known as: TYLENOL  Take 2 tablets by mouth every 6 hours as needed for Pain     albuterol sulfate  (90 Base) MCG/ACT inhaler  Commonly known as: Proventil HFA  Inhale 2 puffs into the lungs every 4 hours as needed for Wheezing     carvedilol 6.25 MG tablet  Commonly known as: COREG  Take 1 tablet by mouth 2 times daily (with meals)     ferrous sulfate 325 (65 Fe) MG tablet  Commonly known as: IRON 325  Take 1 tablet by mouth daily (with breakfast)  Start taking on: February 6, 2023     ipratropium-albuterol 0.5-2.5 (3) MG/3ML Soln nebulizer solution  Commonly known as: DUONEB  Inhale 3 mLs into the lungs 4 times daily     polyethylene glycol 17 g packet  Commonly known as: GLYCOLAX  Take 17 g by mouth daily as needed for Constipation     spironolactone 25 MG tablet  Commonly known as: ALDACTONE  Take 1 tablet by mouth daily  Start taking on: February 6, 2023     Vital AF 1.2 Lalo Liqd  300 mLs by PEG Tube route 6 times daily Boluses at 6am, 9am, 12pm, 3pm, 6pm, and 9pm.  50mL free water before and after each bolus. Additional 100-300mL water for meds.             STOP taking these medications      aspirin 325 MG tablet     psyllium 28.3 % Pack  Commonly known as: Harrison Saldana               Where to Get Your Medications        These medications were sent to [facility]  [address]  [address]      Phone: [phone]   carvedilol 6.25 MG tablet  ferrous sulfate 325 (65 Fe) MG tablet  spironolactone 25 MG tablet       You can get these medications from any pharmacy    Bring a paper prescription for each of these medications  albuterol sulfate  (90 Base) MCG/ACT inhaler  ipratropium-albuterol 0.5-2.5 (3) MG/3ML Soln nebulizer solution  Vital AF 1.2 Lalo Liqd  You don't need a prescription for these medications  acetaminophen 325 MG tablet  polyethylene glycol 17 g packet           Tiffany Crooks MD These medications were sent to [address] [facility]  [address] [place] [address]      Phone: [phone]   albuterol sulfate  (90 Base) MCG/ACT inhaler  carvedilol 6.25 MG tablet  ferrous sulfate 325 (65 Fe) MG tablet  spironolactone 25 MG tablet       You can get these medications from any pharmacy    Bring a paper prescription for each of these medications  Handicap Placard Misc  Vital AF 1.2 Lalo Liqd  You don't need a prescription for these medications  acetaminophen 325 MG tablet  polyethylene glycol 17 g packet           Josiah Kim MD

## 2023-02-05 NOTE — CONSULTS
[facility]   CONSULT NOTE:      DATE OF CONSULT 2/5/2023    REASON FOR CONSULTATION:  Hypoxemia, dyspnea      PCP Warden Yunier MD     CHIEF COMPLAINT: Dyspnea    HISTORY OF PRESENT ILLNESS:     Cris Alexis is a 25-year-old male who is well-known to us from early January where he presented with multifocal community-acquired pneumonia. He developed acute hypoxic respiratory failure was intubated on January 2 and eventually extubated on January 5. He also was felt to have acute infarcts in the left Janes hemisphere left parietal lobe, and lateral subarachnoid hemorrhages. He was eventually extubated and transferred out of the ICU to the floor. He then went to acute rehab. He has done exceptionally well and does not remember any of the events that occurred while he was in the intensive care unit. He was felt to have COPD but he did not have any pulmonary function testing. He had a history of tobacco use 1 pack a day for 40 years. He also had a history of alcohol use and elevated transaminases. After extubation he required a salter device and eventually was weaned down to 6 L nasal cannula. He was transferred on January 18 to acute rehab and we were not consulted. We are consulted now because an overnight oximetry showed desaturation less than 88% for over 20 minutes. Additionally, he also had ox desaturation with exercise. Finally, chest x-ray was still abnormal with hazy bilateral lower lobe infiltrates. ALLERGIES:  No Known Allergies    HOME MEDICATIONS:  Medications Prior to Admission: aspirin 325 MG tablet, Take 325 mg by mouth daily Patient takes 2 tabs daily  psyllium (KONSYL) 28.3 % PACK, Take 1 packet by mouth daily      PAST MEDICAL HISTORY:  History reviewed. No pertinent past medical history.     PAST SURGICAL HISTORY:  Past Surgical History:   Procedure Laterality Date    UPPER GASTROINTESTINAL ENDOSCOPY N/A 1/17/2023    EGD ESOPHAGOGASTRODUODENOSCOPY PEG TUBE INSERTION performed by DO Dragan at [facility]          SOCIAL HISTORY:  Social History     Socioeconomic History    Marital status: Single     Spouse name: Not on file    Number of children: Not on file    Years of education: Not on file    Highest education level: Not on file   Occupational History    Not on file   Tobacco Use    Smoking status: Every Day     Packs/day: 1.00     Years: 40.00     Pack years: 40.00     Types: Cigarettes    Smokeless tobacco: Never   Substance and Sexual Activity    Alcohol use: Yes     Comment: beer and scotch    Drug use: Not Currently     Comment: over 20 years (stated by daughter)    Sexual activity: Not on file   Other Topics Concern    Not on file   Social History Narrative    Not on file     Social Determinants of Health     Financial Resource Strain: Not on file   Food Insecurity: Not on file   Transportation Needs: Not on file   Physical Activity: Not on file   Stress: Not on file   Social Connections: Not on file   Intimate Partner Violence: Not on file   Housing Stability: Not on file       FAMILY HISTORY:  History reviewed. No pertinent family history. REVIEW OF SYSTEMS:  All other systems reviewed and are negative. PHYSICAL EXAM:  Vital Signs Blood pressure 105/73, pulse 83, temperature 98.4 °F (36.9 °C), resp. rate 18, height 5' 7\" (1.702 m), weight 147 lb 1.6 oz (66.7 kg), SpO2 93 %. Oxygen Amount and Delivery: O2 Flow Rate (L/min): 2 L/min    Admission Weight Weight: 163 lb (73.9 kg)    General Appearance   Middle-age gentleman looking much better now, alert and oriented but frail  Head  Normocephalic, without obvious abnormality, atraumatic    Eyes  conjunctivae/corneas clear. PERRL, EOM's intact.      ENT oral cavity is clear without thrush  Neck  no adenopathy, no carotid bruit, no JVD, supple, symmetrical, trachea midline and thyroid not enlarged, symmetric, no tenderness/mass/nodules  Lungs diminished poor air movement no wheezes  Heart: regular rate and rhythm, S1, S2 normal, no murmur, click, rub or gallop  Abdomen  soft, non-tender; bowel sounds normal; no masses,  no organomegaly  Extremities  No edema  Skin  Skin color, texture, turgor normal. No rashes or lesions  Neurologic: Alert and oriented X 3, normal strength and tone. Imaging  Chest x-ray shows minimal bilateral lower lobe haziness        Lab Review  CBC     Lab Results   Component Value Date/Time    WBC 7.0 02/03/2023 06:55 AM    RBC 4.57 02/03/2023 06:55 AM    HGB 11.2 02/03/2023 06:55 AM    HCT 35.9 02/03/2023 06:55 AM     02/03/2023 06:55 AM    MCV 78.4 02/03/2023 06:55 AM    MCH 24.5 02/03/2023 06:55 AM    MCHC 31.2 02/03/2023 06:55 AM    RDW 33.5 02/03/2023 06:55 AM    NRBC 1 01/03/2023 04:27 AM    LYMPHOPCT 15 02/03/2023 06:55 AM    MONOPCT 10 02/03/2023 06:55 AM    BASOPCT 1 02/03/2023 06:55 AM    MONOSABS 0.70 02/03/2023 06:55 AM    LYMPHSABS 1.05 02/03/2023 06:55 AM    EOSABS 0.14 02/03/2023 06:55 AM    BASOSABS 0.07 02/03/2023 06:55 AM       BMP   Lab Results   Component Value Date/Time     02/03/2023 06:55 AM    K 3.9 02/03/2023 06:55 AM     02/03/2023 06:55 AM    CO2 27 02/03/2023 06:55 AM    BUN 18 02/03/2023 06:55 AM    CREATININE 0.49 02/03/2023 06:55 AM    GLUCOSE 127 02/03/2023 06:55 AM    CALCIUM 8.8 02/03/2023 06:55 AM       LFTS  Lab Results   Component Value Date/Time    ALKPHOS 40 01/20/2023 06:20 AM    ALT 18 01/20/2023 06:20 AM    AST 21 01/20/2023 06:20 AM    PROT 6.2 01/20/2023 06:20 AM    BILITOT 0.8 01/20/2023 06:20 AM    BILIDIR 0.4 01/20/2023 06:20 AM    IBILI 0.4 01/20/2023 06:20 AM    LABALBU 3.2 01/20/2023 06:20 AM       INR  No results for input(s): PROTIME, INR in the last 72 hours. APTT  No results for input(s): APTT in the last 72 hours. Lactic Acid  No results found for: LACTA     PRO-BNP   No results for input(s): PROBNP in the last 72 hours.         ABGs:   Lab Results   Component Value Date/Time    PHART 7.372 01/05/2023 04:46 AM    PO2ART 62.9 01/05/2023 04:46 AM    IQP1QIX 51.6 01/05/2023 04:46 AM       Lab Results   Component Value Date/Time    MODE PRV 01/05/2023 04:46 AM         Impression    Acute hypoxic respiratory failure-intubated 1/2 extubated 1/5  Hypoxemia most likely secondary to underlying lung disease, presumed COPD  History of heavy tobacco use, 40+ pack year  Bilateral pneumonic infiltrates, suspect some scarring no active disease clinically  Full code    Plan:      Check CT of the chest, he had a previous CT on admission, will look for areas of scarring the right lower lobe was not able to be fully visualized because he had a pleural effusion at that time    He will need oxygen with exertion and at nighttime. A face-to-face question was done on the benefits of oxygen therapy and he is agreeable    He will need home nebulizer for use.   Face-to-face question was done on the benefits of home nebulizer therapy and he is agreeable    Will need outpatient pulmonary function testing    We discussed tobacco cessation    His wife was present and agrees to the plan    He will need follow-up with me in approximately 4 to 6 weeks

## 2023-02-05 NOTE — PLAN OF CARE
Problem: Discharge Planning  Goal: Discharge to home or other facility with appropriate resources  2/5/2023 0236 by Kevyn Alatorre RN  Outcome: Progressing     Problem: Nutrition Deficit:  Goal: Optimize nutritional status  2/5/2023 0236 by Kevyn Alatorre RN  Outcome: Progressing     Problem: Safety - Adult  Goal: Free from fall injury  2/5/2023 0236 by Kevyn Alatorre RN  Outcome: Progressing     Problem: ABCDS Injury Assessment  Goal: Absence of physical injury  2/5/2023 0236 by Kevyn Alatorre RN  Outcome: Progressing  Flowsheets (Taken 2/5/2023 0033)  Absence of Physical Injury: Implement safety measures based on patient assessment     Problem: Skin/Tissue Integrity  Goal: Absence of new skin breakdown  Description: 1. Monitor for areas of redness and/or skin breakdown  2. Assess vascular access sites hourly  3. Every 4-6 hours minimum:  Change oxygen saturation probe site  4. Every 4-6 hours:  If on nasal continuous positive airway pressure, respiratory therapy assess nares and determine need for appliance change or resting period.   2/5/2023 0236 by Kevyn Alatorre RN  Outcome: Progressing     Problem: Chronic Conditions and Co-morbidities  Goal: Patient's chronic conditions and co-morbidity symptoms are monitored and maintained or improved  2/5/2023 0236 by Kevyn Alatorre RN  Outcome: Progressing     Problem: Pain  Goal: Verbalizes/displays adequate comfort level or baseline comfort level  2/5/2023 0236 by Kevyn Alatorre RN  Outcome: Progressing

## 2023-02-05 NOTE — PROGRESS NOTES
Writer received call from RT, Maria Del Rosario Cruz, informing that nocturnal pulse ox notes reviewed and pt does not qualify for HS o2 as he destated into mid 80s% x 1 min then returned above 90%.  Home o2 eval set for 2/5/23

## 2023-02-05 NOTE — PROGRESS NOTES
Per Dr. Shalonda Barillas, patient requires a new chest xray and pulmonary consult to confirm needs for discharge of oxygen and monitoring for any changes since last chest xray 1/24/23.

## 2023-02-05 NOTE — PLAN OF CARE
Problem: Discharge Planning  Goal: Discharge to home or other facility with appropriate resources  Outcome: Progressing  Flowsheets (Taken 2/5/2023 0830)  Discharge to home or other facility with appropriate resources: Identify barriers to discharge with patient and caregiver   Pt to discharge home once medically stable. Problem: Nutrition Deficit:  Goal: Optimize nutritional status  Outcome: Progressing   Pt tolerating tube feedings. Pt continues q3h while awake. Problem: Safety - Adult  Goal: Free from fall injury  Outcome: Progressing  Flowsheets (Taken 2/5/2023 0941)  Free From Fall Injury: Instruct family/caregiver on patient safety   No injury noted this shift. Problem: ABCDS Injury Assessment  Goal: Absence of physical injury  Outcome: Progressing  Flowsheets (Taken 2/5/2023 0941)  Absence of Physical Injury: Implement safety measures based on patient assessment   No injury noted this shift. Problem: Skin/Tissue Integrity  Goal: Absence of new skin breakdown  Description: 1. Monitor for areas of redness and/or skin breakdown  2. Assess vascular access sites hourly  3. Every 4-6 hours minimum:  Change oxygen saturation probe site  4. Every 4-6 hours:  If on nasal continuous positive airway pressure, respiratory therapy assess nares and determine need for appliance change or resting period. Outcome: Progressing   No new skin noted this shift. Problem: Chronic Conditions and Co-morbidities  Goal: Patient's chronic conditions and co-morbidity symptoms are monitored and maintained or improved  Outcome: Progressing  Flowsheets (Taken 2/5/2023 0830)  Care Plan - Patient's Chronic Conditions and Co-Morbidity Symptoms are Monitored and Maintained or Improved: Monitor and assess patient's chronic conditions and comorbid symptoms for stability, deterioration, or improvement   Monitoring.    Problem: Pain  Goal: Verbalizes/displays adequate comfort level or baseline comfort level  Outcome: Progressing Patient denied pain to RN this shift. Will continue to monitor.

## 2023-02-05 NOTE — PROGRESS NOTES
Physical Therapy  Facility/Department: [facility]  Rehabilitation Physical Therapy Treatment Note    NAME: Ann Poe  : 1960 (47 y.o.)  MRN: [id number]  CODE STATUS: Full Code  Date of Service: 23    Restrictions:  Restrictions/Precautions: Fall Risk;General Precautions;Contact Precautions;Isolation; Bed Alarm     SUBJECTIVE  Subjective  Subjective: Pt states neck pain is improved, has improved rotation ROM. States he slept well. Impulsive with walker; tends to lift it to turn rather than guide it through turn on ground. OBJECTIVE  Functional Mobility  Bed Mobility  Overall Assistance Level: Independent  Additional Factors: Head of bed flat; Without handrails  Bridging  Assistance Level: Independent  Roll Left  Assistance Level: Independent  Roll Right  Assistance Level: Independent  Sit to Supine  Assistance Level: Independent  Supine to Sit  Assistance Level: Independent  Scooting  Assistance Level: Independent  Balance  Sitting Balance: Independent  Standing Balance: Independent (20' amb without device; remainder Mod I with RW.)  Standing Balance  Time: ~3 min. x2  Activity: ambulation  Comments: rolling walker  Transfers  Surface: To bed;From bed; To chair with arms;From chair with arms; Wheelchair; To mat;From mat  Device:  (With, without rolling walker)  Sit to Stand  Assistance Level: Modified independent; Independent  Skilled Clinical Factors: Indep without device, Mod I with  Stand to Sit  Assistance Level: Modified independent; Independent  Skilled Clinical Factors: Indep without device, Mod I with  Bed To/From Chair  Technique: Stand step;Stand pivot  Assistance Level: Modified independent  Skilled Clinical Factors: Without device, seeks UE support  Stand Pivot  Assistance Level: Modified independent  Skilled Clinical Factors: Without device, seeks UE support    Environmental Mobility  Ambulation  Surface: Level surface; Ramp  Device: Rolling walker (20' without AD)  Distance: 200' AM & PM  Assistance Level: Independent; Modified independent  Gait Deviations: Unsteady gait  Skilled Clinical Factors: No supp. O2 used today; Pt briefly desaturates with exertion, but recovers WNL with less than 1 min. seated rest. Some shuffling, particularly with fatigue or longer distances. Slouched posture. Pt states he was attending his breathing technique and pacing himself during the longer walks. Without device, pt made wider turn took wider step to recover balance, negatively impacting path. Stairs  Stair Height: 8''  Device: Bilateral handrails  Number of Stairs: 17  Additional Factors: Non-reciprocal going up;Non-reciprocal going down  Assistance Level: Stand by assist  Skilled Clinical Factors: SpO2 WNL at top of steps, desatted by time he descended, requiring 1 min of seated rest to recover. Pt attempting to use just L HR on ascent, shifts to bilateral rails after increased R knee flexion ascending step No. 7 for improved stability, G return. Using R descending HR on descent without difficulty. Curb  Curb Height: 6''  Device: Rolling walker  Number of Curbs: 2  Assistance Level: Stand by assist  Skilled Clinical Factors: Demonstrates G technique after education and demonstration of same. PT Exercises  Exercise Equipment: NuStep, workload 4, 10 min. (SpO2 WNL on room air throughout.)    ASSESSMENT/PROGRESS TOWARDS GOALS  Vital Signs  Heart Rate: 87  SpO2: (!) 88 % (lowest reading of treatments; Pt recovering to WNL within 1 min.  of seated rest on room air today.)  O2 Device: Nasal cannula    Assessment  Activity Tolerance: Patient limited by endurance;Treatment limited secondary to medical complications (Rest breaks PRN & to encourage pacing, breathing techniques to manage SOB.)     02/05/23 1800   Tinetti   Sitting Balance 1   Arises 2   Attempts to Arise 2   Immediate Standing Balance (First 5 Seconds) 2   Standing Balance 2   Nudged 2   Eyes Closed 1   Turned 360 Degrees: Steadiness 1   Turned 360 Degrees: Continuity of Steps 1   Sitting Down 2   Balance Score 16   Initiation of Gait 1   Step Height: R Swing Foot 0   Step Length: R Swing Foot 1   Step Height: L Swing Foot 0   Step Length: L Swing Foot 1   Step Symmetry 1   Step Continuity 1   Path 1   Trunk 2   Walking Time 1   Gait Score 9   Tinetti Total Score 25   Tinetti Disability Index 1-19%   Tinetti CMS MODIFIER CI     Goals  Short Term Goals  Time Frame for Short Term Goals: 9 days  Short Term Goal 1: CGA assist supine<-> sit x1 assist  Short Term Goal 2: sit EOB withsupervsion up to 15 min for therex/ADL  Short Term Goal 3: 3+/5 LE strength to prepare for standing activiites and gait  Short Term Goal 4: ambulation with rolling walker distance of 70 to 100 ft, CGA, level surface  Short Term Goal 5: roll L/R with SBA  Additional Goals?: Yes  Short Term Goal 6: Pt able to go up and down 5 steps with 2 rails, min A  Long Term Goals  Time Frame for Long Term Goals : By DC  Long Term Goal 1: pt able to roll L/R independently  Long Term Goal 2: pt able to perform supine<>sit mod-I  Long Term Goal 3: pt able to perform sit<>stand and perform pivot/step to transfers at mod-I  Long Term Goal 4: pt able to ambulate household distance with appropriate device, mod-I  Long Term Goal 5: pt able to ambulate > 50 ft with appropriate device, at SBA/supervsion level, level as well as incline surface. Additional Goals?: Yes  Long term goal 6: pt able to go up and down 4 or more steps with B UE support, CGA. Long term goal 7: Improve PASS score to 27/36 improve function/stability. Long term goal 8: improve Tinetti balance score to atleast 23/28 to reduce fall risk.     PLAN OF CARE/SAFETY  Physcial Therapy Plan  General Plan:  minutes of therapy at least 5 out of 7 days a week (5 to 7 days/week.)  Specific Instructions for Next Treatment: challenging balance  activities  Current Treatment Recommendations: Strengthening;Balance training;Functional mobility training;Neuromuscular re-education;Cognitive reorientation; Safety education & training;Patient/Caregiver education & training;Equipment evaluation, education, & procurement; Therapeutic activities;Transfer training; Wheelchair mobility training;Gait training;Stair training;Home exercise program;Positioning  Safety Devices  Type of Devices: All fall risk precautions in place;Call light within reach;Gait belt;Telesitter in use;Patient at risk for falls; Left in bed    Therapy Time     02/05/23 0751 02/05/23 0752   PT Individual Minutes   Time In 1001 1435   Time Out 1104 1505   Minutes 61 200 [place], [place], 02/05/23 at 6:56 PM

## 2023-02-06 PROCEDURE — 97110 THERAPEUTIC EXERCISES: CPT

## 2023-02-06 PROCEDURE — 94640 AIRWAY INHALATION TREATMENT: CPT

## 2023-02-06 PROCEDURE — 97530 THERAPEUTIC ACTIVITIES: CPT

## 2023-02-06 PROCEDURE — 97116 GAIT TRAINING THERAPY: CPT

## 2023-02-06 PROCEDURE — 6360000002 HC RX W HCPCS: Performed by: PHYSICAL MEDICINE & REHABILITATION

## 2023-02-06 PROCEDURE — 92526 ORAL FUNCTION THERAPY: CPT

## 2023-02-06 PROCEDURE — 99232 SBSQ HOSP IP/OBS MODERATE 35: CPT | Performed by: PHYSICAL MEDICINE & REHABILITATION

## 2023-02-06 PROCEDURE — 6370000000 HC RX 637 (ALT 250 FOR IP): Performed by: PHYSICAL MEDICINE & REHABILITATION

## 2023-02-06 PROCEDURE — 6370000000 HC RX 637 (ALT 250 FOR IP): Performed by: INTERNAL MEDICINE

## 2023-02-06 PROCEDURE — 99232 SBSQ HOSP IP/OBS MODERATE 35: CPT | Performed by: INTERNAL MEDICINE

## 2023-02-06 PROCEDURE — 2700000000 HC OXYGEN THERAPY PER DAY

## 2023-02-06 PROCEDURE — 94761 N-INVAS EAR/PLS OXIMETRY MLT: CPT

## 2023-02-06 PROCEDURE — 1180000000 HC REHAB R&B

## 2023-02-06 RX ORDER — IPRATROPIUM BROMIDE AND ALBUTEROL SULFATE 2.5; .5 MG/3ML; MG/3ML
3 SOLUTION RESPIRATORY (INHALATION) 4 TIMES DAILY
Qty: 360 ML | Refills: 3 | Status: SHIPPED | OUTPATIENT
Start: 2023-02-06 | End: 2023-02-06 | Stop reason: SDUPTHER

## 2023-02-06 RX ORDER — IPRATROPIUM BROMIDE AND ALBUTEROL SULFATE 2.5; .5 MG/3ML; MG/3ML
1 SOLUTION RESPIRATORY (INHALATION) 3 TIMES DAILY
Qty: 360 ML | Refills: 3 | Status: CANCELLED | OUTPATIENT
Start: 2023-02-06

## 2023-02-06 RX ORDER — IPRATROPIUM BROMIDE AND ALBUTEROL SULFATE 2.5; .5 MG/3ML; MG/3ML
3 SOLUTION RESPIRATORY (INHALATION) 3 TIMES DAILY
Qty: 360 ML | Refills: 3 | Status: SHIPPED | OUTPATIENT
Start: 2023-02-06 | End: 2023-02-13 | Stop reason: ALTCHOICE

## 2023-02-06 RX ORDER — ALBUTEROL SULFATE 90 UG/1
2 AEROSOL, METERED RESPIRATORY (INHALATION) EVERY 4 HOURS PRN
Qty: 18 G | Refills: 3 | Status: SHIPPED | OUTPATIENT
Start: 2023-02-06 | End: 2023-02-06 | Stop reason: SDUPTHER

## 2023-02-06 RX ORDER — ALBUTEROL SULFATE 90 UG/1
2 AEROSOL, METERED RESPIRATORY (INHALATION) 3 TIMES DAILY PRN
Qty: 18 G | Refills: 3 | Status: SHIPPED | OUTPATIENT
Start: 2023-02-06

## 2023-02-06 RX ADMIN — ENOXAPARIN SODIUM 40 MG: 100 INJECTION SUBCUTANEOUS at 09:30

## 2023-02-06 RX ADMIN — CARVEDILOL 6.25 MG: 6.25 TABLET, FILM COATED ORAL at 18:27

## 2023-02-06 RX ADMIN — SPIRONOLACTONE 25 MG: 25 TABLET ORAL at 10:37

## 2023-02-06 RX ADMIN — CARVEDILOL 6.25 MG: 6.25 TABLET, FILM COATED ORAL at 09:30

## 2023-02-06 RX ADMIN — IPRATROPIUM BROMIDE AND ALBUTEROL SULFATE 1 AMPULE: 2.5; .5 SOLUTION RESPIRATORY (INHALATION) at 16:45

## 2023-02-06 RX ADMIN — IPRATROPIUM BROMIDE AND ALBUTEROL SULFATE 1 AMPULE: 2.5; .5 SOLUTION RESPIRATORY (INHALATION) at 12:16

## 2023-02-06 RX ADMIN — IPRATROPIUM BROMIDE AND ALBUTEROL SULFATE 1 AMPULE: 2.5; .5 SOLUTION RESPIRATORY (INHALATION) at 07:11

## 2023-02-06 RX ADMIN — IPRATROPIUM BROMIDE AND ALBUTEROL SULFATE 1 AMPULE: 2.5; .5 SOLUTION RESPIRATORY (INHALATION) at 19:28

## 2023-02-06 RX ADMIN — FERROUS SULFATE TAB 325 MG (65 MG ELEMENTAL FE) 325 MG: 325 (65 FE) TAB at 10:36

## 2023-02-06 ASSESSMENT — PAIN SCALES - WONG BAKER: WONGBAKER_NUMERICALRESPONSE: 4

## 2023-02-06 NOTE — PLAN OF CARE
Problem: Discharge Planning  Goal: Discharge to home or other facility with appropriate resources  2/6/2023 0123 by Raine Vasquez RN  Outcome: Progressing     Problem: Nutrition Deficit:  Goal: Optimize nutritional status  2/6/2023 0123 by Raine Vasquez RN  Outcome: Progressing     Problem: Safety - Adult  Goal: Free from fall injury  2/6/2023 0123 by Raine Vasquez RN  Outcome: Progressing     Problem: ABCDS Injury Assessment  Goal: Absence of physical injury  2/6/2023 0123 by Raine Vasquez RN  Outcome: Progressing  Flowsheets (Taken 2/5/2023 2345)  Absence of Physical Injury: Implement safety measures based on patient assessment     Problem: Skin/Tissue Integrity  Goal: Absence of new skin breakdown  Description: 1. Monitor for areas of redness and/or skin breakdown  2. Assess vascular access sites hourly  3. Every 4-6 hours minimum:  Change oxygen saturation probe site  4. Every 4-6 hours:  If on nasal continuous positive airway pressure, respiratory therapy assess nares and determine need for appliance change or resting period.   2/6/2023 0123 by Raine Vasquez RN  Outcome: Progressing     Problem: Chronic Conditions and Co-morbidities  Goal: Patient's chronic conditions and co-morbidity symptoms are monitored and maintained or improved  2/6/2023 0123 by Raine Vasquez RN  Outcome: Progressing     Problem: Pain  Goal: Verbalizes/displays adequate comfort level or baseline comfort level  2/6/2023 0123 by Raine Vasquez RN  Outcome: Progressing

## 2023-02-06 NOTE — CARE COORDINATION
Pt and S/O updated on waiting to hear from [place] [id number] and company delivering tube feeding for confirmation that insurance is accepted before he can discharge. Notified if no confirmation by 5:00  He would not be able to go home.

## 2023-02-06 NOTE — PROGRESS NOTES
Byron Mckenzie MD/Luis Felipe Beebe MD/ Saray Carreon MD/Dr Dyllan Manzo APRN AGACNP-BC, NP-C      Amelia Gayle APRN NP-C     Mary Mckeon APRN NP-C                                           Pulmonary Progress Note    Patient - Trish Neely   Age - 60 y.o.   - 1960  MRN - [id number]  Acct # - [de-identified]  Date of Admission - 2023  9:12 AM    Consulting Service/Physician:       Primary Care Physician: Dionne Bernal MD    SUBJECTIVE:     Chief Complaint: No chief complaint on file. Subjective:    Marija Solorzano is seen standing up in therapy. He is currently on 1 L nasal cannula. Home O2 eval yesterday showed he qualified for 2 L at rest and with activity. Overnight oximetry showed he was below 88% for 11 minutes. He qualifies for 2 L around-the-clock. He has been receiving nebulizer treatments here which he states does help with his breathing. He does not have a nebulizer machine or medication at home. He reports an occasional cough. CT of the chest showed atelectasis in the lung bases with severe emphysema. Plan is for discharge home. VITALS  /76   Pulse 84   Temp 98.5 °F (36.9 °C)   Resp 18   Ht 5' 7\" (1.702 m)   Wt 144 lb 6 oz (65.5 kg)   SpO2 95%   BMI 22.61 kg/m²   Wt Readings from Last 3 Encounters:   23 144 lb 6 oz (65.5 kg)   23 159 lb 9.8 oz (72.4 kg)   23 188 lb (85.3 kg)     I/O (24 Hours)    Intake/Output Summary (Last 24 hours) at 2023 1034  Last data filed at 2023 0193  Gross per 24 hour   Intake 2050 ml   Output 550 ml   Net 1500 ml     Ventilator:   Settings  FiO2 : 21 %  Exam:   Physical Exam   Constitutional: Thin appearing male standing up in physical therapy on 1 L nasal cannula   HENT: Unremarkable  Head: Normocephalic and atraumatic. Eyes: EOM are normal. Pupils are equal, round, and reactive to light. Neck: Neck supple.    Cardiovascular:  Regular rate and rhythm. Normal heart tones. No JVD. Pulmonary/Chest: Respirations even and unlabored, lungs severely diminished, on 1 L nasal cannula with pulse ox 95% Abdominal: Soft. Bowel sounds are normal.   Musculoskeletal: Normal range of motion. Neurological: Patient is alert and oriented to person, place, and time. Skin: Skin is warm and dry. No rash noted.    Extremities: No edema or discoloration  Infusions:     Meds:     Current Facility-Administered Medications:     diclofenac sodium (VOLTAREN) 1 % gel 2 g, 2 g, Topical, TID PRN, Coreen Velez MD    enoxaparin (LOVENOX) injection 40 mg, 40 mg, SubCUTAneous, Daily, Ailyn Ramesh MD, 40 mg at 02/05/23 0905    ipratropium-albuterol (DUONEB) nebulizer solution 1 ampule, 1 ampule, Inhalation, Q4H WA, Ailyn Ramesh MD, 1 ampule at 02/06/23 0711    guaiFENesin [facility]) extended release tablet 600 mg, 600 mg, Oral, BID PRN, Kiara Schreiber MD, 600 mg at 01/20/23 1712    carvedilol (COREG) tablet 6.25 mg, 6.25 mg, Oral, BID WC, Kiara Schreiber MD, 6.25 mg at 02/05/23 1823    ferrous sulfate (IRON 325) tablet 325 mg, 325 mg, Oral, Daily with breakfast, Kiara Schreiber MD, 325 mg at 02/05/23 8937    spironolactone (ALDACTONE) tablet 25 mg, 25 mg, Oral, Daily, Kiara Schreiber MD, 25 mg at 02/05/23 1118    acetaminophen (TYLENOL) tablet 650 mg, 650 mg, Oral, Q4H PRN, Ailyn Ramesh MD, 650 mg at 02/02/23 1059    polyethylene glycol (GLYCOLAX) packet 17 g, 17 g, Oral, Daily, Ailyn Ramesh MD, 17 g at 02/01/23 0800    senna (SENOKOT) tablet 17.2 mg, 2 tablet, Oral, Daily PRN, Ailyn Ramesh MD    bisacodyl (DULCOLAX) suppository 10 mg, 10 mg, Rectal, Daily PRN, Ailyn Ramesh MD    Lab Results:     Lab Results   Component Value Date    WBC 7.0 02/03/2023    HGB 11.2 (L) 02/03/2023    HCT 35.9 (L) 02/03/2023    MCV 78.4 (L) 02/03/2023     02/03/2023     Lab Results   Component Value Date    CALCIUM 8.8 02/03/2023     02/03/2023 K 3.9 02/03/2023    CO2 27 02/03/2023     02/03/2023    BUN 18 02/03/2023    CREATININE 0.49 (L) 02/03/2023     Lab Results   Component Value Date    INR 1.6 01/08/2023    PROTIME 19.1 (H) 01/08/2023       Radiology:       ASSESSMENT:       Acute hypoxic respiratory failure-intubated 1/2 extubated 1/5  Hypoxemia most likely secondary to underlying lung disease, presumed COPD  History of heavy tobacco use, 40+ pack year  Bilateral pneumonic infiltrates, suspect some scarring no active disease clinically  Emphysema, large bleb left upper lobe  Atelectasis  Full code  PLAN:   He qualifies for 2 L nasal cannula around-the-clock. He desaturated under 88% for 11 minutes on nocturnal oxygen evaluation. He will benefit from the use of home oxygen at 2 L around-the-clock and face-to-face evaluation was completed today. He will need stationary concentrator as well as portability. He would benefit from the use of nebulizer treatments and a face-to-face evaluation was completed today. He has severe emphysema on CT chest.  He will benefit from the use of DuoNeb treatments 3 times a day. Plan for DuoNeb treatments 3 times per day  He will need outpatient follow-up in our office in 4 to 6 weeks with pulmonary function testing to be completed  Smoking cessation is encouraged  CT findings were discussed with patient in detail. I did discuss with him that he does have a very large left upper lobe bleb. If he develops sudden onset of left-sided chest pain or dyspnea he should go to the emergency room immediately as he has high risk for pneumothorax. Discussed with nursing  Teressa for discharge home from pulmonary standpoint with close outpatient follow-up      Electronically signed by ALAYAN Richards CNP on 02/06/23     This progress note was completed using a voice transcription system. Every effort was made to ensure accuracy. However, inadvertent computerized transcription errors may be present.     Ashley Petersen Eri NP-C, MSN  [facility] Pulmonary, Critical Care & Sleep

## 2023-02-06 NOTE — CARE COORDINATION
CM ARU Note    Spoke with patient this morning and he is all set to go home. Js from [facility] all set to to order Tube feedings for this patient. Salvatore [employer] Dietician, provided patient with Formula to get him through Wednesday. Received a call from [employer], they will re-rerun this through his insurance to ensure coverage before we discharge the patient. They are getting conflicting information from Medicaid. Called Karolyn Laidee deeclemencia from the [employer]. Patient's insurance is now handled my [employer] It still carries the same ID number    Spoke with Ellyn Faria and updated on the above insurance information. Spoke with Willam Simmonds and Dr Dimitrios Chacko and kept them updated. Called and spoke to [facility] and they returned my called stating they are not currently in network with his new insurance. insurance information. [address] [employer] at [phone] and was able to get information regarding which [facility] would be in Network. [address] Notified and after confirming with [employer], patient was accepted. Called Medical Service and they too will reach out to [facility]/ They are not in [address] in with this insurance company. Will reach out with [employer]  and spoke with Lizzie Oleary in the [place] location. [facility] does not currently have a DME provider in network. He asked this writer to download a PA form online and submit with clinicals, which I did, immediately. I will reach out to him tomorrow. Spoke with Ga Lara, Patients nurse and asked she update the family. Annette Jerry kept family updated throughout the day    [address] for confirmation to discharge patient home.

## 2023-02-06 NOTE — PROGRESS NOTES
Speech Language Pathology  Speech Language Pathology  [facility] Acute Rehab Unit at [facility]    Cognitive/Dysphagia Treatment Note    Date: 2/6/2023  Patients Name: Andres Mail  MRN: [id number]  Diagnosis: Dysphagia s/p CVA  Patient Active Problem List   Diagnosis Code    Acute type II respiratory failure (HCC) J96.00    Transaminitis R74.01    Normocytic anemia D64.9    Thrombocytopenia (HCC) D69.6    Agitation R45.1    Acute respiratory failure with hypoxia and hypercapnia (HCC) RLL pneumonia J96.01, J96.02    Altered mental status R41.82    Community acquired pneumonia of right lower lobe of lung J18.9    Prolonged pt (prothrombin time) R79.1    Emphysema lung ([address] J43.9    Cerebral infarction ([address] I63.9    ACP (advance care planning) Z71.89    Goals of care, counseling/discussion Z71.89    Encounter for palliative care Z51.5    Delirium due to another medical condition F05    Moderate malnutrition ([address] E44.0    Hepatitis C virus infection without hepatic coma B19.20    Dysphagia R13.10    Impending cerebrovascular accident ([address] I63.9    Dermatitis associated with moisture L30.8    Cerebrovascular accident (CVA) due to embolism of cerebral artery ([address] I63.40       Pain: 2/10    Cognitive Treatment  Treatment time: 0649-7192    Subjective: [x] Alert [x] Cooperative     [] Confused     [] Agitated    [] Lethargic    Objective/Assessment:    Recall: n/a     Organization: NT    Problem solving: n/a    Dysphagia:  Pt. Completed Kim maneuver x10, simple tongue base strengthening exercises x5, 20 reps each and oral motor exercises x2, 15 reps each. Educ. Pt. Re: continuing c ST for dysphagia with repeat video swallow study as an outpatient recommended in 4 weeks. Also encouraged pt. To continue to practice exercises on own. Pt. Verbalized understanding of info provided. Pt.  To be d/c home this pm.       Plan:  [x] Continue ST services    [] Discharge from ST:        Discharge recommendations: [] Inpatient Rehab   [] [facility]   [] Outpatient Therapy  [] Follow up at trauma clinic   [] Other:         Treatment completed by: Yousif Silver A.CCC/SLP

## 2023-02-06 NOTE — PROGRESS NOTES
[facility] Internal Medicine  Jason Woodward MD; Jim Vigil MD; Jean Paul Garduno MD; MD Morgan Beach MD; Verónica Grider MD    [facility]   [facility]    Progress note    Late entry from 1/30/23        Date:   2/6/2023  Patient name:  Kenji Rosado  Date of admission:  1/19/2023  9:12 AM  MRN:   [id number]  Account:  [de-identified]  YOB: 1960  PCP:    Verónica Grider MD  Room:   [facility]-  Code Status:    Full Code    Physician Requesting Consult: Yolande Wiggins MD    Reason for Consult: Medical management    Chief Complaint:   Today he is asymptomatic he continues to have dysphagia and failed to swallow tests. He has a G-tube in but his insurance will not cover outpatient G-tube feedings or  nursing visits    Acute CVA, generalized weakness, metabolic encephalopathy, multifactorial with underlying acute respiratory failure with pneumonia and heart failure    History Obtained From:     patient    History of Present Illness:     71-year-old gentleman with unknown past medical history presented to inpatient [facility] with metabolic encephalopathy found to have acute respiratory failure, pneumonia, also had slurred speech, confusion progressively was getting worse, EMS brought the patient to the hospital his saturations were  75%, found to have acute respiratory failure with pneumonia, in the ED patient was placed on BiPAP, chest x-ray confirmed right lower lobe pneumonia with pleural effusion. ABG showed acute respiratory failure with hypercapnia and hypoxia.   Subsequently also found to have NSTEMI, acute heart failure with transaminitis possible shock liver, with also hepatitis C acute with hepatic failure, slowly improved,  Also had multiple acute brain infarct with subarachnoid hemorrhage,,  Subsequently patient was evaluated for acute inpatient rehab, accepted and admitted right now we are consulted for medical management    January 28  No new symptoms  Tolerating rehab therapies  Past Medical History:     History reviewed. No pertinent past medical history. Past Surgical History:     Past Surgical History:   Procedure Laterality Date    UPPER GASTROINTESTINAL ENDOSCOPY N/A 1/17/2023    EGD ESOPHAGOGASTRODUODENOSCOPY PEG TUBE INSERTION performed by Cole Martino DO at [facility] ENDO        Medications Prior to Admission:     Prior to Admission medications    Medication Sig Start Date End Date Taking? Authorizing Provider   Handicap Placard MISC by Does not apply route Duration: 12months / Dx: cva 2/6/23  Yes Nilesh Michele MD   ipratropium-albuterol (DUONEB) 0.5-2.5 (3) MG/3ML SOLN nebulizer solution Inhale 3 mLs into the lungs 3 times daily 2/6/23  Yes Nilesh Michele MD   albuterol sulfate HFA (PROVENTIL HFA) 108 (90 Base) MCG/ACT inhaler Inhale 2 puffs into the lungs 3 times daily as needed for Wheezing 2/6/23  Yes Nilesh Michele MD   acetaminophen (TYLENOL) 325 MG tablet Take 2 tablets by mouth every 6 hours as needed for Pain 2/5/23  Yes Rachelle Martinez MD   carvedilol (COREG) 6.25 MG tablet Take 1 tablet by mouth 2 times daily (with meals) 2/5/23  Yes Rachelle Martinez MD   spironolactone (ALDACTONE) 25 MG tablet Take 1 tablet by mouth daily 2/6/23  Yes Rachelle Martinez MD   ferrous sulfate (IRON 325) 325 (65 Fe) MG tablet Take 1 tablet by mouth daily (with breakfast) 2/6/23  Yes Rachelle Martinez MD   polyethylene glycol (GLYCOLAX) 17 g packet Take 17 g by mouth daily as needed for Constipation 2/5/23  Yes Rachelle Martinez MD   Nutritional Supplements (VITAL AF 1.2 MIGUELITO) LIQD 300 mLs by PEG Tube route 6 times daily Boluses at 6am, 9am, 12pm, 3pm, 6pm, and 9pm.  50mL free water before and after each bolus. Additional 100-300mL water for meds. 2/2/23  Yes Rachelle Martinez MD        Allergies:     Patient has no known allergies.     Social History:     Tobacco:    reports that he has been smoking cigarettes. He has a 40.00 pack-year smoking history. He has never used smokeless tobacco.  Alcohol:      reports current alcohol use. Drug Use:  reports that he does not currently use drugs. Family History:     History reviewed. No pertinent family history. Review of Systems:     Positive and Negative as described in HPI. CONSTITUTIONAL:  negative for fevers, chills, sweats, fatigue, weight loss  HEENT:  negative for vision, hearing changes, runny nose, throat pain  RESPIRATORY:  negative for shortness of breath, cough, congestion, wheezing. CARDIOVASCULAR:  negative for chest pain, palpitations. GASTROINTESTINAL:  negative for nausea, vomiting, diarrhea, constipation, change in bowel habits, abdominal pain   GENITOURINARY:  negative for difficulty of urination, burning with urination, frequency   INTEGUMENT:  negative for rash, skin lesions, easy bruising   HEMATOLOGIC/LYMPHATIC:  negative for swelling/edema   ALLERGIC/IMMUNOLOGIC:  negative for urticaria , itching  ENDOCRINE:  negative increase in drinking, increase in urination, hot or cold intolerance  MUSCULOSKELETAL:  negative joint pains, muscle aches, swelling of joints  NEUROLOGICAL: History of encephalopathy and recent subarachnoid hemorrhage  BEHAVIOR/PSYCH:  negative for depression, anxiety    Physical Exam:     /76   Pulse 82   Temp 98.5 °F (36.9 °C)   Resp 18   Ht 5' 7\" (1.702 m)   Wt 144 lb 6 oz (65.5 kg)   SpO2 95%   BMI 22.61 kg/m²   Temp (24hrs), Av.3 °F (36.8 °C), Min:98.1 °F (36.7 °C), Max:98.5 °F (36.9 °C)    No results for input(s): POCGLU in the last 72 hours. Intake/Output Summary (Last 24 hours) at 2023 1624  Last data filed at 2023 0900  Gross per 24 hour   Intake 1950 ml   Output 550 ml   Net 1400 ml       General Appearance:  alert, well appearing, and in no acute distress  Mental status: oriented to person, place, and time with normal affect  Head:  normocephalic, atraumatic.   Eye: no icterus, redness, pupils equal and reactive, extraocular eye movements intact, conjunctiva clear  Ear: normal external ear, no discharge, hearing intact  Nose:  no drainage noted  Mouth: mucous membranes moist  Neck: supple, no carotid bruits, thyroid not palpable  Lungs: Bilateral equal air entry, clear to ausculation, no wheezing, rales or rhonchi, normal effort  Cardiovascular: normal rate, regular rhythm, no murmur, gallop, rub. Abdomen: G-tube in place, no abdominal tenderness or rigidity and no masses  Neurologic: There are no new focal motor or sensory deficits, normal muscle tone and bulk, no abnormal sensation, normal speech, cranial nerves II through XII grossly intact  Skin: No gross lesions, rashes, bruising or bleeding on exposed skin area  Extremities:  peripheral pulses palpable, no pedal edema or calf pain with palpation  Psych: normal affect    Investigations:      Laboratory Testing:  No results found for this or any previous visit (from the past 24 hour(s)). Imaging/Diagonstics:  XR CHEST PORTABLE    Result Date: 1/18/2023  Interval extubation and NG tube removal.  Otherwise similar findings, with perhaps slightly greater hazy ground-glass opacity right mid lung. Correlate clinically. FL MODIFIED BARIUM SWALLOW W VIDEO    Result Date: 1/14/2023  Premature vallecular spillage. Piriform sinus cavity residue. Deep penetration with both materials. Aspiration with pudding. Please see separate speech pathology report for full discussion of findings and recommendations. Assessment :      Medications:      Allergies:  No Known Allergies    Current Meds:   Scheduled Meds:    enoxaparin  40 mg SubCUTAneous Daily    ipratropium-albuterol  1 ampule Inhalation Q4H WA    carvedilol  6.25 mg Oral BID     ferrous sulfate  325 mg Oral Daily with breakfast    spironolactone  25 mg Oral Daily    polyethylene glycol  17 g Oral Daily     Continuous Infusions:   PRN Meds: diclofenac sodium, guaiFENesin, acetaminophen, senna, bisacodyl    Hospital Problems             Last Modified POA    * (Principal) Cerebrovascular accident (CVA) due to embolism of cerebral artery ([address] 1/27/2023 Yes    Acute type II respiratory failure ([address] 1/19/2023 Yes    Transaminitis 1/19/2023 Yes    Normocytic anemia 1/19/2023 Yes    Thrombocytopenia ([address] 1/19/2023 Yes    Acute respiratory failure with hypoxia and hypercapnia (HCC) RLL pneumonia 1/19/2023 Yes    Community acquired pneumonia of right lower lobe of lung 1/19/2023 Yes    Emphysema lung ([address] 1/19/2023 Yes    Cerebral infarction ([address] 1/19/2023 Yes    Moderate malnutrition ([address] 1/19/2023 Yes    Hepatitis C virus infection without hepatic coma 1/19/2023 Yes    Dysphagia 1/19/2023 Yes    Impending cerebrovascular accident [facility]) 1/27/2023 Yes    Dermatitis associated with moisture 1/20/2023 Yes   Plan:     Acute hypoxic and hypercapnic respiratory failure secondary to likely aspiration pneumonia, treated with vancomycin and Unasyn, also was positive for MRSA nasal swab, required intubation, extubated subsequently  Acute CVA with subarachnoid hemorrhage, cardioembolic, did not qualify for tPA,  Acute liver failure possible multifactorial, alcoholic liver disease with hepatitis C,  Hypercoagulable state secondary to acute liver disease, INR currently 1.6  Thrombocytopenia secondary to alcoholic liver disease and hepatitis C, improved  Dysphagia , PEG placed   Anemia , of ch disease multifactorial   DVT PPX with SCD only, as he had cerebral h'age  Full code status   PT/OT     1/21  Patient seen to be resting comfortably  Was able to participate in physical therapy today  No new issues  Repeat CT head showed no new strokes had no hemorrhage  Continue current management    1/22  Since no bleed noted on repeat CT head okay to resume DVT prophylaxis  Hemoglobin is stable,thrombocytopenia has resolved platelet count 400 on labs 1/20  No new issues continue current management    1/27  Labs, radiology, medications, vitals reviewed,  Episodes of desaturation, 88%, improved,  No shortness of breath or chest pain at this time,  Labs anemia, hemoglobin 10.8,  Microcytic, MCV 75.3,  Patient probably will need anemia of chronic disease,  Tolerating physical therapy,    January 28, 2023  Vitals labs stable  Denies chest pain shortness of breath palpitations  Patient is on carvedilol spironolactone ferrous sulfate bronchodilators and Lovenox    2/3  Labs/meds/radiology reviewed   Dysphagia, failed swallow again   Speech working with him  BP running on lower side , monitor   On coreg, aldactone , will lower dose if BP goes more down   Hypoxic event , placed on 1L oxygen , will monitor   IS ordered   The patient is asymptomatic today except that he is unable to swallow, did not have any focal weakness, previous history of sepsis and acute respiratory failure and encephalopathy. He also has a history of cirrhosis of the liver and hepatic encephalopathy. His swallow study showed that he had a deep penetration after barium administration and he will require a G-tube. Unfortunately insurance is using to cover tube feedings and home care. At present he is on beta-blockers and spironolactone            Consultations:   Lis Thakkar WORK  IP CONSULT TO SPIRITUAL SERVICES  IP CONSULT TO INTERNAL MEDICINE  IP CONSULT TO PULMONOLOGY      Kaila Rosales MD  2/6/2023  4:24 PM    Copy sent to Dr. Radha Portillo MD    Please note that this chart was generated using voice recognition Dragon dictation software. Although every effort was made to ensure the accuracy of this automated transcription, some errors in transcription may have occurred.

## 2023-02-06 NOTE — PROGRESS NOTES
[address]    Acute Rehabilitation Occupational Therapy Daily Treatment Note    Date: 23  Patient Name: Pancho Han       Room: 9540/6611-27  MRN: [id number]  Account: [de-identified]   : 1960  (60 y.o.) Gender: male       Referring Practitioner: Benji Duncan MD  Diagnosis: Cerebrovascular accident  Additional Pertinent Hx: Per MRI Impression on 23: Mild amount of residual subarachnoid hemorrhage in the right frontal and  right parietal lobes as well as the left frontal lobe. Evolving small focus of hemorrhage in the left parietal lobe posteriorly. Evolving areas of ischemia in the right frontal lobe and left posterior parietal lobe as well as a few foci in the parasagittal aspect of the right frontoparietal region. Treatment Diagnosis: Impaired self care status    Past Medical History:  has no past medical history on file. Past Surgical History:   has a past surgical history that includes Upper gastrointestinal endoscopy (N/A, 2023). Restrictions  Restrictions/Precautions  Restrictions/Precautions: Fall Risk, General Precautions, Contact Precautions, Isolation, Bed Alarm  Required Braces or Orthoses?: No  Implants present? :  (pt denies)     Position Activity Restriction  Other position/activity restrictions: PT/OT staff may titrateO2 down as tolerated in therapy, but may only titrate up to maximum 4 L NC as needed in therapy. Nursing should perform oxygen titration > 4 L. Per RT SpO2 Goal is 92% or higher, use O2 as needed to maintain SpO2 goal with rest and activity. Vitals        Subjective  Subjective  Subjective: AM:       Objective  Cognition  Overall Orientation Status: Impaired  Orientation Level: Oriented to person;Disoriented to situation;Oriented to place; Disoriented to time    Cognition  Overall Cognitive Status: Exceptions  Arousal/Alertness: Appropriate responses to stimuli  Following Commands:  Follows one step commands consistently  Attention Span: Attends with cues to redirect  Memory: Decreased recall of biographical Information;Decreased recall of precautions;Decreased recall of recent events  Safety Judgement: Decreased awareness of need for safety;Decreased awareness of need for assistance  Problem Solving: Decreased awareness of errors  Insights: Decreased awareness of deficits  Initiation: Requires cues for some  Sequencing: Requires cues for some  Cognition Comment: Pt with impulsive tendencies noted. Activities of Daily Living         OT Exercises  Dynamic Standing Balance Exercises: Pt participates in dynamic standing exercises at table top utilizing RW to perform functional moblity and transfers during self-care with least restrictive mobility device and Good safety. Pt tolerates 5 min 32 sec before req RB. No LOB noted. Assessment  Assessment  Activity Tolerance: Patient tolerated treatment well  Discharge Recommendations: Home with assist PRN;Outpatient OT    Patient Education  Education  Education Given To: Patient  Education Provided: Role of Therapy;Plan of Care;Cognition;Equipment;DME/Home Modifications; ADL Function; Safety; Energy Conservation;Precautions; Fall Prevention Strategies  Education Method: Verbal;Demonstration; Teach Back  Barriers to Learning: Cognition; Hearing  Education Outcome: Verbalized understanding;Demonstrated understanding;Continued education needed         Safety Devices  Safety Devices in place: Yes  Type of devices: All fall risk precautions in place       Goals  Patient Goals   Patient goals : \"To learn how to walk properly. ..all that goes without saying (referring to bathing, dressing and toileting independence)\"  Short Term Goals  Time Frame for Short Term Goals: One week  Short Term Goal 1: Patient will perform oral hygiene with SBA and Good safety. Short Term Goal 2: Patient will perform hair brushing with SBA.   Short Term Goal 3: Patient will perform upper body dressing with SBA.  Short Term Goal 4: Patient will perform lower body dressing, lower body bathing, and toileting tasks with Minimal assist.  Short Term Goal 5: Patient will perform functional moblity and transfers during self-care with CGA, least restrictive mobility device, and Good safety. Short Term Goal 6: Patient will verbalize/demonstrate Good understanding of assistive equipment/durable medical equipment/modified techniques to maximize safety and independence with self-care. Short Term Goal 7: Patient will actively participate in 30+ minutes of therapeutic exercise/functional activities to promote increased independence with self-care and mobility. Long Term Goals  Time Frame for Long Term Goals : By discharge  Long Term Goal 1: Patient will perform BADLs with modified independence and Good safety. Long Term Goal 2: Patient will perform functional mobility and transfers during self-care with modified independence, least restrictive mobility device, and Good safety. Long Term Goal 3: Patient will stand for 5+ minutes with 0-1 UE support, modified independence while engaging in functional activity of choice. Long Term Goal 4: Patient will perform simple meal prep and light housekeeping with SBA and Good safety. Long Term Goal 5: Patient will verbalize/demonstrate Good understanding of Fall Prevention Strategies to maximize safety and independence with self-care. Long Term Goal 6: Patient will perform self-care with improved bilateral  strength as evidenced by 10#  strength improvement and improved bilateral fine motor control as evidenced by 5 second improved in 9 hole peg test (Goal updated by STEFFI Jones/L on 1/24/23).     Plan  Occupational Therapy Plan  Times Per Week: 5-7  Times Per Day: Twice a day  Current Treatment Recommendations: Self-Care / ADL, Strengthening, ROM, Balance training, Functional mobility training, Endurance training, Cognitive reorientation, Neuromuscular re-education, Safety education & training, Patient/Caregiver education & training, Equipment evaluation, education, & procurement, Cognitive/Perceptual training, Coordination training, Home management training     02/06/23 0932   OT Individual Minutes   Time In 0932   Time Out 1006   Minutes 34   Minute Variance   Variance 64   Reason Patient care needs  (Anticipated discharge)         Electronically signed by ANITA Méndez on 2/6/23 at 3:05 PM EST

## 2023-02-06 NOTE — PROGRESS NOTES
Physical Medicine & Rehabilitation  Progress Note    2/6/2023 9:26 AM     CC: Ambulatory and ADL dysfunction due to multifocal CVA    Subjective:   No complaints. Continues on oxygen. Ready for discharge. Difficulty with bowels or bladder    ROS:  Denies fevers, chills, sweats. No chest pain, palpitations, lightheadedness. Denies coughing, wheezing or shortness of breath. Denies abdominal pain, nausea, diarrhea or constipation. No new areas of joint pain. Denies new areas of numbness or weakness. Denies new anxiety or depression issues. No new skin problems. Rehabilitation:   PT:  Restrictions/Precautions: Fall Risk, General Precautions, Contact Precautions, Isolation, Bed Alarm  Implants present? :  (pt denies)  Other position/activity restrictions: PT/OT staff may titrateO2 down as tolerated in therapy, but may only titrate up to maximum 4 L NC as needed in therapy. Nursing should perform oxygen titration > 4 L. Per RT SpO2 Goal is 92% or higher, use O2 as needed to maintain SpO2 goal with rest and activity. Transfers  Sit to Stand: Supervision  Stand to Sit: Supervision  Bed to Chair: Supervision  Stand Pivot Transfers: Supervision (transfers completed with and without AD)  Comment: patient demo'd impulsive movement, vcs for safety and O2 line management  Ambulation  Surface: Level tile  Device: Rolling Walker  Other Apparatus: O2 (1L)  Assistance: Supervision  Quality of Gait: vcs to stay up inside SELVIN of  RW with fair, but fleeting return  Gait Deviations: Slow Radha, Decreased step height  Distance: 267ft  Comments: SPO2 93% post amb on 1L (pt may benefit from rollator for energy conservation. Try tomorrow.)  More Ambulation?: No      OT:  ADL  Feeding: Dependent/Total, NPO  Feeding Skilled Clinical Factors: Peg tube feeds  Grooming: Moderate assistance  Grooming Skilled Clinical Factors: Patient declines oral hygiene. Moderate assist for brushing hair due to significant tangles.   UE Bathing: Stand by assistance  UE Bathing Skilled Clinical Factors: Seated on tub transfer bench in shower with SBA for safety due to impulsivity  LE Bathing: Maximum assistance  LE Bathing Skilled Clinical Factors: Assist for B feet/lower legs and buttocks. Minimal/Moderate assist for standing balance for buttocks  UE Dressing: Minimal assistance  UE Dressing Skilled Clinical Factors: Assist for orientation and to pull down over back  LE Dressing: Maximum assistance  LE Dressing Skilled Clinical Factors: Assist to thread B LE into pull-up. Patient attempted to thread B LE into pants, however threaded B LE into same pant leg. Assist to thread R LE into correct pant leg. Maximal assist to pull over hips  Toileting: Dependent/Total  Toileting Skilled Clinical Factors: Total assist for personal hygiene with BM. Assist for clothing management  Additional Comments: OT facilitated patient engagement in showering routine this date including bathing, dressing, grooming, and toileting tasks. Throughout self-care session, patient required Min/Mod verbal cues for sequencing, safety, attention to task, and problem solving. Patient is impulsive at times and has poor insight into deficits. Please see above for details regarding level of assist.         Balance  Sitting Balance: Stand by assistance  Standing Balance: Moderate assistance (Fluctuates between Minimal/Moderate assist)      Functional Mobility  Functional - Mobility Device: Rolling Walker  Activity:  (2-3 feet from recliner to w/c)  Assist Level: Moderate assistance (Moderate assist x 1, SBA x 1 for safety)  Functional Mobility Comments: with Moderate verbal cues for safety     Bed mobility  Rolling to Left: Modified independent  Rolling to Right: Modified independent  Supine to Sit: Supervision  Sit to Supine: Supervision  Scooting: Supervision  Bed Mobility Comments: no use of grab rails with bed in flat position  Transfers  Stand Pivot Transfers:  Moderate assistance  Sit to stand: Moderate assistance  Stand to sit: Moderate assistance  Transfer Comments: with Moderate verbal cues for hand placemetn and safety   Toilet Transfers  Toilet - Technique: Stand pivot, To right, To left  Equipment Used: Grab bars  Toilet Transfer: Moderate assistance  Toilet Transfers Comments: with Moderate verbal cues for hand placement and safety     Shower Transfers  Shower - Transfer From: Wheelchair  Shower - Transfer Type: To and From  Shower - Transfer To: Transfer tub bench  Shower - Technique: Stand pivot, To right, To left  Shower Transfers: Moderate assistance  Shower Transfers Comments: with Moderate verbal cues for hand placement and safety         ST:    Subjective: [x] Alert     [x] Cooperative     [] Confused     [] Agitated    [] Lethargic     Objective/Assessment:     Recall: Recall of daily events: SUP for mod level of detail. Working memory: Category inclusion/exclusion (f=4) at 70% (I) improved to 90% with min A. Recall/sequence 3-item lists: 100% MI (extended time)     Organization: NT     Problem solving: Functional time calculations: 85% (I) to 100% with min A/repeated stimuli. Other: No family present. Objective:  /76   Pulse 84   Temp 98.5 °F (36.9 °C)   Resp 18   Ht 5' 7\" (1.702 m)   Wt 144 lb 6 oz (65.5 kg)   SpO2 95%   BMI 22.61 kg/m²  I Body mass index is 22.61 kg/m². I   Wt Readings from Last 1 Encounters:   23 144 lb 6 oz (65.5 kg)      Temp (24hrs), Av.3 °F (36.8 °C), Min:98.1 °F (36.7 °C), Max:98.5 °F (36.9 °C)         GEN: well developed, well nourished, no acute distress  HEENT: Normocephalic atraumatic, EOMI, mucous membranes pink and moist mass left neck/cheek chronic for 50 years per patient  CV: RRR, no murmurs, rubs or gallops  PULM: CTAB, no rales or rhonchi.  Respirations WNL and unlabored  ABD: soft, NT, ND, +BS and equal PEG site clean and intact  NEURO: Alert, knew year, president and location, follows commands sensation intact to light touch. MSK: Normal muscle tone/5 upper and distal lower extremities at least antigravity proximal  EXTREMITIES: No calf tenderness to palpation bilaterally. No edema BLEs  SKIN: warm dry and intact with good turgor  PSYCH: appropriately interactive. Affect WNL. Good spirits        Medications   Scheduled Meds:   enoxaparin  40 mg SubCUTAneous Daily    ipratropium-albuterol  1 ampule Inhalation Q4H WA    carvedilol  6.25 mg Oral BID WC    ferrous sulfate  325 mg Oral Daily with breakfast    spironolactone  25 mg Oral Daily    polyethylene glycol  17 g Oral Daily     Continuous Infusions:  PRN Meds:.diclofenac sodium, guaiFENesin, acetaminophen, senna, bisacodyl     Diagnostics:     CBC: No results for input(s): WBC, RBC, HGB, HCT, MCV, RDW, PLT in the last 72 hours. BMP: No results for input(s): NA, K, CL, CO2, PHOS, BUN, CREATININE, CA in the last 72 hours. BNP: No results for input(s): BNP in the last 72 hours. PT/INR: No results for input(s): PROTIME, INR in the last 72 hours. APTT: No results for input(s): APTT in the last 72 hours. CARDIAC ENZYMES: No results for input(s): CKMB, CKMBINDEX, TROPONINT in the last 72 hours. Invalid input(s): CKTOTAL;3  FASTING LIPID PANEL:  Lab Results   Component Value Date    TRIG 85 01/03/2023     LIVER PROFILE: No results for input(s): AST, ALT, ALB, BILIDIR, BILITOT, ALKPHOS in the last 72 hours. I/O (24Hr): Intake/Output Summary (Last 24 hours) at 2/6/2023 0926  Last data filed at 2/6/2023 0608  Gross per 24 hour   Intake 2050 ml   Output 550 ml   Net 1500 ml       Glu last 24 hour  No results for input(s): POCGLU in the last 72 hours. No results for input(s): CLARITYU, COLORU, PHUR, SPECGRAV, PROTEINU, RBCUA, BLOODU, BACTERIA, NITRU, WBCUA, LEUKOCYTESUR, YEAST, GLUCOSEU, BILIRUBINUR in the last 72 hours.     CT CHEST WO CONTRAST    Result Date: 2/5/2023  There are scattered areas subpleural nodular consolidative change with adjacent linear atelectatic change within the posterior aspect of the right lower lobe. Findings can be sequel to the previous reported pneumonia. Linear atelectatic change are also noted within the left lower lobe. . Extensive emphysematous changes within the lungs with massive bulla replacing predominant part left upper lobe. Small pericardial effusion. Bilateral 2-5 mm nonobstructive kidney stones     XR CHEST PORTABLE    Result Date: 2/5/2023  Hazy bibasilar opacities (right greater than left). Impression/Plan:   Impaired ADLs, gait, and mobility due to:     Multifocal cardioembolic CVA, subarachnoid hemorrhage:  PT/OT for gait, mobility, strengthening, endurance, ADLs, and self care. No residual SAH on CT head 1/21. Discharge today, continue with home therapies. No driving, aspiration precautions, PEG tube feeding  Dysphagia:  S/p PEG tube placement on 1/17. SLP treating. Dietitian managing tube feed - switched to all bolus tube fees on 2/1 after repeat MBS completed and NPO recommended. Free water protocol. Agitation:  Recurred 1/21. Patient moved to room closer to the nurses station and telesitter initiated. Repeat CT head improved. Seroquel BID started 1/21 - mental status improved and Seroquel was discontinued. Insomnia: Trazodone started nightly on 1/20 - failed. Improved - no current medication. Acute respiratory failure requiring intubation. now on RA -  home O2 evaluation over the weekend seen by pulmonary-qualifies for home O2 2 L with exertion and at night recommends continue nebulizer-will need outpatient pulmonary function test, noted face-to-face for oxygen and nebulizer done with patient by pulmonary -appreciate pulmonary follow-up today- recommends DuoNeb 3 times daily-changed on discharge meds - Dr Monika Roman (PS) recommend changing albuterol inhaler to 2 puffs 3 times a day as [id number] reviewed precautions  Pneumonia: Improved.  Completed course of Unasyn and Vanco. CXR 1/24 for increased productive cough - stable hazy opacities. CT as above-notify pulmonary perfect served pulmonary Dr. Carmen Piedra covering today-as above DuoNeb 3 times daily and albuterol inhaler 3 times as needed  KENTRELL: Resolved. Will monitor. Elevated LFTs:  Resolved. Will monitor  HCV:  Will need follow up with GI for treatment as an outpatient  Anemia: Hemoglobin 11.2 on 2/3, stable. Monitoring. On oral iron supplementation. Thrombocytopenia: Resolved. Will monitor. Right neck/upper back pain:  May be related to tight musculature in this area. May use heat or ice as needed. Added voltaren gel as needed on 2/3. Bowel Management: Miralax daily, senokot prn, dulcolax prn. DVT Prophylaxis:  Repeat CT - SAH resolved 1/21 - okay to initiate DVT chemoprophylaxis per IM. Lovenox started 1/22. TONI stockings during the day, EPC cuffs at night. Internal Medicine for medical management  DC today if okay with internal medicine and pulmonary [facility]-will review CT let us know, follow up 1. PCP 1-2 weeks,- Dr Jessei Burgos 2. PM&R 4-6 weeks, 3. GI Hep  C management, 4. Neurology-[place], 5. pulmonary Dr. Tyree Holliday 4 weeks, will need pulmonary function test , home PT/OT /Sp/Nsg and PEG site care ,swallow study in few weeks . CBC/BMP 2/8 to Dr. Jessie Burgos, above reviewed with pt continue nebulizer , O2,  follow-up , etc, prescription  to [facility] per patient request    40 minutes spent on discharge      Josiah Srinivasan MD       This note is created with the assistance of a speech recognition program.  While intending to generate a document that actually reflects the content of the visit, the document can still have some errors including those of syntax and sound a like substitutions which may escape proof reading.   In such instances, actual meaning can be extrapolated by contextual diversion

## 2023-02-06 NOTE — PROGRESS NOTES
ACUTE INPATIENT [address]    Patient Name: Julio Kingston  MRN: [id number]     Patient discharged in stable condition as per order of attending physician. AVS provided by nurse at time of discharge, which includes all necessary medical information pertaining to the patients current course of illness, treatment, medications, post-discharge goals of care, and treatment preferences. Provision of Current Reconciled Medication List to Patient at Discharge   Indicate the route(s) of transmission of the current reconciled medication list to the patient/family/caregiver. Verbal (e.g. in person, telephone, video conferencing)     Availability of \"My Chart\" offered to patient as a tool for updated health record. Steps for activation discussed with patient as mentioned on AVS.      Patient/responsible party verbalize understanding of discharge plan and are in agreement with goal/plan/treatment preferences. Belongings including clothing, glasses, rings, shoes sent with patient/responsible party. Home medications sent home with patient/responsible party no    Car Transfer   Level of assistance required for patient transfer into vehicle:   INDEPENDENT: Patient completes the activity by him/herself with no assistance from a helper     High-Risk Drug Classes: Use and Indication   Check if the patient is taking any medications by pharmacological classification If yes, check if there is an indication noted for all meds in the drug class   Antipsychotic No If yes: indication noted? [] If no indication noted, follow up with provider for order clarification   Anticoagulant No If yes: indication noted? []    Antibiotic No If yes: indication noted? []    Opioid No If yes: indication noted? []    Antiplatelet Yes If yes: indication noted? []    Hypoglycemic (Including Insulin) No If yes: indication noted?   []        Pain Assessment   Over the past 5 days, how much of the time has pain made it hard for you to sleep at night? Does not apply - I have not had any pain or hurting in the past 5 days   Over the past 5 days, how often have you limited your participation in rehabilitation therapy sessions due to pain? Rarely or not at all   Over the past 5 days, how often have you limited your day-to-day activities (excluding rehabilitation therapy session)? Rarely or not at all     Special Treatments, Procedures, and Programs   Check all of the following treatments, procedures, and programs that apply on admission.     Cancer Treatments   Chemotherapy No   If Yes, Check All That Apply   []IV Chemotherapy    []Oral Chemotherapy    [] Chemotherapy    Radiation No   Respiratory Therapies   Oxygen Therapy Yes  If Yes, Check All That Apply   []Continuous   [x]Intermittent    []High-Concentration    Suctioning No  If Yes, Check All That Apply   []Scheduled   []As Needed   Tracheostomy Care No   Invasive Mechanical Ventilator   (Ventilator or Respirator) No  If Yes, Check All That Apply   [] Non-invasive Mechanical Ventilator   []BiPAP   []CPAP   Other   IV Medications No  If Yes, Check All That Apply   [] IV Vasoactive Medications   []IV Antibiotics   []IV Anticoagulation   [] IV Medications   Transfusions No   Dialysis No  If Yes, Check All That Apply   []Hemodialysis   [] Dialysis   IV Access No  If Yes, Check All That Apply   []Peripheral   []Midline   [] (PICC, tunneled, port)

## 2023-02-06 NOTE — CARE COORDINATION
[facility] CASE MANAGEMENT DISCHARGE NOTE    Patient discharged today to: Home/ with [facility] per: Carolyn Whiting accompanied by MIRZA DEL VALLE Letter Status: Not Applicable: Patient does not have Medicare as a payor. [facility] [place]  (liaison) notified of discharge    OP Therapy: NA    Tube Feeding continued/ Patient discharged with multiple cases of formula/ calculated to last at least 6 weeks. O2: [employer] to supply O2 tank for ride home and deliver O2 and Nebulizer to the home. DME: walker from [facility] [place]    Current reconciled medication list electronically sent:to the subsequent provider: Dr Chantell Richard    Confirmation Received: Yes      ACUTE INPATIENT [id number]    Case Management Assessment: IRF CHRISTINA 4.0   If score is above 15, Notify PM&R Physician    Patient Health Questionnaire-9 (PHQ-2 to 9)   Over the last 2 weeks, how often have you been bothered by any of the following problems? 1. Little Interest or pleasure in doing things? 2-6 Days (Several Days) - Score 1    2. Feeling down, depressed or hopeless? Never or 1 Day - Score 0    3. Trouble falling or staying asleep, or sleeping too much? Never or 1 Day - Score 0    4. Feeling tired or having little energy? 2-6 Days (Several Days) - Score 1    5. Poor apettite or overeating? Never or 1 Day - Score 0    6. Feeling bad about yourself-or that you are a failure or have let yourself or your family down? Never or 1 Day - Score 0    7. Trouble concentrating on things, such as reading the newspaper or watching television? Never or 1 Day - Score 0    8. Moving or speaking so slowly that other people could have noticed? Or the opposite-being so fidgety or restless that you have been moving around a lot more than usual?   Never or 1 Day - Score 0    9. Thoughts that you would be better off dead or of hurting yourself in some way?    Never or 1 Day - Score 0    Total Score: 2    If you checked off any problems, how difficult have these problems made it for you to do your work, take care of things at home, or get along with other people? [x] Not difficult at all     [] Somewhat Difficult     [] Very Difficult     [] Extremely Difficult           Social Isolation     How often do you feel lonely or isolated from those around you? [x] Never  [] Rarely  [] Sometimes  [] Often  [] Always  [] Patient Unable to Respond       Access To Transportation     2. In the past six months to a year, has lack of transportation kept you from medical appointments or from getting your medications?  Yes    3. In the past six months to a year, has lack of transportation kept you from non-medical meetings, appointments, work, or from getting things that you need? Yes    Filled and and submitted transportation request though [employer]. Patient's SO was given the completed application along with the fax confirmation receipt. This application includes Medical Support Unit's phone number. Also, provided patient and SO the number to the [facility]'s [facility] that services patients on the [address]. They'll be able to utilize this service until they are established with [facility].

## 2023-02-06 NOTE — CARE COORDINATION
Kylee Imre U. 12. Encounter Date/Time: 2023 [place] Account: [de-identified]    MRN: [id number]    Patient: Jere Pollock    Contact Serial #: [id number]      ENCOUNTER          Patient Class: [facility] Private Enc? No Unit RM BD: Julianne Andrews [id number]/[id number]   Hospital Service: Acute Rehab   Encounter DX: Impending cerebrovascula*   ADM Provider: Arlin Cotter MD   Procedure:     ATT Provider: Arlin Cotter MD   REF Provider:        Admission DX: Impending cerebrovascular accident [facility]) and [address] codes: I63.9      PATIENT                 Name: Jere Pollock : 1960 (62 yrs)   Address: [address] [place] [address] Sex: Male   [place] city: [address] [place] [address]         Marital Status: Single   Employer: NOT EMPLOYED         [employer]beterian: Buddhism   Primary Care Provider: Brock Piedra MD         Primary Phone: [phone]   EMERGENCY CONTACT   Contact Name Legal Guardian? Relationship to Patient Home Phone Work Phone   1. Jaimie Montanez  2. Robby Montanez      Child  Child [phone]              GUARANTOR            Guarantor: Jere Pollock     : 1960   Address: [address] [place] [address] Sex: Male     [address]     Relation to Patient: Self       Home Phone: [phone]   Guarantor ID: [id number]       Work Phone:     Guarantor Employer: NOT EMPLOYED         Status: NOT EMPLO*      COVERAGE        PRIMARY INSURANCE   Payor: [place] Plan: [address] [place] [address] DEPT OF*   Payor Address: [address] [address]       Group Number:   Insurance Type: [address] Name: Ernie Hernandez : 1960   Subscriber ID: [id number] Pat. Rel. to Sub: Self   SECONDARY INSURANCE   Payor:   Plan:     Payor Address:  ,           Group Number:   Insurance Type:     Subscriber Name:   Subscriber :     Subscriber ID:   Pat.  Rel. to Sub:        CSN: [id number]        [address]  [phone]       Patient: Jeff Howell Lisseth  MRN: [id number]  :1960      Comments  Comment          Patient Information    Patient Name   Myla Bailey ([id number]) Legal Sex   Male    1960   Room Bed   2629 2629-01       Patient Demographics    Address   [address] [place], [place] [address] Phone   [phone] (Home)   [phone] [facility]       Social Security Number    N          Basic Information    Date Of Birth   1960 Legal Sex   Male Race   White (non-) Ethnic Group   Non- / Non  Preferred Language   English Preferred Written Language   English       Admission Information      Current Information    Attending Provider Admitting Provider Admission Type Admission Status   MD Benji Hernandez MD Elective Confirmed Admission          Admission Date/Time Discharge Date Hospital Service Auth/Cert Status   05/10/10  09:12 AM  Acute Rehab [facility] Area Unit Room/Bed    [address] [facility] 2629/2629-01                  Admission    Complaint          Payor Information             PRIMARY INSURANCE   Payor: [place] Plan: [address] [place] [address] DEPT OF*   Group Number:   Insurance Type: [address] Name: Melvin Aldrich : 1960   Subscriber ID: [id number]       Pat. Rel. to Subscriber: Self       SECONDARY INSURANCE   Payor:   Plan:     Group Number:   Insurance Type:     Subscriber Name:   Subscriber :     Subscriber ID:         Pat.  Rel. to Subscriber:                  PCP and Center    Primary Care Provider   Shoshana Romeo MD Phone   [phone]         Patient Contacts    Name Relation Home Work [address] Child [address] [facility] Child [phone]     Una Alt Girlfriend [phone]     Donna Kohut [phone]   Eleazar Montanez Child [phone]   Kavita Montanez Child          Medical Problems  Comment Hospital Problem List  Date Reviewed: 2023     ICD-10-CM Priority Class Noted POA    Cerebrovascular accident (CVA) due to embolism of cerebral artery ([address] I63.40 Medium  2023 Yes   Acute type II respiratory failure ([address] J96.00 Medium  2023 Yes   Transaminitis R74.01 Medium  2023 Yes   Normocytic anemia D64.9 Medium  2023 Yes   Thrombocytopenia ([address] D69.6 Medium  2023 Yes   Acute respiratory failure with hypoxia and hypercapnia (HCC) RLL pneumonia J96.01, J96.02 Medium  2023 Yes   Community acquired pneumonia of right lower lobe of lung J18.9 Medium  2023 Yes   Emphysema lung ([address] J43.9 Medium  1/3/2023 Yes   Cerebral infarction ([address] I63.9 Medium  1/3/2023 Yes   Moderate malnutrition ([address] E44.0 Medium  2023 Yes   Hepatitis C virus infection without hepatic coma B19.20 Medium  2023 Yes   Dysphagia R13.10 Medium  2023 Yes   Impending cerebrovascular accident ([address] I63.9 Medium  2023 Yes   Dermatitis associated with moisture L30.8 Medium  2023 Yes     Non-Hospital Problem List  Date Reviewed: 2023     ICD-10-CM Priority Class Noted   Agitation R45.1 Medium  2023   Altered mental status R41.82 Medium  2023   Prolonged pt (prothrombin time) R79.1 Medium  1/3/2023   ACP (advance care planning) Z71.89 Medium  2023   Goals of care, counseling/discussion Z71.89 Medium  2023   Encounter for palliative care Z51.5 Medium  2023   Delirium due to another medical condition F05 Medium  1/10/2023         Patient Information    Patient Name Account ID # Gender  Age   Heath Riddle [id number] Male 1960 62 yrs       [facility] Account [de-identified]  CVG-F/O Precert Number [id number] [employer] Phone   7. [address] [facility] DEPT OF JOB          Length of Stay    Actual Admission Date    18 days         Auth/Cert Information    Create auth/cert for hospital account [id number]          Bed Days    No auth/cert for hospital account [de-identified]; no bed days information is available.

## 2023-02-06 NOTE — PROGRESS NOTES
Physical Therapy  Facility/Department: [facility] ACUTE REHAB  Rehabilitation Physical Therapy Daily Treatment Note    NAME: Baron Nicole  : 1960 (80 y.o.)  MRN: [id number]  CODE STATUS: Full Code    Date of Service: 23    Chart Reviewed: Yes  Patient assessed for rehabilitation services?: Yes  Additional Pertinent Hx: History of Present Illness:  Baron Nicole  is a 60 y.o. right-handed male admitted to the [address] [place] [address] on 2023. He was originally admitted to [facility] on 2023 for acute respiratory failure 2/2 RLL pneumonia. Blood/resp/urine cultures negative, although his MRSA swab was +. He completed course of Unasyn and vancomycin. Imaging showed numerous acute, subacute, and chronic infarcts in the left parietal lobe, left cerebellar hemisphere, as well as bilateral SAH. PEG tube was placed 23 for severe dysphagia. HCV+. ID, nephrology, heme-onc, neuro, pulm, and psych are following. Patient admitted to ARU this morning. He reports doing well this morning. He denies any pain aside from some abdominal pain when he coughs. He does note some wheezes and tenderness around the PEG tube site. He does endorse some generalized weakness. He is currently requiring assistance for self-care activities and mobility prompting this admission. Family / Caregiver Present: No  Referring Practitioner: ALAYNA Ferrara NP  Referral Date : 23  Diagnosis: Acute respiratory failure    Restrictions:  Restrictions/Precautions: Fall Risk;General Precautions;Contact Precautions;Isolation; Bed Alarm  Position Activity Restriction  Other position/activity restrictions: PT/OT staff may titrateO2 down as tolerated in therapy, but may only titrate up to maximum 4 L NC as needed in therapy. Nursing should perform oxygen titration > 4 L. Per RT SpO2 Goal is 92% or higher, use O2 as needed to maintain SpO2 goal with rest and activity.      SUBJECTIVE  Subjective: Pt is sitting up in his w/c in PT gym upon arrival. Agreeable to PT initially, however request to return to his room after ~10 of PT tx. Pt is agreeable to continue tx with edu/encouragement. Pain: 3/10 neck pain and elyse shoulder pain        OBJECTIVE  Vision  Vision: Impaired  Vision Exceptions: Wears glasses at all times    Hearing  Hearing: Exceptions to [facility]  Hearing Exceptions: Hard of hearing/hearing concerns    Cognition  Overall Cognitive Status: Exceptions  Arousal/Alertness: Appropriate responses to stimuli  Following Commands: Follows one step commands consistently  Attention Span: Attends with cues to redirect  Memory: Decreased recall of biographical Information;Decreased recall of precautions;Decreased recall of recent events  Safety Judgement: Decreased awareness of need for safety;Decreased awareness of need for assistance (Pt demonstrates inconsistent understanding of need for supp. O2, reports contradictory education from staff;)  Problem Solving: Decreased awareness of errors  Insights: Decreased awareness of deficits  Initiation: Requires cues for some  Sequencing: Requires cues for some  Cognition Comment: Pt with impulsive tendencies noted. Sensation  Overall Sensation Status: WFL (denies)    Functional Mobility  Bed mobility  Rolling to Left: Modified independent  Rolling to Right: Modified independent  Supine to Sit: Supervision  Sit to Supine: Supervision  Scooting: Supervision  Bed Mobility Comments: no use of grab rails with bed in flat position. Pt enters with a modified forward approach. Transfers  Sit to Stand: Supervision  Stand to Sit: Supervision  Bed to Chair: Supervision  Stand Pivot Transfers: Supervision (transfers completed with and without AD)  Comment: patient demo'd impulsive movement, vcs for safety and O2 line management. Transfers completed with RW this date.   Balance  Posture: Good  Sitting - Static: Good;+  Sitting - Dynamic: Good  Standing - Static: Good;-  Standing - Dynamic: Fair;-  Comments: Standing with rolling walker    Environmental Mobility  Ambulation  Surface: Level tile  Device: Rolling Walker  Other Apparatus: O2 (1L)  Assistance: Supervision  Quality of Gait: vcs to stay up inside SELVIN of  RW with fair, but fleeting return  Gait Deviations: Slow Radha;Decreased step height  Distance: 36' and shorter distances in PT gym. Comments: Pt's SPO2 is 95% and HR is 85 post amb. Ambulation 3  Surface - 3: ramp  Device 3: Rolling Walker  Other Apparatus 3: O2 (1L)  Assistance 3: Stand by assistance  Quality of Gait 3: continue to remind pt to stay up inside RW SELVIN with amb. Slow but steady. Gait Deviations: Slow Radha;Decreased step length;Decreased step height  Distance: 30' ascending  Stairs/Curb  Stairs?: No (Refused and states \"I did that yesterday\". )  Wheelchair Activities  Propulsion: Yes  Propulsion 1  Propulsion: Manual  Level: Level Tile  Method: RUE;LUE  Level of Assistance: Supervision  Description/ Details: Pt is able to maintain a straight path and complete turns. Min difficulties in tight spaces. PT Exercises  Resistive Exercises:  (SPO2 94% on 1lpm of O2 with ex)  Dynamic Standing Balance Exercises: Dyn amb: Tandem, high knees, lateral, modified grape vine. Distance: 20'x2 each. Standing Open/Closed Kinetic Chain Exercises: (B) LE standing ex x 10 (SPO2 above 94% throughout session on 1L)  Exercise Equipment: NuStep, workload 2, 10 min. (SpO2 WNL on 1lpm throughout.)    ASSESSMENT  Vitals  O2 Device: Nasal cannula    Activity Tolerance  Activity Tolerance: Patient tolerated treatment well    Assessment  Assessment: 59 y/o male adm with AMS and found to have [address] with recent fall at home. Pt is a high fall risk as he has decreased safety awareness and B LE weakness L hemibody > R. Pt will benefit from continued therapy while in rehab unit to improve fucntion and safety. Performance Deficits/Impairments: Decreased functional mobility ; Decreased ADL status; Decreased body mechanics; Decreased strength;Decreased safe awareness;Decreased cognition;Decreased endurance;Decreased balance;Decreased coordination; Increased pain;Decreased posture  Activity Tolerance Comment: Monitor SP02 throughout therapy sessions  Treatment Diagnosis: impared mobility s/p AMS/SAH  Therapy Prognosis: Good  Decision Making: Medium Complexity  History: falls, 1 week  AMS PTA  Exam: L side weakness, decreased activity tolerance  Barriers to Learning: cognition, endurance  Treatment Initiated : eval, bed mobiltiy, sitting balance, transfers and gait  Discharge Recommendations: Therapy recommended at discharge;Home with assist PRN;Patient would benefit from continued therapy after discharge  PT D/C Equipment  Walker: Rolling (ordered, per PT Christen)  Other: Possibly discuss with family looking for Rollator at mobility center or other resources to increase pt's independence/safety within and out of the home. PT Equipment Recommendations  Equipment Needed: Yes  Walker: Rolling (ordered, per PT Christen)  Other: Possibly discuss with family looking for Rollator at mobility center or other resources to increase pt's independence/safety within and out of the home. CLINICAL IMPRESSION   61 y/o male adm with AMS and found to have [address] with recent fall at home. Pt is a high fall risk as he has decreased safety awareness and B LE weakness L hemibody > R. Pt will benefit from continued therapy while in rehab unit to improve fucntion and safety. GOALS  Patient Goals   Patient Goals : Want to be independent to go home.   Short Term Goals  Time Frame for Short Term Goals: 9 days  Short Term Goal 1: CGA assist supine<-> sit x1 assist  Short Term Goal 2: sit EOB withsupervsion up to 15 min for therex/ADL  Short Term Goal 3: 3+/5 LE strength to prepare for standing activiites and gait  Short Term Goal 4: ambulation with rolling walker distance of 70 to 100 ft, CGA, level surface  Short Term Goal 5: roll L/R with SBA  Additional Goals?: Yes  Short Term Goal 6: Pt able to go up and down 5 steps with 2 rails, min A  Long Term Goals  Time Frame for Long Term Goals : By DC  Long Term Goal 1: pt able to roll L/R independently  Long Term Goal 2: pt able to perform supine<>sit mod-I  Long Term Goal 3: pt able to perform sit<>stand and perform pivot/step to transfers at mod-I  Long Term Goal 4: pt able to ambulate household distance with appropriate device, mod-I  Long Term Goal 5: pt able to ambulate > 50 ft with appropriate device, at SBA/supervsion level, level as well as incline surface. Additional Goals?: Yes  Long term goal 6: pt able to go up and down 4 or more steps with B UE support, CGA. Long term goal 7: Improve PASS score to 27/36 improve function/stability. Long term goal 8: improve Tinetti balance score to atleast 23/28 to reduce fall risk. PLAN OF CARE  Physcial Therapy Plan  General Plan:  minutes of therapy at least 5 out of 7 days a week (5 to 7 days/week.)  Specific Instructions for Next Treatment: challenging balance  activities  Current Treatment Recommendations: Strengthening;Balance training;Functional mobility training;Neuromuscular re-education;Cognitive reorientation; Safety education & training;Patient/Caregiver education & training;Equipment evaluation, education, & procurement; Therapeutic activities;Transfer training; Wheelchair mobility training;Gait training;Stair training;Home exercise program;Positioning  Safety Devices  Type of Devices: All fall risk precautions in place;Call light within reach;Gait belt;Telesitter in use;Patient at risk for falls; Left in bed  Restraints  Restraints Initially in Place: No  Restraints: bilat wrist restraints- no longer needed    EDUCATION  Education  Education Given To: Patient  Education Provided:  Mobility Training;Equipment;Home Exercise Program  Education Provided Comments: Educated patient regarding RW, discussing safety, increased mobility/instability. Edu on HEP, Pt states he received a written handout yesterday and V good understanding at this time. Education Method: Demonstration;Verbal;Printed Information/Hand-outs  Barriers to Learning: Cognition  Education Outcome: Verbalized understanding;Demonstrated understanding  Skilled Clinical Factors: HEP below.     Therapy Time   Individual Concurrent Group Co-treatment   Time In 7818         Time Out 1057         Minutes [address], [place], 02/06/23 at 1:26 PM

## 2023-02-06 NOTE — PLAN OF CARE
Problem: Discharge Planning  Goal: Discharge to home or other facility with appropriate resources  2/6/2023 1350 by Jose Hyde RN  Outcome: Adequate for Discharge  Flowsheets (Taken 2/6/2023 0900)  Discharge to home or other facility with appropriate resources: Identify barriers to discharge with patient and caregiver     Problem: Nutrition Deficit:  Goal: Optimize nutritional status  2/6/2023 1350 by Jose Hyde RN  Outcome: Adequate for Discharge     Problem: Safety - Adult  Goal: Free from fall injury  2/6/2023 1350 by Jose Hyde RN  Outcome: Adequate for Discharge     Problem: ABCDS Injury Assessment  Goal: Absence of physical injury  2/6/2023 1350 by Jose Hyde RN  Outcome: Adequate for Discharge  Flowsheets (Taken 2/6/2023 1112)  Absence of Physical Injury: Implement safety measures based on patient assessment     Problem: Pain  Goal: Verbalizes/displays adequate comfort level or baseline comfort level  2/6/2023 1350 by Jose Hyde RN  Outcome: Adequate for Discharge     Problem: Chronic Conditions and Co-morbidities  Goal: Patient's chronic conditions and co-morbidity symptoms are monitored and maintained or improved  2/6/2023 1350 by Jose Hyde RN  Outcome: Adequate for Discharge  Flowsheets (Taken 2/6/2023 0900)  Care Plan - Patient's Chronic Conditions and Co-Morbidity Symptoms are Monitored and Maintained or Improved: Monitor and assess patient's chronic conditions and comorbid symptoms for stability, deterioration, or improvement

## 2023-02-06 NOTE — PROGRESS NOTES
Comprehensive Nutrition Assessment    Type and Reason for Visit:  Reassess    Nutrition Recommendations/Plan:   Continue current tube feeding. NPO. Malnutrition Assessment:  Malnutrition Status: Moderate malnutrition (01/19/23 1419)    Context:  Acute Illness     Findings of the 6 clinical characteristics of malnutrition:  Energy Intake:  75% or less of estimated energy requirements for 7 or more days  Weight Loss:  Greater than 5% over 1 month     Body Fat Loss:  Unable to assess     Muscle Mass Loss:  Unable to assess    Fluid Accumulation:  No significant fluid accumulation     Strength:  Not Performed    Nutrition Assessment:    Spoke with pt and significant other earlier today. Briefly reviewed tube feeding. They state nurses have demonstrated administration. Samples of tube feeding formula provided in anticipation for discharge. Note discharge has since been delayed due to insurance difficulties and getting tube feeding product covered. Await further plans. Nutrition Related Findings:    No edema. Labs and meds reviewed. Current Nutrition Intake & Therapies:    Average Meal Intake: NPO     Diet NPO  ADULT TUBE FEEDING; PEG; Peptide Based; Bolus; 6 Times Daily; 300; Syringe Push; 50; Other (specify); 50 mL water before and after each bolus feeding. Additional 100-300 mL water daily for meds. Current Tube Feeding (TF) Orders:  Feeding Route: PEG  Formula: Peptide Based  Schedule: Cyclic, Bolus  Feeding Regimen: 300 mL x 6 daily  Water Flushes: 50 mL before and after each bolus feeding. Additional 100-300 mL water to be used with meds daily.   Current and Goal TF & Flush Orders Provides: 2160 kcal, 135 gm protein, 2060 mL free fluid excluding water used for meds    Anthropometric Measures:  Height: 5' 7\" (170.2 cm)  Ideal Body Weight (IBW): 148 lbs (67 kg)    Admission Body Weight: 159 lb 9.8 oz (72.4 kg) ([facility] ([facility]  Current Body Weight: 144 lb 6.4 oz (65.5 kg), 99.4 % IBW. Weight Source: Bed Scale  Current BMI (kg/m2): 22.6  Usual Body Weight: 170 lb (77.1 kg) (170 lb Per significant other.)  % Weight Change (Calculated): -13.5                    BMI Categories: Normal Weight (BMI 18.5-24. 9)    Estimated Daily Nutrient Needs:  Energy Requirements Based On: Formula  Weight Used for Energy Requirements: Current  Energy (kcal/day): 3044-2039 based on [place] with 1.3 factor plus 250 additional kcal to avoid further wt loss  Weight Used for Protein Requirements: Current  Protein (g/day): 87 based on 1.3 gm per kg  Method Used for Fluid Requirements: 1 ml/kcal  Fluid (ml/day): 2100 based on 1 mL per kg    Nutrition Diagnosis:   Moderate malnutrition related to inadequate protein-energy intake as evidenced by Criteria as identified in malnutrition assessment    Nutrition Interventions:   Food and/or Nutrient Delivery: Continue NPO, Continue Current Tube Feeding  Nutrition Education/Counseling: Education completed  Coordination of Nutrition Care: Continue to monitor while inpatient, Speech Therapy       Goals:  Previous Goal Met: Progressing toward Goal(s)  Goals:  Tolerate nutrition support at goal rate       Nutrition Monitoring and Evaluation:   Behavioral-Environmental Outcomes: None Identified  Food/Nutrient Intake Outcomes: Enteral Nutrition Intake/Tolerance  Physical Signs/Symptoms Outcomes: Biochemical Data, Chewing or Swallowing, GI Status, Weight    Discharge Planning:    Enteral Nutrition     Donna Tucker RD, LD  Contact: [phone]

## 2023-02-06 NOTE — PROGRESS NOTES
Updated Dr. Adriana Corral via perfect serve of CT results. Awaiting response. Updated night shift RN of no orders noted back from Dr. Adriana Corral.

## 2023-02-07 VITALS
TEMPERATURE: 97.5 F | SYSTOLIC BLOOD PRESSURE: 130 MMHG | RESPIRATION RATE: 16 BRPM | WEIGHT: 144.38 LBS | DIASTOLIC BLOOD PRESSURE: 85 MMHG | OXYGEN SATURATION: 91 % | HEART RATE: 74 BPM | HEIGHT: 67 IN | BODY MASS INDEX: 22.66 KG/M2

## 2023-02-07 PROCEDURE — 94640 AIRWAY INHALATION TREATMENT: CPT

## 2023-02-07 PROCEDURE — 2700000000 HC OXYGEN THERAPY PER DAY

## 2023-02-07 PROCEDURE — 99232 SBSQ HOSP IP/OBS MODERATE 35: CPT | Performed by: INTERNAL MEDICINE

## 2023-02-07 PROCEDURE — 94761 N-INVAS EAR/PLS OXIMETRY MLT: CPT

## 2023-02-07 PROCEDURE — 97530 THERAPEUTIC ACTIVITIES: CPT

## 2023-02-07 PROCEDURE — 97129 THER IVNTJ 1ST 15 MIN: CPT

## 2023-02-07 PROCEDURE — 92526 ORAL FUNCTION THERAPY: CPT

## 2023-02-07 PROCEDURE — 97116 GAIT TRAINING THERAPY: CPT

## 2023-02-07 PROCEDURE — 6370000000 HC RX 637 (ALT 250 FOR IP): Performed by: INTERNAL MEDICINE

## 2023-02-07 PROCEDURE — 6370000000 HC RX 637 (ALT 250 FOR IP): Performed by: PHYSICAL MEDICINE & REHABILITATION

## 2023-02-07 PROCEDURE — 97110 THERAPEUTIC EXERCISES: CPT

## 2023-02-07 PROCEDURE — 99232 SBSQ HOSP IP/OBS MODERATE 35: CPT | Performed by: PHYSICAL MEDICINE & REHABILITATION

## 2023-02-07 PROCEDURE — 6360000002 HC RX W HCPCS: Performed by: PHYSICAL MEDICINE & REHABILITATION

## 2023-02-07 RX ADMIN — ENOXAPARIN SODIUM 40 MG: 100 INJECTION SUBCUTANEOUS at 08:46

## 2023-02-07 RX ADMIN — FERROUS SULFATE TAB 325 MG (65 MG ELEMENTAL FE) 325 MG: 325 (65 FE) TAB at 08:46

## 2023-02-07 RX ADMIN — CARVEDILOL 6.25 MG: 6.25 TABLET, FILM COATED ORAL at 08:46

## 2023-02-07 RX ADMIN — SPIRONOLACTONE 25 MG: 25 TABLET ORAL at 08:46

## 2023-02-07 RX ADMIN — IPRATROPIUM BROMIDE AND ALBUTEROL SULFATE 1 AMPULE: 2.5; .5 SOLUTION RESPIRATORY (INHALATION) at 11:38

## 2023-02-07 RX ADMIN — IPRATROPIUM BROMIDE AND ALBUTEROL SULFATE 1 AMPULE: 2.5; .5 SOLUTION RESPIRATORY (INHALATION) at 07:08

## 2023-02-07 RX ADMIN — IPRATROPIUM BROMIDE AND ALBUTEROL SULFATE 1 AMPULE: 2.5; .5 SOLUTION RESPIRATORY (INHALATION) at 14:58

## 2023-02-07 NOTE — PROGRESS NOTES
Speech Language Pathology  Speech Language Pathology  [facility] Acute Rehab Unit at [address] [place] [address]    Cognitive/Dysphagia Treatment Note    Date: 2/7/2023  Patients Name: Jogre Magana  MRN: [id number]  Diagnosis: Dysphagia s/p CVA  Patient Active Problem List   Diagnosis Code    Acute type II respiratory failure (HCC) J96.00    Transaminitis R74.01    Normocytic anemia D64.9    Thrombocytopenia (HCC) D69.6    Agitation R45.1    Acute respiratory failure with hypoxia and hypercapnia (HCC) RLL pneumonia J96.01, J96.02    Altered mental status R41.82    Community acquired pneumonia of right lower lobe of lung J18.9    Prolonged pt (prothrombin time) R79.1    Emphysema lung ([address] J43.9    Cerebral infarction ([address] I63.9    ACP (advance care planning) Z71.89    Goals of care, counseling/discussion Z71.89    Encounter for palliative care Z51.5    Delirium due to another medical condition F05    Moderate malnutrition ([address] E44.0    Hepatitis C virus infection without hepatic coma B19.20    Dysphagia R13.10    Impending cerebrovascular accident ([address] I63.9    Dermatitis associated with moisture L30.8    Cerebrovascular accident (CVA) due to embolism of cerebral artery ([address] I63.40       Pain: none reported    Cognitive Treatment  Treatment time: 4247-1377    Subjective: [x] Alert [x] Cooperative     [] Confused     [] Agitated    [] Lethargic    Objective/Assessment:    Recall: Recall 3 units immediately and given short delay- 100%. BIMS:  15/15, admission score was 10/15. Organization: [facility]    Problem solving: Pt. Able to calculate time he is able to drink water following oral care. Dysphagia:  ST provided set up for oral care, which pt. Completed. Pt. Completed Kim maneuver x6, simple tongue base strengthening exercises x4, 20 reps each and oral motor exercises x2, 15 reps each. Educ. Pt. Re: continuing c ST for dysphagia with repeat video swallow study as an outpatient recommended in 4 weeks.   Also encouraged pt. To continue to practice exercises on own. Pt. Verbalized understanding of info provided. Pt. To be d/c home this pm.       Plan:  [x] Continue ST services    [] Discharge from [address] [place] [address] :        Discharge recommendations: [] Inpatient Rehab   [] [place]   [] Outpatient Therapy  [] Follow up at trauma clinic   [] Other:         Treatment completed by: Suri Cruz A.CCC/SLP

## 2023-02-07 NOTE — CARE COORDINATION
Spoke with Magi Robbins [id number] at pts insurance and verified that medications are covered at discharge.

## 2023-02-07 NOTE — PLAN OF CARE
Problem: Discharge Planning  Goal: Discharge to home or other facility with appropriate resources  2/7/2023 1625 by Yamileth Peñaloza RN  Outcome: Adequate for Discharge  Flowsheets (Taken 2/7/2023 0920)  Discharge to home or other facility with appropriate resources: Identify barriers to discharge with patient and caregiver     Problem: Nutrition Deficit:  Goal: Optimize nutritional status  2/7/2023 1625 by Yamileth Peñaloza RN  Outcome: Adequate for Discharge     Problem: Safety - Adult  Goal: Free from fall injury  2/7/2023 1625 by Yamileth Peñaloza RN  Outcome: Adequate for Discharge     Problem: ABCDS Injury Assessment  Goal: Absence of physical injury  2/7/2023 1625 by Yamileth Peñaloza RN  Outcome: Adequate for Discharge  Flowsheets (Taken 2/7/2023 3224)  Absence of Physical Injury: Implement safety measures based on patient assessment     Problem: Chronic Conditions and Co-morbidities  Goal: Patient's chronic conditions and co-morbidity symptoms are monitored and maintained or improved  2/7/2023 1625 by Yamileth Peñaloza RN  Outcome: Adequate for Discharge  Flowsheets (Taken 2/7/2023 0920)  Care Plan - Patient's Chronic Conditions and Co-Morbidity Symptoms are Monitored and Maintained or Improved: Monitor and assess patient's chronic conditions and comorbid symptoms for stability, deterioration, or improvement     Problem: Pain  Goal: Verbalizes/displays adequate comfort level or baseline comfort level  2/7/2023 1625 by Yamileth Peñaloza RN  Outcome: Adequate for Discharge

## 2023-02-07 NOTE — PROGRESS NOTES
[address] [facility] Internal Medicine  Dwayne Yeh MD; Andre Phipps MD; Myra Reddy MD; MD Tito Sanders MD; MD DELFIN Lafleur [facility]   [facility]    Progress note    Late entry from 1/30/23        Date:   2/7/2023  Patient name:  Charanjit Ferrell  Date of admission:  1/19/2023  9:12 AM  MRN:   [id number]  Account:  [de-identified]  YOB: 1960  PCP:    Warden Yunier MD  Room:   3377/9997-92  Code Status:    Full Code    Physician Requesting Consult: Galileo Samuel MD    Reason for Consult: Medical management    Chief Complaint:   Today he is asymptomatic he continues to have dysphagia and failed to swallow tests. He has a G-tube in but his insurance will not cover outpatient G-tube feedings or  nursing visits    Acute CVA, generalized weakness, metabolic encephalopathy, multifactorial with underlying acute respiratory failure with pneumonia and heart failure    History Obtained From:     patient    History of Present Illness:     25-year-old gentleman with unknown past medical history presented to inpatient [facility] with metabolic encephalopathy found to have acute respiratory failure, pneumonia, also had slurred speech, confusion progressively was getting worse, EMS brought the patient to the hospital his saturations were  75%, found to have acute respiratory failure with pneumonia, in the ED patient was placed on BiPAP, chest x-ray confirmed right lower lobe pneumonia with pleural effusion. ABG showed acute respiratory failure with hypercapnia and hypoxia.   Subsequently also found to have NSTEMI, acute heart failure with transaminitis possible shock liver, with also hepatitis C acute with hepatic failure, slowly improved,  Also had multiple acute brain infarct with subarachnoid hemorrhage,,  Subsequently patient was evaluated for acute inpatient rehab, accepted and admitted right now we are consulted for medical management    January 28  No new symptoms  Tolerating rehab therapies  Past Medical History:     History reviewed. No pertinent past medical history. Past Surgical History:     Past Surgical History:   Procedure Laterality Date    UPPER GASTROINTESTINAL ENDOSCOPY N/A 1/17/2023    EGD ESOPHAGOGASTRODUODENOSCOPY PEG TUBE INSERTION performed by Brittney Villagomez DO at [facility] ENDO        Medications Prior to Admission:     Prior to Admission medications    Medication Sig Start Date End Date Taking? Authorizing Provider   Handicap Placard MISC by Does not apply route Duration: 12months / Dx: cva 2/6/23  Yes Gutierrez Snyder MD   ipratropium-albuterol (DUONEB) 0.5-2.5 (3) MG/3ML SOLN nebulizer solution Inhale 3 mLs into the lungs 3 times daily 2/6/23  Yes Gutierrez Snyder MD   albuterol sulfate HFA (PROVENTIL HFA) 108 (90 Base) MCG/ACT inhaler Inhale 2 puffs into the lungs 3 times daily as needed for Wheezing 2/6/23  Yes Gutierrez Snyder MD   acetaminophen (TYLENOL) 325 MG tablet Take 2 tablets by mouth every 6 hours as needed for Pain 2/5/23  Yes Melva Bradford MD   carvedilol (COREG) 6.25 MG tablet Take 1 tablet by mouth 2 times daily (with meals) 2/5/23  Yes Melva Bradford MD   spironolactone (ALDACTONE) 25 MG tablet Take 1 tablet by mouth daily 2/6/23  Yes Melva Bradford MD   ferrous sulfate (IRON 325) 325 (65 Fe) MG tablet Take 1 tablet by mouth daily (with breakfast) 2/6/23  Yes Melva Bradford MD   polyethylene glycol (GLYCOLAX) 17 g packet Take 17 g by mouth daily as needed for Constipation 2/5/23  Yes Melva Bradford MD   Nutritional Supplements (VITAL AF 1.2 MIGUELITO) LIQD 300 mLs by PEG Tube route 6 times daily Boluses at 6am, 9am, 12pm, 3pm, 6pm, and 9pm.  50mL free water before and after each bolus. Additional 100-300mL water for meds. 2/2/23  Yes Melva Bradford MD        Allergies:     Patient has no known allergies.     Social History:     Tobacco:    reports that he has been smoking cigarettes. He has a 40.00 pack-year smoking history. He has never used smokeless tobacco.  Alcohol:      reports current alcohol use. Drug Use:  reports that he does not currently use drugs. Family History:     History reviewed. No pertinent family history. Review of Systems:     Positive and Negative as described in HPI. CONSTITUTIONAL:  negative for fevers, chills, sweats, fatigue, weight loss  HEENT:  negative for vision, hearing changes, runny nose, throat pain  RESPIRATORY:  negative for shortness of breath, cough, congestion, wheezing. CARDIOVASCULAR:  negative for chest pain, palpitations. GASTROINTESTINAL:  negative for nausea, vomiting, diarrhea, constipation, change in bowel habits, abdominal pain   GENITOURINARY:  negative for difficulty of urination, burning with urination, frequency   INTEGUMENT:  negative for rash, skin lesions, easy bruising   HEMATOLOGIC/LYMPHATIC:  negative for swelling/edema   ALLERGIC/IMMUNOLOGIC:  negative for urticaria , itching  ENDOCRINE:  negative increase in drinking, increase in urination, hot or cold intolerance  MUSCULOSKELETAL:  negative joint pains, muscle aches, swelling of joints  NEUROLOGICAL: History of encephalopathy and recent subarachnoid hemorrhage  BEHAVIOR/PSYCH:  negative for depression, anxiety    Physical Exam:     /85   Pulse 77   Temp 97.5 °F (36.4 °C) (Oral)   Resp 18   Ht 5' 7\" (1.702 m)   Wt 144 lb 6 oz (65.5 kg)   SpO2 90%   BMI 22.61 kg/m²   Temp (24hrs), Av.7 °F (36.5 °C), Min:97.5 °F (36.4 °C), Max:97.9 °F (36.6 °C)    No results for input(s): POCGLU in the last 72 hours. Intake/Output Summary (Last 24 hours) at 2023 1431  Last data filed at 2023 0256  Gross per 24 hour   Intake 1250 ml   Output --   Net 1250 ml         General Appearance:  alert, well appearing, and in no acute distress  Mental status: oriented to person, place, and time with normal affect  Head:  normocephalic, atraumatic.   Eye: no icterus, redness, pupils equal and reactive, extraocular eye movements intact, conjunctiva clear  Ear: normal external ear, no discharge, hearing intact  Nose:  no drainage noted  Mouth: mucous membranes moist  Neck: supple, no carotid bruits, thyroid not palpable  Lungs: Bilateral equal air entry, clear to ausculation, no wheezing, rales or rhonchi, normal effort  Cardiovascular: normal rate, regular rhythm, no murmur, gallop, rub. Abdomen: G-tube in place, no abdominal tenderness or rigidity and no masses  Neurologic: There are no new focal motor or sensory deficits, normal muscle tone and bulk, no abnormal sensation, normal speech, cranial nerves II through XII grossly intact  Skin: No gross lesions, rashes, bruising or bleeding on exposed skin area  Extremities:  peripheral pulses palpable, no pedal edema or calf pain with palpation  Psych: normal affect    Investigations:      Laboratory Testing:  No results found for this or any previous visit (from the past 24 hour(s)). Imaging/Diagonstics:  XR CHEST PORTABLE    Result Date: 1/18/2023  Interval extubation and NG tube removal.  Otherwise similar findings, with perhaps slightly greater hazy ground-glass opacity right mid lung. Correlate clinically. FL MODIFIED BARIUM SWALLOW W VIDEO    Result Date: 1/14/2023  Premature vallecular spillage. Piriform sinus cavity residue. Deep penetration with both materials. Aspiration with pudding. Please see separate speech pathology report for full discussion of findings and recommendations. Assessment :      Medications:      Allergies:  No Known Allergies    Current Meds:   Scheduled Meds:    enoxaparin  40 mg SubCUTAneous Daily    ipratropium-albuterol  1 ampule Inhalation Q4H WA    carvedilol  6.25 mg Oral BID     ferrous sulfate  325 mg Oral Daily with breakfast    spironolactone  25 mg Oral Daily    polyethylene glycol  17 g Oral Daily     Continuous Infusions:   PRN Meds: diclofenac sodium, guaiFENesin, acetaminophen, senna, bisacodyl    Hospital Problems             Last Modified POA    * (Principal) Cerebrovascular accident (CVA) due to embolism of cerebral artery ([address] 1/27/2023 Yes    Acute type II respiratory failure ([address] 1/19/2023 Yes    Transaminitis 1/19/2023 Yes    Normocytic anemia 1/19/2023 Yes    Thrombocytopenia ([address] 1/19/2023 Yes    Acute respiratory failure with hypoxia and hypercapnia (HCC) RLL pneumonia 1/19/2023 Yes    Community acquired pneumonia of right lower lobe of lung 1/19/2023 Yes    Emphysema lung ([address] 1/19/2023 Yes    Cerebral infarction ([address] 1/19/2023 Yes    Moderate malnutrition ([address] 1/19/2023 Yes    Hepatitis C virus infection without hepatic coma 1/19/2023 Yes    Dysphagia 1/19/2023 Yes    Impending cerebrovascular accident [facility]) 1/27/2023 Yes    Dermatitis associated with moisture 1/20/2023 Yes   Plan:     Acute hypoxic and hypercapnic respiratory failure secondary to likely aspiration pneumonia, treated with vancomycin and Unasyn, also was positive for MRSA nasal swab, required intubation, extubated subsequently  Acute CVA with subarachnoid hemorrhage, cardioembolic, did not qualify for tPA,  Acute liver failure possible multifactorial, alcoholic liver disease with hepatitis C,  Hypercoagulable state secondary to acute liver disease, INR currently 1.6  Thrombocytopenia secondary to alcoholic liver disease and hepatitis C, improved  Dysphagia , PEG placed   Anemia , of ch disease multifactorial   DVT PPX with SCD only, as he had cerebral h'age  Full code status   PT/OT     1/21  Patient seen to be resting comfortably  Was able to participate in physical therapy today  No new issues  Repeat CT head showed no new strokes had no hemorrhage  Continue current management    1/22  Since no bleed noted on repeat CT head okay to resume DVT prophylaxis  Hemoglobin is stable,thrombocytopenia has resolved platelet count 690 on labs 1/20  No new issues continue current management    1/27  Labs, radiology, medications, vitals reviewed,  Episodes of desaturation, 88%, improved,  No shortness of breath or chest pain at this time,  Labs anemia, hemoglobin 10.8,  Microcytic, MCV 75.3,  Patient probably will need anemia of chronic disease,  Tolerating physical therapy,    January 28, 2023  Vitals labs stable  Denies chest pain shortness of breath palpitations  Patient is on carvedilol spironolactone ferrous sulfate bronchodilators and Lovenox    2/3  Labs/meds/radiology reviewed   Dysphagia, failed swallow again   Speech working with him  BP running on lower side , monitor   On coreg, aldactone , will lower dose if BP goes more down   Hypoxic event , placed on 1L oxygen , will monitor   IS ordered   The patient is asymptomatic today except that he is unable to swallow, did not have any focal weakness, previous history of sepsis and acute respiratory failure and encephalopathy. He also has a history of cirrhosis of the liver and hepatic encephalopathy. His swallow study showed that he had a deep penetration after barium administration and he will require a G-tube. The arrangements for his discharge were accomplished he can be discharged today. He denies any cardiorespiratory symptoms and his abdomen was soft and nontender and G-tube was functioning properly        Consultations:   Zach BEASLEY  IP CONSULT TO Susy  IP CONSULT TO INTERNAL MEDICINE  IP CONSULT TO PULMONOLOGY      Mavis Krueger MD  2/7/2023  2:31 PM    Copy sent to Dr. Eli Brittle, MD    Please note that this chart was generated using voice recognition Dragon dictation software. Although every effort was made to ensure the accuracy of this automated transcription, some errors in transcription may have occurred.

## 2023-02-07 NOTE — PATIENT CARE CONFERENCE
[facility] Acute Inpatient Rehabilitation  TEAM CONFERENCE NOTE  Date: 23  Patient Name: Twyla Lopez       Room: 6076/0972-48  MRN: [id number]       : 1960  (60 y.o.)     Gender: male     Referring Practitioner: ALAYNA Marrero NP     PRINCIPAL DIAGNOSIS: Cerebrovascular accident (CVA) due to embolism of cerebral artery [facility])    NURSING    Bladder Continence  Always Continent  Bowel Continence Always Continent    Date of Last BM:     Bladder/Bowel Program Interventions: Both Bowel & Bladder Program In Place     Wounds/Incisions/Ulcers: No skin issues identified    Pain Control: No pain concerns to address    Pain Medication Regimen Usage Pattern: MAR reviewed and pain medications are being used at the following frequency (Specify Medication, # Doses Administered on average per day, identified patterns of use - for example: time of day, prior to activity/therapy)  Patient has prn tylenol, which he has only taken twice since admitting to ARU. Fall Risk:  Falling star program initiated    Medication Education Program: Patient currently unable to manage medications and responsible party being educated    Discharge Preparation Patient/Responsible Party Education In Progress:   Enteral Tube Feeding Management    Nursing specific communication for TEAM: No additional information identified requiring communication at this time    PHYSICAL THERAPY    Bed Mobility:    Bed mobility  Rolling to Left: Modified independent  Rolling to Right: Modified independent  Supine to Sit: Supervision  Sit to Supine: Supervision  Scooting: Supervision  Bed Mobility Comments: no use of grab rails with bed in flat position. Pt enters with a modified forward approach.     Transfers:  Sit to Stand: Supervision  Stand to Sit: Supervision  Bed to Chair: Supervision    Ambulation  Surface: Level tile  Device: Rolling Walker  Other Apparatus: O2 (1L)  Assistance: Supervision  Quality of Gait: vcs to stay up inside SELVIN of  RW with fair, but fleeting return  Gait Deviations: Slow Radha;Decreased step height  Distance: 36' and shorter distances in PT gym. Comments: Pt's SPO2 is 95% and HR is 85 post amb. Ambulation 3  Surface - 3: ramp  Device 3: Rolling Walker  Other Apparatus 3: O2 (1L)  Assistance 3: Stand by assistance  Quality of Gait 3: continue to remind pt to stay up inside RW SELVIN with amb. Slow but steady. Gait Deviations: Slow Radha;Decreased step length;Decreased step height  Distance: 30' ascending    Stairs:   Stairs  Stair Height: 8''  Device: Bilateral handrails  Number of Stairs: 17  Additional Factors: Non-reciprocal going up, Non-reciprocal going down  Assistance Level: Stand by assist  Skilled Clinical Factors: SpO2 WNL at top of steps, desatted by time he descended, requiring 1 min of seated rest to recover. Pt attempting to use just L HR on ascent, shifts to bilateral rails after increased R knee flexion ascending step No. 7 for improved stability, G return. Using R descending HR on descent without difficulty. Skilled Clinical Factors - Comments: Pt deferred. Wheelchair Activities:       Propulsion: Manual  Level: Level Tile  Method: RUE;LUE  Level of Assistance: Supervision  Description/ Details: Pt is able to maintain a straight path and complete turns. Min difficulties in tight spaces.          Goals  Time Frame for Short Term Goals: 9 days  Short Term Goal 1: CGA assist supine<-> sit x1 assist  Short Term Goal 2: sit EOB withsupervsion up to 15 min for therex/ADL  Short Term Goal 3: 3+/5 LE strength to prepare for standing activiites and gait  Short Term Goal 4: ambulation with rolling walker distance of 70 to 100 ft, CGA, level surface  Short Term Goal 5: roll L/R with SBA  Additional Goals?: Yes  Short Term Goal 6: Pt able to go up and down 5 steps with 2 rails, min A                OCCUPATIONAL THERAPY  SELF CARE        Feeding  Assistance Level: Set-up          Grooming/Oral Hygiene  Assistance Level: Modified independent  Skilled Clinical Factors: Completed brushing hair while seated at EOB. Upper Extremity Bathing  Assistance Level: Modified independent  Skilled Clinical Factors: completed while seated on shower bench       Lower Extremity Bathing  Assistance Level: Modified independent  Skilled Clinical Factors: completed while seated on shower bench, increased trunk flexion for BLE. Upper Extremity Dressing  Assistance Level: Modified independent  Skilled Clinical Factors: doff/don overhead shirt while seated on toilet. Lower Extremity Dressing  Assistance Level: Modified independent  Skilled Clinical Factors: Pt threaded brief and pants while seated on toilet. Able to pull up/down brief and pants while standing. G safety noted. Putting On/Taking Off Footwear  Assistance Level: Modified independent  Skilled Clinical Factors: doff/sendy footies while seated on toilet using figure four position. Toileting  Assistance Level: Modified independent  Skilled Clinical Factors: completed BM while seated on toilet seat. G safety noted. Toilet Transfers  Technique:  (Ambulating with rw.)  Equipment: Standard toilet, Grab bars  Additional Factors: Increased time to complete  Assistance Level: Modified independent  Skilled Clinical Factors: No LOB noted. Short Term Goals  Time Frame for Short Term Goals: One week  Short Term Goal 1: Patient will perform oral hygiene with SBA and Good safety. Short Term Goal 2: Patient will perform hair brushing with SBA. Short Term Goal 3: Patient will perform upper body dressing with SBA. Short Term Goal 4: Patient will perform lower body dressing, lower body bathing, and toileting tasks with Minimal assist.  Short Term Goal 5: Patient will perform functional moblity and transfers during self-care with CGA, least restrictive mobility device, and Good safety.   Short Term Goal 6: Patient will verbalize/demonstrate Good understanding of assistive equipment/durable medical equipment/modified techniques to maximize safety and independence with self-care. Short Term Goal 7: Patient will actively participate in 30+ minutes of therapeutic exercise/functional activities to promote increased independence with self-care and mobility. SPEECH THERAPY  NPO, following water protocol. Supervision memory and problem solving  Short Term Goal: mod I supervision and memory. Diet at least restrictive consistency    NUTRITION  Weight: 144 lb 6 oz (65.5 kg) / Body mass index is 22.61 kg/m². Diet Rx: NPO. Tube Feeding: Vital AF 1.2, 300 mL x 6 bolus feedings daily. 50 mL water before and after each bolus feeding. Additional 100-300 mL water daily for meds. Please see nutrition note for details. CASE MANAGEMENT ASSESSMENT    Discharge Disposition: [place]/ [place] [facility] [place] [address], awaiting confirmation of tube feeding per [facility]. Submitted PA for tube feedings. Patient's insurance changed on 2/1/23 without his knowledge.    Family Support: S/O and children    PCP Established: [x] Yes [] No (If No: Specify Status of Designation)    Services Being Followed In Preparation For Discharge: (Check All That Apply)  [x] [facility] ([address] [place] [address])  [] Outpatient Therapy(Specify Location)  [] Dialysis   [] Hemodialysis (Specify Location/Days/Chair Times)   [] Peritoneal Dialysis  [] IV Infusion Services  [] Enteral Nutrition Services  [] Oxygen  [] Stoma/Ostomy  [] Catheter  [] Lovenox    Patient Mood Interview PHQ-2 to 9 Score: 0    Pre-Admission Status:  Lives With: Significant other  Type of Home: House  Home Layout: One level  Home Access: Stairs to enter with rails  Entrance Stairs - Number of Steps: 6 ALISA from front; 4 ALISA frmo back with no rails  Entrance Stairs - Rails: Both (Both rails needs to be fixed)  Bathroom Shower/Tub: Tub/Shower unit, Curtain, Shower chair without back  [employer]: Standard (Raised seat in [facility] master bed room bathroom)  Bathroom Equipment: Grab bars in shower, Hand-held shower, Toilet raiser  Bathroom Accessibility: Accessible, Walker accessible  Home Equipment: Marzena Rice Help From: Family  ADL Assistance: Independent  Homemaking Assistance: Independent  Homemaking Responsibilities: Yes  Ambulation Assistance: Independent  Transfer Assistance: Independent  Active : Yes  Mode of Transportation: SUV  Occupation: Retired  Type of Occupation:   IADL Comments: Pt sleeps in flat bed. Additional Comments: Spouse is home all the time and able to assist minimally physically @8 her medical condition. Daughter and son Zeina Mack only with advance notice for appointment, son and daughter both work full time and have children. Family Education: Family Education Completed    Percentage Risk for Readmission: Low 0 - 18%   Readmission Risk              Risk of Unplanned Readmission:  17       %    Critical Items: None     Problem / Barrier Intervention / Plan  Results   Dysphagia Tongue base strengthening     Impaired cognition Cognitive retraining      Altered ability to care for self Remediation and training in modified care strategies to maximize safety and independence with self-care     Impaired functional mobility 2* CVA related deficits Strengthening, functional mobility training, DME procurement, Balance and Neuro re-ed, Family training and Edu                                  Total Self Care Score    Total Mobility Score  Admission Score:  11      Admission Score:  27  Goal:  37/42         Goal:  72/90    THERAPY MINUTE COMPLIANCE  Patient is participating and compliant with meeting therapy minutes as outlined in plan of care: Yes `    Discharge Plan   Estimated Discharge Date: 2/7/23  Home evaluation needed?  No  Overnight or Day Pass: No  Factors facilitating achievement of predicted outcomes: Family support, Motivated, Cooperative, Pleasant, Sense of humor, and Good insight into deficits  Barriers to the achievement of predicted outcomes: Anxiety, Decreased endurance, Lower extremity weakness, Long standing deficits, Medical complications, Skin Care, and Medication managment    Functional Goals at discharge:  Predicted Outcome: Home with familyPATIENT'S LEVEL OF ASSISTANCE: Occasional Supervision   Discharge therapy goals:  PT: Long Term Goals  Time Frame for Long Term Goals : By DC  Long Term Goal 1: pt able to roll L/R independently  Long Term Goal 2: pt able to perform supine<>sit mod-I  Long Term Goal 3: pt able to perform sit<>stand and perform pivot/step to transfers at mod-I  Long Term Goal 4: pt able to ambulate household distance with appropriate device, mod-I  Long Term Goal 5: pt able to ambulate > 50 ft with appropriate device, at SBA/supervsion level, level as well as incline surface. Additional Goals?: Yes  Long term goal 6: pt able to go up and down 4 or more steps with B UE support, CGA. Long term goal 7: Improve PASS score to 27/36 improve function/stability. Long term goal 8: improve Tinetti balance score to atleast 23/28 to reduce fall risk. OT:Long Term Goals  Time Frame for Long Term Goals : By discharge  Long Term Goal 1: Patient will perform BADLs with modified independence and Good safety. Long Term Goal 2: Patient will perform functional mobility and transfers during self-care with modified independence, least restrictive mobility device, and Good safety. Long Term Goal 3: Patient will stand for 5+ minutes with 0-1 UE support, modified independence while engaging in functional activity of choice. Long Term Goal 4: Patient will perform simple meal prep and light housekeeping with SBA and Good safety. Long Term Goal 5: Patient will verbalize/demonstrate Good understanding of Fall Prevention Strategies to maximize safety and independence with self-care.   Long Term Goal 6: Patient will perform self-care with improved bilateral  strength as evidenced by 10#  strength improvement and improved bilateral fine motor control as evidenced by 5 second improved in 9 hole peg test (Goal updated by Taras Kumar, OTR/L on 1/24/23). ST: diet at least restrictive consistency    Participating Team Members:  /:  Jasmine Chavez RN  Occupational Therapist:  Leonel Dove OT   Physical Therapist: Maikel Gilman PT  Speech Therapist:  Philomena Campos  Nurse: Stalin Chaudhary RN    Dietary/Nutrition: Vijay Winn RD, LD  Pastoral Care: Chaplain Erinn Rodriguez  CMG: Kemal Sethi RN    I approve the established interdisciplinary plan of care as documented within the medical record of Jose Henley. Dave Rodrigues.  Shelly Warren MD

## 2023-02-07 NOTE — PROGRESS NOTES
Writer went over discharge information with patient and wife. Writer reinforced enteral feeding and medication administration instructions with both patient and wife. Writer instructed patient and family the importance of checking heart rate and blood pressure before administering medications. All questions answered.

## 2023-02-07 NOTE — TELEPHONE ENCOUNTER
[facility] calling to see if Dr Venessa Gordon will be the following physician, confirmed yes and that patient has a scheduled appointment on 02/13

## 2023-02-07 NOTE — PROGRESS NOTES
Physical Therapy  Facility/Department: [facility] ACUTE REHAB  Rehabilitation Physical Therapy Daily Treatment Note    NAME: Baron Nicole  : 1960 (81 y.o.)  MRN: [id number]  CODE STATUS: Full Code    Date of Service: 23      History reviewed. No pertinent past medical history. Past Surgical History:   Procedure Laterality Date    UPPER GASTROINTESTINAL ENDOSCOPY N/A 2023    EGD ESOPHAGOGASTRODUODENOSCOPY PEG TUBE INSERTION performed by Cole Martino DO at [facility] ENDO       Chart Reviewed: Yes  Patient assessed for rehabilitation services?: Yes  Additional Pertinent Hx: History of Present Illness:  Baron Nicole  is a 60 y.o. right-handed male admitted to the [address] [place] [address] on 2023. He was originally admitted to [facility] on 2023 for acute respiratory failure 2/2 RLL pneumonia. Blood/resp/urine cultures negative, although his MRSA swab was +. He completed course of Unasyn and vancomycin. Imaging showed numerous acute, subacute, and chronic infarcts in the left parietal lobe, left cerebellar hemisphere, as well as bilateral SAH. PEG tube was placed 23 for severe dysphagia. HCV+. ID, nephrology, heme-onc, neuro, pulm, and psych are following. Patient admitted to ARU this morning. He reports doing well this morning. He denies any pain aside from some abdominal pain when he coughs. He does note some wheezes and tenderness around the PEG tube site. He does endorse some generalized weakness. He is currently requiring assistance for self-care activities and mobility prompting this admission. Family / Caregiver Present: No  Referring Practitioner: ALAYNA Ferrara NP  Referral Date : 23  Diagnosis: Acute respiratory failure  General Comment  Comments: Vitals monitored continually throughout treatment session. Remained WNL    Restrictions:  Restrictions/Precautions: Fall Risk;General Precautions;Contact Precautions;Isolation; Bed Alarm  Position Activity Restriction  Other position/activity restrictions: PT/OT staff may titrateO2 down as tolerated in therapy, but may only titrate up to maximum 4 L NC as needed in therapy. Nursing should perform oxygen titration > 4 L. Per RT SpO2 Goal is 92% or higher, use O2 as needed to maintain SpO2 goal with rest and activity. SUBJECTIVE  Subjective: Pt lying in bed supine on writers arrival. After writers introduction pt asks, but why therapy if I'm going home. Explained reasoning to pt and agreed to cooperate with therapy. OBJECTIVE  Vision  Vision: Impaired  Vision Exceptions: Wears glasses at all times    Hearing  Hearing: Exceptions to [facility]  Hearing Exceptions: Hard of hearing/hearing concerns    Cognition  Overall Cognitive Status: Exceptions  Arousal/Alertness: Appropriate responses to stimuli  Following Commands: Follows one step commands consistently  Attention Span: Attends with cues to redirect  Safety Judgement: Decreased awareness of need for safety;Decreased awareness of need for assistance  Problem Solving: Decreased awareness of errors  Insights: Decreased awareness of deficits  Initiation: Requires cues for some  Sequencing: Requires cues for some  Cognition Comment: Pt with impulsive tendencies noted. Sensation  Overall Sensation Status: WFL    Functional Mobility  Bed mobility  Rolling to Left: Modified independent  Rolling to Right: Modified independent  Supine to Sit: Supervision  Sit to Supine: Supervision  Scooting: Supervision  Bed Mobility Comments: No use of bed rails for assistance with bed mobility.  Performed with bed flat  Transfers  Sit to Stand: Supervision  Stand to Sit: Supervision  Bed to Chair: Supervision  Stand Pivot Transfers: Supervision (Done with and without RW.)  Balance  Posture: Good  Sitting - Static: Good;+  Sitting - Dynamic: Good  Standing - Static: Good;-  Standing - Dynamic: Fair;-    Environmental Mobility  Ambulation  Surface: Level tile  Device: Rolling Walker  Other Apparatus: O2 (1L)  Assistance: Supervision  Quality of Gait: Slow, steady gait. Continued cueing to stay in SELVIN of RW  Gait Deviations: Slow Radha;Decreased step height  Distance: 180'  Comments: SpO2 reads 93% following ambulation  More Ambulation?: No  Stairs/Curb  Stairs?: No (deferred)    PT Exercises  A/AROM Exercises: Standing (B)LE exs x 10 at RW  Circulation/Endurance Exercises: NuStep L4 x 10'  Functional Mobility Circuit Training: Stand pivot transfers x4, 2 with, 2 without rolling walker  Dynamic Standing Balance Exercises: AMB no device using handrail in dow. 2x20\" Normal ambulation, Lateral stepping, Marching. ASSESSMENT       Activity Tolerance  Activity Tolerance: Patient tolerated treatment well    Assessment    Activity Tolerance Comment: Monitor SP02 throughout therapy sessions  Treatment Diagnosis: impared mobility s/p AMS/SAH  Therapy Prognosis: Good  Decision Making: Medium Complexity  History: falls, 1 week  AMS PTA  Exam: L side weakness, decreased activity tolerance  Barriers to Learning: cognition, endurance  Treatment Initiated : eval, bed mobiltiy, sitting balance, transfers and gait  Discharge Recommendations: Therapy recommended at discharge;Home with assist PRN;Patient would benefit from continued therapy after discharge  PT D/C Equipment  Walker: Rolling (ordered, per PT Christen)  Other: Possibly discuss with family looking for Rollator at mobility center or other resources to increase pt's independence/safety within and out of the home. PT Equipment Recommendations  Equipment Needed: Yes  Walker: Rolling (ordered, per PT Christen)  Other: Possibly discuss with family looking for Rollator at mobility center or other resources to increase pt's independence/safety within and out of the home. GOALS  Patient Goals   Patient Goals : Want to be independent to go home.   Short Term Goals  Time Frame for Short Term Goals: 9 days  Short Term Goal 1: CGA assist supine<-> sit x1 assist  Short Term Goal 2: sit EOB withsupervsion up to 15 min for therex/ADL  Short Term Goal 3: 3+/5 LE strength to prepare for standing activiites and gait  Short Term Goal 4: ambulation with rolling walker distance of 70 to 100 ft, CGA, level surface  Short Term Goal 5: roll L/R with SBA  Additional Goals?: Yes  Short Term Goal 6: Pt able to go up and down 5 steps with 2 rails, min A  Long Term Goals  Time Frame for Long Term Goals : By DC  Long Term Goal 1: pt able to roll L/R independently  Long Term Goal 2: pt able to perform supine<>sit mod-I  Long Term Goal 3: pt able to perform sit<>stand and perform pivot/step to transfers at mod-I  Long Term Goal 4: pt able to ambulate household distance with appropriate device, mod-I  Long Term Goal 5: pt able to ambulate > 50 ft with appropriate device, at SBA/supervsion level, level as well as incline surface. Additional Goals?: Yes  Long term goal 6: pt able to go up and down 4 or more steps with B UE support, CGA. Long term goal 7: Improve PASS score to 27/36 improve function/stability. Long term goal 8: improve Tinetti balance score to atleast 23/28 to reduce fall risk. PLAN OF CARE  Frequency: 1-2 treatment sessions per day, 5-7 days per week  Physcial Therapy Plan  General Plan:  minutes of therapy at least 5 out of 7 days a week (5 to 7 days/wk)  Specific Instructions for Next Treatment: challenging balance  activities  Current Treatment Recommendations: Strengthening;Balance training;Functional mobility training;Neuromuscular re-education;Cognitive reorientation; Safety education & training;Patient/Caregiver education & training;Equipment evaluation, education, & procurement; Therapeutic activities;Transfer training; Wheelchair mobility training;Gait training;Stair training;Home exercise program;Positioning  Safety Devices  Type of Devices:  All fall risk precautions in place;Call light within reach;Gait belt;Telesitter in use;Patient at risk for falls; Left in bed  Restraints  Restraints Initially in Place: No  Restraints: bilat wrist restraints- no longer needed    EDUCATION  Education  Education Given To: Patient  Education Provided:  Mobility Training;Equipment;Home Exercise Program;Energy Conservation;Role of Therapy;Plan of Care;Safety  Education Provided Comments: Discussed the importance of avoiding overexertion and pursed lip breathing for energy conservation  Education Method: Demonstration;Verbal  Barriers to Learning: Cognition  Education Outcome: Verbalized understanding;Demonstrated understanding        Therapy Time   Individual Concurrent Group Co-treatment   Time In 1014         Time Out 1055         Minutes Debbie Rodriguez PTA, 02/07/23 at 2:23 PM

## 2023-02-07 NOTE — PROGRESS NOTES
[address]   OCCUPATIONAL THERAPY MISSED TREATMENT NOTE   ACUTE REHAB  Date: 23  Patient Name: Rakesh Elena       Room: 6709/8563-31  MRN: [id number]   Account #: [de-identified]    : 1960  (60 y.o.)  Gender: male   Referring Practitioner: Sajan Machuca MD  Diagnosis: Cerebrovascular accident             REASON FOR MISSED TREATMENT:  Patient declined   -    Attempted at 200. Pt. Declined to participate in any self care or therapeutic activities at this time. \"I am just waiting to see when I can go home. I was supposed to leave yesterday. \" \"I am going to just listen to my music right now. \" Encouragement and education provided- poor return. If anything changes with patient discharge- will continue to follow/treat as appropriate.          QUIRINO Lilly/OTONIEL

## 2023-02-07 NOTE — PLAN OF CARE
Problem: Discharge Planning  Goal: Discharge to home or other facility with appropriate resources  Outcome: Progressing     Problem: Nutrition Deficit:  Goal: Optimize nutritional status  Outcome: Progressing     Problem: Safety - Adult  Goal: Free from fall injury  Outcome: Progressing     Problem: ABCDS Injury Assessment  Goal: Absence of physical injury  Outcome: Progressing     Problem: Skin/Tissue Integrity  Goal: Absence of new skin breakdown  Description: 1. Monitor for areas of redness and/or skin breakdown  2. Assess vascular access sites hourly  3. Every 4-6 hours minimum:  Change oxygen saturation probe site  4. Every 4-6 hours:  If on nasal continuous positive airway pressure, respiratory therapy assess nares and determine need for appliance change or resting period.   Outcome: Progressing

## 2023-02-07 NOTE — PROGRESS NOTES
Physical Medicine & Rehabilitation  Progress Note    2/7/2023 12:25 PM     CC: Ambulatory and ADL dysfunction due to multifocal CVA    Subjective:   No complaints. Continues on oxygen. Ready for discharge. Denies difficulty with bowels or bladder discharge held yesterday as found out last minute different insurance and trying to obtain clearance for tube feeds    ROS:  Denies fevers, chills, sweats. No chest pain, palpitations, lightheadedness. Denies coughing, wheezing or shortness of breath. Denies abdominal pain, nausea, diarrhea or constipation. No new areas of joint pain. Denies new areas of numbness or weakness. Denies new anxiety or depression issues. No new skin problems. Rehabilitation:   PT:  Restrictions/Precautions: Fall Risk, General Precautions, Contact Precautions, Isolation, Bed Alarm  Implants present? :  (pt denies)  Other position/activity restrictions: PT/OT staff may titrateO2 down as tolerated in therapy, but may only titrate up to maximum 4 L NC as needed in therapy. Nursing should perform oxygen titration > 4 L. Per RT SpO2 Goal is 92% or higher, use O2 as needed to maintain SpO2 goal with rest and activity. Transfers  Sit to Stand: Supervision  Stand to Sit: Supervision  Bed to Chair: Supervision  Stand Pivot Transfers: Supervision (transfers completed with and without AD)  Comment: patient demo'd impulsive movement, vcs for safety and O2 line management. Transfers completed with RW this date. Ambulation  Surface: Level tile  Device: Rolling Walker  Other Apparatus: O2 (1L)  Assistance: Supervision  Quality of Gait: vcs to stay up inside SELVIN of  RW with fair, but fleeting return  Gait Deviations: Slow Radha, Decreased step height  Distance: 283' and shorter distances in PT gym. Comments: Pt's SPO2 is 95% and HR is 85 post amb. More Ambulation?: No      OT:  ADL  Feeding: Dependent/Total, NPO  Feeding Skilled Clinical Factors: Peg tube feeds  Grooming:  Moderate assistance  Grooming Skilled Clinical Factors: Patient declines oral hygiene. Moderate assist for brushing hair due to significant tangles. UE Bathing: Stand by assistance  UE Bathing Skilled Clinical Factors: Seated on tub transfer bench in shower with SBA for safety due to impulsivity  LE Bathing: Maximum assistance  LE Bathing Skilled Clinical Factors: Assist for B feet/lower legs and buttocks. Minimal/Moderate assist for standing balance for buttocks  UE Dressing: Minimal assistance  UE Dressing Skilled Clinical Factors: Assist for orientation and to pull down over back  LE Dressing: Maximum assistance  LE Dressing Skilled Clinical Factors: Assist to thread B LE into pull-up. Patient attempted to thread B LE into pants, however threaded B LE into same pant leg. Assist to thread R LE into correct pant leg. Maximal assist to pull over hips  Toileting: Dependent/Total  Toileting Skilled Clinical Factors: Total assist for personal hygiene with BM. Assist for clothing management  Additional Comments: OT facilitated patient engagement in showering routine this date including bathing, dressing, grooming, and toileting tasks. Throughout self-care session, patient required Min/Mod verbal cues for sequencing, safety, attention to task, and problem solving. Patient is impulsive at times and has poor insight into deficits. Please see above for details regarding level of assist.         Balance  Sitting Balance: Stand by assistance  Standing Balance: Moderate assistance (Fluctuates between Minimal/Moderate assist)      Functional Mobility  Functional - Mobility Device: Rolling Walker  Activity:  (2-3 feet from recliner to w/c)  Assist Level:  Moderate assistance (Moderate assist x 1, SBA x 1 for safety)  Functional Mobility Comments: with Moderate verbal cues for safety     Bed mobility  Rolling to Left: Modified independent  Rolling to Right: Modified independent  Supine to Sit: Supervision  Sit to Supine: Supervision  Scooting: Supervision  Bed Mobility Comments: no use of grab rails with bed in flat position. Pt enters with a modified forward approach. Transfers  Stand Pivot Transfers: Moderate assistance  Sit to stand: Moderate assistance  Stand to sit: Moderate assistance  Transfer Comments: with Moderate verbal cues for hand placemetn and safety   Toilet Transfers  Toilet - Technique: Stand pivot, To right, To left  Equipment Used: Grab bars  Toilet Transfer: Moderate assistance  Toilet Transfers Comments: with Moderate verbal cues for hand placement and safety     Shower Transfers  Shower - Transfer From: Wheelchair  Shower - Transfer Type: To and From  Shower - Transfer To: Transfer tub bench  Shower - Technique: Stand pivot, To right, To left  Shower Transfers: Moderate assistance  Shower Transfers Comments: with Moderate verbal cues for hand placement and safety         ST:    Subjective: [x] Alert     [x] Cooperative     [] Confused     [] Agitated    [] Lethargic     Objective/Assessment:     Recall: Recall of daily events: SUP for mod level of detail. Working memory: Category inclusion/exclusion (f=4) at 70% (I) improved to 90% with min A. Recall/sequence 3-item lists: 100% MI (extended time)     Organization: NT     Problem solving: Functional time calculations: 85% (I) to 100% with min A/repeated stimuli. Other: No family present. Objective:  /85   Pulse 77   Temp 97.5 °F (36.4 °C) (Oral)   Resp 18   Ht 5' 7\" (1.702 m)   Wt 144 lb 6 oz (65.5 kg)   SpO2 90%   BMI 22.61 kg/m²  I Body mass index is 22.61 kg/m².  I   Wt Readings from Last 1 Encounters:   23 144 lb 6 oz (65.5 kg)      Temp (24hrs), Av.7 °F (36.5 °C), Min:97.5 °F (36.4 °C), Max:97.9 °F (36.6 °C)         GEN: well developed, well nourished, no acute distress  HEENT: Normocephalic atraumatic, EOMI, mucous membranes pink and moist mass left neck/cheek chronic for 50 years per patient  CV: RRR, no murmurs, rubs or gallops  PULM: CTAB, no rales or rhonchi. Respirations WNL and unlabored  ABD: soft, NT, ND, +BS and equal PEG site clean and intact  NEURO: Alert, knew year, president and location, follows commands sensation intact to light touch. MSK: Normal muscle tone/5 upper and distal lower extremities at least antigravity proximal  EXTREMITIES: No calf tenderness to palpation bilaterally. No edema BLEs  SKIN: warm dry and intact with good turgor  PSYCH: appropriately interactive. Affect WNL. Good spirits        Medications   Scheduled Meds:   enoxaparin  40 mg SubCUTAneous Daily    ipratropium-albuterol  1 ampule Inhalation Q4H WA    carvedilol  6.25 mg Oral BID WC    ferrous sulfate  325 mg Oral Daily with breakfast    spironolactone  25 mg Oral Daily    polyethylene glycol  17 g Oral Daily     Continuous Infusions:  PRN Meds:.diclofenac sodium, guaiFENesin, acetaminophen, senna, bisacodyl     Diagnostics:     CBC: No results for input(s): WBC, RBC, HGB, HCT, MCV, RDW, PLT in the last 72 hours. BMP: No results for input(s): NA, K, CL, CO2, PHOS, BUN, CREATININE, CA in the last 72 hours. BNP: No results for input(s): BNP in the last 72 hours. PT/INR: No results for input(s): PROTIME, INR in the last 72 hours. APTT: No results for input(s): APTT in the last 72 hours. CARDIAC ENZYMES: No results for input(s): CKMB, CKMBINDEX, TROPONINT in the last 72 hours. Invalid input(s): CKTOTAL;3  FASTING LIPID PANEL:  Lab Results   Component Value Date    TRIG 85 01/03/2023     LIVER PROFILE: No results for input(s): AST, ALT, ALB, BILIDIR, BILITOT, ALKPHOS in the last 72 hours. I/O (24Hr): Intake/Output Summary (Last 24 hours) at 2/7/2023 1225  Last data filed at 2/7/2023 0108  Gross per 24 hour   Intake 1250 ml   Output --   Net 1250 ml         Glu last 24 hour  No results for input(s): POCGLU in the last 72 hours.     No results for input(s): CLARITYU, COLORU, PHUR, SPECGRAV, PROTEINU, RBCUA, BLOODU, BACTERIA, Flakita Side, LEUKOCYTESUR, YEAST, Tuan Revels in the last 72 hours. CT CHEST WO CONTRAST    Result Date: 2/5/2023  There are scattered areas subpleural nodular consolidative change with adjacent linear atelectatic change within the posterior aspect of the right lower lobe. Findings can be sequel to the previous reported pneumonia. Linear atelectatic change are also noted within the left lower lobe. . Extensive emphysematous changes within the lungs with massive bulla replacing predominant part left upper lobe. Small pericardial effusion. Bilateral 2-5 mm nonobstructive kidney stones     XR CHEST PORTABLE    Result Date: 2/5/2023  Hazy bibasilar opacities (right greater than left). Impression/Plan:   Impaired ADLs, gait, and mobility due to:     Multifocal cardioembolic CVA, subarachnoid hemorrhage:  PT/OT for gait, mobility, strengthening, endurance, ADLs, and self care. No residual SAH on CT head 1/21. Discharge tube feeds, etc. clarify with new insurance continue with home therapies. No driving, aspiration precautions, PEG tube feeding  Dysphagia:  S/p PEG tube placement on 1/17. SLP treating. Dietitian managing tube feed - switched to all bolus tube fees on 2/1 after repeat MBS completed and NPO recommended. Free water protocol. Agitation:  Recurred 1/21. Patient moved to room closer to the nurses station and telesitter initiated. Repeat CT head improved. Seroquel BID started 1/21 - mental status improved and Seroquel was discontinued. Insomnia: Trazodone started nightly on 1/20 - failed. Improved - no current medication. Acute respiratory failure requiring intubation. now on RA -  home O2 evaluation over the weekend seen by pulmonary-qualifies for home O2 2 L with exertion and at night recommends continue nebulizer-will need outpatient pulmonary function test, noted face-to-face for oxygen and nebulizer done with patient by pulmonary -appreciate pulmonary follow-up t- recommends DuoNeb 3 times daily-changed on discharge meds - Dr Austyn Villarreal (PS) recommend changing albuterol inhaler to 2 puffs 3 times a day as needed on [id number] reviewed precautions  Pneumonia: Improved. Completed course of Unasyn and Vanco. CXR 1/24 for increased productive cough - stable hazy opacities. CT as above-notify pulmonary perfect served pulmonary Dr. Austyn Villarreal covering 2/6 as above DuoNeb 3 times daily and albuterol inhaler 3 times as needed CT as above reviewed with patient by pulmonary  KENTRELL: Resolved. Will monitor. Elevated LFTs:  Resolved. Will monitor  HCV:  Will need follow up with GI for treatment as an outpatient  Anemia: Hemoglobin 11.2 on 2/3, stable. Monitoring. On oral iron supplementation. Thrombocytopenia: Resolved. Will monitor. Right neck/upper back pain:  May be related to tight musculature in this area. May use heat or ice as needed. Added voltaren gel as needed on 2/3. Improved  Bowel Management: Miralax daily, senokot prn, dulcolax prn. DVT Prophylaxis:  Repeat CT - SAH resolved 1/21 - okay to initiate DVT chemoprophylaxis per IM. Lovenox started 1/22. TONI stockings during the day, EPC cuffs at night. Internal Medicine for medical management  DC okay with internal medicine and pulmonary , follow up 1. PCP 1-2 weeks,- Dr Gabriela Ochao 2. PM&R 4-6 weeks, 3. GI Hep  C management, 4. Neurology-[place], 5. pulmonary Dr. Henrry Claire 4 weeks, will need pulmonary function test , home PT/OT /Sp/Nsg and PEG site care ,swallow study in few weeks . CBC/BMP 2/8 to Dr. Gabriela Ochoa, above reviewed with pt continue nebulizer , O2,  follow-up , etc, prescription  to [facility] per patient request        Herlinda Reza. Elías Keyes MD       This note is created with the assistance of a speech recognition program.  While intending to generate a document that actually reflects the content of the visit, the document can still have some errors including those of syntax and sound a like substitutions which may escape proof reading.   In such instances, actual meaning can be extrapolated by contextual diversion

## 2023-02-07 NOTE — PROGRESS NOTES
Writer assumed care of this patient at this time. Patient resting in bed comfortably with bed alarm activated.

## 2023-02-09 ENCOUNTER — HOSPITAL ENCOUNTER (OUTPATIENT)
Age: 63
Setting detail: SPECIMEN
Discharge: HOME | End: 2023-02-09
Payer: MEDICAID

## 2023-02-09 LAB
ANION GAP SERPL CALCULATED.3IONS-SCNC: 11 MMOL/L (ref 9–17)
BUN SERPL-MCNC: 25 MG/DL (ref 8–23)
CALCIUM SERPL-MCNC: 9.3 MG/DL (ref 8.6–10.4)
CHLORIDE SERPL-SCNC: 98 MMOL/L (ref 98–107)
CO2 SERPL-SCNC: 27 MMOL/L (ref 20–31)
CREAT SERPL-MCNC: 0.46 MG/DL (ref 0.7–1.2)
GFR SERPL CREATININE-BSD FRML MDRD: >60 ML/MIN/1.73M2
GLUCOSE SERPL-MCNC: 104 MG/DL (ref 70–99)
HCT VFR BLD AUTO: 36.2 % (ref 41–53)
HGB BLD-MCNC: 11.6 G/DL (ref 13.5–17.5)
MCH RBC QN AUTO: 26.1 PG (ref 26–34)
MCHC RBC AUTO-ENTMCNC: 31.9 G/DL (ref 31–37)
MCV RBC AUTO: 81.6 FL (ref 80–100)
PDW BLD-RTO: 32 % (ref 11.5–14.9)
PLATELET # BLD AUTO: 195 K/UL (ref 150–450)
PMV BLD AUTO: 9.1 FL (ref 6–12)
POTASSIUM SERPL-SCNC: 4.5 MMOL/L (ref 3.7–5.3)
RBC # BLD: 4.44 M/UL (ref 4.5–5.9)
SODIUM SERPL-SCNC: 136 MMOL/L (ref 135–144)
WBC # BLD AUTO: 7.4 K/UL (ref 3.5–11)

## 2023-02-09 PROCEDURE — 36415 COLL VENOUS BLD VENIPUNCTURE: CPT

## 2023-02-09 PROCEDURE — 80048 BASIC METABOLIC PNL TOTAL CA: CPT

## 2023-02-09 PROCEDURE — 85027 COMPLETE CBC AUTOMATED: CPT

## 2023-02-13 ENCOUNTER — OFFICE VISIT (OUTPATIENT)
Dept: INTERNAL MEDICINE CLINIC | Age: 63
End: 2023-02-13

## 2023-02-13 VITALS
OXYGEN SATURATION: 93 % | DIASTOLIC BLOOD PRESSURE: 86 MMHG | BODY MASS INDEX: 23.05 KG/M2 | WEIGHT: 147.2 LBS | SYSTOLIC BLOOD PRESSURE: 134 MMHG

## 2023-02-13 DIAGNOSIS — I63.40 CEREBROVASCULAR ACCIDENT (CVA) DUE TO EMBOLISM OF CEREBRAL ARTERY (HCC): ICD-10-CM

## 2023-02-13 DIAGNOSIS — R13.10 DYSPHAGIA, UNSPECIFIED TYPE: ICD-10-CM

## 2023-02-13 DIAGNOSIS — E44.0 MODERATE MALNUTRITION (HCC): Primary | ICD-10-CM

## 2023-02-13 DIAGNOSIS — Z09 HOSPITAL DISCHARGE FOLLOW-UP: ICD-10-CM

## 2023-02-13 DIAGNOSIS — Z13.220 SCREENING FOR HYPERLIPIDEMIA: ICD-10-CM

## 2023-02-13 DIAGNOSIS — J43.1 PANLOBULAR EMPHYSEMA (HCC): ICD-10-CM

## 2023-02-13 DIAGNOSIS — J18.9 COMMUNITY ACQUIRED PNEUMONIA OF RIGHT LOWER LOBE OF LUNG: ICD-10-CM

## 2023-02-13 DIAGNOSIS — D69.6 THROMBOCYTOPENIA (HCC): ICD-10-CM

## 2023-02-13 RX ORDER — TIOTROPIUM BROMIDE 18 UG/1
18 CAPSULE ORAL; RESPIRATORY (INHALATION) DAILY
Qty: 90 CAPSULE | Refills: 1 | Status: SHIPPED | OUTPATIENT
Start: 2023-02-13

## 2023-02-13 SDOH — ECONOMIC STABILITY: HOUSING INSECURITY
IN THE LAST 12 MONTHS, WAS THERE A TIME WHEN YOU DID NOT HAVE A STEADY PLACE TO SLEEP OR SLEPT IN A SHELTER (INCLUDING NOW)?: NO

## 2023-02-13 SDOH — ECONOMIC STABILITY: INCOME INSECURITY: HOW HARD IS IT FOR YOU TO PAY FOR THE VERY BASICS LIKE FOOD, HOUSING, MEDICAL CARE, AND HEATING?: NOT HARD AT ALL

## 2023-02-13 SDOH — ECONOMIC STABILITY: FOOD INSECURITY: WITHIN THE PAST 12 MONTHS, YOU WORRIED THAT YOUR FOOD WOULD RUN OUT BEFORE YOU GOT MONEY TO BUY MORE.: NEVER TRUE

## 2023-02-13 SDOH — ECONOMIC STABILITY: FOOD INSECURITY: WITHIN THE PAST 12 MONTHS, THE FOOD YOU BOUGHT JUST DIDN'T LAST AND YOU DIDN'T HAVE MONEY TO GET MORE.: NEVER TRUE

## 2023-02-13 ASSESSMENT — PATIENT HEALTH QUESTIONNAIRE - PHQ9
SUM OF ALL RESPONSES TO PHQ QUESTIONS 1-9: 0
2. FEELING DOWN, DEPRESSED OR HOPELESS: 0
SUM OF ALL RESPONSES TO PHQ9 QUESTIONS 1 & 2: 0
SUM OF ALL RESPONSES TO PHQ QUESTIONS 1-9: 0
SUM OF ALL RESPONSES TO PHQ QUESTIONS 1-9: 0
1. LITTLE INTEREST OR PLEASURE IN DOING THINGS: 0
SUM OF ALL RESPONSES TO PHQ QUESTIONS 1-9: 0

## 2023-02-13 NOTE — PROGRESS NOTES
Post-Discharge Transitional Care  Follow Up      Jerzy Montanez   YOB: 1960    Date of Office Visit:  2/13/2023  Date of Hospital Admission: 1/19/23  Date of Hospital Discharge: 2/7/23  Risk of hospital readmission (high >=14%. Medium >=10%) :Readmission Risk Score: 13.3      Care management risk score Rising risk (score 2-5) and Complex Care (Scores >=6): No Risk Score On File     Non face to face  following discharge, date last encounter closed (first attempt may have been earlier): *No documented post hospital discharge outreach found in the last 14 days    Call initiated 2 business days of discharge: *No response recorded in the last 14 days    ASSESSMENT/PLAN:   Moderate malnutrition (HCC)  Screening for hyperlipidemia  -     Lipid Panel; Future  Hospital discharge follow-up  -     [place] DISCHARGE MEDS RECONCILED W/ CURRENT OUTPATIENT MED LIST  Cerebrovascular accident (CVA) due to embolism of cerebral artery (HCC)  -     [facility] Occupational Therapy - [facility]  Community acquired pneumonia of right lower lobe of lung  Panlobular emphysema (HCC)  -     tiotropium (SPIRIVA HANDIHALER) 18 MCG inhalation capsule; Inhale 1 capsule into the lungs daily, Disp-90 capsule, R-1Normal  Dysphagia, unspecified type  Thrombocytopenia (HCC)      Medical Decision Making: moderate complexity  No follow-ups on file.    On this date 2/13/2023 I have spent  minutes reviewing previous notes, test results and face to face with the patient discussing the diagnosis and importance of compliance with the treatment plan as well as documenting on the day of the visit.       Subjective:   HPI:  Follow up of Hospital problems/diagnosis(es):     Inpatient course: Discharge summary reviewed- see chart.  Jerzy Montanez is a 62 y.o. male admitted to inpatient rehabilitation on 1/19/2023.  He was originally admitted to [place] on 1/1/2023 for acute respiratory failure secondary to right lower lobe pneumonia.  Blood/resp/urine cultures were negative, although his MRSA swab was positive. He completed a course of Unasyn and vancomycin. Imaging showed numerous acute, subacute, and chronic infarcts in the left parietal lobe and left cerebellar hemisphere as well as bilateral subarachnoid hemorrhage. PEG tube was placed on 1/17/23 for severe dysphagia. He was found to be positive for hepatitis C and will need outpatient follow up with GI for treatment. ID, nephrology, heme-onc, neuro, pulm, and psych were following. He was requiring assistance for self-care activities and mobility, prompting admission to acute rehab. Rehab course: The patient participated in an aggressive multidisciplinary inpatient rehabilitation program involving 3 hours per day, 5 days per week of rehabilitation. Patient benefited from inpatient rehab and was discharged in good stable condition. CT head on 1/21/23 showed no residual subarachnoid hemorrhage. He was treated with seroquel for agitation, but this medication was subsequently discontinued after agitation improved. Oxygen was weaned, but he continued to require it intermittently. He remained on duo-neb 4 times daily. He is NPO with bolus tube feeds 6 times daily at the time of discharge. He has been instructed on the free water protocol. Chronic conditions were otherwise stable on medications.       Interval history/Current status:  Patient states that he has been doing okay  Was worried about the CT lung changes  Told him that it is likely secondary to his COPD and sequelae of pneumonia  Continue to n.p.o. PEG tube in place  Following up with speech therapy  Starting speech therapy again tomorrow  Quit smoking  Denies any chest pain  Shortness of breath is significantly improved  Wants to know if he can drive  I ordered occupational therapy driving evaluation before he could drive  Also patient has appointment with neurology next month    Patient Active Problem List Diagnosis    Acute type II respiratory failure (HCC)    Transaminitis    Normocytic anemia    Thrombocytopenia (HCC)    Agitation    Acute respiratory failure with hypoxia and hypercapnia (HCC) RLL pneumonia    Altered mental status    Community acquired pneumonia of right lower lobe of lung    Prolonged pt (prothrombin time)    Emphysema lung (HCC)    Cerebral infarction ([facility]    ACP (advance care planning)    Goals of care, counseling/discussion    Encounter for palliative care    Delirium due to another medical condition    Moderate malnutrition ([facility]    Hepatitis C virus infection without hepatic coma    Dysphagia    Impending cerebrovascular accident ([facility]    Dermatitis associated with moisture    Cerebrovascular accident (CVA) due to embolism of cerebral artery ([facility]       Medications listed as ordered at the time of discharge from hospital     Medication List            Accurate as of February 13, 2023  2:46 PM. If you have any questions, ask your nurse or doctor.                 CONTINUE taking these medications      acetaminophen 325 MG tablet  Commonly known as: TYLENOL  Take 2 tablets by mouth every 6 hours as needed for Pain     albuterol sulfate  (90 Base) MCG/ACT inhaler  Commonly known as: Proventil HFA  Inhale 2 puffs into the lungs 3 times daily as needed for Wheezing     carvedilol 6.25 MG tablet  Commonly known as: COREG  Take 1 tablet by mouth 2 times daily (with meals)     ferrous sulfate 325 (65 Fe) MG tablet  Commonly known as: IRON 325  Take 1 tablet by mouth daily (with breakfast)     Handicap Placard Misc  by Does not apply route Duration: 12months / Dx: cva     ipratropium-albuterol 0.5-2.5 (3) MG/3ML Soln nebulizer solution  Commonly known as: DUONEB  Inhale 3 mLs into the lungs 3 times daily     polyethylene glycol 17 g packet  Commonly known as: GLYCOLAX  Take 17 g by mouth daily as needed for Constipation     spironolactone 25 MG tablet  Commonly known as: ALDACTONE  Take 1 tablet by mouth daily     Vital AF 1.2 Lalo Liqd  300 mLs by PEG Tube route 6 times daily Boluses at 6am, 9am, 12pm, 3pm, 6pm, and 9pm.  50mL free water before and after each bolus. Additional 100-300mL water for meds. Medications marked \"taking\" at this time  Outpatient Medications Marked as Taking for the 2/13/23 encounter (Office Visit) with Davdi Cortez MD   Medication Sig Dispense Refill    Daniel Sweet [address] [place] [address] by Does not apply route Duration: 12months / Dx: cva 1 each 0    ipratropium-albuterol (DUONEB) 0.5-2.5 (3) MG/3ML SOLN nebulizer solution Inhale 3 mLs into the lungs 3 times daily 360 mL 3    albuterol sulfate HFA (PROVENTIL HFA) 108 (90 Base) MCG/ACT inhaler Inhale 2 puffs into the lungs 3 times daily as needed for Wheezing 18 g 3    acetaminophen (TYLENOL) 325 MG tablet Take 2 tablets by mouth every 6 hours as needed for Pain      carvedilol (COREG) 6.25 MG tablet Take 1 tablet by mouth 2 times daily (with meals) 60 tablet 0    spironolactone (ALDACTONE) 25 MG tablet Take 1 tablet by mouth daily 30 tablet 0    ferrous sulfate (IRON 325) 325 (65 Fe) MG tablet Take 1 tablet by mouth daily (with breakfast) 30 tablet 0    polyethylene glycol (GLYCOLAX) 17 g packet Take 17 g by mouth daily as needed for Constipation      Nutritional Supplements (VITAL AF 1.2 LALO) LIQD 300 mLs by PEG Tube route 6 times daily Boluses at 6am, 9am, 12pm, 3pm, 6pm, and 9pm.  50mL free water before and after each bolus. Additional 100-300mL water for meds. 78609 mL 5        Medications patient taking as of now reconciled against medications ordered at time of hospital discharge: Yes    A comprehensive review of systems was negative except for what was noted in the HPI.     Objective:    /86 (Site: Right Upper Arm)   Wt 147 lb 3.2 oz (66.8 kg)   SpO2 93%   BMI 23.05 kg/m²   General Appearance: alert and oriented to person, place and time, well developed and well- nourished, in no acute distress  Skin: warm and dry, no rash or erythema  Head: normocephalic and atraumatic  Eyes: pupils equal, round, and reactive to light, extraocular eye movements intact, conjunctivae normal  ENT: tympanic membrane, external ear and ear canal normal bilaterally, nose without deformity, nasal mucosa and turbinates normal without polyps  Neck: supple and non-tender without mass, no thyromegaly or thyroid nodules, no cervical lymphadenopathy  Pulmonary/Chest bilateral decreased breath sounds no wheezing appreciated cardiovascular: normal rate, regular rhythm, normal S1 and S2, no murmurs, rubs, clicks, or gallops, distal pulses intact, no carotid bruits  Abdomen: soft, non-tender, non-distended, PEG tube in place extremities: no cyanosis, clubbing or edema  Musculoskeletal: normal range of motion, no joint swelling, deformity or tenderness  Neurologic: reflexes normal and symmetric, no cranial nerve deficit, gait, coordination and speech normal      An electronic signature was used to authenticate this note.   --Lulu Gray MD

## 2023-02-13 NOTE — PROGRESS NOTES
Visit Information    Have you changed or started any medications since your last visit including any over-the-counter medicines, vitamins, or herbal medicines? no   Are you having any side effects from any of your medications? -  no  Have you stopped taking any of your medications? Is so, why? -  no    Have you seen any other physician or provider since your last visit? No  Have you had any other diagnostic tests since your last visit? No  Have you been seen in the emergency room and/or had an admission to a hospital since we last saw you? No  Have you had your routine dental cleaning in the past 6 months? no    Have you activated your [employer] account? If not, what are your barriers?  No:      Patient Care Team:  Cinthia Brown MD as PCP - General (Internal Medicine)    Medical History Review  Past Medical, Family, and Social History reviewed and does not contribute to the patient presenting condition    Health Maintenance   Topic Date Due    Pneumococcal 0-64 years Vaccine (1 - PCV) Never done    Depression Screen  Never done    DTaP/Tdap/Td vaccine (1 - Tdap) Never done    Lipids  Never done    Shingles vaccine (1 of 2) Never done    Low dose CT lung screening  Never done    Flu vaccine (1) Never done    COVID-19 Vaccine (4 - Booster for Michael series) 09/19/2022    Colorectal Cancer Screen  01/09/2024    HIV screen  Completed    Hepatitis A vaccine  Aged Out    Hib vaccine  Aged Out    Meningococcal (ACWY) vaccine  Aged Out

## 2023-02-15 ENCOUNTER — TELEPHONE (OUTPATIENT)
Dept: INTERNAL MEDICINE CLINIC | Age: 63
End: 2023-02-15

## 2023-02-15 DIAGNOSIS — E44.0 MODERATE MALNUTRITION (HCC): Primary | ICD-10-CM

## 2023-02-15 NOTE — TELEPHONE ENCOUNTER
[facility] calling to inform that patient discharged from [address] [place] [address] with orders for Vital AF 1.2 calories 300 ml 6 times daily through peg tube and the medical supply company does not carry this dose, so requesting orders to be switched to 1.5 calories       Please reorder and send to Fax [phone]

## 2023-02-16 NOTE — TELEPHONE ENCOUNTER
[address] called to cancel this message as they have now found a supplier who has what the patient needs.

## 2023-02-24 ENCOUNTER — OFFICE VISIT (OUTPATIENT)
Dept: ONCOLOGY | Age: 63
End: 2023-02-24

## 2023-02-24 ENCOUNTER — TELEPHONE (OUTPATIENT)
Dept: INTERNAL MEDICINE CLINIC | Age: 63
End: 2023-02-24

## 2023-02-24 ENCOUNTER — TELEPHONE (OUTPATIENT)
Dept: ONCOLOGY | Age: 63
End: 2023-02-24

## 2023-02-24 VITALS
SYSTOLIC BLOOD PRESSURE: 124 MMHG | WEIGHT: 146.9 LBS | TEMPERATURE: 97.5 F | HEART RATE: 80 BPM | BODY MASS INDEX: 23.01 KG/M2 | DIASTOLIC BLOOD PRESSURE: 79 MMHG

## 2023-02-24 DIAGNOSIS — E61.1 IRON DEFICIENCY: ICD-10-CM

## 2023-02-24 DIAGNOSIS — D69.6 THROMBOCYTOPENIA (HCC): ICD-10-CM

## 2023-02-24 DIAGNOSIS — D89.2 PARAPROTEINEMIA: Primary | ICD-10-CM

## 2023-02-24 NOTE — PROGRESS NOTES
Rajesh Montanez                                                                                                                  2/24/2023  MRN:   [id number]  YOB: 1960  PCP:                           Sara Astudillo MD  Referring Physician: No ref. provider found  Treating Physician Name: Xochilt Jerome MD      Reason for visit:  Chief Complaint   Patient presents with    Follow-Up from Hospital     Patient was in [facility] with anemia        Current problems:  Microcytic anemia  Thrombocytopenia  History of alcohol dependence  IgG lambda paraproteinemia  HCV infection  Iron deficiency    Active and recent treatments:  Work-up in progress    Summary of Case/History:    Ko kang 60 y.o.male is a patient who is admitted with chief complaint of altered mental status. Patient has not seen a doctor for multiple years. Due to worsening mental status EMS was summoned. Patient oxygen saturation was noted to be at 75%. Patient placed on BiPAP x-ray showed right lower lobe pneumonia. Patient started on antibiotics. Patient also noted to have INR of 2.3 platelet count of 66,874. Patient does have a history of alcohol intake. Patient's ammonia level noted to be 29. Creatinine 1.5. Total bilirubin is within range. Hemoglobin 11.2 with MCV of 69. Platelet count is 25,451. Ultrasound liver done however report is pending. Interim History:    Patient presents to the clinic accompanied by his wife. Overall doing much better since he came out of the hospital.  Patient underwent acute rehab which he thinks helped him a lot. Patient has not been drinking anymore. During this visit patient's allergy, social, medical, surgical history and medications were reviewed and updated.     Past Medical History:   Past Medical History:   Diagnosis Date    Anemia        Past Surgical History:     Past Surgical History:   Procedure Laterality Date    UPPER GASTROINTESTINAL ENDOSCOPY N/A 1/17/2023    EGD ESOPHAGOGASTRODUODENOSCOPY PEG TUBE INSERTION performed by Shelia Gu DO at [facility]       Patient Family Social History:     History of alcohol dependence    Family history significant for hypertension    Current Medications:     Current Outpatient Medications   Medication Sig Dispense Refill    tiotropium (Kalli Reji) 18 MCG inhalation capsule Inhale 1 capsule into the lungs daily 90 capsule 1    Handicap Placard MISC by Does not apply route Duration: 12months / Dx: cva 1 each 0    albuterol sulfate HFA (PROVENTIL HFA) 108 (90 Base) MCG/ACT inhaler Inhale 2 puffs into the lungs 3 times daily as needed for Wheezing 18 g 3    acetaminophen (TYLENOL) 325 MG tablet Take 2 tablets by mouth every 6 hours as needed for Pain      carvedilol (COREG) 6.25 MG tablet Take 1 tablet by mouth 2 times daily (with meals) 60 tablet 0    spironolactone (ALDACTONE) 25 MG tablet Take 1 tablet by mouth daily 30 tablet 0    ferrous sulfate (IRON 325) 325 (65 Fe) MG tablet Take 1 tablet by mouth daily (with breakfast) 30 tablet 0    polyethylene glycol (GLYCOLAX) 17 g packet Take 17 g by mouth daily as needed for Constipation      Nutritional Supplements (VITAL AF 1.2 MIGUELITO) LIQD 300 mLs by PEG Tube route 6 times daily Boluses at 6am, 9am, 12pm, 3pm, 6pm, and 9pm.  50mL free water before and after each bolus. Additional 100-300mL water for meds. 93941 mL 5     No current facility-administered medications for this visit. Allergies:   Patient has no known allergies. Review of Systems:    Constitutional: No fever or chills.  No night sweats, no weight loss   Eyes: No eye discharge, double vision, or eye pain   HEENT: negative for sore mouth, sore throat, hoarseness and voice change   Respiratory: negative for cough , sputum, dyspnea, wheezing, hemoptysis, chest pain   Cardiovascular: negative for chest pain, dyspnea, palpitations, orthopnea, PND   Gastrointestinal: negative for nausea, vomiting, diarrhea, constipation, abdominal pain, Dysphagia, hematemesis and hematochezia   Genitourinary: negative for frequency, dysuria, nocturia, urinary incontinence, and hematuria   Integument: negative for rash, skin lesions, bruises. Hematologic/Lymphatic: negative for easy bruising, bleeding, lymphadenopathy, or petechiae   Endocrine: negative for heat or cold intolerance,weight changes, change in bowel habits and hair loss   Musculoskeletal: negative for myalgias, arthralgias, pain, joint swelling,and bone pain   Neurological: negative for headaches, dizziness, seizures, weakness, numbness        Physical Exam:    Vitals: /79   Pulse 80   Temp 97.5 °F (36.4 °C) (Temporal)   Wt 146 lb 14.4 oz (66.6 kg)   BMI 23.01 kg/m²   General appearance - well appearing, no in pain or distress  Mental status - AAO X3  Eyes - pupils equal and reactive, extraocular eye movements intact  Mouth - mucous membranes moist, pharynx normal without lesions  Neck - supple, no significant adenopathy  Lymphatics - no palpable lymphadenopathy, no hepatosplenomegaly  Chest - clear to auscultation, no wheezes, rales or rhonchi, symmetric air entry  Heart - normal rate, regular rhythm, normal S1, S2, no murmurs  Abdomen - soft, nontender, nondistended, no masses or organomegaly  Neurological - alert, oriented, normal speech, no focal findings or movement disorder noted  Extremities - peripheral pulses normal, no pedal edema, no clubbing or cyanosis  Skin - normal coloration and turgor, no rashes, no suspicious skin lesions noted       DATA:    CBC: No results for input(s): WBC, HGB, PLT in the last 72 hours. BMP:  No results for input(s): NA, K, CL, CO2, BUN, CREATININE, GLUCOSE in the last 72 hours. Hepatic: No results for input(s): AST, ALT, ALB, BILITOT, ALKPHOS in the last 72 hours. INR: No results for input(s): INR in the last 72 hours. PTT:No results for input(s): PTT in the last 72 hours.       Impression:  Microcytic anemia  Thrombocytopenia  History of alcohol dependence  IgG lambda paraproteinemia  HCV infection  Iron deficiency    RECOMMENDATIONS:  I reviewed the labs/imaging available to me,outside records and discussed with the patient. I explained to the patient the nature of this problem. I explained the significance of these abnormalities and possible etiology and management options  Discussed natural history of paraproteinemia. Will repeat paraproteinemia profile  Referred to hepatology for management of HCV infection  Continue iron supplementation we will reassess iron stores with next blood draw  Counseled on abstinence from alcohol  We will set up follow-up at [facility] office per patient request.      Xochilt Jerome MD    This note is created with the assistance of a speech recognition program.  While intending to generate a document that actually reflects the content of the visit, the document can still have some errors including those of syntax and sound a like substitutions which may escape proof reading. It such instances, actual meaning can be extrapolated by contextual diversion.

## 2023-02-24 NOTE — PATIENT INSTRUCTIONS
Refer to gi   Pt to f/u in [facility] on 3/16  ( thursday )   Labs 1 week priro to rv at [facility]

## 2023-02-24 NOTE — TELEPHONE ENCOUNTER
AVS from 2/24/23    Refer to gi   Pt to f/u in [address] on 3/16  ( thursday )   Labs 1 week priro to rv at [facility]       Referral faxed     Rv scheduled for 3/16 @ 3:45 pm at [facility]    Pt will have labs drawn one week prior to RV    Pt was given AVS and appointment schedule    Electronically signed by Mo Rivera on 2/24/2023 at 1:13 PM

## 2023-02-24 NOTE — TELEPHONE ENCOUNTER
PCP in chart changed to from Dr. Devin Ruiz to Dr. Angel Barraza. Dr. Devin Ruiz has not ever seen patient out clinic in office, only Dr. Angel Barraza has.

## 2023-02-28 ENCOUNTER — HOSPITAL ENCOUNTER (OUTPATIENT)
Age: 63
Setting detail: SPECIMEN
Discharge: HOME | End: 2023-02-28

## 2023-02-28 DIAGNOSIS — Z13.220 SCREENING FOR HYPERLIPIDEMIA: ICD-10-CM

## 2023-02-28 DIAGNOSIS — E61.1 IRON DEFICIENCY: ICD-10-CM

## 2023-02-28 DIAGNOSIS — D89.2 PARAPROTEINEMIA: ICD-10-CM

## 2023-02-28 LAB
ABSOLUTE EOS #: 0.23 K/UL (ref 0–0.4)
ABSOLUTE IMMATURE GRANULOCYTE: 0 K/UL (ref 0–0.3)
ABSOLUTE LYMPH #: 0.86 K/UL (ref 1–4.8)
ABSOLUTE MONO #: 0.78 K/UL (ref 0.1–0.8)
BASOPHILS # BLD: 0 % (ref 0–2)
BASOPHILS ABSOLUTE: 0 K/UL (ref 0–0.2)
EOSINOPHILS RELATIVE PERCENT: 3 % (ref 1–4)
HCT VFR BLD AUTO: 45.6 % (ref 40.7–50.3)
HGB BLD-MCNC: 14.3 G/DL (ref 13–17)
IMMATURE GRANULOCYTES: 0 %
LYMPHOCYTES # BLD: 11 % (ref 24–44)
MCH RBC QN AUTO: 28.2 PG (ref 25.2–33.5)
MCHC RBC AUTO-ENTMCNC: 31.4 G/DL (ref 28.4–34.8)
MCV RBC AUTO: 89.9 FL (ref 82.6–102.9)
MONOCYTES # BLD: 10 % (ref 1–7)
MORPHOLOGY: ABNORMAL
MORPHOLOGY: ABNORMAL
NRBC AUTOMATED: 0 PER 100 WBC
PDW BLD-RTO: 23.2 % (ref 11.8–14.4)
PLATELET # BLD AUTO: ABNORMAL K/UL (ref 138–453)
PLATELET, FLUORESCENCE: 196 K/UL (ref 138–453)
PLATELET, IMMATURE FRACTION: 8.6 % (ref 1.1–10.3)
RBC # BLD: 5.07 M/UL (ref 4.21–5.77)
SEG NEUTROPHILS: 76 % (ref 36–66)
SEGMENTED NEUTROPHILS ABSOLUTE COUNT: 5.93 K/UL (ref 1.8–7.7)
WBC # BLD AUTO: 7.8 K/UL (ref 3.5–11.3)

## 2023-03-01 LAB
ALBUMIN (CALCULATED): 4.6 G/DL (ref 3.2–5.2)
ALBUMIN PERCENT: 61 % (ref 45–65)
ALBUMIN SERPL-MCNC: 4.5 G/DL (ref 3.5–5.2)
ALBUMIN/GLOBULIN RATIO: 1.3 (ref 1–2.5)
ALP SERPL-CCNC: 74 U/L (ref 40–129)
ALPHA 1 PERCENT: 2 % (ref 3–6)
ALPHA 2 PERCENT: 10 % (ref 6–13)
ALPHA1 GLOB SERPL ELPH-MCNC: 0.2 G/DL (ref 0.1–0.4)
ALPHA2 GLOB SERPL ELPH-MCNC: 0.8 G/DL (ref 0.5–0.9)
ALT SERPL-CCNC: 19 U/L (ref 5–41)
ANION GAP SERPL CALCULATED.3IONS-SCNC: 22 MMOL/L (ref 9–17)
AST SERPL-CCNC: 26 U/L
B-GLOBULIN SERPL ELPH-MCNC: 0.7 G/DL (ref 0.5–1.1)
BETA PERCENT: 10 % (ref 11–19)
BILIRUB SERPL-MCNC: 0.5 MG/DL (ref 0.3–1.2)
BUN SERPL-MCNC: 19 MG/DL (ref 8–23)
CALCIUM SERPL-MCNC: 10 MG/DL (ref 8.6–10.4)
CHLORIDE SERPL-SCNC: 101 MMOL/L (ref 98–107)
CHOLEST SERPL-MCNC: 205 MG/DL
CHOLESTEROL/HDL RATIO: 5.3
CO2 SERPL-SCNC: 18 MMOL/L (ref 20–31)
CREAT SERPL-MCNC: 0.86 MG/DL (ref 0.7–1.2)
FERRITIN SERPL-MCNC: 631 NG/ML (ref 30–400)
FOLATE SERPL-MCNC: 12.9 NG/ML
FREE KAPPA/LAMBDA RATIO: 0.09 (ref 0.26–1.65)
GAMMA GLOB SERPL ELPH-MCNC: 1.3 G/DL (ref 0.5–1.5)
GAMMA GLOBULIN %: 17 % (ref 9–20)
GFR SERPL CREATININE-BSD FRML MDRD: >60 ML/MIN/1.73M2
GLUCOSE SERPL-MCNC: 90 MG/DL (ref 70–99)
HDLC SERPL-MCNC: 39 MG/DL
IRON SATURATION: 24 % (ref 20–55)
IRON SERPL-MCNC: 76 UG/DL (ref 59–158)
KAPPA LC FREE SER-MCNC: 2.23 MG/DL (ref 0.37–1.94)
LAMBDA LC FREE SERPL-MCNC: 24.2 MG/DL (ref 0.57–2.63)
LDLC SERPL CALC-MCNC: 152 MG/DL (ref 0–130)
PATHOLOGIST: ABNORMAL
PATHOLOGIST: ABNORMAL
POTASSIUM SERPL-SCNC: 4.7 MMOL/L (ref 3.7–5.3)
PROT SERPL-MCNC: 7.5 G/DL (ref 6.4–8.3)
PROT SERPL-MCNC: 8.1 G/DL (ref 6.4–8.3)
PROTEIN ELECTROPHORESIS, SERUM: ABNORMAL
SERUM IFX INTERP: ABNORMAL
SODIUM SERPL-SCNC: 141 MMOL/L (ref 135–144)
TIBC SERPL-MCNC: 317 UG/DL (ref 250–450)
TOTAL PROT. SUM,%: 100 % (ref 98–102)
TOTAL PROT. SUM: 7.6 G/DL (ref 6.3–8.2)
TRIGL SERPL-MCNC: 69 MG/DL
UNSATURATED IRON BINDING CAPACITY: 241 UG/DL (ref 112–347)
VIT B12 SERPL-MCNC: 754 PG/ML (ref 232–1245)

## 2023-03-06 ENCOUNTER — TELEPHONE (OUTPATIENT)
Dept: INTERNAL MEDICINE CLINIC | Age: 63
End: 2023-03-06

## 2023-03-06 DIAGNOSIS — R13.10 DYSPHAGIA, UNSPECIFIED TYPE: Primary | ICD-10-CM

## 2023-03-06 NOTE — TELEPHONE ENCOUNTER
Brooke from [facility] calling needing an order for modified barium swallow study.

## 2023-03-08 ENCOUNTER — HOSPITAL ENCOUNTER (OUTPATIENT)
Dept: OCCUPATIONAL THERAPY | Age: 63
Setting detail: THERAPIES SERIES
Discharge: HOME | End: 2023-03-08
Payer: COMMERCIAL

## 2023-03-08 PROCEDURE — 97167 OT EVAL HIGH COMPLEX 60 MIN: CPT

## 2023-03-08 PROCEDURE — 97535 SELF CARE MNGMENT TRAINING: CPT

## 2023-03-08 RX ORDER — SPIRONOLACTONE 25 MG/1
25 TABLET ORAL DAILY
Qty: 30 TABLET | Refills: 0 | Status: SHIPPED | OUTPATIENT
Start: 2023-03-08

## 2023-03-08 RX ORDER — CARVEDILOL 6.25 MG/1
6.25 TABLET ORAL 2 TIMES DAILY WITH MEALS
Qty: 60 TABLET | Refills: 0 | Status: SHIPPED | OUTPATIENT
Start: 2023-03-08

## 2023-03-08 RX ORDER — FERROUS SULFATE 325(65) MG
325 TABLET ORAL
Qty: 30 TABLET | Refills: 0 | Status: SHIPPED | OUTPATIENT
Start: 2023-03-08

## 2023-03-08 NOTE — PROGRESS NOTES
[address] [facility]   Occupational Therapy  Evaluation  Date: 3/8/23  Patient Name: Jeane Dee      MRN: [id number]  Account: [de-identified]   : 1960  (60 y.o.)  Gender: male     [address] [facility]              [address] [facility] [address]              Phone: [phone]              Fax: [phone]      Referring Physician: Ac Ashton MD             Insurance: 48 visits (-23)  Primary Care Physician: Ac Ashton MD; Lora Ochoa MD     Medical Diagnostic Code(s): Subarachnoid Hemorrhage (I63.40)    Rehab Diagnostic Code(s): Fatigue (R53.81); Fall Risk Z91.81  Date of symptom onset: 23  Next 's appt.:    Total Visits:                  Cx/Ns:     Precautions: Fall Risk; SpO2 at night with SpO2 Goal is 92% or higher    Fall Risk/Comorbidities: Fall Risk, Microcytic anemia, Thrombocytopenia, History of alcohol dependence, Hep C; PEG tube     Subjective:      Objective:    Reason for Referral: Jeane Dee is a 60 y.o. male admitted to inpatient rehabilitation on 2023. He was originally admitted to [facility] on 2023 for acute respiratory failure secondary to right lower lobe pneumonia. MRSA swab was positive. Imaging showed numerous acute, subacute, and chronic infarcts in the left parietal lobe and left cerebellar hemisphere as well as bilateral subarachnoid hemorrhage. He was treated with seroquel for agitation. PEG tube was placed on 23 for severe dysphagia. He was found to be positive for hepatitis C and will need outpatient follow up with GI for treatment. ID, nephrology, heme-onc, neuro, pulm, and psych were following.  Patient was discharged to home on 2023 with home OT & PT.  Imaging:  CT head on 23:     Medical History:  Multifocal CVA  Subarachnoid hemorrhage   Dysphagia  Acute respiratory failure requiring intubation  COPD  Pneumonia Agitation  Insomnia  Anemia  Thrombocytopenia  KENTRELL   Hepatitis C     Current Medications:  acetaminophen 325 MG tablet  Commonly known as: TYLENOL  Take 2 tablets by mouth every 6 hours as needed for Pain      albuterol sulfate  (90 Base) MCG/ACT inhaler  Commonly known as: Proventil HFA  Inhale 2 puffs into the lungs 3 times daily as needed for Wheezing      carvedilol 6.25 MG tablet  Commonly known as: COREG  Take 1 tablet by mouth 2 times daily (with meals)  Notes to patient: Withhold medication if heart rate is below 50. Withhold medication if systolic blood pressure (top number) is below 100      ferrous sulfate 325 (65 Fe) MG tablet  Commonly known as: IRON 325  Take 1 tablet by mouth daily (with breakfast)      Handicap Placard Misc  by Does not apply route Duration: 12months / Dx: cva      polyethylene glycol 17 g packet  Commonly known as: GLYCOLAX  Take 17 g by mouth daily as needed for Constipation      spironolactone 25 MG tablet  Commonly known as: ALDACTONE  Take 1 tablet by mouth daily      Vital AF 1.2 Lalo Liqd  300 mLs by PEG Tube route 6 times daily Boluses at 6am, 9am, 12pm, 3pm, 6pm, and 9pm.  50mL free water before and after each bolus. Additional 100-300mL water for meds. Seizure History: n/a  Hearing: Intact bilaterally  Participation in Rehabilitation Therapies: Patient discharged from comprehensive OT, PT & SLP. Driving History:   Prior Level of Function: Independent  Prior Driving Evaluation: n/a   License Number: [place] [id number]   License Endorsements: n/a   License Expiration Date: 05-  Driving Restrictions: Corrective lenses  Traffic Citations: n/a  Traffic Accidents: n/a  Type of Vehicle: Patient drives a SUV  Current Driving Habits: Patient drives locally and long distance, both daytime and night time prior to onset of illness; patient has not driven for the past 6 months  Driving Goals:  To resume local and long distance daytime and night time driving  Input from Family/Significant Others (as appropriate): patient's spouse is supportive of his return to driving    Visual Perceptual Assessment:   Corrective Lenses: Patient had a vision exam 2 years ago and is scheduled for a re-evaluation in May, 2023. Patient wears prescription bifocal lenses.   Visual Acuity (Each eye with/without correction 20/40; both eyes with/without correction 20/40; if blind in one eye-other with/without correction 20/30; minimum visual acuity 20/70 in better eye with restrictions; daytime driving only between 20/50-20/70 or vision in one eye between 20/40-20-60; right or left outside mirror if blind in one eye): 20/30 binocular; 20/40 right eye; & 20/40 left eye which meets the legal requirement for driving  Visual Fields (each eye must have 70 degrees temporal; superior and inferior visual quadrants): 4/4 on simultaneous bilateral stimulation; 3/4 with stimuli presented on left; & 4/4 with stimuli presented on right indicating visual fields are    Far Lateral Phoria (muscle balance required for alignment): patient with score of 9 indicating exophoria that is WNL  Oculomotor Control/Dynamic Vision   Visual Pursuits (Jerky versus smooth; ability to maintain visual fixation): WFL   Saccades (excessive over/undershooting or searching): WFL   Convergence (eye teaming for near vision): [facility]  Far Color Perception: 6/8 indicating mild impairment  Far Color Recognition: 16/16 indicating intact far color vision  Far Stereo Depth Perception (n/a with monocular vision): patient with score of 2/3 indicating mild impairment  Contrast Sensitivity (with and without glare): 7/9 both with and without glare which is [facility]   Road Sign Recognition Test: patient with score of 12/12 indicating good ability to recognize common road signs  Visuospatial Skills:   Copy Cube Test: Intact speed & accuracy    CLOX Clock Drawing Test: Intact speed & accuracy    Cognitive Screen:  Attention Testing  Sustained Visual Attention: Number Tapping (norm 5): patient able to repeat 5 digits forward which is [facility]  Sustained Auditory Attention: Digit Span Forward/Reverse (norm 5 forward; 4 reverse): patient able to repeat 4 digits forward which is mildly impaired and 4 digits in reverse which is [facility]  Shifting of Visual Attention: Modified Trail Making B Sequential Numbers & Letters (58-87 seconds; =/< 3 errors): Intact speed & accuracy  Selective Visual Attention: Modified Stroop Test B (1-25; normal score=1 error, 60 sec.): Intact speed & accuracy  Visual Recall: Contextual Memory Test (norms ,40: 15 immediate, 13 delayed; 40-49: 12 immediate, 11 delayed; >59 11 immediate, 9 delayed); patient with score of 9/20 indicating mild impairment in visual recall & 7/20 following delay with interference which is mildly impaired. Executive Function; Awareness, Ability to Plan Ahead: E-TEK Dynamics Maze Test: (60 seconds, 0 errors): Intact speed & accuracy   Mental Endurance: Good throughout evaluation    Physical Function:  Muscle Tone: WFL  Functional Range of Motion:  Cervical rotation (=/>50%; may require modifications to include additional mirrors): WFL   Upper Extremities (minimum  degrees unilateral shoulder flexion required for steering): WFL throughout  Lower Extremities: WFL throughout  Functional Strength   Upper Extremities (3+/5-4/5 UE requires power steering): WFL throughout  Lower Extremity (4/5 LE strength required for acceleration & braking; 3+/5-4-/5 requires reduced effort acceleration/braking): WFL  throughout  Functional  Strength (35#  strength required for steering and operating gear shift lever): 66# R ; 70# L  which is [facility]  Functional Gross Motor Coordination   Upper Extremity Coordination (Rapid Alternating Finger-to-Nose test):  Intact speed & accuracy  Lower Extremity Coordination (Bilateral foot-tapping test - 18 +/-4 within 10 sec.): Intact speed & accuracy   Functional Balance    Static & Dynamic Sitting: Good    Static & Dynamic Standing: Good static; F+ dynamic   Functional Sensation  LE Proprioception & Kinesthesia (discrimination between gas and accelerator pedals; pressure on pedal within norms for pedal response speed and speed control): WFL throughout   Physical Endurance: Good throughout evaluation  Functional Mobility: Modified independent with ambulation  Mobility Aids/Assistive Devices: SC for community mobility  Functional Transfers: Modified independent    Simulated Assessments:  Operational Skills   Primary Controls (accelerator, brake, steering wheel): Independent  Secondary Controls (fastening/unfastening seatbelt; adjusting seat/steering wheel; starting the drive; turn signal; checking for traffic; &, operating gear shift): Independent    Brake Reaction Times (release of gas to apply the brake of 0.4/100 sec. < 65; 0.5/100 sec. > 65; stopping distance of 175 feet): patient with average brake reaction time score of 0.5 sec and average stopping distance of 175' which is WNL. Basic Vehicle Control (two-jonathan, 40 mph, 4-way stops, minimal cross traffic, pedestrians): Patient cited for 2 stops; over posted speed 5x and 17% of drive time; safely negotiated 4/5 intersections; able to maintain jonathan positioning with an average speed of 47 mph throughout drive. Occupational Therapy Behind-Wheel  Assessment On-Road in the [facility] 3:    Ability to enter/exit vehicle: Modified independent  Ability to maintain 3 visual clearance & 10-12 clearance from steering wheel: Independent  Ability to adjust seat & mirrors and fasten/unfasten seatbelt independently: Independent    Non-Traffic Driving:   Ability to operate the primary controls independently that included the steering wheel, accelerator & brake:  Independent  Ability to operate secondary controls that included: starting the vehicle; shifting the gears; operating the turn signal: and, backing maneuvers): Independent    Driving Environment:   Traffic Density: Moderate   Type of Roadway: 2-jonathan roads; [facility] Performance Skills:    Visual Skills  Mirror checks: Independent   Scans Environment: Required verbal cueing   Blind Spot Checks: Independent   Identifies Road Signs & Signals: Independent   Checks Cross Traffic: Independent    Vehicle Position  Gap Acceptance: Independent   Following/Stopping Distance: Independent   Jonathan Position: Required verbal cueing   Turns into Proper Jonathan: Independent   Jonathan Changes: Required physical assist x! & verbal cueing    Vehicle Handling   Speed Control: Required frequent verbal cueing  Steering: Required physical assist x1   Acceleration: Independent  Braking: Independent  Stopping: Independent   Turning: Required physical assist x1 & verbal cueing    Yielding: Independent    Merging: Independent  Use of Turn Signal: Independent      Strategic Skills    Divided Attention while Driving: Impaired    Anticipation of Potential Hazards: Required physical assist x1 & verbal cueing    Planning: Required verbal cueing    Decision Making: Required verbal cueing    Recall of Visual/Verbal Information: Required verbal cueing    Follows Directions: Required verbal cueing    Obeys Rules of the Road: Independent    Rate of Processing Speed: Delayed    Education & application of defensive driving skills: Patient demonstrated fair knowledge and application of defensive driving strategies taught on the road.     Assessment: Patient would continue to benefit from skilled occupational therapy services in order to improve  performance skills on simulated assessments in the areas of consistent adherence to stops and safe negotiation of intersections and speed control and behind the wheel in the areas of vehicle positioning including jonathan positioning and jonathan changing; vehicle handling including speed control, steering & turning; and strategic skills including divided attention, anticipation of potential hazards, planning ahead, decision making, recall of 2-step directions, and processing speed on road behind the wheel in the [facility]) modified vehicle. Justification for  Rehabilitation: Patient would benefit from participation in  rehabilitation to improve  performance skills on simulated assessments in the areas of consistent adherence to stops and safe negotiation of intersections and speed control and behind the wheel in the areas of vehicle positioning including jonathan positioning and jonathan changing; vehicle handling including speed control, steering & turning; and strategic skills including divided attention, anticipation of potential hazards, planning ahead, decision making, recall of 2-step directions, and processing speed on road behind the wheel in the [facility]) modified vehicle. SHORT TERM GOALS   Goals to Continue for POC:   1. Pt will increase consistent adherence to stops and safe negotiation of intersections, adherence to stops and speed control on simulated assessments of driving;  2. Pt will improve vehicle positioning in the area of jonathan positioning on road;  3. Pt will increase vehicle handling in the areas of speed control, steering & turning on road; &  4. Pt will be independent with strategic skills in the areas of divided attention, anticipation of potential hazards, planning ahead, decision making, recall of 2-step directions, and processing speed on road. LONG TERM GOAL(S): Patient will demonstrate safe  performance skills in the areas of visual skills, vehicle positioning and handling skills and strategic skills behind the wheel on road in the [facility]) modified vehicle. Patient Stated Goals: To resume local and long distance daytime and night time driving     Pt.  Education:  [x] Yes   [x] Reviewed   Method of Education: [x] Verbal  [] Demo    Comprehension of Education:  [x] [employer] understanding. [] Demonstrates understanding. [x] Needs review. [] Demonstrates/verbalizes      PLAN (FREQUENCY & DURATION): 2 times per week for a total of 12 visits     Treatment Plan:  [x] OT eval high complexity  44561 [x] ADL  64201       Patient Response to Recommendations:    Based on the findings of patient history, clinical presentation, evaluation, and decision making during this evaluation, this patient is of high complexity. History (Personal factors, comorbidities) [] 0 [] 1-2 [x] 3+   Exam (limitations, restrictions) [] 1-2 [x] 3 [] 4+   Clinical presentation (progression) [] Stable [x] Evolving  [] Unstable   Decision Making [] Low [] Moderate [x] High    [] Low Complexity [] Moderate Complexity [] High Complexity     Copy of evaluation sent to referring physician. Patient has been instructed to contact the referring physician to discuss the results of this evaluation. Patient has been informed that his or her physician is responsible for make the final decision regarding fitness to drive.     Treatment Charges: Mins Units   [x] Evaluation  30  1   [x] ADL  120  8   Total Treatment Time 150      Time In: 01:00 pm Time Out: 3:30 pm      Electronically signed by: Juan Luis Kearns MS, OTR/L, LICDC, CDRS

## 2023-03-13 ENCOUNTER — HOSPITAL ENCOUNTER (OUTPATIENT)
Dept: GENERAL RADIOLOGY | Age: 63
Discharge: HOME | End: 2023-03-15
Payer: COMMERCIAL

## 2023-03-13 DIAGNOSIS — R13.10 DYSPHAGIA, UNSPECIFIED TYPE: ICD-10-CM

## 2023-03-13 PROCEDURE — 92611 MOTION FLUOROSCOPY/SWALLOW: CPT

## 2023-03-13 PROCEDURE — 74230 X-RAY XM SWLNG FUNCJ C+: CPT

## 2023-03-13 NOTE — PROCEDURES
INSTRUMENTAL SWALLOW REPORT  MODIFIED BARIUM SWALLOW    NAME: Ashley Flynn   : 1960  MRN: [id number]       Date of Eval: 3/13/2023              Referring Diagnosis(es):  dysphagia    Past Medical History:  has a past medical history of Anemia. Past Surgical History:  has a past surgical history that includes Upper gastrointestinal endoscopy (N/A, 2023). Date of Prior Study:   Type of Study: Repeat MBS  Results of Prior Study: Oral Phase:   Premature vallecular spillage     Pharyngeal Phase:  min amt vallecular and pyriform sinus cavity residue c both consistencies. Deep penetration of both consistencies. Test discontinued at this time as pt. C risk of aspiration of all consistencies. Esophageal Screen: WNL. NPO was recommended. Recent CXR/CT of Chest: Date n/a    Patient Complaints/Reason for Referral:  Ashley Flynn was referred for a MBS to assess the efficiency of his/her swallow function, assess for aspiration, and to make recommendations regarding safe dietary consistencies, effective compensatory strategies, and safe eating environment. Onset of problem:      Pt. Has been receiving homecare ST since d/c from [facility] 6 weeks ago. He reports he \"has started eating and drinking whatever I want (by mouth), about a week ago. \"          Behavior/Cognition/Vision/Hearing:  Behavior/Cognition: Alert; Cooperative  Vision: Impaired  Vision Exceptions: Wears glasses at all times  Hearing: Within functional limits    Impressions:    Lateral view taken      Consistencies Administered: Regular; Soft & Bite Sized;Pureed; Moderately Thickteaspoon;Mildly Thick cup              Oral Phase: Premature vallecular spillage of all consistencies. Pt. c sparse dentition, mastication time was prolonged. Pharyngeal Phase:   Mildly thick liquids:  deep penetration. Moderately thick liquids:  min vallecular and pyriform sinus cavity residue, deep penetration.   Pudding, soft and dry solids: functional with no penetration or aspiration. Thin liquids were not attempted d/t risk of aspiration. Upper Esophageal Screen: Unremarkable    Dysphagia Outcome Severity Scale: Level 3: Moderate dysphagia- Total assisstance, supervision or strategies. Two or more diet consistencies restricted  Penetration-Aspiration Scale (PAS): 3 - Material enters the airway, remains above the vocal folds, and is not ejected from airway    Recommended Diet:      Extremely thick (pudding consistency) liquids and easy to chew solids. Safe Swallow Protocol:     Compensatory Swallowing Strategies : Eat/Feed slowly;Upright as possible for all oral intake;Small bites/sips              Recommendations/Treatment  Requires SLP Intervention: Yes                  Recommended Exercises:    Therapeutic Interventions: Vital Stim/NMES;Pharyngeal exercises         Education: Images, results  and recommendations were reviewed with pt.  following this exam.   Patient Education: Family not present  Patient Education Response: Verbalizes understanding;Needs reinforcement    Written information re: extremely thick (pudding) consistency liquids given. Pain    None reported         Therapy Time:   Individual Concurrent Group Co-treatment   Time In 1400         Time Out 1420         Minutes [address] [facility] M. A.CCC/SLP     3/13/2023, 3:01 PM

## 2023-03-14 ENCOUNTER — OFFICE VISIT (OUTPATIENT)
Dept: GASTROENTEROLOGY | Age: 63
End: 2023-03-14
Payer: COMMERCIAL

## 2023-03-14 VITALS
TEMPERATURE: 97.9 F | BODY MASS INDEX: 23.65 KG/M2 | SYSTOLIC BLOOD PRESSURE: 135 MMHG | WEIGHT: 151 LBS | DIASTOLIC BLOOD PRESSURE: 90 MMHG

## 2023-03-14 DIAGNOSIS — Z11.59 SCREENING FOR VIRAL DISEASE: ICD-10-CM

## 2023-03-14 DIAGNOSIS — D64.9 NORMOCYTIC ANEMIA: ICD-10-CM

## 2023-03-14 DIAGNOSIS — R13.19 ESOPHAGEAL DYSPHAGIA: ICD-10-CM

## 2023-03-14 DIAGNOSIS — I63.40 CEREBROVASCULAR ACCIDENT (CVA) DUE TO EMBOLISM OF CEREBRAL ARTERY (HCC): ICD-10-CM

## 2023-03-14 DIAGNOSIS — Z43.1 PEG (PERCUTANEOUS ENDOSCOPIC GASTROSTOMY) ADJUSTMENT/REPLACEMENT/REMOVAL (HCC): ICD-10-CM

## 2023-03-14 DIAGNOSIS — B18.2 CHRONIC HEPATITIS C WITHOUT HEPATIC COMA (HCC): Primary | ICD-10-CM

## 2023-03-14 DIAGNOSIS — D69.6 THROMBOCYTOPENIA (HCC): ICD-10-CM

## 2023-03-14 DIAGNOSIS — K58.1 IRRITABLE BOWEL SYNDROME WITH CONSTIPATION: ICD-10-CM

## 2023-03-14 PROCEDURE — 99204 OFFICE O/P NEW MOD 45 MIN: CPT | Performed by: INTERNAL MEDICINE

## 2023-03-14 ASSESSMENT — ENCOUNTER SYMPTOMS
BLOOD IN STOOL: 0
TROUBLE SWALLOWING: 0
RECTAL PAIN: 0
CHOKING: 0
DIARRHEA: 0
ANAL BLEEDING: 0
ABDOMINAL PAIN: 0
NAUSEA: 0
ABDOMINAL DISTENTION: 0
COUGH: 0
SHORTNESS OF BREATH: 0
SORE THROAT: 0
CONSTIPATION: 0
COLOR CHANGE: 0
VOMITING: 0
WHEEZING: 0

## 2023-03-14 NOTE — PROGRESS NOTES
GI NEW PATIENT OFFICE VISIT    INTERVAL HISTORY:   MD [address]  [address] [place] [address],  [address]    Chief Complaint   Patient presents with    Anemia     Pt is here today as a NP fpr iron def anemia, paraproteinemia, & dysphagia. 1. Chronic hepatitis C without hepatic coma ([address]    2. Cerebrovascular accident (CVA) due to embolism of cerebral artery ([address]    3. Esophageal dysphagia    4. Thrombocytopenia ([address]    5. Normocytic anemia    6. Irritable bowel syndrome with constipation    7. PEG (percutaneous endoscopic gastrostomy) adjustment/replacement/removal ([address]      This patient is evaluated in my office for the first time accompanied by his wife during the interview  This gentleman history significant of multiple chronic medical issues  He was hospitalized for a long time with history for what appears to be pneumonia subsequently suffered 2 strokes he says  He was evaluated by surgery had a PEG tube placement at that time  He has history significant for some weight loss abdominal pain acid reflux  Patient is primarily here for evaluation of his hepatitis C diagnosed during his hospitalization  In the past he has history for IV drug abuse  In the past also has history significant for alcohol abuse  Denies any current alcohol abuse illicit drug usage  Mild abdominal discomfort denies any rectal bleeding melanotic stools  Never had a colonoscopy done? No known family history for colon cancer  His hospital records were reviewed with him    HISTORY OF PRESENT ILLNESS: Chacha Sharif is a 60 y.o. male with a past history remarkable for , referred for evaluation of   Chief Complaint   Patient presents with    Anemia     Pt is here today as a NP fpr iron def anemia, paraproteinemia, & dysphagia. .    Past Medical,Family, and Social History reviewed and does contribute to the patient presenting condition.     Patient's PMH/PSH,SH,PSYCH Hx, MEDs, ALLERGIES, and ROS were all reviewed and updated in the appropriate sections. PAST MEDICAL HISTORY:  Past Medical History:   Diagnosis Date    Anemia        Past Surgical History:   Procedure Laterality Date    UPPER GASTROINTESTINAL ENDOSCOPY N/A 1/17/2023    EGD ESOPHAGOGASTRODUODENOSCOPY PEG TUBE INSERTION performed by Spike Vaca DO at [facility]       CURRENT MEDICATIONS:    Current Outpatient Medications:     ferrous sulfate (IRON 325) 325 (65 Fe) MG tablet, Take 1 tablet by mouth daily (with breakfast), Disp: 30 tablet, Rfl: 0    carvedilol (COREG) 6.25 MG tablet, Take 1 tablet by mouth 2 times daily (with meals), Disp: 60 tablet, Rfl: 0    spironolactone (ALDACTONE) 25 MG tablet, Take 1 tablet by mouth daily, Disp: 30 tablet, Rfl: 0    tiotropium (SPIRIVA HANDIHALER) 18 MCG inhalation capsule, Inhale 1 capsule into the lungs daily, Disp: 90 capsule, Rfl: 1    Handicap Placard MISC, by Does not apply route Duration: 12months / Dx: cva, Disp: 1 each, Rfl: 0    albuterol sulfate HFA (PROVENTIL HFA) 108 (90 Base) MCG/ACT inhaler, Inhale 2 puffs into the lungs 3 times daily as needed for Wheezing, Disp: 18 g, Rfl: 3    acetaminophen (TYLENOL) 325 MG tablet, Take 2 tablets by mouth every 6 hours as needed for Pain, Disp: , Rfl:     polyethylene glycol (GLYCOLAX) 17 g packet, Take 17 g by mouth daily as needed for Constipation, Disp: , Rfl:     Nutritional Supplements (VITAL AF 1.2 MIGUELITO) LIQD, 300 mLs by PEG Tube route 6 times daily Boluses at 6am, 9am, 12pm, 3pm, 6pm, and 9pm.  50mL free water before and after each bolus. Additional 100-300mL water for meds. , Disp: 13675 mL, Rfl: 5    ALLERGIES:   No Known Allergies    FAMILY HISTORY: No family history on file.       SOCIAL HISTORY:   Social History     Socioeconomic History    Marital status: Single     Spouse name: Not on file    Number of children: Not on file    Years of education: Not on file    Highest education level: Not on file   Occupational History    Not on file   Tobacco Use Smoking status: Some Days     Packs/day: 1.00     Years: 40.00     Pack years: 40.00     Types: Cigarettes, Pipe    Smokeless tobacco: Never   Substance and Sexual Activity    Alcohol use: Yes     Comment: beer and scotch    Drug use: Not Currently     Comment: over 20 years (stated by daughter)    Sexual activity: Not on file   Other Topics Concern    Not on file   Social History Narrative    Not on file     Social Determinants of Health     Financial Resource Strain: Low Risk     Difficulty of Paying Living Expenses: Not hard at all   Food Insecurity: No Food Insecurity    Worried About Running Out of Food in the Last Year: Never true    Ran Out of Food in the Last Year: Never true   Transportation Needs: Unknown    Lack of Transportation (Medical): Not on file    Lack of Transportation (Non-Medical): No   Physical Activity: Not on file   Stress: Not on file   Social Connections: Not on file   Intimate Partner Violence: Not on file   Housing Stability: Unknown    Unable to Pay for Housing in the Last Year: Not on file    Number of Places Lived in the Last Year: Not on file    Unstable Housing in the Last Year: No         REVIEW OF SYSTEMS:         Review of Systems   Constitutional:  Negative for appetite change and fatigue. HENT:  Negative for sore throat and trouble swallowing. Eyes:  Negative for visual disturbance. Respiratory:  Negative for cough, choking, shortness of breath and wheezing. Cardiovascular:  Negative for chest pain, palpitations and leg swelling. Gastrointestinal:  Negative for abdominal distention, abdominal pain, anal bleeding, blood in stool, constipation, diarrhea, nausea, rectal pain and vomiting. Skin:  Negative for color change. Allergic/Immunologic: Negative for environmental allergies and food allergies. Neurological:  Negative for dizziness, weakness, numbness and headaches. Hematological:  Does not bruise/bleed easily.    Psychiatric/Behavioral:  Negative for confusion and sleep disturbance. The patient is not nervous/anxious. PHYSICAL EXAMINATION: Vital signs reviewed per the nursing documentation. BP (!) 135/90   Temp 97.9 °F (36.6 °C)   Wt 151 lb (68.5 kg)   BMI 23.65 kg/m²   Body mass index is 23.65 kg/m². Physical Exam  Nursing note reviewed. Constitutional:       Appearance: He is well-developed. Comments: Anxious    HENT:      Head: Normocephalic and atraumatic. Eyes:      Conjunctiva/sclera: Conjunctivae normal.      Pupils: Pupils are equal, round, and reactive to light. Cardiovascular:      Rate and Rhythm: Normal rate and regular rhythm. Pulmonary:      Effort: Pulmonary effort is normal.      Breath sounds: Rales present. Abdominal:      General: Bowel sounds are normal.      Palpations: Abdomen is soft. Comments: PEG    Soft non tender     Genitourinary:     Rectum: Normal.   Musculoskeletal:         General: Normal range of motion. Cervical back: Normal range of motion and neck supple. Skin:     General: Skin is warm. Neurological:      Mental Status: He is alert and oriented to person, place, and time. Deep Tendon Reflexes: Reflexes are normal and symmetric.          LABORATORY DATA: Reviewed  Lab Results   Component Value Date    WBC 7.8 02/28/2023    HGB 14.3 02/28/2023    HCT 45.6 02/28/2023    MCV 89.9 02/28/2023    PLT See Reflexed IPF Result 02/28/2023     02/28/2023    K 4.7 02/28/2023     02/28/2023    CO2 18 (L) 02/28/2023    BUN 19 02/28/2023    CREATININE 0.86 02/28/2023    LABALBU 4.5 02/28/2023    BILITOT 0.5 02/28/2023    ALKPHOS 74 02/28/2023    AST 26 02/28/2023    ALT 19 02/28/2023    INR 1.6 01/08/2023         Lab Results   Component Value Date    RBC 5.07 02/28/2023    HGB 14.3 02/28/2023    MCV 89.9 02/28/2023    MCH 28.2 02/28/2023    MCHC 31.4 02/28/2023    RDW 23.2 (H) 02/28/2023    MPV 9.1 02/09/2023    BASOPCT 0 02/28/2023    LYMPHSABS 0.86 (L) 02/28/2023    MONOSABS 0.78 02/28/2023    NEUTROABS 5.93 02/28/2023    EOSABS 0.23 02/28/2023    BASOSABS 0.00 02/28/2023         DIAGNOSTIC TESTING:     FL MODIFIED BARIUM SWALLOW W VIDEO    Result Date: 3/13/2023  EXAMINATION: MODIFIED BARIUM SWALLOW WAS PERFORMED IN CONJUNCTION WITH SPEECH PATHOLOGY SERVICES 03/13/2023 TECHNIQUE: Under fluoroscopic evaluation cineradiography/videoradiography recordings were performed in conjunction with the speech-language pathologist (SLP). Various liquid, solid and/or semi-solid barium preparations were used to assess swallowing function. FLUOROSCOPY DOSE AND TYPE: Radiation Exposure Index: 1 minute 30 seconds; D AP 98.8 dGy cm2, COMPARISON: 02/01/2023 HISTORY: ORDERING SYSTEM PROVIDED HISTORY: Dysphagia, unspecified type TECHNOLOGIST PROVIDED HISTORY: Reason for Exam: dysphagia FINDINGS: There is premature vallecular spillage and some residue with nearly all consistencies tested. With nectar thick and honey thick liquids there was laryngeal penetration without michael aspiration. Swelling mechanism appears otherwise intact. No evidence of aspiration. No evidence of aspiration. Laryngeal penetration with nectar thick and honey thick liquids. Please see separate speech pathology report for full discussion of findings and recommendations. Assessment  1. Chronic hepatitis C without hepatic coma ([address]    2. Cerebrovascular accident (CVA) due to embolism of cerebral artery ([address]    3. Esophageal dysphagia    4. Thrombocytopenia ([address]    5. Normocytic anemia    6. Irritable bowel syndrome with constipation    7. PEG (percutaneous endoscopic gastrostomy) adjustment/replacement/removal ([address]        Plan    This patient has been diagnosed with hep C.    The significance of the diagnosis was explained to him in detail  Probable mode of transmission were discussed with him  The natural course of hep C was explained to him   The current modes available for the treatment of Hep C including the Medicines were explained to him in detail. Newer treatment options and their success rates were also explained to him in detail. The side effect profile of these medicines were explained to him in detail as well  The significance of quitting Alcohol was also explained     he was explained about the importance of copmplaince  with the treatment and regular follow up    Will order necessary blood tests and Imaging studies for further work up   The patient has verbalized understanding and agreement to these. Pt was given instructions and advice in detail about the symptom of constipation. He was explained about avoidance of fast food, soda pops, cheese and red meat. Was also told to avoid sedatives narcotics and pain killers if possible. Pt was advised to start drinking ample amount of water and liquid. Was told to adapt and follow an exercise regimen. Instructions were given to increase the amount of fiber including dietary in terms of bran, cereals, whole wheat, brown bread etc. Was also instructed to start using supplemental fiber either Metamucil, citrucell or bennafiber with ample liquids. He was told to start drinking prune juice which is good for constipation. If symptoms don't resolve he will require medicines to assist with his symptoms    Pt has verbalized understanding and agreement to this plan. Pt was advised in detail about some life style and dietary modifications. He was advised about avoidance of caffeine, nicotine and chocolate. Pt was also told to stay away from any kind of fast foods, soda pops. He was also advised to avoid lots of spices, grease and fried food etc.     Instructions were also given about trying to arrange the timing, quality and quantity of food.     Instructions were given about using ample amount of fiber including dietary and supplemental fiber either metamucil, bennafiber or citrucell etc.  Pt was advised about drinking ample amount of water without any colors or chemicals. Stress was given about regular exercise. Pt has verbalized understanding and agreement to these modifications. Thank you for allowing me to participate in the care of Mr. Brie Live. For any further questions please do not hesitate to contact me. I have reviewed and agree with the ROS entered by the MA/Nurse. This note is created with the assistance of the speech recognition program.  While intending to generate a document that actually reflects the content of the visit, document can still have some errors including those of syntax and sound like substitutions which may escape proof reading. Actual meaning can be extrapolated by contextual diversion.      Ria Cowan MD, [facility]  Board Certified in Gastroenterology and [address] [place] [address] Gastroenterology  Office #: [phone]

## 2023-03-15 ENCOUNTER — HOSPITAL ENCOUNTER (OUTPATIENT)
Age: 63
Setting detail: SPECIMEN
Discharge: HOME | End: 2023-03-15

## 2023-03-15 ENCOUNTER — HOSPITAL ENCOUNTER (OUTPATIENT)
Dept: OCCUPATIONAL THERAPY | Age: 63
Setting detail: THERAPIES SERIES
Discharge: HOME | End: 2023-03-15
Payer: COMMERCIAL

## 2023-03-15 DIAGNOSIS — B18.2 CHRONIC HEPATITIS C WITHOUT HEPATIC COMA (HCC): ICD-10-CM

## 2023-03-15 DIAGNOSIS — Z11.59 SCREENING FOR VIRAL DISEASE: ICD-10-CM

## 2023-03-15 LAB
ABSOLUTE EOS #: 0.17 K/UL (ref 0–0.44)
ABSOLUTE IMMATURE GRANULOCYTE: <0.03 K/UL (ref 0–0.3)
ABSOLUTE LYMPH #: 1.32 K/UL (ref 1.1–3.7)
ABSOLUTE MONO #: 0.52 K/UL (ref 0.1–1.2)
ALBUMIN SERPL-MCNC: 4.2 G/DL (ref 3.5–5.2)
ALBUMIN/GLOBULIN RATIO: 1.2 (ref 1–2.5)
ALP SERPL-CCNC: 82 U/L (ref 40–129)
ALT SERPL-CCNC: 42 U/L (ref 5–41)
ANION GAP SERPL CALCULATED.3IONS-SCNC: 15 MMOL/L (ref 9–17)
AST SERPL-CCNC: 35 U/L
BASOPHILS # BLD: 1 % (ref 0–2)
BASOPHILS ABSOLUTE: 0.05 K/UL (ref 0–0.2)
BILIRUB DIRECT SERPL-MCNC: 0.1 MG/DL
BILIRUB INDIRECT SERPL-MCNC: 0.3 MG/DL (ref 0–1)
BILIRUB SERPL-MCNC: 0.4 MG/DL (ref 0.3–1.2)
BUN SERPL-MCNC: 5 MG/DL (ref 8–23)
CALCIUM SERPL-MCNC: 9.7 MG/DL (ref 8.6–10.4)
CHLORIDE SERPL-SCNC: 103 MMOL/L (ref 98–107)
CO2 SERPL-SCNC: 23 MMOL/L (ref 20–31)
CREAT SERPL-MCNC: 0.61 MG/DL (ref 0.7–1.2)
EOSINOPHILS RELATIVE PERCENT: 3 % (ref 1–4)
GFR SERPL CREATININE-BSD FRML MDRD: >60 ML/MIN/1.73M2
GLUCOSE SERPL-MCNC: 103 MG/DL (ref 70–99)
HBV SURFACE AB SERPL IA-ACNC: <3.5 MIU/ML
HBV SURFACE AG SER QL: NONREACTIVE
HCT VFR BLD AUTO: 42.2 % (ref 40.7–50.3)
HCV AB SER QL: REACTIVE
HGB BLD-MCNC: 13.4 G/DL (ref 13–17)
HIV 1+2 AB+HIV1 P24 AG SERPL QL IA: NONREACTIVE
IMMATURE GRANULOCYTES: 0 %
LYMPHOCYTES # BLD: 22 % (ref 24–43)
MCH RBC QN AUTO: 29 PG (ref 25.2–33.5)
MCHC RBC AUTO-ENTMCNC: 31.8 G/DL (ref 28.4–34.8)
MCV RBC AUTO: 91.3 FL (ref 82.6–102.9)
MONOCYTES # BLD: 9 % (ref 3–12)
NRBC AUTOMATED: 0 PER 100 WBC
PDW BLD-RTO: 17.6 % (ref 11.8–14.4)
PLATELET # BLD AUTO: 166 K/UL (ref 138–453)
PMV BLD AUTO: 10.5 FL (ref 8.1–13.5)
POTASSIUM SERPL-SCNC: 3.6 MMOL/L (ref 3.7–5.3)
PROT SERPL-MCNC: 7.6 G/DL (ref 6.4–8.3)
RBC # BLD: 4.62 M/UL (ref 4.21–5.77)
RBC # BLD: ABNORMAL 10*6/UL
SEG NEUTROPHILS: 65 % (ref 36–65)
SEGMENTED NEUTROPHILS ABSOLUTE COUNT: 3.82 K/UL (ref 1.5–8.1)
SODIUM SERPL-SCNC: 141 MMOL/L (ref 135–144)
WBC # BLD AUTO: 5.9 K/UL (ref 3.5–11.3)

## 2023-03-15 PROCEDURE — 97535 SELF CARE MNGMENT TRAINING: CPT
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
## 2023-03-16 ENCOUNTER — OFFICE VISIT (OUTPATIENT)
Dept: ONCOLOGY | Age: 63
End: 2023-03-16
Payer: COMMERCIAL

## 2023-03-16 VITALS
TEMPERATURE: 97.4 F | SYSTOLIC BLOOD PRESSURE: 135 MMHG | HEART RATE: 75 BPM | BODY MASS INDEX: 23.65 KG/M2 | DIASTOLIC BLOOD PRESSURE: 89 MMHG | WEIGHT: 151 LBS

## 2023-03-16 DIAGNOSIS — D69.6 THROMBOCYTOPENIA (HCC): ICD-10-CM

## 2023-03-16 DIAGNOSIS — E61.1 IRON DEFICIENCY: ICD-10-CM

## 2023-03-16 DIAGNOSIS — D89.2 PARAPROTEINEMIA: Primary | ICD-10-CM

## 2023-03-16 LAB
HAV AB SERPL IA-ACNC: NONREACTIVE
HBV CORE AB SER QL: NONREACTIVE
HCV RNA SERPL NAA+PROBE-ACNC: ABNORMAL IU/ML
HCV RNA SERPL NAA+PROBE-LOG IU: 4.57 LOG IU/ML
HCV RNA SERPL QL NAA+PROBE: DETECTED
MITOCHONDRIAL ANTIBODY: 1.6 U/ML (ref 0–4)
SOURCE: ABNORMAL

## 2023-03-16 PROCEDURE — 99214 OFFICE O/P EST MOD 30 MIN: CPT | Performed by: INTERNAL MEDICINE

## 2023-03-16 NOTE — PROGRESS NOTES
noted  Extremities - peripheral pulses normal, no pedal edema, no clubbing or cyanosis  Skin - normal coloration and turgor, no rashes, no suspicious skin lesions noted       DATA:    CBC:   Recent Labs     03/15/23  1305   WBC 5.9   HGB 13.4        BMP:    Recent Labs     03/15/23  1305      K 3.6*      CO2 23   BUN 5*   CREATININE 0.61*   GLUCOSE 103*     Hepatic:   Recent Labs     03/15/23  1305   AST 35   ALT 42*   BILITOT 0.4   ALKPHOS 82     INR: No results for input(s): INR in the last 72 hours. PTT:No results for input(s): PTT in the last 72 hours. Impression:  Microcytic anemia  Thrombocytopenia  History of alcohol dependence  IgG lambda paraproteinemia  Abnormal free light chain ratio  HCV infection  Iron deficiency    RECOMMENDATIONS:  I reviewed the labs/imaging available to me,outside records and discussed with the patient. I explained to the patient the nature of this problem. I explained the significance of these abnormalities and possible etiology and management options  Overall patient feels better. CBC has improved. Iron stores are within range. Patient paraproteinemia profile shows negative for M protein however free light chain ratio remains significantly abnormal.  I believe patient will benefit from a bone marrow biopsy however patient wants to hold off at this point.   I will repeat paraproteinemia profile if free light chain ratio remains abnormal we will proceed with bone marrow biopsy  Patient has establish care with GI for management of hepatitis C  Continue iron supplementation we will reassess iron stores with next blood draw  Counseled on abstinence from alcohol  Return to clinic in 4 months      Katie Mendez MD    This note is created with the assistance of a speech recognition program.  While intending to generate a document that actually reflects the content of the visit, the document can still have some errors including those of syntax and sound a like

## 2023-03-17 ENCOUNTER — HOSPITAL ENCOUNTER (OUTPATIENT)
Dept: OCCUPATIONAL THERAPY | Age: 63
Setting detail: THERAPIES SERIES
Discharge: HOME | End: 2023-03-17
Payer: COMMERCIAL

## 2023-03-17 PROCEDURE — 97537 COMMUNITY/WORK REINTEGRATION: CPT

## 2023-03-17 NOTE — PROGRESS NOTES
[address]  Occupational Therapy  Rehabilitation Discharge Summary  Date: 3/17/23  Patient Name: Lotus Reddy    MRN: [id number]  Account: [de-identified]   : 1960  (60 y.o.) Gender: male     [facility]              [address] [facility] [address]              Phone: [phone]              Fax: [phone]      Referring Physician: Gayathri Hudson MD             Insurance: 48 visits (-23)  Primary Care Physician: Gayathri Hudson MD; Diego Schulz MD      Medical Diagnostic Code(s): Subarachnoid Hemorrhage (I63.40)     Rehab Diagnostic Code(s): Fatigue (R53.81); Fall Risk Z91.81  Date of symptom onset: 23      Total Visits: 3/48                      Cx/Ns:      Precautions: Fall Risk; SpO2 at night with SpO2 Goal is 92% or higher     Fall Risk/Comorbidities: Fall Risk, Microcytic anemia, Thrombocytopenia, History of alcohol dependence, Hep C; PEG tube     Subjective: Patient reported that his infectious disease physician is recommending medication x 3 months to treat his Hep C. Objective: Patient expresses frustration with impairments. Reason for Referral: Lotus Reddy is a 60 y.o. male admitted to inpatient rehabilitation on 2023. He was originally admitted to [address] [place] [address] on 2023 for acute respiratory failure secondary to right lower lobe pneumonia. MRSA swab was positive. Imaging showed numerous acute, subacute, and chronic infarcts in the left parietal lobe and left cerebellar hemisphere as well as bilateral subarachnoid hemorrhage. He was treated with seroquel for agitation. PEG tube was placed on 23 for severe dysphagia. He was found to be positive for hepatitis C and will need outpatient follow up with GI for treatment. ID, nephrology, heme-onc, neuro, pulm, and psych were following.  Patient was discharged to home on 2023 with home OT & PT.     Imaging:  CT head on 1/21/23:   Mild amount of residual subarachnoid hemorrhage in the right frontal and   right parietal lobes as well as the left frontal lobe. Evolving small focus of hemorrhage in the left parietal lobe posteriorly. Evolving areas of ischemia in the right frontal lobe and left posterior   parietal lobe as well as a few foci in the parasagittal aspect of the right   frontoparietal region. Results of Initial Clinical  Evaluation 8/9/9824: Patient would continue to benefit from skilled occupational therapy services in order to improve  performance skills on simulated assessments in the areas of consistent adherence to stops and safe negotiation of intersections and speed control. Results of Initial  Evaluation Behind-Wheel On-Road in the [facility] 3/8/2023: Patient demonstrated decreased  performance skills behind-wheel in the areas of vehicle positioning including jonathan positioning and jonathan changing; vehicle handling including speed control, steering & turning; and strategic skills including divided attention, anticipation of potential hazards, planning ahead, decision making, recall of 2-step directions, and processing speed. Results of Most Recent Simulated Driving Assessments 2/09/3747: Patient with impaired  performance skills on simulated assessments in the areas of consistent adherence to stops, hazard avoidance skills, pedal response speed and jonathan positioning. Behind-Wheel  Intervention in the [facility] 3/17/23:              Ability to enter/exit vehicle: Modified independent  Ability to maintain 3 visual clearance & 10-12 clearance from steering wheel: Independent  Ability to adjust seat & mirrors and fasten/unfasten seatbelt independently: Independent     Non-Traffic Driving:   Ability to operate the primary controls independently that included the steering wheel, accelerator & brake:  Independent  Ability to operate secondary controls that included: starting the vehicle; shifting the gears; operating the turn signal: and, backing maneuvers): Independent     Driving Environment:              Traffic Density: Moderate              Type of Roadway: [address]; [address] Performance Skills:     Visual Skills  Mirror checks: Independent              Scans Environment: Independent              Blind Spot Checks: Independent              Identifies Road Signs & Signals: Independent              Checks Cross Traffic: Independent     Vehicle Position  Gap Acceptance: Independent              Following/Stopping Distance: Independent              Jonathan Position: Independent              Turns into Proper Jonathan: Independent              Jonathan Changes: Independent     Vehicle Handling         Speed Control: Independent  Steering: Independent              Acceleration: Independent  Braking: Independent  Stopping: Independent              Turning: Independent                          Yielding: Independent                          Merging: Independent  Use of Turn Signal: Independent    Strategic Skills                          Divided Attention while Driving: Impaired                          Anticipation of Potential Hazards: Independent                          Planning: Independent                          Decision Making: Independent                          Recall of Visual/Verbal Information: Independent                          Follows Directions: Independent                          Obeys Rules of the Road: Independent                          Rate of Processing Speed: Independent     Education & application of defensive driving skills: Patient demonstrated good knowledge and application of defensive driving strategies taught on the road.      Assessment: Patient demonstrated good  performance skills in the areas of visual skills, vehicle positioning and handling and strategic skills behind-the-wheel in the [facility] modified vehicle on-road with patient demonstrating a good ability to apply defensive driving strategies taught throughout the intervention. Recommendation: Return to driving with no restrictions. Pt. Education:  [x] Yes   [x] Reviewed   Method of Education: [x] Verbal  [] Demo    Comprehension of Education:  [x] Verbalizes understanding. [] Demonstrates understanding. [] Needs review. [] Demonstrates/verbalizes      PLAN (FREQUENCY & DURATION): 2 times per week for a total of 12 visits      Treatment Plan:  [] OT eval high complexity  [id number] [x] [address]       Copy of evaluation sent to referring physician. Patient has been instructed to contact the referring physician to discuss the results of this evaluation. Patient has been informed that his or her physician is responsible for make the final decision regarding fitness to drive.      Treatment Charges: Mins Units   [] Evaluation       [x] ADL  60  4   Total Treatment Time  60        Time In: 12:00 pm Time Out: 1:00 pm      Electronically signed by: Noah Trejo MS, OTR/L, LICDC, CDRS

## 2023-03-19 LAB — HEPATITIS C GENOTYPE: NORMAL

## 2023-03-21 LAB
ALANINE AMINOTRANSFERASE, FIBROMETER: 46 U/L (ref 5–50)
ALPHA-2-MACROGLOBULIN, FIBROMETER: 323 MG/DL (ref 131–293)
ASPARTATE AMINOTRANSFERASE, FIBROMETER: 33 U/L (ref 9–50)
CIRRHOMETER PATIENT SCORE: 0.56
EER FIBROMETER REPORT: ABNORMAL
FIBROMETER INTERPRETATION: ABNORMAL
FIBROMETER PATIENT SCORE: 0.9
FIBROMETER PLATELET COUNT: 166
FIBROMETER PROTHROMBIN INDEX: 79 % (ref 90–120)
FIBROSIS METAVIR CLASSIFICATION: ABNORMAL
GAMMA GLUTAMYL TRANSFERASE, FIBROMETER: 23 U/L (ref 7–51)
INFLAMETER METAVIR CLASSIFICATION: ABNORMAL
INFLAMETER PATIENT SCORE: 0.63
UREA NITROGEN, FIBROMETER: 5 MG/DL (ref 7–20)

## 2023-03-22 ENCOUNTER — TELEPHONE (OUTPATIENT)
Dept: INTERNAL MEDICINE CLINIC | Age: 63
End: 2023-03-22

## 2023-03-22 DIAGNOSIS — R13.10 DYSPHAGIA, UNSPECIFIED TYPE: Primary | ICD-10-CM

## 2023-03-22 NOTE — TELEPHONE ENCOUNTER
Brooke from Speech Therapy called and needs order for modified barium swallow study. Please send to [facility]. Dr. Torres Borne out of office please advise.

## 2023-03-24 ENCOUNTER — HOSPITAL ENCOUNTER (OUTPATIENT)
Dept: ULTRASOUND IMAGING | Age: 63
End: 2023-03-24
Payer: COMMERCIAL

## 2023-03-24 DIAGNOSIS — B18.2 CHRONIC HEPATITIS C WITHOUT HEPATIC COMA (HCC): ICD-10-CM

## 2023-03-24 PROCEDURE — 76705 ECHO EXAM OF ABDOMEN: CPT

## 2023-03-27 ENCOUNTER — OFFICE VISIT (OUTPATIENT)
Dept: PHYSICAL MEDICINE AND REHAB | Age: 63
End: 2023-03-27

## 2023-03-27 VITALS
WEIGHT: 150 LBS | BODY MASS INDEX: 23.54 KG/M2 | TEMPERATURE: 97.4 F | DIASTOLIC BLOOD PRESSURE: 88 MMHG | SYSTOLIC BLOOD PRESSURE: 132 MMHG | HEART RATE: 74 BPM | HEIGHT: 67 IN

## 2023-03-27 DIAGNOSIS — R13.12 OROPHARYNGEAL DYSPHAGIA: ICD-10-CM

## 2023-03-27 DIAGNOSIS — R53.81 DEBILITY: ICD-10-CM

## 2023-03-27 DIAGNOSIS — Z86.73 HISTORY OF ISCHEMIC MULTIFOCAL MULTIPLE VASCULAR TERRITORIES STROKE: Primary | ICD-10-CM

## 2023-03-28 ENCOUNTER — HOSPITAL ENCOUNTER (OUTPATIENT)
Dept: GENERAL RADIOLOGY | Age: 63
Discharge: HOME | End: 2023-03-30
Payer: COMMERCIAL

## 2023-03-28 DIAGNOSIS — R13.10 DYSPHAGIA, UNSPECIFIED TYPE: ICD-10-CM

## 2023-03-28 PROCEDURE — 92611 MOTION FLUOROSCOPY/SWALLOW: CPT

## 2023-03-28 PROCEDURE — 74230 X-RAY XM SWLNG FUNCJ C+: CPT

## 2023-03-28 NOTE — PROCEDURES
Regular; Soft & Bite Sized;Pureed; Thin cup;Moderately Thick cup;Mildly Thick cup          Oral Phase: Premature vallecular spillage with all consistencies. Mildly prolonged mastication with regular solid (limited dentition). Pharyngeal Phase: Thin liquid, mildly thick liquid and moderately thick liquid: +DEEP penetration, unable to be ejected from airway, remained in laryngeal vestibule, +delayed cough. Chin tuck was ineffective in preventing deep penetration. Soft solid: shallow penetration only, ejected from airway. Puree and regular solid: No aspiration or penetration. Min amount vallecular and pyriform sinus residue with all consistencies. Reduced BOT retraction and hyolaryngeal excursion noted contributing to penetration and pharyngeal residue. Dysphagia Outcome Severity Scale: Level 3: Moderate dysphagia- Total assisstance, supervision or strategies. Two or more diet consistencies restricted  Penetration-Aspiration Scale (PAS): 3 - Material enters the airway, remains above the vocal folds, and is not ejected from airway    Recommended Diet:  Recommended Diet: Easy to Chew  Recommended Liquid: Extremely (Pudding) Thick            Safe Swallow Protocol:     Compensatory Swallowing Strategies : Eat/Feed slowly;Upright as possible for all oral intake;Small bites/sips        Recommendations/Treatment  Requires SLP Intervention: Yes                Recommended Exercises:    Therapeutic Interventions: Pharyngeal exercises; Vital Stim/NMES;Laryngeal exercises; Patient/Family education;Oral care;Free Water Protocol         Education: Recommendations were reviewed with Pt and wife following this exam.   Patient Education: Pt continuing to state does not have trouble swallowing following test.  Extensive education provided to Pt and wife re: results and recommendations, risk for aspiration with thin liquids and continued dysphagia treatment.   Pt and wife verbalized understanding of information provided,

## 2023-04-04 RX ORDER — SPIRONOLACTONE 25 MG/1
TABLET ORAL
Qty: 30 TABLET | Refills: 0 | Status: SHIPPED | OUTPATIENT
Start: 2023-04-04

## 2023-04-04 RX ORDER — FERROUS SULFATE 325(65) MG
TABLET ORAL
Qty: 30 TABLET | Refills: 0 | Status: SHIPPED | OUTPATIENT
Start: 2023-04-04

## 2023-04-04 RX ORDER — CARVEDILOL 6.25 MG/1
TABLET ORAL
Qty: 60 TABLET | Refills: 0 | Status: SHIPPED | OUTPATIENT
Start: 2023-04-04

## 2023-04-08 ASSESSMENT — ENCOUNTER SYMPTOMS
TROUBLE SWALLOWING: 0
SHORTNESS OF BREATH: 1
COUGH: 0

## 2023-04-09 NOTE — PROGRESS NOTES
lungs daily 90 capsule 1    FEROSUL 325 (65 Fe) MG tablet take 1 tablet by mouth once daily with breakfast 30 tablet 0    carvedilol (COREG) 6.25 MG tablet take 1 tablet by mouth twice a day with meals 60 tablet 0    spironolactone (ALDACTONE) 25 MG tablet take 1 tablet by mouth once daily 30 tablet 0    aspirin EC 81 MG EC tablet Take 1 tablet by mouth daily 90 tablet 1    Handicap Placard MISC by Does not apply route Duration: 12months / Dx: cva (Patient not taking: No sig reported) 1 each 0    albuterol sulfate HFA (PROVENTIL HFA) 108 (90 Base) MCG/ACT inhaler Inhale 2 puffs into the lungs 3 times daily as needed for Wheezing (Patient not taking: No sig reported) 18 g 3    acetaminophen (TYLENOL) 325 MG tablet Take 2 tablets by mouth every 6 hours as needed for Pain (Patient not taking: No sig reported)      polyethylene glycol (GLYCOLAX) 17 g packet Take 17 g by mouth daily as needed for Constipation (Patient not taking: No sig reported)      Nutritional Supplements (VITAL AF 1.2 MIGUELITO) LIQD 300 mLs by PEG Tube route 6 times daily Boluses at 6am, 9am, 12pm, 3pm, 6pm, and 9pm.  50mL free water before and after each bolus. Additional 100-300mL water for meds. (Patient not taking: No sig reported) 04446 mL 5     No current facility-administered medications for this visit. Subjective:      Review of Systems   Constitutional:  Positive for activity change (improving). HENT:  Negative for trouble swallowing. Respiratory:  Positive for shortness of breath. Negative for cough. Neurological:  Negative for numbness and headaches. Psychiatric/Behavioral:  Negative for dysphoric mood and sleep disturbance. Objective:     Physical Exam  /88   Pulse 74   Temp 97.4 °F (36.3 °C)   Ht 5' 7\" (1.702 m)   Wt 150 lb (68 kg)   BMI 23.49 kg/m²     Constitutional: He appears well-developed and well-nourished. In no distress. HEENT: NCAT, EOM grossly intact. Hearing grossly intact.   Pulmonary/Chest:

## 2023-04-17 ENCOUNTER — OFFICE VISIT (OUTPATIENT)
Dept: SURGERY | Age: 63
End: 2023-04-17

## 2023-04-17 VITALS
SYSTOLIC BLOOD PRESSURE: 126 MMHG | WEIGHT: 147.6 LBS | DIASTOLIC BLOOD PRESSURE: 85 MMHG | TEMPERATURE: 96.8 F | BODY MASS INDEX: 23.12 KG/M2 | HEART RATE: 73 BPM

## 2023-04-17 DIAGNOSIS — R13.19 ESOPHAGEAL DYSPHAGIA: Primary | ICD-10-CM

## 2023-04-18 NOTE — PROGRESS NOTES
Office Note -- Post op visit      Patient: Nils Meek  MRN: [id number]  YOB: 1960 (60 y.o.)    Date of Office Visit: April 17, 2023     CC: Post op follow up    SUBJECTIVE:  Nils Meek is a 60 y.o. male who returns to the [facility] clinic for post op follow up from Peg tube placement 1/17/2023. Patient has since recovered from his stroke. He was discharged from acute rehab in early February. He has been having no problems with eating solids for the last month. The last time he used his PEG tube was beginning of March. Review of Systems:  GEN: Denies fevers, chills. CV: Denies chest pain. RESP: Denies shortness of breath, COPD, asthma. GI: Denies abdominal pain  : Denies increased frequency or dysuria. SKIN: Denies skin lesions or incisional dehiscence. Physical Exam:    Vitals:    04/17/23 1004   BP: 126/85   Pulse: 73   Temp: 96.8 °F (36 °C)       General: Alert and oriented x 3. Non toxic in appearance. No apparent distress. Heart: Regular rate and rhythm. Lungs: symmetrical chest rise bilaterally, no respiratory distress. Clear breath sounds bilaterally. Abdomen: Soft, nondistended, PEG tube in the left upper quadrant without leaking or infection. Skin: No rashes or nodules noted. Assessment:  Dysphagia status post PEG    Plan:  Removed PEG tube at bedside. There were no complications. Temporary dressing placed over the left upper quadrant wound and to be removed tomorrow.   Okay for regular diet  Follow-up as needed    Jaxon William DO  4/17/2023

## 2023-04-19 ENCOUNTER — OFFICE VISIT (OUTPATIENT)
Dept: INTERNAL MEDICINE CLINIC | Age: 63
End: 2023-04-19
Payer: COMMERCIAL

## 2023-04-19 VITALS
BODY MASS INDEX: 22.85 KG/M2 | WEIGHT: 145.6 LBS | HEIGHT: 67 IN | HEART RATE: 73 BPM | OXYGEN SATURATION: 94 % | DIASTOLIC BLOOD PRESSURE: 64 MMHG | SYSTOLIC BLOOD PRESSURE: 102 MMHG

## 2023-04-19 DIAGNOSIS — Z43.1 PEG (PERCUTANEOUS ENDOSCOPIC GASTROSTOMY) ADJUSTMENT/REPLACEMENT/REMOVAL (HCC): ICD-10-CM

## 2023-04-19 DIAGNOSIS — B18.2 CHRONIC HEPATITIS C WITHOUT HEPATIC COMA (HCC): ICD-10-CM

## 2023-04-19 DIAGNOSIS — E44.0 MODERATE MALNUTRITION (HCC): ICD-10-CM

## 2023-04-19 DIAGNOSIS — I63.40 CEREBROVASCULAR ACCIDENT (CVA) DUE TO EMBOLISM OF CEREBRAL ARTERY (HCC): Primary | ICD-10-CM

## 2023-04-19 PROCEDURE — 99213 OFFICE O/P EST LOW 20 MIN: CPT | Performed by: INTERNAL MEDICINE

## 2023-04-19 NOTE — PROGRESS NOTES
Visit Information    Have you changed or started any medications since your last visit including any over-the-counter medicines, vitamins, or herbal medicines? no   Are you having any side effects from any of your medications? -  no  Have you stopped taking any of your medications? Is so, why? -  no    Have you seen any other physician or provider since your last visit? No  Have you had any other diagnostic tests since your last visit? No  Have you been seen in the emergency room and/or had an admission to a hospital since we last saw you? No  Have you had your routine dental cleaning in the past 6 months? no    Have you activated your [employer] account? If not, what are your barriers?  Yes     Patient Care Team:  Les Pompa MD as PCP - General (Internal Medicine)  Les Pompa MD as PCP - Empaneled Provider  Roland Almonte MD as Consulting Physician (Gastroenterology)    Medical History Review  Past Medical, Family, and Social History reviewed and does contribute to the patient presenting condition    Health Maintenance   Topic Date Due    Hepatitis A vaccine (1 of 2 - Risk 2-dose series) Never done    Pneumococcal 0-64 years Vaccine (1 - PCV) Never done    Hepatitis B vaccine (1 of 3 - Risk 3-dose series) Never done    DTaP/Tdap/Td vaccine (1 - Tdap) Never done    Shingles vaccine (1 of 2) Never done    Low dose CT lung screening  Never done    COVID-19 Vaccine (3 - Booster for Michael series) 01/31/2022    Flu vaccine (Season Ended) 08/01/2023    Colorectal Cancer Screen  01/09/2024    Depression Screen  02/13/2024    Lipids  02/28/2028    HIV screen  Completed    Hib vaccine  Aged Out    Meningococcal (ACWY) vaccine  Aged Out    GFR test (Diabetes, CKD 3-4, OR last GFR 15-59)  Discontinued    Hepatitis C screen  Discontinued

## 2023-04-19 NOTE — PROGRESS NOTES
[address] [facility] [address]  [place] [address]  Dept: [phone]  Dept Fax: [phone]     Name: Peyton Gardner  : 1960           Chief Complaint   Patient presents with    Cerebrovascular Accident     Patient takes coreg 12.5 recently changed dosage, dr Ga Faye charged this    Wound Check     Patient had his feeding tub removed on Monday, he states that he does not know the proper care of the open incision. He keeps a bandage on it ], Little redness around the site. Itching around the wound.  No warm to the touch        History of Present Illness:    HPI  Here for Ac visit   Post PEG removal,   He is concerned about PEG tube site infections     Past Medical History:    Past Medical History:   Diagnosis Date    Anemia       Reviewed all health maintenance requirements and ordered appropriate tests  Health Maintenance Due   Topic Date Due    Hepatitis A vaccine (1 of 2 - Risk 2-dose series) Never done    Pneumococcal 0-64 years Vaccine (1 - PCV) Never done    Hepatitis B vaccine (1 of 3 - Risk 3-dose series) Never done    DTaP/Tdap/Td vaccine (1 - Tdap) Never done    Shingles vaccine (1 of 2) Never done    Low dose CT lung screening  Never done    COVID-19 Vaccine (3 - Booster for Michael series) 2022       Past Surgical History:    Past Surgical History:   Procedure Laterality Date    UPPER GASTROINTESTINAL ENDOSCOPY N/A 2023    EGD ESOPHAGOGASTRODUODENOSCOPY PEG TUBE INSERTION performed by Polina William DO at [address] [place] [address]        Medications:      Current Outpatient Medications:     FEROSUL 325 (65 Fe) MG tablet, take 1 tablet by mouth once daily with breakfast, Disp: 30 tablet, Rfl: 0    carvedilol (COREG) 6.25 MG tablet, take 1 tablet by mouth twice a day with meals (Patient taking differently: 2 tablets 2 times daily), Disp: 60 tablet, Rfl: 0    spironolactone (ALDACTONE) 25 MG tablet, take 1 tablet by mouth once daily, Disp: 30 tablet, Rfl: 0    aspirin EC 81

## 2023-05-09 RX ORDER — SPIRONOLACTONE 25 MG/1
TABLET ORAL
Qty: 30 TABLET | Refills: 0 | Status: SHIPPED | OUTPATIENT
Start: 2023-05-09

## 2023-05-15 RX ORDER — FERROUS SULFATE 325(65) MG
TABLET ORAL
Qty: 30 TABLET | Refills: 0 | Status: SHIPPED | OUTPATIENT
Start: 2023-05-15

## 2023-06-01 ENCOUNTER — TELEPHONE (OUTPATIENT)
Dept: INTERNAL MEDICINE CLINIC | Age: 63
End: 2023-06-01

## 2023-06-01 NOTE — TELEPHONE ENCOUNTER
I have faxed the order from Dr. Mario Castillo office along with insurance information. I will call Dr. Mario Castillo office tomorrow 6/2/23 and have them send office notes.

## 2023-06-01 NOTE — TELEPHONE ENCOUNTER
Patient is in need of an order for a new oxygen concentrator. The company that was supplying this for him no longer takes his insurance and called him today and stated that they are coming to  his machine tomorrow. Patient called insurance company and they stated that he needs a new order for this and faxed to a new company in his network. If you can please sign the order I am working on getting a company who is in his network.

## 2023-06-06 RX ORDER — SPIRONOLACTONE 25 MG/1
TABLET ORAL
Qty: 30 TABLET | Refills: 0 | Status: SHIPPED | OUTPATIENT
Start: 2023-06-06

## 2023-06-20 ENCOUNTER — OFFICE VISIT (OUTPATIENT)
Dept: GASTROENTEROLOGY | Age: 63
End: 2023-06-20
Payer: COMMERCIAL

## 2023-06-20 VITALS
TEMPERATURE: 97.3 F | WEIGHT: 146 LBS | SYSTOLIC BLOOD PRESSURE: 124 MMHG | DIASTOLIC BLOOD PRESSURE: 81 MMHG | BODY MASS INDEX: 22.87 KG/M2

## 2023-06-20 DIAGNOSIS — B18.2 CHRONIC HEPATITIS C WITHOUT HEPATIC COMA (HCC): Primary | ICD-10-CM

## 2023-06-20 DIAGNOSIS — I63.40 CEREBROVASCULAR ACCIDENT (CVA) DUE TO EMBOLISM OF CEREBRAL ARTERY (HCC): ICD-10-CM

## 2023-06-20 DIAGNOSIS — K58.1 IRRITABLE BOWEL SYNDROME WITH CONSTIPATION: ICD-10-CM

## 2023-06-20 DIAGNOSIS — Z43.1 PEG (PERCUTANEOUS ENDOSCOPIC GASTROSTOMY) ADJUSTMENT/REPLACEMENT/REMOVAL (HCC): ICD-10-CM

## 2023-06-20 DIAGNOSIS — K62.5 BRBPR (BRIGHT RED BLOOD PER RECTUM): ICD-10-CM

## 2023-06-20 DIAGNOSIS — R13.19 ESOPHAGEAL DYSPHAGIA: ICD-10-CM

## 2023-06-20 PROCEDURE — 99214 OFFICE O/P EST MOD 30 MIN: CPT | Performed by: INTERNAL MEDICINE

## 2023-06-20 ASSESSMENT — ENCOUNTER SYMPTOMS
ABDOMINAL DISTENTION: 0
ANAL BLEEDING: 0
WHEEZING: 0
ABDOMINAL PAIN: 0
TROUBLE SWALLOWING: 0
DIARRHEA: 0
CONSTIPATION: 0
COLOR CHANGE: 0
VOMITING: 0
RECTAL PAIN: 0
SORE THROAT: 0
SHORTNESS OF BREATH: 0
BLOOD IN STOOL: 0
CHOKING: 0
NAUSEA: 0
COUGH: 0

## 2023-06-20 NOTE — PROGRESS NOTES
swelling. Gastrointestinal:  Negative for abdominal distention, abdominal pain, anal bleeding, blood in stool, constipation, diarrhea, nausea, rectal pain and vomiting. Skin:  Negative for color change. Allergic/Immunologic: Negative for environmental allergies and food allergies. Neurological:  Negative for dizziness, weakness, numbness and headaches. Hematological:  Does not bruise/bleed easily. Psychiatric/Behavioral:  Negative for confusion and sleep disturbance. The patient is not nervous/anxious. PHYSICAL EXAMINATION: Vital signs reviewed per the nursing documentation. /81   Temp 97.3 °F (36.3 °C)   Wt 146 lb (66.2 kg)   BMI 22.87 kg/m²   Body mass index is 22.87 kg/m². Physical Exam  Nursing note reviewed. Constitutional:       Appearance: He is well-developed. Comments: Anxious    HENT:      Head: Normocephalic and atraumatic. Eyes:      Conjunctiva/sclera: Conjunctivae normal.      Pupils: Pupils are equal, round, and reactive to light. Cardiovascular:      Rate and Rhythm: Normal rate and regular rhythm. Pulmonary:      Effort: Pulmonary effort is normal.      Breath sounds: Normal breath sounds. Abdominal:      General: Bowel sounds are normal.      Palpations: Abdomen is soft. Comments: NON TENDER, NON DISTENTED  LIVER SPLEEN AND HERNIAS ARE NOT  PALPABLE  BOWEL SOUNDS ARE POSITIVE   Scar for previous PEG   Genitourinary:     Rectum: Normal.   Musculoskeletal:         General: Normal range of motion. Cervical back: Normal range of motion and neck supple. Skin:     General: Skin is warm. Neurological:      Mental Status: He is alert and oriented to person, place, and time. Deep Tendon Reflexes: Reflexes are normal and symmetric.          LABORATORY DATA: Reviewed  Lab Results   Component Value Date    WBC 5.9 03/15/2023    HGB 13.4 03/15/2023    HCT 42.2 03/15/2023    MCV 91.3 03/15/2023     03/15/2023     03/15/2023    K 3.6

## 2023-06-26 ENCOUNTER — TELEPHONE (OUTPATIENT)
Dept: INTERNAL MEDICINE CLINIC | Age: 63
End: 2023-06-26

## 2023-06-26 DIAGNOSIS — R91.1 PULMONARY NODULE: Primary | ICD-10-CM

## 2023-06-26 RX ORDER — BISACODYL 5 MG/1
TABLET, DELAYED RELEASE ORAL
Qty: 4 TABLET | Refills: 0 | Status: SHIPPED | OUTPATIENT
Start: 2023-06-26 | End: 2023-06-28

## 2023-06-26 RX ORDER — POLYETHYLENE GLYCOL 3350 17 G/17G
POWDER, FOR SOLUTION ORAL
Qty: 238 G | Refills: 0 | Status: SHIPPED | OUTPATIENT
Start: 2023-06-26

## 2023-06-27 RX ORDER — BUDESONIDE, GLYCOPYRROLATE, AND FORMOTEROL FUMARATE 160; 9; 4.8 UG/1; UG/1; UG/1
2 AEROSOL, METERED RESPIRATORY (INHALATION) 2 TIMES DAILY
COMMUNITY
Start: 2023-05-26

## 2023-06-28 ENCOUNTER — TELEPHONE (OUTPATIENT)
Dept: INTERNAL MEDICINE CLINIC | Age: 63
End: 2023-06-28

## 2023-06-28 ENCOUNTER — OFFICE VISIT (OUTPATIENT)
Dept: INTERNAL MEDICINE CLINIC | Age: 63
End: 2023-06-28
Payer: COMMERCIAL

## 2023-06-28 VITALS
HEIGHT: 67 IN | DIASTOLIC BLOOD PRESSURE: 82 MMHG | OXYGEN SATURATION: 95 % | SYSTOLIC BLOOD PRESSURE: 120 MMHG | BODY MASS INDEX: 22.26 KG/M2 | WEIGHT: 141.8 LBS | HEART RATE: 86 BPM

## 2023-06-28 DIAGNOSIS — B18.2 CHRONIC HEPATITIS C WITHOUT HEPATIC COMA (HCC): ICD-10-CM

## 2023-06-28 DIAGNOSIS — J43.1 PANLOBULAR EMPHYSEMA (HCC): ICD-10-CM

## 2023-06-28 DIAGNOSIS — Z43.1 PEG (PERCUTANEOUS ENDOSCOPIC GASTROSTOMY) ADJUSTMENT/REPLACEMENT/REMOVAL (HCC): ICD-10-CM

## 2023-06-28 DIAGNOSIS — E44.0 MODERATE MALNUTRITION (HCC): ICD-10-CM

## 2023-06-28 DIAGNOSIS — J96.00 ACUTE RESPIRATORY FAILURE, UNSPECIFIED WHETHER WITH HYPOXIA OR HYPERCAPNIA (HCC): ICD-10-CM

## 2023-06-28 DIAGNOSIS — R91.8 LUNG NODULES: Primary | ICD-10-CM

## 2023-06-28 DIAGNOSIS — I63.40 CEREBROVASCULAR ACCIDENT (CVA) DUE TO EMBOLISM OF CEREBRAL ARTERY (HCC): ICD-10-CM

## 2023-06-28 DIAGNOSIS — F17.200 SMOKER: ICD-10-CM

## 2023-06-28 DIAGNOSIS — D69.6 THROMBOCYTOPENIA (HCC): ICD-10-CM

## 2023-06-28 PROCEDURE — 99214 OFFICE O/P EST MOD 30 MIN: CPT | Performed by: INTERNAL MEDICINE

## 2023-06-28 RX ORDER — VARENICLINE TARTRATE
KIT
COMMUNITY
Start: 2023-05-22

## 2023-06-28 ASSESSMENT — PATIENT HEALTH QUESTIONNAIRE - PHQ9
SUM OF ALL RESPONSES TO PHQ9 QUESTIONS 1 & 2: 0
2. FEELING DOWN, DEPRESSED OR HOPELESS: 0
SUM OF ALL RESPONSES TO PHQ QUESTIONS 1-9: 0
1. LITTLE INTEREST OR PLEASURE IN DOING THINGS: 0
SUM OF ALL RESPONSES TO PHQ QUESTIONS 1-9: 0

## 2023-06-29 RX ORDER — CARVEDILOL 12.5 MG/1
12.5 TABLET ORAL 2 TIMES DAILY
Qty: 60 TABLET | Refills: 3 | Status: SHIPPED | OUTPATIENT
Start: 2023-06-29

## 2023-07-07 RX ORDER — FERROUS SULFATE 325(65) MG
TABLET ORAL
Qty: 30 TABLET | Refills: 0 | Status: SHIPPED | OUTPATIENT
Start: 2023-07-07

## 2023-07-07 RX ORDER — SPIRONOLACTONE 25 MG/1
TABLET ORAL
Qty: 30 TABLET | Refills: 0 | Status: SHIPPED | OUTPATIENT
Start: 2023-07-07

## 2023-07-24 ENCOUNTER — HOSPITAL ENCOUNTER (OUTPATIENT)
Age: 63
Setting detail: SPECIMEN
Discharge: HOME | End: 2023-07-24

## 2023-07-24 DIAGNOSIS — D89.2 PARAPROTEINEMIA: ICD-10-CM

## 2023-07-24 DIAGNOSIS — E61.1 IRON DEFICIENCY: ICD-10-CM

## 2023-07-24 DIAGNOSIS — D69.6 THROMBOCYTOPENIA (HCC): ICD-10-CM

## 2023-07-24 LAB
ALBUMIN SERPL-MCNC: 4.2 G/DL (ref 3.5–5.2)
ALBUMIN/GLOB SERPL: 1.2 {RATIO} (ref 1–2.5)
ALP SERPL-CCNC: 79 U/L (ref 40–129)
ALT SERPL-CCNC: 9 U/L (ref 5–41)
ANION GAP SERPL CALCULATED.3IONS-SCNC: 13 MMOL/L (ref 9–17)
AST SERPL-CCNC: 14 U/L
BASOPHILS # BLD: 0.09 K/UL (ref 0–0.2)
BASOPHILS NFR BLD: 1 % (ref 0–2)
BILIRUB SERPL-MCNC: 0.5 MG/DL (ref 0.3–1.2)
BUN SERPL-MCNC: 13 MG/DL (ref 8–23)
CALCIUM SERPL-MCNC: 9.8 MG/DL (ref 8.6–10.4)
CHLORIDE SERPL-SCNC: 100 MMOL/L (ref 98–107)
CO2 SERPL-SCNC: 22 MMOL/L (ref 20–31)
CREAT SERPL-MCNC: 0.8 MG/DL (ref 0.7–1.2)
EOSINOPHIL # BLD: 0.17 K/UL (ref 0–0.44)
EOSINOPHILS RELATIVE PERCENT: 2 % (ref 1–4)
ERYTHROCYTE [DISTWIDTH] IN BLOOD BY AUTOMATED COUNT: 13.4 % (ref 11.8–14.4)
FERRITIN SERPL-MCNC: 474 NG/ML (ref 30–400)
FOLATE SERPL-MCNC: 11 NG/ML
GFR SERPL CREATININE-BSD FRML MDRD: >60 ML/MIN/1.73M2
GLUCOSE SERPL-MCNC: 108 MG/DL (ref 70–99)
HCT VFR BLD AUTO: 45.9 % (ref 40.7–50.3)
HGB BLD-MCNC: 15.2 G/DL (ref 13–17)
IMM GRANULOCYTES # BLD AUTO: 0.06 K/UL (ref 0–0.3)
IMM GRANULOCYTES NFR BLD: 1 %
IRON SATN MFR SERPL: 48 % (ref 20–55)
IRON SERPL-MCNC: 149 UG/DL (ref 59–158)
LYMPHOCYTES NFR BLD: 1.64 K/UL (ref 1.1–3.7)
LYMPHOCYTES RELATIVE PERCENT: 19 % (ref 24–43)
MCH RBC QN AUTO: 30 PG (ref 25.2–33.5)
MCHC RBC AUTO-ENTMCNC: 33.1 G/DL (ref 28.4–34.8)
MCV RBC AUTO: 90.7 FL (ref 82.6–102.9)
MONOCYTES NFR BLD: 0.91 K/UL (ref 0.1–1.2)
MONOCYTES NFR BLD: 10 % (ref 3–12)
NEUTROPHILS NFR BLD: 67 % (ref 36–65)
NEUTS SEG NFR BLD: 5.93 K/UL (ref 1.5–8.1)
NRBC BLD-RTO: 0 PER 100 WBC
PLATELET # BLD AUTO: 171 K/UL (ref 138–453)
PMV BLD AUTO: 11.1 FL (ref 8.1–13.5)
POTASSIUM SERPL-SCNC: 4.7 MMOL/L (ref 3.7–5.3)
PROT SERPL-MCNC: 7.6 G/DL (ref 6.4–8.3)
RBC # BLD AUTO: 5.06 M/UL (ref 4.21–5.77)
SODIUM SERPL-SCNC: 135 MMOL/L (ref 135–144)
TIBC SERPL-MCNC: 313 UG/DL (ref 250–450)
UNSATURATED IRON BINDING CAPACITY: 164 UG/DL (ref 112–347)
VIT B12 SERPL-MCNC: 342 PG/ML (ref 232–1245)
WBC OTHER # BLD: 8.8 K/UL (ref 3.5–11.3)

## 2023-07-26 LAB
ALBUMIN PERCENT: 60 % (ref 45–65)
ALBUMIN SERPL-MCNC: 4.4 G/DL (ref 3.2–5.2)
ALPHA 2 PERCENT: 11 % (ref 6–13)
ALPHA1 GLOB SERPL ELPH-MCNC: 0.2 G/DL (ref 0.1–0.4)
ALPHA1 GLOB SERPL ELPH-MCNC: 3 % (ref 3–6)
ALPHA2 GLOB SERPL ELPH-MCNC: 0.8 G/DL (ref 0.5–0.9)
B-GLOBULIN SERPL ELPH-MCNC: 0.8 G/DL (ref 0.5–1.1)
B-GLOBULIN SERPL ELPH-MCNC: 11 % (ref 11–19)
FREE KAPPA/LAMBDA RATIO: 0.09 (ref 0.26–1.65)
GAMMA GLOB SERPL ELPH-MCNC: 1.1 G/DL (ref 0.5–1.5)
GAMMA GLOBULIN %: 15 % (ref 9–20)
INTERPRETATION SERPL IFE-IMP: ABNORMAL
KAPPA LC FREE SER-MCNC: 20.6 MG/L (ref 3.7–19.4)
LAMBDA LC FREE SERPL-MCNC: 236.6 MG/L (ref 5.7–26.3)
PATH REV: ABNORMAL
PATHOLOGIST: ABNORMAL
PROT PATTERN SERPL ELPH-IMP: ABNORMAL
PROT SERPL-MCNC: 7.2 G/DL (ref 6.4–8.3)
TOTAL PROT. SUM,%: 100 % (ref 98–102)
TOTAL PROT. SUM: 7.3 G/DL (ref 6.3–8.2)

## 2023-08-01 RX ORDER — FERROUS SULFATE 325(65) MG
TABLET ORAL
Qty: 30 TABLET | Refills: 0 | Status: SHIPPED | OUTPATIENT
Start: 2023-08-01

## 2023-08-01 RX ORDER — SPIRONOLACTONE 25 MG/1
TABLET ORAL
Qty: 30 TABLET | Refills: 0 | Status: SHIPPED | OUTPATIENT
Start: 2023-08-01

## 2023-08-02 ENCOUNTER — OFFICE VISIT (OUTPATIENT)
Dept: INTERNAL MEDICINE CLINIC | Age: 63
End: 2023-08-02
Payer: COMMERCIAL

## 2023-08-02 VITALS
HEART RATE: 67 BPM | WEIGHT: 143 LBS | DIASTOLIC BLOOD PRESSURE: 70 MMHG | BODY MASS INDEX: 22.4 KG/M2 | SYSTOLIC BLOOD PRESSURE: 108 MMHG | OXYGEN SATURATION: 96 %

## 2023-08-02 DIAGNOSIS — B37.0 THRUSH: Primary | ICD-10-CM

## 2023-08-02 PROCEDURE — 99213 OFFICE O/P EST LOW 20 MIN: CPT | Performed by: INTERNAL MEDICINE

## 2023-08-02 RX ORDER — VELPATASVIR AND SOFOSBUVIR 100; 400 MG/1; MG/1
TABLET, FILM COATED ORAL
COMMUNITY
Start: 2023-08-01

## 2023-08-02 ASSESSMENT — ENCOUNTER SYMPTOMS: SORE THROAT: 1

## 2023-08-03 ENCOUNTER — TELEPHONE (OUTPATIENT)
Dept: ONCOLOGY | Age: 63
End: 2023-08-03

## 2023-08-03 ENCOUNTER — OFFICE VISIT (OUTPATIENT)
Dept: ONCOLOGY | Age: 63
End: 2023-08-03
Payer: COMMERCIAL

## 2023-08-03 VITALS
HEART RATE: 64 BPM | TEMPERATURE: 97.5 F | BODY MASS INDEX: 22.4 KG/M2 | DIASTOLIC BLOOD PRESSURE: 84 MMHG | SYSTOLIC BLOOD PRESSURE: 127 MMHG | WEIGHT: 143 LBS

## 2023-08-03 DIAGNOSIS — E61.1 IRON DEFICIENCY: ICD-10-CM

## 2023-08-03 DIAGNOSIS — D69.6 THROMBOCYTOPENIA (HCC): ICD-10-CM

## 2023-08-03 DIAGNOSIS — D89.2 PARAPROTEINEMIA: Primary | ICD-10-CM

## 2023-08-03 PROCEDURE — 99214 OFFICE O/P EST MOD 30 MIN: CPT | Performed by: INTERNAL MEDICINE

## 2023-08-03 PROCEDURE — 99211 OFF/OP EST MAY X REQ PHY/QHP: CPT

## 2023-08-03 NOTE — PROGRESS NOTES
Matty Franco                                                                                                                  8/3/2023  MRN:   [id number]  YOB: 1960  PCP:                           Aayush Thompson MD  Referring Physician: No ref. provider found  Treating Physician Name: Bev Villeda MD      Reason for visit:  Chief Complaint   Patient presents with    Follow-up     Review status of disease       Current problems:  Microcytic anemia  Thrombocytopenia  History of alcohol dependence  IgG lambda paraproteinemia  HCV infection  Iron deficiency    Active and recent treatments:  Repeat paraproteinemia profile and possible bone marrow biopsy    Interval history:  Patient presents to the clinic accompanied by his wife to discuss results of his lab work-up. Patient Free light chain ratio remains elevated. Patient has been started on treatment for hepatitis C tolerating it well so far. Anemia has resolved. During this visit patient's allergy, social, medical, surgical history and medications were reviewed and updated. Summary of Case/History:    Matty Franco a 58 y.o.male is a patient who is admitted with chief complaint of altered mental status. Patient has not seen a doctor for multiple years. Due to worsening mental status EMS was summoned. Patient oxygen saturation was noted to be at 75%. Patient placed on BiPAP x-ray showed right lower lobe pneumonia. Patient started on antibiotics. Patient also noted to have INR of 2.3 platelet count of 05,210. Patient does have a history of alcohol intake. Patient's ammonia level noted to be 29. Creatinine 1.5. Total bilirubin is within range. Hemoglobin 11.2 with MCV of 69. Platelet count is 54,791. Ultrasound liver done however report is pending. Interim History:    Patient presents to the clinic accompanied by his wife.   Overall doing much better since he came out of the hospital.  Patient underwent acute

## 2023-08-03 NOTE — TELEPHONE ENCOUNTER
Avs from 8/3/23    Rv in 3 month with labs 1 week prior to rv     Rv on 11/16/23    Labs drawn week prior     PT was given AVS and appt schedule    Electronically signed by Trang Wells on 8/3/2023 at 2:22 PM

## 2023-09-05 RX ORDER — FERROUS SULFATE 325(65) MG
TABLET ORAL
Qty: 30 TABLET | Refills: 0 | Status: SHIPPED | OUTPATIENT
Start: 2023-09-05

## 2023-09-05 RX ORDER — SPIRONOLACTONE 25 MG/1
TABLET ORAL
Qty: 30 TABLET | Refills: 0 | Status: SHIPPED | OUTPATIENT
Start: 2023-09-05

## 2023-09-15 ENCOUNTER — TELEPHONE (OUTPATIENT)
Dept: GASTROENTEROLOGY | Age: 63
End: 2023-09-15

## 2023-09-15 NOTE — TELEPHONE ENCOUNTER
Called the pulmonology office and spoke to Vicky Espinoza. Stated that the patient has an appointment with their office on 9/18/23 with the NP for follow up. Per cardiologist Dr. Verena Liang office the patient is at moderate risk, and to check with neuro if okay to hold the ASA for 5 days. Clearance was faxed to neurologist Dr. Ant Lomax on 8/31/23.

## 2023-09-19 NOTE — TELEPHONE ENCOUNTER
Patient called and cancelled procedure for 10/05/2023.  Nic Frankel will call and cancel with Gerard in the morning 09/20/2023

## 2023-09-21 ENCOUNTER — OFFICE VISIT (OUTPATIENT)
Dept: INTERNAL MEDICINE CLINIC | Age: 63
End: 2023-09-21

## 2023-09-21 VITALS
OXYGEN SATURATION: 97 % | HEIGHT: 67 IN | BODY MASS INDEX: 22.91 KG/M2 | HEART RATE: 71 BPM | DIASTOLIC BLOOD PRESSURE: 64 MMHG | SYSTOLIC BLOOD PRESSURE: 112 MMHG | WEIGHT: 146 LBS

## 2023-09-21 DIAGNOSIS — D69.6 THROMBOCYTOPENIA (HCC): ICD-10-CM

## 2023-09-21 DIAGNOSIS — B18.2 CHRONIC HEPATITIS C WITHOUT HEPATIC COMA (HCC): ICD-10-CM

## 2023-09-21 DIAGNOSIS — R91.8 LUNG NODULES: ICD-10-CM

## 2023-09-21 DIAGNOSIS — Z43.1 PEG (PERCUTANEOUS ENDOSCOPIC GASTROSTOMY) ADJUSTMENT/REPLACEMENT/REMOVAL (HCC): ICD-10-CM

## 2023-09-21 DIAGNOSIS — D22.9 ATYPICAL MOLE: ICD-10-CM

## 2023-09-21 DIAGNOSIS — I63.40 CEREBROVASCULAR ACCIDENT (CVA) DUE TO EMBOLISM OF CEREBRAL ARTERY (HCC): Primary | ICD-10-CM

## 2023-09-21 DIAGNOSIS — E44.0 MODERATE MALNUTRITION (HCC): ICD-10-CM

## 2023-09-21 DIAGNOSIS — F17.200 SMOKER: ICD-10-CM

## 2023-09-21 DIAGNOSIS — J43.1 PANLOBULAR EMPHYSEMA (HCC): ICD-10-CM

## 2023-09-21 RX ORDER — VARENICLINE TARTRATE 1 MG/1
TABLET, FILM COATED ORAL
COMMUNITY
Start: 2023-09-20

## 2023-09-21 NOTE — PROGRESS NOTES
Visit Information    Have you changed or started any medications since your last visit including any over-the-counter medicines, vitamins, or herbal medicines? no   Are you having any side effects from any of your medications? -  no  Have you stopped taking any of your medications? Is so, why? -  no    Have you seen any other physician or provider since your last visit? yes  Have you had any other diagnostic tests since your last visit? No  Have you been seen in the emergency room and/or had an admission to a hospital since we last saw you? No  Have you had your routine dental cleaning in the past 6 months? no    Have you activated your [employer] account? If not, what are your barriers?  No:      Patient Care Team:  Maribell Reis MD as PCP - General (Internal Medicine)  Maribell Reis MD as PCP - Empaneled Provider  Alea Lazaro MD as Consulting Physician (Gastroenterology)    Medical History Review  Past Medical, Family, and Social History reviewed and does not contribute to the patient presenting condition    Health Maintenance   Topic Date Due    Pneumococcal 0-64 years Vaccine (1 - PCV) Never done    Hepatitis A vaccine (1 of 2 - Risk 2-dose series) Never done    DTaP/Tdap/Td vaccine (1 - Tdap) Never done    Shingles vaccine (1 of 2) Never done    Low dose CT lung screening &/or counseling  Never done    Hepatitis B vaccine (1 of 3 - Risk 3-dose series) Never done    COVID-19 Vaccine (3 - Booster for Michael series) 01/31/2022    Flu vaccine (1) Never done    Colorectal Cancer Screen  01/09/2024    Depression Screen  06/28/2024    Lipids  02/28/2028    HIV screen  Completed    Hib vaccine  Aged Out    Meningococcal (ACWY) vaccine  Aged Out    GFR test (Diabetes, CKD 3-4, OR last GFR 15-59)  Discontinued    Hepatitis C screen  Discontinued

## 2023-09-26 RX ORDER — ASPIRIN 81 MG/1
81 TABLET ORAL DAILY
Qty: 90 TABLET | Refills: 1 | Status: SHIPPED | OUTPATIENT
Start: 2023-09-26

## 2023-10-04 RX ORDER — FERROUS SULFATE 325(65) MG
1 TABLET ORAL
Qty: 90 TABLET | Refills: 5 | Status: SHIPPED | OUTPATIENT
Start: 2023-10-04

## 2023-10-04 RX ORDER — SPIRONOLACTONE 25 MG/1
25 TABLET ORAL DAILY
Qty: 90 TABLET | Refills: 5 | Status: SHIPPED | OUTPATIENT
Start: 2023-10-04

## 2023-10-18 ENCOUNTER — OFFICE VISIT (OUTPATIENT)
Dept: NEUROLOGY | Age: 63
End: 2023-10-18
Payer: COMMERCIAL

## 2023-10-18 VITALS
WEIGHT: 146 LBS | HEART RATE: 67 BPM | BODY MASS INDEX: 22.91 KG/M2 | HEIGHT: 67 IN | DIASTOLIC BLOOD PRESSURE: 82 MMHG | SYSTOLIC BLOOD PRESSURE: 129 MMHG | RESPIRATION RATE: 16 BRPM | OXYGEN SATURATION: 97 %

## 2023-10-18 DIAGNOSIS — I63.9 CEREBROVASCULAR ACCIDENT (CVA), UNSPECIFIED MECHANISM (HCC): Primary | ICD-10-CM

## 2023-10-18 PROCEDURE — 99214 OFFICE O/P EST MOD 30 MIN: CPT

## 2023-10-18 NOTE — PROGRESS NOTES
[address], [address]  [place] [address]  P: [phone]  F: [phone]    NEUROLOGY CLINIC NOTE     PATIENT NAME: Jerzy Montanez  PATIENT MRN: [id number]  PRIMARY CARE PHYSICIAN: Catia Del Real MD    HPI:      Jerzy Montanez is a 63 y.o. with past medical history of alcohol use, HCV, anemia, prior stroke presents to neurology clinic for follow-up regarding stroke.    Patient was admitted to [facility] in January 2023 for acute respiratory failure with pneumonia.  At that time, he was found to have ischemic infarctions in right frontal lobe, left posterior parietal lobe, and few foci of parasagittal right frontoparietal region.  He also had suspected SAH on CT and MRI during that admission, and was not started on antiplatelets upon discharge.  Since the hospital discharge, he followed up in the clinic on 3/29/2023 where he was started on aspirin 81 mg daily for secondary stroke prophylaxis.  He was not started on statin due to liver dysfunction. Patient experienced thrombocytopenia along with being diagnosed with HCV, which also contributed to the decision to not start antiplatelets.      Patient is doing well at the time of this visit, and denies any new neurologic deficits. He underwent physical therapy post-hospitalization.     MRI brain w/o contrast done on 1/11/23 shows ischemia in R frontal and L posterior parietal lobe, and few foci in R parasagittal frontoparietal.       History obtained from Patient and EMR.      PATIENT HISTORY:     Past Medical History:   Diagnosis Date    Anemia         Past Surgical History:   Procedure Laterality Date    UPPER GASTROINTESTINAL ENDOSCOPY N/A 1/17/2023    EGD ESOPHAGOGASTRODUODENOSCOPY PEG TUBE INSERTION performed by Jennyfer William DO at [facility]        Social History     Socioeconomic History    Marital status: Single     Spouse name: Not on file    Number of children: Not on file

## 2023-10-31 ENCOUNTER — OFFICE VISIT (OUTPATIENT)
Dept: GASTROENTEROLOGY | Age: 63
End: 2023-10-31
Payer: COMMERCIAL

## 2023-10-31 VITALS
DIASTOLIC BLOOD PRESSURE: 88 MMHG | TEMPERATURE: 98.1 F | WEIGHT: 154 LBS | SYSTOLIC BLOOD PRESSURE: 128 MMHG | BODY MASS INDEX: 24.12 KG/M2

## 2023-10-31 DIAGNOSIS — F17.200 SMOKER: ICD-10-CM

## 2023-10-31 DIAGNOSIS — K62.5 BRBPR (BRIGHT RED BLOOD PER RECTUM): Primary | ICD-10-CM

## 2023-10-31 DIAGNOSIS — K58.1 IRRITABLE BOWEL SYNDROME WITH CONSTIPATION: ICD-10-CM

## 2023-10-31 PROCEDURE — 99214 OFFICE O/P EST MOD 30 MIN: CPT | Performed by: INTERNAL MEDICINE

## 2023-10-31 ASSESSMENT — ENCOUNTER SYMPTOMS
TROUBLE SWALLOWING: 0
ANAL BLEEDING: 1
RECTAL PAIN: 0
ABDOMINAL DISTENTION: 0
SORE THROAT: 1
CHOKING: 0
COUGH: 0
BLOOD IN STOOL: 0
WHEEZING: 0
VOMITING: 0
DIARRHEA: 1
ABDOMINAL PAIN: 1
COLOR CHANGE: 0
NAUSEA: 0
CONSTIPATION: 0
SHORTNESS OF BREATH: 0

## 2023-10-31 NOTE — PROGRESS NOTES
GI CLINIC FOLLOW UP    NTERVAL HISTORY:   No referring provider defined for this encounter. Chief Complaint   Patient presents with    Hepatitis C     Pt is here today for a f/u, last seen on 6/20/23 for Hep C, IBS, esophageal dysphagia, & BRBPR. 1. BRBPR (bright red blood per rectum)    2. Smoker    3. Irritable bowel syndrome with constipation        This patient seen my office as a follow-up  Has been treated for hepatitis C in the past  Has history for stroke had a PEG tube placed in the past as well  Complains of some off-and-on rectal bleeding  While scheduled for colonoscopy did not showed up  He is to get rescheduled for colonoscopy and wants to have it done at [facility]  Mild irritable bowel syndrome like symptoms previous records were reviewed with him      HISTORY OF PRESENT ILLNESS: Ngoc Arellano is a 58 y.o. male with a past history remarkable for , referred for evaluation of   Chief Complaint   Patient presents with    Hepatitis C     Pt is here today for a f/u, last seen on 6/20/23 for Hep C, IBS, esophageal dysphagia, & BRBPR. Greg Bain Past Medical,Family, and Social History reviewed and does contribute to the patient presenting condition. Patient's PMH/PSH,SH,PSYCH Hx, MEDs, ALLERGIES, and ROS were all reviewed and updated in the appropriate sections.     PAST MEDICAL HISTORY:  Past Medical History:   Diagnosis Date    Anemia        Past Surgical History:   Procedure Laterality Date    UPPER GASTROINTESTINAL ENDOSCOPY N/A 1/17/2023    EGD ESOPHAGOGASTRODUODENOSCOPY PEG TUBE INSERTION performed by Geovanny Mora DO at [facility]       CURRENT MEDICATIONS:    Current Outpatient Medications:     spironolactone (ALDACTONE) 25 MG tablet, Take 1 tablet by mouth daily, Disp: 90 tablet, Rfl: 5    ferrous sulfate (FEROSUL) 325 (65 Fe) MG tablet, Take 1 tablet by mouth daily (with breakfast), Disp: 90 tablet, Rfl: 5    aspirin 81 MG EC tablet, Take 1 tablet by mouth daily, Disp: 90 Subjective   Patient ID: Neo is a 57 year old male.    Chief Complaint   Patient presents with   • Annual Exam   • Refill Request     HPI     Here for an annual physical      He has a hx of DM and asthma.  He has microalbuminuria and is on an ace.  He is on a statin as he is diabetic.    DM.   No low BGs.     Feeling well.     Runs a healthy and low BG.    No issues with asthma.  Fall is his problematic season, a few times.     No side effects of his meds.             Neo has no past medical history on file.  Neo has Hyperlipidemia, mixed; Mild intermittent asthma without complication; Obstructive sleep apnea syndrome; Type 2 diabetes mellitus with microalbuminuria, without long-term current use of insulin (CMS/[facility]); and Microalbuminuria on their problem list.  Neo has no past surgical history on file.  His family history is not on file.  Neo reports that he has never smoked. He has never used smokeless tobacco. He reports current alcohol use.  Neo has a current medication list which includes the following prescription(s): albuterol, lisinopril, metformin, montelukast, simvastatin, and fluticasone.  Neo   Current Outpatient Medications   Medication Sig Dispense Refill   • albuterol 108 (90 Base) MCG/ACT inhaler Inhale 2 puffs into the lungs every 4 hours as needed for Shortness of Breath. 1 Inhaler 11   • lisinopril (ZESTRIL) 10 MG tablet Take 1 tablet by mouth daily. 90 tablet 3   • metFORMIN (GLUCOPHAGE-XR) 500 MG 24 hr tablet Take 1 tablet by mouth daily (with breakfast). 90 tablet 3   • montelukast (SINGULAIR) 10 MG tablet Take 1 tablet by mouth nightly. 90 tablet 3   • simvastatin (ZOCOR) 40 MG tablet Take 1 tablet by mouth at bedtime. 90 tablet 3   • fluticasone (FLONASE) 50 MCG/ACT nasal spray Spray 2 sprays in each nostril daily.       No current facility-administered medications for this visit.      Neo is allergic to mold   (environmental); penicillins; and sulfa  antibiotics.    Review of Systems   Cardiovascular: Negative for chest pain.   Gastrointestinal: Negative for abdominal distention and constipation.   All other systems reviewed and are negative.      Objective   Physical Exam  Vitals signs reviewed.   Constitutional:       General: He is not in acute distress.  HENT:      Head: Normocephalic and atraumatic.      Right Ear: External ear normal.      Left Ear: External ear normal.      Nose: No rhinorrhea.      Mouth/Throat:      Pharynx: Posterior oropharyngeal erythema present.   Eyes:      General: No scleral icterus.     Conjunctiva/sclera: Conjunctivae normal.      Pupils: Pupils are equal, round, and reactive to light.   Neck:      Musculoskeletal: Normal range of motion and neck supple.      Thyroid: No thyromegaly.      Vascular: No JVD.   Cardiovascular:      Rate and Rhythm: Normal rate and regular rhythm.      Pulses: Normal pulses.      Heart sounds: Normal heart sounds. No murmur. No friction rub. No gallop.    Pulmonary:      Effort: Pulmonary effort is normal.      Breath sounds: Normal breath sounds.   Abdominal:      General: There is no distension.      Palpations: Abdomen is soft. There is no mass.      Tenderness: There is no abdominal tenderness.   Musculoskeletal: Normal range of motion.         General: No tenderness or deformity.   Lymphadenopathy:      Cervical: No cervical adenopathy.   Skin:     General: Skin is warm and dry.   Neurological:      Mental Status: He is alert and oriented to person, place, and time.   Psychiatric:         Mood and Affect: Mood normal.         Assessment   Problem List Items Addressed This Visit        Respiratory    Mild intermittent asthma without complication    Relevant Medications    albuterol 108 (90 Base) MCG/ACT inhaler    montelukast (SINGULAIR) 10 MG tablet       Endocrine    Hyperlipidemia, mixed    Relevant Medications    lisinopril (ZESTRIL) 10 MG tablet    simvastatin (ZOCOR) 40 MG tablet    Type  2 diabetes mellitus with microalbuminuria, without long-term current use of insulin (CMS/[facility])    Relevant Medications    metFORMIN (GLUCOPHAGE-XR) 500 MG 24 hr tablet    Other Relevant Orders    POCT GLYCOHEMOGLOBIN ANALYZER    COMPREHENSIVE METABOLIC PANEL    MICROALBUMIN URINE RANDOM       Other    Microalbuminuria    Relevant Medications    lisinopril (ZESTRIL) 10 MG tablet      Other Visit Diagnoses     Annual physical exam    -  Primary         Last CN at age 49.  Due again age 59.     DM.  I will check his A1C.  Lipids treated.  Asthma has been controlled.     Return in 1 year.     A1C today.  CMP, urine for microalbumin.     Get a flu vaccine in Sept/Oct.    Implemented All Universal Safety Interventions:  [place] to call system. Call bell, personal items and telephone within reach. Instruct patient to call for assistance. Room bathroom lighting operational. Non-slip footwear when patient is off stretcher. Physically safe environment: no spills, clutter or unnecessary equipment. Stretcher in lowest position, wheels locked, appropriate side rails in place.

## 2023-11-01 ENCOUNTER — TELEPHONE (OUTPATIENT)
Dept: GASTROENTEROLOGY | Age: 63
End: 2023-11-01

## 2023-11-01 NOTE — TELEPHONE ENCOUNTER
Patient was seen in the office yesterday. Per Lovely Mcrae the patient will need scheduled for his colonoscopy with Dr. Viky Gutierrez so that the patient can have his procedure done at [facility]. He is to follow with Lovely Mcrae after that. The patient will need new cardiology and pulmonology clearances as well.

## 2023-11-08 ENCOUNTER — OFFICE VISIT (OUTPATIENT)
Dept: INTERNAL MEDICINE CLINIC | Age: 63
End: 2023-11-08
Payer: COMMERCIAL

## 2023-11-08 ENCOUNTER — HOSPITAL ENCOUNTER (OUTPATIENT)
Age: 63
Setting detail: SPECIMEN
Discharge: HOME | End: 2023-11-08

## 2023-11-08 VITALS
OXYGEN SATURATION: 97 % | DIASTOLIC BLOOD PRESSURE: 74 MMHG | SYSTOLIC BLOOD PRESSURE: 124 MMHG | BODY MASS INDEX: 24.08 KG/M2 | HEIGHT: 67 IN | HEART RATE: 96 BPM | WEIGHT: 153.4 LBS

## 2023-11-08 DIAGNOSIS — J43.1 PANLOBULAR EMPHYSEMA (HCC): ICD-10-CM

## 2023-11-08 DIAGNOSIS — D69.6 THROMBOCYTOPENIA (HCC): ICD-10-CM

## 2023-11-08 DIAGNOSIS — R07.9 CHEST PAIN, UNSPECIFIED TYPE: Primary | ICD-10-CM

## 2023-11-08 DIAGNOSIS — D89.2 PARAPROTEINEMIA: ICD-10-CM

## 2023-11-08 DIAGNOSIS — E61.1 IRON DEFICIENCY: ICD-10-CM

## 2023-11-08 LAB
ALBUMIN SERPL-MCNC: 4 G/DL (ref 3.5–5.2)
ALBUMIN/GLOB SERPL: 1.4 {RATIO} (ref 1–2.5)
ALP SERPL-CCNC: 63 U/L (ref 40–129)
ALT SERPL-CCNC: 9 U/L (ref 5–41)
ANION GAP SERPL CALCULATED.3IONS-SCNC: 7 MMOL/L (ref 9–17)
AST SERPL-CCNC: 18 U/L
BASOPHILS # BLD: 0.09 K/UL (ref 0–0.2)
BASOPHILS NFR BLD: 1 % (ref 0–2)
BILIRUB SERPL-MCNC: 0.5 MG/DL (ref 0.3–1.2)
BUN SERPL-MCNC: 8 MG/DL (ref 8–23)
CALCIUM SERPL-MCNC: 9.5 MG/DL (ref 8.6–10.4)
CHLORIDE SERPL-SCNC: 104 MMOL/L (ref 98–107)
CO2 SERPL-SCNC: 30 MMOL/L (ref 20–31)
CREAT SERPL-MCNC: 0.7 MG/DL (ref 0.7–1.2)
EOSINOPHIL # BLD: 0.38 K/UL (ref 0–0.44)
EOSINOPHILS RELATIVE PERCENT: 5 % (ref 1–4)
ERYTHROCYTE [DISTWIDTH] IN BLOOD BY AUTOMATED COUNT: 13 % (ref 11.8–14.4)
GFR SERPL CREATININE-BSD FRML MDRD: >60 ML/MIN/1.73M2
GLUCOSE SERPL-MCNC: 78 MG/DL (ref 70–99)
HCT VFR BLD AUTO: 45.3 % (ref 40.7–50.3)
HGB BLD-MCNC: 14.4 G/DL (ref 13–17)
IMM GRANULOCYTES # BLD AUTO: 0.05 K/UL (ref 0–0.3)
IMM GRANULOCYTES NFR BLD: 1 %
LYMPHOCYTES NFR BLD: 1.46 K/UL (ref 1.1–3.7)
LYMPHOCYTES RELATIVE PERCENT: 18 % (ref 24–43)
MCH RBC QN AUTO: 30.4 PG (ref 25.2–33.5)
MCHC RBC AUTO-ENTMCNC: 31.8 G/DL (ref 28.4–34.8)
MCV RBC AUTO: 95.8 FL (ref 82.6–102.9)
MONOCYTES NFR BLD: 0.76 K/UL (ref 0.1–1.2)
MONOCYTES NFR BLD: 9 % (ref 3–12)
NEUTROPHILS NFR BLD: 66 % (ref 36–65)
NEUTS SEG NFR BLD: 5.53 K/UL (ref 1.5–8.1)
NRBC BLD-RTO: 0 PER 100 WBC
PLATELET # BLD AUTO: 181 K/UL (ref 138–453)
PMV BLD AUTO: 10.8 FL (ref 8.1–13.5)
POTASSIUM SERPL-SCNC: 4.7 MMOL/L (ref 3.7–5.3)
PROT SERPL-MCNC: 6.9 G/DL (ref 6.4–8.3)
RBC # BLD AUTO: 4.73 M/UL (ref 4.21–5.77)
SODIUM SERPL-SCNC: 141 MMOL/L (ref 135–144)
WBC OTHER # BLD: 8.3 K/UL (ref 3.5–11.3)

## 2023-11-08 PROCEDURE — 99214 OFFICE O/P EST MOD 30 MIN: CPT | Performed by: INTERNAL MEDICINE

## 2023-11-08 NOTE — TELEPHONE ENCOUNTER
1st attempt: Selena Saint asking for patient to return office phone call for scheduling colon procedure with [address]. 2nd attempt: Writer sent letter as a reminder for patient to contact our office to schedule.

## 2023-11-08 NOTE — PROGRESS NOTES
Mouth/Throat:      Pharynx: No oropharyngeal exudate. Eyes:      General: No scleral icterus. Right eye: No discharge. Left eye: No discharge. Conjunctiva/sclera: Conjunctivae normal.      Pupils: Pupils are equal, round, and reactive to light. Neck:      Thyroid: No thyromegaly. Vascular: No JVD. Trachea: No tracheal deviation. Cardiovascular:      Rate and Rhythm: Normal rate. Heart sounds: Normal heart sounds. No murmur heard. No gallop. Pulmonary:      Effort: Pulmonary effort is normal. No respiratory distress. Breath sounds: Normal breath sounds. No stridor. No wheezing or rales. Abdominal:      General: Bowel sounds are normal. There is no distension. Palpations: Abdomen is soft. Tenderness: There is no abdominal tenderness. There is no guarding or rebound. Musculoskeletal:         General: No tenderness. Normal range of motion. Cervical back: Normal range of motion and neck supple. Skin:     General: Skin is warm and dry. Findings: No erythema or rash. Neurological:      Mental Status: He is alert and oriented to person, place, and time. Assessment / Plan:   1. Chest pain, unspecified type  Has Multiple risk Factors , Age . 60, Male sex   H/o Smoking and Alcoholism   - Nuclear stress test with myocardial perfusion; Future    2. Panlobular emphysema (HCC)  STable   On Breztri and albuterol PRN       No follow-ups on file. Reviewed prior labs and health maintenance. Discussed use, benefit, and side effects of prescribed medications. Barriers to medication compliance addressed. All patient questions answered. Pt voiced understanding. MD DELFIN [facility]  11/11/2023, 6:59 PM    Please note that this chart was generated using voice recognition Dragon dictation software.   Although every effort was made to ensure the accuracy of this automated transcription, some errors in transcription may
75M h/o GBM, seizure d/o presents with recurrent seizure. No symptoms concerning for infection. No complaints of new HA, no focal neuro deficits but oriented x2. Will check basic labs, UA and repeat CT head. Plan for neurology consult.

## 2023-11-09 LAB
ALBUMIN PERCENT: 63 % (ref 45–65)
ALBUMIN SERPL-MCNC: 4 G/DL (ref 3.2–5.2)
ALPHA 2 PERCENT: 11 % (ref 6–13)
ALPHA1 GLOB SERPL ELPH-MCNC: 0.2 G/DL (ref 0.1–0.4)
ALPHA1 GLOB SERPL ELPH-MCNC: 3 % (ref 3–6)
ALPHA2 GLOB SERPL ELPH-MCNC: 0.7 G/DL (ref 0.5–0.9)
B-GLOBULIN SERPL ELPH-MCNC: 0.7 G/DL (ref 0.5–1.1)
B-GLOBULIN SERPL ELPH-MCNC: 10 % (ref 11–19)
FREE KAPPA/LAMBDA RATIO: 0.08 (ref 0.26–1.65)
GAMMA GLOB SERPL ELPH-MCNC: 0.8 G/DL (ref 0.5–1.5)
GAMMA GLOBULIN %: 13 % (ref 9–20)
INTERPRETATION SERPL IFE-IMP: ABNORMAL
KAPPA LC FREE SER-MCNC: 15.4 MG/L (ref 3.7–19.4)
LAMBDA LC FREE SERPL-MCNC: 189.7 MG/L (ref 5.7–26.3)
PATH REV: ABNORMAL
PATHOLOGIST: ABNORMAL
PROT PATTERN SERPL ELPH-IMP: ABNORMAL
PROT SERPL-MCNC: 6.4 G/DL (ref 6.4–8.3)
TOTAL PROT. SUM,%: 100 % (ref 98–102)
TOTAL PROT. SUM: 6.4 G/DL (ref 6.3–8.2)

## 2023-11-11 ASSESSMENT — ENCOUNTER SYMPTOMS
ABDOMINAL PAIN: 0
ABDOMINAL DISTENTION: 0
SHORTNESS OF BREATH: 1
COLOR CHANGE: 0
DIARRHEA: 0
BACK PAIN: 0
COUGH: 0
APNEA: 0
CHEST TIGHTNESS: 1
CONSTIPATION: 0
FACIAL SWELLING: 0
WHEEZING: 0

## 2023-11-16 ENCOUNTER — OFFICE VISIT (OUTPATIENT)
Dept: ONCOLOGY | Age: 63
End: 2023-11-16
Payer: COMMERCIAL

## 2023-11-16 ENCOUNTER — TELEPHONE (OUTPATIENT)
Dept: ONCOLOGY | Age: 63
End: 2023-11-16

## 2023-11-16 VITALS
WEIGHT: 155 LBS | TEMPERATURE: 96.8 F | HEART RATE: 72 BPM | SYSTOLIC BLOOD PRESSURE: 135 MMHG | BODY MASS INDEX: 24.27 KG/M2 | DIASTOLIC BLOOD PRESSURE: 90 MMHG

## 2023-11-16 DIAGNOSIS — R76.8 ELEVATED SERUM IMMUNOGLOBULIN FREE LIGHT CHAIN LEVEL: ICD-10-CM

## 2023-11-16 DIAGNOSIS — D89.2 PARAPROTEINEMIA: Primary | ICD-10-CM

## 2023-11-16 PROCEDURE — 99214 OFFICE O/P EST MOD 30 MIN: CPT | Performed by: INTERNAL MEDICINE

## 2023-11-16 NOTE — TELEPHONE ENCOUNTER
AVS 11/16/23    Instructions  from Dr. Dede Yang MD    Instructions  from Dr. Dede Yang MD  Rv in 3-4 with labs 1 week priro              Labs ordered today    Basic Metabolic Panel  Please complete by 11/16/2023  CBC with Auto Differential  Please complete by 11/16/2023  Immunoglobulin Panel (IgG, IgA, IgM)  Please complete by 11/16/2023  Monoclonal Panel  Please complete by 11/16/2023    RV 02/29/24    Verbally asked doctor when revisit would be. Its 3-4 mo. Labs drawn week prior.     PT was given AVS and appt schedule    Electronically signed by Jodie Rubio on 11/16/2023 at 2:33 PM

## 2023-11-16 NOTE — PROGRESS NOTES
Nash Hatchet Rahmel                                                                                                                  11/16/2023  MRN:   [id number]  YOB: 1960  PCP:                           Sulma Brice MD  Referring Physician: No ref. provider found  Treating Physician Name: Nunu Morales MD      Reason for visit:  Chief Complaint   Patient presents with    Follow-up     Review status of disease       Current problems:  Microcytic anemia  Thrombocytopenia  History of alcohol dependence  IgG lambda paraproteinemia  HCV infection  Iron deficiency    Active and recent treatments:  Active surveillance (patient wants to hold off on bone marrow biopsy)    Interval history:  Patient presents to the clinic accompanied by his wife to discuss results of his lab work-up. Free light chain ratio remains abnormal.  M protein somewhat improved. Patient has completed treatment for hep C awaiting testing to confirm results. Wants to hold off on bone marrow biopsy due to upcoming tests. During this visit patient's allergy, social, medical, surgical history and medications were reviewed and updated. Summary of Case/History:    Teri Marvin a 58 y.o.male is a patient who is admitted with chief complaint of altered mental status. Patient has not seen a doctor for multiple years. Due to worsening mental status EMS was summoned. Patient oxygen saturation was noted to be at 75%. Patient placed on BiPAP x-ray showed right lower lobe pneumonia. Patient started on antibiotics. Patient also noted to have INR of 2.3 platelet count of 00,971. Patient does have a history of alcohol intake. Patient's ammonia level noted to be 29. Creatinine 1.5. Total bilirubin is within range. Hemoglobin 11.2 with MCV of 69. Platelet count is 71,901. Ultrasound liver done however report is pending. Interim History:    Patient presents to the clinic accompanied by his wife.   Overall doing

## 2023-11-28 ENCOUNTER — HOSPITAL ENCOUNTER (OUTPATIENT)
Dept: NUCLEAR MEDICINE | Age: 63
Discharge: HOME | End: 2023-11-30
Attending: INTERNAL MEDICINE
Payer: COMMERCIAL

## 2023-11-28 ENCOUNTER — HOSPITAL ENCOUNTER (OUTPATIENT)
Age: 63
Discharge: HOME | End: 2023-11-30
Attending: INTERNAL MEDICINE
Payer: COMMERCIAL

## 2023-11-28 ENCOUNTER — TELEPHONE (OUTPATIENT)
Dept: INTERNAL MEDICINE CLINIC | Age: 63
End: 2023-11-28

## 2023-11-28 ENCOUNTER — HOSPITAL ENCOUNTER (OUTPATIENT)
Dept: ULTRASOUND IMAGING | Age: 63
Discharge: HOME | End: 2023-11-30
Attending: INTERNAL MEDICINE
Payer: COMMERCIAL

## 2023-11-28 DIAGNOSIS — F17.200 SMOKER: ICD-10-CM

## 2023-11-28 DIAGNOSIS — K58.1 IRRITABLE BOWEL SYNDROME WITH CONSTIPATION: ICD-10-CM

## 2023-11-28 DIAGNOSIS — R07.9 CHEST PAIN, UNSPECIFIED TYPE: ICD-10-CM

## 2023-11-28 DIAGNOSIS — K62.5 BRBPR (BRIGHT RED BLOOD PER RECTUM): ICD-10-CM

## 2023-11-28 LAB
STRESS BASELINE DIAS BP: 99 MMHG
STRESS BASELINE HR: 68 BPM
STRESS BASELINE SYS BP: 168 MMHG
STRESS ESTIMATED WORKLOAD: 1 METS
STRESS PEAK DIAS BP: 109 MMHG
STRESS PEAK SYS BP: 185 MMHG
STRESS PERCENT HR ACHIEVED: 65 %
STRESS POST PEAK HR: 101 BPM
STRESS RATE PRESSURE PRODUCT: NORMAL BPM*MMHG
STRESS ST DEPRESSION: 0 MM
STRESS STAGE RECOVERY 1 BP: NORMAL MMHG
STRESS STAGE RECOVERY 1 DURATION: 1 MIN:SEC
STRESS STAGE RECOVERY 1 HR: 93 BPM
STRESS STAGE RECOVERY 2 BP: NORMAL MMHG
STRESS STAGE RECOVERY 2 DURATION: 9 MIN:SEC
STRESS STAGE RECOVERY 2 HR: 90 BPM
STRESS TARGET HR: 156 BPM

## 2023-11-28 PROCEDURE — 78452 HT MUSCLE IMAGE SPECT MULT: CPT

## 2023-11-28 PROCEDURE — 76705 ECHO EXAM OF ABDOMEN: CPT

## 2023-11-28 PROCEDURE — A9500 TC99M SESTAMIBI: HCPCS | Performed by: INTERNAL MEDICINE

## 2023-11-28 PROCEDURE — 3430000000 HC RX DIAGNOSTIC RADIOPHARMACEUTICAL: Performed by: INTERNAL MEDICINE

## 2023-11-28 PROCEDURE — 6360000002 HC RX W HCPCS: Performed by: INTERNAL MEDICINE

## 2023-11-28 PROCEDURE — 2580000003 HC RX 258: Performed by: INTERNAL MEDICINE

## 2023-11-28 PROCEDURE — 93017 CV STRESS TEST TRACING ONLY: CPT

## 2023-11-28 RX ORDER — NITROGLYCERIN 0.4 MG/1
0.4 TABLET SUBLINGUAL EVERY 5 MIN PRN
Status: ACTIVE | OUTPATIENT
Start: 2023-11-28 | End: 2023-11-28

## 2023-11-28 RX ORDER — TETRAKIS(2-METHOXYISOBUTYLISOCYANIDE)COPPER(I) TETRAFLUOROBORATE 1 MG/ML
10 INJECTION, POWDER, LYOPHILIZED, FOR SOLUTION INTRAVENOUS
Status: COMPLETED | OUTPATIENT
Start: 2023-11-28 | End: 2023-11-28

## 2023-11-28 RX ORDER — METOPROLOL TARTRATE 1 MG/ML
5 INJECTION, SOLUTION INTRAVENOUS EVERY 5 MIN PRN
Status: ACTIVE | OUTPATIENT
Start: 2023-11-28 | End: 2023-11-28

## 2023-11-28 RX ORDER — REGADENOSON 0.08 MG/ML
0.4 INJECTION, SOLUTION INTRAVENOUS
Status: COMPLETED | OUTPATIENT
Start: 2023-11-28 | End: 2023-11-28

## 2023-11-28 RX ORDER — SODIUM CHLORIDE 9 MG/ML
500 INJECTION, SOLUTION INTRAVENOUS CONTINUOUS PRN
Status: ACTIVE | OUTPATIENT
Start: 2023-11-28 | End: 2023-11-28

## 2023-11-28 RX ORDER — ATROPINE SULFATE 0.1 MG/ML
0.5 INJECTION INTRAVENOUS EVERY 5 MIN PRN
Status: ACTIVE | OUTPATIENT
Start: 2023-11-28 | End: 2023-11-28

## 2023-11-28 RX ORDER — AMINOPHYLLINE 25 MG/ML
50 INJECTION, SOLUTION INTRAVENOUS PRN
Status: ACTIVE | OUTPATIENT
Start: 2023-11-28 | End: 2023-11-28

## 2023-11-28 RX ORDER — SODIUM CHLORIDE 0.9 % (FLUSH) 0.9 %
5-40 SYRINGE (ML) INJECTION PRN
Status: ACTIVE | OUTPATIENT
Start: 2023-11-28 | End: 2023-11-28

## 2023-11-28 RX ORDER — SODIUM CHLORIDE 0.9 % (FLUSH) 0.9 %
10 SYRINGE (ML) INJECTION PRN
Status: DISCONTINUED | OUTPATIENT
Start: 2023-11-28 | End: 2023-12-01 | Stop reason: HOSPADM

## 2023-11-28 RX ORDER — ALBUTEROL SULFATE 90 UG/1
2 AEROSOL, METERED RESPIRATORY (INHALATION) PRN
Status: ACTIVE | OUTPATIENT
Start: 2023-11-28 | End: 2023-11-28

## 2023-11-28 RX ORDER — TETRAKIS(2-METHOXYISOBUTYLISOCYANIDE)COPPER(I) TETRAFLUOROBORATE 1 MG/ML
30 INJECTION, POWDER, LYOPHILIZED, FOR SOLUTION INTRAVENOUS
Status: COMPLETED | OUTPATIENT
Start: 2023-11-28 | End: 2023-11-28

## 2023-11-28 RX ADMIN — Medication 11.1 MILLICURIE: at 08:06

## 2023-11-28 RX ADMIN — REGADENOSON 0.4 MG: 0.08 INJECTION, SOLUTION INTRAVENOUS at 09:17

## 2023-11-28 RX ADMIN — SODIUM CHLORIDE, PRESERVATIVE FREE 10 ML: 5 INJECTION INTRAVENOUS at 08:07

## 2023-11-28 RX ADMIN — Medication 31 MILLICURIE: at 09:18

## 2023-11-28 NOTE — TELEPHONE ENCOUNTER
Patient requesting renewal for handicap disability  placard due to Pulmonary issues, please advise on okay for letter request.      Once complete patient would like letter mailed to home address.

## 2023-11-30 DIAGNOSIS — D89.2 PARAPROTEINEMIA: ICD-10-CM

## 2023-12-01 RX ORDER — LANOLIN ALCOHOL/MO/W.PET/CERES
1000 CREAM (GRAM) TOPICAL DAILY
Qty: 30 TABLET | Refills: 3 | Status: SHIPPED | OUTPATIENT
Start: 2023-12-01

## 2024-01-13 NOTE — PLAN OF CARE
Problem: Discharge Planning  Goal: Discharge to home or other facility with appropriate resources  Outcome: Progressing     Problem: Safety - Adult  Goal: Free from fall injury  Outcome: Progressing  Flowsheets (Taken 1/11/2023 0358)  Free From Fall Injury: Instruct family/caregiver on patient safety  Note: Safety maintained this shift, no attempts to get out of bed on own     Problem: Skin/Tissue Integrity  Goal: Absence of new skin breakdown  Description: 1. Monitor for areas of redness and/or skin breakdown  2. Assess vascular access sites hourly  3. Every 4-6 hours minimum:  Change oxygen saturation probe site  4. Every 4-6 hours:  If on nasal continuous positive airway pressure, respiratory therapy assess nares and determine need for appliance change or resting period. Outcome: Progressing  Note: No new skin breakdown noted     Problem: Safety - Medical Restraint  Goal: Remains free of injury from restraints (Restraint for Interference with Medical Device)  Description: INTERVENTIONS:  1. Determine that other, less restrictive measures have been tried or would not be effective before applying the restraint  2. Evaluate the patient's condition at the time of restraint application  3. Inform patient/family regarding the reason for restraint  4.  Q2H: Monitor safety, psychosocial status, comfort, nutrition and hydration  Outcome: Completed  Flowsheets  Taken 1/10/2023 1600 by Sylvia Marshall RN  Remains free of injury from restraints (restraint for interference with medical device): Every 2 hours: Monitor safety, psychosocial status, comfort, nutrition and hydration  Taken 1/10/2023 1400 by Sylvia Marshall RN  Remains free of injury from restraints (restraint for interference with medical device): Every 2 hours: Monitor safety, psychosocial status, comfort, nutrition and hydration [Follow-Up Visit] : a follow-up visit for [Melanoma] : melanoma [FreeTextEntry2] : s/p WLE on 12/6/19

## 2024-01-18 NOTE — PROGRESS NOTES
Dermatology Patient Note  [facility], [facility] DERMATOLOGY  [address]  [address]  [place] [address]  Dept: [phone]  Dept Fax: [phone]      VISITDATE: 1/23/2024   REFERRING PROVIDER: Catia Del Real MD      Jerzy Montanez is a 63 y.o. male  who presents today in the office for:    New Patient (Moles- has been present over 20 years. Pt girlfriend says the mole in the middle of the back is concerning due to the size of it. States no symptoms associated with them. States he also has concerns with his finger nails for over 2009- states had trauma to them since then after shutting his hands in a wood window seal- they became infected around that time that spread to the rest of his fingernails. Used clotrimzole 1%. )      HISTORY OF PRESENT ILLNESS:  New patient presents for evaluation of moles. Patient's girlfriend notes concern for a large mole on the mid back. Admits to occasional itchiness.     Patient also notes concern for his fingernails. In 2009, he had trauma to them after shutting his hands in a wood window seal. He also experiences it on his toenails. He has used clotrimazole 1% cream in the past, which resolved the rash on the hands, but did not resolve the nails.     Patient reports having a lesion on the left side of his face that has been present since he was a teenager. He was previously told that it may be calcified bone.     Patient has been diagnosed with hepatitis C and completed treatment but has not had testing to determine if cured      MEDICAL PROBLEMS:  Patient Active Problem List    Diagnosis Date Noted    Chronic hepatitis C without hepatic coma (HCC) 03/14/2023     Priority: Medium    PEG (percutaneous endoscopic gastrostomy) adjustment/replacement/removal (HCC) 03/14/2023     Priority: Medium    Irritable bowel syndrome with constipation 03/14/2023     Priority: Medium    Cerebrovascular accident (CVA) due to

## 2024-01-23 ENCOUNTER — OFFICE VISIT (OUTPATIENT)
Dept: DERMATOLOGY | Age: 64
End: 2024-01-23
Payer: COMMERCIAL

## 2024-01-23 VITALS
OXYGEN SATURATION: 98 % | TEMPERATURE: 99.6 F | WEIGHT: 146 LBS | DIASTOLIC BLOOD PRESSURE: 104 MMHG | HEART RATE: 74 BPM | BODY MASS INDEX: 22.91 KG/M2 | HEIGHT: 67 IN | SYSTOLIC BLOOD PRESSURE: 165 MMHG

## 2024-01-23 DIAGNOSIS — B35.3 TINEA PEDIS OF BOTH FEET: ICD-10-CM

## 2024-01-23 DIAGNOSIS — L82.1 SEBORRHEIC KERATOSES: Primary | ICD-10-CM

## 2024-01-23 PROCEDURE — 99203 OFFICE O/P NEW LOW 30 MIN: CPT | Performed by: DERMATOLOGY

## 2024-02-02 ENCOUNTER — HOSPITAL ENCOUNTER (OUTPATIENT)
Age: 64
Setting detail: SPECIMEN
Discharge: HOME | End: 2024-02-02

## 2024-02-02 DIAGNOSIS — K58.1 IRRITABLE BOWEL SYNDROME WITH CONSTIPATION: ICD-10-CM

## 2024-02-02 DIAGNOSIS — K62.5 BRBPR (BRIGHT RED BLOOD PER RECTUM): ICD-10-CM

## 2024-02-02 DIAGNOSIS — F17.200 SMOKER: ICD-10-CM

## 2024-02-03 LAB
ALBUMIN SERPL-MCNC: 4.2 G/DL (ref 3.5–5.2)
ALBUMIN/GLOB SERPL: 2 {RATIO} (ref 1–2.5)
ALP SERPL-CCNC: 65 U/L (ref 40–129)
ALT SERPL-CCNC: 11 U/L (ref 10–50)
AST SERPL-CCNC: 19 U/L (ref 10–50)
BILIRUB DIRECT SERPL-MCNC: <0.2 MG/DL (ref 0–0.3)
BILIRUB INDIRECT SERPL-MCNC: 0.4 MG/DL (ref 0–1)
BILIRUB SERPL-MCNC: 0.6 MG/DL (ref 0–1.2)
GLOBULIN SER CALC-MCNC: 2.8 G/DL
HCV RNA # SERPL NAA+PROBE: NOT DETECTED {COPIES}/ML
PROT SERPL-MCNC: 7 G/DL (ref 6.6–8.7)
SPECIMEN SOURCE: NORMAL

## 2024-02-08 ENCOUNTER — OFFICE VISIT (OUTPATIENT)
Dept: GASTROENTEROLOGY | Age: 64
End: 2024-02-08
Payer: COMMERCIAL

## 2024-02-08 VITALS
TEMPERATURE: 98.4 F | DIASTOLIC BLOOD PRESSURE: 103 MMHG | WEIGHT: 146 LBS | SYSTOLIC BLOOD PRESSURE: 172 MMHG | BODY MASS INDEX: 22.86 KG/M2

## 2024-02-08 DIAGNOSIS — F17.200 SMOKER: ICD-10-CM

## 2024-02-08 DIAGNOSIS — K58.1 IRRITABLE BOWEL SYNDROME WITH CONSTIPATION: ICD-10-CM

## 2024-02-08 DIAGNOSIS — K62.5 BRBPR (BRIGHT RED BLOOD PER RECTUM): Primary | ICD-10-CM

## 2024-02-08 DIAGNOSIS — B18.2 CHRONIC HEPATITIS C WITHOUT HEPATIC COMA (HCC): ICD-10-CM

## 2024-02-08 DIAGNOSIS — Z43.1 PEG (PERCUTANEOUS ENDOSCOPIC GASTROSTOMY) ADJUSTMENT/REPLACEMENT/REMOVAL (HCC): ICD-10-CM

## 2024-02-08 DIAGNOSIS — B19.20 HEPATITIS C VIRUS INFECTION WITHOUT HEPATIC COMA, UNSPECIFIED CHRONICITY: ICD-10-CM

## 2024-02-08 PROCEDURE — 99214 OFFICE O/P EST MOD 30 MIN: CPT | Performed by: INTERNAL MEDICINE

## 2024-02-08 ASSESSMENT — ENCOUNTER SYMPTOMS
RECTAL PAIN: 0
NAUSEA: 0
ABDOMINAL DISTENTION: 0
CONSTIPATION: 0
DIARRHEA: 0
CHOKING: 0
BLOOD IN STOOL: 0
ABDOMINAL PAIN: 0
TROUBLE SWALLOWING: 0
VOMITING: 0
COLOR CHANGE: 0
SHORTNESS OF BREATH: 0
ANAL BLEEDING: 0
WHEEZING: 0
COUGH: 0
SORE THROAT: 0

## 2024-02-08 NOTE — PROGRESS NOTES
GI CLINIC FOLLOW UP    NTERVAL HISTORY:   No referring provider defined for this encounter.    Chief Complaint   Patient presents with    Results     Pt is here today for a f/u on lab results, last seen on 10/31/23 for BRBPR & IBS w constipation.       1. BRBPR (bright red blood per rectum)    2. Irritable bowel syndrome with constipation    3. PEG (percutaneous endoscopic gastrostomy) adjustment/replacement/removal (HCC)    4. Hepatitis C virus infection without hepatic coma, unspecified chronicity    5. Smoker    6. Chronic hepatitis C without hepatic coma (HCC)      He has hx of hep C    Was treated for that    Current PCR is negative    He has int rectal bleeding    He was scheduled for colonoscopy did not showed up in past    He likes to wait for colonoscopy at this time    He said that he does not have any active over rectal bleeding at this time    Has intermittent constipation which is also better    Currently not drinking alcohol    Denies any smoking    Previous records ultrasound were reviewed with him          HISTORY OF PRESENT ILLNESS: Mr.Michael SHARA Montanez is a 63 y.o. male with a past history remarkable for , referred for evaluation of   Chief Complaint   Patient presents with    Results     Pt is here today for a f/u on lab results, last seen on 10/31/23 for BRBPR & IBS w constipation.   .    Past Medical,Family, and Social History reviewed and does contribute to the patient presenting condition.    Patient's PMH/PSH,SH,PSYCH Hx, MEDs, ALLERGIES, and ROS were all reviewed and updated in the appropriate sections.    PAST MEDICAL HISTORY:  Past Medical History:   Diagnosis Date    Anemia        Past Surgical History:   Procedure Laterality Date    UPPER GASTROINTESTINAL ENDOSCOPY N/A 1/17/2023    EGD ESOPHAGOGASTRODUODENOSCOPY PEG TUBE INSERTION performed by Jennyfer William DO at [facility]       CURRENT MEDICATIONS:    Current Outpatient Medications:     vitamin B-12 (CYANOCOBALAMIN) 1000 MCG

## 2024-02-09 ENCOUNTER — TELEPHONE (OUTPATIENT)
Dept: DERMATOLOGY | Age: 64
End: 2024-02-09

## 2024-02-09 DIAGNOSIS — B35.1 ONYCHOMYCOSIS: Primary | ICD-10-CM

## 2024-02-09 RX ORDER — TERBINAFINE HYDROCHLORIDE 250 MG/1
250 TABLET ORAL DAILY
Qty: 84 TABLET | Refills: 0 | Status: SHIPPED | OUTPATIENT
Start: 2024-02-09 | End: 2024-05-03

## 2024-02-09 NOTE — TELEPHONE ENCOUNTER
Patients girlfriend called and stated that the patient had his blood test required to start terbinafine. According to you last note after the blood test and the okay from GI he could start this. Patients girlfriend says the GI did okay this and wants to know if we could send the rx for terbinafine to the pharmacy? The labs were done on 2/2/2024

## 2024-02-21 ENCOUNTER — HOSPITAL ENCOUNTER (OUTPATIENT)
Age: 64
Setting detail: SPECIMEN
Discharge: HOME | End: 2024-02-21

## 2024-02-21 ENCOUNTER — OFFICE VISIT (OUTPATIENT)
Dept: INTERNAL MEDICINE CLINIC | Age: 64
End: 2024-02-21
Payer: COMMERCIAL

## 2024-02-21 VITALS
WEIGHT: 171 LBS | SYSTOLIC BLOOD PRESSURE: 138 MMHG | BODY MASS INDEX: 26.84 KG/M2 | HEART RATE: 69 BPM | DIASTOLIC BLOOD PRESSURE: 88 MMHG | HEIGHT: 67 IN | OXYGEN SATURATION: 96 %

## 2024-02-21 DIAGNOSIS — I63.40 CEREBROVASCULAR ACCIDENT (CVA) DUE TO EMBOLISM OF CEREBRAL ARTERY (HCC): Primary | ICD-10-CM

## 2024-02-21 DIAGNOSIS — E55.9 VITAMIN D DEFICIENCY: ICD-10-CM

## 2024-02-21 DIAGNOSIS — E44.0 MODERATE MALNUTRITION (HCC): ICD-10-CM

## 2024-02-21 DIAGNOSIS — Z43.1 PEG (PERCUTANEOUS ENDOSCOPIC GASTROSTOMY) ADJUSTMENT/REPLACEMENT/REMOVAL (HCC): ICD-10-CM

## 2024-02-21 DIAGNOSIS — Z12.5 PROSTATE CANCER SCREENING: ICD-10-CM

## 2024-02-21 DIAGNOSIS — D89.2 PARAPROTEINEMIA: ICD-10-CM

## 2024-02-21 DIAGNOSIS — B18.2 CHRONIC HEPATITIS C WITHOUT HEPATIC COMA (HCC): ICD-10-CM

## 2024-02-21 DIAGNOSIS — R76.8 ELEVATED SERUM IMMUNOGLOBULIN FREE LIGHT CHAIN LEVEL: ICD-10-CM

## 2024-02-21 DIAGNOSIS — J43.1 PANLOBULAR EMPHYSEMA (HCC): ICD-10-CM

## 2024-02-21 DIAGNOSIS — K62.5 BRBPR (BRIGHT RED BLOOD PER RECTUM): ICD-10-CM

## 2024-02-21 DIAGNOSIS — R91.1 PULMONARY NODULE: ICD-10-CM

## 2024-02-21 DIAGNOSIS — D69.6 THROMBOCYTOPENIA (HCC): ICD-10-CM

## 2024-02-21 DIAGNOSIS — I63.40 CEREBROVASCULAR ACCIDENT (CVA) DUE TO EMBOLISM OF CEREBRAL ARTERY (HCC): ICD-10-CM

## 2024-02-21 LAB
25(OH)D3 SERPL-MCNC: 11.7 NG/ML
ALBUMIN SERPL-MCNC: 4.1 G/DL (ref 3.5–5.2)
ALBUMIN/GLOB SERPL: 1.4 {RATIO} (ref 1–2.5)
ALP SERPL-CCNC: 63 U/L (ref 40–129)
ALT SERPL-CCNC: 10 U/L (ref 5–41)
ANION GAP SERPL CALCULATED.3IONS-SCNC: 11 MMOL/L (ref 9–17)
ANION GAP SERPL CALCULATED.3IONS-SCNC: 11 MMOL/L (ref 9–17)
AST SERPL-CCNC: 16 U/L
BASOPHILS # BLD: 0.09 K/UL (ref 0–0.2)
BASOPHILS # BLD: 0.09 K/UL (ref 0–0.2)
BASOPHILS NFR BLD: 1 % (ref 0–2)
BASOPHILS NFR BLD: 1 % (ref 0–2)
BILIRUB SERPL-MCNC: 0.4 MG/DL (ref 0.3–1.2)
BILIRUB UR QL STRIP: NEGATIVE
BUN SERPL-MCNC: 9 MG/DL (ref 8–23)
BUN SERPL-MCNC: 9 MG/DL (ref 8–23)
CALCIUM SERPL-MCNC: 9.1 MG/DL (ref 8.6–10.4)
CALCIUM SERPL-MCNC: 9.4 MG/DL (ref 8.6–10.4)
CHLORIDE SERPL-SCNC: 103 MMOL/L (ref 98–107)
CHLORIDE SERPL-SCNC: 105 MMOL/L (ref 98–107)
CHOLEST SERPL-MCNC: 179 MG/DL
CHOLESTEROL/HDL RATIO: 3.1
CLARITY UR: CLEAR
CO2 SERPL-SCNC: 25 MMOL/L (ref 20–31)
CO2 SERPL-SCNC: 26 MMOL/L (ref 20–31)
COLOR UR: YELLOW
COMMENT: NORMAL
CREAT SERPL-MCNC: 0.6 MG/DL (ref 0.7–1.2)
CREAT SERPL-MCNC: 0.7 MG/DL (ref 0.7–1.2)
EOSINOPHIL # BLD: 0.38 K/UL (ref 0–0.44)
EOSINOPHIL # BLD: 0.38 K/UL (ref 0–0.44)
EOSINOPHILS RELATIVE PERCENT: 4 % (ref 1–4)
EOSINOPHILS RELATIVE PERCENT: 4 % (ref 1–4)
ERYTHROCYTE [DISTWIDTH] IN BLOOD BY AUTOMATED COUNT: 13 % (ref 11.8–14.4)
ERYTHROCYTE [DISTWIDTH] IN BLOOD BY AUTOMATED COUNT: 13 % (ref 11.8–14.4)
EST. AVERAGE GLUCOSE BLD GHB EST-MCNC: 88 MG/DL
GFR SERPL CREATININE-BSD FRML MDRD: >60 ML/MIN/1.73M2
GFR SERPL CREATININE-BSD FRML MDRD: >60 ML/MIN/1.73M2
GLUCOSE SERPL-MCNC: 88 MG/DL (ref 70–99)
GLUCOSE SERPL-MCNC: 89 MG/DL (ref 70–99)
GLUCOSE UR STRIP-MCNC: NEGATIVE MG/DL
HBA1C MFR BLD: 4.7 % (ref 4–6)
HCT VFR BLD AUTO: 44.6 % (ref 40.7–50.3)
HCT VFR BLD AUTO: 44.6 % (ref 40.7–50.3)
HDLC SERPL-MCNC: 57 MG/DL
HGB BLD-MCNC: 13.9 G/DL (ref 13–17)
HGB BLD-MCNC: 13.9 G/DL (ref 13–17)
HGB UR QL STRIP.AUTO: NEGATIVE
IGA SERPL-MCNC: 212 MG/DL (ref 70–400)
IGG SERPL-MCNC: 969 MG/DL (ref 700–1600)
IGM SERPL-MCNC: 67 MG/DL (ref 40–230)
IMM GRANULOCYTES # BLD AUTO: 0.06 K/UL (ref 0–0.3)
IMM GRANULOCYTES # BLD AUTO: 0.06 K/UL (ref 0–0.3)
IMM GRANULOCYTES NFR BLD: 1 %
IMM GRANULOCYTES NFR BLD: 1 %
KETONES UR STRIP-MCNC: NEGATIVE MG/DL
LDLC SERPL CALC-MCNC: 110 MG/DL (ref 0–130)
LEUKOCYTE ESTERASE UR QL STRIP: NEGATIVE
LYMPHOCYTES NFR BLD: 1.77 K/UL (ref 1.1–3.7)
LYMPHOCYTES NFR BLD: 1.77 K/UL (ref 1.1–3.7)
LYMPHOCYTES RELATIVE PERCENT: 21 % (ref 24–43)
LYMPHOCYTES RELATIVE PERCENT: 21 % (ref 24–43)
MCH RBC QN AUTO: 29.8 PG (ref 25.2–33.5)
MCH RBC QN AUTO: 29.8 PG (ref 25.2–33.5)
MCHC RBC AUTO-ENTMCNC: 31.2 G/DL (ref 28.4–34.8)
MCHC RBC AUTO-ENTMCNC: 31.2 G/DL (ref 28.4–34.8)
MCV RBC AUTO: 95.7 FL (ref 82.6–102.9)
MCV RBC AUTO: 95.7 FL (ref 82.6–102.9)
MONOCYTES NFR BLD: 0.82 K/UL (ref 0.1–1.2)
MONOCYTES NFR BLD: 0.82 K/UL (ref 0.1–1.2)
MONOCYTES NFR BLD: 10 % (ref 3–12)
MONOCYTES NFR BLD: 10 % (ref 3–12)
NEUTROPHILS NFR BLD: 63 % (ref 36–65)
NEUTROPHILS NFR BLD: 64 % (ref 36–65)
NEUTS SEG NFR BLD: 5.51 K/UL (ref 1.5–8.1)
NEUTS SEG NFR BLD: 5.51 K/UL (ref 1.5–8.1)
NITRITE UR QL STRIP: NEGATIVE
NRBC BLD-RTO: 0 PER 100 WBC
NRBC BLD-RTO: 0 PER 100 WBC
PH UR STRIP: 5.5 [PH] (ref 5–8)
PLATELET # BLD AUTO: 174 K/UL (ref 138–453)
PLATELET # BLD AUTO: 174 K/UL (ref 138–453)
PMV BLD AUTO: 11 FL (ref 8.1–13.5)
PMV BLD AUTO: 11 FL (ref 8.1–13.5)
POTASSIUM SERPL-SCNC: 4.1 MMOL/L (ref 3.7–5.3)
POTASSIUM SERPL-SCNC: 4.2 MMOL/L (ref 3.7–5.3)
PROT SERPL-MCNC: 7.1 G/DL (ref 6.4–8.3)
PROT UR STRIP-MCNC: NEGATIVE MG/DL
PSA SERPL-MCNC: 0.48 NG/ML
RBC # BLD AUTO: 4.66 M/UL (ref 4.21–5.77)
RBC # BLD AUTO: 4.66 M/UL (ref 4.21–5.77)
SODIUM SERPL-SCNC: 139 MMOL/L (ref 135–144)
SODIUM SERPL-SCNC: 142 MMOL/L (ref 135–144)
SP GR UR STRIP: 1.02 (ref 1–1.03)
TRIGL SERPL-MCNC: 59 MG/DL
TSH SERPL DL<=0.05 MIU/L-ACNC: 4.26 UIU/ML (ref 0.3–5)
UROBILINOGEN UR STRIP-ACNC: NORMAL EU/DL (ref 0–1)
WBC OTHER # BLD: 8.6 K/UL (ref 3.5–11.3)
WBC OTHER # BLD: 8.6 K/UL (ref 3.5–11.3)

## 2024-02-21 PROCEDURE — 99214 OFFICE O/P EST MOD 30 MIN: CPT | Performed by: INTERNAL MEDICINE

## 2024-02-21 SDOH — ECONOMIC STABILITY: HOUSING INSECURITY
IN THE LAST 12 MONTHS, WAS THERE A TIME WHEN YOU DID NOT HAVE A STEADY PLACE TO SLEEP OR SLEPT IN A SHELTER (INCLUDING NOW)?: PATIENT DECLINED

## 2024-02-21 SDOH — ECONOMIC STABILITY: FOOD INSECURITY: WITHIN THE PAST 12 MONTHS, THE FOOD YOU BOUGHT JUST DIDN'T LAST AND YOU DIDN'T HAVE MONEY TO GET MORE.: PATIENT DECLINED

## 2024-02-21 SDOH — ECONOMIC STABILITY: INCOME INSECURITY: HOW HARD IS IT FOR YOU TO PAY FOR THE VERY BASICS LIKE FOOD, HOUSING, MEDICAL CARE, AND HEATING?: PATIENT DECLINED

## 2024-02-21 SDOH — ECONOMIC STABILITY: FOOD INSECURITY: WITHIN THE PAST 12 MONTHS, YOU WORRIED THAT YOUR FOOD WOULD RUN OUT BEFORE YOU GOT MONEY TO BUY MORE.: PATIENT DECLINED

## 2024-02-21 ASSESSMENT — PATIENT HEALTH QUESTIONNAIRE - PHQ9
SUM OF ALL RESPONSES TO PHQ QUESTIONS 1-9: 0
1. LITTLE INTEREST OR PLEASURE IN DOING THINGS: 0
SUM OF ALL RESPONSES TO PHQ QUESTIONS 1-9: 0
2. FEELING DOWN, DEPRESSED OR HOPELESS: 0
SUM OF ALL RESPONSES TO PHQ9 QUESTIONS 1 & 2: 0

## 2024-02-21 NOTE — PROGRESS NOTES
[facility]  [address] [address]  Dept: [phone]  Dept Fax: [phone]     Name: Jerzy Montanez  : 1960           Chief Complaint   Patient presents with    emphysema       History of Present Illness:    HPI  Here for follow up   Sob ,  Quit smoking 2023   Hep c treated by GI   Past Medical History:    Past Medical History:   Diagnosis Date    Anemia       Reviewed all health maintenance requirements and ordered appropriate tests  Health Maintenance Due   Topic Date Due    Pneumococcal 0-64 years Vaccine (1 - PCV) Never done    Hepatitis A vaccine (1 of 2 - Risk 2-dose series) Never done    DTaP/Tdap/Td vaccine (1 - Tdap) Never done    Shingles vaccine (1 of 2) Never done    Low dose CT lung screening &/or counseling  Never done    Hepatitis B vaccine (1 of 3 - Risk 3-dose series) Never done    COVID-19 Vaccine (3 - - season) 2023    Colorectal Cancer Screen  2024       Past Surgical History:    Past Surgical History:   Procedure Laterality Date    UPPER GASTROINTESTINAL ENDOSCOPY N/A 2023    EGD ESOPHAGOGASTRODUODENOSCOPY PEG TUBE INSERTION performed by Jennyfer William DO at [facility] ENDO        Medications:      Current Outpatient Medications:     terbinafine (LAMISIL) 250 MG tablet, Take 1 tablet by mouth daily, Disp: 84 tablet, Rfl: 0    vitamin B-12 (CYANOCOBALAMIN) 1000 MCG tablet, take 1 tablet by mouth daily, Disp: 30 tablet, Rfl: 3    carvedilol (COREG) 12.5 MG tablet, Take 1 tablet by mouth 2 times daily NEEDS TO CALL FOR AN APPOINTMENT FOR FURTHER REFILLS, Disp: 180 tablet, Rfl: 1    spironolactone (ALDACTONE) 25 MG tablet, Take 1 tablet by mouth daily, Disp: 90 tablet, Rfl: 5    aspirin 81 MG EC tablet, Take 1 tablet by mouth daily, Disp: 90 tablet, Rfl: 1    BREZTRI AEROSPHERE 160-9-4.8 MCG/ACT AERO, Inhale 2 puffs into the lungs 2 times daily, Disp: , Rfl:     Handicap Placard MISC, by Does not apply route

## 2024-02-21 NOTE — PROGRESS NOTES
Visit Information    Have you changed or started any medications since your last visit including any over-the-counter medicines, vitamins, or herbal medicines? no   Are you having any side effects from any of your medications? -  no  Have you stopped taking any of your medications? Is so, why? -  no    Have you seen any other physician or provider since your last visit? No  Have you had any other diagnostic tests since your last visit? No  Have you been seen in the emergency room and/or had an admission to a hospital since we last saw you? No  Have you had your routine dental cleaning in the past 6 months? no    Have you activated your [employer] account? If not, what are your barriers? No     Patient Care Team:  Catia Del Real MD as PCP - General (Internal Medicine)  Catia Del Real MD as PCP - Empaneled Provider  Karri Lay MD as Consulting Physician (Gastroenterology)    Medical History Review  Past Medical, Family, and Social History reviewed and does not contribute to the patient presenting condition    Health Maintenance   Topic Date Due    Pneumococcal 0-64 years Vaccine (1 - PCV) Never done    Hepatitis A vaccine (1 of 2 - Risk 2-dose series) Never done    DTaP/Tdap/Td vaccine (1 - Tdap) Never done    Shingles vaccine (1 of 2) Never done    Low dose CT lung screening &/or counseling  Never done    Hepatitis B vaccine (1 of 3 - Risk 3-dose series) Never done    COVID-19 Vaccine (3 - 2023-24 season) 09/01/2023    Colorectal Cancer Screen  01/09/2024    Depression Screen  06/28/2024    Lipids  02/28/2028    Flu vaccine  Completed    Respiratory Syncytial Virus (RSV) Pregnant or age 60 yrs+  Completed    HIV screen  Completed    Hib vaccine  Aged Out    Polio vaccine  Aged Out    Meningococcal (ACWY) vaccine  Aged Out    GFR test (Diabetes, CKD 3-4, OR last GFR 15-59)  Discontinued    Hepatitis C screen  Discontinued

## 2024-02-22 ENCOUNTER — TELEPHONE (OUTPATIENT)
Dept: INTERNAL MEDICINE CLINIC | Age: 64
End: 2024-02-22

## 2024-02-22 DIAGNOSIS — E55.9 VITAMIN D DEFICIENCY: Primary | ICD-10-CM

## 2024-02-22 RX ORDER — AMLODIPINE BESYLATE 5 MG/1
5 TABLET ORAL DAILY
Qty: 30 TABLET | Refills: 1 | Status: SHIPPED | OUTPATIENT
Start: 2024-02-22

## 2024-02-22 RX ORDER — ERGOCALCIFEROL 1.25 MG/1
50000 CAPSULE ORAL WEEKLY
Qty: 12 CAPSULE | Refills: 1 | Status: SHIPPED | OUTPATIENT
Start: 2024-02-22

## 2024-02-22 NOTE — TELEPHONE ENCOUNTER
Patient was advised by Dr Del Real to call office when he took his next blood pressure reading -150/93 and pulse 69.

## 2024-02-23 LAB
ALBUMIN PERCENT: 65 % (ref 45–65)
ALBUMIN SERPL-MCNC: 4.3 G/DL (ref 3.2–5.2)
ALPHA 2 PERCENT: 11 % (ref 6–13)
ALPHA1 GLOB SERPL ELPH-MCNC: 0.2 G/DL (ref 0.1–0.4)
ALPHA1 GLOB SERPL ELPH-MCNC: 2 % (ref 3–6)
ALPHA2 GLOB SERPL ELPH-MCNC: 0.7 G/DL (ref 0.5–0.9)
B-GLOBULIN SERPL ELPH-MCNC: 0.6 G/DL (ref 0.5–1.1)
B-GLOBULIN SERPL ELPH-MCNC: 10 % (ref 11–19)
FREE KAPPA/LAMBDA RATIO: 0.07 (ref 0.26–1.65)
GAMMA GLOB SERPL ELPH-MCNC: 0.7 G/DL (ref 0.5–1.5)
GAMMA GLOBULIN %: 11 % (ref 9–20)
INTERPRETATION SERPL IFE-IMP: ABNORMAL
KAPPA LC FREE SER-MCNC: 14.4 MG/L (ref 3.7–19.4)
LAMBDA LC FREE SERPL-MCNC: 195.3 MG/L (ref 5.7–26.3)
PATH REV: ABNORMAL
PATHOLOGIST: ABNORMAL
PROT PATTERN SERPL ELPH-IMP: ABNORMAL
PROT SERPL-MCNC: 6.5 G/DL (ref 6.4–8.3)
TOTAL PROT. SUM,%: 99 % (ref 98–102)
TOTAL PROT. SUM: 6.5 G/DL (ref 6.3–8.2)

## 2024-02-23 NOTE — TELEPHONE ENCOUNTER
Returned call to patient and informed that Physician sent Rx amlodipine.    Patient questioned if this is a replacement of the Carvedilol  , if he discontinues or resumes along with the Amlodipine?  Please advise      Also patient is questioning new Rx Vitamin D supplement that had been sent to pharmacy?  Would like to know why?

## 2024-02-29 ENCOUNTER — TELEPHONE (OUTPATIENT)
Dept: ONCOLOGY | Age: 64
End: 2024-02-29

## 2024-02-29 ENCOUNTER — TELEPHONE (OUTPATIENT)
Dept: INTERNAL MEDICINE CLINIC | Age: 64
End: 2024-02-29

## 2024-02-29 ENCOUNTER — RESEARCH ENCOUNTER (OUTPATIENT)
Dept: RESEARCH | Age: 64
End: 2024-02-29

## 2024-02-29 ENCOUNTER — OFFICE VISIT (OUTPATIENT)
Dept: ONCOLOGY | Age: 64
End: 2024-02-29
Payer: COMMERCIAL

## 2024-02-29 VITALS
DIASTOLIC BLOOD PRESSURE: 99 MMHG | BODY MASS INDEX: 28.19 KG/M2 | HEART RATE: 69 BPM | WEIGHT: 180 LBS | TEMPERATURE: 96.8 F | SYSTOLIC BLOOD PRESSURE: 145 MMHG

## 2024-02-29 DIAGNOSIS — D89.2 PARAPROTEINEMIA: Primary | ICD-10-CM

## 2024-02-29 DIAGNOSIS — R76.8 ELEVATED SERUM IMMUNOGLOBULIN FREE LIGHT CHAIN LEVEL: ICD-10-CM

## 2024-02-29 DIAGNOSIS — R91.8 LUNG NODULES: Primary | ICD-10-CM

## 2024-02-29 PROCEDURE — 99213 OFFICE O/P EST LOW 20 MIN: CPT | Performed by: INTERNAL MEDICINE

## 2024-02-29 NOTE — TELEPHONE ENCOUNTER
AVS FROM 2/29/24    Rv in 4 months with labs 1 week prior     Labs ordered today  CBC with Auto Differential  Please complete by 2/29/2024  Monoclonal Panel  Please complete by 2/29/2024    RV ON 6/13/24    LABS DRAWN WEEK PRIOR     PT was given AVS and appt schedule    Electronically signed by Mita Apple on 2/29/2024 at 2:53 PM

## 2024-02-29 NOTE — PROGRESS NOTES
Jerzy Montanez                                                                                                                  2/29/2024  MRN:   [id number]  YOB: 1960  PCP:                           Catia Del Real MD  Referring Physician: No ref. provider found  Treating Physician Name: ESTEFANÍA BARBOUR MD      Reason for visit:  Chief Complaint   Patient presents with    Follow-up     Review status  of disease       Current problems:  Microcytic anemia  Thrombocytopenia  History of alcohol dependence  IgG lambda paraproteinemia  HCV infection  Iron deficiency    Active and recent treatments:  Active surveillance (patient wants to hold off on bone marrow biopsy)    Interval history:  Patient presents to the clinic accompanied by his wife to discuss results of his lab work-up.  Patient free light chain ratio remained stable.  Lab workup does not show anemia hypercalcemia or abnormal renal function.    During this visit patient's allergy, social, medical, surgical history and medications were reviewed and updated.    Summary of Case/History:    Jerzy Montanez a 63 y.o.male is a patient who is admitted with chief complaint of altered mental status.  Patient has not seen a doctor for multiple years.  Due to worsening mental status EMS was summoned.  Patient oxygen saturation was noted to be at 75%.  Patient placed on BiPAP x-ray showed right lower lobe pneumonia.  Patient started on antibiotics.  Patient also noted to have INR of 2.3 platelet count of 55,000.  Patient does have a history of alcohol intake.    Patient's ammonia level noted to be 29.  Creatinine 1.5.  Total bilirubin is within range.  Hemoglobin 11.2 with MCV of 69.  Platelet count is 67,000.  Ultrasound liver done however report is pending.    Past Medical History:   Past Medical History:   Diagnosis Date    Anemia        Past Surgical History:     Past Surgical History:   Procedure Laterality Date    UPPER GASTROINTESTINAL

## 2024-02-29 NOTE — TELEPHONE ENCOUNTER
The CT of the chest was ordered with contrast for a lung nodule.     The protocol is to do it without contrast.    Please advise.

## 2024-02-29 NOTE — RESEARCH
Research Note:     Patient is a 63-year-old alert and oriented male, with a diagnosis of IgG Lambda Paraproteinemia presented today with his girlfriend for an office visit with Dr. Ortiz. Observational Study [facility]- [facility] Surveillance, Contact, and Research for MGUS, Myeloma, and Amyloidosis presented and explained to patient by Dr. Ortiz as well as Research Coordinator. Patient is agreeable to enter observational study.     Consent obtained by this Research Coordinator in the Oncology exam room. Patient verbalized understanding of observational research study and signed consent along with HIPAA Authorization on 29 FEB 2024. Patient verbalized understanding that he could withdraw from the research study at any time by contacting the Research Coordinator and denied any questions at this time. Copy of signed consent and HIPAA forms provided to patient along with contact information for Research Coordinator. Encouraged patient to call at any time with any questions he may have. Patient verbalized understanding and ambulated to checkout without any difficulty.  Thank you,  Bonnie Ortez MBA BSN RN CCDS  Research Coordinator

## 2024-03-04 ENCOUNTER — HOSPITAL ENCOUNTER (OUTPATIENT)
Dept: CT IMAGING | Facility: CLINIC | Age: 64
Discharge: HOME | End: 2024-03-06
Payer: COMMERCIAL

## 2024-03-04 DIAGNOSIS — R91.8 LUNG NODULES: ICD-10-CM

## 2024-03-04 DIAGNOSIS — R91.1 PULMONARY NODULE: ICD-10-CM

## 2024-03-04 PROCEDURE — 71250 CT THORAX DX C-: CPT

## 2024-04-01 DIAGNOSIS — D89.2 PARAPROTEINEMIA: ICD-10-CM

## 2024-04-01 RX ORDER — LANOLIN ALCOHOL/MO/W.PET/CERES
1000 CREAM (GRAM) TOPICAL DAILY
Qty: 30 TABLET | Refills: 3 | Status: SHIPPED | OUTPATIENT
Start: 2024-04-01

## 2024-04-05 RX ORDER — ASPIRIN 81 MG/1
81 TABLET ORAL DAILY
Qty: 90 TABLET | Refills: 1 | Status: SHIPPED | OUTPATIENT
Start: 2024-04-05

## 2024-04-05 NOTE — TELEPHONE ENCOUNTER
Patient girlfriend called in stating he needs a refill on his asprin. Next appointment is 5/29.       Please advise

## 2024-04-18 RX ORDER — AMLODIPINE BESYLATE 5 MG/1
5 TABLET ORAL DAILY
Qty: 30 TABLET | Refills: 1 | Status: SHIPPED | OUTPATIENT
Start: 2024-04-18

## 2024-04-24 ENCOUNTER — OFFICE VISIT (OUTPATIENT)
Dept: NEUROLOGY | Age: 64
End: 2024-04-24
Payer: COMMERCIAL

## 2024-04-24 VITALS
BODY MASS INDEX: 28.25 KG/M2 | HEIGHT: 67 IN | OXYGEN SATURATION: 96 % | WEIGHT: 180 LBS | SYSTOLIC BLOOD PRESSURE: 118 MMHG | HEART RATE: 75 BPM | DIASTOLIC BLOOD PRESSURE: 85 MMHG

## 2024-04-24 DIAGNOSIS — F05 DELIRIUM DUE TO ANOTHER MEDICAL CONDITION: ICD-10-CM

## 2024-04-24 DIAGNOSIS — I63.40 CEREBROVASCULAR ACCIDENT (CVA) DUE TO EMBOLISM OF CEREBRAL ARTERY (HCC): Primary | ICD-10-CM

## 2024-04-24 PROCEDURE — 99214 OFFICE O/P EST MOD 30 MIN: CPT

## 2024-04-24 RX ORDER — ATORVASTATIN CALCIUM 80 MG/1
80 TABLET, FILM COATED ORAL DAILY
Qty: 30 TABLET | Refills: 3 | Status: SHIPPED | OUTPATIENT
Start: 2024-04-24

## 2024-04-24 NOTE — PROGRESS NOTES
[address], [address]  [place] [address]  P: [phone]  F: [phone]    NEUROLOGY CLINIC NOTE     PATIENT NAME: Jerzy Montanez  PATIENT MRN: [id number]  PRIMARY CARE PHYSICIAN: Catia Del Real MD    HPI:      Jerzy Montanez is a 63 y.o. with past medical history of alcohol use, HCV, anemia, prior stroke presents to neurology clinic for follow-up regarding stroke.    Patient was admitted to [facility] in January 2023 for acute respiratory failure with pneumonia.  At that time, he was found to have ischemic infarctions in right frontal lobe, left posterior parietal lobe, and few foci of parasagittal right frontoparietal region.  He also had suspected SAH on CT and MRI during that admission, and was not started on antiplatelets upon discharge.  Since the hospital discharge, he followed up in the clinic on 3/29/2023 where he was started on aspirin 81 mg daily for secondary stroke prophylaxis.  He was not started on statin due to liver dysfunction. Patient experienced thrombocytopenia along with being diagnosed with HCV, which also contributed to the decision to not start antiplatelets.      Patient is doing well at the time of this visit, and denies any new neurologic deficits. He underwent physical therapy post-hospitalization.     MRI brain w/o contrast done on 1/11/23 shows ischemia in R frontal and L posterior parietal lobe, and few foci in R parasagittal frontoparietal.       History obtained from Patient and EMR.      PATIENT HISTORY:     Past Medical History:   Diagnosis Date    Anemia         Past Surgical History:   Procedure Laterality Date    UPPER GASTROINTESTINAL ENDOSCOPY N/A 1/17/2023    EGD ESOPHAGOGASTRODUODENOSCOPY PEG TUBE INSERTION performed by Jennyfer William DO at [facility]        Social History     Socioeconomic History    Marital status: Single     Spouse name: Not on file    Number of children: Not on file

## 2024-05-06 RX ORDER — CARVEDILOL 12.5 MG/1
12.5 TABLET ORAL 2 TIMES DAILY
Qty: 180 TABLET | Refills: 1 | Status: SHIPPED | OUTPATIENT
Start: 2024-05-06

## 2024-05-16 RX ORDER — BUDESONIDE, GLYCOPYRROLATE, AND FORMOTEROL FUMARATE 160; 9; 4.8 UG/1; UG/1; UG/1
2 AEROSOL, METERED RESPIRATORY (INHALATION) 2 TIMES DAILY
Qty: 1 EACH | Refills: 5 | Status: SHIPPED | OUTPATIENT
Start: 2024-05-16

## 2024-05-29 ENCOUNTER — HOSPITAL ENCOUNTER (OUTPATIENT)
Dept: GENERAL RADIOLOGY | Facility: CLINIC | Age: 64
Discharge: HOME | End: 2024-05-31
Payer: MEDICARE

## 2024-05-29 ENCOUNTER — OFFICE VISIT (OUTPATIENT)
Dept: INTERNAL MEDICINE CLINIC | Age: 64
End: 2024-05-29
Payer: MEDICARE

## 2024-05-29 ENCOUNTER — HOSPITAL ENCOUNTER (OUTPATIENT)
Facility: CLINIC | Age: 64
Discharge: HOME | End: 2024-05-31
Payer: MEDICARE

## 2024-05-29 VITALS
DIASTOLIC BLOOD PRESSURE: 86 MMHG | OXYGEN SATURATION: 94 % | HEART RATE: 84 BPM | HEIGHT: 67 IN | WEIGHT: 172.2 LBS | BODY MASS INDEX: 27.03 KG/M2 | SYSTOLIC BLOOD PRESSURE: 130 MMHG

## 2024-05-29 DIAGNOSIS — M54.50 LUMBAR SPINE PAIN: ICD-10-CM

## 2024-05-29 DIAGNOSIS — M54.50 LUMBAR SPINE PAIN: Primary | ICD-10-CM

## 2024-05-29 DIAGNOSIS — D69.6 THROMBOCYTOPENIA (HCC): ICD-10-CM

## 2024-05-29 DIAGNOSIS — Z12.11 COLON CANCER SCREENING: ICD-10-CM

## 2024-05-29 DIAGNOSIS — R91.8 LUNG NODULES: ICD-10-CM

## 2024-05-29 DIAGNOSIS — I77.810 ASCENDING AORTA DILATATION (HCC): ICD-10-CM

## 2024-05-29 DIAGNOSIS — J43.1 PANLOBULAR EMPHYSEMA (HCC): ICD-10-CM

## 2024-05-29 PROCEDURE — G8427 DOCREV CUR MEDS BY ELIG CLIN: HCPCS | Performed by: INTERNAL MEDICINE

## 2024-05-29 PROCEDURE — 99214 OFFICE O/P EST MOD 30 MIN: CPT | Performed by: INTERNAL MEDICINE

## 2024-05-29 PROCEDURE — 4004F PT TOBACCO SCREEN RCVD TLK: CPT | Performed by: INTERNAL MEDICINE

## 2024-05-29 PROCEDURE — 3023F SPIROM DOC REV: CPT | Performed by: INTERNAL MEDICINE

## 2024-05-29 PROCEDURE — G8419 CALC BMI OUT NRM PARAM NOF/U: HCPCS | Performed by: INTERNAL MEDICINE

## 2024-05-29 PROCEDURE — 72100 X-RAY EXAM L-S SPINE 2/3 VWS: CPT

## 2024-05-29 PROCEDURE — 3017F COLORECTAL CA SCREEN DOC REV: CPT | Performed by: INTERNAL MEDICINE

## 2024-05-29 RX ORDER — PREDNISONE 20 MG/1
20 TABLET ORAL DAILY
Qty: 7 TABLET | Refills: 0 | Status: SHIPPED | OUTPATIENT
Start: 2024-05-29 | End: 2024-06-05

## 2024-05-29 RX ORDER — CYCLOBENZAPRINE HCL 10 MG
10 TABLET ORAL NIGHTLY PRN
Qty: 30 TABLET | Refills: 0 | Status: SHIPPED | OUTPATIENT
Start: 2024-05-29 | End: 2024-06-28

## 2024-05-29 SDOH — ECONOMIC STABILITY: FOOD INSECURITY: WITHIN THE PAST 12 MONTHS, THE FOOD YOU BOUGHT JUST DIDN'T LAST AND YOU DIDN'T HAVE MONEY TO GET MORE.: PATIENT DECLINED

## 2024-05-29 SDOH — ECONOMIC STABILITY: INCOME INSECURITY: HOW HARD IS IT FOR YOU TO PAY FOR THE VERY BASICS LIKE FOOD, HOUSING, MEDICAL CARE, AND HEATING?: PATIENT DECLINED

## 2024-05-29 SDOH — ECONOMIC STABILITY: FOOD INSECURITY: WITHIN THE PAST 12 MONTHS, YOU WORRIED THAT YOUR FOOD WOULD RUN OUT BEFORE YOU GOT MONEY TO BUY MORE.: PATIENT DECLINED

## 2024-05-29 ASSESSMENT — PATIENT HEALTH QUESTIONNAIRE - PHQ9
SUM OF ALL RESPONSES TO PHQ QUESTIONS 1-9: 0
SUM OF ALL RESPONSES TO PHQ QUESTIONS 1-9: 0
2. FEELING DOWN, DEPRESSED OR HOPELESS: NOT AT ALL
1. LITTLE INTEREST OR PLEASURE IN DOING THINGS: NOT AT ALL
SUM OF ALL RESPONSES TO PHQ9 QUESTIONS 1 & 2: 0
SUM OF ALL RESPONSES TO PHQ QUESTIONS 1-9: 0
SUM OF ALL RESPONSES TO PHQ QUESTIONS 1-9: 0

## 2024-05-29 NOTE — PROGRESS NOTES
Subjective     Patient ID: Jerzy Montanez is a 63 y.o. male.    HPI  Review of Systems       Objective   Physical Exam       Assessment & Plan          Visit Information    Have you changed or started any medications since your last visit including any over-the-counter medicines, vitamins, or herbal medicines? no   Are you having any side effects from any of your medications? -  no  Have you stopped taking any of your medications? Is so, why? -  no    Have you seen any other physician or provider since your last visit? Yes - Records Obtained  Have you had any other diagnostic tests since your last visit? No  Have you been seen in the emergency room and/or had an admission to a hospital since we last saw you? No  Have you had your routine dental cleaning in the past 6 months? no    Have you activated your [employer] account? If not, what are your barriers? No     Patient Care Team:  Catia Del Real MD as PCP - General (Internal Medicine)  Catia Del Real MD as PCP - Empaneled Provider  Karri Lay MD as Consulting Physician (Gastroenterology)    Medical History Review  Past Medical, Family, and Social History reviewed and does not contribute to the patient presenting condition    Health Maintenance   Topic Date Due    Pneumococcal 0-64 years Vaccine (1 of 2 - PCV) Never done    Hepatitis A vaccine (1 of 2 - Risk 2-dose series) Never done    DTaP/Tdap/Td vaccine (1 - Tdap) Never done    Shingles vaccine (1 of 2) Never done    Low dose CT lung screening &/or counseling  Never done    Hepatitis B vaccine (1 of 3 - Risk 3-dose series) Never done    COVID-19 Vaccine (3 - 2023-24 season) 09/01/2023    Colorectal Cancer Screen  01/09/2024    Lipids  02/21/2025    Depression Screen  02/21/2025    Flu vaccine  Completed    Respiratory Syncytial Virus (RSV) Pregnant or age 60 yrs+  Completed    HIV screen  Completed    Hib vaccine  Aged Out    Polio vaccine  Aged Out    Meningococcal (ACWY) vaccine  Aged Out    GFR 
02/21/2024 03:35 PM    HCT 44.6 02/21/2024 03:35 PM    MCV 95.7 02/21/2024 03:35 PM    MCV 95.7 02/21/2024 03:35 PM    MCH 29.8 02/21/2024 03:35 PM    MCH 29.8 02/21/2024 03:35 PM    MCHC 31.2 02/21/2024 03:35 PM    MCHC 31.2 02/21/2024 03:35 PM    RDW 13.0 02/21/2024 03:35 PM    RDW 13.0 02/21/2024 03:35 PM     02/21/2024 03:35 PM     02/21/2024 03:35 PM    MPV 11.0 02/21/2024 03:35 PM    MPV 11.0 02/21/2024 03:35 PM     Lab Results   Component Value Date/Time    TSH 4.26 02/21/2024 03:35 PM     Lab Results   Component Value Date/Time    CHOL 179 02/21/2024 03:35 PM     02/21/2024 03:35 PM    HDL 57 02/21/2024 03:35 PM    PSA 0.48 02/21/2024 03:35 PM    LABA1C 4.7 02/21/2024 03:35 PM          Assessment & Plan:     Diagnosis Orders   1. Lumbar spine pain  XR LUMBAR SPINE (2-3 VIEWS)      2. Thrombocytopenia (HCC)        3. Panlobular emphysema (HCC)  Simon Nuñez MD, Pulmonology, [facility]      4. Colon cancer screening  FIT-DNA (Cologuard)    missed few times      5. Lung nodules  Simon Nuñez MD, Pulmonology, [place]    CT chest done [facility] 2024 cleared      6. Ascending aorta dilatation (HCC)      CT chest done 3/2024 , 4.2 cm          1. Lumbar spine pain  -     XR LUMBAR SPINE (2-3 VIEWS); Future  2. Thrombocytopenia (HCC)  3. Panlobular emphysema (HCC)  -     Simon Nuñez MD, Pulmonology, [place]  4. Colon cancer screening  Comments:  missed few times  Orders:  -     FIT-DNA (Cologuard)  5. Lung nodules  Comments:  CT chest done [facility] 2024 cleared  Orders:  -     Simon Nuñez MD, Pulmonology, [place]  6. Ascending aorta dilatation (HCC)  Comments:  CT chest done 3/2024 , 4.2 cm     Fell lower back pain , no bruising   Labs reviewed   CT chest reviewed     Now xray LS,   Prednisone   Flexerill   Prn aleve               Completed Refills   Requested Prescriptions     Signed Prescriptions Disp Refills    cyclobenzaprine (FLEXERIL) 10 MG tablet 30 tablet 0

## 2024-05-30 ENCOUNTER — TELEPHONE (OUTPATIENT)
Dept: INTERNAL MEDICINE CLINIC | Age: 64
End: 2024-05-30

## 2024-05-30 DIAGNOSIS — R91.8 LUNG NODULES: ICD-10-CM

## 2024-05-30 DIAGNOSIS — J43.1 PANLOBULAR EMPHYSEMA (HCC): Primary | ICD-10-CM

## 2024-05-30 NOTE — TELEPHONE ENCOUNTER
Patient called and stated that Dr. Lobo's office in [place] is not taking new patients at this time. The patient would like a referral to Dr. Nieto. Please advise.

## 2024-05-31 DIAGNOSIS — S32.028A OTHER CLOSED FRACTURE OF SECOND LUMBAR VERTEBRA, INITIAL ENCOUNTER (HCC): Primary | ICD-10-CM

## 2024-05-31 NOTE — PROGRESS NOTES
I had a long conversation with the patient about the L2 compression fracture, advised MRI and I ordered  patient at this time will think about it,  I also referred him to neurosurgeon, patient will think about that as well he does not want any surgeries,  Feeling better,  Offered physical therapy he will think about it and let us know thank you  Electronically signed by Catia Del Real MD on 5/31/2024 at 12:46 PM'

## 2024-06-04 ENCOUNTER — HOSPITAL ENCOUNTER (OUTPATIENT)
Age: 64
Setting detail: SPECIMEN
Discharge: HOME | End: 2024-06-04

## 2024-06-04 DIAGNOSIS — Z43.1 PEG (PERCUTANEOUS ENDOSCOPIC GASTROSTOMY) ADJUSTMENT/REPLACEMENT/REMOVAL (HCC): ICD-10-CM

## 2024-06-04 DIAGNOSIS — B19.20 HEPATITIS C VIRUS INFECTION WITHOUT HEPATIC COMA, UNSPECIFIED CHRONICITY: ICD-10-CM

## 2024-06-04 DIAGNOSIS — B18.2 CHRONIC HEPATITIS C WITHOUT HEPATIC COMA (HCC): ICD-10-CM

## 2024-06-04 DIAGNOSIS — F17.200 SMOKER: ICD-10-CM

## 2024-06-04 DIAGNOSIS — D89.2 PARAPROTEINEMIA: ICD-10-CM

## 2024-06-04 DIAGNOSIS — K62.5 BRBPR (BRIGHT RED BLOOD PER RECTUM): ICD-10-CM

## 2024-06-04 DIAGNOSIS — K58.1 IRRITABLE BOWEL SYNDROME WITH CONSTIPATION: ICD-10-CM

## 2024-06-04 DIAGNOSIS — R76.8 ELEVATED SERUM IMMUNOGLOBULIN FREE LIGHT CHAIN LEVEL: ICD-10-CM

## 2024-06-04 LAB
ALBUMIN SERPL-MCNC: 4.1 G/DL (ref 3.5–5.2)
ALBUMIN/GLOB SERPL: 1 {RATIO} (ref 1–2.5)
ALP SERPL-CCNC: 77 U/L (ref 40–129)
ALT SERPL-CCNC: 17 U/L (ref 10–50)
AST SERPL-CCNC: 22 U/L (ref 10–50)
BASOPHILS # BLD: 0.06 K/UL (ref 0–0.2)
BASOPHILS NFR BLD: 1 % (ref 0–2)
BILIRUB DIRECT SERPL-MCNC: <0.2 MG/DL (ref 0–0.3)
BILIRUB INDIRECT SERPL-MCNC: NORMAL MG/DL (ref 0–1)
BILIRUB SERPL-MCNC: 0.5 MG/DL (ref 0–1.2)
EOSINOPHIL # BLD: 0.24 K/UL (ref 0–0.44)
EOSINOPHILS RELATIVE PERCENT: 2 % (ref 1–4)
ERYTHROCYTE [DISTWIDTH] IN BLOOD BY AUTOMATED COUNT: 13.3 % (ref 11.8–14.4)
GLOBULIN SER CALC-MCNC: 3.2 G/DL
HCT VFR BLD AUTO: 44.6 % (ref 40.7–50.3)
HGB BLD-MCNC: 14.3 G/DL (ref 13–17)
IMM GRANULOCYTES # BLD AUTO: 0.19 K/UL (ref 0–0.3)
IMM GRANULOCYTES NFR BLD: 1 %
LYMPHOCYTES NFR BLD: 0.92 K/UL (ref 1.1–3.7)
LYMPHOCYTES RELATIVE PERCENT: 7 % (ref 24–43)
MCH RBC QN AUTO: 29.7 PG (ref 25.2–33.5)
MCHC RBC AUTO-ENTMCNC: 32.1 G/DL (ref 28.4–34.8)
MCV RBC AUTO: 92.5 FL (ref 82.6–102.9)
MONOCYTES NFR BLD: 0.45 K/UL (ref 0.1–1.2)
MONOCYTES NFR BLD: 3 % (ref 3–12)
NEUTROPHILS NFR BLD: 86 % (ref 36–65)
NEUTS SEG NFR BLD: 11.35 K/UL (ref 1.5–8.1)
NRBC BLD-RTO: 0 PER 100 WBC
PLATELET # BLD AUTO: 216 K/UL (ref 138–453)
PMV BLD AUTO: 11.7 FL (ref 8.1–13.5)
PROT SERPL-MCNC: 7.3 G/DL (ref 6.6–8.7)
RBC # BLD AUTO: 4.82 M/UL (ref 4.21–5.77)
WBC OTHER # BLD: 13.2 K/UL (ref 3.5–11.3)

## 2024-06-06 LAB
ALBUMIN PERCENT: 54 % (ref 45–65)
ALBUMIN SERPL-MCNC: 3.8 G/DL (ref 3.2–5.2)
ALPHA 2 PERCENT: 16 % (ref 6–13)
ALPHA1 GLOB SERPL ELPH-MCNC: 0.3 G/DL (ref 0.1–0.4)
ALPHA1 GLOB SERPL ELPH-MCNC: 5 % (ref 3–6)
ALPHA2 GLOB SERPL ELPH-MCNC: 1.1 G/DL (ref 0.5–0.9)
B-GLOBULIN SERPL ELPH-MCNC: 0.8 G/DL (ref 0.5–1.1)
B-GLOBULIN SERPL ELPH-MCNC: 12 % (ref 11–19)
FREE KAPPA/LAMBDA RATIO: 0.04 (ref 0.22–1.74)
GAMMA GLOB SERPL ELPH-MCNC: 0.9 G/DL (ref 0.5–1.5)
GAMMA GLOBULIN %: 13 % (ref 9–20)
ITYP INTERPRETATION: ABNORMAL
KAPPA LC FREE SER-MCNC: 18.2 MG/L
LAMBDA LC FREE SERPL-MCNC: 435 MG/L (ref 4.2–27.7)
PATH REV: ABNORMAL
PATHOLOGIST: ABNORMAL
PROT PATTERN SERPL ELPH-IMP: ABNORMAL
PROT SERPL-MCNC: 7 G/DL (ref 6.6–8.7)
TOTAL PROT. SUM,%: 100 % (ref 98–102)
TOTAL PROT. SUM: 6.9 G/DL (ref 6.3–8.2)

## 2024-06-14 ENCOUNTER — HOSPITAL ENCOUNTER (OUTPATIENT)
Dept: MRI IMAGING | Facility: CLINIC | Age: 64
End: 2024-06-14
Payer: COMMERCIAL

## 2024-06-14 DIAGNOSIS — S32.028A OTHER CLOSED FRACTURE OF SECOND LUMBAR VERTEBRA, INITIAL ENCOUNTER (HCC): ICD-10-CM

## 2024-06-14 PROCEDURE — 72148 MRI LUMBAR SPINE W/O DYE: CPT

## 2024-06-19 DIAGNOSIS — S32.050A COMPRESSION FRACTURE OF L5 VERTEBRA, INITIAL ENCOUNTER (HCC): Primary | ICD-10-CM

## 2024-06-19 RX ORDER — AMLODIPINE BESYLATE 5 MG/1
5 TABLET ORAL DAILY
Qty: 90 TABLET | Refills: 1 | Status: SHIPPED | OUTPATIENT
Start: 2024-06-19

## 2024-06-27 ENCOUNTER — TELEPHONE (OUTPATIENT)
Dept: ONCOLOGY | Age: 64
End: 2024-06-27

## 2024-06-27 ENCOUNTER — OFFICE VISIT (OUTPATIENT)
Dept: ONCOLOGY | Age: 64
End: 2024-06-27
Payer: COMMERCIAL

## 2024-06-27 VITALS
HEART RATE: 80 BPM | BODY MASS INDEX: 26.63 KG/M2 | SYSTOLIC BLOOD PRESSURE: 121 MMHG | WEIGHT: 170 LBS | DIASTOLIC BLOOD PRESSURE: 84 MMHG | TEMPERATURE: 96.8 F

## 2024-06-27 DIAGNOSIS — D47.2 MGUS (MONOCLONAL GAMMOPATHY OF UNKNOWN SIGNIFICANCE): Primary | ICD-10-CM

## 2024-06-27 DIAGNOSIS — R76.8 ELEVATED SERUM IMMUNOGLOBULIN FREE LIGHT CHAIN LEVEL: ICD-10-CM

## 2024-06-27 PROCEDURE — 1123F ACP DISCUSS/DSCN MKR DOCD: CPT | Performed by: INTERNAL MEDICINE

## 2024-06-27 PROCEDURE — 99214 OFFICE O/P EST MOD 30 MIN: CPT | Performed by: INTERNAL MEDICINE

## 2024-06-27 NOTE — TELEPHONE ENCOUNTER
AVS from 6/27/24    RV IN ABOUT 2-4 WEEKS   Labs ordered today  Basic Metabolic Panel  Please complete by 6/27/2024  CBC with Auto Differential  Please complete by 6/27/2024  Immunoglobulin Panel (IgG, IgA, IgM)  Please complete by 6/27/2024  Monoclonal Panel  Please complete by 6/27/2024    RV ON 9/26/24    PT was given AVS and appt schedule    Electronically signed by Mita Apple on 6/27/2024 at 1:27 PM

## 2024-06-27 NOTE — PROGRESS NOTES
Jerzy Montanez                                                                                                                  6/27/2024  MRN:   [id number]  YOB: 1960  PCP:                           Catia Del Real MD  Referring Physician: No ref. provider found  Treating Physician Name: ESTEFANÍA BARBOUR MD      Reason for visit:  Chief Complaint   Patient presents with    Follow-up     Review status of disease       Current problems:  IgG lambda paraproteinemia  HCV infection  History of alcohol dependence  Iron deficiency, now resolved    Active and recent treatments:  Active surveillance (patient wants to hold off on bone marrow biopsy)    Interval history:  Reviewed labs patient presents to the clinic for a follow-up today to discuss results of his lab workup.  Patient free light chain ratio is worsened.  Patient had a fall and compression fracture of the spine.  Pain is now much better.  Lab workup does not show anemia.  Labs done earlier did not show renal dysfunction or hypercalcemia.    During this visit patient's allergy, social, medical, surgical history and medications were reviewed and updated.    Summary of Case/History:    Jerzy Montanez a 64 y.o.male is a patient who is admitted with chief complaint of altered mental status.  Patient has not seen a doctor for multiple years.  Due to worsening mental status EMS was summoned.  Patient oxygen saturation was noted to be at 75%.  Patient placed on BiPAP x-ray showed right lower lobe pneumonia.  Patient started on antibiotics.  Patient also noted to have INR of 2.3 platelet count of 55,000.  Patient does have a history of alcohol intake.    Patient's ammonia level noted to be 29.  Creatinine 1.5.  Total bilirubin is within range.  Hemoglobin 11.2 with MCV of 69.  Platelet count is 67,000.  Ultrasound liver done however report is pending.    Past Medical History:   Past Medical History:   Diagnosis Date    Anemia        Past

## 2024-07-08 ENCOUNTER — OFFICE VISIT (OUTPATIENT)
Age: 64
End: 2024-07-08
Payer: COMMERCIAL

## 2024-07-08 VITALS
DIASTOLIC BLOOD PRESSURE: 81 MMHG | HEART RATE: 82 BPM | SYSTOLIC BLOOD PRESSURE: 117 MMHG | OXYGEN SATURATION: 98 % | WEIGHT: 170.2 LBS | BODY MASS INDEX: 26.66 KG/M2 | TEMPERATURE: 97.7 F | RESPIRATION RATE: 20 BRPM

## 2024-07-08 DIAGNOSIS — S32.020A CLOSED COMPRESSION FRACTURE OF L2 LUMBAR VERTEBRA, INITIAL ENCOUNTER (HCC): Primary | ICD-10-CM

## 2024-07-08 PROCEDURE — 1123F ACP DISCUSS/DSCN MKR DOCD: CPT | Performed by: NEUROLOGICAL SURGERY

## 2024-07-08 PROCEDURE — 99204 OFFICE O/P NEW MOD 45 MIN: CPT | Performed by: NEUROLOGICAL SURGERY

## 2024-07-08 NOTE — PROGRESS NOTES
Review of Systems   All other systems reviewed and are negative.     
Sensation is intact to light touch throughout.  He does well with finger-to-nose testing.  His gait is normal.  His reflexes are symmetric and normal throughout.    Studies Review:     I personally reviewed plain films and an MRI of the lumbar spine.  These were all obtained in the [facility] system.  I also reviewed the radiology reports.  The x-rays were done at the end of May and the MRI was done in mid June.  This shows a mild compression deformity at L2 without evidence of retropulsion.  There is no compromise of the neural elements associated with this fracture.  There are some diffuse degenerative changes that are most pronounced at L4-5.  The fracture does not seem to have lost significant height between the time of the plain films and the MRI.    Assessment and Plan:     1. Closed compression fracture of L2 lumbar vertebra, initial encounter ([facility])        Plan: Mr. Montanez suffered an L2 compression fracture as a result of a fall on some steps in his backyard.  He did have some difficulty with pain for several weeks, but that has since resolved.  He has not had any neurologic symptoms associated with this episode.  Imaging shows a stable appearance of an L2 compression fracture on plain films at the end of May and an MRI in the middle of June.  Because his symptoms have resolved I do not think treatment is necessary.  I think it is unlikely that he will have more difficulty as a result of this injury, but he is welcome to contact me if he does have any new difficulty with pain or neurologic changes.  We did not plan a specific follow-up appointment at this time.    Followup: Return if symptoms worsen or fail to improve.    Prescriptions Ordered:  No orders of the defined types were placed in this encounter.       Orders Placed:  No orders of the defined types were placed in this encounter.      Electronically signed by Elpidio Jaimes MD on 7/8/2024 at 11:30 AM    Please note that this chart was generated using

## 2024-07-16 ENCOUNTER — OFFICE VISIT (OUTPATIENT)
Dept: PULMONOLOGY | Age: 64
End: 2024-07-16

## 2024-07-16 VITALS
DIASTOLIC BLOOD PRESSURE: 81 MMHG | BODY MASS INDEX: 26.71 KG/M2 | RESPIRATION RATE: 18 BRPM | HEIGHT: 67 IN | HEART RATE: 78 BPM | WEIGHT: 170.2 LBS | OXYGEN SATURATION: 95 % | SYSTOLIC BLOOD PRESSURE: 111 MMHG

## 2024-07-16 DIAGNOSIS — Z87.891 HISTORY OF SMOKING GREATER THAN 50 PACK YEARS: ICD-10-CM

## 2024-07-16 DIAGNOSIS — J43.9 BULLA OF LUNG (HCC): ICD-10-CM

## 2024-07-16 DIAGNOSIS — J43.2 CENTRILOBULAR EMPHYSEMA (HCC): Primary | ICD-10-CM

## 2024-07-16 DIAGNOSIS — J96.11 CHRONIC RESPIRATORY FAILURE WITH HYPOXIA (HCC): ICD-10-CM

## 2024-07-16 DIAGNOSIS — I63.40 CEREBROVASCULAR ACCIDENT (CVA) DUE TO EMBOLISM OF CEREBRAL ARTERY (HCC): ICD-10-CM

## 2024-07-16 PROBLEM — Z99.11 DEPENDENCE ON RESPIRATOR (VENTILATOR) STATUS (HCC): Status: ACTIVE | Noted: 2024-07-16

## 2024-07-16 RX ORDER — ATORVASTATIN CALCIUM 80 MG/1
80 TABLET, FILM COATED ORAL DAILY
Qty: 100 TABLET | Refills: 0 | Status: SHIPPED | OUTPATIENT
Start: 2024-07-16

## 2024-07-16 NOTE — PROGRESS NOTES
OUTPATIENT PULMONARY CONSULT NOTE      Patient:  Jerzy Montanez  MRN: [id number]    Consulting Physician: Catia Del Real MD  Reason for Consult: COPD/chronic respiratory failure  [facility] Care Physician: Catia Del Real MD    HISTORY OF PRESENT ILLNESS:   The patient is a 64 y.o. male is referred here for management of COPD/chronic hypoxic respiratory failure with history of smoking.    According patient he was diagnosed COPD about more than 1 and half year ago he had history of intubation in 2023 with prolonged hospitalization ventilator requirement and admission to ECF/rehabilitation facility and long recovery from the hospitalization.  He has been followed by Dr. Fregoso and now establishing care here.  According patient he has been using oxygen for about 1 and half year now.  He use oxygen at night and according patient he does not always use oxygen during the daytime except when needed.  He gets shortness of breath on most of the activities.  He walks very slow.  If you walk fast or if he walk longer distance he gets short of breath and he has to stop.  He does not complain of cough he does not complain of wheezing.  According patient he does not have shortness of breath when he is completely at rest.  He denies sputum production denies nocturnal awakening with cough wheezing or chest tightness.  He is using Breztri twice daily and is compliant with Breztri.  He use albuterol once or twice a week.    According patient he had a pulmonary function test done recently Dr. Fregoso office.  According to spirometry at that time which was done on 03/12/2024 FEV1 was 1.04 33% predicted consistent with severe obstructive disease there was no lung volumes or diffusion capacity available.  He had a CT scan of the chest on 03/04/2024 and on review of the images and as compared to CT scan on 02/05/2023 he has a large bulla present in left lung and extending to mid to lower lung.  Right middle lobe lung nodule

## 2024-07-17 NOTE — PROGRESS NOTES
Dermatology Patient Note  [facility] DERMATOLOGY  [address]  [address]  [facility] [place] [address]  Dept: [phone]  Dept Fax: [phone]      VISITDATE: 7/24/2024   REFERRING PROVIDER: No ref. provider found      Jerzy Montanez is a 64 y.o. male  who presents today in the office for:    Other (Patient presents in the office today as 6 month f/u for tinea pedis. He state both his nails on his feet and hands are completely cleared since his last visit. )      HISTORY OF PRESENT ILLNESS:  Patient presents for 6 month follow up. At  on 1/23/24, he was to start terbinafine 250 mg for 12 weeks if this was agreeable with GI for tinea pedis/onychomycosis.     He reports that his fingernails have cleared but he has persisting fungus on his toenails. He finished the terbinafine course. He states that he has a hard time clipping his toenails and he has a history of ingrown toenails.     He also has bruises on his forearms that he would like evaluated. He denies itching.    He states that he has a lesion below his left ear that was first evaluated around 2005. He is interested in referral for intervention. This began as a small bump after breaking his jaw as in the 1970s.     MEDICAL PROBLEMS:  Patient Active Problem List    Diagnosis Date Noted    Chronic hepatitis C without hepatic coma (HCC) 03/14/2023     Priority: Medium    PEG (percutaneous endoscopic gastrostomy) adjustment/replacement/removal (HCC) 03/14/2023     Priority: Medium    Irritable bowel syndrome with constipation 03/14/2023     Priority: Medium    Cerebrovascular accident (CVA) due to embolism of cerebral artery (HCC) 01/27/2023     Priority: Medium    Dermatitis associated with moisture 01/20/2023     Priority: Medium    Impending cerebrovascular accident (HCC) 01/19/2023     Priority: Medium    Dysphagia 01/16/2023     Priority: Medium    Hepatitis C virus infection without

## 2024-07-24 ENCOUNTER — OFFICE VISIT (OUTPATIENT)
Dept: DERMATOLOGY | Age: 64
End: 2024-07-24
Payer: COMMERCIAL

## 2024-07-24 VITALS
TEMPERATURE: 97.3 F | SYSTOLIC BLOOD PRESSURE: 109 MMHG | BODY MASS INDEX: 26.68 KG/M2 | DIASTOLIC BLOOD PRESSURE: 74 MMHG | HEART RATE: 75 BPM | OXYGEN SATURATION: 96 % | WEIGHT: 170 LBS | HEIGHT: 67 IN

## 2024-07-24 DIAGNOSIS — B35.1 ONYCHOMYCOSIS: ICD-10-CM

## 2024-07-24 DIAGNOSIS — D69.2 SENILE PURPURA (HCC): ICD-10-CM

## 2024-07-24 DIAGNOSIS — B35.3 TINEA PEDIS OF BOTH FEET: Primary | ICD-10-CM

## 2024-07-24 DIAGNOSIS — R22.9 SUBCUTANEOUS NODULE: ICD-10-CM

## 2024-07-24 PROCEDURE — 99213 OFFICE O/P EST LOW 20 MIN: CPT | Performed by: DERMATOLOGY

## 2024-07-24 PROCEDURE — 1123F ACP DISCUSS/DSCN MKR DOCD: CPT | Performed by: DERMATOLOGY

## 2024-07-24 NOTE — PATIENT INSTRUCTIONS
Start terbinafine for 1 week each month for 6 months. Recommended that they use a calendar to keep track of dosing.     Referral placed for ENT for evaluation.

## 2024-07-25 RX ORDER — TERBINAFINE HYDROCHLORIDE 250 MG/1
TABLET ORAL
Qty: 42 TABLET | Refills: 0 | Status: SHIPPED | OUTPATIENT
Start: 2024-07-25

## 2024-07-26 ENCOUNTER — TELEPHONE (OUTPATIENT)
Dept: NEUROLOGY | Age: 64
End: 2024-07-26

## 2024-07-26 NOTE — TELEPHONE ENCOUNTER
Pt called yesterday and stated his insurance does not cover seeing residents now. Can you please send a referral for the pt to see neurology at [facility] please.

## 2024-08-07 DIAGNOSIS — E55.9 VITAMIN D DEFICIENCY: ICD-10-CM

## 2024-08-07 RX ORDER — CARVEDILOL 12.5 MG/1
12.5 TABLET ORAL 2 TIMES DAILY
Qty: 180 TABLET | Refills: 3 | Status: SHIPPED | OUTPATIENT
Start: 2024-08-07

## 2024-08-07 RX ORDER — ERGOCALCIFEROL 1.25 MG/1
50000 CAPSULE ORAL WEEKLY
Qty: 12 CAPSULE | Refills: 3 | Status: SHIPPED | OUTPATIENT
Start: 2024-08-07

## 2024-08-07 NOTE — TELEPHONE ENCOUNTER
Patient called in request to refill Vitamin D to [facility]   Last office visit 5/29/2024

## 2024-08-14 ENCOUNTER — OFFICE VISIT (OUTPATIENT)
Dept: NEUROLOGY | Age: 64
End: 2024-08-14

## 2024-08-14 VITALS
WEIGHT: 173 LBS | OXYGEN SATURATION: 98 % | SYSTOLIC BLOOD PRESSURE: 128 MMHG | HEIGHT: 67 IN | DIASTOLIC BLOOD PRESSURE: 80 MMHG | HEART RATE: 67 BPM | BODY MASS INDEX: 27.15 KG/M2

## 2024-08-14 DIAGNOSIS — I63.432 CEREBROVASCULAR ACCIDENT (CVA) DUE TO EMBOLISM OF LEFT POSTERIOR CEREBRAL ARTERY (HCC): Primary | ICD-10-CM

## 2024-08-14 DIAGNOSIS — F05 DELIRIUM DUE TO ANOTHER MEDICAL CONDITION: ICD-10-CM

## 2024-08-14 ASSESSMENT — ENCOUNTER SYMPTOMS
PHOTOPHOBIA: 0
SHORTNESS OF BREATH: 0
TROUBLE SWALLOWING: 0
VOMITING: 0
CHEST TIGHTNESS: 0
NAUSEA: 0
WHEEZING: 0
COLOR CHANGE: 0
VOICE CHANGE: 0

## 2024-08-14 NOTE — PROGRESS NOTES
proprioception in bilateral upper and lower extremities.      Cerebellar Intact fine motor movement. No involuntary movements or tremors     Reflex function Intact 2+ DTR and symmetric. Negative Babinski     Gait                  Deferred.            PRIOR TESTS AND IMAGING: Following images and Labs were reviewed by the examiner             ASSESSMENT       Multiple cerebral infarctions in R frontal, R parasagittal, and L posterior parietal, left cerebellar infarcts     62 y/o male with Hx of HCV, thrombocytopenia, prior alcohol use is being seen for strokes seen during hospitalization in January 2023. He is doing well at this time, and denies any new focal neurologic deficits. TTE done on 1/3/23 shows EF > 55%, mild TR.  He was not started on antiplatelets or statins due to HCV and concern for liver injury. LFTs done in January revealed AST 18, ALT 21, T. Bili 0.8. Vitamin B12 and folate done on 7/24/23 were 324 pg/mL and 11 ng/mL. He was just treated for HCV. Patient would like hold off on starting statin medication until repeat LFTs. Patient will contact the office after the lab work is finished, and the decision to start statin will be made at that time.     During this visit, the importance about obtaining CASSI was emphasized. He experienced multiple embolic appearing strokes in the posterior circulation with minimal ICAD. He will benefit from undergoing CASSI to better assess for cardiopathy, and Loop recorder to monitor heart rhythm long-term. He will continue with ASA 81 mg for now, but would like to discuss more with his cardiologist and PCP regarding this matter. From a stroke perspective, he will need the CASSI to evaluate for cardiopathy and loop recorder to evaluate for occult a-fib.       PLAN:     Obtain CASSI.   Cardiology Consult.   C/t ASA 81 mg daily.   Start Lipitor 80 mg.     Follow up in the clinic in 6 months.   Instructed patient to call the clinic if symptoms worsen or develop any new symptoms.

## 2024-09-04 ENCOUNTER — OFFICE VISIT (OUTPATIENT)
Dept: INTERNAL MEDICINE CLINIC | Age: 64
End: 2024-09-04
Payer: COMMERCIAL

## 2024-09-04 VITALS
HEART RATE: 67 BPM | DIASTOLIC BLOOD PRESSURE: 86 MMHG | BODY MASS INDEX: 27.15 KG/M2 | SYSTOLIC BLOOD PRESSURE: 124 MMHG | WEIGHT: 173 LBS | OXYGEN SATURATION: 97 % | HEIGHT: 67 IN

## 2024-09-04 DIAGNOSIS — Z00.00 WELCOME TO MEDICARE PREVENTIVE VISIT: Primary | ICD-10-CM

## 2024-09-04 DIAGNOSIS — I77.810 ASCENDING AORTA DILATATION (HCC): ICD-10-CM

## 2024-09-04 DIAGNOSIS — J43.1 PANLOBULAR EMPHYSEMA (HCC): ICD-10-CM

## 2024-09-04 DIAGNOSIS — Z99.11 DEPENDENCE ON RESPIRATOR (VENTILATOR) STATUS (HCC): ICD-10-CM

## 2024-09-04 DIAGNOSIS — I50.22 CHRONIC SYSTOLIC (CONGESTIVE) HEART FAILURE (HCC): ICD-10-CM

## 2024-09-04 DIAGNOSIS — Z43.1 PEG (PERCUTANEOUS ENDOSCOPIC GASTROSTOMY) ADJUSTMENT/REPLACEMENT/REMOVAL (HCC): ICD-10-CM

## 2024-09-04 PROCEDURE — G0402 INITIAL PREVENTIVE EXAM: HCPCS | Performed by: INTERNAL MEDICINE

## 2024-09-04 PROCEDURE — 1123F ACP DISCUSS/DSCN MKR DOCD: CPT | Performed by: INTERNAL MEDICINE

## 2024-09-04 ASSESSMENT — PATIENT HEALTH QUESTIONNAIRE - PHQ9
1. LITTLE INTEREST OR PLEASURE IN DOING THINGS: NOT AT ALL
SUM OF ALL RESPONSES TO PHQ QUESTIONS 1-9: 0
SUM OF ALL RESPONSES TO PHQ9 QUESTIONS 1 & 2: 0
SUM OF ALL RESPONSES TO PHQ QUESTIONS 1-9: 0
SUM OF ALL RESPONSES TO PHQ QUESTIONS 1-9: 0
2. FEELING DOWN, DEPRESSED OR HOPELESS: NOT AT ALL
SUM OF ALL RESPONSES TO PHQ QUESTIONS 1-9: 0

## 2024-09-04 NOTE — PROGRESS NOTES
Visit Information    Have you changed or started any medications since your last visit including any over-the-counter medicines, vitamins, or herbal medicines? no   Are you having any side effects from any of your medications? -  no  Have you stopped taking any of your medications? Is so, why? -  no    Have you seen any other physician or provider since your last visit? Yes - Records Obtained  Have you had any other diagnostic tests since your last visit? Yes - Records Obtained  Have you been seen in the emergency room and/or had an admission to a hospital since we last saw you? No  Have you had your routine dental cleaning in the past 6 months? no    Have you activated your Scripted account? If not, what are your barriers? No:      Patient Care Team:  Catia Del Real MD as PCP - General (Internal Medicine)  Catia Del Real MD as PCP - Empaneled Provider  Karri Lay MD as Consulting Physician (Gastroenterology)    Medical History Review  Past Medical, Family, and Social History reviewed and does contribute to the patient presenting condition    Health Maintenance   Topic Date Due    Pneumococcal 65+ years Vaccine (1 of 2 - PCV) Never done    Hepatitis A vaccine (1 of 2 - Risk 2-dose series) Never done    DTaP/Tdap/Td vaccine (1 - Tdap) Never done    Shingles vaccine (1 of 2) Never done    Hepatitis B vaccine (1 of 3 - Risk 3-dose series) Never done    Colorectal Cancer Screen  01/09/2024    AAA screen  Never done    Annual Wellness Visit (Medicare)  Never done    Flu vaccine (1) 08/01/2024    COVID-19 Vaccine (3 - 2023-24 season) 09/01/2024    Lipids  02/21/2025    Lung Cancer Screening &/or Counseling  03/04/2025    Depression Screen  05/29/2025    Respiratory Syncytial Virus (RSV) Pregnant or age 60 yrs+  Completed    HIV screen  Completed    Hib vaccine  Aged Out    Polio vaccine  Aged Out    Meningococcal (ACWY) vaccine  Aged Out    GFR test (Diabetes, CKD 3-4, OR last GFR 15-59)  Discontinued    Diabetes 
(ALDACTONE) 25 MG tablet Take 1 tablet by mouth daily Yes Catia Del Real MD   Handicap Placard MISC by Does not apply route Duration: 12months / Dx: cva Yes Josiah Reynoso MD   albuterol sulfate HFA (PROVENTIL HFA) 108 (90 Base) MCG/ACT inhaler Inhale 2 puffs into the lungs 3 times daily as needed for Wheezing Yes Josiah Reynoso MD       CareTeam (Including outside providers/suppliers regularly involved in providing care):   Patient Care Team:  Catia Del Real MD as PCP - General (Internal Medicine)  Catia Del Real MD as PCP - Empaneled Provider  Karri Lay MD as Consulting Physician (Gastroenterology)      Reviewed and updated this visit:  Allergies  Meds             Aortic root dilatation 4.2 Cm small   Refer to Vascular surgeon

## 2024-09-04 NOTE — PATIENT INSTRUCTIONS
4 low-dose aspirin. Wait for an ambulance. Do not try to drive yourself.  Watch closely for changes in your health, and be sure to contact your doctor if you have any problems.  Where can you learn more?  Go to https://www.[employer].net/patientEd and enter F075 to learn more about \"A Healthy Heart: Care Instructions.\"  Current as of: June 24, 2023  Content Version: 14.1  © 0808-2310 [email or web address].   Care instructions adapted under license by [employer]. If you have questions about a medical condition or this instruction, always ask your healthcare professional. [email or web address] disclaims any warranty or liability for your use of this information.      Personalized Preventive Plan for Jerzy Montanez - 9/4/2024  Medicare offers a range of preventive health benefits. Some of the tests and screenings are paid in full while other may be subject to a deductible, co-insurance, and/or copay.    Some of these benefits include a comprehensive review of your medical history including lifestyle, illnesses that may run in your family, and various assessments and screenings as appropriate.    After reviewing your medical record and screening and assessments performed today your provider may have ordered immunizations, labs, imaging, and/or referrals for you.  A list of these orders (if applicable) as well as your Preventive Care list are included within your After Visit Summary for your review.    Other Preventive Recommendations:    A preventive eye exam performed by an eye specialist is recommended every 1-2 years to screen for glaucoma; cataracts, macular degeneration, and other eye disorders.  A preventive dental visit is recommended every 6 months.  Try to get at least 150 minutes of exercise per week or 10,000 steps per day on a pedometer .  Order or download the FREE \"Exercise & Physical Activity: Your Everyday Guide\" from [employer]. Call 1-278.423.4697 or search The National

## 2024-09-16 ENCOUNTER — TELEPHONE (OUTPATIENT)
Dept: VASCULAR SURGERY | Age: 64
End: 2024-09-16

## 2024-09-16 ENCOUNTER — TELEPHONE (OUTPATIENT)
Dept: INTERNAL MEDICINE CLINIC | Age: 64
End: 2024-09-16

## 2024-09-19 ENCOUNTER — HOSPITAL ENCOUNTER (OUTPATIENT)
Age: 64
Setting detail: SPECIMEN
Discharge: HOME | End: 2024-09-19

## 2024-09-19 DIAGNOSIS — R76.8 ELEVATED SERUM IMMUNOGLOBULIN FREE LIGHT CHAIN LEVEL: ICD-10-CM

## 2024-09-19 DIAGNOSIS — D47.2 MGUS (MONOCLONAL GAMMOPATHY OF UNKNOWN SIGNIFICANCE): ICD-10-CM

## 2024-09-19 LAB
ANION GAP SERPL CALCULATED.3IONS-SCNC: 10 MMOL/L (ref 9–16)
BASOPHILS # BLD: 0.09 K/UL (ref 0–0.2)
BASOPHILS NFR BLD: 1 % (ref 0–2)
BUN SERPL-MCNC: 16 MG/DL (ref 8–23)
CALCIUM SERPL-MCNC: 9.5 MG/DL (ref 8.6–10.4)
CHLORIDE SERPL-SCNC: 105 MMOL/L (ref 98–107)
CO2 SERPL-SCNC: 26 MMOL/L (ref 20–31)
CREAT SERPL-MCNC: 0.9 MG/DL (ref 0.7–1.2)
EOSINOPHIL # BLD: 0.42 K/UL (ref 0–0.44)
EOSINOPHILS RELATIVE PERCENT: 4 % (ref 1–4)
ERYTHROCYTE [DISTWIDTH] IN BLOOD BY AUTOMATED COUNT: 13 % (ref 11.8–14.4)
GFR, ESTIMATED: 90 ML/MIN/1.73M2
GLUCOSE SERPL-MCNC: 122 MG/DL (ref 74–99)
HCT VFR BLD AUTO: 45.6 % (ref 40.7–50.3)
HGB BLD-MCNC: 14 G/DL (ref 13–17)
IGA SERPL-MCNC: 227 MG/DL (ref 70–400)
IGG SERPL-MCNC: 897 MG/DL (ref 700–1600)
IGM SERPL-MCNC: 68 MG/DL (ref 40–230)
IMM GRANULOCYTES # BLD AUTO: 0.06 K/UL (ref 0–0.3)
IMM GRANULOCYTES NFR BLD: 1 %
LYMPHOCYTES NFR BLD: 1.85 K/UL (ref 1.1–3.7)
LYMPHOCYTES RELATIVE PERCENT: 17 % (ref 24–43)
MCH RBC QN AUTO: 30.2 PG (ref 25.2–33.5)
MCHC RBC AUTO-ENTMCNC: 30.7 G/DL (ref 28.4–34.8)
MCV RBC AUTO: 98.5 FL (ref 82.6–102.9)
MONOCYTES NFR BLD: 1.02 K/UL (ref 0.1–1.2)
MONOCYTES NFR BLD: 9 % (ref 3–12)
NEUTROPHILS NFR BLD: 68 % (ref 36–65)
NEUTS SEG NFR BLD: 7.74 K/UL (ref 1.5–8.1)
NRBC BLD-RTO: 0 PER 100 WBC
PLATELET # BLD AUTO: 171 K/UL (ref 138–453)
PMV BLD AUTO: 11.4 FL (ref 8.1–13.5)
POTASSIUM SERPL-SCNC: 5.4 MMOL/L (ref 3.7–5.3)
RBC # BLD AUTO: 4.63 M/UL (ref 4.21–5.77)
SODIUM SERPL-SCNC: 141 MMOL/L (ref 136–145)
WBC OTHER # BLD: 11.2 K/UL (ref 3.5–11.3)

## 2024-09-20 LAB
ALBUMIN PERCENT: 57 % (ref 45–65)
ALBUMIN SERPL-MCNC: 3.8 G/DL (ref 3.2–5.2)
ALPHA 2 PERCENT: 14 % (ref 6–13)
ALPHA1 GLOB SERPL ELPH-MCNC: 0.4 G/DL (ref 0.1–0.4)
ALPHA1 GLOB SERPL ELPH-MCNC: 5 % (ref 3–6)
ALPHA2 GLOB SERPL ELPH-MCNC: 0.9 G/DL (ref 0.5–0.9)
B-GLOBULIN SERPL ELPH-MCNC: 0.9 G/DL (ref 0.5–1.1)
B-GLOBULIN SERPL ELPH-MCNC: 13 % (ref 11–19)
FREE KAPPA/LAMBDA RATIO: 0.04 (ref 0.22–1.74)
GAMMA GLOB SERPL ELPH-MCNC: 0.8 G/DL (ref 0.5–1.5)
GAMMA GLOBULIN %: 12 % (ref 9–20)
ITYP INTERPRETATION: ABNORMAL
KAPPA LC FREE SER-MCNC: 22.4 MG/L
LAMBDA LC FREE SERPL-MCNC: 515 MG/L (ref 4.2–27.7)
PATH REV: ABNORMAL
PATHOLOGIST: ABNORMAL
PROT PATTERN SERPL ELPH-IMP: ABNORMAL
PROT SERPL-MCNC: 6.7 G/DL (ref 6.6–8.7)
TOTAL PROT. SUM,%: 101 % (ref 98–102)
TOTAL PROT. SUM: 6.8 G/DL (ref 6.3–8.2)

## 2024-09-26 ENCOUNTER — TELEPHONE (OUTPATIENT)
Dept: ONCOLOGY | Age: 64
End: 2024-09-26

## 2024-09-26 ENCOUNTER — OFFICE VISIT (OUTPATIENT)
Dept: ONCOLOGY | Age: 64
End: 2024-09-26
Payer: COMMERCIAL

## 2024-09-26 VITALS
WEIGHT: 173.2 LBS | HEART RATE: 86 BPM | SYSTOLIC BLOOD PRESSURE: 120 MMHG | TEMPERATURE: 96.8 F | BODY MASS INDEX: 27.13 KG/M2 | DIASTOLIC BLOOD PRESSURE: 85 MMHG

## 2024-09-26 DIAGNOSIS — R76.8 ELEVATED SERUM IMMUNOGLOBULIN FREE LIGHT CHAIN LEVEL: ICD-10-CM

## 2024-09-26 DIAGNOSIS — D47.2 MGUS (MONOCLONAL GAMMOPATHY OF UNKNOWN SIGNIFICANCE): Primary | ICD-10-CM

## 2024-09-26 PROCEDURE — 99214 OFFICE O/P EST MOD 30 MIN: CPT | Performed by: INTERNAL MEDICINE

## 2024-09-26 PROCEDURE — 1123F ACP DISCUSS/DSCN MKR DOCD: CPT | Performed by: INTERNAL MEDICINE

## 2024-09-27 ENCOUNTER — TELEPHONE (OUTPATIENT)
Dept: INTERVENTIONAL RADIOLOGY/VASCULAR | Age: 64
End: 2024-09-27

## 2024-10-01 DIAGNOSIS — B35.3 TINEA PEDIS OF BOTH FEET: ICD-10-CM

## 2024-10-01 DIAGNOSIS — B35.1 ONYCHOMYCOSIS: ICD-10-CM

## 2024-10-02 RX ORDER — TERBINAFINE HYDROCHLORIDE 250 MG/1
TABLET ORAL
Qty: 42 TABLET | Refills: 0 | Status: SHIPPED | OUTPATIENT
Start: 2024-10-02

## 2024-10-17 ENCOUNTER — HOSPITAL ENCOUNTER (OUTPATIENT)
Dept: CT IMAGING | Age: 64
Discharge: HOME | End: 2024-10-19
Attending: STUDENT IN AN ORGANIZED HEALTH CARE EDUCATION/TRAINING PROGRAM
Payer: COMMERCIAL

## 2024-10-17 DIAGNOSIS — R22.9 SUBCUTANEOUS NODULE: ICD-10-CM

## 2024-10-17 LAB
EGFR, POC: >90 ML/MIN/1.73M2
POC CREATININE: 0.6 MG/DL (ref 0.51–1.19)

## 2024-10-17 PROCEDURE — 82565 ASSAY OF CREATININE: CPT

## 2024-10-17 PROCEDURE — 70491 CT SOFT TISSUE NECK W/DYE: CPT

## 2024-10-17 PROCEDURE — 6360000004 HC RX CONTRAST MEDICATION: Performed by: STUDENT IN AN ORGANIZED HEALTH CARE EDUCATION/TRAINING PROGRAM

## 2024-10-17 PROCEDURE — 2580000003 HC RX 258: Performed by: STUDENT IN AN ORGANIZED HEALTH CARE EDUCATION/TRAINING PROGRAM

## 2024-10-17 RX ORDER — IOPAMIDOL 755 MG/ML
75 INJECTION, SOLUTION INTRAVASCULAR
Status: COMPLETED | OUTPATIENT
Start: 2024-10-17 | End: 2024-10-17

## 2024-10-17 RX ORDER — SODIUM CHLORIDE 0.9 % (FLUSH) 0.9 %
10 SYRINGE (ML) INJECTION PRN
Status: DISCONTINUED | OUTPATIENT
Start: 2024-10-17 | End: 2024-10-20 | Stop reason: HOSPADM

## 2024-10-17 RX ORDER — 0.9 % SODIUM CHLORIDE 0.9 %
100 INTRAVENOUS SOLUTION INTRAVENOUS ONCE
Status: COMPLETED | OUTPATIENT
Start: 2024-10-17 | End: 2024-10-17

## 2024-10-17 RX ADMIN — IOPAMIDOL 75 ML: 755 INJECTION, SOLUTION INTRAVENOUS at 15:56

## 2024-10-17 RX ADMIN — SODIUM CHLORIDE, PRESERVATIVE FREE 10 ML: 5 INJECTION INTRAVENOUS at 15:56

## 2024-10-17 RX ADMIN — SODIUM CHLORIDE 100 ML: 9 INJECTION, SOLUTION INTRAVENOUS at 15:56

## 2024-10-21 ENCOUNTER — HOSPITAL ENCOUNTER (OUTPATIENT)
Dept: CT IMAGING | Age: 64
Discharge: HOME | End: 2024-10-23
Payer: COMMERCIAL

## 2024-10-21 VITALS
OXYGEN SATURATION: 94 % | HEIGHT: 67 IN | RESPIRATION RATE: 16 BRPM | HEART RATE: 74 BPM | TEMPERATURE: 97.3 F | WEIGHT: 172.4 LBS | DIASTOLIC BLOOD PRESSURE: 82 MMHG | SYSTOLIC BLOOD PRESSURE: 123 MMHG | BODY MASS INDEX: 27.06 KG/M2

## 2024-10-21 DIAGNOSIS — D47.2 MGUS (MONOCLONAL GAMMOPATHY OF UNKNOWN SIGNIFICANCE): ICD-10-CM

## 2024-10-21 LAB
BASOPHILS # BLD: 0.1 K/UL (ref 0–0.2)
BASOPHILS NFR BLD: 1 % (ref 0–2)
EOSINOPHIL # BLD: 0.3 K/UL (ref 0–0.4)
EOSINOPHILS RELATIVE PERCENT: 4 % (ref 0–4)
ERYTHROCYTE [DISTWIDTH] IN BLOOD BY AUTOMATED COUNT: 13.9 % (ref 11.5–14.9)
HCT VFR BLD AUTO: 39.6 % (ref 41–53)
HGB BLD-MCNC: 13.6 G/DL (ref 13.5–17.5)
INR PPP: 1.2
LYMPHOCYTES NFR BLD: 1.3 K/UL (ref 1–4.8)
LYMPHOCYTES RELATIVE PERCENT: 16 % (ref 24–44)
MCH RBC QN AUTO: 30.7 PG (ref 26–34)
MCHC RBC AUTO-ENTMCNC: 34.3 G/DL (ref 31–37)
MCV RBC AUTO: 89.5 FL (ref 80–100)
MONOCYTES NFR BLD: 0.7 K/UL (ref 0.1–1.3)
MONOCYTES NFR BLD: 8 % (ref 1–7)
NEUTROPHILS NFR BLD: 71 % (ref 36–66)
NEUTS SEG NFR BLD: 5.8 K/UL (ref 1.3–9.1)
PARTIAL THROMBOPLASTIN TIME: 28.4 SEC (ref 24–36)
PLATELET # BLD AUTO: 154 K/UL (ref 150–450)
PMV BLD AUTO: 8.9 FL (ref 6–12)
PROTHROMBIN TIME: 15 SEC (ref 11.8–14.6)
RBC # BLD AUTO: 4.42 M/UL (ref 4.5–5.9)
RETICS # AUTO: 0.05 M/UL (ref 0.02–0.1)
RETICS/RBC NFR AUTO: 1.2 % (ref 0.5–2)
WBC OTHER # BLD: 8.2 K/UL (ref 3.5–11)

## 2024-10-21 PROCEDURE — 88305 TISSUE EXAM BY PATHOLOGIST: CPT

## 2024-10-21 PROCEDURE — 88185 FLOWCYTOMETRY/TC ADD-ON: CPT

## 2024-10-21 PROCEDURE — 88360 TUMOR IMMUNOHISTOCHEM/MANUAL: CPT

## 2024-10-21 PROCEDURE — 88184 FLOWCYTOMETRY/ TC 1 MARKER: CPT

## 2024-10-21 PROCEDURE — 88364 INSITU HYBRIDIZATION (FISH): CPT

## 2024-10-21 PROCEDURE — 88280 CHROMOSOME KARYOTYPE STUDY: CPT

## 2024-10-21 PROCEDURE — 85730 THROMBOPLASTIN TIME PARTIAL: CPT

## 2024-10-21 PROCEDURE — 85045 AUTOMATED RETICULOCYTE COUNT: CPT

## 2024-10-21 PROCEDURE — 85025 COMPLETE CBC W/AUTO DIFF WBC: CPT

## 2024-10-21 PROCEDURE — 88237 TISSUE CULTURE BONE MARROW: CPT

## 2024-10-21 PROCEDURE — 7100000011 HC PHASE II RECOVERY - ADDTL 15 MIN: Performed by: RADIOLOGY

## 2024-10-21 PROCEDURE — 88311 DECALCIFY TISSUE: CPT

## 2024-10-21 PROCEDURE — 88365 INSITU HYBRIDIZATION (FISH): CPT

## 2024-10-21 PROCEDURE — 7100000010 HC PHASE II RECOVERY - FIRST 15 MIN: Performed by: RADIOLOGY

## 2024-10-21 PROCEDURE — 77012 CT SCAN FOR NEEDLE BIOPSY: CPT

## 2024-10-21 PROCEDURE — 85610 PROTHROMBIN TIME: CPT

## 2024-10-21 PROCEDURE — 88313 SPECIAL STAINS GROUP 2: CPT

## 2024-10-21 PROCEDURE — 36415 COLL VENOUS BLD VENIPUNCTURE: CPT

## 2024-10-21 PROCEDURE — 88264 CHROMOSOME ANALYSIS 20-25: CPT

## 2024-10-21 RX ORDER — SODIUM CHLORIDE 0.9 % (FLUSH) 0.9 %
5-40 SYRINGE (ML) INJECTION PRN
Status: DISCONTINUED | OUTPATIENT
Start: 2024-10-21 | End: 2024-10-24 | Stop reason: HOSPADM

## 2024-10-21 RX ORDER — SODIUM CHLORIDE 0.9 % (FLUSH) 0.9 %
5-40 SYRINGE (ML) INJECTION EVERY 12 HOURS SCHEDULED
Status: DISCONTINUED | OUTPATIENT
Start: 2024-10-21 | End: 2024-10-24 | Stop reason: HOSPADM

## 2024-10-21 RX ORDER — SODIUM CHLORIDE 9 MG/ML
INJECTION, SOLUTION INTRAVENOUS PRN
Status: DISCONTINUED | OUTPATIENT
Start: 2024-10-21 | End: 2024-10-24 | Stop reason: HOSPADM

## 2024-10-21 RX ORDER — SODIUM CHLORIDE 9 MG/ML
INJECTION, SOLUTION INTRAVENOUS CONTINUOUS
Status: DISCONTINUED | OUTPATIENT
Start: 2024-10-21 | End: 2024-10-24 | Stop reason: HOSPADM

## 2024-10-21 RX ORDER — ACETAMINOPHEN 325 MG/1
650 TABLET ORAL EVERY 4 HOURS PRN
Status: DISCONTINUED | OUTPATIENT
Start: 2024-10-21 | End: 2024-10-24 | Stop reason: HOSPADM

## 2024-10-21 ASSESSMENT — ENCOUNTER SYMPTOMS
SHORTNESS OF BREATH: 1
DIARRHEA: 1
BACK PAIN: 1
COUGH: 1
VOMITING: 0
ABDOMINAL PAIN: 0
NAUSEA: 0
WHEEZING: 0
CONSTIPATION: 0
CHEST TIGHTNESS: 0
SORE THROAT: 0
TROUBLE SWALLOWING: 0

## 2024-10-21 ASSESSMENT — PAIN - FUNCTIONAL ASSESSMENT
PAIN_FUNCTIONAL_ASSESSMENT: NONE - DENIES PAIN
PAIN_FUNCTIONAL_ASSESSMENT: NONE - DENIES PAIN

## 2024-10-21 NOTE — PROGRESS NOTES
Patient refuses IV start  Patient is ok with required lab work  1780 notified with Kiley in IR that pt is ready

## 2024-10-21 NOTE — BRIEF OP NOTE
Brief Postoperative Note for Bone Marrow Biopsy    Jerzy Montanez  YOB: 1960  [id number]    Pre-operative Diagnosis: MGUS (monoclonal gammopathy of unknown significance)      Post-operative Diagnosis: Same     Procedure: Left posterior iliac bone marrow aspiration with and without heparin & biopsy     Anesthesia: 1% lidocaine      Surgeons/Assistants: SHEILA ROSE MD      Estimated Blood Loss: less than 50      Complications: None     Specimens Were Obtained: from the Left posterior iliac crest using an Arrow Oncontrol Device 11 g x 4 in. 6 cc was collected with heparin and 6 cc was collected without heparin. A bone biopsy was also obtained. Specimens were received by hem/onc at the time of collection. Vital signs were reviewed and were stable after the procedure.     Electronically signed by SHEILA ROSE MD on 10/21/2024 at 2:28 PM

## 2024-10-21 NOTE — DISCHARGE INSTRUCTIONS
RADIOLOGY POST BIOPSY INSTRUCTIONS    Diet:    May resume your usual diet.    Medications:  Use over the counter pain medications as directed on the bottle for pain control.  You may continue your home medications as instructed by the radiologist.    Post-Procedure Activity:    Avoid exercises that use your belly muscles and strenuous activities, such as bicycle riding, jogging, weight lifting, or aerobic exercise, for 1 week or until your doctor says it is okay.  Avoid other heavy work or sports for 2 days.    Limit activities for 24 hours.  Resume normal diet.  You will probably be able to shower the same day as the test, if your doctor says it is okay. Pat the puncture site dry. Do not take a bath for at least 2 days after the test, or until your doctor tells you it is okay.    Puncture Site:  Keep clean and dry for 24-48 hours.    Call Doctor if  Swelling occurs around puncture site.  Bleeding occurs at puncture site.  Shortness of breath occurs.  Extreme pain occurs.      IF YOU CANNOT REACH THE DOCTOR, GO TO THE NEAREST EMERGENCY FACILITY.     Phone:  Interventional Radiology  [phone] (Dr Simon)  After hours Radiology  [phone]

## 2024-10-21 NOTE — H&P
H&P note, to be reconciled in pre-op per RN.     Review of Systems   Constitutional:  Negative for chills and fever.   HENT:  Positive for dental problem (one tooth). Negative for hearing loss, sore throat and trouble swallowing.    Eyes:  Positive for visual disturbance (glasses).   Respiratory:  Positive for cough (intermittent) and shortness of breath (SOBOE). Negative for chest tightness and wheezing.         History of COPD   Cardiovascular:  Negative for chest pain and palpitations.   Gastrointestinal:  Positive for diarrhea (Occasional). Negative for abdominal pain, constipation, nausea and vomiting.   Genitourinary:  Negative for dysuria and hematuria.   Musculoskeletal:  Positive for back pain (occasional) and neck pain (Occasional).   Skin:  Negative for rash and wound.   Neurological:  Negative for facial asymmetry and speech difficulty.   Hematological:  Bruises/bleeds easily.         GENERAL PHYSICAL EXAM     Vitals: Review vitals per RN flowsheet.                              Physical Exam  Constitutional:       General: He is not in acute distress.     Appearance: He is well-developed. He is not ill-appearing.   HENT:      Head: Normocephalic and atraumatic.      Nose: Nose normal.      Mouth/Throat:      Mouth: Mucous membranes are moist.      Pharynx: Oropharynx is clear. No oropharyngeal exudate or posterior oropharyngeal erythema.   Eyes:      General: No scleral icterus.        Right eye: No discharge.         Left eye: No discharge.      Pupils: Pupils are equal, round, and reactive to light.   Neck:      Trachea: No tracheal deviation.      Comments: Left neck mass present, no erythema, no drainage or bleeding  Cardiovascular:      Rate and Rhythm: Normal rate and regular rhythm.      Heart sounds: Normal heart sounds. No murmur heard.     No friction rub. No gallop.   Pulmonary:      Effort: Pulmonary effort is normal. No respiratory distress.      Breath sounds: Normal breath sounds. No

## 2024-10-23 ENCOUNTER — TELEPHONE (OUTPATIENT)
Dept: OTOLARYNGOLOGY | Age: 64
End: 2024-10-23

## 2024-10-23 LAB — BONE MARROW REPORT: NORMAL

## 2024-10-23 NOTE — TELEPHONE ENCOUNTER
I called Mr. Montanez regarding the results of his CT neck at the request of Dr. Curran.  The left neck/facial mass of interest appears to be parotid in nature.  Because it has been there for so long the borders are difficult to interpret.  Dr. Curran will go over the imaging in further detail during his appointment with treatment options and recommendations at his follow-up appointment scheduled on 10/29.  Explained to Mr Montanez that the parotid gland is a salivary gland and found on the lower jaw line just in front of the ear.  States understanding of information and agreeable to plan for follow up.  Denies further questions or needs at this time.

## 2024-10-24 LAB — FLOW CYTOMETRY, BM: NORMAL

## 2024-10-29 LAB — CHROMOSOME STUDY: NORMAL

## 2024-10-31 ENCOUNTER — OFFICE VISIT (OUTPATIENT)
Dept: ONCOLOGY | Age: 64
End: 2024-10-31
Payer: COMMERCIAL

## 2024-10-31 ENCOUNTER — TELEPHONE (OUTPATIENT)
Dept: ONCOLOGY | Age: 64
End: 2024-10-31

## 2024-10-31 VITALS
BODY MASS INDEX: 26.94 KG/M2 | WEIGHT: 172 LBS | DIASTOLIC BLOOD PRESSURE: 79 MMHG | SYSTOLIC BLOOD PRESSURE: 110 MMHG | HEART RATE: 78 BPM | TEMPERATURE: 96.8 F

## 2024-10-31 DIAGNOSIS — D47.2 SMOLDERING MYELOMA: Primary | ICD-10-CM

## 2024-10-31 DIAGNOSIS — R22.1 NECK MASS: ICD-10-CM

## 2024-10-31 PROCEDURE — 1123F ACP DISCUSS/DSCN MKR DOCD: CPT | Performed by: INTERNAL MEDICINE

## 2024-10-31 PROCEDURE — 99214 OFFICE O/P EST MOD 30 MIN: CPT | Performed by: INTERNAL MEDICINE

## 2024-10-31 NOTE — PROGRESS NOTES
Jerzy Montanez                                                                                                                  10/31/2024  MRN:   [id number]  YOB: 1960  PCP:                           Catia Del Real MD  Referring Physician: No ref. provider found  Treating Physician Name: ESTEFANÍA BARBOUR MD      Reason for visit:  Chief Complaint   Patient presents with    Follow-up     Review status of disease   Reviewed labs    Current problems:  IgG lambda smoldering myeloma  HCV infection  History of alcohol dependence  Iron deficiency, now resolved    Active and recent treatments:  CT PET  Active surveillance for smoldering myeloma    Interval history:  Patient presents to the clinic for a follow-up visit and to discuss results of his bone marrow biopsy.  Bone marrow biopsy shows 10% lambda restricted plasma cells.  No evidence of organ damage.  Patient also seen by ENT who recommended biopsy of the neck mass.  During this visit patient's allergy, social, medical, surgical history and medications were reviewed and updated.    Summary of Case/History:    Jerzy Montanez a 64 y.o.male is a patient who is admitted with chief complaint of altered mental status.  Patient has not seen a doctor for multiple years.  Due to worsening mental status EMS was summoned.  Patient oxygen saturation was noted to be at 75%.  Patient placed on BiPAP x-ray showed right lower lobe pneumonia.  Patient started on antibiotics.  Patient also noted to have INR of 2.3 platelet count of 55,000.  Patient does have a history of alcohol intake.    Patient's ammonia level noted to be 29.  Creatinine 1.5.  Total bilirubin is within range.  Hemoglobin 11.2 with MCV of 69.  Platelet count is 67,000.  Ultrasound liver done however report is pending.    Past Medical History:   Past Medical History:   Diagnosis Date    Anemia     COPD (chronic obstructive pulmonary disease) (HCC)     Emphysema lung (HCC)     Hypertension

## 2024-10-31 NOTE — TELEPHONE ENCOUNTER
AVS from 10/31/24      Ct pet before rv     PET CT Skull Base To Mid-Thigh      PT was given orders, scheduling instructions, AVS and an appt schedule    Rv on 12/5/24    PT was given AVS and appt schedule    Electronically signed by Mita Apple on 10/31/2024 at 4:03 PM

## 2024-11-04 RX ORDER — BUDESONIDE, GLYCOPYRROLATE, AND FORMOTEROL FUMARATE 160; 9; 4.8 UG/1; UG/1; UG/1
2 AEROSOL, METERED RESPIRATORY (INHALATION) 2 TIMES DAILY
Qty: 1 EACH | Refills: 5 | Status: SHIPPED | OUTPATIENT
Start: 2024-11-04

## 2024-11-07 ENCOUNTER — HOSPITAL ENCOUNTER (OUTPATIENT)
Dept: NUCLEAR MEDICINE | Age: 64
Discharge: HOME | End: 2024-11-09
Attending: INTERNAL MEDICINE
Payer: COMMERCIAL

## 2024-11-07 DIAGNOSIS — D47.2 SMOLDERING MYELOMA: ICD-10-CM

## 2024-11-07 LAB — GLUCOSE BLD-MCNC: 108 MG/DL (ref 75–110)

## 2024-11-07 PROCEDURE — 2580000003 HC RX 258: Performed by: INTERNAL MEDICINE

## 2024-11-07 PROCEDURE — A9609 HC RX DIAGNOSTIC RADIOPHARMACEUTICAL: HCPCS | Performed by: INTERNAL MEDICINE

## 2024-11-07 PROCEDURE — 78815 PET IMAGE W/CT SKULL-THIGH: CPT

## 2024-11-07 PROCEDURE — 3430000000 HC RX DIAGNOSTIC RADIOPHARMACEUTICAL: Performed by: INTERNAL MEDICINE

## 2024-11-07 PROCEDURE — 82947 ASSAY GLUCOSE BLOOD QUANT: CPT

## 2024-11-07 RX ORDER — SODIUM CHLORIDE 0.9 % (FLUSH) 0.9 %
10 SYRINGE (ML) INJECTION ONCE
Status: COMPLETED | OUTPATIENT
Start: 2024-11-07 | End: 2024-11-07

## 2024-11-07 RX ORDER — FLUDEOXYGLUCOSE F 18 200 MCI/ML
10 INJECTION, SOLUTION INTRAVENOUS
Status: COMPLETED | OUTPATIENT
Start: 2024-11-07 | End: 2024-11-07

## 2024-11-07 RX ADMIN — FLUDEOXYGLUCOSE F 18 9.12 MILLICURIE: 200 INJECTION, SOLUTION INTRAVENOUS at 14:54

## 2024-11-07 RX ADMIN — SODIUM CHLORIDE, PRESERVATIVE FREE 10 ML: 5 INJECTION INTRAVENOUS at 14:54

## 2024-11-11 ENCOUNTER — HOSPITAL ENCOUNTER (OUTPATIENT)
Dept: ULTRASOUND IMAGING | Age: 64
Discharge: HOME | End: 2024-11-13
Attending: STUDENT IN AN ORGANIZED HEALTH CARE EDUCATION/TRAINING PROGRAM
Payer: COMMERCIAL

## 2024-11-11 ENCOUNTER — HOSPITAL ENCOUNTER (OUTPATIENT)
Dept: INTERVENTIONAL RADIOLOGY/VASCULAR | Age: 64
Discharge: HOME | End: 2024-11-13
Attending: STUDENT IN AN ORGANIZED HEALTH CARE EDUCATION/TRAINING PROGRAM
Payer: COMMERCIAL

## 2024-11-11 DIAGNOSIS — K11.8 PAROTID MASS: ICD-10-CM

## 2024-11-11 LAB
CASE NUMBER:: NORMAL
SPECIMEN DESCRIPTION: NORMAL

## 2024-11-11 PROCEDURE — 76536 US EXAM OF HEAD AND NECK: CPT

## 2024-11-11 PROCEDURE — 10005 FNA BX W/US GDN 1ST LES: CPT

## 2024-11-11 PROCEDURE — 2709999900 IR BIOPSY LYMPH NODE SUPERFICIAL

## 2024-11-11 PROCEDURE — 88305 TISSUE EXAM BY PATHOLOGIST: CPT

## 2024-11-11 PROCEDURE — 88172 CYTP DX EVAL FNA 1ST EA SITE: CPT

## 2024-11-11 PROCEDURE — 88173 CYTOPATH EVAL FNA REPORT: CPT

## 2024-11-11 NOTE — PROGRESS NOTES
Patient tolerated left parotid biopsy without distress. Dressing to site. Discharge instructions given, no questions at this time. Patient discharged home via private auto..

## 2024-11-12 LAB — SURGICAL PATHOLOGY REPORT: NORMAL

## 2024-11-13 ENCOUNTER — TELEPHONE (OUTPATIENT)
Dept: OTOLARYNGOLOGY | Age: 64
End: 2024-11-13

## 2024-11-13 ENCOUNTER — OFFICE VISIT (OUTPATIENT)
Dept: INTERNAL MEDICINE CLINIC | Age: 64
End: 2024-11-13
Payer: COMMERCIAL

## 2024-11-13 VITALS
DIASTOLIC BLOOD PRESSURE: 82 MMHG | WEIGHT: 173.8 LBS | OXYGEN SATURATION: 95 % | HEART RATE: 75 BPM | SYSTOLIC BLOOD PRESSURE: 128 MMHG | HEIGHT: 67 IN | BODY MASS INDEX: 27.28 KG/M2

## 2024-11-13 DIAGNOSIS — D89.2 PARAPROTEINEMIA: ICD-10-CM

## 2024-11-13 DIAGNOSIS — I63.40 CEREBROVASCULAR ACCIDENT (CVA) DUE TO EMBOLISM OF CEREBRAL ARTERY (HCC): ICD-10-CM

## 2024-11-13 DIAGNOSIS — I50.22 CHRONIC SYSTOLIC (CONGESTIVE) HEART FAILURE (HCC): ICD-10-CM

## 2024-11-13 DIAGNOSIS — E55.9 VITAMIN D DEFICIENCY: ICD-10-CM

## 2024-11-13 DIAGNOSIS — Z12.5 PROSTATE CANCER SCREENING: ICD-10-CM

## 2024-11-13 DIAGNOSIS — B18.2 CHRONIC HEPATITIS C WITHOUT HEPATIC COMA (HCC): ICD-10-CM

## 2024-11-13 DIAGNOSIS — F17.200 SMOKER: ICD-10-CM

## 2024-11-13 DIAGNOSIS — J43.1 PANLOBULAR EMPHYSEMA (HCC): Primary | ICD-10-CM

## 2024-11-13 DIAGNOSIS — R73.9 HYPERGLYCEMIA: ICD-10-CM

## 2024-11-13 PROCEDURE — 1123F ACP DISCUSS/DSCN MKR DOCD: CPT | Performed by: INTERNAL MEDICINE

## 2024-11-13 PROCEDURE — 99214 OFFICE O/P EST MOD 30 MIN: CPT | Performed by: INTERNAL MEDICINE

## 2024-11-13 RX ORDER — LANOLIN ALCOHOL/MO/W.PET/CERES
1000 CREAM (GRAM) TOPICAL DAILY
Qty: 90 TABLET | Refills: 3 | Status: SHIPPED | OUTPATIENT
Start: 2024-11-13

## 2024-11-13 RX ORDER — ATORVASTATIN CALCIUM 80 MG/1
80 TABLET, FILM COATED ORAL DAILY
Qty: 100 TABLET | Refills: 0 | Status: SHIPPED | OUTPATIENT
Start: 2024-11-13 | End: 2024-11-13 | Stop reason: SDUPTHER

## 2024-11-13 RX ORDER — BUDESONIDE, GLYCOPYRROLATE, AND FORMOTEROL FUMARATE 160; 9; 4.8 UG/1; UG/1; UG/1
2 AEROSOL, METERED RESPIRATORY (INHALATION) 2 TIMES DAILY
Qty: 1 EACH | Refills: 5 | Status: SHIPPED | OUTPATIENT
Start: 2024-11-13

## 2024-11-13 RX ORDER — ASPIRIN 81 MG/1
81 TABLET ORAL DAILY
Qty: 90 TABLET | Refills: 3 | Status: SHIPPED | OUTPATIENT
Start: 2024-11-13

## 2024-11-13 RX ORDER — ATORVASTATIN CALCIUM 80 MG/1
80 TABLET, FILM COATED ORAL DAILY
Qty: 100 TABLET | Refills: 1 | Status: SHIPPED | OUTPATIENT
Start: 2024-11-13

## 2024-11-13 NOTE — TELEPHONE ENCOUNTER
I phoned Mr. Montanez at the request of Dr. Curran and discussed the results of the FNA of his parotid.  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  It is a benign, non-cancerous, tumor of the spit gland in his cheek.  As discussed when he was last in clinic with Dr. Curran, his case is complicated by the fact that this tumor takes up almost the entirety of his parotid and likely will need total parotidectomy.    I discussed these two options offered by Dr. Curran:    1) we can schedule a follow-up appointment with me so we can discuss everything in further detail   2) If he has any interest in surgical excision, I can refer him to Dr. Rivas or Dr. Hobbs given the complexity of his case.  I am happy to do this now if he wishes, please just let me know and I can contact them and have their clinic schedulers call him to schedule an appointment.     The patient states understanding of information received and would like to discuss everything with Dr. Curran in further detail.  I sent a message to our  to call him to make an appointment.

## 2024-11-13 NOTE — PROGRESS NOTES
\"Have you been to the ER, urgent care clinic since your last visit?  Hospitalized since your last visit?\"    NO    “Have you seen or consulted any other health care providers outside our system since your last visit?”    YES

## 2024-11-19 PROBLEM — E55.9 VITAMIN D DEFICIENCY: Status: ACTIVE | Noted: 2024-11-19

## 2024-11-19 PROBLEM — Z12.5 PROSTATE CANCER SCREENING: Status: ACTIVE | Noted: 2024-11-19

## 2024-11-19 PROBLEM — R73.9 HYPERGLYCEMIA: Status: ACTIVE | Noted: 2024-11-19

## 2024-11-19 NOTE — PROGRESS NOTES
[facility]  [address] [address]  Dept: [phone]  Dept Fax: [phone]     Name: Jerzy Montanez  : 1960           Chief Complaint   Patient presents with    phlem     X couple months, former smoker, not deep enough to clear it out by coughing.     Weight Management     Having problem with his weight       History of Present Illness:    HPI  Here for follow up   Wt is doing well   Cough off and on     Past Medical History:    Past Medical History:   Diagnosis Date    Anemia     COPD (chronic obstructive pulmonary disease) (HCC)     Emphysema lung (HCC)     Hypertension     Stroke (HCC)     2023      Reviewed all health maintenance requirements and ordered appropriate tests  Health Maintenance Due   Topic Date Due    Hepatitis A vaccine (1 of 2 - Risk 2-dose series) Never done    DTaP/Tdap/Td vaccine (1 - Tdap) Never done    Shingles vaccine (1 of 2) Never done    Hepatitis B vaccine (1 of 3 - Risk 3-dose series) Never done    Colorectal Cancer Screen  2024    AAA screen  Never done    Flu vaccine (1) 2024    COVID-19 Vaccine (3 - - season) 2024       Past Surgical History:    Past Surgical History:   Procedure Laterality Date    CT BONE MARROW BIOPSY  10/21/2024    CT BONE MARROW BIOPSY [facility] CT SCAN    IR BIOPSY LYMPH NODE SUPERVICIAL  2024    IR BIOPSY LYMPH NODE SUPERVICIAL 2024 [facility] SPECIAL PROCEDURES    UPPER GASTROINTESTINAL ENDOSCOPY N/A 2023    EGD ESOPHAGOGASTRODUODENOSCOPY PEG TUBE INSERTION performed by Jennyfer William DO at [facility] ENDO        Medications:      Current Outpatient Medications:     aspirin 81 MG EC tablet, Take 1 tablet by mouth daily, Disp: 90 tablet, Rfl: 3    atorvastatin (LIPITOR) 80 MG tablet, Take 1 tablet by mouth daily, Disp: 100 tablet, Rfl: 1    BREZTRI AEROSPHERE 160-9-4.8 MCG/ACT AERO, Inhale 2 puffs into the lungs 2 times daily, Disp: 1 each, Rfl: 5    vitamin B-12

## 2024-12-12 ENCOUNTER — TELEPHONE (OUTPATIENT)
Dept: CARDIOTHORACIC SURGERY | Age: 64
End: 2024-12-12

## 2024-12-12 NOTE — TELEPHONE ENCOUNTER
Left message for patient to call to schedule consultation. Per referring provider CT chest cannot be repeated at this time.

## 2024-12-16 ENCOUNTER — OFFICE VISIT (OUTPATIENT)
Dept: PULMONOLOGY | Age: 64
End: 2024-12-16
Payer: COMMERCIAL

## 2024-12-16 VITALS
BODY MASS INDEX: 27.31 KG/M2 | RESPIRATION RATE: 16 BRPM | SYSTOLIC BLOOD PRESSURE: 116 MMHG | WEIGHT: 174 LBS | HEART RATE: 76 BPM | DIASTOLIC BLOOD PRESSURE: 80 MMHG | HEIGHT: 67 IN | OXYGEN SATURATION: 95 %

## 2024-12-16 DIAGNOSIS — Z87.891 HISTORY OF SMOKING GREATER THAN 50 PACK YEARS: ICD-10-CM

## 2024-12-16 DIAGNOSIS — J96.11 CHRONIC RESPIRATORY FAILURE WITH HYPOXIA: ICD-10-CM

## 2024-12-16 DIAGNOSIS — J43.2 CENTRILOBULAR EMPHYSEMA (HCC): Primary | ICD-10-CM

## 2024-12-16 DIAGNOSIS — J43.9 BULLA OF LUNG (HCC): ICD-10-CM

## 2024-12-16 DIAGNOSIS — Z87.891 PERSONAL HISTORY OF TOBACCO USE: ICD-10-CM

## 2024-12-16 PROCEDURE — 1123F ACP DISCUSS/DSCN MKR DOCD: CPT | Performed by: INTERNAL MEDICINE

## 2024-12-16 PROCEDURE — G0296 VISIT TO DETERM LDCT ELIG: HCPCS | Performed by: INTERNAL MEDICINE

## 2024-12-16 PROCEDURE — 99214 OFFICE O/P EST MOD 30 MIN: CPT | Performed by: INTERNAL MEDICINE

## 2024-12-16 RX ORDER — AMLODIPINE BESYLATE 5 MG/1
5 TABLET ORAL DAILY
Qty: 90 TABLET | Refills: 1 | Status: SHIPPED | OUTPATIENT
Start: 2024-12-16

## 2024-12-16 NOTE — TELEPHONE ENCOUNTER
Patient called in request to refill for Amlodipine to [facility]  Last office visit 11/13/2024

## 2024-12-16 NOTE — PROGRESS NOTES
screening once a person has not smoked for 15 years or has a health problem that limits life expectancy or the ability to have lung surgery.    The patient  reports that he quit smoking about 15 months ago. His smoking use included cigarettes and pipe. He started smoking about 51 years ago. He has a 50.7 pack-year smoking history. He has been exposed to tobacco smoke. He has never used smokeless tobacco.. Discussed with patient the risks and benefits of screening, including over-diagnosis, false positive rate, and total radiation exposure.  The patient currently exhibits no signs or symptoms suggestive of lung cancer.  Discussed with patient the importance of compliance with yearly annual lung cancer screenings and willingness to undergo diagnosis and treatment if screening scan is positive.  In addition, the patient was counseled regarding the importance of remaining smoke free and/or total smoking cessation.    Also reviewed the following if the patient has Medicare that as of February 10, 2022, Medicare only covers LDCT screening in patients aged 50-77 with at least a 20 pack-year smoking history who currently smoke or have quit in the last 15 years

## 2024-12-19 PROBLEM — Z12.5 PROSTATE CANCER SCREENING: Status: RESOLVED | Noted: 2024-11-19 | Resolved: 2024-12-19

## 2024-12-30 RX ORDER — SPIRONOLACTONE 25 MG/1
25 TABLET ORAL DAILY
Qty: 90 TABLET | Refills: 5 | Status: SHIPPED | OUTPATIENT
Start: 2024-12-30

## 2025-01-08 ENCOUNTER — TELEPHONE (OUTPATIENT)
Dept: ONCOLOGY | Age: 65
End: 2025-01-08

## 2025-01-08 NOTE — TELEPHONE ENCOUNTER
Writer called pt to remind them of appt tomorrow 1/8/25, MANDI. Pt had PET COMPLETED.    Electronically signed by Donna West on 1/8/2025 at 2:23 PM

## 2025-01-09 ENCOUNTER — HOSPITAL ENCOUNTER (OUTPATIENT)
Age: 65
Setting detail: SPECIMEN
Discharge: HOME | End: 2025-01-09

## 2025-01-09 ENCOUNTER — OFFICE VISIT (OUTPATIENT)
Dept: ONCOLOGY | Age: 65
End: 2025-01-09
Payer: COMMERCIAL

## 2025-01-09 ENCOUNTER — TELEPHONE (OUTPATIENT)
Dept: ONCOLOGY | Age: 65
End: 2025-01-09

## 2025-01-09 VITALS
HEART RATE: 71 BPM | RESPIRATION RATE: 16 BRPM | HEIGHT: 67 IN | SYSTOLIC BLOOD PRESSURE: 122 MMHG | WEIGHT: 178 LBS | TEMPERATURE: 95.5 F | DIASTOLIC BLOOD PRESSURE: 79 MMHG | BODY MASS INDEX: 27.94 KG/M2

## 2025-01-09 DIAGNOSIS — E55.9 VITAMIN D DEFICIENCY: ICD-10-CM

## 2025-01-09 DIAGNOSIS — D69.6 THROMBOCYTOPENIA (HCC): ICD-10-CM

## 2025-01-09 DIAGNOSIS — J43.1 PANLOBULAR EMPHYSEMA (HCC): ICD-10-CM

## 2025-01-09 DIAGNOSIS — D47.2 SMOLDERING MYELOMA: Primary | ICD-10-CM

## 2025-01-09 DIAGNOSIS — R76.8 ELEVATED SERUM IMMUNOGLOBULIN FREE LIGHT CHAIN LEVEL: ICD-10-CM

## 2025-01-09 DIAGNOSIS — Z12.5 PROSTATE CANCER SCREENING: ICD-10-CM

## 2025-01-09 DIAGNOSIS — I63.40 CEREBROVASCULAR ACCIDENT (CVA) DUE TO EMBOLISM OF CEREBRAL ARTERY (HCC): ICD-10-CM

## 2025-01-09 DIAGNOSIS — I50.22 CHRONIC SYSTOLIC (CONGESTIVE) HEART FAILURE (HCC): ICD-10-CM

## 2025-01-09 DIAGNOSIS — F17.200 SMOKER: ICD-10-CM

## 2025-01-09 DIAGNOSIS — R73.9 HYPERGLYCEMIA: ICD-10-CM

## 2025-01-09 LAB
25(OH)D3 SERPL-MCNC: 61.9 NG/ML (ref 30–100)
ALBUMIN SERPL-MCNC: 4.3 G/DL (ref 3.5–5.2)
ALBUMIN/GLOB SERPL: 1.7 {RATIO} (ref 1–2.5)
ALP SERPL-CCNC: 65 U/L (ref 40–129)
ALT SERPL-CCNC: 14 U/L (ref 10–50)
ANION GAP SERPL CALCULATED.3IONS-SCNC: 10 MMOL/L (ref 9–16)
AST SERPL-CCNC: 18 U/L (ref 10–50)
BASOPHILS # BLD: 0.1 K/UL (ref 0–0.2)
BASOPHILS NFR BLD: 1 % (ref 0–2)
BILIRUB SERPL-MCNC: 0.4 MG/DL (ref 0–1.2)
BILIRUB UR QL STRIP: NEGATIVE
BUN SERPL-MCNC: 10 MG/DL (ref 8–23)
CALCIUM SERPL-MCNC: 9.9 MG/DL (ref 8.6–10.4)
CHLORIDE SERPL-SCNC: 105 MMOL/L (ref 98–107)
CHOLEST SERPL-MCNC: 126 MG/DL (ref 0–199)
CHOLESTEROL/HDL RATIO: 2.4
CLARITY UR: CLEAR
CO2 SERPL-SCNC: 26 MMOL/L (ref 20–31)
COLOR UR: YELLOW
COMMENT: NORMAL
CREAT SERPL-MCNC: 0.7 MG/DL (ref 0.7–1.2)
EOSINOPHIL # BLD: 0.31 K/UL (ref 0–0.44)
EOSINOPHILS RELATIVE PERCENT: 3 % (ref 1–4)
ERYTHROCYTE [DISTWIDTH] IN BLOOD BY AUTOMATED COUNT: 13.4 % (ref 11.8–14.4)
EST. AVERAGE GLUCOSE BLD GHB EST-MCNC: 105 MG/DL
GFR, ESTIMATED: >90 ML/MIN/1.73M2
GLUCOSE SERPL-MCNC: 102 MG/DL (ref 74–99)
GLUCOSE UR STRIP-MCNC: NEGATIVE MG/DL
HBA1C MFR BLD: 5.3 % (ref 4–6)
HCT VFR BLD AUTO: 46.7 % (ref 40.7–50.3)
HDLC SERPL-MCNC: 52 MG/DL
HGB BLD-MCNC: 14.3 G/DL (ref 13–17)
HGB UR QL STRIP.AUTO: NEGATIVE
IMM GRANULOCYTES # BLD AUTO: 0.07 K/UL (ref 0–0.3)
IMM GRANULOCYTES NFR BLD: 1 %
KETONES UR STRIP-MCNC: NEGATIVE MG/DL
LDLC SERPL CALC-MCNC: 61 MG/DL (ref 0–100)
LEUKOCYTE ESTERASE UR QL STRIP: NEGATIVE
LYMPHOCYTES NFR BLD: 1.52 K/UL (ref 1.1–3.7)
LYMPHOCYTES RELATIVE PERCENT: 16 % (ref 24–43)
MCH RBC QN AUTO: 29.1 PG (ref 25.2–33.5)
MCHC RBC AUTO-ENTMCNC: 30.6 G/DL (ref 28.4–34.8)
MCV RBC AUTO: 94.9 FL (ref 82.6–102.9)
MONOCYTES NFR BLD: 0.83 K/UL (ref 0.1–1.2)
MONOCYTES NFR BLD: 9 % (ref 3–12)
NEUTROPHILS NFR BLD: 70 % (ref 36–65)
NEUTS SEG NFR BLD: 6.7 K/UL (ref 1.5–8.1)
NITRITE UR QL STRIP: NEGATIVE
NRBC BLD-RTO: 0 PER 100 WBC
PH UR STRIP: 5.5 [PH] (ref 5–8)
PLATELET # BLD AUTO: 174 K/UL (ref 138–453)
PMV BLD AUTO: 10.4 FL (ref 8.1–13.5)
POTASSIUM SERPL-SCNC: 4.8 MMOL/L (ref 3.7–5.3)
PROT SERPL-MCNC: 6.9 G/DL (ref 6.6–8.7)
PROT UR STRIP-MCNC: NEGATIVE MG/DL
PSA SERPL-MCNC: 0.54 NG/ML (ref 0–4)
RBC # BLD AUTO: 4.92 M/UL (ref 4.21–5.77)
SODIUM SERPL-SCNC: 141 MMOL/L (ref 136–145)
SP GR UR STRIP: 1.02 (ref 1–1.03)
TRIGL SERPL-MCNC: 63 MG/DL
TSH SERPL DL<=0.05 MIU/L-ACNC: 3.03 UIU/ML (ref 0.27–4.2)
UROBILINOGEN UR STRIP-ACNC: NORMAL EU/DL (ref 0–1)
VLDLC SERPL CALC-MCNC: 13 MG/DL (ref 1–30)
WBC OTHER # BLD: 9.5 K/UL (ref 3.5–11.3)

## 2025-01-09 PROCEDURE — 1123F ACP DISCUSS/DSCN MKR DOCD: CPT | Performed by: INTERNAL MEDICINE

## 2025-01-09 PROCEDURE — 99214 OFFICE O/P EST MOD 30 MIN: CPT | Performed by: INTERNAL MEDICINE

## 2025-01-09 NOTE — TELEPHONE ENCOUNTER
AVS 01/09/25    Rv in around 4 months with labs 1 week before rv     Labs ordered today  Basic Metabolic Panel  Complete this on or around 1/9/2025.  CBC with Auto Differential  Complete this on or around 1/9/2025.  Immunoglobulin Panel (IgG, IgA, IgM)  Complete this on or around 1/9/2025.  Monoclonal Panel  Complete this on or around 1/9/2025.    RV 05/29/25    Labs to be done week prior    PT was given AVS and appt schedule    Electronically signed by Donna West on 1/9/2025 at 3:43 PM

## 2025-01-09 NOTE — PROGRESS NOTES
Jerzy Montanez                                                                                                                  1/9/2025  MRN:   [id number]  YOB: 1960  PCP:                           Catia Del Real MD  Referring Physician: No ref. provider found  Treating Physician Name: ESTEFANÍA BARBOUR MD      Reason for visit:  Chief Complaint   Patient presents with    Follow-up     Patient is being seen for follow-up for MGUS   Reviewed labs    Current problems:  IgG lambda smoldering myeloma  HCV infection  History of alcohol dependence  Iron deficiency, now resolved    Active and recent treatments:  Active surveillance for smoldering myeloma    Interval history:  Patient presents to the clinic for a follow-up visit and to discuss results of his ct pet. S/pa parotid mass biopsy. Doesn't not smoke   During this visit patient's allergy, social, medical, surgical history and medications were reviewed and updated.    Summary of Case/History:    Jerzy Montanez a 64 y.o.male is a patient who is admitted with chief complaint of altered mental status.  Patient has not seen a doctor for multiple years.  Due to worsening mental status EMS was summoned.  Patient oxygen saturation was noted to be at 75%.  Patient placed on BiPAP x-ray showed right lower lobe pneumonia.  Patient started on antibiotics.  Patient also noted to have INR of 2.3 platelet count of 55,000.  Patient does have a history of alcohol intake.    Patient's ammonia level noted to be 29.  Creatinine 1.5.  Total bilirubin is within range.  Hemoglobin 11.2 with MCV of 69.  Platelet count is 67,000.  Ultrasound liver done however report is pending.    Past Medical History:   Past Medical History:   Diagnosis Date    Anemia     COPD (chronic obstructive pulmonary disease) (HCC)     Emphysema lung (HCC)     Hypertension     Stroke (HCC)     Jan 2023       Past Surgical History:     Past Surgical History:   Procedure Laterality Date

## 2025-01-27 ENCOUNTER — TELEPHONE (OUTPATIENT)
Age: 65
End: 2025-01-27

## 2025-01-27 NOTE — TELEPHONE ENCOUNTER
I was unable to confirm Dermatology appointment scheduled for 1/28/2025. The telephone call dropped twice.

## 2025-01-28 ENCOUNTER — OFFICE VISIT (OUTPATIENT)
Age: 65
End: 2025-01-28
Payer: COMMERCIAL

## 2025-01-28 VITALS
HEIGHT: 67 IN | WEIGHT: 178 LBS | SYSTOLIC BLOOD PRESSURE: 133 MMHG | OXYGEN SATURATION: 94 % | HEART RATE: 71 BPM | BODY MASS INDEX: 27.94 KG/M2 | DIASTOLIC BLOOD PRESSURE: 88 MMHG

## 2025-01-28 DIAGNOSIS — B35.1 ONYCHOMYCOSIS: ICD-10-CM

## 2025-01-28 DIAGNOSIS — L82.1 SEBORRHEIC KERATOSES: ICD-10-CM

## 2025-01-28 DIAGNOSIS — B35.3 TINEA PEDIS OF BOTH FEET: Primary | ICD-10-CM

## 2025-01-28 DIAGNOSIS — R22.9 SUBCUTANEOUS NODULE: ICD-10-CM

## 2025-01-28 PROCEDURE — 1123F ACP DISCUSS/DSCN MKR DOCD: CPT | Performed by: DERMATOLOGY

## 2025-01-28 PROCEDURE — 99213 OFFICE O/P EST LOW 20 MIN: CPT | Performed by: DERMATOLOGY

## 2025-01-28 NOTE — PROGRESS NOTES
Dermatology Patient Note  [facility]  [facility]  [address] [place] [address]  Dept: [phone]  Dept Fax: [phone]      VISITDATE: 1/28/2025   REFERRING PROVIDER: No ref. provider found      Jerzy Montanez is a 64 y.o. male  who presents today in the office for:    Other (Patient presents for a 6 month follow up on tinea pedis of elyse feet and Onychomycosis. States that he completed the terbinafine the first week of January. Finger nails are doing great per patient, states he feels after a few clippings of the toenails they will be better. LV-07/24/2024)      HISTORY OF PRESENT ILLNESS:  As above. Patient presents for a 6 month follow up for tinea pedis. At the last visit, 7/24/24, he was started on terbinafine for 1 week each month x 6 months. Also placed a referral to ENT for further evaluation of a subcutaneous nodule on left neck.     Jerzy is accompanied by his girlfriend today. He reports his feet and toenails have been improving. Finished course of terbinafine the first week of January.     He FU with ENT and received a FNA of left parotid mass which was consistent with pleomorphic adenoma. Next FU visit is this Thursday.     MEDICAL PROBLEMS:  Patient Active Problem List    Diagnosis Date Noted    Chronic hepatitis C without hepatic coma (HCC) 03/14/2023     Priority: Medium    PEG (percutaneous endoscopic gastrostomy) adjustment/replacement/removal (HCC) 03/14/2023     Priority: Medium    Irritable bowel syndrome with constipation 03/14/2023     Priority: Medium    Cerebrovascular accident (CVA) due to embolism of cerebral artery (HCC) 01/27/2023     Priority: Medium    Dermatitis associated with moisture 01/20/2023     Priority: Medium    Impending cerebrovascular accident (HCC) 01/19/2023     Priority: Medium    Dysphagia 01/16/2023     Priority: Medium    Hepatitis C virus infection without hepatic coma 01/14/2023     Priority: Medium

## 2025-01-28 NOTE — PATIENT INSTRUCTIONS
Seborrheic Keratosis  Seborrheic keratoses are common benign growths of unknown cause seen in adults due to a thickening of an area of the top skin layer.  Who's At Risk  Although they can occur anytime after puberty, almost everyone over 50 has one or more of these and they increase in number with age. Some families have an inherited tendency to grow multiple lesions. Men and women are equally as likely to develop seborrheic keratoses. Dark-skinned people are less affected than those with light skin; a variant seen in blacks is called dermatosis papulosa nigra.  Signs & Symptoms  One or more spots can occur anywhere on the body, except for palms, soles, and mucous membranes (eg, in the mouth or rectum). They do not go away. They do not turn into cancers, but some cancers resemble seborrheic keratosis.  They start as light brown to skin-colored, flat areas, which are round to oval and of varying size (usually less than a half inch, but sometimes much larger). As they grow thicker and rise above the skin surface, seborrheic keratoses may become dark brown to almost black with a \"stuck on\" appearance. The surface may feel smooth or rough.  Self-Care Guidelines  No treatment is needed unless there is irritation from clothing with itching or bleeding.  There is no way to prevent new spots from forming.  Some lotions with alpha hydroxyl acids may make the areas feel smoother with regular use but will not eliminate them.  OTC freezing techniques are available but usually not effective.  When to Seek Medical Care  If a spot on the skin is growing, bleeding, painful, or itchy, or any other concerning changes, then see your doctor.    Sun Protection     There are two types of sun rays that are harmful to the skin.  UVA rays cause skin aging and skin cancer, such as melanoma.  UVB rays cause sunburns, cataracts, and also contribute to skin cancer.    The [employer] recommends that children and adults wear

## 2025-02-27 ENCOUNTER — OFFICE VISIT (OUTPATIENT)
Dept: INTERNAL MEDICINE CLINIC | Age: 65
End: 2025-02-27
Payer: COMMERCIAL

## 2025-02-27 VITALS
OXYGEN SATURATION: 93 % | BODY MASS INDEX: 28.22 KG/M2 | HEIGHT: 67 IN | WEIGHT: 179.8 LBS | SYSTOLIC BLOOD PRESSURE: 110 MMHG | HEART RATE: 66 BPM | DIASTOLIC BLOOD PRESSURE: 70 MMHG

## 2025-02-27 DIAGNOSIS — Z00.00 INITIAL MEDICARE ANNUAL WELLNESS VISIT: Primary | ICD-10-CM

## 2025-02-27 PROCEDURE — G0438 PPPS, INITIAL VISIT: HCPCS | Performed by: INTERNAL MEDICINE

## 2025-02-27 PROCEDURE — 1123F ACP DISCUSS/DSCN MKR DOCD: CPT | Performed by: INTERNAL MEDICINE

## 2025-02-27 ASSESSMENT — PATIENT HEALTH QUESTIONNAIRE - PHQ9
2. FEELING DOWN, DEPRESSED OR HOPELESS: NOT AT ALL
SUM OF ALL RESPONSES TO PHQ QUESTIONS 1-9: 0
SUM OF ALL RESPONSES TO PHQ9 QUESTIONS 1 & 2: 0
1. LITTLE INTEREST OR PLEASURE IN DOING THINGS: NOT AT ALL
SUM OF ALL RESPONSES TO PHQ QUESTIONS 1-9: 0

## 2025-02-27 ASSESSMENT — LIFESTYLE VARIABLES
HOW MANY STANDARD DRINKS CONTAINING ALCOHOL DO YOU HAVE ON A TYPICAL DAY: PATIENT DOES NOT DRINK
HOW OFTEN DO YOU HAVE A DRINK CONTAINING ALCOHOL: NEVER

## 2025-02-27 NOTE — PROGRESS NOTES
\"Have you been to the ER, urgent care clinic since your last visit?  Hospitalized since your last visit?\"    NO    “Have you seen or consulted any other health care providers outside our system since your last visit?”    Yes : ENT for Parotid Pleomorphic Adenoma, Dermatology for Tinea Pedis of both feet, Oncology for Smoldering Myeloma      “Have you had a colorectal cancer screening such as a colonoscopy/FIT/Cologuard?    NO    No colonoscopy on file  No cologuard on file  Date of last FIT: 1/9/2023   No flexible sigmoidoscopy on file

## 2025-02-27 NOTE — PATIENT INSTRUCTIONS
toothpaste twice a day--in the morning and at night--and floss at least once a day. Plaque can quickly build up on the teeth of older adults.  Watch for the signs of gum disease. These signs include gums that bleed after brushing or after eating hard foods, such as apples.  See a dentist regularly. Many experts recommend checkups every 6 months.  Keep the dentist up to date on any new medications the person is taking.  Encourage a balanced diet that includes whole grains, vegetables, and fruits, and that is low in saturated fat and sodium.  Encourage the person you're caring for not to use tobacco products. They can affect dental and general health.  Many older adults have a fixed income and feel that they can't afford dental care. But most towns and cities have programs in which dentists help older adults by lowering fees. Contact your area's public health offices or  for information about dental care in your area.  Using a toothbrush  Older adults with arthritis sometimes have trouble brushing their teeth because they can't easily hold the toothbrush. Their hands and fingers may be stiff, painful, or weak. If this is the case, you can:  Offer an electric toothbrush.  Enlarge the handle of a non-electric toothbrush by wrapping a sponge, an elastic bandage, or adhesive tape around it.  Push the toothbrush handle through a ball made of rubber or soft foam.  Make the handle longer and thicker by taping Popsicle sticks or tongue depressors to it.  You may also be able to buy special toothbrushes, toothpaste dispensers, and floss holders.  Your doctor may recommend a soft-bristle toothbrush if the person you care for bleeds easily. Bleeding can happen because of a health problem or from certain medicines.  A toothpaste for sensitive teeth may help if the person you care for has sensitive teeth.  How do you brush and floss someone's teeth?  If the person you are caring for has a hard time cleaning their

## 2025-02-27 NOTE — PROGRESS NOTES
Medicare Annual Wellness Visit    Jerzy Montanez is here for Medicare AWV    Assessment & Plan   Initial Medicare annual wellness visit     Return in 3 months (on 5/27/2025).     Subjective   The following acute and/or chronic problems were also addressed today:  HTN  COPD inhalers     Patient's complete Health Risk Assessment and screening values have been reviewed and are found in Flowsheets. The following problems were reviewed today and where indicated follow up appointments were made and/or referrals ordered.    Positive Risk Factor Screenings with Interventions:    Fall Risk:  Do you feel unsteady or are you worried about falling? : no  2 or more falls in past year?: no  Fall with injury in past year?: (!) yes (Last May fell down step)     Interventions:    Reviewed medications, home hazards, visual acuity, and co-morbidities that can increase risk for falls  Recommended Assisted Device             Inactivity:  On average, how many days per week do you engage in moderate to strenuous exercise (like a brisk walk)?: 0 days (!) Abnormal  On average, how many minutes do you engage in exercise at this level?: 0 min  Interventions:  Recommendations: patient agrees to exercise for at least 150 minutes/week, patient agrees to wear a pedometer and walk at least 10,000 steps/day      Dentist Screen:  Have you seen the dentist within the past year?: (!) No    Intervention:  Advised to schedule with their dentist        Advanced Directives:  Do you have a Living Will?: (!) No    Intervention:  has NO advanced directive - information provided         Lung Cancer Screening:  Quit smoking 2 years       CT lung next week             Objective   Vitals:    02/27/25 1407   BP: 110/70   Site: Left Upper Arm   Position: Sitting   Cuff Size: Medium Adult   Pulse: 66   SpO2: 93%   Weight: 81.6 kg (179 lb 12.8 oz)   Height: 1.702 m (5' 7.01\")      Body mass index is 28.15 kg/m².        General Appearance: alert and oriented to

## 2025-03-05 ENCOUNTER — HOSPITAL ENCOUNTER (OUTPATIENT)
Dept: CT IMAGING | Age: 65
Discharge: HOME | End: 2025-03-07
Attending: INTERNAL MEDICINE
Payer: COMMERCIAL

## 2025-03-05 VITALS — HEIGHT: 67 IN | BODY MASS INDEX: 28.09 KG/M2 | WEIGHT: 179 LBS

## 2025-03-05 DIAGNOSIS — Z87.891 PERSONAL HISTORY OF TOBACCO USE: ICD-10-CM

## 2025-03-05 PROCEDURE — 71271 CT THORAX LUNG CANCER SCR C-: CPT

## 2025-03-25 ENCOUNTER — OFFICE VISIT (OUTPATIENT)
Dept: GASTROENTEROLOGY | Age: 65
End: 2025-03-25
Payer: COMMERCIAL

## 2025-03-25 VITALS
WEIGHT: 177 LBS | TEMPERATURE: 97.5 F | SYSTOLIC BLOOD PRESSURE: 129 MMHG | DIASTOLIC BLOOD PRESSURE: 84 MMHG | BODY MASS INDEX: 27.72 KG/M2

## 2025-03-25 DIAGNOSIS — Z43.1 PEG (PERCUTANEOUS ENDOSCOPIC GASTROSTOMY) ADJUSTMENT/REPLACEMENT/REMOVAL (HCC): ICD-10-CM

## 2025-03-25 DIAGNOSIS — K58.1 IRRITABLE BOWEL SYNDROME WITH CONSTIPATION: ICD-10-CM

## 2025-03-25 DIAGNOSIS — I63.40 CEREBROVASCULAR ACCIDENT (CVA) DUE TO EMBOLISM OF CEREBRAL ARTERY (HCC): ICD-10-CM

## 2025-03-25 DIAGNOSIS — F17.200 SMOKER: ICD-10-CM

## 2025-03-25 DIAGNOSIS — B18.2 CHRONIC HEPATITIS C WITHOUT HEPATIC COMA (HCC): ICD-10-CM

## 2025-03-25 DIAGNOSIS — R13.19 ESOPHAGEAL DYSPHAGIA: ICD-10-CM

## 2025-03-25 DIAGNOSIS — B19.20 HEPATITIS C VIRUS INFECTION WITHOUT HEPATIC COMA, UNSPECIFIED CHRONICITY: ICD-10-CM

## 2025-03-25 DIAGNOSIS — K62.5 BRBPR (BRIGHT RED BLOOD PER RECTUM): Primary | ICD-10-CM

## 2025-03-25 PROCEDURE — 1123F ACP DISCUSS/DSCN MKR DOCD: CPT | Performed by: INTERNAL MEDICINE

## 2025-03-25 PROCEDURE — 99214 OFFICE O/P EST MOD 30 MIN: CPT | Performed by: INTERNAL MEDICINE

## 2025-03-25 RX ORDER — ANTIPRURITIC (ANTI-ITCH) 1 G/100G
2 OINTMENT TOPICAL 2 TIMES DAILY
Qty: 2 EACH | Refills: 0 | Status: SHIPPED | OUTPATIENT
Start: 2025-03-25

## 2025-03-25 ASSESSMENT — ENCOUNTER SYMPTOMS
NAUSEA: 0
ABDOMINAL PAIN: 0
BLOOD IN STOOL: 0
DIARRHEA: 0
ANAL BLEEDING: 0
RECTAL PAIN: 0
SHORTNESS OF BREATH: 0
CONSTIPATION: 0
CHOKING: 0
VOMITING: 0
ABDOMINAL DISTENTION: 0
COLOR CHANGE: 0
SORE THROAT: 0
WHEEZING: 0
TROUBLE SWALLOWING: 0
COUGH: 0

## 2025-05-07 ENCOUNTER — OFFICE VISIT (OUTPATIENT)
Dept: NEUROLOGY | Age: 65
End: 2025-05-07
Payer: COMMERCIAL

## 2025-05-07 VITALS
SYSTOLIC BLOOD PRESSURE: 117 MMHG | HEART RATE: 77 BPM | BODY MASS INDEX: 27.03 KG/M2 | HEIGHT: 67 IN | WEIGHT: 172.2 LBS | DIASTOLIC BLOOD PRESSURE: 85 MMHG

## 2025-05-07 DIAGNOSIS — I63.40 CEREBROVASCULAR ACCIDENT (CVA) DUE TO EMBOLISM OF CEREBRAL ARTERY (HCC): ICD-10-CM

## 2025-05-07 DIAGNOSIS — F05 DELIRIUM DUE TO ANOTHER MEDICAL CONDITION: Primary | ICD-10-CM

## 2025-05-07 PROCEDURE — 1123F ACP DISCUSS/DSCN MKR DOCD: CPT

## 2025-05-07 PROCEDURE — 99214 OFFICE O/P EST MOD 30 MIN: CPT

## 2025-05-07 NOTE — PROGRESS NOTES
[address], [address]  [place] [address]  P: [phone]  F: [phone]    NEUROLOGY CLINIC NOTE     PATIENT NAME: Jerzy Montanez  PATIENT MRN: [id number]  PRIMARY CARE PHYSICIAN: Catia Del Real MD    HPI:      Jerzy Montanez is a 64 y.o. with past medical history of alcohol use, HCV, anemia, prior stroke presents to neurology clinic for follow-up regarding stroke.    Patient was admitted to [facility] in January 2023 for acute respiratory failure with pneumonia.  At that time, he was found to have ischemic infarctions in right frontal lobe, left posterior parietal lobe, and few foci of parasagittal right frontoparietal region.  He also had suspected SAH on CT and MRI during that admission, and was not started on antiplatelets upon discharge.  Since the hospital discharge, he followed up in the clinic on 3/29/2023 where he was started on aspirin 81 mg daily for secondary stroke prophylaxis.  He was not started on statin due to liver dysfunction. Patient experienced thrombocytopenia along with being diagnosed with HCV, which also contributed to the decision to not start antiplatelets.      On 8/14/24, for follow up regarding stroke follow up. He is compliant with aspirin 81 mg. Discussion about CASSI was had, and patient would like to discuss with his cardiologist and PCP before undergoing the test. Patient is doing well at the time of this visit, and denies any new neurologic deficits. He underwent physical therapy post-hospitalization.     Today, on 5/7/2025, he presents for follow-up.  He is in good health with no recurrence of strokelike symptoms or concerns.  He remains on aspirin 81 mg and Lipitor 80 mg.  He has refused to undergo transesophageal echo.  Cardiology did not mention any recommendation for CASSI or loop recorder.      MRI brain w/o contrast done on 1/11/23 shows ischemia in R frontal and L posterior parietal lobe, and

## 2025-05-21 RX ORDER — BUDESONIDE, GLYCOPYRROLATE, AND FORMOTEROL FUMARATE 160; 9; 4.8 UG/1; UG/1; UG/1
2 AEROSOL, METERED RESPIRATORY (INHALATION) 2 TIMES DAILY
Qty: 1 EACH | Refills: 5 | Status: SHIPPED | OUTPATIENT
Start: 2025-05-21 | End: 2025-05-22

## 2025-05-21 NOTE — TELEPHONE ENCOUNTER
Patient requesting refill for Breztri to be sent to [facility]   Last office visit 2/27/2025

## 2025-05-22 ENCOUNTER — TELEPHONE (OUTPATIENT)
Age: 65
End: 2025-05-22

## 2025-05-22 ENCOUNTER — TELEPHONE (OUTPATIENT)
Dept: INTERNAL MEDICINE CLINIC | Age: 65
End: 2025-05-22

## 2025-05-22 RX ORDER — FLUTICASONE FUROATE, UMECLIDINIUM BROMIDE AND VILANTEROL TRIFENATATE 200; 62.5; 25 UG/1; UG/1; UG/1
1 POWDER RESPIRATORY (INHALATION) DAILY
Qty: 1 EACH | Refills: 5 | Status: SHIPPED | OUTPATIENT
Start: 2025-05-22

## 2025-05-22 NOTE — TELEPHONE ENCOUNTER
[facility] calling to inform that that Breztri is not a preferred drug on insurance plan alternative  med would be trelegy inaler... Please advise on med change

## 2025-05-23 ENCOUNTER — HOSPITAL ENCOUNTER (OUTPATIENT)
Age: 65
Setting detail: SPECIMEN
Discharge: HOME | End: 2025-05-23

## 2025-05-23 DIAGNOSIS — D47.2 SMOLDERING MYELOMA: ICD-10-CM

## 2025-05-23 DIAGNOSIS — D69.6 THROMBOCYTOPENIA: ICD-10-CM

## 2025-05-23 DIAGNOSIS — R76.8 ELEVATED SERUM IMMUNOGLOBULIN FREE LIGHT CHAIN LEVEL: ICD-10-CM

## 2025-05-23 LAB
ALBUMIN PERCENT: ABNORMAL %
ALBUMIN SERPL-MCNC: ABNORMAL G/DL
ALPHA 2 PERCENT: ABNORMAL %
ALPHA1 GLOB SERPL ELPH-MCNC: ABNORMAL %
ALPHA1 GLOB SERPL ELPH-MCNC: ABNORMAL G/DL
ALPHA2 GLOB SERPL ELPH-MCNC: ABNORMAL G/DL
ANION GAP SERPL CALCULATED.3IONS-SCNC: 11 MMOL/L (ref 9–16)
B-GLOBULIN SERPL ELPH-MCNC: ABNORMAL %
B-GLOBULIN SERPL ELPH-MCNC: ABNORMAL G/DL
BASOPHILS # BLD: 0.09 K/UL (ref 0–0.2)
BASOPHILS NFR BLD: 1 % (ref 0–2)
BUN SERPL-MCNC: 15 MG/DL (ref 8–23)
CALCIUM SERPL-MCNC: 9.9 MG/DL (ref 8.6–10.4)
CHLORIDE SERPL-SCNC: 105 MMOL/L (ref 98–107)
CO2 SERPL-SCNC: 26 MMOL/L (ref 20–31)
CREAT SERPL-MCNC: 0.9 MG/DL (ref 0.7–1.2)
EOSINOPHIL # BLD: 0.32 K/UL (ref 0–0.44)
EOSINOPHILS RELATIVE PERCENT: 3 % (ref 1–4)
ERYTHROCYTE [DISTWIDTH] IN BLOOD BY AUTOMATED COUNT: 13.7 % (ref 11.8–14.4)
FREE KAPPA/LAMBDA RATIO: 0.03 (ref 0.22–1.74)
GAMMA GLOB SERPL ELPH-MCNC: ABNORMAL G/DL
GAMMA GLOBULIN %: ABNORMAL %
GFR, ESTIMATED: >90 ML/MIN/1.73M2
GLUCOSE SERPL-MCNC: 109 MG/DL (ref 74–99)
HCT VFR BLD AUTO: 43.6 % (ref 40.7–50.3)
HGB BLD-MCNC: 13.6 G/DL (ref 13–17)
IGA SERPL-MCNC: 205 MG/DL (ref 70–400)
IGG SERPL-MCNC: 818 MG/DL (ref 700–1600)
IGM SERPL-MCNC: 60 MG/DL (ref 40–230)
IMM GRANULOCYTES # BLD AUTO: 0.07 K/UL (ref 0–0.3)
IMM GRANULOCYTES NFR BLD: 1 %
ITYP INTERPRETATION: ABNORMAL
KAPPA LC FREE SER-MCNC: 16.4 MG/L
LAMBDA LC FREE SERPL-MCNC: 555 MG/L (ref 4.2–27.7)
LYMPHOCYTES NFR BLD: 1.46 K/UL (ref 1.1–3.7)
LYMPHOCYTES RELATIVE PERCENT: 14 % (ref 24–43)
M PROTEIN 2 SERPL ELPH-MCNC: ABNORMAL G/DL
M PROTEIN SERPL ELPH-MCNC: ABNORMAL G/DL
MCH RBC QN AUTO: 29.3 PG (ref 25.2–33.5)
MCHC RBC AUTO-ENTMCNC: 31.2 G/DL (ref 28.4–34.8)
MCV RBC AUTO: 94 FL (ref 82.6–102.9)
MONOCYTES NFR BLD: 0.96 K/UL (ref 0.1–1.2)
MONOCYTES NFR BLD: 9 % (ref 3–12)
NEUTROPHILS NFR BLD: 72 % (ref 36–65)
NEUTS SEG NFR BLD: 7.5 K/UL (ref 1.5–8.1)
NRBC BLD-RTO: 0 PER 100 WBC
PATH REV: ABNORMAL
PATHOLOGIST: ABNORMAL
PLATELET # BLD AUTO: 164 K/UL (ref 138–453)
PMV BLD AUTO: 11.3 FL (ref 8.1–13.5)
POTASSIUM SERPL-SCNC: 4.8 MMOL/L (ref 3.7–5.3)
PROT PATTERN SERPL ELPH-IMP: ABNORMAL
PROT SERPL-MCNC: 6.4 G/DL (ref 6.6–8.7)
RBC # BLD AUTO: 4.64 M/UL (ref 4.21–5.77)
SODIUM SERPL-SCNC: 142 MMOL/L (ref 136–145)
TOTAL PROT. SUM,%: ABNORMAL %
TOTAL PROT. SUM: ABNORMAL G/DL
WBC OTHER # BLD: 10.4 K/UL (ref 3.5–11.3)

## 2025-05-28 LAB
ALBUMIN PERCENT: 57 % (ref 56–66)
ALBUMIN SERPL-MCNC: 3.6 G/DL (ref 3.2–5.2)
ALPHA 2 PERCENT: 14 % (ref 7–12)
ALPHA1 GLOB SERPL ELPH-MCNC: 0.4 G/DL (ref 0.1–0.4)
ALPHA1 GLOB SERPL ELPH-MCNC: 6 % (ref 3–5)
ALPHA2 GLOB SERPL ELPH-MCNC: 0.9 G/DL (ref 0.5–0.9)
B-GLOBULIN SERPL ELPH-MCNC: 0.8 G/DL (ref 0.7–1.4)
B-GLOBULIN SERPL ELPH-MCNC: 12 % (ref 8–13)
FREE KAPPA/LAMBDA RATIO: 0.03 (ref 0.22–1.74)
GAMMA GLOB SERPL ELPH-MCNC: 0.7 G/DL (ref 0.5–1.5)
GAMMA GLOBULIN %: 11 % (ref 11–19)
ITYP INTERPRETATION: ABNORMAL
KAPPA LC FREE SER-MCNC: 16.4 MG/L
LAMBDA LC FREE SERPL-MCNC: 555 MG/L (ref 4.2–27.7)
PATH REV: ABNORMAL
PATHOLOGIST: ABNORMAL
PROT PATTERN SERPL ELPH-IMP: ABNORMAL
PROT SERPL-MCNC: 6.4 G/DL (ref 6.6–8.7)
TOTAL PROT. SUM,%: 100 % (ref 98–102)
TOTAL PROT. SUM: 6.4 G/DL (ref 6.3–8.2)

## 2025-05-29 ENCOUNTER — OFFICE VISIT (OUTPATIENT)
Age: 65
End: 2025-05-29
Payer: COMMERCIAL

## 2025-05-29 ENCOUNTER — TELEPHONE (OUTPATIENT)
Age: 65
End: 2025-05-29

## 2025-05-29 VITALS
HEART RATE: 82 BPM | BODY MASS INDEX: 27.06 KG/M2 | SYSTOLIC BLOOD PRESSURE: 134 MMHG | WEIGHT: 172.8 LBS | DIASTOLIC BLOOD PRESSURE: 84 MMHG

## 2025-05-29 DIAGNOSIS — R22.1 NECK MASS: ICD-10-CM

## 2025-05-29 DIAGNOSIS — D47.2 SMOLDERING MYELOMA: Primary | ICD-10-CM

## 2025-05-29 PROCEDURE — 99214 OFFICE O/P EST MOD 30 MIN: CPT | Performed by: INTERNAL MEDICINE

## 2025-05-29 PROCEDURE — 1123F ACP DISCUSS/DSCN MKR DOCD: CPT | Performed by: INTERNAL MEDICINE

## 2025-05-29 NOTE — PROGRESS NOTES
Jerzy Montanez                                                                                                                  5/29/2025  MRN:   [id number]  YOB: 1960  PCP:                           Catia Del Real MD  Referring Physician: No ref. provider found  Treating Physician Name: ESTEFANÍA BARBOUR MD      Reason for visit:  Chief Complaint   Patient presents with    Follow-up     Labs    Reviewed labs    Current problems:  IgG lambda smoldering myeloma  HCV infection  History of alcohol dependence  Iron deficiency, now resolved    Active and recent treatments:  Active surveillance for smoldering myeloma    Interval history:  Patient presents to the clinic for a follow-up visit and to discuss treatment plan.  Patient free light chain ratio remains elevated at 30.  Patient does not have anemia or hypercalcemia.  Patient has question about undergoing a CASSI.  During this visit patient's allergy, social, medical, surgical history and medications were reviewed and updated.    Summary of Case/History:    Jerzy Montanez a 64 y.o.male is a patient who is admitted with chief complaint of altered mental status.  Patient has not seen a doctor for multiple years.  Due to worsening mental status EMS was summoned.  Patient oxygen saturation was noted to be at 75%.  Patient placed on BiPAP x-ray showed right lower lobe pneumonia.  Patient started on antibiotics.  Patient also noted to have INR of 2.3 platelet count of 55,000.  Patient does have a history of alcohol intake.    Patient's ammonia level noted to be 29.  Creatinine 1.5.  Total bilirubin is within range.  Hemoglobin 11.2 with MCV of 69.  Platelet count is 67,000.  Ultrasound liver done however report is pending.    Past Medical History:   Past Medical History:   Diagnosis Date    Anemia     COPD (chronic obstructive pulmonary disease) (HCC)     Emphysema lung (HCC)     Hypertension     Stroke (HCC)     Jan 2023       Past Surgical

## 2025-05-29 NOTE — TELEPHONE ENCOUNTER
AVS 05/29/25    Instructions   from Dr. Cresencio Ortiz MD    Rv in 6 months with labs 1 weeks     Labs ordered today  Basic Metabolic Panel  Complete this on or around 5/29/2025.  CBC with Auto Differential  Complete this on or around 5/29/2025.  Monoclonal Panel  Complete this on or around 5/29/2025.    RV 11/20/25    Labs to be done week prior    PT was given AVS and appt schedule    Electronically signed by Donna West on 5/29/2025 at 2:17 PM

## 2025-06-03 ENCOUNTER — OFFICE VISIT (OUTPATIENT)
Dept: INTERNAL MEDICINE CLINIC | Age: 65
End: 2025-06-03
Payer: COMMERCIAL

## 2025-06-03 VITALS
OXYGEN SATURATION: 95 % | SYSTOLIC BLOOD PRESSURE: 121 MMHG | DIASTOLIC BLOOD PRESSURE: 76 MMHG | HEART RATE: 71 BPM | WEIGHT: 172.4 LBS | BODY MASS INDEX: 27 KG/M2

## 2025-06-03 DIAGNOSIS — D69.6 THROMBOCYTOPENIA: ICD-10-CM

## 2025-06-03 DIAGNOSIS — B18.2 CHRONIC HEPATITIS C WITHOUT HEPATIC COMA (HCC): ICD-10-CM

## 2025-06-03 DIAGNOSIS — I77.810 ASCENDING AORTA DILATATION: Primary | ICD-10-CM

## 2025-06-03 DIAGNOSIS — I63.40 CEREBROVASCULAR ACCIDENT (CVA) DUE TO EMBOLISM OF CEREBRAL ARTERY (HCC): ICD-10-CM

## 2025-06-03 DIAGNOSIS — I63.9 IMPENDING CEREBROVASCULAR ACCIDENT (HCC): ICD-10-CM

## 2025-06-03 DIAGNOSIS — J43.1 PANLOBULAR EMPHYSEMA (HCC): ICD-10-CM

## 2025-06-03 PROCEDURE — 1123F ACP DISCUSS/DSCN MKR DOCD: CPT | Performed by: INTERNAL MEDICINE

## 2025-06-03 PROCEDURE — 99214 OFFICE O/P EST MOD 30 MIN: CPT | Performed by: INTERNAL MEDICINE

## 2025-06-03 SDOH — ECONOMIC STABILITY: FOOD INSECURITY: WITHIN THE PAST 12 MONTHS, YOU WORRIED THAT YOUR FOOD WOULD RUN OUT BEFORE YOU GOT MONEY TO BUY MORE.: NEVER TRUE

## 2025-06-03 SDOH — ECONOMIC STABILITY: FOOD INSECURITY: WITHIN THE PAST 12 MONTHS, THE FOOD YOU BOUGHT JUST DIDN'T LAST AND YOU DIDN'T HAVE MONEY TO GET MORE.: NEVER TRUE

## 2025-06-03 ASSESSMENT — PATIENT HEALTH QUESTIONNAIRE - PHQ9
SUM OF ALL RESPONSES TO PHQ QUESTIONS 1-9: 0
SUM OF ALL RESPONSES TO PHQ QUESTIONS 1-9: 0
1. LITTLE INTEREST OR PLEASURE IN DOING THINGS: NOT AT ALL
2. FEELING DOWN, DEPRESSED OR HOPELESS: NOT AT ALL
SUM OF ALL RESPONSES TO PHQ QUESTIONS 1-9: 0
SUM OF ALL RESPONSES TO PHQ QUESTIONS 1-9: 0

## 2025-06-03 NOTE — PROGRESS NOTES
[facility]  [address] [address]  Dept: [phone]  Dept Fax: [phone]     Name: Jerzy Montanez  : 1960           Chief Complaint   Patient presents with    Check-Up     3 Month routine check up with provider       History of Present Illness  The patient presents for a follow-up appointment.    He reports no current complaints. He has not yet completed the Cologuard test for colon cancer screening, which was previously ordered. He has the test kit at home and plans to complete it soon. He is under the care of Dr. Lay, a gastroenterologist, who has recommended the Cologuard test as the initial step. He reports no presence of blood in his stools, attributing any minor bleeding to hemorrhoids.    He is currently on a regimen of amlodipine, carvedilol, and spironolactone for blood pressure management. His blood pressure reading today is 121/76 mmHg. He smokes cigarettes but usually does so in a different room to minimize exposure to others.    He has a history of COPD and is utilizing inhalers and nocturnal oxygen therapy. He occasionally requires oxygen during the day following exertion.    He is under the care of Dr. Vazquez, an oncologist, for smoldering myeloma. He has expressed concerns about undergoing surgery due to his compromised lung capacity and potential resuscitation difficulties.    He has a history of hepatitis C, which was successfully treated with Harvoni.    He is scheduled to see Dr. Muhammad, a pulmonologist, in the coming weeks. He is also under the care of Dr. Orellana for cardiac issues and is scheduled for an echocardiogram next week. He has consulted with Dr. Valero, a neurologist, who has recommended a procedure to check for clots behind his heart as a precautionary measure. He has expressed concerns about this procedure due to previous intubation complications.    He is currently taking vitamin D supplements at a dose of 50,000 units

## 2025-06-03 NOTE — PROGRESS NOTES
Have you been to the ER, urgent care clinic since your last visit?  Hospitalized since your last visit?   NO    Have you seen or consulted any other health care providers outside our system since your last visit?   NO      “Have you had a colorectal cancer screening such as a colonoscopy/FIT/Cologuard?    NO    No colonoscopy on file  No cologuard on file  Date of last FIT: 1/9/2023   No flexible sigmoidoscopy on file            Jerzy Montanez provided verbal consent for the provider to use the RK recording tool during their visit.

## 2025-06-16 RX ORDER — AMLODIPINE BESYLATE 5 MG/1
5 TABLET ORAL DAILY
Qty: 90 TABLET | Refills: 1 | Status: SHIPPED | OUTPATIENT
Start: 2025-06-16

## 2025-06-16 NOTE — TELEPHONE ENCOUNTER
Patient called in request to refill amlodipine to be sent to [facility]   Last office visit 6/3/2025

## 2025-06-23 ENCOUNTER — TELEPHONE (OUTPATIENT)
Dept: INTERNAL MEDICINE CLINIC | Age: 65
End: 2025-06-23

## 2025-06-23 ENCOUNTER — OFFICE VISIT (OUTPATIENT)
Dept: PULMONOLOGY | Age: 65
End: 2025-06-23
Payer: COMMERCIAL

## 2025-06-23 VITALS
WEIGHT: 168 LBS | DIASTOLIC BLOOD PRESSURE: 76 MMHG | RESPIRATION RATE: 12 BRPM | HEIGHT: 67 IN | SYSTOLIC BLOOD PRESSURE: 108 MMHG | HEART RATE: 75 BPM | BODY MASS INDEX: 26.37 KG/M2 | OXYGEN SATURATION: 96 %

## 2025-06-23 DIAGNOSIS — J43.2 CENTRILOBULAR EMPHYSEMA (HCC): Primary | ICD-10-CM

## 2025-06-23 DIAGNOSIS — J96.11 CHRONIC RESPIRATORY FAILURE WITH HYPOXIA (HCC): ICD-10-CM

## 2025-06-23 DIAGNOSIS — J43.9 BULLA OF LUNG (HCC): ICD-10-CM

## 2025-06-23 DIAGNOSIS — Z87.891 HISTORY OF SMOKING GREATER THAN 50 PACK YEARS: ICD-10-CM

## 2025-06-23 DIAGNOSIS — Z87.891 PERSONAL HISTORY OF TOBACCO USE: ICD-10-CM

## 2025-06-23 PROCEDURE — 1159F MED LIST DOCD IN RCRD: CPT | Performed by: INTERNAL MEDICINE

## 2025-06-23 PROCEDURE — 99214 OFFICE O/P EST MOD 30 MIN: CPT | Performed by: INTERNAL MEDICINE

## 2025-06-23 PROCEDURE — 1123F ACP DISCUSS/DSCN MKR DOCD: CPT | Performed by: INTERNAL MEDICINE

## 2025-06-23 NOTE — TELEPHONE ENCOUNTER
Office received fax from pharmacy requesting a refill for the following medication:    Vit D2 1.25 MG (50,000 unit) qty 12  Take 1 capsule by mouth once a week.     I do not see this on medication list. Unable to pend.     Please review and send to pharmacy if appropriate.

## 2025-06-23 NOTE — PROGRESS NOTES
saturation dropped to 87% on walking and he was placed on 2 L nasal cannula and his saturation remains 95% on 2 L.  Total distance walk was 1250 feet      Assessment & Plan  1. Chronic Obstructive Pulmonary Disease (COPD).  No flare-ups requiring steroids or antibiotics have occurred since the last visit. An increase in mucus production was noted over the winter, which has decreased after switching from Breztri to Trelegy. Trelegy is taken once daily and is covered by his insurance. He should monitor for changes in mucus color and increased shortness of breath.   His last pulmonary function test was FEV1 of 33% consistent with severe COPD.    2. Chronic Respiratory Failure with Hypoxia.  Oxygen is used at night and occasionally during the day as needed. No issues with the current oxygen regimen were reported. He should continue using his portable oxygen concentrator as needed, especially during exertion.    3. Benign submandibular left parotid mass  A benign lump on the face has been present for approximately 50 years. Concerns about undergoing surgery due to the risks associated with anesthesia and lung function were discussed. He is advised to weigh the risks and benefits of surgery and make a decision based on his comfort level and the non-malignant nature of the lump.    4.  Small multiple lung nodules.  Lung nodules on the CT scan has been stable without much change on CT scan in March 2025 as compared to March 2024.    5.  History of smoking greater than 50 pack year.  He will need low-dose CT scan of the chest yearly last CT scan was March 2025 and he will not be due until March 2026.    Follow-up  Follow-up in 6 months.      It was my pleasure to evaluate Jerzy Montanez today.  Please call with questions.     Please note that this chart was generated using voice recognition Dragon dictation software. Although every effort was made to ensure the accuracy of this automated transcription, some errors in

## 2025-07-11 ENCOUNTER — TELEPHONE (OUTPATIENT)
Dept: INTERNAL MEDICINE CLINIC | Age: 65
End: 2025-07-11

## 2025-07-11 DIAGNOSIS — Z12.11 SCREENING FOR MALIGNANT NEOPLASM OF COLON: Primary | ICD-10-CM

## 2025-07-11 NOTE — TELEPHONE ENCOUNTER
Patient called and stated that nasreen called and told the patient his order . He is in need of a new one. Please advise.

## 2025-07-20 LAB — NONINV COLON CA DNA+OCC BLD SCRN STL QL: NEGATIVE

## 2025-08-04 DIAGNOSIS — I63.40 CEREBROVASCULAR ACCIDENT (CVA) DUE TO EMBOLISM OF CEREBRAL ARTERY (HCC): ICD-10-CM

## 2025-08-04 RX ORDER — CARVEDILOL 12.5 MG/1
12.5 TABLET ORAL 2 TIMES DAILY
Qty: 180 TABLET | Refills: 3 | Status: SHIPPED | OUTPATIENT
Start: 2025-08-04

## 2025-08-04 RX ORDER — ATORVASTATIN CALCIUM 80 MG/1
80 TABLET, FILM COATED ORAL DAILY
Qty: 100 TABLET | Refills: 1 | Status: SHIPPED | OUTPATIENT
Start: 2025-08-04

## 2025-08-11 ENCOUNTER — TELEPHONE (OUTPATIENT)
Dept: PULMONOLOGY | Age: 65
End: 2025-08-11

## 2025-09-01 PROBLEM — Z86.73 HISTORY OF STROKE: Status: ACTIVE | Noted: 2023-01-27

## (undated) DEVICE — ENDO KIT W/SYRINGE: Brand: MEDLINE INDUSTRIES, INC.

## (undated) DEVICE — BITEBLOCK 54FR W/ DENT RIM BLOX

## (undated) DEVICE — GLOVE SURG SZ 7 CRM LTX FREE POLYISOPRENE POLYMER BEAD ANTI

## (undated) DEVICE — DEFENDO AIR WATER SUCTION AND BIOPSY VALVE KIT FOR  OLYMPUS: Brand: DEFENDO AIR/WATER/SUCTION AND BIOPSY VALVE

## (undated) DEVICE — MIC* SAFETY PERCUTANEOUS ENDOSCOPIC GASTROSTOMY PEG KIT - 20 FR - PULL: Brand: MIC PEG TUBE